# Patient Record
Sex: FEMALE | Race: WHITE | NOT HISPANIC OR LATINO | Employment: UNEMPLOYED | URBAN - METROPOLITAN AREA
[De-identification: names, ages, dates, MRNs, and addresses within clinical notes are randomized per-mention and may not be internally consistent; named-entity substitution may affect disease eponyms.]

---

## 2017-04-20 ENCOUNTER — HISTORICAL (OUTPATIENT)
Dept: LAB | Facility: HOSPITAL | Age: 57
End: 2017-04-20

## 2017-04-27 ENCOUNTER — HISTORICAL (OUTPATIENT)
Dept: RADIOLOGY | Facility: HOSPITAL | Age: 57
End: 2017-04-27

## 2017-06-16 ENCOUNTER — HISTORICAL (OUTPATIENT)
Dept: RADIOLOGY | Facility: HOSPITAL | Age: 57
End: 2017-06-16

## 2017-06-16 LAB
ABS NEUT (OLG): 4.4 X10(3)/MCL
ALBUMIN SERPL-MCNC: 3.3 GM/DL (ref 3.4–5)
ALBUMIN/GLOB SERPL: 1 {RATIO}
ALP SERPL-CCNC: 95 UNIT/L (ref 45–117)
ALT SERPL-CCNC: 86 UNIT/L (ref 13–56)
AMYLASE SERPL-CCNC: 25 UNIT/L (ref 25–115)
AST SERPL-CCNC: 62 UNIT/L (ref 15–37)
BASOPHILS # BLD AUTO: 0 X10(3)/MCL
BASOPHILS NFR BLD AUTO: 0.1 % (ref 0–1)
BILIRUB SERPL-MCNC: 0.5 MG/DL (ref 0.2–1)
BILIRUBIN DIRECT+TOT PNL SERPL-MCNC: 0.1 MG/DL (ref 0–0.2)
BILIRUBIN DIRECT+TOT PNL SERPL-MCNC: 0.4 MG/DL (ref 0–1)
BUN SERPL-MCNC: 5 MG/DL (ref 7–18)
CALCIUM SERPL-MCNC: 8.5 MG/DL (ref 8.5–10.1)
CHLORIDE SERPL-SCNC: 104 MMOL/L (ref 98–107)
CO2 SERPL-SCNC: 30 MMOL/L (ref 21–32)
CREAT SERPL-MCNC: 0.83 MG/DL (ref 0.55–1.02)
EOSINOPHIL # BLD AUTO: 0.1 X10(3)/MCL
EOSINOPHIL NFR BLD AUTO: 1.7 % (ref 0–6.4)
ERYTHROCYTE [DISTWIDTH] IN BLOOD BY AUTOMATED COUNT: 16.3 % (ref 11.5–14.8)
GLOBULIN SER-MCNC: 3 GM/DL (ref 2–4)
GLUCOSE SERPL-MCNC: 141 MG/DL (ref 74–106)
HCT VFR BLD AUTO: 49.4 % (ref 35.5–44.6)
HGB BLD-MCNC: 17.1 GM/DL (ref 12.1–15.4)
INR PPP: 1.1
LIPASE SERPL-CCNC: 156 UNIT/L (ref 73–393)
LYMPHOCYTES # BLD AUTO: 2.2 X10(3)/MCL
LYMPHOCYTES NFR BLD AUTO: 31.4 % (ref 16–44)
MCH RBC QN AUTO: 37.1 PG (ref 28.5–33.8)
MCHC RBC AUTO-ENTMCNC: 34.6 % (ref 33–37)
MCV RBC AUTO: 107.2 FL (ref 82–99)
MONOCYTES # BLD AUTO: 0.3 X10(3)/MCL
MONOCYTES NFR BLD AUTO: 4.4 % (ref 4–12.1)
NEUTROPHILS # BLD AUTO: 4.4 X10(3)/MCL
NEUTROPHILS NFR BLD AUTO: 62.3 % (ref 43–73)
PLATELET # BLD AUTO: 248 X10(3)/MCL (ref 136–369)
PMV BLD AUTO: 9.4 FL (ref 7.4–10.4)
POTASSIUM SERPL-SCNC: 3.1 MMOL/L (ref 3.5–5.1)
PROT SERPL-MCNC: 6.6 GM/DL (ref 6.4–8.2)
PROTHROMBIN TIME: 11.7 SECOND(S) (ref 9.8–12)
RBC # BLD AUTO: 4.61 X10(6)/MCL (ref 4–5.5)
SODIUM SERPL-SCNC: 142 MMOL/L (ref 136–145)
WBC # SPEC AUTO: 7.1 X10(3)/MCL (ref 4–10)

## 2017-07-03 ENCOUNTER — HISTORICAL (OUTPATIENT)
Dept: LAB | Facility: HOSPITAL | Age: 57
End: 2017-07-03

## 2017-07-03 LAB
IRON SATN MFR SERPL: 44.5 % (ref 20–50)
IRON SERPL-MCNC: 178 MCG/DL (ref 50–175)
TIBC SERPL-MCNC: 400 MCG/DL (ref 250–450)
TRANSFERRIN SERPL-MCNC: 319 MG/DL (ref 200–360)

## 2017-08-22 LAB
BILIRUB SERPL-MCNC: NEGATIVE MG/DL
BLOOD URINE, POC: NORMAL
CLARITY, POC UA: NORMAL
COLOR, POC UA: YELLOW
GLUCOSE UR QL STRIP: NEGATIVE
KETONES UR QL STRIP: NORMAL
LEUKOCYTE EST, POC UA: NORMAL
NITRITE, POC UA: POSITIVE
PH, POC UA: 6.5
PROTEIN, POC: NEGATIVE
SPECIFIC GRAVITY, POC UA: 1
UROBILINOGEN, POC UA: NORMAL

## 2019-06-13 ENCOUNTER — HISTORICAL (OUTPATIENT)
Dept: RADIOLOGY | Facility: HOSPITAL | Age: 59
End: 2019-06-13

## 2019-10-09 ENCOUNTER — HISTORICAL (OUTPATIENT)
Dept: RADIOLOGY | Facility: HOSPITAL | Age: 59
End: 2019-10-09

## 2019-10-09 LAB
BUN SERPL-MCNC: 8 MG/DL (ref 7–18)
CREAT SERPL-MCNC: 1.1 MG/DL (ref 0.6–1.3)

## 2019-10-10 ENCOUNTER — HISTORICAL (OUTPATIENT)
Dept: ADMINISTRATIVE | Facility: HOSPITAL | Age: 59
End: 2019-10-10

## 2019-10-10 LAB
ABS NEUT (OLG): 6.72 X10(3)/MCL (ref 2.1–9.2)
ALBUMIN SERPL-MCNC: 3.5 GM/DL (ref 3.4–5)
ALBUMIN/GLOB SERPL: 0.8 RATIO (ref 1.1–2)
ALP SERPL-CCNC: 103 UNIT/L (ref 45–117)
ALT SERPL-CCNC: 108 UNIT/L (ref 12–78)
APTT PPP: 28 SECOND(S) (ref 23.3–37)
AST SERPL-CCNC: 132 UNIT/L (ref 15–37)
BASOPHILS # BLD AUTO: 0 X10(3)/MCL (ref 0–0.2)
BASOPHILS NFR BLD AUTO: 0 %
BILIRUB SERPL-MCNC: 0.5 MG/DL (ref 0.2–1)
BILIRUBIN DIRECT+TOT PNL SERPL-MCNC: 0.2 MG/DL (ref 0–0.2)
BILIRUBIN DIRECT+TOT PNL SERPL-MCNC: 0.3 MG/DL
BUN SERPL-MCNC: 7 MG/DL (ref 7–18)
CALCIUM SERPL-MCNC: 9.2 MG/DL (ref 8.5–10.1)
CHLORIDE SERPL-SCNC: 98 MMOL/L (ref 98–107)
CO2 SERPL-SCNC: 27 MMOL/L (ref 21–32)
CREAT SERPL-MCNC: 0.8 MG/DL (ref 0.6–1.3)
EOSINOPHIL # BLD AUTO: 0.1 X10(3)/MCL (ref 0–0.9)
EOSINOPHIL NFR BLD AUTO: 1 %
ERYTHROCYTE [DISTWIDTH] IN BLOOD BY AUTOMATED COUNT: 13.4 % (ref 11.5–14.5)
EST. AVERAGE GLUCOSE BLD GHB EST-MCNC: 128 MG/DL
GLOBULIN SER-MCNC: 4.3 GM/ML (ref 2.3–3.5)
GLUCOSE SERPL-MCNC: 87 MG/DL (ref 74–106)
HBA1C MFR BLD: 6.1 % (ref 4.2–6.3)
HBV SURFACE AB SER-ACNC: <3.1 MIU/ML
HBV SURFACE AB SERPL IA-ACNC: NONREACTIVE M[IU]/ML
HCT VFR BLD AUTO: 49.3 % (ref 35–46)
HCV AB SERPL QL IA: NONREACTIVE
HGB BLD-MCNC: 16.4 GM/DL (ref 12–16)
HIV 1+2 AB+HIV1 P24 AG SERPL QL IA: NONREACTIVE
IMM GRANULOCYTES # BLD AUTO: 0.03 10*3/UL
IMM GRANULOCYTES NFR BLD AUTO: 0 %
INR PPP: 1.01 (ref 0.9–1.2)
LDH SERPL-CCNC: 194 UNIT/L (ref 84–246)
LYMPHOCYTES # BLD AUTO: 1.8 X10(3)/MCL (ref 0.6–4.6)
LYMPHOCYTES NFR BLD AUTO: 20 %
MCH RBC QN AUTO: 33 PG (ref 26–34)
MCHC RBC AUTO-ENTMCNC: 33.3 GM/DL (ref 31–37)
MCV RBC AUTO: 99.2 FL (ref 80–100)
MONOCYTES # BLD AUTO: 0.4 X10(3)/MCL (ref 0.1–1.3)
MONOCYTES NFR BLD AUTO: 5 %
NEUTROPHILS # BLD AUTO: 6.72 X10(3)/MCL (ref 2.1–9.2)
NEUTROPHILS NFR BLD AUTO: 73 %
PLATELET # BLD AUTO: 359 X10(3)/MCL (ref 130–400)
PMV BLD AUTO: 9.1 FL (ref 7.4–10.4)
POTASSIUM SERPL-SCNC: 4.4 MMOL/L (ref 3.5–5.1)
PROT SERPL-MCNC: 7.8 GM/DL (ref 6.4–8.2)
PROTHROMBIN TIME: 13.2 SECOND(S) (ref 11.9–14.4)
RBC # BLD AUTO: 4.97 X10(6)/MCL (ref 4–5.2)
SODIUM SERPL-SCNC: 134 MMOL/L (ref 136–145)
T PALLIDUM AB SER QL: NONREACTIVE
WBC # SPEC AUTO: 9.2 X10(3)/MCL (ref 4.5–11)

## 2019-10-17 ENCOUNTER — HISTORICAL (OUTPATIENT)
Dept: SURGERY | Facility: HOSPITAL | Age: 59
End: 2019-10-17

## 2021-04-20 ENCOUNTER — HISTORICAL (OUTPATIENT)
Dept: ADMINISTRATIVE | Facility: HOSPITAL | Age: 61
End: 2021-04-20

## 2021-04-20 LAB
ABS NEUT (OLG): 8.06 X10(3)/MCL (ref 2.1–9.2)
ALBUMIN SERPL-MCNC: 2.7 GM/DL (ref 3.4–4.8)
ALBUMIN/GLOB SERPL: 0.8 RATIO (ref 1.1–2)
ALP SERPL-CCNC: 224 UNIT/L (ref 40–150)
ALT SERPL-CCNC: 8 UNIT/L (ref 0–55)
AST SERPL-CCNC: 18 UNIT/L (ref 5–34)
BASOPHILS # BLD AUTO: 0.02 X10(3)/MCL (ref 0–0.2)
BASOPHILS NFR BLD AUTO: 0.2 % (ref 0–1)
BILIRUB SERPL-MCNC: 0.8 MG/DL (ref 0.2–1.2)
BILIRUBIN DIRECT+TOT PNL SERPL-MCNC: 0.4 MG/DL (ref 0–0.5)
BILIRUBIN DIRECT+TOT PNL SERPL-MCNC: 0.4 MG/DL (ref 0–0.8)
BUN SERPL-MCNC: 6.1 MG/DL (ref 9.8–20.1)
CALCIUM SERPL-MCNC: 8.8 MG/DL (ref 8.4–10.2)
CHLORIDE SERPL-SCNC: 99 MMOL/L (ref 98–107)
CHOLEST SERPL-MCNC: 83 MG/DL
CHOLEST/HDLC SERPL: 3 {RATIO} (ref 0–5)
CO2 SERPL-SCNC: 29 MMOL/L (ref 23–31)
CREAT SERPL-MCNC: 0.78 MG/DL (ref 0.57–1.11)
EOSINOPHIL # BLD AUTO: 0.02 X10(3)/MCL (ref 0–0.9)
EOSINOPHIL NFR BLD AUTO: 0.2 % (ref 0–6.4)
ERYTHROCYTE [DISTWIDTH] IN BLOOD BY AUTOMATED COUNT: 15.1 % (ref 11.5–17)
EST. AVERAGE GLUCOSE BLD GHB EST-MCNC: <96.8 MG/DL
FERRITIN SERPL-MCNC: 146.18 NG/ML (ref 4.63–204)
FOLATE SERPL-MCNC: 14.4 NG/ML (ref 7–31.4)
GLOBULIN SER-MCNC: 3.3 GM/DL (ref 2.4–3.5)
GLUCOSE SERPL-MCNC: 66 MG/DL (ref 82–115)
HBA1C MFR BLD: <5 %
HCT VFR BLD AUTO: 34.1 % (ref 37–47)
HDLC SERPL-MCNC: 32 MG/DL (ref 40–60)
HGB BLD-MCNC: 10.8 GM/DL (ref 12–16)
IMM GRANULOCYTES # BLD AUTO: 0.06 10*3/UL (ref 0–0.02)
IMM GRANULOCYTES NFR BLD AUTO: 0.5 % (ref 0–0.43)
IRON SATN MFR SERPL: 12 % (ref 20–50)
IRON SERPL-MCNC: 27 UG/DL (ref 50–170)
LDLC SERPL CALC-MCNC: 35 MG/DL (ref 50–140)
LYMPHOCYTES # BLD AUTO: 2.07 X10(3)/MCL (ref 0.6–4.6)
LYMPHOCYTES NFR BLD AUTO: 18.8 % (ref 16–44)
MACROCYTES BLD QL SMEAR: NORMAL
MAGNESIUM SERPL-MCNC: 2.06 MG/DL (ref 1.6–2.6)
MCH RBC QN AUTO: 35.3 PG (ref 27–31)
MCHC RBC AUTO-ENTMCNC: 31.7 GM/DL (ref 33–36)
MCV RBC AUTO: 111.4 FL (ref 80–94)
MONOCYTES # BLD AUTO: 0.77 X10(3)/MCL (ref 0.1–1.3)
MONOCYTES NFR BLD AUTO: 7 % (ref 4–12.1)
NEUTROPHILS # BLD AUTO: 8.06 X10(3)/MCL (ref 2.1–9.2)
NEUTROPHILS NFR BLD AUTO: 73.3 % (ref 43–73)
NRBC BLD AUTO-RTO: 0 % (ref 0–0.2)
PLATELET # BLD AUTO: 360 X10(3)/MCL (ref 130–400)
PLATELET # BLD EST: ADEQUATE 10*3/UL
PMV BLD AUTO: 9.4 FL (ref 7.4–10.4)
POLYCHROMASIA BLD QL SMEAR: SLIGHT
POTASSIUM SERPL-SCNC: 3 MMOL/L (ref 3.5–5.1)
PREALB SERPL-MCNC: 8 MG/DL (ref 14–37)
PROT SERPL-MCNC: 6 GM/DL (ref 5.8–7.6)
RBC # BLD AUTO: 3.06 X10(6)/MCL (ref 4.2–5.4)
SODIUM SERPL-SCNC: 138 MMOL/L (ref 136–145)
TIBC SERPL-MCNC: 194 UG/DL (ref 70–310)
TIBC SERPL-MCNC: 221 UG/DL (ref 250–450)
TRANSFERRIN SERPL-MCNC: 189 MG/DL (ref 173–360)
TRIGL SERPL-MCNC: 82 MG/DL (ref 0–150)
VLDLC SERPL CALC-MCNC: 16 MG/DL
WBC # SPEC AUTO: 11 X10(3)/MCL (ref 4.5–11.5)

## 2021-04-21 LAB
BUN SERPL-MCNC: 6.5 MG/DL (ref 9.8–20.1)
CALCIUM SERPL-MCNC: 8.5 MG/DL (ref 8.4–10.2)
CHLORIDE SERPL-SCNC: 105 MMOL/L (ref 98–107)
CO2 SERPL-SCNC: 28 MMOL/L (ref 23–31)
CREAT SERPL-MCNC: 0.75 MG/DL (ref 0.57–1.11)
CREAT/UREA NIT SERPL: 9
GLUCOSE SERPL-MCNC: 82 MG/DL (ref 82–115)
POTASSIUM SERPL-SCNC: 4.1 MMOL/L (ref 3.5–5.1)
SODIUM SERPL-SCNC: 138 MMOL/L (ref 136–145)

## 2021-04-26 LAB
ABS NEUT (OLG): 3.3 X10(3)/MCL (ref 2.1–9.2)
ALBUMIN SERPL-MCNC: 2.6 GM/DL (ref 3.4–4.8)
ALBUMIN/GLOB SERPL: 0.8 RATIO (ref 1.1–2)
ALP SERPL-CCNC: 232 UNIT/L (ref 40–150)
ALT SERPL-CCNC: 11 UNIT/L (ref 0–55)
AST SERPL-CCNC: 19 UNIT/L (ref 5–34)
BASOPHILS # BLD AUTO: 0.02 X10(3)/MCL (ref 0–0.2)
BASOPHILS NFR BLD AUTO: 0.4 % (ref 0–1)
BILIRUB SERPL-MCNC: 0.2 MG/DL (ref 0.2–1.2)
BILIRUBIN DIRECT+TOT PNL SERPL-MCNC: 0 MG/DL (ref 0–0.8)
BILIRUBIN DIRECT+TOT PNL SERPL-MCNC: 0.2 MG/DL (ref 0–0.5)
BUN SERPL-MCNC: 8.7 MG/DL (ref 9.8–20.1)
CALCIUM SERPL-MCNC: 9.1 MG/DL (ref 8.4–10.2)
CHLORIDE SERPL-SCNC: 104 MMOL/L (ref 98–107)
CO2 SERPL-SCNC: 28 MMOL/L (ref 23–31)
CREAT SERPL-MCNC: 0.8 MG/DL (ref 0.57–1.11)
EOSINOPHIL # BLD AUTO: 0.06 X10(3)/MCL (ref 0–0.9)
EOSINOPHIL NFR BLD AUTO: 1.1 % (ref 0–6.4)
ERYTHROCYTE [DISTWIDTH] IN BLOOD BY AUTOMATED COUNT: 14.2 % (ref 11.5–17)
GLOBULIN SER-MCNC: 3.4 GM/DL (ref 2.4–3.5)
GLUCOSE SERPL-MCNC: 113 MG/DL (ref 82–115)
HCT VFR BLD AUTO: 35.3 % (ref 37–47)
HGB BLD-MCNC: 11.1 GM/DL (ref 12–16)
HYPOCHROMIA BLD QL SMEAR: ABNORMAL
IMM GRANULOCYTES # BLD AUTO: 0.01 10*3/UL (ref 0–0.02)
IMM GRANULOCYTES NFR BLD AUTO: 0.2 % (ref 0–0.43)
LYMPHOCYTES # BLD AUTO: 1.58 X10(3)/MCL (ref 0.6–4.6)
LYMPHOCYTES NFR BLD AUTO: 29.6 % (ref 16–44)
MACROCYTES BLD QL SMEAR: ABNORMAL
MCH RBC QN AUTO: 34.6 PG (ref 27–31)
MCHC RBC AUTO-ENTMCNC: 31.4 GM/DL (ref 33–36)
MCV RBC AUTO: 110 FL (ref 80–94)
MONOCYTES # BLD AUTO: 0.37 X10(3)/MCL (ref 0.1–1.3)
MONOCYTES NFR BLD AUTO: 6.9 % (ref 4–12.1)
NEUTROPHILS # BLD AUTO: 3.3 X10(3)/MCL (ref 2.1–9.2)
NEUTROPHILS NFR BLD AUTO: 61.8 % (ref 43–73)
NRBC BLD AUTO-RTO: 0 % (ref 0–0.2)
PLATELET # BLD AUTO: 352 X10(3)/MCL (ref 130–400)
PLATELET # BLD EST: ADEQUATE 10*3/UL
PMV BLD AUTO: 9.2 FL (ref 7.4–10.4)
POTASSIUM SERPL-SCNC: 4 MMOL/L (ref 3.5–5.1)
PREALB SERPL-MCNC: 12.9 MG/DL (ref 14–37)
PROT SERPL-MCNC: 6 GM/DL (ref 5.8–7.6)
RBC # BLD AUTO: 3.21 X10(6)/MCL (ref 4.2–5.4)
RBC MORPH BLD: ABNORMAL
SODIUM SERPL-SCNC: 141 MMOL/L (ref 136–145)
WBC # SPEC AUTO: 5.3 X10(3)/MCL (ref 4.5–11.5)

## 2021-05-03 LAB
ABS NEUT (OLG): 4.03 X10(3)/MCL (ref 2.1–9.2)
ALBUMIN SERPL-MCNC: 2.6 GM/DL (ref 3.4–4.8)
ALBUMIN/GLOB SERPL: 0.7 RATIO (ref 1.1–2)
ALP SERPL-CCNC: 239 UNIT/L (ref 40–150)
ALT SERPL-CCNC: 13 UNIT/L (ref 0–55)
AST SERPL-CCNC: 19 UNIT/L (ref 5–34)
BASOPHILS # BLD AUTO: 0.02 X10(3)/MCL (ref 0–0.2)
BASOPHILS NFR BLD AUTO: 0.3 % (ref 0–1)
BILIRUB SERPL-MCNC: 0.2 MG/DL (ref 0.2–1.2)
BILIRUBIN DIRECT+TOT PNL SERPL-MCNC: 0.1 MG/DL (ref 0–0.5)
BILIRUBIN DIRECT+TOT PNL SERPL-MCNC: 0.1 MG/DL (ref 0–0.8)
BUN SERPL-MCNC: 6.1 MG/DL (ref 9.8–20.1)
CALCIUM SERPL-MCNC: 9.6 MG/DL (ref 8.4–10.2)
CHLORIDE SERPL-SCNC: 102 MMOL/L (ref 98–107)
CO2 SERPL-SCNC: 29 MMOL/L (ref 23–31)
CREAT SERPL-MCNC: 0.78 MG/DL (ref 0.57–1.11)
EOSINOPHIL # BLD AUTO: 0.04 X10(3)/MCL (ref 0–0.9)
EOSINOPHIL NFR BLD AUTO: 0.6 % (ref 0–6.4)
ERYTHROCYTE [DISTWIDTH] IN BLOOD BY AUTOMATED COUNT: 13.8 % (ref 11.5–17)
GLOBULIN SER-MCNC: 3.7 GM/DL (ref 2.4–3.5)
GLUCOSE SERPL-MCNC: 124 MG/DL (ref 82–115)
HCT VFR BLD AUTO: 38.4 % (ref 37–47)
HGB BLD-MCNC: 12 GM/DL (ref 12–16)
HYPOCHROMIA BLD QL SMEAR: ABNORMAL
IMM GRANULOCYTES # BLD AUTO: 0.02 10*3/UL (ref 0–0.02)
IMM GRANULOCYTES NFR BLD AUTO: 0.3 % (ref 0–0.43)
LYMPHOCYTES # BLD AUTO: 1.87 X10(3)/MCL (ref 0.6–4.6)
LYMPHOCYTES NFR BLD AUTO: 29.4 % (ref 16–44)
MACROCYTES BLD QL SMEAR: ABNORMAL
MCH RBC QN AUTO: 33.8 PG (ref 27–31)
MCHC RBC AUTO-ENTMCNC: 31.3 GM/DL (ref 33–36)
MCV RBC AUTO: 108.2 FL (ref 80–94)
MONOCYTES # BLD AUTO: 0.38 X10(3)/MCL (ref 0.1–1.3)
MONOCYTES NFR BLD AUTO: 6 % (ref 4–12.1)
NEUTROPHILS # BLD AUTO: 4.03 X10(3)/MCL (ref 2.1–9.2)
NEUTROPHILS NFR BLD AUTO: 63.4 % (ref 43–73)
NRBC BLD AUTO-RTO: 0 % (ref 0–0.2)
PLATELET # BLD AUTO: 380 X10(3)/MCL (ref 130–400)
PLATELET # BLD EST: ADEQUATE 10*3/UL
PMV BLD AUTO: 8.8 FL (ref 7.4–10.4)
POTASSIUM SERPL-SCNC: 3.7 MMOL/L (ref 3.5–5.1)
PREALB SERPL-MCNC: 13.4 MG/DL (ref 14–37)
PROT SERPL-MCNC: 6.3 GM/DL (ref 5.8–7.6)
RBC # BLD AUTO: 3.55 X10(6)/MCL (ref 4.2–5.4)
RBC MORPH BLD: ABNORMAL
SODIUM SERPL-SCNC: 143 MMOL/L (ref 136–145)
WBC # SPEC AUTO: 6.4 X10(3)/MCL (ref 4.5–11.5)

## 2022-02-15 ENCOUNTER — HISTORICAL (OUTPATIENT)
Dept: ADMINISTRATIVE | Facility: HOSPITAL | Age: 62
End: 2022-02-15

## 2022-02-15 LAB
APTT PPP: 34.4 S (ref 23.2–33.7)
BUN SERPL-MCNC: 7 MG/DL (ref 9.8–20.1)
CALCIUM SERPL-MCNC: 9.1 MG/DL (ref 8.7–10.5)
CHLORIDE SERPL-SCNC: 106 MMOL/L (ref 98–107)
CO2 SERPL-SCNC: 22 MMOL/L (ref 23–31)
CREAT SERPL-MCNC: 0.7 MG/DL (ref 0.55–1.02)
CREAT/UREA NIT SERPL: 10
GLUCOSE SERPL-MCNC: 98 MG/DL (ref 82–115)
HCT VFR BLD AUTO: 43 % (ref 37–47)
HEMOLYSIS INTERF INDEX SERPL-ACNC: 6
HGB BLD-MCNC: 14.7 G/DL (ref 12–16)
ICTERIC INTERF INDEX SERPL-ACNC: 0
INR PPP: 1.2 (ref 0–1.3)
LIPEMIC INTERF INDEX SERPL-ACNC: <0
POTASSIUM SERPL-SCNC: 4.1 MMOL/L (ref 3.5–5.1)
PROTHROMBIN TIME: 14.8 S (ref 12.5–14.5)
SARS-COV-2 AG RESP QL IA.RAPID: NEGATIVE
SODIUM SERPL-SCNC: 138 MMOL/L (ref 136–145)

## 2022-02-16 ENCOUNTER — HISTORICAL (OUTPATIENT)
Dept: ADMINISTRATIVE | Facility: HOSPITAL | Age: 62
End: 2022-02-16

## 2022-02-23 ENCOUNTER — HISTORICAL (OUTPATIENT)
Dept: ADMINISTRATIVE | Facility: HOSPITAL | Age: 62
End: 2022-02-23

## 2022-02-23 LAB
ABS NEUT (OLG): 11.48 (ref 2.1–9.2)
ALBUMIN SERPL-MCNC: 3.2 G/DL (ref 3.4–4.8)
ALBUMIN/GLOB SERPL: 1 {RATIO} (ref 1.1–2)
ALP SERPL-CCNC: 171 U/L (ref 40–150)
ALT SERPL-CCNC: 121 U/L (ref 0–55)
AST SERPL-CCNC: 119 U/L (ref 5–34)
BASOPHILS # BLD AUTO: 0.02 10*3/UL (ref 0–0.2)
BASOPHILS NFR BLD AUTO: 0.1 % (ref 0–1)
BILIRUB SERPL-MCNC: 0.7 MG/DL (ref 0.2–1.2)
BILIRUBIN DIRECT+TOT PNL SERPL-MCNC: 0.3 (ref 0–0.8)
BILIRUBIN DIRECT+TOT PNL SERPL-MCNC: 0.4 (ref 0–0.5)
BUN SERPL-MCNC: 11.2 MG/DL (ref 9.8–20.1)
CALCIUM SERPL-MCNC: 8.6 MG/DL (ref 8.4–10.2)
CHLORIDE SERPL-SCNC: 97 MMOL/L (ref 98–107)
CHOLEST SERPL-MCNC: 120 MG/DL
CHOLEST/HDLC SERPL: 2 {RATIO} (ref 0–5)
CO2 SERPL-SCNC: 29 MMOL/L (ref 23–31)
CREAT SERPL-MCNC: 0.66 MG/DL (ref 0.57–1.11)
EOSINOPHIL # BLD AUTO: 0.04 10*3/UL (ref 0–0.9)
EOSINOPHIL NFR BLD AUTO: 0.3 % (ref 0–6.4)
ERYTHROCYTE [DISTWIDTH] IN BLOOD BY AUTOMATED COUNT: 14.6 % (ref 11.5–17)
EST. AVERAGE GLUCOSE BLD GHB EST-MCNC: 114 MG/DL
FERRITIN SERPL-MCNC: 335.42 NG/ML (ref 4.63–204)
GLOBULIN SER-MCNC: 3.2 G/DL (ref 2.4–3.5)
GLUCOSE SERPL-MCNC: 89 MG/DL (ref 82–115)
HBA1C MFR BLD: 5.6 %
HCT VFR BLD AUTO: 33.7 % (ref 37–47)
HDLC SERPL-MCNC: 51 MG/DL (ref 40–60)
HEMOLYSIS INTERF INDEX SERPL-ACNC: 6
HEMOLYSIS INTERF INDEX SERPL-ACNC: 6
HGB BLD-MCNC: 11.5 G/DL (ref 12–16)
ICTERIC INTERF INDEX SERPL-ACNC: 0
IMM GRANULOCYTES # BLD AUTO: 0.07 10*3/UL (ref 0–0.02)
IMM GRANULOCYTES NFR BLD AUTO: 0.5 % (ref 0–0.43)
IRON SATN MFR SERPL: 23 % (ref 20–50)
IRON SERPL-MCNC: 63 UG/DL (ref 50–170)
LDLC SERPL CALC-MCNC: 29 MG/DL (ref 50–140)
LIPEMIC INTERF INDEX SERPL-ACNC: 3
LYMPHOCYTES # BLD AUTO: 1.38 10*3/UL (ref 0.6–4.6)
LYMPHOCYTES NFR BLD AUTO: 9.7 % (ref 16–44)
MAGNESIUM SERPL-MCNC: 2.1 MG/DL (ref 1.6–2.6)
MANUAL DIFF? (OHS): NO
MCH RBC QN AUTO: 33.5 PG (ref 27–31)
MCHC RBC AUTO-ENTMCNC: 34.1 G/DL (ref 33–36)
MCV RBC AUTO: 98.3 FL (ref 80–94)
MONOCYTES # BLD AUTO: 1.18 10*3/UL (ref 0.1–1.3)
MONOCYTES NFR BLD AUTO: 8.3 % (ref 4–12.1)
NEUTROPHILS # BLD AUTO: 11.48 10*3/UL (ref 2.1–9.2)
NEUTROPHILS NFR BLD AUTO: 81.1 % (ref 43–73)
NRBC BLD AUTO-RTO: 0 % (ref 0–0.2)
PLATELET # BLD AUTO: 225 10*3/UL (ref 130–400)
PMV BLD AUTO: 9.9 FL (ref 7.4–10.4)
POTASSIUM SERPL-SCNC: 3.2 MMOL/L (ref 3.5–5.1)
PREALB SERPL-MCNC: 19.5 (ref 14–37)
PROT SERPL-MCNC: 6.4 G/DL (ref 5.8–7.6)
RBC # BLD AUTO: 3.43 10*6/UL (ref 4.2–5.4)
SODIUM SERPL-SCNC: 136 MMOL/L (ref 136–145)
TIBC SERPL-MCNC: 208 UG/DL (ref 70–310)
TIBC SERPL-MCNC: 271 UG/DL (ref 250–450)
TRANSFERRIN SERPL-MCNC: 252 MG/DL (ref 173–360)
TRIGL SERPL-MCNC: 200 MG/DL (ref 0–150)
VLDLC SERPL CALC-MCNC: 40 MG/DL
WBC # SPEC AUTO: 14.2 10*3/UL (ref 4.5–11.5)

## 2022-02-24 LAB
ABS NEUT (OLG): 8.91 (ref 2.1–9.2)
ALBUMIN SERPL-MCNC: 2.6 G/DL (ref 3.4–4.8)
ALBUMIN/GLOB SERPL: 1 {RATIO} (ref 1.1–2)
ALP SERPL-CCNC: 153 U/L (ref 40–150)
ALT SERPL-CCNC: 82 U/L (ref 0–55)
AST SERPL-CCNC: 48 U/L (ref 5–34)
BASOPHILS # BLD AUTO: 0 10*3/UL (ref 0–0.2)
BASOPHILS NFR BLD AUTO: 0 % (ref 0–1)
BILIRUB SERPL-MCNC: 0.6 MG/DL (ref 0.2–1.2)
BILIRUBIN DIRECT+TOT PNL SERPL-MCNC: 0.3 (ref 0–0.5)
BILIRUBIN DIRECT+TOT PNL SERPL-MCNC: 0.3 (ref 0–0.8)
BUN SERPL-MCNC: 10.2 MG/DL (ref 9.8–20.1)
CALCIUM SERPL-MCNC: 8 MG/DL (ref 8.4–10.2)
CHLORIDE SERPL-SCNC: 99 MMOL/L (ref 98–107)
CO2 SERPL-SCNC: 26 MMOL/L (ref 23–31)
CREAT SERPL-MCNC: 0.64 MG/DL (ref 0.57–1.11)
EOSINOPHIL # BLD AUTO: 0.13 10*3/UL (ref 0–0.9)
EOSINOPHIL NFR BLD AUTO: 1.1 % (ref 0–6.4)
ERYTHROCYTE [DISTWIDTH] IN BLOOD BY AUTOMATED COUNT: 14.9 % (ref 11.5–17)
GLOBULIN SER-MCNC: 2.7 G/DL (ref 2.4–3.5)
GLUCOSE SERPL-MCNC: 104 MG/DL (ref 82–115)
HCT VFR BLD AUTO: 29.1 % (ref 37–47)
HEMOLYSIS INTERF INDEX SERPL-ACNC: 1
HGB BLD-MCNC: 9.8 G/DL (ref 12–16)
ICTERIC INTERF INDEX SERPL-ACNC: 0
IMM GRANULOCYTES # BLD AUTO: 0.08 10*3/UL (ref 0–0.02)
IMM GRANULOCYTES NFR BLD AUTO: 0.7 % (ref 0–0.43)
LIPEMIC INTERF INDEX SERPL-ACNC: -3
LYMPHOCYTES # BLD AUTO: 1.37 10*3/UL (ref 0.6–4.6)
LYMPHOCYTES NFR BLD AUTO: 11.8 % (ref 16–44)
MANUAL DIFF? (OHS): NO
MCH RBC QN AUTO: 33.6 PG (ref 27–31)
MCHC RBC AUTO-ENTMCNC: 33.7 G/DL (ref 33–36)
MCV RBC AUTO: 99.7 FL (ref 80–94)
MONOCYTES # BLD AUTO: 1.13 10*3/UL (ref 0.1–1.3)
MONOCYTES NFR BLD AUTO: 9.7 % (ref 4–12.1)
NEUTROPHILS # BLD AUTO: 8.91 10*3/UL (ref 2.1–9.2)
NEUTROPHILS NFR BLD AUTO: 76.7 % (ref 43–73)
NRBC BLD AUTO-RTO: 0 % (ref 0–0.2)
PLATELET # BLD AUTO: 202 10*3/UL (ref 130–400)
PMV BLD AUTO: 9.8 FL (ref 7.4–10.4)
POTASSIUM SERPL-SCNC: 3.2 MMOL/L (ref 3.5–5.1)
PROT SERPL-MCNC: 5.3 G/DL (ref 5.8–7.6)
RBC # BLD AUTO: 2.92 10*6/UL (ref 4.2–5.4)
SODIUM SERPL-SCNC: 134 MMOL/L (ref 136–145)
WBC # SPEC AUTO: 11.6 10*3/UL (ref 4.5–11.5)

## 2022-02-25 LAB
ALBUMIN SERPL-MCNC: 2.8 G/DL (ref 3.4–4.8)
ALBUMIN/GLOB SERPL: 0.9 {RATIO} (ref 1.1–2)
ALP SERPL-CCNC: 162 U/L (ref 40–150)
ALT SERPL-CCNC: 70 U/L (ref 0–55)
AST SERPL-CCNC: 35 U/L (ref 5–34)
BILIRUB SERPL-MCNC: 0.7 MG/DL (ref 0.2–1.2)
BILIRUBIN DIRECT+TOT PNL SERPL-MCNC: 0.3 (ref 0–0.8)
BILIRUBIN DIRECT+TOT PNL SERPL-MCNC: 0.4 (ref 0–0.5)
BUN SERPL-MCNC: 6.5 MG/DL (ref 9.8–20.1)
CALCIUM SERPL-MCNC: 8.7 MG/DL (ref 8.4–10.2)
CHLORIDE SERPL-SCNC: 102 MMOL/L (ref 98–107)
CO2 SERPL-SCNC: 26 MMOL/L (ref 23–31)
CREAT SERPL-MCNC: 0.58 MG/DL (ref 0.57–1.11)
GLOBULIN SER-MCNC: 3.2 G/DL (ref 2.4–3.5)
GLUCOSE SERPL-MCNC: 100 MG/DL (ref 82–115)
HCT VFR BLD AUTO: 27.4 % (ref 37–47)
HEMOLYSIS INTERF INDEX SERPL-ACNC: 4
HGB BLD-MCNC: 8.9 G/DL (ref 12–16)
ICTERIC INTERF INDEX SERPL-ACNC: 1
LIPEMIC INTERF INDEX SERPL-ACNC: -4
POTASSIUM SERPL-SCNC: 4.5 MMOL/L (ref 3.5–5.1)
PROT SERPL-MCNC: 6 G/DL (ref 5.8–7.6)
SODIUM SERPL-SCNC: 135 MMOL/L (ref 136–145)

## 2022-02-26 LAB
ABS NEUT (OLG): 4.87 (ref 2.1–9.2)
BASOPHILS # BLD AUTO: 0.01 10*3/UL (ref 0–0.2)
BASOPHILS NFR BLD AUTO: 0.1 % (ref 0–1)
EOSINOPHIL # BLD AUTO: 0.03 10*3/UL (ref 0–0.9)
EOSINOPHIL NFR BLD AUTO: 0.4 % (ref 0–6.4)
ERYTHROCYTE [DISTWIDTH] IN BLOOD BY AUTOMATED COUNT: 15.7 % (ref 11.5–17)
HCT VFR BLD AUTO: 26.7 % (ref 37–47)
HCT VFR BLD AUTO: 28.8 % (ref 37–47)
HGB BLD-MCNC: 8.8 G/DL (ref 12–16)
HGB BLD-MCNC: 9.3 G/DL (ref 12–16)
IMM GRANULOCYTES # BLD AUTO: 0.05 10*3/UL (ref 0–0.02)
IMM GRANULOCYTES NFR BLD AUTO: 0.7 % (ref 0–0.43)
LACTATE SERPL-SCNC: 1.5 MMOL/L (ref 0.5–2.2)
LYMPHOCYTES # BLD AUTO: 1.52 10*3/UL (ref 0.6–4.6)
LYMPHOCYTES NFR BLD AUTO: 20.6 % (ref 16–44)
MANUAL DIFF? (OHS): NO
MCH RBC QN AUTO: 33.6 PG (ref 27–31)
MCHC RBC AUTO-ENTMCNC: 33 G/DL (ref 33–36)
MCV RBC AUTO: 101.9 FL (ref 80–94)
MONOCYTES # BLD AUTO: 0.9 10*3/UL (ref 0.1–1.3)
MONOCYTES NFR BLD AUTO: 12.2 % (ref 4–12.1)
NEUTROPHILS # BLD AUTO: 4.87 10*3/UL (ref 2.1–9.2)
NEUTROPHILS NFR BLD AUTO: 66 % (ref 43–73)
NRBC BLD AUTO-RTO: 0 % (ref 0–0.2)
PLATELET # BLD AUTO: 254 10*3/UL (ref 130–400)
PMV BLD AUTO: 9.5 FL (ref 7.4–10.4)
RBC # BLD AUTO: 2.62 10*6/UL (ref 4.2–5.4)
WBC # SPEC AUTO: 7.4 10*3/UL (ref 4.5–11.5)

## 2022-02-27 LAB
ALBUMIN SERPL-MCNC: 2.5 G/DL (ref 3.4–4.8)
ALBUMIN/GLOB SERPL: 0.8 {RATIO} (ref 1.1–2)
ALP SERPL-CCNC: 162 U/L (ref 40–150)
ALT SERPL-CCNC: 47 U/L (ref 0–55)
APPEARANCE, UA: CLEAR
AST SERPL-CCNC: 21 U/L (ref 5–34)
BILIRUB SERPL-MCNC: 0.3 MG/DL (ref 0.2–1.2)
BILIRUB UR QL STRIP.AUTO: NEGATIVE
BILIRUB UR QL STRIP: NEGATIVE
BILIRUBIN DIRECT+TOT PNL SERPL-MCNC: 0.1 (ref 0–0.8)
BILIRUBIN DIRECT+TOT PNL SERPL-MCNC: 0.2 (ref 0–0.5)
BUN SERPL-MCNC: 8.4 MG/DL (ref 9.8–20.1)
CALCIUM SERPL-MCNC: 8.4 MG/DL (ref 8.4–10.2)
CHLORIDE SERPL-SCNC: 103 MMOL/L (ref 98–107)
CO2 SERPL-SCNC: 23 MMOL/L (ref 23–31)
COLOR UR: YELLOW
CREAT SERPL-MCNC: 0.71 MG/DL (ref 0.57–1.11)
DO MICRO?: NO
GLOBULIN SER-MCNC: 3 G/DL (ref 2.4–3.5)
GLUCOSE (UA): NEGATIVE
GLUCOSE SERPL-MCNC: 108 MG/DL (ref 82–115)
GLUCOSE UR QL STRIP.AUTO: NEGATIVE
HEMOLYSIS INTERF INDEX SERPL-ACNC: 2
HGB UR QL STRIP: NEGATIVE
ICTERIC INTERF INDEX SERPL-ACNC: 0
KETONES UR QL STRIP.AUTO: NEGATIVE
KETONES UR QL STRIP: NEGATIVE
LEUKOCYTE ESTERASE UR QL STRIP.AUTO: NEGATIVE
LEUKOCYTE ESTERASE UR QL STRIP: NEGATIVE
LIPEMIC INTERF INDEX SERPL-ACNC: -2
NITRITE UR QL STRIP: NEGATIVE
PH UR STRIP: 7 [PH] (ref 5–7)
POTASSIUM SERPL-SCNC: 4 MMOL/L (ref 3.5–5.1)
PROT SERPL-MCNC: 5.5 G/DL (ref 5.8–7.6)
PROT UR QL STRIP.AUTO: NEGATIVE
PROT UR QL STRIP: NEGATIVE
RBC UR QL AUTO: NEGATIVE
SODIUM SERPL-SCNC: 135 MMOL/L (ref 136–145)
SP GR UR STRIP: 1.01 (ref 1–1.03)
UROBILINOGEN UR STRIP-ACNC: 1
UROBILINOGEN UR STRIP-ACNC: NEGATIVE

## 2022-02-28 LAB
ABS NEUT (OLG): 4.74 (ref 2.1–9.2)
ALBUMIN SERPL-MCNC: 2.5 G/DL (ref 3.4–4.8)
ALBUMIN/GLOB SERPL: 0.8 {RATIO} (ref 1.1–2)
ALP SERPL-CCNC: 177 U/L (ref 40–150)
ALT SERPL-CCNC: 41 U/L (ref 0–55)
AST SERPL-CCNC: 25 U/L (ref 5–34)
BASOPHILS # BLD AUTO: 0.01 10*3/UL (ref 0–0.2)
BASOPHILS NFR BLD AUTO: 0.1 % (ref 0–1)
BILIRUB SERPL-MCNC: 0.4 MG/DL (ref 0.2–1.2)
BILIRUBIN DIRECT+TOT PNL SERPL-MCNC: 0.2 (ref 0–0.5)
BILIRUBIN DIRECT+TOT PNL SERPL-MCNC: 0.2 (ref 0–0.8)
BUN SERPL-MCNC: 4.6 MG/DL (ref 9.8–20.1)
CALCIUM SERPL-MCNC: 8.5 MG/DL (ref 8.4–10.2)
CHLORIDE SERPL-SCNC: 100 MMOL/L (ref 98–107)
CO2 SERPL-SCNC: 27 MMOL/L (ref 23–31)
CREAT SERPL-MCNC: 0.63 MG/DL (ref 0.57–1.11)
EOSINOPHIL # BLD AUTO: 0.02 10*3/UL (ref 0–0.9)
EOSINOPHIL NFR BLD AUTO: 0.3 % (ref 0–6.4)
ERYTHROCYTE [DISTWIDTH] IN BLOOD BY AUTOMATED COUNT: 15.9 % (ref 11.5–17)
GLOBULIN SER-MCNC: 3.1 G/DL (ref 2.4–3.5)
GLUCOSE SERPL-MCNC: 104 MG/DL (ref 82–115)
HCT VFR BLD AUTO: 24.7 % (ref 37–47)
HEMOLYSIS INTERF INDEX SERPL-ACNC: 8
HGB BLD-MCNC: 8.1 G/DL (ref 12–16)
ICTERIC INTERF INDEX SERPL-ACNC: 0
IMM GRANULOCYTES # BLD AUTO: 0.05 10*3/UL (ref 0–0.02)
IMM GRANULOCYTES NFR BLD AUTO: 0.7 % (ref 0–0.43)
LIPEMIC INTERF INDEX SERPL-ACNC: -3
LYMPHOCYTES # BLD AUTO: 1.36 10*3/UL (ref 0.6–4.6)
LYMPHOCYTES NFR BLD AUTO: 18.9 % (ref 16–44)
MANUAL DIFF? (OHS): NO
MCH RBC QN AUTO: 33.2 PG (ref 27–31)
MCHC RBC AUTO-ENTMCNC: 32.8 G/DL (ref 33–36)
MCV RBC AUTO: 101.2 FL (ref 80–94)
MONOCYTES # BLD AUTO: 1 10*3/UL (ref 0.1–1.3)
MONOCYTES NFR BLD AUTO: 13.9 % (ref 4–12.1)
NEUTROPHILS # BLD AUTO: 4.74 10*3/UL (ref 2.1–9.2)
NEUTROPHILS NFR BLD AUTO: 66.1 % (ref 43–73)
NRBC BLD AUTO-RTO: 0 % (ref 0–0.2)
PLATELET # BLD AUTO: 281 10*3/UL (ref 130–400)
PMV BLD AUTO: 9.6 FL (ref 7.4–10.4)
POTASSIUM SERPL-SCNC: 3.9 MMOL/L (ref 3.5–5.1)
PREALB SERPL-MCNC: 11.2 (ref 14–37)
PROT SERPL-MCNC: 5.6 G/DL (ref 5.8–7.6)
RBC # BLD AUTO: 2.44 10*6/UL (ref 4.2–5.4)
SODIUM SERPL-SCNC: 135 MMOL/L (ref 136–145)
WBC # SPEC AUTO: 7.2 10*3/UL (ref 4.5–11.5)

## 2022-03-03 LAB — DEPRECATED CALCIDIOL+CALCIFEROL SERPL-MC: 5.7 NG/ML (ref 30–80)

## 2022-04-11 ENCOUNTER — HISTORICAL (OUTPATIENT)
Dept: ADMINISTRATIVE | Facility: HOSPITAL | Age: 62
End: 2022-04-11
Payer: MEDICAID

## 2022-04-29 VITALS
HEIGHT: 63 IN | SYSTOLIC BLOOD PRESSURE: 127 MMHG | DIASTOLIC BLOOD PRESSURE: 77 MMHG | BODY MASS INDEX: 30.2 KG/M2 | WEIGHT: 170.44 LBS | OXYGEN SATURATION: 96 %

## 2022-05-05 NOTE — HISTORICAL [facility] CERNER
This is a historical note converted from Chau. Formatting and pictures may have been removed.  Please reference Chau for original formatting and attached multimedia. Patient has been examined this morning and she is feeling well. States that she started to have small amount of vaginal soptting 4 days ago but otherwise without complaint: no weakness, nausea, dizziness. No new ER visits or medications.  She is feeling well today without any complaints. Can describe scheduled procedure in her own words.  Presents today with her sister Roro, #[phone].  ?   Of note, was seen in Cardiology clinic 10/10: Patient has greater than or equal to 4 METS of activity.? Patient is at?low?cardiac risk for?low risk surgery.? Further cardiac work-up is not indicated at this time.  ?  PE:  Vitals:  /96  HR 61  RR 20  O2 95% on RA  T 36.1  ?   EKG 10/10: NSR, possible old anetrior infarct  ?   General: NAD, A/Ox3. Well appearing.  Respiratory: normal work of breathing  Cardiovascular: RRR  Abdomen: + stable distension as during preop exam, + enlarged liver, nontender  Extremities: no edema, no calf tenderness  ?   Plan: To OR for scheduled cold knife conization.  Will await TVUS for further PMB workup. Not yet scheduled.  ?   Lise Magdaleno MD  [facility], PGY-3  ?  ?  ?  ?  ?  ?  ?  ?Chief Complaint  ?  Preop  History of Present Illness  60yo  with persistent cervical dysplasia presenting for preoperative visit for cold knife conization. Cervical dysplasia history outlined below. Pt without any complaints today. Pt states that last menses was approx age 49 but she did have one episode in 2018 of post menopausal bleeding: no cramping, noted on wiping, never noticed on pantyliner. Was also noted on her pelvic exam by her PCP as per patient. No repeat episodes since.  ?  Of note, pt with history of chronic alcohol abuse, fatty liver?and recent Abdominal US in 2019 demonstrating hepatomegaly and lobulation at  mid pole of R kidney for which renal cell carcinoma cannot be excluded- pt states that she was seen by [facility] on 9/26 and today presented for CT Neck and MRI abdomen for further workup. Upon review of imaging, no evidence of renal masses and CT neck with evidence of?sialolithiasis.??  ?  She was also evaluated by Cardiology in 08/2017 for elevated Troponin (peak .236 ng/mL), echocardiogram at this time was significant for mild mitral regurgitation, trace tricuspid regurgitation, mild concentric left ventricular hypertrophy, diastolic dysfunction and EF 65%. She has not seen Cardiology since then.  ?  Seeing Dr. Vogt, her PCP on 10/15. Has not yet obtained appt with Cardiology clinic for preoperative assessment.  ?  Dysplasia History:  5/2018: Pap: LSIL, cannot rule out high grade lesion; HPV HR+.  6/2019: HSIL, with moderate to severe dysplasia (CIS vs CIN2/3)  ?  GynHx:  LMP @ age 49  Denying history of STIs  Previous forms of contraception: OCPs  ?  ObHx:  FTSVD (7#12)  ?  Social:  + tobacco use: 1.5 PPD x 22-25 years (1-1.5 PPD all those years)  + chronic alcohol use- started age 23- notes that she started drinking heavily after her  passed away. Drinks >6 pack daily: only drinking beer at this time. Used to drink whiskey.  + former crack cocaine use - has not used in 5 years. Denying IVDU. Denying other recreational drugs.  Granddaughter will drive her to and from surgery  Review of Systems  Constitutional:?no fever, fatigue, weakness  Eye:?no vision loss, eye redness  ENMT:?no sore throat, ear pain  Respiratory:?no cough, no wheezing, + chronic SOB  Cardiovascular:?no chest pain, no palpitations, no edema  Gastrointestinal:?no nausea, vomiting, or diarrhea. Endorsing abdominal fullness.  Genitourinary:?no dysuria, no hematuria  Hema/Lymph:?no abnormal bruising or bleeding  Endocrine:?no heat or cold intolerance  Musculoskeletal:? no joint swelling  Integumentary:?no skin rash or abnormal  lesion  Neurologic: no headache, no dizziness, no weakness or numbness  Physical Exam  ???Vitals & Measurements  ??T:?36.8? ?C (Oral)? HR:?70(Peripheral)? RR:?20? BP:?132/86? SpO2:?96%?  ??HT:?160?cm? WT:?77.5?kg? BMI:?30.27?  General: NAD, A/Ox3.  Head: Atraumatic, normocephalic  Eyes: EOMI, No scleral icterus, normal conjunctivae  Respiratory: CTAB  Cardiovascular: RRR  Abdomen: significantly distended, + enlarged liver, +tympanic, nontender. No evidence of caput medusae on physical exam.  Skin: No rashes, warm and?dry.  Extremities: no edema, no calf tenderness.?  ?   Exam:  External genitalia: Unremarkable external female genitalia, no masses/lesions. Normal appearing urethral meatus. Normal appearing external anus.  Speculum exam: Vaginal vault without discharge, non odorous. Vaginal mucosa unremarkable, moist. Pale with loss of rugae. No masses/lesions. Cervix well visualized, posterior, flush with vaginal vault.  Bimanual exam with approx 0.5cm of posterior lip of cervix, <0.5cm anterior lip of cervix. No gross lesions or masses, no bleeding.  Uterine size not possible to assess 2/2 abdominal distension  Assessment/Plan  ?  58yo with persistent severe cervical dysplasia scheduled for CKC on 10/17/2019. Also endorsing one isolated episode of PMB in 2018, only noted on wiping. Pt is a poor candidate for general anesthesia at this time given h/o chronic tobacco use and potential cardiac ischemia not fully worked up or followed by cardiology since 2017. Will refer to Cardiology clinic- appt scheduled for tom 10/10. Will plan at this time to proceed with CKC under MAC anesthesia on 10/17 given concern for severe cervical dysplasia vs CIS on pap smear. Will obtain TVUS to assess for endometrial/uterine abnormalities.  ?  PMH significant for chronic alcohol abuse with hepatomegaly, HTN, former crack cocaine abuse, tobacco use, esophageal stricture, Class I Obesity. Pt also with h/o elevated troponin level in 2017  requiring Cardiology workup which was negative for acute coronary syndrome, echo at that time with EF 65%.  ?  Reviewed risks and indication for?cold knife conization with patient.?We discussed the risk that not all of the abnormal cells are removed, and the possible?indication for repeat excision or hysterectomy if repeat excision is not feasible. Alternatives to this planned procedure were explained to the patient, including continued monitoring vs LEEP.? This procedure and its risks, benefits, complications (including injury to bowel, bladder, major blood vessel, ureter, bleeding, possibility of transfusion, infection, scarring, dyspareunia, erosion, further surgery, incontinence, failure of the procedure, or fistula formation). Additional risks specific to this patient and procedure include damage to rectum/bladder and leaving behind abnormal cells.?  ?  Patient counseled on risk of blood transfusion including but not limited to allergic reaction, transmission of HIV, Hep C 1:2 million, transmission of Hep B 1:250,000.  ?  Discussed?smoking cessation with patient.?Emphasized that?tobacco cessation would help prevent postoperative complications. Also discussed with the patient that cessation would help prevent future cardiovascular disease and decrease her risk of cancer. She expressed understanding.  ?  Discussed typical postoperative course and expectations for postoperative pain.?Discussed return precautions and importance of postoperative visit at which point pathology would be reviewed.  ?   [facility]ACE Appointment requested, surgical consents signed.  Pre-op labs?ordered:?T&S, PT/INR, PTT, CBC, CMP, A1c,?HIV, HepC Ab, HepBSAg, ?RPR; TVUS pending EKG, CXR  Labs AM of surgery: None  Meds?AM of surgery: Propranolol. Ok to continue aspirin perioperatively. ?  NPO after midnight.  SCDs and Heparin?for DVT prophylaxis. Caprini score?5 (high DVT risk) Problem List/Past Medical History  Ongoing  ??Alcohol  abuse  ?Alcoholic fatty liver  ?Ascites  ?Cervical dysplasia  ?Chronic GERD  ?Depression  ?Diabetes  ?Enlarged liver  ?Esophageal stricture  ?Hypertension  ?Obesity  ?Preop examination  ?Tobacco user  Historical  ??Pregnant  Procedure/Surgical History  ?EGD (06/15/2017)  Cervical spinal fusion (04/04/2015)  Colonoscopy (04/04/1995)  Appendectomy (1973)  Breast augmentation   ?  Medications  ?aspirin 81 mg oral Delayed Release (EC) tablet, 81 mg= 1 tab(s), Oral, Daily  ?escitalopram 10 mg oral tablet, See Instructions, 5 refills  ?glipiZIDE 5 mg oral tablet, 5 mg= 1 tab(s), Oral, Daily  ?losartan 100 mg oral tablet, 100 mg= 1 tab(s), Oral, Daily  ?propranolol 20 mg oral tablet, 20 mg= 1 tab(s), Oral, BID, 2 refills  Allergies  Demerol HCl?(rash)  Social History  Abuse/Neglect  ?No, No, Yes, 09/27/2019  Alcohol  ?Current, Beer, Daily, Alcohol use interferes with work or home: No. Others hurt by drinking: No. Household alcohol concerns: No., 09/27/2019  ?Current, Liquor, Several times per day, Started age 23 Years. Previous treatment: None. Alcohol use interferes with work or home: No. Drinks more than intended: No. Others hurt by drinking: No. Ready to change: No. 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 Exercise frequency: 3-4 times/week. Self assessment: Poor condition. Exercise type: Aerobics., 04/20/2017  Home/Environment  ?Lives with Children. Living situation: Home/Independent., 04/20/2017  Nutrition/Health  ?Regular, 04/20/2017  Sexual  ?Sexually active: Yes., 04/20/2017  Substance Use  ?Past, Cocaine, 3-5 times per week, Previous treatment: Treatment center. IV drug use: No. Drug use interferes with work/home: Yes. Ready to change: Yes. Household substance abuse concerns: No., 04/20/2017  Tobacco  ?10 or more cigarettes (1/2 pack or more)/day in last 30 days, Cigarettes, No, 20 per day.,  09/27/2019  Family History  ?CAD - Coronary artery disease: Mother.  ?COPD (chronic obstructive pulmonary disease) 27-APR-2016 23:37:36<$>: Mother.  ?Hypertension.: Mother.  Denying breast/ovarian/cervical/endometrial/colon cancer  Lab Results  ?  Scanned into media:  4/2019:  5.6>16.4/49.7<334,   CMP: , Cr 0.71, GFR 92, K+ 5.1  A1c 6.8  UA: wnl  ?  5/2018:  HIV/RPR/HepB/HepC neg  Diagnostic Results  (10/09/2019 09:31 CDT MRI Abdomen W W/O Contrast)  FINDINGS: Kidneys show symmetric enhancement. No enhancing renal  lesion or hydronephrosis identified.  ?  The liver is mildly enlarged with generalized steatosis. No defined gallstone or biliary dilatation. The SMV, splenic vein and main portal vein are patent. The spleen, pancreas and adrenal glands are within  normal limits.  ?  No ascites or abdominal lymphadenopathy. Partially visualized bilateral breast implants. Small area of signal change and enhancement involving the posterior lateral aspect of the inferior left rib,  likely a healing rib fracture.  ?  IMPRESSION:?  1. No suspicious renal lesion identified.  2. Hepatomegaly with hepatic steatosis. [1]   ?  [1]?MRI Abdomen W W/O Contrast; Carlos Eduardo AGARWAL, Reji CLEMENTS 10/09/2019 09:31 CDT  Addendum by Bo AGARWAL, May S on October 16, 2019 15:11:50 CDT (Verified)  I have seen this patient and agree with note above.  Informed consent obtained, specifically I reiterated the risks of: medical conditions such as acute MI, trouble with extubation and needing prolonged ventilation, excess bleeding, pain, infection, bleeding, need for transfusion, need for laparotomy, damage to nearby organs and need for repair or additional surgery, delayed complications,?or other unpredicted risks.  Patient able to describe planned procedure in her own words.  Expected recovery time reviewed, as well as normal postoperative course. [1]  [1]?[facility] Preop Clinic Note; Lise Magdaleno MD 10/09/2019 13:15 CDT   patient states  that she had one more episode of spotting this weekend with wiping.? US has not been done to date.? I discussed adding on D&C today and she is agreeable to this.? Just need pathology as she will likely be a hysterectomy eventually once medically optimized.  ?

## 2022-05-05 NOTE — HISTORICAL [facility] CERNER
This is a historical note converted from [employer]ner. Formatting and pictures may have been removed.  Please reference Cerner for original formatting and attached multimedia. Indication for Surgery  HSIL Pap smear, inadequate colposcopy  Postmenopausal bleeding  Preoperative Diagnosis  Cervical dysplasia  Post menopausal bleeding  Postoperative Diagnosis  Cervical dysplasia  Postmenopausal bleeding  Operation  Cold knife conization  Endocervical curettage  Dilation and curettage  Surgeon(s)  Leanna Martin MD (PGY-2)  ?  Attending  Cheri Soriano MD  Anesthesia  General, LMA  Estimated Blood Loss  10 mL  Urine Output  250 mL  Findings  EUA: 0.5 cm palpable cervix, difficult to assess uterine size given patients ascites, vaginal mucosa without lesion, cervix without visible lesion, almost flush with vaginal wall circumferentially  Intraop: uterus sounded to 5 cm  Specimen(s)  Cervix, cone biopsy  Endocervical curettings  Endometrial curettings  Complications  None  Technique  The patient was then brought to the operating room with IVF running and SCDs in place. General anesthesia was obtained and found to be adequate. The patient was then prepped and draped in the normal sterile fashion in the dorsal supine position with Ismael type stirrups.?  ?   The cervix was visualized. The anterior lip of the cervix was then grasped with a single-tooth tenaculum. 2.5 cc vasopressin injected at 2, 4, 8, and 10 oclock position. Two stay sutures were placed using 0 Vicryl at the 3 and 9 oclock positions. An 11 blade was used to obtain the cone specimen starting at the?12 oclock position and moving in a counter-clockwise manner.?  ?   The cervical cone specimen was then amputated and tagged with silk suture at the 12 oclock position. At this point an endocervical curettage was performed. Both specimens were then labeled and sent for pathology. A sharp curette was then inserted into the uterus. The uterus was curetted in a clockwise  fashion. The endometrial scrapings were sent to pathology.?Hemostasis was achieved using the Bovie with a roller ball tip.?  ?   Surgicel was then placed in the surgical bed, over which the two stay sutures were tied. The patient tolerated the procedure well. The patient was then extubated and awakened from anesthesia and brought to the recovery room in stable condition.  ?  ?Bo was present for the entire procedure   This certifies I was present during the entire period between opening and closing of the surgical field.

## 2022-05-21 NOTE — HISTORICAL [facility]ER
This is a historical note converted from Ch[employer]. Formatting and pictures may have been removed.  Please reference [employer] for original formatting and attached multimedia. Chief Complaint  s/p Thoracic Fusion  Reason for Consultation  Physiatry  History of Present Illness  Admit MD: George Do MD  Consult Physiatry: Nam Swift MD  HPI: 62yo WF with PMH of HTN, DMII, GERD, and history of alcoholism was admitted to [facility] on 2/16 for scheduled spinal fusion per Neurosurgery (Dr. Cole). Patient initially presents to Dr. Crespo office with severe pain, had surgery at T8-T9, T11 (April 2021), did very well, and went home. ?Subsequently was having increasing pain. Noted angulation because of the quality of bones, inflammation of the disk space at T11-T12,? and severe narrowing stenosis at T11-T12.??Underwent Open exposure, removal of the caps and rods, hemostasis, subsequent decompression going down from T11-12, L1 and then placement of a new screw at L1 with bars and caps from T11 to L1 on 2/16. ?New screws were put at T9 not at T11. ?Sides were packed with autograft and allograft with mainly [employer] DBM and some ossimenal strips, bars, and caps were put and final tightening was done. ?A drain was left in place and ?secured. Drain was discontinued on?2/21/22, with no complications.?Participating in therapy. Functional status includes bed mobility, toilet transfer, UE/LE dressing, and gait requiring minimal assitance. Supervision or setup needed for sit to stand. Modified independent for supine to sit. Patient was evaluated, accepted, and admitted to inpatient rehab to improve functional status. Transferred to [facility] on 2/22 without incident.??  2/24: Seen in patient room, lying in bed resting. States that she needs to sit up before she has to throw up. Admits to heartburn this morning as well as nausea. States that she has been drinking milk which more typically helps her nausea, but it may be making it worse.  Reports good appetite and assures me that she is eating something to take pain medicine. States that she was having diarrhea, but no BM in the past couple days. Tells me that therapy is not going well and she has not been able to due much due to pain. Described as sharp pain in back and wrapping around sides. Denies radiating, burning, or tingling pains. Admits that ice application was helpful. Discussed medication adjustment and encouraged participation. Therapy relays previous rehab stay was also difficult with getting patient to participate. VSSAF.  Review of Systems  Barriers to Discharge:  Social:Pt. completed high school. She has no  history. ?She is disabled. ??  Patient is . ?She also has an ex-. ?She states that her ex- has been bringing her to appointments. Pts son had been living with her. ?He went to rehab in [place] ([facility]). He no longer lives with patient. She states that she will need to be as independent as possible prior to return home. ?She states that she was able to manage very well after her last rehab. Children (1).  Medical:Compression fracture and spinal stenosis?,?Alcoholic cirrhosis,?Diabetes type 2,?Hyperlipidemia,?Depression,?Hypertension,?Esophagitis,?GERD,?Tobacco abuse,?History of alcoholism,?VHD,?Thoracic spinal stenosis?  Functional:  Prior Level of Fx: ?Independent ADLs, uses a RW for ambulation. ?She states she wasnt using this all of the time at home but then she started having pain again. ?She has a w/c that she uses when out in the community as needed. ?She does chores, does laundry, grocery shops. ?She states that usually doesnt drive but does drive to do her grocery pickup. She does not cook. ?She states she usually relies on TV dinners. She heats those up in the microwave. ?She manages her own meds and own finances. She hires someone for lawn maintenance. ?She does not have a medic alert. ? ??  Residence: ?Pt. lives alone in [place] in a  2-story home with a threshold to enter the residence. ?She uses a tub/shower combination with grab bar and HHS. ?She does not have an elevated toilet. No grab bars adjacent.?  DME: Blood glucose monitor, BSC, Reacher, shower chair, W/C, RW, HHS, and grab bar in tub/shower. ?  Psychiatric: Hx mental health/substance abuse: Pt. reports a history of anxiety and depression. She also has a history of ETOH/substance abuse. ?She states that she has been sober.?  Depression/Anxiety:?no complaints????  escitalopram (Lexapro)20 mg, form: Tab, Oral, Daily  Pain:?back-sharp?????  gabapentin 300 mg oral capsule)600 mg, form: Cap, Oral, TID  methocarbamol 750 mg oral tablet)750 mg, form: Tab, Oral, TID  acetaminophen 325 mg oral tablet)650 mg, form: Tab, Oral, q6hr PRN for pain  acetaminophen-oxyCODONE (Percocet 10/325)1 tab(s), form: Tab, Oral, q4hr PRN for pain  lidocaine topical, 2 patch(es),q24hr,6:00, bilateral low back parallel to dressing(on either side).  Bowels/Bladder: last BM?2/24??????  Appetite:?good??? ? ?  Sleep:?good?? ???  ?   ???  ?  Physical Exam  Vitals & Measurements  T:?36.7? ?C (Oral)? TMIN:?36.6? ?C (Oral)? TMAX:?36.7? ?C (Oral)? HR:?67(Peripheral)? RR:?20? BP:?127/64? SpO2:?98%?  General:?well-developed, well-nourished, in no acute distress  Eye: EOMI, clear conjunctiva, eyelids normal  HENT:?normocephalic,??oropharynx and nasal mucosal surfaces moist  Neck: full range of motion, supple  Respiratory:?equal chest rise, no SOB, no audible wheeze  Cardiovascular:?regular rate and rhythm, no edema  Gastrointestinal:?soft, non-tender, slightly distended?  Musculoskeletal:?full range of motion of all extremities with generalized weakness  Integumentary: no rashes or skin lesions present, back incision-steristrips, dressing-c/d/i  Neurologic: cranial nerves intact, no signs of peripheral neurological deficit, motor/sensory function intact  ?*MD performed and documented physical examination  ??  Assessment/Plan  1.?S/P fusion of thoracic spine?Z98.1  2.?Thoracic spinal stenosis?M48.04  3.?Thoracic compression fracture?S22.000A  4.?Alcoholic cirrhosis of liver?K70.30  5.?Alcoholic fatty liver?K70.0  6.?Alcohol abuse?F10.10  7.?Diabetes?E11.9  8.?Hypertension?I10  9.?HLD (hyperlipidemia)?E78.5  10.?Esophageal stricture?K22.2  11.?Chronic GERD?K21.9  12.?Depression?F32.9  13.?VHD (valvular heart disease)?I38  14.?Obesity?E66.9  15.?Tobacco user?Z72.0  Orders:  lidocaine topical, 2 patch(es), form: Patch-24, TD, q24hr, first dose 02/25/22 6:00:00 CST, bilateral low back parallel to dressing (on either side).Remove 12 hours after initial application  Wounds:?back incision-steristrips, dressing-c/d/i? ?  Precautions:?spinal?? ? ?  Bracing:?LSO?????  Swallowing:?Regular?  Function: Tolerating therapy. Continue PT/OT/RT  VTE Prophylaxis:?  enoxaparin (Lovenox)40 mg, form: Injection, Subcutaneous, Daily  Code Status:?FULL CODE?? ? ?  Discharge: ?Pt. lives alone in [place] in a 2-story home with a threshold to enter the residence. ?Pt. completed high school. She has no  history. ?She is disabled. ??  Patient is . ?She also has an ex-. ?She states that her ex- has been bringing her to appointments. Pts son had been living with her. ?He went to rehab in [place] ([facility]). He no longer lives with patient. She states that she will need to be as independent as possible prior to return home. ?She states that she was able to manage very well after her last rehab. Children (1). Date pending.  dos 2/24/22  I have?seen and examined patient?in initial Physiatry consult  case & medical records reviewed  I have done and signed? prescreen  Admit?history and PE?completed and reviewed and are consistent with findings on prescreen  I have reviewed case with Kamini Oshea,NP  Medical management by Dr Do and his NPs- I have discussed case with them  PT,OT,ST,RT evaluations ordered and in  progress  Med Reconciliation completed and reviewed in EHR  I?have discussed? expected course with patient  Patient remains appropriate for, in need of, should tolerate and benefit from IRF  Navdeep Swift MD  * I was involved in the creation of this note with Kamini Oshea NP  ?  ?  ?  ?  ?  ?  ?  Tania Oshea?NP, conducted additional independent physical examination and assisted with medical documentation.???   Problem List/Past Medical History  Ongoing  Alcohol abuse  Alcoholic cirrhosis of liver  Alcoholic fatty liver  Ascites  Cervical dysplasia  Chronic GERD  Depression  Diabetes  Enlarged liver  Esophageal stricture  HLD (hyperlipidemia)  Hypertension  Hypertension  Obesity  Preop examination  Thoracic compression fracture  Tobacco user  VHD (valvular heart disease)  Historical  Pregnant  Procedure/Surgical History  Lumbar Fusion (.) (02/16/2022)  Fusion of 2 to 7 Thoracic Vertebral Joints with Synthetic Substitute, Posterior Approach, Posterior Column, Open Approach (04/14/2021)  Release Thoracic Spinal Cord, Open Approach (04/14/2021)  Thoracic Fusion with O-ARM (.) (04/14/2021)  Biopsy Gastrointestinal (04/07/2021)  Esophagogastroduodenoscopy (04/07/2021)  Excision of Stomach, Via Natural or Artificial Opening Endoscopic, Diagnostic (04/07/2021)  Cervical Conization (None) (10/17/2019)  Conization of cervix, with or without fulguration, with or without dilation and curettage, with or without repair; cold knife or laser (10/17/2019)  Dilation & Curettage (None) (10/17/2019)  Excision of Cervix, Via Natural or Artificial Opening, Diagnostic (10/17/2019)  EGD (06/15/2017)  Cervical spinal fusion (04/04/2015)  Colonoscopy (04/04/1995)  Appendectomy (1973)  Breast augmentation   Medications  Inpatient  acetaminophen 325 mg oral tablet, 325 mg= 1 tab(s), Oral, q4hr, PRN  acetaminophen 325 mg oral tablet, 650 mg= 2 tab(s), Oral, q6hr, PRN  Dulcolax Laxative 10 mg RECTAL suppository, 10 mg= 1 supp, VT (rectal), q3day,  PRN  gabapentin 300 mg oral capsule, 600 mg= 2 cap(s), Oral, TID  hydrALAZINE, 10 mg= 0.5 mL, IV Push, q4hr, PRN  K-Dur 20 oral tablet, extended release, 20 mEq= 1 tab(s), Oral, BID  labetalol, 10 mg= 2 mL, IV Push, q10min, PRN  labetalol 200 mg oral tablet, 200 mg= 1 tab(s), Oral, BID  lactulose 10 g/15 mL oral syrup, 20 gm= 30 mL, Oral, Every other day, PRN  Lexapro, 20 mg= 1 tab(s), Oral, Daily  Lidocaine Viscous, 15 mL, Oral, Once  Lidoderm 5% topical film, 2 patch(es), TD, q24hr  Lovenox, 40 mg= 0.4 mL, Subcutaneous, Daily  methocarbamol 750 mg oral tablet, 750 mg= 1 tab(s), Oral, TID  Mylanta Max with Simethicone (400/400/40mg per 5mL) UD Susp, 30 mL, Oral, Once  Pepcid 20 mg oral tablet, 20 mg= 1 tab(s), Oral, BID, PRN  Percocet 10/325, 1 tab(s), Oral, q4hr, PRN  Protonix, 40 mg= 1 tab(s), Oral, Daily  Vasopressin in Saline 20 Units/30mL  Zofran, 8 mg= 2 tab(s), Oral, q8hr, PRN  Home  acetaminophen-HYDROcodone, Oral, q6hr, PRN  atorvastatin 10 mg oral tablet, 10 mg= 1 tab(s), Oral, Daily  Cymbalta 30 mg oral delayed release capsule, 60 mg= 2 cap(s), Oral, Daily,? ?Not taking  escitalopram 10 mg oral tablet, 20 mg= 2 tab(s), Oral, Daily  escitalopram 20 mg oral tablet, 20 mg= 1 tab(s), Oral, Daily  folic acid 1 mg oral tablet, 1 mg= 1 tab(s), Oral, Daily  gabapentin 300 mg oral capsule, 600 mg= 2 cap(s), Oral, TID  gabapentin 300 mg oral capsule, 300 mg= 1 cap(s), Oral, QID,? ?Not taking  labetalol 200 mg oral tablet, 200 mg= 1 tab(s), Oral, BID  methocarbamol 750 mg oral tablet, 1 tab, Oral, TID  Percocet 10/325, 1 tab(s), Oral, q4hr, PRN  Allergies  Demerol HCl?(rash)  Social History  Abuse/Neglect  No, No, Yes, 02/22/2022  No, 02/16/2022  No, No, Yes, 01/19/2022  No, 04/19/2021  No, 04/06/2021  Alcohol  Past, Beer, Several times per day, 02/22/2022  Current, Beer, Daily, 04/19/2021  Current, Several times per day, 04/06/2021  Current, Beer, Several times per day, 07/15/2020  Current, Beer, Daily, Alcohol  use interferes with work or home: No. Others hurt by drinking: No. Household alcohol concerns: No., 09/27/2019  Current, Liquor, Several times per day, Started age 23 Years. Previous treatment: None. Alcohol use interferes with work or home: No. Drinks more than intended: No. Others hurt by drinking: No. Ready to change: No. 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 Exercise frequency: 3-4 times/week. Self assessment: Poor condition. Exercise type: Aerobics., 04/20/2017  Home/Environment  Lives with Alone., 10/09/2019  Lives with Children. Living situation: Home/Independent., 04/20/2017  Nutrition/Health  Regular, 04/20/2017  Sexual  Sexually active: Yes., 04/20/2017  Spiritual/Cultural  Buddhist, 10/09/2019  Substance Use  Past, Cocaine, 04/19/2021  Past, Cocaine, 3-5 times per week, Previous treatment: Treatment center. IV drug use: No. Drug use interferes with work/home: Yes. Ready to change: Yes. Household substance abuse concerns: No., 04/20/2017  Tobacco  10 or more cigarettes (1/2 pack or more)/day in last 30 days, N/A, 02/22/2022  10 or more cigarettes (1/2 pack or more)/day in last 30 days, No, 02/16/2022  10 or more cigarettes (1/2 pack or more)/day in last 30 days, Cigarettes, N/A, 01/19/2022  10 or more cigarettes (1/2 pack or more)/day in last 30 days, Yes, 04/19/2021  10 or more cigarettes (1/2 pack or more)/day in last 30 days, No, 04/06/2021  Family History  CAD - Coronary artery disease: Mother.  COPD (chronic obstructive pulmonary disease)......: Mother.  Hypertension.: Mother.  Lab Results  Test Name Test Result Date/Time   Sodium Lvl 134 mmol/L (Low) 02/24/2022 06:10 CST   Sodium Lvl 136 mmol/L 02/23/2022 06:25 CST   Potassium Lvl 3.2 mmol/L (Low) 02/24/2022 06:10 CST   Potassium Lvl 3.2 mmol/L (Low) 02/23/2022 06:25 CST   Chloride  99 mmol/L 02/24/2022 06:10 CST   Chloride 97 mmol/L (Low) 02/23/2022 06:25 CST   CO2 26 mmol/L 02/24/2022 06:10 CST   CO2 29 mmol/L 02/23/2022 06:25 CST   Calcium Lvl 8.0 mg/dL (Low) 02/24/2022 06:10 CST   Calcium Lvl 8.6 mg/dL 02/23/2022 06:25 CST   Magnesium Lvl 2.10 mg/dL 02/23/2022 06:25 CST   Glucose Lvl 104 mg/dL 02/24/2022 06:10 CST   Glucose Lvl 89 mg/dL 02/23/2022 06:25 CST   .0 mg/dL 02/23/2022 06:25 CST   BUN 10.2 mg/dL 02/24/2022 06:10 CST   BUN 11.2 mg/dL 02/23/2022 06:25 CST   Creatinine 0.64 mg/dL 02/24/2022 06:10 CST   Creatinine 0.66 mg/dL 02/23/2022 06:25 CST   Est Creat Clearance Ser 73.43 mL/min 02/24/2022 06:54 CST   Est Creat Clearance Ser 72.15 mL/min 02/23/2022 07:13 CST   eGFR-AA >60 02/24/2022 06:10 CST   eGFR-AA >60 02/23/2022 06:25 CST   eGFR-JACE >60 mL/min/1.73 m2 02/24/2022 06:10 CST   eGFR-JACE >60 mL/min/1.73 m2 02/23/2022 06:25 CST   Bili Total 0.6 mg/dL 02/24/2022 06:10 CST   Bili Total 0.7 mg/dL 02/23/2022 06:25 CST   Bili Direct 0.3 mg/dL 02/24/2022 06:10 CST   Bili Direct 0.4 mg/dL 02/23/2022 06:25 CST   Bili Indirect 0.30 mg/dL 02/24/2022 06:10 CST   Bili Indirect 0.30 mg/dL 02/23/2022 06:25 CST   AST 48 unit/L (High) 02/24/2022 06:10 CST    unit/L (High) 02/23/2022 06:25 CST   ALT 82 unit/L (High) 02/24/2022 06:10 CST    unit/L (High) 02/23/2022 06:25 CST   Alk Phos 153 unit/L (High) 02/24/2022 06:10 CST   Alk Phos 171 unit/L (High) 02/23/2022 06:25 CST   Total Protein 5.3 gm/dL (Low) 02/24/2022 06:10 CST   Total Protein 6.4 gm/dL 02/23/2022 06:25 CST   Albumin Lvl 2.6 gm/dL (Low) 02/24/2022 06:10 CST   Albumin Lvl 3.2 gm/dL (Low) 02/23/2022 06:25 CST   Globulin 2.7 gm/dL 02/24/2022 06:10 CST   Globulin 3.2 gm/dL 02/23/2022 06:25 CST   A/G Ratio 1.0 ratio (Low) 02/24/2022 06:10 CST   A/G Ratio 1.0 ratio (Low) 02/23/2022 06:25 CST   Hgb A1c 5.6 % 02/23/2022 06:25 CST   Prealbumin 19.5 mg/dL 02/23/2022 06:25 CST   Chol 120 mg/dL 02/23/2022 06:25 CST   HDL 51  mg/dL 02/23/2022 06:25 CST   Trig 200 mg/dL (High) 02/23/2022 06:25 CST   LDL 29.00 mg/dL (Low) 02/23/2022 06:25 CST   Chol/HDL 2 02/23/2022 06:25 CST   VLDL 40 02/23/2022 06:25 CST   WBC 11.6 x10(3)/mcL (High) 02/24/2022 06:10 CST   WBC 14.2 x10(3)/mcL (High) 02/23/2022 06:25 CST   RBC 2.92 x10(6)/mcL (Low) 02/24/2022 06:10 CST   RBC 3.43 x10(6)/mcL (Low) 02/23/2022 06:25 CST   Hgb 9.8 gm/dL (Low) 02/24/2022 06:10 CST   Hgb 11.5 gm/dL (Low) 02/23/2022 06:25 CST   Hct 29.1 % (Low) 02/24/2022 06:10 CST   Hct 33.7 % (Low) 02/23/2022 06:25 CST   Platelet 202 x10(3)/mcL 02/24/2022 06:10 CST   Platelet 225 x10(3)/mcL 02/23/2022 06:25 CST   MCV 99.7 fL (High) 02/24/2022 06:10 CST   MCV 98.3 fL (High) 02/23/2022 06:25 CST   MCH 33.6 pg (High) 02/24/2022 06:10 CST   MCH 33.5 pg (High) 02/23/2022 06:25 CST   MCHC 33.7 gm/dL 02/24/2022 06:10 CST   MCHC 34.1 gm/dL 02/23/2022 06:25 CST   RDW 14.9 % 02/24/2022 06:10 CST   RDW 14.6 % 02/23/2022 06:25 CST   MPV 9.8 fL 02/24/2022 06:10 CST   MPV 9.9 fL 02/23/2022 06:25 CST   Abs Neut 8.91 x10(3)/mcL 02/24/2022 06:10 CST   Abs Neut 11.48 x10(3)/mcL (High) 02/23/2022 06:25 CST   Neutro Auto 76.7 % (High) 02/24/2022 06:10 CST   Neutro Auto 81.1 % (High) 02/23/2022 06:25 CST   Lymph Auto 11.8 % (Low) 02/24/2022 06:10 CST   Lymph Auto 9.7 % (Low) 02/23/2022 06:25 CST   Mono Auto 9.7 % 02/24/2022 06:10 CST   Mono Auto 8.3 % 02/23/2022 06:25 CST   Eos Auto 1.1 % 02/24/2022 06:10 CST   Eos Auto 0.3 % 02/23/2022 06:25 CST   Abs Eos 0.13 x10(3)/mcL 02/24/2022 06:10 CST   Abs Eos 0.04 x10(3)/mcL 02/23/2022 06:25 CST   Basophil Auto 0.0 % 02/24/2022 06:10 CST   Basophil Auto 0.1 % 02/23/2022 06:25 CST   Abs Neutro 8.91 x10(3)/mcL 02/24/2022 06:10 CST   Abs Neutro 11.48 x10(3)/mcL (High) 02/23/2022 06:25 CST   Abs Lymph 1.37 x10(3)/mcL 02/24/2022 06:10 CST   Abs Lymph 1.38 x10(3)/mcL 02/23/2022 06:25 CST   Abs Mono 1.13 x10(3)/mcL 02/24/2022 06:10 CST   Abs Mono 1.18 x10(3)/mcL 02/23/2022 06:25  CST   Abs Baso 0.00 x10(3)/mcL 02/24/2022 06:10 CST   Abs Baso 0.02 x10(3)/mcL 02/23/2022 06:25 CST   NRBC% 0.0 % 02/24/2022 06:10 CST   NRBC% 0.0 % 02/23/2022 06:25 CST   IG% 0.700 % (High) 02/24/2022 06:10 CST   IG% 0.500 % (High) 02/23/2022 06:25 CST   IG# 0.0800 (High) 02/24/2022 06:10 CST   IG# 0.0700 (High) 02/23/2022 06:25 CST   Diagnostic Results  (01/19/2022 16:42 CST CT Abdomen and Pelvis W Contrast)  IMPRESSION:  1. ?Partially visualized posterior fusion with transpedicular screw of  T11 traversing into the intervertebral disc space of T11-T12. The  T11-T12 disc space and the endplates are with irregular definition.  These findings may represent discitis and osteomyelitis with  paraspinal soft tissue inflammations.  2. ?Otherwise, no acute obstructive or inflammatory findings  identified. [1]  ?  (01/19/2022 15:09 CST US Abdomen Limited)  IMPRESSION: Hepatomegaly [2]  ?  (01/19/2022 18:04 CST CT Thoracic Spine W/O Contrast)  IMPRESSION:  1. Stable compression deformity of T10 vertebral body.  2. Interval posterior thoracic fusions and decompression laminectomy.  3. T11-T12 disc space irregularity and osteolytic changes of the  endplates with paraspinal soft tissue inflammations concerning for  discitis and osteomyelitis. Ventral epidural fluid collection lower  thoracic level may represent a hematoma or an abscess. [3]  ?  (01/19/2022 18:04 CST CT Lumbar Spine W/O Contrast)  IMPRESSION:  1. ?Old stable mild compression deformity of L1 vertebral body.  2. ?Lumbar degenerative disc disease and spondylosis level by level  discussed above. [4]  ?  (02/15/2022 13:35 CST XR Chest 2 Views)  IMPRESSION: No active pulmonary disease [5]  ?  (02/18/2022 10:44 CST XR Spine Thorocolumbar AP LAT)  FINDINGS:  There are postoperative changes of the thoracolumbar spine with  posterior spinal fusion hardware extending from T9 through L1.  Alignment is stable with focal kyphosis centered at T11. There are  stable  compression deformities at T10 and L1 with destructive endplate  changes at T11-T12. A soft tissue drain is in place. [6]     [1]?CT Abdomen and Pelvis W Contrast; John Allen MD 01/19/2022 16:42 CST  [2]?US Abdomen Limited; Clint Bosch MD 01/19/2022 15:09 CST  [3]?CT Thoracic Spine W/O Contrast; John Allen MD 01/19/2022 18:04 CST  [4]?CT Lumbar Spine W/O Contrast; John Allen MD 01/19/2022 18:04 CST  [5]?XR Chest 2 Views; Clint Bosch MD 02/15/2022 13:35 CST  [6]?XR Spine Thorocolumbar AP LAT; Echo Miller MDhael 02/18/2022 10:44 CST

## 2022-05-21 NOTE — HISTORICAL [facility] CERNER
This is a historical note converted from Chau. Formatting and pictures may have been removed.  Please reference Cerlisa for original formatting and attached multimedia. Reason for Consultation  psych eval  History of Present Illness  61-year-old WF presented to [facility] for scheduled spinal fusion. ?PMH significant for hypertension, diabetes type 2, GERD, and history of alcoholism. ?On 2/16/2022 tolerated T11-L1 hardware revision with fusion without perioperative complications. ?Follow-up spinal x-rays on 2/18 stable. ?Discontinued CLARK drain on 2/21. ?Tolerated transfer to [facility] inpatient rehab unit on 2/22 without incident.  Todays Information: No acute events overnight. ?Laying in bed comfortably. ?Reports good sleep, decrease in appetite due to loose stools. ?Last BM 2/24, reported loose stools yesterday and reports no loose stools since last night. ?Vital signs at goal with no recent recorded fevers. ?Labs reviewed, improvement in liver enzymes, potassium at 3.2, WBC at 11.6.? [1]  ?  Pt was seen today. Staff reports labile mood, anxiety and depression. She was put on Buspar today and was taking Lexapro before coming to the hospital. History of ETOH abuse - drinking 18 cans of beer per day. Last use was 11/21. Admits to smoking THC before coming to the hospital for back pain.  She reports she feels like she is on drugs but states she is not.  She states she smith sot see a Psychiatrist, prescribed Lexapro by [facility].  She states she went to  her son at the clinic and got confused, one thought went into another.  She is oriented times1, knows she is in a hospital. She reports it [birth year] her birthday. Sleep poor, appetite pretty good. Hears things like they are in the room. Denies suicidal or homicidal ideations. Denies suicide attempts. She states she has never been inpt for psych.  Social history:  , 1 son, completed 12 th grade, not working, retired.  Physical Exam  Vitals &  Measurements  T:?36.6? ?C (Oral)? TMIN:?36.6? ?C (Oral)? TMAX:?37.2? ?C (Oral)? HR:?76(Peripheral)? RR:?20? BP:?104/57? SpO2:?95%?  Assessment/Plan  1.?S/P fusion of thoracic spine?Z98.1  2.?Thoracic spinal stenosis?M48.04  3.?Thoracic compression fracture?S22.000A  4.?Alcoholic cirrhosis of liver?K70.30  5.?Alcoholic fatty liver?K70.0  6.?Alcohol abuse?F10.10  7.?Diabetes?E11.9  8.?Hypertension?I10  9.?HLD (hyperlipidemia)?E78.5  10.?Esophageal stricture?K22.2  11.?Chronic GERD?K21.9  12.?Depression?F32.9  13.?VHD (valvular heart disease)?I38  14.?Obesity?E66.9  15.?Tobacco user?Z72.0  ?  Recommendations:  She appears very confused, Buspar was added today for anxiety  Will add Wellbutrin  mg QAM for now and may need to reassess when medically cleared   Problem List/Past Medical History  Ongoing  Alcohol abuse  Alcoholic cirrhosis of liver  Alcoholic fatty liver  Ascites  Cervical dysplasia  Chronic GERD  Depression  Diabetes  Enlarged liver  Esophageal stricture  HLD (hyperlipidemia)  Hypertension  Hypertension  Obesity  Preop examination  Thoracic compression fracture  Tobacco user  VHD (valvular heart disease)  Historical  Pregnant  Procedure/Surgical History  Fusion of Lumbar Vertebral Joint with Autologous Tissue Substitute, Posterior Approach, Anterior Column, Open Approach (02/16/2022)  Fusion of Thoracic Vertebral Joint with Autologous Tissue Substitute, Posterior Approach, Anterior Column, Open Approach (02/16/2022)  Lumbar Fusion (.) (02/16/2022)  Release Thoracic Spinal Cord, Open Approach (02/16/2022)  Removal of Internal Fixation Device from Thoracic Vertebra, Open Approach (02/16/2022)  Fusion of 2 to 7 Thoracic Vertebral Joints with Synthetic Substitute, Posterior Approach, Posterior Column, Open Approach (04/14/2021)  Release Thoracic Spinal Cord, Open Approach (04/14/2021)  Thoracic Fusion with O-ARM (.) (04/14/2021)  Biopsy Gastrointestinal (04/07/2021)  Esophagogastroduodenoscopy  (04/07/2021)  Excision of Stomach, Via Natural or Artificial Opening Endoscopic, Diagnostic (04/07/2021)  Cervical Conization (None) (10/17/2019)  Conization of cervix, with or without fulguration, with or without dilation and curettage, with or without repair; cold knife or laser (10/17/2019)  Dilation & Curettage (None) (10/17/2019)  Excision of Cervix, Via Natural or Artificial Opening, Diagnostic (10/17/2019)  EGD (06/15/2017)  Cervical spinal fusion (04/04/2015)  Colonoscopy (04/04/1995)  Appendectomy (1973)  Breast augmentation   Medications  Inpatient  acetaminophen 325 mg oral tablet, 325 mg= 1 tab(s), Oral, q4hr, PRN  acetaminophen 325 mg oral tablet, 650 mg= 2 tab(s), Oral, q6hr, PRN  BuSpar 5 mg oral tablet, 5 mg= 1 tab(s), Oral, TID  Dulcolax Laxative 10 mg RECTAL suppository, 10 mg= 1 supp, AL (rectal), q3day, PRN  gabapentin 300 mg oral capsule, 600 mg= 2 cap(s), Oral, TID  hydrALAZINE, 10 mg= 0.5 mL, IV Push, q4hr, PRN  K-Dur 20 oral tablet, extended release, 20 mEq= 1 tab(s), Oral, BID  labetalol, 10 mg= 2 mL, IV Push, q10min, PRN  labetalol 200 mg oral tablet, 200 mg= 1 tab(s), Oral, BID  lactulose 10 g/15 mL oral syrup, 20 gm= 30 mL, Oral, Every other day, PRN  Lexapro, 20 mg= 1 tab(s), Oral, Daily  Lidocaine Viscous, 15 mL, Oral, Once  Lidoderm 5% topical film, 2 patch(es), TD, q24hr  Lovenox, 40 mg= 0.4 mL, Subcutaneous, Daily  Mylanta Max with Simethicone (400/400/40mg per 5mL) UD Susp, 30 mL, Oral, Once  Pepcid 20 mg oral tablet, 20 mg= 1 tab(s), Oral, BID, PRN  Percocet 10/325, 1 tab(s), Oral, q4hr, PRN  Protonix, 40 mg= 1 tab(s), Oral, Daily  tiZANidine, 4 mg= 1 tab(s), Oral, TID  tiZANidine, 2 mg= 0.5 tab(s), Oral, q4hr, PRN  Vasopressin in Saline 20 Units/30mL  Zofran, 8 mg= 2 tab(s), Oral, q8hr, PRN  Home  acetaminophen-HYDROcodone, Oral, q6hr, PRN  atorvastatin 10 mg oral tablet, 10 mg= 1 tab(s), Oral, Daily  Cymbalta 30 mg oral delayed release capsule, 60 mg= 2 cap(s), Oral, Daily,? ?Not  taking  escitalopram 10 mg oral tablet, 20 mg= 2 tab(s), Oral, Daily  escitalopram 20 mg oral tablet, 20 mg= 1 tab(s), Oral, Daily  folic acid 1 mg oral tablet, 1 mg= 1 tab(s), Oral, Daily  gabapentin 300 mg oral capsule, 600 mg= 2 cap(s), Oral, TID  gabapentin 300 mg oral capsule, 300 mg= 1 cap(s), Oral, QID,? ?Not taking  labetalol 200 mg oral tablet, 200 mg= 1 tab(s), Oral, BID  methocarbamol 750 mg oral tablet, 1 tab, Oral, TID  Percocet 10/325, 1 tab(s), Oral, q4hr, PRN  Allergies  Demerol HCl?(rash)  Social History  Abuse/Neglect  No, No, Yes, 02/22/2022  No, 02/16/2022  No, No, Yes, 01/19/2022  No, 04/19/2021  No, 04/06/2021  Alcohol  Past, Beer, Several times per day, 02/22/2022  Current, Beer, Daily, 04/19/2021  Current, Several times per day, 04/06/2021  Current, Beer, Several times per day, 07/15/2020  Current, Beer, Daily, Alcohol use interferes with work or home: No. Others hurt by drinking: No. Household alcohol concerns: No., 09/27/2019  Current, Liquor, Several times per day, Started age 23 Years. Previous treatment: None. Alcohol use interferes with work or home: No. Drinks more than intended: No. Others hurt by drinking: No. Ready to change: No. 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 Exercise frequency: 3-4 times/week. Self assessment: Poor condition. Exercise type: Aerobics., 04/20/2017  Home/Environment  Lives with Alone., 10/09/2019  Lives with Children. Living situation: Home/Independent., 04/20/2017  Nutrition/Health  Regular, 04/20/2017  Sexual  Sexually active: Yes., 04/20/2017  Spiritual/Cultural  Syn[employer], 10/09/2019  Substance Use  Past, Cocaine, 04/19/2021  Past, Cocaine, 3-5 times per week, Previous treatment: Treatment center. IV drug use: No. Drug use interferes with work/home: Yes. Ready to change: Yes. Household  substance abuse concerns: No., 04/20/2017  Tobacco  10 or more cigarettes (1/2 pack or more)/day in last 30 days, N/A, 02/22/2022  10 or more cigarettes (1/2 pack or more)/day in last 30 days, No, 02/16/2022  10 or more cigarettes (1/2 pack or more)/day in last 30 days, Cigarettes, N/A, 01/19/2022  10 or more cigarettes (1/2 pack or more)/day in last 30 days, Yes, 04/19/2021  10 or more cigarettes (1/2 pack or more)/day in last 30 days, No, 04/06/2021  Family History  CAD - Coronary artery disease: Mother.  COPD (chronic obstructive pulmonary disease)......: Mother.  Hypertension.: Mother.     [1]?Rehab Progress Note; Hi AGARWAL, George Bettencourt 02/24/2022 07:35 CST

## 2022-08-28 ENCOUNTER — HOSPITAL ENCOUNTER (EMERGENCY)
Facility: HOSPITAL | Age: 62
Discharge: HOME | End: 2022-08-29
Attending: EMERGENCY MEDICINE
Payer: MEDICAID

## 2022-08-28 VITALS
RESPIRATION RATE: 18 BRPM | SYSTOLIC BLOOD PRESSURE: 138 MMHG | OXYGEN SATURATION: 94 % | WEIGHT: 119 LBS | HEART RATE: 84 BPM | BODY MASS INDEX: 21.09 KG/M2 | DIASTOLIC BLOOD PRESSURE: 92 MMHG | HEIGHT: 63 IN | TEMPERATURE: 97 F

## 2022-08-28 DIAGNOSIS — R53.1 WEAKNESS: ICD-10-CM

## 2022-08-28 DIAGNOSIS — Z79.899 POLYPHARMACY: ICD-10-CM
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
## 2022-08-28 LAB
ALBUMIN SERPL-MCNC: 3 GM/DL (ref 3.4–4.8)
ALBUMIN/GLOB SERPL: 0.8 RATIO (ref 1.1–2)
ALP SERPL-CCNC: 153 UNIT/L (ref 40–150)
ALT SERPL-CCNC: 21 UNIT/L (ref 0–55)
APPEARANCE UR: CLEAR
AST SERPL-CCNC: 30 UNIT/L (ref 5–34)
BACTERIA #/AREA URNS AUTO: NORMAL /HPF
BASOPHILS # BLD AUTO: 0.02 X10(3)/MCL (ref 0–0.2)
BASOPHILS NFR BLD AUTO: 0.3 %
BILIRUB UR QL STRIP.AUTO: NEGATIVE MG/DL
BILIRUBIN DIRECT+TOT PNL SERPL-MCNC: 0.2 MG/DL
BUN SERPL-MCNC: 7.9 MG/DL (ref 9.8–20.1)
CALCIUM SERPL-MCNC: 9 MG/DL (ref 8.4–10.2)
CHLORIDE SERPL-SCNC: 100 MMOL/L (ref 98–107)
CO2 SERPL-SCNC: 20 MMOL/L (ref 23–31)
COLOR UR AUTO: YELLOW
CREAT SERPL-MCNC: 0.63 MG/DL (ref 0.55–1.02)
D DIMER PPP IA.FEU-MCNC: 0.47 UG/ML FEU (ref 0–0.5)
EOSINOPHIL # BLD AUTO: 0.05 X10(3)/MCL (ref 0–0.9)
EOSINOPHIL NFR BLD AUTO: 0.7 %
ERYTHROCYTE [DISTWIDTH] IN BLOOD BY AUTOMATED COUNT: 15 % (ref 11.5–17)
GFR SERPLBLD CREATININE-BSD FMLA CKD-EPI: >60 MLS/MIN/1.73/M2
GLOBULIN SER-MCNC: 3.6 GM/DL (ref 2.4–3.5)
GLUCOSE SERPL-MCNC: 98 MG/DL (ref 82–115)
GLUCOSE UR QL STRIP.AUTO: NEGATIVE MG/DL
HCT VFR BLD AUTO: 36.5 % (ref 37–47)
HGB BLD-MCNC: 11.7 GM/DL (ref 12–16)
IMM GRANULOCYTES # BLD AUTO: 0.05 X10(3)/MCL (ref 0–0.04)
IMM GRANULOCYTES NFR BLD AUTO: 0.7 %
KETONES UR QL STRIP.AUTO: NEGATIVE MG/DL
LEUKOCYTE ESTERASE UR QL STRIP.AUTO: NEGATIVE UNIT/L
LYMPHOCYTES # BLD AUTO: 1.73 X10(3)/MCL (ref 0.6–4.6)
LYMPHOCYTES NFR BLD AUTO: 25.6 %
MAGNESIUM SERPL-MCNC: 2 MG/DL (ref 1.6–2.6)
MCH RBC QN AUTO: 36.9 PG (ref 27–31)
MCHC RBC AUTO-ENTMCNC: 32.1 MG/DL (ref 33–36)
MCV RBC AUTO: 115.1 FL (ref 80–94)
MONOCYTES # BLD AUTO: 0.45 X10(3)/MCL (ref 0.1–1.3)
MONOCYTES NFR BLD AUTO: 6.7 %
NEUTROPHILS # BLD AUTO: 4.5 X10(3)/MCL (ref 2.1–9.2)
NEUTROPHILS NFR BLD AUTO: 66 %
NITRITE UR QL STRIP.AUTO: NEGATIVE
NRBC BLD AUTO-RTO: 0 %
PH UR STRIP.AUTO: 7 [PH]
PLATELET # BLD AUTO: 240 X10(3)/MCL (ref 130–400)
PMV BLD AUTO: 8.9 FL (ref 7.4–10.4)
POTASSIUM SERPL-SCNC: 4.4 MMOL/L (ref 3.5–5.1)
PROT SERPL-MCNC: 6.6 GM/DL (ref 5.8–7.6)
PROT UR QL STRIP.AUTO: NEGATIVE MG/DL
RBC # BLD AUTO: 3.17 X10(6)/MCL (ref 4.2–5.4)
RBC #/AREA URNS AUTO: <5 /HPF
RBC UR QL AUTO: NEGATIVE UNIT/L
SODIUM SERPL-SCNC: 132 MMOL/L (ref 136–145)
SP GR UR STRIP.AUTO: 1 (ref 1–1.03)
SQUAMOUS #/AREA URNS AUTO: <5 /HPF
TROPONIN I SERPL-MCNC: <0.01 NG/ML (ref 0–0.04)
UROBILINOGEN UR STRIP-ACNC: 0.2 MG/DL
WBC # SPEC AUTO: 6.8 X10(3)/MCL (ref 4.5–11.5)
WBC #/AREA URNS AUTO: <5 /HPF

## 2022-08-28 PROCEDURE — 81001 URINALYSIS AUTO W/SCOPE: CPT | Performed by: PHYSICIAN ASSISTANT

## 2022-08-28 PROCEDURE — 93005 ELECTROCARDIOGRAM TRACING: CPT

## 2022-08-28 PROCEDURE — 80053 COMPREHEN METABOLIC PANEL: CPT | Performed by: PHYSICIAN ASSISTANT

## 2022-08-28 PROCEDURE — 99285 EMERGENCY DEPT VISIT HI MDM: CPT | Mod: 25

## 2022-08-28 PROCEDURE — 96374 THER/PROPH/DIAG INJ IV PUSH: CPT

## 2022-08-28 PROCEDURE — 83735 ASSAY OF MAGNESIUM: CPT | Performed by: EMERGENCY MEDICINE

## 2022-08-28 PROCEDURE — 85379 FIBRIN DEGRADATION QUANT: CPT | Performed by: EMERGENCY MEDICINE

## 2022-08-28 PROCEDURE — 96375 TX/PRO/DX INJ NEW DRUG ADDON: CPT

## 2022-08-28 PROCEDURE — 93010 EKG 12-LEAD: ICD-10-PCS | Mod: ,,, | Performed by: INTERNAL MEDICINE

## 2022-08-28 PROCEDURE — 25000003 PHARM REV CODE 250: Performed by: EMERGENCY MEDICINE

## 2022-08-28 PROCEDURE — 84484 ASSAY OF TROPONIN QUANT: CPT | Performed by: PHYSICIAN ASSISTANT

## 2022-08-28 PROCEDURE — 63600175 PHARM REV CODE 636 W HCPCS: Performed by: EMERGENCY MEDICINE

## 2022-08-28 PROCEDURE — 93010 ELECTROCARDIOGRAM REPORT: CPT | Mod: ,,, | Performed by: INTERNAL MEDICINE

## 2022-08-28 PROCEDURE — 36415 COLL VENOUS BLD VENIPUNCTURE: CPT | Performed by: PHYSICIAN ASSISTANT

## 2022-08-28 PROCEDURE — 96361 HYDRATE IV INFUSION ADD-ON: CPT

## 2022-08-28 PROCEDURE — 85025 COMPLETE CBC W/AUTO DIFF WBC: CPT | Performed by: PHYSICIAN ASSISTANT

## 2022-08-28 RX ORDER — MORPHINE SULFATE 4 MG/ML
4 INJECTION, SOLUTION INTRAMUSCULAR; INTRAVENOUS
Status: COMPLETED | OUTPATIENT
Start: 2022-08-28 | End: 2022-08-28

## 2022-08-28 RX ORDER — ONDANSETRON 2 MG/ML
4 INJECTION INTRAMUSCULAR; INTRAVENOUS
Status: COMPLETED | OUTPATIENT
Start: 2022-08-28 | End: 2022-08-28

## 2022-08-28 RX ADMIN — ONDANSETRON 4 MG: 2 INJECTION INTRAMUSCULAR; INTRAVENOUS at 10:08

## 2022-08-28 RX ADMIN — SODIUM CHLORIDE 1000 ML: 9 INJECTION, SOLUTION INTRAVENOUS at 08:08

## 2022-08-28 RX ADMIN — MORPHINE SULFATE 4 MG: 4 INJECTION INTRAVENOUS at 10:08

## 2022-08-29 NOTE — ED PROVIDER NOTES
"Encounter Date: 8/28/2022    SCRIBE #1 NOTE: I, Jacdelano Cole, am scribing for, and in the presence of,  Dr. Ohara. I have scribed the entire note.     History     Chief Complaint   Patient presents with    Fall     States fell in bathroom.  Complaint of lower back pain, weakness.  Noted hypotension in triage 78/54.  Patient admits to 3 beers in the last few hours.      62 year old female presents to the ED following a fall. Pt states earlier tonight she drank 3 beers and took a muscle relaxer. Pt states she felt fine and got up to use the restroom. Pt states she grabbed onto her walker to try to get up and she states "next thing I know I'm flat on my butt." Pt believes she had a syncopal episode leading to her falling and hitting the floor. Pt states she has had 2 spinal surgeries done in the past year. Pt states she is experiencing lower back pain but this is chronic for her. Pt also states she is experiencing worsening LE weakness post fall. Pt denies any urinary incontinence.     The history is provided by the patient. No  was used.   Fall  The accident occurred just prior to arrival. The fall occurred from a bed. She fell from a height of 1 to 2 ft. She landed on A hard floor. There was no blood loss. The point of impact was the buttocks. The pain is present in the lower back. Associated symptoms include back pain (lower back pain (chronic)). Pertinent negatives include no fever, no abdominal pain, no nausea, no vomiting and no headaches. She has tried nothing for the symptoms.   Review of patient's allergies indicates:   Allergen Reactions    Meperidine Rash     No past medical history on file.  No past surgical history on file.  No family history on file.     Review of Systems   Constitutional:  Negative for chills and fever.   HENT:  Negative for congestion and ear pain.    Eyes:  Negative for discharge.   Respiratory:  Negative for cough, shortness of breath and wheezing.  "   Cardiovascular:  Negative for chest pain and leg swelling.   Gastrointestinal:  Negative for abdominal pain, constipation, diarrhea, nausea and vomiting.   Genitourinary:  Negative for dysuria, flank pain and frequency.   Musculoskeletal:  Positive for back pain (lower back pain (chronic)). Negative for joint swelling.   Skin:  Negative for rash.   Neurological:  Positive for weakness (bilateral LE). Negative for dizziness and headaches.   Psychiatric/Behavioral:  Negative for agitation, confusion and hallucinations.      Physical Exam     Initial Vitals   BP Pulse Resp Temp SpO2   08/28/22 1917 08/28/22 1917 08/28/22 1917 08/28/22 1917 08/28/22 2015   (!) 78/54 65 20 96.8 °F (36 °C) 99 %      MAP       --                Physical Exam    Nursing note and vitals reviewed.  Constitutional: She appears well-developed. No distress.   HENT:   Head: Normocephalic and atraumatic.   Mouth/Throat: Oropharynx is clear and moist.   Eyes: Conjunctivae and EOM are normal. Pupils are equal, round, and reactive to light.   Neck: Neck supple.   Cardiovascular:  Normal rate and regular rhythm.           No murmur heard.  Pulmonary/Chest: Breath sounds normal. No respiratory distress. She exhibits no tenderness.   Abdominal: Abdomen is soft. Bowel sounds are normal. She exhibits no distension. There is no abdominal tenderness.   Musculoskeletal:         General: Normal range of motion.      Cervical back: Neck supple.      Lumbar back: Normal. Normal range of motion.      Comments: Scarring to thoracic spine from previous surgeries. No dehiscence to the area. No thoracic step offs or tenderness. Bilateral lumbar Paraspinous tenderness.     Neurological: She is alert and oriented to person, place, and time. She has normal strength. No cranial nerve deficit or sensory deficit.   Sensory and motor function grossly intact bilateral LE   Psychiatric: She has a normal mood and affect. Judgment normal.       ED Course   Procedures  Labs  Reviewed   COMPREHENSIVE METABOLIC PANEL - Abnormal; Notable for the following components:       Result Value    Sodium Level 132 (*)     Carbon Dioxide 20 (*)     Blood Urea Nitrogen 7.9 (*)     Albumin Level 3.0 (*)     Globulin 3.6 (*)     Albumin/Globulin Ratio 0.8 (*)     Alkaline Phosphatase 153 (*)     All other components within normal limits   CBC WITH DIFFERENTIAL - Abnormal; Notable for the following components:    RBC 3.17 (*)     Hgb 11.7 (*)     Hct 36.5 (*)     .1 (*)     MCH 36.9 (*)     MCHC 32.1 (*)     IG# 0.05 (*)     All other components within normal limits   TROPONIN I - Normal   URINALYSIS, REFLEX TO URINE CULTURE - Normal   MAGNESIUM - Normal   D DIMER, QUANTITATIVE - Normal   URINALYSIS, MICROSCOPIC - Normal   CBC W/ AUTO DIFFERENTIAL    Narrative:     The following orders were created for panel order CBC Auto Differential.  Procedure                               Abnormality         Status                     ---------                               -----------         ------                     CBC with Differential[[id number]]        Abnormal            Final result                 Please view results for these tests on the individual orders.     EKG Readings: (Independently Interpreted)   Initial Reading: No STEMI. Rhythm: Normal Sinus Rhythm. Heart Rate: 63. Ectopy: No Ectopy. Conduction: Normal. ST Segments: Normal ST Segments. Axis: Normal.   Done on 8/28/22 at 1924.    Broad based T waves in inferior and lateral leads.    Normal QTc     Imaging Results              CT Lumbar Spine Without Contrast (Final result)  Result time 08/28/22 21:54:17      Final result by Anson Rollins MD (08/28/22 21:54:17)                   Impression:      Stable postsurgical changes from T9 through L1 as above without acute abnormality.      Electronically signed by: Anson Rollins MD  Date:    08/28/2022  Time:    21:54               Narrative:    EXAMINATION:  CT LUMBAR SPINE WITHOUT  CONTRAST    CLINICAL HISTORY:  Compression fracture, lumbar;    TECHNIQUE:  Axial images of the lumbar spine were obtained without IV contrast administration.  Coronal and sagittal reconstructions were provided.  Three dimensional and MIP images were obtained and evaluated.  Total DLP was 471 mGy-cm. Dose lowering technique and automated exposure control were utilized for this exam.    COMPARISON:  CT of the thoracic and lumbar spine 01/19/2022.    FINDINGS:  There are chronic fracture deformities and endplate irregularities from T10 through T12.  There are stable postsurgical changes following T10 through T12 laminectomy and posterior fusion from T9-L1.  Alignment is unchanged.  There is no compression deformity.  There is no new loss of vertebral body height.  The remaining posterior elements are intact and well aligned.    The paraspinal musculature is normal.  There is no retroperitoneal adenopathy.                                       CT Head Without Contrast (Final result)  Result time 08/28/22 21:50:01      Final result by Anson Rollins MD (08/28/22 21:50:01)                   Impression:      No acute intracranial abnormality.      Electronically signed by: Anson Rollins MD  Date:    08/28/2022  Time:    21:50               Narrative:    EXAMINATION:  CT HEAD WITHOUT CONTRAST    CLINICAL HISTORY:  Syncope, recurrent;    TECHNIQUE:  Axial images of the head were obtained without IV contrast administration.  Coronal and sagittal reconstructions were provided.  Three dimensional and MIP images were obtained and evaluated.  Total DLP was 879 mGy-cm. Dose lowering technique and automated exposure control were utilized for this exam.    COMPARISON:  CT of the head 08/28/2017.    FINDINGS:  There is normal brain formation.  There is normal gray-white matter differentiation.  There is no hemorrhage, hydrocephalus, or midline shift.  There is no cytotoxic or vasogenic edema.  There is no intra or extra-axial fluid  collection.  There is no herniation.    The calvarium is intact.  There is no fracture.  The bilateral orbits are normal.  The paranasal sinuses and mastoid air cells are normally developed and free of disease.                                       X-Ray Chest 1 View (In process)                      Medications   sodium chloride 0.9% bolus 1,000 mL (0 mLs Intravenous Stopped 8/28/22 2332)   morphine injection 4 mg (4 mg Intravenous Given 8/28/22 2215)   ondansetron injection 4 mg (4 mg Intravenous Given 8/28/22 2215)     Medical Decision Making:   ED Management:  Well appearing, ct ls neg, not orthostatic, mildly hemoconcentrated, mixing etoh with muscle relaxers, no concerning ekg features for syncope, ct head neg, neg head ct.  Had IVF and pain meds in Ed, has f/u with NSG        Scribe Attestation:   Scribe #1: I performed the above scribed service and the documentation accurately describes the services I performed. I attest to the accuracy of the note.    Attending Attestation:           Physician Attestation for Scribe:  Physician Attestation Statement for Scribe #1: I, Dr. Ohara, reviewed documentation, as scribed by Jose De Jesus Cole in my presence, and it is both accurate and complete.                    Clinical Impression:   Final diagnoses:  [R53.1] Weakness  [W19.XXXA] Fall, initial encounter (Primary)  [Z79.899] Polypharmacy        ED Disposition Condition    Discharge Stable          ED Prescriptions    None       Follow-up Information       Follow up With Specialties Details Why Contact Info    PCP  Call in 1 day  follow up with PCP ni 1-2 days             Reginaldo Ohara MD  08/28/22 [id number]

## 2022-08-29 NOTE — FIRST PROVIDER EVALUATION
"Medical screening exam completed.  I have conducted a focused provider triage encounter, findings are as follows:    Brief history of present illness:  62 year old female presents to ED for accidental fall; patient reports low back pain and weakness; denies hitting head and LOC; reports lumbar surgery 6 months ago     Vitals:    08/28/22 1917   BP: (!) 78/54   BP Location: Right arm   Patient Position: Sitting   Pulse: 65   Resp: 20   Temp: 96.8 °F (36 °C)   TempSrc: Temporal   Weight: 54 kg (119 lb)   Height: 5' 2.99" (1.6 m)       Pertinent physical exam:  awake, alert, in wheel chair     Brief workup plan:  CT lumbar spine; CBC, CMP, EKG, Troponin, UA     Preliminary workup initiated; this workup will be continued and followed by the physician or advanced practice provider that is assigned to the patient when roomed.  "

## 2022-09-21 ENCOUNTER — HISTORICAL (OUTPATIENT)
Dept: ADMINISTRATIVE | Facility: HOSPITAL | Age: 62
End: 2022-09-21
Payer: MEDICAID

## 2022-10-26 ENCOUNTER — HOSPITAL ENCOUNTER (INPATIENT)
Facility: HOSPITAL | Age: 62
LOS: 16 days | Discharge: HOME HEALTH | DRG: 481 | End: 2022-11-11
Attending: EMERGENCY MEDICINE | Admitting: INTERNAL MEDICINE
Payer: MEDICAID

## 2022-10-26 DIAGNOSIS — S72.001A CLOSED DISPLACED FRACTURE OF RIGHT FEMORAL NECK: ICD-10-CM

## 2022-10-26 DIAGNOSIS — I10 BENIGN ESSENTIAL HTN: ICD-10-CM

## 2022-10-26 DIAGNOSIS — I82.409 DVT (DEEP VENOUS THROMBOSIS): ICD-10-CM
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
## 2022-10-26 DIAGNOSIS — S72.001A CLOSED FRACTURE OF RIGHT HIP, INITIAL ENCOUNTER: ICD-10-CM

## 2022-10-26 DIAGNOSIS — S72.141A: ICD-10-CM

## 2022-10-26 DIAGNOSIS — R00.0 SINUS TACHYCARDIA: Primary | ICD-10-CM

## 2022-10-26 DIAGNOSIS — E11.9 TYPE 2 DIABETES MELLITUS WITHOUT COMPLICATION, WITHOUT LONG-TERM CURRENT USE OF INSULIN: ICD-10-CM

## 2022-10-26 DIAGNOSIS — M79.89 SWELLING OF RIGHT LOWER EXTREMITY: ICD-10-CM

## 2022-10-26 DIAGNOSIS — R07.9 CHEST PAIN: ICD-10-CM

## 2022-10-26 DIAGNOSIS — F10.920 ACUTE ALCOHOLIC INTOXICATION WITHOUT COMPLICATION: ICD-10-CM

## 2022-10-26 LAB
ALBUMIN SERPL-MCNC: 3.8 GM/DL (ref 3.4–4.8)
ALBUMIN/GLOB SERPL: 1.2 RATIO (ref 1.1–2)
ALP SERPL-CCNC: 158 UNIT/L (ref 40–150)
ALT SERPL-CCNC: 17 UNIT/L (ref 0–55)
APTT PPP: 28.9 SECONDS (ref 23.2–33.7)
AST SERPL-CCNC: 31 UNIT/L (ref 5–34)
BASOPHILS # BLD AUTO: 0.05 X10(3)/MCL (ref 0–0.2)
BASOPHILS NFR BLD AUTO: 0.5 %
BILIRUBIN DIRECT+TOT PNL SERPL-MCNC: 0.3 MG/DL
BUN SERPL-MCNC: 7.1 MG/DL (ref 9.8–20.1)
CALCIUM SERPL-MCNC: 9.4 MG/DL (ref 8.4–10.2)
CHLORIDE SERPL-SCNC: 96 MMOL/L (ref 98–107)
CO2 SERPL-SCNC: 19 MMOL/L (ref 23–31)
CREAT SERPL-MCNC: 0.64 MG/DL (ref 0.55–1.02)
D DIMER PPP IA.FEU-MCNC: 2.34 UG/ML FEU (ref 0–0.5)
EOSINOPHIL # BLD AUTO: 0.04 X10(3)/MCL (ref 0–0.9)
EOSINOPHIL NFR BLD AUTO: 0.4 %
ERYTHROCYTE [DISTWIDTH] IN BLOOD BY AUTOMATED COUNT: 12.3 % (ref 11.5–17)
ETHANOL SERPL-MCNC: 239 MG/DL
ETHANOL SERPL-MCNC: <10 MG/DL
GFR SERPLBLD CREATININE-BSD FMLA CKD-EPI: >60 MLS/MIN/1.73/M2
GLOBULIN SER-MCNC: 3.3 GM/DL (ref 2.4–3.5)
GLUCOSE SERPL-MCNC: 99 MG/DL (ref 82–115)
HCT VFR BLD AUTO: 37.7 % (ref 37–47)
HGB BLD-MCNC: 12.8 GM/DL (ref 12–16)
IMM GRANULOCYTES # BLD AUTO: 0.05 X10(3)/MCL (ref 0–0.04)
IMM GRANULOCYTES NFR BLD AUTO: 0.5 %
INR BLD: 1.01 (ref 0–1.3)
LYMPHOCYTES # BLD AUTO: 2.66 X10(3)/MCL (ref 0.6–4.6)
LYMPHOCYTES NFR BLD AUTO: 24.5 %
MCH RBC QN AUTO: 35.3 PG (ref 27–31)
MCHC RBC AUTO-ENTMCNC: 34 MG/DL (ref 33–36)
MCV RBC AUTO: 103.9 FL (ref 80–94)
MONOCYTES # BLD AUTO: 0.75 X10(3)/MCL (ref 0.1–1.3)
MONOCYTES NFR BLD AUTO: 6.9 %
NEUTROPHILS # BLD AUTO: 7.3 X10(3)/MCL (ref 2.1–9.2)
NEUTROPHILS NFR BLD AUTO: 67.2 %
NRBC BLD AUTO-RTO: 0 %
PLATELET # BLD AUTO: 358 X10(3)/MCL (ref 130–400)
PMV BLD AUTO: 9.2 FL (ref 7.4–10.4)
POCT GLUCOSE: 124 MG/DL (ref 70–110)
POTASSIUM SERPL-SCNC: 5.3 MMOL/L (ref 3.5–5.1)
PROT SERPL-MCNC: 7.1 GM/DL (ref 5.8–7.6)
PROTHROMBIN TIME: 13.2 SECONDS (ref 12.5–14.5)
RBC # BLD AUTO: 3.63 X10(6)/MCL (ref 4.2–5.4)
SARS-COV-2 RDRP RESP QL NAA+PROBE: NEGATIVE
SODIUM SERPL-SCNC: 128 MMOL/L (ref 136–145)
TROPONIN I SERPL-MCNC: <0.01 NG/ML (ref 0–0.04)
WBC # SPEC AUTO: 10.9 X10(3)/MCL (ref 4.5–11.5)

## 2022-10-26 PROCEDURE — 96376 TX/PRO/DX INJ SAME DRUG ADON: CPT

## 2022-10-26 PROCEDURE — 82077 ASSAY SPEC XCP UR&BREATH IA: CPT | Performed by: INTERNAL MEDICINE

## 2022-10-26 PROCEDURE — 63600175 PHARM REV CODE 636 W HCPCS: Performed by: INTERNAL MEDICINE

## 2022-10-26 PROCEDURE — 96375 TX/PRO/DX INJ NEW DRUG ADDON: CPT

## 2022-10-26 PROCEDURE — S4991 NICOTINE PATCH NONLEGEND: HCPCS | Performed by: PHYSICIAN ASSISTANT

## 2022-10-26 PROCEDURE — 25000003 PHARM REV CODE 250: Performed by: PHYSICIAN ASSISTANT

## 2022-10-26 PROCEDURE — 84484 ASSAY OF TROPONIN QUANT: CPT | Performed by: PHYSICIAN ASSISTANT

## 2022-10-26 PROCEDURE — 63600175 PHARM REV CODE 636 W HCPCS: Performed by: PHYSICIAN ASSISTANT

## 2022-10-26 PROCEDURE — 99285 EMERGENCY DEPT VISIT HI MDM: CPT | Mod: 25

## 2022-10-26 PROCEDURE — 80053 COMPREHEN METABOLIC PANEL: CPT | Performed by: STUDENT IN AN ORGANIZED HEALTH CARE EDUCATION/TRAINING PROGRAM

## 2022-10-26 PROCEDURE — 87635 SARS-COV-2 COVID-19 AMP PRB: CPT | Performed by: EMERGENCY MEDICINE

## 2022-10-26 PROCEDURE — 85379 FIBRIN DEGRADATION QUANT: CPT | Performed by: PHYSICIAN ASSISTANT

## 2022-10-26 PROCEDURE — 25000003 PHARM REV CODE 250: Performed by: INTERNAL MEDICINE

## 2022-10-26 PROCEDURE — 82077 ASSAY SPEC XCP UR&BREATH IA: CPT | Performed by: STUDENT IN AN ORGANIZED HEALTH CARE EDUCATION/TRAINING PROGRAM

## 2022-10-26 PROCEDURE — 25500020 PHARM REV CODE 255: Performed by: INTERNAL MEDICINE

## 2022-10-26 PROCEDURE — 85730 THROMBOPLASTIN TIME PARTIAL: CPT | Performed by: STUDENT IN AN ORGANIZED HEALTH CARE EDUCATION/TRAINING PROGRAM

## 2022-10-26 PROCEDURE — 63600175 PHARM REV CODE 636 W HCPCS: Performed by: EMERGENCY MEDICINE

## 2022-10-26 PROCEDURE — 11000001 HC ACUTE MED/SURG PRIVATE ROOM

## 2022-10-26 PROCEDURE — 36415 COLL VENOUS BLD VENIPUNCTURE: CPT | Performed by: STUDENT IN AN ORGANIZED HEALTH CARE EDUCATION/TRAINING PROGRAM

## 2022-10-26 PROCEDURE — 85610 PROTHROMBIN TIME: CPT | Performed by: STUDENT IN AN ORGANIZED HEALTH CARE EDUCATION/TRAINING PROGRAM

## 2022-10-26 PROCEDURE — 36415 COLL VENOUS BLD VENIPUNCTURE: CPT | Performed by: PHYSICIAN ASSISTANT

## 2022-10-26 PROCEDURE — 85025 COMPLETE CBC W/AUTO DIFF WBC: CPT | Performed by: STUDENT IN AN ORGANIZED HEALTH CARE EDUCATION/TRAINING PROGRAM

## 2022-10-26 PROCEDURE — 96374 THER/PROPH/DIAG INJ IV PUSH: CPT

## 2022-10-26 PROCEDURE — 93005 ELECTROCARDIOGRAM TRACING: CPT

## 2022-10-26 RX ORDER — HYDRALAZINE HYDROCHLORIDE 20 MG/ML
10 INJECTION INTRAMUSCULAR; INTRAVENOUS EVERY 4 HOURS PRN
Status: DISCONTINUED | OUTPATIENT
Start: 2022-10-26 | End: 2022-11-11 | Stop reason: HOSPADM

## 2022-10-26 RX ORDER — AMLODIPINE BESYLATE 5 MG/1
10 TABLET ORAL DAILY
Status: DISCONTINUED | OUTPATIENT
Start: 2022-10-26 | End: 2022-11-11 | Stop reason: HOSPADM

## 2022-10-26 RX ORDER — ESCITALOPRAM OXALATE 20 MG/1
20 TABLET ORAL DAILY
Status: ON HOLD | COMMUNITY

## 2022-10-26 RX ORDER — ENOXAPARIN SODIUM 100 MG/ML
40 INJECTION SUBCUTANEOUS EVERY 24 HOURS
Status: DISCONTINUED | OUTPATIENT
Start: 2022-10-26 | End: 2022-11-11 | Stop reason: HOSPADM

## 2022-10-26 RX ORDER — GABAPENTIN 300 MG/1
300 CAPSULE ORAL 3 TIMES DAILY
Status: DISCONTINUED | OUTPATIENT
Start: 2022-10-26 | End: 2022-11-11 | Stop reason: HOSPADM

## 2022-10-26 RX ORDER — GABAPENTIN 300 MG/1
300 CAPSULE ORAL 3 TIMES DAILY
Status: ON HOLD | COMMUNITY

## 2022-10-26 RX ORDER — DICYCLOMINE HYDROCHLORIDE 20 MG/1
20 TABLET ORAL EVERY 6 HOURS
Status: ON HOLD | COMMUNITY
End: 2022-11-11 | Stop reason: HOSPADM

## 2022-10-26 RX ORDER — ESCITALOPRAM OXALATE 10 MG/1
20 TABLET ORAL DAILY
Status: DISCONTINUED | OUTPATIENT
Start: 2022-10-27 | End: 2022-11-11 | Stop reason: HOSPADM

## 2022-10-26 RX ORDER — CLONIDINE HYDROCHLORIDE 0.1 MG/1
0.1 TABLET ORAL 3 TIMES DAILY PRN
Status: DISCONTINUED | OUTPATIENT
Start: 2022-10-26 | End: 2022-11-11 | Stop reason: HOSPADM

## 2022-10-26 RX ORDER — MORPHINE SULFATE 4 MG/ML
4 INJECTION, SOLUTION INTRAMUSCULAR; INTRAVENOUS
Status: COMPLETED | OUTPATIENT
Start: 2022-10-26 | End: 2022-10-26

## 2022-10-26 RX ORDER — SODIUM CHLORIDE 0.9 % (FLUSH) 0.9 %
10 SYRINGE (ML) INJECTION EVERY 12 HOURS PRN
Status: DISCONTINUED | OUTPATIENT
Start: 2022-10-26 | End: 2022-11-11 | Stop reason: HOSPADM

## 2022-10-26 RX ORDER — GLUCAGON 1 MG
1 KIT INJECTION
Status: DISCONTINUED | OUTPATIENT
Start: 2022-10-26 | End: 2022-11-11 | Stop reason: HOSPADM

## 2022-10-26 RX ORDER — MORPHINE SULFATE 4 MG/ML
2 INJECTION, SOLUTION INTRAMUSCULAR; INTRAVENOUS EVERY 4 HOURS PRN
Status: DISPENSED | OUTPATIENT
Start: 2022-10-26 | End: 2022-10-28

## 2022-10-26 RX ORDER — SODIUM CHLORIDE 9 MG/ML
INJECTION, SOLUTION INTRAVENOUS CONTINUOUS
Status: ACTIVE | OUTPATIENT
Start: 2022-10-26 | End: 2022-10-27

## 2022-10-26 RX ORDER — IBUPROFEN 200 MG
1 TABLET ORAL DAILY
Status: DISCONTINUED | OUTPATIENT
Start: 2022-10-26 | End: 2022-11-11 | Stop reason: HOSPADM

## 2022-10-26 RX ORDER — SODIUM CHLORIDE 9 MG/ML
1000 INJECTION, SOLUTION INTRAVENOUS
Status: DISCONTINUED | OUTPATIENT
Start: 2022-10-26 | End: 2022-10-26

## 2022-10-26 RX ORDER — METHOCARBAMOL 100 MG/ML
1000 INJECTION, SOLUTION INTRAMUSCULAR; INTRAVENOUS ONCE
Status: COMPLETED | OUTPATIENT
Start: 2022-10-26 | End: 2022-10-26

## 2022-10-26 RX ORDER — ATORVASTATIN CALCIUM 10 MG/1
10 TABLET, FILM COATED ORAL DAILY
Status: DISCONTINUED | OUTPATIENT
Start: 2022-10-27 | End: 2022-11-11 | Stop reason: HOSPADM

## 2022-10-26 RX ORDER — BUSPIRONE HYDROCHLORIDE 5 MG/1
5 TABLET ORAL 2 TIMES DAILY
Status: DISCONTINUED | OUTPATIENT
Start: 2022-10-26 | End: 2022-10-28

## 2022-10-26 RX ORDER — FOLIC ACID 1 MG/1
1 TABLET ORAL DAILY
Status: DISCONTINUED | OUTPATIENT
Start: 2022-10-27 | End: 2022-11-11 | Stop reason: HOSPADM

## 2022-10-26 RX ORDER — IBUPROFEN 200 MG
24 TABLET ORAL
Status: DISCONTINUED | OUTPATIENT
Start: 2022-10-26 | End: 2022-11-11 | Stop reason: HOSPADM

## 2022-10-26 RX ORDER — LABETALOL HYDROCHLORIDE 5 MG/ML
10 INJECTION, SOLUTION INTRAVENOUS
Status: DISCONTINUED | OUTPATIENT
Start: 2022-10-26 | End: 2022-11-11 | Stop reason: HOSPADM

## 2022-10-26 RX ORDER — FOLIC ACID 1 MG/1
1 TABLET ORAL DAILY
Status: DISCONTINUED | OUTPATIENT
Start: 2022-10-27 | End: 2022-10-26

## 2022-10-26 RX ORDER — LABETALOL 200 MG/1
200 TABLET, FILM COATED ORAL 2 TIMES DAILY
COMMUNITY
End: 2024-03-20

## 2022-10-26 RX ORDER — LABETALOL 200 MG/1
200 TABLET, FILM COATED ORAL 2 TIMES DAILY
Status: DISCONTINUED | OUTPATIENT
Start: 2022-10-26 | End: 2022-11-11 | Stop reason: HOSPADM

## 2022-10-26 RX ORDER — IBUPROFEN 200 MG
16 TABLET ORAL
Status: DISCONTINUED | OUTPATIENT
Start: 2022-10-26 | End: 2022-11-11 | Stop reason: HOSPADM

## 2022-10-26 RX ORDER — ONDANSETRON 2 MG/ML
4 INJECTION INTRAMUSCULAR; INTRAVENOUS EVERY 4 HOURS PRN
Status: DISCONTINUED | OUTPATIENT
Start: 2022-10-26 | End: 2022-11-11 | Stop reason: HOSPADM

## 2022-10-26 RX ORDER — ACETAMINOPHEN 325 MG/1
650 TABLET ORAL EVERY 4 HOURS PRN
Status: DISCONTINUED | OUTPATIENT
Start: 2022-10-26 | End: 2022-11-06

## 2022-10-26 RX ORDER — INSULIN ASPART 100 [IU]/ML
1-10 INJECTION, SOLUTION INTRAVENOUS; SUBCUTANEOUS EVERY 6 HOURS PRN
Status: DISCONTINUED | OUTPATIENT
Start: 2022-10-26 | End: 2022-11-11 | Stop reason: HOSPADM

## 2022-10-26 RX ORDER — THIAMINE HCL 100 MG
100 TABLET ORAL DAILY
Status: DISCONTINUED | OUTPATIENT
Start: 2022-10-27 | End: 2022-11-11 | Stop reason: HOSPADM

## 2022-10-26 RX ORDER — ONDANSETRON 2 MG/ML
4 INJECTION INTRAMUSCULAR; INTRAVENOUS
Status: COMPLETED | OUTPATIENT
Start: 2022-10-26 | End: 2022-10-26

## 2022-10-26 RX ORDER — ACETAMINOPHEN 325 MG/1
650 TABLET ORAL EVERY 8 HOURS PRN
Status: DISCONTINUED | OUTPATIENT
Start: 2022-10-26 | End: 2022-11-11 | Stop reason: HOSPADM

## 2022-10-26 RX ORDER — OXYCODONE HYDROCHLORIDE 5 MG/1
5 TABLET ORAL EVERY 4 HOURS PRN
Status: DISCONTINUED | OUTPATIENT
Start: 2022-10-26 | End: 2022-11-11 | Stop reason: HOSPADM

## 2022-10-26 RX ORDER — HYDRALAZINE HYDROCHLORIDE 20 MG/ML
10 INJECTION INTRAMUSCULAR; INTRAVENOUS
Status: DISCONTINUED | OUTPATIENT
Start: 2022-10-26 | End: 2022-11-11 | Stop reason: HOSPADM

## 2022-10-26 RX ORDER — FOLIC ACID 1 MG/1
1 TABLET ORAL DAILY
Status: ON HOLD | COMMUNITY

## 2022-10-26 RX ORDER — ATORVASTATIN CALCIUM 10 MG/1
10 TABLET, FILM COATED ORAL DAILY
Status: ON HOLD | COMMUNITY

## 2022-10-26 RX ORDER — BUSPIRONE HYDROCHLORIDE 5 MG/1
5 TABLET ORAL 2 TIMES DAILY
Status: ON HOLD | COMMUNITY
End: 2022-11-11 | Stop reason: HOSPADM

## 2022-10-26 RX ADMIN — GABAPENTIN 300 MG: 300 CAPSULE ORAL at 09:10

## 2022-10-26 RX ADMIN — SODIUM ZIRCONIUM CYCLOSILICATE 10 G: 10 POWDER, FOR SUSPENSION ORAL at 10:10

## 2022-10-26 RX ADMIN — ONDANSETRON 4 MG: 2 INJECTION INTRAMUSCULAR; INTRAVENOUS at 09:10

## 2022-10-26 RX ADMIN — ONDANSETRON 4 MG: 2 INJECTION INTRAMUSCULAR; INTRAVENOUS at 02:10

## 2022-10-26 RX ADMIN — BUSPIRONE HYDROCHLORIDE 5 MG: 5 TABLET ORAL at 09:10

## 2022-10-26 RX ADMIN — LABETALOL HYDROCHLORIDE 200 MG: 200 TABLET, FILM COATED ORAL at 09:10

## 2022-10-26 RX ADMIN — ACETAMINOPHEN 650 MG: 325 TABLET ORAL at 10:10

## 2022-10-26 RX ADMIN — OXYCODONE 5 MG: 5 TABLET ORAL at 02:10

## 2022-10-26 RX ADMIN — LABETALOL HYDROCHLORIDE 10 MG: 5 INJECTION, SOLUTION INTRAVENOUS at 08:10

## 2022-10-26 RX ADMIN — ENOXAPARIN SODIUM 40 MG: 40 INJECTION SUBCUTANEOUS at 06:10

## 2022-10-26 RX ADMIN — ONDANSETRON 4 MG: 2 INJECTION INTRAMUSCULAR; INTRAVENOUS at 08:10

## 2022-10-26 RX ADMIN — MORPHINE SULFATE 4 MG: 4 INJECTION INTRAVENOUS at 09:10

## 2022-10-26 RX ADMIN — MORPHINE SULFATE 2 MG: 4 INJECTION INTRAVENOUS at 10:10

## 2022-10-26 RX ADMIN — SODIUM CHLORIDE: 9 INJECTION, SOLUTION INTRAVENOUS at 12:10

## 2022-10-26 RX ADMIN — FOLIC ACID: 5 INJECTION, SOLUTION INTRAMUSCULAR; INTRAVENOUS; SUBCUTANEOUS at 10:10

## 2022-10-26 RX ADMIN — MORPHINE SULFATE 4 MG: 4 INJECTION INTRAVENOUS at 08:10

## 2022-10-26 RX ADMIN — IOPAMIDOL 100 ML: 755 INJECTION, SOLUTION INTRAVENOUS at 06:10

## 2022-10-26 RX ADMIN — AMLODIPINE BESYLATE 10 MG: 5 TABLET ORAL at 05:10

## 2022-10-26 RX ADMIN — MORPHINE SULFATE 2 MG: 4 INJECTION INTRAVENOUS at 05:10

## 2022-10-26 RX ADMIN — HYDRALAZINE HYDROCHLORIDE 10 MG: 20 INJECTION INTRAMUSCULAR; INTRAVENOUS at 06:10

## 2022-10-26 RX ADMIN — METHOCARBAMOL 1000 MG: 100 INJECTION INTRAMUSCULAR; INTRAVENOUS at 09:10

## 2022-10-26 RX ADMIN — OXYCODONE 5 MG: 5 TABLET ORAL at 07:10

## 2022-10-26 RX ADMIN — MORPHINE SULFATE 2 MG: 4 INJECTION INTRAVENOUS at 11:10

## 2022-10-26 RX ADMIN — NICOTINE 1 PATCH: 14 PATCH, EXTENDED RELEASE TRANSDERMAL at 12:10

## 2022-10-26 NOTE — CONSULTS
[facility] - Emergency Dept  Orthopedics  Consult Note    Patient Name: Luz Maria Alfaro  MRN: [id number]  Admission Date: 10/26/2022  Hospital Length of Stay: 0 days  Attending Provider: Chyna Juarez MD  Primary Care Provider: Primary Doctor No    Patient information was obtained from patient, spouse/SO, and ER records.     Consults  Subjective:     Principal Problem:Closed right hip fracture    Chief Complaint:   Chief Complaint   Patient presents with    Hip Pain     Patient states fell out of bed c/o right hip pain; +ETOH last night.  2+ pedal pulse.  No other complaints.         HPI:  The patient is a 62-year-old female who had a fall at home.  She had immediate right hip pain and inability to bear weight to the right leg.  She was brought to [facility] Emergency Room for evaluation.  She was found to have a right intertrochanteric femur fracture.  She is being admitted to medicines service.  Orthopedics was consulted for fixation of her fracture.  She is awake and alert at the time of my evaluation.  Pain is currently controlled with medications.  Her ex- is at the bedside providing history.  She denies any other injuries or complaints at this time.    Past Medical History:   Diagnosis Date    Benign essential HTN     Mixed hyperlipidemia     Type 2 diabetes mellitus without complications        No past surgical history on file.    Review of patient's allergies indicates:   Allergen Reactions    Meperidine Rash       Current Facility-Administered Medications   Medication    0.9%  NaCl infusion    acetaminophen tablet 650 mg    acetaminophen tablet 650 mg    dextrose 50% injection 12.5 g    dextrose 50% injection 25 g    [START ON 10/27/2022] folic acid tablet 1 mg    glucagon (human recombinant) injection 1 mg    glucose chewable tablet 16 g    glucose chewable tablet 24 g    insulin aspart U-100 injection 1-10 Units    morphine injection 2 mg    [START ON 10/27/2022] multivitamin  "tablet    nicotine 14 mg/24 hr 1 patch    ondansetron injection 4 mg    oxyCODONE immediate release tablet 5 mg    sodium chloride 0.9% flush 10 mL    sodium zirconium cyclosilicate packet 10 g    [START ON 10/27/2022] thiamine tablet 100 mg     No current outpatient medications on file.     Family History    None       Tobacco Use    Smoking status: Every Day     Types: Cigarettes    Smokeless tobacco: Never   Substance and Sexual Activity    Alcohol use: Yes    Drug use: Never    Sexual activity: Not Currently     Review of Systems   Constitutional: Negative for chills and fever.   HENT:  Negative for congestion and hearing loss.    Eyes:  Negative for visual disturbance.   Cardiovascular:  Negative for chest pain and syncope.   Respiratory:  Negative for cough and shortness of breath.    Hematologic/Lymphatic: Does not bruise/bleed easily.   Skin:  Negative for color change and rash.   Gastrointestinal:  Negative for abdominal pain, nausea and vomiting.   Genitourinary:  Negative for dysuria and hematuria.   Neurological:  Negative for numbness, sensory change and weakness.   Psychiatric/Behavioral:  Negative for altered mental status.    Objective:     Vital Signs (Most Recent):  Temp: 97.2 °F (36.2 °C) (10/26/22 0720)  Pulse: 99 (10/26/22 1445)  Resp: 16 (10/26/22 1445)  BP: (!) 163/96 (10/26/22 1445)  SpO2: 95 % (10/26/22 1445)   Vital Signs (24h Range):  Temp:  [97.2 °F (36.2 °C)] 97.2 °F (36.2 °C)  Pulse:  [76-99] 99  Resp:  [16-20] 16  SpO2:  [95 %-100 %] 95 %  BP: (156-168)/() 163/96     Weight: 54.4 kg (120 lb)  Height: 5' 3" (160 cm)  Body mass index is 21.26 kg/m².    No intake or output data in the 24 hours ending 10/26/22 1543    General    Constitutional: She is oriented to person, place, and time. She appears well-developed and well-nourished. No distress.   HENT:   Head: Normocephalic and atraumatic.   Eyes: EOM are normal.   Neck: Neck supple.   Cardiovascular:  Normal rate and intact distal " pulses.            Pulmonary/Chest: Effort normal. No respiratory distress.   Abdominal: Soft. She exhibits no distension. There is no abdominal tenderness.   Neurological: She is alert and oriented to person, place, and time.   Psychiatric: She has a normal mood and affect. Her behavior is normal. Judgment and thought content normal.             Right Hip Exam     Comments:  Skin is intact with no abrasions or open wounds.  Extremity is short compared to the opposite limb.  There is no obvious swelling.  She has tenderness to the groin.  No hip range of motion was attempted.  Thigh is soft and compressible.  No tenderness over the knee, lower leg, or ankle.  No calf tenderness.  She can actively dorsiflex and plantar flex the foot.  Palpable DP pulse.  Motor intact to digits.  Brisk capillary refill distally.  Sensation to light touch intact distally.    Other extremities are free from any obvious swelling, deformity, open wounds, tenderness over the long bones, or pain with range of motion    Significant Labs: BMP:   Recent Labs   Lab 10/26/22  0745   *   K 5.3*   CO2 19*   BUN 7.1*   CREATININE 0.64   CALCIUM 9.4     CBC:   Recent Labs   Lab 10/26/22  0745   WBC 10.9   HGB 12.8   HCT 37.7        All pertinent labs within the past 24 hours have been reviewed.    Significant Imaging: X-Ray: I have reviewed all pertinent results/findings and my personal findings are:  X-rays of the right hip demonstrate a displaced intertrochanteric femur fracture    Assessment/Plan:     Active Diagnoses:    Diagnosis Date Noted POA    PRINCIPAL PROBLEM:  Closed right hip fracture [S72.001A] 10/26/2022 Yes      Problems Resolved During this Admission:     Patient has a displaced right intertrochanteric femur fracture.  Her injury would benefit from operative stabilization.  She is being admitted to medicines service.  We will initiate multimodal pain control.  Remain on bedrest and she will need to be NPO after  midnight.  Plan to proceed to the operating room tomorrow with Dr. Brambila for intramedullary nailing of right intertrochanteric femur fracture.    The proposed procedure and associated risks and benefits were discussed with the patient and family. Risks associated with surgery include but are not limited to pain, bleeding, infection, neurovascular injury, loss of function, need for future surgery, scarring, malunion, nonunion, hardware failure, loss of limb, and loss of life.      The above findings, diagnosis, and treatment plan were discussed with Dr. Stewart Brambila who is in agreement.    Thank you for your consult.       ALEKS Montesinos  Orthopedics  [facility] - Emergency Dept

## 2022-10-26 NOTE — ED PROVIDER NOTES
Encounter Date: 10/26/2022       History     Chief Complaint   Patient presents with    Hip Pain     Patient states fell out of bed c/o right hip pain; +ETOH last night.  2+ pedal pulse.  No other complaints.      62-year-old female with a history of hypertension, hyperlipidemia, diabetes and alcohol use presents ED for evaluation of hip pain.  She fell out of bed onto her right hip with immediate pain.  She denies any other injuries or any other pain.  Admits to drinking last night    The history is provided by the patient. No  was used.   Hip Pain  This is a new problem. The current episode started 1 to 2 hours ago. The problem occurs constantly. The problem has not changed since onset.Nothing aggravates the symptoms. Nothing relieves the symptoms. She has tried nothing for the symptoms. The treatment provided no relief.   Review of patient's allergies indicates:   Allergen Reactions    Meperidine Rash     Past Medical History:   Diagnosis Date    Benign essential HTN     Mixed hyperlipidemia     Type 2 diabetes mellitus without complications      No past surgical history on file.  No family history on file.  Social History     Tobacco Use    Smoking status: Every Day     Types: Cigarettes    Smokeless tobacco: Never   Substance Use Topics    Alcohol use: Yes    Drug use: Never     Review of Systems    Physical Exam     Initial Vitals [10/26/22 0720]   BP Pulse Resp Temp SpO2   (!) 166/100 76 18 97.2 °F (36.2 °C) 98 %      MAP       --         Physical Exam    Nursing note and vitals reviewed.  Constitutional: She appears well-developed. She is not diaphoretic. No distress.   Disheveled, Intoxicated   HENT:   Head: Normocephalic and atraumatic.   Nose: Nose normal.   Mouth/Throat: Oropharynx is clear and moist.   Eyes: Conjunctivae and EOM are normal. Pupils are equal, round, and reactive to light.   Neck: Trachea normal. Neck supple.   Normal range of motion.  Cardiovascular:  Normal rate,  regular rhythm, normal heart sounds and intact distal pulses.           No murmur heard.  Pulmonary/Chest: Breath sounds normal. No respiratory distress. She has no wheezes. She has no rhonchi. She has no rales. She exhibits no tenderness.   Abdominal: Abdomen is soft. Bowel sounds are normal. She exhibits no distension and no mass. There is no abdominal tenderness. There is no rebound and no guarding.   Musculoskeletal:         General: Tenderness present. No edema.      Cervical back: Normal range of motion and neck supple.      Lumbar back: Normal. Normal range of motion.      Comments: Right proximal femur tenderness with pain with range of motion     Neurological: She is alert and oriented to person, place, and time. She has normal strength. No cranial nerve deficit or sensory deficit.   Skin: Skin is warm and dry. Capillary refill takes less than 2 seconds. No abscess noted. No erythema. No pallor.   Psychiatric: She has a normal mood and affect. Her behavior is normal. Judgment and thought content normal.       ED Course   Procedures  Labs Reviewed   COMPREHENSIVE METABOLIC PANEL - Abnormal; Notable for the following components:       Result Value    Sodium Level 128 (*)     Potassium Level 5.3 (*)     Chloride 96 (*)     Carbon Dioxide 19 (*)     Blood Urea Nitrogen 7.1 (*)     Alkaline Phosphatase 158 (*)     All other components within normal limits   ALCOHOL,MEDICAL (ETHANOL) - Abnormal; Notable for the following components:    Ethanol Level 239.0 (*)     All other components within normal limits   CBC WITH DIFFERENTIAL - Abnormal; Notable for the following components:    RBC 3.63 (*)     .9 (*)     MCH 35.3 (*)     IG# 0.05 (*)     All other components within normal limits   PROTIME-INR - Normal   APTT - Normal   CBC W/ AUTO DIFFERENTIAL    Narrative:     The following orders were created for panel order CBC auto differential.  Procedure                               Abnormality         Status                      ---------                               -----------         ------                     CBC with Differential[[id number]]        Abnormal            Final result                 Please view results for these tests on the individual orders.          Imaging Results              X-Ray Hip 2 or 3 views Right (with Pelvis when performed) (In process)                      X-Ray Chest AP Portable (In process)  Result time 10/26/22 09:30:42      ED Interpretation by Sarahi Bolden MD (10/26/22 09:28:33, [facility] - Emergency Dept, Emergency Medicine)    Rotated, no acute abnormality                                  X-Rays:   Independently Interpreted Readings:   Chest X-Ray: Normal heart size.  No infiltrates.  No acute abnormalities.   Other Readings:  Right femoral neck fracture  Medications   0.9%  NaCl infusion (has no administration in time range)   morphine injection 4 mg (4 mg Intravenous Given 10/26/22 0800)   ondansetron injection 4 mg (4 mg Intravenous Given 10/26/22 0800)   methocarbamoL injection 1,000 mg (1,000 mg Intravenous Given 10/26/22 0900)   morphine injection 4 mg (4 mg Intravenous Given 10/26/22 0915)   ondansetron injection 4 mg (4 mg Intravenous Given 10/26/22 0915)     Medical Decision Making:   History:   I obtained history from: EMS provider.       <> Summary of History: Fall off bed  Old Medical Records: I decided to obtain old medical records.  Old Records Summarized: records from previous admission(s).       <> Summary of Records: Spinal surgery  Initial Assessment:   Fall with hip pain  Differential Diagnosis:   Fall with hip fracture  Alcohol intoxication  Hip pain  Hip fracture  Independently Interpreted Test(s):   I have ordered and independently interpreted X-rays - see prior notes.  Clinical Tests:   Lab Tests: Ordered and Reviewed  Radiological Study: Ordered and Reviewed  ED Management:  Ortho consult, hospitalist admit  Other:   I have discussed this  case with another health care provider.           ED Course as of 10/26/22 0932   Wed Oct 26, 2022   0915 Consulting ortho [BS]   0930 Spoke with Dr. Brambila, requests keep NPO, may be able to go to OR later today. Admit to hospitalist [BS]      ED Course User Index  [BS] Sarahi Bolden MD                 Clinical Impression:   Final diagnoses:  [W19.XXXA] Fall  [F10.920] Acute alcoholic intoxication without complication (Primary)  [S72.141A] Fracture of femur, intertrochanteric, closed, right, initial encounter  [I10] Benign essential HTN        ED Disposition Condition    Admit Stable                Sarahi Bolden MD  10/26/22 0932

## 2022-10-26 NOTE — H&P
[facility]  Hospital Medicine History & Physical Examination       Patient Name: Luz Maria Alfaro  MRN: [id number]  Patient Class: IP- Inpatient   Admission Date: 10/26/2022   Admitting Physician: Chyna Juarez MD   Length of Stay: 0  Attending Physician: Chyna Juarez MD   Primary Care Provider: Primary Doctor No  Face-to-Face encounter date: 10/26/2022  Code Status: Full Code  Chief Complaint: Hip Pain (Patient states fell out of bed c/o right hip pain; +ETOH last night.  2+ pedal pulse.  No other complaints. )        Patient information was obtained from patient, patient's family, past medical records and ER records.     HISTORY OF PRESENT ILLNESS:   Luz Maria Alfaro is a 62 y.o. White female with a past medical history of hypertension, hyperlipidemia, diabetes mellitus type 2, anxiety/depression, tobacco use, alcohol abuse. The patient presented to [facility] on 10/26/2022 with a primary complaint of right hip pain. Patient was drinking last night and she fell out of bed.  Patient is unable to give further details but remembers when she woke up on the floor calling for help sometime after midnight.  She admits to drinking 4 beers last night and reports drinking at least 4 beers daily.  She denies a history of alcohol withdrawals in the past but has been drinking more than usual recently due to depression.  She smokes 2 packs of cigarettes per day.  At baseline patient ambulates independently and lives with her son.    Upon presentation to the ED, patient afebrile, hypertensive 166/100 and PO2 98% room air.  Labs notable for sodium 128, potassium 5.3, chloride 96, CO2 19, alkaline phosphatase 158, alcohol level 239.  Chest x-ray without acute abnormalities.  X-ray right hip with a minimally displaced intertrochanteric fracture.  In ED patient the medical morphine and Zofran.  She  is admitted to hospital medicine services for further medical management.    PAST MEDICAL HISTORY:    Hypertension  Hyperlipidemia  Diabetes mellitus II  Anxiety/depression  Alcohol abuse  Tobacco use    PAST SURGICAL HISTORY:   Back surgery x2   Neck surgery   Appendectomy  Colonoscopy   EGD    ALLERGIES:   Meperidine    FAMILY HISTORY:   Mother:  Cardiovascular disease    SOCIAL HISTORY:   Tobacco - Current smoker; 2 packs per day  Alcohol - 4 (+) beers daily  Illicit Drugs - Denies    HOME MEDICATIONS:   As documented    REVIEW OF SYSTEMS:   Except as documented, all other systems reviewed and negative     PHYSICAL EXAM:     VITAL SIGNS: 24 HRS MIN & MAX LAST   Temp  Min: 97.2 °F (36.2 °C)  Max: 97.2 °F (36.2 °C) 97.2 °F (36.2 °C)   BP  Min: 166/100  Max: 168/93 (!) 168/93     Pulse  Min: 76  Max: 82  82   Resp  Min: 16  Max: 20 20   SpO2  Min: 98 %  Max: 100 % 100 %       General appearance: Well-developed, well-nourished female in no apparent distress.  Ex- at bedside.  HEENT: Atraumatic head. Moist mucous membranes of oral cavity.  Lungs:  Symmetrical chest rise.  Nonlabored.  Heart: Regular rate and rhythm.   Abdomen: Soft, distended, non-tender.  Extremities: No cyanosis, clubbing.  Right hip externally rotated with leg shortened.  Skin: No Rash. Warm and dry.  Neuro: Awake, alert and oriented. Motor and sensory exams grossly intact.  Psych/mental status: Appropriate mood and affect. Cooperative. Responds appropriately to questions.       LABS AND IMAGING:     Recent Labs   Lab 10/26/22  0745   WBC 10.9   RBC 3.63*   HGB 12.8   HCT 37.7   .9*   MCH 35.3*   MCHC 34.0   RDW 12.3      MPV 9.2       Recent Labs   Lab 10/26/22  0745   *   K 5.3*   CO2 19*   BUN 7.1*   CREATININE 0.64   CALCIUM 9.4   ALBUMIN 3.8   ALKPHOS 158*   ALT 17   AST 31   BILITOT 0.3       Microbiology Results (last 7 days)       ** No results found for the last 168 hours. **             X-Ray Hip 2 or 3 views Right (with Pelvis when performed)  Narrative: EXAMINATION:  XR HIP WITH PELVIS WHEN PERFORMED, 2 OR 3   VIEWS RIGHT    CLINICAL HISTORY:  Unspecified fall, initial encounter    COMPARISON:  None.    FINDINGS:  Minimally displace intertrochanteric fracture of the right hip with no other definite fractures or dislocations identified    Joint spaces preserved.    No blastic or lytic lesions.    Soft tissues within normal limits.  Impression: Fracture as above.    Electronically signed by: Clint Bosch  Date:    10/26/2022  Time:    09:40  X-Ray Chest AP Portable  Narrative: EXAMINATION:  XR CHEST AP PORTABLE    CLINICAL HISTORY:  fall;, .    COMPARISON:  August 28, 2022    FINDINGS:  No alveolar consolidation, effusion, or pneumothorax is seen.   The thoracic aorta is normal  cardiac silhouette, central pulmonary vessels and mediastinum are normal in size and are grossly unremarkable. There has been surgical manipulation of the lower thoracic spine  Impression: No acute chest disease is identified.    Electronically signed by: Clint Bosch  Date:    10/26/2022  Time:    09:30        ASSESSMENT & PLAN:   Assessment:  Minimally displaced right intertrochanteric hip fracture  Hyponatremia/hypochloremia  Hyperkalemia  Essential hypertension  Hyperlipidemia   Diabetes mellitus type 2   Anxiety/depression  Alcohol intoxication  Tobacco abuse    Plan:  - Ortho consulted. Appreciate recommendations  - IV Morphine and oxycodone as needed for pain  - Lokalema ordered. Continue to monitor electrolytes   - Banana bag ordered  - Will start thiamine, multivitamin folate supplementation orally tomorrow  - CIWA every 6 hours  - Monitor for alcohol withdrawals   - Ultrasound of liver ordered to rule out cirrhosis  - Consult placed for psych eval  - IVF hydration at 125mL/hr  - Accu-checks and sliding scale  - Labs in AM      VTE Prophylaxis: will be placed on SCD for DVT prophylaxis and will be advised to be as mobile as possible and sit in a chair as  tolerated      __________________________________________________________________________  INPATIENT LIST OF MEDICATIONS     Current Facility-Administered Medications:     0.9%  NaCl infusion, 1,000 mL, Intravenous, ED 1 Time, Sarahi Bolden MD    acetaminophen tablet 650 mg, 650 mg, Oral, Q8H PRN, Demetrice Burk PA-C    acetaminophen tablet 650 mg, 650 mg, Oral, Q4H PRN, Demetrice Burk PA-C    dextrose 50% injection 12.5 g, 12.5 g, Intravenous, PRN, ROSI Edwards-TYREE    dextrose 50% injection 25 g, 25 g, Intravenous, PRN, Demetrice Burk PA-C    glucagon (human recombinant) injection 1 mg, 1 mg, Intramuscular, PRN, Demetrice Burk PA-C    glucose chewable tablet 16 g, 16 g, Oral, PRN, Demetrice Burk PA-C    glucose chewable tablet 24 g, 24 g, Oral, PRN, Demetrice Burk PA-C    ondansetron injection 4 mg, 4 mg, Intravenous, Q4H PRN, Demetrice Burk PA-C    sodium chloride 0.9% 1,000 mL with mvi, (ADULT) no.4 with vit K 3,300 unit- 150 mcg/10 mL 10 mL, thiamine 100 mg, folic acid 1 mg infusion, , Intravenous, Once, Demetrice Burk PA-C    sodium chloride 0.9% flush 10 mL, 10 mL, Intravenous, Q12H PRN, Demetrice Burk PA-C  No current outpatient medications on file.      Scheduled Meds:   sodium chloride 0.9%  1,000 mL Intravenous ED 1 Time    Banana bag   Intravenous Once     Continuous Infusions:  PRN Meds:.acetaminophen, acetaminophen, dextrose 50%, dextrose 50%, glucagon (human recombinant), glucose, glucose, ondansetron, sodium chloride 0.9%      Discharge Planning and Disposition: Anticipated discharge to be determined.    IDemetrice PA, have reviewed and discussed the case with Dr. Chyna Juarez MD    Please see the following addendum for further assessment and plan from there attending MD.    Demetrice Burk PA-C  10/26/2022    ________________________________________________________________________________    MD Addendum:  I, Dr. Chyna Juarez MD assumed care of this  patient today at around 11:00 a.m.  For the patient encounter, I performed the substantive portion of the visit, I reviewed the NP/PA documentation, treatment plan, and medical decision making.  I had face to face time with this patient     A. History:    62-year-old female with significant history of HTN, HLD, type 2 diabetes mellitus, anxiety/depression, chronic tobacco and alcohol abuse.  Patient also has chronic back pain and has required multiple back surgeries in the past and neck surgery.  Patient reportedly was drinking last night, she does not recall all the events.  She woke up on the floor little after midnight.  Does not recall how she fell.  Patient drinks alcohol daily, but lately has been drinking more than usual secondary to anxiety/depression.  Reports she had quit for a while and then restarted again.  Patient lives with her son and is independent at baseline.  Upon initial evaluation patient was significantly hypertensive in the ED. lab significant for macrocytosis, electrolyte abnormalities with hyperkalemia/hyponatremia.  Alcohol level significantly elevated-239.  Chest x-ray was unremarkable.  X-ray hip revealed a minimally displaced intertrochanteric fracture of the right hip.  Hospitalist team consulted for admission.  Orthopedic services also consulted      B. Physical exam:  As above    C. Medical decision making:    Acute alcohol intoxication   Fall secondary to above   Closed right hip fracture secondary to fall  Right hip pain secondary to above  Hypertensive urgency/poorly-controlled HTN-pain contributing  Acute on chronic back pain  Acute hyponatremia-possible beer potomania   Mild metabolic acidosis  Elevated F-clmre-wbdh out PE/DVT  HLD  Type 2 diabetes mellitus   History of anxiety/depression   Chronic tobacco/alcohol abuse   Prophylaxis    Patient received banana bag in the ED   Once this completed will initiate her on normal saline at 75 cc/hour   Noted hyponatremia   Continue to  monitor, Lokelma for hyperkalemia  Repeat alcohol level in the evening  Start serax protocol once alcohol level trends down  Initiated folic acid, multivitamin and thiamine  Ultrasound abdomen to rule out cirrhosis  Orthopedics consulted for hip fracture, NPO after midnight  Plan to take to OR tomorrow  Adequate pain control   Blood pressure significantly accelerated  Pain could be contributing   On p.r.n. oxycodone, p.r.n. morphine   P.r.n. IV antihypertensives-hydralazine, labetalol, clonidine added  Resumed scheduled labetalol, added amlodipine 10 mg daily  Patient was complaining of chest pain after admission   Troponins negative   EKG ordered  D-dimer elevated   Obtain CT angiogram, venous ultrasound  Given acute on chronic back pain x-ray L-spine also ordered  Patient reports quitting drinking prior and then restarted any   Consult psych to help with anxiety/depression and detox   Resumed home meds-Lexapro, BuSpar  Also resumed-statin, gabapentin  Nicotine patch   DVT prophylaxis-Lovenox    Critical care time-45 minutes  Critical care diagnosis-hypertensive urgency requiring p.r.n. IV antihypertensives   Critical care interventions: Hands-on evaluation, review of labs/radiographs/records and discussions with patient       All diagnosis and differential diagnosis have been reviewed; assessment and plan has been documented; I have personally reviewed the labs and test results that are presently available; I have reviewed the patients medication list; I have reviewed the consulting providers response and recommendations. I have reviewed or attempted to review medical records based upon their availability.    All of the patient and family questions have been addressed and answered. Patient's is agreeable to the above stated plan. I will continue to monitor closely and make adjustments to medical management as needed.      Chyna Juarez MD  10/26/2022

## 2022-10-26 NOTE — PROGRESS NOTES
*full consult to follow    Patient had a fall onto her right hip at home while intoxicated.  Found to have a right displaced intertrochanteric femur fracture.  Plan for operative intervention tomorrow.  Okay for diet today, NPO after midnight tonight.  Admitted to the hospital medicine service and will be monitoring for signs of alcohol withdrawal.  Please call with any questions or concerns.      Stewart Brambila MD  Orthopedic Trauma  [facility]

## 2022-10-26 NOTE — PROGRESS NOTES
Attempted to meet with patient to conduct initial cm dc planning assessment; however, nurse Hinton stated not a good time to meet due to continued intoxication.

## 2022-10-27 ENCOUNTER — ANESTHESIA EVENT (OUTPATIENT)
Dept: SURGERY | Facility: HOSPITAL | Age: 62
DRG: 481 | End: 2022-10-27
Payer: MEDICAID

## 2022-10-27 ENCOUNTER — ANESTHESIA (OUTPATIENT)
Dept: SURGERY | Facility: HOSPITAL | Age: 62
DRG: 481 | End: 2022-10-27
Payer: MEDICAID

## 2022-10-27 LAB
ALBUMIN SERPL-MCNC: 3.5 GM/DL (ref 3.4–4.8)
ALBUMIN/GLOB SERPL: 1.2 RATIO (ref 1.1–2)
ALP SERPL-CCNC: 142 UNIT/L (ref 40–150)
ALT SERPL-CCNC: 15 UNIT/L (ref 0–55)
AST SERPL-CCNC: 18 UNIT/L (ref 5–34)
BASOPHILS # BLD AUTO: 0.03 X10(3)/MCL (ref 0–0.2)
BASOPHILS NFR BLD AUTO: 0.3 %
BILIRUBIN DIRECT+TOT PNL SERPL-MCNC: 0.5 MG/DL
BUN SERPL-MCNC: 9 MG/DL (ref 9.8–20.1)
CALCIUM SERPL-MCNC: 9.4 MG/DL (ref 8.4–10.2)
CHLORIDE SERPL-SCNC: 94 MMOL/L (ref 98–107)
CO2 SERPL-SCNC: 25 MMOL/L (ref 23–31)
CREAT SERPL-MCNC: 0.95 MG/DL (ref 0.55–1.02)
EOSINOPHIL # BLD AUTO: 0.01 X10(3)/MCL (ref 0–0.9)
EOSINOPHIL NFR BLD AUTO: 0.1 %
ERYTHROCYTE [DISTWIDTH] IN BLOOD BY AUTOMATED COUNT: 12.5 % (ref 11.5–17)
GFR SERPLBLD CREATININE-BSD FMLA CKD-EPI: >60 MLS/MIN/1.73/M2
GLOBULIN SER-MCNC: 3 GM/DL (ref 2.4–3.5)
GLUCOSE SERPL-MCNC: 106 MG/DL (ref 82–115)
HCT VFR BLD AUTO: 35.9 % (ref 37–47)
HGB BLD-MCNC: 11.8 GM/DL (ref 12–16)
IMM GRANULOCYTES # BLD AUTO: 0.03 X10(3)/MCL (ref 0–0.04)
IMM GRANULOCYTES NFR BLD AUTO: 0.3 %
LYMPHOCYTES # BLD AUTO: 2.18 X10(3)/MCL (ref 0.6–4.6)
LYMPHOCYTES NFR BLD AUTO: 24.5 %
MACROCYTES BLD QL SMEAR: ABNORMAL
MCH RBC QN AUTO: 34.6 PG (ref 27–31)
MCHC RBC AUTO-ENTMCNC: 32.9 MG/DL (ref 33–36)
MCV RBC AUTO: 105.3 FL (ref 80–94)
MONOCYTES # BLD AUTO: 0.83 X10(3)/MCL (ref 0.1–1.3)
MONOCYTES NFR BLD AUTO: 9.3 %
NEUTROPHILS # BLD AUTO: 5.8 X10(3)/MCL (ref 2.1–9.2)
NEUTROPHILS NFR BLD AUTO: 65.5 %
NRBC BLD AUTO-RTO: 0 %
PLATELET # BLD AUTO: 304 X10(3)/MCL (ref 130–400)
PLATELET # BLD EST: NORMAL 10*3/UL
PMV BLD AUTO: 9.2 FL (ref 7.4–10.4)
POCT GLUCOSE: 103 MG/DL (ref 70–110)
POCT GLUCOSE: 126 MG/DL (ref 70–110)
POCT GLUCOSE: 152 MG/DL (ref 70–110)
POTASSIUM SERPL-SCNC: 4.1 MMOL/L (ref 3.5–5.1)
PROT SERPL-MCNC: 6.5 GM/DL (ref 5.8–7.6)
RBC # BLD AUTO: 3.41 X10(6)/MCL (ref 4.2–5.4)
RBC MORPH BLD: ABNORMAL
SODIUM SERPL-SCNC: 130 MMOL/L (ref 136–145)
WBC # SPEC AUTO: 8.9 X10(3)/MCL (ref 4.5–11.5)

## 2022-10-27 PROCEDURE — 27000221 HC OXYGEN, UP TO 24 HOURS

## 2022-10-27 PROCEDURE — 36415 COLL VENOUS BLD VENIPUNCTURE: CPT | Performed by: PHYSICIAN ASSISTANT

## 2022-10-27 PROCEDURE — 71000033 HC RECOVERY, INTIAL HOUR: Performed by: ORTHOPAEDIC SURGERY

## 2022-10-27 PROCEDURE — 27245 PR OPEN FIX INTER/SUBTROCH FX,IMPLNT: ICD-10-PCS | Mod: RT,,, | Performed by: ORTHOPAEDIC SURGERY

## 2022-10-27 PROCEDURE — 25000242 PHARM REV CODE 250 ALT 637 W/ HCPCS: Performed by: STUDENT IN AN ORGANIZED HEALTH CARE EDUCATION/TRAINING PROGRAM

## 2022-10-27 PROCEDURE — 36000710: Performed by: ORTHOPAEDIC SURGERY

## 2022-10-27 PROCEDURE — 25000003 PHARM REV CODE 250: Performed by: PHYSICIAN ASSISTANT

## 2022-10-27 PROCEDURE — 25000003 PHARM REV CODE 250: Performed by: STUDENT IN AN ORGANIZED HEALTH CARE EDUCATION/TRAINING PROGRAM

## 2022-10-27 PROCEDURE — C1769 GUIDE WIRE: HCPCS | Performed by: ORTHOPAEDIC SURGERY

## 2022-10-27 PROCEDURE — 36000711: Performed by: ORTHOPAEDIC SURGERY

## 2022-10-27 PROCEDURE — 37000008 HC ANESTHESIA 1ST 15 MINUTES: Performed by: ORTHOPAEDIC SURGERY

## 2022-10-27 PROCEDURE — C1713 ANCHOR/SCREW BN/BN,TIS/BN: HCPCS | Performed by: ORTHOPAEDIC SURGERY

## 2022-10-27 PROCEDURE — 85025 COMPLETE CBC W/AUTO DIFF WBC: CPT | Performed by: PHYSICIAN ASSISTANT

## 2022-10-27 PROCEDURE — 63600175 PHARM REV CODE 636 W HCPCS: Performed by: ORTHOPAEDIC SURGERY

## 2022-10-27 PROCEDURE — 11000001 HC ACUTE MED/SURG PRIVATE ROOM

## 2022-10-27 PROCEDURE — 63600175 PHARM REV CODE 636 W HCPCS: Performed by: INTERNAL MEDICINE

## 2022-10-27 PROCEDURE — 63600175 PHARM REV CODE 636 W HCPCS

## 2022-10-27 PROCEDURE — 63600175 PHARM REV CODE 636 W HCPCS: Performed by: ANESTHESIOLOGY

## 2022-10-27 PROCEDURE — 63600175 PHARM REV CODE 636 W HCPCS: Performed by: PHYSICIAN ASSISTANT

## 2022-10-27 PROCEDURE — 27201423 OPTIME MED/SURG SUP & DEVICES STERILE SUPPLY: Performed by: ORTHOPAEDIC SURGERY

## 2022-10-27 PROCEDURE — 80053 COMPREHEN METABOLIC PANEL: CPT | Performed by: PHYSICIAN ASSISTANT

## 2022-10-27 PROCEDURE — 63600175 PHARM REV CODE 636 W HCPCS: Performed by: STUDENT IN AN ORGANIZED HEALTH CARE EDUCATION/TRAINING PROGRAM

## 2022-10-27 PROCEDURE — 27245 PR OPEN FIX INTER/SUBTROCH FX,IMPLNT: ICD-10-PCS | Mod: AS,RT,, | Performed by: NURSE PRACTITIONER

## 2022-10-27 PROCEDURE — 27245 TREAT THIGH FRACTURE: CPT | Mod: RT,,, | Performed by: ORTHOPAEDIC SURGERY

## 2022-10-27 PROCEDURE — 97162 PT EVAL MOD COMPLEX 30 MIN: CPT

## 2022-10-27 PROCEDURE — 37000009 HC ANESTHESIA EA ADD 15 MINS: Performed by: ORTHOPAEDIC SURGERY

## 2022-10-27 PROCEDURE — 25000003 PHARM REV CODE 250: Performed by: INTERNAL MEDICINE

## 2022-10-27 PROCEDURE — 27245 TREAT THIGH FRACTURE: CPT | Mod: AS,RT,, | Performed by: NURSE PRACTITIONER

## 2022-10-27 PROCEDURE — 63600175 PHARM REV CODE 636 W HCPCS: Performed by: NURSE PRACTITIONER

## 2022-10-27 DEVICE — SCREW LOCKING 34MM: Type: IMPLANTABLE DEVICE | Site: FEMUR | Status: FUNCTIONAL

## 2022-10-27 DEVICE — SCREW TFN ADVANCE 95MM: Type: IMPLANTABLE DEVICE | Site: FEMUR | Status: FUNCTIONAL

## 2022-10-27 DEVICE — NAIL IM CANN 130 DEG 11X170: Type: IMPLANTABLE DEVICE | Site: FEMUR | Status: FUNCTIONAL

## 2022-10-27 RX ORDER — ALBUTEROL SULFATE 90 UG/1
AEROSOL, METERED RESPIRATORY (INHALATION)
Status: DISCONTINUED | OUTPATIENT
Start: 2022-10-27 | End: 2022-10-27

## 2022-10-27 RX ORDER — METHOCARBAMOL 100 MG/ML
INJECTION, SOLUTION INTRAMUSCULAR; INTRAVENOUS
Status: COMPLETED
Start: 2022-10-27 | End: 2022-10-27

## 2022-10-27 RX ORDER — MIDAZOLAM HYDROCHLORIDE 1 MG/ML
INJECTION INTRAMUSCULAR; INTRAVENOUS
Status: DISCONTINUED | OUTPATIENT
Start: 2022-10-27 | End: 2022-10-27

## 2022-10-27 RX ORDER — CEFAZOLIN SODIUM 2 G/50ML
SOLUTION INTRAVENOUS
Status: DISCONTINUED
Start: 2022-10-27 | End: 2022-10-27

## 2022-10-27 RX ORDER — CEFAZOLIN SODIUM 1 G/3ML
INJECTION, POWDER, FOR SOLUTION INTRAMUSCULAR; INTRAVENOUS
Status: DISCONTINUED | OUTPATIENT
Start: 2022-10-27 | End: 2022-10-27

## 2022-10-27 RX ORDER — HYDROMORPHONE HYDROCHLORIDE 2 MG/ML
0.5 INJECTION, SOLUTION INTRAMUSCULAR; INTRAVENOUS; SUBCUTANEOUS EVERY 5 MIN PRN
Status: DISCONTINUED | OUTPATIENT
Start: 2022-10-27 | End: 2022-10-29

## 2022-10-27 RX ORDER — ACETAMINOPHEN 10 MG/ML
INJECTION, SOLUTION INTRAVENOUS
Status: COMPLETED
Start: 2022-10-27 | End: 2022-10-27

## 2022-10-27 RX ORDER — ONDANSETRON 2 MG/ML
4 INJECTION INTRAMUSCULAR; INTRAVENOUS ONCE
Status: DISCONTINUED | OUTPATIENT
Start: 2022-10-27 | End: 2022-11-11 | Stop reason: HOSPADM

## 2022-10-27 RX ORDER — METHOCARBAMOL 100 MG/ML
1000 INJECTION, SOLUTION INTRAMUSCULAR; INTRAVENOUS ONCE
Status: COMPLETED | OUTPATIENT
Start: 2022-10-27 | End: 2022-10-27

## 2022-10-27 RX ORDER — FENTANYL CITRATE 50 UG/ML
INJECTION, SOLUTION INTRAMUSCULAR; INTRAVENOUS
Status: DISCONTINUED | OUTPATIENT
Start: 2022-10-27 | End: 2022-10-27

## 2022-10-27 RX ORDER — HYDROMORPHONE HYDROCHLORIDE 2 MG/ML
INJECTION, SOLUTION INTRAMUSCULAR; INTRAVENOUS; SUBCUTANEOUS
Status: COMPLETED
Start: 2022-10-27 | End: 2022-10-27

## 2022-10-27 RX ORDER — KETOROLAC TROMETHAMINE 30 MG/ML
15 INJECTION, SOLUTION INTRAMUSCULAR; INTRAVENOUS ONCE
Status: COMPLETED | OUTPATIENT
Start: 2022-10-27 | End: 2022-10-27

## 2022-10-27 RX ORDER — PROMETHAZINE HYDROCHLORIDE 25 MG/ML
INJECTION, SOLUTION INTRAMUSCULAR; INTRAVENOUS
Status: DISPENSED
Start: 2022-10-27 | End: 2022-10-27

## 2022-10-27 RX ORDER — METHOCARBAMOL 100 MG/ML
INJECTION, SOLUTION INTRAMUSCULAR; INTRAVENOUS
Status: DISPENSED
Start: 2022-10-27 | End: 2022-10-27

## 2022-10-27 RX ORDER — GLYCOPYRROLATE 0.2 MG/ML
INJECTION INTRAMUSCULAR; INTRAVENOUS
Status: DISCONTINUED | OUTPATIENT
Start: 2022-10-27 | End: 2022-10-27

## 2022-10-27 RX ORDER — CEFAZOLIN SODIUM 2 G/50ML
2 SOLUTION INTRAVENOUS
Status: DISCONTINUED | OUTPATIENT
Start: 2022-10-27 | End: 2022-10-28

## 2022-10-27 RX ORDER — EPHEDRINE SULFATE 50 MG/ML
INJECTION, SOLUTION INTRAVENOUS
Status: DISCONTINUED | OUTPATIENT
Start: 2022-10-27 | End: 2022-10-27

## 2022-10-27 RX ORDER — ACETAMINOPHEN 10 MG/ML
1000 INJECTION, SOLUTION INTRAVENOUS ONCE
Status: COMPLETED | OUTPATIENT
Start: 2022-10-27 | End: 2022-10-27

## 2022-10-27 RX ORDER — DIPHENHYDRAMINE HYDROCHLORIDE 50 MG/ML
25 INJECTION INTRAMUSCULAR; INTRAVENOUS EVERY 6 HOURS PRN
Status: DISCONTINUED | OUTPATIENT
Start: 2022-10-27 | End: 2022-11-11 | Stop reason: HOSPADM

## 2022-10-27 RX ORDER — ONDANSETRON 2 MG/ML
INJECTION INTRAMUSCULAR; INTRAVENOUS
Status: DISCONTINUED | OUTPATIENT
Start: 2022-10-27 | End: 2022-10-27

## 2022-10-27 RX ORDER — KETOROLAC TROMETHAMINE 30 MG/ML
INJECTION, SOLUTION INTRAMUSCULAR; INTRAVENOUS
Status: COMPLETED
Start: 2022-10-27 | End: 2022-10-27

## 2022-10-27 RX ORDER — VANCOMYCIN HYDROCHLORIDE 1 G/20ML
INJECTION, POWDER, LYOPHILIZED, FOR SOLUTION INTRAVENOUS
Status: DISCONTINUED | OUTPATIENT
Start: 2022-10-27 | End: 2022-10-27 | Stop reason: HOSPADM

## 2022-10-27 RX ORDER — PHENYLEPHRINE HCL IN 0.9% NACL 1 MG/10 ML
SYRINGE (ML) INTRAVENOUS
Status: DISCONTINUED | OUTPATIENT
Start: 2022-10-27 | End: 2022-10-27

## 2022-10-27 RX ORDER — DEXAMETHASONE SODIUM PHOSPHATE 4 MG/ML
INJECTION, SOLUTION INTRA-ARTICULAR; INTRALESIONAL; INTRAMUSCULAR; INTRAVENOUS; SOFT TISSUE
Status: DISCONTINUED | OUTPATIENT
Start: 2022-10-27 | End: 2022-10-27

## 2022-10-27 RX ORDER — HYDROMORPHONE HYDROCHLORIDE 2 MG/ML
0.2 INJECTION, SOLUTION INTRAMUSCULAR; INTRAVENOUS; SUBCUTANEOUS EVERY 5 MIN PRN
Status: DISCONTINUED | OUTPATIENT
Start: 2022-10-27 | End: 2022-10-29

## 2022-10-27 RX ORDER — PROPOFOL 10 MG/ML
VIAL (ML) INTRAVENOUS
Status: DISCONTINUED | OUTPATIENT
Start: 2022-10-27 | End: 2022-10-27

## 2022-10-27 RX ORDER — ROCURONIUM BROMIDE 10 MG/ML
INJECTION, SOLUTION INTRAVENOUS
Status: DISCONTINUED | OUTPATIENT
Start: 2022-10-27 | End: 2022-10-27

## 2022-10-27 RX ADMIN — HYDROMORPHONE HYDROCHLORIDE 0.5 MG: 2 INJECTION INTRAMUSCULAR; INTRAVENOUS; SUBCUTANEOUS at 11:10

## 2022-10-27 RX ADMIN — CEFAZOLIN SODIUM 2 G: 2 SOLUTION INTRAVENOUS at 07:10

## 2022-10-27 RX ADMIN — Medication 100 MCG: at 09:10

## 2022-10-27 RX ADMIN — ROCURONIUM BROMIDE 10 MG: 50 INJECTION INTRAVENOUS at 09:10

## 2022-10-27 RX ADMIN — Medication 50 MCG: at 09:10

## 2022-10-27 RX ADMIN — CEFAZOLIN 2 G: 330 INJECTION, POWDER, FOR SOLUTION INTRAMUSCULAR; INTRAVENOUS at 09:10

## 2022-10-27 RX ADMIN — ROCURONIUM BROMIDE 50 MG: 50 INJECTION INTRAVENOUS at 09:10

## 2022-10-27 RX ADMIN — ENOXAPARIN SODIUM 40 MG: 40 INJECTION SUBCUTANEOUS at 03:10

## 2022-10-27 RX ADMIN — METHOCARBAMOL 1000 MG: 100 INJECTION, SOLUTION INTRAMUSCULAR; INTRAVENOUS at 10:10

## 2022-10-27 RX ADMIN — EPHEDRINE SULFATE 5 MG: 50 INJECTION INTRAVENOUS at 09:10

## 2022-10-27 RX ADMIN — LABETALOL HYDROCHLORIDE 200 MG: 200 TABLET, FILM COATED ORAL at 08:10

## 2022-10-27 RX ADMIN — MORPHINE SULFATE 2 MG: 4 INJECTION INTRAVENOUS at 08:10

## 2022-10-27 RX ADMIN — FENTANYL CITRATE 100 MCG: 50 INJECTION, SOLUTION INTRAMUSCULAR; INTRAVENOUS at 09:10

## 2022-10-27 RX ADMIN — MIDAZOLAM 2 MG: 1 INJECTION INTRAMUSCULAR; INTRAVENOUS at 08:10

## 2022-10-27 RX ADMIN — GABAPENTIN 300 MG: 300 CAPSULE ORAL at 08:10

## 2022-10-27 RX ADMIN — BUSPIRONE HYDROCHLORIDE 5 MG: 5 TABLET ORAL at 08:10

## 2022-10-27 RX ADMIN — KETOROLAC TROMETHAMINE 15 MG: 30 INJECTION, SOLUTION INTRAMUSCULAR; INTRAVENOUS at 10:10

## 2022-10-27 RX ADMIN — ALBUTEROL SULFATE 4 PUFF: 90 AEROSOL, METERED RESPIRATORY (INHALATION) at 10:10

## 2022-10-27 RX ADMIN — SODIUM CHLORIDE: 9 INJECTION, SOLUTION INTRAVENOUS at 03:10

## 2022-10-27 RX ADMIN — OXYCODONE 5 MG: 5 TABLET ORAL at 08:10

## 2022-10-27 RX ADMIN — KETOROLAC TROMETHAMINE 15 MG: 30 INJECTION, SOLUTION INTRAMUSCULAR at 10:10

## 2022-10-27 RX ADMIN — SODIUM CHLORIDE, SODIUM GLUCONATE, SODIUM ACETATE, POTASSIUM CHLORIDE AND MAGNESIUM CHLORIDE: 526; 502; 368; 37; 30 INJECTION, SOLUTION INTRAVENOUS at 08:10

## 2022-10-27 RX ADMIN — ACETAMINOPHEN 1000 MG: 10 INJECTION, SOLUTION INTRAVENOUS at 10:10

## 2022-10-27 RX ADMIN — HYDROMORPHONE HYDROCHLORIDE 2 MG: 2 INJECTION INTRAMUSCULAR; INTRAVENOUS; SUBCUTANEOUS at 10:10

## 2022-10-27 RX ADMIN — DEXAMETHASONE SODIUM PHOSPHATE 4 MG: 4 INJECTION, SOLUTION INTRA-ARTICULAR; INTRALESIONAL; INTRAMUSCULAR; INTRAVENOUS; SOFT TISSUE at 09:10

## 2022-10-27 RX ADMIN — PROPOFOL 150 MG: 10 INJECTION, EMULSION INTRAVENOUS at 09:10

## 2022-10-27 RX ADMIN — SUGAMMADEX 200 MG: 100 INJECTION, SOLUTION INTRAVENOUS at 09:10

## 2022-10-27 RX ADMIN — ALBUTEROL SULFATE 2 PUFF: 90 AEROSOL, METERED RESPIRATORY (INHALATION) at 10:10

## 2022-10-27 RX ADMIN — OXYCODONE 5 MG: 5 TABLET ORAL at 05:10

## 2022-10-27 RX ADMIN — OXYCODONE 5 MG: 5 TABLET ORAL at 03:10

## 2022-10-27 RX ADMIN — GABAPENTIN 300 MG: 300 CAPSULE ORAL at 03:10

## 2022-10-27 RX ADMIN — GLYCOPYRROLATE 0.2 MG: 0.2 INJECTION INTRAMUSCULAR; INTRAVENOUS at 08:10

## 2022-10-27 RX ADMIN — ONDANSETRON 4 MG: 2 INJECTION INTRAMUSCULAR; INTRAVENOUS at 09:10

## 2022-10-27 NOTE — BRIEF OP NOTE
[facility] - 9th Floor Med Surg  Brief Operative Note    SUMMARY     Surgery Date: 10/27/2022     Surgeon(s) and Role:     * Stewart Brambila MD - Primary    Assisting Surgeon: None    Pre-op Diagnosis:  Fracture of femur, intertrochanteric, closed, right, initial encounter [S72.141A]    Post-op Diagnosis:  Post-Op Diagnosis Codes:     * Fracture of femur, intertrochanteric, closed, right, initial encounter [S72.141A]    Procedure(s) (LRB):  RIGHT FEMUR INTERTROCH IMN (Right)    Anesthesia: General    Operative Findings: see op report    Estimated Blood Loss: *100mL  Estimated Blood Loss has been documented.         Specimens:   Specimen (24h ago, onward)      None            [id number]  A/P: Tolerated procedure well. Admit to floor. WBAT to RLE, full ROM RLE. Lovenox for DVT ppx unles otherwise contraindicated. Will need CM eval for placement.       Stewart Brambila MD  Orthopedic Trauma  [facility]

## 2022-10-27 NOTE — PROGRESS NOTES
Patient Name: Luz Maria Alfaro  MRN: [id number]  Admission Date: 10/26/2022  Hospital Length of Stay: 1 days  Attending Provider: Chyna Juarez MD  Primary Care Provider: Primary Doctor No  Follow-up For: Procedure(s) (LRB):  RIGHT FEMUR INTERTROCH IMN (Right)    Post-Operative Day:    Subjective:       Principal Orthopedic Problem:  right intertrochanteric femur fracture      Interval History:  no acute issues overnight.  Pain controlled.  Resting comfortably in bed.  Ready for surgery today.    Review of patient's allergies indicates:   Allergen Reactions    Meperidine Rash       Current Facility-Administered Medications   Medication    0.9%  NaCl infusion    acetaminophen tablet 650 mg    acetaminophen tablet 650 mg    amLODIPine tablet 10 mg    atorvastatin tablet 10 mg    busPIRone tablet 5 mg    cloNIDine tablet 0.1 mg    dextrose 50% injection 12.5 g    dextrose 50% injection 25 g    enoxaparin injection 40 mg    EScitalopram oxalate tablet 20 mg    folic acid tablet 1 mg    gabapentin capsule 300 mg    glucagon (human recombinant) injection 1 mg    glucose chewable tablet 16 g    glucose chewable tablet 24 g    hydrALAZINE injection 10 mg    hydrALAZINE injection 10 mg    insulin aspart U-100 injection 1-10 Units    labetaloL injection 10 mg    labetaloL tablet 200 mg    morphine injection 2 mg    multivitamin tablet    nicotine 14 mg/24 hr 1 patch    ondansetron injection 4 mg    oxyCODONE immediate release tablet 5 mg    sodium chloride 0.9% flush 10 mL    sodium zirconium cyclosilicate packet 10 g    thiamine tablet 100 mg     Objective:     Vital Signs (Most Recent):  Temp: 98.2 °F (36.8 °C) (10/27/22 0327)  Pulse: 78 (10/27/22 0327)  Resp: 18 (10/27/22 0520)  BP: 104/65 (10/27/22 0327)  SpO2: 96 % (10/27/22 0327) Vital Signs (24h Range):  Temp:  [97.2 °F (36.2 °C)-99.1 °F (37.3 °C)] 98.2 °F (36.8 °C)  Pulse:  [] 78  Resp:  [16-25] 18  SpO2:  [94 %-100 %] 96 %  BP: (104-238)/() 104/65  "    Weight: 54.4 kg (120 lb)  Height: 5' 3" (160 cm)  Body mass index is 21.26 kg/m².      Intake/Output Summary (Last 24 hours) at 10/27/2022 0636  Last data filed at 10/27/2022 0409  Gross per 24 hour   Intake --   Output 250 ml   Net -250 ml       Physical Exam:   Ortho/SPM Exam    General the patient is alert and oriented, no acute distress nontoxic-appearing, appropriate affect.    Constitutional: Vital signs are examined and stable.  Resp: No signs of labored breathing    Musculoskeletal Exam:    Right lower extremity:  thigh soft and depressible.  No open wounds or abrasions noted.  Limb is shortened and externally rotated.  Site marked.  Distally- palpable DP pulse with good range of motion of ankle and digits.    Diagnostic Findings:   Significant Labs: BMP:   Recent Labs   Lab 10/26/22  0745   *   K 5.3*   CO2 19*   BUN 7.1*   CREATININE 0.64   CALCIUM 9.4     CBC:   Recent Labs   Lab 10/26/22  0745   WBC 10.9   HGB 12.8   HCT 37.7        CMP:   Recent Labs   Lab 10/26/22  0745   *   K 5.3*   CO2 19*   BUN 7.1*   CREATININE 0.64   CALCIUM 9.4   ALBUMIN 3.8   BILITOT 0.3   ALKPHOS 158*   AST 31   ALT 17     Lactic Acid: No results for input(s): LACTATE in the last 48 hours.         Significant Imaging: I have reviewed and interpreted all pertinent imaging results/findings.     Assessment/Plan:     Active Diagnoses:    Diagnosis Date Noted POA    PRINCIPAL PROBLEM:  Closed right hip fracture [S72.001A] 10/26/2022 Yes      Problems Resolved During this Admission:     The risks, benefits and alternatives treatment were discussed at length with the patient today including but not limited to pain, bleeding, scarring, infection, damage to neurovascular structures, malunion/nonunion, hardware failure/irritation, need for future procedures and complications leading to amputation and even death.   She will benefit from intramedullary stabilization of her right intertrochanteric femur fracture.  " Plan taken to the operating room later today.  She understands and agrees and all the rehab discussed all questions and concerns were addressed.          Stewart Brambila MD  Orthopedic Trauma Surgery  [facility] - 9th Floor Med Surg

## 2022-10-27 NOTE — ANESTHESIA POSTPROCEDURE EVALUATION
Anesthesia Post Evaluation    Patient: Luz Maria Alfaro    Procedure(s) Performed: Procedure(s) (LRB):  RIGHT FEMUR INTERTROCH IMN (Right)    Final Anesthesia Type: general      Patient location during evaluation: PACU  Patient participation: Yes- Able to Participate  Level of consciousness: awake and alert  Post-procedure vital signs: reviewed and stable  Pain management: adequate  Airway patency: patent      Anesthetic complications: no      Cardiovascular status: hemodynamically stable  Respiratory status: unassisted  Hydration status: euvolemic  Follow-up not needed.          Vitals Value Taken Time   /68 10/27/22 1026   Temp 36.8 °C (98.2 °F) 10/27/22 0327   Pulse 104 10/27/22 1029   Resp 18 10/27/22 0852   SpO2 95 % 10/27/22 1029   Vitals shown include unvalidated device data.      No case tracking events are documented in the log.      Pain/Saroj Score: Pain Rating Prior to Med Admin: 6 (10/27/2022  8:52 AM)  Pain Rating Post Med Admin: 0 (10/27/2022  6:20 AM)

## 2022-10-27 NOTE — OP NOTE
[facility]                       [address] [address]    PATIENT NAME:      YULIA DAMON   YOB: 1960  CSN:               [id number]  MRN:               [id number]  ADMIT DATE:        10/26/2022 07:25:00  PHYSICIAN:         Stewart Brambila MD                          OPERATIVE REPORT      DATE OF SURGERY:    10/27/2022 00:00:00    SURGEON:  Stewart Brambila MD    PREOPERATIVE DIAGNOSIS:  Right displaced intertrochanteric femur fracture.    POSTOPERATIVE DIAGNOSIS:  Right displaced intertrochanteric femur fracture.    PROCEDURE PERFORMED:  Intramedullary nailing of right intertrochanteric femur   fracture.    ANESTHESIA:  General.    ESTIMATED BLOOD LOSS:  100 cc.    ASSISTANT:  Estela Cabrera nurse practitioner, necessary for a skilled set of   hands to assist with reduction of the fracture as well as application of   hardware and deep closure.    IMPLANTS:  Synthes 11 mm short TFNA with a single distal interlocking screw.    COMPLICATIONS:  None.    COUNTS:  All counts correct x2 at the end of the case.    INDICATIONS FOR PROCEDURE:  Ms. Damon is a 62-year-old female with a history   of chronic alcoholism.  She had a fall at home while intoxicated and sustained   a right intertrochanteric femur fracture.  She was seen, evaluated, and admitted   to the hospital medicine service.  The risks, benefits, and alternatives of   treatment were discussed at length with the patient.  She was brought to the   operating room for operative stabilization of her intertrochanteric femur   fracture.    PROCEDURE IN DETAIL:  After informed consent was obtained, the patient was met   in the preoperative holding area and her site was marked.  She was taken to the   operating room.  She was placed supine on the operating table and general   anesthesia was induced.  All bony prominences were well padded, and  preoperative   antibiotics were given.  The right lower extremity was prepped and draped in a   standard sterile fashion.  A time-out was done indicating correct operative limb   and procedure.  She had been placed into the traction Spars on the [place] table,   and a closed manipulation of the hip showed good alignment.  Incision was made   over the lateral aspect of the hip.  It was carried down to the tip of the   greater trochanter.  A starting point for trochanteric entry nail was obtained.    Opening reamer was passed.  The nail was slid into position.  The lag screw was   placed centered within the femoral neck and head on AP and lateral projections.    Prior to application of the lag screw, a percutaneously applied bone hook was   then used on the inferior aspect of the neck, reducing the fracture and holding   it in position.  As the lag screw was placed, external compression was then   performed.  Good overall alignment was maintained.  The single distal   interlocking screw was placed.  The set screw was tightened.  The jigs were   removed, and final fluoroscopic images showed that all the hardware and   alignment were in appropriate position.  Wounds thoroughly irrigated and closed   using #1 Vicryl, 2-0 Monocryl, and staples.  Xeroform and island dressings were   applied, and the patient was awakened, taken out of the traction Spars,   extubated, and taken to Recovery in stable condition.    POSTOPERATIVE PLAN:  She will be admitted back to the floor.  She can weightbear   as tolerated to the right lower extremity.  Full range of motion of the right   lower extremity.  Lovenox for DVT prophylaxis.  Will need Case Management   evaluation for placement.        ______________________________  MD BRETT Sen/ROSIO  DD:  10/27/2022  Time:  09:56AM  DT:  10/27/2022  Time:  11:04AM  Job #:  [id number]      OPERATIVE REPORT

## 2022-10-27 NOTE — ANESTHESIA PROCEDURE NOTES
Intubation    Date/Time: 10/27/2022 9:04 AM  Performed by: David Patricia CRNA  Authorized by: Heidi Moreland MD     Intubation:     Induction:  Intravenous    Intubated:  Postinduction    Mask Ventilation:  Easy mask    Attempts:  1    Attempted By:  CRNA    Method of Intubation:  Direct    Blade:  Aur[place] 3    Laryngeal View Grade: Grade IIA - cords partially seen      Difficult Airway Encountered?: No      Complications:  None    Airway Device:  Oral endotracheal tube    Airway Device Size:  7.0    Style/Cuff Inflation:  Cuffed (inflated to minimal occlusive pressure)    Tube secured:  22    Secured at:  The lips    Placement Verified By:  Capnometry    Complicating Factors:  None    Findings Post-Intubation:  BS equal bilateral and atraumatic/condition of teeth unchanged

## 2022-10-27 NOTE — TRANSFER OF CARE
"Anesthesia Transfer of Care Note    Patient: Luz Maria Alfaro    Procedure(s) Performed: Procedure(s) (LRB):  RIGHT FEMUR INTERTROCH IMN (Right)    Patient location: PACU    Anesthesia Type: general    Transport from OR: Transported from OR on room air with adequate spontaneous ventilation    Post pain: adequate analgesia    Post assessment: no apparent anesthetic complications    Post vital signs: stable    Level of consciousness: awake    Nausea/Vomiting: no nausea/vomiting    Complications: none    Transfer of care protocol was followed      Last vitals:   Visit Vitals  /71 (BP Location: Right arm, Patient Position: Sitting)   Pulse 83   Temp 36.8 °C (98.2 °F) (Oral)   Resp 18   Ht 5' 3" (1.6 m)   Wt 54.4 kg (120 lb)   SpO2 (!) 93%   BMI 21.26 kg/m²     "

## 2022-10-27 NOTE — PROGRESS NOTES
[facility] Hospital Medicine Progress Note        Chief Complaint: Inpatient Follow-up for Right IT fracture     HPI:   Luz Maria Alfaro is a 62 y.o. White female with a past medical history of hypertension, hyperlipidemia, diabetes mellitus type 2, anxiety/depression, tobacco use, alcohol abuse. The patient presented to [facility] on 10/26/2022 with a primary complaint of right hip pain. Patient was drinking last night and she fell out of bed.  Patient is unable to give further details but remembers when she woke up on the floor calling for help sometime after midnight.  She admits to drinking 4 beers last night and reports drinking at least 4 beers daily.  She denies a history of alcohol withdrawals in the past but has been drinking more than usual recently due to depression.  She smokes 2 packs of cigarettes per day.  At baseline patient ambulates independently and lives with her son.     Upon presentation to the ED, patient afebrile, hypertensive 166/100 and PO2 98% room air.  Labs notable for sodium 128, potassium 5.3, chloride 96, CO2 19, alkaline phosphatase 158, alcohol level 239.  Chest x-ray without acute abnormalities.  X-ray right hip with a minimally displaced intertrochanteric fracture.  In ED patient the medical morphine and Zofran.  She  is admitted to hospital medicine services for further medical management  Interval Hx:   Patient awake and comfortable. S/P right femur fracture repair. Pain well controlled.     Objective/physical exam:  General: In no acute distress, afebrile  Chest: Clear to auscultation bilaterally  Heart: RRR, +S1, S2, no appreciable murmur  Abdomen: Soft, nontender, BS +  MSK: Warm, no lower extremity edema, no clubbing or cyanosis  Neurologic: Alert and oriented x4, Cranial nerve II-XII intact,    VITAL SIGNS: 24 HRS MIN & MAX LAST   Temp  Min: 97.8 °F (36.6 °C)  Max: 99.1 °F (37.3 °C) 97.8 °F (36.6 °C)   BP  Min: 90/56  Max: 238/124 105/82   Pulse   Min: 78  Max: 136  108   Resp  Min: 16  Max: 25 18   SpO2  Min: 89 %  Max: 98 % (!) 94 %         Recent Labs   Lab 10/26/22  0745 10/27/22  0524   WBC 10.9 8.9   RBC 3.63* 3.41*   HGB 12.8 11.8*   HCT 37.7 35.9*   .9* 105.3*   MCH 35.3* 34.6*   MCHC 34.0 32.9*   RDW 12.3 12.5    304   MPV 9.2 9.2       Recent Labs   Lab 10/26/22  0745 10/27/22  0524   * 130*   K 5.3* 4.1   CO2 19* 25   BUN 7.1* 9.0*   CREATININE 0.64 0.95   CALCIUM 9.4 9.4   ALBUMIN 3.8 3.5   ALKPHOS 158* 142   ALT 17 15   AST 31 18   BILITOT 0.3 0.5          Microbiology Results (last 7 days)       ** No results found for the last 168 hours. **             See below for Radiology    Scheduled Med:   amLODIPine  10 mg Oral Daily    atorvastatin  10 mg Oral Daily    busPIRone  5 mg Oral BID    ceFAZolin (ANCEF) IVPB  2 g Intravenous Q8H    ceFAZolin 2 g/50mL Dextrose IVPB        enoxaparin  40 mg Subcutaneous Daily    EScitalopram oxalate  20 mg Oral Daily    folic acid  1 mg Oral Daily    gabapentin  300 mg Oral TID    labetaloL  200 mg Oral BID    methocarbamoL        multivitamin  1 tablet Oral Daily    nicotine  1 patch Transdermal Daily    ondansetron  4 mg Intravenous Once    promethazine        sodium zirconium cyclosilicate  10 g Oral Once    thiamine  100 mg Oral Daily        Continuous Infusions:       PRN Meds:  acetaminophen, acetaminophen, cloNIDine, dextrose 50%, dextrose 50%, diphenhydrAMINE, glucagon (human recombinant), glucose, glucose, hydrALAZINE, hydrALAZINE, HYDROmorphone, HYDROmorphone, insulin aspart U-100, labetalol, morphine, ondansetron, oxyCODONE, sodium chloride 0.9%       Assessment/Plan:  Minimally displaced right intertrochanteric hip fracture  Hyponatremia/hypochloremia: improved   Hyperkalemia  Essential hypertension  Hyperlipidemia   Diabetes mellitus type 2   Anxiety/depression  Alcohol intoxication  Tobacco abuse    Plan:  Patient doing well with no acute issues  Pain well controlled   Home  meds will be continued   See a psych consult for anxiety/depression. Current mood is stable   Start PT when ok with ortho team   Cont sliding scale   Monitor labs closely     VTE prophylaxis: Lovenox    Patient condition:  Fair    Anticipated discharge and Disposition:         All diagnosis and differential diagnosis have been reviewed; assessment and plan has been documented; I have personally reviewed the labs and test results that are presently available; I have reviewed the patients medication list; I have reviewed the consulting providers response and recommendations. I have reviewed or attempted to review medical records based upon their availability    All of the patient's questions have been  addressed and answered. Patient's is agreeable to the above stated plan. I will continue to monitor closely and make adjustments to medical management as needed.  _____________________________________________________________________    Nutrition Status:    Radiology:  X-Ray Hip 2 or 3 views Right (with Pelvis when performed)  Narrative: EXAMINATION:  XR HIP WITH PELVIS WHEN PERFORMED, 2 OR 3  VIEWS RIGHT    CLINICAL HISTORY:  post op;    TECHNIQUE:  AP view of the pelvis and AP and frog leg lateral view of the right hip were performed.    COMPARISON:  10/26/2022    FINDINGS:  BONE: Postsurgical changes of ORIF.  Hardware appears engaged and intact.  Alignment is satisfactory.    SOFT TISSUES: Expected postoperative gas.  Excreted contrast is seen in the urinary bladder compatible with recent CT exam.  Impression: Postop proximal femoral ORIF in good alignment.    Electronically signed by: Devi Richey  Date:    10/27/2022  Time:    11:40  SURG FL Surgery Fluoro Usage  See OP Notes for results.     IMPRESSION: See OP Notes for results.     This procedure was auto-finalized by: Virtual Radiologist      Robin Metcalf MD   10/27/2022

## 2022-10-27 NOTE — PSYCH
Attempted psych eval this morning but patient's bed was not in her room.  I spoke to her nurse and she informed me that she was in surgery holding.  She says that she has hip surgery scheduled today for noon.

## 2022-10-27 NOTE — PT/OT/SLP EVAL
Physical Therapy Evaluation    Patient Name:  Luz Maria Alfaro   MRN:  [id number]    Recommendations:     Discharge Recommendations:  rehabilitation facility, home with home health   Discharge Equipment Recommendations: walker, rolling   Barriers to discharge:  acuity of illness    Assessment:     Luz Maria Alfaro is a 62 y.o. female admitted with a medical diagnosis of Closed right hip fracture.  She presents with the following impairments/functional limitations:  weakness, impaired endurance, impaired self care skills, impaired functional mobility, gait instability, decreased lower extremity function, pain, decreased ROM, orthopedic precautions.    Rehab Prognosis: Good; patient would benefit from acute skilled PT services to address these deficits and reach maximum level of function.    Recent Surgery: Procedure(s) (LRB):  RIGHT FEMUR INTERTROCH IMN (Right) Day of Surgery    Plan:     During this hospitalization, patient to be seen daily (/BID) to address the identified rehab impairments via gait training, therapeutic activities, therapeutic exercises, neuromuscular re-education and progress toward the following goals:    Plan of Care Expires:  11/26/22    Subjective     Chief Complaint: pain  Patient/Family Comments/goals: to get stronger  Pain/Comfort:  Pain Rating 1: 5/10  Location - Side 1: Right  Location 1: hip    Patients cultural, spiritual, Hoahaoism conflicts given the current situation: no    Living Environment:  Pt lives with son in a [facility] with no steps to enter. Pt reports she does not go upstairs at her house.  Prior to admission, patients level of function was modified independent.  Equipment used at home: walker, rolling.  DME owned (not currently used): none.  Upon discharge, patient will have assistance from unknown.    Objective:     Communicated with RN prior to session.  Patient found HOB elevated with peripheral IV  upon PT entry to room.    General Precautions: Standard,     Orthopedic  Precautions:RLE weight bearing as tolerated (RLE Full ROM)   Braces:    Respiratory Status: Nasal cannula, flow 2 L/min    BP seated EOB: 121/76 mmHg  SpO2: 95%-96%    Exams:  Cognitive Exam:  Patient is oriented to Person, Place, Time, and Situation  LLE ROM: WFL  LLE Strength: WFL    Functional Mobility:  Bed Mobility:     Supine to Sit: minimum assistance  Sit to Supine: minimum assistance  Transfers:     Sit to Stand:  minimum assistance with rolling walker  Gait: pt performed pre-gt side steps to the left towards the HOB with a RW and [place].  Limited by pt requesting to return to bed 2/2 pain and fatigue.       AM-PAC 6 CLICK MOBILITY  Total Score:14     Patient left HOB elevated with all lines intact, call button in reach, RN notified, and family present.    GOALS:   Multidisciplinary Problems       Physical Therapy Goals          Problem: Physical Therapy    Goal Priority Disciplines Outcome Goal Variances Interventions   Physical Therapy Goal     PT, PT/OT      Description: Goals to be met by: 2022     Patient will increase functional independence with mobility by performin. Supine to sit with Modified [place]  2. Sit to supine with Modified [place]  3. Sit to stand transfer with Modified [place]  4. Gait  x 200 feet with Modified [place] using Rolling Walker.                          History:     Past Medical History:   Diagnosis Date    Benign essential HTN     Mixed hyperlipidemia     Type 2 diabetes mellitus without complications        No past surgical history on file.    Time Tracking:     PT Received On: 10/27/22  PT Start Time: 1330     PT Stop Time: 1343  PT Total Time (min): 13 min     Billable Minutes: Evaluation , moderate complexity      10/27/2022

## 2022-10-27 NOTE — ANESTHESIA PREPROCEDURE EVALUATION
10/27/2022  Luz Maria Alfaro is a 62 y.o., female  with a past medical history of hypertension, hyperlipidemia, diabetes mellitus type 2, anxiety/depression, tobacco use, alcohol abuse. The patient presented to [facility] on 10/26/2022 with a primary complaint of right hip pain. Patient was drinking last night and she fell out of bed.  Patient is unable to give further details but remembers when she woke up on the floor calling for help sometime after midnight.  She admits to drinking 4 beers last night and reports drinking at least 4 beers daily.  She denies a history of alcohol withdrawals in the past but has been drinking more than usual recently due to depression.  She smokes 2 packs of cigarettes per day.  At baseline patient ambulates independently and lives with her son.     HGB 11.8  Macrocytic  Cr .95  NSR on EKG  Echo 2021:      Here for IM nail of Hip fx  Pre-op Assessment    I have reviewed the Patient Summary Reports.     I have reviewed the Nursing Notes. I have reviewed the NPO Status.   I have reviewed the Medications.     Review of Systems  Anesthesia Hx:  No problems with previous Anesthesia    Social:  Smoker    Cardiovascular:   Hypertension ECG has been reviewed.    Endocrine:   Diabetes    Psych:   depression          Physical Exam  General: Well nourished, Cooperative, Alert and Oriented    Airway:  Mallampati: II   Mouth Opening: Normal  TM Distance: Normal  Tongue: Normal  Neck ROM: Normal ROM    Dental:  Edentulous        Anesthesia Plan  Type of Anesthesia, risks & benefits discussed:    Anesthesia Type: Gen ETT  Intra-op Monitoring Plan: Standard ASA Monitors  Post Op Pain Control Plan: multimodal analgesia  Induction:  IV  Airway Plan: Direct, Post-Induction  Informed Consent: Informed consent signed with the Patient and all parties understand the risks and agree with anesthesia plan.   All questions answered. Patient consented to blood products? Yes  ASA Score: 3  Day of Surgery Review of History & Physical: H&P Update referred to the surgeon/provider.    Ready For Surgery From Anesthesia Perspective.     .

## 2022-10-27 NOTE — PLAN OF CARE
Goals to be met by: 2022     Patient will increase functional independence with mobility by performin. Supine to sit with Modified [place]  2. Sit to supine with Modified [place]  3. Sit to stand transfer with Modified [place]  4. Gait  x 200 feet with Modified [place] using Rolling Walker.

## 2022-10-28 LAB
ANION GAP SERPL CALC-SCNC: 9 MEQ/L
BASOPHILS # BLD AUTO: 0.02 X10(3)/MCL (ref 0–0.2)
BASOPHILS NFR BLD AUTO: 0.3 %
BUN SERPL-MCNC: 8 MG/DL (ref 9.8–20.1)
CALCIUM SERPL-MCNC: 8.9 MG/DL (ref 8.4–10.2)
CHLORIDE SERPL-SCNC: 98 MMOL/L (ref 98–107)
CO2 SERPL-SCNC: 24 MMOL/L (ref 23–31)
CREAT SERPL-MCNC: 0.73 MG/DL (ref 0.55–1.02)
CREAT/UREA NIT SERPL: 11
EOSINOPHIL # BLD AUTO: 0.01 X10(3)/MCL (ref 0–0.9)
EOSINOPHIL NFR BLD AUTO: 0.1 %
ERYTHROCYTE [DISTWIDTH] IN BLOOD BY AUTOMATED COUNT: 12.5 % (ref 11.5–17)
GFR SERPLBLD CREATININE-BSD FMLA CKD-EPI: >60 MLS/MIN/1.73/M2
GLUCOSE SERPL-MCNC: 102 MG/DL (ref 82–115)
HCT VFR BLD AUTO: 25.2 % (ref 37–47)
HGB BLD-MCNC: 8.2 GM/DL (ref 12–16)
IMM GRANULOCYTES # BLD AUTO: 0.03 X10(3)/MCL (ref 0–0.04)
IMM GRANULOCYTES NFR BLD AUTO: 0.4 %
LYMPHOCYTES # BLD AUTO: 1.82 X10(3)/MCL (ref 0.6–4.6)
LYMPHOCYTES NFR BLD AUTO: 23.6 %
MCH RBC QN AUTO: 34.3 PG (ref 27–31)
MCHC RBC AUTO-ENTMCNC: 32.5 MG/DL (ref 33–36)
MCV RBC AUTO: 105.4 FL (ref 80–94)
MONOCYTES # BLD AUTO: 0.69 X10(3)/MCL (ref 0.1–1.3)
MONOCYTES NFR BLD AUTO: 8.9 %
NEUTROPHILS # BLD AUTO: 5.2 X10(3)/MCL (ref 2.1–9.2)
NEUTROPHILS NFR BLD AUTO: 66.7 %
NRBC BLD AUTO-RTO: 0 %
PLATELET # BLD AUTO: 227 X10(3)/MCL (ref 130–400)
PMV BLD AUTO: 10.4 FL (ref 7.4–10.4)
POCT GLUCOSE: 106 MG/DL (ref 70–110)
POCT GLUCOSE: 114 MG/DL (ref 70–110)
POCT GLUCOSE: 134 MG/DL (ref 70–110)
POCT GLUCOSE: 159 MG/DL (ref 70–110)
POTASSIUM SERPL-SCNC: 3.8 MMOL/L (ref 3.5–5.1)
RBC # BLD AUTO: 2.39 X10(6)/MCL (ref 4.2–5.4)
SODIUM SERPL-SCNC: 131 MMOL/L (ref 136–145)
WBC # SPEC AUTO: 7.7 X10(3)/MCL (ref 4.5–11.5)

## 2022-10-28 PROCEDURE — 63600175 PHARM REV CODE 636 W HCPCS: Performed by: INTERNAL MEDICINE

## 2022-10-28 PROCEDURE — 85025 COMPLETE CBC W/AUTO DIFF WBC: CPT | Performed by: NURSE PRACTITIONER

## 2022-10-28 PROCEDURE — 97116 GAIT TRAINING THERAPY: CPT | Mod: CQ

## 2022-10-28 PROCEDURE — 11000001 HC ACUTE MED/SURG PRIVATE ROOM

## 2022-10-28 PROCEDURE — 97110 THERAPEUTIC EXERCISES: CPT | Mod: CQ

## 2022-10-28 PROCEDURE — 80048 BASIC METABOLIC PNL TOTAL CA: CPT | Performed by: NURSE PRACTITIONER

## 2022-10-28 PROCEDURE — 25000003 PHARM REV CODE 250: Performed by: PHYSICIAN ASSISTANT

## 2022-10-28 PROCEDURE — S4991 NICOTINE PATCH NONLEGEND: HCPCS | Performed by: PHYSICIAN ASSISTANT

## 2022-10-28 PROCEDURE — 97166 OT EVAL MOD COMPLEX 45 MIN: CPT

## 2022-10-28 PROCEDURE — 25000003 PHARM REV CODE 250: Performed by: INTERNAL MEDICINE

## 2022-10-28 PROCEDURE — 97530 THERAPEUTIC ACTIVITIES: CPT

## 2022-10-28 PROCEDURE — 27000221 HC OXYGEN, UP TO 24 HOURS

## 2022-10-28 PROCEDURE — 63600175 PHARM REV CODE 636 W HCPCS: Performed by: NURSE PRACTITIONER

## 2022-10-28 PROCEDURE — 36415 COLL VENOUS BLD VENIPUNCTURE: CPT | Performed by: NURSE PRACTITIONER

## 2022-10-28 RX ORDER — BUSPIRONE HYDROCHLORIDE 5 MG/1
10 TABLET ORAL 3 TIMES DAILY
Status: DISCONTINUED | OUTPATIENT
Start: 2022-10-29 | End: 2022-11-11 | Stop reason: HOSPADM

## 2022-10-28 RX ORDER — ARIPIPRAZOLE 2 MG/1
2 TABLET ORAL DAILY
Status: DISCONTINUED | OUTPATIENT
Start: 2022-10-29 | End: 2022-11-11 | Stop reason: HOSPADM

## 2022-10-28 RX ADMIN — OXYCODONE 5 MG: 5 TABLET ORAL at 12:10

## 2022-10-28 RX ADMIN — AMLODIPINE BESYLATE 10 MG: 5 TABLET ORAL at 09:10

## 2022-10-28 RX ADMIN — GABAPENTIN 300 MG: 300 CAPSULE ORAL at 08:10

## 2022-10-28 RX ADMIN — CEFAZOLIN SODIUM 2 G: 2 SOLUTION INTRAVENOUS at 03:10

## 2022-10-28 RX ADMIN — NICOTINE 1 PATCH: 14 PATCH, EXTENDED RELEASE TRANSDERMAL at 10:10

## 2022-10-28 RX ADMIN — BUSPIRONE HYDROCHLORIDE 5 MG: 5 TABLET ORAL at 09:10

## 2022-10-28 RX ADMIN — THERA TABS 1 TABLET: TAB at 09:10

## 2022-10-28 RX ADMIN — LABETALOL HYDROCHLORIDE 200 MG: 200 TABLET, FILM COATED ORAL at 09:10

## 2022-10-28 RX ADMIN — OXYCODONE 5 MG: 5 TABLET ORAL at 03:10

## 2022-10-28 RX ADMIN — ENOXAPARIN SODIUM 40 MG: 40 INJECTION SUBCUTANEOUS at 05:10

## 2022-10-28 RX ADMIN — GABAPENTIN 300 MG: 300 CAPSULE ORAL at 05:10

## 2022-10-28 RX ADMIN — OXYCODONE 5 MG: 5 TABLET ORAL at 05:10

## 2022-10-28 RX ADMIN — FOLIC ACID 1 MG: 1 TABLET ORAL at 09:10

## 2022-10-28 RX ADMIN — ATORVASTATIN CALCIUM 10 MG: 10 TABLET, FILM COATED ORAL at 09:10

## 2022-10-28 RX ADMIN — ESCITALOPRAM OXALATE 20 MG: 10 TABLET ORAL at 09:10

## 2022-10-28 RX ADMIN — GABAPENTIN 300 MG: 300 CAPSULE ORAL at 09:10

## 2022-10-28 RX ADMIN — BUSPIRONE HYDROCHLORIDE 5 MG: 5 TABLET ORAL at 08:10

## 2022-10-28 RX ADMIN — Medication 100 MG: at 09:10

## 2022-10-28 RX ADMIN — OXYCODONE 5 MG: 5 TABLET ORAL at 09:10

## 2022-10-28 RX ADMIN — OXYCODONE 5 MG: 5 TABLET ORAL at 08:10

## 2022-10-28 RX ADMIN — LABETALOL HYDROCHLORIDE 200 MG: 200 TABLET, FILM COATED ORAL at 08:10

## 2022-10-28 NOTE — PROGRESS NOTES
[facility] - 9th Floor Med Surg  Orthopedics  Progress Note    Patient Name: Luz Maria Alfaro  MRN: [id number]  Admission Date: 10/26/2022  Hospital Length of Stay: 2 days  Attending Provider: Chyna Juarez MD  Primary Care Provider: Primary Doctor No  Follow-up For: Procedure(s) (LRB):  RIGHT FEMUR INTERTROCH IMN (Right)    Post-Operative Day: 1 Day Post-Op  Subjective:     Principal Problem:Closed right hip fracture    Principal Orthopedic Problem: closed right intertrochanteric femur fracture    Interval History: POD1 IMN R IT. No acute events since OR. Pain is improved post op and currently controlled with meds. She states she stood up with PT yesterday. No new issues reported.     Review of patient's allergies indicates:   Allergen Reactions    Meperidine Rash       Current Facility-Administered Medications   Medication    acetaminophen tablet 650 mg    acetaminophen tablet 650 mg    amLODIPine tablet 10 mg    atorvastatin tablet 10 mg    busPIRone tablet 5 mg    cefazolin (ANCEF) 2 gram in dextrose 5% 50 mL IVPB (premix)    cloNIDine tablet 0.1 mg    dextrose 50% injection 12.5 g    dextrose 50% injection 25 g    diphenhydrAMINE injection 25 mg    enoxaparin injection 40 mg    EScitalopram oxalate tablet 20 mg    folic acid tablet 1 mg    gabapentin capsule 300 mg    glucagon (human recombinant) injection 1 mg    glucose chewable tablet 16 g    glucose chewable tablet 24 g    hydrALAZINE injection 10 mg    hydrALAZINE injection 10 mg    HYDROmorphone (PF) injection 0.2 mg    HYDROmorphone (PF) injection 0.5 mg    insulin aspart U-100 injection 1-10 Units    labetaloL injection 10 mg    labetaloL tablet 200 mg    morphine injection 2 mg    multivitamin tablet    nicotine 14 mg/24 hr 1 patch    ondansetron injection 4 mg    ondansetron injection 4 mg    oxyCODONE immediate release tablet 5 mg    sodium chloride 0.9% flush 10 mL    sodium zirconium cyclosilicate packet 10 g    thiamine tablet 100  "mg     Objective:     Vital Signs (Most Recent):  Temp: 98 °F (36.7 °C) (10/28/22 0546)  Pulse: 76 (10/28/22 0546)  Resp: 18 (10/28/22 0546)  BP: 104/63 (10/28/22 0546)  SpO2: (!) 94 % (10/28/22 0546)   Vital Signs (24h Range):  Temp:  [97.4 °F (36.3 °C)-98.4 °F (36.9 °C)] 98 °F (36.7 °C)  Pulse:  [] 76  Resp:  [17-22] 18  SpO2:  [89 %-95 %] 94 %  BP: ()/(56-82) 104/63     Weight: 54.4 kg (120 lb)  Height: 5' 3" (160 cm)  Body mass index is 21.26 kg/m².      Intake/Output Summary (Last 24 hours) at 10/28/2022 0704  Last data filed at 10/28/2022 0559  Gross per 24 hour   Intake 1480 ml   Output 900 ml   Net 580 ml       Ortho/SPM Exam  General: AAOx4. Well nourished, well perfused, no distress.   R LE:   Dressing with spots of sanguinous drainage  Tolerates gently passive hip circumduction  Thigh is soft and compressible  No calf swelling or tenderness  Actively dorsi/plantar flexes foot  BCR distally  SLT intact distally      Significant Labs: BMP:   Recent Labs   Lab 10/27/22  0524   *   K 4.1   CO2 25   BUN 9.0*   CREATININE 0.95   CALCIUM 9.4     CBC:   Recent Labs   Lab 10/26/22  0745 10/27/22  0524   WBC 10.9 8.9   HGB 12.8 11.8*   HCT 37.7 35.9*    304     All pertinent labs within the past 24 hours have been reviewed.    Significant Imaging: X-Ray: I have reviewed all pertinent results/findings and my personal findings are:  post op xray right hip demonstrates good fracture alignment with all hardware intact    Assessment/Plan:     Active Diagnoses:    Diagnosis Date Noted POA    PRINCIPAL PROBLEM:  Closed right hip fracture [S72.001A] 10/26/2022 Yes      Problems Resolved During this Admission:     Pt is doing well today s/p IMN R IT. H&H stable post op. Pain controlled. Plan for continued PT today. WBAT R LE. Full ROM R LE. 3 post op doses of ancef. Lovenox for dvt ppx. Judicious pain mgmt. Begin dressing changes tomorrow. Will need CM eval for dc planning.     The above findings, " diagnosis, and treatment plan were discussed with Dr. Stewart Brambila who is in agreement.      ALEKS Montesinos  Orthopedics  [facility] - 9th Floor Med Surg

## 2022-10-28 NOTE — PT/OT/SLP PROGRESS
Physical Therapy Treatment    Patient Name:  Luz Maria Alfaro   MRN:  [id number]    Recommendations:     Discharge Recommendations:  rehabilitation facility, home with home health   Discharge Equipment Recommendations: walker, rolling     Subjective     Patient awake, alert, and oriented 3 .     Objective:     General Precautions: Standard,     Orthopedic Precautions:RLE weight bearing as tolerated (RLE Full ROM)   Braces:    Respiratory Status: Room air 98%     Communicated with nurse prior to treatment.     Functional Mobility:    Rolling:Minimal Assistance  Supine to sit:Moderate Assistance  Sit to stand transfer: Moderate Assistance  Bed to chair transfer:Moderate Assistance  Gait 20 ft with RW min assist and cues for sequence with some posterior LOB. Pt performed LE PRE's to increase strenth, ROM, and endurance to improve overall independence. Pt has RW @ home and she needs rehab but maybe HHPT if mobility improves over the weekend. Improved mobility and gait distance as well as pain complaints although still painful. I will see her tomorrow.       AM-PAC 6 CLICK MOBILITY        Patient left up in chair with all lines intact and call button in reach..    GOALS:   Multidisciplinary Problems       Physical Therapy Goals          Problem: Physical Therapy    Goal Priority Disciplines Outcome Goal Variances Interventions   Physical Therapy Goal     PT, PT/OT      Description: Goals to be met by: 2022     Patient will increase functional independence with mobility by performin. Supine to sit with Modified [place]  2. Sit to supine with Modified [place]  3. Sit to stand transfer with Modified [place]  4. Gait  x 200 feet with Modified [place] using Rolling Walker.                          Assessment:     Luz Maria Alfaro is a 62 y.o. female admitted with a medical diagnosis of Closed right hip fracture.     Rehab Prognosis: Good; patient would benefit from acute skilled PT services to  address these deficits and reach maximum level of function.    Recent Surgery: Procedure(s) (LRB):  RIGHT FEMUR INTERTROCH IMN (Right) 1 Day Post-Op    Plan:     During this hospitalization, patient to be seen daily (/BID) to address the identified rehab impairments via gait training, therapeutic activities, therapeutic exercises, neuromuscular re-education and progress toward the following goals:    Plan of Care Expires:  11/26/22    Billable Minutes     Billable Minutes: Gait Training 12 and Therapeutic Exercise 13    Treatment Type: Treatment  PT/PTA: PTA     PTA Visit Number: 1     10/28/2022

## 2022-10-28 NOTE — PLAN OF CARE
Problem: Occupational Therapy  Goal: Occupational Therapy Goal  Description: Goals to be met by: 11/11/22    Patient will increase functional independence with ADLs by performing:    LE Dressing with Modified [place].  Grooming while standing or seated with Modified [place].  Toileting from toilet with Modified [place] for hygiene and clothing management.   Toilet transfer to toilet with Modified [place].    Outcome: Ongoing, Progressing

## 2022-10-28 NOTE — PT/OT/SLP EVAL
Occupational Therapy   Evaluation    Name: Luz Maria Alfaro  MRN: [id number]  Admitting Diagnosis:  Closed right hip fracture  Recent Surgery: Procedure(s) (LRB):  RIGHT FEMUR INTERTROCH IMN (Right) 1 Day Post-Op    Recommendations:     Discharge Recommendations: home with home health  Discharge Equipment Recommendations:     Barriers to discharge:       Assessment:     Luz Maria Alfaro is a 62 y.o. female with a medical diagnosis of Closed right hip fracture s/p IMN R IT, closed R intertrochanteric femur fx.  She presents with the following performance deficits affecting function: impaired endurance, impaired self care skills, impaired functional mobility, gait instability, impaired balance, weakness, pain.    Pt is WBAT to RLE & full ROM to RLE.       Rehab Prognosis: Good; patient would benefit from acute skilled OT services to address these deficits and reach maximum level of function.       Plan:     Patient to be seen 6 x/week, daily to address the above listed problems via self-care/home management, therapeutic activities, therapeutic exercises  Plan of Care Expires: 11/11/22  Plan of Care Reviewed with: patient    Subjective     Chief Complaint: increased pain   Patient/Family Comments/goals: --    Occupational Profile:  Living Environment: son   Previous level of function: independent   Roles and Routines: unemployed, no longer drives   Equipment Used at Home:  walker, rolling, bedside commode (tub bench & hand held shower head)  Assistance upon Discharge: son     Pain/Comfort:  Pain Rating 1: 10/10    Patients cultural, spiritual, Scientologist conflicts given the current situation:      Objective:     Communicated with: nrsg prior to session.  Patient found HOB elevated with PureWick upon OT entry to room.    General Precautions: Standard,     Orthopedic Precautions:    Braces:    Respiratory Status: Room air    Occupational Performance:    Bed Mobility:    Patient completed Supine to Sit with moderate  assistance  Patient completed Sit to Supine with moderate assistance    Functional Mobility/Transfers:  Patient completed Sit <> Stand Transfer with minimum assistance  with  rolling walker       Activities of Daily Living:  Lower Body Dressing: maximal assistance .  Toileting: moderate assistance .    Cognitive/Visual Perceptual:  Cognitive/Psychosocial Skills:     -       Oriented to: Person, Place, Time, and Situation     Physical Exam:  Upper Extremity Strength:    -       Right Upper Extremity: WNL  -       Left Upper Extremity: WNL    AMPAC 6 Click ADL:  AMPA Total Score:      Treatment & Education:      Patient left HOB elevated with all lines intact, call button in reach, and nrsg notified    GOALS:   Multidisciplinary Problems       Occupational Therapy Goals          Problem: Occupational Therapy    Goal Priority Disciplines Outcome Interventions   Occupational Therapy Goal     OT, PT/OT Ongoing, Progressing    Description: Goals to be met by: 11/11/22    Patient will increase functional independence with ADLs by performing:    LE Dressing with Modified [place].  Grooming while standing or seated with Modified [place].  Toileting from toilet with Modified [place] for hygiene and clothing management.   Toilet transfer to toilet with Modified [place].                         History:     Past Medical History:   Diagnosis Date    Benign essential HTN     Mixed hyperlipidemia     Type 2 diabetes mellitus without complications          Past Surgical History:   Procedure Laterality Date    INTRAMEDULLARY RODDING OF FEMUR Right 10/27/2022    Procedure: RIGHT FEMUR INTERTROCH IMN;  Surgeon: Stewart Brambila MD;  [facility];  Service: Orthopedics;  Laterality: Right;  [place] table, supine  3rd case  Synthes Short TFNA       Time Tracking:     OT Date of Treatment:    OT Start Time: 0928  OT Stop Time: 0940  OT Total Time (min): 12 min    Billable Minutes:Evaluation Moderate Complexity      10/28/2022

## 2022-10-28 NOTE — PT/OT/SLP PROGRESS
Occupational Therapy   Treatment    Name: Luz Maria Alfaro  MRN: [id number]  Admitting Diagnosis:  Closed right hip fracture  1 Day Post-Op    Recommendations:     Discharge Recommendations: home with home health, rehabilitation facility  Discharge Equipment Recommendations:     Barriers to discharge:       Assessment:     Luz Maria Alfaro is a 62 y.o. female with a medical diagnosis of Closed right hip fracture.  She presents with the following performance deficits affecting function are weakness, impaired endurance, impaired self care skills, impaired functional mobility, gait instability, impaired balance, pain.       Rehab Prognosis:  Good; patient would benefit from acute skilled OT services to address these deficits and reach maximum level of function.       Plan:     Patient to be seen daily, 6 x/week to address the above listed problems via self-care/home management, therapeutic activities, therapeutic exercises  Plan of Care Expires: 11/11/22  Plan of Care Reviewed with: patient    Subjective     Pain/Comfort:  Pain Rating 1: 10/10    Objective:     Communicated with:  Patient found up in chair with PureWick upon OT entry to room.    General Precautions: Standard,     Orthopedic Precautions:RLE weight bearing as tolerated   Braces:    Respiratory Status: Room air     Occupational Performance:     Bed Mobility:    Patient completed Sit to Supine with minimum assistance     Functional Mobility/Transfers:  Patient completed Bed <> Chair Transfer using Step Transfer technique with minimum assistance with rolling walker  Functional Mobility: min A t/f back to bed from chair with RW; mod A from chair 2/2 increased pain RLE ; min A for functional mobility       AMPAC 6 Click ADL:          Patient left HOB elevated with all lines intact, call button in reach, and family present    GOALS:   Multidisciplinary Problems       Occupational Therapy Goals          Problem: Occupational Therapy    Goal Priority Disciplines  Outcome Interventions   Occupational Therapy Goal     OT, PT/OT Ongoing, Progressing    Description: Goals to be met by: 11/11/22    Patient will increase functional independence with ADLs by performing:    LE Dressing with Modified [place].  Grooming while standing or seated with Modified [place].  Toileting from toilet with Modified [place] for hygiene and clothing management.   Toilet transfer to toilet with Modified [place].                         Time Tracking:     OT Date of Treatment:    OT Start Time: 1255  OT Stop Time: 1308  OT Total Time (min): 13 min    Billable Minutes:Therapeutic Activity 13min    OT/KAREN: OT     KAREN Visit Number: 0    10/28/2022

## 2022-10-28 NOTE — PROGRESS NOTES
[facility] Hospital Medicine Progress Note        Chief Complaint: Inpatient Follow-up for Right IT fracture     HPI:   Luz Maria Alfaro is a 62 y.o. White female with a past medical history of hypertension, hyperlipidemia, diabetes mellitus type 2, anxiety/depression, tobacco use, alcohol abuse. The patient presented to [facility] on 10/26/2022 with a primary complaint of right hip pain. Patient was drinking last night and she fell out of bed.  Patient is unable to give further details but remembers when she woke up on the floor calling for help sometime after midnight.  She admits to drinking 4 beers last night and reports drinking at least 4 beers daily.  She denies a history of alcohol withdrawals in the past but has been drinking more than usual recently due to depression.  She smokes 2 packs of cigarettes per day.  At baseline patient ambulates independently and lives with her son.     Upon presentation to the ED, patient afebrile, hypertensive 166/100 and PO2 98% room air.  Labs notable for sodium 128, potassium 5.3, chloride 96, CO2 19, alkaline phosphatase 158, alcohol level 239.  Chest x-ray without acute abnormalities.  X-ray right hip with a minimally displaced intertrochanteric fracture.  In ED patient the medical morphine and Zofran.  She  is admitted to hospital medicine services for further medical management    Interval Hx:   Seen and examined.  She is afebrile vitals stable hemodynamics stable.  Does not show signs of alcohol withdrawal.    She work with physical therapy this morning was able to walk.    Objective/physical exam:  General: In no acute distress, afebrile  Chest: Clear to auscultation bilaterally  Heart: RRR, +S1, S2, no appreciable murmur  Abdomen: Soft, nontender, BS +  MSK: Warm, no lower extremity edema, no clubbing or cyanosis, right hip incision with bandage in place  Neurologic: Alert and oriented x4, Cranial nerve II-XII intact,    VITAL SIGNS: 24  HRS MIN & MAX LAST   Temp  Min: 97.2 °F (36.2 °C)  Max: 98 °F (36.7 °C) 97.2 °F (36.2 °C)   BP  Min: 102/60  Max: 121/68 108/67   Pulse  Min: 76  Max: 83  83   Resp  Min: 10  Max: 18 18   SpO2  Min: 91 %  Max: 94 % (!) 94 %         Recent Labs   Lab 10/26/22  0745 10/27/22  0524 10/28/22  0616   WBC 10.9 8.9 7.7   RBC 3.63* 3.41* 2.39*   HGB 12.8 11.8* 8.2*   HCT 37.7 35.9* 25.2*   .9* 105.3* 105.4*   MCH 35.3* 34.6* 34.3*   MCHC 34.0 32.9* 32.5*   RDW 12.3 12.5 12.5    304 227   MPV 9.2 9.2 10.4       Recent Labs   Lab 10/26/22  0745 10/27/22  0524 10/28/22  0616   * 130* 131*   K 5.3* 4.1 3.8   CO2 19* 25 24   BUN 7.1* 9.0* 8.0*   CREATININE 0.64 0.95 0.73   CALCIUM 9.4 9.4 8.9   ALBUMIN 3.8 3.5  --    ALKPHOS 158* 142  --    ALT 17 15  --    AST 31 18  --    BILITOT 0.3 0.5  --           Microbiology Results (last 7 days)       ** No results found for the last 168 hours. **             See below for Radiology    Scheduled Med:   amLODIPine  10 mg Oral Daily    atorvastatin  10 mg Oral Daily    busPIRone  5 mg Oral BID    enoxaparin  40 mg Subcutaneous Daily    EScitalopram oxalate  20 mg Oral Daily    folic acid  1 mg Oral Daily    gabapentin  300 mg Oral TID    labetaloL  200 mg Oral BID    multivitamin  1 tablet Oral Daily    nicotine  1 patch Transdermal Daily    ondansetron  4 mg Intravenous Once    sodium zirconium cyclosilicate  10 g Oral Once    thiamine  100 mg Oral Daily        Continuous Infusions:       PRN Meds:  acetaminophen, acetaminophen, cloNIDine, dextrose 50%, dextrose 50%, diphenhydrAMINE, glucagon (human recombinant), glucose, glucose, hydrALAZINE, hydrALAZINE, HYDROmorphone, HYDROmorphone, insulin aspart U-100, labetalol, ondansetron, oxyCODONE, sodium chloride 0.9%       Assessment/Plan:  Minimally displaced right intertrochanteric hip fracture  Hyponatremia/hypochloremia: improved   Hyperkalemia-resolved  Essential hypertension-controlled  Hyperlipidemia   Diabetes  mellitus type 2   Anxiety/depression  Alcohol intoxication  Tobacco abuse  Post-op anemia-continue to monitor    Plan:  - she has home made tea at the bedside, I advised her to not drink tea and she agreed in the hospital so it would be confounding the treatment.   - Librium 10 mg Q 4 p.r.n. for CIWA>6,   - continue pain control with oxycodone seems that she is tolerating.  - DM diet.   See a psych consult for anxiety/depression.  - follow psych recommendations.     VTE prophylaxis: Lovenox 40 mg qd    Patient condition:  Fair    Anticipated discharge and Disposition:     Pending further PT and pain control, plan for dc to rehab    All diagnosis and differential diagnosis have been reviewed; assessment and plan has been documented; I have personally reviewed the labs and test results that are presently available; I have reviewed the patients medication list; I have reviewed the consulting providers response and recommendations. I have reviewed or attempted to review medical records based upon their availability    All of the patient's questions have been  addressed and answered. Patient's is agreeable to the above stated plan. I will continue to monitor closely and make adjustments to medical management as needed.  _____________________________________________________________________    Nutrition Status: start diabetic diet today    Radiology:  CV Ultrasound doppler venous legs bilat  · There is no evidence of a right lower extremity DVT.  · There is no evidence of a left lower extremity DVT.     Bilateral lower extremity (-) DVT.      ABBY WINN,    10/28/2022

## 2022-10-29 LAB
ANION GAP SERPL CALC-SCNC: 7 MEQ/L
BASOPHILS # BLD AUTO: 0.02 X10(3)/MCL (ref 0–0.2)
BASOPHILS NFR BLD AUTO: 0.3 %
BUN SERPL-MCNC: 7.4 MG/DL (ref 9.8–20.1)
CALCIUM SERPL-MCNC: 8.7 MG/DL (ref 8.4–10.2)
CHLORIDE SERPL-SCNC: 100 MMOL/L (ref 98–107)
CO2 SERPL-SCNC: 27 MMOL/L (ref 23–31)
CREAT SERPL-MCNC: 0.66 MG/DL (ref 0.55–1.02)
CREAT/UREA NIT SERPL: 11
EOSINOPHIL # BLD AUTO: 0.04 X10(3)/MCL (ref 0–0.9)
EOSINOPHIL NFR BLD AUTO: 0.6 %
ERYTHROCYTE [DISTWIDTH] IN BLOOD BY AUTOMATED COUNT: 12.7 % (ref 11.5–17)
GFR SERPLBLD CREATININE-BSD FMLA CKD-EPI: >60 MLS/MIN/1.73/M2
GLUCOSE SERPL-MCNC: 108 MG/DL (ref 82–115)
HCT VFR BLD AUTO: 24 % (ref 37–47)
HGB BLD-MCNC: 7.7 GM/DL (ref 12–16)
IMM GRANULOCYTES # BLD AUTO: 0.03 X10(3)/MCL (ref 0–0.04)
IMM GRANULOCYTES NFR BLD AUTO: 0.5 %
LYMPHOCYTES # BLD AUTO: 1.94 X10(3)/MCL (ref 0.6–4.6)
LYMPHOCYTES NFR BLD AUTO: 29.8 %
MCH RBC QN AUTO: 35 PG (ref 27–31)
MCHC RBC AUTO-ENTMCNC: 32.1 MG/DL (ref 33–36)
MCV RBC AUTO: 109.1 FL (ref 80–94)
MONOCYTES # BLD AUTO: 0.7 X10(3)/MCL (ref 0.1–1.3)
MONOCYTES NFR BLD AUTO: 10.7 %
NEUTROPHILS # BLD AUTO: 3.8 X10(3)/MCL (ref 2.1–9.2)
NEUTROPHILS NFR BLD AUTO: 58.1 %
NRBC BLD AUTO-RTO: 0 %
PLATELET # BLD AUTO: 238 X10(3)/MCL (ref 130–400)
PMV BLD AUTO: 9.5 FL (ref 7.4–10.4)
POCT GLUCOSE: 122 MG/DL (ref 70–110)
POCT GLUCOSE: 127 MG/DL (ref 70–110)
POTASSIUM SERPL-SCNC: 3.9 MMOL/L (ref 3.5–5.1)
RBC # BLD AUTO: 2.2 X10(6)/MCL (ref 4.2–5.4)
SODIUM SERPL-SCNC: 134 MMOL/L (ref 136–145)
WBC # SPEC AUTO: 6.5 X10(3)/MCL (ref 4.5–11.5)

## 2022-10-29 PROCEDURE — 25000003 PHARM REV CODE 250: Performed by: NURSE PRACTITIONER

## 2022-10-29 PROCEDURE — 97530 THERAPEUTIC ACTIVITIES: CPT | Mod: CO

## 2022-10-29 PROCEDURE — 27000221 HC OXYGEN, UP TO 24 HOURS

## 2022-10-29 PROCEDURE — S4991 NICOTINE PATCH NONLEGEND: HCPCS | Performed by: PHYSICIAN ASSISTANT

## 2022-10-29 PROCEDURE — 85025 COMPLETE CBC W/AUTO DIFF WBC: CPT | Performed by: NURSE PRACTITIONER

## 2022-10-29 PROCEDURE — 25000003 PHARM REV CODE 250: Performed by: INTERNAL MEDICINE

## 2022-10-29 PROCEDURE — 11000001 HC ACUTE MED/SURG PRIVATE ROOM

## 2022-10-29 PROCEDURE — 80048 BASIC METABOLIC PNL TOTAL CA: CPT | Performed by: NURSE PRACTITIONER

## 2022-10-29 PROCEDURE — 25000003 PHARM REV CODE 250: Performed by: PHYSICIAN ASSISTANT

## 2022-10-29 PROCEDURE — 63600175 PHARM REV CODE 636 W HCPCS: Performed by: INTERNAL MEDICINE

## 2022-10-29 PROCEDURE — 36415 COLL VENOUS BLD VENIPUNCTURE: CPT | Performed by: NURSE PRACTITIONER

## 2022-10-29 RX ORDER — CHLORDIAZEPOXIDE HYDROCHLORIDE 10 MG/1
10 CAPSULE, GELATIN COATED ORAL 4 TIMES DAILY PRN
Status: DISCONTINUED | OUTPATIENT
Start: 2022-10-29 | End: 2022-11-08

## 2022-10-29 RX ADMIN — Medication 100 MG: at 09:10

## 2022-10-29 RX ADMIN — BUSPIRONE HYDROCHLORIDE 10 MG: 5 TABLET ORAL at 02:10

## 2022-10-29 RX ADMIN — OXYCODONE 5 MG: 5 TABLET ORAL at 05:10

## 2022-10-29 RX ADMIN — GABAPENTIN 300 MG: 300 CAPSULE ORAL at 09:10

## 2022-10-29 RX ADMIN — ARIPIPRAZOLE 2 MG: 2 TABLET ORAL at 09:10

## 2022-10-29 RX ADMIN — AMLODIPINE BESYLATE 10 MG: 5 TABLET ORAL at 09:10

## 2022-10-29 RX ADMIN — ATORVASTATIN CALCIUM 10 MG: 10 TABLET, FILM COATED ORAL at 09:10

## 2022-10-29 RX ADMIN — GABAPENTIN 300 MG: 300 CAPSULE ORAL at 08:10

## 2022-10-29 RX ADMIN — FOLIC ACID 1 MG: 1 TABLET ORAL at 09:10

## 2022-10-29 RX ADMIN — OXYCODONE 5 MG: 5 TABLET ORAL at 11:10

## 2022-10-29 RX ADMIN — LABETALOL HYDROCHLORIDE 200 MG: 200 TABLET, FILM COATED ORAL at 08:10

## 2022-10-29 RX ADMIN — GABAPENTIN 300 MG: 300 CAPSULE ORAL at 02:10

## 2022-10-29 RX ADMIN — THERA TABS 1 TABLET: TAB at 09:10

## 2022-10-29 RX ADMIN — OXYCODONE 5 MG: 5 TABLET ORAL at 06:10

## 2022-10-29 RX ADMIN — ESCITALOPRAM OXALATE 20 MG: 10 TABLET ORAL at 09:10

## 2022-10-29 RX ADMIN — BUSPIRONE HYDROCHLORIDE 10 MG: 5 TABLET ORAL at 08:10

## 2022-10-29 RX ADMIN — ENOXAPARIN SODIUM 40 MG: 40 INJECTION SUBCUTANEOUS at 05:10

## 2022-10-29 RX ADMIN — OXYCODONE 5 MG: 5 TABLET ORAL at 02:10

## 2022-10-29 RX ADMIN — LABETALOL HYDROCHLORIDE 200 MG: 200 TABLET, FILM COATED ORAL at 09:10

## 2022-10-29 RX ADMIN — NICOTINE 1 PATCH: 14 PATCH, EXTENDED RELEASE TRANSDERMAL at 09:10

## 2022-10-29 RX ADMIN — BUSPIRONE HYDROCHLORIDE 10 MG: 5 TABLET ORAL at 09:10

## 2022-10-29 NOTE — PT/OT/SLP PROGRESS
"Occupational Therapy  Treatment    Luz Maria Alfaro   MRN: [id number]   Admitting Diagnosis: Closed right hip fracture        OT Start Time: 1059  OT Stop Time: 1116  OT Total Time (min): 17 min      OT/KAREN: KAREN     KAREN Visit Number: 1    General Precautions: Standard, fall  Orthopedic Precautions: RLE weight bearing as tolerated  Braces: N/A  Respiratory Status: Room air         Subjective:  Communicated with RN prior to session.    S: Pt reporting "I was thinking of taking today off." Pt ed for necessity to move in order to maintain strength and functional I.Pt reluctantly agreeable.     O:Pt attempted bed mob twice prior to successful 2/2 increased pain with movement. Pt sup>EOB c extended time and encouragement. Pt EOB>stance c min A and completed functional mob in room c CGA and RW. Pt deferred to attempt ADLs at sink this date. Pt returned to bed c min  A for supporting RLE as lifted into bed.     A: pt with anxiety surrounding potential pain but once on feet able to use UE to minimize RLE discomfort. Pt tolerated fair.    P: cont c current OT POC      AM-PAC 6 CLICK ADL   How much help from another person does this patient currently need?   1 = Unable, Total/Dependent Assistance  2 = A lot, Maximum/Moderate Assistance  3 = A little, Minimum/Contact Guard/Supervision  4 = None, Modified [place]/Independent    Putting on and taking off regular lower body clothing? : 1  Bathing (including washing, rinsing, drying)?: 1  Toileting, which includes using toilet, bedpan, or urinal? : 1  Putting on and taking off regular upper body clothing?: 3  Taking care of personal grooming such as brushing teeth?: 4  Eating meals?: 4  Daily Activity Total Score: 14     AM-PAC Raw Score CMS "G-Code Modifier Level of Impairment Assistance   6 % Total / Unable   7 - 8 CM 80 - 100% Maximal Assist   9-13 CL 60 - 80% Moderate Assist   14 - 19 CK 40 - 60% Moderate Assist   20 - 22 CJ 20 - 40% Minimal Assist   23 CI 1-20% SBA " / CGA   24 CH 0% Independent/ Mod I       Patient left supine with all lines intact and call button in reach    ASSESSMENT:  Luz Maria Alfaro is a 62 y.o. female with a medical diagnosis of Closed right hip fracture and presents with .    Rehab identified problem list/impairments:      Rehab potential is good.    Activity tolerance: Fair    Discharge recommendations: Discharge Facility/Level of Care Needs: rehabilitation facility, nursing facility, skilled     Barriers to discharge: Barriers to Discharge: Inaccessible home environment, Decreased caregiver support    Equipment recommendations:       GOALS:   Multidisciplinary Problems       Occupational Therapy Goals          Problem: Occupational Therapy    Goal Priority Disciplines Outcome Interventions   Occupational Therapy Goal     OT, PT/OT Ongoing, Progressing    Description: Goals to be met by: 11/11/22    Patient will increase functional independence with ADLs by performing:    LE Dressing with Modified [place].  Grooming while standing or seated with Modified [place].  Toileting from toilet with Modified [place] for hygiene and clothing management.   Toilet transfer to toilet with Modified [place].                         Plan:  Patient to be seen 6 x/week to address the above listed problems via self-care/home management, therapeutic activities, therapeutic exercises  Plan of Care expires: 11/11/22  Plan of Care reviewed with: patient         10/29/2022

## 2022-10-29 NOTE — PT/OT/SLP PROGRESS
"Physical Therapy      Patient Name:  Luz Maria Alfaro   MRN:  [id number]    Patient not seen today secondary to refused, "I already got up with OT and walked." Will follow-up Monday.    "

## 2022-10-29 NOTE — PROGRESS NOTES
[facility] 9th Floor Med Surg  Orthopedics  Progress Note    Patient Name: Luz Maria Alfaro  MRN: [id number]  Admission Date: 10/26/2022  Hospital Length of Stay: 3 days  Attending Provider: Chyna Juarez MD  Primary Care Provider: Primary Doctor No  Follow-up For: Procedure(s) (LRB):  RIGHT FEMUR INTERTROCH IMN (Right)    Post-Operative Day: 2 Day Post-Op  Subjective:     Principal Problem:Closed right hip fracture    Principal Orthopedic Problem: closed right intertrochanteric femur fracture    Interval History: POD2 IMN R IT. No acute events overnight. Pain is improved post op and currently controlled with meds. She continues to work with PT. No new issues reported.     Review of patient's allergies indicates:   Allergen Reactions    Meperidine Rash       Current Facility-Administered Medications   Medication    acetaminophen tablet 650 mg    acetaminophen tablet 650 mg    amLODIPine tablet 10 mg    ARIPiprazole tablet 2 mg    atorvastatin tablet 10 mg    busPIRone tablet 10 mg    cloNIDine tablet 0.1 mg    dextrose 50% injection 12.5 g    dextrose 50% injection 25 g    diphenhydrAMINE injection 25 mg    enoxaparin injection 40 mg    EScitalopram oxalate tablet 20 mg    folic acid tablet 1 mg    gabapentin capsule 300 mg    glucagon (human recombinant) injection 1 mg    glucose chewable tablet 16 g    glucose chewable tablet 24 g    hydrALAZINE injection 10 mg    hydrALAZINE injection 10 mg    HYDROmorphone (PF) injection 0.2 mg    HYDROmorphone (PF) injection 0.5 mg    insulin aspart U-100 injection 1-10 Units    labetaloL injection 10 mg    labetaloL tablet 200 mg    multivitamin tablet    nicotine 14 mg/24 hr 1 patch    ondansetron injection 4 mg    ondansetron injection 4 mg    oxyCODONE immediate release tablet 5 mg    sodium chloride 0.9% flush 10 mL    sodium zirconium cyclosilicate packet 10 g    thiamine tablet 100 mg     Objective:     Vital Signs (Most Recent):  Temp: 99.6 °F (37.6 °C)  "(10/29/22 0400)  Pulse: 87 (10/29/22 0400)  Resp: 18 (10/29/22 0622)  BP: 118/75 (10/29/22 0400)  SpO2: (!) 94 % (10/29/22 0400)   Vital Signs (24h Range):  Temp:  [97.2 °F (36.2 °C)-99.6 °F (37.6 °C)] 99.6 °F (37.6 °C)  Pulse:  [80-88] 87  Resp:  [] 18  SpO2:  [91 %-94 %] 94 %  BP: (108-131)/(66-75) 118/75     Weight: 54.4 kg (120 lb)  Height: 5' 3" (160 cm)  Body mass index is 21.26 kg/m².      Intake/Output Summary (Last 24 hours) at 10/29/2022 0816  Last data filed at 10/28/2022 0933  Gross per 24 hour   Intake --   Output 650 ml   Net -650 ml         Ortho/SPM Exam  General: AAOx4. Well nourished, well perfused, no distress.   R LE:   Dressing with spots of sanguinous drainage  Tolerates gently passive hip circumduction  Thigh is soft and compressible  No calf swelling or tenderness  Actively dorsi/plantar flexes foot  BCR distally  SLT intact distally      Significant Labs: BMP:   Recent Labs   Lab 10/29/22  0549   *   K 3.9   CO2 27   BUN 7.4*   CREATININE 0.66   CALCIUM 8.7       CBC:   Recent Labs   Lab 10/28/22  0616 10/29/22  0528   WBC 7.7 6.5   HGB 8.2* 7.7*   HCT 25.2* 24.0*    238       All pertinent labs within the past 24 hours have been reviewed.    Significant Imaging: X-Ray: I have reviewed all pertinent results/findings and my personal findings are:  post op xray right hip demonstrates good fracture alignment with all hardware intact    Assessment/Plan:     Active Diagnoses:    Diagnosis Date Noted POA    PRINCIPAL PROBLEM:  Closed right hip fracture [S72.001A] 10/26/2022 Yes      Problems Resolved During this Admission:     Pt is doing well today s/p IMN R IT. H&H down a bit as expected post op. Will defer to primary team on transfusion needs. Pain controlled. Continue PT daily. WBAT R LE. Full ROM R LE. Lovenox for dvt ppx. Judicious pain mgmt. Begin daily dry dressing changes today. Will need CM eval for placement. F/u with Dr. Brambila in 2 weeks for wound check and staple " removal. Please call with any questions or concerns.    I, Estela Cabrera, GEORGINA, have scribed this note in the presence of Dr. Stewart Brambila who has personally examined the patient and initiated the plan of care.         ALEKS Montesinos  Orthopedics  [facility] - 9th Floor Med Surg

## 2022-10-29 NOTE — NURSING
Tea removed from room per doctor request. Tea placed in nursing station with patient label. Gave tea to the day charge nurse. Dressing changed per order.

## 2022-10-30 LAB
ALBUMIN SERPL-MCNC: 2.8 GM/DL (ref 3.4–4.8)
ALBUMIN/GLOB SERPL: 1 RATIO (ref 1.1–2)
ALP SERPL-CCNC: 103 UNIT/L (ref 40–150)
ALT SERPL-CCNC: 11 UNIT/L (ref 0–55)
AST SERPL-CCNC: 20 UNIT/L (ref 5–34)
BASOPHILS # BLD AUTO: 0.03 X10(3)/MCL (ref 0–0.2)
BASOPHILS NFR BLD AUTO: 0.5 %
BILIRUBIN DIRECT+TOT PNL SERPL-MCNC: 0.4 MG/DL
BUN SERPL-MCNC: 11.1 MG/DL (ref 9.8–20.1)
CALCIUM SERPL-MCNC: 8.9 MG/DL (ref 8.4–10.2)
CHLORIDE SERPL-SCNC: 102 MMOL/L (ref 98–107)
CO2 SERPL-SCNC: 26 MMOL/L (ref 23–31)
CREAT SERPL-MCNC: 0.68 MG/DL (ref 0.55–1.02)
EOSINOPHIL # BLD AUTO: 0.05 X10(3)/MCL (ref 0–0.9)
EOSINOPHIL NFR BLD AUTO: 0.8 %
ERYTHROCYTE [DISTWIDTH] IN BLOOD BY AUTOMATED COUNT: 12.8 % (ref 11.5–17)
GFR SERPLBLD CREATININE-BSD FMLA CKD-EPI: >60 MLS/MIN/1.73/M2
GLOBULIN SER-MCNC: 2.7 GM/DL (ref 2.4–3.5)
GLUCOSE SERPL-MCNC: 145 MG/DL (ref 82–115)
HCT VFR BLD AUTO: 23.3 % (ref 37–47)
HGB BLD-MCNC: 7.5 GM/DL (ref 12–16)
IMM GRANULOCYTES # BLD AUTO: 0.02 X10(3)/MCL (ref 0–0.04)
IMM GRANULOCYTES NFR BLD AUTO: 0.3 %
LYMPHOCYTES # BLD AUTO: 1.86 X10(3)/MCL (ref 0.6–4.6)
LYMPHOCYTES NFR BLD AUTO: 28.8 %
MCH RBC QN AUTO: 35.5 PG (ref 27–31)
MCHC RBC AUTO-ENTMCNC: 32.2 MG/DL (ref 33–36)
MCV RBC AUTO: 110.4 FL (ref 80–94)
MONOCYTES # BLD AUTO: 0.61 X10(3)/MCL (ref 0.1–1.3)
MONOCYTES NFR BLD AUTO: 9.5 %
NEUTROPHILS # BLD AUTO: 3.9 X10(3)/MCL (ref 2.1–9.2)
NEUTROPHILS NFR BLD AUTO: 60.1 %
NRBC BLD AUTO-RTO: 0 %
PLATELET # BLD AUTO: 250 X10(3)/MCL (ref 130–400)
PMV BLD AUTO: 9.7 FL (ref 7.4–10.4)
POCT GLUCOSE: 116 MG/DL (ref 70–110)
POCT GLUCOSE: 129 MG/DL (ref 70–110)
POCT GLUCOSE: 130 MG/DL (ref 70–110)
POCT GLUCOSE: 150 MG/DL (ref 70–110)
POCT GLUCOSE: 155 MG/DL (ref 70–110)
POTASSIUM SERPL-SCNC: 4.2 MMOL/L (ref 3.5–5.1)
PROT SERPL-MCNC: 5.5 GM/DL (ref 5.8–7.6)
RBC # BLD AUTO: 2.11 X10(6)/MCL (ref 4.2–5.4)
SODIUM SERPL-SCNC: 135 MMOL/L (ref 136–145)
WBC # SPEC AUTO: 6.5 X10(3)/MCL (ref 4.5–11.5)

## 2022-10-30 PROCEDURE — 63600175 PHARM REV CODE 636 W HCPCS: Performed by: INTERNAL MEDICINE

## 2022-10-30 PROCEDURE — 25000003 PHARM REV CODE 250: Performed by: PHYSICIAN ASSISTANT

## 2022-10-30 PROCEDURE — 80053 COMPREHEN METABOLIC PANEL: CPT | Performed by: STUDENT IN AN ORGANIZED HEALTH CARE EDUCATION/TRAINING PROGRAM

## 2022-10-30 PROCEDURE — 36415 COLL VENOUS BLD VENIPUNCTURE: CPT | Performed by: STUDENT IN AN ORGANIZED HEALTH CARE EDUCATION/TRAINING PROGRAM

## 2022-10-30 PROCEDURE — 25000003 PHARM REV CODE 250: Performed by: NURSE PRACTITIONER

## 2022-10-30 PROCEDURE — 11000001 HC ACUTE MED/SURG PRIVATE ROOM

## 2022-10-30 PROCEDURE — 25000003 PHARM REV CODE 250: Performed by: INTERNAL MEDICINE

## 2022-10-30 PROCEDURE — S4991 NICOTINE PATCH NONLEGEND: HCPCS | Performed by: PHYSICIAN ASSISTANT

## 2022-10-30 PROCEDURE — 85025 COMPLETE CBC W/AUTO DIFF WBC: CPT | Performed by: NURSE PRACTITIONER

## 2022-10-30 RX ORDER — LIDOCAINE HYDROCHLORIDE 20 MG/ML
15 SOLUTION OROPHARYNGEAL ONCE
Status: COMPLETED | OUTPATIENT
Start: 2022-10-31 | End: 2022-10-31

## 2022-10-30 RX ORDER — MAG HYDROX/ALUMINUM HYD/SIMETH 200-200-20
15 SUSPENSION, ORAL (FINAL DOSE FORM) ORAL 2 TIMES DAILY PRN
Status: DISCONTINUED | OUTPATIENT
Start: 2022-10-31 | End: 2022-11-11 | Stop reason: HOSPADM

## 2022-10-30 RX ADMIN — LABETALOL HYDROCHLORIDE 200 MG: 200 TABLET, FILM COATED ORAL at 08:10

## 2022-10-30 RX ADMIN — BUSPIRONE HYDROCHLORIDE 10 MG: 5 TABLET ORAL at 02:10

## 2022-10-30 RX ADMIN — OXYCODONE 5 MG: 5 TABLET ORAL at 08:10

## 2022-10-30 RX ADMIN — OXYCODONE 5 MG: 5 TABLET ORAL at 02:10

## 2022-10-30 RX ADMIN — GABAPENTIN 300 MG: 300 CAPSULE ORAL at 02:10

## 2022-10-30 RX ADMIN — GABAPENTIN 300 MG: 300 CAPSULE ORAL at 08:10

## 2022-10-30 RX ADMIN — NICOTINE 1 PATCH: 14 PATCH, EXTENDED RELEASE TRANSDERMAL at 08:10

## 2022-10-30 RX ADMIN — BUSPIRONE HYDROCHLORIDE 10 MG: 5 TABLET ORAL at 08:10

## 2022-10-30 RX ADMIN — OXYCODONE 5 MG: 5 TABLET ORAL at 05:10

## 2022-10-30 RX ADMIN — FOLIC ACID 1 MG: 1 TABLET ORAL at 08:10

## 2022-10-30 RX ADMIN — ESCITALOPRAM OXALATE 20 MG: 10 TABLET ORAL at 08:10

## 2022-10-30 RX ADMIN — ENOXAPARIN SODIUM 40 MG: 40 INJECTION SUBCUTANEOUS at 04:10

## 2022-10-30 RX ADMIN — ATORVASTATIN CALCIUM 10 MG: 10 TABLET, FILM COATED ORAL at 08:10

## 2022-10-30 RX ADMIN — THERA TABS 1 TABLET: TAB at 08:10

## 2022-10-30 RX ADMIN — Medication 100 MG: at 08:10

## 2022-10-30 RX ADMIN — ARIPIPRAZOLE 2 MG: 2 TABLET ORAL at 08:10

## 2022-10-30 RX ADMIN — AMLODIPINE BESYLATE 10 MG: 5 TABLET ORAL at 08:10

## 2022-10-31 LAB
ABS NEUT (OLG): 3.65 X10(3)/MCL (ref 2.1–9.2)
ALBUMIN SERPL-MCNC: 2.9 GM/DL (ref 3.4–4.8)
ALBUMIN/GLOB SERPL: 1 RATIO (ref 1.1–2)
ALP SERPL-CCNC: 116 UNIT/L (ref 40–150)
ALT SERPL-CCNC: 20 UNIT/L (ref 0–55)
ANISOCYTOSIS BLD QL SMEAR: ABNORMAL
AST SERPL-CCNC: 43 UNIT/L (ref 5–34)
BASOPHILS NFR BLD MANUAL: 0.13 X10(3)/MCL (ref 0–0.2)
BASOPHILS NFR BLD MANUAL: 2 %
BILIRUBIN DIRECT+TOT PNL SERPL-MCNC: 0.7 MG/DL
BUN SERPL-MCNC: 12.9 MG/DL (ref 9.8–20.1)
CALCIUM SERPL-MCNC: 9 MG/DL (ref 8.4–10.2)
CHLORIDE SERPL-SCNC: 101 MMOL/L (ref 98–107)
CO2 SERPL-SCNC: 24 MMOL/L (ref 23–31)
CREAT SERPL-MCNC: 0.61 MG/DL (ref 0.55–1.02)
EOSINOPHIL NFR BLD MANUAL: 0.19 X10(3)/MCL (ref 0–0.9)
EOSINOPHIL NFR BLD MANUAL: 3 %
ERYTHROCYTE [DISTWIDTH] IN BLOOD BY AUTOMATED COUNT: 12.9 % (ref 11.5–17)
GFR SERPLBLD CREATININE-BSD FMLA CKD-EPI: >60 MLS/MIN/1.73/M2
GLOBULIN SER-MCNC: 2.9 GM/DL (ref 2.4–3.5)
GLUCOSE SERPL-MCNC: 109 MG/DL (ref 82–115)
HCT VFR BLD AUTO: 24.5 % (ref 37–47)
HGB BLD-MCNC: 7.8 GM/DL (ref 12–16)
IMM GRANULOCYTES # BLD AUTO: 0.04 X10(3)/MCL (ref 0–0.04)
IMM GRANULOCYTES NFR BLD AUTO: 0.6 %
INSTRUMENT WBC (OLG): 6.3 X10(3)/MCL
LYMPHOCYTES NFR BLD MANUAL: 1.76 X10(3)/MCL
LYMPHOCYTES NFR BLD MANUAL: 28 %
MACROCYTES BLD QL SMEAR: ABNORMAL
MAGNESIUM SERPL-MCNC: 2 MG/DL (ref 1.6–2.6)
MCH RBC QN AUTO: 35 PG (ref 27–31)
MCHC RBC AUTO-ENTMCNC: 31.8 MG/DL (ref 33–36)
MCV RBC AUTO: 109.9 FL (ref 80–94)
MONOCYTES NFR BLD MANUAL: 0.63 X10(3)/MCL (ref 0.1–1.3)
MONOCYTES NFR BLD MANUAL: 10 %
NEUTROPHILS NFR BLD MANUAL: 58 %
NRBC BLD AUTO-RTO: 0 %
PHOSPHATE SERPL-MCNC: 3.3 MG/DL (ref 2.3–4.7)
PLATELET # BLD AUTO: 266 X10(3)/MCL (ref 130–400)
PLATELET # BLD EST: NORMAL 10*3/UL
PMV BLD AUTO: 10.6 FL (ref 7.4–10.4)
POCT GLUCOSE: 125 MG/DL (ref 70–110)
POCT GLUCOSE: 139 MG/DL (ref 70–110)
POCT GLUCOSE: 97 MG/DL (ref 70–110)
POIKILOCYTOSIS BLD QL SMEAR: ABNORMAL
POTASSIUM SERPL-SCNC: 5 MMOL/L (ref 3.5–5.1)
PROT SERPL-MCNC: 5.8 GM/DL (ref 5.8–7.6)
RBC # BLD AUTO: 2.23 X10(6)/MCL (ref 4.2–5.4)
RBC MORPH BLD: ABNORMAL
SODIUM SERPL-SCNC: 136 MMOL/L (ref 136–145)
STOMATOCYTES (OLG): ABNORMAL
WBC # SPEC AUTO: 6.3 X10(3)/MCL (ref 4.5–11.5)

## 2022-10-31 PROCEDURE — 36415 COLL VENOUS BLD VENIPUNCTURE: CPT | Performed by: STUDENT IN AN ORGANIZED HEALTH CARE EDUCATION/TRAINING PROGRAM

## 2022-10-31 PROCEDURE — S4991 NICOTINE PATCH NONLEGEND: HCPCS | Performed by: PHYSICIAN ASSISTANT

## 2022-10-31 PROCEDURE — 63600175 PHARM REV CODE 636 W HCPCS: Performed by: PHYSICIAN ASSISTANT

## 2022-10-31 PROCEDURE — 11000001 HC ACUTE MED/SURG PRIVATE ROOM

## 2022-10-31 PROCEDURE — 85025 COMPLETE CBC W/AUTO DIFF WBC: CPT | Performed by: STUDENT IN AN ORGANIZED HEALTH CARE EDUCATION/TRAINING PROGRAM

## 2022-10-31 PROCEDURE — 25000003 PHARM REV CODE 250: Performed by: PHYSICIAN ASSISTANT

## 2022-10-31 PROCEDURE — 25000003 PHARM REV CODE 250: Performed by: NURSE PRACTITIONER

## 2022-10-31 PROCEDURE — 85027 COMPLETE CBC AUTOMATED: CPT | Performed by: STUDENT IN AN ORGANIZED HEALTH CARE EDUCATION/TRAINING PROGRAM

## 2022-10-31 PROCEDURE — 97110 THERAPEUTIC EXERCISES: CPT | Mod: CQ

## 2022-10-31 PROCEDURE — 97116 GAIT TRAINING THERAPY: CPT | Mod: CQ

## 2022-10-31 PROCEDURE — 25000003 PHARM REV CODE 250: Performed by: INTERNAL MEDICINE

## 2022-10-31 PROCEDURE — 25000003 PHARM REV CODE 250: Performed by: STUDENT IN AN ORGANIZED HEALTH CARE EDUCATION/TRAINING PROGRAM

## 2022-10-31 PROCEDURE — 80053 COMPREHEN METABOLIC PANEL: CPT | Performed by: STUDENT IN AN ORGANIZED HEALTH CARE EDUCATION/TRAINING PROGRAM

## 2022-10-31 PROCEDURE — 63600175 PHARM REV CODE 636 W HCPCS: Performed by: INTERNAL MEDICINE

## 2022-10-31 PROCEDURE — 84100 ASSAY OF PHOSPHORUS: CPT | Performed by: STUDENT IN AN ORGANIZED HEALTH CARE EDUCATION/TRAINING PROGRAM

## 2022-10-31 PROCEDURE — 83735 ASSAY OF MAGNESIUM: CPT | Performed by: STUDENT IN AN ORGANIZED HEALTH CARE EDUCATION/TRAINING PROGRAM

## 2022-10-31 RX ORDER — PANTOPRAZOLE SODIUM 40 MG/1
40 TABLET, DELAYED RELEASE ORAL DAILY
Status: DISCONTINUED | OUTPATIENT
Start: 2022-10-31 | End: 2022-11-01

## 2022-10-31 RX ADMIN — NICOTINE 1 PATCH: 14 PATCH, EXTENDED RELEASE TRANSDERMAL at 08:10

## 2022-10-31 RX ADMIN — FOLIC ACID 1 MG: 1 TABLET ORAL at 08:10

## 2022-10-31 RX ADMIN — AMLODIPINE BESYLATE 10 MG: 5 TABLET ORAL at 08:10

## 2022-10-31 RX ADMIN — BUSPIRONE HYDROCHLORIDE 10 MG: 5 TABLET ORAL at 02:10

## 2022-10-31 RX ADMIN — LABETALOL HYDROCHLORIDE 200 MG: 200 TABLET, FILM COATED ORAL at 08:10

## 2022-10-31 RX ADMIN — OXYCODONE 5 MG: 5 TABLET ORAL at 05:10

## 2022-10-31 RX ADMIN — OXYCODONE 5 MG: 5 TABLET ORAL at 08:10

## 2022-10-31 RX ADMIN — BUSPIRONE HYDROCHLORIDE 10 MG: 5 TABLET ORAL at 08:10

## 2022-10-31 RX ADMIN — THERA TABS 1 TABLET: TAB at 08:10

## 2022-10-31 RX ADMIN — ARIPIPRAZOLE 2 MG: 2 TABLET ORAL at 08:10

## 2022-10-31 RX ADMIN — OXYCODONE 5 MG: 5 TABLET ORAL at 10:10

## 2022-10-31 RX ADMIN — ATORVASTATIN CALCIUM 10 MG: 10 TABLET, FILM COATED ORAL at 08:10

## 2022-10-31 RX ADMIN — GABAPENTIN 300 MG: 300 CAPSULE ORAL at 02:10

## 2022-10-31 RX ADMIN — OXYCODONE 5 MG: 5 TABLET ORAL at 03:10

## 2022-10-31 RX ADMIN — Medication 100 MG: at 08:10

## 2022-10-31 RX ADMIN — ALUMINUM HYDROXIDE, MAGNESIUM HYDROXIDE, AND SIMETHICONE 15 ML: 200; 200; 20 SUSPENSION ORAL at 02:10

## 2022-10-31 RX ADMIN — PANTOPRAZOLE SODIUM 40 MG: 40 TABLET, DELAYED RELEASE ORAL at 04:10

## 2022-10-31 RX ADMIN — ESCITALOPRAM OXALATE 20 MG: 10 TABLET ORAL at 08:10

## 2022-10-31 RX ADMIN — ALUMINUM HYDROXIDE, MAGNESIUM HYDROXIDE, AND SIMETHICONE 15 ML: 200; 200; 20 SUSPENSION ORAL at 12:10

## 2022-10-31 RX ADMIN — GABAPENTIN 300 MG: 300 CAPSULE ORAL at 08:10

## 2022-10-31 RX ADMIN — ONDANSETRON 4 MG: 2 INJECTION INTRAMUSCULAR; INTRAVENOUS at 05:10

## 2022-10-31 RX ADMIN — ENOXAPARIN SODIUM 40 MG: 40 INJECTION SUBCUTANEOUS at 04:10

## 2022-10-31 RX ADMIN — OXYCODONE 5 MG: 5 TABLET ORAL at 12:10

## 2022-10-31 RX ADMIN — LIDOCAINE HYDROCHLORIDE 15 ML: 20 SOLUTION ORAL; TOPICAL at 12:10

## 2022-10-31 NOTE — PT/OT/SLP PROGRESS
Occupational Therapy      Patient Name:  Luz Maria Alfaro   MRN:  [id number]    Patient not seen today secondary to eating lunch when therapy was attempted . Will follow-up as schedule allows.    10/31/2022

## 2022-10-31 NOTE — PLAN OF CARE
10/31/22 0804   Discharge Reassessment   Assessment Type Discharge Planning Reassessment   Discharge Plan discussed with: Patient   Post-Acute Status   Post-Acute Authorization Placement   Post-Acute Placement Status Referrals Sent  (Ortho Rehab)

## 2022-10-31 NOTE — PROGRESS NOTES
[facility] Hospital Medicine Progress Note        Chief Complaint: Inpatient Follow-up for Right IT fracture     HPI:   Mrs Luz Maria Alfaro is a 62 y.o. lady with a past medical history of hypertension, hyperlipidemia, diabetes mellitus type 2, anxiety/depression, tobacco use, alcohol abuse. The patient presented to [facility] on 10/26/2022 with a primary complaint of right hip pain. Patient was drinking the night before night and fell out of bed.  She was unable to give further details but remembered she woke up on the floor calling for help sometime after midnight. She admits to drinking 4 beers daily. Labs notable for sodium 128, potassium 5.3, chloride 96, CO2 19, alkaline phosphatase 158, alcohol level 239. Chest x-ray without acute abnormalities. X-ray right hip with a minimally displaced intertrochanteric fracture for which she underwent intramedullary nailing. PT working with patient, plan for Rehab placement. CM consulted.    Interval Hx:   Patient stated that she was doing well today and had no new complaints. Eating well, sleeping well.    Objective/physical exam:  General: In no acute distress, afebrile  Chest: Clear to auscultation bilaterally  Heart: RRR, +S1, S2, no appreciable murmur  Abdomen: Soft, nontender, BS +  MSK: Warm, no lower extremity edema, no clubbing or cyanosis, right hip incision with bandage in place  Neurologic: Alert and oriented x4, Cranial nerve II-XII intact,    VITAL SIGNS: 24 HRS MIN & MAX LAST   Temp  Min: 98.1 °F (36.7 °C)  Max: 99.3 °F (37.4 °C) 98.1 °F (36.7 °C)   BP  Min: 104/56  Max: 132/69 (!) 104/56   Pulse  Min: 69  Max: 83  83   Resp  Min: 16  Max: 20 20   SpO2  Min: 90 %  Max: 97 % (!) 91 %         Recent Labs   Lab 10/28/22  0616 10/29/22  0528 10/30/22  0549   WBC 7.7 6.5 6.5   RBC 2.39* 2.20* 2.11*   HGB 8.2* 7.7* 7.5*   HCT 25.2* 24.0* 23.3*   .4* 109.1* 110.4*   MCH 34.3* 35.0* 35.5*   MCHC 32.5* 32.1* 32.2*   RDW 12.5 12.7  12.8    238 250   MPV 10.4 9.5 9.7         Recent Labs   Lab 10/26/22  0745 10/27/22  0524 10/28/22  0616 10/29/22  0549 10/30/22  0549   * 130* 131* 134* 135*   K 5.3* 4.1 3.8 3.9 4.2   CO2 19* 25 24 27 26   BUN 7.1* 9.0* 8.0* 7.4* 11.1   CREATININE 0.64 0.95 0.73 0.66 0.68   CALCIUM 9.4 9.4 8.9 8.7 8.9   ALBUMIN 3.8 3.5  --   --  2.8*   ALKPHOS 158* 142  --   --  103   ALT 17 15  --   --  11   AST 31 18  --   --  20   BILITOT 0.3 0.5  --   --  0.4       Scheduled Med:   amLODIPine  10 mg Oral Daily    ARIPiprazole  2 mg Oral Daily    atorvastatin  10 mg Oral Daily    busPIRone  10 mg Oral TID    enoxaparin  40 mg Subcutaneous Daily    EScitalopram oxalate  20 mg Oral Daily    folic acid  1 mg Oral Daily    gabapentin  300 mg Oral TID    labetaloL  200 mg Oral BID    multivitamin  1 tablet Oral Daily    nicotine  1 patch Transdermal Daily    ondansetron  4 mg Intravenous Once    sodium zirconium cyclosilicate  10 g Oral Once    thiamine  100 mg Oral Daily         PRN Meds:  acetaminophen, acetaminophen, chlordiazepoxide, cloNIDine, dextrose 50%, dextrose 50%, diphenhydrAMINE, glucagon (human recombinant), glucose, glucose, hydrALAZINE, hydrALAZINE, insulin aspart U-100, labetalol, ondansetron, oxyCODONE, sodium chloride 0.9%       Assessment/Plan:  Right Intertrochanteric Hip Fracture s/p Intramedullary Nailing  Essential Hypertension  Hyperlipidemia   Diabetes Mellitus type 2   Anxiety/Depression  Tobacco abuse  Macrocytic Anemia likely 2/2 EtOH Use    - Continues to be admitted for close monitoring   - Librium 10 mg Q 4 p.r.n. for CIWA>6  - OT/PT on-board; recommending Rehab Placement  - On amlodipine 10 mg, labetalol 200 mg b.i.d.   - On atorvastatin 10 mg   - On ISS  - Continue pain control with oxycodone seems that she is tolerating  - Psych consulted; follow recommendations    VTE prophylaxis: Lovenox 40 mg qd    Patient condition:  Fair    Anticipated discharge and Disposition:     Pending  further PT and pain control, plan for dc to rehab    All diagnosis and differential diagnosis have been reviewed; assessment and plan has been documented; I have personally reviewed the labs and test results that are presently available; I have reviewed the patients medication list; I have reviewed the consulting providers response and recommendations. I have reviewed or attempted to review medical records based upon their availability    All of the patient's questions have been  addressed and answered. Patient's is agreeable to the above stated plan. I will continue to monitor closely and make adjustments to medical management as needed.  _____________________________________________________________________    Nutrition Status: Diabetic    Radiology:  CV Ultrasound doppler venous legs bilat  · There is no evidence of a right lower extremity DVT.  · There is no evidence of a left lower extremity DVT.     Bilateral lower extremity (-) DVT.      Jamie Evans MD   10/30/2022

## 2022-10-31 NOTE — PSYCH EVALUATION
"Subjective: "I been thinking about death a lot."    Patient is a 62 y.o. female seen for evaluation. Onset of symptoms was abrupt starting 2 weeks ago with gradually worsening course since that time. Patient's depressive symptoms include anhedonia, depressed mood, hopelessness, and recent changes in medical status/fall . Previous treatment modalities employed include medication. Depression risk factors include negative life event physical health status change and previous episode of depression. Recent fx to right hip, reports waking up on the floor in her room between her foot board and the wall. Unsure of how she got there during the night but is certain she started off in her bed. Lives with 2 other people but 1 of them wasn't in the home. Her son's room was across the home so it took her a while of banging on the walls loud enough to get his attention to call for her some help. Decreased appetite at home, reports sleep adequate before coming to the hospital. Reports she has been having multiple falls at home over the past several months. Has been increasingly saddened with the realization she is unable to  her car or leave her home much because of her health. Relies on son and grand-daughter to help transport her to where she needs to go. Has had 3 major surgeries in the past 3 years. Has been bed-ridden for the past year. 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 Currently disabled. , with 1 adult son. Currently seeing [facility] for psych med management.       Past Psychiatric History:   Past Psych medications: Lexapro, Buspar, Zoloft  Currently in treatment with [facility]  Education: high school diploma/GED    Substance Abuse History:  Recreational drugs:  denies  Use of Alcohol: denied  Use of Caffeine: denies use  Use of OTC: n/a    Patient Active Problem List    Diagnosis Date Noted    Closed right hip fracture 10/26/2022     Past Medical History:   Diagnosis " Date    Benign essential HTN     Mixed hyperlipidemia     Type 2 diabetes mellitus without complications       Past Surgical History:   Procedure Laterality Date    INTRAMEDULLARY RODDING OF FEMUR Right 10/27/2022    Procedure: RIGHT FEMUR INTERTROCH IMN;  Surgeon: Stewart Brambila MD;  Location: [facility];  Service: Orthopedics;  Laterality: Right;  High View table, supine  3rd case  Synthes Short TFNA      Medications Prior to Admission   Medication Sig Dispense Refill Last Dose    atorvastatin (LIPITOR) 10 MG tablet Take 10 mg by mouth once daily.   Past Week    busPIRone (BUSPAR) 5 MG Tab Take 5 mg by mouth 2 (two) times daily.   Past Week    EScitalopram oxalate (LEXAPRO) 20 MG tablet Take 20 mg by mouth once daily.   Past Week    folic acid (FOLVITE) 1 MG tablet Take 1 mg by mouth once daily.   Past Week    gabapentin (NEURONTIN) 300 MG capsule Take 300 mg by mouth 3 (three) times daily.   Past Week    labetaloL (NORMODYNE) 200 MG tablet Take 200 mg by mouth 2 (two) times daily.   Past Week    dicyclomine (BENTYL) 20 mg tablet Take 20 mg by mouth every 6 (six) hours.   Unknown     Review of patient's allergies indicates:   Allergen Reactions    Meperidine Rash      Social History     Tobacco Use    Smoking status: Every Day     Types: Cigarettes    Smokeless tobacco: Never   Substance Use Topics    Alcohol use: Yes        Psychiatric Review Of Systems:  sleep: no  appetite changes: yes  weight changes: no  energy/anergy: yes  interest/pleasure/anhedonia: yes  somatic symptoms: yes  libido: no  anxiety/panic: yes  guilty/hopeless: yes  S.I.B.s/risky behavior: no  any drugs: no  alcohol: no       Objective:  Vital signs:  Temp:  [98.1 °F (36.7 °C)-99.3 °F (37.4 °C)] 99.2 °F (37.3 °C)  Pulse:  [69-83] 74  Resp:  [16-20] 18  SpO2:  [90 %-97 %] 96 %  BP: (104-133)/(56-69) 119/69      Mental Status Evaluation:  Appearance:  unremarkable, age appropriate, lying in bed   Behavior:  friendly and cooperative   Speech:  soft    Mood:  sad   Affect:  sad   Thought Process:  circumstantial   Thought Content:  normal, no suicidality, no homicidality, delusions, or paranoia   Sensorium:  grossly intact   Cognition:  grossly intact   Insight:  fair   Judgment:  fair     Assessment/Plan:  Axis I: Anxiety Disorder NOS, Depressive Disorder NOS, and See current hospital problem list  Axis II: Deferred  Axis III:   Past Medical History:   Diagnosis Date    Benign essential HTN     Mixed hyperlipidemia     Type 2 diabetes mellitus without complications      Axis IV: economic problems, occupational problems, other psychosocial or environmental problems, problems with access to health care services, and problems with primary support group  Axis V: 0 Inadequate information        Recommendations:   1.) cont. Lexapro 20mg po QDAY (home med), start Abilify  2.) f/u SWLA after DC  3.) psych sign-off, re-consult as needed

## 2022-11-01 LAB
HCT VFR BLD AUTO: 25.1 % (ref 37–47)
HGB BLD-MCNC: 7.9 GM/DL (ref 12–16)
POCT GLUCOSE: 109 MG/DL (ref 70–110)
POCT GLUCOSE: 110 MG/DL (ref 70–110)
POCT GLUCOSE: 118 MG/DL (ref 70–110)
POCT GLUCOSE: 139 MG/DL (ref 70–110)

## 2022-11-01 PROCEDURE — 25000003 PHARM REV CODE 250: Performed by: PHYSICIAN ASSISTANT

## 2022-11-01 PROCEDURE — 25000003 PHARM REV CODE 250: Performed by: NURSE PRACTITIONER

## 2022-11-01 PROCEDURE — 25000003 PHARM REV CODE 250: Performed by: INTERNAL MEDICINE

## 2022-11-01 PROCEDURE — S4991 NICOTINE PATCH NONLEGEND: HCPCS | Performed by: PHYSICIAN ASSISTANT

## 2022-11-01 PROCEDURE — 85014 HEMATOCRIT: CPT | Performed by: INTERNAL MEDICINE

## 2022-11-01 PROCEDURE — 97116 GAIT TRAINING THERAPY: CPT | Mod: CQ

## 2022-11-01 PROCEDURE — 36415 COLL VENOUS BLD VENIPUNCTURE: CPT | Performed by: INTERNAL MEDICINE

## 2022-11-01 PROCEDURE — 85018 HEMOGLOBIN: CPT | Performed by: INTERNAL MEDICINE

## 2022-11-01 PROCEDURE — 25000003 PHARM REV CODE 250: Performed by: STUDENT IN AN ORGANIZED HEALTH CARE EDUCATION/TRAINING PROGRAM

## 2022-11-01 PROCEDURE — 97110 THERAPEUTIC EXERCISES: CPT | Mod: CQ

## 2022-11-01 PROCEDURE — 63600175 PHARM REV CODE 636 W HCPCS: Performed by: INTERNAL MEDICINE

## 2022-11-01 PROCEDURE — 11000001 HC ACUTE MED/SURG PRIVATE ROOM

## 2022-11-01 PROCEDURE — 97530 THERAPEUTIC ACTIVITIES: CPT

## 2022-11-01 RX ORDER — POLYETHYLENE GLYCOL 3350 17 G/17G
17 POWDER, FOR SOLUTION ORAL ONCE
Status: COMPLETED | OUTPATIENT
Start: 2022-11-01 | End: 2022-11-01

## 2022-11-01 RX ORDER — FAMOTIDINE 20 MG/1
20 TABLET, FILM COATED ORAL 2 TIMES DAILY
Status: DISCONTINUED | OUTPATIENT
Start: 2022-11-01 | End: 2022-11-11 | Stop reason: HOSPADM

## 2022-11-01 RX ADMIN — OXYCODONE 5 MG: 5 TABLET ORAL at 01:11

## 2022-11-01 RX ADMIN — OXYCODONE 5 MG: 5 TABLET ORAL at 12:11

## 2022-11-01 RX ADMIN — NICOTINE 1 PATCH: 14 PATCH, EXTENDED RELEASE TRANSDERMAL at 09:11

## 2022-11-01 RX ADMIN — POLYETHYLENE GLYCOL 3350 17 G: 17 POWDER, FOR SOLUTION ORAL at 03:11

## 2022-11-01 RX ADMIN — BUSPIRONE HYDROCHLORIDE 10 MG: 5 TABLET ORAL at 08:11

## 2022-11-01 RX ADMIN — ATORVASTATIN CALCIUM 10 MG: 10 TABLET, FILM COATED ORAL at 09:11

## 2022-11-01 RX ADMIN — AMLODIPINE BESYLATE 10 MG: 5 TABLET ORAL at 09:11

## 2022-11-01 RX ADMIN — ARIPIPRAZOLE 2 MG: 2 TABLET ORAL at 09:11

## 2022-11-01 RX ADMIN — GABAPENTIN 300 MG: 300 CAPSULE ORAL at 03:11

## 2022-11-01 RX ADMIN — LABETALOL HYDROCHLORIDE 200 MG: 200 TABLET, FILM COATED ORAL at 08:11

## 2022-11-01 RX ADMIN — OXYCODONE 5 MG: 5 TABLET ORAL at 09:11

## 2022-11-01 RX ADMIN — ENOXAPARIN SODIUM 40 MG: 40 INJECTION SUBCUTANEOUS at 05:11

## 2022-11-01 RX ADMIN — LABETALOL HYDROCHLORIDE 200 MG: 200 TABLET, FILM COATED ORAL at 09:11

## 2022-11-01 RX ADMIN — ESCITALOPRAM OXALATE 20 MG: 10 TABLET ORAL at 09:11

## 2022-11-01 RX ADMIN — THERA TABS 1 TABLET: TAB at 09:11

## 2022-11-01 RX ADMIN — GABAPENTIN 300 MG: 300 CAPSULE ORAL at 08:11

## 2022-11-01 RX ADMIN — GABAPENTIN 300 MG: 300 CAPSULE ORAL at 09:11

## 2022-11-01 RX ADMIN — PANTOPRAZOLE SODIUM 40 MG: 40 TABLET, DELAYED RELEASE ORAL at 09:11

## 2022-11-01 RX ADMIN — FAMOTIDINE 20 MG: 20 TABLET, FILM COATED ORAL at 08:11

## 2022-11-01 RX ADMIN — BUSPIRONE HYDROCHLORIDE 10 MG: 5 TABLET ORAL at 05:11

## 2022-11-01 RX ADMIN — Medication 100 MG: at 09:11

## 2022-11-01 RX ADMIN — BUSPIRONE HYDROCHLORIDE 10 MG: 5 TABLET ORAL at 09:11

## 2022-11-01 RX ADMIN — OXYCODONE 5 MG: 5 TABLET ORAL at 04:11

## 2022-11-01 RX ADMIN — OXYCODONE 5 MG: 5 TABLET ORAL at 05:11

## 2022-11-01 RX ADMIN — FOLIC ACID 1 MG: 1 TABLET ORAL at 09:11

## 2022-11-01 NOTE — PT/OT/SLP PROGRESS
Occupational Therapy   Treatment    Name: Luz Maria Alfaro  MRN: [id number]  Admitting Diagnosis:  Closed right hip fracture  5 Days Post-Op    Recommendations:     Discharge Recommendations: rehabilitation facility  Discharge Equipment Recommendations:     Barriers to discharge:       Assessment:     Luz Maria Alfaro is a 62 y.o. female with a medical diagnosis of Closed right hip fracture.  She presents with the following performance deficits affecting function are weakness, impaired endurance, impaired self care skills, impaired functional mobility, gait instability, impaired balance.     Todays session limited 2/2 increased pain.     Rehab Prognosis:  Good; patient would benefit from acute skilled OT services to address these deficits and reach maximum level of function.       Plan:     Patient to be seen 5 x/week, 6 x/week to address the above listed problems via self-care/home management, therapeutic activities, therapeutic exercises  Plan of Care Expires: 11/11/22  Plan of Care Reviewed with: patient    Subjective     Pain/Comfort:  Pain Rating 1: 8/10  Location - Side 1: Right  Location 1: leg  Pain Addressed 2: Reposition, Cessation of Activity    Objective:     Communicated with: Patient found HOB elevated with PureWick upon OT entry to room.    General Precautions: Standard, fall   Orthopedic Precautions:RLE weight bearing as tolerated   Braces:    Respiratory Status: Nasal cannula, flow 2 L/min     Occupational Performance:     Bed Mobility:    Patient completed Supine to Sit with minimum assistance  Patient completed Sit to Supine with minimum assistance     Functional Mobility/Transfers:  Patient completed Bed <> Chair Transfer using Step Transfer technique with minimum assistance with rolling walker  Functional Mobility: increased time to t/f to chair with RW 2/2 pain         AMPAC 6 Click ADL:      Treatment & Education:  --    Patient left up in chair with all lines intact and call button in  reach    GOALS:   Multidisciplinary Problems       Occupational Therapy Goals          Problem: Occupational Therapy    Goal Priority Disciplines Outcome Interventions   Occupational Therapy Goal     OT, PT/OT Ongoing, Progressing    Description: Goals to be met by: 11/11/22    Patient will increase functional independence with ADLs by performing:    LE Dressing with Modified [place].  Grooming while standing or seated with Modified [place].  Toileting from toilet with Modified [place] for hygiene and clothing management.   Toilet transfer to toilet with Modified [place].                         Time Tracking:     OT Date of Treatment:    OT Start Time: 1000  OT Stop Time: 1020  OT Total Time (min): 20 min    Billable Minutes:Therapeutic Activity 20min    OT/KAREN: OT     KAREN Visit Number: 2    11/1/2022

## 2022-11-01 NOTE — PROGRESS NOTES
[facility] Hospital Medicine Progress Note        Chief Complaint: Inpatient Follow-up for Right IT fracture     HPI:   Mrs Luz Maria Alfaro is a 62 y.o. lady with a past medical history of hypertension, hyperlipidemia, diabetes mellitus type 2, anxiety/depression, tobacco use, alcohol abuse. The patient presented to [facility] on 10/26/2022 with a primary complaint of right hip pain. Patient was drinking the night before night and fell out of bed.  She was unable to give further details but remembered she woke up on the floor calling for help sometime after midnight. She admits to drinking 4 beers daily. Labs notable for sodium 128, potassium 5.3, chloride 96, CO2 19, alkaline phosphatase 158, alcohol level 239. Chest x-ray without acute abnormalities. X-ray right hip with a minimally displaced intertrochanteric fracture for which she underwent intramedullary nailing. PT working with patient, plan for Rehab placement. CM consulted.    Interval Hx:   10/30/22 Patient stated that she was doing well today and had no new complaints. Eating well, sleeping well.    10/31/22 voices no complains / pain controlled/wound looks good/ awaiting  rehab placement      Objective/physical exam:  General: In no acute distress, afebrile  Chest: Clear to auscultation bilaterally  Heart: RRR, +S1, S2, no appreciable murmur  Abdomen: Soft, nontender, BS +  MSK: Warm, no lower extremity edema, no clubbing or cyanosis, right hip incision with bandage in place  Neurologic: Alert and oriented x4, Cranial nerve II-XII intact,    VITAL SIGNS: 24 HRS MIN & MAX LAST   Temp  Min: 98.1 °F (36.7 °C)  Max: 99.4 °F (37.4 °C) 99.4 °F (37.4 °C)   BP  Min: 104/56  Max: 133/66 118/70   Pulse  Min: 74  Max: 83  76   Resp  Min: 18  Max: 20 18   SpO2  Min: 90 %  Max: 96 % (!) 93 %         Recent Labs   Lab 10/29/22  0528 10/30/22  0549 10/31/22  0349   WBC 6.5 6.5 6.3   RBC 2.20* 2.11* 2.23*   HGB 7.7* 7.5* 7.8*   HCT 24.0* 23.3*  24.5*   .1* 110.4* 109.9*   MCH 35.0* 35.5* 35.0*   MCHC 32.1* 32.2* 31.8*   RDW 12.7 12.8 12.9    250 266   MPV 9.5 9.7 10.6*         Recent Labs   Lab 10/27/22  0524 10/28/22  0616 10/29/22  0549 10/30/22  0549 10/31/22  0349   *   < > 134* 135* 136   K 4.1   < > 3.9 4.2 5.0   CO2 25   < > 27 26 24   BUN 9.0*   < > 7.4* 11.1 12.9   CREATININE 0.95   < > 0.66 0.68 0.61   CALCIUM 9.4   < > 8.7 8.9 9.0   MG  --   --   --   --  2.00   ALBUMIN 3.5  --   --  2.8* 2.9*   ALKPHOS 142  --   --  103 116   ALT 15  --   --  11 20   AST 18  --   --  20 43*   BILITOT 0.5  --   --  0.4 0.7    < > = values in this interval not displayed.       Scheduled Med:   amLODIPine  10 mg Oral Daily    ARIPiprazole  2 mg Oral Daily    atorvastatin  10 mg Oral Daily    busPIRone  10 mg Oral TID    enoxaparin  40 mg Subcutaneous Daily    EScitalopram oxalate  20 mg Oral Daily    folic acid  1 mg Oral Daily    gabapentin  300 mg Oral TID    labetaloL  200 mg Oral BID    multivitamin  1 tablet Oral Daily    nicotine  1 patch Transdermal Daily    ondansetron  4 mg Intravenous Once    pantoprazole  40 mg Oral Daily    sodium zirconium cyclosilicate  10 g Oral Once    thiamine  100 mg Oral Daily         PRN Meds:  acetaminophen, acetaminophen, aluminum-magnesium hydroxide-simethicone **AND** [COMPLETED] LIDOcaine HCl 2%, chlordiazepoxide, cloNIDine, dextrose 50%, dextrose 50%, diphenhydrAMINE, glucagon (human recombinant), glucose, glucose, hydrALAZINE, hydrALAZINE, insulin aspart U-100, labetalol, ondansetron, oxyCODONE, sodium chloride 0.9%       Assessment/Plan:  Right Intertrochanteric Hip Fracture s/p Intramedullary Nailing  Essential Hypertension  Hyperlipidemia   Diabetes Mellitus type 2   Anxiety/Depression  Tobacco abuse  Macrocytic Anemia likely 2/2 EtOH Use    - Continues to be admitted for close monitoring   - Librium 10 mg Q 4 p.r.n. for CIWA>6  - OT/PT on-board; recommending Rehab Placement  - On amlodipine 10 mg,  labetalol 200 mg b.i.d.   - On atorvastatin 10 mg   - On ISS  - Continue pain control with oxycodone seems that she is tolerating  - Psych consulted; follow recommendations    VTE prophylaxis: Lovenox 40 mg qd    Patient condition:  Fair    Anticipated discharge and Disposition:     Pending further PT and pain control, plan for dc to rehab    All diagnosis and differential diagnosis have been reviewed; assessment and plan has been documented; I have personally reviewed the labs and test results that are presently available; I have reviewed the patients medication list; I have reviewed the consulting providers response and recommendations. I have reviewed or attempted to review medical records based upon their availability    All of the patient's questions have been  addressed and answered. Patient's is agreeable to the above stated plan. I will continue to monitor closely and make adjustments to medical management as needed.  _____________________________________________________________________    Nutrition Status: Diabetic    Radiology:  CV Ultrasound doppler venous legs bilat  · There is no evidence of a right lower extremity DVT.  · There is no evidence of a left lower extremity DVT.     Bilateral lower extremity (-) DVT.      Gavino Alonzo MD   10/31/2022

## 2022-11-01 NOTE — PT/OT/SLP PROGRESS
Physical Therapy         Treatment        Luz Maria Alfaro   MRN: [id number]     PT Received On: 22  PT Start Time: 1020     PT Stop Time: 1043    PT Total Time (min): 23 min       Billable Minutes:  Gait [id number] and Therapeutic Exercise 13  Total Minutes: 23    Treatment Type: Treatment  PT/PTA: PTA     PTA Visit Number: 3       General Precautions: Standard,    Orthopedic Precautions: Orthopedic Precautions : RLE weight bearing as tolerated (RLE Full ROM)   Braces:      Spiritual, Cultural Beliefs, Methodist Practices, Values that Affect Care: no    Objective:  Patient found in chair upon entry.    Balance:     Transitional Sit-to-stand: Min A with RW    Gait Training: Min A with RW   Pt amb 8ft, 10ft and 8ft with step through pattern. VC's to increase weight through RW to alleviate wb through RLE to decreased pain.       Additional Treatment:    Therapeutic Exercises    RLE: heel slides, SAQ, hip abd/add 2 x 10 reps in semi-supine    Activity Tolerance:  Patient tolerated treatment well    Patient left up in chair with call button in reach.    Assessment:  Luz Maria Alfaro is a 62 y.o. female with a medical diagnosis of Closed right hip fracture.   .    Rehab potential is excellent.    Activity tolerance: Excellent    Discharge recommendations: Discharge Facility/Level of Care Needs: rehabilitation facility     Equipment recommendations:       GOALS:   Multidisciplinary Problems       Physical Therapy Goals          Problem: Physical Therapy    Goal Priority Disciplines Outcome Goal Variances Interventions   Physical Therapy Goal     PT, PT/OT      Description: Goals to be met by: 2022     Patient will increase functional independence with mobility by performin. Supine to sit with Modified [place]  2. Sit to supine with Modified [place]  3. Sit to stand transfer with Modified [place]  4. Gait  x 200 feet with Modified [place] using Rolling Walker.                           PLAN:    Patient to be seen daily  to address the above listed problems via gait training, therapeutic activities, therapeutic exercises  Plan of Care expires: 11/26/22  Plan of Care reviewed with: patient         11/1/2022

## 2022-11-01 NOTE — PROGRESS NOTES
[facility] Hospital Medicine Progress Note        Chief Complaint: Inpatient Follow-up for Right IT fracture     HPI:   Mrs Luz Maria Alfaro is a 62 y.o. lady with a past medical history of hypertension, hyperlipidemia, diabetes mellitus type 2, anxiety/depression, tobacco use, alcohol abuse. The patient presented to [facility] on 10/26/2022 with a primary complaint of right hip pain. Patient was drinking the night before night and fell out of bed.  She was unable to give further details but remembered she woke up on the floor calling for help sometime after midnight. She admits to drinking 4 beers daily. Labs notable for sodium 128, potassium 5.3, chloride 96, CO2 19, alkaline phosphatase 158, alcohol level 239. Chest x-ray without acute abnormalities. X-ray right hip with a minimally displaced intertrochanteric fracture for which she underwent intramedullary nailing. PT working with patient, plan for Rehab placement. CM consulted, awaiting insurance authorization.    Interval Hx:   Patient stated that she was doing well today and had no new complaints. Adding Famotidine for heartburn and Miralax for constipation.    Objective/physical exam:  General: In no acute distress, afebrile  Chest: Clear to auscultation bilaterally  Heart: RRR, +S1, S2, no appreciable murmur  Abdomen: Soft, nontender, BS +  MSK: Warm, no lower extremity edema, no clubbing or cyanosis, right hip incision with bandage in place  Neurologic: Alert and oriented x4, Cranial nerve II-XII intact,    VITAL SIGNS: 24 HRS MIN & MAX LAST   Temp  Min: 98.1 °F (36.7 °C)  Max: 99.4 °F (37.4 °C) 98.1 °F (36.7 °C)   BP  Min: 111/65  Max: 143/73 (!) 143/73   Pulse  Min: 72  Max: 76  74   Resp  Min: 16  Max: 18 16   SpO2  Min: 93 %  Max: 98 % 98 %         Recent Labs   Lab 10/29/22  0528 10/30/22  0549 10/31/22  0349 11/01/22  0343   WBC 6.5 6.5 6.3  --    RBC 2.20* 2.11* 2.23*  --    HGB 7.7* 7.5* 7.8* 7.9*   HCT 24.0* 23.3* 24.5*  25.1*   .1* 110.4* 109.9*  --    MCH 35.0* 35.5* 35.0*  --    MCHC 32.1* 32.2* 31.8*  --    RDW 12.7 12.8 12.9  --     250 266  --    MPV 9.5 9.7 10.6*  --          Recent Labs   Lab 10/27/22  0524 10/28/22  0616 10/29/22  0549 10/30/22  0549 10/31/22  0349   *   < > 134* 135* 136   K 4.1   < > 3.9 4.2 5.0   CO2 25   < > 27 26 24   BUN 9.0*   < > 7.4* 11.1 12.9   CREATININE 0.95   < > 0.66 0.68 0.61   CALCIUM 9.4   < > 8.7 8.9 9.0   MG  --   --   --   --  2.00   ALBUMIN 3.5  --   --  2.8* 2.9*   ALKPHOS 142  --   --  103 116   ALT 15  --   --  11 20   AST 18  --   --  20 43*   BILITOT 0.5  --   --  0.4 0.7    < > = values in this interval not displayed.       Scheduled Med:   amLODIPine  10 mg Oral Daily    ARIPiprazole  2 mg Oral Daily    atorvastatin  10 mg Oral Daily    busPIRone  10 mg Oral TID    enoxaparin  40 mg Subcutaneous Daily    EScitalopram oxalate  20 mg Oral Daily    folic acid  1 mg Oral Daily    gabapentin  300 mg Oral TID    labetaloL  200 mg Oral BID    multivitamin  1 tablet Oral Daily    nicotine  1 patch Transdermal Daily    ondansetron  4 mg Intravenous Once    pantoprazole  40 mg Oral Daily    sodium zirconium cyclosilicate  10 g Oral Once    thiamine  100 mg Oral Daily         PRN Meds:  acetaminophen, acetaminophen, aluminum-magnesium hydroxide-simethicone **AND** [COMPLETED] LIDOcaine HCl 2%, chlordiazepoxide, cloNIDine, dextrose 50%, dextrose 50%, diphenhydrAMINE, glucagon (human recombinant), glucose, glucose, hydrALAZINE, hydrALAZINE, insulin aspart U-100, labetalol, ondansetron, oxyCODONE, sodium chloride 0.9%       Assessment/Plan:  Right Intertrochanteric Hip Fracture s/p Intramedullary Nailing  Essential Hypertension  Hyperlipidemia   Diabetes Mellitus type 2   Anxiety/Depression  Tobacco abuse  Macrocytic Anemia likely 2/2 EtOH Use    - Continues to be admitted for close monitoring   - Librium 10 mg Q 4 p.r.n. for CIWA > 6  - OT/PT on-board; recommending Rehab  Placement  - CM on-board; awaiting insurance approval for Rehab Placement  - On amlodipine 10 mg, labetalol 200 mg b.i.d.   - On atorvastatin 10 mg   - On Aripiprazole 2 mg, Buspirone 5 mg, Escitalopram 20 mg  - On Gabapentin 300 mg TID  - On Thiamine 100 mg BID  - On ISS  - Continue pain control with oxycodone seems that she is tolerating  - Psych consulted; follow recommendations    VTE prophylaxis: Lovenox 40 mg qd    Patient condition:  Fair    Anticipated discharge and Disposition:     Pending further PT and pain control, plan for dc to rehab    All diagnosis and differential diagnosis have been reviewed; assessment and plan has been documented; I have personally reviewed the labs and test results that are presently available; I have reviewed the patients medication list; I have reviewed the consulting providers response and recommendations. I have reviewed or attempted to review medical records based upon their availability    All of the patient's questions have been  addressed and answered. Patient's is agreeable to the above stated plan. I will continue to monitor closely and make adjustments to medical management as needed.  _____________________________________________________________________    Nutrition Status: Diabetic    Radiology:  CV Ultrasound doppler venous legs bilat  · There is no evidence of a right lower extremity DVT.  · There is no evidence of a left lower extremity DVT.     Bilateral lower extremity (-) DVT.      Jamie Evans MD   11/01/2022

## 2022-11-01 NOTE — PROGRESS NOTES
"Inpatient Nutrition Evaluation    Admit Date: 10/26/2022   Total duration of encounter: 6 days    Nutrition Recommendation/Prescription     Continue diabetic diet as tolerated  RD to monitor po intake and weight changes    Nutrition Assessment     Chart Review    Reason Seen: length of stay    Diagnosis:  Right Intertrochanteric Hip Fracture s/p Intramedullary Nailing  Essential Hypertension  Macrocytic Anemia likely 2/2 EtOH Use      Relevant Medical History: HTN, HLD, DM 2, anxiety/depression, tobaccos use, alcohol use    Nutrition-Related Medications: folic acid daily, insulin PRN, MVI daily, Zofran PRN, Protonix daily, thiamine daily    Nutrition-Related Labs: 11/1: H/H-7.9/25.1      Diet Order: Diet diabetic  Oral Supplement Order: none  Appetite/Oral Intake: good/% of meals  Factors Affecting Nutritional Intake: none identified  Food/Anglican/Cultural Preferences: none reported  Food Allergies: none reported    Skin Integrity: incision  Wound(s):       Comments    11/1: pt reports good appetite, with no n/v/d/c, stating nausea and diarrhea improved. UBW reported 119 lb with weight loss over 1 year. Weight prior weight loss was 170 lb. Admit weight of 54.4 kg (120 lb) on 10/26 and previous weight of 54 kg (119 lb) on 8/28. Weight stable at this time.     Anthropometrics    Height: 5' 3" (160 cm) Height Method: Stated  Last Weight: 54.4 kg (120 lb) (10/26/22 0720) Weight Method: Bed Scale  BMI (Calculated): 21.3  BMI Classification: normal (BMI 18.5-24.9)     Ideal Body Weight (IBW), Female: 115 lb     % Ideal Body Weight, Female (lb): 104.35 %                             Usual Weight Provided By: patient and EMR weight history    Wt Readings from Last 5 Encounters:   10/26/22 54.4 kg (120 lb)   08/28/22 54 kg (119 lb)   10/10/19 77.3 kg (170 lb 6.7 oz)     Weight Change(s) Since Admission:  Admit Weight: 54.4 kg (120 lb) (10/26/22 0720)      Patient Education    Not applicable.    Monitoring & Evaluation "     Dietitian will monitor food and beverage intake and weight change.  Nutrition Risk/Follow-Up: low (follow-up in 5-7 days)  Patients assigned 'low nutrition risk' status do not qualify for a full nutritional assessment but will be monitored and re-evaluated in a 5-7 day time period. Please consult if re-evaluation needed sooner.    Lelo Perea, Registration Eligible, Provisional LDN

## 2022-11-01 NOTE — PHYSICIAN QUERY
PT Name: Luz Maria Alfaro  MR #: [id number]    DOCUMENTATION CLARIFICATION      CDS/: TAMIA Erickson, RN               Contact information:[email or web address]    This form is a permanent document in the medical record.      Query Date: November 1, 2022    By submitting this query, we are merely seeking further clarification of documentation. Please utilize your independent clinical judgment when addressing the question(s) below.    The Medical Record contains the following:   Indicators  Supporting Clinical Findings Location in Medical Record    x Anemia documented Post-op anemia-continue to monitor        Macrocytic Anemia likely 2/2 EtOH Use Hospital Medicine Progress Notes File Time: 10/29/2022 1:04 PM    Hospital Medicine Progress  Notes: File Time: 10/30/2022 11:13 PM      x H&H  12.8 & 37.7 on 10/26/2022     11.8 & 35.9 on 10/27/2022     8.2 & 25.2 on 10/28/2022     7.7 & 24.0 on 10/29/2022     7.3 & 23.3 on 10/30/2022     7.8 & 24.5 on 10/31/2022     7.9 & 25.1 on 11/1/2022   Lab Results 10/26 thru 11/1/2022    x BP                    HR BP: (104-238)/()                  Pulse:  []     BP: ()/(56-82)                      Pulse:  []     BP: (108-131)/(66-75)                    Pulse:  [80-88]  Orthopedic Surgery Progress Notes 10/27, 10/28, 10/29    GI bleeding documented      Acute bleeding (Non GI site)      Transfusion(s)      x Acute/Chronic illness Minimally displaced right intertrochanteric hip fracture,  Hyponatremia/hypochloremia, Hyperkalemia,   Essential hypertension, Hyperlipidemia, Diabetes mellitus, Anxiety/Depression, Alcohol intoxication, Tobacco abuse, Post-op anemia,   Hospital Medicine Progress Notes File Time: 10/29/2022 1:04 PM    x Treatments Multivitamin Tablet  Dose: 1 tablet: Oral: Daily    Post-op anemia-continue to monitor        - Continues to be admitted for close monitoring    MAR 10/26 thru 11/1/2022    Hospital Medicine Progress Notes File Time:  10/29/2022 1:04 PM    Hospital Medicine Progress  Notes: File Time: 10/30/2022 11:13 PM      x Other PREOPERATIVE DIAGNOSIS:  Right displaced intertrochanteric femur fracture.    PROCEDURE PERFORMED:  Intramedullary nailing of right intertrochanteric femur   fracture.    ESTIMATED BLOOD LOSS:  100 cc.    H&H down a bit as expected post op. Will defer to primary team on transfusion needs.    Orthopedic Surgery Op Note 10/27/2022                Orthopedic Surgery Progress Notes 10/29/2022     Provider, please specify diagnosis or diagnoses associated with above clinical findings.   [ X ] Acute blood loss anemia expected post-operatively    [   ] Anemia, unspecified    [   ] Other Hematological Diagnosis (please specify): _________________   [   ] Clinically Undetermined              Please document in your progress notes daily for the duration of treatment, until resolved, and include in your discharge summary.    Form No. [id number]

## 2022-11-02 LAB
ANION GAP SERPL CALC-SCNC: 9 MEQ/L
BUN SERPL-MCNC: 11.9 MG/DL (ref 9.8–20.1)
CALCIUM SERPL-MCNC: 9.5 MG/DL (ref 8.4–10.2)
CHLORIDE SERPL-SCNC: 101 MMOL/L (ref 98–107)
CO2 SERPL-SCNC: 27 MMOL/L (ref 23–31)
CREAT SERPL-MCNC: 0.77 MG/DL (ref 0.55–1.02)
CREAT/UREA NIT SERPL: 15
GFR SERPLBLD CREATININE-BSD FMLA CKD-EPI: >60 MLS/MIN/1.73/M2
GLUCOSE SERPL-MCNC: 104 MG/DL (ref 82–115)
MAGNESIUM SERPL-MCNC: 2 MG/DL (ref 1.6–2.6)
PHOSPHATE SERPL-MCNC: 4.2 MG/DL (ref 2.3–4.7)
POCT GLUCOSE: 112 MG/DL (ref 70–110)
POCT GLUCOSE: 128 MG/DL (ref 70–110)
POCT GLUCOSE: 132 MG/DL (ref 70–110)
POTASSIUM SERPL-SCNC: 4.7 MMOL/L (ref 3.5–5.1)
SODIUM SERPL-SCNC: 137 MMOL/L (ref 136–145)

## 2022-11-02 PROCEDURE — 11000001 HC ACUTE MED/SURG PRIVATE ROOM

## 2022-11-02 PROCEDURE — 36415 COLL VENOUS BLD VENIPUNCTURE: CPT | Performed by: STUDENT IN AN ORGANIZED HEALTH CARE EDUCATION/TRAINING PROGRAM

## 2022-11-02 PROCEDURE — 97116 GAIT TRAINING THERAPY: CPT | Mod: CQ

## 2022-11-02 PROCEDURE — 25000003 PHARM REV CODE 250: Performed by: INTERNAL MEDICINE

## 2022-11-02 PROCEDURE — 63600175 PHARM REV CODE 636 W HCPCS: Performed by: INTERNAL MEDICINE

## 2022-11-02 PROCEDURE — 97530 THERAPEUTIC ACTIVITIES: CPT

## 2022-11-02 PROCEDURE — 83735 ASSAY OF MAGNESIUM: CPT | Performed by: STUDENT IN AN ORGANIZED HEALTH CARE EDUCATION/TRAINING PROGRAM

## 2022-11-02 PROCEDURE — 84100 ASSAY OF PHOSPHORUS: CPT | Performed by: STUDENT IN AN ORGANIZED HEALTH CARE EDUCATION/TRAINING PROGRAM

## 2022-11-02 PROCEDURE — 25000003 PHARM REV CODE 250: Performed by: NURSE PRACTITIONER

## 2022-11-02 PROCEDURE — 97110 THERAPEUTIC EXERCISES: CPT | Mod: CQ

## 2022-11-02 PROCEDURE — S4991 NICOTINE PATCH NONLEGEND: HCPCS | Performed by: PHYSICIAN ASSISTANT

## 2022-11-02 PROCEDURE — 25000003 PHARM REV CODE 250: Performed by: STUDENT IN AN ORGANIZED HEALTH CARE EDUCATION/TRAINING PROGRAM

## 2022-11-02 PROCEDURE — 80048 BASIC METABOLIC PNL TOTAL CA: CPT | Performed by: STUDENT IN AN ORGANIZED HEALTH CARE EDUCATION/TRAINING PROGRAM

## 2022-11-02 PROCEDURE — 25000003 PHARM REV CODE 250: Performed by: PHYSICIAN ASSISTANT

## 2022-11-02 RX ORDER — CYCLOBENZAPRINE HCL 5 MG
5 TABLET ORAL 3 TIMES DAILY PRN
Status: DISCONTINUED | OUTPATIENT
Start: 2022-11-02 | End: 2022-11-11 | Stop reason: HOSPADM

## 2022-11-02 RX ADMIN — OXYCODONE 5 MG: 5 TABLET ORAL at 05:11

## 2022-11-02 RX ADMIN — OXYCODONE 5 MG: 5 TABLET ORAL at 03:11

## 2022-11-02 RX ADMIN — GABAPENTIN 300 MG: 300 CAPSULE ORAL at 03:11

## 2022-11-02 RX ADMIN — OXYCODONE 5 MG: 5 TABLET ORAL at 01:11

## 2022-11-02 RX ADMIN — NICOTINE 1 PATCH: 14 PATCH, EXTENDED RELEASE TRANSDERMAL at 08:11

## 2022-11-02 RX ADMIN — AMLODIPINE BESYLATE 10 MG: 5 TABLET ORAL at 08:11

## 2022-11-02 RX ADMIN — BUSPIRONE HYDROCHLORIDE 10 MG: 5 TABLET ORAL at 08:11

## 2022-11-02 RX ADMIN — CYCLOBENZAPRINE HYDROCHLORIDE 5 MG: 5 TABLET, FILM COATED ORAL at 11:11

## 2022-11-02 RX ADMIN — GABAPENTIN 300 MG: 300 CAPSULE ORAL at 08:11

## 2022-11-02 RX ADMIN — ENOXAPARIN SODIUM 40 MG: 40 INJECTION SUBCUTANEOUS at 04:11

## 2022-11-02 RX ADMIN — LABETALOL HYDROCHLORIDE 200 MG: 200 TABLET, FILM COATED ORAL at 08:11

## 2022-11-02 RX ADMIN — FAMOTIDINE 20 MG: 20 TABLET, FILM COATED ORAL at 08:11

## 2022-11-02 RX ADMIN — ESCITALOPRAM OXALATE 20 MG: 10 TABLET ORAL at 08:11

## 2022-11-02 RX ADMIN — BUSPIRONE HYDROCHLORIDE 10 MG: 5 TABLET ORAL at 03:11

## 2022-11-02 RX ADMIN — OXYCODONE 5 MG: 5 TABLET ORAL at 09:11

## 2022-11-02 RX ADMIN — OXYCODONE 5 MG: 5 TABLET ORAL at 07:11

## 2022-11-02 RX ADMIN — ATORVASTATIN CALCIUM 10 MG: 10 TABLET, FILM COATED ORAL at 08:11

## 2022-11-02 RX ADMIN — ARIPIPRAZOLE 2 MG: 2 TABLET ORAL at 08:11

## 2022-11-02 RX ADMIN — FOLIC ACID 1 MG: 1 TABLET ORAL at 08:11

## 2022-11-02 NOTE — PROGRESS NOTES
[facility] Hospital Medicine Progress Note        Chief Complaint: Inpatient Follow-up for Right IT fracture     HPI:   Mrs Luz Maria Alfaro is a 62 year old lady with PMH of hypertension, hyperlipidemia, diabetes mellitus type 2, anxiety/depression, tobacco use, alcohol abuse. The patient presented to [facility] on 10/26/2022 with a primary complaint of right hip pain. Patient was drinking the night before night and fell out of bed.  She was unable to give further details but remembered she woke up on the floor calling for help sometime after midnight. She admits to drinking 4 beers daily. Labs notable for sodium 128, potassium 5.3, chloride 96, CO2 19, alkaline phosphatase 158, alcohol level 239. Chest x-ray without acute abnormalities. X-ray right hip with a minimally displaced intertrochanteric fracture for which she underwent intramedullary nailing. PT working with patient, plan for placement into Rehab vs SNF. CM received rejection from insurance for Rehab placement. Will apply for SNF placement. Added Famotidine for heartburn.    Interval Hx:   Patient stated that she was doing well today and had no new complaints. Informed of rehab rejection from insurance and plan to apply for SNF.     Objective/physical exam:  General: in no acute distress, afebrile  Chest: Clear to auscultation bilaterally  Heart: RRR, +S1, S2, no appreciable murmur  Abdomen: Soft, nontender, slightly distended abdomen; BS +  MSK: warm, no lower extremity edema, no clubbing or cyanosis, right hip incision with bandage in place  Neurologic: alert and oriented x4, Cranial nerve II-XII intact,    VITAL SIGNS: 24 HRS MIN & MAX LAST   Temp  Min: 98 °F (36.7 °C)  Max: 98.6 °F (37 °C) 98.4 °F (36.9 °C)   BP  Min: 107/66  Max: 118/63 107/66   Pulse  Min: 65  Max: 72  65   Resp  Min: 16  Max: 20 18   SpO2  Min: 90 %  Max: 98 % 98 %         Recent Labs   Lab 10/29/22  0528 10/30/22  0549 10/31/22  0349 11/01/22  034    WBC 6.5 6.5 6.3  --    RBC 2.20* 2.11* 2.23*  --    HGB 7.7* 7.5* 7.8* 7.9*   HCT 24.0* 23.3* 24.5* 25.1*   .1* 110.4* 109.9*  --    MCH 35.0* 35.5* 35.0*  --    MCHC 32.1* 32.2* 31.8*  --    RDW 12.7 12.8 12.9  --     250 266  --    MPV 9.5 9.7 10.6*  --          Recent Labs   Lab 10/27/22  0524 10/28/22  0616 10/30/22  0549 10/31/22  0349 11/02/22  0446   *   < > 135* 136 137   K 4.1   < > 4.2 5.0 4.7   CO2 25   < > 26 24 27   BUN 9.0*   < > 11.1 12.9 11.9   CREATININE 0.95   < > 0.68 0.61 0.77   CALCIUM 9.4   < > 8.9 9.0 9.5   MG  --   --   --  2.00 2.00   ALBUMIN 3.5  --  2.8* 2.9*  --    ALKPHOS 142  --  103 116  --    ALT 15  --  11 20  --    AST 18  --  20 43*  --    BILITOT 0.5  --  0.4 0.7  --     < > = values in this interval not displayed.       Scheduled Med:   amLODIPine  10 mg Oral Daily    ARIPiprazole  2 mg Oral Daily    atorvastatin  10 mg Oral Daily    busPIRone  10 mg Oral TID    enoxaparin  40 mg Subcutaneous Daily    EScitalopram oxalate  20 mg Oral Daily    famotidine  20 mg Oral BID    folic acid  1 mg Oral Daily    gabapentin  300 mg Oral TID    labetaloL  200 mg Oral BID    multivitamin  1 tablet Oral Daily    nicotine  1 patch Transdermal Daily    ondansetron  4 mg Intravenous Once    sodium zirconium cyclosilicate  10 g Oral Once    thiamine  100 mg Oral Daily         PRN Meds:  acetaminophen, acetaminophen, aluminum-magnesium hydroxide-simethicone **AND** [COMPLETED] LIDOcaine HCl 2%, chlordiazepoxide, cloNIDine, cyclobenzaprine, dextrose 50%, dextrose 50%, diphenhydrAMINE, glucagon (human recombinant), glucose, glucose, hydrALAZINE, hydrALAZINE, insulin aspart U-100, labetalol, ondansetron, oxyCODONE, sodium chloride 0.9%       Assessment/Plan:  Right Intertrochanteric Hip Fracture s/p Intramedullary Nailing  Essential Hypertension  Hyperlipidemia   Diabetes Mellitus type 2   Anxiety/Depression  Tobacco abuse  Macrocytic Anemia likely 2/2 EtOH Use    - Continues to  be admitted for close monitoring   - Librium 10 mg q4hrs PRN for CIWA > 6  - OT/PT on-board; recommending Rehab Placement  - CM on-board; recevied insurance rejection for Rehab Placement; will apply for SNF placement  - On Amlodipine 10 mg, Labetalol 200 mg b.i.d.   - On Atorvastatin 10 mg   - On Aripiprazole 2 mg, Buspirone 5 mg, Escitalopram 20 mg  - On Gabapentin 300 mg TID  - On Thiamine 100 mg BID  - On ISS  - Continue pain control with oxycodone seems that she is tolerating  - Psych consulted; follow recommendations    VTE prophylaxis: Lovenox 40 mg qd    Patient condition:  Fair    Anticipated discharge and Disposition:     Pending further PT and pain control, plan for dc to rehab    All diagnosis and differential diagnosis have been reviewed; assessment and plan has been documented; I have personally reviewed the labs and test results that are presently available; I have reviewed the patients medication list; I have reviewed the consulting providers response and recommendations. I have reviewed or attempted to review medical records based upon their availability    All of the patient's questions have been  addressed and answered. Patient's is agreeable to the above stated plan. I will continue to monitor closely and make adjustments to medical management as needed.  _____________________________________________________________________    Nutrition Status: Diabetic    Radiology:  CV Ultrasound doppler venous legs bilat  · There is no evidence of a right lower extremity DVT.  · There is no evidence of a left lower extremity DVT.     Bilateral lower extremity (-) DVT.      Jamie Evans MD   11/02/2022

## 2022-11-02 NOTE — PT/OT/SLP PROGRESS
Physical Therapy         Treatment        Luz Maria Alfaro   MRN: [id number]     PT Received On: 22  PT Start Time: 902     PT Stop Time: 928    PT Total Time (min): 26 min       Billable Minutes:  Gait [id number] and Therapeutic Exercise 10  Total Minutes: 26    Treatment Type: Treatment  PT/PTA: PTA     PTA Visit Number: 4       General Precautions: Standard,    Orthopedic Precautions: Orthopedic Precautions : RLE weight bearing as tolerated (RLE Full ROM)   Braces:      Spiritual, Cultural Beliefs, Latter day Practices, Values that Affect Care: no    Objective:  Patient found in bed upon entry.    Functional Mobility:  Bed Mobility:   Supine to sit: Minimal Assistance   Sit to supine: Activity did not occur   Rolling: Activity did not occur   Scooting: Minimal Assistance    Balance:     Transitional sit-to-stand: Min a with RW    Gait Training: Min A with RW   Pt amb 18ft with step through gait pattern. Pt presents with decreased Nnamdi step length and decreased wb on RLE during stance phase.     Additional Treatment:    Therapeutic Exercises    RLE: heel slides, SAQ, hip abd/add 3 x 12 reps    Activity Tolerance:  Patient tolerated treatment well    Patient left up in chair with call button in reach.    Assessment:  Luz Maria Alfaro is a 62 y.o. female with a medical diagnosis of Closed right hip fracture.     Rehab potential is excellent.    Activity tolerance: Excellent    Discharge recommendations: Discharge Facility/Level of Care Needs: rehabilitation facility     Equipment recommendations:       GOALS:   Multidisciplinary Problems       Physical Therapy Goals          Problem: Physical Therapy    Goal Priority Disciplines Outcome Goal Variances Interventions   Physical Therapy Goal     PT, PT/OT      Description: Goals to be met by: 2022     Patient will increase functional independence with mobility by performin. Supine to sit with Modified [place]  2. Sit to supine with Modified  [place]  3. Sit to stand transfer with Modified [place]  4. Gait  x 200 feet with Modified [place] using Rolling Walker.                          PLAN:    Patient to be seen daily  to address the above listed problems via gait training, therapeutic activities, therapeutic exercises  Plan of Care expires: 11/26/22  Plan of Care reviewed with: patient         11/2/2022

## 2022-11-02 NOTE — PT/OT/SLP PROGRESS
Occupational Therapy   Treatment    Name: Luz Maria Alfaro  MRN: [id number]  Admitting Diagnosis:  Closed right hip fracture  6 Days Post-Op    Recommendations:     Discharge Recommendations: rehabilitation facility  Discharge Equipment Recommendations:     Barriers to discharge:       Assessment:     Luz Maria Alfaro is a 62 y.o. female with a medical diagnosis of Closed right hip fracture.  She presents with the following performance deficits affecting function are weakness, impaired endurance, impaired self care skills, impaired functional mobility, gait instability, impaired balance, pain.     Rehab Prognosis:  Fair; patient would benefit from acute skilled OT services to address these deficits and reach maximum level of function.       Plan:     Patient to be seen 5 x/week to address the above listed problems via self-care/home management, therapeutic activities, therapeutic exercises  Plan of Care Expires: 11/11/22  Plan of Care Reviewed with: patient    Subjective     Pain/Comfort:  Pain Rating 1: 10/10  Location - Side 1: Right  Pain Addressed 1: Nurse notified, Cessation of Activity    Objective:     Communicated with: nrsg prior to session.  Patient found up in chair with PureWick upon OT entry to room.    General Precautions: Standard, fall   Orthopedic Precautions:RLE weight bearing as tolerated   Braces:    Respiratory Status: Room air     Occupational Performance:     Bed Mobility:    Patient completed Sit to Supine with minimum assistance     Functional Mobility/Transfers:  Patient completed Chair <> Mat Step Transfer technique with minimum assistance with rolling walker  Functional Mobility: min A t/f with RW; increased time to t/f 2/2 increased pain     Activities of Daily Living:  Unable to practice ADLs 2/2 increased pain and spasms today 10/10       [facility] 6 Click ADL:      Treatment & Education:  --    Patient left HOB elevated with all lines intact and call button in reach    GOALS:    Multidisciplinary Problems       Occupational Therapy Goals          Problem: Occupational Therapy    Goal Priority Disciplines Outcome Interventions   Occupational Therapy Goal     OT, PT/OT Ongoing, Progressing    Description: Goals to be met by: 11/11/22    Patient will increase functional independence with ADLs by performing:    LE Dressing with Modified [place].  Grooming while standing or seated with Modified [place].  Toileting from toilet with Modified [place] for hygiene and clothing management.   Toilet transfer to toilet with Modified [place].                         Time Tracking:     OT Date of Treatment:    OT Start Time: 1017  OT Stop Time: 1028  OT Total Time (min): 11 min    Billable Minutes:Therapeutic Activity 11min    OT/KAREN: OT     KAREN Visit Number: 3    11/2/2022

## 2022-11-03 LAB
POCT GLUCOSE: 117 MG/DL (ref 70–110)
POCT GLUCOSE: 121 MG/DL (ref 70–110)
POCT GLUCOSE: 96 MG/DL (ref 70–110)

## 2022-11-03 PROCEDURE — 97116 GAIT TRAINING THERAPY: CPT | Mod: CQ

## 2022-11-03 PROCEDURE — 11000001 HC ACUTE MED/SURG PRIVATE ROOM

## 2022-11-03 PROCEDURE — S4991 NICOTINE PATCH NONLEGEND: HCPCS | Performed by: PHYSICIAN ASSISTANT

## 2022-11-03 PROCEDURE — 63600175 PHARM REV CODE 636 W HCPCS: Performed by: INTERNAL MEDICINE

## 2022-11-03 PROCEDURE — 25000003 PHARM REV CODE 250: Performed by: NURSE PRACTITIONER

## 2022-11-03 PROCEDURE — 97110 THERAPEUTIC EXERCISES: CPT | Mod: CQ

## 2022-11-03 PROCEDURE — 97535 SELF CARE MNGMENT TRAINING: CPT

## 2022-11-03 PROCEDURE — 25000003 PHARM REV CODE 250: Performed by: INTERNAL MEDICINE

## 2022-11-03 PROCEDURE — 25000003 PHARM REV CODE 250: Performed by: STUDENT IN AN ORGANIZED HEALTH CARE EDUCATION/TRAINING PROGRAM

## 2022-11-03 PROCEDURE — 25000003 PHARM REV CODE 250: Performed by: PHYSICIAN ASSISTANT

## 2022-11-03 RX ADMIN — LABETALOL HYDROCHLORIDE 200 MG: 200 TABLET, FILM COATED ORAL at 08:11

## 2022-11-03 RX ADMIN — AMLODIPINE BESYLATE 10 MG: 5 TABLET ORAL at 08:11

## 2022-11-03 RX ADMIN — BUSPIRONE HYDROCHLORIDE 10 MG: 5 TABLET ORAL at 08:11

## 2022-11-03 RX ADMIN — CYCLOBENZAPRINE HYDROCHLORIDE 5 MG: 5 TABLET, FILM COATED ORAL at 04:11

## 2022-11-03 RX ADMIN — THERA TABS 1 TABLET: TAB at 08:11

## 2022-11-03 RX ADMIN — GABAPENTIN 300 MG: 300 CAPSULE ORAL at 01:11

## 2022-11-03 RX ADMIN — Medication 100 MG: at 08:11

## 2022-11-03 RX ADMIN — ATORVASTATIN CALCIUM 10 MG: 10 TABLET, FILM COATED ORAL at 08:11

## 2022-11-03 RX ADMIN — GABAPENTIN 300 MG: 300 CAPSULE ORAL at 08:11

## 2022-11-03 RX ADMIN — ARIPIPRAZOLE 2 MG: 2 TABLET ORAL at 08:11

## 2022-11-03 RX ADMIN — NICOTINE 1 PATCH: 14 PATCH, EXTENDED RELEASE TRANSDERMAL at 08:11

## 2022-11-03 RX ADMIN — FAMOTIDINE 20 MG: 20 TABLET, FILM COATED ORAL at 08:11

## 2022-11-03 RX ADMIN — OXYCODONE 5 MG: 5 TABLET ORAL at 09:11

## 2022-11-03 RX ADMIN — OXYCODONE 5 MG: 5 TABLET ORAL at 05:11

## 2022-11-03 RX ADMIN — OXYCODONE 5 MG: 5 TABLET ORAL at 08:11

## 2022-11-03 RX ADMIN — OXYCODONE 5 MG: 5 TABLET ORAL at 01:11

## 2022-11-03 RX ADMIN — OXYCODONE 5 MG: 5 TABLET ORAL at 04:11

## 2022-11-03 RX ADMIN — ENOXAPARIN SODIUM 40 MG: 40 INJECTION SUBCUTANEOUS at 05:11

## 2022-11-03 RX ADMIN — FOLIC ACID 1 MG: 1 TABLET ORAL at 08:11

## 2022-11-03 RX ADMIN — ESCITALOPRAM OXALATE 20 MG: 10 TABLET ORAL at 08:11

## 2022-11-03 RX ADMIN — BUSPIRONE HYDROCHLORIDE 10 MG: 5 TABLET ORAL at 01:11

## 2022-11-03 NOTE — PT/OT/SLP PROGRESS
Physical Therapy         Treatment        Luz Maria Alfaro   MRN: [id number]     PT Received On: 22  PT Start Time: 939     PT Stop Time: 1003    PT Total Time (min): 24 min       Billable Minutes:  Gait [id number] and Therapeutic Exercise 14  Total Minutes: 24    Treatment Type: Treatment  PT/PTA: PTA     PTA Visit Number: 5       General Precautions: Standard,    Orthopedic Precautions: Orthopedic Precautions : RLE weight bearing as tolerated (RLE Full ROM)   Braces:      Spiritual, Cultural Beliefs, Hindu Practices, Values that Affect Care: no    Objective:  Patient found in chair upon entry.    Balance:     Transitional Sit-to-stand: Min A with RW    Gait Training: Min A with RW   Pt amb 26ft with step to gait pattern. Pt presents with decreased stance time on RLE due to increase in pain with wb.     Additional Treatment:    Therapeutic Exercises    RLE: heel slides, SAQ, hip abd/add 2 X 12 reps in semi supine    Activity Tolerance:  Patient tolerated treatment well    Patient left HOB elevated with call button in reach.    Assessment:  Luz Maria Alfaro is a 62 y.o. female with a medical diagnosis of Closed right hip fracture. .    Rehab potential is excellent.    Activity tolerance: Excellent    Discharge recommendations: Discharge Facility/Level of Care Needs: nursing facility, skilled     Equipment recommendations:       GOALS:   Multidisciplinary Problems       Physical Therapy Goals          Problem: Physical Therapy    Goal Priority Disciplines Outcome Goal Variances Interventions   Physical Therapy Goal     PT, PT/OT      Description: Goals to be met by: 2022     Patient will increase functional independence with mobility by performin. Supine to sit with Modified [place]  2. Sit to supine with Modified [place]  3. Sit to stand transfer with Modified [place]  4. Gait  x 200 feet with Modified [place] using Rolling Walker.                          PLAN:    Patient  to be seen daily  to address the above listed problems via gait training, therapeutic activities, therapeutic exercises  Plan of Care expires: 11/26/22  Plan of Care reviewed with: patient         11/3/2022

## 2022-11-03 NOTE — PLAN OF CARE
11/03/22 0805   Discharge Reassessment   Assessment Type Discharge Planning Reassessment   Post-Acute Status   Post-Acute Authorization Placement   Post-Acute Placement Status Referrals Sent  (TCU)

## 2022-11-03 NOTE — PLAN OF CARE
Problem: Adult Inpatient Plan of Care  Goal: Plan of Care Review  Outcome: Ongoing, Progressing  Goal: Patient-Specific Goal (Individualized)  Outcome: Ongoing, Progressing  Goal: Absence of Hospital-Acquired Illness or Injury  Outcome: Ongoing, Progressing     Problem: Skin Injury Risk Increased  Goal: Skin Health and Integrity  Outcome: Ongoing, Progressing     Problem: Fall Injury Risk  Goal: Absence of Fall and Fall-Related Injury  Outcome: Ongoing, Progressing     Problem: Pain Acute  Goal: Acceptable Pain Control and Functional Ability  Outcome: Ongoing, Progressing

## 2022-11-03 NOTE — PLAN OF CARE
[place] completed. Letter of Consideration received and requested information faxed to OB and Gabriela at [facility] regarding pt's level 2 142. Referral sent to pt's facilities of choice. Awaiting response at this time.

## 2022-11-03 NOTE — PT/OT/SLP PROGRESS
Occupational Therapy   Treatment    Name: Luz Maria Alfaro  MRN: [id number]  Admitting Diagnosis:  Closed right hip fracture  7 Days Post-Op    Recommendations:     Discharge Recommendations: nursing facility, skilled  Discharge Equipment Recommendations:     Barriers to discharge:       Assessment:     Luz Maria Alfaro is a 62 y.o. female with a medical diagnosis of Closed right hip fracture.  She presents with the following performance deficits affecting function are weakness, impaired endurance, impaired self care skills, impaired functional mobility, gait instability, impaired balance, pain.     Rehab Prognosis:  Good; patient would benefit from acute skilled OT services to address these deficits and reach maximum level of function.       Plan:     Patient to be seen 5 x/week, 6 x/week to address the above listed problems via self-care/home management, therapeutic activities, therapeutic exercises  Plan of Care Expires: 11/11/22  Plan of Care Reviewed with: patient    Subjective     Pain/Comfort:  Pain Rating 1: 8/10  Location - Side 1: Right  Pain Addressed 1: Pre-medicate for activity, Distraction    Objective:     Communicated with: nrsg prior to session.  Patient found HOB elevated with bed alarm upon OT entry to room.    General Precautions: Standard, fall   Orthopedic Precautions:RLE weight bearing as tolerated   Braces:    Respiratory Status: Room air     Occupational Performance:     Bed Mobility:    Patient completed Supine to Sit with minimum assistance     Functional Mobility/Transfers:  Patient completed Bed <> Chair Transfer using Step Transfer technique with minimum assistance with rolling walker  Functional Mobility: min A t/f to chair with RW; pt c/o increased pain and spasms requiring increased time to get to chair     Activities of Daily Living:  Lower Body Dressing: minimum assistance using hip kit, pt required min A for threading RLE into brief ; SBA for don/doff BLE socks with VC       [facility]  6 Click ADL:      Treatment & Education:  --    Patient left up in chair with all lines intact, call button in reach, and chair alarm on    GOALS:   Multidisciplinary Problems       Occupational Therapy Goals          Problem: Occupational Therapy    Goal Priority Disciplines Outcome Interventions   Occupational Therapy Goal     OT, PT/OT Ongoing, Progressing    Description: Goals to be met by: 11/11/22    Patient will increase functional independence with ADLs by performing:    LE Dressing with Modified [place].  Grooming while standing or seated with Modified [place].  Toileting from toilet with Modified [place] for hygiene and clothing management.   Toilet transfer to toilet with Modified [place].                         Time Tracking:     OT Date of Treatment:    OT Start Time: 0818  OT Stop Time: 0850  OT Total Time (min): 32 min    Billable Minutes:Self Care/Home Management 32min    OT/KAREN: OT     KAREN Visit Number: 4    11/3/2022

## 2022-11-03 NOTE — PROGRESS NOTES
[facility] Hospital Medicine Progress Note        Chief Complaint: Inpatient Follow-up for Right IT fracture     HPI:   Mrs Luz Maria Alfaro is a 62 year old lady with PMH of hypertension, hyperlipidemia, diabetes mellitus type 2, anxiety/depression, tobacco use, alcohol abuse. The patient presented to [facility] on 10/26/2022 with a primary complaint of right hip pain. Patient was drinking the night before night and fell out of bed.  She was unable to give further details but remembered she woke up on the floor calling for help sometime after midnight. She admits to drinking 4 beers daily. Labs notable for sodium 128, potassium 5.3, chloride 96, CO2 19, alkaline phosphatase 158, alcohol level 239. Chest x-ray without acute abnormalities. X-ray right hip with a minimally displaced intertrochanteric fracture for which she underwent intramedullary nailing. PT working with patient, plan for placement into Rehab vs SNF. CM received rejection from insurance for Rehab placement. Working on placement. She was making a good clinical recovery.     Interval Hx:   Patient awake and comfortable. Feels much better. Ambulating with PT. Pain well controlled. Spoke about alcohol cessation.     Objective/physical exam:  General: In no acute distress, afebrile  Chest: Clear to auscultation bilaterally  Heart: RRR, +S1, S2, no appreciable murmur  Abdomen: Soft, nontender, BS +  MSK: Warm, no lower extremity edema, no clubbing or cyanosis  Neurologic: Alert and oriented x4, Cranial nerve II-XII intact, Strength 5/5 in all 4 extremities    VITAL SIGNS: 24 HRS MIN & MAX LAST   Temp  Min: 98.3 °F (36.8 °C)  Max: 99.5 °F (37.5 °C) 98.3 °F (36.8 °C)   BP  Min: 102/57  Max: 125/74 (!) 106/59     Pulse  Min: 67  Max: 75  67   Resp  Min: 16  Max: 20 16   SpO2  Min: 89 %  Max: 93 % (!) 92 %         Recent Labs   Lab 10/29/22  0528 10/30/22  0549 10/31/22  0349 11/01/22  0343   WBC 6.5 6.5 6.3  --    RBC 2.20* 2.11*  2.23*  --    HGB 7.7* 7.5* 7.8* 7.9*   HCT 24.0* 23.3* 24.5* 25.1*   .1* 110.4* 109.9*  --    MCH 35.0* 35.5* 35.0*  --    MCHC 32.1* 32.2* 31.8*  --    RDW 12.7 12.8 12.9  --     250 266  --    MPV 9.5 9.7 10.6*  --        Recent Labs   Lab 10/30/22  0549 10/31/22  0349 11/02/22  0446   * 136 137   K 4.2 5.0 4.7   CO2 26 24 27   BUN 11.1 12.9 11.9   CREATININE 0.68 0.61 0.77   CALCIUM 8.9 9.0 9.5   MG  --  2.00 2.00   ALBUMIN 2.8* 2.9*  --    ALKPHOS 103 116  --    ALT 11 20  --    AST 20 43*  --    BILITOT 0.4 0.7  --           Microbiology Results (last 7 days)       ** No results found for the last 168 hours. **             See below for Radiology    Scheduled Med:   amLODIPine  10 mg Oral Daily    ARIPiprazole  2 mg Oral Daily    atorvastatin  10 mg Oral Daily    busPIRone  10 mg Oral TID    enoxaparin  40 mg Subcutaneous Daily    EScitalopram oxalate  20 mg Oral Daily    famotidine  20 mg Oral BID    folic acid  1 mg Oral Daily    gabapentin  300 mg Oral TID    labetaloL  200 mg Oral BID    multivitamin  1 tablet Oral Daily    nicotine  1 patch Transdermal Daily    ondansetron  4 mg Intravenous Once    sodium zirconium cyclosilicate  10 g Oral Once    thiamine  100 mg Oral Daily        Continuous Infusions:       PRN Meds:  acetaminophen, acetaminophen, aluminum-magnesium hydroxide-simethicone **AND** [COMPLETED] LIDOcaine HCl 2%, chlordiazepoxide, cloNIDine, cyclobenzaprine, dextrose 50%, dextrose 50%, diphenhydrAMINE, glucagon (human recombinant), glucose, glucose, hydrALAZINE, hydrALAZINE, insulin aspart U-100, labetalol, ondansetron, oxyCODONE, sodium chloride 0.9%       Assessment/Plan:  Essential Hypertension  Hyperlipidemia   Diabetes Mellitus type 2   Anxiety/Depression  Tobacco abuse  Macrocytic Anemia likely 2/2 EtOH Use    Plan:  Patient has no new issues  Advised to continue moving with PT  No signs of withdrawal   Cont sliding scale   Cont current tx plan    Continue strict  aspiration, fall and decubitus precautions      VTE prophylaxis: Lovenox    Patient condition:  Fair    Anticipated discharge and Disposition:   Rehab when accepted       All diagnosis and differential diagnosis have been reviewed; assessment and plan has been documented; I have personally reviewed the labs and test results that are presently available; I have reviewed the patients medication list; I have reviewed the consulting providers response and recommendations. I have reviewed or attempted to review medical records based upon their availability    All of the patient's questions have been  addressed and answered. Patient's is agreeable to the above stated plan. I will continue to monitor closely and make adjustments to medical management as needed.  _____________________________________________________________________    Nutrition Status:    Radiology:  X-Ray Chest 1 View  Narrative: EXAMINATION:  XR CHEST 1 VIEW    CLINICAL HISTORY:  hypoxemia;    TECHNIQUE:  One view    COMPARISON:  October 26, 2022.    FINDINGS:  Cardiopericardial silhouette is within normal limits. Lungs are without dense focal or segmental consolidation, congestive process, pleural effusions or pneumothorax.  Operative changes lower thoracic spine with bilateral rods.  Impression: NO ACUTE CARDIOPULMONARY PROCESS IDENTIFIED.    Electronically signed by: John Allen  Date:    11/02/2022  Time:    17:01      Robin Metcalf MD   11/03/2022

## 2022-11-04 LAB
POCT GLUCOSE: 118 MG/DL (ref 70–110)
POCT GLUCOSE: 119 MG/DL (ref 70–110)
POCT GLUCOSE: 138 MG/DL (ref 70–110)

## 2022-11-04 PROCEDURE — 94799 UNLISTED PULMONARY SVC/PX: CPT

## 2022-11-04 PROCEDURE — 63600175 PHARM REV CODE 636 W HCPCS: Performed by: INTERNAL MEDICINE

## 2022-11-04 PROCEDURE — S4991 NICOTINE PATCH NONLEGEND: HCPCS | Performed by: PHYSICIAN ASSISTANT

## 2022-11-04 PROCEDURE — 25000003 PHARM REV CODE 250: Performed by: PHYSICIAN ASSISTANT

## 2022-11-04 PROCEDURE — 25000003 PHARM REV CODE 250: Performed by: INTERNAL MEDICINE

## 2022-11-04 PROCEDURE — 94761 N-INVAS EAR/PLS OXIMETRY MLT: CPT

## 2022-11-04 PROCEDURE — 25000003 PHARM REV CODE 250: Performed by: NURSE PRACTITIONER

## 2022-11-04 PROCEDURE — 25000003 PHARM REV CODE 250: Performed by: STUDENT IN AN ORGANIZED HEALTH CARE EDUCATION/TRAINING PROGRAM

## 2022-11-04 PROCEDURE — 97535 SELF CARE MNGMENT TRAINING: CPT

## 2022-11-04 PROCEDURE — 97164 PT RE-EVAL EST PLAN CARE: CPT

## 2022-11-04 PROCEDURE — 11000001 HC ACUTE MED/SURG PRIVATE ROOM

## 2022-11-04 PROCEDURE — 97168 OT RE-EVAL EST PLAN CARE: CPT

## 2022-11-04 RX ORDER — POLYETHYLENE GLYCOL 3350 17 G/17G
17 POWDER, FOR SOLUTION ORAL DAILY
Status: DISCONTINUED | OUTPATIENT
Start: 2022-11-04 | End: 2022-11-11 | Stop reason: HOSPADM

## 2022-11-04 RX ADMIN — OXYCODONE 5 MG: 5 TABLET ORAL at 06:11

## 2022-11-04 RX ADMIN — ARIPIPRAZOLE 2 MG: 2 TABLET ORAL at 08:11

## 2022-11-04 RX ADMIN — OXYCODONE 5 MG: 5 TABLET ORAL at 02:11

## 2022-11-04 RX ADMIN — FAMOTIDINE 20 MG: 20 TABLET, FILM COATED ORAL at 09:11

## 2022-11-04 RX ADMIN — ENOXAPARIN SODIUM 40 MG: 40 INJECTION SUBCUTANEOUS at 04:11

## 2022-11-04 RX ADMIN — AMLODIPINE BESYLATE 10 MG: 5 TABLET ORAL at 08:11

## 2022-11-04 RX ADMIN — GABAPENTIN 300 MG: 300 CAPSULE ORAL at 02:11

## 2022-11-04 RX ADMIN — THERA TABS 1 TABLET: TAB at 08:11

## 2022-11-04 RX ADMIN — CYCLOBENZAPRINE HYDROCHLORIDE 5 MG: 5 TABLET, FILM COATED ORAL at 02:11

## 2022-11-04 RX ADMIN — FAMOTIDINE 20 MG: 20 TABLET, FILM COATED ORAL at 08:11

## 2022-11-04 RX ADMIN — GABAPENTIN 300 MG: 300 CAPSULE ORAL at 09:11

## 2022-11-04 RX ADMIN — BUSPIRONE HYDROCHLORIDE 10 MG: 5 TABLET ORAL at 08:11

## 2022-11-04 RX ADMIN — BUSPIRONE HYDROCHLORIDE 10 MG: 5 TABLET ORAL at 02:11

## 2022-11-04 RX ADMIN — Medication 100 MG: at 08:11

## 2022-11-04 RX ADMIN — OXYCODONE 5 MG: 5 TABLET ORAL at 11:11

## 2022-11-04 RX ADMIN — GABAPENTIN 300 MG: 300 CAPSULE ORAL at 08:11

## 2022-11-04 RX ADMIN — NICOTINE 1 PATCH: 14 PATCH, EXTENDED RELEASE TRANSDERMAL at 08:11

## 2022-11-04 RX ADMIN — ATORVASTATIN CALCIUM 10 MG: 10 TABLET, FILM COATED ORAL at 08:11

## 2022-11-04 RX ADMIN — CYCLOBENZAPRINE HYDROCHLORIDE 5 MG: 5 TABLET, FILM COATED ORAL at 11:11

## 2022-11-04 RX ADMIN — LABETALOL HYDROCHLORIDE 200 MG: 200 TABLET, FILM COATED ORAL at 09:11

## 2022-11-04 RX ADMIN — BUSPIRONE HYDROCHLORIDE 10 MG: 5 TABLET ORAL at 09:11

## 2022-11-04 RX ADMIN — FOLIC ACID 1 MG: 1 TABLET ORAL at 08:11

## 2022-11-04 RX ADMIN — OXYCODONE 5 MG: 5 TABLET ORAL at 10:11

## 2022-11-04 RX ADMIN — POLYETHYLENE GLYCOL 3350 17 G: 17 POWDER, FOR SOLUTION ORAL at 02:11

## 2022-11-04 RX ADMIN — ESCITALOPRAM OXALATE 20 MG: 10 TABLET ORAL at 08:11

## 2022-11-04 NOTE — PT/OT/SLP RE-EVAL
Occupational Therapy   Re-evaluation    Name: Luz Maria Alfaro  MRN: [id number]  Admitting Diagnosis:  Closed right hip fracture  Recent Surgery: Procedure(s) (LRB):  RIGHT FEMUR INTERTROCH IMN (Right) 8 Days Post-Op    Recommendations:     Discharge Recommendations: rehabilitation facility, nursing facility, skilled  Discharge Equipment Recommendations:     Barriers to discharge:       Assessment:     Luz Maria Alfaro is a 62 y.o. female with a medical diagnosis of Closed right hip fracture.  She presents with the following performance deficits affecting function are weakness, impaired endurance, impaired self care skills, impaired functional mobility, gait instability, impaired balance, pain.      Rehab Prognosis:  good; patient would benefit from acute skilled OT services to address these deficits and reach maximum level of function.       Plan:     Patient to be seen 5 x/week, 6 x/week to address the above listed problems via self-care/home management, therapeutic activities, therapeutic exercises  Plan of Care Expires: 11/11/22  Plan of Care Reviewed with: patient    Subjective     Chief Complaint: --  Patient/Family stated goals: --  Communicated with: nrsg prior to session.  Pain/Comfort:  Pain Rating 1: 10/10  Location 1: knee    Objective:     Communicated with: nrsg prior to session.  Patient found HOB elevated with:  upon OT entry to room.    General Precautions: Standard, fall   Orthopedic Precautions:RLE weight bearing as tolerated   Braces:    Respiratory Status: Room air    Occupational Performance:    Bed Mobility:    Patient completed Supine to Sit with minimum assistance  Patient completed Sit to Supine with minimum assistance    Functional Mobility/Transfers:  Patient completed Sit <> Stand Transfer with minimum assistance  with  rolling walker   Functional Mobility: pt ambulated around room with min A to door and back ; no LOB    Activities of Daily Living:  Lower Body Dressing: modified  independence using hip kit , pt able to doff/adilson BLE socks     Cognitive/Visual Perceptual:  Cognitive/Psychosocial Skills:     -       Mood/Affect/Coping skills/emotional control: Pleasant    Physical Exam:  Upper Extremity Strength:    -       Right Upper Extremity: WNL  -       Left Upper Extremity: WNL    AMPA 6 Click:  [facility] Total Score:      Treatment & Education:  --    Patient left HOB elevated with all lines intact and call button in reach    GOALS:   Multidisciplinary Problems       Occupational Therapy Goals          Problem: Occupational Therapy    Goal Priority Disciplines Outcome Interventions   Occupational Therapy Goal     OT, PT/OT Ongoing, Progressing    Description: Goals to be met by: 11/11/22    Patient will increase functional independence with ADLs by performing:    LE Dressing with Modified [place].  Grooming while standing or seated with Modified [place].  Toileting from toilet with Modified [place] for hygiene and clothing management.   Toilet transfer to toilet with Modified [place].                         History:     Past Medical History:   Diagnosis Date    Benign essential HTN     Mixed hyperlipidemia     Type 2 diabetes mellitus without complications          Past Surgical History:   Procedure Laterality Date    INTRAMEDULLARY RODDING OF FEMUR Right 10/27/2022    Procedure: RIGHT FEMUR INTERTROCH IMN;  Surgeon: Stewart Brambila MD;  Location: [facility];  Service: Orthopedics;  Laterality: Right;  [place] table, supine  3rd case  Synthes Short TFNA       Time Tracking:     OT Date of Treatment:    OT Start Time: 1423  OT Stop Time: 1457  OT Total Time (min): 34 min    Billable Minutes:Cecily 17  Self Care/Home Management 17    11/4/2022

## 2022-11-04 NOTE — PROGRESS NOTES
[facility] Hospital Medicine Progress Note        Chief Complaint: Inpatient Follow-up for Right IT fracture     HPI:   Mrs Luz Maria Alfaro is a 62 year old lady with PMH of hypertension, hyperlipidemia, diabetes mellitus type 2, anxiety/depression, tobacco use, alcohol abuse. The patient presented to [facility] on 10/26/2022 with a primary complaint of right hip pain. Patient was drinking the night before night and fell out of bed.  She was unable to give further details but remembered she woke up on the floor calling for help sometime after midnight. She admits to drinking 4 beers daily. Labs notable for sodium 128, potassium 5.3, chloride 96, CO2 19, alkaline phosphatase 158, alcohol level 239. Chest x-ray without acute abnormalities. X-ray right hip with a minimally displaced intertrochanteric fracture for which she underwent intramedullary nailing. PT working with patient, plan for placement into Rehab vs SNF. CM received rejection from insurance for Rehab placement. Working on placement. She was making a good clinical recovery.     Interval Hx:   Patient awake and comfortable. C/O right knee pain. This is after PT. No other issues.     Objective/physical exam:  General: In no acute distress, afebrile  Chest: Clear to auscultation bilaterally  Heart: RRR, +S1, S2, no appreciable murmur  Abdomen: Soft, nontender, BS +  MSK: Right knee with mild swelling and tenderness   Neurologic: Alert and oriented x4, Cranial nerve II-XII intact, Strength 5/5 in all 4 extremities    VITAL SIGNS: 24 HRS MIN & MAX LAST   Temp  Min: 97.5 °F (36.4 °C)  Max: 99.1 °F (37.3 °C) 98.1 °F (36.7 °C)   BP  Min: 103/59  Max: 173/106 (!) 173/106     Pulse  Min: 68  Max: 78  77   Resp  Min: 16  Max: 18 18   SpO2  Min: 90 %  Max: 97 % 97 %         Recent Labs   Lab 10/29/22  0528 10/30/22  0549 10/31/22  0349 11/01/22  0343   WBC 6.5 6.5 6.3  --    RBC 2.20* 2.11* 2.23*  --    HGB 7.7* 7.5* 7.8* 7.9*   HCT 24.0*  23.3* 24.5* 25.1*   .1* 110.4* 109.9*  --    MCH 35.0* 35.5* 35.0*  --    MCHC 32.1* 32.2* 31.8*  --    RDW 12.7 12.8 12.9  --     250 266  --    MPV 9.5 9.7 10.6*  --        Recent Labs   Lab 10/30/22  0549 10/31/22  0349 11/02/22  0446   * 136 137   K 4.2 5.0 4.7   CO2 26 24 27   BUN 11.1 12.9 11.9   CREATININE 0.68 0.61 0.77   CALCIUM 8.9 9.0 9.5   MG  --  2.00 2.00   ALBUMIN 2.8* 2.9*  --    ALKPHOS 103 116  --    ALT 11 20  --    AST 20 43*  --    BILITOT 0.4 0.7  --           Microbiology Results (last 7 days)       ** No results found for the last 168 hours. **             See below for Radiology    Scheduled Med:   amLODIPine  10 mg Oral Daily    ARIPiprazole  2 mg Oral Daily    atorvastatin  10 mg Oral Daily    busPIRone  10 mg Oral TID    enoxaparin  40 mg Subcutaneous Daily    EScitalopram oxalate  20 mg Oral Daily    famotidine  20 mg Oral BID    folic acid  1 mg Oral Daily    gabapentin  300 mg Oral TID    labetaloL  200 mg Oral BID    multivitamin  1 tablet Oral Daily    nicotine  1 patch Transdermal Daily    ondansetron  4 mg Intravenous Once    polyethylene glycol  17 g Oral Daily    sodium zirconium cyclosilicate  10 g Oral Once    thiamine  100 mg Oral Daily        Continuous Infusions:       PRN Meds:  acetaminophen, acetaminophen, aluminum-magnesium hydroxide-simethicone **AND** [COMPLETED] LIDOcaine HCl 2%, chlordiazepoxide, cloNIDine, cyclobenzaprine, dextrose 50%, dextrose 50%, diphenhydrAMINE, glucagon (human recombinant), glucose, glucose, hydrALAZINE, hydrALAZINE, insulin aspart U-100, labetalol, ondansetron, oxyCODONE, sodium chloride 0.9%       Assessment/Plan:  Essential Hypertension  Hyperlipidemia   Diabetes Mellitus type 2   Anxiety/Depression  Tobacco abuse  Macrocytic Anemia likely 2/2 EtOH Use    Plan:  Patient today has right knee pain and mild swelling   Will get XR Right knee for further evaluation   Advised to continue moving with PT  No signs of withdrawal    Cont sliding scale, blood sugars stable.   Cont current tx plan    Continue strict aspiration, fall and decubitus precautions      VTE prophylaxis: Lovenox    Patient condition:  Fair    Anticipated discharge and Disposition:   Rehab when accepted       All diagnosis and differential diagnosis have been reviewed; assessment and plan has been documented; I have personally reviewed the labs and test results that are presently available; I have reviewed the patients medication list; I have reviewed the consulting providers response and recommendations. I have reviewed or attempted to review medical records based upon their availability    All of the patient's questions have been  addressed and answered. Patient's is agreeable to the above stated plan. I will continue to monitor closely and make adjustments to medical management as needed.  _____________________________________________________________________    Nutrition Status:    Radiology:  X-Ray Chest 1 View  Narrative: EXAMINATION:  XR CHEST 1 VIEW    CLINICAL HISTORY:  hypoxemia;    TECHNIQUE:  One view    COMPARISON:  October 26, 2022.    FINDINGS:  Cardiopericardial silhouette is within normal limits. Lungs are without dense focal or segmental consolidation, congestive process, pleural effusions or pneumothorax.  Operative changes lower thoracic spine with bilateral rods.  Impression: NO ACUTE CARDIOPULMONARY PROCESS IDENTIFIED.    Electronically signed by: John Allen  Date:    11/02/2022  Time:    17:01      Robin Metcalf MD   11/04/2022

## 2022-11-04 NOTE — PT/OT/SLP RE-EVAL
Physical Therapy Re-evaluation    Patient Name:  Luz Maria Alfaro   MRN:  [id number]    Recommendations:     Discharge Recommendations:  nursing facility, skilled, rehabilitation facility   Discharge Equipment Recommendations: walker, rolling   Barriers to discharge:  increased need of assistance    Assessment:     Luz Maria Alfaro is a 62 y.o. female admitted with a medical diagnosis of Closed right hip fracture.  She presents with the following impairments/functional limitations:  weakness, impaired endurance, impaired self care skills, impaired functional mobility, gait instability, impaired balance, decreased lower extremity function, decreased safety awareness, pain. Progressing well with PT. Still limited by pain. Would benefit from rehab vs SNF to improve strength and mobility prior to returning home.     Rehab Prognosis:  good; patient would benefit from acute skilled PT services to address these deficits and reach maximum level of function.      Recent Surgery: Procedure(s) (LRB):  RIGHT FEMUR INTERTROCH IMN (Right) 8 Days Post-Op    Plan:     During this hospitalization, patient to be seen daily to address the above listed problems via therapeutic activities, therapeutic exercises, neuromuscular re-education  Plan of Care Expires:  11/26/22  Plan of Care Reviewed with: patient    Subjective     Communicated with nurse prior to session.  Patient found supine with telemetry upon PT entry to room, agreeable to evaluation.      Chief Complaint: pain  Patient comments/goals: none  Pain/Comfort:  Pain Rating 1: 8/10  Location - Side 1: Right  Location 1: hip  Pain Addressed 1: Distraction, Reposition, Nurse notified  Pain Rating Post-Intervention 1: 8/10    Patients cultural, spiritual, Gnosticist conflicts given the current situation: no      Objective:     Patient found with: telemetry     General Precautions: Standard, fall   Orthopedic Precautions:RLE weight bearing as tolerated   Braces: N/A  Respiratory  Status: Room air    Exams:  Cognitive Exam:  Patient is oriented to Person, Place, Time, and Situation  Sensation:    -       Intact  RLE ROM: WFL  RLE Strength: 3+/5 grossly. Limited by pain  LLE ROM: WFL  LLE Strength: WFL    Functional Mobility:  Bed Mobility:     Supine to Sit: minimum assistance  Transfers:     Sit to Stand:  contact guard assistance with rolling walker  Gait: 40 ft with RW & CGA. Antalgic gait pattern  Balance: fair    AM-PAC 6 CLICK MOBILITY  Total Score:17     Patient left up in chair with all lines intact, call button in reach, chair alarm on, and nurse notified. Educated on calling for assistance when ready to return to bed.     GOALS:   Multidisciplinary Problems       Physical Therapy Goals          Problem: Physical Therapy    Goal Priority Disciplines Outcome Goal Variances Interventions   Physical Therapy Goal     PT, PT/OT Ongoing, Progressing     Description: Goals to be met by: 2022     Patient will increase functional independence with mobility by performin. Supine to sit with Modified [place]  2. Sit to supine with Modified [place]  3. Sit to stand transfer with Modified [place]  4. Gait  x 200 feet with Modified [place] using Rolling Walker.                          History:     Past Medical History:   Diagnosis Date    Benign essential HTN     Mixed hyperlipidemia     Type 2 diabetes mellitus without complications        Past Surgical History:   Procedure Laterality Date    INTRAMEDULLARY RODDING OF FEMUR Right 10/27/2022    Procedure: RIGHT FEMUR INTERTROCH IMN;  Surgeon: Stewart Brambila MD;  Location: [facility];  Service: Orthopedics;  Laterality: Right;  [place] table, supine  3rd case  Synthes Short TFNA       Time Tracking:     PT Received On: 22  PT Start Time: 0752     PT Stop Time: 0810  PT Total Time (min): 18 min     Billable Minutes: Re-eval 18 minutes      2022

## 2022-11-04 NOTE — PLAN OF CARE
Problem: Physical Therapy  Goal: Physical Therapy Goal  Description: Goals to be met by: 2022     Patient will increase functional independence with mobility by performin. Supine to sit with Modified [place]  2. Sit to supine with Modified [place]  3. Sit to stand transfer with Modified [place]  4. Gait  x 200 feet with Modified [place] using Rolling Walker.     Outcome: Ongoing, Progressing

## 2022-11-04 NOTE — PROGRESS NOTES
[facility] 9th Floor Med Surg  Orthopedics  Progress Note    Patient Name: Luz Maria Alfaro  MRN: [id number]  Admission Date: 10/26/2022  Hospital Length of Stay: 9 days  Attending Provider: Gavino Alonzo MD  Primary Care Provider: Primary Doctor No  Follow-up For: Procedure(s) (LRB):  RIGHT FEMUR INTERTROCH IMN (Right)    Post-Operative Day: 8 Day Post-Op  Subjective:     Principal Problem:Closed right hip fracture    Principal Orthopedic Problem: closed right intertrochanteric femur fracture    Interval History: POD8 IMN R IT. Patient states she is doing fairly well today. Her pain is controlled. She has been working with PT. Placement pending. No redness/drainage to incision. No new issues/complaints related to right hip.     Review of patient's allergies indicates:   Allergen Reactions    Meperidine Rash       Current Facility-Administered Medications   Medication    acetaminophen tablet 650 mg    acetaminophen tablet 650 mg    aluminum-magnesium hydroxide-simethicone 200-200-20 mg/5 mL suspension 15 mL    amLODIPine tablet 10 mg    ARIPiprazole tablet 2 mg    atorvastatin tablet 10 mg    busPIRone tablet 10 mg    chlordiazepoxide capsule 10 mg    cloNIDine tablet 0.1 mg    cyclobenzaprine tablet 5 mg    dextrose 50% injection 12.5 g    dextrose 50% injection 25 g    diphenhydrAMINE injection 25 mg    enoxaparin injection 40 mg    EScitalopram oxalate tablet 20 mg    famotidine tablet 20 mg    folic acid tablet 1 mg    gabapentin capsule 300 mg    glucagon (human recombinant) injection 1 mg    glucose chewable tablet 16 g    glucose chewable tablet 24 g    hydrALAZINE injection 10 mg    hydrALAZINE injection 10 mg    insulin aspart U-100 injection 1-10 Units    labetaloL injection 10 mg    labetaloL tablet 200 mg    multivitamin tablet    nicotine 14 mg/24 hr 1 patch    ondansetron injection 4 mg    ondansetron injection 4 mg    oxyCODONE immediate release tablet 5 mg    sodium chloride 0.9%  "flush 10 mL    sodium zirconium cyclosilicate packet 10 g    thiamine tablet 100 mg     Objective:     Vital Signs (Most Recent):  Temp: 98.6 °F (37 °C) (11/04/22 0400)  Pulse: 69 (11/04/22 0400)  Resp: 17 (11/04/22 0623)  BP: (!) 117/54 (11/04/22 0400)  SpO2: 95 % (11/04/22 0400)   Vital Signs (24h Range):  Temp:  [97.9 °F (36.6 °C)-99.1 °F (37.3 °C)] 98.6 °F (37 °C)  Pulse:  [67-78] 69  Resp:  [16-18] 17  SpO2:  [90 %-96 %] 95 %  BP: (106-122)/(54-71) 117/54     Weight: 54.4 kg (120 lb)  Height: 5' 3" (160 cm)  Body mass index is 21.26 kg/m².      Intake/Output Summary (Last 24 hours) at 11/4/2022 0730  Last data filed at 11/3/2022 1842  Gross per 24 hour   Intake 480 ml   Output 600 ml   Net -120 ml         Ortho/SPM Exam  General: AAOx4. Well nourished, well perfused, no distress.   R LE:   Dressing clean, dry, intact  Dressing removed for exam; incisions are clean and dry with no erythema or drainage.   Tolerates gently passive hip circumduction  Thigh is soft and compressible  No calf swelling or tenderness  Actively dorsi/plantar flexes foot  BCR distally  SLT intact distally      Significant Labs: BMP:   No results for input(s): GLU, NA, K, CL, CO2, BUN, CREATININE, CALCIUM, MG in the last 48 hours.    CBC:   No results for input(s): WBC, HGB, HCT, PLT in the last 48 hours.    All pertinent labs within the past 24 hours have been reviewed.    Significant Imaging: X-Ray: I have reviewed all pertinent results/findings and my personal findings are:  post op xray right hip demonstrates good fracture alignment with all hardware intact    Assessment/Plan:     Active Diagnoses:    Diagnosis Date Noted POA    PRINCIPAL PROBLEM:  Closed right hip fracture [S72.001A] 10/26/2022 Yes      Problems Resolved During this Admission:     Pt is doing well today 8 days s/p IMN R IT. Pain controlled. Continue judicious pain management. Continue PT daily. WBAT R LE. Full ROM R LE. Lovenox for dvt ppx. Continue daily dry dressing " changes to incisions. Will need CM eval for placement. F/u with Dr. Brambila in 1 week for wound check and staple removal. Please call with any questions or concerns.    The above findings, diagnosis, and treatment plan were discussed with Dr. Stewart Brambila who is in agreement.          ALEKS Montesinos  Orthopedics  [facility] - 9th Floor Med Surg

## 2022-11-05 LAB
ANION GAP SERPL CALC-SCNC: 11 MEQ/L
BASOPHILS # BLD AUTO: 0.06 X10(3)/MCL (ref 0–0.2)
BASOPHILS NFR BLD AUTO: 0.7 %
BUN SERPL-MCNC: 11 MG/DL (ref 9.8–20.1)
CALCIUM SERPL-MCNC: 9.2 MG/DL (ref 8.4–10.2)
CHLORIDE SERPL-SCNC: 100 MMOL/L (ref 98–107)
CO2 SERPL-SCNC: 26 MMOL/L (ref 23–31)
CREAT SERPL-MCNC: 0.83 MG/DL (ref 0.55–1.02)
CREAT/UREA NIT SERPL: 13
EOSINOPHIL # BLD AUTO: 0.12 X10(3)/MCL (ref 0–0.9)
EOSINOPHIL NFR BLD AUTO: 1.4 %
ERYTHROCYTE [DISTWIDTH] IN BLOOD BY AUTOMATED COUNT: 13.6 % (ref 11.5–17)
GFR SERPLBLD CREATININE-BSD FMLA CKD-EPI: >60 MLS/MIN/1.73/M2
GLUCOSE SERPL-MCNC: 119 MG/DL (ref 82–115)
HCT VFR BLD AUTO: 27.3 % (ref 37–47)
HGB BLD-MCNC: 8.3 GM/DL (ref 12–16)
IMM GRANULOCYTES # BLD AUTO: 0.07 X10(3)/MCL (ref 0–0.04)
IMM GRANULOCYTES NFR BLD AUTO: 0.8 %
LYMPHOCYTES # BLD AUTO: 2.25 X10(3)/MCL (ref 0.6–4.6)
LYMPHOCYTES NFR BLD AUTO: 26.3 %
MCH RBC QN AUTO: 34.3 PG (ref 27–31)
MCHC RBC AUTO-ENTMCNC: 30.4 MG/DL (ref 33–36)
MCV RBC AUTO: 112.8 FL (ref 80–94)
MONOCYTES # BLD AUTO: 0.86 X10(3)/MCL (ref 0.1–1.3)
MONOCYTES NFR BLD AUTO: 10 %
NEUTROPHILS # BLD AUTO: 5.2 X10(3)/MCL (ref 2.1–9.2)
NEUTROPHILS NFR BLD AUTO: 60.8 %
NRBC BLD AUTO-RTO: 0 %
PLATELET # BLD AUTO: 453 X10(3)/MCL (ref 130–400)
PMV BLD AUTO: 9.4 FL (ref 7.4–10.4)
POCT GLUCOSE: 102 MG/DL (ref 70–110)
POCT GLUCOSE: 115 MG/DL (ref 70–110)
POCT GLUCOSE: 117 MG/DL (ref 70–110)
POCT GLUCOSE: 118 MG/DL (ref 70–110)
POTASSIUM SERPL-SCNC: 4 MMOL/L (ref 3.5–5.1)
RBC # BLD AUTO: 2.42 X10(6)/MCL (ref 4.2–5.4)
SODIUM SERPL-SCNC: 137 MMOL/L (ref 136–145)
WBC # SPEC AUTO: 8.6 X10(3)/MCL (ref 4.5–11.5)

## 2022-11-05 PROCEDURE — 80048 BASIC METABOLIC PNL TOTAL CA: CPT | Performed by: INTERNAL MEDICINE

## 2022-11-05 PROCEDURE — 25000003 PHARM REV CODE 250: Performed by: PHYSICIAN ASSISTANT

## 2022-11-05 PROCEDURE — 97110 THERAPEUTIC EXERCISES: CPT | Mod: CQ

## 2022-11-05 PROCEDURE — 97116 GAIT TRAINING THERAPY: CPT | Mod: CQ

## 2022-11-05 PROCEDURE — 63600175 PHARM REV CODE 636 W HCPCS: Performed by: INTERNAL MEDICINE

## 2022-11-05 PROCEDURE — 85025 COMPLETE CBC W/AUTO DIFF WBC: CPT | Performed by: INTERNAL MEDICINE

## 2022-11-05 PROCEDURE — S4991 NICOTINE PATCH NONLEGEND: HCPCS | Performed by: PHYSICIAN ASSISTANT

## 2022-11-05 PROCEDURE — 36415 COLL VENOUS BLD VENIPUNCTURE: CPT | Performed by: INTERNAL MEDICINE

## 2022-11-05 PROCEDURE — 11000001 HC ACUTE MED/SURG PRIVATE ROOM

## 2022-11-05 PROCEDURE — 25000003 PHARM REV CODE 250: Performed by: INTERNAL MEDICINE

## 2022-11-05 PROCEDURE — 25000003 PHARM REV CODE 250: Performed by: NURSE PRACTITIONER

## 2022-11-05 PROCEDURE — 25000003 PHARM REV CODE 250: Performed by: STUDENT IN AN ORGANIZED HEALTH CARE EDUCATION/TRAINING PROGRAM

## 2022-11-05 RX ADMIN — GABAPENTIN 300 MG: 300 CAPSULE ORAL at 08:11

## 2022-11-05 RX ADMIN — CYCLOBENZAPRINE HYDROCHLORIDE 5 MG: 5 TABLET, FILM COATED ORAL at 05:11

## 2022-11-05 RX ADMIN — BUSPIRONE HYDROCHLORIDE 10 MG: 5 TABLET ORAL at 08:11

## 2022-11-05 RX ADMIN — OXYCODONE 5 MG: 5 TABLET ORAL at 08:11

## 2022-11-05 RX ADMIN — FAMOTIDINE 20 MG: 20 TABLET, FILM COATED ORAL at 08:11

## 2022-11-05 RX ADMIN — ENOXAPARIN SODIUM 40 MG: 40 INJECTION SUBCUTANEOUS at 04:11

## 2022-11-05 RX ADMIN — BUSPIRONE HYDROCHLORIDE 10 MG: 5 TABLET ORAL at 02:11

## 2022-11-05 RX ADMIN — THERA TABS 1 TABLET: TAB at 08:11

## 2022-11-05 RX ADMIN — FOLIC ACID 1 MG: 1 TABLET ORAL at 08:11

## 2022-11-05 RX ADMIN — Medication 100 MG: at 08:11

## 2022-11-05 RX ADMIN — GABAPENTIN 300 MG: 300 CAPSULE ORAL at 02:11

## 2022-11-05 RX ADMIN — AMLODIPINE BESYLATE 10 MG: 5 TABLET ORAL at 08:11

## 2022-11-05 RX ADMIN — ATORVASTATIN CALCIUM 10 MG: 10 TABLET, FILM COATED ORAL at 08:11

## 2022-11-05 RX ADMIN — NICOTINE 1 PATCH: 14 PATCH, EXTENDED RELEASE TRANSDERMAL at 07:11

## 2022-11-05 RX ADMIN — LABETALOL HYDROCHLORIDE 200 MG: 200 TABLET, FILM COATED ORAL at 08:11

## 2022-11-05 RX ADMIN — CYCLOBENZAPRINE HYDROCHLORIDE 5 MG: 5 TABLET, FILM COATED ORAL at 01:11

## 2022-11-05 RX ADMIN — OXYCODONE 5 MG: 5 TABLET ORAL at 03:11

## 2022-11-05 RX ADMIN — OXYCODONE 5 MG: 5 TABLET ORAL at 12:11

## 2022-11-05 RX ADMIN — OXYCODONE 5 MG: 5 TABLET ORAL at 04:11

## 2022-11-05 RX ADMIN — ESCITALOPRAM OXALATE 20 MG: 10 TABLET ORAL at 08:11

## 2022-11-05 RX ADMIN — ARIPIPRAZOLE 2 MG: 2 TABLET ORAL at 08:11

## 2022-11-05 NOTE — PROGRESS NOTES
[facility] Medicine Progress Note        Chief Complaint: Inpatient Follow-up for Right IT fracture     HPI:   Mrs Luz Maria Alfaro is a 62 year old lady with PMH of hypertension, hyperlipidemia, diabetes mellitus type 2, anxiety/depression, tobacco use, alcohol abuse. The patient presented to [facility] on 10/26/2022 with a primary complaint of right hip pain. Patient was drinking the night before night and fell out of bed.  She was unable to give further details but remembered she woke up on the floor calling for help sometime after midnight. She admits to drinking 4 beers daily. Labs notable for sodium 128, potassium 5.3, chloride 96, CO2 19, alkaline phosphatase 158, alcohol level 239. Chest x-ray without acute abnormalities. X-ray right hip with a minimally displaced intertrochanteric fracture for which she underwent intramedullary nailing. PT working with patient, plan for placement into Rehab vs SNF. CM received rejection from insurance for Rehab placement. Working on placement. She was making a good clinical recovery.     Interval Hx:   Patient awake and comfortable. Has no new complaints. Informed patient about the XR right knee report. She is participating with PT.     Objective/physical exam:  General: In no acute distress, afebrile  Chest: Clear to auscultation bilaterally  Heart: RRR, +S1, S2, no appreciable murmur  Abdomen: Soft, nontender, BS +  MSK: Right knee with mild swelling and tenderness   Neurologic: Alert and oriented x4, Cranial nerve II-XII intact, Strength 5/5 in all 4 extremities    VITAL SIGNS: 24 HRS MIN & MAX LAST   Temp  Min: 96.2 °F (35.7 °C)  Max: 99.3 °F (37.4 °C) 98 °F (36.7 °C)   BP  Min: 110/63  Max: 173/106 134/78     Pulse  Min: 70  Max: 78  76   Resp  Min: 18  Max: 20 18   SpO2  Min: 90 %  Max: 98 % (!) 94 %         Recent Labs   Lab 10/30/22  0549 10/31/22  0349 11/01/22  0343 11/05/22  0616   WBC 6.5 6.3  --  8.6   RBC 2.11* 2.23*  --   2.42*   HGB 7.5* 7.8* 7.9* 8.3*   HCT 23.3* 24.5* 25.1* 27.3*   .4* 109.9*  --  112.8*   MCH 35.5* 35.0*  --  34.3*   MCHC 32.2* 31.8*  --  30.4*   RDW 12.8 12.9  --  13.6    266  --  453*   MPV 9.7 10.6*  --  9.4       Recent Labs   Lab 10/30/22  0549 10/31/22  0349 11/02/22  0446 11/05/22  0616   * 136 137 137   K 4.2 5.0 4.7 4.0   CO2 26 24 27 26   BUN 11.1 12.9 11.9 11.0   CREATININE 0.68 0.61 0.77 0.83   CALCIUM 8.9 9.0 9.5 9.2   MG  --  2.00 2.00  --    ALBUMIN 2.8* 2.9*  --   --    ALKPHOS 103 116  --   --    ALT 11 20  --   --    AST 20 43*  --   --    BILITOT 0.4 0.7  --   --           Microbiology Results (last 7 days)       ** No results found for the last 168 hours. **             See below for Radiology    Scheduled Med:   amLODIPine  10 mg Oral Daily    ARIPiprazole  2 mg Oral Daily    atorvastatin  10 mg Oral Daily    busPIRone  10 mg Oral TID    enoxaparin  40 mg Subcutaneous Daily    EScitalopram oxalate  20 mg Oral Daily    famotidine  20 mg Oral BID    folic acid  1 mg Oral Daily    gabapentin  300 mg Oral TID    labetaloL  200 mg Oral BID    multivitamin  1 tablet Oral Daily    nicotine  1 patch Transdermal Daily    ondansetron  4 mg Intravenous Once    polyethylene glycol  17 g Oral Daily    sodium zirconium cyclosilicate  10 g Oral Once    thiamine  100 mg Oral Daily        Continuous Infusions:       PRN Meds:  acetaminophen, acetaminophen, aluminum-magnesium hydroxide-simethicone **AND** [COMPLETED] LIDOcaine HCl 2%, chlordiazepoxide, cloNIDine, cyclobenzaprine, dextrose 50%, dextrose 50%, diphenhydrAMINE, glucagon (human recombinant), glucose, glucose, hydrALAZINE, hydrALAZINE, insulin aspart U-100, labetalol, ondansetron, oxyCODONE, sodium chloride 0.9%       Assessment/Plan:  Essential Hypertension  Hyperlipidemia   Diabetes Mellitus type 2   Anxiety/Depression  Tobacco abuse  Macrocytic Anemia likely 2/2 EtOH Use    Plan:  Patient doing well. Right knee XR was negative  for fractures   Advised to continue moving with PT  Current meds reviewed and will continue   Cont sliding scale, blood sugars stable.   Cont current tx plan    Continue strict aspiration, fall and decubitus precautions      VTE prophylaxis: Lovenox    Patient condition:  Fair    Anticipated discharge and Disposition:   Rehab when accepted       All diagnosis and differential diagnosis have been reviewed; assessment and plan has been documented; I have personally reviewed the labs and test results that are presently available; I have reviewed the patients medication list; I have reviewed the consulting providers response and recommendations. I have reviewed or attempted to review medical records based upon their availability    All of the patient's questions have been  addressed and answered. Patient's is agreeable to the above stated plan. I will continue to monitor closely and make adjustments to medical management as needed.  _____________________________________________________________________    Nutrition Status:    Radiology:  X-Ray Knee 1 or 2 View Right  Narrative: EXAMINATION:  XR KNEE 1 OR 2 VIEW RIGHT    CLINICAL HISTORY:  Pain and swelling;    TECHNIQUE:  AP and lateral views of the right knee were performed.    COMPARISON:  None    FINDINGS:  No displaced fracture.  Edematous soft tissues noted.  Impression: Appearance of edematous soft tissues with no underlying osseous abnormality.    Electronically signed by: Barrington Bocanegra  Date:    11/04/2022  Time:    14:05      Robin Metcalf MD   11/05/2022

## 2022-11-05 NOTE — PT/OT/SLP PROGRESS
Physical Therapy  Treatment    Luz Maria Alfaro   MRN: [id number]   Admitting Diagnosis: Closed right hip fracture       PT Start Time: 1200     PT Stop Time: 1228    PT Total Time (min): 28 min       Billable Minutes:  Gait Training 12 and Therapeutic Exercise 16             PTA Visit Number: 1       General Precautions: Standard, fall  Orthopedic Precautions: RLE weight bearing as tolerated   Braces:    Respiratory Status: Room air    Spiritual, Cultural Beliefs, Jain Practices, Values that Affect Care: no    Subjective:  Communicated with NSG prior to session.    Pain/Comfort  Pain Rating Post-Intervention 1: 8/10  Location - Side 2: Right  Location 2: leg    Objective:   Patient found with: telemetry    Functional Mobility:  Bed Mobility: Supine to sit. mIN A.       Transfers:   Sit to/from stand. Min A with RW.    Gait:    Pt ambulated ~75ft. Step to gait pattern. Antalgic gait t/o worsening with gait distance. Pt very slow but steady. Increased leaning on the RW with progression of gait. Pt limited by pain. NSG present at the end of session to bring meds.        Treatment and Education:  ANGELO LE therex x 10 reps AAROM on the RLE    AM-PAC 6 CLICK MOBILITY  How much help from another person does this patient currently need?   1 = Unable, Total/Dependent Assistance  2 = A lot, Maximum/Moderate Assistance  3 = A little, Minimum/Contact Guard/Supervision  4 = None, Modified [place]/Independent         AM-PAC Raw Score CMS G-Code Modifier Level of Impairment Assistance   6 % Total / Unable   7 - 9 CM 80 - 100% Maximal Assist   10 - 14 CL 60 - 80% Moderate Assist   15 - 19 CK 40 - 60% Moderate Assist   20 - 22 CJ 20 - 40% Minimal Assist   23 CI 1-20% SBA / CGA   24 CH 0% Independent/ Mod I     Patient left up in chair with all lines intact, call button in reach, and chair alarm on.    Assessment:  Luz Maria Alfaro is a 62 y.o. female with a medical diagnosis of Closed right hip fracture     Rehab  identified problem list/impairments: Rehab identified problem list/impairments: weakness, impaired balance, impaired sensation, gait instability, impaired endurance    Rehab potential is excellent.    Activity tolerance: Excellent    Discharge recommendations: Discharge Facility/Level of Care Needs: rehabilitation facility     Barriers to discharge:      Equipment recommendations:       GOALS:   Multidisciplinary Problems       Physical Therapy Goals          Problem: Physical Therapy    Goal Priority Disciplines Outcome Goal Variances Interventions   Physical Therapy Goal     PT, PT/OT Ongoing, Progressing     Description: Goals to be met by: 2022     Patient will increase functional independence with mobility by performin. Supine to sit with Modified [place]  2. Sit to supine with Modified [place]  3. Sit to stand transfer with Modified [place]  4. Gait  x 200 feet with Modified [place] using Rolling Walker.                          PLAN:    Patient to be seen daily  to address the above listed problems via gait training, therapeutic activities, therapeutic exercises  Plan of Care expires: 22  Plan of Care reviewed with: patient         2022

## 2022-11-06 LAB
POCT GLUCOSE: 108 MG/DL (ref 70–110)
POCT GLUCOSE: 116 MG/DL (ref 70–110)
POCT GLUCOSE: 190 MG/DL (ref 70–110)
POCT GLUCOSE: 94 MG/DL (ref 70–110)

## 2022-11-06 PROCEDURE — 11000001 HC ACUTE MED/SURG PRIVATE ROOM

## 2022-11-06 PROCEDURE — 25000003 PHARM REV CODE 250: Performed by: STUDENT IN AN ORGANIZED HEALTH CARE EDUCATION/TRAINING PROGRAM

## 2022-11-06 PROCEDURE — 63600175 PHARM REV CODE 636 W HCPCS: Performed by: INTERNAL MEDICINE

## 2022-11-06 PROCEDURE — 25000003 PHARM REV CODE 250: Performed by: PHYSICIAN ASSISTANT

## 2022-11-06 PROCEDURE — 63600175 PHARM REV CODE 636 W HCPCS: Performed by: PHYSICIAN ASSISTANT

## 2022-11-06 PROCEDURE — 25000003 PHARM REV CODE 250: Performed by: INTERNAL MEDICINE

## 2022-11-06 PROCEDURE — 25000003 PHARM REV CODE 250: Performed by: NURSE PRACTITIONER

## 2022-11-06 PROCEDURE — S4991 NICOTINE PATCH NONLEGEND: HCPCS | Performed by: PHYSICIAN ASSISTANT

## 2022-11-06 RX ORDER — ACETAMINOPHEN 500 MG
1000 TABLET ORAL EVERY 6 HOURS PRN
Status: DISCONTINUED | OUTPATIENT
Start: 2022-11-06 | End: 2022-11-11 | Stop reason: HOSPADM

## 2022-11-06 RX ADMIN — ARIPIPRAZOLE 2 MG: 2 TABLET ORAL at 08:11

## 2022-11-06 RX ADMIN — OXYCODONE 5 MG: 5 TABLET ORAL at 10:11

## 2022-11-06 RX ADMIN — POLYETHYLENE GLYCOL 3350 17 G: 17 POWDER, FOR SOLUTION ORAL at 08:11

## 2022-11-06 RX ADMIN — GABAPENTIN 300 MG: 300 CAPSULE ORAL at 09:11

## 2022-11-06 RX ADMIN — LABETALOL HYDROCHLORIDE 200 MG: 200 TABLET, FILM COATED ORAL at 09:11

## 2022-11-06 RX ADMIN — AMLODIPINE BESYLATE 10 MG: 5 TABLET ORAL at 08:11

## 2022-11-06 RX ADMIN — BUSPIRONE HYDROCHLORIDE 10 MG: 5 TABLET ORAL at 09:11

## 2022-11-06 RX ADMIN — BUSPIRONE HYDROCHLORIDE 10 MG: 5 TABLET ORAL at 02:11

## 2022-11-06 RX ADMIN — ACETAMINOPHEN 1000 MG: 500 TABLET, FILM COATED ORAL at 09:11

## 2022-11-06 RX ADMIN — FOLIC ACID 1 MG: 1 TABLET ORAL at 08:11

## 2022-11-06 RX ADMIN — CYCLOBENZAPRINE HYDROCHLORIDE 5 MG: 5 TABLET, FILM COATED ORAL at 06:11

## 2022-11-06 RX ADMIN — ATORVASTATIN CALCIUM 10 MG: 10 TABLET, FILM COATED ORAL at 08:11

## 2022-11-06 RX ADMIN — CYCLOBENZAPRINE HYDROCHLORIDE 5 MG: 5 TABLET, FILM COATED ORAL at 08:11

## 2022-11-06 RX ADMIN — ENOXAPARIN SODIUM 40 MG: 40 INJECTION SUBCUTANEOUS at 04:11

## 2022-11-06 RX ADMIN — ESCITALOPRAM OXALATE 20 MG: 10 TABLET ORAL at 08:11

## 2022-11-06 RX ADMIN — OXYCODONE 5 MG: 5 TABLET ORAL at 04:11

## 2022-11-06 RX ADMIN — NICOTINE 1 PATCH: 14 PATCH, EXTENDED RELEASE TRANSDERMAL at 08:11

## 2022-11-06 RX ADMIN — BUSPIRONE HYDROCHLORIDE 10 MG: 5 TABLET ORAL at 08:11

## 2022-11-06 RX ADMIN — INSULIN ASPART 2 UNITS: 100 INJECTION, SOLUTION INTRAVENOUS; SUBCUTANEOUS at 12:11

## 2022-11-06 RX ADMIN — GABAPENTIN 300 MG: 300 CAPSULE ORAL at 02:11

## 2022-11-06 RX ADMIN — CYCLOBENZAPRINE HYDROCHLORIDE 5 MG: 5 TABLET, FILM COATED ORAL at 09:11

## 2022-11-06 RX ADMIN — GABAPENTIN 300 MG: 300 CAPSULE ORAL at 08:11

## 2022-11-06 RX ADMIN — FAMOTIDINE 20 MG: 20 TABLET, FILM COATED ORAL at 09:11

## 2022-11-06 RX ADMIN — FAMOTIDINE 20 MG: 20 TABLET, FILM COATED ORAL at 08:11

## 2022-11-06 RX ADMIN — OXYCODONE 5 MG: 5 TABLET ORAL at 06:11

## 2022-11-06 RX ADMIN — Medication 100 MG: at 08:11

## 2022-11-06 RX ADMIN — LABETALOL HYDROCHLORIDE 200 MG: 200 TABLET, FILM COATED ORAL at 08:11

## 2022-11-06 RX ADMIN — ACETAMINOPHEN 1000 MG: 500 TABLET, FILM COATED ORAL at 02:11

## 2022-11-07 LAB
POCT GLUCOSE: 122 MG/DL (ref 70–110)
POCT GLUCOSE: 130 MG/DL (ref 70–110)

## 2022-11-07 PROCEDURE — 25000003 PHARM REV CODE 250: Performed by: NURSE PRACTITIONER

## 2022-11-07 PROCEDURE — S4991 NICOTINE PATCH NONLEGEND: HCPCS | Performed by: PHYSICIAN ASSISTANT

## 2022-11-07 PROCEDURE — 63600175 PHARM REV CODE 636 W HCPCS: Performed by: INTERNAL MEDICINE

## 2022-11-07 PROCEDURE — 25000003 PHARM REV CODE 250: Performed by: INTERNAL MEDICINE

## 2022-11-07 PROCEDURE — 25000003 PHARM REV CODE 250: Performed by: PHYSICIAN ASSISTANT

## 2022-11-07 PROCEDURE — 97530 THERAPEUTIC ACTIVITIES: CPT

## 2022-11-07 PROCEDURE — 11000001 HC ACUTE MED/SURG PRIVATE ROOM

## 2022-11-07 PROCEDURE — 25000003 PHARM REV CODE 250: Performed by: STUDENT IN AN ORGANIZED HEALTH CARE EDUCATION/TRAINING PROGRAM

## 2022-11-07 RX ADMIN — OXYCODONE 5 MG: 5 TABLET ORAL at 06:11

## 2022-11-07 RX ADMIN — ARIPIPRAZOLE 2 MG: 2 TABLET ORAL at 08:11

## 2022-11-07 RX ADMIN — FAMOTIDINE 20 MG: 20 TABLET, FILM COATED ORAL at 08:11

## 2022-11-07 RX ADMIN — GABAPENTIN 300 MG: 300 CAPSULE ORAL at 02:11

## 2022-11-07 RX ADMIN — ACETAMINOPHEN 1000 MG: 500 TABLET, FILM COATED ORAL at 08:11

## 2022-11-07 RX ADMIN — OXYCODONE 5 MG: 5 TABLET ORAL at 05:11

## 2022-11-07 RX ADMIN — LABETALOL HYDROCHLORIDE 200 MG: 200 TABLET, FILM COATED ORAL at 08:11

## 2022-11-07 RX ADMIN — OXYCODONE 5 MG: 5 TABLET ORAL at 11:11

## 2022-11-07 RX ADMIN — ATORVASTATIN CALCIUM 10 MG: 10 TABLET, FILM COATED ORAL at 08:11

## 2022-11-07 RX ADMIN — CYCLOBENZAPRINE HYDROCHLORIDE 5 MG: 5 TABLET, FILM COATED ORAL at 08:11

## 2022-11-07 RX ADMIN — ENOXAPARIN SODIUM 40 MG: 40 INJECTION SUBCUTANEOUS at 04:11

## 2022-11-07 RX ADMIN — BUSPIRONE HYDROCHLORIDE 10 MG: 5 TABLET ORAL at 08:11

## 2022-11-07 RX ADMIN — BUSPIRONE HYDROCHLORIDE 10 MG: 5 TABLET ORAL at 02:11

## 2022-11-07 RX ADMIN — AMLODIPINE BESYLATE 10 MG: 5 TABLET ORAL at 08:11

## 2022-11-07 RX ADMIN — FOLIC ACID 1 MG: 1 TABLET ORAL at 08:11

## 2022-11-07 RX ADMIN — Medication 100 MG: at 08:11

## 2022-11-07 RX ADMIN — NICOTINE 1 PATCH: 14 PATCH, EXTENDED RELEASE TRANSDERMAL at 08:11

## 2022-11-07 RX ADMIN — ESCITALOPRAM OXALATE 20 MG: 10 TABLET ORAL at 08:11

## 2022-11-07 RX ADMIN — GABAPENTIN 300 MG: 300 CAPSULE ORAL at 08:11

## 2022-11-07 RX ADMIN — CYCLOBENZAPRINE HYDROCHLORIDE 5 MG: 5 TABLET, FILM COATED ORAL at 10:11

## 2022-11-07 NOTE — PT/OT/SLP PROGRESS
Occupational Therapy   Treatment    Name: Luz Maria Alfaro  MRN: [id number]  Admitting Diagnosis:  Closed right hip fracture  11 Days Post-Op    Recommendations:     Discharge Recommendations: nursing facility, skilled, rehabilitation facility  Discharge Equipment Recommendations:     Barriers to discharge:       Assessment:     Luz Maria Alfaro is a 62 y.o. female with a medical diagnosis of Closed right hip fracture.  She presents with the following performance deficits affecting function are weakness, impaired endurance, impaired self care skills, impaired functional mobility, gait instability, impaired balance, pain.     Pt limited today 2/2 RLE ankle swelling and pain. Notified nrsg.    Rehab Prognosis:  Good; patient would benefit from acute skilled OT services to address these deficits and reach maximum level of function.       Plan:     Patient to be seen 5 x/week, 3 x/week to address the above listed problems via self-care/home management, therapeutic activities, therapeutic exercises  Plan of Care Expires: 11/11/22  Plan of Care Reviewed with: patient    Subjective     Pain/Comfort:       Objective:     Communicated with: nrsg prior to session.  Patient found HOB elevated with bed alarm upon OT entry to room.    General Precautions: Standard, fall   Orthopedic Precautions:RLE weight bearing as tolerated   Braces:    Respiratory Status: Room air     Occupational Performance:     Bed Mobility:    Patient completed Supine to Sit with stand by assistance     Functional Mobility/Transfers:  Patient completed Bed <> Chair Transfer using Step Transfer technique with minimum assistance with rolling walker  Functional Mobility: t/f to chair ; slow gait 2/2 RLE ankle pain; she states is 12/10 pain ; notified nursg; left pt UIC with RLE elevated on pillow and ice on ankle with instructions of 20min on/20min off           AMPAC 6 Click ADL:      Treatment & Education:  --    Patient left up in chair with all lines  intact, call button in reach, and chair alarm on    GOALS:   Multidisciplinary Problems       Occupational Therapy Goals          Problem: Occupational Therapy    Goal Priority Disciplines Outcome Interventions   Occupational Therapy Goal     OT, PT/OT Ongoing, Progressing    Description: Goals to be met by: 11/11/22    Patient will increase functional independence with ADLs by performing:    LE Dressing with Modified [place].  Grooming while standing or seated with Modified [place].  Toileting from toilet with Modified [place] for hygiene and clothing management.   Toilet transfer to toilet with Modified [place].                         Time Tracking:     OT Date of Treatment:    OT Start Time: 1052  OT Stop Time: 1109  OT Total Time (min): 17 min    Billable Minutes:Therapeutic Activity 17min    OT/KAREN: OT     KAREN Visit Number: 2    11/7/2022

## 2022-11-07 NOTE — PT/OT/SLP PROGRESS
Physical Therapy      Patient Name:  Luz Maria Alfaro   MRN:  [id number]    Patient not seen today secondary to Patient unwilling to participate, Pain. Will follow-up as schedule allows.

## 2022-11-07 NOTE — PLAN OF CARE
Updates sent to [facility] and [facility] regarding pt's referral. Awaiting a call back at this time from both facilities regarding the status of her referral. Still awaiting level 2 142 at this time.

## 2022-11-07 NOTE — PROGRESS NOTES
[facility]        Chief Complaint: Inpatient Follow-up for Right IT fracture     HPI:   Mrs Luz Maria Alfaro is a 62 year old lady with PMH of hypertension, hyperlipidemia, diabetes mellitus type 2, anxiety/depression, tobacco use, alcohol abuse. The patient presented to [facility] on 10/26/2022 with a primary complaint of right hip pain. Patient was drinking the night before night and fell out of bed.  She was unable to give further details but remembered she woke up on the floor calling for help sometime after midnight. She admits to drinking 4 beers daily. Labs notable for sodium 128, potassium 5.3, chloride 96, CO2 19, alkaline phosphatase 158, alcohol level 239. Chest x-ray without acute abnormalities. X-ray right hip with a minimally displaced intertrochanteric fracture for which she underwent intramedullary nailing. PT working with patient, plan for placement into Rehab vs SNF. CM received rejection from insurance for Rehab placement. Working on placement. She was making a good clinical recovery.     Interval Hx:   Patient awake and comfortable. Ambulating with PT. Pain well controlled.     Objective/physical exam:  General: In no acute distress, afebrile  Chest: Clear to auscultation bilaterally  Heart: RRR, +S1, S2, no appreciable murmur  Abdomen: Soft, nontender, BS +  MSK: Right knee with mild swelling and tenderness   Neurologic: Alert and oriented x4, Cranial nerve II-XII intact, Strength 5/5 in all 4 extremities    VITAL SIGNS: 24 HRS MIN & MAX LAST   Temp  Min: 97.5 °F (36.4 °C)  Max: 98.3 °F (36.8 °C) 98.2 °F (36.8 °C)   BP  Min: 93/54  Max: 116/62 95/61     Pulse  Min: 58  Max: 69  63   Resp  Min: 16  Max: 19 19   SpO2  Min: 91 %  Max: 96 % 95 %         Recent Labs   Lab 11/01/22  0343 11/05/22  0616   WBC  --  8.6   RBC  --  2.42*   HGB 7.9* 8.3*   HCT 25.1* 27.3*   MCV  --  112.8*   MCH  --  34.3*   MCHC  --  30.4*   RDW  --  13.6   PLT   --  453*   MPV  --  9.4       Recent Labs   Lab 11/02/22  0446 11/05/22  0616    137   K 4.7 4.0   CO2 27 26   BUN 11.9 11.0   CREATININE 0.77 0.83   CALCIUM 9.5 9.2   MG 2.00  --           Microbiology Results (last 7 days)       ** No results found for the last 168 hours. **             See below for Radiology    Scheduled Med:   amLODIPine  10 mg Oral Daily    ARIPiprazole  2 mg Oral Daily    atorvastatin  10 mg Oral Daily    busPIRone  10 mg Oral TID    enoxaparin  40 mg Subcutaneous Daily    EScitalopram oxalate  20 mg Oral Daily    famotidine  20 mg Oral BID    folic acid  1 mg Oral Daily    gabapentin  300 mg Oral TID    labetaloL  200 mg Oral BID    multivitamin  1 tablet Oral Daily    nicotine  1 patch Transdermal Daily    ondansetron  4 mg Intravenous Once    polyethylene glycol  17 g Oral Daily    sodium zirconium cyclosilicate  10 g Oral Once    thiamine  100 mg Oral Daily        Continuous Infusions:       PRN Meds:  acetaminophen, acetaminophen, aluminum-magnesium hydroxide-simethicone **AND** [COMPLETED] LIDOcaine HCl 2%, chlordiazepoxide, cloNIDine, cyclobenzaprine, dextrose 50%, dextrose 50%, diphenhydrAMINE, glucagon (human recombinant), glucose, glucose, hydrALAZINE, hydrALAZINE, insulin aspart U-100, labetalol, ondansetron, oxyCODONE, sodium chloride 0.9%       Assessment/Plan:  Right IT fracture   Essential Hypertension  Hyperlipidemia   Diabetes Mellitus type 2   Anxiety/Depression  Tobacco abuse  Macrocytic Anemia likely 2/2 EtOH Use    Plan:  Patient resting well and pain is controlled.   Advised to continue moving with PT  Current meds reviewed and will continue   Cont sliding scale, blood sugars stable.   Cont current tx plan    Continue strict aspiration, fall and decubitus precautions      VTE prophylaxis: Lovenox    Patient condition:  Fair    Anticipated discharge and Disposition:   Rehab when accepted       All diagnosis and differential diagnosis have been reviewed; assessment  and plan has been documented; I have personally reviewed the labs and test results that are presently available; I have reviewed the patients medication list; I have reviewed the consulting providers response and recommendations. I have reviewed or attempted to review medical records based upon their availability    All of the patient's questions have been  addressed and answered. Patient's is agreeable to the above stated plan. I will continue to monitor closely and make adjustments to medical management as needed.  _____________________________________________________________________    Nutrition Status:    Radiology:  X-Ray Knee 1 or 2 View Right  Narrative: EXAMINATION:  XR KNEE 1 OR 2 VIEW RIGHT    CLINICAL HISTORY:  Pain and swelling;    TECHNIQUE:  AP and lateral views of the right knee were performed.    COMPARISON:  None    FINDINGS:  No displaced fracture.  Edematous soft tissues noted.  Impression: Appearance of edematous soft tissues with no underlying osseous abnormality.    Electronically signed by: Barrington Bocanegra  Date:    11/04/2022  Time:    14:05      Robin Metcalf MD   11/07/2022

## 2022-11-07 NOTE — PROGRESS NOTES
[facility]        Chief Complaint: Inpatient Follow-up for Right IT fracture     HPI:   Mrs Luz Maria Alfaro is a 62 year old lady with PMH of hypertension, hyperlipidemia, diabetes mellitus type 2, anxiety/depression, tobacco use, alcohol abuse. The patient presented to [facility] on 10/26/2022 with a primary complaint of right hip pain. Patient was drinking the night before night and fell out of bed.  She was unable to give further details but remembered she woke up on the floor calling for help sometime after midnight. She admits to drinking 4 beers daily. Labs notable for sodium 128, potassium 5.3, chloride 96, CO2 19, alkaline phosphatase 158, alcohol level 239. Chest x-ray without acute abnormalities. X-ray right hip with a minimally displaced intertrochanteric fracture for which she underwent intramedullary nailing. PT working with patient, plan for placement into Rehab vs SNF. CM received rejection from insurance for Rehab placement. Working on placement. She was making a good clinical recovery.     Interval Hx:   Patient awake and comfortable. No new complaints.     Objective/physical exam:  General: In no acute distress, afebrile  Chest: Clear to auscultation bilaterally  Heart: RRR, +S1, S2, no appreciable murmur  Abdomen: Soft, nontender, BS +  MSK: Right knee with mild swelling and tenderness   Neurologic: Alert and oriented x4, Cranial nerve II-XII intact, Strength 5/5 in all 4 extremities    VITAL SIGNS: 24 HRS MIN & MAX LAST   Temp  Min: 98.2 °F (36.8 °C)  Max: 99.7 °F (37.6 °C) 99.2 °F (37.3 °C)   BP  Min: 107/61  Max: 132/73 115/66     Pulse  Min: 68  Max: 82  80   Resp  Min: 16  Max: 20 18   SpO2  Min: 90 %  Max: 96 % 96 %         Recent Labs   Lab 10/31/22  0349 11/01/22  0343 11/05/22  0616   WBC 6.3  --  8.6   RBC 2.23*  --  2.42*   HGB 7.8* 7.9* 8.3*   HCT 24.5* 25.1* 27.3*   .9*  --  112.8*   MCH 35.0*  --  34.3*   MCHC 31.8*   --  30.4*   RDW 12.9  --  13.6     --  453*   MPV 10.6*  --  9.4       Recent Labs   Lab 10/31/22  0349 11/02/22  0446 11/05/22  0616    137 137   K 5.0 4.7 4.0   CO2 24 27 26   BUN 12.9 11.9 11.0   CREATININE 0.61 0.77 0.83   CALCIUM 9.0 9.5 9.2   MG 2.00 2.00  --    ALBUMIN 2.9*  --   --    ALKPHOS 116  --   --    ALT 20  --   --    AST 43*  --   --    BILITOT 0.7  --   --           Microbiology Results (last 7 days)       ** No results found for the last 168 hours. **             See below for Radiology    Scheduled Med:   amLODIPine  10 mg Oral Daily    ARIPiprazole  2 mg Oral Daily    atorvastatin  10 mg Oral Daily    busPIRone  10 mg Oral TID    enoxaparin  40 mg Subcutaneous Daily    EScitalopram oxalate  20 mg Oral Daily    famotidine  20 mg Oral BID    folic acid  1 mg Oral Daily    gabapentin  300 mg Oral TID    labetaloL  200 mg Oral BID    multivitamin  1 tablet Oral Daily    nicotine  1 patch Transdermal Daily    ondansetron  4 mg Intravenous Once    polyethylene glycol  17 g Oral Daily    sodium zirconium cyclosilicate  10 g Oral Once    thiamine  100 mg Oral Daily        Continuous Infusions:       PRN Meds:  acetaminophen, acetaminophen, aluminum-magnesium hydroxide-simethicone **AND** [COMPLETED] LIDOcaine HCl 2%, chlordiazepoxide, cloNIDine, cyclobenzaprine, dextrose 50%, dextrose 50%, diphenhydrAMINE, glucagon (human recombinant), glucose, glucose, hydrALAZINE, hydrALAZINE, insulin aspart U-100, labetalol, ondansetron, oxyCODONE, sodium chloride 0.9%       Assessment/Plan:  Right IT fracture   Essential Hypertension  Hyperlipidemia   Diabetes Mellitus type 2   Anxiety/Depression  Tobacco abuse  Macrocytic Anemia likely 2/2 EtOH Use    Plan:  Patient clinically improving. No acute issues.   Advised to continue moving with PT  Current meds reviewed and will continue   Cont sliding scale, blood sugars stable.   Cont current tx plan    Continue strict aspiration, fall and decubitus  precautions      VTE prophylaxis: Lovenox    Patient condition:  Fair    Anticipated discharge and Disposition:   Rehab when accepted       All diagnosis and differential diagnosis have been reviewed; assessment and plan has been documented; I have personally reviewed the labs and test results that are presently available; I have reviewed the patients medication list; I have reviewed the consulting providers response and recommendations. I have reviewed or attempted to review medical records based upon their availability    All of the patient's questions have been  addressed and answered. Patient's is agreeable to the above stated plan. I will continue to monitor closely and make adjustments to medical management as needed.  _____________________________________________________________________    Nutrition Status:    Radiology:  X-Ray Knee 1 or 2 View Right  Narrative: EXAMINATION:  XR KNEE 1 OR 2 VIEW RIGHT    CLINICAL HISTORY:  Pain and swelling;    TECHNIQUE:  AP and lateral views of the right knee were performed.    COMPARISON:  None    FINDINGS:  No displaced fracture.  Edematous soft tissues noted.  Impression: Appearance of edematous soft tissues with no underlying osseous abnormality.    Electronically signed by: Barrington Bocanegra  Date:    11/04/2022  Time:    14:05      Robin Metcalf MD   11/06/2022

## 2022-11-08 LAB
POCT GLUCOSE: 106 MG/DL (ref 70–110)
POCT GLUCOSE: 114 MG/DL (ref 70–110)
POCT GLUCOSE: 130 MG/DL (ref 70–110)

## 2022-11-08 PROCEDURE — 11000001 HC ACUTE MED/SURG PRIVATE ROOM

## 2022-11-08 PROCEDURE — 25000003 PHARM REV CODE 250: Performed by: NURSE PRACTITIONER

## 2022-11-08 PROCEDURE — S4991 NICOTINE PATCH NONLEGEND: HCPCS | Performed by: PHYSICIAN ASSISTANT

## 2022-11-08 PROCEDURE — 25000003 PHARM REV CODE 250: Performed by: STUDENT IN AN ORGANIZED HEALTH CARE EDUCATION/TRAINING PROGRAM

## 2022-11-08 PROCEDURE — 25000003 PHARM REV CODE 250: Performed by: INTERNAL MEDICINE

## 2022-11-08 PROCEDURE — 63600175 PHARM REV CODE 636 W HCPCS: Performed by: INTERNAL MEDICINE

## 2022-11-08 PROCEDURE — 93010 EKG 12-LEAD: ICD-10-PCS | Mod: ,,, | Performed by: INTERNAL MEDICINE

## 2022-11-08 PROCEDURE — 97535 SELF CARE MNGMENT TRAINING: CPT

## 2022-11-08 PROCEDURE — 93010 ELECTROCARDIOGRAM REPORT: CPT | Mod: ,,, | Performed by: INTERNAL MEDICINE

## 2022-11-08 PROCEDURE — 97116 GAIT TRAINING THERAPY: CPT | Mod: CQ

## 2022-11-08 PROCEDURE — 97530 THERAPEUTIC ACTIVITIES: CPT | Mod: CQ

## 2022-11-08 PROCEDURE — 93005 ELECTROCARDIOGRAM TRACING: CPT

## 2022-11-08 PROCEDURE — 25000003 PHARM REV CODE 250: Performed by: PHYSICIAN ASSISTANT

## 2022-11-08 RX ORDER — ALPRAZOLAM 0.5 MG/1
0.5 TABLET ORAL 3 TIMES DAILY PRN
Status: DISCONTINUED | OUTPATIENT
Start: 2022-11-08 | End: 2022-11-11 | Stop reason: HOSPADM

## 2022-11-08 RX ADMIN — POLYETHYLENE GLYCOL 3350 17 G: 17 POWDER, FOR SOLUTION ORAL at 09:11

## 2022-11-08 RX ADMIN — BUSPIRONE HYDROCHLORIDE 10 MG: 5 TABLET ORAL at 09:11

## 2022-11-08 RX ADMIN — OXYCODONE 5 MG: 5 TABLET ORAL at 07:11

## 2022-11-08 RX ADMIN — FAMOTIDINE 20 MG: 20 TABLET, FILM COATED ORAL at 09:11

## 2022-11-08 RX ADMIN — AMLODIPINE BESYLATE 10 MG: 5 TABLET ORAL at 09:11

## 2022-11-08 RX ADMIN — ARIPIPRAZOLE 2 MG: 2 TABLET ORAL at 09:11

## 2022-11-08 RX ADMIN — ALPRAZOLAM 0.5 MG: 0.5 TABLET ORAL at 09:11

## 2022-11-08 RX ADMIN — ACETAMINOPHEN 1000 MG: 500 TABLET, FILM COATED ORAL at 04:11

## 2022-11-08 RX ADMIN — CYCLOBENZAPRINE HYDROCHLORIDE 5 MG: 5 TABLET, FILM COATED ORAL at 09:11

## 2022-11-08 RX ADMIN — OXYCODONE 5 MG: 5 TABLET ORAL at 01:11

## 2022-11-08 RX ADMIN — GABAPENTIN 300 MG: 300 CAPSULE ORAL at 09:11

## 2022-11-08 RX ADMIN — GABAPENTIN 300 MG: 300 CAPSULE ORAL at 04:11

## 2022-11-08 RX ADMIN — NICOTINE 1 PATCH: 14 PATCH, EXTENDED RELEASE TRANSDERMAL at 09:11

## 2022-11-08 RX ADMIN — ATORVASTATIN CALCIUM 10 MG: 10 TABLET, FILM COATED ORAL at 09:11

## 2022-11-08 RX ADMIN — FOLIC ACID 1 MG: 1 TABLET ORAL at 09:11

## 2022-11-08 RX ADMIN — Medication 100 MG: at 09:11

## 2022-11-08 RX ADMIN — LABETALOL HYDROCHLORIDE 200 MG: 200 TABLET, FILM COATED ORAL at 09:11

## 2022-11-08 RX ADMIN — OXYCODONE 5 MG: 5 TABLET ORAL at 02:11

## 2022-11-08 RX ADMIN — ESCITALOPRAM OXALATE 20 MG: 10 TABLET ORAL at 09:11

## 2022-11-08 RX ADMIN — ENOXAPARIN SODIUM 40 MG: 40 INJECTION SUBCUTANEOUS at 04:11

## 2022-11-08 RX ADMIN — BUSPIRONE HYDROCHLORIDE 10 MG: 5 TABLET ORAL at 04:11

## 2022-11-08 NOTE — PLAN OF CARE
Spoke with Gabriela at [facility] who stated that Cat with [facility] will come evaluate pt tomorrow at 10 am. She also requested that I refax a copy of pt's psych eval to her at [phone]. Faxed as requested.

## 2022-11-08 NOTE — PROGRESS NOTES
"Inpatient Nutrition Evaluation    Admit Date: 10/26/2022   Total duration of encounter: 13 days    Nutrition Recommendation/Prescription     Continue diabetic diet as tolerated  Continue medical management of blood sugars    Nutrition Assessment     Chart Review    Reason Seen: follow-up    Diagnosis:  Right Intertrochanteric Hip Fracture s/p Intramedullary Nailing  Essential Hypertension  Macrocytic Anemia likely 2/2 EtOH Use      Relevant Medical History: HTN, HLD, DM 2, anxiety/depression, tobaccos use, alcohol use    Nutrition-Related Medications: folic acid daily, insulin PRN, MVI daily, Zofran PRN, Protonix daily, thiamine daily    Nutrition-Related Labs: 11/1: H/H-7.9/25.1  11/5: H/H-8.3/27.3, Gluc-119, CBG         Diet Order: Diet diabetic  Oral Supplement Order: none  Appetite/Oral Intake: good/% of meals  Factors Affecting Nutritional Intake: none identified  Food/Restorationist/Cultural Preferences: none reported  Food Allergies: none reported    Skin Integrity: incision  Wound(s):       Comments    11/1: pt reports good appetite, with no n/v/d/c, stating nausea and diarrhea improved. UBW reported 119 lb with weight loss over 1 year. Weight prior weight loss was 170 lb. Admit weight of 54.4 kg (120 lb) on 10/26 and previous weight of 54 kg (119 lb) on 8/28. Weight stable at this time.     11/8: Pt doing well. Tolerating diet without problems. Monitor blood sugars.    Anthropometrics    Height: 5' 3" (160 cm) Height Method: Stated  Last Weight: 54.4 kg (120 lb) (10/26/22 0720) Weight Method: Bed Scale  BMI (Calculated): 21.3  BMI Classification: normal (BMI 18.5-24.9)     Ideal Body Weight (IBW), Female: 115 lb     % Ideal Body Weight, Female (lb): 104.35 %                             Usual Weight Provided By: patient and EMR weight history    Wt Readings from Last 5 Encounters:   10/26/22 54.4 kg (120 lb)   08/28/22 54 kg (119 lb)   10/10/19 77.3 kg (170 lb 6.7 oz)     Weight Change(s) Since " Admission:  Admit Weight: 54.4 kg (120 lb) (10/26/22 0720)  11/8: 54.4kg-stable      Patient Education    Not applicable.    Monitoring & Evaluation     Dietitian will monitor food and beverage intake and weight change.  Nutrition Risk/Follow-Up: low (follow-up in 5-7 days)  Patients assigned 'low nutrition risk' status do not qualify for a full nutritional assessment but will be monitored and re-evaluated in a 5-7 day time period. Please consult if re-evaluation needed sooner.

## 2022-11-08 NOTE — PT/OT/SLP PROGRESS
Physical Therapy  Treatment    Luz Maria Alfaro   MRN: [id number]   Admitting Diagnosis: Closed right hip fracture    PT Received On: 11/08/22  PT Start Time: 0912     PT Stop Time: 0936    PT Total Time (min): 24 min       Billable Minutes:  Gait Training 12 and Therapeutic Activity 12    Treatment Type: Treatment  PT/PTA: PTA     PTA Visit Number: 2       General Precautions: Standard, fall  Orthopedic Precautions: RLE weight bearing as tolerated   Braces:    Respiratory Status: Room air    Spiritual, Cultural Beliefs, Confucianism Practices, Values that Affect Care: no    Subjective:  Communicated with nurse prior to session.       Objective:   Patient found with: telemetry    Functional Mobility:  Bed Mobility: supine to sit tf with CGA       Transfers: sit<>stand tf x3 trials with CGA and cues for safety awareness       Gait: pt amb x2 trials for 45ft each trial with seated rest breaks required and CGA with noted antalgic gait pattern         Balance:   Static Sit: FAIR+: Able to take MINIMAL challenges from all directions  Dynamic Sit: FAIR+: Maintains balance through MINIMAL excursions of active trunk motion  Static Stand: FAIR: Maintains without assist but unable to take challenges  Dynamic stand: FAIR: Needs CONTACT GUARD during gait       AM-PAC 6 CLICK MOBILITY  How much help from another person does this patient currently need?   1 = Unable, Total/Dependent Assistance  2 = A lot, Maximum/Moderate Assistance  3 = A little, Minimum/Contact Guard/Supervision  4 = None, Modified [place]/Independent         AM-PAC Raw Score CMS G-Code Modifier Level of Impairment Assistance   6 % Total / Unable   7 - 9 CM 80 - 100% Maximal Assist   10 - 14 CL 60 - 80% Moderate Assist   15 - 19 CK 40 - 60% Moderate Assist   20 - 22 CJ 20 - 40% Minimal Assist   23 CI 1-20% SBA / CGA   24 CH 0% Independent/ Mod I     Patient left up in chair with all lines intact, call button in reach, and chair alarm  on.    Assessment:  Luz Maria Alfaro is a 62 y.o. female with a medical diagnosis of Closed right hip fracture and presents with increased endurance.    Rehab identified problem list/impairments: Rehab identified problem list/impairments: weakness, impaired endurance, decreased safety awareness, pain, impaired cognition    Rehab potential is good.    Activity tolerance: Good    Discharge recommendations: Discharge Facility/Level of Care Needs: nursing facility, skilled     Barriers to discharge:      Equipment recommendations: Equipment Needed After Discharge: walker, rolling     GOALS:   Multidisciplinary Problems       Physical Therapy Goals          Problem: Physical Therapy    Goal Priority Disciplines Outcome Goal Variances Interventions   Physical Therapy Goal     PT, PT/OT Ongoing, Progressing     Description: Goals to be met by: 2022     Patient will increase functional independence with mobility by performin. Supine to sit with Modified [place]  2. Sit to supine with Modified [place]  3. Sit to stand transfer with Modified [place]  4. Gait  x 200 feet with Modified [place] using Rolling Walker.                          PLAN:    Patient to be seen daily  to address the above listed problems via gait training, therapeutic activities  Plan of Care expires: 22  Plan of Care reviewed with: patient         2022

## 2022-11-08 NOTE — PROGRESS NOTES
[facility] Hospital Medicine Progress Note        Chief Complaint: Inpatient Follow-up for Right IT fracture     HPI:   Mrs Luz Maria Alfaro is a 62 year old lady with PMH of hypertension, hyperlipidemia, diabetes mellitus type 2, anxiety/depression, tobacco use, alcohol abuse. The patient presented to [facility] on 10/26/2022 with a primary complaint of right hip pain. Patient was drinking the night before night and fell out of bed.  She was unable to give further details but remembered she woke up on the floor calling for help sometime after midnight. She admits to drinking 4 beers daily. Labs notable for sodium 128, potassium 5.3, chloride 96, CO2 19, alkaline phosphatase 158, alcohol level 239. Chest x-ray without acute abnormalities. X-ray right hip with a minimally displaced intertrochanteric fracture for which she underwent intramedullary nailing. PT working with patient, plan for placement into Rehab vs SNF. CM received rejection from insurance for Rehab placement. Working on placement. She was making a good clinical recovery.     Interval Hx:   Patient awake and comfortable. States has panic attacks. Now feels much better, Participating with PT.     Objective/physical exam:  General: In no acute distress, afebrile  Chest: Clear to auscultation bilaterally  Heart: RRR, +S1, S2, no appreciable murmur  Abdomen: Soft, nontender, BS +  MSK: Right knee with mild swelling and tenderness   Neurologic: Alert and oriented x4, Cranial nerve II-XII intact, Strength 5/5 in all 4 extremities    VITAL SIGNS: 24 HRS MIN & MAX LAST   Temp  Min: 97.9 °F (36.6 °C)  Max: 98.8 °F (37.1 °C) 98.8 °F (37.1 °C)   BP  Min: 95/61  Max: 124/65 109/76     Pulse  Min: 63  Max: 77  71   Resp  Min: 18  Max: 20 18   SpO2  Min: 90 %  Max: 95 % (!) 93 %         Recent Labs   Lab 11/05/22  0616   WBC 8.6   RBC 2.42*   HGB 8.3*   HCT 27.3*   .8*   MCH 34.3*   MCHC 30.4*   RDW 13.6   *   MPV 9.4        Recent Labs   Lab 11/02/22  0446 11/05/22  0616    137   K 4.7 4.0   CO2 27 26   BUN 11.9 11.0   CREATININE 0.77 0.83   CALCIUM 9.5 9.2   MG 2.00  --           Microbiology Results (last 7 days)       ** No results found for the last 168 hours. **             See below for Radiology    Scheduled Med:   amLODIPine  10 mg Oral Daily    ARIPiprazole  2 mg Oral Daily    atorvastatin  10 mg Oral Daily    busPIRone  10 mg Oral TID    enoxaparin  40 mg Subcutaneous Daily    EScitalopram oxalate  20 mg Oral Daily    famotidine  20 mg Oral BID    folic acid  1 mg Oral Daily    gabapentin  300 mg Oral TID    labetaloL  200 mg Oral BID    multivitamin  1 tablet Oral Daily    nicotine  1 patch Transdermal Daily    ondansetron  4 mg Intravenous Once    polyethylene glycol  17 g Oral Daily    sodium zirconium cyclosilicate  10 g Oral Once    thiamine  100 mg Oral Daily        Continuous Infusions:       PRN Meds:  acetaminophen, acetaminophen, aluminum-magnesium hydroxide-simethicone **AND** [COMPLETED] LIDOcaine HCl 2%, chlordiazepoxide, cloNIDine, cyclobenzaprine, dextrose 50%, dextrose 50%, diphenhydrAMINE, glucagon (human recombinant), glucose, glucose, hydrALAZINE, hydrALAZINE, insulin aspart U-100, labetalol, ondansetron, oxyCODONE, sodium chloride 0.9%       Assessment/Plan:  Right IT fracture   Essential Hypertension  Hyperlipidemia   Diabetes Mellitus type 2   Anxiety/Depression  Tobacco abuse  Macrocytic Anemia likely 2/2 EtOH Use    Plan:  Patient having episodes of panic attacks.   Will start po prn xanax   Advised to continue moving with PT  Current meds reviewed and will continue   Cont sliding scale, blood sugars stable.   Cont current tx plan    Continue strict aspiration, fall and decubitus precautions      VTE prophylaxis: Lovenox    Patient condition:  Fair    Anticipated discharge and Disposition:   Rehab when accepted       All diagnosis and differential diagnosis have been reviewed; assessment  and plan has been documented; I have personally reviewed the labs and test results that are presently available; I have reviewed the patients medication list; I have reviewed the consulting providers response and recommendations. I have reviewed or attempted to review medical records based upon their availability    All of the patient's questions have been  addressed and answered. Patient's is agreeable to the above stated plan. I will continue to monitor closely and make adjustments to medical management as needed.  _____________________________________________________________________    Nutrition Status:    Radiology:  CV Ultrasound doppler venous DVT leg right  There was no evidence of deep or superficial vein thrombosis in the right   lower extremity.      Robin Metcalf MD   11/08/2022

## 2022-11-08 NOTE — PT/OT/SLP PROGRESS
Occupational Therapy   Treatment    Name: Luz Maria Alfaro  MRN: [id number]  Admitting Diagnosis:  Closed right hip fracture  12 Days Post-Op    Recommendations:     Discharge Recommendations: rehabilitation facility, nursing facility, skilled  Discharge Equipment Recommendations:     Barriers to discharge:       Assessment:     Luz Maria Alfaro is a 62 y.o. female with a medical diagnosis of Closed right hip fracture.  She presents with the following performance deficits affecting function are weakness, impaired endurance, impaired self care skills, impaired functional mobility, gait instability, impaired balance, pain.     Rehab Prognosis:  Good; patient would benefit from acute skilled OT services to address these deficits and reach maximum level of function.       Plan:     Patient to be seen 5 x/week, 6 x/week to address the above listed problems via self-care/home management, therapeutic activities, therapeutic exercises  Plan of Care Expires: 11/11/22  Plan of Care Reviewed with: patient    Subjective     Pain/Comfort:  Pain Rating 1: 10/10  Location - Side 1: Right  Pain Addressed 2: Reposition, Cessation of Activity    Objective:     Communicated with: nrsg prior to session.  Patient found HOB elevated with upon OT entry to room.    General Precautions: Standard, fall   Orthopedic Precautions:RLE weight bearing as tolerated   Braces:    Respiratory Status: Room air     Occupational Performance:     Bed Mobility:    Patient completed Supine to Sit with contact guard assistance  Patient completed Sit to Supine with contact guard assistance       Activities of Daily Living:  Lower Body Dressing: stand by assistance sitting EOB; pt completed LE dress with hip kit for don/doff BLE socks with SBA; did require VC for appropriate use       [facility] 6 Click ADL:      Treatment & Education:  --    Patient left HOB elevated with all lines intact and call button in reach    GOALS:   Multidisciplinary Problems        Occupational Therapy Goals          Problem: Occupational Therapy    Goal Priority Disciplines Outcome Interventions   Occupational Therapy Goal     OT, PT/OT Ongoing, Progressing    Description: Goals to be met by: 11/11/22    Patient will increase functional independence with ADLs by performing:    LE Dressing with Modified [place].  Grooming while standing or seated with Modified [place].  Toileting from toilet with Modified [place] for hygiene and clothing management.   Toilet transfer to toilet with Modified [place].                         Time Tracking:     OT Date of Treatment:    OT Start Time: 1424  OT Stop Time: 1445  OT Total Time (min): 21 min    Billable Minutes:Self Care/Home Management 21min    OT/KAREN: OT     KAREN Visit Number: 3    11/8/2022

## 2022-11-09 LAB
ANION GAP SERPL CALC-SCNC: 9 MEQ/L
BASOPHILS # BLD AUTO: 0.06 X10(3)/MCL (ref 0–0.2)
BASOPHILS NFR BLD AUTO: 0.7 %
BUN SERPL-MCNC: 10 MG/DL (ref 9.8–20.1)
CALCIUM SERPL-MCNC: 8.8 MG/DL (ref 8.4–10.2)
CHLORIDE SERPL-SCNC: 108 MMOL/L (ref 98–107)
CO2 SERPL-SCNC: 24 MMOL/L (ref 23–31)
CREAT SERPL-MCNC: 0.83 MG/DL (ref 0.55–1.02)
CREAT/UREA NIT SERPL: 12
EOSINOPHIL # BLD AUTO: 0.12 X10(3)/MCL (ref 0–0.9)
EOSINOPHIL NFR BLD AUTO: 1.3 %
ERYTHROCYTE [DISTWIDTH] IN BLOOD BY AUTOMATED COUNT: 13.9 % (ref 11.5–17)
GFR SERPLBLD CREATININE-BSD FMLA CKD-EPI: >60 MLS/MIN/1.73/M2
GLUCOSE SERPL-MCNC: 90 MG/DL (ref 82–115)
HCT VFR BLD AUTO: 26 % (ref 37–47)
HGB BLD-MCNC: 8 GM/DL (ref 12–16)
IMM GRANULOCYTES # BLD AUTO: 0.04 X10(3)/MCL (ref 0–0.04)
IMM GRANULOCYTES NFR BLD AUTO: 0.4 %
LYMPHOCYTES # BLD AUTO: 1.73 X10(3)/MCL (ref 0.6–4.6)
LYMPHOCYTES NFR BLD AUTO: 19 %
MCH RBC QN AUTO: 34.5 PG (ref 27–31)
MCHC RBC AUTO-ENTMCNC: 30.8 MG/DL (ref 33–36)
MCV RBC AUTO: 112.1 FL (ref 80–94)
MONOCYTES # BLD AUTO: 0.63 X10(3)/MCL (ref 0.1–1.3)
MONOCYTES NFR BLD AUTO: 6.9 %
NEUTROPHILS # BLD AUTO: 6.5 X10(3)/MCL (ref 2.1–9.2)
NEUTROPHILS NFR BLD AUTO: 71.7 %
NRBC BLD AUTO-RTO: 0 %
PLATELET # BLD AUTO: 439 X10(3)/MCL (ref 130–400)
PMV BLD AUTO: 9.8 FL (ref 7.4–10.4)
POCT GLUCOSE: 105 MG/DL (ref 70–110)
POCT GLUCOSE: 125 MG/DL (ref 70–110)
POCT GLUCOSE: 148 MG/DL (ref 70–110)
POCT GLUCOSE: 96 MG/DL (ref 70–110)
POTASSIUM SERPL-SCNC: 4.4 MMOL/L (ref 3.5–5.1)
RBC # BLD AUTO: 2.32 X10(6)/MCL (ref 4.2–5.4)
SODIUM SERPL-SCNC: 141 MMOL/L (ref 136–145)
WBC # SPEC AUTO: 9.1 X10(3)/MCL (ref 4.5–11.5)

## 2022-11-09 PROCEDURE — 25000003 PHARM REV CODE 250: Performed by: STUDENT IN AN ORGANIZED HEALTH CARE EDUCATION/TRAINING PROGRAM

## 2022-11-09 PROCEDURE — 25000003 PHARM REV CODE 250: Performed by: INTERNAL MEDICINE

## 2022-11-09 PROCEDURE — 11000001 HC ACUTE MED/SURG PRIVATE ROOM

## 2022-11-09 PROCEDURE — 63600175 PHARM REV CODE 636 W HCPCS: Performed by: INTERNAL MEDICINE

## 2022-11-09 PROCEDURE — 85025 COMPLETE CBC W/AUTO DIFF WBC: CPT | Performed by: INTERNAL MEDICINE

## 2022-11-09 PROCEDURE — 25000003 PHARM REV CODE 250: Performed by: NURSE PRACTITIONER

## 2022-11-09 PROCEDURE — S4991 NICOTINE PATCH NONLEGEND: HCPCS | Performed by: PHYSICIAN ASSISTANT

## 2022-11-09 PROCEDURE — 25000003 PHARM REV CODE 250: Performed by: PHYSICIAN ASSISTANT

## 2022-11-09 PROCEDURE — 36415 COLL VENOUS BLD VENIPUNCTURE: CPT | Performed by: INTERNAL MEDICINE

## 2022-11-09 PROCEDURE — 80048 BASIC METABOLIC PNL TOTAL CA: CPT | Performed by: INTERNAL MEDICINE

## 2022-11-09 PROCEDURE — 97116 GAIT TRAINING THERAPY: CPT | Mod: CQ

## 2022-11-09 PROCEDURE — 97535 SELF CARE MNGMENT TRAINING: CPT

## 2022-11-09 RX ADMIN — FAMOTIDINE 20 MG: 20 TABLET, FILM COATED ORAL at 09:11

## 2022-11-09 RX ADMIN — FOLIC ACID 1 MG: 1 TABLET ORAL at 09:11

## 2022-11-09 RX ADMIN — LABETALOL HYDROCHLORIDE 200 MG: 200 TABLET, FILM COATED ORAL at 08:11

## 2022-11-09 RX ADMIN — BUSPIRONE HYDROCHLORIDE 10 MG: 5 TABLET ORAL at 09:11

## 2022-11-09 RX ADMIN — GABAPENTIN 300 MG: 300 CAPSULE ORAL at 09:11

## 2022-11-09 RX ADMIN — OXYCODONE 5 MG: 5 TABLET ORAL at 09:11

## 2022-11-09 RX ADMIN — BUSPIRONE HYDROCHLORIDE 10 MG: 5 TABLET ORAL at 08:11

## 2022-11-09 RX ADMIN — FAMOTIDINE 20 MG: 20 TABLET, FILM COATED ORAL at 08:11

## 2022-11-09 RX ADMIN — OXYCODONE 5 MG: 5 TABLET ORAL at 01:11

## 2022-11-09 RX ADMIN — ATORVASTATIN CALCIUM 10 MG: 10 TABLET, FILM COATED ORAL at 09:11

## 2022-11-09 RX ADMIN — BUSPIRONE HYDROCHLORIDE 10 MG: 5 TABLET ORAL at 01:11

## 2022-11-09 RX ADMIN — GABAPENTIN 300 MG: 300 CAPSULE ORAL at 01:11

## 2022-11-09 RX ADMIN — AMLODIPINE BESYLATE 10 MG: 5 TABLET ORAL at 09:11

## 2022-11-09 RX ADMIN — LABETALOL HYDROCHLORIDE 200 MG: 200 TABLET, FILM COATED ORAL at 09:11

## 2022-11-09 RX ADMIN — ENOXAPARIN SODIUM 40 MG: 40 INJECTION SUBCUTANEOUS at 06:11

## 2022-11-09 RX ADMIN — THERA TABS 1 TABLET: TAB at 09:11

## 2022-11-09 RX ADMIN — NICOTINE 1 PATCH: 14 PATCH, EXTENDED RELEASE TRANSDERMAL at 09:11

## 2022-11-09 RX ADMIN — ESCITALOPRAM OXALATE 20 MG: 10 TABLET ORAL at 09:11

## 2022-11-09 RX ADMIN — POLYETHYLENE GLYCOL 3350 17 G: 17 POWDER, FOR SOLUTION ORAL at 09:11

## 2022-11-09 RX ADMIN — GABAPENTIN 300 MG: 300 CAPSULE ORAL at 08:11

## 2022-11-09 RX ADMIN — OXYCODONE 5 MG: 5 TABLET ORAL at 08:11

## 2022-11-09 RX ADMIN — ARIPIPRAZOLE 2 MG: 2 TABLET ORAL at 09:11

## 2022-11-09 NOTE — PT/OT/SLP PROGRESS
Physical Therapy  Treatment    Luz Maria Alfaro   MRN: [id number]   Admitting Diagnosis: Closed right hip fracture    PT Received On: 11/09/22  PT Start Time: 1419     PT Stop Time: 1437    PT Total Time (min): 18 min       Billable Minutes:  Gait Training 18    Treatment Type: Treatment  PT/PTA: PTA     PTA Visit Number: 3       General Precautions: Standard, fall  Orthopedic Precautions: RLE weight bearing as tolerated   Braces:    Respiratory Status: Room air    Spiritual, Cultural Beliefs, Orthodox Practices, Values that Affect Care: no    Subjective:  Communicated with nurse prior to session.         Objective:   Patient found with: telemetry    Functional Mobility:  Bed Mobility: supine<>sit tf SBA with cues for safety        Transfers: sit<>stand tf x2 trials with RW and CGA        Gait: pt amb x85ft with RW and CGA with 1 standing rest break with cues for upright posture         Balance:   Static Stand: FAIR: Maintains without assist but unable to take challenges  Dynamic stand: FAIR: Needs CONTACT GUARD during gait         AM-PAC 6 CLICK MOBILITY  How much help from another person does this patient currently need?   1 = Unable, Total/Dependent Assistance  2 = A lot, Maximum/Moderate Assistance  3 = A little, Minimum/Contact Guard/Supervision  4 = None, Modified [place]/Independent         AM-PAC Raw Score CMS G-Code Modifier Level of Impairment Assistance   6 % Total / Unable   7 - 9 CM 80 - 100% Maximal Assist   10 - 14 CL 60 - 80% Moderate Assist   15 - 19 CK 40 - 60% Moderate Assist   20 - 22 CJ 20 - 40% Minimal Assist   23 CI 1-20% SBA / CGA   24 CH 0% Independent/ Mod I     Patient left HOB elevated with all lines intact and call button in reach.    Assessment:  Luz Maria Alfaro is a 62 y.o. female with a medical diagnosis of Closed right hip fracture and presents with increased gait endurance.    Rehab identified problem list/impairments: Rehab identified problem list/impairments:  weakness, impaired endurance, decreased safety awareness, pain, impaired cognition    Rehab potential is good.    Activity tolerance: Good    Discharge recommendations: Discharge Facility/Level of Care Needs: rehabilitation facility, nursing facility, skilled     Barriers to discharge:      Equipment recommendations: Equipment Needed After Discharge: walker, rolling     GOALS:   Multidisciplinary Problems       Physical Therapy Goals          Problem: Physical Therapy    Goal Priority Disciplines Outcome Goal Variances Interventions   Physical Therapy Goal     PT, PT/OT Ongoing, Progressing     Description: Goals to be met by: 2022     Patient will increase functional independence with mobility by performin. Supine to sit with Modified [place]  2. Sit to supine with Modified [place]  3. Sit to stand transfer with Modified [place]  4. Gait  x 200 feet with Modified [place] using Rolling Walker.                          PLAN:    Patient to be seen daily  to address the above listed problems via gait training, therapeutic activities  Plan of Care expires: 22  Plan of Care reviewed with: patient         2022

## 2022-11-09 NOTE — PT/OT/SLP PROGRESS
Occupational Therapy   Treatment    Name: Luz Maria Alfaro  MRN: [id number]  Admitting Diagnosis:  Closed right hip fracture  13 Days Post-Op    Recommendations:     Discharge Recommendations: rehabilitation facility, nursing facility, skilled  Discharge Equipment Recommendations:     Barriers to discharge:       Assessment:     Luz Maria Alfaro is a 62 y.o. female with a medical diagnosis of Closed right hip fracture.  She presents with the following performance deficits affecting function are weakness, impaired endurance, impaired self care skills, impaired functional mobility, gait instability, impaired balance, decreased upper extremity function, impaired coordination.     Rehab Prognosis:  Good; patient would benefit from acute skilled OT services to address these deficits and reach maximum level of function.       Plan:     Patient to be seen 3 x/week, 5 x/week to address the above listed problems via self-care/home management, therapeutic activities, therapeutic exercises  Plan of Care Expires: 11/11/22  Plan of Care Reviewed with: patient    Subjective     Pain/Comfort:       Objective:     Communicated with: nrsg prior to session.  Patient found HOB elevated with  upon OT entry to room.    General Precautions: Standard, fall   Orthopedic Precautions:RLE weight bearing as tolerated   Braces:    Respiratory Status: Room air     Occupational Performance:     Bed Mobility:    Patient completed Supine to Sit with stand by assistance  Patient completed Sit to Supine with stand by assistance     Functional Mobility/Transfers:  Patient completed Toilet Transfer Step Transfer technique with contact guard assistance with  rolling walker  Functional Mobility: no LOB to toilet '; no assist for pericare     Activities of Daily Living:  Toileting: contact guard assistance .      [facility] 6 Click ADL:      Treatment & Education:  --    Patient left HOB elevated with all lines intact and call button in reach    GOALS:    Multidisciplinary Problems       Occupational Therapy Goals          Problem: Occupational Therapy    Goal Priority Disciplines Outcome Interventions   Occupational Therapy Goal     OT, PT/OT Ongoing, Progressing    Description: Goals to be met by: 11/11/22    Patient will increase functional independence with ADLs by performing:    LE Dressing with Modified [place].  Grooming while standing or seated with Modified [place].  Toileting from toilet with Modified [place] for hygiene and clothing management.   Toilet transfer to toilet with Modified [place].                         Time Tracking:     OT Date of Treatment:    OT Start Time: 1443  OT Stop Time: 1500  OT Total Time (min): 17 min    Billable Minutes:Self Care/Home Management 17min    OT/KAREN: OT     KAREN Visit Number: 4    11/9/2022

## 2022-11-09 NOTE — PROGRESS NOTES
[facility] Medicine Progress Note        Chief Complaint: Inpatient Follow-up for Right IT fracture     HPI:   Mrs Luz Maria Alfaro is a 62 year old lady with PMH of hypertension, hyperlipidemia, diabetes mellitus type 2, anxiety/depression, tobacco use, alcohol abuse. The patient presented to [facility] on 10/26/2022 with a primary complaint of right hip pain. Patient was drinking the night before night and fell out of bed.  She was unable to give further details but remembered she woke up on the floor calling for help sometime after midnight. She admits to drinking 4 beers daily. Labs notable for sodium 128, potassium 5.3, chloride 96, CO2 19, alkaline phosphatase 158, alcohol level 239. Chest x-ray without acute abnormalities. X-ray right hip with a minimally displaced intertrochanteric fracture for which she underwent intramedullary nailing. PT working with patient, plan for placement into Rehab vs SNF. CM received rejection from insurance for Rehab placement. Working on placement. She was making a good clinical recovery.     Interval Hx:   Patient awake and laying in bed, reports she is working with [employer] they come  Discussed placement    Objective/physical exam:  General: In no acute distress, afebrile  Chest: Clear to auscultation bilaterally  Heart: RRR, +S1, S2, 2/6 systolic murmur heard  Abdomen: Soft, nontender, BS +  MSK: Right knee with mild swelling and tenderness   Neurologic: Alert and oriented x4, Cranial nerve II-XII intact    VITAL SIGNS: 24 HRS MIN & MAX LAST   Temp  Min: 97.2 °F (36.2 °C)  Max: 98.8 °F (37.1 °C) 98.5 °F (36.9 °C)   BP  Min: 96/59  Max: 119/77 119/77     Pulse  Min: 63  Max: 76  69   Resp  Min: 16  Max: 18 16   SpO2  Min: 87 %  Max: 97 % (!) 87 %         Recent Labs   Lab 11/05/22  0616 11/09/22  0351   WBC 8.6 9.1   RBC 2.42* 2.32*   HGB 8.3* 8.0*   HCT 27.3* 26.0*   .8* 112.1*   MCH 34.3* 34.5*   MCHC 30.4* 30.8*   RDW 13.6 13.9    * 439*   MPV 9.4 9.8       Recent Labs   Lab 11/05/22  0616 11/09/22  0351    141   K 4.0 4.4   CO2 26 24   BUN 11.0 10.0   CREATININE 0.83 0.83   CALCIUM 9.2 8.8          Microbiology Results (last 7 days)       ** No results found for the last 168 hours. **             See below for Radiology    Scheduled Med:   amLODIPine  10 mg Oral Daily    ARIPiprazole  2 mg Oral Daily    atorvastatin  10 mg Oral Daily    busPIRone  10 mg Oral TID    enoxaparin  40 mg Subcutaneous Daily    EScitalopram oxalate  20 mg Oral Daily    famotidine  20 mg Oral BID    folic acid  1 mg Oral Daily    gabapentin  300 mg Oral TID    labetaloL  200 mg Oral BID    multivitamin  1 tablet Oral Daily    nicotine  1 patch Transdermal Daily    ondansetron  4 mg Intravenous Once    polyethylene glycol  17 g Oral Daily    sodium zirconium cyclosilicate  10 g Oral Once    thiamine  100 mg Oral Daily        Continuous Infusions:       PRN Meds:  acetaminophen, acetaminophen, ALPRAZolam, aluminum-magnesium hydroxide-simethicone **AND** [COMPLETED] LIDOcaine HCl 2%, cloNIDine, cyclobenzaprine, dextrose 50%, dextrose 50%, diphenhydrAMINE, glucagon (human recombinant), glucose, glucose, hydrALAZINE, hydrALAZINE, insulin aspart U-100, labetalol, ondansetron, oxyCODONE, sodium chloride 0.9%       Assessment/Plan:  Right IT fracture S/P Intramedullary nailing of right intertrochanteric femur fracture 10/27  Essential Hypertension  Hyperlipidemia   Diabetes Mellitus type 2   Anxiety/Depression/ panic attacks   Tobacco abuse  Macrocytic Anemia likely 2/2 EtOH Use    S/p Intramedullary nailing of right intertrochanteric femur racture  POD 13  Patient having episodes of panic attacks which seems to respond to prn xanax   Advised to continue moving with PT--> recommended rehab/SNF  Current meds reviewed and will continue   Cont sliding scale, blood sugars stable.   Cont current tx plan  Continue strict aspiration, fall and decubitus precautions     Morning that is stable    VTE prophylaxis: Lovenox    Patient condition:  Fair    Anticipated discharge and Disposition:   Rehab/ SNF when accepted.  Patient is medically cleared for discharge      All diagnosis and differential diagnosis have been reviewed; assessment and plan has been documented; I have personally reviewed the labs and test results that are presently available; I have reviewed the patients medication list; I have reviewed the consulting providers response and recommendations. I have reviewed or attempted to review medical records based upon their availability    All of the patient's questions have been  addressed and answered. Patient's is agreeable to the above stated plan. I will continue to monitor closely and make adjustments to medical management as needed.  _____________________________________________________________________    Nutrition Status:    Radiology:  CV Ultrasound doppler venous DVT leg right  There was no evidence of deep or superficial vein thrombosis in the right   lower extremity.      Adam Weston MD   11/09/2022

## 2022-11-10 LAB
POCT GLUCOSE: 112 MG/DL (ref 70–110)
POCT GLUCOSE: 98 MG/DL (ref 70–110)

## 2022-11-10 PROCEDURE — 25000003 PHARM REV CODE 250: Performed by: INTERNAL MEDICINE

## 2022-11-10 PROCEDURE — 25000003 PHARM REV CODE 250: Performed by: PHYSICIAN ASSISTANT

## 2022-11-10 PROCEDURE — 25000003 PHARM REV CODE 250: Performed by: STUDENT IN AN ORGANIZED HEALTH CARE EDUCATION/TRAINING PROGRAM

## 2022-11-10 PROCEDURE — 97110 THERAPEUTIC EXERCISES: CPT | Mod: CQ

## 2022-11-10 PROCEDURE — 63600175 PHARM REV CODE 636 W HCPCS: Performed by: INTERNAL MEDICINE

## 2022-11-10 PROCEDURE — 11000001 HC ACUTE MED/SURG PRIVATE ROOM

## 2022-11-10 PROCEDURE — 97535 SELF CARE MNGMENT TRAINING: CPT

## 2022-11-10 PROCEDURE — S4991 NICOTINE PATCH NONLEGEND: HCPCS | Performed by: PHYSICIAN ASSISTANT

## 2022-11-10 PROCEDURE — 25000003 PHARM REV CODE 250: Performed by: NURSE PRACTITIONER

## 2022-11-10 PROCEDURE — 97116 GAIT TRAINING THERAPY: CPT | Mod: CQ

## 2022-11-10 RX ADMIN — FAMOTIDINE 20 MG: 20 TABLET, FILM COATED ORAL at 08:11

## 2022-11-10 RX ADMIN — ATORVASTATIN CALCIUM 10 MG: 10 TABLET, FILM COATED ORAL at 08:11

## 2022-11-10 RX ADMIN — ENOXAPARIN SODIUM 40 MG: 40 INJECTION SUBCUTANEOUS at 05:11

## 2022-11-10 RX ADMIN — NICOTINE 1 PATCH: 14 PATCH, EXTENDED RELEASE TRANSDERMAL at 08:11

## 2022-11-10 RX ADMIN — FOLIC ACID 1 MG: 1 TABLET ORAL at 08:11

## 2022-11-10 RX ADMIN — GABAPENTIN 300 MG: 300 CAPSULE ORAL at 08:11

## 2022-11-10 RX ADMIN — OXYCODONE 5 MG: 5 TABLET ORAL at 04:11

## 2022-11-10 RX ADMIN — ARIPIPRAZOLE 2 MG: 2 TABLET ORAL at 08:11

## 2022-11-10 RX ADMIN — BUSPIRONE HYDROCHLORIDE 10 MG: 5 TABLET ORAL at 08:11

## 2022-11-10 RX ADMIN — BUSPIRONE HYDROCHLORIDE 10 MG: 5 TABLET ORAL at 05:11

## 2022-11-10 RX ADMIN — OXYCODONE 5 MG: 5 TABLET ORAL at 08:11

## 2022-11-10 RX ADMIN — Medication 100 MG: at 08:11

## 2022-11-10 RX ADMIN — THERA TABS 1 TABLET: TAB at 08:11

## 2022-11-10 RX ADMIN — ESCITALOPRAM OXALATE 20 MG: 10 TABLET ORAL at 08:11

## 2022-11-10 RX ADMIN — POLYETHYLENE GLYCOL 3350 17 G: 17 POWDER, FOR SOLUTION ORAL at 08:11

## 2022-11-10 RX ADMIN — LABETALOL HYDROCHLORIDE 200 MG: 200 TABLET, FILM COATED ORAL at 08:11

## 2022-11-10 RX ADMIN — GABAPENTIN 300 MG: 300 CAPSULE ORAL at 05:11

## 2022-11-10 NOTE — PT/OT/SLP PROGRESS
Occupational Therapy   Treatment    Name: Luz Maria Alfaro  MRN: [id number]  Admitting Diagnosis:  Closed right hip fracture  14 Days Post-Op    Recommendations:     Discharge Recommendations: nursing facility, skilled, home with home health  Discharge Equipment Recommendations:     Barriers to discharge:       Assessment:     Luz Maria Alfaro is a 62 y.o. female with a medical diagnosis of Closed right hip fracture.  She presents with the following performance deficits affecting function are weakness, impaired endurance, pain, impaired self care skills, impaired functional mobility.     Rehab Prognosis:  Good; patient would benefit from acute skilled OT services to address these deficits and reach maximum level of function.       Plan:     Patient to be seen 5 x/week, 6 x/week to address the above listed problems via self-care/home management, therapeutic activities, therapeutic exercises  Plan of Care Expires: 11/11/22  Plan of Care Reviewed with: patient    Subjective     Pain/Comfort:  Pain Rating 1: 5/10  Location - Side 1: Right  Location 1: leg    Objective:     Communicated with: nrsg prior to session.  Patient found HOB elevated with upon OT entry to room.    General Precautions: Standard, fall   Orthopedic Precautions:RLE weight bearing as tolerated   Braces:    Respiratory Status: Nasal cannula, flow 3 L/min     Occupational Performance:     Bed Mobility:    Patient completed Supine to Sit with stand by assistance  Patient completed Sit to Supine with stand by assistance     Functional Mobility/Transfers:  Patient completed Sit <> Stand Transfer with stand by assistance  with  rolling walker   Patient completed Toilet Transfer Step Transfer technique with stand by assistance with  rolling walker  Functional Mobility: no LOB     Activities of Daily Living:  Grooming: stand by assistance standing at sink for oral care   Lower Body Dressing: stand by assistance using hip kit at EOB for BLE socks   Toileting:  stand by assistance for pericare and don/doff new brief       [facility] 6 Click ADL:      Treatment & Education:  --    Patient left HOB elevated with all lines intact and call button in reach    GOALS:   Multidisciplinary Problems       Occupational Therapy Goals          Problem: Occupational Therapy    Goal Priority Disciplines Outcome Interventions   Occupational Therapy Goal     OT, PT/OT Ongoing, Progressing    Description: Goals to be met by: 11/11/22    Patient will increase functional independence with ADLs by performing:    LE Dressing with Modified [place].  Grooming while standing or seated with Modified [place].  Toileting from toilet with Modified [place] for hygiene and clothing management.   Toilet transfer to toilet with Modified [place].                         Time Tracking:     OT Date of Treatment:    OT Start Time: 0945  OT Stop Time: 1023  OT Total Time (min): 38 min    Billable Minutes:Self Care/Home Management 38min    OT/KAREN: OT     KAREN Visit Number: 5    11/10/2022

## 2022-11-10 NOTE — PROGRESS NOTES
[facility] Medicine Progress Note        Chief Complaint: Inpatient Follow-up for Right IT fracture     HPI:   Mrs Luz Maria Alfaro is a 62 year old lady with PMH of hypertension, hyperlipidemia, diabetes mellitus type 2, anxiety/depression, tobacco use, alcohol abuse. The patient presented to [facility] on 10/26/2022 with a primary complaint of right hip pain. Patient was drinking the night before night and fell out of bed.  She was unable to give further details but remembered she woke up on the floor calling for help sometime after midnight. She admits to drinking 4 beers daily. Labs notable for sodium 128, potassium 5.3, chloride 96, CO2 19, alkaline phosphatase 158, alcohol level 239. Chest x-ray without acute abnormalities. X-ray right hip with a minimally displaced intertrochanteric fracture for which she underwent intramedullary nailing. PT working with patient, plan for placement into Rehab vs SNF. CM received rejection from insurance for Rehab placement. Working on placement. She was making a good clinical recovery.     Interval Hx:   Patient awake and laying in bed, reports she is working with therapy and doing ok, still waiting on placement  No family is at bedside  Case discussed with patient's nurse as well as  on the floor    Objective/physical exam:  General: In no acute distress, afebrile  Chest: Clear to auscultation bilaterally  Heart: RRR, +S1, S2, 2/6 systolic murmur heard  Abdomen: Soft, nontender, BS +  MSK: Right knee with mild swelling and tenderness   Neurologic: Alert and oriented x4, Cranial nerve II-XII intact    VITAL SIGNS: 24 HRS MIN & MAX LAST   Temp  Min: 98.3 °F (36.8 °C)  Max: 98.5 °F (36.9 °C) 98.5 °F (36.9 °C)   BP  Min: 91/51  Max: 119/77 (!) 96/56     Pulse  Min: 64  Max: 69  65   Resp  Min: 16  Max: 18 16   SpO2  Min: 87 %  Max: 96 % 96 %         Recent Labs   Lab 11/05/22  0616 11/09/22  0351   WBC 8.6 9.1   RBC 2.42* 2.32*    HGB 8.3* 8.0*   HCT 27.3* 26.0*   .8* 112.1*   MCH 34.3* 34.5*   MCHC 30.4* 30.8*   RDW 13.6 13.9   * 439*   MPV 9.4 9.8       Recent Labs   Lab 11/05/22  0616 11/09/22  0351    141   K 4.0 4.4   CO2 26 24   BUN 11.0 10.0   CREATININE 0.83 0.83   CALCIUM 9.2 8.8          Microbiology Results (last 7 days)       ** No results found for the last 168 hours. **             See below for Radiology    Scheduled Med:   amLODIPine  10 mg Oral Daily    ARIPiprazole  2 mg Oral Daily    atorvastatin  10 mg Oral Daily    busPIRone  10 mg Oral TID    enoxaparin  40 mg Subcutaneous Daily    EScitalopram oxalate  20 mg Oral Daily    famotidine  20 mg Oral BID    folic acid  1 mg Oral Daily    gabapentin  300 mg Oral TID    labetaloL  200 mg Oral BID    multivitamin  1 tablet Oral Daily    nicotine  1 patch Transdermal Daily    ondansetron  4 mg Intravenous Once    polyethylene glycol  17 g Oral Daily    sodium zirconium cyclosilicate  10 g Oral Once    thiamine  100 mg Oral Daily        Continuous Infusions:       PRN Meds:  acetaminophen, acetaminophen, ALPRAZolam, aluminum-magnesium hydroxide-simethicone **AND** [COMPLETED] LIDOcaine HCl 2%, cloNIDine, cyclobenzaprine, dextrose 50%, dextrose 50%, diphenhydrAMINE, glucagon (human recombinant), glucose, glucose, hydrALAZINE, hydrALAZINE, insulin aspart U-100, labetalol, ondansetron, oxyCODONE, sodium chloride 0.9%       Assessment/Plan:  Right IT fracture S/P Intramedullary nailing of right intertrochanteric femur fracture 10/27  Essential Hypertension  Hyperlipidemia   Diabetes Mellitus type 2   Anxiety/Depression/ panic attacks   Tobacco abuse  Macrocytic Anemia likely 2/2 EtOH Use    S/p Intramedullary nailing of right intertrochanteric femur fracture 10/27  Patient having episodes of panic attacks which seems to respond to prn xanax   Advised to continue moving with PT--> recommended rehab/SNF  Current meds reviewed and will continue   Cont sliding scale,  blood sugars stable.   Cont current tx plan  Continue strict aspiration, fall and decubitus precautions    Morning labs stable  Pending Level 2 for placement     VTE prophylaxis: Lovenox    Patient condition:  Fair    Anticipated discharge and Disposition:   Rehab/ SNF when accepted,awaiting Level 2.  Patient is medically cleared for discharge      All diagnosis and differential diagnosis have been reviewed; assessment and plan has been documented; I have personally reviewed the labs and test results that are presently available; I have reviewed the patients medication list; I have reviewed the consulting providers response and recommendations. I have reviewed or attempted to review medical records based upon their availability    All of the patient's questions have been  addressed and answered. Patient's is agreeable to the above stated plan. I will continue to monitor closely and make adjustments to medical management as needed.  _____________________________________________________________________    Nutrition Status:    Radiology:  CV Ultrasound doppler venous DVT leg right  There was no evidence of deep or superficial vein thrombosis in the right   lower extremity.      Adam Weston MD   11/10/2022

## 2022-11-10 NOTE — PT/OT/SLP PROGRESS
Physical Therapy  Treatment    Luz Maria Alfaro   MRN: [id number]   Admitting Diagnosis: Closed right hip fracture    PT Received On: 11/10/22  PT Start Time: 1408     PT Stop Time: 1433    PT Total Time (min): 25 min       Billable Minutes:  Gait Training 13 and Therapeutic Exercise 12    Treatment Type: Treatment  PT/PTA: PTA     PTA Visit Number: 4       General Precautions: Standard, fall  Orthopedic Precautions: RLE weight bearing as tolerated   Braces:    Respiratory Status: Room air    Spiritual, Cultural Beliefs, [employer]ian Practices, Values that Affect Care: no    Subjective:  Communicated with nurse prior to session.         Objective:   Patient found with: telemetry    Functional Mobility:  Bed Mobility: supine<>sit tf with SBA        Transfers: sit<>stand tf x4 trials with RW and SBA    Pt performed standing therex including hip flexion and calf raises for 2 sets of 10 reps as well as seated LAQs    Gait: Pt amb x85ft with CGA and RW with cues for obstacle negotiation and safety awareness       Balance:   Static Stand: FAIR+: Takes MINIMAL challenges from all directions  Dynamic stand: FAIR: Needs CONTACT GUARD during gait       AM-PAC 6 CLICK MOBILITY  How much help from another person does this patient currently need?   1 = Unable, Total/Dependent Assistance  2 = A lot, Maximum/Moderate Assistance  3 = A little, Minimum/Contact Guard/Supervision  4 = None, Modified [place]/Independent         AM-PAC Raw Score CMS G-Code Modifier Level of Impairment Assistance   6 % Total / Unable   7 - 9 CM 80 - 100% Maximal Assist   10 - 14 CL 60 - 80% Moderate Assist   15 - 19 CK 40 - 60% Moderate Assist   20 - 22 CJ 20 - 40% Minimal Assist   23 CI 1-20% SBA / CGA   24 CH 0% Independent/ Mod I     Patient left HOB elevated with all lines intact and call button in reach.    Assessment:  Luz Maria Alfaro is a 62 y.o. female with a medical diagnosis of Closed right hip fracture and presents with increased  endurance.    Rehab identified problem list/impairments: Rehab identified problem list/impairments: weakness, impaired endurance, decreased safety awareness, pain, impaired cognition, impaired functional mobility    Rehab potential is good.    Activity tolerance: Good    Discharge recommendations: Discharge Facility/Level of Care Needs: nursing facility, skilled     Barriers to discharge:      Equipment recommendations: Equipment Needed After Discharge: walker, rolling     GOALS:   Multidisciplinary Problems       Physical Therapy Goals          Problem: Physical Therapy    Goal Priority Disciplines Outcome Goal Variances Interventions   Physical Therapy Goal     PT, PT/OT Ongoing, Progressing     Description: Goals to be met by: 2022     Patient will increase functional independence with mobility by performin. Supine to sit with Modified [place]  2. Sit to supine with Modified [place]  3. Sit to stand transfer with Modified [place]  4. Gait  x 200 feet with Modified [place] using Rolling Walker.                          PLAN:    Patient to be seen daily  to address the above listed problems via gait training, therapeutic activities, therapeutic exercises  Plan of Care expires: 22  Plan of Care reviewed with: patient         11/10/2022

## 2022-11-10 NOTE — PROGRESS NOTES
[facility] 9th Floor Med Surg  Orthopedics  Progress Note    Patient Name: Luz Maria Alfaro  MRN: [id number]  Admission Date: 10/26/2022  Hospital Length of Stay: 15 days  Attending Provider: Adam Weston MD  Primary Care Provider: Primary Doctor No  Follow-up For: Procedure(s) (LRB):  RIGHT FEMUR INTERTROCH IMN (Right)    Post-Operative Day: 14 Day Post-Op  Subjective:     Principal Problem:Closed right hip fracture    Principal Orthopedic Problem: closed right intertrochanteric femur fracture    Interval History: POD14 IMN R IT. Patient states she is doing well today. Her pain is controlled. She states physical therapy is going well. Placement is pending. No redness/drainage to incision. No new issues/complaints related to right hip.     Review of patient's allergies indicates:   Allergen Reactions    Meperidine Rash       Current Facility-Administered Medications   Medication    acetaminophen tablet 1,000 mg    acetaminophen tablet 650 mg    ALPRAZolam tablet 0.5 mg    aluminum-magnesium hydroxide-simethicone 200-200-20 mg/5 mL suspension 15 mL    amLODIPine tablet 10 mg    ARIPiprazole tablet 2 mg    atorvastatin tablet 10 mg    busPIRone tablet 10 mg    cloNIDine tablet 0.1 mg    cyclobenzaprine tablet 5 mg    dextrose 50% injection 12.5 g    dextrose 50% injection 25 g    diphenhydrAMINE injection 25 mg    enoxaparin injection 40 mg    EScitalopram oxalate tablet 20 mg    famotidine tablet 20 mg    folic acid tablet 1 mg    gabapentin capsule 300 mg    glucagon (human recombinant) injection 1 mg    glucose chewable tablet 16 g    glucose chewable tablet 24 g    hydrALAZINE injection 10 mg    hydrALAZINE injection 10 mg    insulin aspart U-100 injection 1-10 Units    labetaloL injection 10 mg    labetaloL tablet 200 mg    multivitamin tablet    nicotine 14 mg/24 hr 1 patch    ondansetron injection 4 mg    ondansetron injection 4 mg    oxyCODONE immediate release tablet 5 mg    polyethylene glycol  "packet 17 g    sodium chloride 0.9% flush 10 mL    sodium zirconium cyclosilicate packet 10 g    thiamine tablet 100 mg     Objective:     Vital Signs (Most Recent):  Temp: 97.9 °F (36.6 °C) (11/10/22 0752)  Pulse: 66 (11/10/22 0752)  Resp: 18 (11/10/22 0857)  BP: 109/63 (11/10/22 0752)  SpO2: 98 % (11/10/22 0752)   Vital Signs (24h Range):  Temp:  [97.9 °F (36.6 °C)-98.5 °F (36.9 °C)] 97.9 °F (36.6 °C)  Pulse:  [64-66] 66  Resp:  [16-18] 18  SpO2:  [93 %-98 %] 98 %  BP: ()/(51-86) 109/63     Weight: 54.4 kg (120 lb)  Height: 5' 3" (160 cm)  Body mass index is 21.26 kg/m².      Intake/Output Summary (Last 24 hours) at 11/10/2022 1000  Last data filed at 11/9/2022 2300  Gross per 24 hour   Intake 360 ml   Output 100 ml   Net 260 ml         Ortho/SPM Exam  General: AAOx4. Well nourished, well perfused, no distress.   R LE:   Dressing clean, dry, intact  Dressing removed for exam; incisions are well healed; clean and dry with no erythema or drainage.   Tolerates passive hip ROM with minimal pain  Thigh is soft and compressible  No calf swelling or tenderness  Actively dorsi/plantar flexes foot  BCR distally  SLT intact distally      Significant Labs: BMP:   Recent Labs   Lab 11/09/22 0351      K 4.4   CO2 24   BUN 10.0   CREATININE 0.83   CALCIUM 8.8       CBC:   Recent Labs   Lab 11/09/22 0351   WBC 9.1   HGB 8.0*   HCT 26.0*   *       All pertinent labs within the past 24 hours have been reviewed.    Significant Imaging: X-Ray: I have reviewed all pertinent results/findings and my personal findings are:  post op xray right hip demonstrates good fracture alignment with all hardware intact    Assessment/Plan:     Active Diagnoses:    Diagnosis Date Noted POA    PRINCIPAL PROBLEM:  Closed right hip fracture [S72.001A] 10/26/2022 Yes      Problems Resolved During this Admission:     Pt is doing well today 2 weeks s/p IMN R IT. Pain controlled. Continue judicious pain management. Continue PT daily. " WBAT R LE. Full ROM R LE. Lovenox for dvt ppx. Incisions are well healed and free of any signs of infection. Will have staples removed today. Ok to shower and leave open to air. Placement is pending. F/u with Dr. Brambila in 1 month for xrays. Please call with any questions or concerns.    The above findings, diagnosis, and treatment plan were discussed with Dr. Stewart Brambila who is in agreement.          ALEKS Montesinos  Orthopedics  [facility] - 9th Floor Med Surg

## 2022-11-11 VITALS
WEIGHT: 120 LBS | HEART RATE: 71 BPM | HEIGHT: 63 IN | BODY MASS INDEX: 21.26 KG/M2 | SYSTOLIC BLOOD PRESSURE: 119 MMHG | DIASTOLIC BLOOD PRESSURE: 66 MMHG | RESPIRATION RATE: 18 BRPM | TEMPERATURE: 98 F | OXYGEN SATURATION: 94 %

## 2022-11-11 PROBLEM — F10.920 ACUTE ALCOHOLIC INTOXICATION WITHOUT COMPLICATION: Status: ACTIVE | Noted: 2022-11-11

## 2022-11-11 PROBLEM — F10.920 ACUTE ALCOHOLIC INTOXICATION WITHOUT COMPLICATION: Status: RESOLVED | Noted: 2022-11-11 | Resolved: 2022-11-11

## 2022-11-11 PROBLEM — I82.409 DVT (DEEP VENOUS THROMBOSIS): Status: ACTIVE | Noted: 2022-11-11

## 2022-11-11 LAB
POCT GLUCOSE: 154 MG/DL (ref 70–110)
POCT GLUCOSE: 96 MG/DL (ref 70–110)

## 2022-11-11 PROCEDURE — 25000003 PHARM REV CODE 250: Performed by: INTERNAL MEDICINE

## 2022-11-11 PROCEDURE — 25000003 PHARM REV CODE 250: Performed by: PHYSICIAN ASSISTANT

## 2022-11-11 PROCEDURE — 97168 OT RE-EVAL EST PLAN CARE: CPT

## 2022-11-11 PROCEDURE — S4991 NICOTINE PATCH NONLEGEND: HCPCS | Performed by: PHYSICIAN ASSISTANT

## 2022-11-11 PROCEDURE — 97164 PT RE-EVAL EST PLAN CARE: CPT

## 2022-11-11 PROCEDURE — 25000003 PHARM REV CODE 250: Performed by: NURSE PRACTITIONER

## 2022-11-11 PROCEDURE — 25000003 PHARM REV CODE 250: Performed by: STUDENT IN AN ORGANIZED HEALTH CARE EDUCATION/TRAINING PROGRAM

## 2022-11-11 PROCEDURE — 97535 SELF CARE MNGMENT TRAINING: CPT

## 2022-11-11 RX ORDER — FAMOTIDINE 20 MG/1
20 TABLET, FILM COATED ORAL 2 TIMES DAILY
Qty: 30 TABLET | Refills: 0 | Status: ON HOLD | OUTPATIENT
Start: 2022-11-11 | End: 2022-11-26

## 2022-11-11 RX ORDER — ARIPIPRAZOLE 2 MG/1
2 TABLET ORAL DAILY
Qty: 30 TABLET | Refills: 1 | Status: ON HOLD | OUTPATIENT
Start: 2022-11-12

## 2022-11-11 RX ORDER — TRAMADOL HYDROCHLORIDE 50 MG/1
50 TABLET ORAL EVERY 6 HOURS
Qty: 20 TABLET | Refills: 0 | Status: SHIPPED | OUTPATIENT
Start: 2022-11-11 | End: 2022-11-21 | Stop reason: SDUPTHER

## 2022-11-11 RX ORDER — BUSPIRONE HYDROCHLORIDE 10 MG/1
10 TABLET ORAL 3 TIMES DAILY
Qty: 90 TABLET | Refills: 1 | Status: ON HOLD | OUTPATIENT
Start: 2022-11-11

## 2022-11-11 RX ORDER — LANOLIN ALCOHOL/MO/W.PET/CERES
100 CREAM (GRAM) TOPICAL DAILY
Status: ON HOLD | COMMUNITY
Start: 2022-11-12

## 2022-11-11 RX ORDER — IBUPROFEN 200 MG
1 TABLET ORAL DAILY
Qty: 28 PATCH | Refills: 0 | Status: SHIPPED | OUTPATIENT
Start: 2022-11-12 | End: 2024-03-20

## 2022-11-11 RX ORDER — AMLODIPINE BESYLATE 10 MG/1
10 TABLET ORAL DAILY
Qty: 30 TABLET | Refills: 1 | Status: ON HOLD | OUTPATIENT
Start: 2022-11-12

## 2022-11-11 RX ADMIN — ESCITALOPRAM OXALATE 20 MG: 10 TABLET ORAL at 09:11

## 2022-11-11 RX ADMIN — OXYCODONE 5 MG: 5 TABLET ORAL at 06:11

## 2022-11-11 RX ADMIN — OXYCODONE 5 MG: 5 TABLET ORAL at 01:11

## 2022-11-11 RX ADMIN — ARIPIPRAZOLE 2 MG: 2 TABLET ORAL at 09:11

## 2022-11-11 RX ADMIN — BUSPIRONE HYDROCHLORIDE 10 MG: 5 TABLET ORAL at 09:11

## 2022-11-11 RX ADMIN — AMLODIPINE BESYLATE 10 MG: 5 TABLET ORAL at 09:11

## 2022-11-11 RX ADMIN — ATORVASTATIN CALCIUM 10 MG: 10 TABLET, FILM COATED ORAL at 09:11

## 2022-11-11 RX ADMIN — THERA TABS 1 TABLET: TAB at 09:11

## 2022-11-11 RX ADMIN — BUSPIRONE HYDROCHLORIDE 10 MG: 5 TABLET ORAL at 03:11

## 2022-11-11 RX ADMIN — NICOTINE 1 PATCH: 14 PATCH, EXTENDED RELEASE TRANSDERMAL at 09:11

## 2022-11-11 RX ADMIN — FAMOTIDINE 20 MG: 20 TABLET, FILM COATED ORAL at 09:11

## 2022-11-11 RX ADMIN — POLYETHYLENE GLYCOL 3350 17 G: 17 POWDER, FOR SOLUTION ORAL at 09:11

## 2022-11-11 RX ADMIN — GABAPENTIN 300 MG: 300 CAPSULE ORAL at 09:11

## 2022-11-11 RX ADMIN — OXYCODONE 5 MG: 5 TABLET ORAL at 02:11

## 2022-11-11 RX ADMIN — FOLIC ACID 1 MG: 1 TABLET ORAL at 09:11

## 2022-11-11 RX ADMIN — GABAPENTIN 300 MG: 300 CAPSULE ORAL at 03:11

## 2022-11-11 RX ADMIN — Medication 100 MG: at 09:11

## 2022-11-11 RX ADMIN — LABETALOL HYDROCHLORIDE 200 MG: 200 TABLET, FILM COATED ORAL at 09:11

## 2022-11-11 NOTE — PLAN OF CARE
Problem: Physical Therapy  Goal: Physical Therapy Goal  Description: Goals to be met by: 2022     Patient will increase functional independence with mobility by performin. Supine to sit with Modified [place]  2. Sit to supine with Modified [place]  3. Sit to stand transfer with Modified [place]  4. Gait  x 200 feet with Modified [place] using Rolling Walker.     Outcome: Ongoing, Progressing

## 2022-11-11 NOTE — PT/OT/SLP RE-EVAL
Physical Therapy Re-evaluation    Patient Name:  Luz Maria Alfaro   MRN:  [id number]    Recommendations:     Discharge Recommendations:  home health PT   Discharge Equipment Recommendations: walker, rolling   Barriers to discharge: None    Assessment:     Luz Maria Alfaro is a 62 y.o. female admitted with a medical diagnosis of Closed right hip fracture.  She presents with the following impairments/functional limitations:  weakness, impaired self care skills, impaired functional mobility, impaired endurance. Patient has made great progress with PT. I feel it is safe for patient to return home with  PT. She will have a granddaughter home with her during the day to assist with any needs.     Rehab Prognosis:  good; patient would benefit from acute skilled PT services to address these deficits and reach maximum level of function.      Recent Surgery: Procedure(s) (LRB):  RIGHT FEMUR INTERTROCH IMN (Right) 15 Days Post-Op    Plan:     During this hospitalization, patient to be seen daily to address the above listed problems via gait training, therapeutic activities, therapeutic exercises, neuromuscular re-education  Plan of Care Expires:  11/26/22  Plan of Care Reviewed with: patient    Subjective     Communicated with nurse prior to session.  Patient found HOB elevated with telemetry upon PT entry to room, agreeable to evaluation.      Chief Complaint: none  Patient comments/goals: none  Pain/Comfort:  Pain Rating 1: 3/10  Location - Side 1: Right  Location 1: leg  Pain Addressed 1: Distraction, Reposition  Pain Rating Post-Intervention 1: 3/10    Patients cultural, spiritual, [employer] conflicts given the current situation: no      Objective:     Patient found with: telemetry     General Precautions: Standard, fall   Orthopedic Precautions:RLE weight bearing as tolerated   Braces: N/A  Respiratory Status: Room air    Exams:  Cognitive Exam:  Patient is oriented to Person, Place, Time, and Situation  Sensation:    -        Intact  RLE ROM: WFL  RLE Strength: +3/5 globally  LLE ROM: WFL  LLE Strength: WFL    Functional Mobility:  Bed Mobility:     Supine to Sit: stand by assistance  Sit to Supine: stand by assistance  Transfers:     Sit to Stand:  stand by assistance with rolling walker  Gait: 128 ft with RW & SBA  Balance: fair    AM-PAC 6 CLICK MOBILITY  Total Score:23     Patient left HOB elevated with all lines intact and call button in reach.    GOALS:   Multidisciplinary Problems       Physical Therapy Goals          Problem: Physical Therapy    Goal Priority Disciplines Outcome Goal Variances Interventions   Physical Therapy Goal     PT, PT/OT Ongoing, Progressing     Description: Goals to be met by: 2022     Patient will increase functional independence with mobility by performin. Supine to sit with Modified [place]  2. Sit to supine with Modified [place]  3. Sit to stand transfer with Modified [place]  4. Gait  x 200 feet with Modified [place] using Rolling Walker.                          History:     Past Medical History:   Diagnosis Date    Benign essential HTN     Mixed hyperlipidemia     Type 2 diabetes mellitus without complications        Past Surgical History:   Procedure Laterality Date    INTRAMEDULLARY RODDING OF FEMUR Right 10/27/2022    Procedure: RIGHT FEMUR INTERTROCH IMN;  Surgeon: Stewart Brambila MD;  Location: [facility];  Service: Orthopedics;  Laterality: Right;  [place]opold table, supine  3rd case  Synthes Short TFNA       Time Tracking:     PT Received On: 22  PT Start Time: 819     PT Stop Time: 834  PT Total Time (min): 15 min     Billable Minutes: Evaluation 15 minutes      2022

## 2022-11-11 NOTE — PT/OT/SLP RE-EVAL
Occupational Therapy   Re-evaluation    Name: Luz Maria Alfaro  MRN: [id number]  Admitting Diagnosis:  Closed right hip fracture  Recent Surgery: Procedure(s) (LRB):  RIGHT FEMUR INTERTROCH IMN (Right) 15 Days Post-Op    Recommendations:     Discharge Recommendations: home with home health  Discharge Equipment Recommendations:     Barriers to discharge:       Assessment:     Luz Maria Alfaro is a 62 y.o. female with a medical diagnosis of Closed right hip fracture.  She presents with the following performance deficits affecting function are impaired self care skills, impaired endurance.      Rehab Prognosis:  good ; patient would benefit from acute skilled OT services to address these deficits and reach maximum level of function.       Plan:     Patient to be seen 5 x/week to address the above listed problems via self-care/home management, therapeutic activities, therapeutic exercises  Plan of Care Expires: 11/11/22  Plan of Care Reviewed with: patient    Subjective     Chief Complaint: --  Patient/Family stated goals: --  Communicated with: -- prior to session.  Pain/Comfort:  Pain Rating 1: 0/10    Objective:     Communicated with: -- prior to session.  Patient found HOB elevated with: bed alarm upon OT entry to room.    General Precautions: Standard, fall   Orthopedic Precautions:RLE weight bearing as tolerated   Braces:    Respiratory Status: Nasal cannula, flow 3 L/min    Occupational Performance:    Bed Mobility:    Patient completed Supine to Sit with supervision  Patient completed Sit to Supine with supervision    Functional Mobility/Transfers:  Patient completed Sit <> Stand Transfer with supervision  with  rolling walker   Functional Mobility: no lob   Pt t/f to chair with RW ; sat in chair for 10min but refused to stay in chair longer; therefore, t/f back to bed with RW and SBA ; no LOB     Activities of Daily Living:  Grooming: modified independence standing at sink with RW in front for oral care   Lower  Body Dressing: modified independence with hip kit for don socks     Cognitive/Visual Perceptual:  Cognitive/Psychosocial Skills:     -       Oriented to: Person, Place, and Time     Physical Exam:  Upper Extremity Strength:    -       Right Upper Extremity: WFL  -       Left Upper Extremity: WFL    AMPA 6 Click:  [facility] Total Score:      Treatment & Education:  --    Patient left HOB elevated with all lines intact and call button in reach    GOALS:   Multidisciplinary Problems       Occupational Therapy Goals          Problem: Occupational Therapy    Goal Priority Disciplines Outcome Interventions   Occupational Therapy Goal     OT, PT/OT Ongoing, Progressing    Description: Goals to be met by: 11/11/22    Patient will increase functional independence with ADLs by performing:    LE Dressing with Modified [place].  Grooming while standing or seated with Modified [place].  Toileting from toilet with Modified [place] for hygiene and clothing management.   Toilet transfer to toilet with Modified [place].                         History:     Past Medical History:   Diagnosis Date    Benign essential HTN     Mixed hyperlipidemia     Type 2 diabetes mellitus without complications          Past Surgical History:   Procedure Laterality Date    INTRAMEDULLARY RODDING OF FEMUR Right 10/27/2022    Procedure: RIGHT FEMUR INTERTROCH IMN;  Surgeon: Stewart Brambila MD;  Location: [facility];  Service: Orthopedics;  Laterality: Right;  [place] table, supine  3rd case  Synthes Short TFNA       Time Tracking:     OT Date of Treatment:    OT Start Time: 1038  OT Stop Time: 1111  OT Total Time (min): 33 min    Billable Minutes:Re-eval 20  Self Care/Home Management 13    11/11/2022

## 2022-11-11 NOTE — DISCHARGE SUMMARY
,[facility]  Hospital Medicine Discharge Summary    Admit Date: 10/26/2022  Discharge Date and Time: 11/11/202211:46 AM  Admitting Physician:  Team  Discharging Physician: Adam Weston MD.  Primary Care Physician: Robyn Matthews NP  Consults: Orthopedic Surgery and Psychiatry    Discharge Diagnoses:  Right IT fracture S/P Intramedullary nailing of right intertrochanteric femur fracture 10/27  Essential Hypertension  Hyperlipidemia   Diabetes Mellitus type 2   Anxiety/Depression/ panic attacks   Tobacco abuse  Macrocytic Anemia likely 2/2 EtOH Use    Hospital Course:   Mrs Luz Maria Alfaro is a 62 year old lady with PMH of hypertension, hyperlipidemia, diabetes mellitus type 2, anxiety/depression, tobacco use, alcohol abuse. The patient presented to [facility] on 10/26/2022 with a primary complaint of right hip pain. Patient was drinking the night before night and fell out of bed.  She was unable to give further details but remembered she woke up on the floor calling for help sometime after midnight. She admits to drinking 4 beers daily. Labs notable for sodium 128, potassium 5.3, chloride 96, CO2 19, alkaline phosphatase 158, alcohol level 239. Chest x-ray without acute abnormalities. X-ray right hip with a minimally displaced intertrochanteric fracture for which she underwent intramedullary nailing. PT working with patient, plan for placement into Rehab vs SNF. CM received rejection from insurance for Rehab placement. Working on placement. She was making a good clinical recovery.  S/p Intramedullary nailing of right intertrochanteric femur fracture 10/27  Patient having episodes of panic attacks which seems to respond to prn xanax   Current meds reviewed and will continue   Cont sliding scale, blood sugars stable.   Cont current tx plan  Continue strict aspiration, fall and decubitus precautions    Morning labs stable  Patient has been successfully rehabbed in the hospital per therapist  and has now been cleared to be discharged home with home health.  Patient is medically cleared for discharge as well      Pt was seen and examined on the day of discharge  Vitals:  VITAL SIGNS: 24 HRS MIN & MAX LAST   Temp  Min: 98.2 °F (36.8 °C)  Max: 99 °F (37.2 °C) 98.2 °F (36.8 °C)   BP  Min: 105/62  Max: 125/73 118/73   Pulse  Min: 67  Max: 78  68   Resp  Min: 16  Max: 18 16   SpO2  Min: 92 %  Max: 99 % (!) 92 %         Physical Exam:  General: In no acute distress, afebrile  Chest: Clear to auscultation bilaterally  Heart: RRR, +S1, S2, 2/6 systolic murmur heard  Abdomen: Soft, nontender, BS +  MSK: ambulating independently now with a walker    Neurologic: Alert and oriented x4, Cranial nerve II-XII intact    Significant Diagnostic Studies: See Full reports for all details    Recent Labs   Lab 11/05/22 0616 11/09/22  0351   WBC 8.6 9.1   RBC 2.42* 2.32*   HGB 8.3* 8.0*   HCT 27.3* 26.0*   .8* 112.1*   MCH 34.3* 34.5*   MCHC 30.4* 30.8*   RDW 13.6 13.9   * 439*   MPV 9.4 9.8       Recent Labs   Lab 11/05/22  0616 11/09/22  0351    141   K 4.0 4.4   CO2 26 24   BUN 11.0 10.0   CREATININE 0.83 0.83   CALCIUM 9.2 8.8        Microbiology Results (last 7 days)       ** No results found for the last 168 hours. **             CV Ultrasound doppler venous DVT leg right  There was no evidence of deep or superficial vein thrombosis in the right   lower extremity.         Medication List        START taking these medications      amLODIPine 10 MG tablet  Commonly known as: NORVASC  Take 1 tablet (10 mg total) by mouth once daily.  Start taking on: November 12, 2022     ARIPiprazole 2 MG Tab  Commonly known as: ABILIFY  Take 1 tablet (2 mg total) by mouth once daily.  Start taking on: November 12, 2022     famotidine 20 MG tablet  Commonly known as: PEPCID  Take 1 tablet (20 mg total) by mouth 2 (two) times daily. for 15 days     multivitamin Tab  Take 1 tablet by mouth once daily.  Start taking on:  November 12, 2022     nicotine 14 mg/24 hr  Commonly known as: NICODERM CQ  Place 1 patch onto the skin once daily.  Start taking on: November 12, 2022     thiamine 100 MG tablet  Take 1 tablet (100 mg total) by mouth once daily.  Start taking on: November 12, 2022     traMADoL 50 mg tablet  Commonly known as: ULTRAM  Take 1 tablet (50 mg total) by mouth every 6 (six) hours. for 5 days            CHANGE how you take these medications      busPIRone 10 MG tablet  Commonly known as: BUSPAR  Take 1 tablet (10 mg total) by mouth 3 (three) times daily.  What changed:   medication strength  how much to take  when to take this            CONTINUE taking these medications      atorvastatin 10 MG tablet  Commonly known as: LIPITOR     EScitalopram oxalate 20 MG tablet  Commonly known as: LEXAPRO     folic acid 1 MG tablet  Commonly known as: FOLVITE     gabapentin 300 MG capsule  Commonly known as: NEURONTIN     labetaloL 200 MG tablet  Commonly known as: NORMODYNE            STOP taking these medications      dicyclomine 20 mg tablet  Commonly known as: BENTYL               Where to Get Your Medications        These medications were sent to [facility] [address] [place] [address] Floor 1  [address] [facility] Floor [address] [address]      Phone: [phone]   amLODIPine 10 MG tablet  ARIPiprazole 2 MG Tab  busPIRone 10 MG tablet  famotidine 20 MG tablet  nicotine 14 mg/24 hr  traMADoL 50 mg tablet       You can get these medications from any pharmacy    You don't need a prescription for these medications  multivitamin Tab  thiamine 100 MG tablet          Explained in detail to the patient about the discharge plan, medications, and follow-up visits. Pt understands and agrees with the treatment plan  Discharge Disposition: Home with    Discharged Condition: stable  Diet-   Dietary Orders (From admission, onward)       Start     Ordered    10/28/22 1519  Diet diabetic  Diet  effective now         10/28/22 1519                   Medications Per DC med rec  Activities as tolerated   Follow-up Information       Stewart Brambila MD Follow up in 1 month(s).    Specialty: Orthopedic Surgery  Contact information:  [address] [place] [address] [phone]               Robyn Matthews NP. Schedule an appointment as soon as possible for a visit in 1 week(s).    Specialties: Urgent Care, Emergency Medicine  Why: post discharge for diabetes mx  Contact information:  [address] [place] [id number] [phone]                           For further questions contact hospitalist office    Discharge time 33 minutes    For worsening symptoms, chest pain, shortness of breath, increased abdominal pain, high grade fever, stroke or stroke like symptoms, immediately go to the nearest Emergency Room or call 911 as soon as possible.      Adam Weston MD  Department of Hospital Medicine   [facility]   11/11/2022 11:46 AM

## 2022-11-11 NOTE — PLAN OF CARE
Spoke with Jayson paiz [place] to let her know that pt no longer requires SNF placement and is being discharged home today.

## 2022-11-11 NOTE — PLAN OF CARE
11/11/22 1202   Final Note   Assessment Type Final Discharge Note   Anticipated Discharge Disposition Home-Health   Hospital Resources/Appts/Education Provided Post-Acute resouces added to AVS   Post-Acute Status   Post-Acute Authorization Home Health   Home Health Status Set-up Complete/Auth obtained  ([facility] per medicaid rotation)

## 2022-11-19 PROCEDURE — G0180 MD CERTIFICATION HHA PATIENT: HCPCS | Mod: ,,, | Performed by: ORTHOPAEDIC SURGERY

## 2022-11-19 PROCEDURE — G0180 PR HOME HEALTH MD CERTIFICATION: ICD-10-PCS | Mod: ,,, | Performed by: ORTHOPAEDIC SURGERY

## 2022-11-21 DIAGNOSIS — S72.001D CLOSED FRACTURE OF RIGHT HIP WITH ROUTINE HEALING, SUBSEQUENT ENCOUNTER: Primary | ICD-10-CM

## 2022-11-21 RX ORDER — TRAMADOL HYDROCHLORIDE 50 MG/1
50 TABLET ORAL EVERY 6 HOURS PRN
Qty: 20 TABLET | Refills: 0 | Status: SHIPPED | OUTPATIENT
Start: 2022-11-21 | End: 2022-12-02 | Stop reason: SDUPTHER

## 2022-12-02 DIAGNOSIS — S72.001D CLOSED FRACTURE OF RIGHT HIP WITH ROUTINE HEALING, SUBSEQUENT ENCOUNTER: ICD-10-CM

## 2022-12-02 RX ORDER — TRAMADOL HYDROCHLORIDE 50 MG/1
50 TABLET ORAL EVERY 6 HOURS PRN
Qty: 20 TABLET | Refills: 0 | Status: SHIPPED | OUTPATIENT
Start: 2022-12-02 | End: 2022-12-08 | Stop reason: SDUPTHER

## 2022-12-06 ENCOUNTER — EXTERNAL HOME HEALTH (OUTPATIENT)
Dept: HOME HEALTH SERVICES | Facility: HOSPITAL | Age: 62
End: 2022-12-06
Payer: MEDICAID

## 2022-12-08 DIAGNOSIS — S72.001D CLOSED FRACTURE OF RIGHT HIP WITH ROUTINE HEALING, SUBSEQUENT ENCOUNTER: ICD-10-CM

## 2022-12-09 RX ORDER — TRAMADOL HYDROCHLORIDE 50 MG/1
50 TABLET ORAL EVERY 6 HOURS PRN
Qty: 28 TABLET | Refills: 0 | Status: SHIPPED | OUTPATIENT
Start: 2022-12-09 | End: 2022-12-29 | Stop reason: SDUPTHER

## 2022-12-15 ENCOUNTER — OFFICE VISIT (OUTPATIENT)
Dept: ORTHOPEDICS | Facility: CLINIC | Age: 62
End: 2022-12-15
Payer: MEDICAID

## 2022-12-15 ENCOUNTER — HOSPITAL ENCOUNTER (OUTPATIENT)
Dept: RADIOLOGY | Facility: CLINIC | Age: 62
Discharge: HOME | End: 2022-12-15
Attending: ORTHOPAEDIC SURGERY
Payer: MEDICAID

## 2022-12-15 VITALS
BODY MASS INDEX: 21.25 KG/M2 | HEART RATE: 104 BPM | DIASTOLIC BLOOD PRESSURE: 89 MMHG | HEIGHT: 63 IN | SYSTOLIC BLOOD PRESSURE: 152 MMHG | WEIGHT: 119.94 LBS | RESPIRATION RATE: 18 BRPM

## 2022-12-15 DIAGNOSIS — S72.001D CLOSED FRACTURE OF RIGHT HIP WITH ROUTINE HEALING, SUBSEQUENT ENCOUNTER: Primary | ICD-10-CM

## 2022-12-15 DIAGNOSIS — S72.001D CLOSED FRACTURE OF RIGHT HIP WITH ROUTINE HEALING, SUBSEQUENT ENCOUNTER: ICD-10-CM

## 2022-12-15 PROCEDURE — 73552 X-RAY EXAM OF FEMUR 2/>: CPT | Mod: RT,,, | Performed by: ORTHOPAEDIC SURGERY

## 2022-12-15 PROCEDURE — 99024 PR POST-OP FOLLOW-UP VISIT: ICD-10-PCS | Mod: ,,, | Performed by: ORTHOPAEDIC SURGERY

## 2022-12-15 PROCEDURE — 3077F SYST BP >= 140 MM HG: CPT | Mod: CPTII,,, | Performed by: ORTHOPAEDIC SURGERY

## 2022-12-15 PROCEDURE — 1159F MED LIST DOCD IN RCRD: CPT | Mod: CPTII,,, | Performed by: ORTHOPAEDIC SURGERY

## 2022-12-15 PROCEDURE — 1160F RVW MEDS BY RX/DR IN RCRD: CPT | Mod: CPTII,,, | Performed by: ORTHOPAEDIC SURGERY

## 2022-12-15 PROCEDURE — 1159F PR MEDICATION LIST DOCUMENTED IN MEDICAL RECORD: ICD-10-PCS | Mod: CPTII,,, | Performed by: ORTHOPAEDIC SURGERY

## 2022-12-15 PROCEDURE — 1160F PR REVIEW ALL MEDS BY PRESCRIBER/CLIN PHARMACIST DOCUMENTED: ICD-10-PCS | Mod: CPTII,,, | Performed by: ORTHOPAEDIC SURGERY

## 2022-12-15 PROCEDURE — 3079F DIAST BP 80-89 MM HG: CPT | Mod: CPTII,,, | Performed by: ORTHOPAEDIC SURGERY

## 2022-12-15 PROCEDURE — 73552 XR FEMUR 2 VIEW RIGHT: ICD-10-PCS | Mod: RT,,, | Performed by: ORTHOPAEDIC SURGERY

## 2022-12-15 PROCEDURE — 99024 POSTOP FOLLOW-UP VISIT: CPT | Mod: ,,, | Performed by: ORTHOPAEDIC SURGERY

## 2022-12-15 PROCEDURE — 3008F BODY MASS INDEX DOCD: CPT | Mod: CPTII,,, | Performed by: ORTHOPAEDIC SURGERY

## 2022-12-15 PROCEDURE — 3079F PR MOST RECENT DIASTOLIC BLOOD PRESSURE 80-89 MM HG: ICD-10-PCS | Mod: CPTII,,, | Performed by: ORTHOPAEDIC SURGERY

## 2022-12-15 PROCEDURE — 3077F PR MOST RECENT SYSTOLIC BLOOD PRESSURE >= 140 MM HG: ICD-10-PCS | Mod: CPTII,,, | Performed by: ORTHOPAEDIC SURGERY

## 2022-12-15 PROCEDURE — 3008F PR BODY MASS INDEX (BMI) DOCUMENTED: ICD-10-PCS | Mod: CPTII,,, | Performed by: ORTHOPAEDIC SURGERY

## 2022-12-15 NOTE — PROGRESS NOTES
Subjective:       Patient ID: Luz Maria Alfaro is a 62 y.o. female.    Chief Complaint   Patient presents with    Right Hip - Follow-up     7 WEEK F/U IMN RIGHT IT FEMUR FX, AMBULATING WITH WALKER        Patient is here today for follow-up evaluation 7 weeks status post intramedullary nailing of right intertrochanteric femur fracture.  She complains of soreness in the groin and hip.  She is able to ambulate with a walker.  She is had no issue with her incisions.  She continues to smoke despite our discussion regarding nicotine cessation.    Follow-up  Pertinent negatives include no abdominal pain, chest pain, chills, congestion, coughing, fever, nausea, neck pain, numbness or vomiting.     Review of Systems   Constitutional: Negative for chills, fever and malaise/fatigue.   HENT:  Negative for congestion and hearing loss.    Eyes:  Negative for visual disturbance.   Cardiovascular:  Negative for chest pain and syncope.   Respiratory:  Negative for cough and shortness of breath.    Hematologic/Lymphatic: Does not bruise/bleed easily.   Skin:  Negative for color change and suspicious lesions.   Musculoskeletal:  Negative for falls and neck pain.   Gastrointestinal:  Negative for abdominal pain, nausea and vomiting.   Genitourinary:  Negative for dysuria and hematuria.   Neurological:  Negative for numbness and sensory change.   Psychiatric/Behavioral:  Negative for altered mental status. The patient is not nervous/anxious.       Current Outpatient Medications on File Prior to Visit   Medication Sig Dispense Refill    amLODIPine (NORVASC) 10 MG tablet Take 1 tablet (10 mg total) by mouth once daily. 30 tablet 1    ARIPiprazole (ABILIFY) 2 MG Tab Take 1 tablet (2 mg total) by mouth once daily. 30 tablet 1    atorvastatin (LIPITOR) 10 MG tablet Take 10 mg by mouth once daily.      busPIRone (BUSPAR) 10 MG tablet Take 1 tablet (10 mg total) by mouth 3 (three) times daily. 90 tablet 1    EScitalopram oxalate (LEXAPRO) 20  "MG tablet Take 20 mg by mouth once daily.      folic acid (FOLVITE) 1 MG tablet Take 1 mg by mouth once daily.      gabapentin (NEURONTIN) 300 MG capsule Take 300 mg by mouth 3 (three) times daily.      thiamine 100 MG tablet Take 1 tablet (100 mg total) by mouth once daily.      traMADoL (ULTRAM) 50 mg tablet Take 1 tablet (50 mg total) by mouth every 6 (six) hours as needed for Pain. 28 tablet 0    famotidine (PEPCID) 20 MG tablet Take 1 tablet (20 mg total) by mouth 2 (two) times daily. for 15 days 30 tablet 0    labetaloL (NORMODYNE) 200 MG tablet Take 200 mg by mouth 2 (two) times daily.      multivitamin Tab Take 1 tablet by mouth once daily.      nicotine (NICODERM CQ) 14 mg/24 hr Place 1 patch onto the skin once daily. 28 patch 0     No current facility-administered medications on file prior to visit.          Objective:      BP (!) 152/89   Pulse 104   Resp 18   Ht 5' 3" (1.6 m)   Wt 54.4 kg (119 lb 14.9 oz)   BMI 21.24 kg/m²   Physical Exam  Constitutional:       General: She is not in acute distress.     Appearance: Normal appearance. She is not ill-appearing.   HENT:      Head: Normocephalic and atraumatic.      Nose: No congestion.   Eyes:      Extraocular Movements: Extraocular movements intact.   Cardiovascular:      Rate and Rhythm: Normal rate and regular rhythm.      Pulses: Normal pulses.   Pulmonary:      Effort: Pulmonary effort is normal.      Breath sounds: Normal breath sounds.   Abdominal:      General: There is no distension.      Palpations: Abdomen is soft.      Tenderness: There is no abdominal tenderness.   Musculoskeletal:      Comments: Right lower extremity:  Surgical incisions are all well healed.  She has tenderness to palpation over the lateral aspect of the hip.  Her hardware is not palpable or prominent.  She has soreness with internal external rotation of the hip.  Full range of motion of the ankle and digits.  No calf swelling or tenderness, no signs of DVT.   Skin:     " General: Skin is warm and dry.   Neurological:      Mental Status: She is alert and oriented to person, place, and time. Mental status is at baseline.   Psychiatric:         Mood and Affect: Mood normal.         Behavior: Behavior normal.         Thought Content: Thought content normal.         Judgment: Judgment normal.      Body mass index is 21.24 kg/m².    Radiology:   Right hip two views:  She has had collapse at the fracture site with prominence of her lag screw laterally.  No cut-out is noted.  She does have new callus formation.      Assessment:         1. Closed fracture of right hip with routine healing, subsequent encounter  X-Ray Femur 2 View Right              Plan:       We discussed plan of care today and the findings on x-ray.  She is had confirmed controlled collapse of her fracture though it appears to be stable and she does have new callus formation.  She is had no cut-out of the lag screw and it is not significantly prominent.  Allow her to continue to weight bear to the right lower extremity and continue to work on range of motion exercises and strengthening.  We had another discussion about smoking cessation and the need for the discontinuance of all nicotine products in order to promote healing but her fracture.  I will see her back in 5 weeks for repeat x-rays of the hip.  She understands and agrees with all that we have discussed today and all questions and concerns were addressed.    Stewart Brambila MD  Orthopedic Trauma  [facility]      Follow up in about 5 weeks (around 1/19/2023).    Closed fracture of right hip with routine healing, subsequent encounter  -     X-Ray Femur 2 View Right; Future; Expected date: 12/15/2022              Orders Placed This Encounter   Procedures    X-Ray Femur 2 View Right     Standing Status:   Future     Number of Occurrences:   1     Standing Expiration Date:   12/14/2023     Order Specific Question:   May the Radiologist modify the order  per protocol to meet the clinical needs of the patient?     Answer:   Yes     Order Specific Question:   Release to patient     Answer:   Immediate       Future Appointments   Date Time Provider Department Center   1/24/2023 10:00 AM Stewart Brambila MD [facility]

## 2022-12-29 DIAGNOSIS — S72.001D CLOSED FRACTURE OF RIGHT HIP WITH ROUTINE HEALING, SUBSEQUENT ENCOUNTER: ICD-10-CM

## 2022-12-30 RX ORDER — TRAMADOL HYDROCHLORIDE 50 MG/1
50 TABLET ORAL EVERY 8 HOURS PRN
Qty: 21 TABLET | Refills: 0 | Status: SHIPPED | OUTPATIENT
Start: 2022-12-30 | End: 2023-01-06

## 2023-01-16 RX ORDER — TRAMADOL HYDROCHLORIDE 50 MG/1
50 TABLET ORAL EVERY 6 HOURS
Qty: 28 TABLET | Refills: 0 | Status: SHIPPED | OUTPATIENT
Start: 2023-01-16 | End: 2023-01-30 | Stop reason: SDUPTHER

## 2023-01-16 NOTE — ADDENDUM NOTE
Addended by: HEIDY BERKOWITZ on: 1/16/2023 11:20 AM     Modules accepted: Orders     ED Provider Note    Scribed for Scooter Ha II, MD by Cesia Masters. 6/5/2018  3:48 AM    Means of Arrival: EMS  History obtained by: Patient  Limitations: None    CHIEF COMPLAINT  Chief Complaint   Patient presents with   • Hallucinations     auditory     HPI  Dallin Lowry is a 36 y.o. male with a history of schizophrenia who presents with auditory hallucinations telling him that someone is going to come kill him and a request to be transferred to [facility] ([facility]). He was seen at [facility] and he was unsatisfied with his care there so he left and went to [facility]. At [facility] he called 911 who took him here for evaluation. He denies any suicidal ideation or homicidal ideation. He does not like taking his psychiatric medications as he states that do not work. He states he will not be able to make it to [facility] by himself and requests to be placed on a legal hold. He says he can't make it because of the voices in his head. The voices are not telling him to hurt himself or others. He denies any plans to hurt himself or others. He says he does not take recommended medications because they do not work. He wants to go to [facility] to talk to someone. He is unable to tell me why he believes placing him on a hold is more effective than him going to the facility himself.     REVIEW OF SYSTEMS  Review of Systems   Constitutional: Negative for fever.   Respiratory: Negative for shortness of breath.    Cardiovascular: Negative for chest pain.   Neurological: Negative for headaches.   Psychiatric/Behavioral: Positive for hallucinations and substance abuse. Negative for suicidal ideas. The patient is not nervous/anxious and does not have insomnia.         Negative for homicidal ideations.     See HPI for further details.    PAST MEDICAL HISTORY   has a past medical history of Auditory hallucinations; Psychiatric disorder; and Schizophrenia ([facility]) (2001).    SOCIAL  "HISTORY  Social History     Social History Main Topics   • Smoking status: Current Every Day Smoker     Packs/day: 0.50     Types: Cigarettes   • Smokeless tobacco: Never Used   • Alcohol use No   • Drug use: No      Comment: crystal meth for about 5 yrs     SURGICAL HISTORY  patient denies any surgical history    CURRENT MEDICATIONS  Home Medications     Reviewed by Stephanie Lantigua R.N. (Registered Nurse) on 06/05/18 at 0324  Med List Status: Complete   Medication Last Dose Status        Patient Kevin Taking any Medications                     ALLERGIES  Allergies   Allergen Reactions   • Geodon [ ]      \"I get lock jaw\"   • Haldol [Haloperidol]      \"I get lock jaw\"   • Risperidone And Related      \"I get lock jaw\"     PHYSICAL EXAM  VITAL SIGNS: /78   Pulse 90   Temp 36.4 °C (97.6 °F)   Resp 16   Ht 1.829 m (6')   Wt 71.9 kg (158 lb 8.2 oz)   SpO2 96%   BMI 21.50 kg/m²     Pulse ox interpretation: I interpret this pulse ox as normal.  Constitutional: 36 year old man, well nourished sitting in chair  HENT: Normocephalic, Atraumatic, Bilateral external ears normal. Nose normal.   Eyes: Pupils are equal. Conjunctiva normal, non-icteric.   Heart: Regular rate and rythm, no murmurs.    Lungs: No respiratory distress, regular respirations. Clear to auscultation bilaterally.  Abdomen: Normal appearance, nondistended, nontender.  Skin: Warm, Dry, No erythema, No rash.   Neurologic-Psychiatric: Alert, Animated, mostly linear thought process, some paranoid delusions but not debilitated by them. Not showing signs of responding to internal stimuli. No suicidal or homicidal comments. Clear speech. No facial droop. Normal strength in all extremities. Normal gait.       COURSE & MEDICAL DECISION MAKING  Pertinent Labs & Imaging studies reviewed. (See chart for details)    3:48 AM This is a 36 y.o. male who presents with auditory hallucinations demanding to be placed on a legal hold and transferred to inpatient " "psychiatric facility. He states he is too schizophrenic to walk to inpatient psychiatric facility himself, but he states he will not take his psychiatric medications because they do not work. He also states that there are different levels of schizophrenia and that he is the lowest levels and is a \"functional schizophrenic.\" He is not acutely psychotic and denies suicidal or homicidal ideation. I explained to him that he will not be placed on a legal hold, and I recommend that he go to inpatient psychiatric facility himself since he has been fully capable of leaving previous hospital on his own will, going to restaurant and calling 911. I do not believe he is gravely disabled by schizophrenia at this time.       3:57 AM Patient freely decides to leave ER at this time as he is frustrated he will not be placed on legal hold.     The patient will return for worsening symptoms and is stable at the time of discharge. The patient verbalizes understanding and will comply.    The patient is referred to a primary physician for blood pressure management, diabetic screening, and for all other preventative health concerns.    DISPOSITION:  Patient will be discharged home in stable condition.    FOLLOW UP:  [facility]     FINAL IMPRESSION  1. Schizophrenia, unspecified type (HCC)          Cesia MORENO (Scribe), am scribing for, and in the presence of, ILIANA Arteaga II.    Electronically signed by: Cesia Masters (Scribe), 6/5/2018    Scooter MORENO II, MD personally performed the services described in this documentation, as scribed by Cesia Masters in my presence, and it is both accurate and complete.    The note accurately reflects work and decisions made by me.  Scooter Ha II  6/5/2018  7:40 AM    "

## 2023-01-24 ENCOUNTER — OFFICE VISIT (OUTPATIENT)
Dept: ORTHOPEDICS | Facility: CLINIC | Age: 63
End: 2023-01-24
Payer: MEDICAID

## 2023-01-24 ENCOUNTER — HOSPITAL ENCOUNTER (OUTPATIENT)
Dept: RADIOLOGY | Facility: CLINIC | Age: 63
Discharge: HOME | End: 2023-01-24
Attending: ORTHOPAEDIC SURGERY
Payer: MEDICAID

## 2023-01-24 VITALS
HEART RATE: 103 BPM | WEIGHT: 119.94 LBS | HEIGHT: 63 IN | BODY MASS INDEX: 21.25 KG/M2 | SYSTOLIC BLOOD PRESSURE: 156 MMHG | TEMPERATURE: 99 F | RESPIRATION RATE: 18 BRPM | DIASTOLIC BLOOD PRESSURE: 88 MMHG

## 2023-01-24 DIAGNOSIS — S72.001D CLOSED FRACTURE OF RIGHT HIP WITH ROUTINE HEALING, SUBSEQUENT ENCOUNTER: ICD-10-CM

## 2023-01-24 DIAGNOSIS — S72.001D CLOSED FRACTURE OF RIGHT HIP WITH ROUTINE HEALING, SUBSEQUENT ENCOUNTER: Primary | ICD-10-CM

## 2023-01-24 PROCEDURE — 3008F BODY MASS INDEX DOCD: CPT | Mod: CPTII,,, | Performed by: PHYSICIAN ASSISTANT

## 2023-01-24 PROCEDURE — 3079F DIAST BP 80-89 MM HG: CPT | Mod: CPTII,,, | Performed by: PHYSICIAN ASSISTANT

## 2023-01-24 PROCEDURE — 3077F SYST BP >= 140 MM HG: CPT | Mod: CPTII,,, | Performed by: PHYSICIAN ASSISTANT

## 2023-01-24 PROCEDURE — 73552 X-RAY EXAM OF FEMUR 2/>: CPT | Mod: RT,,, | Performed by: ORTHOPAEDIC SURGERY

## 2023-01-24 PROCEDURE — 3079F PR MOST RECENT DIASTOLIC BLOOD PRESSURE 80-89 MM HG: ICD-10-PCS | Mod: CPTII,,, | Performed by: PHYSICIAN ASSISTANT

## 2023-01-24 PROCEDURE — 1159F MED LIST DOCD IN RCRD: CPT | Mod: CPTII,,, | Performed by: PHYSICIAN ASSISTANT

## 2023-01-24 PROCEDURE — 99024 PR POST-OP FOLLOW-UP VISIT: ICD-10-PCS | Mod: ,,, | Performed by: PHYSICIAN ASSISTANT

## 2023-01-24 PROCEDURE — 3077F PR MOST RECENT SYSTOLIC BLOOD PRESSURE >= 140 MM HG: ICD-10-PCS | Mod: CPTII,,, | Performed by: PHYSICIAN ASSISTANT

## 2023-01-24 PROCEDURE — 99024 POSTOP FOLLOW-UP VISIT: CPT | Mod: ,,, | Performed by: PHYSICIAN ASSISTANT

## 2023-01-24 PROCEDURE — 3008F PR BODY MASS INDEX (BMI) DOCUMENTED: ICD-10-PCS | Mod: CPTII,,, | Performed by: PHYSICIAN ASSISTANT

## 2023-01-24 PROCEDURE — 1159F PR MEDICATION LIST DOCUMENTED IN MEDICAL RECORD: ICD-10-PCS | Mod: CPTII,,, | Performed by: PHYSICIAN ASSISTANT

## 2023-01-24 PROCEDURE — 73552 XR FEMUR 2 VIEW RIGHT: ICD-10-PCS | Mod: RT,,, | Performed by: ORTHOPAEDIC SURGERY

## 2023-01-24 NOTE — PROGRESS NOTES
Subjective:       Patient ID: Luz Maria Alfaro is a 62 y.o. female.  Chief Complaint   Patient presents with    Right Femur - Follow-up     3 month f/u IMN right IT femur fx, ambulating with walker.         HPI    Patient presents for 3 month follow up IMN right IT fracture. Doing well, ambulating with a walker at this time. States she is doing well but had increased pain in the groin since her last follow up. She has been unable to transition out of her walker. Having some pain at the posterior hip along her incision site. Denies numbness and tingling distally. Pain mostly when ambulating. No pain with passive ROM.     ROS:  Constitutional: Denies fever chills  Eyes: No change in vision  ENT: No ringing or current infections  CV: No chest pain  Resp: No labored breathing  MSK: Pain evident at site of injury located in HPI,   Integ: No signs of abrasions or lacerations  Neuro: No numbness or tingling  Lymphatic: No swelling outside the area of injury     Current Outpatient Medications on File Prior to Visit   Medication Sig Dispense Refill    amLODIPine (NORVASC) 10 MG tablet Take 1 tablet (10 mg total) by mouth once daily. 30 tablet 1    ARIPiprazole (ABILIFY) 2 MG Tab Take 1 tablet (2 mg total) by mouth once daily. 30 tablet 1    atorvastatin (LIPITOR) 10 MG tablet Take 10 mg by mouth once daily.      busPIRone (BUSPAR) 10 MG tablet Take 1 tablet (10 mg total) by mouth 3 (three) times daily. 90 tablet 1    EScitalopram oxalate (LEXAPRO) 20 MG tablet Take 20 mg by mouth once daily.      folic acid (FOLVITE) 1 MG tablet Take 1 mg by mouth once daily.      gabapentin (NEURONTIN) 300 MG capsule Take 300 mg by mouth 3 (three) times daily.      thiamine 100 MG tablet Take 1 tablet (100 mg total) by mouth once daily.      traMADoL (ULTRAM) 50 mg tablet Take 1 tablet (50 mg total) by mouth every 6 (six) hours. 28 tablet 0    famotidine (PEPCID) 20 MG tablet Take 1 tablet (20 mg total) by mouth 2 (two) times daily. for 15  "days 30 tablet 0    labetaloL (NORMODYNE) 200 MG tablet Take 200 mg by mouth 2 (two) times daily.      multivitamin Tab Take 1 tablet by mouth once daily.      nicotine (NICODERM CQ) 14 mg/24 hr Place 1 patch onto the skin once daily. 28 patch 0     No current facility-administered medications on file prior to visit.          Objective:      BP (!) 156/88   Pulse 103   Temp 99.2 °F (37.3 °C) (Oral)   Resp 18   Ht 5' 3" (1.6 m)   Wt 54.4 kg (119 lb 14.9 oz)   BMI 21.24 kg/m²   Physical Exam  General the patient is alert and oriented x3 no acute distress nontoxic-appearing appropriate affect.    Constitutional: Vital signs are examined and stable.  Resp: No signs of labored breathing    Musculoskeletal:   Right lower extremity: no swelling; no deformity; no open wounds; compartments are soft and compressible; No pain with ROM at the hip, knee, or ankle with passive range of motion on exam; no tenderness to palpation; dorsi/plantar flexes the foot; SILT distally; BCR distally; DP pulse 2+        Body mass index is 21.24 kg/m².  Ideal body weight: 52.4 kg (115 lb 8.3 oz)  Adjusted ideal body weight: 53.2 kg (117 lb 4.6 oz)  Hemoglobin A1c   Date Value Ref Range Status   02/23/2022 5.6 <=7.0    04/20/2021 <5.0 <<=7.0 % Final     Hgb   Date Value Ref Range Status   11/09/2022 8.0 (L) 12.0 - 16.0 gm/dL Final   11/05/2022 8.3 (L) 12.0 - 16.0 gm/dL Final     Hct   Date Value Ref Range Status   11/09/2022 26.0 (L) 37.0 - 47.0 % Final   11/05/2022 27.3 (L) 37.0 - 47.0 % Final     Iron Level   Date Value Ref Range Status   02/23/2022 63 50 - 170    04/20/2021 27 (L) 50 - 170 ug/dL Final     No components found for: FROLATE  Vit D 25 OH   Date Value Ref Range Status   03/03/2022 5.7 30.0 - 80.0      WBC   Date Value Ref Range Status   11/09/2022 9.1 4.5 - 11.5 x10(3)/mcL Final   11/05/2022 8.6 4.5 - 11.5 x10(3)/mcL Final       Radiology:  3 view x ray right femur: hardware intact without signs of loosening or failure. " Continued bony consolidation. Some callus noted at the joint line. No further collapse of her fracture as compared to previous images.         Assessment:         1. Closed fracture of right hip with routine healing, subsequent encounter  X-Ray Femur 2 View Right              Plan:         Follow up in about 2 months (around 3/24/2023), or if symptoms worsen or fail to improve.    Luz Maria was seen today for follow-up.    Diagnoses and all orders for this visit:    Closed fracture of right hip with routine healing, subsequent encounter  -     X-Ray Femur 2 View Right; Future        -  Patient progressing following surgery although I do suspect she is having some post-traumatic arthritis at this time due to her having anterior groin pain. Posterior pain is intermittent and consistent with routine healing.  - Is working with physical therapy on mobilizing but states the groin pain has become more severe over the past few weeks.   - Discussed possible need for arthoplasty in the future. She is understanding and all questions were answered to the best of my ability.   - We will follow up with her in 6-8 weeks for repeat x-rays and evaluation of her groin pain at that time.  -ED precautions given    The above findings, diagnostics, and treatment plan were discussed with Dr. Brambila who is in agreement with the plan of care except as stated in additional documentation.       Shonda Lewis PA-C          Future Appointments   Date Time Provider Department Center   3/27/2023 10:00 AM Stewart Brambila MD [facility]

## 2023-01-26 ENCOUNTER — DOCUMENT SCAN (OUTPATIENT)
Dept: HOME HEALTH SERVICES | Facility: HOSPITAL | Age: 63
End: 2023-01-26

## 2023-01-30 RX ORDER — TRAMADOL HYDROCHLORIDE 50 MG/1
50 TABLET ORAL EVERY 6 HOURS
Qty: 28 TABLET | Refills: 0 | Status: SHIPPED | OUTPATIENT
Start: 2023-01-30 | End: 2023-02-15 | Stop reason: SDUPTHER

## 2023-02-02 DIAGNOSIS — B97.7 HIGH RISK HPV INFECTION: ICD-10-CM

## 2023-02-02 DIAGNOSIS — R87.612 LGSIL ON PAP SMEAR OF CERVIX: ICD-10-CM

## 2023-02-02 DIAGNOSIS — N87.0 CIN I (CERVICAL INTRAEPITHELIAL NEOPLASIA I): Primary | ICD-10-CM

## 2023-02-15 DIAGNOSIS — S72.001D CLOSED FRACTURE OF RIGHT HIP WITH ROUTINE HEALING, SUBSEQUENT ENCOUNTER: Primary | ICD-10-CM

## 2023-02-15 RX ORDER — TRAMADOL HYDROCHLORIDE 50 MG/1
50 TABLET ORAL EVERY 6 HOURS
Qty: 28 TABLET | Refills: 0 | Status: SHIPPED | OUTPATIENT
Start: 2023-02-15 | End: 2023-03-08 | Stop reason: SDUPTHER

## 2023-03-08 DIAGNOSIS — S72.001D CLOSED FRACTURE OF RIGHT HIP WITH ROUTINE HEALING, SUBSEQUENT ENCOUNTER: ICD-10-CM

## 2023-03-09 RX ORDER — TRAMADOL HYDROCHLORIDE 50 MG/1
50 TABLET ORAL EVERY 8 HOURS PRN
Qty: 21 TABLET | Refills: 0 | Status: SHIPPED | OUTPATIENT
Start: 2023-03-09 | End: 2023-03-16

## 2023-03-27 ENCOUNTER — OFFICE VISIT (OUTPATIENT)
Dept: ORTHOPEDICS | Facility: CLINIC | Age: 63
End: 2023-03-27
Payer: MEDICARE

## 2023-03-27 ENCOUNTER — HOSPITAL ENCOUNTER (OUTPATIENT)
Dept: RADIOLOGY | Facility: CLINIC | Age: 63
Discharge: HOME | End: 2023-03-27
Attending: ORTHOPAEDIC SURGERY
Payer: MEDICARE

## 2023-03-27 VITALS
BODY MASS INDEX: 21.25 KG/M2 | DIASTOLIC BLOOD PRESSURE: 75 MMHG | SYSTOLIC BLOOD PRESSURE: 117 MMHG | HEIGHT: 63 IN | HEART RATE: 97 BPM | WEIGHT: 119.94 LBS | RESPIRATION RATE: 18 BRPM | TEMPERATURE: 98 F

## 2023-03-27 DIAGNOSIS — S72.001D CLOSED FRACTURE OF RIGHT HIP WITH ROUTINE HEALING, SUBSEQUENT ENCOUNTER: Primary | ICD-10-CM

## 2023-03-27 DIAGNOSIS — S72.001D CLOSED FRACTURE OF RIGHT HIP WITH ROUTINE HEALING, SUBSEQUENT ENCOUNTER: ICD-10-CM

## 2023-03-27 PROCEDURE — 99213 OFFICE O/P EST LOW 20 MIN: CPT | Mod: ,,, | Performed by: ORTHOPAEDIC SURGERY

## 2023-03-27 PROCEDURE — 73502 XR HIP WITH PELVIS WHEN PERFORMED, 2 OR 3  VIEWS RIGHT: ICD-10-PCS | Mod: RT,,, | Performed by: ORTHOPAEDIC SURGERY

## 2023-03-27 PROCEDURE — 73502 X-RAY EXAM HIP UNI 2-3 VIEWS: CPT | Mod: RT,,, | Performed by: ORTHOPAEDIC SURGERY

## 2023-03-27 PROCEDURE — 99213 PR OFFICE/OUTPT VISIT, EST, LEVL III, 20-29 MIN: ICD-10-PCS | Mod: ,,, | Performed by: ORTHOPAEDIC SURGERY

## 2023-03-27 NOTE — PROGRESS NOTES
Subjective:       Patient ID: Luz Maria Alfaro is a 63 y.o. female.    Chief Complaint   Patient presents with    Right Hip - Follow-up     5 MONTH F/U IMN RIGHT IT FEMUR FX, REPORTS CONTINUED GROIN PAIN, DOING STRETCHES ON HER OWN.          Patient is here today for follow-up evaluation 5 months status post intramedullary nailing of right intertrochanteric femur fracture.  She reports she has some pain in her groin as well as a lateral aspect of her hip.  It is not debilitating and she is able to walk with a walker.  She has had multiple back surgeries and states that the walker helps with her back pain as well.  She is had no new issues since her last evaluation.  She has completed a course of physical therapy.    Follow-up  Pertinent negatives include no abdominal pain, chest pain, chills, congestion, coughing, fever, nausea, neck pain, numbness or vomiting.     Review of Systems   Constitutional: Negative for chills, fever and malaise/fatigue.   HENT:  Negative for congestion and hearing loss.    Eyes:  Negative for visual disturbance.   Cardiovascular:  Negative for chest pain and syncope.   Respiratory:  Negative for cough and shortness of breath.    Hematologic/Lymphatic: Does not bruise/bleed easily.   Skin:  Negative for color change and suspicious lesions.   Musculoskeletal:  Negative for falls and neck pain.   Gastrointestinal:  Negative for abdominal pain, nausea and vomiting.   Genitourinary:  Negative for dysuria and hematuria.   Neurological:  Negative for numbness and sensory change.   Psychiatric/Behavioral:  Negative for altered mental status. The patient is not nervous/anxious.       Current Outpatient Medications on File Prior to Visit   Medication Sig Dispense Refill    amLODIPine (NORVASC) 10 MG tablet Take 1 tablet (10 mg total) by mouth once daily. 30 tablet 1    ARIPiprazole (ABILIFY) 2 MG Tab Take 1 tablet (2 mg total) by mouth once daily. 30 tablet 1    atorvastatin (LIPITOR) 10 MG tablet  "Take 10 mg by mouth once daily.      busPIRone (BUSPAR) 10 MG tablet Take 1 tablet (10 mg total) by mouth 3 (three) times daily. 90 tablet 1    EScitalopram oxalate (LEXAPRO) 20 MG tablet Take 20 mg by mouth once daily.      folic acid (FOLVITE) 1 MG tablet Take 1 mg by mouth once daily.      gabapentin (NEURONTIN) 300 MG capsule Take 300 mg by mouth 3 (three) times daily.      thiamine 100 MG tablet Take 1 tablet (100 mg total) by mouth once daily.      famotidine (PEPCID) 20 MG tablet Take 1 tablet (20 mg total) by mouth 2 (two) times daily. for 15 days 30 tablet 0    labetaloL (NORMODYNE) 200 MG tablet Take 200 mg by mouth 2 (two) times daily.      multivitamin Tab Take 1 tablet by mouth once daily.      nicotine (NICODERM CQ) 14 mg/24 hr Place 1 patch onto the skin once daily. 28 patch 0     No current facility-administered medications on file prior to visit.          Objective:      /75   Pulse 97   Temp 97.6 °F (36.4 °C) (Oral)   Resp 18   Ht 5' 3" (1.6 m)   Wt 54.4 kg (119 lb 14.9 oz)   BMI 21.24 kg/m²   Physical Exam  Constitutional:       General: She is not in acute distress.     Appearance: Normal appearance. She is not ill-appearing.   HENT:      Head: Normocephalic and atraumatic.      Nose: No congestion.   Eyes:      Extraocular Movements: Extraocular movements intact.   Cardiovascular:      Rate and Rhythm: Normal rate and regular rhythm.      Pulses: Normal pulses.   Pulmonary:      Effort: Pulmonary effort is normal.      Breath sounds: Normal breath sounds.   Abdominal:      General: There is no distension.      Palpations: Abdomen is soft.      Tenderness: There is no abdominal tenderness.   Musculoskeletal:      Comments: Right lower extremity:  Surgical incisions are all well healed.  No painful or prominent hardware is noted.  She has some soreness with internal external rotation of the hip.  She feels the pain in her groin with rotation.  No crepitus is noted no grinding with " range of motion.  She is no palpable hardware laterally.  No calf swelling or tenderness, no signs of DVT.  She is neurovascularly intact distally.   Skin:     General: Skin is warm and dry.   Neurological:      Mental Status: She is alert and oriented to person, place, and time. Mental status is at baseline.   Psychiatric:         Mood and Affect: Mood normal.         Behavior: Behavior normal.         Thought Content: Thought content normal.         Judgment: Judgment normal.      Body mass index is 21.24 kg/m².    Radiology:   Right hip two views:  Hardware is intact.  Alignment is maintained.  No change in alignment compared to her previous films.  Further consolidation is noted compared to previous films.      Assessment:         1. Closed fracture of right hip with routine healing, subsequent encounter  X-Ray Hip 2 or 3 views Right (with Pelvis when performed)              Plan:       Her fracture is consolidated, she has had some shortening with slight prominence of her lag screw laterally however it has not changed position she has had no cut-out.  She continues to smoke despite our discussion regarding the need for nicotine cessation.  She understands she is at risk for development of posttraumatic arthritis in the future and that she May 1 day require total hip arthroplasty.  For now her fracture is stable and her hardware is intact and we have no plans for any further operative intervention.  If she continues to have worsening pain and would like to see an arthroplasty specialist in the future will be happy to refer her to 1 of my partners.  She can continue full weight-bearing without restrictions.  She will follow up on an as-needed basis from this point forward.    Stewart Brambila MD  Orthopedic Trauma  [facility]      Follow up if symptoms worsen or fail to improve.    Closed fracture of right hip with routine healing, subsequent encounter  -     X-Ray Hip 2 or 3 views Right (with  Pelvis when performed); Future; Expected date: 03/27/2023              Orders Placed This Encounter   Procedures    X-Ray Hip 2 or 3 views Right (with Pelvis when performed)     Standing Status:   Future     Number of Occurrences:   1     Standing Expiration Date:   3/24/2024     Order Specific Question:   May the Radiologist modify the order per protocol to meet the clinical needs of the patient?     Answer:   Yes     Order Specific Question:   Release to patient     Answer:   Immediate       Future Appointments   Date Time Provider Department Center   5/12/2023 10:00 AM COLPO ROOM #1, [facility] GYN [facility] GYN Ryan Aguirre

## 2023-06-13 DIAGNOSIS — S22.078D: Primary | ICD-10-CM

## 2023-06-19 ENCOUNTER — HOSPITAL ENCOUNTER (OUTPATIENT)
Dept: RADIOLOGY | Facility: HOSPITAL | Age: 63
Discharge: HOME | End: 2023-06-19
Attending: NEUROLOGICAL SURGERY
Payer: MEDICARE

## 2023-06-19 DIAGNOSIS — S22.078D: ICD-10-CM

## 2023-06-19 PROCEDURE — 78802 RP LOCLZJ TUM WHBDY 1 D IMG: CPT | Mod: TC

## 2023-06-21 ENCOUNTER — HOSPITAL ENCOUNTER (OUTPATIENT)
Dept: RADIOLOGY | Facility: HOSPITAL | Age: 63
Discharge: HOME | End: 2023-06-21
Attending: NEUROLOGICAL SURGERY
Payer: MEDICARE

## 2023-07-21 ENCOUNTER — PROCEDURE VISIT (OUTPATIENT)
Dept: GYNECOLOGY | Facility: CLINIC | Age: 63
End: 2023-07-21
Payer: MEDICARE

## 2023-07-21 VITALS
RESPIRATION RATE: 18 BRPM | HEIGHT: 63 IN | OXYGEN SATURATION: 97 % | TEMPERATURE: 99 F | BODY MASS INDEX: 22 KG/M2 | WEIGHT: 124.19 LBS | DIASTOLIC BLOOD PRESSURE: 80 MMHG | SYSTOLIC BLOOD PRESSURE: 121 MMHG | HEART RATE: 97 BPM

## 2023-07-21 DIAGNOSIS — N87.0 CIN I (CERVICAL INTRAEPITHELIAL NEOPLASIA I): ICD-10-CM

## 2023-07-21 DIAGNOSIS — R87.612 LGSIL ON PAP SMEAR OF CERVIX: ICD-10-CM

## 2023-07-21 DIAGNOSIS — B97.7 HIGH RISK HPV INFECTION: ICD-10-CM

## 2023-07-21 PROCEDURE — 88305 TISSUE EXAM BY PATHOLOGIST: CPT | Mod: TC

## 2023-07-21 PROCEDURE — 57505 ENDOCERVICAL CURETTAGE: CPT | Mod: PBBFAC

## 2023-07-21 PROCEDURE — 57456 ENDOCERV CURETTAGE W/SCOPE: CPT | Mod: PBBFAC

## 2023-07-21 RX ORDER — ONDANSETRON 4 MG/1
TABLET, ORALLY DISINTEGRATING ORAL
Status: ON HOLD | COMMUNITY
Start: 2023-06-16

## 2023-07-21 RX ORDER — HYDROCODONE BITARTRATE AND ACETAMINOPHEN 5; 325 MG/1; MG/1
1 TABLET ORAL 2 TIMES DAILY PRN
Status: ON HOLD | COMMUNITY
Start: 2023-07-10

## 2023-07-21 NOTE — PROCEDURES
Colposcopy    Date/Time: 2023 8:00 AM  Performed by: Janene Tang MD  Authorized by: Janene Tang MD     Consent Done?:  Yes (Verbal) and Yes (Written)  Assistants?: Yes    List of assistants:  Dracy    Colposcopy Site:  Cervix  Position:  Supine  Acrowhite Lesion: No    Atypical Vessels: No    Transformation Zone Adequate?: Yes    Biopsy?: No    ECC Performed?: Yes    LEEP Performed?: No    Estimated blood loss (cc):  0   Patient tolerated the procedure well with no immediate complications.   Post-operative instructions were provided for the patient.   Patient was discharged and will follow up if any complications occur          [facility] COLPOSCOPY CLINIC NOTE     Luz Maria Alfaro is a 63 y.o.  referred to [facility] colposcopy clinic from .      Patient reports Yes, past history of abnormal pap smear.However, is unable to remember the type of abnormality        Pap History:   2022 :LSIL , HrHPV + (untyped)      Sexual History:    Number of sex partners: Not currently ( 8 years ); Lifetime 10  Contraception:  PLAN CONTRACEPTION: no method  Future fertility desires: YES/NO: postmenopausal   Smoking History: 30 years 1 ppd   HIV status: negative   HPV vaccination status: YES/NO: No    GYN History:  Last menstrual period: 53 menopause, PMB episode at 59      Gynecology  OB History          1    Para   1    Term   1            AB        Living   1         SAB        IAB        Ectopic        Multiple        Live Births   1                Past Medical History:   Diagnosis Date    Benign essential HTN     Mixed hyperlipidemia     Type 2 diabetes mellitus without complications       Past Surgical History:   Procedure Laterality Date    APPENDECTOMY      BACK SURGERY      HIP SURGERY      INTRAMEDULLARY RODDING OF FEMUR Right 10/27/2022    Procedure: RIGHT FEMUR INTERTROCH IMN;  Surgeon: Stewart Brambila MD;  [facility];  Service: Orthopedics;  Laterality: Right;  Jj elizabeth,  "supine  3rd case  Synthes Short TFNA        Social History     Tobacco Use    Smoking status: Every Day     Packs/day: 0.50     Types: Cigarettes    Smokeless tobacco: Current   Substance Use Topics    Alcohol use: Yes    Drug use: Never       Review of Systems  Pertinent items are noted in HPI.     Objective:     /80   Pulse 97   Temp 98.5 °F (36.9 °C) (Oral)   Resp 18   Ht 5' 3" (1.6 m)   Wt 56.3 kg (124 lb 3.2 oz)   SpO2 97%   BMI 22.00 kg/m²   Physical Exam:  Gen: Well-nourished, well-developed female appearing stated age. Alert, cooperative, in no acute distress.      SEE COLPOSCOPY PROCEDURE NOTE      Assessment:       63 y.o.  here for:  1. ADEOLA I (cervical intraepithelial neoplasia I)  Ambulatory referral/consult to Obstetrics / Gynecology      2. LGSIL on Pap smear of cervix  Ambulatory referral/consult to Obstetrics / Gynecology    Specimen to Pathology Gynecology and Obstetrics      3. High risk HPV infection  Ambulatory referral/consult to Obstetrics / Gynecology    Specimen to Pathology Gynecology and Obstetrics             COLPOSCOPIC IMPRESSION:    No acetowhite lesions present     Plan:         Problem List Items Addressed This Visit    None  Visit Diagnoses       ADEOLA I (cervical intraepithelial neoplasia I)        LGSIL on Pap smear of cervix        Relevant Orders    Specimen to Pathology Gynecology and Obstetrics    High risk HPV infection        Relevant Orders    Specimen to Pathology Gynecology and Obstetrics            No acetowhite or lugol's lesions. ECC obtained. Will call pt with results.         Janene Tang MD   [facility], PGY2         I was present throughout the key elements of the procedure.  I agree with the resident's findings and description.    Antionette Rowland MD  [facility]   "

## 2023-07-24 LAB
ESTROGEN SERPL-MCNC: NORMAL PG/ML
INSULIN SERPL-ACNC: NORMAL U[IU]/ML
LAB AP CLINICAL INFORMATION: NORMAL
LAB AP GROSS DESCRIPTION: NORMAL
LAB AP REPORT FOOTNOTES: NORMAL
T3RU NFR SERPL: NORMAL %

## 2023-07-25 ENCOUNTER — TELEPHONE (OUTPATIENT)
Dept: GYNECOLOGY | Facility: CLINIC | Age: 63
End: 2023-07-25
Payer: MEDICARE

## 2023-07-25 NOTE — TELEPHONE ENCOUNTER
Called patient to discuss results of recent colposcopy office visit.discuss lab results  Diagnostic tests were reviewed and questions answered. Diagnosis, care plan and treatment options were discussed. The patient understand instructions and will follow up as directed.      Based on patient pap smear results and recent colposcopy findings/results:    Pap:   12/2022 :LSIL , HrHPV + (untyped)    Colposcopy results:  Endocervix, curettage :      - Inadequate tissue for diagnostic interpretation.           Assessment/Diagnosis:  Cervicval dysplasia : LSIL      Plan:   Repeat pap smear with co-testing in 1 year    Pt endorses understanding the plan     Janene Tang MD   [facility], PGY2

## 2023-08-15 ENCOUNTER — TELEPHONE (OUTPATIENT)
Dept: GYNECOLOGY | Facility: CLINIC | Age: 63
End: 2023-08-15
Payer: MEDICARE

## 2024-01-08 DIAGNOSIS — R14.0 ABDOMINAL DISTENSION: Primary | ICD-10-CM

## 2024-01-08 DIAGNOSIS — R63.4 LOSS OF WEIGHT: ICD-10-CM

## 2024-01-08 DIAGNOSIS — K74.60 CIRRHOSIS: ICD-10-CM

## 2024-01-22 ENCOUNTER — HOSPITAL ENCOUNTER (OUTPATIENT)
Dept: RADIOLOGY | Facility: HOSPITAL | Age: 64
Discharge: HOME | End: 2024-01-22
Attending: INTERNAL MEDICINE
Payer: MEDICARE

## 2024-01-22 DIAGNOSIS — R63.4 LOSS OF WEIGHT: ICD-10-CM

## 2024-01-22 DIAGNOSIS — R14.0 ABDOMINAL DISTENSION: ICD-10-CM

## 2024-01-22 DIAGNOSIS — K74.60 CIRRHOSIS: ICD-10-CM

## 2024-01-22 PROCEDURE — 76700 US EXAM ABDOM COMPLETE: CPT | Mod: TC

## 2024-02-08 ENCOUNTER — HOSPITAL ENCOUNTER (OUTPATIENT)
Dept: RADIOLOGY | Facility: HOSPITAL | Age: 64
Discharge: HOME | End: 2024-02-08
Attending: INTERNAL MEDICINE
Payer: MEDICARE

## 2024-02-08 PROCEDURE — 63600175 PHARM REV CODE 636 W HCPCS: Performed by: INTERNAL MEDICINE

## 2024-02-08 PROCEDURE — A9537 TC99M MEBROFENIN: HCPCS

## 2024-02-08 RX ORDER — SINCALIDE 5 UG/5ML
0.02 INJECTION, POWDER, LYOPHILIZED, FOR SOLUTION INTRAVENOUS ONCE
Status: COMPLETED | OUTPATIENT
Start: 2024-02-08 | End: 2024-02-08

## 2024-02-08 RX ADMIN — SINCALIDE 1 MCG: 5 INJECTION, POWDER, LYOPHILIZED, FOR SOLUTION INTRAVENOUS at 10:02

## 2024-03-20 ENCOUNTER — HOSPITAL ENCOUNTER (INPATIENT)
Facility: HOSPITAL | Age: 64
LOS: 28 days | Discharge: HOME HEALTH | DRG: 871 | End: 2024-04-17
Attending: EMERGENCY MEDICINE | Admitting: INTERNAL MEDICINE
Payer: MEDICARE

## 2024-03-20 DIAGNOSIS — D69.6 THROMBOCYTOPENIA: ICD-10-CM

## 2024-03-20 DIAGNOSIS — R41.82 ALTERED MENTAL STATUS: ICD-10-CM

## 2024-03-20 DIAGNOSIS — D53.9 MACROCYTIC ANEMIA: ICD-10-CM

## 2024-03-20 DIAGNOSIS — K74.60 HEPATIC CIRRHOSIS, UNSPECIFIED HEPATIC CIRRHOSIS TYPE, UNSPECIFIED WHETHER ASCITES PRESENT: ICD-10-CM

## 2024-03-20 DIAGNOSIS — I63.9 STROKE: ICD-10-CM

## 2024-03-20 DIAGNOSIS — G93.40 ACUTE ENCEPHALOPATHY: Primary | ICD-10-CM

## 2024-03-20 LAB
ALBUMIN SERPL-MCNC: 1.9 G/DL (ref 3.4–4.8)
ALBUMIN/GLOB SERPL: 0.6 RATIO (ref 1.1–2)
ALP SERPL-CCNC: 313 UNIT/L (ref 40–150)
ALT SERPL-CCNC: 17 UNIT/L (ref 0–55)
AMMONIA PLAS-MSCNC: 33.7 UMOL/L (ref 18–72)
AMPHET UR QL SCN: NEGATIVE
ANISOCYTOSIS BLD QL SMEAR: ABNORMAL
APPEARANCE UR: CLEAR
APTT PPP: 27.1 SECONDS (ref 23.2–33.7)
AST SERPL-CCNC: 43 UNIT/L (ref 5–34)
BACTERIA #/AREA URNS AUTO: ABNORMAL /HPF
BARBITURATE SCN PRESENT UR: NEGATIVE
BASOPHILS # BLD AUTO: 0.01 X10(3)/MCL
BASOPHILS NFR BLD AUTO: 0.2 %
BENZODIAZ UR QL SCN: NEGATIVE
BILIRUB SERPL-MCNC: 1.5 MG/DL
BILIRUB UR QL STRIP.AUTO: NEGATIVE
BUN SERPL-MCNC: 3.5 MG/DL (ref 9.8–20.1)
CALCIUM SERPL-MCNC: 7.9 MG/DL (ref 8.4–10.2)
CANNABINOIDS UR QL SCN: NEGATIVE
CHLORIDE SERPL-SCNC: 105 MMOL/L (ref 98–107)
CO2 SERPL-SCNC: 18 MMOL/L (ref 23–31)
COCAINE UR QL SCN: NEGATIVE
COLOR UR AUTO: YELLOW
CREAT SERPL-MCNC: 0.5 MG/DL (ref 0.55–1.02)
EOSINOPHIL # BLD AUTO: 0.01 X10(3)/MCL (ref 0–0.9)
EOSINOPHIL NFR BLD AUTO: 0.2 %
ERYTHROCYTE [DISTWIDTH] IN BLOOD BY AUTOMATED COUNT: 15.3 % (ref 11.5–17)
ETHANOL SERPL-MCNC: <10 MG/DL
FENTANYL UR QL SCN: NEGATIVE
FOLATE SERPL-MCNC: 11.9 NG/ML (ref 7–31.4)
GFR SERPLBLD CREATININE-BSD FMLA CKD-EPI: >60 MLS/MIN/1.73/M2
GLOBULIN SER-MCNC: 3.2 GM/DL (ref 2.4–3.5)
GLUCOSE SERPL-MCNC: 82 MG/DL (ref 82–115)
GLUCOSE UR QL STRIP.AUTO: NORMAL
HCT VFR BLD AUTO: 31.7 % (ref 37–47)
HEMATOLOGIST REVIEW: NORMAL
HGB BLD-MCNC: 10.7 G/DL (ref 12–16)
IMM GRANULOCYTES # BLD AUTO: 0.05 X10(3)/MCL (ref 0–0.04)
IMM GRANULOCYTES NFR BLD AUTO: 0.8 %
INR PPP: 1.4
KETONES UR QL STRIP.AUTO: NEGATIVE
LEUKOCYTE ESTERASE UR QL STRIP.AUTO: 75
LYMPHOCYTES # BLD AUTO: 1.22 X10(3)/MCL (ref 0.6–4.6)
LYMPHOCYTES NFR BLD AUTO: 19.1 %
MACROCYTES BLD QL SMEAR: ABNORMAL
MCH RBC QN AUTO: 47.3 PG (ref 27–31)
MCHC RBC AUTO-ENTMCNC: 33.8 G/DL (ref 33–36)
MCV RBC AUTO: 140.3 FL (ref 80–94)
MDMA UR QL SCN: NEGATIVE
MONOCYTES # BLD AUTO: 0.61 X10(3)/MCL (ref 0.1–1.3)
MONOCYTES NFR BLD AUTO: 9.6 %
MUCOUS THREADS URNS QL MICRO: ABNORMAL /LPF
NEUTROPHILS # BLD AUTO: 4.48 X10(3)/MCL (ref 2.1–9.2)
NEUTROPHILS NFR BLD AUTO: 70.1 %
NITRITE UR QL STRIP.AUTO: NEGATIVE
NRBC BLD AUTO-RTO: 0.8 %
OHS QRS DURATION: 66 MS
OHS QTC CALCULATION: 478 MS
OPIATES UR QL SCN: POSITIVE
PCP UR QL: NEGATIVE
PH UR STRIP.AUTO: 6 [PH]
PH UR: 6 [PH] (ref 3–11)
PLATELET # BLD AUTO: 34 X10(3)/MCL (ref 130–400)
PLATELET # BLD EST: ABNORMAL 10*3/UL
PLATELETS.RETICULATED NFR BLD AUTO: 11.9 % (ref 0.9–11.2)
PMV BLD AUTO: 12.1 FL (ref 7.4–10.4)
POIKILOCYTOSIS BLD QL SMEAR: ABNORMAL
POLYCHROMASIA BLD QL SMEAR: ABNORMAL
POTASSIUM SERPL-SCNC: 4 MMOL/L (ref 3.5–5.1)
PROT SERPL-MCNC: 5.1 GM/DL (ref 5.8–7.6)
PROT UR QL STRIP.AUTO: NEGATIVE
PROTHROMBIN TIME: 16.8 SECONDS (ref 12.5–14.5)
RBC # BLD AUTO: 2.26 X10(6)/MCL (ref 4.2–5.4)
RBC #/AREA URNS AUTO: ABNORMAL /HPF
RBC MORPH BLD: ABNORMAL
RBC UR QL AUTO: NEGATIVE
SODIUM SERPL-SCNC: 136 MMOL/L (ref 136–145)
SP GR UR STRIP.AUTO: >1.05 (ref 1–1.03)
SPECIFIC GRAVITY, URINE AUTO (.000) (OHS): >1.05 (ref 1–1.03)
SQUAMOUS #/AREA URNS LPF: ABNORMAL /HPF
STIPPLED RBC (OHS): ABNORMAL
TARGETS BLD QL SMEAR: ABNORMAL
UROBILINOGEN UR STRIP-ACNC: 2
VIT B12 SERPL-MCNC: 1958 PG/ML (ref 213–816)
WBC # SPEC AUTO: 6.38 X10(3)/MCL (ref 4.5–11.5)
WBC #/AREA URNS AUTO: ABNORMAL /HPF

## 2024-03-20 PROCEDURE — 82746 ASSAY OF FOLIC ACID SERUM: CPT | Performed by: EMERGENCY MEDICINE

## 2024-03-20 PROCEDURE — 85610 PROTHROMBIN TIME: CPT | Performed by: EMERGENCY MEDICINE

## 2024-03-20 PROCEDURE — 25000003 PHARM REV CODE 250: Performed by: EMERGENCY MEDICINE

## 2024-03-20 PROCEDURE — 63600175 PHARM REV CODE 636 W HCPCS: Performed by: EMERGENCY MEDICINE

## 2024-03-20 PROCEDURE — 51701 INSERT BLADDER CATHETER: CPT

## 2024-03-20 PROCEDURE — 81001 URINALYSIS AUTO W/SCOPE: CPT | Performed by: STUDENT IN AN ORGANIZED HEALTH CARE EDUCATION/TRAINING PROGRAM

## 2024-03-20 PROCEDURE — 25000003 PHARM REV CODE 250: Performed by: INTERNAL MEDICINE

## 2024-03-20 PROCEDURE — 93010 ELECTROCARDIOGRAM REPORT: CPT | Mod: ,,, | Performed by: INTERNAL MEDICINE

## 2024-03-20 PROCEDURE — 82077 ASSAY SPEC XCP UR&BREATH IA: CPT | Performed by: EMERGENCY MEDICINE

## 2024-03-20 PROCEDURE — 84425 ASSAY OF VITAMIN B-1: CPT | Performed by: EMERGENCY MEDICINE

## 2024-03-20 PROCEDURE — 25000003 PHARM REV CODE 250: Performed by: NURSE PRACTITIONER

## 2024-03-20 PROCEDURE — 92610 EVALUATE SWALLOWING FUNCTION: CPT

## 2024-03-20 PROCEDURE — 80053 COMPREHEN METABOLIC PANEL: CPT | Performed by: STUDENT IN AN ORGANIZED HEALTH CARE EDUCATION/TRAINING PROGRAM

## 2024-03-20 PROCEDURE — 80307 DRUG TEST PRSMV CHEM ANLYZR: CPT | Performed by: EMERGENCY MEDICINE

## 2024-03-20 PROCEDURE — 87086 URINE CULTURE/COLONY COUNT: CPT | Performed by: STUDENT IN AN ORGANIZED HEALTH CARE EDUCATION/TRAINING PROGRAM

## 2024-03-20 PROCEDURE — 85025 COMPLETE CBC W/AUTO DIFF WBC: CPT | Performed by: STUDENT IN AN ORGANIZED HEALTH CARE EDUCATION/TRAINING PROGRAM

## 2024-03-20 PROCEDURE — 99285 EMERGENCY DEPT VISIT HI MDM: CPT | Mod: 25

## 2024-03-20 PROCEDURE — 63600175 PHARM REV CODE 636 W HCPCS: Performed by: INTERNAL MEDICINE

## 2024-03-20 PROCEDURE — 82607 VITAMIN B-12: CPT | Performed by: EMERGENCY MEDICINE

## 2024-03-20 PROCEDURE — 11000001 HC ACUTE MED/SURG PRIVATE ROOM

## 2024-03-20 PROCEDURE — 85730 THROMBOPLASTIN TIME PARTIAL: CPT | Performed by: EMERGENCY MEDICINE

## 2024-03-20 PROCEDURE — 25500020 PHARM REV CODE 255: Performed by: EMERGENCY MEDICINE

## 2024-03-20 PROCEDURE — 82140 ASSAY OF AMMONIA: CPT | Performed by: EMERGENCY MEDICINE

## 2024-03-20 PROCEDURE — 93005 ELECTROCARDIOGRAM TRACING: CPT

## 2024-03-20 PROCEDURE — 51798 US URINE CAPACITY MEASURE: CPT

## 2024-03-20 RX ORDER — ALBUTEROL SULFATE 90 UG/1
1 AEROSOL, METERED RESPIRATORY (INHALATION) EVERY 4 HOURS PRN
COMMUNITY

## 2024-03-20 RX ORDER — HYOSCYAMINE SULFATE 0.12 MG/1
0.12 TABLET SUBLINGUAL 3 TIMES DAILY PRN
COMMUNITY

## 2024-03-20 RX ORDER — THIAMINE HCL 100 MG
100 TABLET ORAL EVERY 8 HOURS
Status: DISCONTINUED | OUTPATIENT
Start: 2024-03-22 | End: 2024-04-17 | Stop reason: HOSPADM

## 2024-03-20 RX ORDER — LORAZEPAM 0.5 MG/1
2 TABLET ORAL EVERY 4 HOURS PRN
Status: DISCONTINUED | OUTPATIENT
Start: 2024-03-20 | End: 2024-03-30

## 2024-03-20 RX ORDER — PANTOPRAZOLE SODIUM 40 MG/1
40 TABLET, DELAYED RELEASE ORAL 2 TIMES DAILY
COMMUNITY

## 2024-03-20 RX ORDER — CHLORDIAZEPOXIDE HYDROCHLORIDE 25 MG/1
25 CAPSULE, GELATIN COATED ORAL 4 TIMES DAILY PRN
Status: DISCONTINUED | OUTPATIENT
Start: 2024-03-20 | End: 2024-03-30

## 2024-03-20 RX ORDER — METHOCARBAMOL 750 MG/1
750 TABLET, FILM COATED ORAL 3 TIMES DAILY PRN
Status: ON HOLD | COMMUNITY
End: 2024-04-17 | Stop reason: HOSPADM

## 2024-03-20 RX ORDER — SODIUM CHLORIDE 9 MG/ML
INJECTION, SOLUTION INTRAVENOUS CONTINUOUS
Status: DISCONTINUED | OUTPATIENT
Start: 2024-03-20 | End: 2024-03-20

## 2024-03-20 RX ADMIN — SODIUM CHLORIDE: 9 INJECTION, SOLUTION INTRAVENOUS at 11:03

## 2024-03-20 RX ADMIN — THIAMINE HYDROCHLORIDE 500 MG: 100 INJECTION, SOLUTION INTRAMUSCULAR; INTRAVENOUS at 02:03

## 2024-03-20 RX ADMIN — LEUCINE, PHENYLALANINE, LYSINE, METHIONINE, ISOLEUCINE, VALINE, HISTIDINE, THREONINE, TRYPTOPHAN, ALANINE, GLYCINE, ARGININE, PROLINE, SERINE, TYROSINE, SODIUM ACETATE, DIBASIC POTASSIUM PHOSPHATE, MAGNESIUM CHLORIDE, SODIUM CHLORIDE, CALCIUM CHLORIDE, DEXTROSE
311; 238; 247; 170; 255; 247; 204; 179; 77; 880; 438; 489; 289; 213; 17; 297; 261; 51; 77; 33; 5 INJECTION INTRAVENOUS at 02:03

## 2024-03-20 RX ADMIN — THIAMINE HYDROCHLORIDE 500 MG: 100 INJECTION, SOLUTION INTRAMUSCULAR; INTRAVENOUS at 08:03

## 2024-03-20 RX ADMIN — PIPERACILLIN SODIUM AND TAZOBACTAM SODIUM 4.5 G: 4; .5 INJECTION, POWDER, LYOPHILIZED, FOR SOLUTION INTRAVENOUS at 01:03

## 2024-03-20 RX ADMIN — PIPERACILLIN SODIUM AND TAZOBACTAM SODIUM 4.5 G: 4; .5 INJECTION, POWDER, LYOPHILIZED, FOR SOLUTION INTRAVENOUS at 09:03

## 2024-03-20 RX ADMIN — FOLIC ACID: 5 INJECTION, SOLUTION INTRAMUSCULAR; INTRAVENOUS; SUBCUTANEOUS at 05:03

## 2024-03-20 RX ADMIN — IOHEXOL 100 ML: 350 INJECTION, SOLUTION INTRAVENOUS at 03:03

## 2024-03-20 NOTE — H&P
[facility] Hospital Medicine History & Physical Examination       Patient Name: Luz Maria Alfaro  MRN: [id number]  Patient Class: IP- Inpatient   Admission Date: 03/20/2024   Admitting Service: Hospital Medicine   Length of Stay: 0  Attending Physician: Helena Spain MD  Primary Care Provider: Robyn Matthews NP  Face-to-Face encounter date: 03/20/2024  Code Status: Full  Chief Complaint: Altered Mental Status (Arrives aasi unit 21 reports ams since yesterday w/ increased weakness, also reports from family pt w/ worsening hearing for past week, gcs 15, currently only c/o chronic back pain)      Patient information was obtained from patient, patient's family, past medical records and ER records.      HISTORY OF PRESENT ILLNESS:   Luz Maria Alfaro is a 64 y.o. female with a PMHx of HTN, HLD, type 2 DM, alcoholic cirrhosis, alcohol abuse and nicotine dependence who presented to [facility] on 3/20/2024 via EMS with c/o worsening confusion x1 day with associated generalized weakness.  The majority of the history was obtained from review of the medical record as the patient was unable to provide any additional history.    Upon presentation to ED, vital signed included /73, , RR 18, SpO2 98% on room air, temperature 98.4° F.  Labs notable for hemoglobin 10.7, hematocrit 31.7, platelets 34, vitamin B12 1958, INR 1.4, CO2 18, BUN 3.5, creatinine 0.5, calcium 7.9, , albumin 1.9, AST 43.  Serum alcohol level undetectable.  EKG demonstrated sinus tachycardia.  CXR demonstrated increased left retrocardiac density with partial silhouetting of the left hemidiaphragm which might be related to an infiltrate/atelectasis.  CT head without contrast negative for acute intracranial findings or significant interval changes compared to August 2022.  CT abdomen and pelvis with IV contrast consistent with cirrhosis, moderate generalized ascites, bilateral pleural fluid right greater than left  noted.  She was admitted to hospital medicine service for further medical management.      REVIEW OF SYSTEMS:   Unobtainable    PAST MEDICAL HISTORY:   Essential hypertension  Mixed hyperlipidemia  Type 2 DM  Alcoholic cirrhosis   Nicotine dependence   Alcohol abuse    PAST SURGICAL HISTORY:     Past Surgical History:   Procedure Laterality Date    APPENDECTOMY      BACK SURGERY      HIP SURGERY      INTRAMEDULLARY RODDING OF FEMUR Right 10/27/2022    Procedure: RIGHT FEMUR INTERTROCH IMN;  Surgeon: Stewatr Brambila MD;  [facility];  Service: Orthopedics;  Laterality: Right;  [place] table, supine  3rd case  Synthes Short TFNA       FAMILY HISTORY:   Reviewed and negative    SOCIAL HISTORY:   Denied or illicit drug use.  Smokes 1 pack of cigarettes per day. Drinks 2 beers per day.    ALLERGIES:   Meperidine    HOME MEDICATIONS:     Prior to Admission medications    Medication Sig Start Date End Date Taking? Authorizing Provider   albuterol (PROVENTIL/VENTOLIN HFA) 90 mcg/actuation inhaler Inhale 1 puff into the lungs every 4 (four) hours as needed for Shortness of Breath.   Yes Provider, Historical   amLODIPine (NORVASC) 10 MG tablet Take 1 tablet (10 mg total) by mouth once daily.  Patient taking differently: Take 5 mg by mouth once daily. 11/12/22  Yes Adam Weston MD   gabapentin (NEURONTIN) 300 MG capsule Take 300 mg by mouth 3 (three) times daily.   Yes Provider, Historical   HYDROcodone-acetaminophen (NORCO) 5-325 mg per tablet Take 1 tablet by mouth 2 (two) times daily as needed. 7/10/23  Yes Provider, Historical   hyoscyamine (LEVSIN) 0.125 mg Subl Place 0.125 mg under the tongue 3 (three) times daily as needed (for abdominal cramping).   Yes Provider, Historical   methocarbamoL (ROBAXIN) 750 MG Tab Take 750 mg by mouth 3 (three) times daily as needed (for back pain).   Yes Provider, Historical   pantoprazole (PROTONIX) 40 MG tablet Take 40 mg by mouth 2 (two) times daily.   Yes Provider, Historical    ARIPiprazole (ABILIFY) 2 MG Tab Take 1 tablet (2 mg total) by mouth once daily. 11/12/22   Adam Weston MD   atorvastatin (LIPITOR) 10 MG tablet Take 10 mg by mouth once daily.    Provider, Historical   busPIRone (BUSPAR) 10 MG tablet Take 1 tablet (10 mg total) by mouth 3 (three) times daily. 11/11/22   Adam Weston MD   EScitalopram oxalate (LEXAPRO) 20 MG tablet Take 20 mg by mouth once daily.    Provider, Historical   famotidine (PEPCID) 20 MG tablet Take 1 tablet (20 mg total) by mouth 2 (two) times daily. for 15 days 11/11/22 11/26/22  Adam Weston MD   folic acid (FOLVITE) 1 MG tablet Take 1 mg by mouth once daily.    Provider, Historical   labetaloL (NORMODYNE) 200 MG tablet Take 200 mg by mouth 2 (two) times daily.    Provider, Historical   multivitamin Tab Take 1 tablet by mouth once daily. 11/12/22   Adam Weston MD   nicotine (NICODERM CQ) 14 mg/24 hr Place 1 patch onto the skin once daily. 11/12/22   Adam Weston MD   ondansetron (ZOFRAN-ODT) 4 MG TbDL DISSOLVE ONE TABLET under the TONGUE THREE TIMES DAILY AS NEEDED FOR NAUSEA 6/16/23   Provider, Historical   thiamine 100 MG tablet Take 1 tablet (100 mg total) by mouth once daily. 11/12/22   Adam Weston MD     ________________________________________________________________________  INPATIENT LIST OF MEDICATIONS     Current Facility-Administered Medications:     thiamine (B-1) 500 mg in dextrose 5 % (D5W) 100 mL IVPB, 500 mg, Intravenous, Q8H, Last Rate: 100 mL/hr at 03/20/24 0801, 500 mg at 03/20/24 0801 **FOLLOWED BY** [START ON 3/22/2024] thiamine tablet 100 mg, 100 mg, Oral, Q8H, Burk, Christopher C, MD    Current Outpatient Medications:     albuterol (PROVENTIL/VENTOLIN HFA) 90 mcg/actuation inhaler, Inhale 1 puff into the lungs every 4 (four) hours as needed for Shortness of Breath., Disp: , Rfl:     amLODIPine (NORVASC) 10 MG tablet, Take 1 tablet (10 mg total) by mouth once daily. (Patient taking differently: Take 5 mg by mouth once  daily.), Disp: 30 tablet, Rfl: 1    gabapentin (NEURONTIN) 300 MG capsule, Take 300 mg by mouth 3 (three) times daily., Disp: , Rfl:     HYDROcodone-acetaminophen (NORCO) 5-325 mg per tablet, Take 1 tablet by mouth 2 (two) times daily as needed., Disp: , Rfl:     hyoscyamine (LEVSIN) 0.125 mg Subl, Place 0.125 mg under the tongue 3 (three) times daily as needed (for abdominal cramping)., Disp: , Rfl:     methocarbamoL (ROBAXIN) 750 MG Tab, Take 750 mg by mouth 3 (three) times daily as needed (for back pain)., Disp: , Rfl:     pantoprazole (PROTONIX) 40 MG tablet, Take 40 mg by mouth 2 (two) times daily., Disp: , Rfl:     ARIPiprazole (ABILIFY) 2 MG Tab, Take 1 tablet (2 mg total) by mouth once daily., Disp: 30 tablet, Rfl: 1    atorvastatin (LIPITOR) 10 MG tablet, Take 10 mg by mouth once daily., Disp: , Rfl:     busPIRone (BUSPAR) 10 MG tablet, Take 1 tablet (10 mg total) by mouth 3 (three) times daily., Disp: 90 tablet, Rfl: 1    EScitalopram oxalate (LEXAPRO) 20 MG tablet, Take 20 mg by mouth once daily., Disp: , Rfl:     famotidine (PEPCID) 20 MG tablet, Take 1 tablet (20 mg total) by mouth 2 (two) times daily. for 15 days, Disp: 30 tablet, Rfl: 0    folic acid (FOLVITE) 1 MG tablet, Take 1 mg by mouth once daily., Disp: , Rfl:     labetaloL (NORMODYNE) 200 MG tablet, Take 200 mg by mouth 2 (two) times daily., Disp: , Rfl:     multivitamin Tab, Take 1 tablet by mouth once daily., Disp: , Rfl:     nicotine (NICODERM CQ) 14 mg/24 hr, Place 1 patch onto the skin once daily., Disp: 28 patch, Rfl: 0    ondansetron (ZOFRAN-ODT) 4 MG TbDL, DISSOLVE ONE TABLET under the TONGUE THREE TIMES DAILY AS NEEDED FOR NAUSEA, Disp: , Rfl:     thiamine 100 MG tablet, Take 1 tablet (100 mg total) by mouth once daily., Disp: , Rfl:     Scheduled Meds:   thiamine (B-1) 500 mg in dextrose 5 % (D5W) 100 mL IVPB  500 mg Intravenous Q8H    Followed by    [START ON 3/22/2024] thiamine  100 mg Oral Q8H     Continuous Infusions:  PRN  "Meds:.    PHYSICAL EXAM:     VITAL SIGNS: 24 HRS MIN & MAX LAST   Temp  Min: 98.4 °F (36.9 °C)  Max: 98.4 °F (36.9 °C) 98.4 °F (36.9 °C)   BP  Min: 123/86  Max: 147/76 (!) 141/87   Pulse  Min: 100  Max: 112  106   Resp  Min: 13  Max: 19 18   SpO2  Min: 97 %  Max: 98 % 98 %       General appearance:  Chronically ill-appearing female in no apparent distress.  HENT: Atraumatic head. Moist mucous membranes of oral cavity. Very hard of hearing.  Eyes: Normal extraocular movements.   Neck: Supple.   Lungs: Clear to auscultation bilaterally.   Heart: Regular rate and rhythm. S1 and S2 present. No pedal edema.  Abdomen: Soft, non-distended, non-tender.  Protuberant.  Extremities: No cyanosis, clubbing  Skin:  Ecchymosis to bilateral upper extremities. Dry, flaking skin.   Neuro: Motor and sensory exams grossly intact.   Psych/mental status:  Does not answer questions appropriately. When asked questions patient responds"I asked you to speak in English."    LABS AND IMAGING:     Recent Labs   Lab 03/20/24  0055   WBC 6.38   RBC 2.26*   HGB 10.7*   HCT 31.7*   .3*   MCH 47.3*   MCHC 33.8   RDW 15.3   PLT 34*   MPV 12.1*       Recent Labs   Lab 03/20/24  0055      K 4.0   CO2 18*   BUN 3.5*   CREATININE 0.50*   CALCIUM 7.9*   ALBUMIN 1.9*   ALKPHOS 313*   ALT 17   AST 43*   BILITOT 1.5       Microbiology Results (last 7 days)       ** No results found for the last 168 hours. **             CT Abdomen Pelvis With IV Contrast NO Oral Contrast  Narrative: EXAMINATION:  CT ABDOMEN PELVIS WITH IV CONTRAST    CLINICAL HISTORY:  Abdominal pain, acute, nonlocalized;    TECHNIQUE:  Low dose axial images, sagittal and coronal reformations were obtained from the lung bases to the pubic symphysis following the IV administration of 100 mL of Omnipaque 350 .  Oral contrast given.  .  Automated exposure control used.    COMPARISON:  None.    FINDINGS:  Liver demonstrates heterogeneous enhancement pattern and irregular " contour with enlarged left hepatic lobe consistent with cirrhosis.  No discrete focal lesion.    Gallbladder and biliary ductal system normal.    Pancreas, stomach, spleen normal.  Left-sided adrenal nodule measuring 9 mm, nonspecific.    Kidneys normal enhancement with no hydronephrosis.    Aorta tapers normally.  Moderate atherosclerotic calcification.    Bowel loops are generally decompressed.  No definite edematous bowel wall thickening or dilation.    Moderate generalized ascites.  No localized inflammatory change.  No lymphadenopathy in the abdomen or pelvis.    Uterus, adnexa, bladder, rectum normal.    Internal fixation hardware proximal right femur.  Surgical changes thoracolumbar region.    Mild moderate pleural fluid right greater than left.  Impression: Findings consistent with cirrhosis.    Moderate generalized ascites.    Bilateral pleural fluid right greater than left.    Agree with nighthawk.    Electronically signed by: Vadim Wei MD  Date:    03/20/2024  Time:    08:06  X-Ray Chest 1 View  Narrative: EXAMINATION:  XR CHEST 1 VIEW    CPT 71896    CLINICAL HISTORY:  AMS;    COMPARISON:  November 12, 2022    FINDINGS:  Examination reveals mediastinal silhouette to be within normal limits cardiac silhouette is not enlarged right lung field is clear and free of gross infiltrates atelectasis or effusions.    Some increased left retrocardiac density and partial silhouetting of the left hemidiaphragm is identified which might be related to an infiltrate/atelectasis.    No other focal consolidative changes  Impression: Increased left retrocardiac density with partial silhouetting of the left hemidiaphragm which might be related to an infiltrate/atelectasis.    Otherwise no active pulmonary disease    Electronically signed by: Clint Bosch  Date:    03/20/2024  Time:    07:31  CT Head Without Contrast  Narrative: EXAMINATION:  CT HEAD WITHOUT CONTRAST    CLINICAL HISTORY:  Mental status change,  unknown cause;    TECHNIQUE:  CT imaging of the head performed from the skull base to the vertex without intravenous contrast. DLP 1582 mGycm. Automatic exposure control, adjustment of mA/kV or iterative reconstruction technique was used to reduce radiation.    COMPARISON:  28 August 2022    FINDINGS:  There is no acute cortical infarct, hemorrhage or mass lesion.  There is right parietal encephalomalacia.  There is moderate patchy hypoattenuation in the cerebral white matter which is nonspecific but most commonly associated with chronic small vessel ischemic changes.  Ventricles are not significantly enlarged.  There are vascular calcifications.    Visualized paranasal sinuses and mastoid air cells are clear.  Impression: No acute intracranial findings or significant interval change compared to August 2022.    No significant discrepancy with the preliminary report.    Electronically signed by: Bruce Smith  Date:    03/20/2024  Time:    06:32        ASSESSMENT & PLAN:     Acute encephalopathy, metabolic versus hepatic   Metabolic Acidosis   Alcoholic cirrhosis   Ascites   Thrombocytopenia   Acute microcytic anemia   Hypoalbuminemia   History of essential hypertension, hyperlipidemia, type 2 DM, nicotine dependence, alcohol abuse     Plan:  Banana bag given in ED, NS at 50 ml/hr after completion of banana bag  CIWA AR Scale q 8 hrs  Fall precautions   Seizure precautions   NPO until more appropriate   UDS pending  IV Thiamine q8 hrs x2 days  Peripheral Smear pending  Ammonia level is normal  Consider MRI Brain if no symptomatic improvement   Resume appropriate home medications once able to tolerate PO intake  Labs in AM      VTE Prophylaxis: SCDs    Discharge Planning and Disposition: SHRAVAN    I, Gabriela Starkey NP have reviewed and discussed the case with Helena Spain MD  Please see the attending MD's addendum for further assessment and plan.    Gabriela Starkey, [facility]-BC  Department of Hospital Medicine    [facility]   03/20/2024    This note was created with the assistance of opvizor voice recognition software. There may be transcription errors as a result of using this technology, however minimal. Effort has been made to assure accuracy of transcription, but any obvious errors or omissions should be clarified with the author of the document.    _______________________________________________________________________________  MD Addendum:  I, Dr. Grabiel cheney, assumed care of this patient today at  10am  For the patient encounter, I performed the substantive portion of the visit, I reviewed the NP/PA documentation, treatment plan, and medical decision making.  I had face to face time with this patient     The majority of the history was obtained from review of the medical record as the patient was unable to provide any additional history.    A. History:  64 y.o. female with a PMHx of HTN, HLD, type 2 DM, alcoholic cirrhosis, alcohol abuse and nicotine dependence who presented to [facility] on 3/20/2024 via EMS with c/o worsening confusion x1 day with associated generalized weakness.      She was admitted to hospital medicine service for further medical management.      Vitals   /73, , RR 18, SpO2 98% on room air, temperature 98.4° F.      Labs   hemoglobin 10.7, hematocrit 31.7, platelets 34, vitamin B12 1958, INR 1.4, CO2 18, BUN 3.5, creatinine 0.5, calcium 7.9, , albumin 1.9, AST 43.  Serum alcohol level undetectable.      Imaging  EKG demonstrated sinus tachycardia.    CXR demonstrated increased left retrocardiac density with partial silhouetting of the left hemidiaphragm which might be related to an infiltrate/atelectasis.    CT head without contrast negative for acute intracranial findings or significant interval changes compared to August 2022.    CT abdomen and pelvis with IV contrast consistent with cirrhosis, moderate generalized ascites, bilateral pleural fluid  "right greater than left noted.        B. Physical exam:  General appearance:  Chronically ill-appearing female in no apparent distress.  HENT: Atraumatic head. Moist mucous membranes of oral cavity. Very hard of hearing.  Eyes: Normal extraocular movements.   Neck: Supple.   Lungs: Clear to auscultation bilaterally anteriorly .   Heart: Regular rate and rhythm. S1 and S2 present. No pedal edema.  Abdomen: Soft, non-distended, non-tender.  Protuberant- ascites   Extremities: No cyanosis, clubbing  Skin:  Ecchymosis to bilateral upper extremities. Dry, flaking skin.   Neuro: alert, oriented to name , but not to place and time, asking to go home, Motor and sensory exams intact  Psych/mental status:  Does not answer questions appropriately. When asked questions patient responds"I asked you to speak in English."    ASSESSMENT & PLAN:     Acute encephalopathy, metabolic versus Acute urinary retention   Possible Aspiration PNA vs Acute UTI -POA, Vs  Less likely SBP   Acute UTI  Acute urinary retention s/p straight cath x 1   Metabolic Acidosis   Alcoholic cirrhosis liver disease with coagulopathy, thrombocytopenia   Ascites , moderate  Thrombocytopenia , severe , not bleeding significantly   Acute microcytic anemia   Hypoalbuminemia   Bilateral LE EDEMA d/t hypoalbuminosis     History of essential hypertension, hyperlipidemia, type 2 DM, nicotine dependence, alcohol abuse     Plan:  Banana bag given in ED,   IV Clinimix after completion of banana bag  CIWA AR Scale q 8 hrs  NPO until more appropriate  ST/OT/PT Eval  Begin IV Zosyn , day 1   F/up urine cultures   Does not need to be tapped at this time   Felt relief after straight cath in ED   Retention likely d/t UTI and conrad use   Will begin po flomax if retains again   No indication for transfusion of platelets at this time  Transfuse platelets if less than 10 K or if less than 20 K and bleeding  Elevate lower extremity on 2 pillows   Consult nutritionist to help optimize " nutrition   Fall precautions   Seizure precautions   UDS pending  IV Thiamine q8 hrs x2 days  Peripheral Smear pending  Ammonia level is normal  Consider MRI Brain if no symptomatic improvement   Resume appropriate home medications once able to tolerate PO intake  Labs in AM      VTE Prophylaxis: SCDs    Discharge Planning and Disposition: TBD      All diagnosis and differential diagnosis have been reviewed; assessment and plan has been documented; I have personally reviewed the labs and test results that are presently available; I have reviewed the patients medication list; I have reviewed the consulting providers response and recommendations. I have reviewed or attempted to review medical records based upon their availability.    All of the patient and family questions have been addressed and answered. Patient's is agreeable to the above stated plan. I will continue to monitor closely and make adjustments to medical management as needed.      03/20/2024

## 2024-03-20 NOTE — ED PROVIDER NOTES
Encounter Date: 3/20/2024       History     Chief Complaint   Patient presents with    Altered Mental Status     Arrives [facility] 21 reports ams since yesterday w/ increased weakness, also reports from family pt w/ worsening hearing for past week, gcs 15, currently only c/o chronic back pain     64-year-old female with a history of hypertension, hyperlipidemia, diabetes mellitus, alcohol abuse with cirrhosis presents to the emergency department for evaluation of increasing altered mental status for the last 2 days with associated generalized weakness.  No reported fever.  Family reported possibly with decreased hearing for the last week as well.      At time of my evaluation, patient very difficult to obtain a coherent conversation.  She was alert, will answer basic orientation questions however question has to be rephrasing multiple times for the patient to come up with an answer.  Additionally, will start speak nonsensically mid conversation.    Upon abdominal palpation, winces; however, unable to express when abdominal pain started, unable to communicate when last BM was or if she's had any nausea.     Bruising noted to the forearms bilaterally, denies any trauma.  Unable to tell me if windows versus developed    The history is provided by the patient, the EMS personnel and medical records.     Review of patient's allergies indicates:   Allergen Reactions    Meperidine Rash and Hives     Past Medical History:   Diagnosis Date    Benign essential HTN     Mixed hyperlipidemia     Type 2 diabetes mellitus without complications      Past Surgical History:   Procedure Laterality Date    APPENDECTOMY      BACK SURGERY      HIP SURGERY      INTRAMEDULLARY RODDING OF FEMUR Right 10/27/2022    Procedure: RIGHT FEMUR INTERTROCH IMN;  Surgeon: Stewart Brambila MD;  Location: [facility];  Service: Orthopedics;  Laterality: Right;  [place] table, supine  3rd case  Synthes Short TFNA     No family history on file.  Social History      Tobacco Use    Smoking status: Every Day     Current packs/day: 0.50     Types: Cigarettes    Smokeless tobacco: Current   Substance Use Topics    Alcohol use: Yes    Drug use: Never     Review of Systems   Constitutional:  Negative for fever.   Respiratory:  Negative for chest tightness and shortness of breath.    Gastrointestinal:  Positive for abdominal pain. Negative for nausea and vomiting.   Musculoskeletal:  Positive for back pain.       Physical Exam     Initial Vitals [03/20/24 0013]   BP Pulse Resp Temp SpO2   (!) 143/73 100 18 98.4 °F (36.9 °C) 98 %      MAP       --         Physical Exam    Nursing note and vitals reviewed.  Constitutional: She appears well-developed and well-nourished. No distress.   HENT:   Head: Normocephalic and atraumatic.   Mouth/Throat: Oropharynx is clear and moist.   TM clear bilaterally with no erythema or exudates, no discharge   Eyes: Conjunctivae are normal. No scleral icterus.   Neck: Neck supple.   Normal range of motion.  Cardiovascular:  Normal rate, regular rhythm and intact distal pulses.           Pulmonary/Chest: No respiratory distress.   Abdominal: Abdomen is soft. She exhibits distension. There is abdominal tenderness. There is no rebound and no guarding.   Musculoskeletal:      Cervical back: Normal range of motion and neck supple.     Neurological: She is alert.   Oriented to self, knew she was at the hospital but not she was at [facility], not oriented to time  Moving all extremities with symmetric strength, no gross facial asymmetry   Skin: Skin is warm and dry.   Bruising to the forearms bilaterally         ED Course   Procedures  Labs Reviewed   COMPREHENSIVE METABOLIC PANEL - Abnormal; Notable for the following components:       Result Value    Carbon Dioxide 18 (*)     Blood Urea Nitrogen 3.5 (*)     Creatinine 0.50 (*)     Calcium Level Total 7.9 (*)     Protein Total 5.1 (*)     Albumin Level 1.9 (*)     Albumin/Globulin Ratio 0.6 (*)     Alkaline  Phosphatase 313 (*)     Aspartate Aminotransferase 43 (*)     All other components within normal limits   CBC WITH DIFFERENTIAL - Abnormal; Notable for the following components:    RBC 2.26 (*)     Hgb 10.7 (*)     Hct 31.7 (*)     .3 (*)     MCH 47.3 (*)     Platelet 34 (*)     MPV 12.1 (*)     IG# 0.05 (*)     IPF 11.9 (*)     All other components within normal limits   BLOOD SMEAR MICROSCOPIC EXAM (OLG) - Abnormal; Notable for the following components:    RBC Morph Abnormal (*)     Anisocytosis 1+ (*)     Macrocytosis 3+ (*)     Poikilocytosis 1+ (*)     Polychromasia 1+ (*)     Stippled RBCs 1+ (*)     Target Cells 2+ (*)     Platelets Decreased (*)     All other components within normal limits   PROTIME-INR - Abnormal; Notable for the following components:    PT 16.8 (*)     INR 1.4 (*)     All other components within normal limits   AMMONIA - Normal   APTT - Normal   ALCOHOL,MEDICAL (ETHANOL) - Normal   CBC W/ AUTO DIFFERENTIAL    Narrative:     The following orders were created for panel order CBC auto differential.  Procedure                               Abnormality         Status                     ---------                               -----------         ------                     CBC with Differential[[id number]]        Abnormal            Final result                 Please view results for these tests on the individual orders.   URINALYSIS, REFLEX TO URINE CULTURE   PATH REVIEW OF BLOOD SMEAR   DRUG SCREEN, URINE (BEAKER)   VITAMIN B12   FOLATE   VITAMIN B1, WHOLE BLOOD          Imaging Results              CT Abdomen Pelvis With IV Contrast NO Oral Contrast (Preliminary result)  Result time 03/20/24 03:29:56      Preliminary result by Alexys Rodriguez Jr., MD (03/20/24 03:29:56)                   Narrative:    START OF REPORT:  Technique: CT of the abdomen and pelvis was performed with axial images as well as sagittal and coronal reconstruction images with intravenous contrast but  without oral contrast.    Comparison: None available.    Clinical History: Abd pain , weakness.    Dosage Information: Automated Exposure Control was utilized.    Findings:  Lines and Tubes: None.  Thorax:  Lungs: The visualized lung bases appear unremarkable.  Pleura: There are small bilateral pleural effusions.  Heart: The heart size is within normal limits.  Abdomen:  Abdominal Wall: There is extensive stranding of the subcutaneous fat as well as the intraabdominal and intrapelvic fat suggesting an element of anasarca edema of uncertain etiology.  Liver: Mild fatty infiltration of the liver is present. There is a nodular contour of the liver suggesting cirrhosis.  Biliary System: No intrahepatic or extrahepatic biliary duct dilatation is seen.  Gallbladder: The gallbladder appears unremarkable.  Pancreas: The pancreas appears unremarkable.  Spleen: The spleen appears unremarkable.  Adrenals: The adrenal glands appear unremarkable.  Kidneys: The kidneys appear unremarkable with no stones cysts masses or hydronephrosis.  Aorta: There is moderate calcification of the abdominal aorta and its branches.  IVC: Unremarkable.  Bowel:  Esophagus: The visualized esophagus appears unremarkable.  Stomach: The stomach appears unremarkable.  Duodenum: Unremarkable appearing duodenum.  Small Bowel: The small bowel appears unremarkable.  Colon: Nondistended. There is submucosal fatty infiltration of the cecum and ascending colon. This may reflect sequelae of prior inflammation.  Appendix: The appendix is not identified but no inflammatory changes are seen in the right lower quadrant to suggest appendicitis.  Peritoneum: There is mild to moderate ascites. No intraperitoneal free gas is seen.    Pelvis:  Bladder: The bladder appears unremarkable.  Female:  Uterus: A speck of calcification is seen in the fundus of the uterus.  Ovaries: No adnexal masses are seen.    Bony structures: The bones are osteopenic.  Dorsal Spine: There are  chronic compression deformities of T10 through T12 with posterior decompression and fusion of T9 through L1.  Bony Pelvis: A dynamic hip screw is seen in the right femur.      Impression:  1. There is extensive stranding of the subcutaneous fat as well as the intraabdominal and intrapelvic fat suggesting an element of anasarca edema of uncertain etiology.  2. There is a nodular contour of the liver suggesting cirrhosis.  3. There are small bilateral pleural effusions.  4. There is mild to moderate ascites.  5. No acute intraabdominal or pelvic solid organ or bowel pathology identified. Details and other findings as discussed above.                                         CT Head Without Contrast (Preliminary result)  Result time 03/20/24 02:00:14      Preliminary result by Alexys Rodriguez Jr., MD (03/20/24 02:00:14)                   Narrative:    START OF REPORT:  Technique: CT of the head was performed without intravenous contrast with axial as well as coronal and sagittal images.    Comparison: Comparison is with study dated 2022-08-28 21:46:17.    Dosage Information: Automated exposure control was utilized.    Clinical history: Ams.    Findings:  Hemorrhage: No acute intracranial hemorrhage is seen.  CSF spaces: The ventricles, sulci and basal cisterns all appear mildly prominent consistent with global cerebral atrophy.  Brain parenchyma: Unremarkable with preservation of the grey white junction throughout. Mild stable appearing microvascular change is seen in portions of the periventricular and deep white matter tracts.  Vascular territory infarcts:  Lacunar infarcts: There are few lacunar infarcts in the bilateral basal ganglia.  Cerebellum: Unremarkable.  Vascular: Unremarkable venous sinuses. Moderate stable appearing atheromatous calcification of the intracranial arteries is seen.  Sella and skull base: The sella appears to be within normal limits for age.  Cerebellopontine angles: Within normal  limits.  Herniation: None.  Intracranial calcifications: Incidental note is made of bilateral choroid plexus calcification. Incidental note is made of some pineal region calcification.  Calvarium: No acute linear or depressed skull fracture is seen.    Maxillofacial Structures:  Paranasal sinuses: The visualized paranasal sinuses appear clear with no mucoperiosteal thickening or air fluid levels identified.  Orbits: The orbits appear unremarkable.  Zygomatic arches: The zygomatic arches are intact and unremarkable.  Temporal bones and mastoids: The temporal bones and mastoids appear unremarkable.  TMJ: The mandibular condyles appear normally placed with respect to the mandibular fossa.  Nasal Bones: The nasal septum is deviated to left.      Impression:  1. No acute intracranial process identified. Details and findings as noted above.                                         X-Ray Chest 1 View (In process)                      Medications   sodium chloride 0.9% 1,000 mL with mvi, (ADULT) no.4 with vit K 3,300 unit- 150 mcg/10 mL 10 mL, thiamine 100 mg, folic acid 1 mg infusion (has no administration in time range)   iohexoL (OMNIPAQUE 350) injection 100 mL (100 mLs Intravenous Given 3/20/24 0305)     Medical Decision Making  Problems Addressed:  Acute encephalopathy: acute illness or injury  Altered mental status: acute illness or injury  Hepatic cirrhosis, unspecified hepatic cirrhosis type, unspecified whether ascites present: chronic illness or injury  Macrocytic anemia: chronic illness or injury with exacerbation, progression, or side effects of treatment  Thrombocytopenia: acute illness or injury      ED assessment:    Ms. Alfaro presented to the emergency department for evaluation of altered mental status in recent days, confused here, unable to answer questions clearly though is keenly alert.      Differential diagnosis (including but not limited to):   Potential for occult head trauma given bruising to the  forearms, intracranial hemorrhage, cerebral contusion, concussion, ischemic CVA, hemorrhagic CVA, urinary tract infection, acute kidney injury, electrolyte derangements  She does have medications prescribed to her including gabapentin, Norco for chronic pain as well as Abilify, BuSpar, Lexapro - consideration given to polypharmacy etiology of her mental status changes.  Additionally, history of alcohol abuse and cirrhosis.  Consider intoxication, alcoholic or hepatic encephalopathy, Wernicke encephalopathy    ED management:  Laboratory studies notable for anemia, thrombocytopenia.  Elevated alkaline phosphatase, hypoalbuminemia without hyperbilirubinemia or significant transaminitis.  Glucose within normal limits.  CT of the head demonstrates no clinically significant acute abnormalities.  CT of the abdomen and pelvis with ascites, anasarca, small bilateral pleural effusions, otherwise unremarkable.  Toxicology studies negative.  Given persistent mental status changes, case discussed with hospital medicine who accepts for admission for further inpatient evaluation and management    My independent radiology interpretation:   CT head without IV contrast:  No acute intracranial hemorrhage or mass lesion   Chest x-ray:  Hypoventilatory, possible small effusions bilaterally, no dense infiltrate    Amount and/or Complexity of Data Reviewed  Independent historian: EMS   Summary of history:  Reports family summoned EMS for evaluation of altered mental status since yesterday, also with possible decreased hearing for the last few days  External data reviewed: notes from previous admissions and prior labs  Summary of data reviewed:  Previous admission in 2022 with hip fracture, documented alcoholic with cirrhosis at that time.  Most recent CBC on record from 11/20/2022 with thrombocytosis, macrocytic anemia at that time. Worsening macrocytosis today.  Risk and benefits of testing: discussed   Labs: ordered and  reviewed  Radiology: ordered and independent interpretation performed (see above or ED course)  ECG/medicine tests: ordered and independent interpretation performed (see above or ED course)  Discussion of management or test interpretation with external provider(s): discussed with hospitalist physician   Summary of discussion: as below    Risk  Decision regarding hospitalization  Shared decision making     Critical Care  none    I, Roseann Davidson MD personally performed the history, PE, MDM, and procedures as documented above and agree with the scribe's documentation.              ED Course as of 03/20/24 0535   Wed Mar 20, 2024   0426 Discussed with Toyin Golden NP with hospital medicine who accepts for admission [KS]      ED Course User Index  [KS] Roseann Davidson MD                           Clinical Impression:  Final diagnoses:  [R41.82] Altered mental status  [G93.40] Acute encephalopathy (Primary)  [D69.6] Thrombocytopenia  [K74.60] Hepatic cirrhosis, unspecified hepatic cirrhosis type, unspecified whether ascites present  [D53.9] Macrocytic anemia          ED Disposition Condition    Admit                 Roseann Davidson MD  03/20/24 0517

## 2024-03-20 NOTE — PLAN OF CARE
Pt is , 1 child, no living will or POA.    03/20/24 1258   Discharge Assessment   Assessment Type Discharge Planning Assessment   Confirmed/corrected address, phone number and insurance Yes   Confirmed Demographics Correct on Facesheet   Source of Information patient   When was your last doctors appointment?   ([facility] for PCP)   Communicated MILAN with patient/caregiver Date not available/Unable to determine   People in Home child(lala), adult;grandchild(lala)   Do you expect to return to your current living situation? Yes   Do you have help at home or someone to help you manage your care at home? Yes   Who are your caregiver(s) and their phone number(s)? lives with son and grandchild, but son is reportedly gone 6-6. Ex  Bruce Baum [phone]   Prior to hospitilization cognitive status: Unable to Assess   Current cognitive status: Unable to Assess   Walking or Climbing Stairs Difficulty yes   Mobility Management rolling walker   Dressing/Bathing Difficulty yes   Dressing/Bathing bathing difficulty, requires equipment   Dressing/Bathing Management bed baths with assistance from family if needed. Bedside commode   Home Accessibility stairs within home   Stairs, Within Home, Primary 15+   Home Layout Able to live on 1st floor   Equipment Currently Used at Home bedside commode;rollator;other (see comments)  (blood pressure machine PRN)   Readmission within 30 days? No   Patient currently being followed by outpatient case management? No   Do you currently have service(s) that help you manage your care at home? No   Do you take prescription medications? Yes  (Fills with [facility] on [address])   Do you have prescription coverage? Yes   Coverage Medicare   Do you have any problems affording any of your prescribed medications? No   Is the patient taking medications as prescribed? yes   Who is going to help you get home at discharge? Bruce can assist depending on dc disposition   How do you get to doctors  appointments? family or friend will provide   Are you on dialysis? No   Do you take coumadin? No   Discharge Plan A Other  (TBD)   DME Needed Upon Discharge  other (see comments)  (TBD)   Discharge Plan discussed with:   (ex  Bruce)   Transition of Care Barriers None   Physical Activity   On average, how many days per week do you engage in moderate to strenuous exercise (like a brisk walk)? 0 days   On average, how many minutes do you engage in exercise at this level? 0 min   Financial Resource Strain   How hard is it for you to pay for the very basics like food, housing, medical care, and heating? Not very   Housing Stability   In the last 12 months, was there a time when you were not able to pay the mortgage or rent on time? N   In the last 12 months, how many places have you lived? 1   In the last 12 months, was there a time when you did not have a steady place to sleep or slept in a shelter (including now)? N   Transportation Needs   In the past 12 months, has lack of transportation kept you from medical appointments or from getting medications? no   In the past 12 months, has lack of transportation kept you from meetings, work, or from getting things needed for daily living? No   Food Insecurity   Within the past 12 months, the food you bought just didn't last and you didn't have money to get more. Never true   Stress   Do you feel stress - tense, restless, nervous, or anxious, or unable to sleep at night because your mind is troubled all the time - these days? To some exte  (per ex  Bruce due to declining health/ hearing loss over the past month)   Social Connections   In a typical week, how many times do you talk on the phone with family, friends, or neighbors? More than 3  (lives with son and grandchild)   How often do you get together with friends or relatives? More than 3  (lives with son and grandchild)   How often do you attend Sikh or Latter day services? Never   Do you belong to any  clubs or organizations such as Latter-day groups, unions, fraternal or athletic groups, or school groups? No   How often do you attend meetings of the clubs or organizations you belong to? Never   Are you , , , , never , or living with a partner?    Alcohol Use   Q1: How often do you have a drink containing alcohol? 4 or more ti  (per ex  Bruce pt drinks approximately 3 beers daily)   Q2: How many drinks containing alcohol do you have on a typical day when you are drinking? 3 or 4  (per ex  Bruce pt drinks approximately 3 beers daily)   Q3: How often do you have six or more drinks on one occasion? Never  (per ex  Bruce pt drinks approximately 3 beers daily)   OTHER   Name(s) of People in Home son and grandchild

## 2024-03-20 NOTE — Clinical Note
Diagnosis: Acute encephalopathy [693096]   Future Attending Provider: AGUS HORTON [[id number]]   Admit to which facility:: [facility] [[id number]]   Reason for IP Medical Treatment  (Clinical interventions that can only be accomplished in the IP setting? ) :: further AMS/thrombocytopenia work up   I certify that Inpatient services for greater than or equal to 2 midnights are medically necessary:: Yes   Plans for Post-Acute care--if anticipated (pick the single best option):: A. No post acute care anticipated at this time

## 2024-03-20 NOTE — PT/OT/SLP EVAL
[facility]  Speech Language Pathology Department  Clinical Swallow Evaluation    Patient Name:  Luz Maria Alfaro   MRN:  [id number]    Recommendations     General recommendations:  SLP follow up with nursing x1 regarding diet tolerance  Diet texture/consistency recommendations:  Puree solids (IDDSI 4) and thin liquids (IDDSI 0)  Medications: per patient preference  Swallow strategies/precautions: small bites/sips, slow rate, supervision with meals, and assist with feeding as needed  Precautions: Standard, aspiration    History     Luz Maria Alfaro is a/n 64 y.o. female admitted with encephalopathy, metabolic acidosis, alcoholic cirrhosis, and ascites after presenting with AMS.    Past Medical History:   Diagnosis Date    Benign essential HTN     Mixed hyperlipidemia     Type 2 diabetes mellitus without complications      Past Surgical History:   Procedure Laterality Date    APPENDECTOMY      BACK SURGERY      HIP SURGERY      INTRAMEDULLARY RODDING OF FEMUR Right 10/27/2022    Procedure: RIGHT FEMUR INTERTROCH IMN;  Surgeon: Stewart Brambila MD;  Location: [facility];  Service: Orthopedics;  Laterality: Right;  [place] table, supine  3rd case  Synthes Short TFNA     Home diet texture/consistency: unknown  Current method of nutrition: NPO    Imaging   Results for orders placed during the hospital encounter of 03/20/24    X-Ray Chest 1 View    Narrative  EXAMINATION:  XR CHEST 1 VIEW    CPT 18442    CLINICAL HISTORY:  AMS;    COMPARISON:  November 12, 2022    FINDINGS:  Examination reveals mediastinal silhouette to be within normal limits cardiac silhouette is not enlarged right lung field is clear and free of gross infiltrates atelectasis or effusions.    Some increased left retrocardiac density and partial silhouetting of the left hemidiaphragm is identified which might be related to an infiltrate/atelectasis.    No other focal consolidative changes    Impression  Increased left retrocardiac  density with partial silhouetting of the left hemidiaphragm which might be related to an infiltrate/atelectasis.    Otherwise no active pulmonary disease      Electronically signed by: Clint Bosch  Date:    03/20/2024  Time:    07:31      Subjective     Patient awake and alert, but confused and unable to follow directions.    Patient goals: unable to state   Spiritual/Cultural/Jainism Beliefs/Practices that affect care: no    Pain/Comfort: Pain Rating 1: 0/10  Respiratory status: room air  Restraints/positioning devices: roll belt    Objective     ORAL MUSCULATURE  Dentition: missing teeth  Secretion Management: adequate  Mucosal Quality: good    Consistency Fed By Oral Symptoms Pharyngeal Symptoms   Thin liquid by spoon SLP None None   Puree SLP None None   Thin liquid by cup Self None None   Thin liquid by straw SLP None None   Chewable solid Self Expectorated material N/A     Assessment     Oral dysphagia secondary to lack of adequate dentition and negatively impacted by AMS requiring diet modification to improve swallow safety and efficiency.  No overt/clinical signs/sx aspiration noted.    Education     No learner present/available.    Plan     SLP Follow-Up:  Yes   Plan of Care reviewed with:   (no learner present)     Time Tracking     SLP Treatment Date:   03/20/24  Speech Start Time:  1440  Speech Stop Time:  1455     Speech Total Time (min):  15 min    Billable minutes:  Swallow and Oral Function Evaluation, 15 minutes     03/20/2024

## 2024-03-21 PROBLEM — E46 MALNUTRITION: Status: ACTIVE | Noted: 2024-03-21

## 2024-03-21 PROBLEM — G93.40 ACUTE ENCEPHALOPATHY: Status: ACTIVE | Noted: 2024-03-21

## 2024-03-21 LAB
AFP-TM SERPL-MCNC: 2.7 NG/ML
ALBUMIN SERPL-MCNC: 1.8 G/DL (ref 3.4–4.8)
ALBUMIN/GLOB SERPL: 0.7 RATIO (ref 1.1–2)
ALP SERPL-CCNC: 269 UNIT/L (ref 40–150)
ALT SERPL-CCNC: 15 UNIT/L (ref 0–55)
AST SERPL-CCNC: 49 UNIT/L (ref 5–34)
BASOPHILS # BLD AUTO: 0.01 X10(3)/MCL
BASOPHILS NFR BLD AUTO: 0.1 %
BILIRUB SERPL-MCNC: 1.4 MG/DL
BUN SERPL-MCNC: 5 MG/DL (ref 9.8–20.1)
CALCIUM SERPL-MCNC: 7.3 MG/DL (ref 8.4–10.2)
CHLORIDE SERPL-SCNC: 103 MMOL/L (ref 98–107)
CO2 SERPL-SCNC: 25 MMOL/L (ref 23–31)
CREAT SERPL-MCNC: 0.43 MG/DL (ref 0.55–1.02)
EOSINOPHIL # BLD AUTO: 0.01 X10(3)/MCL (ref 0–0.9)
EOSINOPHIL NFR BLD AUTO: 0.1 %
ERYTHROCYTE [DISTWIDTH] IN BLOOD BY AUTOMATED COUNT: 15.9 % (ref 11.5–17)
EST. AVERAGE GLUCOSE BLD GHB EST-MCNC: 111.2 MG/DL
FERRITIN SERPL-MCNC: 277.84 NG/ML (ref 4.63–204)
GFR SERPLBLD CREATININE-BSD FMLA CKD-EPI: >60 MLS/MIN/1.73/M2
GLOBULIN SER-MCNC: 2.6 GM/DL (ref 2.4–3.5)
GLUCOSE SERPL-MCNC: 97 MG/DL (ref 82–115)
HBA1C MFR BLD: 5.5 %
HCT VFR BLD AUTO: 24.8 % (ref 37–47)
HCT VFR BLD AUTO: 25.9 % (ref 37–47)
HGB BLD-MCNC: 8 G/DL (ref 12–16)
HGB BLD-MCNC: 8.6 G/DL (ref 12–16)
IMM GRANULOCYTES # BLD AUTO: 0.08 X10(3)/MCL (ref 0–0.04)
IMM GRANULOCYTES NFR BLD AUTO: 1.1 %
INR PPP: 1.5
IRON SATN MFR SERPL: ABNORMAL %
IRON SERPL-MCNC: 95 UG/DL (ref 50–170)
LYMPHOCYTES # BLD AUTO: 1.81 X10(3)/MCL (ref 0.6–4.6)
LYMPHOCYTES NFR BLD AUTO: 25.1 %
MAGNESIUM SERPL-MCNC: 2.3 MG/DL (ref 1.6–2.6)
MCH RBC QN AUTO: 47.8 PG (ref 27–31)
MCHC RBC AUTO-ENTMCNC: 33.2 G/DL (ref 33–36)
MCV RBC AUTO: 143.9 FL (ref 80–94)
MONOCYTES # BLD AUTO: 0.66 X10(3)/MCL (ref 0.1–1.3)
MONOCYTES NFR BLD AUTO: 9.2 %
MRSA PCR SCRN (OHS): NOT DETECTED
NEUTROPHILS # BLD AUTO: 4.64 X10(3)/MCL (ref 2.1–9.2)
NEUTROPHILS NFR BLD AUTO: 64.4 %
NRBC BLD AUTO-RTO: 0.7 %
PHOSPHATE SERPL-MCNC: 7.2 MG/DL (ref 2.3–4.7)
PLATELET # BLD AUTO: 39 X10(3)/MCL (ref 130–400)
PLATELETS.RETICULATED NFR BLD AUTO: 12.8 % (ref 0.9–11.2)
PMV BLD AUTO: 12.3 FL (ref 7.4–10.4)
POTASSIUM SERPL-SCNC: 3.8 MMOL/L (ref 3.5–5.1)
PROLACTIN LEVEL (OLG): 11.59 NG/ML (ref 5.18–26.53)
PROT SERPL-MCNC: 4.4 GM/DL (ref 5.8–7.6)
PROTHROMBIN TIME: 18.3 SECONDS (ref 12.5–14.5)
RBC # BLD AUTO: 1.8 X10(6)/MCL (ref 4.2–5.4)
SODIUM SERPL-SCNC: 138 MMOL/L (ref 136–145)
TIBC SERPL-MCNC: <120 UG/DL (ref 250–450)
TIBC SERPL-MCNC: <25 UG/DL (ref 70–310)
TRANSFERRIN SERPL-MCNC: 68 MG/DL (ref 173–360)
WBC # SPEC AUTO: 7.21 X10(3)/MCL (ref 4.5–11.5)

## 2024-03-21 PROCEDURE — 95816 EEG AWAKE AND DROWSY: CPT | Mod: 26,,, | Performed by: SPECIALIST

## 2024-03-21 PROCEDURE — 11000001 HC ACUTE MED/SURG PRIVATE ROOM

## 2024-03-21 PROCEDURE — 25000003 PHARM REV CODE 250: Performed by: INTERNAL MEDICINE

## 2024-03-21 PROCEDURE — 85610 PROTHROMBIN TIME: CPT

## 2024-03-21 PROCEDURE — 63600175 PHARM REV CODE 636 W HCPCS: Performed by: INTERNAL MEDICINE

## 2024-03-21 PROCEDURE — 80053 COMPREHEN METABOLIC PANEL: CPT | Performed by: NURSE PRACTITIONER

## 2024-03-21 PROCEDURE — 82728 ASSAY OF FERRITIN: CPT | Performed by: INTERNAL MEDICINE

## 2024-03-21 PROCEDURE — 87641 MR-STAPH DNA AMP PROBE: CPT | Performed by: INTERNAL MEDICINE

## 2024-03-21 PROCEDURE — 83036 HEMOGLOBIN GLYCOSYLATED A1C: CPT | Performed by: INTERNAL MEDICINE

## 2024-03-21 PROCEDURE — 84100 ASSAY OF PHOSPHORUS: CPT | Performed by: INTERNAL MEDICINE

## 2024-03-21 PROCEDURE — 84146 ASSAY OF PROLACTIN: CPT | Performed by: INTERNAL MEDICINE

## 2024-03-21 PROCEDURE — 85014 HEMATOCRIT: CPT | Performed by: INTERNAL MEDICINE

## 2024-03-21 PROCEDURE — 97162 PT EVAL MOD COMPLEX 30 MIN: CPT

## 2024-03-21 PROCEDURE — 99223 1ST HOSP IP/OBS HIGH 75: CPT | Mod: 25,,, | Performed by: SPECIALIST

## 2024-03-21 PROCEDURE — 83735 ASSAY OF MAGNESIUM: CPT | Performed by: INTERNAL MEDICINE

## 2024-03-21 PROCEDURE — 82105 ALPHA-FETOPROTEIN SERUM: CPT

## 2024-03-21 PROCEDURE — 83540 ASSAY OF IRON: CPT | Performed by: INTERNAL MEDICINE

## 2024-03-21 PROCEDURE — 95816 EEG AWAKE AND DROWSY: CPT

## 2024-03-21 PROCEDURE — 85025 COMPLETE CBC W/AUTO DIFF WBC: CPT | Performed by: NURSE PRACTITIONER

## 2024-03-21 PROCEDURE — 25000003 PHARM REV CODE 250: Performed by: NURSE PRACTITIONER

## 2024-03-21 PROCEDURE — 21400001 HC TELEMETRY ROOM

## 2024-03-21 RX ORDER — METHOCARBAMOL 750 MG/1
750 TABLET, FILM COATED ORAL 3 TIMES DAILY PRN
Status: DISCONTINUED | OUTPATIENT
Start: 2024-03-21 | End: 2024-04-17 | Stop reason: HOSPADM

## 2024-03-21 RX ORDER — LORAZEPAM 2 MG/ML
1 INJECTION INTRAMUSCULAR
Status: DISCONTINUED | OUTPATIENT
Start: 2024-03-21 | End: 2024-03-30

## 2024-03-21 RX ORDER — LACTULOSE 10 G/15ML
10 SOLUTION ORAL 3 TIMES DAILY
Status: DISCONTINUED | OUTPATIENT
Start: 2024-03-21 | End: 2024-03-22

## 2024-03-21 RX ORDER — GABAPENTIN 300 MG/1
300 CAPSULE ORAL 3 TIMES DAILY
Status: DISCONTINUED | OUTPATIENT
Start: 2024-03-21 | End: 2024-03-31

## 2024-03-21 RX ORDER — HYOSCYAMINE SULFATE 0.12 MG/1
0.12 TABLET SUBLINGUAL 3 TIMES DAILY PRN
Status: DISCONTINUED | OUTPATIENT
Start: 2024-03-21 | End: 2024-04-17 | Stop reason: HOSPADM

## 2024-03-21 RX ORDER — FOLIC ACID 1 MG/1
1 TABLET ORAL DAILY
Status: DISCONTINUED | OUTPATIENT
Start: 2024-03-21 | End: 2024-04-17 | Stop reason: HOSPADM

## 2024-03-21 RX ORDER — CLONIDINE HYDROCHLORIDE 0.1 MG/1
0.1 TABLET ORAL 3 TIMES DAILY PRN
Status: DISCONTINUED | OUTPATIENT
Start: 2024-03-21 | End: 2024-03-30

## 2024-03-21 RX ORDER — ALBUTEROL SULFATE 90 UG/1
1 AEROSOL, METERED RESPIRATORY (INHALATION) EVERY 4 HOURS PRN
Status: DISCONTINUED | OUTPATIENT
Start: 2024-03-21 | End: 2024-04-17 | Stop reason: HOSPADM

## 2024-03-21 RX ORDER — GLUCAGON 1 MG
1 KIT INJECTION
Status: DISCONTINUED | OUTPATIENT
Start: 2024-03-21 | End: 2024-04-17 | Stop reason: HOSPADM

## 2024-03-21 RX ORDER — HYDROCODONE BITARTRATE AND ACETAMINOPHEN 5; 325 MG/1; MG/1
1 TABLET ORAL 2 TIMES DAILY PRN
Status: DISCONTINUED | OUTPATIENT
Start: 2024-03-21 | End: 2024-03-26

## 2024-03-21 RX ORDER — MORPHINE SULFATE 4 MG/ML
1 INJECTION, SOLUTION INTRAMUSCULAR; INTRAVENOUS EVERY 4 HOURS PRN
Status: DISCONTINUED | OUTPATIENT
Start: 2024-03-21 | End: 2024-03-26

## 2024-03-21 RX ORDER — IBUPROFEN 200 MG
24 TABLET ORAL
Status: DISCONTINUED | OUTPATIENT
Start: 2024-03-21 | End: 2024-04-17 | Stop reason: HOSPADM

## 2024-03-21 RX ORDER — INSULIN ASPART 100 [IU]/ML
0-5 INJECTION, SOLUTION INTRAVENOUS; SUBCUTANEOUS
Status: DISCONTINUED | OUTPATIENT
Start: 2024-03-21 | End: 2024-04-17 | Stop reason: HOSPADM

## 2024-03-21 RX ORDER — ALBUMIN HUMAN 250 G/1000ML
25 SOLUTION INTRAVENOUS ONCE
Status: COMPLETED | OUTPATIENT
Start: 2024-03-22 | End: 2024-03-22

## 2024-03-21 RX ORDER — IBUPROFEN 200 MG
16 TABLET ORAL
Status: DISCONTINUED | OUTPATIENT
Start: 2024-03-21 | End: 2024-04-17 | Stop reason: HOSPADM

## 2024-03-21 RX ADMIN — THIAMINE HYDROCHLORIDE 500 MG: 100 INJECTION, SOLUTION INTRAMUSCULAR; INTRAVENOUS at 06:03

## 2024-03-21 RX ADMIN — THIAMINE HYDROCHLORIDE 500 MG: 100 INJECTION, SOLUTION INTRAMUSCULAR; INTRAVENOUS at 10:03

## 2024-03-21 RX ADMIN — LORAZEPAM 2 MG: 0.5 TABLET ORAL at 05:03

## 2024-03-21 RX ADMIN — PIPERACILLIN SODIUM AND TAZOBACTAM SODIUM 4.5 G: 4; .5 INJECTION, POWDER, LYOPHILIZED, FOR SOLUTION INTRAVENOUS at 01:03

## 2024-03-21 RX ADMIN — PIPERACILLIN SODIUM AND TAZOBACTAM SODIUM 4.5 G: 4; .5 INJECTION, POWDER, LYOPHILIZED, FOR SOLUTION INTRAVENOUS at 09:03

## 2024-03-21 RX ADMIN — PIPERACILLIN SODIUM AND TAZOBACTAM SODIUM 4.5 G: 4; .5 INJECTION, POWDER, LYOPHILIZED, FOR SOLUTION INTRAVENOUS at 05:03

## 2024-03-21 RX ADMIN — THIAMINE HYDROCHLORIDE 500 MG: 100 INJECTION, SOLUTION INTRAMUSCULAR; INTRAVENOUS at 01:03

## 2024-03-21 RX ADMIN — DOXYCYCLINE 100 MG: 100 INJECTION, POWDER, LYOPHILIZED, FOR SOLUTION INTRAVENOUS at 03:03

## 2024-03-21 RX ADMIN — LEUCINE, PHENYLALANINE, LYSINE, METHIONINE, ISOLEUCINE, VALINE, HISTIDINE, THREONINE, TRYPTOPHAN, ALANINE, GLYCINE, ARGININE, PROLINE, SERINE, TYROSINE, SODIUM ACETATE, DIBASIC POTASSIUM PHOSPHATE, MAGNESIUM CHLORIDE, SODIUM CHLORIDE, CALCIUM CHLORIDE, DEXTROSE
311; 238; 247; 170; 255; 247; 204; 179; 77; 880; 438; 489; 289; 213; 17; 297; 261; 51; 77; 33; 5 INJECTION INTRAVENOUS at 07:03

## 2024-03-21 NOTE — CONSULTS
Inpatient Nutrition Assessment    Admit Date: 3/20/2024   Total duration of encounter: 1 day   Patient Age: 64 y.o.    Nutrition Recommendation/Prescription     Oral diet texture modifications per SLP, currently puree solids + thin liquids. Monitor need to diabetic diet restriction.  Add Boost Glucose Control (provides 190 kcal, 16 g protein per serving) with meals, alternate flavors.   Continue thiamin supplementation. As mental status improves, consider 100mg daily.     Recommend increasing rate of PPN to 70ml/hr to better meet protein needs:   Clinimix-E 4.25/5 @ 70 ml/hr to provide  571 kcal/d, 31% est needs  71 g amino acids/d, 91% est needs  1680 ml fluid/d, 100% est needs    Communication of Recommendations: reviewed with nurse and reviewed with family    Nutrition Assessment     Malnutrition Assessment/Nutrition-Focused Physical Exam    Malnutrition Context: chronic illness (03/21/24 1105)  Malnutrition Level: moderate (03/21/24 1105)  Energy Intake (Malnutrition):  (unable to determine) (03/21/24 1105)  Weight Loss (Malnutrition):  (unable to determine) (03/21/24 1105)  Subcutaneous Fat (Malnutrition): severe depletion (03/21/24 1105)     Upper Arm Region (Subcutaneous Fat Loss): severe depletion     Muscle Mass (Malnutrition): mild depletion (03/21/24 1105)     Clavicle Bone Region (Muscle Loss): mild depletion  Clavicle and Acromion Bone Region (Muscle Loss): mild depletion     Dorsal Hand (Muscle Loss): mild depletion     Anterior Thigh Region (Muscle Loss): mild depletion  Posterior Calf Region (Muscle Loss): mild depletion  Fluid Accumulation (Malnutrition):  (does not meet criteria) (03/21/24 1105)        A minimum of two characteristics is recommended for diagnosis of either severe or non-severe malnutrition.    Chart Review    Reason Seen: continuous nutrition monitoring, malnutrition screening tool (MST), and physician consult for PCM    Malnutrition Screening Tool Results   Have you recently lost  weight without trying?: Unsure  Have you been eating poorly because of a decreased appetite?: No   MST Score: 2   Diagnosis:  Acute encephalopathy, metabolic versus hepatic   Metabolic Acidosis   Alcoholic cirrhosis   Ascites   Thrombocytopenia   Acute microcytic anemia   Hypoalbuminemia     Relevant Medical History: HTN, HLD, type 2 DM, alcoholic cirrhosis, alcohol abuse and nicotine dependence      Scheduled Medications:  piperacillin-tazobactam (Zosyn) IV (PEDS and ADULTS) (extended infusion is not appropriate), 4.5 g, Q8H  thiamine (B-1) 500 mg in dextrose 5 % (D5W) 100 mL IVPB, 500 mg, Q8H   Followed by  [START ON 3/22/2024] thiamine, 100 mg, Q8H    Continuous Infusions:  Amino acid 4.25% - dextrose 5% (CLINIMIX-E) solution (1L provides 42.5 gm AA, 50 gm CHO (170 kcal/L dextrose), Na 35, K 30, Mg 5, Ca 4.5, Acetate 70, Cl 39, Phos 15), Last Rate: 50 mL/hr at 03/20/24 1415    PRN Medications: chlordiazepoxide, cloNIDine, LORazepam    Calorie Containing IV Medications: no significant kcals from medications at this time and Clinimix    Recent Labs   Lab 03/20/24  0055 03/20/24  0321 03/21/24  0517 03/21/24  0656     --  138  --    K 4.0  --  3.8  --    CALCIUM 7.9*  --  7.3*  --    CHLORIDE 105  --  103  --    CO2 18*  --  25  --    BUN 3.5*  --  5.0*  --    CREATININE 0.50*  --  0.43*  --    EGFRNORACEVR >60  --  >60  --    GLUCOSE 82  --  97  --    BILITOT 1.5  --  1.4  --    ALKPHOS 313*  --  269*  --    ALT 17  --  15  --    AST 43*  --  49*  --    ALBUMIN 1.9*  --  1.8*  --    AMMONIA  --  33.7  --   --    WBC 6.38  --   --  7.21   HGB 10.7*  --   --  8.6*   HCT 31.7*  --   --  25.9*     Nutrition Orders:  Amino acid 4.25% - dextrose 5% (CLINIMIX-E) solution (1L provides 42.5 gm AA, 50 gm CHO (170 kcal/L dextrose), Na 35, K 30, Mg 5, Ca 4.5, Acetate 70, Cl 39, Phos 15)  Diet Pureed (IDDSI Level 4) Supervision with Meals  Dietary nutrition supplements Boost Glucose Control Chocolate; BID,Dietary  "nutrition supplements Boost Glucose Control Vanilla; Daily    Appetite/Oral Intake: poor/0-25% of meals  Factors Affecting Nutritional Intake: impaired cognitive status/motor control and decreased appetite  Food/Buddhism/Cultural Preferences: none reported  Food Allergies: none reported  Last Bowel Movement:  (pt can not answer)  Wound(s):      Comments    3/21/24 Pt unable to answer questions, ex- at bedside states pt is on a "liquid diet", chronic alcohol abuse. States notable wt loss over the last 9 months, estimating weight around 160lbs. EMR wt history shows 119-124lbs x 1.5 years so unsure accuracy of reported UBW. Did note muscle and fat loss during NFPA. He think she would drink oral supplement, will send with meals. PPN infusing at 50ml/hr, recommend increasing rate to better meet nutritional needs while oral intake is inadequate.     Anthropometrics    Height: 5' 3" (160 cm), Height Method: Stated  Last Weight: 61 kg (134 lb 7.7 oz) (03/21/24 0510), Weight Method: Standard Scale  BMI (Calculated): 23.8  BMI Classification: normal (BMI 18.5-24.9)     Ideal Body Weight (IBW), Female: 115 lb     % Ideal Body Weight, Female (lb): 116.94 %                             Usual Weight Provided By: unable to obtain usual weight    Wt Readings from Last 5 Encounters:   03/21/24 61 kg (134 lb 7.7 oz)   07/21/23 56.3 kg (124 lb 3.2 oz)   03/27/23 54.4 kg (119 lb 14.9 oz)   01/24/23 54.4 kg (119 lb 14.9 oz)   12/15/22 54.4 kg (119 lb 14.9 oz)     Weight Change(s) Since Admission:   Wt Readings from Last 1 Encounters:   03/21/24 0510 61 kg (134 lb 7.7 oz)   03/20/24 0013 56.7 kg (125 lb)   Admit Weight: 56.7 kg (125 lb) (03/20/24 0013), Weight Method: Stated    Estimated Needs    Weight Used For Calorie Calculations: 61 kg (134 lb 7.7 oz)  Energy Calorie Requirements (kcal): 1830kcals/d (30kcals/kg)  Energy Need Method: Kcal/kg  Weight Used For Protein Calculations: 61 kg (134 lb 7.7 oz)  Protein Requirements: " 79g/d (1.3g/kg)  Fluid Requirements (mL): 1525-1830ml fl/d (25-30ml/kg)    Enteral Nutrition     Patient not receiving enteral nutrition at this time.    Parenteral Nutrition     Standard Formula: Clinimix E 4.25/5  Custom Formula: not applicable  Additives: none  Rate/Volume: 50ml/hr  Lipids: none  Total Nutrition Provided by Parenteral Nutrition:  Calories Provided  408 kcal/d, 22% needs   Protein Provided  51 g/d, 64% needs   Dextrose Provided  60 g/d, GIR 0.73 mg CHO/kg/min   Fluid Provided  1200 ml/d, 78% needs       Evaluation of Received Nutrient Intake    Calories: not meeting estimated needs  Protein: not meeting estimated needs    Patient Education     Not applicable.    Nutrition Diagnosis     PES: Inadequate oral intake related to acute illness as evidenced by <50% EER since admit. (new)     PES: Moderate chronic disease or condition related malnutrition related to inability to consume sufficient nutrients as evidenced by severe fat depletion and mild muscle depletion. (new)    Nutrition Interventions     Intervention(s): modified composition of meals/snacks, modified rate of parenteral nutrition, commercial beverage, multivitamin/mineral supplement therapy, and collaboration with other providers    Goal: Meet greater than 80% of nutritional needs by follow-up. (new)  Goal: Maintain weight throughout hospitalization. (new)    Nutrition Goals & Monitoring     Dietitian will monitor: energy intake, weight change, electrolyte/renal panel, and glucose/endocrine profile    Nutrition Risk/Follow-Up: high (follow-up in 1-4 days)   Please consult if re-assessment needed sooner.

## 2024-03-21 NOTE — NURSING
Nurses Note -- 4 Eyes      3/21/2024   0515      Skin assessed during: Admit      [x] No Altered Skin Integrity Present    []Prevention Measures Documented      [] Yes- Altered Skin Integrity Present or Discovered   [] LDA Added if Not in Epic (Describe Wound)   [] New Altered Skin Integrity was Present on Admit and Documented in LDA   [] Wound Image Taken    Wound Care Consulted? No    Attending Nurse:  Liz Crawford RN/Staff Member:  DEVAN Elliott

## 2024-03-21 NOTE — ASSESSMENT & PLAN NOTE
MRI brain w w/o ordered  Await EEG results  Avoid sedating medications  Etoh withdrawal protocol per primary team  Fall precautions

## 2024-03-21 NOTE — CONSULTS
[facility] - Neurology  Neurology  Consult Note    Patient Name: Luz Maria Alfaro  MRN: [id number]  Admission Date: 3/20/2024  Hospital Length of Stay: 1 days  Code Status: Full Code   Attending Provider: Yvette Saba MD   Consulting Provider: Portia Beatty [facility]  Primary Care Physician: Robyn Matthews NP  Principal Problem:<principal problem not specified>    Inpatient consult to Neurology  Consult performed by: Portia Beatty FNP  Consult ordered by: Yvette Saba MD         Subjective:     Chief Complaint:       HPI:   63 y/o PMH HTN, HLD, DM2, alcoholic cirrhosis, alcohol abuse, and smoker who presents to ED on 3/20 with worsening confusion with associated generalized weakness.  Patient reportedly also having worsening hearing over the last week as well per patient's family report.    CT head without negative for acute intracranial abnormalities or significant interval change as compared to 08/2022.  CT abdomen/pelvis consistent with liver cirrhosis.  Significant for microcytic anemia, AST 49, and bacteriuria.              Past Medical History:   Diagnosis Date    Benign essential HTN     Mixed hyperlipidemia     Type 2 diabetes mellitus without complications        Past Surgical History:   Procedure Laterality Date    APPENDECTOMY      BACK SURGERY      HIP SURGERY      INTRAMEDULLARY RODDING OF FEMUR Right 10/27/2022    Procedure: RIGHT FEMUR INTERTROCH IMN;  Surgeon: Stewart Brambila MD;  Location: [facility];  Service: Orthopedics;  Laterality: Right;  [place] table, supine  3rd case  Synthes Short TFNA       Review of patient's allergies indicates:   Allergen Reactions    Meperidine Rash and Hives       Current Neurological Medications:     No current facility-administered medications on file prior to encounter.     Current Outpatient Medications on File Prior to Encounter   Medication Sig    albuterol (PROVENTIL/VENTOLIN HFA) 90 mcg/actuation inhaler Inhale 1 puff into the lungs every 4  (four) hours as needed for Shortness of Breath.    amLODIPine (NORVASC) 10 MG tablet Take 1 tablet (10 mg total) by mouth once daily. (Patient taking differently: Take 5 mg by mouth once daily.)    gabapentin (NEURONTIN) 300 MG capsule Take 300 mg by mouth 3 (three) times daily.    HYDROcodone-acetaminophen (NORCO) 5-325 mg per tablet Take 1 tablet by mouth 2 (two) times daily as needed.    hyoscyamine (LEVSIN) 0.125 mg Subl Place 0.125 mg under the tongue 3 (three) times daily as needed (for abdominal cramping).    methocarbamoL (ROBAXIN) 750 MG Tab Take 750 mg by mouth 3 (three) times daily as needed (for back pain).    pantoprazole (PROTONIX) 40 MG tablet Take 40 mg by mouth 2 (two) times daily.    ARIPiprazole (ABILIFY) 2 MG Tab Take 1 tablet (2 mg total) by mouth once daily.    atorvastatin (LIPITOR) 10 MG tablet Take 10 mg by mouth once daily.    busPIRone (BUSPAR) 10 MG tablet Take 1 tablet (10 mg total) by mouth 3 (three) times daily.    EScitalopram oxalate (LEXAPRO) 20 MG tablet Take 20 mg by mouth once daily.    famotidine (PEPCID) 20 MG tablet Take 1 tablet (20 mg total) by mouth 2 (two) times daily. for 15 days    folic acid (FOLVITE) 1 MG tablet Take 1 mg by mouth once daily.    ondansetron (ZOFRAN-ODT) 4 MG TbDL DISSOLVE ONE TABLET under the TONGUE THREE TIMES DAILY AS NEEDED FOR NAUSEA    thiamine 100 MG tablet Take 1 tablet (100 mg total) by mouth once daily.     Family History    None       Tobacco Use    Smoking status: Every Day     Current packs/day: 0.50     Types: Cigarettes    Smokeless tobacco: Current   Substance and Sexual Activity    Alcohol use: Yes    Drug use: Never    Sexual activity: Not Currently     Review of Systems   Unable to perform ROS: Acuity of condition     Objective:     Vital Signs (Most Recent):  Temp: 98 °F (36.7 °C) (03/21/24 1149)  Pulse: 102 (03/21/24 1149)  Resp: 16 (03/21/24 1149)  BP: (!) 148/87 (03/21/24 1149)  SpO2: 97 % (03/21/24 1149) Vital Signs (24h  Range):  Temp:  [98 °F (36.7 °C)-98.8 °F (37.1 °C)] 98 °F (36.7 °C)  Pulse:  [101-115] 102  Resp:  [11-20] 16  SpO2:  [96 %-100 %] 97 %  BP: (109-158)/() 148/87     Weight: 61 kg (134 lb 7.7 oz)  Body mass index is 23.82 kg/m².     Physical Exam   Defer to MD for full neurologic exam       Significant Labs:   Recent Lab Results         03/21/24  1214   03/21/24  0656   03/21/24  0517        Immature Platelet Fraction   12.8         Albumin/Globulin Ratio     0.7       Albumin     1.8       ALP     269       ALT     15       AST     49       Baso #   0.01         Basophil %   0.1         BILIRUBIN TOTAL     1.4       BUN     5.0       Calcium     7.3       Chloride     103       CO2     25       Creatinine     0.43       eGFR     >60       Eos #   0.01         Eos %   0.1         Ferritin     277.84       Globulin, Total     2.6       Glucose     97       Hematocrit 24.8   25.9         Hemoglobin 8.0   8.6         Immature Grans (Abs)   0.08         Immature Granulocytes   1.1         INR 1.5           Iron     95       Iron Binding Capacity Unsaturated     <25       Iron Saturation     --  Comment: Unable to calculate       Lymph #   1.81         LYMPH %   25.1         Magnesium  2.30           MCH   47.8         MCHC   33.2         MCV   143.9         Mono #   0.66         Mono %   9.2         MPV   12.3         Neut #   4.64         Neut %   64.4         nRBC   0.7         Phosphorus Level 7.2           Platelet Count   39         Potassium     3.8       Prolactin     11.59       PROTEIN TOTAL     4.4       PT 18.3           RBC   1.80         RDW   15.9         Sodium     138       TIBC     <120       Transferrin     68       WBC   7.21                 Significant Imaging:  CT head w/o 3/20/2024:  FINDINGS:  There is no acute cortical infarct, hemorrhage or mass lesion.  There is right parietal encephalomalacia.  There is moderate patchy hypoattenuation in the cerebral white matter which is nonspecific but  most commonly associated with chronic small vessel ischemic changes.  Ventricles are not significantly enlarged.  There are vascular calcifications.     Visualized paranasal sinuses and mastoid air cells are clear.     Impression:     No acute intracranial findings or significant interval change compared to August 2022.     I have reviewed all pertinent imaging results/findings within the past 24 hours.  Assessment and Plan:     Acute encephalopathy  MRI brain w w/o ordered  Await EEG results  Avoid sedating medications  Etoh withdrawal protocol per primary team  Fall precautions        VTE Risk Mitigation (From admission, onward)           Ordered     IP VTE HIGH RISK PATIENT  Once         03/20/24 0823     Place sequential compression device  Until discontinued         03/20/24 0823                    Thank you for your consult.  Further recommendations to follow per MD Portia Beatty, [facility]  Inpatient Neurology  [facility] - Neurology

## 2024-03-21 NOTE — PLAN OF CARE
03/21/24 1554   Discharge Assessment   Assessment Type Discharge Planning Assessment   Discharge Plan A Skilled Nursing Facility   Discharge Plan B Skilled Nursing Facility

## 2024-03-21 NOTE — SUBJECTIVE & OBJECTIVE
Past Medical History:   Diagnosis Date    Benign essential HTN     Mixed hyperlipidemia     Type 2 diabetes mellitus without complications        Past Surgical History:   Procedure Laterality Date    APPENDECTOMY      BACK SURGERY      HIP SURGERY      INTRAMEDULLARY RODDING OF FEMUR Right 10/27/2022    Procedure: RIGHT FEMUR INTERTROCH IMN;  Surgeon: Stewart Brambila MD;  Location: [facility];  Service: Orthopedics;  Laterality: Right;  [place] table, supine  3rd case  Synthes Short TFNA       Review of patient's allergies indicates:   Allergen Reactions    Meperidine Rash and Hives       Current Neurological Medications:     No current facility-administered medications on file prior to encounter.     Current Outpatient Medications on File Prior to Encounter   Medication Sig    albuterol (PROVENTIL/VENTOLIN HFA) 90 mcg/actuation inhaler Inhale 1 puff into the lungs every 4 (four) hours as needed for Shortness of Breath.    amLODIPine (NORVASC) 10 MG tablet Take 1 tablet (10 mg total) by mouth once daily. (Patient taking differently: Take 5 mg by mouth once daily.)    gabapentin (NEURONTIN) 300 MG capsule Take 300 mg by mouth 3 (three) times daily.    HYDROcodone-acetaminophen (NORCO) 5-325 mg per tablet Take 1 tablet by mouth 2 (two) times daily as needed.    hyoscyamine (LEVSIN) 0.125 mg Subl Place 0.125 mg under the tongue 3 (three) times daily as needed (for abdominal cramping).    methocarbamoL (ROBAXIN) 750 MG Tab Take 750 mg by mouth 3 (three) times daily as needed (for back pain).    pantoprazole (PROTONIX) 40 MG tablet Take 40 mg by mouth 2 (two) times daily.    ARIPiprazole (ABILIFY) 2 MG Tab Take 1 tablet (2 mg total) by mouth once daily.    atorvastatin (LIPITOR) 10 MG tablet Take 10 mg by mouth once daily.    busPIRone (BUSPAR) 10 MG tablet Take 1 tablet (10 mg total) by mouth 3 (three) times daily.    EScitalopram oxalate (LEXAPRO) 20 MG tablet Take 20 mg by mouth once daily.    famotidine (PEPCID) 20 MG  tablet Take 1 tablet (20 mg total) by mouth 2 (two) times daily. for 15 days    folic acid (FOLVITE) 1 MG tablet Take 1 mg by mouth once daily.    ondansetron (ZOFRAN-ODT) 4 MG TbDL DISSOLVE ONE TABLET under the TONGUE THREE TIMES DAILY AS NEEDED FOR NAUSEA    thiamine 100 MG tablet Take 1 tablet (100 mg total) by mouth once daily.     Family History    None       Tobacco Use    Smoking status: Every Day     Current packs/day: 0.50     Types: Cigarettes    Smokeless tobacco: Current   Substance and Sexual Activity    Alcohol use: Yes    Drug use: Never    Sexual activity: Not Currently     Review of Systems   Unable to perform ROS: Acuity of condition     Objective:     Vital Signs (Most Recent):  Temp: 98 °F (36.7 °C) (03/21/24 1149)  Pulse: 102 (03/21/24 1149)  Resp: 16 (03/21/24 1149)  BP: (!) 148/87 (03/21/24 1149)  SpO2: 97 % (03/21/24 1149) Vital Signs (24h Range):  Temp:  [98 °F (36.7 °C)-98.8 °F (37.1 °C)] 98 °F (36.7 °C)  Pulse:  [101-115] 102  Resp:  [11-20] 16  SpO2:  [96 %-100 %] 97 %  BP: (109-158)/() 148/87     Weight: 61 kg (134 lb 7.7 oz)  Body mass index is 23.82 kg/m².     Physical Exam   Defer to MD for full neurologic exam       Significant Labs:   Recent Lab Results         03/21/24  1214   03/21/24  0656   03/21/24  0517        Immature Platelet Fraction   12.8         Albumin/Globulin Ratio     0.7       Albumin     1.8       ALP     269       ALT     15       AST     49       Baso #   0.01         Basophil %   0.1         BILIRUBIN TOTAL     1.4       BUN     5.0       Calcium     7.3       Chloride     103       CO2     25       Creatinine     0.43       eGFR     >60       Eos #   0.01         Eos %   0.1         Ferritin     277.84       Globulin, Total     2.6       Glucose     97       Hematocrit 24.8   25.9         Hemoglobin 8.0   8.6         Immature Grans (Abs)   0.08         Immature Granulocytes   1.1         INR 1.5           Iron     95       Iron Binding Capacity  Unsaturated     <25       Iron Saturation     --  Comment: Unable to calculate       Lymph #   1.81         LYMPH %   25.1         Magnesium  2.30           MCH   47.8         MCHC   33.2         MCV   143.9         Mono #   0.66         Mono %   9.2         MPV   12.3         Neut #   4.64         Neut %   64.4         nRBC   0.7         Phosphorus Level 7.2           Platelet Count   39         Potassium     3.8       Prolactin     11.59       PROTEIN TOTAL     4.4       PT 18.3           RBC   1.80         RDW   15.9         Sodium     138       TIBC     <120       Transferrin     68       WBC   7.21                 Significant Imaging:  CT head w/o 3/20/2024:  FINDINGS:  There is no acute cortical infarct, hemorrhage or mass lesion.  There is right parietal encephalomalacia.  There is moderate patchy hypoattenuation in the cerebral white matter which is nonspecific but most commonly associated with chronic small vessel ischemic changes.  Ventricles are not significantly enlarged.  There are vascular calcifications.     Visualized paranasal sinuses and mastoid air cells are clear.     Impression:     No acute intracranial findings or significant interval change compared to August 2022.     I have reviewed all pertinent imaging results/findings within the past 24 hours.

## 2024-03-21 NOTE — CONSULTS
Consult Note    Reason for Consult:      We were consulted by Dr. Saba to evaluate this patient for cirrhosis, ascites, drop in Hgb, FOBT pending.     HPI:     64 year old female known to Dr. Garcia with a pmhx of HTN, HLD, DM, alcohol abuse, chronic anemia, and cirrhosis presents to the ED c/o increased weakness and AMS for past 2 days.     On presentation, hypertensive 143/73 otherwise VSS. Labs notable for macrocytic anemia hgb 10.7, thrombocytopenia 34,000, B12 1958 H, folate wnL, iron 95, TIBC low, ferritin 277 H, INR 1.5, BUN 3.5/Cr 0.50, GFR >60, alk phos 313, AST 43, ALT 17, tbili 1.5, ammonia wnL, alcohol level <10, UDS positive for opiates, UA with acute UTI. Imaging notable for CXR: increased L retrocardiac density with partial silhouetting of L hemidiaphragm may represent infilatrate/atelectasis. CT head: no acute intracranial findings. CT abd/pel with: heterogenous enhancement and irregular contour with enlarged L hepatic lobe consistent with cirrhosis. No discrete focal lesion. Moderate generalized ascites. Pt was admitted for further care. GI consulted for cirrhosis with ascites and drop in hgb.    Pt unable to provide history. She only grunts and moves around in bed when I ask her questions. She did attempt to flex her hand when I asked. No family present to provide further history. History obtained solely from chart review.    Previous records reviewed...  Baseline hgb 7-8 per my chart review.  EGD for variceal screening and nausea 01/24/2024: benign intrinsic stricture s/p dilation with 44Fr bougie. small mucosal tear noted so didn't dilate further. small HH. granularity and erythema in antrum compatible with gastritis. normal mucosa in duodenum. No mention of esophageal varices.   Colonoscopy for alternating bowel habits 9/1/2023: internal hemorrhoids, 3 polyps in descending colon, and 1 polyp in ascending. All TA polyps.    PCP:  Robyn Matthews NP    Review of patient's allergies  indicates:   Allergen Reactions    Meperidine Rash and Hives        Current Facility-Administered Medications   Medication Dose Route Frequency Provider Last Rate Last Admin    albuterol inhaler 1 puff  1 puff Inhalation Q4H PRN Yvette Saba MD        Amino acid 4.25% - dextrose 5% (CLINIMIX-E) solution (1L provides 42.5 gm AA, 50 gm CHO (170 kcal/L dextrose), Na 35, K 30, Mg 5, Ca 4.5, Acetate 70, Cl 39, Phos 15)   Intravenous Continuous Helena Spain MD 50 mL/hr at 03/20/24 1415 New Bag at 03/20/24 1415    chlordiazepoxide capsule 25 mg  25 mg Oral QID PRN Gabriela Starkey AGACNP-BC        cloNIDine tablet 0.1 mg  0.1 mg Oral TID PRN Toyin Golden FNP        doxycycline 100 mg in dextrose 5 % in water (D5W) 100 mL IVPB (MB+)  100 mg Intravenous Q12H Yvette Saba MD        folic acid tablet 1 mg  1 mg Oral Daily Yvette Saba MD        gabapentin capsule 300 mg  300 mg Oral TID Yvette Saba MD        HYDROcodone-acetaminophen 5-325 mg per tablet 1 tablet  1 tablet Oral BID PRN Yvette Saba MD        hyoscyamine ODT tablet 0.125 mg  0.125 mg Sublingual TID PRN Yvette Saba MD        LORazepam tablet 2 mg  2 mg Oral Q4H PRN Gabriela Starkey AGACNP-BC        methocarbamoL tablet 750 mg  750 mg Oral TID PRN Yvette Saba MD        piperacillin-tazobactam (ZOSYN) 4.5 g in dextrose 5 % in water (D5W) 100 mL IVPB (MB+)  4.5 g Intravenous Q8H Helena Spain MD   Stopped at 03/21/24 0929    thiamine (B-1) 500 mg in dextrose 5 % (D5W) 100 mL IVPB  500 mg Intravenous Q8H Evgeny Burk  mL/hr at 03/21/24 1015 500 mg at 03/21/24 1015    Followed by    [START ON 3/22/2024] thiamine tablet 100 mg  100 mg Oral Q8H Evgeny Burk MD         Medications Prior to Admission   Medication Sig Dispense Refill Last Dose    albuterol (PROVENTIL/VENTOLIN HFA) 90 mcg/actuation inhaler Inhale 1 puff into the lungs every 4 (four) hours as needed for Shortness of Breath.   Past Month     amLODIPine (NORVASC) 10 MG tablet Take 1 tablet (10 mg total) by mouth once daily. (Patient taking differently: Take 5 mg by mouth once daily.) 30 tablet 1 3/19/2024    gabapentin (NEURONTIN) 300 MG capsule Take 300 mg by mouth 3 (three) times daily.   3/19/2024    HYDROcodone-acetaminophen (NORCO) 5-325 mg per tablet Take 1 tablet by mouth 2 (two) times daily as needed.   3/19/2024    hyoscyamine (LEVSIN) 0.125 mg Subl Place 0.125 mg under the tongue 3 (three) times daily as needed (for abdominal cramping).   3/19/2024    methocarbamoL (ROBAXIN) 750 MG Tab Take 750 mg by mouth 3 (three) times daily as needed (for back pain).   3/19/2024    pantoprazole (PROTONIX) 40 MG tablet Take 40 mg by mouth 2 (two) times daily.   3/19/2024    ARIPiprazole (ABILIFY) 2 MG Tab Take 1 tablet (2 mg total) by mouth once daily. 30 tablet 1 Unknown    atorvastatin (LIPITOR) 10 MG tablet Take 10 mg by mouth once daily.   Unknown    busPIRone (BUSPAR) 10 MG tablet Take 1 tablet (10 mg total) by mouth 3 (three) times daily. 90 tablet 1 Unknown    EScitalopram oxalate (LEXAPRO) 20 MG tablet Take 20 mg by mouth once daily.   Unknown    famotidine (PEPCID) 20 MG tablet Take 1 tablet (20 mg total) by mouth 2 (two) times daily. for 15 days 30 tablet 0     folic acid (FOLVITE) 1 MG tablet Take 1 mg by mouth once daily.   Unknown    ondansetron (ZOFRAN-ODT) 4 MG TbDL DISSOLVE ONE TABLET under the TONGUE THREE TIMES DAILY AS NEEDED FOR NAUSEA   Unknown    thiamine 100 MG tablet Take 1 tablet (100 mg total) by mouth once daily.   Unknown       Past Medical History:  Past Medical History:   Diagnosis Date    Benign essential HTN     Mixed hyperlipidemia     Type 2 diabetes mellitus without complications       Past Surgical History:  Past Surgical History:   Procedure Laterality Date    APPENDECTOMY      BACK SURGERY      HIP SURGERY      INTRAMEDULLARY RODDING OF FEMUR Right 10/27/2022    Procedure: RIGHT FEMUR INTERTROCH IMN;  Surgeon: Stewart ROONEY  MD Kosta;  Location: [facility];  Service: Orthopedics;  Laterality: Right;  [place] table, supine  3rd case  Synthes Short TFNA      Family History:  No family history on file.  Social History:  Social History     Tobacco Use    Smoking status: Every Day     Current packs/day: 0.50     Types: Cigarettes    Smokeless tobacco: Current   Substance Use Topics    Alcohol use: Yes       Review of Systems:     Review of Systems   Unable to perform ROS: Mental status change       Objective:     VITAL SIGNS: 24 HR MIN & MAX LAST    Temp  Min: 98 °F (36.7 °C)  Max: 98.8 °F (37.1 °C)  98 °F (36.7 °C)        BP  Min: 109/70  Max: 158/88  (!) 148/87     Pulse  Min: 101  Max: 115  102     Resp  Min: 11  Max: 20  16    SpO2  Min: 96 %  Max: 100 %  97 %        Intake/Output Summary (Last 24 hours) at 3/21/2024 1241  Last data filed at 3/21/2024 0223  Gross per 24 hour   Intake 399.97 ml   Output --   Net 399.97 ml       Physical Exam  Constitutional:       General: She is not in acute distress.     Appearance: She is ill-appearing.   HENT:      Head: Normocephalic and atraumatic.   Eyes:      General: No scleral icterus.     Extraocular Movements: Extraocular movements intact.   Cardiovascular:      Rate and Rhythm: Normal rate and regular rhythm.   Pulmonary:      Effort: Pulmonary effort is normal. No respiratory distress.   Abdominal:      General: Bowel sounds are normal. There is distension.      Palpations: Abdomen is soft. There is no mass.      Tenderness: There is no abdominal tenderness. There is no guarding or rebound.      Comments: Distended. Slightly firm to palpation.   Musculoskeletal:      Right lower leg: No edema.      Left lower leg: No edema.   Skin:     General: Skin is warm and dry.      Coloration: Skin is not jaundiced.   Neurological:      Mental Status: She is alert.      Comments: Unable to assess   Psychiatric:      Comments: Unable to assess           Recent Results (from the past 48 hour(s))   EKG  12-lead    Collection Time: 03/20/24 12:20 AM   Result Value Ref Range    QRS Duration 66 ms    [facility] QTC Calculation 478 ms   Comprehensive metabolic panel    Collection Time: 03/20/24 12:55 AM   Result Value Ref Range    Sodium Level 136 136 - 145 mmol/L    Potassium Level 4.0 3.5 - 5.1 mmol/L    Chloride 105 98 - 107 mmol/L    Carbon Dioxide 18 (L) 23 - 31 mmol/L    Glucose Level 82 82 - 115 mg/dL    Blood Urea Nitrogen 3.5 (L) 9.8 - 20.1 mg/dL    Creatinine 0.50 (L) 0.55 - 1.02 mg/dL    Calcium Level Total 7.9 (L) 8.4 - 10.2 mg/dL    Protein Total 5.1 (L) 5.8 - 7.6 gm/dL    Albumin Level 1.9 (L) 3.4 - 4.8 g/dL    Globulin 3.2 2.4 - 3.5 gm/dL    Albumin/Globulin Ratio 0.6 (L) 1.1 - 2.0 ratio    Bilirubin Total 1.5 <=1.5 mg/dL    Alkaline Phosphatase 313 (H) 40 - 150 unit/L    Alanine Aminotransferase 17 0 - 55 unit/L    Aspartate Aminotransferase 43 (H) 5 - 34 unit/L    eGFR >60 mls/min/1.73/m2   CBC with Differential    Collection Time: 03/20/24 12:55 AM   Result Value Ref Range    WBC 6.38 4.50 - 11.50 x10(3)/mcL    RBC 2.26 (L) 4.20 - 5.40 x10(6)/mcL    Hgb 10.7 (L) 12.0 - 16.0 g/dL    Hct 31.7 (L) 37.0 - 47.0 %    .3 (H) 80.0 - 94.0 fL    MCH 47.3 (H) 27.0 - 31.0 pg    MCHC 33.8 33.0 - 36.0 g/dL    RDW 15.3 11.5 - 17.0 %    Platelet 34 (LL) 130 - 400 x10(3)/mcL    MPV 12.1 (H) 7.4 - 10.4 fL    Neut % 70.1 %    Lymph % 19.1 %    Mono % 9.6 %    Eos % 0.2 %    Basophil % 0.2 %    Lymph # 1.22 0.6 - 4.6 x10(3)/mcL    Neut # 4.48 2.1 - 9.2 x10(3)/mcL    Mono # 0.61 0.1 - 1.3 x10(3)/mcL    Eos # 0.01 0 - 0.9 x10(3)/mcL    Baso # 0.01 <=0.2 x10(3)/mcL    IG# 0.05 (H) 0 - 0.04 x10(3)/mcL    IG% 0.8 %    NRBC% 0.8 %    IPF 11.9 (H) 0.9 - 11.2 %   Blood Smear Microscopic Exam    Collection Time: 03/20/24 12:55 AM   Result Value Ref Range    RBC Morph Abnormal (A) Normal    Anisocytosis 1+ (A) (none)    Macrocytosis 3+ (A) (none)    Poikilocytosis 1+ (A) (none)    Polychromasia 1+ (A) (none)    Stippled RBCs 1+  (A) (none)    Target Cells 2+ (A) (none)    Platelets Decreased (A) Normal, Adequate   Path Review, Peripheral Smear    Collection Time: 03/20/24 12:55 AM   Result Value Ref Range    Peripheral Smear Evaluation       - Macrocytic anemia.  - Thrombocytopenia.    Impression: Peripheral blood findings likely secondary to patient's known history of cirrhosis.    Brody Prescott M.D.      Ethanol    Collection Time: 03/20/24 12:55 AM   Result Value Ref Range    Ethanol Level <10.0 <=10.0 mg/dL   Ammonia    Collection Time: 03/20/24  3:21 AM   Result Value Ref Range    Ammonia Level 33.7 18.0 - 72.0 umol/L   Protime-INR    Collection Time: 03/20/24  3:41 AM   Result Value Ref Range    PT 16.8 (H) 12.5 - 14.5 seconds    INR 1.4 (H) <=1.3   APTT    Collection Time: 03/20/24  3:41 AM   Result Value Ref Range    PTT 27.1 23.2 - 33.7 seconds   Vitamin B12    Collection Time: 03/20/24  6:52 AM   Result Value Ref Range    Vitamin B12 Level 1,958 (H) 213 - 816 pg/mL   Folate    Collection Time: 03/20/24  6:52 AM   Result Value Ref Range    Folate Level 11.9 7.0 - 31.4 ng/mL   Urinalysis, Reflex to Urine Culture    Collection Time: 03/20/24  9:58 AM    Specimen: Urine   Result Value Ref Range    Color, UA Yellow Yellow, Light-Yellow, Colorless, Straw, Dark-Yellow    Appearance, UA Clear Clear    Specific Gravity, UA >1.050 (H) 1.005 - 1.030    pH, UA 6.0 5.0 - 8.5    Protein, UA Negative Negative    Glucose, UA Normal Negative, Normal    Ketones, UA Negative Negative    Blood, UA Negative Negative    Bilirubin, UA Negative Negative    Urobilinogen, UA 2.0 (A) 0.2, 1.0, Normal    Nitrites, UA Negative Negative    Leukocyte Esterase, UA 75 (A) Negative    WBC, UA 21-50 (A) None Seen, 0-2, 3-5, 0-5 /HPF    Bacteria, UA Many (A) None Seen, Trace /HPF    Squamous Epithelial Cells, UA Trace None Seen /HPF    Mucous, UA Occasional (A) None Seen /LPF    RBC, UA 0-5 None Seen, 0-2, 3-5, 0-5 /HPF   Drug Screen, Urine    Collection Time:  03/20/24  9:58 AM   Result Value Ref Range    Amphetamines, Urine Negative Negative    Barbituates, Urine Negative Negative    Benzodiazepine, Urine Negative Negative    Cannabinoids, Urine Negative Negative    Cocaine, Urine Negative Negative    Fentanyl, Urine Negative Negative    MDMA, Urine Negative Negative    Opiates, Urine Positive (A) Negative    Phencyclidine, Urine Negative Negative    pH, Urine 6.0 3.0 - 11.0    Specific Gravity, Urine Auto >1.050 (H) 1.001 - 1.035   Urine culture    Collection Time: 03/20/24  9:58 AM    Specimen: Urine   Result Value Ref Range    Urine Culture >/= 100,000 colonies/ml Gram-negative Rods (A)    Comprehensive Metabolic Panel    Collection Time: 03/21/24  5:17 AM   Result Value Ref Range    Sodium Level 138 136 - 145 mmol/L    Potassium Level 3.8 3.5 - 5.1 mmol/L    Chloride 103 98 - 107 mmol/L    Carbon Dioxide 25 23 - 31 mmol/L    Glucose Level 97 82 - 115 mg/dL    Blood Urea Nitrogen 5.0 (L) 9.8 - 20.1 mg/dL    Creatinine 0.43 (L) 0.55 - 1.02 mg/dL    Calcium Level Total 7.3 (L) 8.4 - 10.2 mg/dL    Protein Total 4.4 (L) 5.8 - 7.6 gm/dL    Albumin Level 1.8 (L) 3.4 - 4.8 g/dL    Globulin 2.6 2.4 - 3.5 gm/dL    Albumin/Globulin Ratio 0.7 (L) 1.1 - 2.0 ratio    Bilirubin Total 1.4 <=1.5 mg/dL    Alkaline Phosphatase 269 (H) 40 - 150 unit/L    Alanine Aminotransferase 15 0 - 55 unit/L    Aspartate Aminotransferase 49 (H) 5 - 34 unit/L    eGFR >60 mls/min/1.73/m2   Iron Panel    Collection Time: 03/21/24  5:17 AM   Result Value Ref Range    Iron Binding Capacity Unsaturated <25 (L) 70 - 310 ug/dL    Iron Level 95 50 - 170 ug/dL    Transferrin 68 (L) 173 - 360 mg/dL    Iron Binding Capacity Total <120 (L) 250 - 450 ug/dL    Iron Saturation     Ferritin    Collection Time: 03/21/24  5:17 AM   Result Value Ref Range    Ferritin Level 277.84 (H) 4.63 - 204.00 ng/mL   Prolactin    Collection Time: 03/21/24  5:17 AM   Result Value Ref Range    Prolactin Level 11.59 5.18 - 26.53  ng/mL   CBC with Differential    Collection Time: 03/21/24  6:56 AM   Result Value Ref Range    WBC 7.21 4.50 - 11.50 x10(3)/mcL    RBC 1.80 (L) 4.20 - 5.40 x10(6)/mcL    Hgb 8.6 (L) 12.0 - 16.0 g/dL    Hct 25.9 (L) 37.0 - 47.0 %    .9 (H) 80.0 - 94.0 fL    MCH 47.8 (H) 27.0 - 31.0 pg    MCHC 33.2 33.0 - 36.0 g/dL    RDW 15.9 11.5 - 17.0 %    Platelet 39 (LL) 130 - 400 x10(3)/mcL    MPV 12.3 (H) 7.4 - 10.4 fL    Neut % 64.4 %    Lymph % 25.1 %    Mono % 9.2 %    Eos % 0.1 %    Basophil % 0.1 %    Lymph # 1.81 0.6 - 4.6 x10(3)/mcL    Neut # 4.64 2.1 - 9.2 x10(3)/mcL    Mono # 0.66 0.1 - 1.3 x10(3)/mcL    Eos # 0.01 0 - 0.9 x10(3)/mcL    Baso # 0.01 <=0.2 x10(3)/mcL    IG# 0.08 (H) 0 - 0.04 x10(3)/mcL    IG% 1.1 %    NRBC% 0.7 %    IPF 12.8 (H) 0.9 - 11.2 %   Hemoglobin and Hematocrit    Collection Time: 03/21/24 12:14 PM   Result Value Ref Range    Hgb 8.0 (L) 12.0 - 16.0 g/dL    Hct 24.8 (L) 37.0 - 47.0 %   Protime-INR    Collection Time: 03/21/24 12:14 PM   Result Value Ref Range    PT 18.3 (H) 12.5 - 14.5 seconds    INR 1.5 (H) <=1.3       CT Abdomen Pelvis With IV Contrast NO Oral Contrast    Result Date: 3/20/2024  EXAMINATION: CT ABDOMEN PELVIS WITH IV CONTRAST CLINICAL HISTORY: Abdominal pain, acute, nonlocalized; TECHNIQUE: Low dose axial images, sagittal and coronal reformations were obtained from the lung bases to the pubic symphysis following the IV administration of 100 mL of Omnipaque 350 .  Oral contrast given.  .  Automated exposure control used. COMPARISON: None. FINDINGS: Liver demonstrates heterogeneous enhancement pattern and irregular contour with enlarged left hepatic lobe consistent with cirrhosis.  No discrete focal lesion. Gallbladder and biliary ductal system normal. Pancreas, stomach, spleen normal.  Left-sided adrenal nodule measuring 9 mm, nonspecific. Kidneys normal enhancement with no hydronephrosis. Aorta tapers normally.  Moderate atherosclerotic calcification. Bowel  loops are generally decompressed.  No definite edematous bowel wall thickening or dilation. Moderate generalized ascites.  No localized inflammatory change.  No lymphadenopathy in the abdomen or pelvis. Uterus, adnexa, bladder, rectum normal. Internal fixation hardware proximal right femur.  Surgical changes thoracolumbar region. Mild moderate pleural fluid right greater than left.     Findings consistent with cirrhosis. Moderate generalized ascites. Bilateral pleural fluid right greater than left. Agree with [facility]hawk. Electronically signed by: Vadim Wei MD Date:    03/20/2024 Time:    08:06    X-Ray Chest 1 View    Result Date: 3/20/2024  EXAMINATION: XR CHEST 1 VIEW CPT 82497 CLINICAL HISTORY: AMS; COMPARISON: November 12, 2022 FINDINGS: Examination reveals mediastinal silhouette to be within normal limits cardiac silhouette is not enlarged right lung field is clear and free of gross infiltrates atelectasis or effusions. Some increased left retrocardiac density and partial silhouetting of the left hemidiaphragm is identified which might be related to an infiltrate/atelectasis. No other focal consolidative changes     Increased left retrocardiac density with partial silhouetting of the left hemidiaphragm which might be related to an infiltrate/atelectasis. Otherwise no active pulmonary disease Electronically signed by: Clint Bosch Date:    03/20/2024 Time:    07:31    CT Head Without Contrast    Result Date: 3/20/2024  EXAMINATION: CT HEAD WITHOUT CONTRAST CLINICAL HISTORY: Mental status change, unknown cause; TECHNIQUE: CT imaging of the head performed from the skull base to the vertex without intravenous contrast. DLP 1582 mGycm. Automatic exposure control, adjustment of mA/kV or iterative reconstruction technique was used to reduce radiation. COMPARISON: 28 August 2022 FINDINGS: There is no acute cortical infarct, hemorrhage or mass lesion.  There is right parietal encephalomalacia.  There is  moderate patchy hypoattenuation in the cerebral white matter which is nonspecific but most commonly associated with chronic small vessel ischemic changes.  Ventricles are not significantly enlarged.  There are vascular calcifications. Visualized paranasal sinuses and mastoid air cells are clear.     No acute intracranial findings or significant interval change compared to August 2022. No significant discrepancy with the preliminary report. Electronically signed by: Bruce Smith Date:    03/20/2024 Time:    06:32        Assessment / Plan:     64 year old female known to Dr. Garcia with a pmhx of HTN, HLD, DM, alcohol abuse, chronic anemia, and cirrhosis presents to the ED c/o increased weakness and AMS for past 2 days. Admitted with acute UTI, encephalopathy, acute urinary retention. GI consulted for cirrhosis and drop in hgb.     Cirrhosis  MELD-Na: 12  - Origin: ETOH vs PANDA  - Transplant candidacy: unable to obtain hx  - Ascites: present on US today, not present on previous imaging  - low sodium diet  - presence of varices: last EGD 01/24/24 without varices  - presence of hepatic encephalopathy: questionable as ammonia is wnL and pt has acute UTI  - screening for neoplasm: AFP: wnL; Most recent imaging: no lesion on CT  Plts: 39,000  INR:1.5    2. Chronic macrocytic anemia  Hgb 10.7--8.0  Baseline hgb 7-8  FOBT pending, no report of GI bleed    - Continue ppi  - Monitor H/H and transfuse as needed to Hgb 7  - Monitor stools for bleeding  - will get paracentesis with fluid studies to further evaluate new ascites  - start lactulose for potential HE    Thank you for allowing us to participate in this patient's care.

## 2024-03-21 NOTE — PROCEDURES
"Luz Maria Alfaro is a 64 y.o. female patient.    Temp: 98.1 °F (36.7 °C) (24 1606)  Pulse: 106 (24 1606)  Resp: 16 (24 1149)  BP: (!) 155/97 (24 1606)  SpO2: 95 % (24 1606)  Weight: 61 kg (134 lb 7.7 oz) (24 0510)  Height: 5' 3" (160 cm) (24 0510)       EEG    Date/Time: 3/21/2024 4:08 PM    Performed by: Lalo Maldonado MD  Authorized by: Yvette Saba MD      EEG REPORT    PATIENT: Luz Maria Alfaro  : 1960    INTERPRETING PHYSICIAN: Lalo Maldonado     Reason for EEG: Encephalopathy      Digital EEG reviewed.  Electrodes placed in customary 10-20 fashion.    Video recorded:  yes    Duration of recordin  minutes   Patient Awake     Poorly organized delta slowing in background   Activations:   photic performed and not associated with abnormalities.   Technical character: Suboptimal ___poor pt cooperation / relaxation   Lateralizing, focal, or epileptiform features were not present.  EKG: defer characterization; RR interval may be irregular     IMPRESSION: This is a nonspecific EEG with diffuse slow activity.     Comments:   The lack of focal or epileptiform features does not negate or exclude a diagnosis of seizure or epilepsy, as the sensitivity of interictal EEG may be < or equal to 50%.    Lalo Maldonado MD LISA FAAN FAA           3/21/2024    "

## 2024-03-21 NOTE — PLAN OF CARE
Problem: Physical Therapy  Goal: Physical Therapy Goal  Description: Goals to be met by: 24     Patient will increase functional independence with mobility by performin. Supine to sit with MInimal Assistance  2. Sit to stand transfer with Minimal Assistance  3. Bed to chair transfer with Minimal Assistance using Rolling Walker  4. Gait  x 150 feet with Minimal Assistance using Rolling Walker.     Outcome: Ongoing, Progressing

## 2024-03-21 NOTE — HPI
63 y/o PMH HTN, HLD, DM2, alcoholic cirrhosis, alcohol abuse, and smoker who presents to ED on 3/20 with worsening confusion with associated generalized weakness.  Patient reportedly also having worsening hearing over the last week as well per patient's family report.    CT head without negative for acute intracranial abnormalities or significant interval change as compared to 08/2022.  CT abdomen/pelvis consistent with liver cirrhosis.  Significant for microcytic anemia, AST 49, and bacteriuria.

## 2024-03-21 NOTE — PT/OT/SLP EVAL
"Physical Therapy Evaluation    Patient Name:  Luz Maria Alfaro   MRN:  [id number]    Recommendations:     Discharge therapy intensity: Moderate Intensity Therapy   Discharge Equipment Recommendations: to be determined by next level of care   Barriers to discharge: Impaired mobility and Ongoing medical needs    Assessment:     Luz Maria Alfaro is a 64 y.o. female admitted with a medical diagnosis of acute encephalopathy, suspected metabolic form UTI vs. Alcohol withdrawal; metabolic acidosis, alcoholic cirrhosis, ascites, anemia.  She presents with the following impairments/functional limitations: weakness, impaired endurance, impaired functional mobility, gait instability, impaired balance, impaired self care skills, impaired cognition, decreased safety awareness, decreased upper extremity function, decreased lower extremity function, impaired cardiopulmonary response to activity . Pt required maxA for bed mobility and sitting balance, attempted to stand but pt with clear discomfort and no significant effort placed through legs to stand. Confused and not much active participation today. Pt's ex  reports she was previously ambulatory with a RW and independent with ADLS. At this time recommending moderate intensity therapy but will continue to follow.    Rehab Prognosis: Fair; patient would benefit from acute skilled PT services to address these deficits and reach maximum level of function.    Recent Surgery: * No surgery found *      Plan:     During this hospitalization, patient to be seen 5 x/week to address the identified rehab impairments via gait training, therapeutic activities, therapeutic exercises, neuromuscular re-education and progress toward the following goals:    Plan of Care Expires:  04/21/24    Subjective     Chief Complaint: c/o pain when sitting EOB   Patient/Family Comments/goals: unable to state  Pain/Comfort:  Pain Rating 1:  (stated "I'm hurting" when sitting EOB, did not " localize)    Patients cultural, spiritual, Amish conflicts given the current situation: no    Living Environment:  Pt lives with her son and grandchild in a 2-story condo with her needs on first floor.   Prior to admission, patients level of function was Otto with RW, indep with ADLs.  Equipment used at home: bedside commode, walker, rolling.  DME owned (not currently used): none.  Upon discharge, patient will have assistance from ex ; son works 12 hour shifts.    Objective:     Communicated with RN prior to session.  Patient found HOB elevated with peripheral IV, telemetry, pulse ox (continuous) (roll belt)  upon PT entry to room.    General Precautions: Standard, fall  Orthopedic Precautions:    Braces:    Respiratory Status: Room air  Blood Pressure: NT      Exams:  Cognitive Exam:  Patient is oriented to n/a; opening eyes briefly to attend to PT, smiling occasionally, followed command to squeeze my hand with R hand, no other command following  RLE Strength: VINITA 2/2 cognition   LLE Strength: VINITA 2/2 cognition  Skin integrity: Visible skin intact      Functional Mobility:  Bed Mobility:     Supine to Sit: maximal assistance and of 2 persons  Sit to Supine: total assistance and of 2 persons  Transfers:     Sit to Stand:  maximal assistance and of 2 persons with hand-held assist  Balance: Sitting balance =mod-maxA 2/2 posterior sway      AM-PAC 6 CLICK MOBILITY  Total Score:9       Treatment & Education:  Educated family on frequent orientation, lights on during the day and off at night, positioning    Patient and family were provided with verbal education education regarding PT role/goals/POC, fall prevention, safety awareness, and discharge/DME recommendations.  Understanding was verbalized, however additional teaching warranted.     Patient left HOB elevated with all lines intact, call button in reach, RN notified, and family present.    GOALS:   Multidisciplinary Problems       Physical Therapy  Goals          Problem: Physical Therapy    Goal Priority Disciplines Outcome Goal Variances Interventions   Physical Therapy Goal     PT, PT/OT Ongoing, Progressing     Description: Goals to be met by: 24     Patient will increase functional independence with mobility by performin. Supine to sit with MInimal Assistance  2. Sit to stand transfer with Minimal Assistance  3. Bed to chair transfer with Minimal Assistance using Rolling Walker  4. Gait  x 150 feet with Minimal Assistance using Rolling Walker.                          History:     Past Medical History:   Diagnosis Date    Benign essential HTN     Mixed hyperlipidemia     Type 2 diabetes mellitus without complications        Past Surgical History:   Procedure Laterality Date    APPENDECTOMY      BACK SURGERY      HIP SURGERY      INTRAMEDULLARY RODDING OF FEMUR Right 10/27/2022    Procedure: RIGHT FEMUR INTERTROCH IMN;  Surgeon: Stewart Brambila MD;  Location: [facility];  Service: Orthopedics;  Laterality: Right;  [place] table, supine  3rd case  Synthes Short TFNA       Time Tracking:     PT Received On: 24  PT Start Time: 1057     PT Stop Time: 1110  PT Total Time (min): 13 min     Billable Minutes: Evaluation MOD      2024

## 2024-03-21 NOTE — PLAN OF CARE
Therapy recommending Moderate intensity post care. CM printed patient choice list via care port for skilled Nursing Facilities. CM reviewed choices with patient sister , Vannesa Brambila. Family first choice is [facility], second choice is [facility]. GERARDO Ramires will send referral to facility. CM completed PASSAR for patient . And given to GERARDO Ramires.

## 2024-03-21 NOTE — PROGRESS NOTES
[facility] Hospital Medicine Progress Note        Chief Complaint: Inpatient Follow-up for Altered Mental Status     HPI: Luz Maria Alfaro is a 64 y.o. female with a PMHx of HTN, HLD, type 2 DM, alcoholic cirrhosis, alcohol abuse and nicotine dependence who presented to [facility] on 3/20/2024 via EMS with c/o worsening confusion x1 day with associated generalized weakness.  The majority of the history was obtained from review of the medical record as the patient was unable to provide any additional history.     Upon presentation to ED, vital signed included /73, , RR 18, SpO2 98% on room air, temperature 98.4° F.  Labs notable for hemoglobin 10.7, hematocrit 31.7, platelets 34, vitamin B12 1958, INR 1.4, CO2 18, BUN 3.5, creatinine 0.5, calcium 7.9, , albumin 1.9, AST 43.  Serum alcohol level undetectable.  EKG demonstrated sinus tachycardia.  CXR demonstrated increased left retrocardiac density with partial silhouetting of the left hemidiaphragm which might be related to an infiltrate/atelectasis.  CT head without contrast negative for acute intracranial findings or significant interval changes compared to August 2022.  CT abdomen and pelvis with IV contrast consistent with cirrhosis, moderate generalized ascites, bilateral pleural fluid right greater than left noted.  She was admitted to hospital medicine service for further medical management.      Interval Hx:   Patient was seen and examined at bedside.Alert,continues to be altered,does not communicate  or follow commands.Noted to move all extremities and opening eyes.    Chart was reviewed. Afebrile.Intermittent Tachycardia noted. On room air.Labs reviewed.Urine culture-gm ne rods    Objective/physical exam:  General: In no acute distress, afebrile  Chest: Clear to auscultation bilaterally  Heart:  +S1, S2, no appreciable murmur  Abdomen: Soft, nontender, BS +. Distended  MSK: Warm, no lower extremity edema, no  clubbing or cyanosis  Neurologic: Alert , oriented x0,does not communicate  or follow commands.Noted to move all extremities and opening eyes.    VITAL SIGNS: 24 HRS MIN & MAX LAST   Temp  Min: 98.2 °F (36.8 °C)  Max: 98.8 °F (37.1 °C) 98.2 °F (36.8 °C)   BP  Min: 109/70  Max: 158/88 (!) 151/84   Pulse  Min: 101  Max: 115  (!) 115   Resp  Min: 11  Max: 20 16   SpO2  Min: 96 %  Max: 100 % 97 %     I have reviewed the following labs:  Recent Labs   Lab 03/20/24  0055 03/21/24  0656   WBC 6.38 7.21   RBC 2.26* 1.80*   HGB 10.7* 8.6*   HCT 31.7* 25.9*   .3* 143.9*   MCH 47.3* 47.8*   MCHC 33.8 33.2   RDW 15.3 15.9   PLT 34* 39*   MPV 12.1* 12.3*     Recent Labs   Lab 03/20/24 0055 03/21/24  0517    138   K 4.0 3.8   CO2 18* 25   BUN 3.5* 5.0*   CREATININE 0.50* 0.43*   CALCIUM 7.9* 7.3*   ALBUMIN 1.9* 1.8*   ALKPHOS 313* 269*   ALT 17 15   AST 43* 49*   BILITOT 1.5 1.4     Microbiology Results (last 7 days)       Procedure Component Value Units Date/Time    Urine culture [[id number]]  (Abnormal) Collected: 03/20/24 0958    Order Status: Completed Specimen: Urine Updated: 03/21/24 0731     Urine Culture >/= 100,000 colonies/ml Gram-negative Rods             See below for Radiology    Scheduled Med:   gabapentin  300 mg Oral TID    piperacillin-tazobactam (Zosyn) IV (PEDS and ADULTS) (extended infusion is not appropriate)  4.5 g Intravenous Q8H    thiamine (B-1) 500 mg in dextrose 5 % (D5W) 100 mL IVPB  500 mg Intravenous Q8H    Followed by    [START ON 3/22/2024] thiamine  100 mg Oral Q8H      Continuous Infusions:   Amino acid 4.25% - dextrose 5% (CLINIMIX-E) solution (1L provides 42.5 gm AA, 50 gm CHO (170 kcal/L dextrose), Na 35, K 30, Mg 5, Ca 4.5, Acetate 70, Cl 39, Phos 15) 50 mL/hr at 03/20/24 1415      PRN Meds:  albuterol, chlordiazepoxide, cloNIDine, HYDROcodone-acetaminophen, hyoscyamine, LORazepam, methocarbamoL     Assessment/Plan:  Acute encephalopathy, suspect Metabolic from UTI and possible  Alcohol withdrawal  Concern for Pneumonia  Metabolic Acidosis -improved  Alcoholic cirrhosis   Ascites   Thrombocytopenia from underlying cirrhosis  Macrocytic anemia   Hypoalbuminemia     History of essential hypertension, hyperlipidemia, type 2 DM, nicotine dependence, alcohol abuse     Plan:  CIWA scale  Thiamine, folate, multivitamin. Received banana bag.  Now on clinimix  F/u EEG. MRI Brain. Family says she is perfectly normal at baseline mentally and physically  Cont Zosyn-Day 2. Monitor fever curve, wbc. Urine and blood cultures. There is a concern for pneumonia , follow up sputum cultures, add doxycycline.  Ammonia normal  Hb drop noted today,could be dilutional, abdomen looks distended with ascites, family unaware of her cirrhosis diagnosis, interested to discuss with GI about further steps down the radha. F/u CMP, PT INR  May need pantoprazole if FOBT +ve. Holding due to low platelets.Monitor H and H  PT ,OT, Speech Therapy  Monitor electrolytes  Cont supportive care and appropriate home meds  Monitor blood sugars, add sliding scale  Hearing aids            VTE prophylaxis: scds , no chemical prophylaxis given low platelets      Anticipated discharge and Disposition:   tbd . Work up in progress      All diagnosis and differential diagnosis have been reviewed; assessment and plan has been documented; I have personally reviewed the labs and test results that are presently available; I have reviewed the patients medication list; I have reviewed the consulting providers response and recommendations. I have reviewed or attempted to review medical records based upon their availability    All of the patient's questions have been  addressed and answered. Patient's is agreeable to the above stated plan. I will continue to monitor closely and make adjustments to medical management as needed.  _____________________________________________________________________    Nutrition Status:    Radiology:  I have personally  reviewed the following imaging and agree with the radiologist.     CT Abdomen Pelvis With IV Contrast NO Oral Contrast  Narrative: EXAMINATION:  CT ABDOMEN PELVIS WITH IV CONTRAST    CLINICAL HISTORY:  Abdominal pain, acute, nonlocalized;    TECHNIQUE:  Low dose axial images, sagittal and coronal reformations were obtained from the lung bases to the pubic symphysis following the IV administration of 100 mL of Omnipaque 350 .  Oral contrast given.  .  Automated exposure control used.    COMPARISON:  None.    FINDINGS:  Liver demonstrates heterogeneous enhancement pattern and irregular contour with enlarged left hepatic lobe consistent with cirrhosis.  No discrete focal lesion.    Gallbladder and biliary ductal system normal.    Pancreas, stomach, spleen normal.  Left-sided adrenal nodule measuring 9 mm, nonspecific.    Kidneys normal enhancement with no hydronephrosis.    Aorta tapers normally.  Moderate atherosclerotic calcification.    Bowel loops are generally decompressed.  No definite edematous bowel wall thickening or dilation.    Moderate generalized ascites.  No localized inflammatory change.  No lymphadenopathy in the abdomen or pelvis.    Uterus, adnexa, bladder, rectum normal.    Internal fixation hardware proximal right femur.  Surgical changes thoracolumbar region.    Mild moderate pleural fluid right greater than left.  Impression: Findings consistent with cirrhosis.    Moderate generalized ascites.    Bilateral pleural fluid right greater than left.    Agree with errol.    Electronically signed by: Vadim Wei MD  Date:    03/20/2024  Time:    08:06  X-Ray Chest 1 View  Narrative: EXAMINATION:  XR CHEST 1 VIEW    CPT 04485    CLINICAL HISTORY:  AMS;    COMPARISON:  November 12, 2022    FINDINGS:  Examination reveals mediastinal silhouette to be within normal limits cardiac silhouette is not enlarged right lung field is clear and free of gross infiltrates atelectasis or  effusions.    Some increased left retrocardiac density and partial silhouetting of the left hemidiaphragm is identified which might be related to an infiltrate/atelectasis.    No other focal consolidative changes  Impression: Increased left retrocardiac density with partial silhouetting of the left hemidiaphragm which might be related to an infiltrate/atelectasis.    Otherwise no active pulmonary disease    Electronically signed by: Clint Bosch  Date:    03/20/2024  Time:    07:31  CT Head Without Contrast  Narrative: EXAMINATION:  CT HEAD WITHOUT CONTRAST    CLINICAL HISTORY:  Mental status change, unknown cause;    TECHNIQUE:  CT imaging of the head performed from the skull base to the vertex without intravenous contrast. DLP 1582 mGycm. Automatic exposure control, adjustment of mA/kV or iterative reconstruction technique was used to reduce radiation.    COMPARISON:  28 August 2022    FINDINGS:  There is no acute cortical infarct, hemorrhage or mass lesion.  There is right parietal encephalomalacia.  There is moderate patchy hypoattenuation in the cerebral white matter which is nonspecific but most commonly associated with chronic small vessel ischemic changes.  Ventricles are not significantly enlarged.  There are vascular calcifications.    Visualized paranasal sinuses and mastoid air cells are clear.  Impression: No acute intracranial findings or significant interval change compared to August 2022.    No significant discrepancy with the preliminary report.    Electronically signed by: Bruce Smith  Date:    03/20/2024  Time:    06:32      Yvette Saba MD  Department of Hospital Medicine   [facility]   03/21/2024

## 2024-03-22 LAB
ALBUMIN FLD-MCNC: 0.5 GM/DL
ALBUMIN SERPL-MCNC: 1.7 G/DL (ref 3.4–4.8)
ANION GAP SERPL CALC-SCNC: 9 MEQ/L
BACTERIA UR CULT: ABNORMAL
BASOPHILS # BLD AUTO: 0 X10(3)/MCL
BASOPHILS NFR BLD AUTO: 0 %
BUN SERPL-MCNC: 7.2 MG/DL (ref 9.8–20.1)
CALCIUM SERPL-MCNC: 7.4 MG/DL (ref 8.4–10.2)
CHLORIDE SERPL-SCNC: 101 MMOL/L (ref 98–107)
CO2 SERPL-SCNC: 27 MMOL/L (ref 23–31)
CREAT SERPL-MCNC: 0.41 MG/DL (ref 0.55–1.02)
CREAT/UREA NIT SERPL: 18
EOSINOPHIL # BLD AUTO: 0.01 X10(3)/MCL (ref 0–0.9)
EOSINOPHIL NFR BLD AUTO: 0.2 %
ERYTHROCYTE [DISTWIDTH] IN BLOOD BY AUTOMATED COUNT: 16 % (ref 11.5–17)
GFR SERPLBLD CREATININE-BSD FMLA CKD-EPI: >60 MLS/MIN/1.73/M2
GLUCOSE FLD-MCNC: 96 MG/DL
GLUCOSE SERPL-MCNC: 93 MG/DL (ref 82–115)
GROUP & RH: NORMAL
HCT VFR BLD AUTO: 21.5 % (ref 37–47)
HGB BLD-MCNC: 7.2 G/DL (ref 12–16)
IMM GRANULOCYTES # BLD AUTO: 0.08 X10(3)/MCL (ref 0–0.04)
IMM GRANULOCYTES NFR BLD AUTO: 1.5 %
INDIRECT COOMBS: NORMAL
INR PPP: 1.2
LYMPHOCYTE MANUAL BF (OHS): 92 %
LYMPHOCYTES # BLD AUTO: 1.84 X10(3)/MCL (ref 0.6–4.6)
LYMPHOCYTES NFR BLD AUTO: 34.5 %
MCH RBC QN AUTO: 47.4 PG (ref 27–31)
MCHC RBC AUTO-ENTMCNC: 33.5 G/DL (ref 33–36)
MCV RBC AUTO: 141.4 FL (ref 80–94)
MONOCYTE MANUAL BF (OHS): 7 %
MONOCYTES # BLD AUTO: 0.54 X10(3)/MCL (ref 0.1–1.3)
MONOCYTES NFR BLD AUTO: 10.1 %
NEUTROPHILS # BLD AUTO: 2.86 X10(3)/MCL (ref 2.1–9.2)
NEUTROPHILS MAN BF (OHS): 1 %
NEUTROPHILS NFR BLD AUTO: 53.7 %
NRBC BLD AUTO-RTO: 0.6 %
PLATELET # BLD AUTO: 40 X10(3)/MCL (ref 130–400)
PMV BLD AUTO: 12.6 FL (ref 7.4–10.4)
POCT GLUCOSE: 116 MG/DL (ref 70–110)
POCT GLUCOSE: 88 MG/DL (ref 70–110)
POTASSIUM SERPL-SCNC: 3.8 MMOL/L (ref 3.5–5.1)
PROT FLD-MCNC: 0.8 GM/DL
PROT SERPL-MCNC: 3.9 GM/DL (ref 5.8–7.6)
PROTHROMBIN TIME: 15.1 SECONDS (ref 12.5–14.5)
RBC # BLD AUTO: 1.52 X10(6)/MCL (ref 4.2–5.4)
SODIUM SERPL-SCNC: 137 MMOL/L (ref 136–145)
SPECIMEN OUTDATE: NORMAL
WBC # FLD AUTO: 49 /UL
WBC # SPEC AUTO: 5.33 X10(3)/MCL (ref 4.5–11.5)

## 2024-03-22 PROCEDURE — 85610 PROTHROMBIN TIME: CPT

## 2024-03-22 PROCEDURE — C1751 CATH, INF, PER/CENT/MIDLINE: HCPCS

## 2024-03-22 PROCEDURE — 25000003 PHARM REV CODE 250: Performed by: HOSPITALIST

## 2024-03-22 PROCEDURE — 25000003 PHARM REV CODE 250: Performed by: INTERNAL MEDICINE

## 2024-03-22 PROCEDURE — 82945 GLUCOSE OTHER FLUID: CPT

## 2024-03-22 PROCEDURE — P9047 ALBUMIN (HUMAN), 25%, 50ML: HCPCS | Mod: JZ,JG

## 2024-03-22 PROCEDURE — 99231 SBSQ HOSP IP/OBS SF/LOW 25: CPT | Mod: ,,, | Performed by: SPECIALIST

## 2024-03-22 PROCEDURE — P9016 RBC LEUKOCYTES REDUCED: HCPCS | Performed by: HOSPITALIST

## 2024-03-22 PROCEDURE — 84155 ASSAY OF PROTEIN SERUM: CPT

## 2024-03-22 PROCEDURE — 21400001 HC TELEMETRY ROOM

## 2024-03-22 PROCEDURE — 86901 BLOOD TYPING SEROLOGIC RH(D): CPT | Performed by: HOSPITALIST

## 2024-03-22 PROCEDURE — 84157 ASSAY OF PROTEIN OTHER: CPT

## 2024-03-22 PROCEDURE — 63600175 PHARM REV CODE 636 W HCPCS: Performed by: INTERNAL MEDICINE

## 2024-03-22 PROCEDURE — 89051 BODY FLUID CELL COUNT: CPT

## 2024-03-22 PROCEDURE — 88305 TISSUE EXAM BY PATHOLOGIST: CPT

## 2024-03-22 PROCEDURE — C9113 INJ PANTOPRAZOLE SODIUM, VIA: HCPCS | Performed by: HOSPITALIST

## 2024-03-22 PROCEDURE — 86923 COMPATIBILITY TEST ELECTRIC: CPT | Mod: 91 | Performed by: HOSPITALIST

## 2024-03-22 PROCEDURE — 27000221 HC OXYGEN, UP TO 24 HOURS

## 2024-03-22 PROCEDURE — 85025 COMPLETE CBC W/AUTO DIFF WBC: CPT | Performed by: INTERNAL MEDICINE

## 2024-03-22 PROCEDURE — 82042 OTHER SOURCE ALBUMIN QUAN EA: CPT

## 2024-03-22 PROCEDURE — 0W9G3ZZ DRAINAGE OF PERITONEAL CAVITY, PERCUTANEOUS APPROACH: ICD-10-PCS | Performed by: INTERNAL MEDICINE

## 2024-03-22 PROCEDURE — 36569 INSJ PICC 5 YR+ W/O IMAGING: CPT

## 2024-03-22 PROCEDURE — 25000003 PHARM REV CODE 250

## 2024-03-22 PROCEDURE — 82040 ASSAY OF SERUM ALBUMIN: CPT

## 2024-03-22 PROCEDURE — 87070 CULTURE OTHR SPECIMN AEROBIC: CPT

## 2024-03-22 PROCEDURE — 63600175 PHARM REV CODE 636 W HCPCS: Performed by: HOSPITALIST

## 2024-03-22 PROCEDURE — 63600175 PHARM REV CODE 636 W HCPCS: Mod: JZ,JG

## 2024-03-22 PROCEDURE — 88112 CYTOPATH CELL ENHANCE TECH: CPT

## 2024-03-22 PROCEDURE — 02HV33Z INSERTION OF INFUSION DEVICE INTO SUPERIOR VENA CAVA, PERCUTANEOUS APPROACH: ICD-10-PCS | Performed by: HOSPITALIST

## 2024-03-22 PROCEDURE — 30233N1 TRANSFUSION OF NONAUTOLOGOUS RED BLOOD CELLS INTO PERIPHERAL VEIN, PERCUTANEOUS APPROACH: ICD-10-PCS | Performed by: HOSPITALIST

## 2024-03-22 PROCEDURE — 80048 BASIC METABOLIC PNL TOTAL CA: CPT | Performed by: INTERNAL MEDICINE

## 2024-03-22 PROCEDURE — A4216 STERILE WATER/SALINE, 10 ML: HCPCS | Performed by: HOSPITALIST

## 2024-03-22 PROCEDURE — 11000001 HC ACUTE MED/SURG PRIVATE ROOM

## 2024-03-22 PROCEDURE — 36430 TRANSFUSION BLD/BLD COMPNT: CPT

## 2024-03-22 RX ORDER — SODIUM CHLORIDE 0.9 % (FLUSH) 0.9 %
10 SYRINGE (ML) INJECTION
Status: DISCONTINUED | OUTPATIENT
Start: 2024-03-22 | End: 2024-04-17 | Stop reason: HOSPADM

## 2024-03-22 RX ORDER — PANTOPRAZOLE SODIUM 40 MG/10ML
40 INJECTION, POWDER, LYOPHILIZED, FOR SOLUTION INTRAVENOUS 2 TIMES DAILY
Status: DISCONTINUED | OUTPATIENT
Start: 2024-03-22 | End: 2024-03-25

## 2024-03-22 RX ORDER — PANTOPRAZOLE SODIUM 40 MG/1
40 TABLET, DELAYED RELEASE ORAL DAILY
Status: DISCONTINUED | OUTPATIENT
Start: 2024-03-22 | End: 2024-03-22

## 2024-03-22 RX ORDER — SODIUM CHLORIDE 0.9 % (FLUSH) 0.9 %
10 SYRINGE (ML) INJECTION EVERY 6 HOURS
Status: DISCONTINUED | OUTPATIENT
Start: 2024-03-22 | End: 2024-03-31

## 2024-03-22 RX ORDER — LACTULOSE 10 G/15ML
200 SOLUTION ORAL; RECTAL 3 TIMES DAILY
Status: DISCONTINUED | OUTPATIENT
Start: 2024-03-22 | End: 2024-03-24

## 2024-03-22 RX ORDER — HYDROCODONE BITARTRATE AND ACETAMINOPHEN 500; 5 MG/1; MG/1
TABLET ORAL
Status: DISCONTINUED | OUTPATIENT
Start: 2024-03-22 | End: 2024-04-17 | Stop reason: HOSPADM

## 2024-03-22 RX ADMIN — PIPERACILLIN SODIUM AND TAZOBACTAM SODIUM 4.5 G: 4; .5 INJECTION, POWDER, LYOPHILIZED, FOR SOLUTION INTRAVENOUS at 08:03

## 2024-03-22 RX ADMIN — PIPERACILLIN SODIUM AND TAZOBACTAM SODIUM 4.5 G: 4; .5 INJECTION, POWDER, LYOPHILIZED, FOR SOLUTION INTRAVENOUS at 01:03

## 2024-03-22 RX ADMIN — LEUCINE, PHENYLALANINE, LYSINE, METHIONINE, ISOLEUCINE, VALINE, HISTIDINE, THREONINE, TRYPTOPHAN, ALANINE, GLYCINE, ARGININE, PROLINE, SERINE, TYROSINE, SODIUM ACETATE, DIBASIC POTASSIUM PHOSPHATE, MAGNESIUM CHLORIDE, SODIUM CHLORIDE, CALCIUM CHLORIDE, DEXTROSE
311; 238; 247; 170; 255; 247; 204; 179; 77; 880; 438; 489; 289; 213; 17; 297; 261; 51; 77; 33; 5 INJECTION INTRAVENOUS at 10:03

## 2024-03-22 RX ADMIN — DOXYCYCLINE 100 MG: 100 INJECTION, POWDER, LYOPHILIZED, FOR SOLUTION INTRAVENOUS at 12:03

## 2024-03-22 RX ADMIN — THIAMINE HYDROCHLORIDE 500 MG: 100 INJECTION, SOLUTION INTRAMUSCULAR; INTRAVENOUS at 01:03

## 2024-03-22 RX ADMIN — ALBUMIN (HUMAN) 25 G: 12.5 SOLUTION INTRAVENOUS at 01:03

## 2024-03-22 RX ADMIN — LACTULOSE 200 G: 10 SOLUTION ORAL; RECTAL at 08:03

## 2024-03-22 RX ADMIN — PIPERACILLIN SODIUM AND TAZOBACTAM SODIUM 4.5 G: 4; .5 INJECTION, POWDER, LYOPHILIZED, FOR SOLUTION INTRAVENOUS at 04:03

## 2024-03-22 RX ADMIN — SODIUM CHLORIDE, PRESERVATIVE FREE 10 ML: 5 INJECTION INTRAVENOUS at 11:03

## 2024-03-22 RX ADMIN — SODIUM CHLORIDE, PRESERVATIVE FREE 10 ML: 5 INJECTION INTRAVENOUS at 06:03

## 2024-03-22 RX ADMIN — PANTOPRAZOLE SODIUM 40 MG: 40 INJECTION, POWDER, FOR SOLUTION INTRAVENOUS at 08:03

## 2024-03-22 NOTE — CONSULTS
"3/22/2024  Luz Maria Alfaro   1960   [id number]            Psychiatry Evaluation    Date of Admission: 3/20/2024 12:30 AM    Chief Complaint: Psychiatric evaluation for SNF placement    SUBJECTIVE:   History of Present Illness:   Luz Maria Alfaro is a 64 y.o. female with a history of HTN, HLD, type 2 DM, alcoholic cirrhosis, and alcohol abuse who presented to [facility] on 03/20/24 for confusion x1 day and associated weakness.  CXR demonstrated increased left retrocardiac density with partial silhouetting of the left hemidiaphragm which might be related to an infiltrate/atelectasis.  CT head without contrast negative for acute intracranial findings or significant interval changes compared to August 2022.  CT abdomen and pelvis with IV contrast consistent with cirrhosis, moderate generalized ascites, bilateral pleural fluid right greater than left noted. Psychiatric consult requested due to possible SNF placement. Seen at the bedside where she makes eye contact, but does not communicate. History is limited due to this fact. Nursing reports that she has previously said "yes" and "no" appropriately today, but makes almost no communication with staff. Seen by psychiatry on 10/31/2022 when inpatient where she was diagnosed with depressive disorder NOS and anxiety disorder NOS.        Past Psychiatric History:   Previous Psychiatric Hospitalizations: Unable to obtain  Previous Medication Trials: Abilify; Buspirone, Lexapro, zoloft  Previous Suicide Attempts: Unable to obtain  Outpatient psychiatrist: Psychiatric medications historically managed by [facility]    Past Medical/Surgical History:   Past Medical History:   Diagnosis Date    Benign essential HTN     Mixed hyperlipidemia     Type 2 diabetes mellitus without complications      Past Surgical History:   Procedure Laterality Date    APPENDECTOMY      BACK SURGERY      HIP SURGERY      INTRAMEDULLARY RODDING OF FEMUR Right 10/27/2022    Procedure: RIGHT FEMUR INTERTROCH " IMN;  Surgeon: Stewart Brambila MD;  Location: [facility];  Service: Orthopedics;  Laterality: Right;  Dinosaur table, supine  3rd case  Synthes Short TFNA         Family Psychiatric History:   Unable to obtain    Allergies:   Review of patient's allergies indicates:   Allergen Reactions    Meperidine Rash and Hives       Substance Abuse History:   Tobacco: History of 2 PPD  Alcohol: History of 4 beers daily  Illicit Substances: Unable to obtain  Treatment: Unable to obtain      Current Medications:   Home Psychiatric Meds: Lexapro 20mg PO QD    Scheduled Meds:    folic acid  1 mg Oral Daily    gabapentin  300 mg Oral TID    lactulose  200 g Rectal TID    pantoprazole  40 mg Intravenous BID    piperacillin-tazobactam (Zosyn) IV (PEDS and ADULTS) (extended infusion is not appropriate)  4.5 g Intravenous Q8H    sodium chloride 0.9%  10 mL Intravenous Q6H    thiamine  100 mg Oral Q8H      PRN Meds: 0.9%  NaCl infusion (for blood administration), albuterol, chlordiazepoxide, cloNIDine, dextrose 10%, dextrose 10%, glucagon (human recombinant), glucose, glucose, HYDROcodone-acetaminophen, hyoscyamine, insulin aspart U-100, lorazepam, LORazepam, methocarbamoL, morphine, Fl[place] PICC/Midline Protocol **AND** sodium chloride 0.9% **AND** sodium chloride 0.9%   Psychotherapeutics (From admission, onward)      Start     Stop Route Frequency Ordered    03/21/24 1921  LORazepam injection 1 mg         -- IV Every 3 hours PRN 03/21/24 1822    03/20/24 1000  LORazepam tablet 2 mg         -- Oral Every 4 hours PRN 03/20/24 0900    03/20/24 1000  chlordiazepoxide capsule 25 mg         -- Oral 4 times daily PRN 03/20/24 0900              Social History:  Housing Status: Unable to obtain  Relationship Status/Sexual Orientation: Unable to obtain   Children: 1 adult son  Education: High school diploma/GED  Employment Status/Info: Unable to obtain    history: Unable to obtain  History of physical/sexual abuse: Unable to obtain   Access  to gun: Unable to obtain       Legal History:   Past Charges/Incarcerations: Unable to obtain   Pending charges: Unable to obtain      OBJECTIVE:       Vitals   Vitals:    03/22/24 1545   BP: (!) 152/80   Pulse: 95   Resp: 18   Temp: 98.1 °F (36.7 °C)        Labs/Imaging/Studies:   Recent Results (from the past 48 hour(s))   Comprehensive Metabolic Panel    Collection Time: 03/21/24  5:17 AM   Result Value Ref Range    Sodium Level 138 136 - 145 mmol/L    Potassium Level 3.8 3.5 - 5.1 mmol/L    Chloride 103 98 - 107 mmol/L    Carbon Dioxide 25 23 - 31 mmol/L    Glucose Level 97 82 - 115 mg/dL    Blood Urea Nitrogen 5.0 (L) 9.8 - 20.1 mg/dL    Creatinine 0.43 (L) 0.55 - 1.02 mg/dL    Calcium Level Total 7.3 (L) 8.4 - 10.2 mg/dL    Protein Total 4.4 (L) 5.8 - 7.6 gm/dL    Albumin Level 1.8 (L) 3.4 - 4.8 g/dL    Globulin 2.6 2.4 - 3.5 gm/dL    Albumin/Globulin Ratio 0.7 (L) 1.1 - 2.0 ratio    Bilirubin Total 1.4 <=1.5 mg/dL    Alkaline Phosphatase 269 (H) 40 - 150 unit/L    Alanine Aminotransferase 15 0 - 55 unit/L    Aspartate Aminotransferase 49 (H) 5 - 34 unit/L    eGFR >60 mls/min/1.73/m2   Iron Panel    Collection Time: 03/21/24  5:17 AM   Result Value Ref Range    Iron Binding Capacity Unsaturated <25 (L) 70 - 310 ug/dL    Iron Level 95 50 - 170 ug/dL    Transferrin 68 (L) 173 - 360 mg/dL    Iron Binding Capacity Total <120 (L) 250 - 450 ug/dL    Iron Saturation     Ferritin    Collection Time: 03/21/24  5:17 AM   Result Value Ref Range    Ferritin Level 277.84 (H) 4.63 - 204.00 ng/mL   Prolactin    Collection Time: 03/21/24  5:17 AM   Result Value Ref Range    Prolactin Level 11.59 5.18 - 26.53 ng/mL   CBC with Differential    Collection Time: 03/21/24  6:56 AM   Result Value Ref Range    WBC 7.21 4.50 - 11.50 x10(3)/mcL    RBC 1.80 (L) 4.20 - 5.40 x10(6)/mcL    Hgb 8.6 (L) 12.0 - 16.0 g/dL    Hct 25.9 (L) 37.0 - 47.0 %    .9 (H) 80.0 - 94.0 fL    MCH 47.8 (H) 27.0 - 31.0 pg    MCHC 33.2 33.0 - 36.0 g/dL     RDW 15.9 11.5 - 17.0 %    Platelet 39 (LL) 130 - 400 x10(3)/mcL    MPV 12.3 (H) 7.4 - 10.4 fL    Neut % 64.4 %    Lymph % 25.1 %    Mono % 9.2 %    Eos % 0.1 %    Basophil % 0.1 %    Lymph # 1.81 0.6 - 4.6 x10(3)/mcL    Neut # 4.64 2.1 - 9.2 x10(3)/mcL    Mono # 0.66 0.1 - 1.3 x10(3)/mcL    Eos # 0.01 0 - 0.9 x10(3)/mcL    Baso # 0.01 <=0.2 x10(3)/mcL    IG# 0.08 (H) 0 - 0.04 x10(3)/mcL    IG% 1.1 %    NRBC% 0.7 %    IPF 12.8 (H) 0.9 - 11.2 %   Hemoglobin and Hematocrit    Collection Time: 03/21/24 12:14 PM   Result Value Ref Range    Hgb 8.0 (L) 12.0 - 16.0 g/dL    Hct 24.8 (L) 37.0 - 47.0 %   Protime-INR    Collection Time: 03/21/24 12:14 PM   Result Value Ref Range    PT 18.3 (H) 12.5 - 14.5 seconds    INR 1.5 (H) <=1.3   Magnesium    Collection Time: 03/21/24 12:14 PM   Result Value Ref Range    Magnesium Level 2.30 1.60 - 2.60 mg/dL   Phosphorus    Collection Time: 03/21/24 12:14 PM   Result Value Ref Range    Phosphorus Level 7.2 (H) 2.3 - 4.7 mg/dL   AFP Tumor Marker    Collection Time: 03/21/24 12:14 PM   Result Value Ref Range    Alpha Fetoprotein Level 2.70 <=8.90 ng/mL   MRSA PCR    Collection Time: 03/21/24  5:14 PM   Result Value Ref Range    MRSA PCR SCRN ([facility]) Not Detected Not Detected   Hemoglobin A1c if not done in past 3 months    Collection Time: 03/21/24  8:03 PM   Result Value Ref Range    Hemoglobin A1c 5.5 <=7.0 %    Estimated Average Glucose 111.2 mg/dL   Basic Metabolic Panel    Collection Time: 03/22/24  5:26 AM   Result Value Ref Range    Sodium Level 137 136 - 145 mmol/L    Potassium Level 3.8 3.5 - 5.1 mmol/L    Chloride 101 98 - 107 mmol/L    Carbon Dioxide 27 23 - 31 mmol/L    Glucose Level 93 82 - 115 mg/dL    Blood Urea Nitrogen 7.2 (L) 9.8 - 20.1 mg/dL    Creatinine 0.41 (L) 0.55 - 1.02 mg/dL    BUN/Creatinine Ratio 18     Calcium Level Total 7.4 (L) 8.4 - 10.2 mg/dL    Anion Gap 9.0 mEq/L    eGFR >60 mls/min/1.73/m2   Protime-INR    Collection Time: 03/22/24  5:26 AM   Result  Value Ref Range    PT 15.1 (H) 12.5 - 14.5 seconds    INR 1.2 <=1.3   CBC with Differential    Collection Time: 03/22/24  5:26 AM   Result Value Ref Range    WBC 5.33 4.50 - 11.50 x10(3)/mcL    RBC 1.52 (L) 4.20 - 5.40 x10(6)/mcL    Hgb 7.2 (L) 12.0 - 16.0 g/dL    Hct 21.5 (L) 37.0 - 47.0 %    .4 (H) 80.0 - 94.0 fL    MCH 47.4 (H) 27.0 - 31.0 pg    MCHC 33.5 33.0 - 36.0 g/dL    RDW 16.0 11.5 - 17.0 %    Platelet 40 (L) 130 - 400 x10(3)/mcL    MPV 12.6 (H) 7.4 - 10.4 fL    Neut % 53.7 %    Lymph % 34.5 %    Mono % 10.1 %    Eos % 0.2 %    Basophil % 0.0 %    Lymph # 1.84 0.6 - 4.6 x10(3)/mcL    Neut # 2.86 2.1 - 9.2 x10(3)/mcL    Mono # 0.54 0.1 - 1.3 x10(3)/mcL    Eos # 0.01 0 - 0.9 x10(3)/mcL    Baso # 0.00 <=0.2 x10(3)/mcL    IG# 0.08 (H) 0 - 0.04 x10(3)/mcL    IG% 1.5 %    NRBC% 0.6 %   Albumin    Collection Time: 03/22/24  5:26 AM   Result Value Ref Range    Albumin Level 1.7 (L) 3.4 - 4.8 g/dL   Protein, Total    Collection Time: 03/22/24  5:26 AM   Result Value Ref Range    Protein Total 3.9 (L) 5.8 - 7.6 gm/dL   Prepare RBC 1 Unit    Collection Time: 03/22/24  7:53 AM   Result Value Ref Range    UNIT NUMBER [id number]     UNIT ABO/RH B POS     DISPENSE STATUS Issued     Unit Expiration [id number]     Product Code Z5499J33     Unit Blood Type Code 7300     CROSSMATCH INTERPRETATION Compatible     UNIT NUMBER [id number]     UNIT ABO/RH B POS     DISPENSE STATUS Selected     Unit Expiration [id number]     Product Code T3086J10     Unit Blood Type Code 7300     CROSSMATCH INTERPRETATION Compatible    Type & Screen    Collection Time: 03/22/24  7:53 AM   Result Value Ref Range    Group & Rh B POS     Indirect Demi GEL NEG     Specimen Outdate 03/25/2024 23:59    POCT glucose    Collection Time: 03/22/24  8:26 AM   Result Value Ref Range    POCT Glucose 116 (H) 70 - 110 mg/dL   Albumin, Body Fluid    Collection Time: 03/22/24 12:56 PM   Result Value Ref Range    Albumin Level Body Fluid 0.5 gm/dL  "  Cell Count, Body Fluid    Collection Time: 03/22/24 12:56 PM   Result Value Ref Range    WBC BF 49 /uL   Protein, Body Fluid    Collection Time: 03/22/24 12:56 PM   Result Value Ref Range    Protein Body Fluid 0.8 gm/dL   Glucose, Body Fluid    Collection Time: 03/22/24 12:56 PM   Result Value Ref Range    Glucose Body Fluid 96 mg/dL   Differential, Body Fluid    Collection Time: 03/22/24 12:56 PM   Result Value Ref Range    Neutrophils % 1 %    Lymphocyte % 92 %    Monocyte % 7 %   POCT glucose    Collection Time: 03/22/24  4:19 PM   Result Value Ref Range    POCT Glucose 88 70 - 110 mg/dL      No results found for: "PHENYTOIN", "PHENOBARB", "VALPROATE", "CBMZ"        Psychiatric Mental Status Exam:  General Appearance: appears stated age, dressed in hospital garb, lying in bed, in no acute distress  Arousal: alert  Behavior: poor eye contact  Movements and Motor Activity: no tics, no tremors, no akathisia, no dystonia, no evidence of tardive dyskinesia  Orientation: Unwilling to Participate  Speech: refuses to speak  Mood: Guarded  Affect: constricted  Thought Process: Unwilling to Participate  Associations: Unwilling to Participate  Thought Content and Perceptions: Unwilling to Participate  Recent and Remote Memory: Unwilling to Participate; per interview/observation with patient  Attention and Concentration: Unwilling to Participate; per interview/observation with patient  Fund of Knowledge: Unwilling to Participate; based on history, vocabulary, fund of knowledge, syntax, grammar, and content  Insight: poor; based on understanding of severity of illness and HPI  Judgment: poor; based on patient's behavior and HPI        ASSESSMENT/PLAN:   Diagnoses:  DELIRIUM, DEMENTIA, AND AMNESTIC AND OTHER COGNITIVE DISORDERS; Delirium NOS (R41.0), SUBSTANCE-RELATED DISORDERS; Alcohol-Related Disorders; Alcohol Abuse Without Physiological Dependence , and MOOD DISORDERS; Mood Disorder NOS (F39)         Past Medical " History:   Diagnosis Date    Benign essential HTN     Mixed hyperlipidemia     Type 2 diabetes mellitus without complications           Problem lists and Management Plans:  Continue CIWA-Ar Q 8 hr  Continue librium, thiamine, and folic acid  Will sign-off; please reconsult as needed    Buddy Silveira

## 2024-03-22 NOTE — PT/OT/SLP PROGRESS
Unable to complete MBS at this time as pt minimally responsive and NPO pending paracentesis.  POC discussed with nursing and MD.  SLP to follow up in AM.

## 2024-03-22 NOTE — PLAN OF CARE
Patient 's PASSAR triggered level 2 for SNF placement. CM placed order for Psych consult. CM phoned [facility] Intake spoke to Gina and notified of psych consult for SNF placement.

## 2024-03-22 NOTE — PROGRESS NOTES
[facility] Hospital Medicine Progress Note        Chief Complaint: Inpatient Follow-up     HPI:   64 y.o. female with a PMHx of HTN, HLD, type 2 DM, alcoholic cirrhosis, alcohol abuse and nicotine dependence who presented to [facility] on 3/20/2024 via EMS with c/o worsening confusion x1 day with associated generalized weakness.  The majority of the history was obtained from review of the medical record as the patient was unable to provide any additional history.     Upon presentation to ED, vital signed included /73, , RR 18, SpO2 98% on room air, temperature 98.4° F.  Labs notable for hemoglobin 10.7, hematocrit 31.7, platelets 34, vitamin B12 1958, INR 1.4, CO2 18, BUN 3.5, creatinine 0.5, calcium 7.9, , albumin 1.9, AST 43.  Serum alcohol level undetectable.  EKG demonstrated sinus tachycardia.  CXR demonstrated increased left retrocardiac density with partial silhouetting of the left hemidiaphragm which might be related to an infiltrate/atelectasis.  CT head without contrast negative for acute intracranial findings or significant interval changes compared to August 2022.  CT abdomen and pelvis with IV contrast consistent with cirrhosis, moderate generalized ascites, bilateral pleural fluid right greater than left noted.  She was admitted to hospital medicine service for further medical management.       Interval Hx:   Patient was lethargic this morning.  She opens her eyes but not able to communicate.  No family with her at the bedside this morning.  GI rounded yesterday.     Objective/physical exam:  General: In no acute distress, afebrile  Chest: Clear to auscultation bilaterally  Heart:  +S1, S2, no appreciable murmur  Abdomen: Soft, nontender, BS +. Distended  MSK: Warm, no lower extremity edema, no clubbing or cyanosis  Neurologic:  Lethargic, inattentive.    VITAL SIGNS: 24 HRS MIN & MAX LAST   Temp  Min: 97.8 °F (36.6 °C)  Max: 98.2 °F (36.8 °C) 97.8 °F (36.6  °C)   BP  Min: 131/82  Max: 155/97 131/82   Pulse  Min: 87  Max: 115  87   Resp  Min: 16  Max: 18 18   SpO2  Min: 95 %  Max: 100 % 97 %       Recent Labs   Lab 03/20/24 0055 03/21/24  0656 03/21/24 1214 03/22/24  0526   WBC 6.38 7.21  --  5.33   RBC 2.26* 1.80*  --  1.52*   HGB 10.7* 8.6* 8.0* 7.2*   HCT 31.7* 25.9* 24.8* 21.5*   .3* 143.9*  --  141.4*   MCH 47.3* 47.8*  --  47.4*   MCHC 33.8 33.2  --  33.5   RDW 15.3 15.9  --  16.0   PLT 34* 39*  --  40*   MPV 12.1* 12.3*  --  12.6*       Recent Labs   Lab 03/20/24 0055 03/21/24 0517 03/21/24 1214 03/22/24  0526    138  --  137   K 4.0 3.8  --  3.8   CO2 18* 25  --  27   BUN 3.5* 5.0*  --  7.2*   CREATININE 0.50* 0.43*  --  0.41*   CALCIUM 7.9* 7.3*  --  7.4*   MG  --   --  2.30  --    ALBUMIN 1.9* 1.8*  --   --    ALKPHOS 313* 269*  --   --    ALT 17 15  --   --    AST 43* 49*  --   --    BILITOT 1.5 1.4  --   --           Microbiology Results (last 7 days)       Procedure Component Value Units Date/Time    Urine culture [[id number]]  (Abnormal)  (Susceptibility) Collected: 03/20/24 0958    Order Status: Completed Specimen: Urine Updated: 03/22/24 0651     Urine Culture >/= 100,000 colonies/ml Escherichia coli    Body Fluid Culture [[id number]]     Order Status: Sent Specimen: Abdominal Fluid from Abdomen     Respiratory Culture [[id number]]     Order Status: Sent Specimen: Sputum, Expectorated              Radiology:  CT Abdomen Pelvis With IV Contrast NO Oral Contrast  Narrative: EXAMINATION:  CT ABDOMEN PELVIS WITH IV CONTRAST    CLINICAL HISTORY:  Abdominal pain, acute, nonlocalized;    TECHNIQUE:  Low dose axial images, sagittal and coronal reformations were obtained from the lung bases to the pubic symphysis following the IV administration of 100 mL of Omnipaque 350 .  Oral contrast given.  .  Automated exposure control used.    COMPARISON:  None.    FINDINGS:  Liver demonstrates heterogeneous enhancement pattern and irregular  contour with enlarged left hepatic lobe consistent with cirrhosis.  No discrete focal lesion.    Gallbladder and biliary ductal system normal.    Pancreas, stomach, spleen normal.  Left-sided adrenal nodule measuring 9 mm, nonspecific.    Kidneys normal enhancement with no hydronephrosis.    Aorta tapers normally.  Moderate atherosclerotic calcification.    Bowel loops are generally decompressed.  No definite edematous bowel wall thickening or dilation.    Moderate generalized ascites.  No localized inflammatory change.  No lymphadenopathy in the abdomen or pelvis.    Uterus, adnexa, bladder, rectum normal.    Internal fixation hardware proximal right femur.  Surgical changes thoracolumbar region.    Mild moderate pleural fluid right greater than left.  Impression: Findings consistent with cirrhosis.    Moderate generalized ascites.    Bilateral pleural fluid right greater than left.    Agree with nighthawk.    Electronically signed by: Vadim Wei MD  Date:    03/20/2024  Time:    08:06  X-Ray Chest 1 View  Narrative: EXAMINATION:  XR CHEST 1 VIEW    CPT 33739    CLINICAL HISTORY:  AMS;    COMPARISON:  November 12, 2022    FINDINGS:  Examination reveals mediastinal silhouette to be within normal limits cardiac silhouette is not enlarged right lung field is clear and free of gross infiltrates atelectasis or effusions.    Some increased left retrocardiac density and partial silhouetting of the left hemidiaphragm is identified which might be related to an infiltrate/atelectasis.    No other focal consolidative changes  Impression: Increased left retrocardiac density with partial silhouetting of the left hemidiaphragm which might be related to an infiltrate/atelectasis.    Otherwise no active pulmonary disease    Electronically signed by: Clint Bosch  Date:    03/20/2024  Time:    07:31  CT Head Without Contrast  Narrative: EXAMINATION:  CT HEAD WITHOUT CONTRAST    CLINICAL HISTORY:  Mental status change,  unknown cause;    TECHNIQUE:  CT imaging of the head performed from the skull base to the vertex without intravenous contrast. DLP 1582 mGycm. Automatic exposure control, adjustment of mA/kV or iterative reconstruction technique was used to reduce radiation.    COMPARISON:  28 August 2022    FINDINGS:  There is no acute cortical infarct, hemorrhage or mass lesion.  There is right parietal encephalomalacia.  There is moderate patchy hypoattenuation in the cerebral white matter which is nonspecific but most commonly associated with chronic small vessel ischemic changes.  Ventricles are not significantly enlarged.  There are vascular calcifications.    Visualized paranasal sinuses and mastoid air cells are clear.  Impression: No acute intracranial findings or significant interval change compared to August 2022.    No significant discrepancy with the preliminary report.    Electronically signed by: Bruce Smith  Date:    03/20/2024  Time:    06:32      Scheduled Med:   albumin human 25%  25 g Intravenous Once    doxycycline IV (PEDS and ADULTS)  100 mg Intravenous Q12H    folic acid  1 mg Oral Daily    gabapentin  300 mg Oral TID    lactulose 10 gram/15 ml  10 g Oral TID    pantoprazole  40 mg Oral Daily    piperacillin-tazobactam (Zosyn) IV (PEDS and ADULTS) (extended infusion is not appropriate)  4.5 g Intravenous Q8H    thiamine  100 mg Oral Q8H        Continuous Infusions:   Amino acid 4.25% - dextrose 5% (CLINIMIX-E) solution (1L provides 42.5 gm AA, 50 gm CHO (170 kcal/L dextrose), Na 35, K 30, Mg 5, Ca 4.5, Acetate 70, Cl 39, Phos 15) 50 mL/hr at 03/21/24 1935        PRN Meds:  albuterol, chlordiazepoxide, cloNIDine, dextrose 10%, dextrose 10%, glucagon (human recombinant), glucose, glucose, HYDROcodone-acetaminophen, hyoscyamine, insulin aspart U-100, lorazepam, LORazepam, methocarbamoL, morphine       Assessment/Plan:   Acute encephalopathy, suspect Metabolic from UTI and possible Alcohol withdrawal  Concern  for Pneumonia  Metabolic Acidosis -improved  Alcoholic cirrhosis   Ascites   Thrombocytopenia from underlying cirrhosis  Macrocytic anemia   Hypoalbuminemia     Continue Clinimix.  NPO with altered mental status.    She has been started on lactulose and will continue to follow.    GI has ordered paracentesis.  Will follow up results and recommendations.  No further fevers since admission.  Mildly tachycardic but possibly secondary to dehydration.  Currently on doxycycline and Zosyn questionable pneumonia.  She did have a positive urine culture.  Will discontinue doxycycline.  I reviewed the chest imaging.  Appears that she has some pleural effusion likely due to ascites.  No discrete consolidation noted.  Will plan to treat UTI for 5 days.  Transitioned to oral when able.    Patient did have a significant drop in her hemoglobin this morning.  Was 10.7 on admission and now down to 7.2.  Maybe some dilutional effect.  Will transfuse 1 unit of blood this morning. GI is following.  Will see if they recommend scoping with her history of cirrhosis.  She was on Protonix currently 40 mg oral daily.  Will bump up to b.i.d. in the meantime.  Iron stores were stable.  Elevated ferritin.  Likely some degree of chronic inflammatory anemia  Speech therapy evaluated yesterday.  Poor tolerance of p.o. meds.  Planning on MBS this morning    Critical care diagnosis: critical anemia requiring blood transfusion  Critical care interventions: hands on evaluation, review of labs/radiographs/records and discussions with family  Critical care time spent: >32 minutes        Chaz Harrison MD   03/22/2024     All diagnosis and differential diagnosis have been reviewed; assessment and plan has been documented; I have personally reviewed the labs and test results that are presently available; I have reviewed the patients medication list; I have reviewed the consulting providers response and recommendations. I have reviewed or attempted to review  medical records based upon their availability    All of the patient's questions have been  addressed and answered. Patient's is agreeable to the above stated plan. I will continue to monitor closely and make adjustments to medical management as needed.  _____________________________________________________________________

## 2024-03-22 NOTE — PROGRESS NOTES
"Gastroenterology Progress Note    Subjective/Interval History:    VSS stable today on 2L O2 via NC. Pt sleeping but awakens to name and touch. Still nonverbal at this time. No Bms since admission. Hgb lower at 7.2 with plans for 1u prbcs per primary. Plts stable at 40,000. INR improved at 1.2.    EEG unrevealing. Brain MRI ordered. Plans for paracentesis with fluid studies today.    Vital Signs:  /74   Pulse 91   Temp 97.6 °F (36.4 °C) (Oral)   Resp 18   Ht 5' 3" (1.6 m)   Wt 61 kg (134 lb 7.7 oz)   SpO2 97%   BMI 23.82 kg/m²   Body mass index is 23.82 kg/m².    Physical Exam:  Physical Exam  Constitutional:       General: She is not in acute distress.     Appearance: She is ill-appearing.   HENT:      Head: Normocephalic and atraumatic.   Eyes:      General: No scleral icterus.     Extraocular Movements: Extraocular movements intact.   Cardiovascular:      Rate and Rhythm: Normal rate and regular rhythm.   Pulmonary:      Effort: Pulmonary effort is normal. No respiratory distress.      Comments: On 2L O2 via NC.  Abdominal:      General: Bowel sounds are normal. There is distension.      Palpations: Abdomen is soft. There is no mass.      Tenderness: There is no abdominal tenderness. There is no guarding or rebound.   Musculoskeletal:      Right lower leg: No edema.      Left lower leg: No edema.   Skin:     General: Skin is warm and dry.      Coloration: Skin is not jaundiced.   Neurological:      Comments: Squeezes finger on command   Psychiatric:      Comments: Unable to assess         Labs:  Recent Results (from the past 48 hour(s))   Comprehensive Metabolic Panel    Collection Time: 03/21/24  5:17 AM   Result Value Ref Range    Sodium Level 138 136 - 145 mmol/L    Potassium Level 3.8 3.5 - 5.1 mmol/L    Chloride 103 98 - 107 mmol/L    Carbon Dioxide 25 23 - 31 mmol/L    Glucose Level 97 82 - 115 mg/dL    Blood Urea Nitrogen 5.0 (L) 9.8 - 20.1 mg/dL    Creatinine 0.43 (L) 0.55 - 1.02 mg/dL    " Calcium Level Total 7.3 (L) 8.4 - 10.2 mg/dL    Protein Total 4.4 (L) 5.8 - 7.6 gm/dL    Albumin Level 1.8 (L) 3.4 - 4.8 g/dL    Globulin 2.6 2.4 - 3.5 gm/dL    Albumin/Globulin Ratio 0.7 (L) 1.1 - 2.0 ratio    Bilirubin Total 1.4 <=1.5 mg/dL    Alkaline Phosphatase 269 (H) 40 - 150 unit/L    Alanine Aminotransferase 15 0 - 55 unit/L    Aspartate Aminotransferase 49 (H) 5 - 34 unit/L    eGFR >60 mls/min/1.73/m2   Iron Panel    Collection Time: 03/21/24  5:17 AM   Result Value Ref Range    Iron Binding Capacity Unsaturated <25 (L) 70 - 310 ug/dL    Iron Level 95 50 - 170 ug/dL    Transferrin 68 (L) 173 - 360 mg/dL    Iron Binding Capacity Total <120 (L) 250 - 450 ug/dL    Iron Saturation     Ferritin    Collection Time: 03/21/24  5:17 AM   Result Value Ref Range    Ferritin Level 277.84 (H) 4.63 - 204.00 ng/mL   Prolactin    Collection Time: 03/21/24  5:17 AM   Result Value Ref Range    Prolactin Level 11.59 5.18 - 26.53 ng/mL   CBC with Differential    Collection Time: 03/21/24  6:56 AM   Result Value Ref Range    WBC 7.21 4.50 - 11.50 x10(3)/mcL    RBC 1.80 (L) 4.20 - 5.40 x10(6)/mcL    Hgb 8.6 (L) 12.0 - 16.0 g/dL    Hct 25.9 (L) 37.0 - 47.0 %    .9 (H) 80.0 - 94.0 fL    MCH 47.8 (H) 27.0 - 31.0 pg    MCHC 33.2 33.0 - 36.0 g/dL    RDW 15.9 11.5 - 17.0 %    Platelet 39 (LL) 130 - 400 x10(3)/mcL    MPV 12.3 (H) 7.4 - 10.4 fL    Neut % 64.4 %    Lymph % 25.1 %    Mono % 9.2 %    Eos % 0.1 %    Basophil % 0.1 %    Lymph # 1.81 0.6 - 4.6 x10(3)/mcL    Neut # 4.64 2.1 - 9.2 x10(3)/mcL    Mono # 0.66 0.1 - 1.3 x10(3)/mcL    Eos # 0.01 0 - 0.9 x10(3)/mcL    Baso # 0.01 <=0.2 x10(3)/mcL    IG# 0.08 (H) 0 - 0.04 x10(3)/mcL    IG% 1.1 %    NRBC% 0.7 %    IPF 12.8 (H) 0.9 - 11.2 %   Hemoglobin and Hematocrit    Collection Time: 03/21/24 12:14 PM   Result Value Ref Range    Hgb 8.0 (L) 12.0 - 16.0 g/dL    Hct 24.8 (L) 37.0 - 47.0 %   Protime-INR    Collection Time: 03/21/24 12:14 PM   Result Value Ref Range    PT 18.3  (H) 12.5 - 14.5 seconds    INR 1.5 (H) <=1.3   Magnesium    Collection Time: 03/21/24 12:14 PM   Result Value Ref Range    Magnesium Level 2.30 1.60 - 2.60 mg/dL   Phosphorus    Collection Time: 03/21/24 12:14 PM   Result Value Ref Range    Phosphorus Level 7.2 (H) 2.3 - 4.7 mg/dL   AFP Tumor Marker    Collection Time: 03/21/24 12:14 PM   Result Value Ref Range    Alpha Fetoprotein Level 2.70 <=8.90 ng/mL   MRSA PCR    Collection Time: 03/21/24  5:14 PM   Result Value Ref Range    MRSA PCR SCRN ([facility]) Not Detected Not Detected   Hemoglobin A1c if not done in past 3 months    Collection Time: 03/21/24  8:03 PM   Result Value Ref Range    Hemoglobin A1c 5.5 <=7.0 %    Estimated Average Glucose 111.2 mg/dL   Basic Metabolic Panel    Collection Time: 03/22/24  5:26 AM   Result Value Ref Range    Sodium Level 137 136 - 145 mmol/L    Potassium Level 3.8 3.5 - 5.1 mmol/L    Chloride 101 98 - 107 mmol/L    Carbon Dioxide 27 23 - 31 mmol/L    Glucose Level 93 82 - 115 mg/dL    Blood Urea Nitrogen 7.2 (L) 9.8 - 20.1 mg/dL    Creatinine 0.41 (L) 0.55 - 1.02 mg/dL    BUN/Creatinine Ratio 18     Calcium Level Total 7.4 (L) 8.4 - 10.2 mg/dL    Anion Gap 9.0 mEq/L    eGFR >60 mls/min/1.73/m2   Protime-INR    Collection Time: 03/22/24  5:26 AM   Result Value Ref Range    PT 15.1 (H) 12.5 - 14.5 seconds    INR 1.2 <=1.3   CBC with Differential    Collection Time: 03/22/24  5:26 AM   Result Value Ref Range    WBC 5.33 4.50 - 11.50 x10(3)/mcL    RBC 1.52 (L) 4.20 - 5.40 x10(6)/mcL    Hgb 7.2 (L) 12.0 - 16.0 g/dL    Hct 21.5 (L) 37.0 - 47.0 %    .4 (H) 80.0 - 94.0 fL    MCH 47.4 (H) 27.0 - 31.0 pg    MCHC 33.5 33.0 - 36.0 g/dL    RDW 16.0 11.5 - 17.0 %    Platelet 40 (L) 130 - 400 x10(3)/mcL    MPV 12.6 (H) 7.4 - 10.4 fL    Neut % 53.7 %    Lymph % 34.5 %    Mono % 10.1 %    Eos % 0.2 %    Basophil % 0.0 %    Lymph # 1.84 0.6 - 4.6 x10(3)/mcL    Neut # 2.86 2.1 - 9.2 x10(3)/mcL    Mono # 0.54 0.1 - 1.3 x10(3)/mcL    Eos # 0.01 0  - 0.9 x10(3)/mcL    Baso # 0.00 <=0.2 x10(3)/mcL    IG# 0.08 (H) 0 - 0.04 x10(3)/mcL    IG% 1.5 %    NRBC% 0.6 %   Prepare RBC 1 Unit    Collection Time: 03/22/24  7:53 AM   Result Value Ref Range    UNIT NUMBER [id number]     UNIT ABO/RH B POS     DISPENSE STATUS Selected     Unit Expiration [id number]     Product Code I3729V11     Unit Blood Type Code 7300     CROSSMATCH INTERPRETATION Compatible     UNIT NUMBER [id number]     UNIT ABO/RH B POS     DISPENSE STATUS Selected     Unit Expiration [id number]     Product Code G4999B78     Unit Blood Type Code 7300     CROSSMATCH INTERPRETATION Compatible    Type & Screen    Collection Time: 03/22/24  7:53 AM   Result Value Ref Range    Group & Rh B POS     Indirect Demi GEL NEG     Specimen Outdate 03/25/2024 23:59        Imaging:  CT Abdomen Pelvis With IV Contrast NO Oral Contrast    Result Date: 3/20/2024  EXAMINATION: CT ABDOMEN PELVIS WITH IV CONTRAST CLINICAL HISTORY: Abdominal pain, acute, nonlocalized; TECHNIQUE: Low dose axial images, sagittal and coronal reformations were obtained from the lung bases to the pubic symphysis following the IV administration of 100 mL of Omnipaque 350 .  Oral contrast given.  .  Automated exposure control used. COMPARISON: None. FINDINGS: Liver demonstrates heterogeneous enhancement pattern and irregular contour with enlarged left hepatic lobe consistent with cirrhosis.  No discrete focal lesion. Gallbladder and biliary ductal system normal. Pancreas, stomach, spleen normal.  Left-sided adrenal nodule measuring 9 mm, nonspecific. Kidneys normal enhancement with no hydronephrosis. Aorta tapers normally.  Moderate atherosclerotic calcification. Bowel loops are generally decompressed.  No definite edematous bowel wall thickening or dilation. Moderate generalized ascites.  No localized inflammatory change.  No lymphadenopathy in the abdomen or pelvis. Uterus, adnexa, bladder, rectum normal. Internal fixation  hardware proximal right femur.  Surgical changes thoracolumbar region. Mild moderate pleural fluid right greater than left.     Findings consistent with cirrhosis. Moderate generalized ascites. Bilateral pleural fluid right greater than left. Agree with errol. Electronically signed by: Vadim Wei MD Date:    03/20/2024 Time:    08:06    X-Ray Chest 1 View    Result Date: 3/20/2024  EXAMINATION: XR CHEST 1 VIEW CPT 36040 CLINICAL HISTORY: AMS; COMPARISON: November 12, 2022 FINDINGS: Examination reveals mediastinal silhouette to be within normal limits cardiac silhouette is not enlarged right lung field is clear and free of gross infiltrates atelectasis or effusions. Some increased left retrocardiac density and partial silhouetting of the left hemidiaphragm is identified which might be related to an infiltrate/atelectasis. No other focal consolidative changes     Increased left retrocardiac density with partial silhouetting of the left hemidiaphragm which might be related to an infiltrate/atelectasis. Otherwise no active pulmonary disease Electronically signed by: Clint Bosch Date:    03/20/2024 Time:    07:31    CT Head Without Contrast    Result Date: 3/20/2024  EXAMINATION: CT HEAD WITHOUT CONTRAST CLINICAL HISTORY: Mental status change, unknown cause; TECHNIQUE: CT imaging of the head performed from the skull base to the vertex without intravenous contrast. DLP 1582 mGycm. Automatic exposure control, adjustment of mA/kV or iterative reconstruction technique was used to reduce radiation. COMPARISON: 28 August 2022 FINDINGS: There is no acute cortical infarct, hemorrhage or mass lesion.  There is right parietal encephalomalacia.  There is moderate patchy hypoattenuation in the cerebral white matter which is nonspecific but most commonly associated with chronic small vessel ischemic changes.  Ventricles are not significantly enlarged.  There are vascular calcifications. Visualized paranasal sinuses  and mastoid air cells are clear.     No acute intracranial findings or significant interval change compared to August 2022. No significant discrepancy with the preliminary report. Electronically signed by: Bruce Smith Date:    03/20/2024 Time:    06:32     Assessment/Plan:  64 year old female known to Dr. Garcia with a pmhx of HTN, HLD, DM, alcohol abuse, chronic anemia, and cirrhosis presents to the ED c/o increased weakness and AMS for past 2 days. Admitted with acute UTI, encephalopathy, acute urinary retention. GI consulted for cirrhosis and drop in hgb.      Cirrhosis  MELD-Na: 12 on 3/21/24  - Origin: ETOH vs PANDA  - Transplant candidacy: unable to obtain hx  - Ascites: present on US 3/21, not present on previous imaging  - low sodium diet  - presence of varices: last EGD 01/24/24 without varices  - presence of hepatic encephalopathy: questionable as ammonia is wnL and pt has acute UTI. Neuro following  - screening for neoplasm: AFP: wnL; Most recent imaging: no lesion on CT  Plts: 40,000  INR: 1.2     2. Chronic macrocytic anemia  Hgb 10.7--8.0--7.2  Plans to transfuse 1u prbcs per primary  Baseline hgb 7-8  FOBT pending, no report of GI bleed     - Continue ppi  - Monitor H/H and transfuse as needed to Hgb 7  - Monitor stools for bleeding  - F/u paracentesis with fluid studies to further evaluate new ascites  - transition to lactulose enemas as not clear for po intake for potential HE  - no plans for endoscopy today d/t lack of overt GIB and recent EGD without varices or source of anemia. Will monitor response to blood transfusion.       Megan Sethi PA-C

## 2024-03-22 NOTE — ASSESSMENT & PLAN NOTE
Multifactorial    -plans for blood transfusion and paracentesis today  -avoid sedating medications  -etoh withdrawal protocol and antimicrobials for UTI per primary team  -fall precautions  -further recommendations per MD

## 2024-03-22 NOTE — PROCEDURES
"Luz Maria Alfaro is a 64 y.o. female patient.    Temp: 97.6 °F (36.4 °C) (03/22/24 0825)  Pulse: 91 (03/22/24 0825)  Resp: 18 (03/22/24 0825)  BP: 119/74 (03/22/24 0825)  SpO2: 97 % (03/22/24 0825)  Weight: 61 kg (134 lb 7.7 oz) (03/21/24 0510)  Height: 5' 3" (160 cm) (03/21/24 0510)    PICC  Date/Time: 3/22/2024 10:30 AM  Performed by: Buck Kwok RN  Consent Done: Yes  Time out: Immediately prior to procedure a time out was called to verify the correct patient, procedure, equipment, support staff and site/side marked as required  Indications: med administration  Anesthesia: local infiltration  Local anesthetic: lidocaine 1% without epinephrine  Anesthetic Total (mL): 5  Preparation: skin prepped with ChloraPrep  Skin prep agent dried: skin prep agent completely dried prior to procedure  Sterile barriers: all five maximum sterile barriers used - cap, mask, sterile gown, sterile gloves, and large sterile sheet  Hand hygiene: hand hygiene performed prior to central venous catheter insertion  Location details: right basilic  Catheter type: double lumen  Catheter size: 5 Fr  Catheter Length: 35cm    Ultrasound guidance: yes  Number of attempts: 1  Post-procedure: blood return through all ports and sterile dressing applied    Assessment: placement verified by x-ray          Buck Kwok RN  3/22/2024    "

## 2024-03-22 NOTE — PROGRESS NOTES
[facility] - Neurology  Neurology  Progress Note    Patient Name: Luz Maria Alfaro  MRN: [id number]  Admission Date: 3/20/2024  Hospital Length of Stay: 2 days  Code Status: Full Code   Attending Provider: Chaz Harrison MD  Primary Care Physician: Robyn Matthews NP   Principal Problem:<principal problem not specified>    HPI:   63 y/o PMH HTN, HLD, DM2, alcoholic cirrhosis, alcohol abuse, and smoker who presents to ED on 3/20 with worsening confusion with associated generalized weakness.  Patient reportedly also having worsening hearing over the last week as well per patient's family report.    CT head without negative for acute intracranial abnormalities or significant interval change as compared to 08/2022.  CT abdomen/pelvis consistent with liver cirrhosis.  Significant for microcytic anemia, AST 49, and bacteriuria.             Overview/Hospital Course:  No notes on file        Subjective:     Interval History:   Neurologic exam remains poor and unchanged.      EEG unrevealing for epileptiform discharges, revealed diffuse slowing.  Labs significant for worsening anemia.     Current Neurological Medications:     Current Facility-Administered Medications   Medication Dose Route Frequency Provider Last Rate Last Admin    0.9%  NaCl infusion (for blood administration)   Intravenous Q24H PRN Chaz Harrison MD        albumin human 25% bottle 25 g  25 g Intravenous Once Megan Sethi, PA-C        albuterol inhaler 1 puff  1 puff Inhalation Q4H PRN Yvette Saba MD        Amino acid 4.25% - dextrose 5% (CLINIMIX-E) solution (1L provides 42.5 gm AA, 50 gm CHO (170 kcal/L dextrose), Na 35, K 30, Mg 5, Ca 4.5, Acetate 70, Cl 39, Phos 15)   Intravenous Continuous Yvette Saba MD 50 mL/hr at 03/21/24 1935 New Bag at 03/21/24 1935    chlordiazepoxide capsule 25 mg  25 mg Oral QID PRN Gabriela Starkey, AGACNP-BC        cloNIDine tablet 0.1 mg  0.1 mg Oral TID PRN Toyin Golden, FNP         dextrose 10% bolus 125 mL 125 mL  12.5 g Intravenous PRN Yvette Saba MD        dextrose 10% bolus 250 mL 250 mL  25 g Intravenous PRN Yvette Saba MD        folic acid tablet 1 mg  1 mg Oral Daily Yvette Saba MD        gabapentin capsule 300 mg  300 mg Oral TID Yvette Saba MD        glucagon (human recombinant) injection 1 mg  1 mg Intramuscular PRN Yvette Saba MD        glucose chewable tablet 16 g  16 g Oral PRN Yvette Saba MD        glucose chewable tablet 24 g  24 g Oral PRN Yvette Saba MD        HYDROcodone-acetaminophen 5-325 mg per tablet 1 tablet  1 tablet Oral BID PRN Yvette Saba MD        hyoscyamine ODT tablet 0.125 mg  0.125 mg Sublingual TID PRN Yvette Saba MD        insulin aspart U-100 injection 0-5 Units  0-5 Units Subcutaneous QID (AC + HS) PRN Yvette Saba MD        lactulose 10 gram/15 ml solution 10 g  10 g Oral TID Megan Sethi, PA-C        LORazepam injection 1 mg  1 mg Intravenous Q3H PRN Yvette Saba MD        LORazepam tablet 2 mg  2 mg Oral Q4H PRN Gabriela Starkey AGACNP-BC   2 mg at 03/21/24 1757    methocarbamoL tablet 750 mg  750 mg Oral TID PRN Yvette Saba MD        morphine injection 1 mg  1 mg Intravenous Q4H PRN Yvette Saba MD        pantoprazole injection 40 mg  40 mg Intravenous BID Chaz Harrison MD   40 mg at 03/22/24 0821    piperacillin-tazobactam (ZOSYN) 4.5 g in dextrose 5 % in water (D5W) 100 mL IVPB (MB+)  4.5 g Intravenous Q8H Helena Spain MD   Stopped at 03/22/24 0821    thiamine tablet 100 mg  100 mg Oral Q8H Evgeny Burk MD           Review of Systems   Unable to perform ROS: Acuity of condition     Objective:     Vital Signs (Most Recent):  Temp: 97.6 °F (36.4 °C) (03/22/24 0825)  Pulse: 91 (03/22/24 0825)  Resp: 18 (03/22/24 0825)  BP: 119/74 (03/22/24 0825)  SpO2: 97 % (03/22/24 0825) Vital Signs (24h Range):  Temp:  [97.6 °F (36.4 °C)-98.1 °F (36.7 °C)] 97.6 °F (36.4 °C)  Pulse:  []  91  Resp:  [16-18] 18  SpO2:  [95 %-100 %] 97 %  BP: (119-155)/(74-98) 119/74     Weight: 61 kg (134 lb 7.7 oz)  Body mass index is 23.82 kg/m².     Physical Exam   GENERAL: NAD, calm, lethargic, able to arouse to verbal stimuli, scattered bruising throughout BUE  MENTAL STATUS:  Does not follow commands  SPEECH/LANGUAGE:  Nonverbal  CN:   gaze conjugate, visual fields:  Appears to make eye contact  PERRLA  Motor: no focal motor weakness  Gait: not observed         Significant Labs:   Recent Lab Results         03/22/24  0526   03/21/24 2003 03/21/24  1714   03/21/24  1214        AFP       2.70       Anion Gap 9.0             Baso # 0.00             Basophil % 0.0             BUN 7.2             BUN/CREAT RATIO 18             Calcium 7.4             Chloride 101             CO2 27             Creatinine 0.41             eGFR >60             Eos # 0.01             Eos % 0.2             Estimated Avg Glucose   111.2           Glucose 93             Hematocrit 21.5       24.8       Hemoglobin 7.2       8.0       Hemoglobin A1C External   5.5           Immature Grans (Abs) 0.08             Immature Granulocytes 1.5             INR 1.2       1.5       Lymph # 1.84             LYMPH % 34.5             Magnesium        2.30       MCH 47.4             MCHC 33.5             .4             Mono # 0.54             Mono % 10.1             MPV 12.6             MRSA SCREEN BY PCR     Not Detected         Neut # 2.86             Neut % 53.7             nRBC 0.6             Phosphorus Level       7.2       Platelet Count 40             Potassium 3.8             PT 15.1       18.3       RBC 1.52             RDW 16.0             Sodium 137             WBC 5.33                     Significant Imaging: I have reviewed all pertinent imaging results/findings within the past 24 hours.  Assessment and Plan:     Acute encephalopathy  Multifactorial    -plans for blood transfusion and paracentesis today  -avoid sedating  medications  -etoh withdrawal protocol and antimicrobials for UTI per primary team  -fall precautions  -further recommendations per MD          VTE Risk Mitigation (From admission, onward)           Ordered     IP VTE HIGH RISK PATIENT  Once         03/20/24 0823     Place sequential compression device  Until discontinued         03/20/24 0823                    JACEK Nielsen-BC  Inpatient Neurology  [facility] - Neurology

## 2024-03-22 NOTE — SUBJECTIVE & OBJECTIVE
Subjective:     Interval History:   Neurologic exam remains poor and unchanged.      EEG unrevealing for epileptiform discharges, revealed diffuse slowing.  Labs significant for worsening anemia.     Current Neurological Medications:     Current Facility-Administered Medications   Medication Dose Route Frequency Provider Last Rate Last Admin    0.9%  NaCl infusion (for blood administration)   Intravenous Q24H PRN Chaz Harrison MD        albumin human 25% bottle 25 g  25 g Intravenous Once Megan Sethi PA-C        albuterol inhaler 1 puff  1 puff Inhalation Q4H PRN Yvette Saba MD        Amino acid 4.25% - dextrose 5% (CLINIMIX-E) solution (1L provides 42.5 gm AA, 50 gm CHO (170 kcal/L dextrose), Na 35, K 30, Mg 5, Ca 4.5, Acetate 70, Cl 39, Phos 15)   Intravenous Continuous Yvette Saba MD 50 mL/hr at 03/21/24 1935 New Bag at 03/21/24 1935    chlordiazepoxide capsule 25 mg  25 mg Oral QID PRN Gabriela Starkey, AGACNP-BC        cloNIDine tablet 0.1 mg  0.1 mg Oral TID PRN Toyin Golden, ALEKS        dextrose 10% bolus 125 mL 125 mL  12.5 g Intravenous PRN Yvette Saba MD        dextrose 10% bolus 250 mL 250 mL  25 g Intravenous PRN Yvette Saba MD        folic acid tablet 1 mg  1 mg Oral Daily Yvette Saba MD        gabapentin capsule 300 mg  300 mg Oral TID Yvette Saba MD        glucagon (human recombinant) injection 1 mg  1 mg Intramuscular PRN Yvette Saba MD        glucose chewable tablet 16 g  16 g Oral PRN Yvette Saba MD        glucose chewable tablet 24 g  24 g Oral PRN Yvette Saba MD        HYDROcodone-acetaminophen 5-325 mg per tablet 1 tablet  1 tablet Oral BID PRN Yvette Saba MD        hyoscyamine ODT tablet 0.125 mg  0.125 mg Sublingual TID PRN Yvette Saba MD        insulin aspart U-100 injection 0-5 Units  0-5 Units Subcutaneous QID (AC + HS) PRN Yvette Saba MD        lactulose 10 gram/15 ml solution 10 g  10 g Oral TID Megan Sethi PA-C         LORazepam injection 1 mg  1 mg Intravenous Q3H PRN Yvette Saba MD        LORazepam tablet 2 mg  2 mg Oral Q4H PRN Gabriela Starkey AGACNP-BC   2 mg at 03/21/24 1757    methocarbamoL tablet 750 mg  750 mg Oral TID PRN Yvette Saba MD        morphine injection 1 mg  1 mg Intravenous Q4H PRN Yvette Saba MD        pantoprazole injection 40 mg  40 mg Intravenous BID Chaz Harrison MD   40 mg at 03/22/24 0821    piperacillin-tazobactam (ZOSYN) 4.5 g in dextrose 5 % in water (D5W) 100 mL IVPB (MB+)  4.5 g Intravenous Q8H Helena Spain MD   Stopped at 03/22/24 0821    thiamine tablet 100 mg  100 mg Oral Q8H Evgeny Burk MD           Review of Systems   Unable to perform ROS: Acuity of condition     Objective:     Vital Signs (Most Recent):  Temp: 97.6 °F (36.4 °C) (03/22/24 0825)  Pulse: 91 (03/22/24 0825)  Resp: 18 (03/22/24 0825)  BP: 119/74 (03/22/24 0825)  SpO2: 97 % (03/22/24 0825) Vital Signs (24h Range):  Temp:  [97.6 °F (36.4 °C)-98.1 °F (36.7 °C)] 97.6 °F (36.4 °C)  Pulse:  [] 91  Resp:  [16-18] 18  SpO2:  [95 %-100 %] 97 %  BP: (119-155)/(74-98) 119/74     Weight: 61 kg (134 lb 7.7 oz)  Body mass index is 23.82 kg/m².     Physical Exam   GENERAL: NAD, calm, lethargic, able to arouse to verbal stimuli, scattered bruising throughout BUE  MENTAL STATUS:  Does not follow commands  SPEECH/LANGUAGE:  Nonverbal  CN:   gaze conjugate, visual fields:  Appears to make eye contact  PERRLA  Motor: no focal motor weakness  Gait: not observed         Significant Labs:   Recent Lab Results         03/22/24  0526   03/21/24 2003 03/21/24  1714   03/21/24  1214        AFP       2.70       Anion Gap 9.0             Baso # 0.00             Basophil % 0.0             BUN 7.2             BUN/CREAT RATIO 18             Calcium 7.4             Chloride 101             CO2 27             Creatinine 0.41             eGFR >60             Eos # 0.01             Eos % 0.2             Estimated Avg  Glucose   111.2           Glucose 93             Hematocrit 21.5       24.8       Hemoglobin 7.2       8.0       Hemoglobin A1C External   5.5           Immature Grans (Abs) 0.08             Immature Granulocytes 1.5             INR 1.2       1.5       Lymph # 1.84             LYMPH % 34.5             Magnesium        2.30       MCH 47.4             MCHC 33.5             .4             Mono # 0.54             Mono % 10.1             MPV 12.6             MRSA SCREEN BY PCR     Not Detected         Neut # 2.86             Neut % 53.7             nRBC 0.6             Phosphorus Level       7.2       Platelet Count 40             Potassium 3.8             PT 15.1       18.3       RBC 1.52             RDW 16.0             Sodium 137             WBC 5.33                     Significant Imaging: I have reviewed all pertinent imaging results/findings within the past 24 hours.   Methotrexate Pregnancy And Lactation Text: This medication is Pregnancy Category X and is known to cause fetal harm. This medication is excreted in breast milk.

## 2024-03-23 LAB
ALBUMIN SERPL-MCNC: 2.7 G/DL (ref 3.4–4.8)
ALBUMIN/GLOB SERPL: 0.9 RATIO (ref 1.1–2)
ALP SERPL-CCNC: 244 UNIT/L (ref 40–150)
ALT SERPL-CCNC: 21 UNIT/L (ref 0–55)
AST SERPL-CCNC: 58 UNIT/L (ref 5–34)
BASOPHILS # BLD AUTO: 0.01 X10(3)/MCL
BASOPHILS NFR BLD AUTO: 0.2 %
BILIRUB SERPL-MCNC: 1.4 MG/DL
BUN SERPL-MCNC: 6.8 MG/DL (ref 9.8–20.1)
CALCIUM SERPL-MCNC: 8.6 MG/DL (ref 8.4–10.2)
CHLORIDE SERPL-SCNC: 101 MMOL/L (ref 98–107)
CO2 SERPL-SCNC: 29 MMOL/L (ref 23–31)
CREAT SERPL-MCNC: 0.42 MG/DL (ref 0.55–1.02)
EOSINOPHIL # BLD AUTO: 0.01 X10(3)/MCL (ref 0–0.9)
EOSINOPHIL NFR BLD AUTO: 0.2 %
ERYTHROCYTE [DISTWIDTH] IN BLOOD BY AUTOMATED COUNT: ABNORMAL %
GFR SERPLBLD CREATININE-BSD FMLA CKD-EPI: >60 MLS/MIN/1.73/M2
GLOBULIN SER-MCNC: 2.9 GM/DL (ref 2.4–3.5)
GLUCOSE SERPL-MCNC: 84 MG/DL (ref 82–115)
HCT VFR BLD AUTO: 32.4 % (ref 37–47)
HGB BLD-MCNC: 11.2 G/DL (ref 12–16)
IMM GRANULOCYTES # BLD AUTO: 0.06 X10(3)/MCL (ref 0–0.04)
IMM GRANULOCYTES NFR BLD AUTO: 0.9 %
LYMPHOCYTES # BLD AUTO: 1.74 X10(3)/MCL (ref 0.6–4.6)
LYMPHOCYTES NFR BLD AUTO: 26.2 %
MAGNESIUM SERPL-MCNC: 2 MG/DL (ref 1.6–2.6)
MCH RBC QN AUTO: 40.7 PG (ref 27–31)
MCHC RBC AUTO-ENTMCNC: 34.6 G/DL (ref 33–36)
MCV RBC AUTO: 117.8 FL (ref 80–94)
MONOCYTES # BLD AUTO: 0.89 X10(3)/MCL (ref 0.1–1.3)
MONOCYTES NFR BLD AUTO: 13.4 %
NEUTROPHILS # BLD AUTO: 3.92 X10(3)/MCL (ref 2.1–9.2)
NEUTROPHILS NFR BLD AUTO: 59.1 %
NRBC BLD AUTO-RTO: 0 %
PLATELET # BLD AUTO: 48 X10(3)/MCL (ref 130–400)
PMV BLD AUTO: 12.3 FL (ref 7.4–10.4)
POCT GLUCOSE: 101 MG/DL (ref 70–110)
POCT GLUCOSE: 106 MG/DL (ref 70–110)
POCT GLUCOSE: 82 MG/DL (ref 70–110)
POTASSIUM SERPL-SCNC: 3.4 MMOL/L (ref 3.5–5.1)
PROT SERPL-MCNC: 5.6 GM/DL (ref 5.8–7.6)
RBC # BLD AUTO: 2.75 X10(6)/MCL (ref 4.2–5.4)
SODIUM SERPL-SCNC: 141 MMOL/L (ref 136–145)
WBC # SPEC AUTO: 6.63 X10(3)/MCL (ref 4.5–11.5)

## 2024-03-23 PROCEDURE — 25000003 PHARM REV CODE 250: Performed by: INTERNAL MEDICINE

## 2024-03-23 PROCEDURE — 85025 COMPLETE CBC W/AUTO DIFF WBC: CPT | Performed by: HOSPITALIST

## 2024-03-23 PROCEDURE — 63600175 PHARM REV CODE 636 W HCPCS: Performed by: NURSE PRACTITIONER

## 2024-03-23 PROCEDURE — 63600175 PHARM REV CODE 636 W HCPCS: Performed by: HOSPITALIST

## 2024-03-23 PROCEDURE — 80053 COMPREHEN METABOLIC PANEL: CPT | Performed by: HOSPITALIST

## 2024-03-23 PROCEDURE — 21400001 HC TELEMETRY ROOM

## 2024-03-23 PROCEDURE — 63600175 PHARM REV CODE 636 W HCPCS: Performed by: INTERNAL MEDICINE

## 2024-03-23 PROCEDURE — A4216 STERILE WATER/SALINE, 10 ML: HCPCS | Performed by: HOSPITALIST

## 2024-03-23 PROCEDURE — C9113 INJ PANTOPRAZOLE SODIUM, VIA: HCPCS | Performed by: HOSPITALIST

## 2024-03-23 PROCEDURE — 25000003 PHARM REV CODE 250: Performed by: HOSPITALIST

## 2024-03-23 PROCEDURE — 83735 ASSAY OF MAGNESIUM: CPT | Performed by: HOSPITALIST

## 2024-03-23 PROCEDURE — 11000001 HC ACUTE MED/SURG PRIVATE ROOM

## 2024-03-23 RX ORDER — MUPIROCIN 20 MG/G
OINTMENT TOPICAL 2 TIMES DAILY
Status: COMPLETED | OUTPATIENT
Start: 2024-03-23 | End: 2024-03-27

## 2024-03-23 RX ORDER — ACETAMINOPHEN 10 MG/ML
1000 INJECTION, SOLUTION INTRAVENOUS ONCE
Status: COMPLETED | OUTPATIENT
Start: 2024-03-23 | End: 2024-03-23

## 2024-03-23 RX ADMIN — SODIUM CHLORIDE, PRESERVATIVE FREE 10 ML: 5 INJECTION INTRAVENOUS at 06:03

## 2024-03-23 RX ADMIN — SODIUM CHLORIDE, PRESERVATIVE FREE 10 ML: 5 INJECTION INTRAVENOUS at 12:03

## 2024-03-23 RX ADMIN — PANTOPRAZOLE SODIUM 40 MG: 40 INJECTION, POWDER, FOR SOLUTION INTRAVENOUS at 10:03

## 2024-03-23 RX ADMIN — PIPERACILLIN SODIUM AND TAZOBACTAM SODIUM 4.5 G: 4; .5 INJECTION, POWDER, LYOPHILIZED, FOR SOLUTION INTRAVENOUS at 01:03

## 2024-03-23 RX ADMIN — ACETAMINOPHEN 1000 MG: 10 INJECTION, SOLUTION INTRAVENOUS at 02:03

## 2024-03-23 RX ADMIN — MUPIROCIN: 20 OINTMENT TOPICAL at 10:03

## 2024-03-23 RX ADMIN — PIPERACILLIN SODIUM AND TAZOBACTAM SODIUM 4.5 G: 4; .5 INJECTION, POWDER, LYOPHILIZED, FOR SOLUTION INTRAVENOUS at 04:03

## 2024-03-23 RX ADMIN — SODIUM CHLORIDE, PRESERVATIVE FREE 10 ML: 5 INJECTION INTRAVENOUS at 01:03

## 2024-03-23 RX ADMIN — PIPERACILLIN SODIUM AND TAZOBACTAM SODIUM 4.5 G: 4; .5 INJECTION, POWDER, LYOPHILIZED, FOR SOLUTION INTRAVENOUS at 08:03

## 2024-03-23 RX ADMIN — MUPIROCIN: 20 OINTMENT TOPICAL at 08:03

## 2024-03-23 RX ADMIN — PANTOPRAZOLE SODIUM 40 MG: 40 INJECTION, POWDER, FOR SOLUTION INTRAVENOUS at 09:03

## 2024-03-23 NOTE — PT/OT/SLP PROGRESS
Unable to complete MBS due to patient refusal to accept PO/barium at this time. Will continue to follow.

## 2024-03-23 NOTE — PROGRESS NOTES
"Gastroenterology Progress Note    Subjective/Interval History:  Awake.  Not much communicative.    Vital Signs:  BP (!) 153/84   Pulse 79   Temp 96.6 °F (35.9 °C) (Axillary)   Resp 13   Ht 5' 3" (1.6 m)   Wt 61 kg (134 lb 7.7 oz)   SpO2 99%   BMI 23.82 kg/m²   Body mass index is 23.82 kg/m².    Physical Exam:  Physical Exam  Constitutional:       General: She is not in acute distress.     Appearance: She is ill-appearing.   HENT:      Head: Normocephalic and atraumatic.   Eyes:      General: No scleral icterus.     Extraocular Movements: Extraocular movements intact.   Cardiovascular:      Rate and Rhythm: Normal rate and regular rhythm.   Pulmonary:      Effort: Pulmonary effort is normal. No respiratory distress.      Comments: On 2L O2 via NC.  Abdominal:      General: Bowel sounds are normal. There is distension.      Palpations: Abdomen is soft. There is no mass.      Tenderness: There is no abdominal tenderness. There is no guarding or rebound.   Musculoskeletal:      Right lower leg: No edema.      Left lower leg: No edema.   Skin:     General: Skin is warm and dry.      Coloration: Skin is not jaundiced.   Neurological:      Comments: Squeezes finger on command   Psychiatric:      Comments: Unable to assess         Labs:  Recent Results (from the past 48 hour(s))   Hemoglobin A1c if not done in past 3 months    Collection Time: 03/21/24  8:03 PM   Result Value Ref Range    Hemoglobin A1c 5.5 <=7.0 %    Estimated Average Glucose 111.2 mg/dL   Basic Metabolic Panel    Collection Time: 03/22/24  5:26 AM   Result Value Ref Range    Sodium Level 137 136 - 145 mmol/L    Potassium Level 3.8 3.5 - 5.1 mmol/L    Chloride 101 98 - 107 mmol/L    Carbon Dioxide 27 23 - 31 mmol/L    Glucose Level 93 82 - 115 mg/dL    Blood Urea Nitrogen 7.2 (L) 9.8 - 20.1 mg/dL    Creatinine 0.41 (L) 0.55 - 1.02 mg/dL    BUN/Creatinine Ratio 18     Calcium Level Total 7.4 (L) 8.4 - 10.2 mg/dL    Anion Gap 9.0 mEq/L    eGFR >60 " mls/min/1.73/m2   Protime-INR    Collection Time: 03/22/24  5:26 AM   Result Value Ref Range    PT 15.1 (H) 12.5 - 14.5 seconds    INR 1.2 <=1.3   CBC with Differential    Collection Time: 03/22/24  5:26 AM   Result Value Ref Range    WBC 5.33 4.50 - 11.50 x10(3)/mcL    RBC 1.52 (L) 4.20 - 5.40 x10(6)/mcL    Hgb 7.2 (L) 12.0 - 16.0 g/dL    Hct 21.5 (L) 37.0 - 47.0 %    .4 (H) 80.0 - 94.0 fL    MCH 47.4 (H) 27.0 - 31.0 pg    MCHC 33.5 33.0 - 36.0 g/dL    RDW 16.0 11.5 - 17.0 %    Platelet 40 (L) 130 - 400 x10(3)/mcL    MPV 12.6 (H) 7.4 - 10.4 fL    Neut % 53.7 %    Lymph % 34.5 %    Mono % 10.1 %    Eos % 0.2 %    Basophil % 0.0 %    Lymph # 1.84 0.6 - 4.6 x10(3)/mcL    Neut # 2.86 2.1 - 9.2 x10(3)/mcL    Mono # 0.54 0.1 - 1.3 x10(3)/mcL    Eos # 0.01 0 - 0.9 x10(3)/mcL    Baso # 0.00 <=0.2 x10(3)/mcL    IG# 0.08 (H) 0 - 0.04 x10(3)/mcL    IG% 1.5 %    NRBC% 0.6 %   Albumin    Collection Time: 03/22/24  5:26 AM   Result Value Ref Range    Albumin Level 1.7 (L) 3.4 - 4.8 g/dL   Protein, Total    Collection Time: 03/22/24  5:26 AM   Result Value Ref Range    Protein Total 3.9 (L) 5.8 - 7.6 gm/dL   Prepare RBC 1 Unit    Collection Time: 03/22/24  7:53 AM   Result Value Ref Range    UNIT NUMBER [id number]     UNIT ABO/RH B POS     DISPENSE STATUS Transfused     Unit Expiration [id number]     Product Code U4027Y94     Unit Blood Type Code 7300     CROSSMATCH INTERPRETATION Compatible     UNIT NUMBER [id number]     UNIT ABO/RH B POS     DISPENSE STATUS Selected     Unit Expiration [id number]     Product Code W4852L88     Unit Blood Type Code 7300     CROSSMATCH INTERPRETATION Compatible    Type & Screen    Collection Time: 03/22/24  7:53 AM   Result Value Ref Range    Group & Rh B POS     Indirect Demi GEL NEG     Specimen Outdate 03/25/2024 23:59    POCT glucose    Collection Time: 03/22/24  8:26 AM   Result Value Ref Range    POCT Glucose 116 (H) 70 - 110 mg/dL   Albumin, Body Fluid    Collection Time:  03/22/24 12:56 PM   Result Value Ref Range    Albumin Level Body Fluid 0.5 gm/dL   Cell Count, Body Fluid    Collection Time: 03/22/24 12:56 PM   Result Value Ref Range    WBC BF 49 /uL   Body Fluid Culture    Collection Time: 03/22/24 12:56 PM    Specimen: Abdomen; Abdominal Fluid   Result Value Ref Range    Body Fluid Culture No Growth At 24 Hours    Protein, Body Fluid    Collection Time: 03/22/24 12:56 PM   Result Value Ref Range    Protein Body Fluid 0.8 gm/dL   Glucose, Body Fluid    Collection Time: 03/22/24 12:56 PM   Result Value Ref Range    Glucose Body Fluid 96 mg/dL   Differential, Body Fluid    Collection Time: 03/22/24 12:56 PM   Result Value Ref Range    Neutrophils % 1 %    Lymphocyte % 92 %    Monocyte % 7 %   POCT glucose    Collection Time: 03/22/24  4:19 PM   Result Value Ref Range    POCT Glucose 88 70 - 110 mg/dL   Magnesium    Collection Time: 03/23/24  4:45 AM   Result Value Ref Range    Magnesium Level 2.00 1.60 - 2.60 mg/dL   Comprehensive Metabolic Panel    Collection Time: 03/23/24  4:45 AM   Result Value Ref Range    Sodium Level 141 136 - 145 mmol/L    Potassium Level 3.4 (L) 3.5 - 5.1 mmol/L    Chloride 101 98 - 107 mmol/L    Carbon Dioxide 29 23 - 31 mmol/L    Glucose Level 84 82 - 115 mg/dL    Blood Urea Nitrogen 6.8 (L) 9.8 - 20.1 mg/dL    Creatinine 0.42 (L) 0.55 - 1.02 mg/dL    Calcium Level Total 8.6 8.4 - 10.2 mg/dL    Protein Total 5.6 (L) 5.8 - 7.6 gm/dL    Albumin Level 2.7 (L) 3.4 - 4.8 g/dL    Globulin 2.9 2.4 - 3.5 gm/dL    Albumin/Globulin Ratio 0.9 (L) 1.1 - 2.0 ratio    Bilirubin Total 1.4 <=1.5 mg/dL    Alkaline Phosphatase 244 (H) 40 - 150 unit/L    Alanine Aminotransferase 21 0 - 55 unit/L    Aspartate Aminotransferase 58 (H) 5 - 34 unit/L    eGFR >60 mls/min/1.73/m2   CBC with Differential    Collection Time: 03/23/24  4:45 AM   Result Value Ref Range    WBC 6.63 4.50 - 11.50 x10(3)/mcL    RBC 2.75 (L) 4.20 - 5.40 x10(6)/mcL    Hgb 11.2 (L) 12.0 - 16.0 g/dL    Hct  32.4 (L) 37.0 - 47.0 %    .8 (H) 80.0 - 94.0 fL    MCH 40.7 (H) 27.0 - 31.0 pg    MCHC 34.6 33.0 - 36.0 g/dL    RDW      Platelet 48 (L) 130 - 400 x10(3)/mcL    MPV 12.3 (H) 7.4 - 10.4 fL    Neut % 59.1 %    Lymph % 26.2 %    Mono % 13.4 %    Eos % 0.2 %    Basophil % 0.2 %    Lymph # 1.74 0.6 - 4.6 x10(3)/mcL    Neut # 3.92 2.1 - 9.2 x10(3)/mcL    Mono # 0.89 0.1 - 1.3 x10(3)/mcL    Eos # 0.01 0 - 0.9 x10(3)/mcL    Baso # 0.01 <=0.2 x10(3)/mcL    IG# 0.06 (H) 0 - 0.04 x10(3)/mcL    IG% 0.9 %    NRBC% 0.0 %   POCT glucose    Collection Time: 03/23/24  1:42 PM   Result Value Ref Range    POCT Glucose 101 70 - 110 mg/dL   POCT glucose    Collection Time: 03/23/24  4:50 PM   Result Value Ref Range    POCT Glucose 106 70 - 110 mg/dL       Imaging:  CT Abdomen Pelvis With IV Contrast NO Oral Contrast    Result Date: 3/20/2024  EXAMINATION: CT ABDOMEN PELVIS WITH IV CONTRAST CLINICAL HISTORY: Abdominal pain, acute, nonlocalized; TECHNIQUE: Low dose axial images, sagittal and coronal reformations were obtained from the lung bases to the pubic symphysis following the IV administration of 100 mL of Omnipaque 350 .  Oral contrast given.  .  Automated exposure control used. COMPARISON: None. FINDINGS: Liver demonstrates heterogeneous enhancement pattern and irregular contour with enlarged left hepatic lobe consistent with cirrhosis.  No discrete focal lesion. Gallbladder and biliary ductal system normal. Pancreas, stomach, spleen normal.  Left-sided adrenal nodule measuring 9 mm, nonspecific. Kidneys normal enhancement with no hydronephrosis. Aorta tapers normally.  Moderate atherosclerotic calcification. Bowel loops are generally decompressed.  No definite edematous bowel wall thickening or dilation. Moderate generalized ascites.  No localized inflammatory change.  No lymphadenopathy in the abdomen or pelvis. Uterus, adnexa, bladder, rectum normal. Internal fixation hardware proximal right femur.  Surgical  changes thoracolumbar region. Mild moderate pleural fluid right greater than left.     Findings consistent with cirrhosis. Moderate generalized ascites. Bilateral pleural fluid right greater than left. Agree with nighthawk. Electronically signed by: Vadim Wei MD Date:    03/20/2024 Time:    08:06    X-Ray Chest 1 View    Result Date: 3/20/2024  EXAMINATION: XR CHEST 1 VIEW CPT 09087 CLINICAL HISTORY: AMS; COMPARISON: November 12, 2022 FINDINGS: Examination reveals mediastinal silhouette to be within normal limits cardiac silhouette is not enlarged right lung field is clear and free of gross infiltrates atelectasis or effusions. Some increased left retrocardiac density and partial silhouetting of the left hemidiaphragm is identified which might be related to an infiltrate/atelectasis. No other focal consolidative changes     Increased left retrocardiac density with partial silhouetting of the left hemidiaphragm which might be related to an infiltrate/atelectasis. Otherwise no active pulmonary disease Electronically signed by: Clint Bosch Date:    03/20/2024 Time:    07:31    CT Head Without Contrast    Result Date: 3/20/2024  EXAMINATION: CT HEAD WITHOUT CONTRAST CLINICAL HISTORY: Mental status change, unknown cause; TECHNIQUE: CT imaging of the head performed from the skull base to the vertex without intravenous contrast. DLP 1582 mGycm. Automatic exposure control, adjustment of mA/kV or iterative reconstruction technique was used to reduce radiation. COMPARISON: 28 August 2022 FINDINGS: There is no acute cortical infarct, hemorrhage or mass lesion.  There is right parietal encephalomalacia.  There is moderate patchy hypoattenuation in the cerebral white matter which is nonspecific but most commonly associated with chronic small vessel ischemic changes.  Ventricles are not significantly enlarged.  There are vascular calcifications. Visualized paranasal sinuses and mastoid air cells are clear.     No  acute intracranial findings or significant interval change compared to August 2022. No significant discrepancy with the preliminary report. Electronically signed by: Bruce Smith Date:    03/20/2024 Time:    06:32     Assessment/Plan:  64 year old female known to Dr. Garcia with a pmhx of HTN, HLD, DM, alcohol abuse, chronic anemia, and cirrhosis presents to the ED c/o increased weakness and AMS for past 2 days. Admitted with acute UTI, encephalopathy, acute urinary retention. GI consulted for cirrhosis and drop in hgb.      Cirrhosis  MELD-Na: 12 on 3/21/24  - Origin: ETOH vs PANDA  - Transplant candidacy: unable to obtain hx  - Ascites: present on US 3/21, not present on previous imaging  - low sodium diet  - presence of varices: last EGD 01/24/24 without varices  - presence of hepatic encephalopathy: questionable as ammonia is wnL and pt has acute UTI. Neuro following  - screening for neoplasm: AFP: wnL; Most recent imaging: no lesion on CT  Plts: 40,000  INR: 1.2     2. Chronic macrocytic anemia  Hgb 10.7--8.0--7.2  Plans to transfuse 1u prbcs per primary  Baseline hgb 7-8  FOBT pending, no report of GI bleed     - Continue ppi  - Monitor H/H and transfuse as needed to Hgb 7  - Monitor stools for bleeding  - F/u paracentesis with fluid studies to further evaluate new ascites  - transition to lactulose enemas as not clear for po intake for potential HE  - no plans for endoscopy today d/t lack of overt GIB and recent EGD without varices or source of anemia. Will monitor response to blood transfusion.     3/23/24  Events noted.  Patient reported to have 2 soft BMs.  No overt GI bleed.  Awake.  Not much communicative.  Ascitic fluid analysis not consistent with SBP.  Also SAAG gradient greater than 1.1 along with low ascitic fluid total protein suggestive of ascites from liver disease.  Lactulose enemas as needed.  Monitor electrolytes periodically.  Hemoglobin above 10.

## 2024-03-23 NOTE — PROGRESS NOTES
[facility] Hospital Medicine Progress Note        Chief Complaint: Inpatient Follow-up     HPI:   64 y.o. female with a PMHx of HTN, HLD, type 2 DM, alcoholic cirrhosis, alcohol abuse and nicotine dependence who presented to [facility] on 3/20/2024 via EMS with c/o worsening confusion x1 day with associated generalized weakness.  The majority of the history was obtained from review of the medical record as the patient was unable to provide any additional history.     Upon presentation to ED, vital signed included /73, , RR 18, SpO2 98% on room air, temperature 98.4° F.  Labs notable for hemoglobin 10.7, hematocrit 31.7, platelets 34, vitamin B12 1958, INR 1.4, CO2 18, BUN 3.5, creatinine 0.5, calcium 7.9, , albumin 1.9, AST 43.  Serum alcohol level undetectable.  EKG demonstrated sinus tachycardia.  CXR demonstrated increased left retrocardiac density with partial silhouetting of the left hemidiaphragm which might be related to an infiltrate/atelectasis.  CT head without contrast negative for acute intracranial findings or significant interval changes compared to August 2022.  CT abdomen and pelvis with IV contrast consistent with cirrhosis, moderate generalized ascites, bilateral pleural fluid right greater than left noted.  She was admitted to hospital medicine service for further medical management.       Interval Hx:   Patient was much more awake this morning but noncommunicative.  She does seem to respond to simple questions with shaking and nodding her head but not sure how much she was really understanding.  GI he was switched over to l lactulose retention enemas which seemed to be having a good affect     Objective/physical exam:  General: In no acute distress, afebrile  Chest: Clear to auscultation bilaterally  Heart:  +S1, S2, no appreciable murmur  Abdomen: Soft, nontender, BS +. Distended  MSK: Warm, no lower extremity edema, no clubbing or  cyanosis  Neurologic:  Awake but nonverbal  VITAL SIGNS: 24 HRS MIN & MAX LAST   Temp  Min: 97.6 °F (36.4 °C)  Max: 98.2 °F (36.8 °C) 98 °F (36.7 °C)   BP  Min: 119/74  Max: 159/86 (!) 159/86   Pulse  Min: 84  Max: 96  90   Resp  Min: 18  Max: 18 18   SpO2  Min: 90 %  Max: 98 % 97 %       Recent Labs   Lab 03/20/24 0055 03/21/24  0656 03/21/24  1214 03/22/24  0526 03/23/24  0445   WBC 6.38 7.21  --  5.33 6.63   RBC 2.26* 1.80*  --  1.52* 2.75*   HGB 10.7* 8.6* 8.0* 7.2* 11.2*   HCT 31.7* 25.9* 24.8* 21.5* 32.4*   .3* 143.9*  --  141.4* 117.8*   MCH 47.3* 47.8*  --  47.4* 40.7*   MCHC 33.8 33.2  --  33.5 34.6   RDW 15.3 15.9  --  16.0  --    PLT 34* 39*  --  40* 48*   MPV 12.1* 12.3*  --  12.6* 12.3*       Recent Labs   Lab 03/20/24 0055 03/21/24 0517 03/21/24 1214 03/22/24  0526 03/23/24  0445    138  --  137 141   K 4.0 3.8  --  3.8 3.4*   CO2 18* 25  --  27 29   BUN 3.5* 5.0*  --  7.2* 6.8*   CREATININE 0.50* 0.43*  --  0.41* 0.42*   CALCIUM 7.9* 7.3*  --  7.4* 8.6   MG  --   --  2.30  --  2.00   ALBUMIN 1.9* 1.8*  --  1.7* 2.7*   ALKPHOS 313* 269*  --   --  244*   ALT 17 15  --   --  21   AST 43* 49*  --   --  58*   BILITOT 1.5 1.4  --   --  1.4          Microbiology Results (last 7 days)       Procedure Component Value Units Date/Time    Body Fluid Culture [[id number]] Collected: 03/22/24 1256    Order Status: Sent Specimen: Abdominal Fluid from Abdomen Updated: 03/22/24 1321    Urine culture [[id number]]  (Abnormal)  (Susceptibility) Collected: 03/20/24 0958    Order Status: Completed Specimen: Urine Updated: 03/22/24 0651     Urine Culture >/= 100,000 colonies/ml Escherichia coli    Respiratory Culture [[id number]]     Order Status: Sent Specimen: Sputum, Expectorated              Radiology:  X-Ray Chest 1 View for Line/Tube Placement  Narrative: EXAMINATION:  XR CHEST 1 VIEW FOR LINE/TUBE PLACEMENT    CLINICAL HISTORY:  picc;    COMPARISON:  20 March 2024    FINDINGS:  Frontal view of the  chest was obtained. Right PICC tip overlies the distal SVC.  The heart is not enlarged.  Lungs are clear.  There is no pneumothorax or significant effusion.  Impression: Right PICC tip overlies the distal SVC.    Electronically signed by: Bruce Smith  Date:    03/22/2024  Time:    11:03      Scheduled Med:   folic acid  1 mg Oral Daily    gabapentin  300 mg Oral TID    lactulose  200 g Rectal TID    mupirocin   Nasal BID    pantoprazole  40 mg Intravenous BID    piperacillin-tazobactam (Zosyn) IV (PEDS and ADULTS) (extended infusion is not appropriate)  4.5 g Intravenous Q8H    sodium chloride 0.9%  10 mL Intravenous Q6H    thiamine  100 mg Oral Q8H        Continuous Infusions:   Amino acid 4.25% - dextrose 5% (CLINIMIX-E) solution (1L provides 42.5 gm AA, 50 gm CHO (170 kcal/L dextrose), Na 35, K 30, Mg 5, Ca 4.5, Acetate 70, Cl 39, Phos 15) 50 mL/hr at 03/22/24 2243        PRN Meds:  0.9%  NaCl infusion (for blood administration), albuterol, chlordiazepoxide, cloNIDine, dextrose 10%, dextrose 10%, glucagon (human recombinant), glucose, glucose, HYDROcodone-acetaminophen, hyoscyamine, insulin aspart U-100, lorazepam, LORazepam, methocarbamoL, morphine, Flushing PICC/Midline Protocol **AND** sodium chloride 0.9% **AND** sodium chloride 0.9%       Assessment/Plan:   Metabolic encephalopathy  Severe sepsis  Anemia of chronic inflammation  Urinary tract infection  Metabolic Acidosis -improved  Alcoholic cirrhosis   Ascites   Thrombocytopenia from underlying cirrhosis  Macrocytic anemia   Hypoalbuminemia      Patient was remain NPO.  Remains altered unsafe for a diet.  On Clinimix.  Will continue this until she was tolerating p.o..  Speech therapy will continue to follow.  I discussed with them yesterday.  Appreciate GI recommendations.  Now with lactulose enemas.  Urine culture with pansensitive E coli.  On Zosyn day 3.  Plan to transition to oral antibiotic once cleared for intake.  We would benefit from some  Aldactone but will need to wait till has oral clearance as well.    Greatly increased hemoglobin this morning.  He was 7.2 yesterday.  Transfused 1 unit and now 11.  Will continue to monitor.  He was not appear to be dry.    Continue Protonix 40 mg IV b.i.d. for now.  MRI of the head was ordered on admission due to encephalopathy.  Low suspicion for stroke in the setting of cirrhosis and sepsis.  Will go ahead and cancel MRI for now.  Can reassess after she was had some more time with lactulose     Critical care diagnosis: sepsis, iv antibiotics  Critical care interventions: hands on evaluation, review of labs/radiographs/records and discussions with family  Critical care time spent: >32 minutes      Chaz Harrison MD   03/23/2024     All diagnosis and differential diagnosis have been reviewed; assessment and plan has been documented; I have personally reviewed the labs and test results that are presently available; I have reviewed the patients medication list; I have reviewed the consulting providers response and recommendations. I have reviewed or attempted to review medical records based upon their availability    All of the patient's questions have been  addressed and answered. Patient's is agreeable to the above stated plan. I will continue to monitor closely and make adjustments to medical management as needed.  _____________________________________________________________________

## 2024-03-24 LAB
ANION GAP SERPL CALC-SCNC: 15 MEQ/L
BASOPHILS # BLD AUTO: 0.02 X10(3)/MCL
BASOPHILS NFR BLD AUTO: 0.2 %
BUN SERPL-MCNC: 7.8 MG/DL (ref 9.8–20.1)
CALCIUM SERPL-MCNC: 8.4 MG/DL (ref 8.4–10.2)
CHLORIDE SERPL-SCNC: 102 MMOL/L (ref 98–107)
CO2 SERPL-SCNC: 22 MMOL/L (ref 23–31)
CREAT SERPL-MCNC: 0.38 MG/DL (ref 0.55–1.02)
CREAT/UREA NIT SERPL: 21
EOSINOPHIL # BLD AUTO: 0.01 X10(3)/MCL (ref 0–0.9)
EOSINOPHIL NFR BLD AUTO: 0.1 %
ERYTHROCYTE [DISTWIDTH] IN BLOOD BY AUTOMATED COUNT: ABNORMAL %
GFR SERPLBLD CREATININE-BSD FMLA CKD-EPI: >60 MLS/MIN/1.73/M2
GLUCOSE SERPL-MCNC: 73 MG/DL (ref 82–115)
HCT VFR BLD AUTO: 35.7 % (ref 37–47)
HGB BLD-MCNC: 12.4 G/DL (ref 12–16)
IMM GRANULOCYTES # BLD AUTO: 0.1 X10(3)/MCL (ref 0–0.04)
IMM GRANULOCYTES NFR BLD AUTO: 1 %
LYMPHOCYTES # BLD AUTO: 1.89 X10(3)/MCL (ref 0.6–4.6)
LYMPHOCYTES NFR BLD AUTO: 18 %
MAGNESIUM SERPL-MCNC: 1.9 MG/DL (ref 1.6–2.6)
MCH RBC QN AUTO: 40.1 PG (ref 27–31)
MCHC RBC AUTO-ENTMCNC: 34.7 G/DL (ref 33–36)
MCV RBC AUTO: 115.5 FL (ref 80–94)
MONOCYTES # BLD AUTO: 1.36 X10(3)/MCL (ref 0.1–1.3)
MONOCYTES NFR BLD AUTO: 13 %
NEUTROPHILS # BLD AUTO: 7.12 X10(3)/MCL (ref 2.1–9.2)
NEUTROPHILS NFR BLD AUTO: 67.7 %
NRBC BLD AUTO-RTO: 0 %
PLATELET # BLD AUTO: 63 X10(3)/MCL (ref 130–400)
PMV BLD AUTO: 12.1 FL (ref 7.4–10.4)
POCT GLUCOSE: 81 MG/DL (ref 70–110)
POCT GLUCOSE: 83 MG/DL (ref 70–110)
POTASSIUM SERPL-SCNC: 3.4 MMOL/L (ref 3.5–5.1)
RBC # BLD AUTO: 3.09 X10(6)/MCL (ref 4.2–5.4)
SODIUM SERPL-SCNC: 139 MMOL/L (ref 136–145)
VIT B1 BLD-SCNC: 80 NMOL/L (ref 70–180)
WBC # SPEC AUTO: 10.5 X10(3)/MCL (ref 4.5–11.5)

## 2024-03-24 PROCEDURE — 63600175 PHARM REV CODE 636 W HCPCS: Performed by: INTERNAL MEDICINE

## 2024-03-24 PROCEDURE — 83735 ASSAY OF MAGNESIUM: CPT | Performed by: HOSPITALIST

## 2024-03-24 PROCEDURE — 11000001 HC ACUTE MED/SURG PRIVATE ROOM

## 2024-03-24 PROCEDURE — 25000003 PHARM REV CODE 250: Performed by: INTERNAL MEDICINE

## 2024-03-24 PROCEDURE — A4216 STERILE WATER/SALINE, 10 ML: HCPCS | Performed by: HOSPITALIST

## 2024-03-24 PROCEDURE — 85025 COMPLETE CBC W/AUTO DIFF WBC: CPT | Performed by: HOSPITALIST

## 2024-03-24 PROCEDURE — 63600175 PHARM REV CODE 636 W HCPCS: Performed by: HOSPITALIST

## 2024-03-24 PROCEDURE — 27000221 HC OXYGEN, UP TO 24 HOURS

## 2024-03-24 PROCEDURE — 94760 N-INVAS EAR/PLS OXIMETRY 1: CPT

## 2024-03-24 PROCEDURE — 80048 BASIC METABOLIC PNL TOTAL CA: CPT | Performed by: HOSPITALIST

## 2024-03-24 PROCEDURE — 25000003 PHARM REV CODE 250: Performed by: HOSPITALIST

## 2024-03-24 PROCEDURE — 92526 ORAL FUNCTION THERAPY: CPT

## 2024-03-24 PROCEDURE — C9113 INJ PANTOPRAZOLE SODIUM, VIA: HCPCS | Performed by: HOSPITALIST

## 2024-03-24 PROCEDURE — 21400001 HC TELEMETRY ROOM

## 2024-03-24 RX ORDER — POTASSIUM CHLORIDE 14.9 MG/ML
20 INJECTION INTRAVENOUS
Status: COMPLETED | OUTPATIENT
Start: 2024-03-24 | End: 2024-03-24

## 2024-03-24 RX ORDER — SODIUM CHLORIDE 9 MG/ML
INJECTION, SOLUTION INTRAVENOUS CONTINUOUS
Status: DISCONTINUED | OUTPATIENT
Start: 2024-03-24 | End: 2024-03-26

## 2024-03-24 RX ADMIN — SODIUM CHLORIDE, PRESERVATIVE FREE 10 ML: 5 INJECTION INTRAVENOUS at 06:03

## 2024-03-24 RX ADMIN — PANTOPRAZOLE SODIUM 40 MG: 40 INJECTION, POWDER, FOR SOLUTION INTRAVENOUS at 09:03

## 2024-03-24 RX ADMIN — SODIUM CHLORIDE: 9 INJECTION, SOLUTION INTRAVENOUS at 11:03

## 2024-03-24 RX ADMIN — PIPERACILLIN SODIUM AND TAZOBACTAM SODIUM 4.5 G: 4; .5 INJECTION, POWDER, LYOPHILIZED, FOR SOLUTION INTRAVENOUS at 04:03

## 2024-03-24 RX ADMIN — MUPIROCIN: 20 OINTMENT TOPICAL at 08:03

## 2024-03-24 RX ADMIN — POTASSIUM CHLORIDE 20 MEQ: 14.9 INJECTION, SOLUTION INTRAVENOUS at 04:03

## 2024-03-24 RX ADMIN — SODIUM CHLORIDE, PRESERVATIVE FREE 10 ML: 5 INJECTION INTRAVENOUS at 05:03

## 2024-03-24 RX ADMIN — PIPERACILLIN SODIUM AND TAZOBACTAM SODIUM 4.5 G: 4; .5 INJECTION, POWDER, LYOPHILIZED, FOR SOLUTION INTRAVENOUS at 01:03

## 2024-03-24 RX ADMIN — SODIUM CHLORIDE, PRESERVATIVE FREE 10 ML: 5 INJECTION INTRAVENOUS at 11:03

## 2024-03-24 RX ADMIN — SODIUM CHLORIDE, PRESERVATIVE FREE 10 ML: 5 INJECTION INTRAVENOUS at 12:03

## 2024-03-24 RX ADMIN — POTASSIUM CHLORIDE 20 MEQ: 14.9 INJECTION, SOLUTION INTRAVENOUS at 12:03

## 2024-03-24 RX ADMIN — PANTOPRAZOLE SODIUM 40 MG: 40 INJECTION, POWDER, FOR SOLUTION INTRAVENOUS at 08:03

## 2024-03-24 RX ADMIN — MUPIROCIN: 20 OINTMENT TOPICAL at 09:03

## 2024-03-24 RX ADMIN — PIPERACILLIN SODIUM AND TAZOBACTAM SODIUM 4.5 G: 4; .5 INJECTION, POWDER, LYOPHILIZED, FOR SOLUTION INTRAVENOUS at 08:03

## 2024-03-24 NOTE — PT/OT/SLP PROGRESS
[facility]  Speech Language Pathology Department  Dysphagia Therapy Progress Note    Patient Name:  Luz Maria Alfaro   MRN:  [id number]    Recommendations     General recommendations:  Modified Barium Swallow Study  Diet texture/consistency recommendations:  NPO  Medications: NPO  Barriers to safe discharge:  decreased communication skills and level of skilled assistance needed    Subjective     Patient awake and alert.  Spiritual/Cultural/Jehovah's witness Beliefs/Practices that affect care: no    Pain/Comfort: Pain Rating 1: 0/10    Objective     Therapeutic PO Trials:  Consistency Amount Fed By Oral Symptoms Pharyngeal Symptoms   Thin liquid by cup x4 SLP None Cough after the swallow   Thin liquid by straw x2 SLP None None   Puree x2 SLP Bolus holding None     Assessment     Pt continues to present with oropharyngeal dysphagia and remains unsafe for PO diet.    Goals     Multidisciplinary Problems       SLP Goals       Not on file                  Patient Education     Patient provided with verbal education regarding ST POC.  Additional teaching is warranted.    Plan     Will continue to follow and tx as appropriate.    SLP Follow-Up:  Yes   Patient to be seen:  5 x/week   Plan of Care expires:     Plan of Care reviewed with:  patient       Time Tracking     SLP Treatment Date:   03/24/24  Speech Start Time:  1150  Speech Stop Time:  1200     Speech Total Time (min):  10 min    Billable minutes:  Treatment of Swallow Dysfunction, 10 minutes       03/24/2024

## 2024-03-24 NOTE — PROGRESS NOTES
SLP spoke with RN and MD regarding plans for MBS. Per MD, ST to hold MBS until tomorrow AM. Will continue to follow.

## 2024-03-24 NOTE — PROGRESS NOTES
[facility] Hospital Medicine Progress Note        Chief Complaint: Inpatient Follow-up for encephalopathy     HPI:   64 y.o. female with a PMHx of HTN, HLD, type 2 DM, alcoholic cirrhosis, alcohol abuse and nicotine dependence who presented to [facility] on 3/20/2024 via EMS with c/o worsening confusion x1 day with associated generalized weakness.  The majority of the history was obtained from review of the medical record as the patient was unable to provide any additional history.     Upon presentation to ED, vital signed included /73, , RR 18, SpO2 98% on room air, temperature 98.4° F.  Labs notable for hemoglobin 10.7, hematocrit 31.7, platelets 34, vitamin B12 1958, INR 1.4, CO2 18, BUN 3.5, creatinine 0.5, calcium 7.9, , albumin 1.9, AST 43.  Serum alcohol level undetectable.  EKG demonstrated sinus tachycardia.  CXR demonstrated increased left retrocardiac density with partial silhouetting of the left hemidiaphragm which might be related to an infiltrate/atelectasis.  CT head without contrast negative for acute intracranial findings or significant interval changes compared to August 2022.  CT abdomen and pelvis with IV contrast consistent with cirrhosis, moderate generalized ascites, bilateral pleural fluid right greater than left noted.  She was admitted to hospital medicine service for further medical management.      Spoke to her Ex  who frequently visits with her, patients baseline is - she ambulates with a walker past 2 years, good memory and speech and need very minimal assist for ADLs. She is a chronic alcoholic and currently drinks beer. Early this month she started to loose her hearing and then became confused and weak.       Interval Hx:   Patient today awake and looks comfortable. She is non verbal and not following any verbal commands. Has been having several episodes of loose stools. Enema has been on hold. She is NPO.     No family at bedside.    '  Case was discussed with patient's nurse and  on the floor.    Objective/physical exam:  General: In no acute distress, Non verbal   Chest: Clear to auscultation bilaterally  Heart: RRR, +S1, S2, no appreciable murmur  Abdomen: Soft, nontender, BS +  Neurologic: Moving all 4 extremities     VITAL SIGNS: 24 HRS MIN & MAX LAST   Temp  Min: 96.6 °F (35.9 °C)  Max: 98 °F (36.7 °C) 97.4 °F (36.3 °C)   BP  Min: 149/91  Max: 179/94 (!) 173/110   Pulse  Min: 75  Max: 103  103   Resp  Min: 13  Max: 20 20   SpO2  Min: 96 %  Max: 99 % 99 %     I have reviewed the following labs:  Recent Labs   Lab 03/20/24 0055 03/21/24  0656 03/21/24 1214 03/22/24  0526 03/23/24 0445 03/24/24 0448   WBC 6.38 7.21  --  5.33 6.63 10.50   RBC 2.26* 1.80*  --  1.52* 2.75* 3.09*   HGB 10.7* 8.6*   < > 7.2* 11.2* 12.4   HCT 31.7* 25.9*   < > 21.5* 32.4* 35.7*   .3* 143.9*  --  141.4* 117.8* 115.5*   MCH 47.3* 47.8*  --  47.4* 40.7* 40.1*   MCHC 33.8 33.2  --  33.5 34.6 34.7   RDW 15.3 15.9  --  16.0  --   --    PLT 34* 39*  --  40* 48* 63*   MPV 12.1* 12.3*  --  12.6* 12.3* 12.1*    < > = values in this interval not displayed.     Recent Labs   Lab 03/20/24 0055 03/21/24  0517 03/21/24  1214 03/22/24  0526 03/23/24 0445 03/24/24 0448    138  --  137 141 139   K 4.0 3.8  --  3.8 3.4* 3.4*   CO2 18* 25  --  27 29 22*   BUN 3.5* 5.0*  --  7.2* 6.8* 7.8*   CREATININE 0.50* 0.43*  --  0.41* 0.42* 0.38*   CALCIUM 7.9* 7.3*  --  7.4* 8.6 8.4   MG  --   --  2.30  --  2.00 1.90   ALBUMIN 1.9* 1.8*  --  1.7* 2.7*  --    ALKPHOS 313* 269*  --   --  244*  --    ALT 17 15  --   --  21  --    AST 43* 49*  --   --  58*  --    BILITOT 1.5 1.4  --   --  1.4  --      Microbiology Results (last 7 days)       Procedure Component Value Units Date/Time    Body Fluid Culture [[id number]] Collected: 03/22/24 1256    Order Status: Completed Specimen: Abdominal Fluid from Abdomen Updated: 03/24/24 0651     Body Fluid Culture No Growth At  48 Hours    Urine culture [[id number]]  (Abnormal)  (Susceptibility) Collected: 03/20/24 0958    Order Status: Completed Specimen: Urine Updated: 03/22/24 0651     Urine Culture >/= 100,000 colonies/ml Escherichia coli    Respiratory Culture [[id number]]     Order Status: Sent Specimen: Sputum, Expectorated              See below for Radiology    Scheduled Med:   folic acid  1 mg Oral Daily    gabapentin  300 mg Oral TID    mupirocin   Nasal BID    pantoprazole  40 mg Intravenous BID    piperacillin-tazobactam (Zosyn) IV (PEDS and ADULTS) (extended infusion is not appropriate)  4.5 g Intravenous Q8H    sodium chloride 0.9%  10 mL Intravenous Q6H    thiamine  100 mg Oral Q8H      Continuous Infusions:   sodium chloride 0.9% 100 mL/hr at 03/24/24 1127      PRN Meds:  0.9%  NaCl infusion (for blood administration), albuterol, chlordiazepoxide, cloNIDine, dextrose 10%, dextrose 10%, glucagon (human recombinant), glucose, glucose, HYDROcodone-acetaminophen, hyoscyamine, insulin aspart U-100, lorazepam, LORazepam, methocarbamoL, morphine, Flushing PICC/Midline Protocol **AND** sodium chloride 0.9% **AND** sodium chloride 0.9%     Assessment/Plan:  Metabolic encephalopathy  Sepsis secondary to UTI, POA   Anemia of chronic inflammation  Alcoholic cirrhosis   Current alcohol use   Ascites   Thrombocytopenia from underlying cirrhosis  Macrocytic anemia   Hypoalbuminemia       Plan:  Patient looks comfortable but still confused and non verbal   She has been having several watery stools. Will stop lactulose enema for now   Hydrate with NS at 100 cc/hr   Continue iv zosyn for UTI  CT brain did not show any acute changes and patient is also moving extremities     Reviewed today's labs and  WBC 10, Hb 12, Plt 63, Na 139, K 3.4, CO2 22, Crt 0.38    Current meds reviewed    Continue strict aspiration, fall and decubitus precautions      Will closely monitor patients daily weight, urine out put, renal parameters and volume status   "    Labs in am     Start OT/PT    VTE prophylaxis: SCD    Patient condition:  Guarded    Anticipated discharge and Disposition:   TBD      All diagnosis and differential diagnosis have been reviewed; assessment and plan has been documented; I have personally reviewed the labs and test results that are presently available; I have reviewed the patients medication list; I have reviewed the consulting providers response and recommendations. I have reviewed or attempted to review medical records based upon their availability    All of the patient's questions have been  addressed and answered. Patient's is agreeable to the above stated plan. I will continue to monitor closely and make adjustments to medical management as needed.  _____________________________________________________________________    Nutrition Status:    Radiology:  I have personally reviewed the following imaging and agree with the radiologist.     US Paracentesis inc Imaging  EXAMINATION  US GUIDED PARACENTESIS INC IMAGING    CLINICAL HISTORY  new onset ascites;    COMPARISON  Abdominopelvic CT dated 22 January 2024 was reviewed prior to start of the procedure.    TECHNIQUE/FINDINGS  The procedure and indication(s) were explained to the patient, to include discussion of risks, benefits, and alternatives. Written informed consent was obtained once all questions/concerns had been addressed to satisfaction. A "time out" was then performed with all participating members of the procedure team present and in agreement.  The patient was placed in supine position.  A preliminary ultrasound scan demonstrated adequate volume of intraperitoneal free fluid to allow safe access; optimal access site was then marked and the regional abdominal wall prepped and draped in usual sterile fashion. Local anesthesia was achieved with 5 mL 1% lidocaine, injected under direct ultrasound visualization.    With continuous ultrasound guidance, a tandem needle-catheter unit was " advanced into the right lateral peritoneal cavity.  The needle was then removed and the catheter left in place, with subsequent connection to drainage tubing and vacuum container.  A total of approximately 800 mL mildly turbid, straw-colored fluid were aspirated without incident.  Final sonographic assessment prior to catheter removal demonstrated decreased volume of the free abdominal fluid. The catheter was then removed uneventfully, hemostasis achieved with direct pressure at the access site, and no evidence of new intra-abdominal abnormality or other adverse finding was appreciated.    Estimated blood loss: negligible    IMPRESSION  Technically successful ultrasound-guided therapeutic/diagnostic paracentesis.    Patient Status: The patient tolerated the procedure well, with no immediate complications appreciated. The patient was monitored for a short period following procedure completion, in order to ensure no change from baseline pre-procedure condition, with subsequent release from the Radiology Dept for treatment/follow up by the primary team.    The staff radiologist (Dr. Montenegro) was present throughout the procedure.    Electronically signed by: Gaurang Montenegro  Date:    03/23/2024  Time:    14:41      Robin Metcalf MD  Department of Hospital Medicine   [facility]   03/24/2024

## 2024-03-25 LAB
ANION GAP SERPL CALC-SCNC: 14 MEQ/L
BASOPHILS # BLD AUTO: 0.03 X10(3)/MCL
BASOPHILS NFR BLD AUTO: 0.2 %
BUN SERPL-MCNC: 7.5 MG/DL (ref 9.8–20.1)
CALCIUM SERPL-MCNC: 8.5 MG/DL (ref 8.4–10.2)
CHLORIDE SERPL-SCNC: 106 MMOL/L (ref 98–107)
CO2 SERPL-SCNC: 19 MMOL/L (ref 23–31)
CREAT SERPL-MCNC: 0.48 MG/DL (ref 0.55–1.02)
CREAT/UREA NIT SERPL: 16
EOSINOPHIL # BLD AUTO: 0.01 X10(3)/MCL (ref 0–0.9)
EOSINOPHIL NFR BLD AUTO: 0.1 %
ERYTHROCYTE [DISTWIDTH] IN BLOOD BY AUTOMATED COUNT: ABNORMAL %
GFR SERPLBLD CREATININE-BSD FMLA CKD-EPI: >60 MLS/MIN/1.73/M2
GLUCOSE SERPL-MCNC: 80 MG/DL (ref 82–115)
HCT VFR BLD AUTO: 33.2 % (ref 37–47)
HGB BLD-MCNC: 11.6 G/DL (ref 12–16)
IMM GRANULOCYTES # BLD AUTO: 0.14 X10(3)/MCL (ref 0–0.04)
IMM GRANULOCYTES NFR BLD AUTO: 1 %
LYMPHOCYTES # BLD AUTO: 1.78 X10(3)/MCL (ref 0.6–4.6)
LYMPHOCYTES NFR BLD AUTO: 12.7 %
MCH RBC QN AUTO: 40.6 PG (ref 27–31)
MCHC RBC AUTO-ENTMCNC: 34.9 G/DL (ref 33–36)
MCV RBC AUTO: 116.1 FL (ref 80–94)
MONOCYTES # BLD AUTO: 1.63 X10(3)/MCL (ref 0.1–1.3)
MONOCYTES NFR BLD AUTO: 11.6 %
NEUTROPHILS # BLD AUTO: 10.45 X10(3)/MCL (ref 2.1–9.2)
NEUTROPHILS NFR BLD AUTO: 74.4 %
NRBC BLD AUTO-RTO: 0 %
PLATELET # BLD AUTO: 114 X10(3)/MCL (ref 130–400)
PLATELETS.RETICULATED NFR BLD AUTO: 8.5 % (ref 0.9–11.2)
PMV BLD AUTO: 11.2 FL (ref 7.4–10.4)
POCT GLUCOSE: 69 MG/DL (ref 70–110)
POTASSIUM SERPL-SCNC: 3.4 MMOL/L (ref 3.5–5.1)
PSYCHE PATHOLOGY RESULT: NORMAL
RBC # BLD AUTO: 2.86 X10(6)/MCL (ref 4.2–5.4)
SODIUM SERPL-SCNC: 139 MMOL/L (ref 136–145)
WBC # SPEC AUTO: 14.04 X10(3)/MCL (ref 4.5–11.5)

## 2024-03-25 PROCEDURE — 92611 MOTION FLUOROSCOPY/SWALLOW: CPT

## 2024-03-25 PROCEDURE — 25000003 PHARM REV CODE 250: Performed by: INTERNAL MEDICINE

## 2024-03-25 PROCEDURE — 80048 BASIC METABOLIC PNL TOTAL CA: CPT | Performed by: INTERNAL MEDICINE

## 2024-03-25 PROCEDURE — 97530 THERAPEUTIC ACTIVITIES: CPT

## 2024-03-25 PROCEDURE — 63600175 PHARM REV CODE 636 W HCPCS: Performed by: INTERNAL MEDICINE

## 2024-03-25 PROCEDURE — 97166 OT EVAL MOD COMPLEX 45 MIN: CPT

## 2024-03-25 PROCEDURE — 85025 COMPLETE CBC W/AUTO DIFF WBC: CPT | Performed by: INTERNAL MEDICINE

## 2024-03-25 PROCEDURE — 63600175 PHARM REV CODE 636 W HCPCS: Performed by: HOSPITALIST

## 2024-03-25 PROCEDURE — 11000001 HC ACUTE MED/SURG PRIVATE ROOM

## 2024-03-25 PROCEDURE — A9698 NON-RAD CONTRAST MATERIALNOC: HCPCS | Performed by: INTERNAL MEDICINE

## 2024-03-25 PROCEDURE — 25500020 PHARM REV CODE 255: Performed by: INTERNAL MEDICINE

## 2024-03-25 PROCEDURE — 63600175 PHARM REV CODE 636 W HCPCS: Performed by: NURSE PRACTITIONER

## 2024-03-25 PROCEDURE — 63600175 PHARM REV CODE 636 W HCPCS: Mod: JZ,JG | Performed by: INTERNAL MEDICINE

## 2024-03-25 PROCEDURE — 21400001 HC TELEMETRY ROOM

## 2024-03-25 PROCEDURE — A4216 STERILE WATER/SALINE, 10 ML: HCPCS | Performed by: HOSPITALIST

## 2024-03-25 PROCEDURE — C9113 INJ PANTOPRAZOLE SODIUM, VIA: HCPCS | Performed by: HOSPITALIST

## 2024-03-25 PROCEDURE — 25000003 PHARM REV CODE 250: Performed by: HOSPITALIST

## 2024-03-25 RX ORDER — CIPROFLOXACIN 500 MG/1
500 TABLET ORAL EVERY 12 HOURS
Status: COMPLETED | OUTPATIENT
Start: 2024-03-25 | End: 2024-03-27

## 2024-03-25 RX ORDER — HYDRALAZINE HYDROCHLORIDE 20 MG/ML
10 INJECTION INTRAMUSCULAR; INTRAVENOUS EVERY 4 HOURS PRN
Status: DISCONTINUED | OUTPATIENT
Start: 2024-03-25 | End: 2024-04-17 | Stop reason: HOSPADM

## 2024-03-25 RX ORDER — LABETALOL HYDROCHLORIDE 5 MG/ML
10 INJECTION, SOLUTION INTRAVENOUS EVERY 4 HOURS PRN
Status: DISCONTINUED | OUTPATIENT
Start: 2024-03-25 | End: 2024-04-17 | Stop reason: HOSPADM

## 2024-03-25 RX ORDER — AMLODIPINE BESYLATE 5 MG/1
5 TABLET ORAL DAILY
Status: DISCONTINUED | OUTPATIENT
Start: 2024-03-25 | End: 2024-04-17 | Stop reason: HOSPADM

## 2024-03-25 RX ORDER — ARIPIPRAZOLE 2 MG/1
2 TABLET ORAL DAILY
Status: DISCONTINUED | OUTPATIENT
Start: 2024-03-25 | End: 2024-04-17 | Stop reason: HOSPADM

## 2024-03-25 RX ORDER — PANTOPRAZOLE SODIUM 40 MG/1
40 TABLET, DELAYED RELEASE ORAL 2 TIMES DAILY
Status: DISCONTINUED | OUTPATIENT
Start: 2024-03-25 | End: 2024-04-17 | Stop reason: HOSPADM

## 2024-03-25 RX ORDER — BUSPIRONE HYDROCHLORIDE 5 MG/1
10 TABLET ORAL 3 TIMES DAILY
Status: DISCONTINUED | OUTPATIENT
Start: 2024-03-25 | End: 2024-03-31

## 2024-03-25 RX ORDER — CARVEDILOL 3.12 MG/1
6.25 TABLET ORAL 2 TIMES DAILY
Status: DISCONTINUED | OUTPATIENT
Start: 2024-03-25 | End: 2024-04-17 | Stop reason: HOSPADM

## 2024-03-25 RX ORDER — ESCITALOPRAM OXALATE 10 MG/1
20 TABLET ORAL DAILY
Status: DISCONTINUED | OUTPATIENT
Start: 2024-03-25 | End: 2024-03-31

## 2024-03-25 RX ADMIN — SODIUM CHLORIDE, PRESERVATIVE FREE 10 ML: 5 INJECTION INTRAVENOUS at 11:03

## 2024-03-25 RX ADMIN — SODIUM CHLORIDE, PRESERVATIVE FREE 10 ML: 5 INJECTION INTRAVENOUS at 06:03

## 2024-03-25 RX ADMIN — BUSPIRONE HYDROCHLORIDE 10 MG: 5 TABLET ORAL at 10:03

## 2024-03-25 RX ADMIN — CIPROFLOXACIN HYDROCHLORIDE 500 MG: 500 TABLET, FILM COATED ORAL at 10:03

## 2024-03-25 RX ADMIN — MORPHINE SULFATE 1 MG: 4 INJECTION, SOLUTION INTRAMUSCULAR; INTRAVENOUS at 11:03

## 2024-03-25 RX ADMIN — CARVEDILOL 6.25 MG: 3.12 TABLET, FILM COATED ORAL at 10:03

## 2024-03-25 RX ADMIN — PIPERACILLIN SODIUM AND TAZOBACTAM SODIUM 4.5 G: 4; .5 INJECTION, POWDER, LYOPHILIZED, FOR SOLUTION INTRAVENOUS at 04:03

## 2024-03-25 RX ADMIN — GABAPENTIN 300 MG: 300 CAPSULE ORAL at 10:03

## 2024-03-25 RX ADMIN — PANTOPRAZOLE SODIUM 40 MG: 40 TABLET, DELAYED RELEASE ORAL at 10:03

## 2024-03-25 RX ADMIN — PANTOPRAZOLE SODIUM 40 MG: 40 INJECTION, POWDER, FOR SOLUTION INTRAVENOUS at 11:03

## 2024-03-25 RX ADMIN — MUPIROCIN: 20 OINTMENT TOPICAL at 10:03

## 2024-03-25 RX ADMIN — MORPHINE SULFATE 1 MG: 4 INJECTION, SOLUTION INTRAMUSCULAR; INTRAVENOUS at 12:03

## 2024-03-25 RX ADMIN — BARIUM SULFATE 10 ML: 0.81 POWDER, FOR SUSPENSION ORAL at 10:03

## 2024-03-25 RX ADMIN — THIAMINE HCL TAB 100 MG 100 MG: 100 TAB at 10:03

## 2024-03-25 RX ADMIN — MUPIROCIN: 20 OINTMENT TOPICAL at 11:03

## 2024-03-25 RX ADMIN — HYDRALAZINE HYDROCHLORIDE 10 MG: 20 INJECTION INTRAMUSCULAR; INTRAVENOUS at 12:03

## 2024-03-25 RX ADMIN — SODIUM CHLORIDE: 9 INJECTION, SOLUTION INTRAVENOUS at 12:03

## 2024-03-25 NOTE — PROGRESS NOTES
"Gastroenterology Progress Note    Subjective/Interval History:  Awake.  Not much communicative.    Vital Signs:  BP (!) 156/80   Pulse 87   Temp 97.4 °F (36.3 °C) (Oral)   Resp 20   Ht 5' 3" (1.6 m)   Wt 61 kg (134 lb 7.7 oz)   SpO2 97%   BMI 23.82 kg/m²   Body mass index is 23.82 kg/m².    Physical Exam:  Physical Exam  Constitutional:       General: She is not in acute distress.     Appearance: She is ill-appearing.   HENT:      Head: Normocephalic and atraumatic.   Eyes:      General: No scleral icterus.     Extraocular Movements: Extraocular movements intact.   Cardiovascular:      Rate and Rhythm: Normal rate and regular rhythm.   Pulmonary:      Effort: Pulmonary effort is normal. No respiratory distress.      Comments: On 2L O2 via NC.  Abdominal:      General: Bowel sounds are normal. There is distension.      Palpations: Abdomen is soft. There is no mass.      Tenderness: There is no abdominal tenderness. There is no guarding or rebound.   Musculoskeletal:      Right lower leg: No edema.      Left lower leg: No edema.   Skin:     General: Skin is warm and dry.      Coloration: Skin is not jaundiced.   Neurological:      Comments: Squeezes finger on command   Psychiatric:      Comments: Unable to assess         Labs:  Recent Results (from the past 48 hour(s))   Magnesium    Collection Time: 03/23/24  4:45 AM   Result Value Ref Range    Magnesium Level 2.00 1.60 - 2.60 mg/dL   Comprehensive Metabolic Panel    Collection Time: 03/23/24  4:45 AM   Result Value Ref Range    Sodium Level 141 136 - 145 mmol/L    Potassium Level 3.4 (L) 3.5 - 5.1 mmol/L    Chloride 101 98 - 107 mmol/L    Carbon Dioxide 29 23 - 31 mmol/L    Glucose Level 84 82 - 115 mg/dL    Blood Urea Nitrogen 6.8 (L) 9.8 - 20.1 mg/dL    Creatinine 0.42 (L) 0.55 - 1.02 mg/dL    Calcium Level Total 8.6 8.4 - 10.2 mg/dL    Protein Total 5.6 (L) 5.8 - 7.6 gm/dL    Albumin Level 2.7 (L) 3.4 - 4.8 g/dL    Globulin 2.9 2.4 - 3.5 gm/dL    " Albumin/Globulin Ratio 0.9 (L) 1.1 - 2.0 ratio    Bilirubin Total 1.4 <=1.5 mg/dL    Alkaline Phosphatase 244 (H) 40 - 150 unit/L    Alanine Aminotransferase 21 0 - 55 unit/L    Aspartate Aminotransferase 58 (H) 5 - 34 unit/L    eGFR >60 mls/min/1.73/m2   CBC with Differential    Collection Time: 03/23/24  4:45 AM   Result Value Ref Range    WBC 6.63 4.50 - 11.50 x10(3)/mcL    RBC 2.75 (L) 4.20 - 5.40 x10(6)/mcL    Hgb 11.2 (L) 12.0 - 16.0 g/dL    Hct 32.4 (L) 37.0 - 47.0 %    .8 (H) 80.0 - 94.0 fL    MCH 40.7 (H) 27.0 - 31.0 pg    MCHC 34.6 33.0 - 36.0 g/dL    RDW      Platelet 48 (L) 130 - 400 x10(3)/mcL    MPV 12.3 (H) 7.4 - 10.4 fL    Neut % 59.1 %    Lymph % 26.2 %    Mono % 13.4 %    Eos % 0.2 %    Basophil % 0.2 %    Lymph # 1.74 0.6 - 4.6 x10(3)/mcL    Neut # 3.92 2.1 - 9.2 x10(3)/mcL    Mono # 0.89 0.1 - 1.3 x10(3)/mcL    Eos # 0.01 0 - 0.9 x10(3)/mcL    Baso # 0.01 <=0.2 x10(3)/mcL    IG# 0.06 (H) 0 - 0.04 x10(3)/mcL    IG% 0.9 %    NRBC% 0.0 %   POCT glucose    Collection Time: 03/23/24  1:42 PM   Result Value Ref Range    POCT Glucose 101 70 - 110 mg/dL   POCT glucose    Collection Time: 03/23/24  4:50 PM   Result Value Ref Range    POCT Glucose 106 70 - 110 mg/dL   POCT glucose    Collection Time: 03/23/24  8:13 PM   Result Value Ref Range    POCT Glucose 82 70 - 110 mg/dL   Basic Metabolic Panel    Collection Time: 03/24/24  4:48 AM   Result Value Ref Range    Sodium Level 139 136 - 145 mmol/L    Potassium Level 3.4 (L) 3.5 - 5.1 mmol/L    Chloride 102 98 - 107 mmol/L    Carbon Dioxide 22 (L) 23 - 31 mmol/L    Glucose Level 73 (L) 82 - 115 mg/dL    Blood Urea Nitrogen 7.8 (L) 9.8 - 20.1 mg/dL    Creatinine 0.38 (L) 0.55 - 1.02 mg/dL    BUN/Creatinine Ratio 21     Calcium Level Total 8.4 8.4 - 10.2 mg/dL    Anion Gap 15.0 mEq/L    eGFR >60 mls/min/1.73/m2   Magnesium    Collection Time: 03/24/24  4:48 AM   Result Value Ref Range    Magnesium Level 1.90 1.60 - 2.60 mg/dL   CBC with Differential     Collection Time: 03/24/24  4:48 AM   Result Value Ref Range    WBC 10.50 4.50 - 11.50 x10(3)/mcL    RBC 3.09 (L) 4.20 - 5.40 x10(6)/mcL    Hgb 12.4 12.0 - 16.0 g/dL    Hct 35.7 (L) 37.0 - 47.0 %    .5 (H) 80.0 - 94.0 fL    MCH 40.1 (H) 27.0 - 31.0 pg    MCHC 34.7 33.0 - 36.0 g/dL    RDW      Platelet 63 (L) 130 - 400 x10(3)/mcL    MPV 12.1 (H) 7.4 - 10.4 fL    Neut % 67.7 %    Lymph % 18.0 %    Mono % 13.0 %    Eos % 0.1 %    Basophil % 0.2 %    Lymph # 1.89 0.6 - 4.6 x10(3)/mcL    Neut # 7.12 2.1 - 9.2 x10(3)/mcL    Mono # 1.36 (H) 0.1 - 1.3 x10(3)/mcL    Eos # 0.01 0 - 0.9 x10(3)/mcL    Baso # 0.02 <=0.2 x10(3)/mcL    IG# 0.10 (H) 0 - 0.04 x10(3)/mcL    IG% 1.0 %    NRBC% 0.0 %   POCT glucose    Collection Time: 03/24/24 12:17 PM   Result Value Ref Range    POCT Glucose 81 70 - 110 mg/dL   POCT glucose    Collection Time: 03/24/24  5:18 PM   Result Value Ref Range    POCT Glucose 83 70 - 110 mg/dL       Imaging:  CT Abdomen Pelvis With IV Contrast NO Oral Contrast    Result Date: 3/20/2024  EXAMINATION: CT ABDOMEN PELVIS WITH IV CONTRAST CLINICAL HISTORY: Abdominal pain, acute, nonlocalized; TECHNIQUE: Low dose axial images, sagittal and coronal reformations were obtained from the lung bases to the pubic symphysis following the IV administration of 100 mL of Omnipaque 350 .  Oral contrast given.  .  Automated exposure control used. COMPARISON: None. FINDINGS: Liver demonstrates heterogeneous enhancement pattern and irregular contour with enlarged left hepatic lobe consistent with cirrhosis.  No discrete focal lesion. Gallbladder and biliary ductal system normal. Pancreas, stomach, spleen normal.  Left-sided adrenal nodule measuring 9 mm, nonspecific. Kidneys normal enhancement with no hydronephrosis. Aorta tapers normally.  Moderate atherosclerotic calcification. Bowel loops are generally decompressed.  No definite edematous bowel wall thickening or dilation. Moderate generalized ascites.  No  localized inflammatory change.  No lymphadenopathy in the abdomen or pelvis. Uterus, adnexa, bladder, rectum normal. Internal fixation hardware proximal right femur.  Surgical changes thoracolumbar region. Mild moderate pleural fluid right greater than left.     Findings consistent with cirrhosis. Moderate generalized ascites. Bilateral pleural fluid right greater than left. Agree with nighthawk. Electronically signed by: Vadim Wei MD Date:    03/20/2024 Time:    08:06    X-Ray Chest 1 View    Result Date: 3/20/2024  EXAMINATION: XR CHEST 1 VIEW CPT 77657 CLINICAL HISTORY: AMS; COMPARISON: November 12, 2022 FINDINGS: Examination reveals mediastinal silhouette to be within normal limits cardiac silhouette is not enlarged right lung field is clear and free of gross infiltrates atelectasis or effusions. Some increased left retrocardiac density and partial silhouetting of the left hemidiaphragm is identified which might be related to an infiltrate/atelectasis. No other focal consolidative changes     Increased left retrocardiac density with partial silhouetting of the left hemidiaphragm which might be related to an infiltrate/atelectasis. Otherwise no active pulmonary disease Electronically signed by: Clint Bosch Date:    03/20/2024 Time:    07:31    CT Head Without Contrast    Result Date: 3/20/2024  EXAMINATION: CT HEAD WITHOUT CONTRAST CLINICAL HISTORY: Mental status change, unknown cause; TECHNIQUE: CT imaging of the head performed from the skull base to the vertex without intravenous contrast. DLP 1582 mGycm. Automatic exposure control, adjustment of mA/kV or iterative reconstruction technique was used to reduce radiation. COMPARISON: 28 August 2022 FINDINGS: There is no acute cortical infarct, hemorrhage or mass lesion.  There is right parietal encephalomalacia.  There is moderate patchy hypoattenuation in the cerebral white matter which is nonspecific but most commonly associated with chronic small  vessel ischemic changes.  Ventricles are not significantly enlarged.  There are vascular calcifications. Visualized paranasal sinuses and mastoid air cells are clear.     No acute intracranial findings or significant interval change compared to August 2022. No significant discrepancy with the preliminary report. Electronically signed by: Bruce Smith Date:    03/20/2024 Time:    06:32     Assessment/Plan:  64 year old female known to Dr. Garcia with a pmhx of HTN, HLD, DM, alcohol abuse, chronic anemia, and cirrhosis presents to the ED c/o increased weakness and AMS for past 2 days. Admitted with acute UTI, encephalopathy, acute urinary retention. GI consulted for cirrhosis and drop in hgb.      Cirrhosis  MELD-Na: 12 on 3/21/24  - Origin: ETOH vs PANDA  - Transplant candidacy: unable to obtain hx  - Ascites: present on US 3/21, not present on previous imaging  - low sodium diet  - presence of varices: last EGD 01/24/24 without varices  - presence of hepatic encephalopathy: questionable as ammonia is wnL and pt has acute UTI. Neuro following  - screening for neoplasm: AFP: wnL; Most recent imaging: no lesion on CT  Plts: 40,000  INR: 1.2     2. Chronic macrocytic anemia  Hgb 10.7--8.0--7.2  Plans to transfuse 1u prbcs per primary  Baseline hgb 7-8  FOBT pending, no report of GI bleed     - Continue ppi  - Monitor H/H and transfuse as needed to Hgb 7  - Monitor stools for bleeding  - F/u paracentesis with fluid studies to further evaluate new ascites  - transition to lactulose enemas as not clear for po intake for potential HE  - no plans for endoscopy today d/t lack of overt GIB and recent EGD without varices or source of anemia. Will monitor response to blood transfusion.     3/23/24  Events noted.  Patient reported to have 2 soft BMs.  No overt GI bleed.  Awake.  Not much communicative.  Ascitic fluid analysis not consistent with SBP.  Also SAAG gradient greater than 1.1 along with low ascitic fluid total  protein suggestive of ascites from liver disease.  Lactulose enemas as needed.  Monitor electrolytes periodically.  Hemoglobin above 10.    3/24/24  Patient reported to have loose bowel movements.  Lactulose on hold.  Monitor electrolytes periodically.  Avoid hypokalemia and hypomagnesemia in particular.  Ascitic fluid analysis not consistent with SBP as above.  Ascitic fluid analysis suggestive of liver cirrhosis.

## 2024-03-25 NOTE — PROGRESS NOTES
"Gastroenterology Progress Note      HPI:    Pt cleared for modified diet. She is awake and alert but nonverbal. No abd pain. Nurses report all Bms have been nonbloody. The lactulose discontinued yesterday due to diarrhea.     ROS:    Review of Systems   Unable to perform ROS: Patient nonverbal         Vital Signs:  BP (!) 173/94   Pulse (!) 119   Temp 97.2 °F (36.2 °C) (Axillary)   Resp 18   Ht 5' 3" (1.6 m)   Wt 61 kg (134 lb 7.7 oz)   SpO2 97%   BMI 23.82 kg/m²   Body mass index is 23.82 kg/m².    Physical Exam:    Physical Exam  Vitals and nursing note reviewed.   Constitutional:       Appearance: She is ill-appearing.   HENT:      Head: Normocephalic and atraumatic.   Eyes:      General: No scleral icterus.     Extraocular Movements: Extraocular movements intact.   Cardiovascular:      Rate and Rhythm: Normal rate and regular rhythm.      Heart sounds: Normal heart sounds.   Pulmonary:      Effort: Pulmonary effort is normal. No respiratory distress.      Breath sounds: Normal breath sounds.   Abdominal:      General: Abdomen is flat. Bowel sounds are normal. There is no distension.      Palpations: Abdomen is soft.      Tenderness: There is no abdominal tenderness.   Musculoskeletal:         General: No swelling or deformity. Normal range of motion.      Right lower leg: No edema.      Left lower leg: No edema.   Skin:     General: Skin is warm and dry.      Comments: Bruising on javier upper extremities.   Neurological:      Mental Status: She is alert.      Comments: Awake, alert, not verbally responsive         Labs:  Recent Results (from the past 48 hour(s))   POCT glucose    Collection Time: 03/23/24  1:42 PM   Result Value Ref Range    POCT Glucose 101 70 - 110 mg/dL   POCT glucose    Collection Time: 03/23/24  4:50 PM   Result Value Ref Range    POCT Glucose 106 70 - 110 mg/dL   POCT glucose    Collection Time: 03/23/24  8:13 PM   Result Value Ref Range    POCT Glucose 82 70 - 110 mg/dL   Basic " Metabolic Panel    Collection Time: 03/24/24  4:48 AM   Result Value Ref Range    Sodium Level 139 136 - 145 mmol/L    Potassium Level 3.4 (L) 3.5 - 5.1 mmol/L    Chloride 102 98 - 107 mmol/L    Carbon Dioxide 22 (L) 23 - 31 mmol/L    Glucose Level 73 (L) 82 - 115 mg/dL    Blood Urea Nitrogen 7.8 (L) 9.8 - 20.1 mg/dL    Creatinine 0.38 (L) 0.55 - 1.02 mg/dL    BUN/Creatinine Ratio 21     Calcium Level Total 8.4 8.4 - 10.2 mg/dL    Anion Gap 15.0 mEq/L    eGFR >60 mls/min/1.73/m2   Magnesium    Collection Time: 03/24/24  4:48 AM   Result Value Ref Range    Magnesium Level 1.90 1.60 - 2.60 mg/dL   CBC with Differential    Collection Time: 03/24/24  4:48 AM   Result Value Ref Range    WBC 10.50 4.50 - 11.50 x10(3)/mcL    RBC 3.09 (L) 4.20 - 5.40 x10(6)/mcL    Hgb 12.4 12.0 - 16.0 g/dL    Hct 35.7 (L) 37.0 - 47.0 %    .5 (H) 80.0 - 94.0 fL    MCH 40.1 (H) 27.0 - 31.0 pg    MCHC 34.7 33.0 - 36.0 g/dL    RDW      Platelet 63 (L) 130 - 400 x10(3)/mcL    MPV 12.1 (H) 7.4 - 10.4 fL    Neut % 67.7 %    Lymph % 18.0 %    Mono % 13.0 %    Eos % 0.1 %    Basophil % 0.2 %    Lymph # 1.89 0.6 - 4.6 x10(3)/mcL    Neut # 7.12 2.1 - 9.2 x10(3)/mcL    Mono # 1.36 (H) 0.1 - 1.3 x10(3)/mcL    Eos # 0.01 0 - 0.9 x10(3)/mcL    Baso # 0.02 <=0.2 x10(3)/mcL    IG# 0.10 (H) 0 - 0.04 x10(3)/mcL    IG% 1.0 %    NRBC% 0.0 %   POCT glucose    Collection Time: 03/24/24 12:17 PM   Result Value Ref Range    POCT Glucose 81 70 - 110 mg/dL   POCT glucose    Collection Time: 03/24/24  5:18 PM   Result Value Ref Range    POCT Glucose 83 70 - 110 mg/dL   Basic Metabolic Panel    Collection Time: 03/25/24  5:45 AM   Result Value Ref Range    Sodium Level 139 136 - 145 mmol/L    Potassium Level 3.4 (L) 3.5 - 5.1 mmol/L    Chloride 106 98 - 107 mmol/L    Carbon Dioxide 19 (L) 23 - 31 mmol/L    Glucose Level 80 (L) 82 - 115 mg/dL    Blood Urea Nitrogen 7.5 (L) 9.8 - 20.1 mg/dL    Creatinine 0.48 (L) 0.55 - 1.02 mg/dL    BUN/Creatinine Ratio 16      Calcium Level Total 8.5 8.4 - 10.2 mg/dL    Anion Gap 14.0 mEq/L    eGFR >60 mls/min/1.73/m2   CBC with Differential    Collection Time: 03/25/24  5:45 AM   Result Value Ref Range    WBC 14.04 (H) 4.50 - 11.50 x10(3)/mcL    RBC 2.86 (L) 4.20 - 5.40 x10(6)/mcL    Hgb 11.6 (L) 12.0 - 16.0 g/dL    Hct 33.2 (L) 37.0 - 47.0 %    .1 (H) 80.0 - 94.0 fL    MCH 40.6 (H) 27.0 - 31.0 pg    MCHC 34.9 33.0 - 36.0 g/dL    RDW      Platelet 114 (L) 130 - 400 x10(3)/mcL    MPV 11.2 (H) 7.4 - 10.4 fL    Neut % 74.4 %    Lymph % 12.7 %    Mono % 11.6 %    Eos % 0.1 %    Basophil % 0.2 %    Lymph # 1.78 0.6 - 4.6 x10(3)/mcL    Neut # 10.45 (H) 2.1 - 9.2 x10(3)/mcL    Mono # 1.63 (H) 0.1 - 1.3 x10(3)/mcL    Eos # 0.01 0 - 0.9 x10(3)/mcL    Baso # 0.03 <=0.2 x10(3)/mcL    IG# 0.14 (H) 0 - 0.04 x10(3)/mcL    IG% 1.0 %    NRBC% 0.0 %    IPF 8.5 0.9 - 11.2 %         Assessment/Plan:    64 year old female known to Dr. Garcia with a pmhx of HTN, HLD, DM, alcohol abuse, chronic anemia, and cirrhosis presents to the ED c/o increased weakness and AMS for past 2 days. Admitted with acute UTI, encephalopathy, acute urinary retention. GI consulted for cirrhosis and drop in hgb.      Cirrhosis  thrombocytopenia  MELD-Na: 12 on 3/21/24  - Origin: ETOH vs PANDA  - Transplant candidacy: unable to obtain hx  - Ascites: moderate generalized ascites present on CT 3/20/24 -new onset this admission. S/p paracentesis 3/23/24 with 800 ml removed. SAAG >1.1 c/w cirrhotic ascites.   - low sodium diet   - presence of varices: last EGD 01/24/24 without varices  - presence of hepatic encephalopathy: questionable as ammonia is wnL and pt has acute UTI. Neuro following. Continue lactulose  - screening for neoplasm: AFP: wnL; Most recent imaging: no lesion on CT  - Plts: 40,000 --- 114,000   - INR: 1.2     3. Chronic macrocytic anemia- stable  - Hgb 10.7--8.0--7.2 --- +1 unit prbcs--- 11.2 --- 12.4 --- 11.6   - Plans to transfuse 1u prbcs per primary  - No  overt GI bleeding.   - Continue ppi    4. Metabolic Encephalopathy  - multifactorial, possible HE element involved  - restart lactulose, titrate to 2-3 Bms daily     5. Urosepsis  - on zosyn           Sumaya Calzada PA-C  [facility], [facility]

## 2024-03-25 NOTE — PROGRESS NOTES
[facility] Hospital Medicine Progress Note        Chief Complaint: Inpatient Follow-up for encephalopathy     HPI:   64 y.o. female with a PMHx of HTN, HLD, type 2 DM, alcoholic cirrhosis, alcohol abuse and nicotine dependence who presented to [facility] on 3/20/2024 via EMS with c/o worsening confusion x1 day with associated generalized weakness.  The majority of the history was obtained from review of the medical record as the patient was unable to provide any additional history.     Upon presentation to ED, vital signed included /73, , RR 18, SpO2 98% on room air, temperature 98.4° F.  Labs notable for hemoglobin 10.7, hematocrit 31.7, platelets 34, vitamin B12 1958, INR 1.4, CO2 18, BUN 3.5, creatinine 0.5, calcium 7.9, , albumin 1.9, AST 43.  Serum alcohol level undetectable.  EKG demonstrated sinus tachycardia.  CXR demonstrated increased left retrocardiac density with partial silhouetting of the left hemidiaphragm which might be related to an infiltrate/atelectasis.  CT head without contrast negative for acute intracranial findings or significant interval changes compared to August 2022.  CT abdomen and pelvis with IV contrast consistent with cirrhosis, moderate generalized ascites, bilateral pleural fluid right greater than left noted.  She was admitted to hospital medicine service for further medical management.      Spoke to her Ex  who frequently visits with her, patients baseline is - she ambulates with a walker past 2 years, good memory and speech and need very minimal assist for ADLs. She is a chronic alcoholic and currently drinks beer. Early this month she started to loose her hearing and then became confused and weak.       Interval Hx:   Patient today more alert and awake, looks comfortable. She is still non verbal and not following commands. Has been afebrile. She was seen by ST and cleared for po diet.     Family at bedside, explained in detail  about the patients condition, diagnosis, vitals, labs and treatment plan. They understand and agree with the plan. All their questions were answered.    '  Case was discussed with patient's nurse and  on the floor.    Objective/physical exam:  General: In no acute distress, Non verbal   Chest: Clear to auscultation bilaterally  Heart: RRR, +S1, S2, no appreciable murmur  Abdomen: Soft, nontender, BS +  Neurologic: Moving all 4 extremities   + generalized muscle loss     VITAL SIGNS: 24 HRS MIN & MAX LAST   Temp  Min: 97.2 °F (36.2 °C)  Max: 98.6 °F (37 °C) 97.5 °F (36.4 °C)   BP  Min: 142/74  Max: 189/108 (!) 161/88   Pulse  Min: 87  Max: 120  (!) 120   Resp  Min: 18  Max: 22 18   SpO2  Min: 96 %  Max: 98 % 97 %     I have reviewed the following labs:  Recent Labs   Lab 03/20/24  0055 03/21/24  0656 03/21/24  1214 03/22/24  0526 03/23/24  0445 03/24/24  0448 03/25/24  0545   WBC 6.38 7.21  --  5.33 6.63 10.50 14.04*   RBC 2.26* 1.80*  --  1.52* 2.75* 3.09* 2.86*   HGB 10.7* 8.6*   < > 7.2* 11.2* 12.4 11.6*   HCT 31.7* 25.9*   < > 21.5* 32.4* 35.7* 33.2*   .3* 143.9*  --  141.4* 117.8* 115.5* 116.1*   MCH 47.3* 47.8*  --  47.4* 40.7* 40.1* 40.6*   MCHC 33.8 33.2  --  33.5 34.6 34.7 34.9   RDW 15.3 15.9  --  16.0  --   --   --    PLT 34* 39*  --  40* 48* 63* 114*   MPV 12.1* 12.3*  --  12.6* 12.3* 12.1* 11.2*    < > = values in this interval not displayed.     Recent Labs   Lab 03/20/24  0055 03/21/24  0517 03/21/24  1214 03/22/24  0526 03/23/24  0445 03/24/24  0448 03/25/24  0545    138  --  137 141 139 139   K 4.0 3.8  --  3.8 3.4* 3.4* 3.4*   CO2 18* 25  --  27 29 22* 19*   BUN 3.5* 5.0*  --  7.2* 6.8* 7.8* 7.5*   CREATININE 0.50* 0.43*  --  0.41* 0.42* 0.38* 0.48*   CALCIUM 7.9* 7.3*  --  7.4* 8.6 8.4 8.5   MG  --   --  2.30  --  2.00 1.90  --    ALBUMIN 1.9* 1.8*  --  1.7* 2.7*  --   --    ALKPHOS 313* 269*  --   --  244*  --   --    ALT 17 15  --   --  21  --   --    AST 43* 49*  --   --   58*  --   --    BILITOT 1.5 1.4  --   --  1.4  --   --      Microbiology Results (last 7 days)       Procedure Component Value Units Date/Time    Body Fluid Culture [[id number]] Collected: 03/22/24 1256    Order Status: Completed Specimen: Abdominal Fluid from Abdomen Updated: 03/25/24 1013     Body Fluid Culture No Growth At 72 Hours    Urine culture [[id number]]  (Abnormal)  (Susceptibility) Collected: 03/20/24 0958    Order Status: Completed Specimen: Urine Updated: 03/22/24 0651     Urine Culture >/= 100,000 colonies/ml Escherichia coli    Respiratory Culture [[id number]]     Order Status: Sent Specimen: Sputum, Expectorated              See below for Radiology    Scheduled Med:   folic acid  1 mg Oral Daily    gabapentin  300 mg Oral TID    mupirocin   Nasal BID    pantoprazole  40 mg Intravenous BID    piperacillin-tazobactam (Zosyn) IV (PEDS and ADULTS) (extended infusion is not appropriate)  4.5 g Intravenous Q8H    sodium chloride 0.9%  10 mL Intravenous Q6H    thiamine  100 mg Oral Q8H      Continuous Infusions:   sodium chloride 0.9% 100 mL/hr at 03/24/24 1127      PRN Meds:  0.9%  NaCl infusion (for blood administration), albuterol, chlordiazepoxide, cloNIDine, dextrose 10%, dextrose 10%, glucagon (human recombinant), glucose, glucose, hydrALAZINE, HYDROcodone-acetaminophen, hyoscyamine, insulin aspart U-100, lorazepam, LORazepam, methocarbamoL, morphine, Flushing PICC/Midline Protocol **AND** sodium chloride 0.9% **AND** sodium chloride 0.9%     Assessment/Plan:  Metabolic encephalopathy  Sepsis secondary to UTI, POA   Anemia of chronic inflammation  Alcoholic cirrhosis   Current alcohol use   Ascites   Thrombocytopenia from underlying cirrhosis  Macrocytic anemia   Hypoalbuminemia   Hypertensive urgency       Plan:  Patient more alert and awake today. Still non verbal  Will cont iv genle hydration  Cleared for po diet per [facility] meds reviewed and started   BP uncontrolled. Added coreg 6.25 mg po BID  and continue home Norvasc   Continue iv zosyn for UTI  CT brain did not show any acute changes and patient is also moving extremities     Reviewed today's labs and  WBC 14, Hb 11, Plt 114, Na 139, K 3.4, CO2 19, Crt 0.48    Current meds reviewed    Continue strict aspiration, fall and decubitus precautions      Will closely monitor patients daily weight, urine out put, renal parameters and volume status      Labs in am     Start OT/PT    VTE prophylaxis: SCD    Patient condition:  Guarded    Anticipated discharge and Disposition:   TBD      All diagnosis and differential diagnosis have been reviewed; assessment and plan has been documented; I have personally reviewed the labs and test results that are presently available; I have reviewed the patients medication list; I have reviewed the consulting providers response and recommendations. I have reviewed or attempted to review medical records based upon their availability    All of the patient's questions have been  addressed and answered. Patient's is agreeable to the above stated plan. I will continue to monitor closely and make adjustments to medical management as needed.  _____________________________________________________________________    Nutrition Status:    Radiology:  I have personally reviewed the following imaging and agree with the radiologist.     Fl Modified Barium Swallow Speech  See procedure notes from Speech Pathologist.    This procedure was auto-finalized.      Robin Metcalf MD  Department of Hospital Medicine   [facility]   03/25/2024

## 2024-03-25 NOTE — PLAN OF CARE
Faxed level II paperwork to [facility] for review. Awaiting decision.

## 2024-03-25 NOTE — PROCEDURES
[facility]  Speech Language Pathology Department  Modified Barium Swallow (MBS) Study    Patient Name:  Luz Maria Alfaro   MRN:  [id number]    Recommendations     General recommendations:  SLP follow up x1 regarding diet tolerance  Repeat MBS study: as clinically indicated   Diet texture/consistency recommendations:  Puree solids (IDDSI 4) and mildly thick liquids (IDDSI 2)  Medications: crushed in puree  Swallow strategies/precautions: small bites/sips, slow rate, additional swallow per bite/sip, supervision with meals, and assist with feeding as needed  General precautions: Standard, aspiration    History     Luz Maria Alfaro is a/n 64 y.o. female admitted with encephalopathy, metabolic acidosis, alcoholic cirrhosis, and ascites after presenting with AMS. Clinical swallow evaluation was done on 3/20 and modified diet of puree solids and thin liquids was initiated however nursing reported poor tolerance of PO intake on 3/21.      Past Medical History:   Diagnosis Date    Benign essential HTN     Mixed hyperlipidemia     Type 2 diabetes mellitus without complications      Past Surgical History:   Procedure Laterality Date    APPENDECTOMY      BACK SURGERY      HIP SURGERY      INTRAMEDULLARY RODDING OF FEMUR Right 10/27/2022    Procedure: RIGHT FEMUR INTERTROCH IMN;  Surgeon: Stewart Brambila MD;  Location: [facility];  Service: Orthopedics;  Laterality: Right;  Sylvia table, supine  3rd case  Synthes Short TFNA     A MBS Study was completed to assess the efficiency of her swallow function, rule out aspiration and make recommendations regarding safe dietary consistencies, effective compensatory strategies, and safe eating environment.     Home diet texture/consistency: unknown  Current Method of Nutrition: NPO    Patient complaint: unable to state    Imaging   Results for orders placed during the hospital encounter of 03/20/24    X-Ray Chest 1 View    Narrative  EXAMINATION:  XR CHEST 1  VIEW    CPT 34281    CLINICAL HISTORY:  AMS;    COMPARISON:  November 12, 2022    FINDINGS:  Examination reveals mediastinal silhouette to be within normal limits cardiac silhouette is not enlarged right lung field is clear and free of gross infiltrates atelectasis or effusions.    Some increased left retrocardiac density and partial silhouetting of the left hemidiaphragm is identified which might be related to an infiltrate/atelectasis.    No other focal consolidative changes    Impression  Increased left retrocardiac density with partial silhouetting of the left hemidiaphragm which might be related to an infiltrate/atelectasis.    Otherwise no active pulmonary disease      Electronically signed by: Clint Bosch  Date:    03/20/2024  Time:    07:31    Subjective     Patient awake and alert, however pt required max encouragement for participation.    Spiritual/Cultural/Caodaism Beliefs/Practices that affect care: no  Pain/Comfort: Pain Rating 1: 0/10    Respiratory Status: room air  Restraints/positioning devices: none    Fluoroscopic Findings     Oral Musculature  Dentition: missing teeth  Secretion Management: adequate  Mucosal Quality: good  Facial Movement: WFL  Buccal Strength & Mobility: WFL  Mandibular Strength & Mobility: WFL  Oral Labial Strength & Mobility: WFL  Lingual Strength & Mobility: WFL  Velar Elevation: WFL  Vocal Quality: no phonation    Positioning: upright in bed  Visualization  Patient was seen in the lateral view    Oral Phase:   Adequate lip closure  Bolus holding  Prolonged/disorganized bolus formation  Disorganized lingual movement  Reduced bolus cohesion  Adequate anterior-posterior transport  Loss of bolus control with liquids  Mild oral residue    Pharyngeal Phase:   Swallow delay with spill to the pyriform sinuses  Reduced base of tongue retraction  Adequate epiglottic deflection  Reduced hyolaryngeal excursion  Reduced airway protection    Consistency Laryngeal Penetration  Aspiration Residue   Mildly thick liquid by spoon None None None   Thin liquid by cup During the swallow  To the vocal folds  Did NOT clear None Mild  Base of tongue, Valleculae, and Pyriform sinus  Cleared with additional swallow   Thin liquid by straw During the swallow  To the vocal folds  Did NOT clear None Trace  Base of tongue and Pyriform sinus  Did NOT fully clear   Puree None None Trace  Base of tongue  Did NOT fully clear   Mildly thick liquid by cup During the swallow  Cleared spontaneously None Trace  Base of tongue and Valleculae  Did NOT fully clear     Cervical Esophageal Phase:   UES appeared to accommodate all bolus types without stasis or retrograde movement visualized    Assessment     Pt exhibited moderate oropharyngeal dysphagia characterized by the findings noted above.  Laryngeal penetration of thin liquids to the vocal folds visualized and did NOT clear putting the pt at HIGH risk of aspiration.  Both swallow safety and efficiency are impaired.  Swallow function is negatively impacted by cognitive impairments.    Patient appears to be at high risk for aspiration related pneumonia when considering complicated medical status, reduced mobility, dependence for feeding, and cognitive impairments.  Prognosis for behavioral swallow rehabilitation is poor due to cognitive impairments and complicated medical status .    Education     Patient provided with verbal education regarding MBS results and recommendations.  Additional teaching is warranted.    Plan     SLP Follow-Up:  Yes    Plan of Care reviewed with:  patient     Time Tracking     SLP Treatment Date:   03/25/24  Speech Start Time:  1035  Speech Stop Time:  1055     Speech Total Time (min):  20 min    Billable minutes:   Motion Fluoroscopic Evaluation, Video Recording, 20 minutes     03/25/2024

## 2024-03-25 NOTE — PT/OT/SLP EVAL
Occupational Therapy  Evaluation    Name: Luz Maria Alfaro  MRN: [id number]  Admitting Diagnosis: AMS  Recent Surgery: * No surgery found *      Recommendations:     Discharge therapy intensity: Moderate Intensity Therapy   Discharge Equipment Recommendations:  to be determined by next level of care  Barriers to discharge:   (ongoing medical needs; impaired cognition)    Assessment:     Luz Maria Alfaro is a 64 y.o. female presenting with AMS and malnutrition with a history of hypertension, hyperlipidemia, diabetes mellitus, alcohol abuse with cirrhosis. 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 She presents with the following performance deficits affecting function: weakness, impaired endurance, impaired self care skills, impaired functional mobility, impaired balance, impaired cognition, decreased coordination, decreased upper extremity function, decreased lower extremity function, decreased safety awareness.     During today's eval, patient was nonverbal and unable to follow any simple commands with max verbal and physical cues. She was able to sit EOB with total A and fair balance. Patient was able to take one sip of drink but refused all other attempts. Patient was able to hold onto railing for balance while sitting but kept trying to go back down onto pillow. Lotion bottle was placed into patient's hand in attempt to see if patient would use it but did not. Lotion was placed into patient's hand and other hand was placed to rub together but patient pulled back. Patient was returned supine in bed with total A and all needs in reach. She would benefit from moderate intensive therapy upon d/c.    Rehab Prognosis: Fair; patient would benefit from acute skilled OT services to address these deficits and reach maximum level of function.       Plan:     Patient to be seen 5 x/week to address the above listed problems via    Plan of  Care Expires: 04/25/24  Plan of Care Reviewed with:  (ex-)    Subjective     Chief Complaint: none; patient is nonverbal  Patient/Family Comments/goals: ex- states to increase indep    Occupational Profile:  Living Environment: patient lives with son and grandchild but ex- visits frequently in 2 story home  Previous level of function: needed assist with some ADLs  Roles and Routines: mother; grandmother; ex-wife  Equipment Used at Home: walker, rolling; BSC  Assistance upon Discharge: ex- and son    Pain/Comfort:  Pain Rating 1: 0/10    Patients cultural, spiritual, Islam conflicts given the current situation: no    Objective:     OT communicated with nursing prior to session.      Patient was found supine with telemetry, peripheral IV upon OT entry to room.    General Precautions: Standard, fall, honey thick  Orthopedic Precautions: N/A  Braces: N/A    Vital Signs: Blood Pressure: 162/73;     Bed Mobility:    Patient completed Supine to Sit with total assistance  Patient completed Sit to Supine with total assistance    Functional Mobility/Transfers:  Not attempted    Activities of Daily Living:  Feeding:  total A to bring drink to mouth      Functional Cognition:  Affect: Flat   Command Following: Unable to follow commands despite cues    Visual Perceptual Skills:  Patient with eye contact with therapist    Upper Extremity Function:  Right Upper Extremity:   Range of Motion: Impaired.    Strength: Impaired.      Left Upper Extremity:  Range of Motion: Impaired.    Strength: Impaired.      Balance:   Static sitting balance: WFL     Therapeutic Positioning  Risk for acquired pressure injuries is increased due to relative decrease in mobility d/t hospitalization  and inability to communicate toileting needs.    OT interventions performed during the course of today's session:   Education was provided on benefits of and recommendations for therapeutic positioning  Therapeutic  positioning was provided at the conclusion of session to offload all bony prominences for the prevention and/or reduction of pressure injuries  Positioning recommendations were communicated to care team     OT recommendations for therapeutic positioning throughout hospitalization:   Follow [facility] Pressure Injury Prevention Protocol  [employer]    Patient Education:  Patient and ex-  provided with verbal education education regarding OT role/goals/POC, fall prevention, safety awareness, Discharge/DME recommendations, and pressure ulcer prevention.  Additional teaching is warranted.     Patient left left sidelying with all lines intact, call button in reach, wedge under R side, bed alarm on, nursing notified, and ex- present.    GOALS:   Multidisciplinary Problems       Occupational Therapy Goals          Problem: Occupational Therapy    Goal Priority Disciplines Outcome Interventions   Occupational Therapy Goal     OT, PT/OT Ongoing, Progressing    Description: Goals to be met by 4/25/2024    1. Pt will follow >80% of simple one step commands across three sessions  2. Pt will perform grooming EOB with min A.   3. Pt will perform functional grasp/reach/release of items >80% of trials in prep for increased participation in ADL tasks.   4. Pt will perform sit<>stand with min A in prep for ADL t/fs.                         History:     Past Medical History:   Diagnosis Date    Benign essential HTN     Mixed hyperlipidemia     Type 2 diabetes mellitus without complications          Past Surgical History:   Procedure Laterality Date    APPENDECTOMY      BACK SURGERY      HIP SURGERY      INTRAMEDULLARY RODDING OF FEMUR Right 10/27/2022    Procedure: RIGHT FEMUR INTERTROCH IMN;  Surgeon: Stewart Brambila MD;  Location: [facility];  Service: Orthopedics;  Laterality: Right;  Fleming table, supine  3rd case  Synthes Short TFNA       Time Tracking:     OT Date of Treatment:    OT Start Time: 1214  OT Stop Time:  1233  OT Total Time (min): 19 min    Billable Minutes:Evaluation mod    3/25/2024

## 2024-03-25 NOTE — PLAN OF CARE
Problem: Occupational Therapy  Goal: Occupational Therapy Goal  Description: Goals to be met by 4/25/2024    1. Pt will follow >80% of simple one step commands across three sessions  2. Pt will perform grooming EOB with min A.   3. Pt will perform functional grasp/reach/release of items >80% of trials in prep for increased participation in ADL tasks.   4. Pt will perform sit<>stand with min A in prep for ADL t/fs.    Outcome: Ongoing, Progressing

## 2024-03-25 NOTE — PT/OT/SLP PROGRESS
Physical Therapy Treatment    Patient Name:  Luz Maria Alfaro   MRN:  [id number]    Recommendations:     Discharge therapy intensity: Moderate Intensity Therapy   Discharge Equipment Recommendations: to be determined by next level of care  Barriers to discharge: Impaired mobility and Ongoing medical needs    Assessment:     Luz Maria Alfaro is a 64 y.o. female admitted with a medical diagnosis of encephalopathy, multifactorial.  She presents with the following impairments/functional limitations: weakness, impaired endurance, impaired functional mobility, gait instability, impaired balance, impaired self care skills, impaired cognition, decreased safety awareness, decreased upper extremity function, decreased lower extremity function, impaired cardiopulmonary response to activity . Pt with minimal active participation, attending visually to PT but no verbalizations .    Rehab Prognosis: Good; patient would benefit from acute skilled PT services to address these deficits and reach maximum level of function.    Recent Surgery: * No surgery found *      Plan:     During this hospitalization, patient to be seen 5 x/week to address the identified rehab impairments via gait training, therapeutic activities, therapeutic exercises, neuromuscular re-education and progress toward the following goals:    Plan of Care Expires:  04/21/24    Subjective     Chief Complaint: unable to state  Patient/Family Comments/goals: unable to state  Pain/Comfort:  Pain Rating 1: 0/10      Objective:     Communicated with RN prior to session.  Patient found HOB elevated with peripheral IV, telemetry, pulse ox (continuous), bed alarm upon PT entry to room.     General Precautions: Standard, fall, aspiration  Orthopedic Precautions:    Braces:    Respiratory Status: Room air  Blood Pressure: NT  Skin Integrity: Visible skin intact      Functional Mobility:  Bed Mobility:     Supine to Sit: maximal assistance and of 2 persons  Sit to Supine:  maximal assistance and of 2 persons  Transfers:     Sit to Stand:  maximal assistance and of 2 persons with hand-held assist  Balance: Sitting balance = brief SBA, mostly Jessika    Therapeutic Activities/Exercises:  No command following     Education:  Patient provided with verbal education education regarding PT role/goals/POC and safety awareness.  Additional teaching is warranted.     Patient left HOB elevated with all lines intact, call button in reach, wedge under R side, and bed alarm on    GOALS:   Multidisciplinary Problems       Physical Therapy Goals          Problem: Physical Therapy    Goal Priority Disciplines Outcome Goal Variances Interventions   Physical Therapy Goal     PT, PT/OT Ongoing, Progressing     Description: Goals to be met by: 24     Patient will increase functional independence with mobility by performin. Supine to sit with MInimal Assistance  2. Sit to stand transfer with Minimal Assistance  3. Bed to chair transfer with Minimal Assistance using Rolling Walker  4. Gait  x 150 feet with Minimal Assistance using Rolling Walker.                          Time Tracking:     PT Received On: 24  PT Start Time: 1350     PT Stop Time: 1403  PT Total Time (min): 13 min     Billable Minutes: Therapeutic Activity 13 min    Treatment Type: Treatment  PT/PTA: PT     Number of PTA visits since last PT visit: 2024

## 2024-03-26 LAB
ABO + RH BLD: NORMAL
ABO + RH BLD: NORMAL
ALBUMIN SERPL-MCNC: 2.5 G/DL (ref 3.4–4.8)
ALBUMIN/GLOB SERPL: 1 RATIO (ref 1.1–2)
ALP SERPL-CCNC: 175 UNIT/L (ref 40–150)
ALT SERPL-CCNC: 21 UNIT/L (ref 0–55)
AST SERPL-CCNC: 23 UNIT/L (ref 5–34)
BASOPHILS # BLD AUTO: 0.02 X10(3)/MCL
BASOPHILS NFR BLD AUTO: 0.1 %
BILIRUB SERPL-MCNC: 1 MG/DL
BLD PROD TYP BPU: NORMAL
BLD PROD TYP BPU: NORMAL
BLOOD UNIT EXPIRATION DATE: NORMAL
BLOOD UNIT EXPIRATION DATE: NORMAL
BLOOD UNIT TYPE CODE: 7300
BLOOD UNIT TYPE CODE: 7300
BUN SERPL-MCNC: 3.4 MG/DL (ref 9.8–20.1)
CALCIUM SERPL-MCNC: 8.2 MG/DL (ref 8.4–10.2)
CHLORIDE SERPL-SCNC: 108 MMOL/L (ref 98–107)
CO2 SERPL-SCNC: 18 MMOL/L (ref 23–31)
CREAT SERPL-MCNC: 0.48 MG/DL (ref 0.55–1.02)
CROSSMATCH INTERPRETATION: NORMAL
CROSSMATCH INTERPRETATION: NORMAL
DISPENSE STATUS: NORMAL
DISPENSE STATUS: NORMAL
EOSINOPHIL # BLD AUTO: 0 X10(3)/MCL (ref 0–0.9)
EOSINOPHIL NFR BLD AUTO: 0 %
ERYTHROCYTE [DISTWIDTH] IN BLOOD BY AUTOMATED COUNT: ABNORMAL %
GFR SERPLBLD CREATININE-BSD FMLA CKD-EPI: >60 MLS/MIN/1.73/M2
GLOBULIN SER-MCNC: 2.6 GM/DL (ref 2.4–3.5)
GLUCOSE SERPL-MCNC: 83 MG/DL (ref 82–115)
HCT VFR BLD AUTO: 32.3 % (ref 37–47)
HGB BLD-MCNC: 11.2 G/DL (ref 12–16)
IMM GRANULOCYTES # BLD AUTO: 0.1 X10(3)/MCL (ref 0–0.04)
IMM GRANULOCYTES NFR BLD AUTO: 0.7 %
LYMPHOCYTES # BLD AUTO: 1.69 X10(3)/MCL (ref 0.6–4.6)
LYMPHOCYTES NFR BLD AUTO: 11.6 %
MAGNESIUM SERPL-MCNC: 1.6 MG/DL (ref 1.6–2.6)
MCH RBC QN AUTO: 40.3 PG (ref 27–31)
MCHC RBC AUTO-ENTMCNC: 34.7 G/DL (ref 33–36)
MCV RBC AUTO: 116.2 FL (ref 80–94)
MONOCYTES # BLD AUTO: 1.75 X10(3)/MCL (ref 0.1–1.3)
MONOCYTES NFR BLD AUTO: 12 %
NEUTROPHILS # BLD AUTO: 11.06 X10(3)/MCL (ref 2.1–9.2)
NEUTROPHILS NFR BLD AUTO: 75.6 %
NRBC BLD AUTO-RTO: 0 %
PLATELET # BLD AUTO: 139 X10(3)/MCL (ref 130–400)
PLATELETS.RETICULATED NFR BLD AUTO: 6 % (ref 0.9–11.2)
PMV BLD AUTO: 11.2 FL (ref 7.4–10.4)
POCT GLUCOSE: 131 MG/DL (ref 70–110)
POCT GLUCOSE: 67 MG/DL (ref 70–110)
POCT GLUCOSE: 79 MG/DL (ref 70–110)
POCT GLUCOSE: 85 MG/DL (ref 70–110)
POCT GLUCOSE: 96 MG/DL (ref 70–110)
POTASSIUM SERPL-SCNC: 3 MMOL/L (ref 3.5–5.1)
PROT SERPL-MCNC: 5.1 GM/DL (ref 5.8–7.6)
RBC # BLD AUTO: 2.78 X10(6)/MCL (ref 4.2–5.4)
SODIUM SERPL-SCNC: 139 MMOL/L (ref 136–145)
UNIT NUMBER: NORMAL
UNIT NUMBER: NORMAL
WBC # SPEC AUTO: 14.62 X10(3)/MCL (ref 4.5–11.5)

## 2024-03-26 PROCEDURE — 25000003 PHARM REV CODE 250: Performed by: INTERNAL MEDICINE

## 2024-03-26 PROCEDURE — 11000001 HC ACUTE MED/SURG PRIVATE ROOM

## 2024-03-26 PROCEDURE — 80053 COMPREHEN METABOLIC PANEL: CPT | Performed by: INTERNAL MEDICINE

## 2024-03-26 PROCEDURE — 97530 THERAPEUTIC ACTIVITIES: CPT | Mod: CQ

## 2024-03-26 PROCEDURE — 83735 ASSAY OF MAGNESIUM: CPT | Performed by: INTERNAL MEDICINE

## 2024-03-26 PROCEDURE — A4216 STERILE WATER/SALINE, 10 ML: HCPCS | Performed by: HOSPITALIST

## 2024-03-26 PROCEDURE — 63600175 PHARM REV CODE 636 W HCPCS: Performed by: INTERNAL MEDICINE

## 2024-03-26 PROCEDURE — 25000003 PHARM REV CODE 250: Performed by: HOSPITALIST

## 2024-03-26 PROCEDURE — 25000003 PHARM REV CODE 250: Performed by: PHYSICIAN ASSISTANT

## 2024-03-26 PROCEDURE — 85025 COMPLETE CBC W/AUTO DIFF WBC: CPT | Performed by: INTERNAL MEDICINE

## 2024-03-26 PROCEDURE — 97530 THERAPEUTIC ACTIVITIES: CPT | Mod: CO

## 2024-03-26 PROCEDURE — 21400001 HC TELEMETRY ROOM

## 2024-03-26 RX ORDER — POTASSIUM CHLORIDE 14.9 MG/ML
20 INJECTION INTRAVENOUS
Status: COMPLETED | OUTPATIENT
Start: 2024-03-26 | End: 2024-03-26

## 2024-03-26 RX ORDER — LACTULOSE 10 G/15ML
200 SOLUTION ORAL; RECTAL 3 TIMES DAILY
Status: DISCONTINUED | OUTPATIENT
Start: 2024-03-26 | End: 2024-03-27

## 2024-03-26 RX ORDER — DEXTROSE MONOHYDRATE, SODIUM CHLORIDE, AND POTASSIUM CHLORIDE 50; 2.98; 4.5 G/1000ML; G/1000ML; G/1000ML
INJECTION, SOLUTION INTRAVENOUS CONTINUOUS
Status: DISCONTINUED | OUTPATIENT
Start: 2024-03-26 | End: 2024-03-27

## 2024-03-26 RX ADMIN — LACTULOSE 200 G: 10 SOLUTION ORAL; RECTAL at 02:03

## 2024-03-26 RX ADMIN — SODIUM CHLORIDE, PRESERVATIVE FREE 10 ML: 5 INJECTION INTRAVENOUS at 03:03

## 2024-03-26 RX ADMIN — MUPIROCIN: 20 OINTMENT TOPICAL at 09:03

## 2024-03-26 RX ADMIN — POTASSIUM CHLORIDE, DEXTROSE MONOHYDRATE AND SODIUM CHLORIDE: 300; 5; 450 INJECTION, SOLUTION INTRAVENOUS at 10:03

## 2024-03-26 RX ADMIN — CIPROFLOXACIN HYDROCHLORIDE 500 MG: 500 TABLET, FILM COATED ORAL at 09:03

## 2024-03-26 RX ADMIN — SODIUM CHLORIDE, PRESERVATIVE FREE 10 ML: 5 INJECTION INTRAVENOUS at 05:03

## 2024-03-26 RX ADMIN — POTASSIUM CHLORIDE 20 MEQ: 14.9 INJECTION, SOLUTION INTRAVENOUS at 09:03

## 2024-03-26 RX ADMIN — AMLODIPINE BESYLATE 5 MG: 5 TABLET ORAL at 01:03

## 2024-03-26 RX ADMIN — POTASSIUM CHLORIDE 20 MEQ: 14.9 INJECTION, SOLUTION INTRAVENOUS at 12:03

## 2024-03-26 RX ADMIN — THIAMINE HCL TAB 100 MG 100 MG: 100 TAB at 09:03

## 2024-03-26 RX ADMIN — DEXTROSE MONOHYDRATE 125 ML: 100 INJECTION, SOLUTION INTRAVENOUS at 01:03

## 2024-03-26 RX ADMIN — MUPIROCIN: 20 OINTMENT TOPICAL at 10:03

## 2024-03-26 RX ADMIN — CARVEDILOL 6.25 MG: 3.12 TABLET, FILM COATED ORAL at 01:03

## 2024-03-26 RX ADMIN — CIPROFLOXACIN HYDROCHLORIDE 500 MG: 500 TABLET, FILM COATED ORAL at 01:03

## 2024-03-26 RX ADMIN — PANTOPRAZOLE SODIUM 40 MG: 40 TABLET, DELAYED RELEASE ORAL at 09:03

## 2024-03-26 RX ADMIN — BUSPIRONE HYDROCHLORIDE 10 MG: 5 TABLET ORAL at 09:03

## 2024-03-26 RX ADMIN — LABETALOL HYDROCHLORIDE 10 MG: 5 INJECTION, SOLUTION INTRAVENOUS at 01:03

## 2024-03-26 RX ADMIN — GABAPENTIN 300 MG: 300 CAPSULE ORAL at 09:03

## 2024-03-26 RX ADMIN — CARVEDILOL 6.25 MG: 3.12 TABLET, FILM COATED ORAL at 09:03

## 2024-03-26 RX ADMIN — THIAMINE HCL TAB 100 MG 100 MG: 100 TAB at 05:03

## 2024-03-26 NOTE — PROGRESS NOTES
[facility] Hospital Medicine Progress Note        Chief Complaint: Inpatient Follow-up for encephalopathy     HPI:   64 y.o. female with a PMHx of HTN, HLD, type 2 DM, alcoholic cirrhosis, alcohol abuse and nicotine dependence who presented to [facility] on 3/20/2024 via EMS with c/o worsening confusion x1 day with associated generalized weakness.  The majority of the history was obtained from review of the medical record as the patient was unable to provide any additional history.     Upon presentation to ED, vital signed included /73, , RR 18, SpO2 98% on room air, temperature 98.4° F.  Labs notable for hemoglobin 10.7, hematocrit 31.7, platelets 34, vitamin B12 1958, INR 1.4, CO2 18, BUN 3.5, creatinine 0.5, calcium 7.9, , albumin 1.9, AST 43.  Serum alcohol level undetectable.  EKG demonstrated sinus tachycardia.  CXR demonstrated increased left retrocardiac density with partial silhouetting of the left hemidiaphragm which might be related to an infiltrate/atelectasis.  CT head without contrast negative for acute intracranial findings or significant interval changes compared to August 2022.  CT abdomen and pelvis with IV contrast consistent with cirrhosis, moderate generalized ascites, bilateral pleural fluid right greater than left noted.  She was admitted to hospital medicine service for further medical management.      Spoke to her Ex  who frequently visits with her, patients baseline is - she ambulates with a walker past 2 years, good memory and speech and need very minimal assist for ADLs. She is a chronic alcoholic and currently drinks beer. Early this month she started to loose her hearing and then became confused and weak.       Interval Hx:   Patient today resting well. Able to open eyes with verbal and tactile stimulus. Not following commands. Unable to hold conversation. Moving all 4 extremities. She has been afebrile.     No family at bedside.    '  Case was discussed with patient's nurse and  on the floor.    Objective/physical exam:  General: In no acute distress, Non verbal, more awake today  Chest: Clear to auscultation bilaterally  Heart: RRR, +S1, S2, no appreciable murmur  Abdomen: Soft, nontender, BS +  Neurologic: Moving all 4 extremities   + generalized muscle loss     VITAL SIGNS: 24 HRS MIN & MAX LAST   Temp  Min: 97.2 °F (36.2 °C)  Max: 98.6 °F (37 °C) 97.5 °F (36.4 °C)   BP  Min: 142/74  Max: 189/108 (!) 161/88   Pulse  Min: 87  Max: 120  (!) 120   Resp  Min: 18  Max: 22 18   SpO2  Min: 96 %  Max: 98 % 97 %     I have reviewed the following labs:  Recent Labs   Lab 03/20/24 0055 03/21/24  0656 03/21/24  1214 03/22/24  0526 03/23/24  0445 03/24/24  0448 03/25/24  0545   WBC 6.38 7.21  --  5.33 6.63 10.50 14.04*   RBC 2.26* 1.80*  --  1.52* 2.75* 3.09* 2.86*   HGB 10.7* 8.6*   < > 7.2* 11.2* 12.4 11.6*   HCT 31.7* 25.9*   < > 21.5* 32.4* 35.7* 33.2*   .3* 143.9*  --  141.4* 117.8* 115.5* 116.1*   MCH 47.3* 47.8*  --  47.4* 40.7* 40.1* 40.6*   MCHC 33.8 33.2  --  33.5 34.6 34.7 34.9   RDW 15.3 15.9  --  16.0  --   --   --    PLT 34* 39*  --  40* 48* 63* 114*   MPV 12.1* 12.3*  --  12.6* 12.3* 12.1* 11.2*    < > = values in this interval not displayed.     Recent Labs   Lab 03/20/24 0055 03/21/24  0517 03/21/24  1214 03/22/24  0526 03/23/24  0445 03/24/24  0448 03/25/24  0545    138  --  137 141 139 139   K 4.0 3.8  --  3.8 3.4* 3.4* 3.4*   CO2 18* 25  --  27 29 22* 19*   BUN 3.5* 5.0*  --  7.2* 6.8* 7.8* 7.5*   CREATININE 0.50* 0.43*  --  0.41* 0.42* 0.38* 0.48*   CALCIUM 7.9* 7.3*  --  7.4* 8.6 8.4 8.5   MG  --   --  2.30  --  2.00 1.90  --    ALBUMIN 1.9* 1.8*  --  1.7* 2.7*  --   --    ALKPHOS 313* 269*  --   --  244*  --   --    ALT 17 15  --   --  21  --   --    AST 43* 49*  --   --  58*  --   --    BILITOT 1.5 1.4  --   --  1.4  --   --      Microbiology Results (last 7 days)       Procedure Component Value Units  Date/Time    Body Fluid Culture [[id number]] Collected: 03/22/24 1256    Order Status: Completed Specimen: Abdominal Fluid from Abdomen Updated: 03/25/24 1013     Body Fluid Culture No Growth At 72 Hours    Urine culture [[id number]]  (Abnormal)  (Susceptibility) Collected: 03/20/24 0958    Order Status: Completed Specimen: Urine Updated: 03/22/24 0651     Urine Culture >/= 100,000 colonies/ml Escherichia coli    Respiratory Culture [[id number]]     Order Status: Sent Specimen: Sputum, Expectorated              See below for Radiology    Scheduled Med:   folic acid  1 mg Oral Daily    gabapentin  300 mg Oral TID    mupirocin   Nasal BID    pantoprazole  40 mg Intravenous BID    piperacillin-tazobactam (Zosyn) IV (PEDS and ADULTS) (extended infusion is not appropriate)  4.5 g Intravenous Q8H    sodium chloride 0.9%  10 mL Intravenous Q6H    thiamine  100 mg Oral Q8H      Continuous Infusions:   sodium chloride 0.9% 100 mL/hr at 03/24/24 1127      PRN Meds:  0.9%  NaCl infusion (for blood administration), albuterol, chlordiazepoxide, cloNIDine, dextrose 10%, dextrose 10%, glucagon (human recombinant), glucose, glucose, hydrALAZINE, HYDROcodone-acetaminophen, hyoscyamine, insulin aspart U-100, lorazepam, LORazepam, methocarbamoL, morphine, Flushing PICC/Midline Protocol **AND** sodium chloride 0.9% **AND** sodium chloride 0.9%     Assessment/Plan:  Acute Metabolic encephalopathy  Sepsis secondary to UTI, POA   Anemia of chronic inflammation  Alcoholic cirrhosis   Current alcohol use   Ascites   Thrombocytopenia from underlying cirrhosis  Macrocytic anemia   Hypoalbuminemia   Hypertensive urgency       Plan:  Patients mental status is a bit better today. More alert and awake  Still non verbal. Not following verbal commands   Will cont iv genle hydration, will switch to D5 1/2 with 40 MEQ KCL   Cleared for po diet per ST, maintain strict aspiration precautions   Home meds reviewed and started   Continue coreg 6.25 mg po  BID and  Norvasc   Continue po Cipro   CT brain did not show any acute changes and patient is also moving extremities     Reviewed today's labs and  WBC 14, Hb 11, Plt 139, Na 139, K 3, CO2 18, Crt 0.48    Added po Bicarb tabs     Current meds reviewed    Continue strict aspiration, fall and decubitus precautions      Will closely monitor patients daily weight, urine out put, renal parameters and volume status      Labs in am     Start OT/PT    VTE prophylaxis: SCD    Patient condition:  Guarded    Anticipated discharge and Disposition:   TBD      All diagnosis and differential diagnosis have been reviewed; assessment and plan has been documented; I have personally reviewed the labs and test results that are presently available; I have reviewed the patients medication list; I have reviewed the consulting providers response and recommendations. I have reviewed or attempted to review medical records based upon their availability    All of the patient's questions have been  addressed and answered. Patient's is agreeable to the above stated plan. I will continue to monitor closely and make adjustments to medical management as needed.  _____________________________________________________________________    Nutrition Status:    Radiology:  I have personally reviewed the following imaging and agree with the radiologist.     Fl Modified Barium Swallow Speech  See procedure notes from Speech Pathologist.    This procedure was auto-finalized.      Robin Metcalf MD  Department of Hospital Medicine   [facility]   03/25/2024

## 2024-03-26 NOTE — PT/OT/SLP PROGRESS
Occupational Therapy   Treatment    Name: Luz Maria Alfaro  MRN: [id number]  Admitting Diagnosis:  <principal problem not specified>       Recommendations:     Recommended therapy intensity at discharge: Moderate Intensity Therapy   Discharge Equipment Recommendations:  to be determined by next level of care  Barriers to discharge:       Assessment:     Luz Maria Alfaro is a 64 y.o. female with a medical diagnosis of <principal problem not specified>.  She presents with increased lethargy. Performance deficits affecting function are weakness, impaired balance, decreased safety awareness, impaired endurance, impaired cognition, impaired self care skills, impaired functional mobility, gait instability, decreased lower extremity function, decreased upper extremity function.     Rehab Prognosis:  Fair; patient would benefit from acute skilled OT services to address these deficits and reach maximum level of function.       Plan:     Patient to be seen 4 x/week to address the above listed problems via self-care/home management, therapeutic activities, therapeutic exercises  Plan of Care Expires: 04/25/24  Plan of Care Reviewed with: patient    Subjective     Pain/Comfort:  N/A    Objective:     Communicated with: nurse prior to session.  Patient found HOB elevated with telemetry, peripheral IV upon OT entry to room.    General Precautions: Standard, fall, honey thick    Orthopedic Precautions:N/A  Braces: N/A  Respiratory Status: Room air     Occupational Performance:     Bed Mobility:    Patient completed Supine to Sit with total assistance in long sitting    Therapeutic Positioning    OT interventions performed during the course of today's session in an effort to prevent and/or reduce acquired pressure injuries:   Therapeutic positioning was provided at the conclusion of session to offload all bony prominences for the prevention and/or reduction of pressure injuries    [facility] 6 Click ADL: 6      Patient left HOB elevated  with all lines intact and call button in reach.    GOALS:   Multidisciplinary Problems       Occupational Therapy Goals          Problem: Occupational Therapy    Goal Priority Disciplines Outcome Interventions   Occupational Therapy Goal     OT, PT/OT Ongoing, Progressing    Description: Goals to be met by 4/25/2024    1. Pt will follow >80% of simple one step commands across three sessions  2. Pt will perform grooming EOB with min A.   3. Pt will perform functional grasp/reach/release of items >80% of trials in prep for increased participation in ADL tasks.   4. Pt will perform sit<>stand with min A in prep for ADL t/fs.                         Time Tracking:     OT Date of Treatment: 03/26/24  OT Start Time: 1035  OT Stop Time: 1046  OT Total Time (min): 11 min    Billable Minutes:Therapeutic Activity 11    OT/KAREN: KAREN     Number of KAREN visits since last OT visit: 1    3/26/2024

## 2024-03-26 NOTE — PLAN OF CARE
Received level  from [facility]. Made Vanita at [facility] aware. Sent a copy of pasrr/142 as well as clinical updates to [facility] via [employer]. Awaiting response to find out whether they are ready to admit this patient if she is medically ready.

## 2024-03-26 NOTE — PT/OT/SLP PROGRESS
Occupational Therapy   Treatment    Name: Luz Maria Alfaro  MRN: [id number]  Admitting Diagnosis:  <principal problem not specified>       Recommendations:     Recommended therapy intensity at discharge: Moderate Intensity Therapy   Discharge Equipment Recommendations:  to be determined by next level of care  Barriers to discharge:       Assessment:     Luz Maria Alfaro is a 64 y.o. female with a medical diagnosis of <principal problem not specified>. Performance deficits affecting function are weakness, gait instability, decreased upper extremity function, decreased lower extremity function, impaired balance, impaired endurance, decreased safety awareness, impaired self care skills, impaired functional mobility, impaired cognition.     Rehab Prognosis:  Good; patient would benefit from acute skilled OT services to address these deficits and reach maximum level of function.       Plan:     Patient to be seen 4 x/week to address the above listed problems via self-care/home management, therapeutic activities, therapeutic exercises  Plan of Care Expires: 04/25/24  Plan of Care Reviewed with: patient    Subjective     Pain/Comfort:  N/A    Objective:     Communicated with: nurse prior to session.  Patient found HOB elevated with telemetry, peripheral IV upon OT entry to room.    General Precautions: Standard, fall, honey thick    Orthopedic Precautions:N/A  Braces: N/A  Respiratory Status: Room air     Occupational Performance:     Bed Mobility:    Patient completed Scooting/Bridging with minimum assistance  Patient completed Supine to Sit with minimum assistance  Patient completed Sit to Supine with moderate assistance     Functional Mobility/Transfers:  Patient completed Sit <> Stand Transfer with moderate assistance and of 2 persons  with  hand-held assist     Therapeutic Activities:  Follow commands 50% of time     Therapeutic Positioning    OT interventions performed during the course of today's session in an  effort to prevent and/or reduce acquired pressure injuries:   Therapeutic positioning was provided at the conclusion of session to offload all bony prominences for the prevention and/or reduction of pressure injuries    [facility] 6 Click ADL: 6    Patient Education:  Patient provided with verbal education and demonstrations education regarding fall prevention and safety awareness.  Understanding was verbalized, however additional teaching warranted.      Patient left HOB elevated with all lines intact and call button in reach.    GOALS:   Multidisciplinary Problems       Occupational Therapy Goals          Problem: Occupational Therapy    Goal Priority Disciplines Outcome Interventions   Occupational Therapy Goal     OT, PT/OT Ongoing, Progressing    Description: Goals to be met by 4/25/2024    1. Pt will follow >80% of simple one step commands across three sessions  2. Pt will perform grooming EOB with min A.   3. Pt will perform functional grasp/reach/release of items >80% of trials in prep for increased participation in ADL tasks.   4. Pt will perform sit<>stand with min A in prep for ADL t/fs.                         Time Tracking:     OT Date of Treatment: 03/26/24  OT Start Time: 1213  OT Stop Time: 1228  OT Total Time (min): 15 min    Billable Minutes:Therapeutic Activity 15    OT/KAREN: KAREN     Number of KAREN visits since last OT visit: 2    3/26/2024

## 2024-03-26 NOTE — PROGRESS NOTES
"Gastroenterology Progress Note      HPI:    Pt is somnolent today. Totally unable to work with PT. Abd soft. VSS     ROS:    Review of Systems   Unable to perform ROS: Mental status change         Vital Signs:  /84   Pulse 105   Temp 97.7 °F (36.5 °C) (Oral)   Resp 17   Ht 5' 3" (1.6 m)   Wt 61 kg (134 lb 7.7 oz)   SpO2 96%   BMI 23.82 kg/m²   Body mass index is 23.82 kg/m².    Physical Exam:    Physical Exam  Vitals and nursing note reviewed.   Constitutional:       Appearance: She is ill-appearing.   HENT:      Head: Normocephalic and atraumatic.   Eyes:      General: No scleral icterus.     Extraocular Movements: Extraocular movements intact.   Cardiovascular:      Rate and Rhythm: Normal rate and regular rhythm.      Heart sounds: Normal heart sounds.   Pulmonary:      Effort: Pulmonary effort is normal. No respiratory distress.      Breath sounds: Normal breath sounds.   Abdominal:      General: Abdomen is flat. Bowel sounds are normal. There is no distension.      Palpations: Abdomen is soft.      Tenderness: There is no abdominal tenderness.   Musculoskeletal:         General: No swelling or deformity. Normal range of motion.      Right lower leg: No edema.      Left lower leg: No edema.   Skin:     General: Skin is warm and dry.   Neurological:      Mental Status: She is alert.      Comments: somnolent         Labs:  Recent Results (from the past 48 hour(s))   POCT glucose    Collection Time: 03/24/24 12:17 PM   Result Value Ref Range    POCT Glucose 81 70 - 110 mg/dL   POCT glucose    Collection Time: 03/24/24  5:18 PM   Result Value Ref Range    POCT Glucose 83 70 - 110 mg/dL   Basic Metabolic Panel    Collection Time: 03/25/24  5:45 AM   Result Value Ref Range    Sodium Level 139 136 - 145 mmol/L    Potassium Level 3.4 (L) 3.5 - 5.1 mmol/L    Chloride 106 98 - 107 mmol/L    Carbon Dioxide 19 (L) 23 - 31 mmol/L    Glucose Level 80 (L) 82 - 115 mg/dL    Blood Urea Nitrogen 7.5 (L) 9.8 - 20.1 " mg/dL    Creatinine 0.48 (L) 0.55 - 1.02 mg/dL    BUN/Creatinine Ratio 16     Calcium Level Total 8.5 8.4 - 10.2 mg/dL    Anion Gap 14.0 mEq/L    eGFR >60 mls/min/1.73/m2   CBC with Differential    Collection Time: 03/25/24  5:45 AM   Result Value Ref Range    WBC 14.04 (H) 4.50 - 11.50 x10(3)/mcL    RBC 2.86 (L) 4.20 - 5.40 x10(6)/mcL    Hgb 11.6 (L) 12.0 - 16.0 g/dL    Hct 33.2 (L) 37.0 - 47.0 %    .1 (H) 80.0 - 94.0 fL    MCH 40.6 (H) 27.0 - 31.0 pg    MCHC 34.9 33.0 - 36.0 g/dL    RDW      Platelet 114 (L) 130 - 400 x10(3)/mcL    MPV 11.2 (H) 7.4 - 10.4 fL    Neut % 74.4 %    Lymph % 12.7 %    Mono % 11.6 %    Eos % 0.1 %    Basophil % 0.2 %    Lymph # 1.78 0.6 - 4.6 x10(3)/mcL    Neut # 10.45 (H) 2.1 - 9.2 x10(3)/mcL    Mono # 1.63 (H) 0.1 - 1.3 x10(3)/mcL    Eos # 0.01 0 - 0.9 x10(3)/mcL    Baso # 0.03 <=0.2 x10(3)/mcL    IG# 0.14 (H) 0 - 0.04 x10(3)/mcL    IG% 1.0 %    NRBC% 0.0 %    IPF 8.5 0.9 - 11.2 %   POCT glucose    Collection Time: 03/25/24  9:24 AM   Result Value Ref Range    POCT Glucose 69 (L) 70 - 110 mg/dL   POCT glucose    Collection Time: 03/26/24 12:14 AM   Result Value Ref Range    POCT Glucose 67 (L) 70 - 110 mg/dL   POCT glucose    Collection Time: 03/26/24  5:31 AM   Result Value Ref Range    POCT Glucose 85 70 - 110 mg/dL   Comprehensive Metabolic Panel    Collection Time: 03/26/24  5:46 AM   Result Value Ref Range    Sodium Level 139 136 - 145 mmol/L    Potassium Level 3.0 (L) 3.5 - 5.1 mmol/L    Chloride 108 (H) 98 - 107 mmol/L    Carbon Dioxide 18 (L) 23 - 31 mmol/L    Glucose Level 83 82 - 115 mg/dL    Blood Urea Nitrogen 3.4 (L) 9.8 - 20.1 mg/dL    Creatinine 0.48 (L) 0.55 - 1.02 mg/dL    Calcium Level Total 8.2 (L) 8.4 - 10.2 mg/dL    Protein Total 5.1 (L) 5.8 - 7.6 gm/dL    Albumin Level 2.5 (L) 3.4 - 4.8 g/dL    Globulin 2.6 2.4 - 3.5 gm/dL    Albumin/Globulin Ratio 1.0 (L) 1.1 - 2.0 ratio    Bilirubin Total 1.0 <=1.5 mg/dL    Alkaline Phosphatase 175 (H) 40 - 150 unit/L     Alanine Aminotransferase 21 0 - 55 unit/L    Aspartate Aminotransferase 23 5 - 34 unit/L    eGFR >60 mls/min/1.73/m2   Magnesium    Collection Time: 03/26/24  5:46 AM   Result Value Ref Range    Magnesium Level 1.60 1.60 - 2.60 mg/dL   CBC with Differential    Collection Time: 03/26/24  5:46 AM   Result Value Ref Range    WBC 14.62 (H) 4.50 - 11.50 x10(3)/mcL    RBC 2.78 (L) 4.20 - 5.40 x10(6)/mcL    Hgb 11.2 (L) 12.0 - 16.0 g/dL    Hct 32.3 (L) 37.0 - 47.0 %    .2 (H) 80.0 - 94.0 fL    MCH 40.3 (H) 27.0 - 31.0 pg    MCHC 34.7 33.0 - 36.0 g/dL    RDW      Platelet 139 130 - 400 x10(3)/mcL    MPV 11.2 (H) 7.4 - 10.4 fL    Neut % 75.6 %    Lymph % 11.6 %    Mono % 12.0 %    Eos % 0.0 %    Basophil % 0.1 %    Lymph # 1.69 0.6 - 4.6 x10(3)/mcL    Neut # 11.06 (H) 2.1 - 9.2 x10(3)/mcL    Mono # 1.75 (H) 0.1 - 1.3 x10(3)/mcL    Eos # 0.00 0 - 0.9 x10(3)/mcL    Baso # 0.02 <=0.2 x10(3)/mcL    IG# 0.10 (H) 0 - 0.04 x10(3)/mcL    IG% 0.7 %    NRBC% 0.0 %    IPF 6.0 0.9 - 11.2 %   POCT glucose    Collection Time: 03/26/24  9:22 AM   Result Value Ref Range    POCT Glucose 79 70 - 110 mg/dL         Assessment/Plan:    64 year old female known to Dr. Garcia with a pmhx of HTN, HLD, DM, alcohol abuse, chronic anemia, and cirrhosis presents to the ED c/o increased weakness and AMS for past 2 days. Admitted with acute UTI, encephalopathy, acute urinary retention. GI consulted for cirrhosis and drop in hgb.      Cirrhosis  thrombocytopenia  MELD-Na: 12 on 3/21/24  - Origin: ETOH vs PANDA  - Transplant candidacy: unable to obtain hx  - Ascites: moderate generalized ascites present on CT 3/20/24 -new onset this admission. S/p paracentesis 3/23/24 with 800 ml removed. SAAG >1.1 c/w cirrhotic ascites.   - low sodium diet   - presence of varices: last EGD 01/24/24 without varices  - presence of hepatic encephalopathy: questionable as ammonia is wnL and pt has acute UTI. Neuro following. Continue lactulose  - screening for  neoplasm: AFP: wnL; Most recent imaging: no lesion on CT  - Plts: 40,000 --- 114,000   - INR: 1.2     3. Chronic macrocytic anemia- stable  - Hgb 10.7--8.0--7.2 --- +1 unit prbcs--- 11.2 --- 12.4 --- 11.6   - Plans to transfuse 1u prbcs per primary  - No overt GI bleeding.   - Continue ppi     4. Metabolic Encephalopathy  - multifactorial, possible HE element involved    - pt is too somnolent for PO lactulose. restart lactulose enemas today      5. Urosepsis  - on [facility]  [facility]

## 2024-03-26 NOTE — PROGRESS NOTES
Inpatient Nutrition Assessment    Admit Date: 3/20/2024   Total duration of encounter: 6 days   Patient Age: 64 y.o.    Nutrition Recommendation/Prescription     Oral diet per SLP recommendations: Diet Pureed (IDDSI Level 4) No Straws (assist with feeding as needed), Supervision with Meals; Mildly Thick Liquids (IDDSI Level 2)   Boost Very High Calorie (provides 530 kcal, 22 g protein per serving) BID, mildly thick appropriate.   Continue thiamin, MV and folic acid supplementation.   Replace electrolytes as feasible.    Recommend NG/Dobhoff placement for enteral nutrition if AMS and inadequate oral intake persists. Tube feeding recommendations:      Diabetisource AC goal rate of 70ml/hr would provide:1680kcals (92% est needs), 84g protein (106% est needs), 1148ml free water    Communication of Recommendations: reviewed with nurse    Nutrition Assessment     Malnutrition Assessment/Nutrition-Focused Physical Exam    Malnutrition Context: chronic illness (03/21/24 1105)  Malnutrition Level: moderate (03/21/24 1105)  Energy Intake (Malnutrition):  (unable to determine) (03/21/24 1105)  Weight Loss (Malnutrition):  (unable to determine) (03/21/24 1105)  Subcutaneous Fat (Malnutrition): severe depletion (03/21/24 1105)     Upper Arm Region (Subcutaneous Fat Loss): severe depletion     Muscle Mass (Malnutrition): mild depletion (03/21/24 1105)     Clavicle Bone Region (Muscle Loss): mild depletion  Clavicle and Acromion Bone Region (Muscle Loss): mild depletion     Dorsal Hand (Muscle Loss): mild depletion     Anterior Thigh Region (Muscle Loss): mild depletion  Posterior Calf Region (Muscle Loss): mild depletion  Fluid Accumulation (Malnutrition):  (does not meet criteria) (03/21/24 1105)        A minimum of two characteristics is recommended for diagnosis of either severe or non-severe malnutrition.    Chart Review    Reason Seen: continuous nutrition monitoring, malnutrition screening tool (MST), and physician consult  for PCM    Malnutrition Screening Tool Results   Have you recently lost weight without trying?: Unsure  Have you been eating poorly because of a decreased appetite?: No   MST Score: 2   Diagnosis:  Acute encephalopathy, metabolic versus hepatic   Metabolic Acidosis   Alcoholic cirrhosis   Ascites   Thrombocytopenia   Acute microcytic anemia   Hypoalbuminemia     Relevant Medical History: HTN, HLD, type 2 DM, alcoholic cirrhosis, alcohol abuse and nicotine dependence      Scheduled Medications:  amLODIPine, 5 mg, Daily  ARIPiprazole, 2 mg, Daily  busPIRone, 10 mg, TID  carvediloL, 6.25 mg, BID  ciprofloxacin HCl, 500 mg, Q12H  EScitalopram oxalate, 20 mg, Daily  folic acid, 1 mg, Daily  gabapentin, 300 mg, TID  multivitamin, 1 tablet, Daily  mupirocin, , BID  pantoprazole, 40 mg, BID  potassium chloride in water, 20 mEq, Q2H  sodium chloride 0.9%, 10 mL, Q6H  thiamine, 100 mg, Q8H    Continuous Infusions:  dextrose 5 % and 0.45 % NaCl with KCl 40 mEq, Last Rate: 100 mL/hr at 03/26/24 1006    PRN Medications: 0.9%  NaCl infusion (for blood administration), albuterol, chlordiazepoxide, cloNIDine, dextrose 10%, dextrose 10%, glucagon (human recombinant), glucose, glucose, hydrALAZINE, hyoscyamine, insulin aspart U-100, labetalol, lorazepam, LORazepam, methocarbamoL, Flushing PICC/Midline Protocol **AND** sodium chloride 0.9% **AND** sodium chloride 0.9%    Calorie Containing IV Medications:  D5 + 1/2NS + Kcl @ 100ml/hr (408kcals/d)    Recent Labs   Lab 03/20/24  0055 03/20/24  0321 03/21/24  0517 03/21/24  0656 03/21/24  1214 03/21/24  2003 03/22/24  0526 03/23/24  0445 03/24/24  0448 03/25/24  0545 03/26/24  0546     --  138  --   --   --  137 141 139 139 139   K 4.0  --  3.8  --   --   --  3.8 3.4* 3.4* 3.4* 3.0*   CALCIUM 7.9*  --  7.3*  --   --   --  7.4* 8.6 8.4 8.5 8.2*   PHOS  --   --   --   --  7.2*  --   --   --   --   --   --    MG  --   --   --   --  2.30  --   --  2.00 1.90  --  1.60   CHLORIDE 105  --  " 103  --   --   --  101 101 102 106 108*   CO2 18*  --  25  --   --   --  27 29 22* 19* 18*   BUN 3.5*  --  5.0*  --   --   --  7.2* 6.8* 7.8* 7.5* 3.4*   CREATININE 0.50*  --  0.43*  --   --   --  0.41* 0.42* 0.38* 0.48* 0.48*   EGFRNORACEVR >60  --  >60  --   --   --  >60 >60 >60 >60 >60   GLUCOSE 82  --  97  --   --   --  93 84 73* 80* 83   BILITOT 1.5  --  1.4  --   --   --   --  1.4  --   --  1.0   ALKPHOS 313*  --  269*  --   --   --   --  244*  --   --  175*   ALT 17  --  15  --   --   --   --  21  --   --  21   AST 43*  --  49*  --   --   --   --  58*  --   --  23   ALBUMIN 1.9*  --  1.8*  --   --   --  1.7* 2.7*  --   --  2.5*   HGBA1C  --   --   --   --   --  5.5  --   --   --   --   --    AMMONIA  --  33.7  --   --   --   --   --   --   --   --   --    WBC 6.38  --   --  7.21  --   --  5.33 6.63 10.50 14.04* 14.62*   HGB 10.7*  --   --  8.6* 8.0*  --  7.2* 11.2* 12.4 11.6* 11.2*   HCT 31.7*  --   --  25.9* 24.8*  --  21.5* 32.4* 35.7* 33.2* 32.3*       Nutrition Orders:  Diet Pureed (IDDSI Level 4) No Straws (assist with feeding as needed), Supervision with Meals; Mildly Thick Liquids (IDDSI Level 2)  Dietary nutrition supplements Boost VHC Vanilla; BID    Appetite/Oral Intake: poor/0-25% of meals  Factors Affecting Nutritional Intake: impaired cognitive status/motor control and decreased appetite  Food/Anabaptism/Cultural Preferences: none reported  Food Allergies: none reported  Last Bowel Movement: 03/25/24  Wound(s):      Comments    3/21/24 Pt unable to answer questions, ex- at bedside states pt is on a "liquid diet", chronic alcohol abuse. States notable wt loss over the last 9 months, estimating weight around 160lbs. EMR wt history shows 119-124lbs x 1.5 years so unsure accuracy of reported UBW. Did note muscle and fat loss during NFPA. He think she would drink oral supplement, will send with meals. PPN infusing at 50ml/hr, recommend increasing rate to better meet nutritional needs while oral " "intake is inadequate.     3/26/24 NPO from 3/21-3/24 s/t AMS. MBS performed yesterday, SLP recommends puree solids and mildly thick liquids. Pt has not woke up to take medications or po intake. Will change ONS to Boost VHC for mildly thick supplement and leave tube feeding recommendations incase po intake does not improve.     Anthropometrics    Height: 5' 3" (160 cm), Height Method: Stated  Last Weight: 61 kg (134 lb 7.7 oz) (03/21/24 0510), Weight Method: Standard Scale  BMI (Calculated): 23.8  BMI Classification: normal (BMI 18.5-24.9)     Ideal Body Weight (IBW), Female: 115 lb     % Ideal Body Weight, Female (lb): 116.94 %                             Usual Weight Provided By: unable to obtain usual weight    Wt Readings from Last 5 Encounters:   03/21/24 61 kg (134 lb 7.7 oz)   07/21/23 56.3 kg (124 lb 3.2 oz)   03/27/23 54.4 kg (119 lb 14.9 oz)   01/24/23 54.4 kg (119 lb 14.9 oz)   12/15/22 54.4 kg (119 lb 14.9 oz)     Weight Change(s) Since Admission: 3/26/24 no updated wts   Wt Readings from Last 1 Encounters:   03/21/24 0510 61 kg (134 lb 7.7 oz)   03/20/24 0013 56.7 kg (125 lb)   Admit Weight: 56.7 kg (125 lb) (03/20/24 0013), Weight Method: Stated    Estimated Needs    Weight Used For Calorie Calculations: 61 kg (134 lb 7.7 oz)  Energy Calorie Requirements (kcal): 1830kcals/d (30kcals/kg)  Energy Need Method: Kcal/kg  Weight Used For Protein Calculations: 61 kg (134 lb 7.7 oz)  Protein Requirements: 79g/d (1.3g/kg)  Fluid Requirements (mL): 1525-1830ml fl/d (25-30ml/kg)    Enteral Nutrition     Patient not receiving enteral nutrition at this time.    Parenteral Nutrition     Patient not receiving parenteral nutrition at this time.    Evaluation of Received Nutrient Intake    Calories: not meeting estimated needs  Protein: not meeting estimated needs    Patient Education     Not applicable.    Nutrition Diagnosis     PES: Inadequate oral intake related to acute illness as evidenced by <50% EER since admit. " (active)     PES: Moderate chronic disease or condition related malnutrition related to inability to consume sufficient nutrients as evidenced by severe fat depletion and mild muscle depletion. (active)    Nutrition Interventions     Intervention(s): modified composition of meals/snacks, modified composition of enteral nutrition, modified rate of enteral nutrition, commercial beverage, multivitamin/mineral supplement therapy, and collaboration with other providers    Goal: Meet greater than 80% of nutritional needs by follow-up. (goal not met)  Goal: Maintain weight throughout hospitalization. (goal progressing)    Nutrition Goals & Monitoring     Dietitian will monitor: energy intake, weight change, electrolyte/renal panel, and glucose/endocrine profile    Nutrition Risk/Follow-Up: high (follow-up in 1-4 days)   Please consult if re-assessment needed sooner.

## 2024-03-26 NOTE — PLAN OF CARE
Problem: Adult Inpatient Plan of Care  Goal: Plan of Care Review  3/26/2024 1817 by Chen Miles RN  Outcome: Ongoing, Progressing  3/26/2024 1744 by Chen Miles RN  Outcome: Ongoing, Progressing  Goal: Patient-Specific Goal (Individualized)  3/26/2024 1817 by Chen Miles RN  Outcome: Ongoing, Progressing  3/26/2024 1744 by Chen Miles RN  Outcome: Ongoing, Progressing  Goal: Absence of Hospital-Acquired Illness or Injury  3/26/2024 1817 by Chen Miles RN  Outcome: Ongoing, Progressing  3/26/2024 1744 by Chen Miles RN  Outcome: Ongoing, Progressing  Goal: Optimal Comfort and Wellbeing  3/26/2024 1817 by Chen Miles RN  Outcome: Ongoing, Progressing  3/26/2024 1744 by Chen Miles RN  Outcome: Ongoing, Progressing  Goal: Readiness for Transition of Care  3/26/2024 1817 by Chen Miles RN  Outcome: Ongoing, Progressing  3/26/2024 1744 by Chen Miles RN  Outcome: Ongoing, Progressing     Problem: Diabetes Comorbidity  Goal: Blood Glucose Level Within Targeted Range  3/26/2024 1817 by Chen Miles RN  Outcome: Ongoing, Progressing  3/26/2024 1744 by Chen Miles RN  Outcome: Ongoing, Progressing     Problem: Fall Injury Risk  Goal: Absence of Fall and Fall-Related Injury  3/26/2024 1817 by Chen Miles RN  Outcome: Ongoing, Progressing  3/26/2024 1744 by Chen Miles RN  Outcome: Ongoing, Progressing     Problem: Skin Injury Risk Increased  Goal: Skin Health and Integrity  3/26/2024 1817 by Chen Miles RN  Outcome: Ongoing, Progressing  3/26/2024 1744 by Chen Miles RN  Outcome: Ongoing, Progressing     Problem: Infection  Goal: Absence of Infection Signs and Symptoms  3/26/2024 1817 by Chen Miles RN  Outcome: Ongoing, Progressing  3/26/2024 1744 by Chen Miles RN  Outcome: Ongoing, Progressing

## 2024-03-26 NOTE — PT/OT/SLP PROGRESS
Physical Therapy Treatment    Patient Name:  Luz Maria Alfaro   MRN:  [id number]    Recommendations:     Discharge therapy intensity: Moderate Intensity Therapy   Discharge Equipment Recommendations: to be determined by next level of care  Barriers to discharge: Decreased caregiver support and Ongoing medical needs    Assessment:     Luz Maria Alfaro is a 64 y.o. female admitted with a medical diagnosis of encephalopathy, multifactorial.  She presents with the following impairments/functional limitations: weakness, impaired endurance, impaired functional mobility, gait instability, impaired balance, impaired self care skills, impaired cognition, decreased safety awareness, decreased upper extremity function, decreased lower extremity function, impaired cardiopulmonary response to activity .    Rehab Prognosis: Fair; patient would benefit from acute skilled PT services to address these deficits and reach maximum level of function.    Recent Surgery: * No surgery found *      Plan:     During this hospitalization, patient to be seen 5 x/week to address the identified rehab impairments via gait training, therapeutic activities, therapeutic exercises, neuromuscular re-education and progress toward the following goals:    Plan of Care Expires:  04/21/24    Subjective     Chief Complaint: none stated  Patient/Family Comments/goals: none stated  Pain/Comfort:  Pain Rating 1: 0/10      Objective:     Communicated with patient, nurse prior to session.  Patient found HOB elevated with telemetry, peripheral IV upon PT entry to room.     General Precautions: Standard, fall, honey thick  Orthopedic Precautions: N/A  Braces: N/A  Respiratory Status: Room air  Blood Pressure: 152/96mmHg, HR: 101bpm  Skin Integrity: Visible skin intact      Functional Mobility:  Bed Mobility:     Rolling Left:  minimum assistance  Rolling Right: minimum assistance  Scooting: minimum assistance  Supine to Sit: minimum assistance and moderate  assistance  Sit to Supine: minimum assistance and moderate assistance    Therapeutic Activities/Exercises:  Patient performed dynamic sitting edge of bed with SBA-CGA for sitting balance.    Education:  Patient provided with verbal education education regarding PT role/goals/POC, fall prevention, and safety awareness.  Understanding was verbalized, however additional teaching warranted.     Patient left HOB elevated with all lines intact, call button in reach, bed alarm on, and nursing notified    GOALS:   Multidisciplinary Problems       Physical Therapy Goals          Problem: Physical Therapy    Goal Priority Disciplines Outcome Goal Variances Interventions   Physical Therapy Goal     PT, PT/OT Ongoing, Progressing     Description: Goals to be met by: 24     Patient will increase functional independence with mobility by performin. Supine to sit with MInimal Assistance  2. Sit to stand transfer with Minimal Assistance  3. Bed to chair transfer with Minimal Assistance using Rolling Walker  4. Gait  x 150 feet with Minimal Assistance using Rolling Walker.                          Time Tracking:     PT Received On: 24  PT Start Time: 1213     PT Stop Time: 1228  PT Total Time (min): 15 min     Billable Minutes: Therapeutic Activity 15    Treatment Type: Treatment  PT/PTA: PTA     Number of PTA visits since last PT visit: 2     2024

## 2024-03-26 NOTE — PLAN OF CARE
Problem: Adult Inpatient Plan of Care  Goal: Plan of Care Review  3/26/2024 1818 by Cehn Miles RN  Outcome: Ongoing, Progressing  3/26/2024 1817 by Chen Miles RN  Outcome: Ongoing, Progressing  3/26/2024 1744 by Chen Miles RN  Outcome: Ongoing, Progressing  Goal: Patient-Specific Goal (Individualized)  3/26/2024 1818 by Chen Miles RN  Outcome: Ongoing, Progressing  3/26/2024 1817 by Chen Miles RN  Outcome: Ongoing, Progressing  3/26/2024 1744 by Chen Miles RN  Outcome: Ongoing, Progressing  Goal: Absence of Hospital-Acquired Illness or Injury  3/26/2024 1818 by Chen Miles RN  Outcome: Ongoing, Progressing  3/26/2024 1817 by Chen Miles RN  Outcome: Ongoing, Progressing  3/26/2024 1744 by Chen Miles RN  Outcome: Ongoing, Progressing  Goal: Optimal Comfort and Wellbeing  3/26/2024 1818 by Chen Miles RN  Outcome: Ongoing, Progressing  3/26/2024 1817 by Chen Miles RN  Outcome: Ongoing, Progressing  3/26/2024 1744 by Chen Miles RN  Outcome: Ongoing, Progressing  Goal: Readiness for Transition of Care  3/26/2024 1818 by Chen Miles RN  Outcome: Ongoing, Progressing  3/26/2024 1817 by Chen Miles RN  Outcome: Ongoing, Progressing  3/26/2024 1744 by Chen Miles RN  Outcome: Ongoing, Progressing     Problem: Diabetes Comorbidity  Goal: Blood Glucose Level Within Targeted Range  3/26/2024 1818 by Chen Miles RN  Outcome: Ongoing, Progressing  3/26/2024 1817 by Chen Miles RN  Outcome: Ongoing, Progressing  3/26/2024 1744 by Chen Miles RN  Outcome: Ongoing, Progressing     Problem: Fall Injury Risk  Goal: Absence of Fall and Fall-Related Injury  3/26/2024 1818 by Chen Miles RN  Outcome: Ongoing, Progressing  3/26/2024 1817 by Gaddison, Chen, RN  Outcome: Ongoing, Progressing  3/26/2024 1744 by Chen Miles, DEVAN  Outcome: Ongoing, Progressing     Problem: Skin Injury Risk  Increased  Goal: Skin Health and Integrity  3/26/2024 1818 by Chen Miles RN  Outcome: Ongoing, Progressing  3/26/2024 1817 by Chen Miles RN  Outcome: Ongoing, Progressing  3/26/2024 1744 by Chen Miles RN  Outcome: Ongoing, Progressing     Problem: Infection  Goal: Absence of Infection Signs and Symptoms  3/26/2024 1818 by Chen Miles RN  Outcome: Ongoing, Progressing  3/26/2024 1817 by Chen Miles RN  Outcome: Ongoing, Progressing  3/26/2024 1744 by Chen Miles RN  Outcome: Ongoing, Progressing

## 2024-03-27 LAB
AMMONIA PLAS-MSCNC: 50.2 UMOL/L (ref 18–72)
ANION GAP SERPL CALC-SCNC: 6 MEQ/L
BACTERIA FLD CULT: NORMAL
BASOPHILS # BLD AUTO: 0.03 X10(3)/MCL
BASOPHILS NFR BLD AUTO: 0.3 %
BUN SERPL-MCNC: <3 MG/DL (ref 9.8–20.1)
CALCIUM SERPL-MCNC: 8 MG/DL (ref 8.4–10.2)
CHLORIDE SERPL-SCNC: 114 MMOL/L (ref 98–107)
CO2 SERPL-SCNC: 18 MMOL/L (ref 23–31)
CREAT SERPL-MCNC: 0.45 MG/DL (ref 0.55–1.02)
CREAT/UREA NIT SERPL: <7
EOSINOPHIL # BLD AUTO: 0.03 X10(3)/MCL (ref 0–0.9)
EOSINOPHIL NFR BLD AUTO: 0.3 %
ERYTHROCYTE [DISTWIDTH] IN BLOOD BY AUTOMATED COUNT: ABNORMAL %
GFR SERPLBLD CREATININE-BSD FMLA CKD-EPI: >60 MLS/MIN/1.73/M2
GLUCOSE SERPL-MCNC: 104 MG/DL (ref 82–115)
HCT VFR BLD AUTO: 30.2 % (ref 37–47)
HGB BLD-MCNC: 10.2 G/DL (ref 12–16)
IMM GRANULOCYTES # BLD AUTO: 0.05 X10(3)/MCL (ref 0–0.04)
IMM GRANULOCYTES NFR BLD AUTO: 0.6 %
LYMPHOCYTES # BLD AUTO: 1.93 X10(3)/MCL (ref 0.6–4.6)
LYMPHOCYTES NFR BLD AUTO: 21.9 %
MCH RBC QN AUTO: 40.6 PG (ref 27–31)
MCHC RBC AUTO-ENTMCNC: 33.8 G/DL (ref 33–36)
MCV RBC AUTO: 120.3 FL (ref 80–94)
MONOCYTES # BLD AUTO: 1.22 X10(3)/MCL (ref 0.1–1.3)
MONOCYTES NFR BLD AUTO: 13.8 %
NEUTROPHILS # BLD AUTO: 5.56 X10(3)/MCL (ref 2.1–9.2)
NEUTROPHILS NFR BLD AUTO: 63.1 %
NRBC BLD AUTO-RTO: 0 %
PLATELET # BLD AUTO: 142 X10(3)/MCL (ref 130–400)
PMV BLD AUTO: 11 FL (ref 7.4–10.4)
POCT GLUCOSE: 102 MG/DL (ref 70–110)
POCT GLUCOSE: 138 MG/DL (ref 70–110)
POCT GLUCOSE: 91 MG/DL (ref 70–110)
POCT GLUCOSE: 97 MG/DL (ref 70–110)
POTASSIUM SERPL-SCNC: 4.2 MMOL/L (ref 3.5–5.1)
RBC # BLD AUTO: 2.51 X10(6)/MCL (ref 4.2–5.4)
SODIUM SERPL-SCNC: 138 MMOL/L (ref 136–145)
WBC # SPEC AUTO: 8.82 X10(3)/MCL (ref 4.5–11.5)

## 2024-03-27 PROCEDURE — 85025 COMPLETE CBC W/AUTO DIFF WBC: CPT | Performed by: INTERNAL MEDICINE

## 2024-03-27 PROCEDURE — 25000003 PHARM REV CODE 250: Performed by: PHYSICIAN ASSISTANT

## 2024-03-27 PROCEDURE — 99222 1ST HOSP IP/OBS MODERATE 55: CPT | Mod: ,,, | Performed by: NURSE PRACTITIONER

## 2024-03-27 PROCEDURE — 11000001 HC ACUTE MED/SURG PRIVATE ROOM

## 2024-03-27 PROCEDURE — 25000003 PHARM REV CODE 250: Performed by: INTERNAL MEDICINE

## 2024-03-27 PROCEDURE — 21400001 HC TELEMETRY ROOM

## 2024-03-27 PROCEDURE — 97530 THERAPEUTIC ACTIVITIES: CPT | Mod: CQ

## 2024-03-27 PROCEDURE — 25000003 PHARM REV CODE 250: Performed by: HOSPITALIST

## 2024-03-27 PROCEDURE — S5010 5% DEXTROSE AND 0.45% SALINE: HCPCS | Performed by: INTERNAL MEDICINE

## 2024-03-27 PROCEDURE — 27000207 HC ISOLATION

## 2024-03-27 PROCEDURE — A4216 STERILE WATER/SALINE, 10 ML: HCPCS | Performed by: HOSPITALIST

## 2024-03-27 PROCEDURE — 82140 ASSAY OF AMMONIA: CPT | Performed by: INTERNAL MEDICINE

## 2024-03-27 PROCEDURE — 80048 BASIC METABOLIC PNL TOTAL CA: CPT | Performed by: INTERNAL MEDICINE

## 2024-03-27 PROCEDURE — 63600175 PHARM REV CODE 636 W HCPCS: Performed by: INTERNAL MEDICINE

## 2024-03-27 RX ORDER — DEXTROSE MONOHYDRATE AND SODIUM CHLORIDE 5; .45 G/100ML; G/100ML
INJECTION, SOLUTION INTRAVENOUS CONTINUOUS
Status: DISCONTINUED | OUTPATIENT
Start: 2024-03-27 | End: 2024-03-27

## 2024-03-27 RX ORDER — SODIUM BICARBONATE 650 MG/1
650 TABLET ORAL DAILY
Status: DISCONTINUED | OUTPATIENT
Start: 2024-03-27 | End: 2024-04-17 | Stop reason: HOSPADM

## 2024-03-27 RX ADMIN — SODIUM BICARBONATE 650 MG TABLET 650 MG: at 04:03

## 2024-03-27 RX ADMIN — CIPROFLOXACIN HYDROCHLORIDE 500 MG: 500 TABLET, FILM COATED ORAL at 10:03

## 2024-03-27 RX ADMIN — BUSPIRONE HYDROCHLORIDE 10 MG: 5 TABLET ORAL at 09:03

## 2024-03-27 RX ADMIN — SODIUM CHLORIDE, PRESERVATIVE FREE 10 ML: 5 INJECTION INTRAVENOUS at 12:03

## 2024-03-27 RX ADMIN — ESCITALOPRAM OXALATE 20 MG: 10 TABLET ORAL at 10:03

## 2024-03-27 RX ADMIN — GABAPENTIN 300 MG: 300 CAPSULE ORAL at 09:03

## 2024-03-27 RX ADMIN — SODIUM CHLORIDE, PRESERVATIVE FREE 10 ML: 5 INJECTION INTRAVENOUS at 06:03

## 2024-03-27 RX ADMIN — LABETALOL HYDROCHLORIDE 10 MG: 5 INJECTION, SOLUTION INTRAVENOUS at 04:03

## 2024-03-27 RX ADMIN — AMLODIPINE BESYLATE 5 MG: 5 TABLET ORAL at 10:03

## 2024-03-27 RX ADMIN — GABAPENTIN 300 MG: 300 CAPSULE ORAL at 04:03

## 2024-03-27 RX ADMIN — MUPIROCIN: 20 OINTMENT TOPICAL at 09:03

## 2024-03-27 RX ADMIN — SODIUM CHLORIDE, PRESERVATIVE FREE 10 ML: 5 INJECTION INTRAVENOUS at 05:03

## 2024-03-27 RX ADMIN — DEXTROSE AND SODIUM CHLORIDE: 5; 450 INJECTION, SOLUTION INTRAVENOUS at 12:03

## 2024-03-27 RX ADMIN — GABAPENTIN 300 MG: 300 CAPSULE ORAL at 10:03

## 2024-03-27 RX ADMIN — THIAMINE HCL TAB 100 MG 100 MG: 100 TAB at 09:03

## 2024-03-27 RX ADMIN — LEUCINE, PHENYLALANINE, LYSINE, METHIONINE, ISOLEUCINE, VALINE, HISTIDINE, THREONINE, TRYPTOPHAN, ALANINE, GLYCINE, ARGININE, PROLINE, SERINE, TYROSINE, SODIUM ACETATE, DIBASIC POTASSIUM PHOSPHATE, MAGNESIUM CHLORIDE, SODIUM CHLORIDE, CALCIUM CHLORIDE, DEXTROSE
311; 238; 247; 170; 255; 247; 204; 179; 77; 880; 438; 489; 289; 213; 17; 297; 261; 51; 77; 33; 5 INJECTION INTRAVENOUS at 05:03

## 2024-03-27 RX ADMIN — Medication: at 10:03

## 2024-03-27 RX ADMIN — RIFAXIMIN 550 MG: 550 TABLET ORAL at 09:03

## 2024-03-27 RX ADMIN — MUPIROCIN: 20 OINTMENT TOPICAL at 10:03

## 2024-03-27 RX ADMIN — SODIUM CHLORIDE, PRESERVATIVE FREE 10 ML: 5 INJECTION INTRAVENOUS at 11:03

## 2024-03-27 RX ADMIN — CARVEDILOL 6.25 MG: 3.12 TABLET, FILM COATED ORAL at 10:03

## 2024-03-27 RX ADMIN — RIFAXIMIN 550 MG: 550 TABLET ORAL at 01:03

## 2024-03-27 RX ADMIN — BUSPIRONE HYDROCHLORIDE 10 MG: 5 TABLET ORAL at 04:03

## 2024-03-27 RX ADMIN — PANTOPRAZOLE SODIUM 40 MG: 40 TABLET, DELAYED RELEASE ORAL at 09:03

## 2024-03-27 RX ADMIN — CARVEDILOL 6.25 MG: 3.12 TABLET, FILM COATED ORAL at 09:03

## 2024-03-27 NOTE — PROGRESS NOTES
[facility] Hospital Medicine Progress Note        Chief Complaint: Inpatient Follow-up for encephalopathy     HPI:   64 y.o. female with a PMHx of HTN, HLD, type 2 DM, alcoholic cirrhosis, alcohol abuse and nicotine dependence who presented to [facility] on 3/20/2024 via EMS with c/o worsening confusion x1 day with associated generalized weakness.  The majority of the history was obtained from review of the medical record as the patient was unable to provide any additional history.     Upon presentation to ED, vital signed included /73, , RR 18, SpO2 98% on room air, temperature 98.4° F.  Labs notable for hemoglobin 10.7, hematocrit 31.7, platelets 34, vitamin B12 1958, INR 1.4, CO2 18, BUN 3.5, creatinine 0.5, calcium 7.9, , albumin 1.9, AST 43.  Serum alcohol level undetectable.  EKG demonstrated sinus tachycardia.  CXR demonstrated increased left retrocardiac density with partial silhouetting of the left hemidiaphragm which might be related to an infiltrate/atelectasis.  CT head without contrast negative for acute intracranial findings or significant interval changes compared to August 2022.  CT abdomen and pelvis with IV contrast consistent with cirrhosis, moderate generalized ascites, bilateral pleural fluid right greater than left noted.  She was admitted to hospital medicine service for further medical management.      Spoke to her Ex  who frequently visits with her, patients baseline is - she ambulates with a walker past 2 years, good memory and speech and need very minimal assist for ADLs. She is a chronic alcoholic and currently drinks beer. Early this month she started to loose her hearing and then became confused and weak.     Patients mental status was slowly improving. She could not tolerate lactulose, was having profuse diarrhea and weakness. Lactulose was stopped and added rifaximin. Her ammonia level was rechedk and was 50. She was afebrile.  Completed antibiotics for her UTI.       Interval Hx:   Patient today more alert and awake. Opens eyes to verbal stimulus. Able to say few words. Per nurse, patient also tolerated po diet. She is still lethargic and not following all verbal commands. Has been afebrile.     Will consult palliative care team to start discussing goals of care with family.   '  Case was discussed with patient's nurse and  on the floor.    Objective/physical exam:  General: In no acute distress, more awake today  Chest: Clear to auscultation bilaterally  Heart: RRR, +S1, S2, no appreciable murmur  Abdomen: Soft, nontender, BS +  Neurologic: Moving all 4 extremities   + generalized muscle loss     VITAL SIGNS: 24 HRS MIN & MAX LAST   Temp  Min: 97.2 °F (36.2 °C)  Max: 98.6 °F (37 °C) 97.5 °F (36.4 °C)   BP  Min: 142/74  Max: 189/108 (!) 161/88   Pulse  Min: 87  Max: 120  (!) 120   Resp  Min: 18  Max: 22 18   SpO2  Min: 96 %  Max: 98 % 97 %     I have reviewed the following labs:  Recent Labs   Lab 03/20/24  0055 03/21/24  0656 03/21/24  1214 03/22/24  0526 03/23/24  0445 03/24/24  0448 03/25/24  0545   WBC 6.38 7.21  --  5.33 6.63 10.50 14.04*   RBC 2.26* 1.80*  --  1.52* 2.75* 3.09* 2.86*   HGB 10.7* 8.6*   < > 7.2* 11.2* 12.4 11.6*   HCT 31.7* 25.9*   < > 21.5* 32.4* 35.7* 33.2*   .3* 143.9*  --  141.4* 117.8* 115.5* 116.1*   MCH 47.3* 47.8*  --  47.4* 40.7* 40.1* 40.6*   MCHC 33.8 33.2  --  33.5 34.6 34.7 34.9   RDW 15.3 15.9  --  16.0  --   --   --    PLT 34* 39*  --  40* 48* 63* 114*   MPV 12.1* 12.3*  --  12.6* 12.3* 12.1* 11.2*    < > = values in this interval not displayed.     Recent Labs   Lab 03/20/24  0055 03/21/24  0517 03/21/24  1214 03/22/24  0526 03/23/24  0445 03/24/24  0448 03/25/24  0545    138  --  137 141 139 139   K 4.0 3.8  --  3.8 3.4* 3.4* 3.4*   CO2 18* 25  --  27 29 22* 19*   BUN 3.5* 5.0*  --  7.2* 6.8* 7.8* 7.5*   CREATININE 0.50* 0.43*  --  0.41* 0.42* 0.38* 0.48*   CALCIUM 7.9* 7.3*   --  7.4* 8.6 8.4 8.5   MG  --   --  2.30  --  2.00 1.90  --    ALBUMIN 1.9* 1.8*  --  1.7* 2.7*  --   --    ALKPHOS 313* 269*  --   --  244*  --   --    ALT 17 15  --   --  21  --   --    AST 43* 49*  --   --  58*  --   --    BILITOT 1.5 1.4  --   --  1.4  --   --      Microbiology Results (last 7 days)       Procedure Component Value Units Date/Time    Body Fluid Culture [[id number]] Collected: 03/22/24 1256    Order Status: Completed Specimen: Abdominal Fluid from Abdomen Updated: 03/25/24 1013     Body Fluid Culture No Growth At 72 Hours    Urine culture [[id number]]  (Abnormal)  (Susceptibility) Collected: 03/20/24 0958    Order Status: Completed Specimen: Urine Updated: 03/22/24 0651     Urine Culture >/= 100,000 colonies/ml Escherichia coli    Respiratory Culture [[id number]]     Order Status: Sent Specimen: Sputum, Expectorated              See below for Radiology    Scheduled Med:   folic acid  1 mg Oral Daily    gabapentin  300 mg Oral TID    mupirocin   Nasal BID    pantoprazole  40 mg Intravenous BID    piperacillin-tazobactam (Zosyn) IV (PEDS and ADULTS) (extended infusion is not appropriate)  4.5 g Intravenous Q8H    sodium chloride 0.9%  10 mL Intravenous Q6H    thiamine  100 mg Oral Q8H      Continuous Infusions:   sodium chloride 0.9% 100 mL/hr at 03/24/24 1127      PRN Meds:  0.9%  NaCl infusion (for blood administration), albuterol, chlordiazepoxide, cloNIDine, dextrose 10%, dextrose 10%, glucagon (human recombinant), glucose, glucose, hydrALAZINE, HYDROcodone-acetaminophen, hyoscyamine, insulin aspart U-100, lorazepam, LORazepam, methocarbamoL, morphine, Flushing PICC/Midline Protocol **AND** sodium chloride 0.9% **AND** sodium chloride 0.9%     Assessment/Plan:  Acute Metabolic encephalopathy, multifactorial   Sepsis secondary to UTI, POA   Anemia of chronic inflammation  Alcoholic cirrhosis   Current alcohol use   Ascites   Thrombocytopenia from underlying cirrhosis  Macrocytic anemia    Hypoalbuminemia   Hypertensive urgency       Plan:  Patients mental status is gradually improving   She is tolerating po diet   Ammonia is 50, will add Rifaximin   Not tolerating lactulose   Will cont iv genle hydration, with D5 1/2  Home meds reviewed and started   Continue coreg 6.25 mg po BID and  Norvasc   CT brain did not show any acute changes and patient is also moving extremities     Completed antibiotics for UTI    Reviewed today's labs and  WBC 8.82, Hb 10, Plt 142, Na 138, K 4.2, CO2 18, Crt 0.45    Current meds reviewed    Continue strict aspiration, fall and decubitus precautions      Will closely monitor patients daily weight, urine out put, renal parameters and volume status      Labs in am     Continue OT/PT    VTE prophylaxis: SCD    Patient condition:  Guarded    Anticipated discharge and Disposition:   TBD      All diagnosis and differential diagnosis have been reviewed; assessment and plan has been documented; I have personally reviewed the labs and test results that are presently available; I have reviewed the patients medication list; I have reviewed the consulting providers response and recommendations. I have reviewed or attempted to review medical records based upon their availability    All of the patient's questions have been  addressed and answered. Patient's is agreeable to the above stated plan. I will continue to monitor closely and make adjustments to medical management as needed.  _____________________________________________________________________    Nutrition Status:    Radiology:  I have personally reviewed the following imaging and agree with the radiologist.     Fl Modified Barium Swallow Speech  See procedure notes from Speech Pathologist.    This procedure was auto-finalized.      Robin Metcalf MD  Department of Hospital Medicine   [facility]   03/25/2024

## 2024-03-27 NOTE — PLAN OF CARE
Problem: Adult Inpatient Plan of Care  Goal: Plan of Care Review  Outcome: Ongoing, Not Progressing  Goal: Patient-Specific Goal (Individualized)  Outcome: Ongoing, Not Progressing  Goal: Absence of Hospital-Acquired Illness or Injury  Outcome: Ongoing, Not Progressing  Goal: Optimal Comfort and Wellbeing  Outcome: Ongoing, Not Progressing  Goal: Readiness for Transition of Care  Outcome: Ongoing, Not Progressing     Problem: Diabetes Comorbidity  Goal: Blood Glucose Level Within Targeted Range  Outcome: Ongoing, Not Progressing     Problem: Fall Injury Risk  Goal: Absence of Fall and Fall-Related Injury  Outcome: Ongoing, Not Progressing     Problem: Skin Injury Risk Increased  Goal: Skin Health and Integrity  Outcome: Ongoing, Not Progressing     Problem: Infection  Goal: Absence of Infection Signs and Symptoms  Outcome: Ongoing, Not Progressing     Problem: Impaired Wound Healing  Goal: Optimal Wound Healing  Outcome: Ongoing, Not Progressing     Problem: Coping Ineffective  Goal: Effective Coping  Outcome: Ongoing, Not Progressing

## 2024-03-27 NOTE — CONSULTS
Inpatient consult to Palliative Care  Consult performed by: France Persaud FNP  Consult ordered by: Robin Metcalf MD      Patient Name: Luz Maria Alfaro   MRN: [id number]   Admission Date: 3/20/2024   Hospital Length of Stay: 7   Attending Provider: Robin Metcalf MD   Consulting Provider: France FINK  Reason for Consult: Goals of Care  Primary Care Physician: Robyn Matthews NP     Principal Problem: <principal problem not specified>     Patient information was obtained from spouse/SO and ER records.      Final diagnoses:  [R41.82] Altered mental status  [G93.40] Acute encephalopathy (Primary)  [D69.6] Thrombocytopenia  [K74.60] Hepatic cirrhosis, unspecified hepatic cirrhosis type, unspecified whether ascites present  [D53.9] Macrocytic anemia     Assessment/Plan:     I reviewed the patient and family's understanding of the seriousness of the illness and its expected prognosis. We discussed the patient's goals of care and treatment preferences. We discussed the difference between palliative and curative medicine. I explained the differences between home health, palliative and hospice care. I clarified current code status. I identified the surrogate decision maker or health care POA. I explained the difference between a living will (advanced directive) and [place]. We discussed the patient's chosen code status as listed above and the contents of the LaPOST. I answered all questions and we formulated a plan including recommendations for symptom management and how to best achieve goals of care.       Advance Care Planning     Date: 03/28/2024    [facility]  I engaged the family in a voluntary conversation about advance care planning and we specifically addressed what the goals of care would be moving forward, in light of the patient's change in clinical status, specifically current condition.  We did specifically address the patient's likely prognosis, which is poor.  We explored the  patient's values and preferences for future care.  The family endorses that what is most important right now is to focus on curative/life-prolongation (regardless of treatment burdens)    Accordingly, we have decided that the best plan to meet the patient's goals includes continuing with treatment               Met with ex- and patient--introduced service and discussed patient history and current condition.  He reports that patient has long-history of alcohol abuse and that he has history as well.  Discussed with him that medical team is continuing to evaluate whether patient can make significant improvement or not.  Discussed the severity of patient's condition and high risk for decline.      Discussed AD and code status to which SO reports that patient has an adult son who he expressed that he thinks he would have difficulty making medical decisions.  Ex- expressed that he would agree to all resuscitative measures--I explained the likely poor outcomes that would result from resuscitation in patient's current condition.  Discussed that son is legal next of kin--carl asking if son could defer authority to him.   Discussed that POA must be given by patient and currently patient is encephalopathic.  Discussed that son could defer decision making to next of kin after him which would be siblings.  We arranged phone meeting with son at noon tomorrow to discuss goals further.  Offered support and informed palliative care would continue to follow.      History of Present Illness:     Patient is a 64-year-old female with PMH of HTN, HLD, dm type 2, alcoholic cirrhosis, alcohol abuse and nicotine dependence who presented to [facility] on 03/20/2024 via EMS with complaint of worsening confusion for 1 day.  Upon presentation vital signs include /73, heart rate 100 with notable labs of hemoglobin 10.7 platelets 34, vitamin B12 1958, CO2 18, BUN 3.5, creatinine 0.5, alkaline phosphatase 313, albumin 1.9 with  serum alcohol level undetectable.  Chest x-ray demonstrated increased left retrocardiac density with partial silhouetting of the left hemidiaphragm which may be related to infiltrate or atelectasis.  CT of the head negative for acute findings.  CT of the abdomen and pelvis consistent with cirrhosis, moderate generalized ascites, bilateral pleural fluid right greater than left noted.  Patient is currently lying in bed with ex- present. Palliative care consulted for discussion of goals of care.       Active Ambulatory Problems     Diagnosis Date Noted    Closed right hip fracture s/p Intramedullary nailing  10/26/2022    DVT (deep venous thrombosis) 11/11/2022    Type 2 diabetes mellitus without complications      Resolved Ambulatory Problems     Diagnosis Date Noted    Acute alcoholic intoxication without complication 11/11/2022     Past Medical History:   Diagnosis Date    Benign essential HTN     Mixed hyperlipidemia         Past Surgical History:   Procedure Laterality Date    APPENDECTOMY      BACK SURGERY      HIP SURGERY      INTRAMEDULLARY RODDING OF FEMUR Right 10/27/2022    Procedure: RIGHT FEMUR INTERTROCH IMN;  Surgeon: Stewart Brambila MD;  Location: [facility];  Service: Orthopedics;  Laterality: Right;  [place] table, supine  3rd case  [employer] Short TFNA        Review of patient's allergies indicates:   Allergen Reactions    Meperidine Rash and Hives          Current Facility-Administered Medications:     0.9%  NaCl infusion (for blood administration), , Intravenous, Q24H PRN, Chaz Harrison MD    albuterol inhaler 1 puff, 1 puff, Inhalation, Q4H PRN, Yvette Saba MD    Amino acid 4.25% - dextrose 5% (CLINIMIX-E) solution (1L provides 42.5 gm AA, 50 gm CHO (170 kcal/L dextrose), Na 35, K 30, Mg 5, Ca 4.5, Acetate 70, Cl 39, Phos 15), , Intravenous, Continuous, Sumaya Calzada PA    amLODIPine tablet 5 mg, 5 mg, Oral, Daily, Robin Metcalf MD, 5 mg at 03/27/24 1024    ARIPiprazole tablet 2  mg, 2 mg, Oral, Daily, Robin Metcalf MD    busPIRone tablet 10 mg, 10 mg, Oral, TID, Robin Metcalf MD, 10 mg at 03/27/24 1612    carvediloL tablet 6.25 mg, 6.25 mg, Oral, BID, Robin Metcalf MD, 6.25 mg at 03/27/24 1024    chlordiazepoxide capsule 25 mg, 25 mg, Oral, QID PRN, Gabriela Starkey [facility]    cloNIDine tablet 0.1 mg, 0.1 mg, Oral, TID PRN, Toyin Golden, FNP    dextrose 10% bolus 125 mL 125 mL, 12.5 g, Intravenous, PRN, Yvette Saba MD, Stopped at 03/26/24 0125    dextrose 10% bolus 250 mL 250 mL, 25 g, Intravenous, PRN, Yvette Saba MD    EScitalopram oxalate tablet 20 mg, 20 mg, Oral, Daily, Robin Metcalf MD, 20 mg at 03/27/24 1027    folic acid tablet 1 mg, 1 mg, Oral, Daily, Yvette Saba MD    gabapentin capsule 300 mg, 300 mg, Oral, TID, Yvette Saba MD, 300 mg at 03/27/24 1612    glucagon (human recombinant) injection 1 mg, 1 mg, Intramuscular, PRN, Yvette Saba MD    glucose chewable tablet 16 g, 16 g, Oral, PRN, Yvette Saba MD    glucose chewable tablet 24 g, 24 g, Oral, PRN, Yvette Saba MD    hydrALAZINE injection 10 mg, 10 mg, Intravenous, Q4H PRN, Toyin Golden, FNP, 10 mg at 03/25/24 0013    hyoscyamine ODT tablet 0.125 mg, 0.125 mg, Sublingual, TID PRN, Yvette Saba MD    insulin aspart U-100 injection 0-5 Units, 0-5 Units, Subcutaneous, QID (AC + HS) PRN, Yvette Saba MD    labetaloL injection 10 mg, 10 mg, Intravenous, Q4H PRN, Robin Metcalf MD, 10 mg at 03/27/24 0429    LORazepam injection 1 mg, 1 mg, Intravenous, Q3H PRN, Yvette Saba MD    LORazepam tablet 2 mg, 2 mg, Oral, Q4H PRN, Gabriela Starkey, AGACNP-BC, 2 mg at 03/21/24 1757    methocarbamoL tablet 750 mg, 750 mg, Oral, TID PRN, Yvette Saba MD    multivitamin tablet, 1 tablet, Oral, Daily, Robin Metcalf MD    mupirocin 2 % ointment, , Nasal, BID, Chaz Harrison MD, Given at 03/27/24 1027    pantoprazole EC tablet 40 mg, 40 mg,  "Oral, BID, Robin Metcalf MD, 40 mg at 03/26/24 2123    rifAXIMin tablet 550 mg, 550 mg, Oral, BID, Robin Metcalf MD, 550 mg at 03/27/24 1306    sodium bicarbonate tablet 650 mg, 650 mg, Oral, Daily, Robin Metcalf MD, 650 mg at 03/27/24 1612    Flushing PICC/Midline Protocol, , , Until Discontinued **AND** sodium chloride 0.9% flush 10 mL, 10 mL, Intravenous, Q6H, 10 mL at 03/27/24 1121 **AND** sodium chloride 0.9% flush 10 mL, 10 mL, Intravenous, PRN, Chaz Harrison MD    [COMPLETED] thiamine (B-1) 500 mg in dextrose 5 % (D5W) 100 mL IVPB, 500 mg, Intravenous, Q8H, Stopped at 03/22/24 0223 **FOLLOWED BY** thiamine tablet 100 mg, 100 mg, Oral, Q8H, Evgeny Burk MD, 100 mg at 03/26/24 2122    zinc oxide-cod liver oil 40 % paste, , Topical (Top), Daily, Robin Metcalf MD, Given at 03/27/24 1028     0.9%  NaCl infusion (for blood administration), albuterol, chlordiazepoxide, cloNIDine, dextrose 10%, dextrose 10%, glucagon (human recombinant), glucose, glucose, hydrALAZINE, hyoscyamine, insulin aspart U-100, labetalol, lorazepam, LORazepam, methocarbamoL, Flushing PICC/Midline Protocol **AND** sodium chloride 0.9% **AND** sodium chloride 0.9%     No family history on file.     Review of Systems   Unable to perform ROS: Acuity of condition            Objective:   BP (!) 149/82   Pulse 88   Temp 97.6 °F (36.4 °C)   Resp 18   Ht 5' 3" (1.6 m)   Wt 61 kg (134 lb 7.7 oz)   SpO2 97%   BMI 23.82 kg/m²      Physical Exam  Constitutional:       Appearance: She is ill-appearing.   HENT:      Head: Normocephalic.   Eyes:      Pupils: Pupils are equal, round, and reactive to light.   Cardiovascular:      Rate and Rhythm: Normal rate.   Pulmonary:      Effort: Pulmonary effort is normal.   Abdominal:      Palpations: Abdomen is soft.   Skin:     General: Skin is warm.   Neurological:      Comments: lethargic           FAMILY CONTACTS:     Review of Symptoms  Review of " Symptoms      Symptom Assessment (ESAS 0-10 Scale)  Pain:  0  Dyspnea:  0  Anxiety:  0  Nausea:  0  Depression:  0  Anorexia:  0  Fatigue:  0  Insomnia:  0  Restlessness:  0  Agitation:  0         Bowel Management Plan (BMP):  Yes      Psychosocial/Cultural:   See Palliative Psychosocial Note: Yes  Patient is  with one adult son.   **Primary  to Follow**  Palliative Care  Consult: No    Spiritual:  F - [place] and Belief:  Cheondoism      Advance Care Planning   Advance Directives:     Decision Making:  Family answered questions  Goals of Care: The family endorses that what is most important right now is to focus on curative/life-prolongation (regardless of treatment burdens)    Accordingly, we have decided that the best plan to meet the patient's goals includes continuing with treatment          PAINAD: NA    Caregiver burden formerly assessed: Yes        > 50% of 60 min of encounter was spent in chart review, face to face discussion of goals of care, symptom assessment, coordination of care and emotional support.         France Persaud FNP, ACHPN  Palliative Medicine  [facility]

## 2024-03-27 NOTE — PT/OT/SLP PROGRESS
Occupational Therapy      Patient Name:  Luz Maria Alfaro   MRN:  [id number]    Attempted to see patient in PM but was very lethargic and not appropriate for therapy at this time. Opened eyes to sternal rub but closed them immediately. Pt not following any commands. Nursing aware. Will attempt pt again as scheduling allows.      3/27/2024

## 2024-03-27 NOTE — PT/OT/SLP PROGRESS
Physical Therapy Treatment    Patient Name:  Luz Maria Alfaro   MRN:  [id number]    Recommendations:     Discharge therapy intensity: Moderate Intensity Therapy   Discharge Equipment Recommendations: to be determined by next level of care  Barriers to discharge: Decreased caregiver support and Ongoing medical needs    Assessment:     Luz Maria Alfaro is a 64 y.o. female admitted with a medical diagnosis of encephalopathy, multifactorial.  She presents with the following impairments/functional limitations: weakness, impaired endurance, impaired functional mobility, gait instability, impaired balance, impaired self care skills, impaired cognition, decreased safety awareness, decreased upper extremity function, decreased lower extremity function, impaired cardiopulmonary response to activity .    Rehab Prognosis: Fair; patient would benefit from acute skilled PT services to address these deficits and reach maximum level of function.    Recent Surgery: * No surgery found *      Plan:     During this hospitalization, patient to be seen 5 x/week to address the identified rehab impairments via gait training, therapeutic activities, therapeutic exercises, neuromuscular re-education and progress toward the following goals:    Plan of Care Expires:  04/21/24    Subjective     Chief Complaint: none stated  Patient/Family Comments/goals: none stated  Pain/Comfort:  Pain Rating 1: 0/10      Objective:     Communicated with patient, nurse prior to session.  Patient found HOB elevated with peripheral IV, telemetry upon PT entry to room.     General Precautions: Standard, fall, honey thick  Orthopedic Precautions: N/A  Braces: N/A  Respiratory Status: Room air  Blood Pressure: n/a  Skin Integrity: Visible skin intact      Functional Mobility:  Bed Mobility:     Rolling Left:  minimum assistance  Rolling Right: minimum assistance  Scooting: minimum assistance  Supine <> Sit: attempted with maxA however patient refusing and laying  back down      Education:  Patient provided with verbal education education regarding PT role/goals/POC, fall prevention, and safety awareness.  Understanding was verbalized, however additional teaching warranted.     Patient left HOB elevated with all lines intact, call button in reach, bed alarm on, and nursing notified    GOALS:   Multidisciplinary Problems       Physical Therapy Goals          Problem: Physical Therapy    Goal Priority Disciplines Outcome Goal Variances Interventions   Physical Therapy Goal     PT, PT/OT Ongoing, Progressing     Description: Goals to be met by: 24     Patient will increase functional independence with mobility by performin. Supine to sit with MInimal Assistance  2. Sit to stand transfer with Minimal Assistance  3. Bed to chair transfer with Minimal Assistance using Rolling Walker  4. Gait  x 150 feet with Minimal Assistance using Rolling Walker.                          Time Tracking:     PT Received On: 24  PT Start Time: 1120     PT Stop Time: 1132  PT Total Time (min): 12 min     Billable Minutes: Therapeutic Activity 12    Treatment Type: Treatment  PT/PTA: PTA     Number of PTA visits since last PT visit: 3     2024

## 2024-03-27 NOTE — PROGRESS NOTES
"Gastroenterology Progress Note      HPI:    Mentation is better, but she has uncontrollable liquid diarrhea, and it is causing irritation to her skin. No blood or foul odor noted. Nursing has attempted to obtain stool samples to no avail. Lactulose d/c'd by nursing.     The patient did eat breakfast with assistance, but is unable to eat on her own. Her lunch plate is sitting untouched at her bedside, she is too weak to eat by herself. The patient is still very somnolent, but she does rouse to tactile stimulation and follows commands.     ROS:    Review of Systems   Constitutional:  Negative for fever, malaise/fatigue and weight loss.   Respiratory:  Negative for cough and shortness of breath.    Cardiovascular:  Negative for chest pain, palpitations and leg swelling.   Gastrointestinal:  Positive for diarrhea. Negative for abdominal pain, blood in stool, constipation, heartburn, melena, nausea and vomiting.   Musculoskeletal:  Negative for back pain and myalgias.   Skin:  Negative for rash.   Neurological:  Negative for speech change and focal weakness.   All other systems reviewed and are negative.        Vital Signs:  BP (!) 169/81   Pulse 83   Temp 96.9 °F (36.1 °C) (Axillary)   Resp 18   Ht 5' 3" (1.6 m)   Wt 61 kg (134 lb 7.7 oz)   SpO2 97%   BMI 23.82 kg/m²   Body mass index is 23.82 kg/m².    Physical Exam:    Physical Exam  Vitals and nursing note reviewed.   Constitutional:       Appearance: She is ill-appearing.   HENT:      Head: Normocephalic and atraumatic.   Eyes:      General: No scleral icterus.     Extraocular Movements: Extraocular movements intact.   Cardiovascular:      Rate and Rhythm: Normal rate and regular rhythm.      Heart sounds: Normal heart sounds.   Pulmonary:      Effort: Pulmonary effort is normal. No respiratory distress.      Breath sounds: Normal breath sounds.   Abdominal:      General: Abdomen is flat. Bowel sounds are normal. There is no distension.      Palpations: " Abdomen is soft.      Tenderness: There is no abdominal tenderness.   Musculoskeletal:         General: No swelling or deformity. Normal range of motion.      Right lower leg: No edema.      Left lower leg: No edema.   Skin:     General: Skin is warm and dry.   Neurological:      General: No focal deficit present.      Mental Status: She is alert and oriented to person, place, and time.   Psychiatric:         Mood and Affect: Mood normal.         Behavior: Behavior normal.         Labs:  Recent Results (from the past 48 hour(s))   POCT glucose    Collection Time: 03/25/24  9:24 AM   Result Value Ref Range    POCT Glucose 69 (L) 70 - 110 mg/dL   POCT glucose    Collection Time: 03/26/24 12:14 AM   Result Value Ref Range    POCT Glucose 67 (L) 70 - 110 mg/dL   POCT glucose    Collection Time: 03/26/24  5:31 AM   Result Value Ref Range    POCT Glucose 85 70 - 110 mg/dL   Comprehensive Metabolic Panel    Collection Time: 03/26/24  5:46 AM   Result Value Ref Range    Sodium Level 139 136 - 145 mmol/L    Potassium Level 3.0 (L) 3.5 - 5.1 mmol/L    Chloride 108 (H) 98 - 107 mmol/L    Carbon Dioxide 18 (L) 23 - 31 mmol/L    Glucose Level 83 82 - 115 mg/dL    Blood Urea Nitrogen 3.4 (L) 9.8 - 20.1 mg/dL    Creatinine 0.48 (L) 0.55 - 1.02 mg/dL    Calcium Level Total 8.2 (L) 8.4 - 10.2 mg/dL    Protein Total 5.1 (L) 5.8 - 7.6 gm/dL    Albumin Level 2.5 (L) 3.4 - 4.8 g/dL    Globulin 2.6 2.4 - 3.5 gm/dL    Albumin/Globulin Ratio 1.0 (L) 1.1 - 2.0 ratio    Bilirubin Total 1.0 <=1.5 mg/dL    Alkaline Phosphatase 175 (H) 40 - 150 unit/L    Alanine Aminotransferase 21 0 - 55 unit/L    Aspartate Aminotransferase 23 5 - 34 unit/L    eGFR >60 mls/min/1.73/m2   Magnesium    Collection Time: 03/26/24  5:46 AM   Result Value Ref Range    Magnesium Level 1.60 1.60 - 2.60 mg/dL   CBC with Differential    Collection Time: 03/26/24  5:46 AM   Result Value Ref Range    WBC 14.62 (H) 4.50 - 11.50 x10(3)/mcL    RBC 2.78 (L) 4.20 - 5.40  x10(6)/mcL    Hgb 11.2 (L) 12.0 - 16.0 g/dL    Hct 32.3 (L) 37.0 - 47.0 %    .2 (H) 80.0 - 94.0 fL    MCH 40.3 (H) 27.0 - 31.0 pg    MCHC 34.7 33.0 - 36.0 g/dL    RDW      Platelet 139 130 - 400 x10(3)/mcL    MPV 11.2 (H) 7.4 - 10.4 fL    Neut % 75.6 %    Lymph % 11.6 %    Mono % 12.0 %    Eos % 0.0 %    Basophil % 0.1 %    Lymph # 1.69 0.6 - 4.6 x10(3)/mcL    Neut # 11.06 (H) 2.1 - 9.2 x10(3)/mcL    Mono # 1.75 (H) 0.1 - 1.3 x10(3)/mcL    Eos # 0.00 0 - 0.9 x10(3)/mcL    Baso # 0.02 <=0.2 x10(3)/mcL    IG# 0.10 (H) 0 - 0.04 x10(3)/mcL    IG% 0.7 %    NRBC% 0.0 %    IPF 6.0 0.9 - 11.2 %   POCT glucose    Collection Time: 03/26/24  9:22 AM   Result Value Ref Range    POCT Glucose 79 70 - 110 mg/dL   POCT glucose    Collection Time: 03/26/24  3:34 PM   Result Value Ref Range    POCT Glucose 131 (H) 70 - 110 mg/dL   POCT glucose    Collection Time: 03/26/24  9:21 PM   Result Value Ref Range    POCT Glucose 96 70 - 110 mg/dL   Basic Metabolic Panel    Collection Time: 03/27/24  5:12 AM   Result Value Ref Range    Sodium Level 138 136 - 145 mmol/L    Potassium Level 4.2 3.5 - 5.1 mmol/L    Chloride 114 (H) 98 - 107 mmol/L    Carbon Dioxide 18 (L) 23 - 31 mmol/L    Glucose Level 104 82 - 115 mg/dL    Blood Urea Nitrogen <3.0 (L) 9.8 - 20.1 mg/dL    Creatinine 0.45 (L) 0.55 - 1.02 mg/dL    BUN/Creatinine Ratio <7     Calcium Level Total 8.0 (L) 8.4 - 10.2 mg/dL    Anion Gap 6.0 mEq/L    eGFR >60 mls/min/1.73/m2   CBC with Differential    Collection Time: 03/27/24  5:12 AM   Result Value Ref Range    WBC 8.82 4.50 - 11.50 x10(3)/mcL    RBC 2.51 (L) 4.20 - 5.40 x10(6)/mcL    Hgb 10.2 (L) 12.0 - 16.0 g/dL    Hct 30.2 (L) 37.0 - 47.0 %    .3 (H) 80.0 - 94.0 fL    MCH 40.6 (H) 27.0 - 31.0 pg    MCHC 33.8 33.0 - 36.0 g/dL    RDW      Platelet 142 130 - 400 x10(3)/mcL    MPV 11.0 (H) 7.4 - 10.4 fL    Neut % 63.1 %    Lymph % 21.9 %    Mono % 13.8 %    Eos % 0.3 %    Basophil % 0.3 %    Lymph # 1.93 0.6 - 4.6  x10(3)/mcL    Neut # 5.56 2.1 - 9.2 x10(3)/mcL    Mono # 1.22 0.1 - 1.3 x10(3)/mcL    Eos # 0.03 0 - 0.9 x10(3)/mcL    Baso # 0.03 <=0.2 x10(3)/mcL    IG# 0.05 (H) 0 - 0.04 x10(3)/mcL    IG% 0.6 %    NRBC% 0.0 %   POCT glucose    Collection Time: 03/27/24  5:51 AM   Result Value Ref Range    POCT Glucose 91 70 - 110 mg/dL         Assessment/Plan:       64 year old female known to Dr. Garcia with a pmhx of HTN, HLD, DM, alcohol abuse, chronic anemia, and cirrhosis presents to the ED c/o increased weakness and AMS for past 2 days. Admitted with acute UTI, encephalopathy, acute urinary retention. GI consulted for cirrhosis and drop in hgb.      Cirrhosis  thrombocytopenia  MELD-Na: 12 on 3/21/24  - Origin: ETOH vs PANDA  - Transplant candidacy: unable to obtain hx  - Ascites: moderate generalized ascites present on CT 3/20/24 -new onset this admission. S/p paracentesis 3/23/24 with 800 ml removed. SAAG >1.1 c/w cirrhotic ascites.   - low sodium diet   - presence of varices: last EGD 01/24/24 without varices  - presence of hepatic encephalopathy: questionable as ammonia is wnL and pt has acute UTI. Neuro following. Continue lactulose  - screening for neoplasm: AFP: wnL; Most recent imaging: no lesion on CT  - Plts: 40,000 --- 114,000   - INR: 1.2     3. Chronic macrocytic anemia- stable  - Hgb 10.7--8.0--7.2 --- +1 unit prbcs--- 11.2 --- 12.4 --- 11.6   - Plans to transfuse 1u prbcs per primary  - No overt GI bleeding.   - Continue ppi     4. Metabolic Encephalopathy - improving  - multifactorial, possible HE element involved  -s/p lactulose treatment which induced diarrhea     5. Urosepsis  - was on zosyn, now PO cipro    6. Diarrhea  - attempt to obtain sample. It could just be due to lactulose. D/c lactulose    3/27/24   - the patient is eating small amounts only with assistance. Her lunch is sitting untouched  - recommend starting clinimix vs. NGT with tube feeds per nutrition recs to provide optimal nutrition.  Clinimix ordered to replace IVf  - palliative care being consulted, agree with this    Sumaya Calzada PA-C  [facility]

## 2024-03-28 LAB
ANION GAP SERPL CALC-SCNC: 7 MEQ/L
BUN SERPL-MCNC: 5.8 MG/DL (ref 9.8–20.1)
CALCIUM SERPL-MCNC: 8.6 MG/DL (ref 8.4–10.2)
CHLORIDE SERPL-SCNC: 115 MMOL/L (ref 98–107)
CO2 SERPL-SCNC: 18 MMOL/L (ref 23–31)
CREAT SERPL-MCNC: 0.43 MG/DL (ref 0.55–1.02)
CREAT/UREA NIT SERPL: 13
GFR SERPLBLD CREATININE-BSD FMLA CKD-EPI: >60 MLS/MIN/1.73/M2
GLUCOSE SERPL-MCNC: 92 MG/DL (ref 82–115)
POCT GLUCOSE: 109 MG/DL (ref 70–110)
POCT GLUCOSE: 92 MG/DL (ref 70–110)
POTASSIUM SERPL-SCNC: 4.3 MMOL/L (ref 3.5–5.1)
SODIUM SERPL-SCNC: 140 MMOL/L (ref 136–145)

## 2024-03-28 PROCEDURE — 25000003 PHARM REV CODE 250: Performed by: INTERNAL MEDICINE

## 2024-03-28 PROCEDURE — 63600175 PHARM REV CODE 636 W HCPCS: Performed by: NURSE PRACTITIONER

## 2024-03-28 PROCEDURE — 25000003 PHARM REV CODE 250: Performed by: HOSPITALIST

## 2024-03-28 PROCEDURE — 25000003 PHARM REV CODE 250: Performed by: PHYSICIAN ASSISTANT

## 2024-03-28 PROCEDURE — 27000207 HC ISOLATION

## 2024-03-28 PROCEDURE — 51798 US URINE CAPACITY MEASURE: CPT

## 2024-03-28 PROCEDURE — 21400001 HC TELEMETRY ROOM

## 2024-03-28 PROCEDURE — 51701 INSERT BLADDER CATHETER: CPT

## 2024-03-28 PROCEDURE — 11000001 HC ACUTE MED/SURG PRIVATE ROOM

## 2024-03-28 PROCEDURE — 80048 BASIC METABOLIC PNL TOTAL CA: CPT | Performed by: INTERNAL MEDICINE

## 2024-03-28 PROCEDURE — 97535 SELF CARE MNGMENT TRAINING: CPT

## 2024-03-28 PROCEDURE — A4216 STERILE WATER/SALINE, 10 ML: HCPCS | Performed by: HOSPITALIST

## 2024-03-28 PROCEDURE — 97110 THERAPEUTIC EXERCISES: CPT | Mod: CQ

## 2024-03-28 PROCEDURE — 99233 SBSQ HOSP IP/OBS HIGH 50: CPT | Mod: ,,, | Performed by: NURSE PRACTITIONER

## 2024-03-28 RX ORDER — LORAZEPAM 2 MG/ML
1 INJECTION INTRAMUSCULAR ONCE
Status: COMPLETED | OUTPATIENT
Start: 2024-03-28 | End: 2024-03-28

## 2024-03-28 RX ADMIN — SODIUM CHLORIDE, PRESERVATIVE FREE 10 ML: 5 INJECTION INTRAVENOUS at 06:03

## 2024-03-28 RX ADMIN — SODIUM BICARBONATE 650 MG TABLET 650 MG: at 09:03

## 2024-03-28 RX ADMIN — BUSPIRONE HYDROCHLORIDE 10 MG: 5 TABLET ORAL at 09:03

## 2024-03-28 RX ADMIN — SODIUM CHLORIDE, PRESERVATIVE FREE 10 ML: 5 INJECTION INTRAVENOUS at 12:03

## 2024-03-28 RX ADMIN — GABAPENTIN 300 MG: 300 CAPSULE ORAL at 03:03

## 2024-03-28 RX ADMIN — AMLODIPINE BESYLATE 5 MG: 5 TABLET ORAL at 09:03

## 2024-03-28 RX ADMIN — LEUCINE, PHENYLALANINE, LYSINE, METHIONINE, ISOLEUCINE, VALINE, HISTIDINE, THREONINE, TRYPTOPHAN, ALANINE, GLYCINE, ARGININE, PROLINE, SERINE, TYROSINE, SODIUM ACETATE, DIBASIC POTASSIUM PHOSPHATE, MAGNESIUM CHLORIDE, SODIUM CHLORIDE, CALCIUM CHLORIDE, DEXTROSE
311; 238; 247; 170; 255; 247; 204; 179; 77; 880; 438; 489; 289; 213; 17; 297; 261; 51; 77; 33; 5 INJECTION INTRAVENOUS at 11:03

## 2024-03-28 RX ADMIN — CARVEDILOL 6.25 MG: 3.12 TABLET, FILM COATED ORAL at 09:03

## 2024-03-28 RX ADMIN — LORAZEPAM 1 MG: 2 INJECTION INTRAMUSCULAR; INTRAVENOUS at 05:03

## 2024-03-28 RX ADMIN — BUSPIRONE HYDROCHLORIDE 10 MG: 5 TABLET ORAL at 03:03

## 2024-03-28 RX ADMIN — THIAMINE HCL TAB 100 MG 100 MG: 100 TAB at 06:03

## 2024-03-28 RX ADMIN — SODIUM CHLORIDE, PRESERVATIVE FREE 10 ML: 5 INJECTION INTRAVENOUS at 11:03

## 2024-03-28 RX ADMIN — GABAPENTIN 300 MG: 300 CAPSULE ORAL at 09:03

## 2024-03-28 RX ADMIN — ARIPIPRAZOLE 2 MG: 2 TABLET ORAL at 10:03

## 2024-03-28 RX ADMIN — ESCITALOPRAM OXALATE 20 MG: 10 TABLET ORAL at 09:03

## 2024-03-28 RX ADMIN — PANTOPRAZOLE SODIUM 40 MG: 40 TABLET, DELAYED RELEASE ORAL at 09:03

## 2024-03-28 RX ADMIN — FOLIC ACID 1 MG: 1 TABLET ORAL at 09:03

## 2024-03-28 RX ADMIN — LEUCINE, PHENYLALANINE, LYSINE, METHIONINE, ISOLEUCINE, VALINE, HISTIDINE, THREONINE, TRYPTOPHAN, ALANINE, GLYCINE, ARGININE, PROLINE, SERINE, TYROSINE, SODIUM ACETATE, DIBASIC POTASSIUM PHOSPHATE, MAGNESIUM CHLORIDE, SODIUM CHLORIDE, CALCIUM CHLORIDE, DEXTROSE
311; 238; 247; 170; 255; 247; 204; 179; 77; 880; 438; 489; 289; 213; 17; 297; 261; 51; 77; 33; 5 INJECTION INTRAVENOUS at 07:03

## 2024-03-28 RX ADMIN — THERA TABS 1 TABLET: TAB at 09:03

## 2024-03-28 RX ADMIN — Medication: at 01:03

## 2024-03-28 RX ADMIN — RIFAXIMIN 550 MG: 550 TABLET ORAL at 10:03

## 2024-03-28 NOTE — PT/OT/SLP PROGRESS
Occupational Therapy   Treatment    Name: Luz Maria Alfaro  MRN: [id number]  Admitting Diagnosis:  AMS    Recommendations:     Recommended therapy intensity at discharge: Moderate Intensity Therapy   Discharge Equipment Recommendations:  to be determined by next level of care  Barriers to discharge:   (ongoing medical needs; impaired cognition)    Assessment:     Luz Maria Alfaro is a 64 y.o. female with a medical diagnosis of AMS and malnutrition with a history of hypertension, hyperlipidemia, diabetes mellitus, alcohol abuse with cirrhosis.  She presents with increased alertness leading to increased participation with ADLs. Performance deficits affecting function are weakness, gait instability, decreased upper extremity function, decreased lower extremity function, impaired balance, impaired endurance, decreased safety awareness, impaired self care skills, impaired functional mobility, impaired cognition.     Patient was supine in bed with ex  present at OT arrival. Ex  states that he was able to feed her breakfast and that she was eating more today. Patient was soiled with brief on while in bed and required additional time during session to clean patient. Patient's buttocks was very red in perineal area and the beginning of breakdown is noted. New layers were placed on patient's bed and a brief was not donned. Patient required total A for donning new gown and socks. Patient was able to transfer to sitting EOB with max x2 A and able to stand for ~1-2 min with max A x2 but with max x1 once maintaining balance. She was returned to supine and was able to wash face with warm towel with setup. Patient with all needs in reach, roll belt and pressure relief boots on, ex  present, and nurse was informed of patient's current buttocks condition and an air mattress was recommended.     Rehab Prognosis:  Fair; patient would benefit from acute skilled OT services to address these deficits and reach maximum  level of function.       Plan:     Patient to be seen 4 x/week to address the above listed problems via self-care/home management, therapeutic activities, therapeutic exercises  Plan of Care Expires: 04/25/24  Plan of Care Reviewed with: patient    Subjective     Pain/Comfort:  Location - Orientation 1: generalized  Location 1: perineum  Pain Addressed 1: Reposition, Nurse notified    Objective:     Communicated with: nursing prior to session.  Patient found HOB elevated with peripheral IV, telemetry, restraints upon OT entry to room.    General Precautions: Standard, fall    Orthopedic Precautions:N/A  Braces: N/A  Respiratory Status: Room air     Occupational Performance:     Bed Mobility:    Patient completed Supine to Sit with maximal assistance and 2 persons  Patient completed Sit to Supine with maximal assistance and 2 persons     Functional Mobility/Transfers:  Patient completed Sit <> Stand Transfer with maximal assistance and of 2 persons  with  hand-held assist     Activities of Daily Living:  Grooming: setup to wash face with towel; total A to clean under fingernails  Upper Body Dressing: total assistance doff/adilson new gown  Lower Body Dressing: total assistance adilson socks  Toileting: total assistance after BM ; michael cream applied     Therapeutic Positioning    OT interventions performed during the course of today's session in an effort to prevent and/or reduce acquired pressure injuries:   Education was provided on benefits of and recommendations for therapeutic positioning  Therapeutic positioning was provided at the conclusion of session to offload all bony prominences for the prevention and/or reduction of pressure injuries  Positioning recommendations were communicated to care team     Skin assessment: all bony prominences were assessed    Findings: new area of altered skin integrity discovered at buttocks; nursing notified after session    Patient Education:  Patient and spouse were provided with  verbal education education regarding OT role/goals/POC, fall prevention, safety awareness, and Discharge/DME recommendations.  Additional teaching is warranted.      Patient left right sidelying with all lines intact, call button in reach, wedge under L side, pressure relief boots, bed alarm on, restraints reapplied at end of session, nursing notified, and ex- present.    GOALS:   Multidisciplinary Problems       Occupational Therapy Goals          Problem: Occupational Therapy    Goal Priority Disciplines Outcome Interventions   Occupational Therapy Goal     OT, PT/OT Ongoing, Progressing    Description: Goals to be met by 4/25/2024    1. Pt will follow >80% of simple one step commands across three sessions  2. Pt will perform grooming EOB with min A.   3. Pt will perform functional grasp/reach/release of items >80% of trials in prep for increased participation in ADL tasks.   4. Pt will perform sit<>stand with min A in prep for ADL t/fs.                         Time Tracking:     OT Date of Treatment: 03/28/24  OT Start Time: 0936  OT Stop Time: 1016  OT Total Time (min): 40 min    Billable Minutes:Self Care/Home Management 40    OT/KAREN: OT     Number of KAREN visits since last OT visit: 0    3/28/2024

## 2024-03-28 NOTE — PHYSICIAN QUERY
PT Name: Luz Maria Alfaro  MR #: [id number]    DOCUMENTATION CLARIFICATION     CDS/: Charmaine Solo RN, CDS               Contact information:  [email or web address]    This form is a permanent document in the medical record.     Query Date: March 28, 2024    By submitting this query, we are merely seeking further clarification of documentation.  Please utilize your independent clinical judgment when addressing the question(s) below.    The medical record contains the following:   Indicators  Supporting Clinical Findings Location in Medical Record   x Registered Dietician Diagnosis --Malnutrition Level: moderate   --Malnutrition Context: chronic illness   --PES: Inadequate oral intake related to acute illness as evidenced by <50% EER since admit. (new)   --PES: Moderate chronic disease or condition related malnutrition related to inability to consume sufficient nutrients as evidenced by severe fat depletion and mild muscle depletion. (new)   RD c/s 3/21   x Energy Intake --Appetite/Oral Intake: poor/0-25% of meals    RD c/s 3/21   x Weight Loss --States notable wt loss over the last 9 months, estimating weight around 160lbs. EMR wt history shows 119-124lbs x 1.5 years so unsure accuracy of reported UBW.    RD c/s 3/21   x Fat Loss Subcutaneous Fat (Malnutrition): severe depletion (03/21/24 1105)  Upper Arm Region (Subcutaneous Fat Loss): severe depletion   RD c/s 3/21   x Muscle Loss Muscle Mass (Malnutrition): mild depletion (03/21/24 1105)  Clavicle Bone Region (Muscle Loss): mild depletion  Clavicle and Acromion Bone Region (Muscle Loss): mild depletion  Dorsal Hand (Muscle Loss): mild depletion  Anterior Thigh Region (Muscle Loss): mild depletion  Posterior Calf Region (Muscle Loss): mild depletion   RD c/s 3/21    Edema/Fluid Accumulation      Reduced  Strength (by dynamometer)     x Weight, BMI, Usual Body Weight Last Weight: 61 kg (134 lb 7.7 oz) (03/21/24 0510),   BMI (Calculated):  "23.8  Ideal Body Weight (IBW), Female: 115 lb   RD c/s 3/21    Delayed Wound Healing     x Acute or Chronic Illness --Relevant Medical History: HTN, HLD, type 2 DM, alcoholic cirrhosis, alcohol abuse and nicotine dependence     --Acute Metabolic encephalopathy, multifactorial   Sepsis secondary to UTI, POA   Anemia of chronic inflammation  Alcoholic cirrhosis   Current alcohol use   Ascites   Thrombocytopenia from underlying cirrhosis  Macrocytic anemia   Hypoalbuminemia   Hypertensive urgency   RD c/s 3/21      Hosp med Note 3/27 (Dr Metcalf)    Social or Environmental Circumstances     x Treatment --Recommend increasing rate of PPN to 70ml/hr to better meet protein needs:   Clinimix-E 4.25/5 @ 70 ml/hr to provide  571 kcal/d, 31% est needs  71 g amino acids/d, 91% est needs  1680 ml fluid/d, 100% est needs  --Dietary nutrition supplements Boost Glucose Control Chocolate; BID,Dietary nutrition supplements Boost Glucose Control Vanilla; Daily    RD c/s 3/21   x Other --ex- at bedside states pt is on a "liquid diet", chronic alcohol abuse.     --She could not tolerate lactulose, was having profuse diarrhea and weakness. Lactulose was stopped and added rifaximin.  RD c/s 3/21      Hosp med Note 3/27 (Dr Metcalf)       Academy of Nutrition and Dietetics (Academy) and the American Society for Parenteral and Enteral Nutrition (A.S.P.E.N.) Clinical Characteristics to support Malnutrition      Criteria for mild malnutrition is defined as 1 characteristic outlined above within the established moderate or severe parameters.  A minimum of 2 out of the 6 characteristics noted above are recommended for a diagnosis of moderate or severe malnutrition.  Chronic illness/injury is a disease/condition lasting 3 months or longer.    The noted clinical guidelines are only system guidelines and do not replace the providers clinical judgment.    Provider, please specify the diagnosis or diagnoses associated with above " clinical findings.  Check all that apply.    [ x ] Moderate Malnutrition - a minimum of 2 of the 6 moderate malnutrition characteristics noted above      [  ] Malnutrition, Unspecified degree     [  ] Other Nutritional Diagnosis (please specify): _______     [  ] Malnutrition ruled out   Please document in your progress notes daily for the duration of treatment until resolved and  include in your discharge summary.      Reference:    HARLEEN Snider, PhD, RD, Benoit SANCHEZ P., PhD, RN, MALCOM Claros MD, PhD, Everardo MURPHY A., MS, RD, [facility], SARAHY Sidhu, MS, RD, The Academy Malnutrition Work Group, The A.S.P.E.N. Board of Directors. (2012). Consensus Statement: Academy of Nutrition and Dietetics and American Society for Parenteral and Enteral Nutrition: Characteristics Recommended for the Identification and Documentation of Adult Malnutrition (Undernutrition). Journal of Parenteral and Enteral Nutrition, 36(3), 275-283. doi:10.1177/9519952084425817     Form No. 64560

## 2024-03-28 NOTE — PHYSICIAN QUERY
PT Name: Luz Maria Alfaro  MR #: [id number]    DOCUMENTATION CLARIFICATION     CDS/: Charmaine Solo RN, CDS               Contact information: [email or web address]  This form is a permanent document in the medical record.     Query Date: March 28, 2024    By submitting this query, we are merely seeking further clarification of documentation. Please utilize your independent clinical judgment when addressing the question(s) below.    The Medical Record contains the following:   Indicators   Supporting Clinical Findings Location in Medical Record   x AMS, Confusion,  LOC, etc.  --evaluation of increasing altered mental status for the last 2 days with associated generalized weakness.   --Oriented to self, knew she was at the hospital but not she was at [facility], not oriented to time   -- altered mental status in recent days, confused here, unable to answer questions clearly though is keenly alert.      --reports ams since yesterday w/ increased weakness, also reports from family pt w/ worsening hearing for past week, gcs 15     --Patients mental status was slowly improving. She could not tolerate lactulose   --She is still lethargic and not following all verbal commands.   --Opens eyes to verbal stimulus.    ED Provider Note 3/20              Hosp med H&P 3/20 (Dr Spain)      Hosp med Note 3/27 (Dr Metcalf)   x Acute/Chronic Illness --PAST MEDICAL HISTORY:  Alcoholic cirrhosis   Alcohol abuse  --Assessment and Plan;     Acute encephalopathy, metabolic versus hepatic   Metabolic Acidosis   Alcoholic cirrhosis   Ascites   Acute UTI     --Acute Metabolic encephalopathy, multifactorial   Sepsis secondary to UTI, POA    Hosp med H&P 3/20 (Dr Spain)                  Hosp med Note 3/27 (Dr Metcalf)    Radiology Findings      Electrolyte Imbalance     x Medication --Begin IV Zosyn , day 1   --IV Thiamine q8 hrs x2 days     --Lactulose was stopped and added rifaximin       --Zosyn 4.5g IV q8h  (3/20-3/25)  --Cipro 500mg PO q12h (3/25-3/27)     Hosp med H&P 3/20 (Dr Spain)    Hosp med Note 3/27 (Dr Metcalf)    MAR   x Treatment         --Banana bag given in ED, NS at 50 ml/hr after completion of banana bag   --IV Clinimix after completion of banana bag    Hosp med H&P 3/20 (Dr Spain)   x Other --, albumin 1.9, AST 43. Serum alcohol level undetectable.    Hosp med H&P 3/20 (Dr Spain)     The noted clinical guidelines are only system guidelines and do not replace the providers clinical judgment.    The National [place] of Neurologic Disorders and Stroke (NINDS) of the NIH describes encephalopathy as any diffuse disease of the brain that alters brain function or structure.    Provider, please clarify the encephalopathy diagnosis or diagnoses associated with above clinical findings.     [   ] Metabolic Encephalopathy      [   ] Hepatic Encephalopathy      [   ] Metabolic and Hepatic Encephalopathy     [   ] Other Encephalopathy (please specify): ____________________     [ x  ]  Clinically Undetermined     Please document in your progress notes daily for the duration of treatment until resolved, and include in your discharge summary.    References:  CORNELIA Trejo RN, CCDS. (2018, June 9). Notes from the Instructor: Encephalopathy tips. Retrieved October 22, 2020, from [email or web address].org/articles/[email or web address]-instructor-encephalopathy-tips    ICD-9-CM Coding Clinic First Quarter 2013, Effective with discharges: October 21, 2013 [facility] § Seizure with encephalopathy due to postictal state (2013).    ICD-10-CM/PCS [employer] Integrated Codebook (Version V.20.8.10.0) [Computer software]. (2020). Retrieved October 21, 2020.    National [place] of Neurological Disorders and Stroke. (2019, March 27). Retrieved October 22, 2020, from https://www.ninds.nih.gov/Disorders/All-Disorders/Zadghmqjfhjnvb-Kjmryvmgqjn-Yezy    Form No. 01688

## 2024-03-28 NOTE — PT/OT/SLP PROGRESS
Physical Therapy Treatment    Patient Name:  Luz Maria Alfaro   MRN:  [id number]    Recommendations:     Discharge therapy intensity: Moderate Intensity Therapy   Discharge Equipment Recommendations: to be determined by next level of care  Barriers to discharge: Impaired mobility    Assessment:     Luz Maria Alfaro is a 64 y.o. female admitted with a medical diagnosis of  encephalopathy, multifactorial .  She presents with the following impairments/functional limitations: weakness, impaired endurance, impaired functional mobility, gait instability, impaired balance, impaired self care skills, impaired cognition, decreased safety awareness, decreased upper extremity function, decreased lower extremity function, impaired cardiopulmonary response to activity .    Rehab Prognosis: Fair; patient would benefit from acute skilled PT services to address these deficits and reach maximum level of function.    Recent Surgery: * No surgery found *      Plan:     During this hospitalization, patient to be seen 5 x/week to address the identified rehab impairments via gait training, therapeutic activities, neuromuscular re-education, therapeutic exercises and progress toward the following goals:    Plan of Care Expires:  04/21/24    Subjective     Chief Complaint: none expressed  Patient/Family Comments/goals:   Pain/Comfort:  Pain Rating 1: 0/10 (pt did not speak, no indication of pain)      Objective:     Communicated with pts nurse prior to session.  Patient found supine with peripheral IV, restraints, telemetry upon PT entry to room.     General Precautions: Standard, fall  Orthopedic Precautions: N/A  Braces: N/A  Respiratory Status: Room air  Blood Pressure:   Skin Integrity: Bruising of B UE's    Therapeutic Activities/Exercises:  Pt was difficult to arouse but w/ PROM of LE's pt  began to respond.  Pt assisted w/ LE and UE exercises intermittently.  Pt did except bites of food and drink from lunch.  Would not participate  after ~ 15 mins into session.     Education:  Patient provided with verbal education education regarding PT role/goals/POC.  Additional teaching is warranted.     Patient left HOB elevated with all lines intact, call button in reach, and nurse notified    GOALS:   Multidisciplinary Problems       Physical Therapy Goals          Problem: Physical Therapy    Goal Priority Disciplines Outcome Goal Variances Interventions   Physical Therapy Goal     PT, PT/OT Ongoing, Progressing     Description: Goals to be met by: 24     Patient will increase functional independence with mobility by performin. Supine to sit with MInimal Assistance  2. Sit to stand transfer with Minimal Assistance  3. Bed to chair transfer with Minimal Assistance using Rolling Walker  4. Gait  x 150 feet with Minimal Assistance using Rolling Walker.                          Time Tracking:     PT Received On: 24  PT Start Time: 1541     PT Stop Time: 1559  PT Total Time (min): 18 min     Billable Minutes: Therapeutic Exercise 18    Treatment Type: Treatment  PT/PTA: PTA     Number of PTA visits since last PT visit: 2024

## 2024-03-28 NOTE — PLAN OF CARE
Problem: Adult Inpatient Plan of Care  Goal: Plan of Care Review  Outcome: Ongoing, Progressing  Goal: Patient-Specific Goal (Individualized)  Outcome: Ongoing, Progressing  Goal: Absence of Hospital-Acquired Illness or Injury  Outcome: Ongoing, Progressing  Goal: Optimal Comfort and Wellbeing  Outcome: Ongoing, Progressing  Goal: Readiness for Transition of Care  Outcome: Ongoing, Progressing     Problem: Diabetes Comorbidity  Goal: Blood Glucose Level Within Targeted Range  Outcome: Ongoing, Progressing     Problem: Fall Injury Risk  Goal: Absence of Fall and Fall-Related Injury  Outcome: Ongoing, Progressing     Problem: Skin Injury Risk Increased  Goal: Skin Health and Integrity  Outcome: Ongoing, Progressing     Problem: Infection  Goal: Absence of Infection Signs and Symptoms  Outcome: Ongoing, Progressing     Problem: Impaired Wound Healing  Goal: Optimal Wound Healing  Outcome: Ongoing, Progressing     Problem: Coping Ineffective  Goal: Effective Coping  Outcome: Ongoing, Progressing

## 2024-03-28 NOTE — PROGRESS NOTES
Inpatient Nutrition Assessment    Admit Date: 3/20/2024   Total duration of encounter: 8 days   Patient Age: 64 y.o.    Nutrition Recommendation/Prescription     Oral diet per SLP recommendations: puree solids + mildly thick liquids. Assistance with meals.     Boost Very High Calorie (provides 530 kcal, 22 g protein per serving) BID, mildly thick appropriate.     Continue thiamin, MV and folic acid supplementation.     Continue PPN for supplemental nutrition until oral intake >60% estimate nutrition needs.     Would consider enteral nutrition if mentation or po intake does not improve to best meet nutritional needs. Tube feeding recommendations:     Diabetisource AC goal rate of 70ml/hr would provide:1680kcals (92% est needs), 84g protein (106% est needs), 1148ml free water    Communication of Recommendations: reviewed with nurse    Nutrition Assessment     Malnutrition Assessment/Nutrition-Focused Physical Exam    Malnutrition Context: chronic illness (03/21/24 1105)  Malnutrition Level: moderate (03/21/24 1105)  Energy Intake (Malnutrition):  (unable to determine) (03/21/24 1105)  Weight Loss (Malnutrition):  (unable to determine) (03/21/24 1105)  Subcutaneous Fat (Malnutrition): severe depletion (03/21/24 1105)     Upper Arm Region (Subcutaneous Fat Loss): severe depletion     Muscle Mass (Malnutrition): mild depletion (03/21/24 1105)     Clavicle Bone Region (Muscle Loss): mild depletion  Clavicle and Acromion Bone Region (Muscle Loss): mild depletion     Dorsal Hand (Muscle Loss): mild depletion     Anterior Thigh Region (Muscle Loss): mild depletion  Posterior Calf Region (Muscle Loss): mild depletion  Fluid Accumulation (Malnutrition):  (does not meet criteria) (03/21/24 1105)        A minimum of two characteristics is recommended for diagnosis of either severe or non-severe malnutrition.    Chart Review    Reason Seen: continuous nutrition monitoring, malnutrition screening tool (MST), and physician consult  for PCM    Malnutrition Screening Tool Results   Have you recently lost weight without trying?: Unsure  Have you been eating poorly because of a decreased appetite?: No   MST Score: 2   Diagnosis:  Acute encephalopathy, metabolic versus hepatic   Metabolic Acidosis   Alcoholic cirrhosis   Ascites   Thrombocytopenia   Acute microcytic anemia   Hypoalbuminemia     Relevant Medical History: HTN, HLD, type 2 DM, alcoholic cirrhosis, alcohol abuse and nicotine dependence      Scheduled Medications:  amLODIPine, 5 mg, Daily  ARIPiprazole, 2 mg, Daily  busPIRone, 10 mg, TID  carvediloL, 6.25 mg, BID  EScitalopram oxalate, 20 mg, Daily  folic acid, 1 mg, Daily  gabapentin, 300 mg, TID  multivitamin, 1 tablet, Daily  pantoprazole, 40 mg, BID  rifAXIMin, 550 mg, BID  sodium bicarbonate, 650 mg, Daily  sodium chloride 0.9%, 10 mL, Q6H  thiamine, 100 mg, Q8H  zinc oxide-cod liver oil, , Daily    Continuous Infusions:  Amino acid 4.25% - dextrose 5% (CLINIMIX-E) solution (1L provides 42.5 gm AA, 50 gm CHO (170 kcal/L dextrose), Na 35, K 30, Mg 5, Ca 4.5, Acetate 70, Cl 39, Phos 15), Last Rate: 75 mL/hr at 03/28/24 0752    PRN Medications: 0.9%  NaCl infusion (for blood administration), albuterol, chlordiazepoxide, cloNIDine, dextrose 10%, dextrose 10%, glucagon (human recombinant), glucose, glucose, hydrALAZINE, hyoscyamine, insulin aspart U-100, labetalol, lorazepam, LORazepam, methocarbamoL, Flushing PICC/Midline Protocol **AND** sodium chloride 0.9% **AND** sodium chloride 0.9%    Calorie Containing IV Medications:  D5 + 1/2NS + Kcl @ 100ml/hr (408kcals/d)    Recent Labs   Lab 03/21/24 2003 03/22/24  0526 03/23/24  0445 03/24/24  0448 03/25/24  0545 03/26/24  0546 03/27/24  0512 03/27/24  1113 03/28/24  0603   NA  --  137 141 139 139 139 138  --  140   K  --  3.8 3.4* 3.4* 3.4* 3.0* 4.2  --  4.3   CALCIUM  --  7.4* 8.6 8.4 8.5 8.2* 8.0*  --  8.6   MG  --   --  2.00 1.90  --  1.60  --   --   --    CHLORIDE  --  101 101 102  "106 108* 114*  --  115*   CO2  --  27 29 22* 19* 18* 18*  --  18*   BUN  --  7.2* 6.8* 7.8* 7.5* 3.4* <3.0*  --  5.8*   CREATININE  --  0.41* 0.42* 0.38* 0.48* 0.48* 0.45*  --  0.43*   EGFRNORACEVR  --  >60 >60 >60 >60 >60 >60  --  >60   GLUCOSE  --  93 84 73* 80* 83 104  --  92   BILITOT  --   --  1.4  --   --  1.0  --   --   --    ALKPHOS  --   --  244*  --   --  175*  --   --   --    ALT  --   --  21  --   --  21  --   --   --    AST  --   --  58*  --   --  23  --   --   --    ALBUMIN  --  1.7* 2.7*  --   --  2.5*  --   --   --    HGBA1C 5.5  --   --   --   --   --   --   --   --    AMMONIA  --   --   --   --   --   --   --  50.2  --    WBC  --  5.33 6.63 10.50 14.04* 14.62* 8.82  --   --    HGB  --  7.2* 11.2* 12.4 11.6* 11.2* 10.2*  --   --    HCT  --  21.5* 32.4* 35.7* 33.2* 32.3* 30.2*  --   --        Nutrition Orders:  Diet Pureed (IDDSI Level 4) No Straws (assist with feeding as needed), Supervision with Meals; Mildly Thick Liquids (IDDSI Level 2)  Amino acid 4.25% - dextrose 5% (CLINIMIX-E) solution (1L provides 42.5 gm AA, 50 gm CHO (170 kcal/L dextrose), Na 35, K 30, Mg 5, Ca 4.5, Acetate 70, Cl 39, Phos 15)  Dietary nutrition supplements Boost VHC Vanilla; BID    Appetite/Oral Intake: poor/0-25% of meals  Factors Affecting Nutritional Intake: impaired cognitive status/motor control and decreased appetite  Food/Sabianist/Cultural Preferences: none reported  Food Allergies: none reported  Last Bowel Movement: 03/27/24  Wound(s):     Altered Skin Integrity 03/26/24 1000 Right medial Buttocks Other (comment)-Tissue loss description: Partial thickness     Comments    3/21/24 Pt unable to answer questions, ex- at bedside states pt is on a "liquid diet", chronic alcohol abuse. States notable wt loss over the last 9 months, estimating weight around 160lbs. EMR wt history shows 119-124lbs x 1.5 years so unsure accuracy of reported UBW. Did note muscle and fat loss during NFPA. He think she would drink " "oral supplement, will send with meals. PPN infusing at 50ml/hr, recommend increasing rate to better meet nutritional needs while oral intake is inadequate.     3/26/24 NPO from 3/21-3/24 s/t AMS. MBS performed yesterday, SLP recommends puree solids and mildly thick liquids. Pt has not woke up to take medications or po intake. Will change ONS to Boost VHC for mildly thick supplement and leave tube feeding recommendations incase po intake does not improve.     3/28/24 Spoke to ex-, pt ate 75% waffle this morning and a few sips of thickened ice tea, tolerated. PO intake remains inadequate but tolerating meals. PPN started yesterday by MD for supplemental support.     Anthropometrics    Height: 5' 3" (160 cm), Height Method: Stated  Last Weight: 61 kg (134 lb 7.7 oz) (03/21/24 0510), Weight Method: Standard Scale  BMI (Calculated): 23.8  BMI Classification: normal (BMI 18.5-24.9)     Ideal Body Weight (IBW), Female: 115 lb     % Ideal Body Weight, Female (lb): 116.94 %                             Usual Weight Provided By: unable to obtain usual weight    Wt Readings from Last 5 Encounters:   03/21/24 61 kg (134 lb 7.7 oz)   07/21/23 56.3 kg (124 lb 3.2 oz)   03/27/23 54.4 kg (119 lb 14.9 oz)   01/24/23 54.4 kg (119 lb 14.9 oz)   12/15/22 54.4 kg (119 lb 14.9 oz)     Weight Change(s) Since Admission: 3/26-3/28/24 no updated wts   Wt Readings from Last 1 Encounters:   03/21/24 0510 61 kg (134 lb 7.7 oz)   03/20/24 0013 56.7 kg (125 lb)   Admit Weight: 56.7 kg (125 lb) (03/20/24 0013), Weight Method: Stated    Estimated Needs    Weight Used For Calorie Calculations: 61 kg (134 lb 7.7 oz)  Energy Calorie Requirements (kcal): 1830kcals/d (30kcals/kg)  Energy Need Method: Kcal/kg  Weight Used For Protein Calculations: 61 kg (134 lb 7.7 oz)  Protein Requirements: 79g/d (1.3g/kg)  Fluid Requirements (mL): 1525-1830ml fl/d (25-30ml/kg)    Enteral Nutrition     Patient not receiving enteral nutrition at this " time.    Parenteral Nutrition   Standard Formula: Clinimix E 4.25/5  Custom Formula: not applicable  Additives: none  Rate/Volume: 75mL/hr  Lipids: none  Total Nutrition Provided by Parenteral Nutrition:  Calories Provided  612 kcal/d, 33% needs   Protein Provided  77 g/d, 97% needs   Dextrose Provided  90 g/d, GIR 1.02 mg CHO/kg/min   Fluid Provided  1800 ml/d, 100% needs      Patient not receiving parenteral nutrition at this time.    Evaluation of Received Nutrient Intake    Calories: not meeting estimated needs  Protein: meeting estimated needs    Patient Education     Not applicable.    Nutrition Diagnosis     PES: Inadequate oral intake related to acute illness as evidenced by <50% EER since admit. (active)     PES: Moderate chronic disease or condition related malnutrition related to inability to consume sufficient nutrients as evidenced by severe fat depletion and mild muscle depletion. (active)    Nutrition Interventions     Intervention(s): modified composition of meals/snacks, modified composition of enteral nutrition, modified rate of enteral nutrition, modified rate of parenteral nutrition, commercial beverage, multivitamin/mineral supplement therapy, and collaboration with other providers    Goal: Meet greater than 80% of nutritional needs by follow-up. (goal not met)  Goal: Maintain weight throughout hospitalization. (goal progressing)    Nutrition Goals & Monitoring     Dietitian will monitor: energy intake, weight change, electrolyte/renal panel, and glucose/endocrine profile    Nutrition Risk/Follow-Up: high (follow-up in 1-4 days)   Please consult if re-assessment needed sooner.

## 2024-03-28 NOTE — PROGRESS NOTES
[facility]  Hospital Medicine Progress Note      Chief Complaint:  Altered mental status    HPI: (personally reviewed by me and is documented from initial H&P)     64 y.o. female with a PMHx of HTN, HLD, type 2 DM, alcoholic cirrhosis, alcohol abuse and nicotine dependence who presented to [facility] on 3/20/2024 via EMS with c/o worsening confusion x1 day with associated generalized weakness.  The majority of the history was obtained from review of the medical record as the patient was unable to provide any additional history.     Upon presentation to ED, vital signed included /73, , RR 18, SpO2 98% on room air, temperature 98.4° F.  Labs notable for hemoglobin 10.7, hematocrit 31.7, platelets 34, vitamin B12 1958, INR 1.4, CO2 18, BUN 3.5, creatinine 0.5, calcium 7.9, , albumin 1.9, AST 43.  Serum alcohol level undetectable.  EKG demonstrated sinus tachycardia.  CXR demonstrated increased left retrocardiac density with partial silhouetting of the left hemidiaphragm which might be related to an infiltrate/atelectasis.  CT head without contrast negative for acute intracranial findings or significant interval changes compared to August 2022.  CT abdomen and pelvis with IV contrast consistent with cirrhosis, moderate generalized ascites, bilateral pleural fluid right greater than left noted.  She was admitted to hospital medicine service for further medical management.      Interval Hx:   No overnight event no family present at bedside    __________________________________________________________________________________________________________________________________  Objective/physical exam:  Vital signs have been personally reviewed by me   General: Appears comfortable, no acute distress.  Neuro:  No significant verbal response appreciated during my evaluation.  Patient does not follow any commands for me however CNA present at bedside states that she did speak  words    Integumentary: Warm, dry, intact.  Musculoskeletal: Purposeful movement noted.     Respiratory: No accessory muscle use. Breath sounds are equal.  Cardiovascular: Regular rate.       VITAL SIGNS: 24 HRS MIN & MAX LAST   Temp  Min: 96.8 °F (36 °C)  Max: 98 °F (36.7 °C) 97.4 °F (36.3 °C)   BP  Min: 138/73  Max: 181/96 (!) 181/96   Pulse  Min: 83  Max: 94  94   Resp  Min: 16  Max: 20 18   SpO2  Min: 95 %  Max: 97 % 97 %     MRI Brain Without Contrast  Narrative: EXAMINATION:  MRI BRAIN WITHOUT CONTRAST    CLINICAL HISTORY:  Stroke, follow up;  Cerebral infarction, unspecified    TECHNIQUE:  Multiplanar multisequence MR imaging of the brain was performed without contrast.    COMPARISON:  CT head without contrast 03/20/2024.    FINDINGS:  INTRACRANIAL: Right parietal lobe encephalomalacia.  Mild generalized brain atrophy.  Moderate chronic small-vessel ischemic changes of the supratentorial white matter.  No parenchymal restricted diffusion.  No evidence of intracranial hemorrhage.  No extra-axial fluid collection or mass.  No intracranial mass effect.  No hydrocephalus.  Midline structures have a normal configuration.  Visualized pituitary gland and infundibulum are normal.  Visualized major intracranial vascular structures demonstrate normal flow voids and are normal in course and caliber.    SINUSES: Paranasal sinuses and mastoid air cells are clear.    ORBITS: Visualized orbits are normal.  Impression: No acute intracranial abnormality.    Moderate chronic small-vessel ischemic changes of the supratentorial white matter.    Right parietal lobe encephalomalacia.    Mild generalized brain atrophy.    Electronically signed by: Jack Kirkland  Date:    03/28/2024  Time:    06:09    Recent Labs   Lab 03/22/24  0526 03/23/24  0445 03/25/24  0545 03/26/24  0546 03/27/24  0512   WBC 5.33   < > 14.04* 14.62* 8.82   RBC 1.52*   < > 2.86* 2.78* 2.51*   HGB 7.2*   < > 11.6* 11.2* 10.2*   HCT 21.5*   < > 33.2* 32.3* 30.2*    .4*   < > 116.1* 116.2* 120.3*   MCH 47.4*   < > 40.6* 40.3* 40.6*   MCHC 33.5   < > 34.9 34.7 33.8   RDW 16.0  --   --   --   --    PLT 40*   < > 114* 139 142   MPV 12.6*   < > 11.2* 11.2* 11.0*    < > = values in this interval not displayed.       Recent Labs   Lab 03/22/24  0526 03/23/24  0445 03/24/24  0448 03/25/24  0545 03/26/24  0546 03/27/24  0512 03/28/24  0603    141 139   < > 139 138 140   K 3.8 3.4* 3.4*   < > 3.0* 4.2 4.3   CO2 27 29 22*   < > 18* 18* 18*   BUN 7.2* 6.8* 7.8*   < > 3.4* <3.0* 5.8*   CREATININE 0.41* 0.42* 0.38*   < > 0.48* 0.45* 0.43*   CALCIUM 7.4* 8.6 8.4   < > 8.2* 8.0* 8.6   MG  --  2.00 1.90  --  1.60  --   --    ALBUMIN 1.7* 2.7*  --   --  2.5*  --   --    ALKPHOS  --  244*  --   --  175*  --   --    ALT  --  21  --   --  21  --   --    AST  --  58*  --   --  23  --   --    BILITOT  --  1.4  --   --  1.0  --   --     < > = values in this interval not displayed.     Microbiology Results (last 7 days)       Procedure Component Value Units Date/Time    Body Fluid Culture [[id number]] Collected: 03/22/24 1256    Order Status: Completed Specimen: Abdominal Fluid from Abdomen Updated: 03/27/24 1038     Body Fluid Culture Final Report: At 5 days. No growth    Clostridium Diff Toxin, A & B, EIA [[id number]]     Order Status: Sent Specimen: Stool     Urine culture [[id number]]  (Abnormal)  (Susceptibility) Collected: 03/20/24 0958    Order Status: Completed Specimen: Urine Updated: 03/22/24 0651     Urine Culture >/= 100,000 colonies/ml Escherichia coli    Respiratory Culture [[id number]]     Order Status: Sent Specimen: Sputum, Expectorated              Scheduled Med:   amLODIPine  5 mg Oral Daily    ARIPiprazole  2 mg Oral Daily    busPIRone  10 mg Oral TID    carvediloL  6.25 mg Oral BID    EScitalopram oxalate  20 mg Oral Daily    folic acid  1 mg Oral Daily    gabapentin  300 mg Oral TID    multivitamin  1 tablet Oral Daily    pantoprazole  40 mg Oral BID    rifAXIMin   550 mg Oral BID    sodium bicarbonate  650 mg Oral Daily    sodium chloride 0.9%  10 mL Intravenous Q6H    thiamine  100 mg Oral Q8H    zinc oxide-cod liver oil   Topical (Top) Daily      Continuous Infusions:   Amino acid 4.25% - dextrose 5% (CLINIMIX-E) solution (1L provides 42.5 gm AA, 50 gm CHO (170 kcal/L dextrose), Na 35, K 30, Mg 5, Ca 4.5, Acetate 70, Cl 39, Phos 15) 75 mL/hr at 03/28/24 0752        PRN Meds:  0.9%  NaCl infusion (for blood administration), albuterol, chlordiazepoxide, cloNIDine, dextrose 10%, dextrose 10%, glucagon (human recombinant), glucose, glucose, hydrALAZINE, hyoscyamine, insulin aspart U-100, labetalol, lorazepam, LORazepam, methocarbamoL, Flushing PICC/Midline Protocol **AND** sodium chloride 0.9% **AND** sodium chloride 0.9%   __________________________________________________________________________________________________________________________________  Assessment/Plan:  Acute Metabolic encephalopathy  UTI   Anemia of chronic inflammation  Alcoholic cirrhosis   Current alcohol use   Ascites   Thrombocytopenia from underlying cirrhosis  Macrocytic anemia   Hypoalbuminemia   Hypertensive urgency      Above present on admission     Anticipated discharge and Disposition when medically stable:  Pending.   _______________________________________________________________________________________________________________________________    ------altered mental status, etiology not well defined   S/p EEG without evidence of seizure like activity.felt to be secondary to chronic alcohol abuse.     -----cirrhosis with MELD score<14.  No evidence of GI bleed  Abdominal ascites,s/p diagnostic paracentesis, suggestive of cirrhotic etiology  Lactulose discontinued due to persistent liquid stools, considering restarting at lower dose/frequency  Continue rifaximin prophylactically    Continue supportive care  Continue checking vital signs q4hrs.    ---hypertension, continue to monitor and make  adjustments as needed.      Nurse notified to page me if any changes occur   DVT prophylaxis initiated   Nutrition Status:     Consults:  Palliative Care, gastroenterologist  I have personally reviewed the specialist documentation and/or have spoken to the specialist with regard to the care of this patient; recommendations are noted above.     _______________________________________________________________________________________________________________________________    I have spent >30 minutes on the day of the visit; time spent includes face to face time and non-face to face time preparing to see the patient (eg, review of tests), independently reviewing and interpreting medical records, both past and current; documenting clinical information in the electronic or other health record, and communicating results to the patient/family/caregiver and care coordinator and nursing team.      All diagnosis and differential diagnosis have been reviewed,  interpreted and communicated appropriately to care team. assessment and plan has been documented; I have personally reviewed the labs and test results that are presently available and pertinent to this hospital course; I have reviewed medical records based upon their availability.    I will continue to monitor closely and make adjustments to medical management as needed.    Tabatha Stokes,    03/28/2024     This note was created with the assistance of Dragon voice recognition software. There may be transcription errors as a result of using this technology however minimal. Effort has been made to assure accuracy of transcription but any obvious errors or omissions should be clarified with the author of the document.

## 2024-03-28 NOTE — PROGRESS NOTES
[facility] - Neurology  Wound Care    Patient Name:  Luz Maria Alfaro   MRN:  [id number]  Date: 3/28/2024  Diagnosis: <principal problem not specified>    History:     Past Medical History:   Diagnosis Date    Benign essential HTN     Mixed hyperlipidemia     Type 2 diabetes mellitus without complications        Social History     Socioeconomic History    Marital status:    Tobacco Use    Smoking status: Every Day     Current packs/day: 0.50     Types: Cigarettes    Smokeless tobacco: Current   Substance and Sexual Activity    Alcohol use: Yes    Drug use: Never    Sexual activity: Not Currently     Social Determinants of Health     Financial Resource Strain: Low Risk  (3/20/2024)    Overall Financial Resource Strain (CARDIA)     Difficulty of Paying Living Expenses: Not very hard   Food Insecurity: Unknown (3/20/2024)    Hunger Vital Sign     Ran Out of Food in the Last Year: Never true   Transportation Needs: No Transportation Needs (3/20/2024)    PRAPARE - Transportation     Lack of Transportation (Medical): No     Lack of Transportation (Non-Medical): No   Physical Activity: Inactive (3/20/2024)    Exercise Vital Sign     Days of Exercise per Week: 0 days     Minutes of Exercise per Session: 0 min   Stress: Stress Concern Present (3/20/2024)    [employer] [place] [employer] - Occupational Stress Questionnaire     Feeling of Stress : To some extent   Social Connections: Socially Isolated (3/20/2024)    Social Connection and Isolation Panel [NHANES]     Frequency of Communication with Friends and Family: More than three times a week     Frequency of Social Gatherings with Friends and Family: More than three times a week     Attends Synagogue Services: Never     Active Member of Clubs or Organizations: No     Attends Club or Organization Meetings: Never     Marital Status:    Housing Stability: Low Risk  (3/20/2024)    Housing Stability Vital Sign     Unable to Pay for  Housing in the Last Year: No     Number of Places Lived in the Last Year: 1     Unstable Housing in the Last Year: No       Precautions:     Allergies as of 03/20/2024 - Reviewed 03/20/2024   Allergen Reaction Noted    Meperidine Rash and Hives 08/12/2021       [facility] Assessment Details/Treatment        03/28/24 1407   WOCN Assessment   Visit Date 03/28/24   Visit Time 1407   Consult Type New   WO Speciality Wound   Intervention assessed;chart review;orders   Teaching on-going   Skin Interventions   Device Skin Pressure Protection absorbent pad utilized/changed   Pressure Reduction Devices specialty bed utilized  (ordered)        Altered Skin Integrity 03/26/24 1000 Right medial Buttocks Other (comment)   Date First Assessed/Time First Assessed: 03/26/24 1000   Altered Skin Integrity Present on Admission - Did Patient arrive to the hospital with altered skin?: yes  Side: Right  Orientation: medial  Location: Buttocks  Is this injury device related?: (c...   Wound Image    Dressing Appearance Open to air   Drainage Amount None   Drainage Characteristics/Odor No odor   Appearance Red;Moist   Periwound Area Redness;Moist   Wound Edges Irregular   Care Cleansed with:;Sterile normal saline   Dressing Applied        Altered Skin Integrity 03/27/24 2252 Left dorsal Elbow   Date First Assessed/Time First Assessed: 03/27/24 2252   Side: Left  Orientation: dorsal  Location: Elbow   Wound Image    Dressing Appearance Dry;Intact;Clean   Drainage Amount None   Appearance Intact;Red   Tissue loss description Not applicable   Wound Edges   (closed)   Wound Length (cm) 6 cm   Wound Width (cm) 0.4 cm   Wound Surface Area (cm^2) 2.4 cm^2   Care Cleansed with:;Sterile normal saline   Dressing Applied        Altered Skin Integrity 03/27/24 2254 Right dorsal Elbow   Date First Assessed/Time First Assessed: 03/27/24 2254   Side: Right  Orientation: dorsal  Location: Elbow   Wound Image    Dressing Appearance Dry;Intact;Clean   Drainage  Amount None   Appearance Red;Dry   Tissue loss description Not applicable   Periwound Area Intact   Care Cleansed with:;Sterile normal saline   Dressing Applied;Foam        Altered Skin Integrity 03/27/24 2303 Left anterior Ankle   Date First Assessed/Time First Assessed: 03/27/24 2303   Side: Left  Orientation: anterior  Location: Ankle   Wound Image    Dressing Appearance Dry;Intact;Clean   Drainage Amount None   Appearance Intact   Tissue loss description Not applicable   Periwound Area Intact;Redness   Wound Length (cm) 2 cm   Wound Width (cm) 2 cm   Wound Surface Area (cm^2) 4 cm^2   Care Cleansed with:;Sterile normal saline   Dressing Applied;Foam        Altered Skin Integrity 03/27/24 2304 Right anterior Ankle   Date First Assessed/Time First Assessed: 03/27/24 2304   Side: Right  Orientation: anterior  Location: Ankle   Wound Image    Dressing Appearance Dry;Intact;Clean   Drainage Amount None   Drainage Characteristics/Odor No odor   Appearance Red   Tissue loss description Partial thickness   Periwound Area Intact;Redness   Care Cleansed with:;Sterile normal saline   Dressing Applied;Foam     Woundcare consulted for Buttocks, left right elbow, left and right lateral ankles.  Initial evaluation, no family at bedside. Recommendations: Sacrum: Apply Desitin BID and PRN, Bilateral ankle and elbows: Cleanse with NS. Apply mepilex bordered foam. Change every 3 days and PRN. Heel lift boots on while in bed.Nursing to continue with turning every two hours, wedge, and floating heels. Repositioned, head of bed elevated, bed in lowest position. DAV mattress ordered. Will follow up.      03/28/2024

## 2024-03-28 NOTE — CONSULTS
Consults    Patient Name: Luz Maria Alfaro   MRN: [id number]   Admission Date: 3/20/2024   Hospital Length of Stay: 8   Attending Provider: Tabatha Stokes DO   Consulting Provider: France FINK  Reason for Consult: Goals of Care  Primary Care Physician:  Robyn Matthews NP     Principal Problem: <principal problem not specified>       Final diagnoses:  [R41.82] Altered mental status  [G93.40] Acute encephalopathy (Primary)  [D69.6] Thrombocytopenia  [K74.60] Hepatic cirrhosis, unspecified hepatic cirrhosis type, unspecified whether ascites present  [D53.9] Macrocytic anemia      Assessment/Plan:     I reviewed the patient and family's understanding of the seriousness of the illness and its expected prognosis. We discussed the patient's goals of care and treatment preferences.        Advance Care Planning     Date: 03/28/2024    Today a voluntary meeting took place: other (conference room) over the phone    Patient Participation: Patient is unable to participate     Attendees (Name and  Relationship to patient):  son    Staff attendees (Name and  Role): KEN Persaud NP    ACP Conversation (General): Other (specify below) see below     Code Status: Full Code    ACP Documents: None    Goals of care: The family endorses that what is most important right now is to focus on improvement in condition but with limits to invasive therapies    Accordingly, we have decided that the best plan to meet the patient's goals includes continuing with treatment      Recommendations/  Follow-up tasks: Other (specify below) spoke to son on the phone and had extensive conversation about patient's history and current condition.  Son states that he realized the severity of patient's condition..  Discussed code status and the likely poor outcomes that would result from intubation and resuscitation.  He verbalized understanding and informed that he would speak to the rest of his family concerning this.  He expressed that he does  not think that placing patient on life support would provide any quality of life.         Discussed options of continued treatment, but patient's nutrition remains poor and may not be candidate for feeding tube d/t ascites.  Informed that GI is following and will make recommendations.  Discussed option of hospice as well.        I updated son on my conversation with ex- who wants to be involved in decision-making.  Informed that I discussed that son is legal next of kin.    Son states that he talks to his father daily and wants to remain primary decision-maker.  Informed that ex- also wanted me to speak with patient's sister to which son informed that he will speak to her family.  Offered support and informed that palliative would continue to follow.               Interval History:     Patient currently lying in bed and continues to be somnolent.  Palliative care continuing to follow for assistance with plan of care.       Active Ambulatory Problems     Diagnosis Date Noted    Closed right hip fracture s/p Intramedullary nailing  10/26/2022    DVT (deep venous thrombosis) 11/11/2022    Type 2 diabetes mellitus without complications      Resolved Ambulatory Problems     Diagnosis Date Noted    Acute alcoholic intoxication without complication 11/11/2022     Past Medical History:   Diagnosis Date    Benign essential HTN     Mixed hyperlipidemia         Past Surgical History:   Procedure Laterality Date    APPENDECTOMY      BACK SURGERY      HIP SURGERY      INTRAMEDULLARY RODDING OF FEMUR Right 10/27/2022    Procedure: RIGHT FEMUR INTERTROCH IMN;  Surgeon: Stewart Brambila MD;  Location: [facility];  Service: Orthopedics;  Laterality: Right;  [place] table, supine  3rd case  Synthes Short TFNA        Review of patient's allergies indicates:   Allergen Reactions    Meperidine Rash and Hives          Current Facility-Administered Medications:     0.9%  NaCl infusion (for blood administration), , Intravenous,  Q24H PRN, Chaz Harrison MD    albuterol inhaler 1 puff, 1 puff, Inhalation, Q4H PRN, Yvette Saba MD    Amino acid 4.25% - dextrose 5% (CLINIMIX-E) solution (1L provides 42.5 gm AA, 50 gm CHO (170 kcal/L dextrose), Na 35, K 30, Mg 5, Ca 4.5, Acetate 70, Cl 39, Phos 15), , Intravenous, Continuous, Sumaya Calzada PA, Last Rate: 75 mL/hr at 03/28/24 0752, New Bag at 03/28/24 0752    amLODIPine tablet 5 mg, 5 mg, Oral, Daily, Robin Metcalf MD, 5 mg at 03/28/24 0959    ARIPiprazole tablet 2 mg, 2 mg, Oral, Daily, Robin Metcalf MD, 2 mg at 03/28/24 1000    busPIRone tablet 10 mg, 10 mg, Oral, TID, Robin Metcalf MD, 10 mg at 03/28/24 0959    carvediloL tablet 6.25 mg, 6.25 mg, Oral, BID, Robin Metcalf MD, 6.25 mg at 03/28/24 0959    chlordiazepoxide capsule 25 mg, 25 mg, Oral, QID PRN, Gabriela Starkey, AGACNP-BC    cloNIDine tablet 0.1 mg, 0.1 mg, Oral, TID PRN, Toyin Golden, YARIELP    dextrose 10% bolus 125 mL 125 mL, 12.5 g, Intravenous, PRN, Yvette Saba MD, Stopped at 03/26/24 0125    dextrose 10% bolus 250 mL 250 mL, 25 g, Intravenous, PRN, Yvette Saba MD    EScitalopram oxalate tablet 20 mg, 20 mg, Oral, Daily, Robin Metcalf MD, 20 mg at 03/28/24 0959    folic acid tablet 1 mg, 1 mg, Oral, Daily, Yvette Saba MD, 1 mg at 03/28/24 0959    gabapentin capsule 300 mg, 300 mg, Oral, TID, Yvette Saba MD, 300 mg at 03/28/24 0959    glucagon (human recombinant) injection 1 mg, 1 mg, Intramuscular, PRN, Yvette Saba MD    glucose chewable tablet 16 g, 16 g, Oral, PRN, Yvette Saba MD    glucose chewable tablet 24 g, 24 g, Oral, PRN, Yvette Saba MD    hydrALAZINE injection 10 mg, 10 mg, Intravenous, Q4H PRN, Toyin Golden, FNP, 10 mg at 03/25/24 0013    hyoscyamine ODT tablet 0.125 mg, 0.125 mg, Sublingual, TID PRWandy QUEEN Mohana, MD    insulin aspart U-100 injection 0-5 Units, 0-5 Units, Subcutaneous, QID (AC + HS) YANNAN, Yvette Saba MD     "labetaloL injection 10 mg, 10 mg, Intravenous, Q4H PRN, Robin Metcalf MD, 10 mg at 03/27/24 0429    LORazepam injection 1 mg, 1 mg, Intravenous, Q3H PRN, Yvette Saba MD    LORazepam tablet 2 mg, 2 mg, Oral, Q4H PRN, Gabriela Starkey, AGACNP-BC, 2 mg at 03/21/24 1757    methocarbamoL tablet 750 mg, 750 mg, Oral, TID PRN, Yvette Saba MD    multivitamin tablet, 1 tablet, Oral, Daily, Robin Metcalf MD, 1 tablet at 03/28/24 0959    pantoprazole EC tablet 40 mg, 40 mg, Oral, BID, Robin Metcalf MD, 40 mg at 03/28/24 0959    rifAXIMin tablet 550 mg, 550 mg, Oral, BID, Robin Metcalf MD, 550 mg at 03/28/24 1000    sodium bicarbonate tablet 650 mg, 650 mg, Oral, Daily, Robin Metcalf MD, 650 mg at 03/28/24 0959    Flushing PICC/Midline Protocol, , , Until Discontinued **AND** sodium chloride 0.9% flush 10 mL, 10 mL, Intravenous, Q6H, 10 mL at 03/28/24 0607 **AND** sodium chloride 0.9% flush 10 mL, 10 mL, Intravenous, PRN, Chaz Harrison MD    [COMPLETED] thiamine (B-1) 500 mg in dextrose 5 % (D5W) 100 mL IVPB, 500 mg, Intravenous, Q8H, Stopped at 03/22/24 0223 **FOLLOWED BY** thiamine tablet 100 mg, 100 mg, Oral, Q8H, Evgeny Burk MD, 100 mg at 03/28/24 0607    zinc oxide-cod liver oil 40 % paste, , Topical (Top), Daily, Robin Metcalf MD, Given at 03/27/24 1028     0.9%  NaCl infusion (for blood administration), albuterol, chlordiazepoxide, cloNIDine, dextrose 10%, dextrose 10%, glucagon (human recombinant), glucose, glucose, hydrALAZINE, hyoscyamine, insulin aspart U-100, labetalol, lorazepam, LORazepam, methocarbamoL, Flushing PICC/Midline Protocol **AND** sodium chloride 0.9% **AND** sodium chloride 0.9%     No family history on file.       Review of Systems   Unable to perform ROS: Acuity of condition            Objective:   BP (!) 157/88   Pulse 90   Temp 97.7 °F (36.5 °C) (Oral)   Resp 20   Ht 5' 3" (1.6 m)   Wt 61 kg (134 lb 7.7 oz)   SpO2 100% "   BMI 23.82 kg/m²      Physical Exam   Constitutional: She appears ill.   HENT:   Head: Normocephalic.   Eyes: Pupils are equal, round, and reactive to light.   Cardiovascular: Normal rate. Pulmonary:      Effort: Pulmonary effort is normal.     Abdominal: Soft.   Neurological:   somnolent   Skin: Skin is warm. There is pallor.          Review of Symptoms  Review of Symptoms      Symptom Assessment (ESAS 0-10 Scale)  Pain:  0  Dyspnea:  0  Anxiety:  0  Nausea:  0  Depression:  0  Anorexia:  0  Fatigue:  0  Insomnia:  0  Restlessness:  0  Agitation:  0         Bowel Management Plan (BMP):  Yes      Psychosocial/Cultural:   See Palliative Psychosocial Note: Yes  **Primary  to Follow**  Palliative Care  Consult: No      Advance Care Planning   Advance Directives:     Decision Making:  Family answered questions  Goals of Care: The family endorses that what is most important right now is to focus on improvement in condition but with limits to invasive therapies    Accordingly, we have decided that the best plan to meet the patient's goals includes continuing with treatment            PAINAD: NA    Caregiver burden formerly assessed: Yes      No results displayed because visit has over 200 results.               > 50% of 45 min of encounter was spent in chart review, face to face discussion of goals of care, symptom assessment, coordination of care and emotional support.    France SALDIVARP, [facility]  Palliative Medicine  [facility] - Observation Unit

## 2024-03-28 NOTE — PROGRESS NOTES
"Gastroenterology Progress Note      HPI:       3/27/24   - the patient is eating small amounts only with assistance. Her lunch is sitting untouched  - recommend starting clinimix vs. NGT with tube feeds per nutrition recs to provide optimal nutrition. Clinimix ordered to replace IVf  - palliative care being consulted, agree with this     3/28/24  - family is opting for full/curative care, per palliative notes  - clinimix started yesterday.   - still no stool studies obtained, still having diarrhea  - her mentation has improved. She is tolerating her meals  - her abdomen is soft/nontender. No GI complaints now. No family at bedside.     ROS:    Review of Systems   Constitutional:  Negative for fever, malaise/fatigue and weight loss.   Respiratory:  Negative for cough and shortness of breath.    Cardiovascular:  Negative for chest pain, palpitations and leg swelling.   Gastrointestinal:  Positive for diarrhea. Negative for abdominal pain, blood in stool, constipation, heartburn, melena, nausea and vomiting.   Musculoskeletal:  Negative for back pain and myalgias.   Skin:  Negative for rash.   Neurological:  Negative for speech change and focal weakness.   All other systems reviewed and are negative.        Vital Signs:  BP (!) 181/96   Pulse 94   Temp 97.4 °F (36.3 °C) (Oral)   Resp 18   Ht 5' 3" (1.6 m)   Wt 61 kg (134 lb 7.7 oz)   SpO2 97%   BMI 23.82 kg/m²   Body mass index is 23.82 kg/m².    Physical Exam:    Physical Exam  Vitals and nursing note reviewed.   Constitutional:       Appearance: She is ill-appearing.   HENT:      Head: Normocephalic and atraumatic.   Eyes:      General: No scleral icterus.     Extraocular Movements: Extraocular movements intact.   Cardiovascular:      Rate and Rhythm: Normal rate and regular rhythm.      Heart sounds: Normal heart sounds.   Pulmonary:      Effort: Pulmonary effort is normal. No respiratory distress.      Breath sounds: Normal breath sounds.   Abdominal:      " General: Abdomen is flat. Bowel sounds are normal. There is no distension.      Palpations: Abdomen is soft.      Tenderness: There is no abdominal tenderness.   Musculoskeletal:         General: No swelling or deformity. Normal range of motion.      Right lower leg: No edema.      Left lower leg: No edema.   Skin:     General: Skin is warm and dry.   Neurological:      General: No focal deficit present.      Mental Status: She is alert.      Comments: Awake, alert, minimally verbal, sleepy         Labs:  Recent Results (from the past 48 hour(s))   POCT glucose    Collection Time: 03/26/24  3:34 PM   Result Value Ref Range    POCT Glucose 131 (H) 70 - 110 mg/dL   POCT glucose    Collection Time: 03/26/24  9:21 PM   Result Value Ref Range    POCT Glucose 96 70 - 110 mg/dL   Basic Metabolic Panel    Collection Time: 03/27/24  5:12 AM   Result Value Ref Range    Sodium Level 138 136 - 145 mmol/L    Potassium Level 4.2 3.5 - 5.1 mmol/L    Chloride 114 (H) 98 - 107 mmol/L    Carbon Dioxide 18 (L) 23 - 31 mmol/L    Glucose Level 104 82 - 115 mg/dL    Blood Urea Nitrogen <3.0 (L) 9.8 - 20.1 mg/dL    Creatinine 0.45 (L) 0.55 - 1.02 mg/dL    BUN/Creatinine Ratio <7     Calcium Level Total 8.0 (L) 8.4 - 10.2 mg/dL    Anion Gap 6.0 mEq/L    eGFR >60 mls/min/1.73/m2   CBC with Differential    Collection Time: 03/27/24  5:12 AM   Result Value Ref Range    WBC 8.82 4.50 - 11.50 x10(3)/mcL    RBC 2.51 (L) 4.20 - 5.40 x10(6)/mcL    Hgb 10.2 (L) 12.0 - 16.0 g/dL    Hct 30.2 (L) 37.0 - 47.0 %    .3 (H) 80.0 - 94.0 fL    MCH 40.6 (H) 27.0 - 31.0 pg    MCHC 33.8 33.0 - 36.0 g/dL    RDW      Platelet 142 130 - 400 x10(3)/mcL    MPV 11.0 (H) 7.4 - 10.4 fL    Neut % 63.1 %    Lymph % 21.9 %    Mono % 13.8 %    Eos % 0.3 %    Basophil % 0.3 %    Lymph # 1.93 0.6 - 4.6 x10(3)/mcL    Neut # 5.56 2.1 - 9.2 x10(3)/mcL    Mono # 1.22 0.1 - 1.3 x10(3)/mcL    Eos # 0.03 0 - 0.9 x10(3)/mcL    Baso # 0.03 <=0.2 x10(3)/mcL    IG# 0.05 (H) 0 -  0.04 x10(3)/mcL    IG% 0.6 %    NRBC% 0.0 %   POCT glucose    Collection Time: 03/27/24  5:51 AM   Result Value Ref Range    POCT Glucose 91 70 - 110 mg/dL   Ammonia    Collection Time: 03/27/24 11:13 AM   Result Value Ref Range    Ammonia Level 50.2 18.0 - 72.0 umol/L   POCT glucose    Collection Time: 03/27/24  1:13 PM   Result Value Ref Range    POCT Glucose 102 70 - 110 mg/dL   POCT glucose    Collection Time: 03/27/24  6:25 PM   Result Value Ref Range    POCT Glucose 97 70 - 110 mg/dL   POCT glucose    Collection Time: 03/27/24  9:48 PM   Result Value Ref Range    POCT Glucose 138 (H) 70 - 110 mg/dL   Basic Metabolic Panel    Collection Time: 03/28/24  6:03 AM   Result Value Ref Range    Sodium Level 140 136 - 145 mmol/L    Potassium Level 4.3 3.5 - 5.1 mmol/L    Chloride 115 (H) 98 - 107 mmol/L    Carbon Dioxide 18 (L) 23 - 31 mmol/L    Glucose Level 92 82 - 115 mg/dL    Blood Urea Nitrogen 5.8 (L) 9.8 - 20.1 mg/dL    Creatinine 0.43 (L) 0.55 - 1.02 mg/dL    BUN/Creatinine Ratio 13     Calcium Level Total 8.6 8.4 - 10.2 mg/dL    Anion Gap 7.0 mEq/L    eGFR >60 mls/min/1.73/m2   POCT glucose    Collection Time: 03/28/24  6:15 AM   Result Value Ref Range    POCT Glucose 92 70 - 110 mg/dL         Assessment/Plan:     64 year old female known to Dr. Garcia with a pmhx of HTN, HLD, DM, alcohol abuse, chronic anemia, and cirrhosis presents to the ED c/o increased weakness and AMS for past 2 days. Admitted with acute UTI, encephalopathy, acute urinary retention. GI consulted for cirrhosis and drop in hgb.      Cirrhosis  thrombocytopenia  MELD-Na: 12 on 3/21/24  - Origin: ETOH vs PANDA  - Transplant candidacy: unable to obtain hx  - Ascites: moderate generalized ascites present on CT 3/20/24 -new onset this admission. S/p paracentesis 3/23/24 with 800 ml removed. SAAG >1.1 c/w cirrhotic ascites.   - low sodium diet   - presence of varices: last EGD 01/24/24 without varices  - presence of hepatic encephalopathy:  questionable as ammonia is wnL and pt has acute UTI. Neuro following. Continue lactulose  - screening for neoplasm: AFP: wnL; Most recent imaging: no lesion on CT  - Plts: 40,000 --- 114,000   - INR: 1.2     3. Chronic macrocytic anemia- stable  - Hgb 10.7--8.0--7.2 --- +1 unit prbcs--- 11.2 --- 12.4 --- 11.6 -- 10.2   - Plans to transfuse 1u prbcs per primary  - No overt GI bleeding.   - Continue ppi BID     4. Metabolic Encephalopathy - improving  - multifactorial, possible HE element involved  -s/p lactulose treatment which induced diarrhea     5. Urosepsis  - was on zosyn, now PO cipro     6. Diarrhea  - attempt to obtain sample, consider rectal tube or bed pan. D/c lactulose  - possibly due to antibiotics  - xifaxan added yesterday 3/27/24       PATRIA ButtsC  [facility], [facility]

## 2024-03-29 LAB
POCT GLUCOSE: 109 MG/DL (ref 70–110)
POCT GLUCOSE: 93 MG/DL (ref 70–110)
POCT GLUCOSE: 98 MG/DL (ref 70–110)
POCT GLUCOSE: 98 MG/DL (ref 70–110)

## 2024-03-29 PROCEDURE — 63600175 PHARM REV CODE 636 W HCPCS: Performed by: NURSE PRACTITIONER

## 2024-03-29 PROCEDURE — 27000207 HC ISOLATION

## 2024-03-29 PROCEDURE — 97164 PT RE-EVAL EST PLAN CARE: CPT

## 2024-03-29 PROCEDURE — 11000001 HC ACUTE MED/SURG PRIVATE ROOM

## 2024-03-29 PROCEDURE — 25000003 PHARM REV CODE 250: Performed by: PHYSICIAN ASSISTANT

## 2024-03-29 PROCEDURE — 25000003 PHARM REV CODE 250: Performed by: INTERNAL MEDICINE

## 2024-03-29 PROCEDURE — 99900035 HC TECH TIME PER 15 MIN (STAT)

## 2024-03-29 PROCEDURE — 94760 N-INVAS EAR/PLS OXIMETRY 1: CPT

## 2024-03-29 PROCEDURE — 25000003 PHARM REV CODE 250: Performed by: HOSPITALIST

## 2024-03-29 PROCEDURE — 99900031 HC PATIENT EDUCATION (STAT)

## 2024-03-29 PROCEDURE — 21400001 HC TELEMETRY ROOM

## 2024-03-29 PROCEDURE — A4216 STERILE WATER/SALINE, 10 ML: HCPCS | Performed by: HOSPITALIST

## 2024-03-29 RX ORDER — HALOPERIDOL 5 MG/ML
2 INJECTION INTRAMUSCULAR ONCE
Status: COMPLETED | OUTPATIENT
Start: 2024-03-29 | End: 2024-03-29

## 2024-03-29 RX ADMIN — BUSPIRONE HYDROCHLORIDE 10 MG: 5 TABLET ORAL at 09:03

## 2024-03-29 RX ADMIN — CARVEDILOL 6.25 MG: 3.12 TABLET, FILM COATED ORAL at 09:03

## 2024-03-29 RX ADMIN — ARIPIPRAZOLE 2 MG: 2 TABLET ORAL at 09:03

## 2024-03-29 RX ADMIN — LEUCINE, PHENYLALANINE, LYSINE, METHIONINE, ISOLEUCINE, VALINE, HISTIDINE, THREONINE, TRYPTOPHAN, ALANINE, GLYCINE, ARGININE, PROLINE, SERINE, TYROSINE, SODIUM ACETATE, DIBASIC POTASSIUM PHOSPHATE, MAGNESIUM CHLORIDE, SODIUM CHLORIDE, CALCIUM CHLORIDE, DEXTROSE
311; 238; 247; 170; 255; 247; 204; 179; 77; 880; 438; 489; 289; 213; 17; 297; 261; 51; 77; 33; 5 INJECTION INTRAVENOUS at 07:03

## 2024-03-29 RX ADMIN — FOLIC ACID 1 MG: 1 TABLET ORAL at 09:03

## 2024-03-29 RX ADMIN — ESCITALOPRAM OXALATE 20 MG: 10 TABLET ORAL at 09:03

## 2024-03-29 RX ADMIN — RIFAXIMIN 550 MG: 550 TABLET ORAL at 09:03

## 2024-03-29 RX ADMIN — Medication: at 09:03

## 2024-03-29 RX ADMIN — SODIUM BICARBONATE 650 MG TABLET 650 MG: at 09:03

## 2024-03-29 RX ADMIN — THERA TABS 1 TABLET: TAB at 09:03

## 2024-03-29 RX ADMIN — THIAMINE HCL TAB 100 MG 100 MG: 100 TAB at 02:03

## 2024-03-29 RX ADMIN — SODIUM CHLORIDE, PRESERVATIVE FREE 10 ML: 5 INJECTION INTRAVENOUS at 12:03

## 2024-03-29 RX ADMIN — PANTOPRAZOLE SODIUM 40 MG: 40 TABLET, DELAYED RELEASE ORAL at 09:03

## 2024-03-29 RX ADMIN — SODIUM CHLORIDE, PRESERVATIVE FREE 10 ML: 5 INJECTION INTRAVENOUS at 05:03

## 2024-03-29 RX ADMIN — HALOPERIDOL LACTATE 2 MG: 5 INJECTION INTRAMUSCULAR at 01:03

## 2024-03-29 RX ADMIN — GABAPENTIN 300 MG: 300 CAPSULE ORAL at 09:03

## 2024-03-29 RX ADMIN — GABAPENTIN 300 MG: 300 CAPSULE ORAL at 02:03

## 2024-03-29 RX ADMIN — BUSPIRONE HYDROCHLORIDE 10 MG: 5 TABLET ORAL at 02:03

## 2024-03-29 RX ADMIN — THIAMINE HCL TAB 100 MG 100 MG: 100 TAB at 09:03

## 2024-03-29 RX ADMIN — AMLODIPINE BESYLATE 5 MG: 5 TABLET ORAL at 12:03

## 2024-03-29 RX ADMIN — THIAMINE HCL TAB 100 MG 100 MG: 100 TAB at 05:03

## 2024-03-29 NOTE — PROGRESS NOTES
"Gastroenterology Progress Note      HPI:       3/27/24   - the patient is eating small amounts only with assistance. Her lunch is sitting untouched  - recommend starting clinimix vs. NGT with tube feeds per nutrition recs to provide optimal nutrition. Clinimix ordered to replace IVf  - palliative care being consulted, agree with this     3/28/24  - family is opting for full/curative care, per palliative notes  - clinimix started yesterday.   - still no stool studies obtained, still having diarrhea  - her mentation has improved. She is tolerating her meals  - her abdomen is soft/nontender. No GI complaints now. No family at bedside.     3-29-24  Pt pulled her picc out- clinimix was going into the bed  Pt ate all her dinner  Refused her meds  Denies abd pain  BM x 4    ROS:    Review of Systems   Constitutional:  Negative for fever, malaise/fatigue and weight loss.   Respiratory:  Negative for cough and shortness of breath.    Cardiovascular:  Negative for chest pain, palpitations and leg swelling.   Gastrointestinal:  Positive for diarrhea. Negative for abdominal pain, blood in stool, constipation, heartburn, melena, nausea and vomiting.   Musculoskeletal:  Negative for back pain and myalgias.   Skin:  Negative for rash.   Neurological:  Negative for speech change and focal weakness.   All other systems reviewed and are negative.        Vital Signs:  BP (!) 155/79   Pulse 94   Temp 98.4 °F (36.9 °C) (Oral)   Resp 18   Ht 5' 3" (1.6 m)   Wt 61 kg (134 lb 7.7 oz)   SpO2 96%   BMI 23.82 kg/m²   Body mass index is 23.82 kg/m².    Physical Exam:    Physical Exam  Vitals and nursing note reviewed.   Constitutional:       Appearance: She is ill-appearing.   HENT:      Head: Normocephalic and atraumatic.   Eyes:      General: No scleral icterus.     Extraocular Movements: Extraocular movements intact.   Cardiovascular:      Rate and Rhythm: Normal rate and regular rhythm.      Heart sounds: Normal heart sounds. "   Pulmonary:      Effort: Pulmonary effort is normal. No respiratory distress.      Breath sounds: Normal breath sounds.   Abdominal:      General: Abdomen is flat. Bowel sounds are normal. There is no distension.      Palpations: Abdomen is soft.      Tenderness: There is no abdominal tenderness.   Musculoskeletal:         General: No swelling or deformity. Normal range of motion.      Right lower leg: No edema.      Left lower leg: No edema.   Skin:     General: Skin is warm and dry.   Neurological:      General: No focal deficit present.      Mental Status: She is alert.      Comments: Awake, alert, minimally verbal, sleepy         Labs:  Recent Results (from the past 48 hour(s))   Ammonia    Collection Time: 03/27/24 11:13 AM   Result Value Ref Range    Ammonia Level 50.2 18.0 - 72.0 umol/L   POCT glucose    Collection Time: 03/27/24  1:13 PM   Result Value Ref Range    POCT Glucose 102 70 - 110 mg/dL   POCT glucose    Collection Time: 03/27/24  6:25 PM   Result Value Ref Range    POCT Glucose 97 70 - 110 mg/dL   POCT glucose    Collection Time: 03/27/24  9:48 PM   Result Value Ref Range    POCT Glucose 138 (H) 70 - 110 mg/dL   Basic Metabolic Panel    Collection Time: 03/28/24  6:03 AM   Result Value Ref Range    Sodium Level 140 136 - 145 mmol/L    Potassium Level 4.3 3.5 - 5.1 mmol/L    Chloride 115 (H) 98 - 107 mmol/L    Carbon Dioxide 18 (L) 23 - 31 mmol/L    Glucose Level 92 82 - 115 mg/dL    Blood Urea Nitrogen 5.8 (L) 9.8 - 20.1 mg/dL    Creatinine 0.43 (L) 0.55 - 1.02 mg/dL    BUN/Creatinine Ratio 13     Calcium Level Total 8.6 8.4 - 10.2 mg/dL    Anion Gap 7.0 mEq/L    eGFR >60 mls/min/1.73/m2   POCT glucose    Collection Time: 03/28/24  6:15 AM   Result Value Ref Range    POCT Glucose 92 70 - 110 mg/dL   POCT glucose    Collection Time: 03/28/24  8:40 PM   Result Value Ref Range    POCT Glucose 109 70 - 110 mg/dL   POCT glucose    Collection Time: 03/29/24  5:26 AM   Result Value Ref Range    POCT  Glucose 98 70 - 110 mg/dL         Assessment/Plan:     64 year old female known to Dr. Garcia with a pmhx of HTN, HLD, DM, alcohol abuse, chronic anemia, and cirrhosis presents to the ED c/o increased weakness and AMS for past 2 days. Admitted with acute UTI, encephalopathy, acute urinary retention. GI consulted for cirrhosis and drop in hgb.      Cirrhosis  thrombocytopenia  MELD-Na: 12 on 3/21/24  - Origin: ETOH vs PANDA  - Transplant candidacy: unable to obtain hx  - Ascites: moderate generalized ascites present on CT 3/20/24 -new onset this admission. S/p paracentesis 3/23/24 with 800 ml removed. SAAG >1.1 c/w cirrhotic ascites.   - low sodium diet   - presence of varices: last EGD 01/24/24 without varices  - presence of hepatic encephalopathy: questionable as ammonia is wnL and pt has acute UTI. Neuro following. Continue lactulose  - screening for neoplasm: AFP: wnL; Most recent imaging: no lesion on CT  - Plts: 40,000 --- 114,000   - INR: 1.2     3. Chronic macrocytic anemia- stable  - Hgb 10.7--8.0--7.2 --- +1 unit prbcs--- 11.2 --- 12.4 --- 11.6 -- 10.2   - Plans to transfuse 1u prbcs per primary  - No overt GI bleeding.   - Continue ppi BID     4. Metabolic Encephalopathy - improving  - multifactorial, possible HE element involved  -s/p lactulose treatment which induced diarrhea     5. Urosepsis  - was on zosyn, now PO cipro     6. Diarrhea  - attempt to obtain sample, consider rectal tube or bed pan. D/c lactulose  - possibly due to antibiotics  - xifaxan added yesterday 3/27/24     Continue supportive care  Consider adding appetite stimulant - will defer this to the attending  Continue Xifaxan and lactulose  No further GI recs - please call if needed        Sabi Burk NP as scribe for Dr. Chester Degroot

## 2024-03-29 NOTE — NURSING
Patient pulled picc out to right upper arm. Receiving clinimix. Attempted to contact all 3 family members multiple times, no answer. Right forearm 20g placed using US on first attempt. Good blood return. Flushed easily with 10 cc normal saline. Tolerated well.

## 2024-03-29 NOTE — PROGRESS NOTES
[facility]  Hospital Medicine Progress Note      Chief Complaint:  Altered mental status    HPI: (personally reviewed by me and is documented from initial H&P)     64 y.o. female with a PMHx of HTN, HLD, type 2 DM, alcoholic cirrhosis, alcohol abuse and nicotine dependence who presented to [facility] on 3/20/2024 via EMS with c/o worsening confusion x1 day with associated generalized weakness.  The majority of the history was obtained from review of the medical record as the patient was unable to provide any additional history.     Upon presentation to ED, vital signed included /73, , RR 18, SpO2 98% on room air, temperature 98.4° F.  Labs notable for hemoglobin 10.7, hematocrit 31.7, platelets 34, vitamin B12 1958, INR 1.4, CO2 18, BUN 3.5, creatinine 0.5, calcium 7.9, , albumin 1.9, AST 43.  Serum alcohol level undetectable.  EKG demonstrated sinus tachycardia.  CXR demonstrated increased left retrocardiac density with partial silhouetting of the left hemidiaphragm which might be related to an infiltrate/atelectasis.  CT head without contrast negative for acute intracranial findings or significant interval changes compared to August 2022.  CT abdomen and pelvis with IV contrast consistent with cirrhosis, moderate generalized ascites, bilateral pleural fluid right greater than left noted.  She was admitted to hospital medicine service for further medical management.      Interval Hx:   Nurse tells me that patient was significantly disoriented overnight, pulled out her IV not following simple commands.    __________________________________________________________________________________________________________________________________  Objective/physical exam:  Vital signs have been personally reviewed by me   General: Appears comfortable, no acute distress.  Neuro:  No significant verbal response appreciated during my evaluation.  Patient does not follow any commands for  me however CNA present at bedside states that she did speak words    Integumentary: Warm, dry, intact.  Musculoskeletal: Purposeful movement noted.     Respiratory: No accessory muscle use. Breath sounds are equal.  Cardiovascular: Regular rate.       VITAL SIGNS: 24 HRS MIN & MAX LAST   Temp  Min: 97.7 °F (36.5 °C)  Max: 98.4 °F (36.9 °C) 97.7 °F (36.5 °C)   BP  Min: 99/77  Max: 157/88 (!) 146/76   Pulse  Min: 78  Max: 94  85   Resp  Min: 18  Max: 20 18   SpO2  Min: 96 %  Max: 100 % 98 %     MRI Brain Without Contrast  Narrative: EXAMINATION:  MRI BRAIN WITHOUT CONTRAST    CLINICAL HISTORY:  Stroke, follow up;  Cerebral infarction, unspecified    TECHNIQUE:  Multiplanar multisequence MR imaging of the brain was performed without contrast.    COMPARISON:  CT head without contrast 03/20/2024.    FINDINGS:  INTRACRANIAL: Right parietal lobe encephalomalacia.  Mild generalized brain atrophy.  Moderate chronic small-vessel ischemic changes of the supratentorial white matter.  No parenchymal restricted diffusion.  No evidence of intracranial hemorrhage.  No extra-axial fluid collection or mass.  No intracranial mass effect.  No hydrocephalus.  Midline structures have a normal configuration.  Visualized pituitary gland and infundibulum are normal.  Visualized major intracranial vascular structures demonstrate normal flow voids and are normal in course and caliber.    SINUSES: Paranasal sinuses and mastoid air cells are clear.    ORBITS: Visualized orbits are normal.  Impression: No acute intracranial abnormality.    Moderate chronic small-vessel ischemic changes of the supratentorial white matter.    Right parietal lobe encephalomalacia.    Mild generalized brain atrophy.    Electronically signed by: Jack Krikland  Date:    03/28/2024  Time:    06:09    Recent Labs   Lab 03/25/24  0545 03/26/24  0546 03/27/24  0512   WBC 14.04* 14.62* 8.82   RBC 2.86* 2.78* 2.51*   HGB 11.6* 11.2* 10.2*   HCT 33.2* 32.3* 30.2*   .1*  116.2* 120.3*   MCH 40.6* 40.3* 40.6*   MCHC 34.9 34.7 33.8   * 139 142   MPV 11.2* 11.2* 11.0*       Recent Labs   Lab 03/23/24  0445 03/24/24  0448 03/25/24  0545 03/26/24  0546 03/27/24  0512 03/28/24  0603    139   < > 139 138 140   K 3.4* 3.4*   < > 3.0* 4.2 4.3   CO2 29 22*   < > 18* 18* 18*   BUN 6.8* 7.8*   < > 3.4* <3.0* 5.8*   CREATININE 0.42* 0.38*   < > 0.48* 0.45* 0.43*   CALCIUM 8.6 8.4   < > 8.2* 8.0* 8.6   MG 2.00 1.90  --  1.60  --   --    ALBUMIN 2.7*  --   --  2.5*  --   --    ALKPHOS 244*  --   --  175*  --   --    ALT 21  --   --  21  --   --    AST 58*  --   --  23  --   --    BILITOT 1.4  --   --  1.0  --   --     < > = values in this interval not displayed.     Microbiology Results (last 7 days)       Procedure Component Value Units Date/Time    Body Fluid Culture [[id number]] Collected: 03/22/24 1256    Order Status: Completed Specimen: Abdominal Fluid from Abdomen Updated: 03/27/24 1038     Body Fluid Culture Final Report: At 5 days. No growth    Clostridium Diff Toxin, A & B, EIA [[id number]]     Order Status: Canceled Specimen: Stool              Scheduled Med:   amLODIPine  5 mg Oral Daily    ARIPiprazole  2 mg Oral Daily    busPIRone  10 mg Oral TID    carvediloL  6.25 mg Oral BID    EScitalopram oxalate  20 mg Oral Daily    folic acid  1 mg Oral Daily    gabapentin  300 mg Oral TID    multivitamin  1 tablet Oral Daily    pantoprazole  40 mg Oral BID    rifAXIMin  550 mg Oral BID    sodium bicarbonate  650 mg Oral Daily    sodium chloride 0.9%  10 mL Intravenous Q6H    thiamine  100 mg Oral Q8H    zinc oxide-cod liver oil   Topical (Top) Daily      Continuous Infusions:   Amino acid 4.25% - dextrose 5% (CLINIMIX-E) solution (1L provides 42.5 gm AA, 50 gm CHO (170 kcal/L dextrose), Na 35, K 30, Mg 5, Ca 4.5, Acetate 70, Cl 39, Phos 15) 75 mL/hr at 03/28/24 6378        PRN Meds:  0.9%  NaCl infusion (for blood administration), albuterol, chlordiazepoxide, cloNIDine, dextrose  10%, dextrose 10%, glucagon (human recombinant), glucose, glucose, hydrALAZINE, hyoscyamine, insulin aspart U-100, labetalol, lorazepam, LORazepam, methocarbamoL, Flushing PICC/Midline Protocol **AND** sodium chloride 0.9% **AND** sodium chloride 0.9%   __________________________________________________________________________________________________________________________________  Assessment/Plan:  Acute Metabolic encephalopathy  UTI   Anemia of chronic inflammation  Alcoholic cirrhosis   Current alcohol use   Ascites   Thrombocytopenia from underlying cirrhosis  Macrocytic anemia   Hypoalbuminemia   Hypertensive urgency      Above present on admission     Anticipated discharge and Disposition when medically stable:  Pending.   _______________________________________________________________________________________________________________________________    ------altered mental status, etiology not well defined,  No improvement in mental status, will repeat ammonia level (lactulose is currently on hold due to severe diarrhea however I intend to restart based on ammonia level)  S/p EEG without evidence of seizure like activity.felt to be secondary to chronic alcohol abuse.     -----cirrhosis with MELD score<14.  No evidence of GI bleed  Abdominal ascites,s/p diagnostic paracentesis, suggestive of cirrhotic etiology  Lactulose discontinued due to persistent liquid stools, considering restarting at lower dose/frequency  Continue rifaximin prophylactically    Continue supportive care  Continue checking vital signs q4hrs.    ---hypertension, continue to monitor and make adjustments as needed.      Nurse notified to page me if any changes occur   DVT prophylaxis initiated   Nutrition Status:     Consults:  Palliative Care, gastroenterologist  I have personally reviewed the specialist documentation and/or have spoken to the specialist with regard to the care of this patient; recommendations are noted above.      _______________________________________________________________________________________________________________________________    I have spent >30 minutes on the day of the visit; time spent includes face to face time and non-face to face time preparing to see the patient (eg, review of tests), independently reviewing and interpreting medical records, both past and current; documenting clinical information in the electronic or other health record, and communicating results to the patient/family/caregiver and care coordinator and nursing team.      All diagnosis and differential diagnosis have been reviewed,  interpreted and communicated appropriately to care team. assessment and plan has been documented; I have personally reviewed the labs and test results that are presently available and pertinent to this hospital course; I have reviewed medical records based upon their availability.    I will continue to monitor closely and make adjustments to medical management as needed.    Tabatha Stokes,    03/29/2024     This note was created with the assistance of Dragon voice recognition software. There may be transcription errors as a result of using this technology however minimal. Effort has been made to assure accuracy of transcription but any obvious errors or omissions should be clarified with the author of the document.

## 2024-03-29 NOTE — PT/OT/SLP RE-EVAL
Physical Therapy Re-Evaluation    Patient Name:  Luz Maria Alfaro   MRN:  [id number]    Recommendations:     Discharge therapy intensity: Moderate Intensity Therapy   Discharge Equipment Recommendations: to be determined by next level of care   Barriers to discharge: Decreased caregiver support, Impaired mobility, and Ongoing medical needs    Assessment:     Luz Maria Alfaro is a 64 y.o. female admitted with a medical diagnosis of acute encephalopathy, suspected metabolic form UTI vs. Alcohol withdrawal; metabolic acidosis, alcoholic cirrhosis, ascites, anemia .  She presents with the following impairments/functional limitations: weakness, impaired endurance, impaired self care skills, impaired functional mobility, gait instability. Pt with fair response to treatment. Able to further engage in mobility for out of bed transfer, however difficulty with motor planning and following command. PT to decrease frequency to 3x/week due to poor pt engagement in treatment sessions since SOC. Will reassess and adjust as needed.    Rehab Prognosis: Fair; patient would benefit from acute skilled PT services to address these deficits and reach maximum level of function.    Recent Surgery: * No surgery found *      Plan:     During this hospitalization, patient to be seen 3 x/week to address the identified rehab impairments via gait training, therapeutic activities, therapeutic exercises and progress toward the following goals:    Plan of Care Expires:  04/21/24    PT/PTA conference to discuss PT POC and patient's progression towards goals held with Kaley Lo PTA.     Subjective     Chief Complaint: needing to use the bathroom  Patient/Family Comments/goals: none stated  Pain/Comfort:  Pain Rating 1: 0/10  Pain Rating Post-Intervention 1: 0/10    Patients cultural, spiritual, [employer] conflicts given the current situation: no    Objective:     Communicated with nurse prior to session.  Patient found HOB elevated with  peripheral IV, telemetry  upon PT entry to room.    General Precautions: Standard, fall  Orthopedic Precautions:N/A   Braces: N/A  Respiratory Status: Room air      Exams:  Cognitive Exam:  Patient is oriented to Person and did not answer further orientation questions  Gross Motor Coordination:  impaired  RLE Strength: WFL  LLE Strength: WFL  Skin integrity: Visible skin intact      Functional Mobility:  Bed Mobility:     Supine to Sit: minimum assistance  Sit to Supine: moderate assistance  Transfers:     Sit to Stand:  minimum assistance with rolling walker  Gait: 5 steps to HOB with RW moderate assist requiring hand over hand assist to advance RW. Pt with no initiation of movement      AM-PAC 6 CLICK MOBILITY  Total Score:15       Treatment & Education:  Pt transferred to [facility]. (+) void. Static stand with UE support on RW. PT assisted with annamarie and donning brief.    Patient provided with verbal education and demonstrations education regarding PT role/goals/POC, fall prevention, and safety awareness.  Additional teaching is warranted.     Patient left HOB elevated with all lines intact, call button in reach, bed alarm on, and nurse notified.    GOALS:   Multidisciplinary Problems       Physical Therapy Goals          Problem: Physical Therapy    Goal Priority Disciplines Outcome Goal Variances Interventions   Physical Therapy Goal     PT, PT/OT Ongoing, Not Progressing     Description: Goals to be met by: 24     Patient will increase functional independence with mobility by performin. Supine to sit with MInimal Assistance  2. Sit to stand transfer with Minimal Assistance  3. Bed to chair transfer with Minimal Assistance using Rolling Walker  4. Gait  x 150 feet with Minimal Assistance using Rolling Walker.                          History:     Past Medical History:   Diagnosis Date    Benign essential HTN     Mixed hyperlipidemia     Type 2 diabetes mellitus without complications        Past  Surgical History:   Procedure Laterality Date    APPENDECTOMY      BACK SURGERY      HIP SURGERY      INTRAMEDULLARY RODDING OF FEMUR Right 10/27/2022    Procedure: RIGHT FEMUR INTERTROCH IMN;  Surgeon: Stewart Brambila MD;  Location: [facility];  Service: Orthopedics;  Laterality: Right;  [place] table, supine  3rd case  [employer] Short TFNA       Time Tracking:     PT Received On: 03/29/24  PT Start Time: 1312     PT Stop Time: 1333  PT Total Time (min): 21 min     Billable Minutes: Re-eval 21 min      03/29/2024

## 2024-03-30 LAB
ALBUMIN SERPL-MCNC: 2.6 G/DL (ref 3.4–4.8)
ALBUMIN/GLOB SERPL: 0.8 RATIO (ref 1.1–2)
ALP SERPL-CCNC: 123 UNIT/L (ref 40–150)
ALT SERPL-CCNC: 17 UNIT/L (ref 0–55)
AMMONIA PLAS-MSCNC: 24.6 UMOL/L (ref 18–72)
AST SERPL-CCNC: 22 UNIT/L (ref 5–34)
BILIRUB SERPL-MCNC: 0.5 MG/DL
BUN SERPL-MCNC: 12.4 MG/DL (ref 9.8–20.1)
CALCIUM SERPL-MCNC: 8.9 MG/DL (ref 8.4–10.2)
CHLORIDE SERPL-SCNC: 105 MMOL/L (ref 98–107)
CO2 SERPL-SCNC: 25 MMOL/L (ref 23–31)
CREAT SERPL-MCNC: 0.48 MG/DL (ref 0.55–1.02)
GFR SERPLBLD CREATININE-BSD FMLA CKD-EPI: >60 MLS/MIN/1.73/M2
GLOBULIN SER-MCNC: 3.1 GM/DL (ref 2.4–3.5)
GLUCOSE SERPL-MCNC: 89 MG/DL (ref 82–115)
POCT GLUCOSE: 120 MG/DL (ref 70–110)
POCT GLUCOSE: 88 MG/DL (ref 70–110)
POCT GLUCOSE: 95 MG/DL (ref 70–110)
POTASSIUM SERPL-SCNC: 4 MMOL/L (ref 3.5–5.1)
PROT SERPL-MCNC: 5.7 GM/DL (ref 5.8–7.6)
SODIUM SERPL-SCNC: 139 MMOL/L (ref 136–145)

## 2024-03-30 PROCEDURE — 80053 COMPREHEN METABOLIC PANEL: CPT | Performed by: STUDENT IN AN ORGANIZED HEALTH CARE EDUCATION/TRAINING PROGRAM

## 2024-03-30 PROCEDURE — 25000003 PHARM REV CODE 250: Performed by: HOSPITALIST

## 2024-03-30 PROCEDURE — 63600175 PHARM REV CODE 636 W HCPCS: Performed by: NURSE PRACTITIONER

## 2024-03-30 PROCEDURE — 25000003 PHARM REV CODE 250: Performed by: INTERNAL MEDICINE

## 2024-03-30 PROCEDURE — A4216 STERILE WATER/SALINE, 10 ML: HCPCS | Performed by: HOSPITALIST

## 2024-03-30 PROCEDURE — 25000003 PHARM REV CODE 250: Performed by: STUDENT IN AN ORGANIZED HEALTH CARE EDUCATION/TRAINING PROGRAM

## 2024-03-30 PROCEDURE — 21400001 HC TELEMETRY ROOM

## 2024-03-30 PROCEDURE — 82140 ASSAY OF AMMONIA: CPT | Performed by: STUDENT IN AN ORGANIZED HEALTH CARE EDUCATION/TRAINING PROGRAM

## 2024-03-30 PROCEDURE — 11000001 HC ACUTE MED/SURG PRIVATE ROOM

## 2024-03-30 RX ORDER — HALOPERIDOL 5 MG/ML
2 INJECTION INTRAMUSCULAR ONCE
Status: COMPLETED | OUTPATIENT
Start: 2024-03-30 | End: 2024-03-30

## 2024-03-30 RX ADMIN — Medication: at 09:03

## 2024-03-30 RX ADMIN — GABAPENTIN 300 MG: 300 CAPSULE ORAL at 10:03

## 2024-03-30 RX ADMIN — THERA TABS 1 TABLET: TAB at 09:03

## 2024-03-30 RX ADMIN — CARVEDILOL 6.25 MG: 3.12 TABLET, FILM COATED ORAL at 09:03

## 2024-03-30 RX ADMIN — SODIUM CHLORIDE, PRESERVATIVE FREE 10 ML: 5 INJECTION INTRAVENOUS at 05:03

## 2024-03-30 RX ADMIN — ARIPIPRAZOLE 2 MG: 2 TABLET ORAL at 09:03

## 2024-03-30 RX ADMIN — RIFAXIMIN 550 MG: 550 TABLET ORAL at 09:03

## 2024-03-30 RX ADMIN — LEUCINE, PHENYLALANINE, LYSINE, METHIONINE, ISOLEUCINE, VALINE, HISTIDINE, THREONINE, TRYPTOPHAN, ALANINE, GLYCINE, ARGININE, PROLINE, SERINE, TYROSINE, SODIUM ACETATE, DIBASIC POTASSIUM PHOSPHATE, MAGNESIUM CHLORIDE, SODIUM CHLORIDE, CALCIUM CHLORIDE, DEXTROSE
311; 238; 247; 170; 255; 247; 204; 179; 77; 880; 438; 489; 289; 213; 17; 297; 261; 51; 77; 33; 5 INJECTION INTRAVENOUS at 10:03

## 2024-03-30 RX ADMIN — LEUCINE, PHENYLALANINE, LYSINE, METHIONINE, ISOLEUCINE, VALINE, HISTIDINE, THREONINE, TRYPTOPHAN, ALANINE, GLYCINE, ARGININE, PROLINE, SERINE, TYROSINE, SODIUM ACETATE, DIBASIC POTASSIUM PHOSPHATE, MAGNESIUM CHLORIDE, SODIUM CHLORIDE, CALCIUM CHLORIDE, DEXTROSE
311; 238; 247; 170; 255; 247; 204; 179; 77; 880; 438; 489; 289; 213; 17; 297; 261; 51; 77; 33; 5 INJECTION INTRAVENOUS at 09:03

## 2024-03-30 RX ADMIN — BUSPIRONE HYDROCHLORIDE 10 MG: 5 TABLET ORAL at 09:03

## 2024-03-30 RX ADMIN — THIAMINE HCL TAB 100 MG 100 MG: 100 TAB at 10:03

## 2024-03-30 RX ADMIN — RIFAXIMIN 550 MG: 550 TABLET ORAL at 10:03

## 2024-03-30 RX ADMIN — HALOPERIDOL LACTATE 2 MG: 5 INJECTION, SOLUTION INTRAMUSCULAR at 12:03

## 2024-03-30 RX ADMIN — HALOPERIDOL LACTATE 2 MG: 5 INJECTION, SOLUTION INTRAMUSCULAR at 10:03

## 2024-03-30 RX ADMIN — BUSPIRONE HYDROCHLORIDE 10 MG: 5 TABLET ORAL at 10:03

## 2024-03-30 RX ADMIN — GABAPENTIN 300 MG: 300 CAPSULE ORAL at 09:03

## 2024-03-30 RX ADMIN — CARVEDILOL 6.25 MG: 3.12 TABLET, FILM COATED ORAL at 10:03

## 2024-03-30 RX ADMIN — PANTOPRAZOLE SODIUM 40 MG: 40 TABLET, DELAYED RELEASE ORAL at 09:03

## 2024-03-30 RX ADMIN — HYDRALAZINE HYDROCHLORIDE 10 MG: 20 INJECTION INTRAMUSCULAR; INTRAVENOUS at 03:03

## 2024-03-30 RX ADMIN — FOLIC ACID 1 MG: 1 TABLET ORAL at 09:03

## 2024-03-30 RX ADMIN — SODIUM CHLORIDE, PRESERVATIVE FREE 10 ML: 5 INJECTION INTRAVENOUS at 06:03

## 2024-03-30 RX ADMIN — SODIUM BICARBONATE 650 MG TABLET 650 MG: at 09:03

## 2024-03-30 RX ADMIN — SODIUM CHLORIDE, PRESERVATIVE FREE 10 ML: 5 INJECTION INTRAVENOUS at 12:03

## 2024-03-30 RX ADMIN — AMLODIPINE BESYLATE 5 MG: 5 TABLET ORAL at 09:03

## 2024-03-30 RX ADMIN — PANTOPRAZOLE SODIUM 40 MG: 40 TABLET, DELAYED RELEASE ORAL at 10:03

## 2024-03-30 RX ADMIN — ESCITALOPRAM OXALATE 20 MG: 10 TABLET ORAL at 09:03

## 2024-03-31 LAB
POCT GLUCOSE: 108 MG/DL (ref 70–110)
POCT GLUCOSE: 112 MG/DL (ref 70–110)
POCT GLUCOSE: 121 MG/DL (ref 70–110)
POCT GLUCOSE: 131 MG/DL (ref 70–110)

## 2024-03-31 PROCEDURE — 21400001 HC TELEMETRY ROOM

## 2024-03-31 PROCEDURE — 25000003 PHARM REV CODE 250: Performed by: STUDENT IN AN ORGANIZED HEALTH CARE EDUCATION/TRAINING PROGRAM

## 2024-03-31 PROCEDURE — 25000003 PHARM REV CODE 250: Performed by: HOSPITALIST

## 2024-03-31 PROCEDURE — 25000003 PHARM REV CODE 250: Performed by: INTERNAL MEDICINE

## 2024-03-31 PROCEDURE — 11000001 HC ACUTE MED/SURG PRIVATE ROOM

## 2024-03-31 PROCEDURE — A4216 STERILE WATER/SALINE, 10 ML: HCPCS | Performed by: HOSPITALIST

## 2024-03-31 RX ORDER — ESCITALOPRAM OXALATE 5 MG/1
5 TABLET ORAL DAILY
Status: DISCONTINUED | OUTPATIENT
Start: 2024-04-01 | End: 2024-04-17 | Stop reason: HOSPADM

## 2024-03-31 RX ORDER — BUSPIRONE HYDROCHLORIDE 5 MG/1
5 TABLET ORAL 2 TIMES DAILY
Status: DISCONTINUED | OUTPATIENT
Start: 2024-03-31 | End: 2024-04-17 | Stop reason: HOSPADM

## 2024-03-31 RX ORDER — GABAPENTIN 100 MG/1
100 CAPSULE ORAL 3 TIMES DAILY
Status: DISCONTINUED | OUTPATIENT
Start: 2024-03-31 | End: 2024-04-17 | Stop reason: HOSPADM

## 2024-03-31 RX ADMIN — FOLIC ACID 1 MG: 1 TABLET ORAL at 08:03

## 2024-03-31 RX ADMIN — PANTOPRAZOLE SODIUM 40 MG: 40 TABLET, DELAYED RELEASE ORAL at 08:03

## 2024-03-31 RX ADMIN — CARVEDILOL 6.25 MG: 3.12 TABLET, FILM COATED ORAL at 10:03

## 2024-03-31 RX ADMIN — GABAPENTIN 300 MG: 300 CAPSULE ORAL at 08:03

## 2024-03-31 RX ADMIN — RIFAXIMIN 550 MG: 550 TABLET ORAL at 08:03

## 2024-03-31 RX ADMIN — ESCITALOPRAM OXALATE 20 MG: 10 TABLET ORAL at 08:03

## 2024-03-31 RX ADMIN — PANTOPRAZOLE SODIUM 40 MG: 40 TABLET, DELAYED RELEASE ORAL at 10:03

## 2024-03-31 RX ADMIN — SODIUM BICARBONATE 650 MG TABLET 650 MG: at 08:03

## 2024-03-31 RX ADMIN — THERA TABS 1 TABLET: TAB at 08:03

## 2024-03-31 RX ADMIN — BUSPIRONE HYDROCHLORIDE 10 MG: 5 TABLET ORAL at 08:03

## 2024-03-31 RX ADMIN — BUSPIRONE HYDROCHLORIDE 5 MG: 5 TABLET ORAL at 10:03

## 2024-03-31 RX ADMIN — SODIUM CHLORIDE, PRESERVATIVE FREE 10 ML: 5 INJECTION INTRAVENOUS at 05:03

## 2024-03-31 RX ADMIN — LEUCINE, PHENYLALANINE, LYSINE, METHIONINE, ISOLEUCINE, VALINE, HISTIDINE, THREONINE, TRYPTOPHAN, ALANINE, GLYCINE, ARGININE, PROLINE, SERINE, TYROSINE, SODIUM ACETATE, DIBASIC POTASSIUM PHOSPHATE, MAGNESIUM CHLORIDE, SODIUM CHLORIDE, CALCIUM CHLORIDE, DEXTROSE
311; 238; 247; 170; 255; 247; 204; 179; 77; 880; 438; 489; 289; 213; 17; 297; 261; 51; 77; 33; 5 INJECTION INTRAVENOUS at 02:03

## 2024-03-31 RX ADMIN — CARVEDILOL 6.25 MG: 3.12 TABLET, FILM COATED ORAL at 08:03

## 2024-03-31 RX ADMIN — Medication: at 08:03

## 2024-03-31 RX ADMIN — SODIUM CHLORIDE, PRESERVATIVE FREE 10 ML: 5 INJECTION INTRAVENOUS at 12:03

## 2024-03-31 RX ADMIN — AMLODIPINE BESYLATE 5 MG: 5 TABLET ORAL at 08:03

## 2024-03-31 RX ADMIN — ARIPIPRAZOLE 2 MG: 2 TABLET ORAL at 08:03

## 2024-03-31 NOTE — PROGRESS NOTES
[facility]  Hospital Medicine Progress Note      Chief Complaint:  Altered mental status    HPI: (personally reviewed by me and is documented from initial H&P)     64 y.o. female with a PMHx of HTN, HLD, type 2 DM, alcoholic cirrhosis, alcohol abuse and nicotine dependence who presented to [facility] on 3/20/2024 via EMS with c/o worsening confusion x1 day with associated generalized weakness.  The majority of the history was obtained from review of the medical record as the patient was unable to provide any additional history.     Upon presentation to ED, vital signed included /73, , RR 18, SpO2 98% on room air, temperature 98.4° F.  Labs notable for hemoglobin 10.7, hematocrit 31.7, platelets 34, vitamin B12 1958, INR 1.4, CO2 18, BUN 3.5, creatinine 0.5, calcium 7.9, , albumin 1.9, AST 43.  Serum alcohol level undetectable.  EKG demonstrated sinus tachycardia.  CXR demonstrated increased left retrocardiac density with partial silhouetting of the left hemidiaphragm which might be related to an infiltrate/atelectasis.  CT head without contrast negative for acute intracranial findings or significant interval changes compared to August 2022.  CT abdomen and pelvis with IV contrast consistent with cirrhosis, moderate generalized ascites, bilateral pleural fluid right greater than left noted.  She was admitted to hospital medicine service for further medical management.      Interval Hx:   No overnight events; no new complaints.   __________________________________________________________________________________________________________________________________    Objective/physical exam:  Vital signs have been personally reviewed by me   General: Appears comfortable, no acute distress.  Neuro:  Awake and alert, verbal response appreciated.    Integumentary: Warm, dry, intact.  Musculoskeletal: Purposeful movement noted.     Respiratory: No accessory muscle use. Breath sounds  are equal.  Cardiovascular: Regular rate.       VITAL SIGNS: 24 HRS MIN & MAX LAST   Temp  Min: 97.8 °F (36.6 °C)  Max: 98.6 °F (37 °C) 97.8 °F (36.6 °C)   BP  Min: 100/54  Max: 184/76 110/73   Pulse  Min: 71  Max: 112  79   Resp  Min: 18  Max: 22 18   SpO2  Min: 94 %  Max: 97 % (!) 94 %     MRI Brain Without Contrast  Narrative: EXAMINATION:  MRI BRAIN WITHOUT CONTRAST    CLINICAL HISTORY:  Stroke, follow up;  Cerebral infarction, unspecified    TECHNIQUE:  Multiplanar multisequence MR imaging of the brain was performed without contrast.    COMPARISON:  CT head without contrast 03/20/2024.    FINDINGS:  INTRACRANIAL: Right parietal lobe encephalomalacia.  Mild generalized brain atrophy.  Moderate chronic small-vessel ischemic changes of the supratentorial white matter.  No parenchymal restricted diffusion.  No evidence of intracranial hemorrhage.  No extra-axial fluid collection or mass.  No intracranial mass effect.  No hydrocephalus.  Midline structures have a normal configuration.  Visualized pituitary gland and infundibulum are normal.  Visualized major intracranial vascular structures demonstrate normal flow voids and are normal in course and caliber.    SINUSES: Paranasal sinuses and mastoid air cells are clear.    ORBITS: Visualized orbits are normal.  Impression: No acute intracranial abnormality.    Moderate chronic small-vessel ischemic changes of the supratentorial white matter.    Right parietal lobe encephalomalacia.    Mild generalized brain atrophy.    Electronically signed by: Jack Kirkland  Date:    03/28/2024  Time:    06:09    Recent Labs   Lab 03/25/24  0545 03/26/24  0546 03/27/24  0512   WBC 14.04* 14.62* 8.82   RBC 2.86* 2.78* 2.51*   HGB 11.6* 11.2* 10.2*   HCT 33.2* 32.3* 30.2*   .1* 116.2* 120.3*   MCH 40.6* 40.3* 40.6*   MCHC 34.9 34.7 33.8   * 139 142   MPV 11.2* 11.2* 11.0*       Recent Labs   Lab 03/26/24  0546 03/27/24  0512 03/28/24  0603 03/30/24  0926    138 140  139   K 3.0* 4.2 4.3 4.0   CO2 18* 18* 18* 25   BUN 3.4* <3.0* 5.8* 12.4   CREATININE 0.48* 0.45* 0.43* 0.48*   CALCIUM 8.2* 8.0* 8.6 8.9   MG 1.60  --   --   --    ALBUMIN 2.5*  --   --  2.6*   ALKPHOS 175*  --   --  123   ALT 21  --   --  17   AST 23  --   --  22   BILITOT 1.0  --   --  0.5     Microbiology Results (last 7 days)       Procedure Component Value Units Date/Time    Body Fluid Culture [[id number]] Collected: 03/22/24 1256    Order Status: Completed Specimen: Abdominal Fluid from Abdomen Updated: 03/27/24 1038     Body Fluid Culture Final Report: At 5 days. No growth    Clostridium Diff Toxin, A & B, EIA [[id number]]     Order Status: Canceled Specimen: Stool              Scheduled Med:   amLODIPine  5 mg Oral Daily    ARIPiprazole  2 mg Oral Daily    busPIRone  10 mg Oral TID    carvediloL  6.25 mg Oral BID    EScitalopram oxalate  20 mg Oral Daily    folic acid  1 mg Oral Daily    gabapentin  300 mg Oral TID    multivitamin  1 tablet Oral Daily    pantoprazole  40 mg Oral BID    rifAXIMin  550 mg Oral BID    sodium bicarbonate  650 mg Oral Daily    sodium chloride 0.9%  10 mL Intravenous Q6H    thiamine  100 mg Oral Q8H    zinc oxide-cod liver oil   Topical (Top) Daily      Continuous Infusions:         PRN Meds:  0.9%  NaCl infusion (for blood administration), albuterol, dextrose 10%, dextrose 10%, glucagon (human recombinant), glucose, glucose, hydrALAZINE, hyoscyamine, insulin aspart U-100, labetalol, methocarbamoL, Flushing PICC/Midline Protocol **AND** sodium chloride 0.9% **AND** sodium chloride 0.9%   __________________________________________________________________________________________________________________________________  Assessment/Plan:  Acute Metabolic encephalopathy  UTI   Anemia of chronic inflammation  Alcoholic cirrhosis   Current alcohol use   Ascites   Thrombocytopenia from underlying cirrhosis  Macrocytic anemia   Hypoalbuminemia   Hypertensive urgency      Above present on  admission     Anticipated discharge and Disposition when medically stable:  Pending.   _______________________________________________________________________________________________________________________________    ------altered mental status, etiology not well defined.  Mental status is actually improving.  Ammonia level within normal limits.  Lactulose currently on hold.  We will continue low dose and monitor closely.    S/p EEG without evidence of seizure like activity.felt to be secondary to chronic alcohol abuse.     -----cirrhosis with MELD score<14.  No evidence of GI bleed  Abdominal ascites,s/p diagnostic paracentesis, suggestive of cirrhotic etiology; Continue rifaximin prophylactically    Continue supportive care  Continue checking vital signs q4hrs.    ---hypertension, continue to monitor and make adjustments as needed.      Nurse notified to page me if any changes occur   DVT prophylaxis initiated   Nutrition Status:     Consults:  Palliative Care, gastroenterologist  I have personally reviewed the specialist documentation and/or have spoken to the specialist with regard to the care of this patient; recommendations are noted above.     _______________________________________________________________________________________________________________________________    I have spent >30 minutes on the day of the visit; time spent includes face to face time and non-face to face time preparing to see the patient (eg, review of tests), independently reviewing and interpreting medical records, both past and current; documenting clinical information in the electronic or other health record, and communicating results to the patient/family/caregiver and care coordinator and nursing team.      All diagnosis and differential diagnosis have been reviewed,  interpreted and communicated appropriately to care team. assessment and plan has been documented; I have personally reviewed the labs and test results that are  presently available and pertinent to this hospital course; I have reviewed medical records based upon their availability.    I will continue to monitor closely and make adjustments to medical management as needed.    Tabatha Stokes, DO   03/31/2024     This note was created with the assistance of Dragon voice recognition software. There may be transcription errors as a result of using this technology however minimal. Effort has been made to assure accuracy of transcription but any obvious errors or omissions should be clarified with the author of the document.

## 2024-04-01 LAB
POCT GLUCOSE: 120 MG/DL (ref 70–110)
POCT GLUCOSE: 179 MG/DL (ref 70–110)
POCT GLUCOSE: 74 MG/DL (ref 70–110)
POCT GLUCOSE: 89 MG/DL (ref 70–110)

## 2024-04-01 PROCEDURE — 97530 THERAPEUTIC ACTIVITIES: CPT

## 2024-04-01 PROCEDURE — 21400001 HC TELEMETRY ROOM

## 2024-04-01 PROCEDURE — 25000003 PHARM REV CODE 250: Performed by: INTERNAL MEDICINE

## 2024-04-01 PROCEDURE — 63600175 PHARM REV CODE 636 W HCPCS: Performed by: INTERNAL MEDICINE

## 2024-04-01 PROCEDURE — 11000001 HC ACUTE MED/SURG PRIVATE ROOM

## 2024-04-01 PROCEDURE — 25000003 PHARM REV CODE 250: Performed by: STUDENT IN AN ORGANIZED HEALTH CARE EDUCATION/TRAINING PROGRAM

## 2024-04-01 RX ADMIN — PANTOPRAZOLE SODIUM 40 MG: 40 TABLET, DELAYED RELEASE ORAL at 10:04

## 2024-04-01 RX ADMIN — RIFAXIMIN 550 MG: 550 TABLET ORAL at 09:04

## 2024-04-01 RX ADMIN — BUSPIRONE HYDROCHLORIDE 5 MG: 5 TABLET ORAL at 09:04

## 2024-04-01 RX ADMIN — LEUCINE, PHENYLALANINE, LYSINE, METHIONINE, ISOLEUCINE, VALINE, HISTIDINE, THREONINE, TRYPTOPHAN, ALANINE, GLYCINE, ARGININE, PROLINE, SERINE, TYROSINE, SODIUM ACETATE, DIBASIC POTASSIUM PHOSPHATE, MAGNESIUM CHLORIDE, SODIUM CHLORIDE, CALCIUM CHLORIDE, DEXTROSE
311; 238; 247; 170; 255; 247; 204; 179; 77; 880; 438; 489; 289; 213; 17; 297; 261; 51; 77; 33; 5 INJECTION INTRAVENOUS at 04:04

## 2024-04-01 RX ADMIN — AMLODIPINE BESYLATE 5 MG: 5 TABLET ORAL at 10:04

## 2024-04-01 RX ADMIN — GABAPENTIN 100 MG: 100 CAPSULE ORAL at 09:04

## 2024-04-01 RX ADMIN — THIAMINE HCL TAB 100 MG 100 MG: 100 TAB at 09:04

## 2024-04-01 RX ADMIN — PANTOPRAZOLE SODIUM 40 MG: 40 TABLET, DELAYED RELEASE ORAL at 09:04

## 2024-04-01 RX ADMIN — ARIPIPRAZOLE 2 MG: 2 TABLET ORAL at 10:04

## 2024-04-01 RX ADMIN — BUSPIRONE HYDROCHLORIDE 5 MG: 5 TABLET ORAL at 10:04

## 2024-04-01 RX ADMIN — LABETALOL HYDROCHLORIDE 10 MG: 5 INJECTION, SOLUTION INTRAVENOUS at 12:04

## 2024-04-01 RX ADMIN — THERA TABS 1 TABLET: TAB at 10:04

## 2024-04-01 RX ADMIN — THIAMINE HCL TAB 100 MG 100 MG: 100 TAB at 02:04

## 2024-04-01 RX ADMIN — GABAPENTIN 100 MG: 100 CAPSULE ORAL at 02:04

## 2024-04-01 RX ADMIN — RIFAXIMIN 550 MG: 550 TABLET ORAL at 10:04

## 2024-04-01 RX ADMIN — GABAPENTIN 100 MG: 100 CAPSULE ORAL at 10:04

## 2024-04-01 RX ADMIN — CARVEDILOL 6.25 MG: 3.12 TABLET, FILM COATED ORAL at 10:04

## 2024-04-01 RX ADMIN — Medication: at 10:04

## 2024-04-01 RX ADMIN — CARVEDILOL 6.25 MG: 3.12 TABLET, FILM COATED ORAL at 09:04

## 2024-04-01 RX ADMIN — ESCITALOPRAM OXALATE 5 MG: 5 TABLET, FILM COATED ORAL at 10:04

## 2024-04-01 RX ADMIN — FOLIC ACID 1 MG: 1 TABLET ORAL at 10:04

## 2024-04-01 RX ADMIN — SODIUM BICARBONATE 650 MG TABLET 650 MG: at 10:04

## 2024-04-01 NOTE — PT/OT/SLP PROGRESS
Physical Therapy Treatment    Patient Name:  Luz Maria Alfaro   MRN:  [id number]    Recommendations:     Discharge therapy intensity: Moderate Intensity Therapy   Discharge Equipment Recommendations: to be determined by next level of care  Barriers to discharge: Impaired mobility and Ongoing medical needs    Assessment:     Luz Maria Alfaro is a 64 y.o. female admitted with a medical diagnosis of Luz Maria Alfaro is a 64 y.o. female admitted with a medical diagnosis of acute encephalopathy, suspected metabolic form UTI vs. Alcohol withdrawal; metabolic acidosis, alcoholic cirrhosis, ascites, anemia .  She presents with the following impairments/functional limitations: weakness, impaired endurance, impaired functional mobility, gait instability, impaired cognition, impaired balance, decreased safety awareness, impaired self care skills, decreased coordination, decreased upper extremity function, decreased lower extremity function . Pt was reluctant to work and did not tolerate being up for long    Rehab Prognosis: Fair; patient would benefit from acute skilled PT services to address these deficits and reach maximum level of function.    Recent Surgery: * No surgery found *      Plan:     During this hospitalization, patient to be seen 3 x/week to address the identified rehab impairments via gait training, therapeutic activities, therapeutic exercises and progress toward the following goals:    Plan of Care Expires:  04/21/24    Subjective     Chief Complaint:   Patient/Family Comments/goals:   Pain/Comfort:         Objective:     Communicated with nurse prior to session.  Patient found supine with peripheral IV, PureWick, telemetry upon PT entry to room.     General Precautions: Standard, fall  Orthopedic Precautions: N/A  Braces: N/A  Respiratory Status: Room air  Blood Pressure:   Skin Integrity: Visible skin intact      Functional Mobility:  Bed Mobility:     Scooting: moderate assistance  Supine to Sit: moderate  assistance  Sit to Supine: moderate assistance  Transfers:     Sit to Stand:  moderate assistance with no AD and rolling walker  Gait: pt was not safe to take forward steps ,but did side steps with moda    Therapeutic Activities/Exercises:      Education:  Patient provided with verbal education education regarding PT role/goals/POC, fall prevention, and safety awareness.  Additional teaching is warranted.     Patient left supine with all lines intact    GOALS:   Multidisciplinary Problems       Physical Therapy Goals          Problem: Physical Therapy    Goal Priority Disciplines Outcome Goal Variances Interventions   Physical Therapy Goal     PT, PT/OT Ongoing, Not Progressing     Description: Goals to be met by: 24     Patient will increase functional independence with mobility by performin. Supine to sit with MInimal Assistance  2. Sit to stand transfer with Minimal Assistance  3. Bed to chair transfer with Minimal Assistance using Rolling Walker  4. Gait  x 150 feet with Minimal Assistance using Rolling Walker.                          Time Tracking:     PT Received On:    PT Start Time: 0950     PT Stop Time: 1015  PT Total Time (min): 25 min     Billable Minutes: Therapeutic Activity 25    Treatment Type: Treatment  PT/PTA: PT     Number of PTA visits since last PT visit: 2024

## 2024-04-01 NOTE — PROGRESS NOTES
[facility]  Hospital Medicine Progress Note      Chief Complaint:  Altered mental status    HPI: (personally reviewed by me and is documented from initial H&P)     64 y.o. female with a PMHx of HTN, HLD, type 2 DM, alcoholic cirrhosis, alcohol abuse and nicotine dependence who presented to [facility] on 3/20/2024 via EMS with c/o worsening confusion x1 day with associated generalized weakness.  The majority of the history was obtained from review of the medical record as the patient was unable to provide any additional history.     Upon presentation to ED, vital signed included /73, , RR 18, SpO2 98% on room air, temperature 98.4° F.  Labs notable for hemoglobin 10.7, hematocrit 31.7, platelets 34, vitamin B12 1958, INR 1.4, CO2 18, BUN 3.5, creatinine 0.5, calcium 7.9, , albumin 1.9, AST 43.  Serum alcohol level undetectable.  EKG demonstrated sinus tachycardia.  CXR demonstrated increased left retrocardiac density with partial silhouetting of the left hemidiaphragm which might be related to an infiltrate/atelectasis.  CT head without contrast negative for acute intracranial findings or significant interval changes compared to August 2022.  CT abdomen and pelvis with IV contrast consistent with cirrhosis, moderate generalized ascites, bilateral pleural fluid right greater than left noted.  She was admitted to hospital medicine service for further medical management.      Interval Hx:   No overnight events; no new complaints.   No family present at bedside during my visit.  Nurse present during my evaluation; according to staff patient has been interacting with them.  __________________________________________________________________________________________________________________________________    Objective/physical exam:  Vital signs have been personally reviewed by me   General: Appears comfortable, no acute distress.  Neuro:  Awake and alert,no verbal response  appreciated.    Integumentary: Warm, dry, intact.  Musculoskeletal: Purposeful movement noted.     Respiratory: No accessory muscle use. Breath sounds are equal.  Cardiovascular: Regular rate.       VITAL SIGNS: 24 HRS MIN & MAX LAST   Temp  Min: 97.4 °F (36.3 °C)  Max: 98 °F (36.7 °C) 98 °F (36.7 °C)   BP  Min: 103/68  Max: 179/99 (!) 159/90   Pulse  Min: 70  Max: 100  89   Resp  Min: 16  Max: 20 20   SpO2  Min: 92 %  Max: 97 % 95 %     MRI Brain Without Contrast  Narrative: EXAMINATION:  MRI BRAIN WITHOUT CONTRAST    CLINICAL HISTORY:  Stroke, follow up;  Cerebral infarction, unspecified    TECHNIQUE:  Multiplanar multisequence MR imaging of the brain was performed without contrast.    COMPARISON:  CT head without contrast 03/20/2024.    FINDINGS:  INTRACRANIAL: Right parietal lobe encephalomalacia.  Mild generalized brain atrophy.  Moderate chronic small-vessel ischemic changes of the supratentorial white matter.  No parenchymal restricted diffusion.  No evidence of intracranial hemorrhage.  No extra-axial fluid collection or mass.  No intracranial mass effect.  No hydrocephalus.  Midline structures have a normal configuration.  Visualized pituitary gland and infundibulum are normal.  Visualized major intracranial vascular structures demonstrate normal flow voids and are normal in course and caliber.    SINUSES: Paranasal sinuses and mastoid air cells are clear.    ORBITS: Visualized orbits are normal.  Impression: No acute intracranial abnormality.    Moderate chronic small-vessel ischemic changes of the supratentorial white matter.    Right parietal lobe encephalomalacia.    Mild generalized brain atrophy.    Electronically signed by: Jack Kirkland  Date:    03/28/2024  Time:    06:09    Recent Labs   Lab 03/26/24  0546 03/27/24  0512   WBC 14.62* 8.82   RBC 2.78* 2.51*   HGB 11.2* 10.2*   HCT 32.3* 30.2*   .2* 120.3*   MCH 40.3* 40.6*   MCHC 34.7 33.8    142   MPV 11.2* 11.0*       Recent Labs    Lab 03/26/24  0546 03/27/24  0512 03/28/24  0603 03/30/24  0926    138 140 139   K 3.0* 4.2 4.3 4.0   CO2 18* 18* 18* 25   BUN 3.4* <3.0* 5.8* 12.4   CREATININE 0.48* 0.45* 0.43* 0.48*   CALCIUM 8.2* 8.0* 8.6 8.9   MG 1.60  --   --   --    ALBUMIN 2.5*  --   --  2.6*   ALKPHOS 175*  --   --  123   ALT 21  --   --  17   AST 23  --   --  22   BILITOT 1.0  --   --  0.5     Microbiology Results (last 7 days)       Procedure Component Value Units Date/Time    Body Fluid Culture [[id number]] Collected: 03/22/24 1256    Order Status: Completed Specimen: Abdominal Fluid from Abdomen Updated: 03/27/24 1038     Body Fluid Culture Final Report: At 5 days. No growth    Clostridium Diff Toxin, A & B, EIA [[id number]]     Order Status: Canceled Specimen: Stool              Scheduled Med:   amLODIPine  5 mg Oral Daily    ARIPiprazole  2 mg Oral Daily    busPIRone  5 mg Oral BID    carvediloL  6.25 mg Oral BID    EScitalopram oxalate  5 mg Oral Daily    folic acid  1 mg Oral Daily    gabapentin  100 mg Oral TID    multivitamin  1 tablet Oral Daily    pantoprazole  40 mg Oral BID    rifAXIMin  550 mg Oral BID    sodium bicarbonate  650 mg Oral Daily    thiamine  100 mg Oral Q8H    zinc oxide-cod liver oil   Topical (Top) Daily      Continuous Infusions:   Amino acid 4.25% - dextrose 5% (CLINIMIX-E) solution (1L provides 42.5 gm AA, 50 gm CHO (170 kcal/L dextrose), Na 35, K 30, Mg 5, Ca 4.5, Acetate 70, Cl 39, Phos 15) 75 mL/hr at 04/01/24 0424          PRN Meds:  0.9%  NaCl infusion (for blood administration), albuterol, dextrose 10%, dextrose 10%, glucagon (human recombinant), glucose, glucose, hydrALAZINE, hyoscyamine, insulin aspart U-100, labetalol, methocarbamoL, [CANCELED] Flushing PICC/Midline Protocol **AND** [DISCONTINUED] sodium chloride 0.9% **AND** sodium chloride 0.9%    __________________________________________________________________________________________________________________________________  Assessment/Plan:  Acute Metabolic encephalopathy  UTI   Anemia of chronic inflammation  Alcoholic cirrhosis   Current alcohol use   Ascites   Thrombocytopenia from underlying cirrhosis  Macrocytic anemia   Hypoalbuminemia   Hypertensive urgency      Above present on admission     Anticipated discharge and Disposition when medically stable:  SNF new   _______________________________________________________________________________________________________________________________    ------altered mental status, etiology not well defined.  Mental status is actually improving.  Ammonia level within normal limits.  Lactulose currently on hold.     S/p EEG without evidence of seizure like activity.felt to be secondary to chronic alcohol abuse.     -----cirrhosis with MELD score<14.  No evidence of GI bleed  Abdominal ascites,s/p diagnostic paracentesis, suggestive of cirrhotic etiology; Continue rifaximin prophylactically    Continue supportive care  Continue checking vital signs q4hrs.    ---hypertension, continue to monitor and make adjustments as needed.      Nurse notified to page me if any changes occur   DVT prophylaxis initiated   Nutrition Status:     Consults:  Palliative Care, gastroenterologist  I have personally reviewed the specialist documentation and/or have spoken to the specialist with regard to the care of this patient; recommendations are noted above.     _______________________________________________________________________________________________________________________________    I have spent >30 minutes on the day of the visit; time spent includes face to face time and non-face to face time preparing to see the patient (eg, review of tests), independently reviewing and interpreting medical records, both past and current; documenting clinical information in the  electronic or other health record, and communicating results to the patient/family/caregiver and care coordinator and nursing team.      All diagnosis and differential diagnosis have been reviewed,  interpreted and communicated appropriately to care team. assessment and plan has been documented; I have personally reviewed the labs and test results that are presently available and pertinent to this hospital course; I have reviewed medical records based upon their availability.    I will continue to monitor closely and make adjustments to medical management as needed.    Tabatha Stokes,    04/01/2024     This note was created with the assistance of Dragon voice recognition software. There may be transcription errors as a result of using this technology however minimal. Effort has been made to assure accuracy of transcription but any obvious errors or omissions should be clarified with the author of the document.

## 2024-04-01 NOTE — PROGRESS NOTES
Inpatient Nutrition Assessment    Admit Date: 3/20/2024   Total duration of encounter: 12 days   Patient Age: 64 y.o.    Nutrition Recommendation/Prescription     Oral diet per SLP recommendations: puree solids + mildly thick liquids. Assistance with meals.     Boost Very High Calorie (provides 530 kcal, 22 g protein per serving) BID, mildly thick appropriate.     Continue thiamin, MV and folic acid supplementation.     Can discontinue PPN since oral intake improved.     Communication of Recommendations: reviewed with nurse    Nutrition Assessment     Malnutrition Assessment/Nutrition-Focused Physical Exam    Malnutrition Context: chronic illness (03/21/24 1105)  Malnutrition Level: moderate (03/21/24 1105)  Energy Intake (Malnutrition):  (unable to determine) (03/21/24 1105)  Weight Loss (Malnutrition):  (unable to determine) (03/21/24 1105)  Subcutaneous Fat (Malnutrition): severe depletion (03/21/24 1105)     Upper Arm Region (Subcutaneous Fat Loss): severe depletion     Muscle Mass (Malnutrition): mild depletion (03/21/24 1105)     Clavicle Bone Region (Muscle Loss): mild depletion  Clavicle and Acromion Bone Region (Muscle Loss): mild depletion     Dorsal Hand (Muscle Loss): mild depletion     Anterior Thigh Region (Muscle Loss): mild depletion  Posterior Calf Region (Muscle Loss): mild depletion  Fluid Accumulation (Malnutrition):  (does not meet criteria) (03/21/24 1105)        A minimum of two characteristics is recommended for diagnosis of either severe or non-severe malnutrition.    Chart Review    Reason Seen: continuous nutrition monitoring, malnutrition screening tool (MST), and physician consult for PCM    Malnutrition Screening Tool Results   Have you recently lost weight without trying?: Unsure  Have you been eating poorly because of a decreased appetite?: No   MST Score: 2   Diagnosis:  Acute encephalopathy, metabolic versus hepatic   Metabolic Acidosis   Alcoholic cirrhosis   Ascites    Thrombocytopenia   Acute microcytic anemia   Hypoalbuminemia     Relevant Medical History: HTN, HLD, type 2 DM, alcoholic cirrhosis, alcohol abuse and nicotine dependence      Scheduled Medications:  amLODIPine, 5 mg, Daily  ARIPiprazole, 2 mg, Daily  busPIRone, 5 mg, BID  carvediloL, 6.25 mg, BID  EScitalopram oxalate, 5 mg, Daily  folic acid, 1 mg, Daily  gabapentin, 100 mg, TID  multivitamin, 1 tablet, Daily  pantoprazole, 40 mg, BID  rifAXIMin, 550 mg, BID  sodium bicarbonate, 650 mg, Daily  thiamine, 100 mg, Q8H  zinc oxide-cod liver oil, , Daily    Continuous Infusions:     PRN Medications: 0.9%  NaCl infusion (for blood administration), albuterol, dextrose 10%, dextrose 10%, glucagon (human recombinant), glucose, glucose, hydrALAZINE, hyoscyamine, insulin aspart U-100, labetalol, methocarbamoL, [CANCELED] Flushing PICC/Midline Protocol **AND** [DISCONTINUED] sodium chloride 0.9% **AND** sodium chloride 0.9%    Calorie Containing IV Medications: Clinimix    Recent Labs   Lab 03/26/24  0546 03/27/24  0512 03/27/24  1113 03/28/24  0603 03/30/24  0926    138  --  140 139   K 3.0* 4.2  --  4.3 4.0   CALCIUM 8.2* 8.0*  --  8.6 8.9   MG 1.60  --   --   --   --    CHLORIDE 108* 114*  --  115* 105   CO2 18* 18*  --  18* 25   BUN 3.4* <3.0*  --  5.8* 12.4   CREATININE 0.48* 0.45*  --  0.43* 0.48*   EGFRNORACEVR >60 >60  --  >60 >60   GLUCOSE 83 104  --  92 89   BILITOT 1.0  --   --   --  0.5   ALKPHOS 175*  --   --   --  123   ALT 21  --   --   --  17   AST 23  --   --   --  22   ALBUMIN 2.5*  --   --   --  2.6*   AMMONIA  --   --  50.2  --  24.6   WBC 14.62* 8.82  --   --   --    HGB 11.2* 10.2*  --   --   --    HCT 32.3* 30.2*  --   --   --        Nutrition Orders:  Diet Pureed (IDDSI Level 4) No Straws (assist with feeding as needed), Supervision with Meals; Mildly Thick Liquids (IDDSI Level 2)  Dietary nutrition supplements Boost Very High Calorie Nutritional Drink - Vanilla; BID    Appetite/Oral Intake:  "fair/50-75% of meals  Factors Affecting Nutritional Intake: impaired cognitive status/motor control, decreased appetite, and inability to feed self  Food/Sikhism/Cultural Preferences: none reported  Food Allergies: none reported  Last Bowel Movement: 03/29/24  Wound(s):     Altered Skin Integrity 03/26/24 1000 Right medial Buttocks Other (comment)-Tissue loss description: Partial thickness       Altered Skin Integrity 03/27/24 2252 Left dorsal Elbow-Tissue loss description: Not applicable       Altered Skin Integrity 03/27/24 2254 Right dorsal Elbow-Tissue loss description: Not applicable       Altered Skin Integrity 03/27/24 2303 Left anterior Ankle-Tissue loss description: Not applicable       Altered Skin Integrity 03/27/24 2304 Right anterior Ankle-Tissue loss description: Partial thickness     Comments    3/21/24 Pt unable to answer questions, ex- at bedside states pt is on a "liquid diet", chronic alcohol abuse. States notable wt loss over the last 9 months, estimating weight around 160lbs. EMR wt history shows 119-124lbs x 1.5 years so unsure accuracy of reported UBW. Did note muscle and fat loss during NFPA. He think she would drink oral supplement, will send with meals. PPN infusing at 50ml/hr, recommend increasing rate to better meet nutritional needs while oral intake is inadequate.     3/26/24 NPO from 3/21-3/24 s/t AMS. MBS performed yesterday, SLP recommends puree solids and mildly thick liquids. Pt has not woke up to take medications or po intake. Will change ONS to Boost VHC for mildly thick supplement and leave tube feeding recommendations incase po intake does not improve.     3/28/24 Spoke to ex-, pt ate 75% waffle this morning and a few sips of thickened ice tea, tolerated. PO intake remains inadequate but tolerating meals. PPN started yesterday by MD for supplemental support.     4/1/24 Pt awake and alert watching TV, did not respond to me. RN states assist feeding and eating " "well. PPN still infusing, can discontinue.     Anthropometrics    Height: 5' 3" (160 cm), Height Method: Stated  Last Weight: 61 kg (134 lb 7.7 oz) (03/21/24 0510), Weight Method: Standard Scale  BMI (Calculated): 23.8  BMI Classification: normal (BMI 18.5-24.9)     Ideal Body Weight (IBW), Female: 115 lb     % Ideal Body Weight, Female (lb): 116.94 %                             Usual Weight Provided By: unable to obtain usual weight    Wt Readings from Last 5 Encounters:   03/21/24 61 kg (134 lb 7.7 oz)   07/21/23 56.3 kg (124 lb 3.2 oz)   03/27/23 54.4 kg (119 lb 14.9 oz)   01/24/23 54.4 kg (119 lb 14.9 oz)   12/15/22 54.4 kg (119 lb 14.9 oz)     Weight Change(s) Since Admission: 3/26-4/1/24 no updated wts   Wt Readings from Last 1 Encounters:   03/21/24 0510 61 kg (134 lb 7.7 oz)   03/20/24 0013 56.7 kg (125 lb)   Admit Weight: 56.7 kg (125 lb) (03/20/24 0013), Weight Method: Stated    Estimated Needs    Weight Used For Calorie Calculations: 61 kg (134 lb 7.7 oz)  Energy Calorie Requirements (kcal): 1830kcals/d (30kcals/kg)  Energy Need Method: Kcal/kg  Weight Used For Protein Calculations: 61 kg (134 lb 7.7 oz)  Protein Requirements: 79g/d (1.3g/kg)  Fluid Requirements (mL): 1525-1830ml fl/d (25-30ml/kg)    Enteral Nutrition     Patient not receiving enteral nutrition at this time.    Parenteral Nutrition   Standard Formula: Clinimix E 4.25/5  Custom Formula: not applicable  Additives: none  Rate/Volume: 75mL/hr  Lipids: none  Total Nutrition Provided by Parenteral Nutrition:  Calories Provided  612 kcal/d, 33% needs   Protein Provided  77 g/d, 97% needs   Dextrose Provided  90 g/d, GIR 1.02 mg CHO/kg/min   Fluid Provided  1800 ml/d, 100% needs      Patient not receiving parenteral nutrition at this time.    Evaluation of Received Nutrient Intake    Calories: meeting estimated needs  Protein: meeting estimated needs    Patient Education     Not applicable.    Nutrition Diagnosis     PES: Inadequate oral intake " related to acute illness as evidenced by <50% EER since admit. (resolved)     PES: Moderate chronic disease or condition related malnutrition related to inability to consume sufficient nutrients as evidenced by severe fat depletion and mild muscle depletion. (active)    Nutrition Interventions     Intervention(s): modified composition of meals/snacks, modified rate of parenteral nutrition, commercial beverage, multivitamin/mineral supplement therapy, and collaboration with other providers    Goal: Meet greater than 80% of nutritional needs by follow-up. (goal met)  Goal: Maintain weight throughout hospitalization. (goal progressing)    Nutrition Goals & Monitoring     Dietitian will monitor: energy intake, weight change, electrolyte/renal panel, and glucose/endocrine profile    Nutrition Risk/Follow-Up: high (follow-up in 1-4 days)   Please consult if re-assessment needed sooner.

## 2024-04-02 LAB
COLOR STL: YELLOW
CONSISTENCY STL: ABNORMAL
HEMOCCULT SP1 STL QL: POSITIVE
POCT GLUCOSE: 120 MG/DL (ref 70–110)
POCT GLUCOSE: 85 MG/DL (ref 70–110)

## 2024-04-02 PROCEDURE — 25000003 PHARM REV CODE 250: Performed by: INTERNAL MEDICINE

## 2024-04-02 PROCEDURE — 82272 OCCULT BLD FECES 1-3 TESTS: CPT | Performed by: INTERNAL MEDICINE

## 2024-04-02 PROCEDURE — 97535 SELF CARE MNGMENT TRAINING: CPT | Mod: CO

## 2024-04-02 PROCEDURE — 25000003 PHARM REV CODE 250: Performed by: STUDENT IN AN ORGANIZED HEALTH CARE EDUCATION/TRAINING PROGRAM

## 2024-04-02 PROCEDURE — 21400001 HC TELEMETRY ROOM

## 2024-04-02 PROCEDURE — 11000001 HC ACUTE MED/SURG PRIVATE ROOM

## 2024-04-02 PROCEDURE — 97530 THERAPEUTIC ACTIVITIES: CPT | Mod: CO

## 2024-04-02 RX ADMIN — PANTOPRAZOLE SODIUM 40 MG: 40 TABLET, DELAYED RELEASE ORAL at 08:04

## 2024-04-02 RX ADMIN — Medication: at 09:04

## 2024-04-02 RX ADMIN — RIFAXIMIN 550 MG: 550 TABLET ORAL at 08:04

## 2024-04-02 RX ADMIN — THIAMINE HCL TAB 100 MG 100 MG: 100 TAB at 03:04

## 2024-04-02 RX ADMIN — ESCITALOPRAM OXALATE 5 MG: 5 TABLET, FILM COATED ORAL at 09:04

## 2024-04-02 RX ADMIN — CARVEDILOL 6.25 MG: 3.12 TABLET, FILM COATED ORAL at 09:04

## 2024-04-02 RX ADMIN — THIAMINE HCL TAB 100 MG 100 MG: 100 TAB at 08:04

## 2024-04-02 RX ADMIN — AMLODIPINE BESYLATE 5 MG: 5 TABLET ORAL at 09:04

## 2024-04-02 RX ADMIN — THIAMINE HCL TAB 100 MG 100 MG: 100 TAB at 05:04

## 2024-04-02 RX ADMIN — FOLIC ACID 1 MG: 1 TABLET ORAL at 09:04

## 2024-04-02 RX ADMIN — GABAPENTIN 100 MG: 100 CAPSULE ORAL at 09:04

## 2024-04-02 RX ADMIN — THERA TABS 1 TABLET: TAB at 09:04

## 2024-04-02 RX ADMIN — RIFAXIMIN 550 MG: 550 TABLET ORAL at 09:04

## 2024-04-02 RX ADMIN — CARVEDILOL 6.25 MG: 3.12 TABLET, FILM COATED ORAL at 08:04

## 2024-04-02 RX ADMIN — GABAPENTIN 100 MG: 100 CAPSULE ORAL at 03:04

## 2024-04-02 RX ADMIN — ARIPIPRAZOLE 2 MG: 2 TABLET ORAL at 09:04

## 2024-04-02 RX ADMIN — BUSPIRONE HYDROCHLORIDE 5 MG: 5 TABLET ORAL at 08:04

## 2024-04-02 RX ADMIN — PANTOPRAZOLE SODIUM 40 MG: 40 TABLET, DELAYED RELEASE ORAL at 09:04

## 2024-04-02 RX ADMIN — SODIUM BICARBONATE 650 MG TABLET 650 MG: at 09:04

## 2024-04-02 RX ADMIN — GABAPENTIN 100 MG: 100 CAPSULE ORAL at 08:04

## 2024-04-02 RX ADMIN — BUSPIRONE HYDROCHLORIDE 5 MG: 5 TABLET ORAL at 09:04

## 2024-04-02 NOTE — PROGRESS NOTES
[facility]  Hospital Medicine Progress Note      Chief Complaint:  Altered mental status    HPI: (personally reviewed by me and is documented from initial H&P)     64 y.o. female with a PMHx of HTN, HLD, type 2 DM, alcoholic cirrhosis, alcohol abuse and nicotine dependence who presented to [facility] on 3/20/2024 via EMS with c/o worsening confusion x1 day with associated generalized weakness.  The majority of the history was obtained from review of the medical record as the patient was unable to provide any additional history.     Upon presentation to ED, vital signed included /73, , RR 18, SpO2 98% on room air, temperature 98.4° F.  Labs notable for hemoglobin 10.7, hematocrit 31.7, platelets 34, vitamin B12 1958, INR 1.4, CO2 18, BUN 3.5, creatinine 0.5, calcium 7.9, , albumin 1.9, AST 43.  Serum alcohol level undetectable.  EKG demonstrated sinus tachycardia.  CXR demonstrated increased left retrocardiac density with partial silhouetting of the left hemidiaphragm which might be related to an infiltrate/atelectasis.  CT head without contrast negative for acute intracranial findings or significant interval changes compared to August 2022.  CT abdomen and pelvis with IV contrast consistent with cirrhosis, moderate generalized ascites, bilateral pleural fluid right greater than left noted.  She was admitted to hospital medicine service for further medical management.      Interval Hx:   No overnight events; no new complaints.   Mental status improving daily.  Patient is tolerating her meals and working well with therapy.  __________________________________________________________________________________________________________________________________    Objective/physical exam:  Vital signs have been personally reviewed by me   General: Appears comfortable, no acute distress.  Neuro:  Awake and alert,no verbal response appreciated.    Integumentary: Warm, dry,  intact.  Musculoskeletal: Purposeful movement noted.     Respiratory: No accessory muscle use. Breath sounds are equal.  Cardiovascular: Regular rate.       VITAL SIGNS: 24 HRS MIN & MAX LAST   Temp  Min: 97.3 °F (36.3 °C)  Max: 98.3 °F (36.8 °C) 98.3 °F (36.8 °C)   BP  Min: 100/65  Max: 133/79 109/71   Pulse  Min: 77  Max: 104  84   No data recorded 18   SpO2  Min: 93 %  Max: 95 % 95 %     MRI Brain Without Contrast  Narrative: EXAMINATION:  MRI BRAIN WITHOUT CONTRAST    CLINICAL HISTORY:  Stroke, follow up;  Cerebral infarction, unspecified    TECHNIQUE:  Multiplanar multisequence MR imaging of the brain was performed without contrast.    COMPARISON:  CT head without contrast 03/20/2024.    FINDINGS:  INTRACRANIAL: Right parietal lobe encephalomalacia.  Mild generalized brain atrophy.  Moderate chronic small-vessel ischemic changes of the supratentorial white matter.  No parenchymal restricted diffusion.  No evidence of intracranial hemorrhage.  No extra-axial fluid collection or mass.  No intracranial mass effect.  No hydrocephalus.  Midline structures have a normal configuration.  Visualized pituitary gland and infundibulum are normal.  Visualized major intracranial vascular structures demonstrate normal flow voids and are normal in course and caliber.    SINUSES: Paranasal sinuses and mastoid air cells are clear.    ORBITS: Visualized orbits are normal.  Impression: No acute intracranial abnormality.    Moderate chronic small-vessel ischemic changes of the supratentorial white matter.    Right parietal lobe encephalomalacia.    Mild generalized brain atrophy.    Electronically signed by: Jack Kirkland  Date:    03/28/2024  Time:    06:09    Recent Labs   Lab 03/27/24  0512   WBC 8.82   RBC 2.51*   HGB 10.2*   HCT 30.2*   .3*   MCH 40.6*   MCHC 33.8      MPV 11.0*       Recent Labs   Lab 03/27/24  0512 03/28/24  0603 03/30/24  0926    140 139   K 4.2 4.3 4.0   CO2 18* 18* 25   BUN <3.0* 5.8*  12.4   CREATININE 0.45* 0.43* 0.48*   CALCIUM 8.0* 8.6 8.9   ALBUMIN  --   --  2.6*   ALKPHOS  --   --  123   ALT  --   --  17   AST  --   --  22   BILITOT  --   --  0.5     Microbiology Results (last 7 days)       Procedure Component Value Units Date/Time    Body Fluid Culture [[id number]] Collected: 03/22/24 1256    Order Status: Completed Specimen: Abdominal Fluid from Abdomen Updated: 03/27/24 1038     Body Fluid Culture Final Report: At 5 days. No growth    Clostridium Diff Toxin, A & B, EIA [[id number]]     Order Status: Canceled Specimen: Stool              Scheduled Med:   amLODIPine  5 mg Oral Daily    ARIPiprazole  2 mg Oral Daily    busPIRone  5 mg Oral BID    carvediloL  6.25 mg Oral BID    EScitalopram oxalate  5 mg Oral Daily    folic acid  1 mg Oral Daily    gabapentin  100 mg Oral TID    multivitamin  1 tablet Oral Daily    pantoprazole  40 mg Oral BID    rifAXIMin  550 mg Oral BID    sodium bicarbonate  650 mg Oral Daily    thiamine  100 mg Oral Q8H    zinc oxide-cod liver oil   Topical (Top) Daily      Continuous Infusions:           PRN Meds:  0.9%  NaCl infusion (for blood administration), albuterol, dextrose 10%, dextrose 10%, glucagon (human recombinant), glucose, glucose, hydrALAZINE, hyoscyamine, insulin aspart U-100, labetalol, methocarbamoL, [CANCELED] Flushing PICC/Midline Protocol **AND** [DISCONTINUED] sodium chloride 0.9% **AND** sodium chloride 0.9%   __________________________________________________________________________________________________________________________________  Assessment/Plan:  Acute Metabolic encephalopathy  UTI   Anemia of chronic inflammation  Alcoholic cirrhosis   Current alcohol use   Ascites   Thrombocytopenia from underlying cirrhosis  Macrocytic anemia   Hypoalbuminemia   Hypertensive urgency      Above present on admission     Anticipated discharge and Disposition when medically stable:  SNF new--waiting placement.    _______________________________________________________________________________________________________________________________    ------altered mental status, etiology not well defined.  Mental status is actually improving.  Ammonia level within normal limits.  Lactulose currently on hold.     S/p EEG without evidence of seizure like activity.felt to be secondary to chronic alcohol abuse.     -----cirrhosis with MELD score<14.  No evidence of GI bleed  Abdominal ascites,s/p diagnostic paracentesis, suggestive of cirrhotic etiology; Continue rifaximin prophylactically    Continue supportive care  Continue checking vital signs q4hrs.    ---hypertension, continue to monitor and make adjustments as needed.      Nurse notified to page me if any changes occur   DVT prophylaxis initiated   Nutrition Status:     Consults:  Palliative Care, gastroenterologist  I have personally reviewed the specialist documentation and/or have spoken to the specialist with regard to the care of this patient; recommendations are noted above.     _______________________________________________________________________________________________________________________________    I have spent >30 minutes on the day of the visit; time spent includes face to face time and non-face to face time preparing to see the patient (eg, review of tests), independently reviewing and interpreting medical records, both past and current; documenting clinical information in the electronic or other health record, and communicating results to the patient/family/caregiver and care coordinator and nursing team.      All diagnosis and differential diagnosis have been reviewed,  interpreted and communicated appropriately to care team. assessment and plan has been documented; I have personally reviewed the labs and test results that are presently available and pertinent to this hospital course; I have reviewed medical records based upon their availability.    I will  continue to monitor closely and make adjustments to medical management as needed.    Tabatha Stokes, DO   04/02/2024     This note was created with the assistance of Dragon voice recognition software. There may be transcription errors as a result of using this technology however minimal. Effort has been made to assure accuracy of transcription but any obvious errors or omissions should be clarified with the author of the document.

## 2024-04-02 NOTE — PT/OT/SLP PROGRESS
Occupational Therapy   Treatment    Name: Luz Maria Alfaro  MRN: [id number]  Admitting Diagnosis:  <principal problem not specified>       Recommendations:     Recommended therapy intensity at discharge: Moderate Intensity Therapy   Discharge Equipment Recommendations:  to be determined by next level of care  Barriers to discharge:       Assessment:     Luz Maria Alfaro is a 64 y.o. female with a medical diagnosis of <principal problem not specified>.  Performance deficits affecting function are weakness, impaired endurance, impaired self care skills, gait instability, impaired balance, impaired functional mobility, impaired cognition, decreased safety awareness.     Rehab Prognosis:  Fair; patient would benefit from acute skilled OT services to address these deficits and reach maximum level of function.       Plan:     Patient to be seen 4 x/week to address the above listed problems via self-care/home management, therapeutic activities, therapeutic exercises  Plan of Care Expires: 04/25/24  Plan of Care Reviewed with: patient    Subjective     Pain/Comfort:  No complaints    Objective:     Communicated with: nurse prior to session.  Patient found HOB elevated with peripheral IV, PureWick, telemetry upon OT entry to room.    General Precautions: Standard, fall    Orthopedic Precautions:N/A  Braces: N/A  Respiratory Status: Room air     Occupational Performance:     Bed Mobility:    Patient completed Scooting/Bridging with moderate assistance  Patient completed Supine to Sit with minimum assistance  Patient completed Sit to Supine with minimum assistance     Functional Mobility/Transfers:  Patient completed Sit <> Stand Transfer with minimum assistance  with  rolling walker with posterior lean during static standing  Static standing balance and tolerance x 5 minutes    Activities of Daily Living:  Toileting: total assistance standing and supine and side lying    Therapeutic Positioning    OT interventions performed  during the course of today's session in an effort to prevent and/or reduce acquired pressure injuries:   Therapeutic positioning was provided at the conclusion of session to offload all bony prominences for the prevention and/or reduction of pressure injuries    Skin assessment: complete skin assessment was performed with exception of areas not visualized   Findings: known area of altered skin integrity at buttocks noted, redness, nurse notified    [facility] 6 Click ADL: 6    Patient Education:  Patient provided with verbal education and demonstrations education regarding fall prevention and safety awareness.  Understanding was verbalized, however additional teaching warranted.      Patient left HOB elevated with all lines intact and call button in reach.    GOALS:   Multidisciplinary Problems       Occupational Therapy Goals          Problem: Occupational Therapy    Goal Priority Disciplines Outcome Interventions   Occupational Therapy Goal     OT, PT/OT Ongoing, Progressing    Description: Goals to be met by 4/25/2024    1. Pt will follow >80% of simple one step commands across three sessions  2. Pt will perform grooming EOB with min A.   3. Pt will perform functional grasp/reach/release of items >80% of trials in prep for increased participation in ADL tasks.   4. Pt will perform sit<>stand with min A in prep for ADL t/fs.                         Time Tracking:     OT Date of Treatment: 04/02/24  OT Start Time: 1050  OT Stop Time: 1120  OT Total Time (min): 30 min    Billable Minutes:Self Care/Home Management 15  Therapeutic Activity 15    OT/KAREN: KAREN     Number of KAREN visits since last OT visit: 1    4/2/2024

## 2024-04-02 NOTE — PLAN OF CARE
Problem: Adult Inpatient Plan of Care  Goal: Plan of Care Review  Outcome: Ongoing, Progressing  Goal: Patient-Specific Goal (Individualized)  Outcome: Ongoing, Progressing  Goal: Absence of Hospital-Acquired Illness or Injury  Outcome: Ongoing, Progressing  Goal: Optimal Comfort and Wellbeing  Outcome: Ongoing, Progressing  Goal: Readiness for Transition of Care  Outcome: Ongoing, Progressing     Problem: Fall Injury Risk  Goal: Absence of Fall and Fall-Related Injury  Outcome: Ongoing, Progressing     Problem: Infection  Goal: Absence of Infection Signs and Symptoms  Outcome: Ongoing, Progressing     Problem: Impaired Wound Healing  Goal: Optimal Wound Healing  Outcome: Ongoing, Progressing     Problem: Coping Ineffective  Goal: Effective Coping  Outcome: Ongoing, Progressing

## 2024-04-03 PROCEDURE — 21400001 HC TELEMETRY ROOM

## 2024-04-03 PROCEDURE — 99232 SBSQ HOSP IP/OBS MODERATE 35: CPT | Mod: 95,,, | Performed by: NURSE PRACTITIONER

## 2024-04-03 PROCEDURE — 25000003 PHARM REV CODE 250: Performed by: STUDENT IN AN ORGANIZED HEALTH CARE EDUCATION/TRAINING PROGRAM

## 2024-04-03 PROCEDURE — 11000001 HC ACUTE MED/SURG PRIVATE ROOM

## 2024-04-03 PROCEDURE — 97530 THERAPEUTIC ACTIVITIES: CPT | Mod: CQ

## 2024-04-03 PROCEDURE — 25000003 PHARM REV CODE 250: Performed by: INTERNAL MEDICINE

## 2024-04-03 PROCEDURE — 97530 THERAPEUTIC ACTIVITIES: CPT | Mod: CO

## 2024-04-03 RX ADMIN — THERA TABS 1 TABLET: TAB at 08:04

## 2024-04-03 RX ADMIN — SODIUM BICARBONATE 650 MG TABLET 650 MG: at 08:04

## 2024-04-03 RX ADMIN — CARVEDILOL 6.25 MG: 3.12 TABLET, FILM COATED ORAL at 08:04

## 2024-04-03 RX ADMIN — THIAMINE HCL TAB 100 MG 100 MG: 100 TAB at 09:04

## 2024-04-03 RX ADMIN — GABAPENTIN 100 MG: 100 CAPSULE ORAL at 08:04

## 2024-04-03 RX ADMIN — THIAMINE HCL TAB 100 MG 100 MG: 100 TAB at 05:04

## 2024-04-03 RX ADMIN — Medication: at 08:04

## 2024-04-03 RX ADMIN — PANTOPRAZOLE SODIUM 40 MG: 40 TABLET, DELAYED RELEASE ORAL at 08:04

## 2024-04-03 RX ADMIN — BUSPIRONE HYDROCHLORIDE 5 MG: 5 TABLET ORAL at 08:04

## 2024-04-03 RX ADMIN — RIFAXIMIN 550 MG: 550 TABLET ORAL at 08:04

## 2024-04-03 RX ADMIN — THIAMINE HCL TAB 100 MG 100 MG: 100 TAB at 02:04

## 2024-04-03 RX ADMIN — ESCITALOPRAM OXALATE 5 MG: 5 TABLET, FILM COATED ORAL at 08:04

## 2024-04-03 RX ADMIN — ARIPIPRAZOLE 2 MG: 2 TABLET ORAL at 08:04

## 2024-04-03 RX ADMIN — GABAPENTIN 100 MG: 100 CAPSULE ORAL at 02:04

## 2024-04-03 RX ADMIN — AMLODIPINE BESYLATE 5 MG: 5 TABLET ORAL at 08:04

## 2024-04-03 RX ADMIN — FOLIC ACID 1 MG: 1 TABLET ORAL at 08:04

## 2024-04-03 NOTE — PT/OT/SLP PROGRESS
Physical Therapy Treatment    Patient Name:  Luz Maria Alfaro   MRN:  [id number]    Recommendations:     Discharge therapy intensity: Moderate Intensity Therapy   Discharge Equipment Recommendations: to be determined by next level of care  Barriers to discharge: Decreased caregiver support, Impaired mobility, and Ongoing medical needs    Assessment:     Luz Maria Alfaro is a 64 y.o. female admitted with a medical diagnosis of acute encephalopathy, suspected metabolic form UTI vs. Alcohol withdrawal; metabolic acidosis, alcoholic cirrhosis, ascites, anemia.  She presents with the following impairments/functional limitations: weakness, impaired endurance, impaired functional mobility, gait instability, impaired cognition, impaired balance, decreased safety awareness, impaired self care skills, decreased coordination, decreased upper extremity function, decreased lower extremity function.    Rehab Prognosis: Good; patient would benefit from acute skilled PT services to address these deficits and reach maximum level of function.    Recent Surgery: * No surgery found *      Plan:     During this hospitalization, patient to be seen 3 x/week to address the identified rehab impairments via gait training, therapeutic activities, therapeutic exercises and progress toward the following goals:    Plan of Care Expires:  04/21/24    Subjective     Chief Complaint:   Patient/Family Comments/goals:   Pain/Comfort:         Objective:     Communicated with nursing prior to session.  Patient found HOB elevated with PureWick, telemetry upon PT entry to room.     General Precautions: Standard, fall  Orthopedic Precautions: N/A  Braces: N/A  Respiratory Status: Room air  Blood Pressure: nt  Skin Integrity:  skin break down over perineal area      Functional Mobility:  Bed Mobility:     Rolling R/L: minimum assistance  Scooting: moderate assistance  Supine to Sit: minimum assistance  Sit to Supine: moderate assistance  Transfers:     Sit  to Stand:  minimum assistance with rolling walker  Bed to Chair: minimum assistance with  rolling walker  using  Step Transfer    Therapeutic Activities/Exercises:  Pt. Soiled upon entry. Rolling performed for pericare total A. Pt. Then sat EOB. Attempt to t/f to chair however pt with uncontrolled loose BM.   Layed pt. Back down for total A with pericare & adilson brief. Pt sat EOB & then t/f to bed side chair.     Patient left up in chair with all lines intact, call button in reach, chair alarm on, geomat cushion, RN notified, and nursing staff present    GOALS:   Multidisciplinary Problems       Physical Therapy Goals          Problem: Physical Therapy    Goal Priority Disciplines Outcome Goal Variances Interventions   Physical Therapy Goal     PT, PT/OT Ongoing, Not Progressing     Description: Goals to be met by: 24     Patient will increase functional independence with mobility by performin. Supine to sit with MInimal Assistance  2. Sit to stand transfer with Minimal Assistance  3. Bed to chair transfer with Minimal Assistance using Rolling Walker  4. Gait  x 150 feet with Minimal Assistance using Rolling Walker.                          Time Tracking:     PT Received On: 24  PT Start Time: 1131     PT Stop Time: 1202  PT Total Time (min): 31 min     Billable Minutes: Therapeutic Activity 31    Treatment Type: Treatment  PT/PTA: PTA     Number of PTA visits since last PT visit: 2     2024

## 2024-04-03 NOTE — PLAN OF CARE
SUSANA phoned Bruce Baum to inform him financial are needed by [facility] for SNF placement. No answer, voice message is full. Attending phoned son harlan Baum to notify them to get financials or information needed to [place] by 2 pm today or patient will be discharged to home.  Mary have Bruce Baum call CM . Bruce Baum phoned CM , informed him to bring information needed to [facility] by  pm today or or patient will be discharged home. Bruce Baum informed CM he will take care of it .

## 2024-04-03 NOTE — CONSULTS
Consults    Patient Name: Luz Maria Alfaro   MRN: [id number]   Admission Date: 3/20/2024   Hospital Length of Stay: 14   Attending Provider: Tabatha Stokes DO   Consulting Provider: France FINK  Reason for Consult: Goals of Care  Primary Care Physician:  Robyn Matthews NP     Principal Problem: <principal problem not specified>       Final diagnoses:  [R41.82] Altered mental status  [G93.40] Acute encephalopathy (Primary)  [D69.6] Thrombocytopenia  [K74.60] Hepatic cirrhosis, unspecified hepatic cirrhosis type, unspecified whether ascites present  [D53.9] Macrocytic anemia      Assessment/Plan:     I reviewed the patient and family's understanding of the seriousness of the illness and its expected prognosis. We discussed the patient's goals of care and treatment preferences.        Advance Care Planning     Date: 04/03/2024    [facility]  I engaged the patient and family in a voluntary conversation about advance care planning and we specifically addressed what the goals of care would be moving forward, in light of the patient's change in clinical status, specifically current condition.  We did specifically address the patient's likely prognosis, which is fair .  We explored the patient's values and preferences for future care.  The patient and family endorses that what is most important right now is to focus on improvement in condition but with limits to invasive therapies    Accordingly, we have decided that the best plan to meet the patient's goals includes continuing with treatment          Met with patient who states that she is feeling better and that she is working with therapy.  Denies pain or any other discomfort.  States she is looking forward to being discharged out of the hospital.    Discuss case with hospitalist and case management who states that placement is pending financials provided to facility and that family has been notified.        Interval History:     Patient awake and alert and  tolerating diet.  Plan is for skilled placement for further therapy.  Palliative Care continuing to follow for assistance with plan of care.      Active Ambulatory Problems     Diagnosis Date Noted    Closed right hip fracture s/p Intramedullary nailing  10/26/2022    DVT (deep venous thrombosis) 11/11/2022    Type 2 diabetes mellitus without complications      Resolved Ambulatory Problems     Diagnosis Date Noted    Acute alcoholic intoxication without complication 11/11/2022     Past Medical History:   Diagnosis Date    Benign essential HTN     Mixed hyperlipidemia         Past Surgical History:   Procedure Laterality Date    APPENDECTOMY      BACK SURGERY      HIP SURGERY      INTRAMEDULLARY RODDING OF FEMUR Right 10/27/2022    Procedure: RIGHT FEMUR INTERTROCH IMN;  Surgeon: Stewart Brambila MD;  Location: [facility];  Service: Orthopedics;  Laterality: Right;  Juliette table, supine  3rd case  Synthes Short TFNA        Review of patient's allergies indicates:   Allergen Reactions    Meperidine Rash and Hives          Current Facility-Administered Medications:     0.9%  NaCl infusion (for blood administration), , Intravenous, Q24H PRN, Chaz Harrison MD    albuterol inhaler 1 puff, 1 puff, Inhalation, Q4H PRN, Yvette Saba MD    amLODIPine tablet 5 mg, 5 mg, Oral, Daily, Robin Metcalf MD, 5 mg at 04/03/24 0845    ARIPiprazole tablet 2 mg, 2 mg, Oral, Daily, Robin Metcalf MD, 2 mg at 04/03/24 0853    busPIRone tablet 5 mg, 5 mg, Oral, BID, Tabatha Stokes, DO, 5 mg at 04/03/24 0845    carvediloL tablet 6.25 mg, 6.25 mg, Oral, BID, Robin Metcalf MD, 6.25 mg at 04/03/24 0846    dextrose 10% bolus 125 mL 125 mL, 12.5 g, Intravenous, PRN, Yvette Saba MD, Stopped at 03/26/24 0125    dextrose 10% bolus 250 mL 250 mL, 25 g, Intravenous, PRN, Yvette Saba MD    EScitalopram oxalate tablet 5 mg, 5 mg, Oral, Daily, Tabatha Stokes, DO, 5 mg at 04/03/24 0852    folic acid tablet 1 mg, 1 mg,  Oral, Daily, Yvette Saba MD, 1 mg at 04/03/24 0846    gabapentin capsule 100 mg, 100 mg, Oral, TID, Tabatha Stokes DO, 100 mg at 04/03/24 0845    glucagon (human recombinant) injection 1 mg, 1 mg, Intramuscular, PRN, Yvette Saba MD    glucose chewable tablet 16 g, 16 g, Oral, PRN, Yvette Saba MD    glucose chewable tablet 24 g, 24 g, Oral, PRN, Yvette Saba MD    hydrALAZINE injection 10 mg, 10 mg, Intravenous, Q4H PRN, Toyin Golden, FNP, 10 mg at 03/30/24 1518    hyoscyamine ODT tablet 0.125 mg, 0.125 mg, Sublingual, TID PRN, Yvette Saba MD    insulin aspart U-100 injection 0-5 Units, 0-5 Units, Subcutaneous, QID (AC + HS) PRN, Yvette Saba MD    labetaloL injection 10 mg, 10 mg, Intravenous, Q4H PRN, Robin Metcalf MD, 10 mg at 04/01/24 0032    methocarbamoL tablet 750 mg, 750 mg, Oral, TID PRN, Yvette Saba MD    multivitamin tablet, 1 tablet, Oral, Daily, Robin Metcalf MD, 1 tablet at 04/03/24 0845    pantoprazole EC tablet 40 mg, 40 mg, Oral, BID, Robin Metcalf MD, 40 mg at 04/03/24 0845    rifAXIMin tablet 550 mg, 550 mg, Oral, BID, Robin Metcalf MD, 550 mg at 04/03/24 0853    sodium bicarbonate tablet 650 mg, 650 mg, Oral, Daily, Robin Metcalf MD, 650 mg at 04/03/24 0846    [CANCELED] Flushing PICC/Midline Protocol, , , Until Discontinued **AND** [DISCONTINUED] sodium chloride 0.9% flush 10 mL, 10 mL, Intravenous, Q6H, 10 mL at 03/31/24 1706 **AND** sodium chloride 0.9% flush 10 mL, 10 mL, Intravenous, PRN, Chaz Harrison MD    [COMPLETED] thiamine (B-1) 500 mg in dextrose 5 % (D5W) 100 mL IVPB, 500 mg, Intravenous, Q8H, Stopped at 03/22/24 0223 **FOLLOWED BY** thiamine tablet 100 mg, 100 mg, Oral, Q8H, Evgeny Burk MD, 100 mg at 04/03/24 0501    zinc oxide-cod liver oil 40 % paste, , Topical (Top), Daily, Robin Metcalf MD, Given at 04/03/24 0853     0.9%  NaCl infusion (for blood administration), albuterol,  "dextrose 10%, dextrose 10%, glucagon (human recombinant), glucose, glucose, hydrALAZINE, hyoscyamine, insulin aspart U-100, labetalol, methocarbamoL, [CANCELED] Flushing PICC/Midline Protocol **AND** [DISCONTINUED] sodium chloride 0.9% **AND** sodium chloride 0.9%     No family history on file.       Review of Systems   Unable to perform ROS: Mental status change            Objective:   BP (!) 94/58   Pulse 71   Temp 97.4 °F (36.3 °C) (Oral)   Resp 18   Ht 5' 3" (1.6 m)   Wt 61 kg (134 lb 7.7 oz)   SpO2 96%   BMI 23.82 kg/m²      Physical Exam   Constitutional: She appears ill.   Eyes: Pupils are equal, round, and reactive to light.   Cardiovascular: Normal rate. Pulmonary:      Effort: Pulmonary effort is normal.     Abdominal: Soft.   Musculoskeletal:      Comments: sarcopenia   Neurological:   To person and place          Review of Symptoms  Review of Symptoms      Symptom Assessment (ESAS 0-10 Scale)  Pain:  0  Dyspnea:  0  Anxiety:  0  Nausea:  0  Depression:  0  Anorexia:  0  Fatigue:  0  Insomnia:  0  Restlessness:  0  Agitation:  0         Bowel Management Plan (BMP):  Yes      Psychosocial/Cultural:   See Palliative Psychosocial Note: Yes  **Primary  to Follow**  Palliative Care  Consult: No      Advance Care Planning   Advance Directives:   Goals of Care: What is most important right now is to focus on improvement in condition but with limits to invasive therapies. Accordingly, we have decided that the best plan to meet the patient's goals includes continuing with treatment.          PAINAD: NA    Caregiver burden formerly assessed: Yes      No results displayed because visit has over 200 results.               > 50% of 30 min of encounter was spent in chart review, face to face discussion of goals of care, symptom assessment, coordination of care and emotional support.    France Persaud FNP, [facility]  Palliative Medicine  [facility]   "

## 2024-04-03 NOTE — PT/OT/SLP PROGRESS
Occupational Therapy   Treatment    Name: Luz Maria Alfaro  MRN: [id number]  Admitting Diagnosis:  AMS       Recommendations:     Recommended therapy intensity at discharge: Moderate Intensity Therapy   Discharge Equipment Recommendations:  to be determined by next level of care  Barriers to discharge:       Assessment:     Luz Maria Alfaro is a 64 y.o. female with a medical diagnosis of AMS.  She presents with improved balance and increased endurance, pt. Requiring cueing for safety due to poor safety awareness, Overall Mod A for mobility during session, pt. Is a high fall risk, recommending Mod intensity therapy. Performance deficits affecting function are weakness, impaired endurance, impaired self care skills, impaired functional mobility, impaired balance.     Rehab Prognosis:  Good; patient would benefit from acute skilled OT services to address these deficits and reach maximum level of function.       Plan:     Patient to be seen 4 x/week to address the above listed problems via self-care/home management, therapeutic activities, therapeutic exercises  Plan of Care Expires: 04/25/24  Plan of Care Reviewed with: patient    Subjective     Pain/Comfort:       Objective:     Communicated with: RN prior to session.  Patient found up in chair with   upon OT entry to room.    General Precautions: Standard, fall    Orthopedic Precautions:N/A  Braces: N/A  Respiratory Status: Room air    Occupational Performance:   (Sit to stand- Mod A) From BS chair on mary evelina.  Pt. Performing a stand step t/f from BS chair to EOB using RW for UE support with balance, Mod A for balance and increased cueing for step progression and safety.   BM noted in standing, Min A for standing balance with total A for toilet hygiene.  Pt. Repositioned in bed appropriately with all needs within reach.    Therapeutic Positioning    OT interventions performed during the course of today's session in an effort to prevent and/or reduce acquired  pressure injuries:   Therapeutic positioning was provided at the conclusion of session to offload all bony prominences for the prevention and/or reduction of pressure injuries        [facility] 6 Click ADL:      Patient Education:  Patient provided with verbal education education regarding fall prevention, safety awareness, and pressure ulcer prevention.  Additional teaching is warranted.      Patient left HOB elevated with all lines intact and call button in reach.    GOALS:   Multidisciplinary Problems       Occupational Therapy Goals          Problem: Occupational Therapy    Goal Priority Disciplines Outcome Interventions   Occupational Therapy Goal     OT, PT/OT Ongoing, Progressing    Description: Goals to be met by 4/25/2024    1. Pt will follow >80% of simple one step commands across three sessions  2. Pt will perform grooming EOB with min A.   3. Pt will perform functional grasp/reach/release of items >80% of trials in prep for increased participation in ADL tasks.   4. Pt will perform sit<>stand with min A in prep for ADL t/fs.                         Time Tracking:     OT Date of Treatment: 04/03/24  OT Start Time: 1417  OT Stop Time: 1440  OT Total Time (min): 23 min    Billable Minutes:Therapeutic Activity 2          Number of KAREN visits since last OT visit: 5    4/3/2024

## 2024-04-03 NOTE — PROGRESS NOTES
[facility]  Hospital Medicine Progress Note      Chief Complaint:  Altered mental status    HPI: (personally reviewed by me and is documented from initial H&P)     64 y.o. female with a PMHx of HTN, HLD, type 2 DM, alcoholic cirrhosis, alcohol abuse and nicotine dependence who presented to [facility] on 3/20/2024 via EMS with c/o worsening confusion x1 day with associated generalized weakness.  The majority of the history was obtained from review of the medical record as the patient was unable to provide any additional history.     Upon presentation to ED, vital signed included /73, , RR 18, SpO2 98% on room air, temperature 98.4° F.  Labs notable for hemoglobin 10.7, hematocrit 31.7, platelets 34, vitamin B12 1958, INR 1.4, CO2 18, BUN 3.5, creatinine 0.5, calcium 7.9, , albumin 1.9, AST 43.  Serum alcohol level undetectable.  EKG demonstrated sinus tachycardia.  CXR demonstrated increased left retrocardiac density with partial silhouetting of the left hemidiaphragm which might be related to an infiltrate/atelectasis.  CT head without contrast negative for acute intracranial findings or significant interval changes compared to August 2022.  CT abdomen and pelvis with IV contrast consistent with cirrhosis, moderate generalized ascites, bilateral pleural fluid right greater than left noted.  She was admitted to hospital medicine service for further medical management.      Interval Hx:     SUSANA stated that SNF was having difficulty reaching family with regard to obtaining financial information. I initially called the patient's  however attempts were unsuccessful but I was able to talk to her son, Omer.   I informed him that if family did not proved information needed to get patient to the SNF, she would have to be discharged home.   SUSANA notified me shortly thereafter that family was at the SNF to provide documentation.   Update---patient has to pay >$200 upfront for  SNF placement. CM notified and following, referral to TCU to be followup today.     Patient is medically stable, she is communicating, tolerating her diet and working well with therapy.      __________________________________________________________________________________________________________________________________    Objective/physical exam:  Vital signs have been personally reviewed by me   General: Appears comfortable, no acute distress.  Neuro:  Awake and alert,no verbal response appreciated.    Integumentary: Warm, dry, intact.  Musculoskeletal: Purposeful movement noted.     Respiratory: No accessory muscle use. Breath sounds are equal.  Cardiovascular: Regular rate.       VITAL SIGNS: 24 HRS MIN & MAX LAST   Temp  Min: 97.4 °F (36.3 °C)  Max: 98.3 °F (36.8 °C) 97.4 °F (36.3 °C)   BP  Min: 94/58  Max: 131/63 (!) 94/58   Pulse  Min: 71  Max: 88  71   Resp  Min: 18  Max: 18 18   SpO2  Min: 95 %  Max: 96 % 96 %     MRI Brain Without Contrast  Narrative: EXAMINATION:  MRI BRAIN WITHOUT CONTRAST    CLINICAL HISTORY:  Stroke, follow up;  Cerebral infarction, unspecified    TECHNIQUE:  Multiplanar multisequence MR imaging of the brain was performed without contrast.    COMPARISON:  CT head without contrast 03/20/2024.    FINDINGS:  INTRACRANIAL: Right parietal lobe encephalomalacia.  Mild generalized brain atrophy.  Moderate chronic small-vessel ischemic changes of the supratentorial white matter.  No parenchymal restricted diffusion.  No evidence of intracranial hemorrhage.  No extra-axial fluid collection or mass.  No intracranial mass effect.  No hydrocephalus.  Midline structures have a normal configuration.  Visualized pituitary gland and infundibulum are normal.  Visualized major intracranial vascular structures demonstrate normal flow voids and are normal in course and caliber.    SINUSES: Paranasal sinuses and mastoid air cells are clear.    ORBITS: Visualized orbits are normal.  Impression: No acute  "intracranial abnormality.    Moderate chronic small-vessel ischemic changes of the supratentorial white matter.    Right parietal lobe encephalomalacia.    Mild generalized brain atrophy.    Electronically signed by: Jack Kirkland  Date:    03/28/2024  Time:    06:09    No results for input(s): "WBC", "RBC", "HGB", "HCT", "MCV", "MCH", "MCHC", "RDW", "PLT", "MPV", "GRAN", "LYMPH", "MONO", "BASO", "NRBC" in the last 168 hours.      Recent Labs   Lab 03/28/24  0603 03/30/24  0926    139   K 4.3 4.0   CO2 18* 25   BUN 5.8* 12.4   CREATININE 0.43* 0.48*   CALCIUM 8.6 8.9   ALBUMIN  --  2.6*   ALKPHOS  --  123   ALT  --  17   AST  --  22   BILITOT  --  0.5     Microbiology Results (last 7 days)       ** No results found for the last 168 hours. **             Scheduled Med:   amLODIPine  5 mg Oral Daily    ARIPiprazole  2 mg Oral Daily    busPIRone  5 mg Oral BID    carvediloL  6.25 mg Oral BID    EScitalopram oxalate  5 mg Oral Daily    folic acid  1 mg Oral Daily    gabapentin  100 mg Oral TID    multivitamin  1 tablet Oral Daily    pantoprazole  40 mg Oral BID    rifAXIMin  550 mg Oral BID    sodium bicarbonate  650 mg Oral Daily    thiamine  100 mg Oral Q8H    zinc oxide-cod liver oil   Topical (Top) Daily      PRN Meds:  0.9%  NaCl infusion (for blood administration), albuterol, dextrose 10%, dextrose 10%, glucagon (human recombinant), glucose, glucose, hydrALAZINE, hyoscyamine, insulin aspart U-100, labetalol, methocarbamoL, [CANCELED] Flushing PICC/Midline Protocol **AND** [DISCONTINUED] sodium chloride 0.9% **AND** sodium chloride 0.9%   __________________________________________________________________________________________________________________________________  Assessment/Plan:  Acute Metabolic encephalopathy  UTI   Anemia of chronic inflammation  Alcoholic cirrhosis   Current alcohol use   Ascites   Thrombocytopenia from underlying cirrhosis  Macrocytic anemia   Hypoalbuminemia   Hypertensive urgency "      Above present on admission     Anticipated discharge and Disposition when medically stable:  New placement--waiting placement.     _______________________________________________________________________________________________________________________________    ------altered mental status, etiology not well defined.  Mental status is actually improving.  Ammonia level within normal limits.  Lactulose currently on hold.     S/p EEG without evidence of seizure like activity felt to be secondary to chronic alcohol abuse.     -----cirrhosis with MELD score<14.  No evidence of GI bleed  Abdominal ascites,s/p diagnostic paracentesis, suggestive of cirrhotic etiology; Continue rifaximin prophylactically    Continue supportive care  Continue checking vital signs q4hrs.    ---hypertension, continue to monitor and make adjustments as needed.      Nurse notified to page me if any changes occur   DVT prophylaxis initiated   Nutrition Status:     Consults:  Palliative Care, gastroenterologist  I have personally reviewed the specialist documentation and/or have spoken to the specialist with regard to the care of this patient; recommendations are noted above.     _______________________________________________________________________________________________________________________________    I have spent >30 minutes on the day of the visit; time spent includes face to face time and non-face to face time preparing to see the patient (eg, review of tests), independently reviewing and interpreting medical records, both past and current; documenting clinical information in the electronic or other health record, and communicating results to the patient/family/caregiver and care coordinator and nursing team.      All diagnosis and differential diagnosis have been reviewed,  interpreted and communicated appropriately to care team. assessment and plan has been documented; I have personally reviewed the labs and test results that are  presently available and pertinent to this hospital course; I have reviewed medical records based upon their availability.    I will continue to monitor closely and make adjustments to medical management as needed.    Tabatha Stokes, DO   04/03/2024     This note was created with the assistance of Dragon voice recognition software. There may be transcription errors as a result of using this technology however minimal. Effort has been made to assure accuracy of transcription but any obvious errors or omissions should be clarified with the author of the document.

## 2024-04-04 LAB
POCT GLUCOSE: 125 MG/DL (ref 70–110)
POCT GLUCOSE: 93 MG/DL (ref 70–110)

## 2024-04-04 PROCEDURE — 97530 THERAPEUTIC ACTIVITIES: CPT | Mod: CQ

## 2024-04-04 PROCEDURE — 21400001 HC TELEMETRY ROOM

## 2024-04-04 PROCEDURE — 97535 SELF CARE MNGMENT TRAINING: CPT

## 2024-04-04 PROCEDURE — 25000003 PHARM REV CODE 250: Performed by: STUDENT IN AN ORGANIZED HEALTH CARE EDUCATION/TRAINING PROGRAM

## 2024-04-04 PROCEDURE — 97116 GAIT TRAINING THERAPY: CPT | Mod: CQ

## 2024-04-04 PROCEDURE — 25000003 PHARM REV CODE 250: Performed by: INTERNAL MEDICINE

## 2024-04-04 PROCEDURE — 11000001 HC ACUTE MED/SURG PRIVATE ROOM

## 2024-04-04 PROCEDURE — 97168 OT RE-EVAL EST PLAN CARE: CPT

## 2024-04-04 RX ADMIN — GABAPENTIN 100 MG: 100 CAPSULE ORAL at 08:04

## 2024-04-04 RX ADMIN — Medication: at 08:04

## 2024-04-04 RX ADMIN — RIFAXIMIN 550 MG: 550 TABLET ORAL at 08:04

## 2024-04-04 RX ADMIN — SODIUM BICARBONATE 650 MG TABLET 650 MG: at 08:04

## 2024-04-04 RX ADMIN — FOLIC ACID 1 MG: 1 TABLET ORAL at 08:04

## 2024-04-04 RX ADMIN — CARVEDILOL 6.25 MG: 3.12 TABLET, FILM COATED ORAL at 08:04

## 2024-04-04 RX ADMIN — BUSPIRONE HYDROCHLORIDE 5 MG: 5 TABLET ORAL at 08:04

## 2024-04-04 RX ADMIN — AMLODIPINE BESYLATE 5 MG: 5 TABLET ORAL at 08:04

## 2024-04-04 RX ADMIN — ESCITALOPRAM OXALATE 5 MG: 5 TABLET, FILM COATED ORAL at 08:04

## 2024-04-04 RX ADMIN — THIAMINE HCL TAB 100 MG 100 MG: 100 TAB at 08:04

## 2024-04-04 RX ADMIN — THIAMINE HCL TAB 100 MG 100 MG: 100 TAB at 05:04

## 2024-04-04 RX ADMIN — THERA TABS 1 TABLET: TAB at 08:04

## 2024-04-04 RX ADMIN — ARIPIPRAZOLE 2 MG: 2 TABLET ORAL at 08:04

## 2024-04-04 RX ADMIN — PANTOPRAZOLE SODIUM 40 MG: 40 TABLET, DELAYED RELEASE ORAL at 08:04

## 2024-04-04 NOTE — PT/OT/SLP PROGRESS
Physical Therapy Treatment    Patient Name:  Luz Maria Alfaro   MRN:  [id number]    Recommendations:     Discharge therapy intensity: Moderate Intensity Therapy   Discharge Equipment Recommendations: to be determined by next level of care  Barriers to discharge: Ongoing medical needs, impaired cognition     Assessment:     Luz Maria Alfaro is a 64 y.o. female admitted with a medical diagnosis of acute encephalopathy, suspected metabolic form UTI vs. Alcohol withdrawal; metabolic acidosis, alcoholic cirrhosis, ascites, anemia.  She presents with the following impairments/functional limitations: weakness, impaired endurance, impaired functional mobility, gait instability, impaired cognition, impaired balance, decreased safety awareness, impaired self care skills, decreased coordination, decreased upper extremity function, decreased lower extremity function.    Rehab Prognosis: Good; patient would benefit from acute skilled PT services to address these deficits and reach maximum level of function.    Recent Surgery: * No surgery found *      Plan:     During this hospitalization, patient to be seen 3 x/week to address the identified rehab impairments via gait training, therapeutic activities, therapeutic exercises and progress toward the following goals:    Plan of Care Expires:  04/21/24    Subjective     Chief Complaint:   Patient/Family Comments/goals:   Pain/Comfort:         Objective:     Communicated with nursing prior to session.  Patient found  standing up from chair alarm going off  with telemetry, chair check upon PT entry to room.     General Precautions: Standard, fall  Orthopedic Precautions: N/A  Braces: N/A  Respiratory Status: Room air  Blood Pressure: NT    Functional Mobility:  Transfers:     Sit to Stand:  minimum assistance with rolling walker  Gait: 4' x2, 56' w/RW , CGA    Therapeutic Activities/Exercises:  Upon entry pt. Was attempting to standing stating she needed the restroom. Pt ~4' to toilet  for (+) void/BM. Pt stood for total A with annamarie de anda brief. Pt. Amb ~ 4' back to chair. Pt. Then amb in [place].     Education:  Patient provided with verbal education education regarding safety awareness.   Additional teaching is warranted.    Patient left up in chair with all lines intact, call button in reach, chair alarm on, and nurse notified    GOALS:   Multidisciplinary Problems       Physical Therapy Goals          Problem: Physical Therapy    Goal Priority Disciplines Outcome Goal Variances Interventions   Physical Therapy Goal     PT, PT/OT Ongoing, Not Progressing     Description: Goals to be met by: 24     Patient will increase functional independence with mobility by performin. Supine to sit with MInimal Assistance  2. Sit to stand transfer with Minimal Assistance  3. Bed to chair transfer with Minimal Assistance using Rolling Walker  4. Gait  x 150 feet with Minimal Assistance using Rolling Walker.                          Time Tracking:     PT Received On: 24  PT Start Time: 1107     PT Stop Time: 1136  PT Total Time (min): 29 min     Billable Minutes: Gait Training 14 and Therapeutic Activity 15    Treatment Type: Treatment  PT/PTA: PTA     Number of PTA visits since last PT visit: 3     2024

## 2024-04-04 NOTE — PLAN OF CARE
Problem: Occupational Therapy  Goal: Occupational Therapy Goal  Description: Goals to be met by 4/25/2024    1. Pt will follow >80% of simple one step commands across three sessions. (Met 4/4/24)  2. Pt will perform grooming EOB with min A. (Met 4/4/24)  3. Pt will perform functional grasp/reach/release of items >80% of trials in prep for increased participation in ADL tasks. (Met 4/4/24)  4. Pt will perform sit<>stand with min A in prep for ADL t/fs. (Met 4/4/24)    Updated Goals 4/4/24:  Pt will be CGA for toilet transfer with   UE Dressing with [place].  LE Dressing with Supervision.  Grooming while standing at sink with Modified [place].  Toileting from toilet with Supervision for hygiene and clothing management.   Toilet transfer to toilet with Stand-by Assistance.  Outcome: Ongoing, Progressing

## 2024-04-04 NOTE — PROGRESS NOTES
Inpatient Nutrition Assessment    Admit Date: 3/20/2024   Total duration of encounter: 15 days   Patient Age: 64 y.o.    Nutrition Recommendation/Prescription     Oral diet per SLP recommendations: Diet Pureed (IDDSI Level 4) No Straws (assist with feeding as needed), Supervision with Meals; Mildly Thick Liquids (IDDSI Level 2)     Boost Very High Calorie (provides 530 kcal, 22 g protein per serving) BID, mildly thick appropriate.     Continue thiamin, MV and folic acid supplementation.     Obtain new weight as feasible.     Communication of Recommendations: reviewed with nurse    Nutrition Assessment     Malnutrition Assessment/Nutrition-Focused Physical Exam    Malnutrition Context: chronic illness (03/21/24 1105)  Malnutrition Level: moderate (03/21/24 1105)  Energy Intake (Malnutrition):  (unable to determine) (03/21/24 1105)  Weight Loss (Malnutrition):  (unable to determine) (03/21/24 1105)  Subcutaneous Fat (Malnutrition): severe depletion (03/21/24 1105)     Upper Arm Region (Subcutaneous Fat Loss): severe depletion     Muscle Mass (Malnutrition): mild depletion (03/21/24 1105)     Clavicle Bone Region (Muscle Loss): mild depletion  Clavicle and Acromion Bone Region (Muscle Loss): mild depletion     Dorsal Hand (Muscle Loss): mild depletion     Anterior Thigh Region (Muscle Loss): mild depletion  Posterior Calf Region (Muscle Loss): mild depletion  Fluid Accumulation (Malnutrition):  (does not meet criteria) (03/21/24 1105)        A minimum of two characteristics is recommended for diagnosis of either severe or non-severe malnutrition.    Chart Review    Reason Seen: continuous nutrition monitoring, malnutrition screening tool (MST), physician consult for PCM, and follow-up    Malnutrition Screening Tool Results   Have you recently lost weight without trying?: Unsure  Have you been eating poorly because of a decreased appetite?: No   MST Score: 2   Diagnosis:  Acute encephalopathy, metabolic versus hepatic    Metabolic Acidosis   Alcoholic cirrhosis   Ascites   Thrombocytopenia   Acute microcytic anemia   Hypoalbuminemia     Relevant Medical History: HTN, HLD, type 2 DM, alcoholic cirrhosis, alcohol abuse and nicotine dependence      Scheduled Medications:  amLODIPine, 5 mg, Daily  ARIPiprazole, 2 mg, Daily  busPIRone, 5 mg, BID  carvediloL, 6.25 mg, BID  EScitalopram oxalate, 5 mg, Daily  folic acid, 1 mg, Daily  gabapentin, 100 mg, TID  multivitamin, 1 tablet, Daily  pantoprazole, 40 mg, BID  rifAXIMin, 550 mg, BID  sodium bicarbonate, 650 mg, Daily  thiamine, 100 mg, Q8H  zinc oxide-cod liver oil, , Daily    Continuous Infusions:     PRN Medications: 0.9%  NaCl infusion (for blood administration), albuterol, dextrose 10%, dextrose 10%, glucagon (human recombinant), glucose, glucose, hydrALAZINE, hyoscyamine, insulin aspart U-100, labetalol, methocarbamoL, [CANCELED] Flushing PICC/Midline Protocol **AND** [DISCONTINUED] sodium chloride 0.9% **AND** sodium chloride 0.9%    Calorie Containing IV Medications: Clinimix    Recent Labs   Lab 03/30/24  0926      K 4.0   CALCIUM 8.9   CHLORIDE 105   CO2 25   BUN 12.4   CREATININE 0.48*   EGFRNORACEVR >60   GLUCOSE 89   BILITOT 0.5   ALKPHOS 123   ALT 17   AST 22   ALBUMIN 2.6*   AMMONIA 24.6       Nutrition Orders:  Diet Pureed (IDDSI Level 4) No Straws (assist with feeding as needed), Supervision with Meals; Mildly Thick Liquids (IDDSI Level 2)  Dietary nutrition supplements Boost Very High Calorie Nutritional Drink - Vanilla; BID    Appetite/Oral Intake: fair/50-75% of meals  Factors Affecting Nutritional Intake: impaired cognitive status/motor control, decreased appetite, and inability to feed self  Food/Quaker/Cultural Preferences: none reported  Food Allergies: none reported  Last Bowel Movement: 04/03/24  Wound(s):     Altered Skin Integrity 03/26/24 1000 Right medial Buttocks Other (comment)-Tissue loss description: Partial thickness       Altered Skin  "Integrity 03/27/24 2252 Left dorsal Elbow-Tissue loss description: Not applicable       Altered Skin Integrity 03/27/24 2254 Right dorsal Elbow-Tissue loss description: Not applicable       Altered Skin Integrity 03/27/24 2303 Left anterior Ankle-Tissue loss description: Not applicable       Altered Skin Integrity 03/27/24 2304 Right anterior Ankle-Tissue loss description: Partial thickness     Comments    3/21/24 Pt unable to answer questions, ex- at bedside states pt is on a "liquid diet", chronic alcohol abuse. States notable wt loss over the last 9 months, estimating weight around 160lbs. EMR wt history shows 119-124lbs x 1.5 years so unsure accuracy of reported UBW. Did note muscle and fat loss during NFPA. He think she would drink oral supplement, will send with meals. PPN infusing at 50ml/hr, recommend increasing rate to better meet nutritional needs while oral intake is inadequate.     3/26/24 NPO from 3/21-3/24 s/t AMS. MBS performed yesterday, SLP recommends puree solids and mildly thick liquids. Pt has not woke up to take medications or po intake. Will change ONS to Boost VHC for mildly thick supplement and leave tube feeding recommendations incase po intake does not improve.     3/28/24 Spoke to ex-, pt ate 75% waffle this morning and a few sips of thickened ice tea, tolerated. PO intake remains inadequate but tolerating meals. PPN started yesterday by MD for supplemental support.     4/1/24 Pt awake and alert watching TV, did not respond to me. RN states assist feeding and eating well. PPN still infusing, can discontinue.     4/4/24 With therapy. RN states tolerating meals with good intake. Has assistance with meals when needed. No GI complaints.     Anthropometrics    Height: 5' 3" (160 cm), Height Method: Stated  Last Weight: 61 kg (134 lb 7.7 oz) (03/21/24 0510), Weight Method: Standard Scale  BMI (Calculated): 23.8  BMI Classification: normal (BMI 18.5-24.9)     Ideal Body Weight " (IBW), Female: 115 lb     % Ideal Body Weight, Female (lb): 116.94 %                             Usual Weight Provided By: unable to obtain usual weight    Wt Readings from Last 5 Encounters:   03/21/24 61 kg (134 lb 7.7 oz)   07/21/23 56.3 kg (124 lb 3.2 oz)   03/27/23 54.4 kg (119 lb 14.9 oz)   01/24/23 54.4 kg (119 lb 14.9 oz)   12/15/22 54.4 kg (119 lb 14.9 oz)     Weight Change(s) Since Admission: 3/26-4/1/24 no updated wts   Wt Readings from Last 1 Encounters:   03/21/24 0510 61 kg (134 lb 7.7 oz)   03/20/24 0013 56.7 kg (125 lb)   Admit Weight: 56.7 kg (125 lb) (03/20/24 0013), Weight Method: Stated    Estimated Needs    Weight Used For Calorie Calculations: 61 kg (134 lb 7.7 oz)  Energy Calorie Requirements (kcal): 1830kcals/d (30kcals/kg)  Energy Need Method: Kcal/kg  Weight Used For Protein Calculations: 61 kg (134 lb 7.7 oz)  Protein Requirements: 79g/d (1.3g/kg)  Fluid Requirements (mL): 1525-1830ml fl/d (25-30ml/kg)    Enteral Nutrition     Patient not receiving enteral nutrition at this time.    Parenteral Nutrition     Patient not receiving parenteral nutrition at this time.    Evaluation of Received Nutrient Intake    Calories: meeting estimated needs  Protein: meeting estimated needs    Patient Education     Not applicable.    Nutrition Diagnosis     PES: Inadequate oral intake related to acute illness as evidenced by <50% EER since admit. (resolved)     PES: Moderate chronic disease or condition related malnutrition related to inability to consume sufficient nutrients as evidenced by severe fat depletion and mild muscle depletion. (active)    Nutrition Interventions     Intervention(s): modified composition of meals/snacks, modified rate of parenteral nutrition, commercial beverage, multivitamin/mineral supplement therapy, and collaboration with other providers    Goal: Meet greater than 80% of nutritional needs by follow-up. (goal met)  Goal: Maintain weight throughout hospitalization. (goal  progressing)    Nutrition Goals & Monitoring     Dietitian will monitor: energy intake, weight change, electrolyte/renal panel, and glucose/endocrine profile    Nutrition Risk/Follow-Up: moderate (follow-up in 3-5 days)   Please consult if re-assessment needed sooner.

## 2024-04-04 NOTE — PT/OT/SLP EVAL
Occupational Therapy   Re-evaluation    Name: Luz Maria Alfaro  MRN: [id number]  Admitting Diagnosis: AMS  Recent Surgery: * No surgery found *      Recommendations:     Discharge therapy intensity: Moderate Intensity Therapy   Discharge Equipment Recommendations:  to be determined by next level of care  Barriers to discharge:   (Ongoing medical needs, impaired cognition)    Assessment:     Luz Maria Alfaro is a 64 y.o. female with a medical diagnosis of AMS and malnutrition with a history of hypertension, hyperlipidemia, diabetes mellitus, alcohol abuse with cirrhosis. Pt is very hard of hearing, requiring repetition of verbal and visual cues for completing tasks/ transfers during session. Pt was overall Min A for bed mobility and CGA for functional transfers with RW. Pt was CGA for grooming in stand and total A for michael cares.  She presents with the following performance deficits affecting function: weakness, impaired endurance, impaired self care skills, impaired cognition, impaired functional mobility, gait instability, impaired balance.      Rehab Prognosis: Good; patient would benefit from acute skilled OT services to address these deficits and reach maximum level of function.       Plan:     Patient to be seen 4 x/week to address the above listed problems via self-care/home management, therapeutic activities, therapeutic exercises  Plan of Care Expires: 04/25/24  Plan of Care Reviewed with: patient    Subjective     Chief Complaint: Hates thickened liquids  Patient/Family Comments/goals: None stated    Pain/Comfort:  Pain Rating 1: 0/10    Patients cultural, spiritual, Taoism conflicts given the current situation: no    Objective:     OT communicated with nurse prior to session.      Patient was found HOB elevated with telemetry upon OT entry to room.    General Precautions: Standard, fall  Orthopedic Precautions: N/A  Braces: N/A    Bed Mobility:    Patient completed Supine to Sit with minimum  assistance    Functional Mobility/Transfers:  Patient completed Sit <> Stand Transfer with contact guard assistance  with  rolling walker   Patient completed Bed <> Chair Transfer using Step Transfer technique with contact guard assistance with rolling walker  Functional Mobility: Pt demo'd safe use of RW and no LOB noted during mobility.    Activities of Daily Living:  Grooming: contact guard assistance hand washing in stand at the sink with RW  Toileting: total assistance michael cares in bed and while in stand as pt incontinent of bowel. Brief placed for mobility.    Functional Cognition:  Affect: Cooperative  Orientation: oriented to Person and Place  Command Following: Follows simple one-step commands with 90% accuracy  Insight into Deficits: Impaired. Pt with BM on hands, reporting the discoloration being from her cats rather than BM.    Visual Perceptual Skills:  Intact    Upper Extremity Function:  Right Upper Extremity:   WFL    Left Upper Extremity:  WFL    Balance:   Static sitting balance: WFL  Static standing balance:WFL    Therapeutic Positioning  Risk for acquired pressure injuries is increased due to relative decrease in mobility d/t hospitalization , impaired mobility, incontinence, and altered skin already present.    OT interventions performed during the course of today's session in an effort to prevent and/or reduce acquired pressure injuries:   Education was provided on benefits of and recommendations for therapeutic positioning     Findings: known area of altered skin integrity at B feet/ ankles, and buttocks    OT recommendations for therapeutic positioning throughout hospitalization:   Follow [facility] Pressure Injury Prevention Protocol    Additional Treatment:  ADL Training: Pt instructed on safety with hand placement and thorough hand washing while in stand at the sink.    Patient Education:  Patient provided with verbal education and demonstrations education regarding OT role/goals/POC, fall  prevention, safety awareness, and pressure ulcer prevention.  Additional teaching is warranted.     Patient left up in chair with all lines intact, call button in reach, and nurse notified    GOALS:   Multidisciplinary Problems       Occupational Therapy Goals          Problem: Occupational Therapy    Goal Priority Disciplines Outcome Interventions   Occupational Therapy Goal     OT, PT/OT Ongoing, Progressing    Description: Goals to be met by 4/25/2024    1. Pt will follow >80% of simple one step commands across three sessions. (Met 4/4/24)  2. Pt will perform grooming EOB with min A. (Met 4/4/24)  3. Pt will perform functional grasp/reach/release of items >80% of trials in prep for increased participation in ADL tasks. (Met 4/4/24)  4. Pt will perform sit<>stand with min A in prep for ADL t/fs. (Met 4/4/24)    Updated Goals:  Pt will be CGA for toilet transfer with   UE Dressing with [place].  LE Dressing with Supervision.  Grooming while standing at sink with Modified [place].  Toileting from toilet with Supervision for hygiene and clothing management.   Toilet transfer to toilet with Stand-by Assistance.                       History:     Past Medical History:   Diagnosis Date    Benign essential HTN     Mixed hyperlipidemia     Type 2 diabetes mellitus without complications          Past Surgical History:   Procedure Laterality Date    APPENDECTOMY      BACK SURGERY      HIP SURGERY      INTRAMEDULLARY RODDING OF FEMUR Right 10/27/2022    Procedure: RIGHT FEMUR INTERTROCH IMN;  Surgeon: Stewart Brambila MD;  Location: [facility];  Service: Orthopedics;  Laterality: Right;  [place] table, supine  3rd case  Synthes Short TFNA       Time Tracking:     OT Date of Treatment: 04/04/24  OT Start Time: 0945  OT Stop Time: 1017  OT Total Time (min): 32 min    Billable Minutes:Re-eval 12 minutes  Self Care/Home Management 20 minutes    4/4/2024

## 2024-04-04 NOTE — PROGRESS NOTES
[facility] Hospital Medicine Progress Note        Chief Complaint: Inpatient Follow-up for Altered mental status    HPI: 64 y.o. female with a PMHx of HTN, HLD, type 2 DM, alcoholic cirrhosis, alcohol abuse and nicotine dependence who presented to [facility] on 3/20/2024 via EMS with c/o worsening confusion x1 day with associated generalized weakness.  The majority of the history was obtained from review of the medical record as the patient was unable to provide any additional history.     Upon presentation to ED, vital signed included /73, , RR 18, SpO2 98% on room air, temperature 98.4° F.  Labs notable for hemoglobin 10.7, hematocrit 31.7, platelets 34, vitamin B12 1958, INR 1.4, CO2 18, BUN 3.5, creatinine 0.5, calcium 7.9, , albumin 1.9, AST 43.  Serum alcohol level undetectable.  EKG demonstrated sinus tachycardia.  CXR demonstrated increased left retrocardiac density with partial silhouetting of the left hemidiaphragm which might be related to an infiltrate/atelectasis.  CT head without contrast negative for acute intracranial findings or significant interval changes compared to August 2022.  CT abdomen and pelvis with IV contrast consistent with cirrhosis, moderate generalized ascites, bilateral pleural fluid right greater than left noted.  She was admitted to hospital medicine service for further medical management.      Interval Hx:   No acute overnight events reported by the staff.  Waiting for placement.  Patient was seen and examined.  Chart was reviewed, afebrile, hemodynamically stable, most recent labs reviewed.      Objective/physical exam:  General: In no acute distress, afebrile  Chest: Clear to auscultation bilaterally  Heart: +S1, S2, no appreciable murmur  Abdomen: Soft, nontender, BS +  MSK: Warm, no lower extremity edema, no clubbing or cyanosis  Neurologic: Alert, awake, purposeful movements noted    VITAL SIGNS: 24 HRS MIN & MAX LAST   Temp  Min:  "96.2 °F (35.7 °C)  Max: 98.3 °F (36.8 °C) 96.2 °F (35.7 °C)   BP  Min: 94/58  Max: 124/74 105/71   Pulse  Min: 68  Max: 83  74   No data recorded 18   SpO2  Min: 94 %  Max: 98 % 95 %     I have reviewed the following labs:  No results for input(s): "WBC", "RBC", "HGB", "HCT", "MCV", "MCH", "MCHC", "RDW", "PLT", "MPV", "GRAN", "LYMPH", "MONO", "BASO", "NRBC" in the last 168 hours.  Recent Labs   Lab 03/30/24  0926      K 4.0   CO2 25   BUN 12.4   CREATININE 0.48*   CALCIUM 8.9   ALBUMIN 2.6*   ALKPHOS 123   ALT 17   AST 22   BILITOT 0.5     Microbiology Results (last 7 days)       ** No results found for the last 168 hours. **             See below for Radiology    Scheduled Med:   amLODIPine  5 mg Oral Daily    ARIPiprazole  2 mg Oral Daily    busPIRone  5 mg Oral BID    carvediloL  6.25 mg Oral BID    EScitalopram oxalate  5 mg Oral Daily    folic acid  1 mg Oral Daily    gabapentin  100 mg Oral TID    multivitamin  1 tablet Oral Daily    pantoprazole  40 mg Oral BID    rifAXIMin  550 mg Oral BID    sodium bicarbonate  650 mg Oral Daily    thiamine  100 mg Oral Q8H    zinc oxide-cod liver oil   Topical (Top) Daily      Continuous Infusions:     PRN Meds:  0.9%  NaCl infusion (for blood administration), albuterol, dextrose 10%, dextrose 10%, glucagon (human recombinant), glucose, glucose, hydrALAZINE, hyoscyamine, insulin aspart U-100, labetalol, methocarbamoL, [CANCELED] Flushing PICC/Midline Protocol **AND** [DISCONTINUED] sodium chloride 0.9% **AND** sodium chloride 0.9%     Assessment/Plan:  Acute Metabolic encephalopathy  UTI   Anemia of chronic inflammation  Alcoholic cirrhosis   Current alcohol use   Ascites   Thrombocytopenia from underlying cirrhosis  Macrocytic anemia   Hypoalbuminemia   Hypertensive urgency -improved     Plan   Mental status improving, status post EEG without evidence of seizure activity.  Ammonia level within normal limits, MRI of the brain without any acute " abnormalities  Cirrhosis with MELD less than 14, no evidence of GI bleed.  Status post paracentesis ;suggestive of cirrhotic etiology, continue rifaximin prophylactically  Platelets improving  Palliative medicine following  PTOT following, recommended moderate intensity therapy, Waiting on placement, case management on board   Continue supportive care    VTE prophylaxis:  SCDs, thrombocytopenic      Anticipated discharge and Disposition:   To be decided      All diagnosis and differential diagnosis have been reviewed; assessment and plan has been documented; I have personally reviewed the labs and test results that are presently available; I have reviewed the patients medication list; I have reviewed the consulting providers response and recommendations. I have reviewed or attempted to review medical records based upon their availability    All of the patient's questions have been  addressed and answered. Patient's is agreeable to the above stated plan. I will continue to monitor closely and make adjustments to medical management as needed.  _____________________________________________________________________    Nutrition Status:    Radiology:  I have personally reviewed the following imaging and agree with the radiologist.     MRI Brain Without Contrast  Narrative: EXAMINATION:  MRI BRAIN WITHOUT CONTRAST    CLINICAL HISTORY:  Stroke, follow up;  Cerebral infarction, unspecified    TECHNIQUE:  Multiplanar multisequence MR imaging of the brain was performed without contrast.    COMPARISON:  CT head without contrast 03/20/2024.    FINDINGS:  INTRACRANIAL: Right parietal lobe encephalomalacia.  Mild generalized brain atrophy.  Moderate chronic small-vessel ischemic changes of the supratentorial white matter.  No parenchymal restricted diffusion.  No evidence of intracranial hemorrhage.  No extra-axial fluid collection or mass.  No intracranial mass effect.  No hydrocephalus.  Midline structures have a normal  configuration.  Visualized pituitary gland and infundibulum are normal.  Visualized major intracranial vascular structures demonstrate normal flow voids and are normal in course and caliber.    SINUSES: Paranasal sinuses and mastoid air cells are clear.    ORBITS: Visualized orbits are normal.  Impression: No acute intracranial abnormality.    Moderate chronic small-vessel ischemic changes of the supratentorial white matter.    Right parietal lobe encephalomalacia.    Mild generalized brain atrophy.    Electronically signed by: Jack Kirkland  Date:    03/28/2024  Time:    06:09      Yvette Saba MD  Department of Hospital Medicine   [facility]   04/04/2024

## 2024-04-05 LAB
ANION GAP SERPL CALC-SCNC: 7 MEQ/L
BASOPHILS # BLD AUTO: 0.04 X10(3)/MCL
BASOPHILS NFR BLD AUTO: 0.5 %
BUN SERPL-MCNC: 21.2 MG/DL (ref 9.8–20.1)
CALCIUM SERPL-MCNC: 9.2 MG/DL (ref 8.4–10.2)
CHLORIDE SERPL-SCNC: 106 MMOL/L (ref 98–107)
CO2 SERPL-SCNC: 28 MMOL/L (ref 23–31)
CREAT SERPL-MCNC: 0.58 MG/DL (ref 0.55–1.02)
CREAT/UREA NIT SERPL: 37
EOSINOPHIL # BLD AUTO: 0.04 X10(3)/MCL (ref 0–0.9)
EOSINOPHIL NFR BLD AUTO: 0.5 %
ERYTHROCYTE [DISTWIDTH] IN BLOOD BY AUTOMATED COUNT: 22.5 % (ref 11.5–17)
GFR SERPLBLD CREATININE-BSD FMLA CKD-EPI: >60 MLS/MIN/1.73/M2
GLUCOSE SERPL-MCNC: 106 MG/DL (ref 82–115)
HCT VFR BLD AUTO: 31.7 % (ref 37–47)
HGB BLD-MCNC: 10.8 G/DL (ref 12–16)
IMM GRANULOCYTES # BLD AUTO: 0.04 X10(3)/MCL (ref 0–0.04)
IMM GRANULOCYTES NFR BLD AUTO: 0.5 %
LYMPHOCYTES # BLD AUTO: 1.44 X10(3)/MCL (ref 0.6–4.6)
LYMPHOCYTES NFR BLD AUTO: 16.8 %
MCH RBC QN AUTO: 40.4 PG (ref 27–31)
MCHC RBC AUTO-ENTMCNC: 34.1 G/DL (ref 33–36)
MCV RBC AUTO: 118.7 FL (ref 80–94)
MONOCYTES # BLD AUTO: 0.94 X10(3)/MCL (ref 0.1–1.3)
MONOCYTES NFR BLD AUTO: 11 %
NEUTROPHILS # BLD AUTO: 6.07 X10(3)/MCL (ref 2.1–9.2)
NEUTROPHILS NFR BLD AUTO: 70.7 %
NRBC BLD AUTO-RTO: 0 %
PLATELET # BLD AUTO: 413 X10(3)/MCL (ref 130–400)
PMV BLD AUTO: 9.8 FL (ref 7.4–10.4)
POTASSIUM SERPL-SCNC: 3.9 MMOL/L (ref 3.5–5.1)
RBC # BLD AUTO: 2.67 X10(6)/MCL (ref 4.2–5.4)
SODIUM SERPL-SCNC: 141 MMOL/L (ref 136–145)
WBC # SPEC AUTO: 8.57 X10(3)/MCL (ref 4.5–11.5)

## 2024-04-05 PROCEDURE — 21400001 HC TELEMETRY ROOM

## 2024-04-05 PROCEDURE — 63600175 PHARM REV CODE 636 W HCPCS: Performed by: INTERNAL MEDICINE

## 2024-04-05 PROCEDURE — 25000003 PHARM REV CODE 250: Performed by: INTERNAL MEDICINE

## 2024-04-05 PROCEDURE — 97535 SELF CARE MNGMENT TRAINING: CPT

## 2024-04-05 PROCEDURE — 80048 BASIC METABOLIC PNL TOTAL CA: CPT | Performed by: INTERNAL MEDICINE

## 2024-04-05 PROCEDURE — 85025 COMPLETE CBC W/AUTO DIFF WBC: CPT | Performed by: INTERNAL MEDICINE

## 2024-04-05 PROCEDURE — 11000001 HC ACUTE MED/SURG PRIVATE ROOM

## 2024-04-05 PROCEDURE — 25000003 PHARM REV CODE 250: Performed by: STUDENT IN AN ORGANIZED HEALTH CARE EDUCATION/TRAINING PROGRAM

## 2024-04-05 PROCEDURE — 99900031 HC PATIENT EDUCATION (STAT)

## 2024-04-05 PROCEDURE — 94761 N-INVAS EAR/PLS OXIMETRY MLT: CPT

## 2024-04-05 RX ORDER — ENOXAPARIN SODIUM 100 MG/ML
40 INJECTION SUBCUTANEOUS EVERY 24 HOURS
Status: DISCONTINUED | OUTPATIENT
Start: 2024-04-05 | End: 2024-04-17 | Stop reason: HOSPADM

## 2024-04-05 RX ADMIN — GABAPENTIN 100 MG: 100 CAPSULE ORAL at 03:04

## 2024-04-05 RX ADMIN — ARIPIPRAZOLE 2 MG: 2 TABLET ORAL at 09:04

## 2024-04-05 RX ADMIN — BUSPIRONE HYDROCHLORIDE 5 MG: 5 TABLET ORAL at 09:04

## 2024-04-05 RX ADMIN — Medication: at 09:04

## 2024-04-05 RX ADMIN — GABAPENTIN 100 MG: 100 CAPSULE ORAL at 09:04

## 2024-04-05 RX ADMIN — THIAMINE HCL TAB 100 MG 100 MG: 100 TAB at 05:04

## 2024-04-05 RX ADMIN — SODIUM BICARBONATE 650 MG TABLET 650 MG: at 09:04

## 2024-04-05 RX ADMIN — ESCITALOPRAM OXALATE 5 MG: 5 TABLET, FILM COATED ORAL at 09:04

## 2024-04-05 RX ADMIN — PANTOPRAZOLE SODIUM 40 MG: 40 TABLET, DELAYED RELEASE ORAL at 09:04

## 2024-04-05 RX ADMIN — ENOXAPARIN SODIUM 40 MG: 40 INJECTION SUBCUTANEOUS at 06:04

## 2024-04-05 RX ADMIN — RIFAXIMIN 550 MG: 550 TABLET ORAL at 09:04

## 2024-04-05 RX ADMIN — THIAMINE HCL TAB 100 MG 100 MG: 100 TAB at 09:04

## 2024-04-05 RX ADMIN — CARVEDILOL 6.25 MG: 3.12 TABLET, FILM COATED ORAL at 09:04

## 2024-04-05 RX ADMIN — THIAMINE HCL TAB 100 MG 100 MG: 100 TAB at 03:04

## 2024-04-05 RX ADMIN — THERA TABS 1 TABLET: TAB at 09:04

## 2024-04-05 RX ADMIN — FOLIC ACID 1 MG: 1 TABLET ORAL at 09:04

## 2024-04-05 RX ADMIN — AMLODIPINE BESYLATE 5 MG: 5 TABLET ORAL at 09:04

## 2024-04-05 NOTE — PROGRESS NOTES
[facility] - Neurology  Wound Care    Patient Name:  Luz Maria Alfaro   MRN:  [id number]  Date: 04/04/2024  Diagnosis: <principal problem not specified>    History:     Past Medical History:   Diagnosis Date    Benign essential HTN     Mixed hyperlipidemia     Type 2 diabetes mellitus without complications        Social History     Socioeconomic History    Marital status:    Tobacco Use    Smoking status: Every Day     Current packs/day: 0.50     Types: Cigarettes    Smokeless tobacco: Current   Substance and Sexual Activity    Alcohol use: Yes    Drug use: Never    Sexual activity: Not Currently     Social Determinants of Health     Financial Resource Strain: Low Risk  (3/20/2024)    Overall Financial Resource Strain (CARDIA)     Difficulty of Paying Living Expenses: Not very hard   Food Insecurity: Unknown (3/20/2024)    Hunger Vital Sign     Ran Out of Food in the Last Year: Never true   Transportation Needs: No Transportation Needs (3/20/2024)    PRAPARE - Transportation     Lack of Transportation (Medical): No     Lack of Transportation (Non-Medical): No   Physical Activity: Inactive (3/20/2024)    Exercise Vital Sign     Days of Exercise per Week: 0 days     Minutes of Exercise per Session: 0 min   Stress: Stress Concern Present (3/20/2024)    [employer] - Occupational Stress Questionnaire     Feeling of Stress : To some extent   Social Connections: Socially Isolated (3/20/2024)    Social Connection and Isolation Panel [NHANES]     Frequency of Communication with Friends and Family: More than three times a week     Frequency of Social Gatherings with Friends and Family: More than three times a week     Attends Denominational Services: Never     Active Member of Clubs or Organizations: No     Attends Club or Organization Meetings: Never     Marital Status:    Housing Stability: Low Risk  (3/20/2024)    Housing Stability Vital Sign     Unable to Pay for  Housing in the Last Year: No     Number of Places Lived in the Last Year: 1     Unstable Housing in the Last Year: No       Precautions:     Allergies as of 03/20/2024 - Reviewed 03/20/2024   Allergen Reaction Noted    Meperidine Rash and Hives 08/12/2021       WO Assessment Details/Treatment        04/04/24 1020   WOCN Assessment   Visit Date 04/04/24   Visit Time 1020   Consult Type Follow Up   WOCN Speciality Wound   Intervention changed   Teaching on-going   Skin Interventions   Device Skin Pressure Protection absorbent pad utilized/changed   Pressure Reduction Devices specialty bed utilized        Altered Skin Integrity 03/26/24 1000 Right medial Buttocks Other (comment)   Date First Assessed/Time First Assessed: 03/26/24 1000   Altered Skin Integrity Present on Admission - Did Patient arrive to the hospital with altered skin?: yes  Side: Right  Orientation: medial  Location: Buttocks  Is this injury device related?: (c...   Wound Image    Dressing Appearance Open to air   Drainage Amount None   Drainage Characteristics/Odor No odor   Appearance Pink;Dry   Tissue loss description Partial thickness   Periwound Area Redness;Moist   Wound Edges Irregular   Care Cleansed with:;Sterile normal saline   Dressing Applied        Altered Skin Integrity 03/27/24 2252 Left dorsal Elbow   Date First Assessed/Time First Assessed: 03/27/24 2252   Side: Left  Orientation: dorsal  Location: Elbow   Wound Image    Dressing Appearance Dry;Intact;Clean   Drainage Amount None   Appearance Intact   Tissue loss description Not applicable   Care Cleansed with:;Sterile normal saline   Dressing Applied;Foam        Altered Skin Integrity 03/27/24 2254 Right dorsal Elbow   Date First Assessed/Time First Assessed: 03/27/24 2254   Side: Right  Orientation: dorsal  Location: Elbow   Wound Image    Dressing Appearance Dry;Intact;Clean   Drainage Amount None   Appearance Intact;Pink;Dry   Tissue loss description Not applicable   Periwound  Area Intact   Care Cleansed with:;Sterile normal saline   Dressing Applied;Foam        Altered Skin Integrity 03/27/24 2303 Left anterior Ankle   Date First Assessed/Time First Assessed: 03/27/24 2303   Side: Left  Orientation: anterior  Location: Ankle   Wound Image    Dressing Appearance Dry;Intact;Clean   Drainage Amount None   Appearance Intact   Tissue loss description Not applicable   Periwound Area Intact;Redness   Care Cleansed with:;Sterile normal saline   Dressing Applied;Foam        Altered Skin Integrity 03/27/24 2304 Right anterior Ankle   Date First Assessed/Time First Assessed: 03/27/24 2304   Side: Right  Orientation: anterior  Location: Ankle   Wound Image    Dressing Appearance Dry;Intact;Clean     Woundcare follow up for Buttocks, left right elbow, left and right lateral ankles. no family at bedside, sitting in chair. Continue with current Recommendations: Sacrum: Apply Desitin BID and PRN, Bilateral ankle and elbows: Cleanse with NS. Apply mepilex bordered foam. Change every 3 days and PRN. Heel lift boots on while in bed.Nursing to continue with turning every two hours, wedge, and floating heels. Repositioned, head of bed elevated, bed in lowest position. On DAV mattress. Will follow up.     04/04/2024

## 2024-04-05 NOTE — PT/OT/SLP PROGRESS
Occupational Therapy   Treatment    Name: Luz Maria Alfaro  MRN: [id number]  Admitting Diagnosis:  AMS    Recommendations:     Recommended therapy intensity at discharge: Moderate Intensity Therapy   Discharge Equipment Recommendations:  to be determined by next level of care  Barriers to discharge:  Decreased caregiver support (ongoing medical needs)    Assessment:     Luz Maria Alfaro is a 64 y.o. female with a medical diagnosis of AMS and malnutrition with a history of hypertension, hyperlipidemia, diabetes mellitus, alcohol abuse with cirrhosis. Patient is very hard of hearing. She presents with performance deficits affecting function are weakness, impaired endurance, impaired self care skills, impaired cognition, impaired functional mobility, gait instability, impaired balance. Patient would benefit from moderate intensity therapy upon discharge.     Patient was supine in bed at OT arrival. She was able to transfer to EOB with SBA and able to stand with RW and ambulate to bathroom with CGA. She was able to brush teeth with thickened water and wash face at sink. She then transferred to toilet for BM and was able to complete toilet hygiene with CGA while sitting on toilet. She was able to ambulate to recliner and was given comb to detangle hair. She was left in chair with all needs in reach and nursing notified. She would benefit from continued skilled therapy.    Rehab Prognosis:  Good; patient would benefit from acute skilled OT services to address these deficits and reach maximum level of function.       Plan:     Patient to be seen 4 x/week to address the above listed problems via self-care/home management, therapeutic activities, therapeutic exercises  Plan of Care Expires: 04/25/24  Plan of Care Reviewed with: patient    Subjective     Pain/Comfort:  Pain Rating 1: 0/10    Objective:     Communicated with: nursing prior to session.  Patient found supine with pulse ox (continuous), telemetry, peripheral IV  upon OT entry to room.    General Precautions: Standard, fall    Orthopedic Precautions:N/A  Braces: N/A  Respiratory Status: Room air  Vital Signs: Respiratory Status: on room air     Occupational Performance:     Bed Mobility:    Patient completed Supine to Sit with stand by assistance     Functional Mobility/Transfers:  Patient completed Sit <> Stand Transfer with contact guard assistance  with  rolling walker   Patient completed Bed <> Chair Transfer using Step Transfer technique with contact guard assistance with rolling walker  Patient completed Toilet Transfer Step Transfer technique with contact guard assistance with  rolling walker  No LOB noted    Activities of Daily Living:  Grooming: contact guard assistance standing at sink to brush teeth, comb hair, and wash face with CGA and good balance  Lower Body Dressing: total assistance adilson/doff brief in standing  Toileting: maximal assistance patient able to perform toileting hygiene but needed assist for clothing management while on toilet    Patient Education:  Patient provided with verbal education education regarding OT role/goals/POC, fall prevention, safety awareness, and Discharge/DME recommendations.  Understanding was verbalized.      Patient left up in chair with all lines intact, call button in reach, chair alarm on, and nursing notified.    GOALS:   Multidisciplinary Problems       Occupational Therapy Goals          Problem: Occupational Therapy    Goal Priority Disciplines Outcome Interventions   Occupational Therapy Goal     OT, PT/OT Ongoing, Progressing    Description: Goals to be met by 4/25/2024    1. Pt will follow >80% of simple one step commands across three sessions. (Met 4/4/24)  2. Pt will perform grooming EOB with min A. (Met 4/4/24)  3. Pt will perform functional grasp/reach/release of items >80% of trials in prep for increased participation in ADL tasks. (Met 4/4/24)  4. Pt will perform sit<>stand with min A in prep for ADL t/fs.  (Met 4/4/24)    Updated Goals:  Pt will be CGA for toilet transfer with   UE Dressing with [place].  LE Dressing with Supervision.  Grooming while standing at sink with Modified [place].  Toileting from toilet with Supervision for hygiene and clothing management.   Toilet transfer to toilet with Stand-by Assistance.                       Time Tracking:     OT Date of Treatment: 04/05/24  OT Start Time: 1255  OT Stop Time: 1328  OT Total Time (min): 33 min    Billable Minutes:Self Care/Home Management 33    OT/KAREN: OT     Number of KAREN visits since last OT visit: 1 4/5/2024

## 2024-04-05 NOTE — PROGRESS NOTES
[facility] Hospital Medicine Progress Note        Chief Complaint: Inpatient Follow-up for Altered mental status    HPI: 64 y.o. female with a PMHx of HTN, HLD, type 2 DM, alcoholic cirrhosis, alcohol abuse and nicotine dependence who presented to [facility] on 3/20/2024 via EMS with c/o worsening confusion x1 day with associated generalized weakness.  The majority of the history was obtained from review of the medical record as the patient was unable to provide any additional history.     Upon presentation to ED, vital signed included /73, , RR 18, SpO2 98% on room air, temperature 98.4° F.  Labs notable for hemoglobin 10.7, hematocrit 31.7, platelets 34, vitamin B12 1958, INR 1.4, CO2 18, BUN 3.5, creatinine 0.5, calcium 7.9, , albumin 1.9, AST 43.  Serum alcohol level undetectable.  EKG demonstrated sinus tachycardia.  CXR demonstrated increased left retrocardiac density with partial silhouetting of the left hemidiaphragm which might be related to an infiltrate/atelectasis.  CT head without contrast negative for acute intracranial findings or significant interval changes compared to August 2022.  CT abdomen and pelvis with IV contrast consistent with cirrhosis, moderate generalized ascites, bilateral pleural fluid right greater than left noted.  She was admitted to hospital medicine service for further medical management.      Interval Hx:   No acute overnight events reported by the staff.  Waiting for placement.  Patient was seen and examined.  Chart was reviewed, afebrile, hemodynamically stable, most recent labs reviewed.      Objective/physical exam:  General: In no acute distress, afebrile  Chest: Clear to auscultation bilaterally  Heart: +S1, S2, no appreciable murmur  Abdomen: Soft, nontender, BS +  MSK: Warm, no lower extremity edema, no clubbing or cyanosis  Neurologic: Alert, awake, purposeful movements noted    VITAL SIGNS: 24 HRS MIN & MAX LAST   Temp  Min:  97.2 °F (36.2 °C)  Max: 98.6 °F (37 °C) 98.6 °F (37 °C)   BP  Min: 121/73  Max: 137/75 137/75   Pulse  Min: 72  Max: 85  85   Resp  Min: 18  Max: 18 18   SpO2  Min: 93 %  Max: 98 % 96 %     I have reviewed the following labs:  Recent Labs   Lab 04/05/24  0735   WBC 8.57   RBC 2.67*   HGB 10.8*   HCT 31.7*   .7*   MCH 40.4*   MCHC 34.1   RDW 22.5*   *   MPV 9.8     Recent Labs   Lab 03/30/24  0926 04/05/24  0735    141   K 4.0 3.9   CO2 25 28   BUN 12.4 21.2*   CREATININE 0.48* 0.58   CALCIUM 8.9 9.2   ALBUMIN 2.6*  --    ALKPHOS 123  --    ALT 17  --    AST 22  --    BILITOT 0.5  --      Microbiology Results (last 7 days)       ** No results found for the last 168 hours. **             See below for Radiology    Scheduled Med:   amLODIPine  5 mg Oral Daily    ARIPiprazole  2 mg Oral Daily    busPIRone  5 mg Oral BID    carvediloL  6.25 mg Oral BID    enoxparin  40 mg Subcutaneous Q24H (prophylaxis, 1700)    EScitalopram oxalate  5 mg Oral Daily    folic acid  1 mg Oral Daily    gabapentin  100 mg Oral TID    multivitamin  1 tablet Oral Daily    pantoprazole  40 mg Oral BID    rifAXIMin  550 mg Oral BID    sodium bicarbonate  650 mg Oral Daily    thiamine  100 mg Oral Q8H    zinc oxide-cod liver oil   Topical (Top) Daily      Continuous Infusions:     PRN Meds:  0.9%  NaCl infusion (for blood administration), albuterol, dextrose 10%, dextrose 10%, glucagon (human recombinant), glucose, glucose, hydrALAZINE, hyoscyamine, insulin aspart U-100, labetalol, methocarbamoL, [CANCELED] Flushing PICC/Midline Protocol **AND** [DISCONTINUED] sodium chloride 0.9% **AND** sodium chloride 0.9%     Assessment/Plan:  Acute Metabolic encephalopathy  UTI   Alcoholic cirrhosis   Current alcohol use   Ascites   Thrombocytopenia from underlying cirrhosis  Anemia, likely from chronic disease  Hypoalbuminemia   Hypertensive urgency -improved        Plan   Mental status improving, status post EEG without evidence of  seizure activity.  Ammonia level within normal limits, MRI of the brain without any acute abnormalities  Cirrhosis with MELD less than 14, no evidence of GI bleed.  Status post paracentesis ;suggestive of cirrhotic etiology, continue rifaximin prophylactically  Platelets improving  Palliative medicine following  Continue supportive care  PTOT following, recommended moderate intensity therapy, Waiting on placement, case management on board     VTE prophylaxis:  SCDs, thrombocytopenic>lovenox      Anticipated discharge and Disposition:   placement pending placement pending    All diagnosis and differential diagnosis have been reviewed; assessment and plan has been documented; I have personally reviewed the labs and test results that are presently available; I have reviewed the patients medication list; I have reviewed the consulting providers response and recommendations. I have reviewed or attempted to review medical records based upon their availability    All of the patient's questions have been  addressed and answered. Patient's is agreeable to the above stated plan. I will continue to monitor closely and make adjustments to medical management as needed.  _____________________________________________________________________    Nutrition Status:    Radiology:  I have personally reviewed the following imaging and agree with the radiologist.     MRI Brain Without Contrast  Narrative: EXAMINATION:  MRI BRAIN WITHOUT CONTRAST    CLINICAL HISTORY:  Stroke, follow up;  Cerebral infarction, unspecified    TECHNIQUE:  Multiplanar multisequence MR imaging of the brain was performed without contrast.    COMPARISON:  CT head without contrast 03/20/2024.    FINDINGS:  INTRACRANIAL: Right parietal lobe encephalomalacia.  Mild generalized brain atrophy.  Moderate chronic small-vessel ischemic changes of the supratentorial white matter.  No parenchymal restricted diffusion.  No evidence of intracranial hemorrhage.  No extra-axial  fluid collection or mass.  No intracranial mass effect.  No hydrocephalus.  Midline structures have a normal configuration.  Visualized pituitary gland and infundibulum are normal.  Visualized major intracranial vascular structures demonstrate normal flow voids and are normal in course and caliber.    SINUSES: Paranasal sinuses and mastoid air cells are clear.    ORBITS: Visualized orbits are normal.  Impression: No acute intracranial abnormality.    Moderate chronic small-vessel ischemic changes of the supratentorial white matter.    Right parietal lobe encephalomalacia.    Mild generalized brain atrophy.    Electronically signed by: Jack Kirkland  Date:    03/28/2024  Time:    06:09      Yvette Saba MD  Department of Hospital Medicine   [facility]   04/05/2024

## 2024-04-05 NOTE — PLAN OF CARE
SUSANA spoke to Bruce Baum,He brought paperwork from [employer] to [facility]. [employer] will e-mail past 3 months of Bank statements to [facility] Lists of hospitals in the [place]

## 2024-04-06 LAB — POCT GLUCOSE: 102 MG/DL (ref 70–110)

## 2024-04-06 PROCEDURE — 25000003 PHARM REV CODE 250: Performed by: INTERNAL MEDICINE

## 2024-04-06 PROCEDURE — 25000003 PHARM REV CODE 250: Performed by: STUDENT IN AN ORGANIZED HEALTH CARE EDUCATION/TRAINING PROGRAM

## 2024-04-06 PROCEDURE — 21400001 HC TELEMETRY ROOM

## 2024-04-06 PROCEDURE — 63600175 PHARM REV CODE 636 W HCPCS: Performed by: INTERNAL MEDICINE

## 2024-04-06 PROCEDURE — 11000001 HC ACUTE MED/SURG PRIVATE ROOM

## 2024-04-06 RX ADMIN — CARVEDILOL 6.25 MG: 3.12 TABLET, FILM COATED ORAL at 10:04

## 2024-04-06 RX ADMIN — THIAMINE HCL TAB 100 MG 100 MG: 100 TAB at 08:04

## 2024-04-06 RX ADMIN — PANTOPRAZOLE SODIUM 40 MG: 40 TABLET, DELAYED RELEASE ORAL at 10:04

## 2024-04-06 RX ADMIN — GABAPENTIN 100 MG: 100 CAPSULE ORAL at 10:04

## 2024-04-06 RX ADMIN — PANTOPRAZOLE SODIUM 40 MG: 40 TABLET, DELAYED RELEASE ORAL at 08:04

## 2024-04-06 RX ADMIN — ARIPIPRAZOLE 2 MG: 2 TABLET ORAL at 10:04

## 2024-04-06 RX ADMIN — Medication: at 10:04

## 2024-04-06 RX ADMIN — ESCITALOPRAM OXALATE 5 MG: 5 TABLET, FILM COATED ORAL at 10:04

## 2024-04-06 RX ADMIN — ENOXAPARIN SODIUM 40 MG: 40 INJECTION SUBCUTANEOUS at 04:04

## 2024-04-06 RX ADMIN — RIFAXIMIN 550 MG: 550 TABLET ORAL at 10:04

## 2024-04-06 RX ADMIN — AMLODIPINE BESYLATE 5 MG: 5 TABLET ORAL at 10:04

## 2024-04-06 RX ADMIN — BUSPIRONE HYDROCHLORIDE 5 MG: 5 TABLET ORAL at 08:04

## 2024-04-06 RX ADMIN — RIFAXIMIN 550 MG: 550 TABLET ORAL at 08:04

## 2024-04-06 RX ADMIN — THIAMINE HCL TAB 100 MG 100 MG: 100 TAB at 05:04

## 2024-04-06 RX ADMIN — GABAPENTIN 100 MG: 100 CAPSULE ORAL at 08:04

## 2024-04-06 RX ADMIN — CARVEDILOL 6.25 MG: 3.12 TABLET, FILM COATED ORAL at 08:04

## 2024-04-06 RX ADMIN — GABAPENTIN 100 MG: 100 CAPSULE ORAL at 02:04

## 2024-04-06 RX ADMIN — THIAMINE HCL TAB 100 MG 100 MG: 100 TAB at 02:04

## 2024-04-06 RX ADMIN — THERA TABS 1 TABLET: TAB at 10:04

## 2024-04-06 RX ADMIN — FOLIC ACID 1 MG: 1 TABLET ORAL at 10:04

## 2024-04-06 RX ADMIN — SODIUM BICARBONATE 650 MG TABLET 650 MG: at 10:04

## 2024-04-06 RX ADMIN — BUSPIRONE HYDROCHLORIDE 5 MG: 5 TABLET ORAL at 10:04

## 2024-04-06 NOTE — PROGRESS NOTES
[facility] Hospital Medicine Progress Note        Chief Complaint: Inpatient Follow-up for Altered mental status    HPI: 64 y.o. female with a PMHx of HTN, HLD, type 2 DM, alcoholic cirrhosis, alcohol abuse and nicotine dependence who presented to [facility] on 3/20/2024 via EMS with c/o worsening confusion x1 day with associated generalized weakness.  The majority of the history was obtained from review of the medical record as the patient was unable to provide any additional history.     Upon presentation to ED, vital signed included /73, , RR 18, SpO2 98% on room air, temperature 98.4° F.  Labs notable for hemoglobin 10.7, hematocrit 31.7, platelets 34, vitamin B12 1958, INR 1.4, CO2 18, BUN 3.5, creatinine 0.5, calcium 7.9, , albumin 1.9, AST 43.  Serum alcohol level undetectable.  EKG demonstrated sinus tachycardia.  CXR demonstrated increased left retrocardiac density with partial silhouetting of the left hemidiaphragm which might be related to an infiltrate/atelectasis.  CT head without contrast negative for acute intracranial findings or significant interval changes compared to August 2022.  CT abdomen and pelvis with IV contrast consistent with cirrhosis, moderate generalized ascites, bilateral pleural fluid right greater than left noted.  She was admitted to hospital medicine service for further medical management.      Interval Hx:   No acute overnight events reported by the staff.  Waiting for placement.  Patient was seen and examined, feels good.  Chart was reviewed, afebrile, hemodynamically stable, most recent labs reviewed.      Objective/physical exam:  General: In no acute distress, afebrile  Chest: Clear to auscultation bilaterally  Heart: +S1, S2, no appreciable murmur  Abdomen: Soft, nontender, BS +  MSK: Warm, no lower extremity edema, no clubbing or cyanosis  Neurologic: Alert, awake, purposeful movements noted    VITAL SIGNS: 24 HRS MIN & MAX LAST    Temp  Min: 97.4 °F (36.3 °C)  Max: 98.3 °F (36.8 °C) 97.4 °F (36.3 °C)   BP  Min: 96/61  Max: 129/72 103/64   Pulse  Min: 72  Max: 82  72   Resp  Min: 16  Max: 18 18   SpO2  Min: 93 %  Max: 97 % 96 %     I have reviewed the following labs:  Recent Labs   Lab 04/05/24  0735   WBC 8.57   RBC 2.67*   HGB 10.8*   HCT 31.7*   .7*   MCH 40.4*   MCHC 34.1   RDW 22.5*   *   MPV 9.8     Recent Labs   Lab 04/05/24  0735      K 3.9   CO2 28   BUN 21.2*   CREATININE 0.58   CALCIUM 9.2     Microbiology Results (last 7 days)       ** No results found for the last 168 hours. **             See below for Radiology    Scheduled Med:   amLODIPine  5 mg Oral Daily    ARIPiprazole  2 mg Oral Daily    busPIRone  5 mg Oral BID    carvediloL  6.25 mg Oral BID    enoxparin  40 mg Subcutaneous Q24H (prophylaxis, 1700)    EScitalopram oxalate  5 mg Oral Daily    folic acid  1 mg Oral Daily    gabapentin  100 mg Oral TID    multivitamin  1 tablet Oral Daily    pantoprazole  40 mg Oral BID    rifAXIMin  550 mg Oral BID    sodium bicarbonate  650 mg Oral Daily    thiamine  100 mg Oral Q8H    zinc oxide-cod liver oil   Topical (Top) Daily      Continuous Infusions:     PRN Meds:  0.9%  NaCl infusion (for blood administration), albuterol, dextrose 10%, dextrose 10%, glucagon (human recombinant), glucose, glucose, hydrALAZINE, hyoscyamine, insulin aspart U-100, labetalol, methocarbamoL, [CANCELED] Flushing PICC/Midline Protocol **AND** [DISCONTINUED] sodium chloride 0.9% **AND** sodium chloride 0.9%     Assessment/Plan:  Acute Metabolic encephalopathy  UTI   Alcoholic cirrhosis   Current alcohol use   Ascites   Thrombocytopenia from underlying cirrhosis  Anemia, likely from chronic disease  Hypoalbuminemia   Hypertensive urgency -improved        Plan   Mental status improving, status post EEG without evidence of seizure activity.  Ammonia level within normal limits, MRI of the brain without any acute abnormalities  Cirrhosis  with MELD less than 14, no evidence of GI bleed.  Status post paracentesis ;suggestive of cirrhotic etiology, continue rifaximin prophylactically  Platelets improving  Palliative medicine following  Continue supportive care  PTOT following, recommended moderate intensity therapy, Waiting on placement, case management on board , thinking about [facility] at [facility]. Working on financials      VTE prophylaxis:  SCDs, thrombocytopenic>lovenox      Anticipated discharge and Disposition:   placement pending placement pending    All diagnosis and differential diagnosis have been reviewed; assessment and plan has been documented; I have personally reviewed the labs and test results that are presently available; I have reviewed the patients medication list; I have reviewed the consulting providers response and recommendations. I have reviewed or attempted to review medical records based upon their availability    All of the patient's questions have been  addressed and answered. Patient's is agreeable to the above stated plan. I will continue to monitor closely and make adjustments to medical management as needed.  _____________________________________________________________________    Nutrition Status:    Radiology:  I have personally reviewed the following imaging and agree with the radiologist.     MRI Brain Without Contrast  Narrative: EXAMINATION:  MRI BRAIN WITHOUT CONTRAST    CLINICAL HISTORY:  Stroke, follow up;  Cerebral infarction, unspecified    TECHNIQUE:  Multiplanar multisequence MR imaging of the brain was performed without contrast.    COMPARISON:  CT head without contrast 03/20/2024.    FINDINGS:  INTRACRANIAL: Right parietal lobe encephalomalacia.  Mild generalized brain atrophy.  Moderate chronic small-vessel ischemic changes of the supratentorial white matter.  No parenchymal restricted diffusion.  No evidence of intracranial hemorrhage.  No extra-axial fluid collection or mass.  No intracranial mass  effect.  No hydrocephalus.  Midline structures have a normal configuration.  Visualized pituitary gland and infundibulum are normal.  Visualized major intracranial vascular structures demonstrate normal flow voids and are normal in course and caliber.    SINUSES: Paranasal sinuses and mastoid air cells are clear.    ORBITS: Visualized orbits are normal.  Impression: No acute intracranial abnormality.    Moderate chronic small-vessel ischemic changes of the supratentorial white matter.    Right parietal lobe encephalomalacia.    Mild generalized brain atrophy.    Electronically signed by: Jack Kirkland  Date:    03/28/2024  Time:    06:09      Yvette Saba MD  Department of Hospital Medicine   [facility]   04/06/2024

## 2024-04-07 LAB
MAGNESIUM SERPL-MCNC: 1.9 MG/DL (ref 1.6–2.6)
POCT GLUCOSE: 103 MG/DL (ref 70–110)
POCT GLUCOSE: 98 MG/DL (ref 70–110)

## 2024-04-07 PROCEDURE — 83735 ASSAY OF MAGNESIUM: CPT | Performed by: INTERNAL MEDICINE

## 2024-04-07 PROCEDURE — 25000003 PHARM REV CODE 250: Performed by: INTERNAL MEDICINE

## 2024-04-07 PROCEDURE — 21400001 HC TELEMETRY ROOM

## 2024-04-07 PROCEDURE — 25000003 PHARM REV CODE 250: Performed by: STUDENT IN AN ORGANIZED HEALTH CARE EDUCATION/TRAINING PROGRAM

## 2024-04-07 PROCEDURE — 11000001 HC ACUTE MED/SURG PRIVATE ROOM

## 2024-04-07 PROCEDURE — 63600175 PHARM REV CODE 636 W HCPCS: Performed by: INTERNAL MEDICINE

## 2024-04-07 RX ADMIN — FOLIC ACID 1 MG: 1 TABLET ORAL at 09:04

## 2024-04-07 RX ADMIN — THIAMINE HCL TAB 100 MG 100 MG: 100 TAB at 05:04

## 2024-04-07 RX ADMIN — RIFAXIMIN 550 MG: 550 TABLET ORAL at 09:04

## 2024-04-07 RX ADMIN — PANTOPRAZOLE SODIUM 40 MG: 40 TABLET, DELAYED RELEASE ORAL at 09:04

## 2024-04-07 RX ADMIN — GABAPENTIN 100 MG: 100 CAPSULE ORAL at 09:04

## 2024-04-07 RX ADMIN — AMLODIPINE BESYLATE 5 MG: 5 TABLET ORAL at 09:04

## 2024-04-07 RX ADMIN — CARVEDILOL 6.25 MG: 3.12 TABLET, FILM COATED ORAL at 09:04

## 2024-04-07 RX ADMIN — BUSPIRONE HYDROCHLORIDE 5 MG: 5 TABLET ORAL at 09:04

## 2024-04-07 RX ADMIN — THIAMINE HCL TAB 100 MG 100 MG: 100 TAB at 03:04

## 2024-04-07 RX ADMIN — ESCITALOPRAM OXALATE 5 MG: 5 TABLET, FILM COATED ORAL at 09:04

## 2024-04-07 RX ADMIN — THIAMINE HCL TAB 100 MG 100 MG: 100 TAB at 09:04

## 2024-04-07 RX ADMIN — GABAPENTIN 100 MG: 100 CAPSULE ORAL at 03:04

## 2024-04-07 RX ADMIN — SODIUM BICARBONATE 650 MG TABLET 650 MG: at 09:04

## 2024-04-07 RX ADMIN — ARIPIPRAZOLE 2 MG: 2 TABLET ORAL at 09:04

## 2024-04-07 RX ADMIN — Medication: at 09:04

## 2024-04-07 RX ADMIN — ENOXAPARIN SODIUM 40 MG: 40 INJECTION SUBCUTANEOUS at 05:04

## 2024-04-07 RX ADMIN — THERA TABS 1 TABLET: TAB at 09:04

## 2024-04-07 NOTE — PROGRESS NOTES
[facility] Hospital Medicine Progress Note        Chief Complaint: Inpatient Follow-up for Altered mental status    HPI: 64 y.o. female with a PMHx of HTN, HLD, type 2 DM, alcoholic cirrhosis, alcohol abuse and nicotine dependence who presented to [facility] on 3/20/2024 via EMS with c/o worsening confusion x1 day with associated generalized weakness.  The majority of the history was obtained from review of the medical record as the patient was unable to provide any additional history.     Upon presentation to ED, vital signed included /73, , RR 18, SpO2 98% on room air, temperature 98.4° F.  Labs notable for hemoglobin 10.7, hematocrit 31.7, platelets 34, vitamin B12 1958, INR 1.4, CO2 18, BUN 3.5, creatinine 0.5, calcium 7.9, , albumin 1.9, AST 43.  Serum alcohol level undetectable.  EKG demonstrated sinus tachycardia.  CXR demonstrated increased left retrocardiac density with partial silhouetting of the left hemidiaphragm which might be related to an infiltrate/atelectasis.  CT head without contrast negative for acute intracranial findings or significant interval changes compared to August 2022.  CT abdomen and pelvis with IV contrast consistent with cirrhosis, moderate generalized ascites, bilateral pleural fluid right greater than left noted.  She was admitted to hospital medicine service for further medical management.      Interval Hx:   No acute overnight events reported by the staff.  Waiting for placement.  Patient was seen and examined, feels good.  Chart was reviewed, afebrile, hemodynamically stable, most recent labs reviewed.      Objective/physical exam:  General: In no acute distress, afebrile  Chest: Clear to auscultation bilaterally  Heart: +S1, S2, no appreciable murmur  Abdomen: Soft, nontender, BS +  MSK: Warm, no lower extremity edema, no clubbing or cyanosis  Neurologic: Alert, awake, moves all extremities    VITAL SIGNS: 24 HRS MIN & MAX LAST    Temp  Min: 97.6 °F (36.4 °C)  Max: 98.3 °F (36.8 °C) 97.6 °F (36.4 °C)   BP  Min: 103/63  Max: 143/76 103/63   Pulse  Min: 68  Max: 75  72   Resp  Min: 18  Max: 18 18   SpO2  Min: 93 %  Max: 98 % (!) 94 %     I have reviewed the following labs:  Recent Labs   Lab 04/05/24  0735   WBC 8.57   RBC 2.67*   HGB 10.8*   HCT 31.7*   .7*   MCH 40.4*   MCHC 34.1   RDW 22.5*   *   MPV 9.8     Recent Labs   Lab 04/05/24  0735      K 3.9   CO2 28   BUN 21.2*   CREATININE 0.58   CALCIUM 9.2     Microbiology Results (last 7 days)       ** No results found for the last 168 hours. **             See below for Radiology    Scheduled Med:   amLODIPine  5 mg Oral Daily    ARIPiprazole  2 mg Oral Daily    busPIRone  5 mg Oral BID    carvediloL  6.25 mg Oral BID    enoxparin  40 mg Subcutaneous Q24H (prophylaxis, 1700)    EScitalopram oxalate  5 mg Oral Daily    folic acid  1 mg Oral Daily    gabapentin  100 mg Oral TID    multivitamin  1 tablet Oral Daily    pantoprazole  40 mg Oral BID    rifAXIMin  550 mg Oral BID    sodium bicarbonate  650 mg Oral Daily    thiamine  100 mg Oral Q8H    zinc oxide-cod liver oil   Topical (Top) Daily      Continuous Infusions:     PRN Meds:  0.9%  NaCl infusion (for blood administration), albuterol, dextrose 10%, dextrose 10%, glucagon (human recombinant), glucose, glucose, hydrALAZINE, hyoscyamine, insulin aspart U-100, labetalol, methocarbamoL, [CANCELED] Flushing PICC/Midline Protocol **AND** [DISCONTINUED] sodium chloride 0.9% **AND** sodium chloride 0.9%     Assessment/Plan:  Acute Metabolic encephalopathy  UTI   Alcoholic cirrhosis   Current alcohol use   Ascites   Thrombocytopenia from underlying cirrhosis  Anemia, likely from chronic disease  Hypoalbuminemia   Hypertensive urgency -improved        Plan   Mental status improving, status post EEG without evidence of seizure activity.  Ammonia level within normal limits, MRI of the brain without any acute  abnormalities  Cirrhosis with MELD less than 14, no evidence of GI bleed.  Status post paracentesis ;suggestive of cirrhotic etiology, continue rifaximin prophylactically  Platelets improving  Palliative medicine following  Continue supportive care  PTOT following, recommended moderate intensity therapy, Waiting on placement, case management on board , thinking about [facility] at [place]. Working on financials      VTE prophylaxis:  SCDs, thrombocytopenic>lovenox      Anticipated discharge and Disposition:   placement pending placement pending    All diagnosis and differential diagnosis have been reviewed; assessment and plan has been documented; I have personally reviewed the labs and test results that are presently available; I have reviewed the patients medication list; I have reviewed the consulting providers response and recommendations. I have reviewed or attempted to review medical records based upon their availability    All of the patient's questions have been  addressed and answered. Patient's is agreeable to the above stated plan. I will continue to monitor closely and make adjustments to medical management as needed.  _____________________________________________________________________    Nutrition Status:    Radiology:  I have personally reviewed the following imaging and agree with the radiologist.     MRI Brain Without Contrast  Narrative: EXAMINATION:  MRI BRAIN WITHOUT CONTRAST    CLINICAL HISTORY:  Stroke, follow up;  Cerebral infarction, unspecified    TECHNIQUE:  Multiplanar multisequence MR imaging of the brain was performed without contrast.    COMPARISON:  CT head without contrast 03/20/2024.    FINDINGS:  INTRACRANIAL: Right parietal lobe encephalomalacia.  Mild generalized brain atrophy.  Moderate chronic small-vessel ischemic changes of the supratentorial white matter.  No parenchymal restricted diffusion.  No evidence of intracranial hemorrhage.  No extra-axial fluid collection or mass.   No intracranial mass effect.  No hydrocephalus.  Midline structures have a normal configuration.  Visualized pituitary gland and infundibulum are normal.  Visualized major intracranial vascular structures demonstrate normal flow voids and are normal in course and caliber.    SINUSES: Paranasal sinuses and mastoid air cells are clear.    ORBITS: Visualized orbits are normal.  Impression: No acute intracranial abnormality.    Moderate chronic small-vessel ischemic changes of the supratentorial white matter.    Right parietal lobe encephalomalacia.    Mild generalized brain atrophy.    Electronically signed by: Jack Kirkland  Date:    03/28/2024  Time:    06:09      Yvette Saba MD  Department of Hospital Medicine   [facility]   04/07/2024

## 2024-04-08 LAB
POCT GLUCOSE: 101 MG/DL (ref 70–110)
POCT GLUCOSE: 104 MG/DL (ref 70–110)
POCT GLUCOSE: 111 MG/DL (ref 70–110)
POCT GLUCOSE: 92 MG/DL (ref 70–110)

## 2024-04-08 PROCEDURE — 25000003 PHARM REV CODE 250: Performed by: STUDENT IN AN ORGANIZED HEALTH CARE EDUCATION/TRAINING PROGRAM

## 2024-04-08 PROCEDURE — 84132 ASSAY OF SERUM POTASSIUM: CPT | Performed by: INTERNAL MEDICINE

## 2024-04-08 PROCEDURE — 11000001 HC ACUTE MED/SURG PRIVATE ROOM

## 2024-04-08 PROCEDURE — 84100 ASSAY OF PHOSPHORUS: CPT | Performed by: INTERNAL MEDICINE

## 2024-04-08 PROCEDURE — 97116 GAIT TRAINING THERAPY: CPT

## 2024-04-08 PROCEDURE — 25000003 PHARM REV CODE 250: Performed by: INTERNAL MEDICINE

## 2024-04-08 PROCEDURE — 97530 THERAPEUTIC ACTIVITIES: CPT | Mod: CO

## 2024-04-08 PROCEDURE — 21400001 HC TELEMETRY ROOM

## 2024-04-08 PROCEDURE — 63600175 PHARM REV CODE 636 W HCPCS: Performed by: INTERNAL MEDICINE

## 2024-04-08 RX ADMIN — BUSPIRONE HYDROCHLORIDE 5 MG: 5 TABLET ORAL at 09:04

## 2024-04-08 RX ADMIN — ESCITALOPRAM OXALATE 5 MG: 5 TABLET, FILM COATED ORAL at 08:04

## 2024-04-08 RX ADMIN — AMLODIPINE BESYLATE 5 MG: 5 TABLET ORAL at 08:04

## 2024-04-08 RX ADMIN — THERA TABS 1 TABLET: TAB at 08:04

## 2024-04-08 RX ADMIN — GABAPENTIN 100 MG: 100 CAPSULE ORAL at 09:04

## 2024-04-08 RX ADMIN — SODIUM BICARBONATE 650 MG TABLET 650 MG: at 08:04

## 2024-04-08 RX ADMIN — ARIPIPRAZOLE 2 MG: 2 TABLET ORAL at 08:04

## 2024-04-08 RX ADMIN — FOLIC ACID 1 MG: 1 TABLET ORAL at 08:04

## 2024-04-08 RX ADMIN — RIFAXIMIN 550 MG: 550 TABLET ORAL at 08:04

## 2024-04-08 RX ADMIN — Medication: at 09:04

## 2024-04-08 RX ADMIN — THIAMINE HCL TAB 100 MG 100 MG: 100 TAB at 09:04

## 2024-04-08 RX ADMIN — BUSPIRONE HYDROCHLORIDE 5 MG: 5 TABLET ORAL at 08:04

## 2024-04-08 RX ADMIN — GABAPENTIN 100 MG: 100 CAPSULE ORAL at 08:04

## 2024-04-08 RX ADMIN — GABAPENTIN 100 MG: 100 CAPSULE ORAL at 02:04

## 2024-04-08 RX ADMIN — PANTOPRAZOLE SODIUM 40 MG: 40 TABLET, DELAYED RELEASE ORAL at 08:04

## 2024-04-08 RX ADMIN — THIAMINE HCL TAB 100 MG 100 MG: 100 TAB at 02:04

## 2024-04-08 RX ADMIN — CARVEDILOL 6.25 MG: 3.12 TABLET, FILM COATED ORAL at 08:04

## 2024-04-08 RX ADMIN — RIFAXIMIN 550 MG: 550 TABLET ORAL at 09:04

## 2024-04-08 RX ADMIN — ENOXAPARIN SODIUM 40 MG: 40 INJECTION SUBCUTANEOUS at 05:04

## 2024-04-08 RX ADMIN — PANTOPRAZOLE SODIUM 40 MG: 40 TABLET, DELAYED RELEASE ORAL at 09:04

## 2024-04-08 RX ADMIN — THIAMINE HCL TAB 100 MG 100 MG: 100 TAB at 05:04

## 2024-04-08 RX ADMIN — CARVEDILOL 6.25 MG: 3.12 TABLET, FILM COATED ORAL at 09:04

## 2024-04-08 NOTE — PROGRESS NOTES
[facility] Hospital Medicine Progress Note        Chief Complaint: Inpatient Follow-up for Altered mental status    HPI: 64 y.o. female with a PMHx of HTN, HLD, type 2 DM, alcoholic cirrhosis, alcohol abuse and nicotine dependence who presented to [facility] on 3/20/2024 via EMS with c/o worsening confusion x1 day with associated generalized weakness.  The majority of the history was obtained from review of the medical record as the patient was unable to provide any additional history.     Upon presentation to ED, vital signed included /73, , RR 18, SpO2 98% on room air, temperature 98.4° F.  Labs notable for hemoglobin 10.7, hematocrit 31.7, platelets 34, vitamin B12 1958, INR 1.4, CO2 18, BUN 3.5, creatinine 0.5, calcium 7.9, , albumin 1.9, AST 43.  Serum alcohol level undetectable.  EKG demonstrated sinus tachycardia.  CXR demonstrated increased left retrocardiac density with partial silhouetting of the left hemidiaphragm which might be related to an infiltrate/atelectasis.  CT head without contrast negative for acute intracranial findings or significant interval changes compared to August 2022.  CT abdomen and pelvis with IV contrast consistent with cirrhosis, moderate generalized ascites, bilateral pleural fluid right greater than left noted.  She was admitted to hospital medicine service for further medical management.      Interval Hx:   No acute overnight events reported by the staff.  Waiting for placement.  Patient was seen and examined, feels good.  Chart was reviewed, afebrile, hemodynamically stable, most recent labs reviewed.      Objective/physical exam:  General: In no acute distress, afebrile  Chest: Clear to auscultation bilaterally  Heart: +S1, S2, no appreciable murmur  Abdomen: Soft, nontender, BS +  MSK: Warm, no lower extremity edema, no clubbing or cyanosis  Neurologic: Alert, awake, moves all extremities    VITAL SIGNS: 24 HRS MIN & MAX LAST    Temp  Min: 97.5 °F (36.4 °C)  Max: 98 °F (36.7 °C) 97.5 °F (36.4 °C)   BP  Min: 111/64  Max: 129/75 118/76   Pulse  Min: 73  Max: 77  74   Resp  Min: 18  Max: 18 18   SpO2  Min: 95 %  Max: 100 % 98 %     I have reviewed the following labs:  Recent Labs   Lab 04/05/24  0735   WBC 8.57   RBC 2.67*   HGB 10.8*   HCT 31.7*   .7*   MCH 40.4*   MCHC 34.1   RDW 22.5*   *   MPV 9.8     Recent Labs   Lab 04/05/24  0735 04/07/24  1429     --    K 3.9  --    CO2 28  --    BUN 21.2*  --    CREATININE 0.58  --    CALCIUM 9.2  --    MG  --  1.90     Microbiology Results (last 7 days)       ** No results found for the last 168 hours. **             See below for Radiology    Scheduled Med:   amLODIPine  5 mg Oral Daily    ARIPiprazole  2 mg Oral Daily    busPIRone  5 mg Oral BID    carvediloL  6.25 mg Oral BID    enoxparin  40 mg Subcutaneous Q24H (prophylaxis, 1700)    EScitalopram oxalate  5 mg Oral Daily    folic acid  1 mg Oral Daily    gabapentin  100 mg Oral TID    multivitamin  1 tablet Oral Daily    pantoprazole  40 mg Oral BID    rifAXIMin  550 mg Oral BID    sodium bicarbonate  650 mg Oral Daily    thiamine  100 mg Oral Q8H    zinc oxide-cod liver oil   Topical (Top) Daily      Continuous Infusions:     PRN Meds:  0.9%  NaCl infusion (for blood administration), albuterol, dextrose 10%, dextrose 10%, glucagon (human recombinant), glucose, glucose, hydrALAZINE, hyoscyamine, insulin aspart U-100, labetalol, methocarbamoL, [CANCELED] Flushing PICC/Midline Protocol **AND** [DISCONTINUED] sodium chloride 0.9% **AND** sodium chloride 0.9%     Assessment/Plan:  Acute Metabolic encephalopathy  UTI   Alcoholic cirrhosis   Current alcohol use   Ascites   Thrombocytopenia from underlying cirrhosis  Anemia, likely from chronic disease  Hypoalbuminemia   Hypertensive urgency -improved        Plan   Mental status improving, status post EEG without evidence of seizure activity.  Ammonia level within normal limits,  MRI of the brain without any acute abnormalities  Cirrhosis with MELD less than 14, no evidence of GI bleed.  Status post paracentesis ;suggestive of cirrhotic etiology, continue rifaximin prophylactically  Platelets improving  Palliative medicine following  Continue supportive care  PTOT following, recommended moderate intensity therapy, Waiting on placement, case management on board , thinking about [employer] [facility] [place]. Working on financials      VTE prophylaxis:  SCDs, thrombocytopenic>lovenox      Anticipated discharge and Disposition:   placement pending placement pending    All diagnosis and differential diagnosis have been reviewed; assessment and plan has been documented; I have personally reviewed the labs and test results that are presently available; I have reviewed the patients medication list; I have reviewed the consulting providers response and recommendations. I have reviewed or attempted to review medical records based upon their availability    All of the patient's questions have been  addressed and answered. Patient's is agreeable to the above stated plan. I will continue to monitor closely and make adjustments to medical management as needed.  _____________________________________________________________________    Nutrition Status:    Radiology:  I have personally reviewed the following imaging and agree with the radiologist.     MRI Brain Without Contrast  Narrative: EXAMINATION:  MRI BRAIN WITHOUT CONTRAST    CLINICAL HISTORY:  Stroke, follow up;  Cerebral infarction, unspecified    TECHNIQUE:  Multiplanar multisequence MR imaging of the brain was performed without contrast.    COMPARISON:  CT head without contrast 03/20/2024.    FINDINGS:  INTRACRANIAL: Right parietal lobe encephalomalacia.  Mild generalized brain atrophy.  Moderate chronic small-vessel ischemic changes of the supratentorial white matter.  No parenchymal restricted diffusion.  No evidence of intracranial hemorrhage.  No  extra-axial fluid collection or mass.  No intracranial mass effect.  No hydrocephalus.  Midline structures have a normal configuration.  Visualized pituitary gland and infundibulum are normal.  Visualized major intracranial vascular structures demonstrate normal flow voids and are normal in course and caliber.    SINUSES: Paranasal sinuses and mastoid air cells are clear.    ORBITS: Visualized orbits are normal.  Impression: No acute intracranial abnormality.    Moderate chronic small-vessel ischemic changes of the supratentorial white matter.    Right parietal lobe encephalomalacia.    Mild generalized brain atrophy.    Electronically signed by: Jack Kirkland  Date:    03/28/2024  Time:    06:09      Yvette Saba MD  Department of Hospital Medicine   [facility]   04/08/2024

## 2024-04-08 NOTE — PT/OT/SLP PROGRESS
Physical Therapy Treatment    Patient Name:  Luz Maria Alfaro   MRN:  [id number]    Recommendations:     Discharge therapy intensity: Moderate Intensity Therapy   Discharge Equipment Recommendations: walker, rolling  Barriers to discharge: Ongoing medical needs, impaired cognition     Assessment:     Luz Maria Alfaro is a 64 y.o. female admitted with a medical diagnosis of acute encephalopathy, suspected metabolic form UTI vs. Alcohol withdrawal; metabolic acidosis, alcoholic cirrhosis, ascites, anemia.  She presents with the following impairments/functional limitations: weakness, impaired endurance, impaired self care skills, impaired functional mobility, impaired balance, impaired cognition, decreased safety awareness. Pt with improving activity tolerance but remains very impulsive with poor safety awareness and is a high risk for falls. Constant cues required throughout session to remain on task.      Rehab Prognosis: Good; patient would benefit from acute skilled PT services to address these deficits and reach maximum level of function.    Recent Surgery: * No surgery found *      Plan:     During this hospitalization, patient to be seen 3 x/week to address the identified rehab impairments via gait training, therapeutic activities, therapeutic exercises and progress toward the following goals:    Plan of Care Expires:  04/21/24    Subjective     Chief Complaint:   Patient/Family Comments/goals:   Pain/Comfort:  Pain Rating 1: 0/10      Objective:     Communicated with nursing prior to session.  Patient found  with bed alarm going off, family member seated at bedside pt resting in bed  with pulse ox (continuous), telemetry, peripheral IV, bed alarm upon PT entry to room.     General Precautions: Standard, fall  Orthopedic Precautions: N/A  Braces: N/A  Respiratory Status: Room air    Functional Mobility:  Bed Mobility:     Supine to Sit: contact guard assistance  Transfers:     Sit to Stand:  minimum assistance  with rolling walker  Gait: 120ft with RW CGA. Verbal cues for proper use of AD. Step through gait pattern with narrow SERGO. Quick changes in direction resulting in LOB, PT assist to stabilize  Balance: decreased dynamic standing balance    Education:  Patient provided with verbal education education regarding safety awareness.   Additional teaching is warranted.    Patient left up in chair with all lines intact, call button in reach, chair alarm on, and nurse and family present in room    GOALS:   Multidisciplinary Problems       Physical Therapy Goals          Problem: Physical Therapy    Goal Priority Disciplines Outcome Goal Variances Interventions   Physical Therapy Goal     PT, PT/OT Ongoing, Progressing     Description: Goals to be met by: 24     Patient will increase functional independence with mobility by performin. Supine to sit with MInimal Assistance- MET  Pt Ind with bed mobility  2. Sit to stand transfer with Minimal Assistance- MET  Pt to demonstrate independence with transfers.  3. Bed to chair transfer with Minimal Assistance using Rolling Walker  4. Gait  x 150 feet with Minimal Assistance using Rolling Walker.                          Time Tracking:     PT Received On: 24  PT Start Time: 1442     PT Stop Time: 1455  PT Total Time (min): 13 min     Billable Minutes: Gait Training 14    Treatment Type: Treatment  PT/PTA: PT     Number of PTA visits since last PT visit: 2024

## 2024-04-08 NOTE — PROGRESS NOTES
Inpatient Nutrition Assessment    Admit Date: 3/20/2024   Total duration of encounter: 19 days   Patient Age: 64 y.o.    Nutrition Recommendation/Prescription     Continue diabetic diet as tolerated.     Change oral supplement to Boost Glucose Control (provides 190 kcal, 16 g protein per serving) for additional nutrition.     Continue thiamin, MV and folic acid supplementation.     Obtain new weight as feasible.     Communication of Recommendations: reviewed with nurse    Nutrition Assessment     Malnutrition Assessment/Nutrition-Focused Physical Exam    Malnutrition Context: chronic illness (03/21/24 1105)  Malnutrition Level: moderate (03/21/24 1105)  Energy Intake (Malnutrition):  (unable to determine) (03/21/24 1105)  Weight Loss (Malnutrition):  (unable to determine) (03/21/24 1105)  Subcutaneous Fat (Malnutrition): severe depletion (03/21/24 1105)     Upper Arm Region (Subcutaneous Fat Loss): severe depletion     Muscle Mass (Malnutrition): mild depletion (03/21/24 1105)     Clavicle Bone Region (Muscle Loss): mild depletion  Clavicle and Acromion Bone Region (Muscle Loss): mild depletion     Dorsal Hand (Muscle Loss): mild depletion     Anterior Thigh Region (Muscle Loss): mild depletion  Posterior Calf Region (Muscle Loss): mild depletion  Fluid Accumulation (Malnutrition):  (does not meet criteria) (03/21/24 1105)        A minimum of two characteristics is recommended for diagnosis of either severe or non-severe malnutrition.    Chart Review    Reason Seen: continuous nutrition monitoring, malnutrition screening tool (MST), physician consult for PCM, and follow-up    Malnutrition Screening Tool Results   Have you recently lost weight without trying?: Unsure  Have you been eating poorly because of a decreased appetite?: No   MST Score: 2   Diagnosis:  Acute encephalopathy, metabolic versus hepatic   Metabolic Acidosis   Alcoholic cirrhosis   Ascites   Thrombocytopenia   Acute microcytic anemia    Hypoalbuminemia     Relevant Medical History: HTN, HLD, type 2 DM, alcoholic cirrhosis, alcohol abuse and nicotine dependence      Scheduled Medications:  amLODIPine, 5 mg, Daily  ARIPiprazole, 2 mg, Daily  busPIRone, 5 mg, BID  carvediloL, 6.25 mg, BID  enoxparin, 40 mg, Q24H (prophylaxis, 1700)  EScitalopram oxalate, 5 mg, Daily  folic acid, 1 mg, Daily  gabapentin, 100 mg, TID  multivitamin, 1 tablet, Daily  pantoprazole, 40 mg, BID  rifAXIMin, 550 mg, BID  sodium bicarbonate, 650 mg, Daily  thiamine, 100 mg, Q8H  zinc oxide-cod liver oil, , Daily    Continuous Infusions:     PRN Medications: 0.9%  NaCl infusion (for blood administration), albuterol, dextrose 10%, dextrose 10%, glucagon (human recombinant), glucose, glucose, hydrALAZINE, hyoscyamine, insulin aspart U-100, labetalol, methocarbamoL, [CANCELED] Flushing PICC/Midline Protocol **AND** [DISCONTINUED] sodium chloride 0.9% **AND** sodium chloride 0.9%    Calorie Containing IV Medications: Clinimix    Recent Labs   Lab 04/05/24  0735 04/07/24  1429     --    K 3.9  --    CALCIUM 9.2  --    MG  --  1.90   CHLORIDE 106  --    CO2 28  --    BUN 21.2*  --    CREATININE 0.58  --    EGFRNORACEVR >60  --    GLUCOSE 106  --    WBC 8.57  --    HGB 10.8*  --    HCT 31.7*  --        Nutrition Orders:  Diet Adult Regular Diabetic  Dietary nutrition supplements Boost Glucose Control - Vanilla; BID    Appetite/Oral Intake: good/% of meals  Factors Affecting Nutritional Intake: none identified  Food/Druze/Cultural Preferences: none reported  Food Allergies: none reported  Last Bowel Movement: 04/06/24  Wound(s):     Altered Skin Integrity 03/26/24 1000 Right medial Buttocks Other (comment)-Tissue loss description: Partial thickness       Altered Skin Integrity 03/27/24 2252 Left dorsal Elbow-Tissue loss description: Not applicable       Altered Skin Integrity 03/27/24 2254 Right dorsal Elbow-Tissue loss description: Not applicable       Altered Skin  "Integrity 03/27/24 2303 Left anterior Ankle-Tissue loss description: Not applicable       Altered Skin Integrity 03/27/24 2304 Right anterior Ankle-Tissue loss description: Partial thickness     Comments    3/21/24 Pt unable to answer questions, ex- at bedside states pt is on a "liquid diet", chronic alcohol abuse. States notable wt loss over the last 9 months, estimating weight around 160lbs. EMR wt history shows 119-124lbs x 1.5 years so unsure accuracy of reported UBW. Did note muscle and fat loss during NFPA. He think she would drink oral supplement, will send with meals. PPN infusing at 50ml/hr, recommend increasing rate to better meet nutritional needs while oral intake is inadequate.     3/26/24 NPO from 3/21-3/24 s/t AMS. MBS performed yesterday, SLP recommends puree solids and mildly thick liquids. Pt has not woke up to take medications or po intake. Will change ONS to Boost VHC for mildly thick supplement and leave tube feeding recommendations incase po intake does not improve.     3/28/24 Spoke to ex-, pt ate 75% waffle this morning and a few sips of thickened ice tea, tolerated. PO intake remains inadequate but tolerating meals. PPN started yesterday by MD for supplemental support.     4/1/24 Pt awake and alert watching TV, did not respond to me. RN states assist feeding and eating well. PPN still infusing, can discontinue.     4/4/24 With therapy. RN states tolerating meals with good intake. Has assistance with meals when needed. No GI complaints.     4/8/24 Tolerating meals, eating well. Will change oral supplement to Boost Glucose Control since on thin liquids and po intake improved. No GI complaints.     Anthropometrics    Height: 5' 3" (160 cm), Height Method: Stated  Last Weight: 61 kg (134 lb 7.7 oz) (03/21/24 0510), Weight Method: Standard Scale  BMI (Calculated): 23.8  BMI Classification: normal (BMI 18.5-24.9)     Ideal Body Weight (IBW), Female: 115 lb     % Ideal Body Weight, " Female (lb): 116.94 %                             Usual Weight Provided By: unable to obtain usual weight    Wt Readings from Last 5 Encounters:   03/21/24 61 kg (134 lb 7.7 oz)   07/21/23 56.3 kg (124 lb 3.2 oz)   03/27/23 54.4 kg (119 lb 14.9 oz)   01/24/23 54.4 kg (119 lb 14.9 oz)   12/15/22 54.4 kg (119 lb 14.9 oz)     Weight Change(s) Since Admission: 3/26-4/8/24 no updated wts   Wt Readings from Last 1 Encounters:   03/21/24 0510 61 kg (134 lb 7.7 oz)   03/20/24 0013 56.7 kg (125 lb)   Admit Weight: 56.7 kg (125 lb) (03/20/24 0013), Weight Method: Stated    Estimated Needs    Weight Used For Calorie Calculations: 61 kg (134 lb 7.7 oz)  Energy Calorie Requirements (kcal): 1830kcals/d (30kcals/kg)  Energy Need Method: Kcal/kg  Weight Used For Protein Calculations: 61 kg (134 lb 7.7 oz)  Protein Requirements: 79g/d (1.3g/kg)  Fluid Requirements (mL): 1525-1830ml fl/d (25-30ml/kg)    Enteral Nutrition     Patient not receiving enteral nutrition at this time.    Parenteral Nutrition     Patient not receiving parenteral nutrition at this time.    Evaluation of Received Nutrient Intake    Calories: meeting estimated needs  Protein: meeting estimated needs    Patient Education     Not applicable.    Nutrition Diagnosis     PES: Inadequate oral intake related to acute illness as evidenced by <50% EER since admit. (resolved)     PES: Moderate chronic disease or condition related malnutrition related to inability to consume sufficient nutrients as evidenced by severe fat depletion and mild muscle depletion. (active)    Nutrition Interventions     Intervention(s): modified composition of meals/snacks, commercial beverage, multivitamin/mineral supplement therapy, and collaboration with other providers    Goal: Meet greater than 80% of nutritional needs by follow-up. (goal met)  Goal: Maintain weight throughout hospitalization. (goal progressing)    Nutrition Goals & Monitoring     Dietitian will monitor: energy intake,  weight change, electrolyte/renal panel, and glucose/endocrine profile    Nutrition Risk/Follow-Up: moderate (follow-up in 3-5 days)   Please consult if re-assessment needed sooner.

## 2024-04-08 NOTE — PLAN OF CARE
Problem: Physical Therapy  Goal: Physical Therapy Goal  Description: Goals to be met by: 24   Goals reviewed and advanced as appropriate 24  Patient will increase functional independence with mobility by performin. Supine to sit with MInimal Assistance- MET  Pt Ind with bed mobility  2. Sit to stand transfer with Minimal Assistance- MET  Pt to demonstrate independence with transfers.  3. Bed to chair transfer with Minimal Assistance using Rolling Walker  4. Gait  x 150 feet with Minimal Assistance using Rolling Walker.     Outcome: Ongoing, Progressing

## 2024-04-09 LAB
PHOSPHATE SERPL-MCNC: 3.4 MG/DL (ref 2.3–4.7)
POCT GLUCOSE: 98 MG/DL (ref 70–110)
POTASSIUM SERPL-SCNC: 4.1 MMOL/L (ref 3.5–5.1)

## 2024-04-09 PROCEDURE — 25000003 PHARM REV CODE 250: Performed by: INTERNAL MEDICINE

## 2024-04-09 PROCEDURE — 25000003 PHARM REV CODE 250: Performed by: STUDENT IN AN ORGANIZED HEALTH CARE EDUCATION/TRAINING PROGRAM

## 2024-04-09 PROCEDURE — 11000001 HC ACUTE MED/SURG PRIVATE ROOM

## 2024-04-09 PROCEDURE — 21400001 HC TELEMETRY ROOM

## 2024-04-09 PROCEDURE — 97535 SELF CARE MNGMENT TRAINING: CPT | Mod: CO

## 2024-04-09 PROCEDURE — 97116 GAIT TRAINING THERAPY: CPT | Mod: CQ

## 2024-04-09 PROCEDURE — 63600175 PHARM REV CODE 636 W HCPCS: Performed by: INTERNAL MEDICINE

## 2024-04-09 RX ADMIN — AMLODIPINE BESYLATE 5 MG: 5 TABLET ORAL at 09:04

## 2024-04-09 RX ADMIN — BUSPIRONE HYDROCHLORIDE 5 MG: 5 TABLET ORAL at 09:04

## 2024-04-09 RX ADMIN — CARVEDILOL 6.25 MG: 3.12 TABLET, FILM COATED ORAL at 09:04

## 2024-04-09 RX ADMIN — ENOXAPARIN SODIUM 40 MG: 40 INJECTION SUBCUTANEOUS at 04:04

## 2024-04-09 RX ADMIN — THIAMINE HCL TAB 100 MG 100 MG: 100 TAB at 09:04

## 2024-04-09 RX ADMIN — FOLIC ACID 1 MG: 1 TABLET ORAL at 09:04

## 2024-04-09 RX ADMIN — GABAPENTIN 100 MG: 100 CAPSULE ORAL at 09:04

## 2024-04-09 RX ADMIN — ESCITALOPRAM OXALATE 5 MG: 5 TABLET, FILM COATED ORAL at 09:04

## 2024-04-09 RX ADMIN — RIFAXIMIN 550 MG: 550 TABLET ORAL at 09:04

## 2024-04-09 RX ADMIN — THIAMINE HCL TAB 100 MG 100 MG: 100 TAB at 03:04

## 2024-04-09 RX ADMIN — GABAPENTIN 100 MG: 100 CAPSULE ORAL at 03:04

## 2024-04-09 RX ADMIN — METHOCARBAMOL 750 MG: 750 TABLET ORAL at 10:04

## 2024-04-09 RX ADMIN — ARIPIPRAZOLE 2 MG: 2 TABLET ORAL at 09:04

## 2024-04-09 RX ADMIN — Medication: at 09:04

## 2024-04-09 RX ADMIN — THERA TABS 1 TABLET: TAB at 09:04

## 2024-04-09 RX ADMIN — THIAMINE HCL TAB 100 MG 100 MG: 100 TAB at 06:04

## 2024-04-09 RX ADMIN — PANTOPRAZOLE SODIUM 40 MG: 40 TABLET, DELAYED RELEASE ORAL at 09:04

## 2024-04-09 RX ADMIN — SODIUM BICARBONATE 650 MG TABLET 650 MG: at 09:04

## 2024-04-09 NOTE — PROGRESS NOTES
[facility] Hospital Medicine Progress Note        Chief Complaint: Inpatient Follow-up for Altered mental status    HPI: 64 y.o. female with a PMHx of HTN, HLD, type 2 DM, alcoholic cirrhosis, alcohol abuse and nicotine dependence who presented to [facility] on 3/20/2024 via EMS with c/o worsening confusion x1 day with associated generalized weakness.  The majority of the history was obtained from review of the medical record as the patient was unable to provide any additional history.     Upon presentation to ED, vital signed included /73, , RR 18, SpO2 98% on room air, temperature 98.4° F.  Labs notable for hemoglobin 10.7, hematocrit 31.7, platelets 34, vitamin B12 1958, INR 1.4, CO2 18, BUN 3.5, creatinine 0.5, calcium 7.9, , albumin 1.9, AST 43.  Serum alcohol level undetectable.  EKG demonstrated sinus tachycardia.  CXR demonstrated increased left retrocardiac density with partial silhouetting of the left hemidiaphragm which might be related to an infiltrate/atelectasis.  CT head without contrast negative for acute intracranial findings or significant interval changes compared to August 2022.  CT abdomen and pelvis with IV contrast consistent with cirrhosis, moderate generalized ascites, bilateral pleural fluid right greater than left noted.  She was admitted to hospital medicine service for further medical management.      Interval Hx:   No acute overnight events reported by the staff.  Waiting for placement.  Patient was seen and examined, feels good.  Chart was reviewed, afebrile, hemodynamically stable, most recent labs reviewed.      Objective/physical exam:  General: In no acute distress, afebrile  Chest: Clear to auscultation bilaterally  Heart: +S1, S2, no appreciable murmur  Abdomen: Soft, nontender, BS +  MSK: Warm, no lower extremity edema, no clubbing or cyanosis  Neurologic: Alert,orientedx2-3, awake, moves all extremities    VITAL SIGNS: 24 HRS MIN &  MAX LAST   Temp  Min: 96.6 °F (35.9 °C)  Max: 98 °F (36.7 °C) 98 °F (36.7 °C)   BP  Min: 105/65  Max: 129/76 129/76   Pulse  Min: 73  Max: 80  78   Resp  Min: 18  Max: 20 20   SpO2  Min: 93 %  Max: 100 % (!) 94 %     I have reviewed the following labs:  Recent Labs   Lab 04/05/24  0735   WBC 8.57   RBC 2.67*   HGB 10.8*   HCT 31.7*   .7*   MCH 40.4*   MCHC 34.1   RDW 22.5*   *   MPV 9.8     Recent Labs   Lab 04/05/24  0735 04/07/24  1429 04/08/24  0500     --   --    K 3.9  --  4.1   CO2 28  --   --    BUN 21.2*  --   --    CREATININE 0.58  --   --    CALCIUM 9.2  --   --    MG  --  1.90  --      Microbiology Results (last 7 days)       ** No results found for the last 168 hours. **             See below for Radiology    Scheduled Med:   amLODIPine  5 mg Oral Daily    ARIPiprazole  2 mg Oral Daily    busPIRone  5 mg Oral BID    carvediloL  6.25 mg Oral BID    enoxparin  40 mg Subcutaneous Q24H (prophylaxis, 1700)    EScitalopram oxalate  5 mg Oral Daily    folic acid  1 mg Oral Daily    gabapentin  100 mg Oral TID    multivitamin  1 tablet Oral Daily    pantoprazole  40 mg Oral BID    rifAXIMin  550 mg Oral BID    sodium bicarbonate  650 mg Oral Daily    thiamine  100 mg Oral Q8H    zinc oxide-cod liver oil   Topical (Top) Daily      Continuous Infusions:     PRN Meds:  0.9%  NaCl infusion (for blood administration), albuterol, dextrose 10%, dextrose 10%, glucagon (human recombinant), glucose, glucose, hydrALAZINE, hyoscyamine, insulin aspart U-100, labetalol, methocarbamoL, [CANCELED] Flushing PICC/Midline Protocol **AND** [DISCONTINUED] sodium chloride 0.9% **AND** sodium chloride 0.9%     Assessment/Plan:  Acute Metabolic encephalopathy  UTI   Alcoholic cirrhosis   Current alcohol use   Ascites   Thrombocytopenia from underlying cirrhosis  Anemia, likely from chronic disease  Hypoalbuminemia   Hypertensive urgency -improved        Plan   Mental status improving, status post EEG without  evidence of seizure activity.  Ammonia level within normal limits, MRI of the brain without any acute abnormalities. Seen by Neurology, thinks metabolic encephalopathy, signed off. Pt has hearing issues  Cirrhosis with MELD less than 14, no evidence of GI bleed.  Status post paracentesis ;suggestive of cirrhotic etiology, continue rifaximin prophylactically  Platelets improving  Palliative medicine followed  Continue supportive care  PTOT following, recommended moderate intensity therapy, Waiting on placement, case management on board , thinking about e-volo at [facility]. Working on [employer]      VTE prophylaxis:  SCDs, thrombocytopenic>lovenox      Anticipated discharge and Disposition:   placement pending. GI follow up Otolaryngology follow up    All diagnosis and differential diagnosis have been reviewed; assessment and plan has been documented; I have personally reviewed the labs and test results that are presently available; I have reviewed the patients medication list; I have reviewed the consulting providers response and recommendations. I have reviewed or attempted to review medical records based upon their availability    All of the patient's questions have been  addressed and answered. Patient's is agreeable to the above stated plan. I will continue to monitor closely and make adjustments to medical management as needed.  _____________________________________________________________________    Nutrition Status:    Radiology:  I have personally reviewed the following imaging and agree with the radiologist.     MRI Brain Without Contrast  Narrative: EXAMINATION:  MRI BRAIN WITHOUT CONTRAST    CLINICAL HISTORY:  Stroke, follow up;  Cerebral infarction, unspecified    TECHNIQUE:  Multiplanar multisequence MR imaging of the brain was performed without contrast.    COMPARISON:  CT head without contrast 03/20/2024.    FINDINGS:  INTRACRANIAL: Right parietal lobe encephalomalacia.  Mild generalized brain  atrophy.  Moderate chronic small-vessel ischemic changes of the supratentorial white matter.  No parenchymal restricted diffusion.  No evidence of intracranial hemorrhage.  No extra-axial fluid collection or mass.  No intracranial mass effect.  No hydrocephalus.  Midline structures have a normal configuration.  Visualized pituitary gland and infundibulum are normal.  Visualized major intracranial vascular structures demonstrate normal flow voids and are normal in course and caliber.    SINUSES: Paranasal sinuses and mastoid air cells are clear.    ORBITS: Visualized orbits are normal.  Impression: No acute intracranial abnormality.    Moderate chronic small-vessel ischemic changes of the supratentorial white matter.    Right parietal lobe encephalomalacia.    Mild generalized brain atrophy.    Electronically signed by: Jack Kirkland  Date:    03/28/2024  Time:    06:09      Yvette Saba MD  Department of Hospital Medicine   [facility]   04/09/2024

## 2024-04-09 NOTE — PLAN OF CARE
CM phone patient 's son, Omer Baum at [phone] to discuss discharge plan for patient, no answer and no voice mail available. CM made 2 attempts to reach patient 's son.

## 2024-04-09 NOTE — PT/OT/SLP PROGRESS
Occupational Therapy   Treatment    Name: Luz Maria Alfaro  MRN: [id number]  Admitting Diagnosis:  <principal problem not specified>       Recommendations:     Recommended therapy intensity at discharge: Moderate Intensity Therapy   Discharge Equipment Recommendations:  walker, rolling  Barriers to discharge:  Decreased caregiver support    Assessment:     Luz Maria Alfaro is a 64 y.o. female with a medical diagnosis of <principal problem not specified>.  She presents with good participation. Performance deficits affecting function are weakness, impaired balance, decreased safety awareness, impaired endurance, impaired cognition, impaired self care skills, impaired functional mobility, gait instability.     Rehab Prognosis:  Good; patient would benefit from acute skilled OT services to address these deficits and reach maximum level of function.       Plan:     Patient to be seen 4 x/week to address the above listed problems via self-care/home management, therapeutic activities, therapeutic exercises  Plan of Care Expires: 04/25/24  Plan of Care Reviewed with: patient    Subjective     Pain/Comfort:  Pain Rating 1: 0/10    Objective:     Communicated with: nurse prior to session.  Patient found HOB elevated with pulse ox (continuous), telemetry, bed alarm upon OT entry to room.    General Precautions: Standard, fall    Orthopedic Precautions:N/A  Braces: N/A  Respiratory Status: Room air     Occupational Performance:     Bed Mobility:    Patient completed Scooting/Bridging with minimum assistance  Patient completed Supine to Sit with minimum assistance  Patient completed Sit to Supine with minimum assistance   Maintain sitting edge of bed with SBA    Functional Mobility/Transfers:  Patient completed Sit <> Stand Transfer with minimum assistance  with  rolling walker   Patient completed Toilet Transfer Step Transfer technique with minimum assistance with  rolling walker  Functional Mobility: mobility to and from  bathroom with RW and Min A    Activities of Daily Living:  Grooming: contact guard assistance washing hands at sink standing  Lower Body Dressing: maximal assistance seated on toilet to don/doff socks  Toileting: contact guard assistance in sitting and standing    Therapeutic Positioning    OT interventions performed during the course of today's session in an effort to prevent and/or reduce acquired pressure injuries:   Therapeutic positioning was provided at the conclusion of session to offload all bony prominences for the prevention and/or reduction of pressure injuries    [facility] 6 Click ADL: 16    Patient Education:  Patient and family were provided with verbal education and demonstrations education regarding fall prevention and safety awareness.  Understanding was verbalized, however additional teaching warranted.      Patient left HOB elevated with all lines intact, call button in reach, and family present.    GOALS:   Multidisciplinary Problems       Occupational Therapy Goals          Problem: Occupational Therapy    Goal Priority Disciplines Outcome Interventions   Occupational Therapy Goal     OT, PT/OT Ongoing, Progressing    Description: Goals to be met by 4/25/2024    1. Pt will follow >80% of simple one step commands across three sessions. (Met 4/4/24)  2. Pt will perform grooming EOB with min A. (Met 4/4/24)  3. Pt will perform functional grasp/reach/release of items >80% of trials in prep for increased participation in ADL tasks. (Met 4/4/24)  4. Pt will perform sit<>stand with min A in prep for ADL t/fs. (Met 4/4/24)    Updated Goals:  Pt will be CGA for toilet transfer with   UE Dressing with [place].  LE Dressing with Supervision.  Grooming while standing at sink with Modified [place].  Toileting from toilet with Supervision for hygiene and clothing management.   Toilet transfer to toilet with Stand-by Assistance.                       Time Tracking:     OT Date of Treatment: 04/09/24  OT  Start Time: 1604  OT Stop Time: 1627  OT Total Time (min): 23 min    Billable Minutes:Self Care/Home Management 23    OT/KAREN: KAREN     Number of KAREN visits since last OT visit: 3    4/9/2024

## 2024-04-09 NOTE — PT/OT/SLP PROGRESS
Physical Therapy Treatment    Patient Name:  Luz Maria Alfaro   MRN:  [id number]    Recommendations:     Discharge therapy intensity: Moderate Intensity Therapy   Discharge Equipment Recommendations: walker, rolling  Barriers to discharge: Ongoing medical needs and impaired cognition    Assessment:     Luz Maria Alfaro is a 64 y.o. female admitted with a medical diagnosis of acute encephalopathy, suspected metabolic form UTI vs. Alcohol withdrawal; metabolic acidosis, alcoholic cirrhosis, ascites, anemia.  She presents with the following impairments/functional limitations: weakness, impaired endurance, impaired self care skills, impaired functional mobility, impaired balance, impaired cognition, decreased safety awareness. Pt. With decreased safety awareness & needing constant VC for attention to task.    Rehab Prognosis: Good; patient would benefit from acute skilled PT services to address these deficits and reach maximum level of function.    Recent Surgery: * No surgery found *      Plan:     During this hospitalization, patient to be seen 3 x/week to address the identified rehab impairments via gait training, therapeutic activities, therapeutic exercises and progress toward the following goals:    Plan of Care Expires:  04/21/24    Subjective     Chief Complaint: I need to use the restroom  Patient/Family Comments/goals:   Pain/Comfort:         Objective:     Communicated with nursing prior to session.  Patient found supine with pulse ox (continuous), telemetry, bed alarm upon PT entry to room.     General Precautions: Standard, fall  Orthopedic Precautions: N/A  Braces: N/A  Respiratory Status: Room air  Blood Pressure: NT    Functional Mobility:  Bed Mobility:     Supine to Sit: minimum assistance  Transfers:     Sit to Stand:  contact guard assistance with no AD  X2 trials  Bed to Chair: contact guard assistance with  hand-held assist  using  Step Transfer  Gait: 200' w/RW  VC for safety awareness & attention  to task    Therapeutic Activities/Exercises:  Pt. T/f from bed to BSC for (+) BM. SBA for pericare. Pt. Then amb out in [place].     Education:  Patient provided with verbal education education regarding PT role/goals/POC, fall prevention, and safety awareness.  Understanding was verbalized, however additional teaching warranted.     Patient left up in chair with all lines intact, call button in reach, chair alarm on, geomat cushion, and nurse notified    GOALS:   Multidisciplinary Problems       Physical Therapy Goals          Problem: Physical Therapy    Goal Priority Disciplines Outcome Goal Variances Interventions   Physical Therapy Goal     PT, PT/OT Ongoing, Progressing     Description: Goals to be met by: 24     Patient will increase functional independence with mobility by performin. Supine to sit with MInimal Assistance- MET  Pt Ind with bed mobility  2. Sit to stand transfer with Minimal Assistance- MET  Pt to demonstrate independence with transfers.  3. Bed to chair transfer with Minimal Assistance using Rolling Walker  4. Gait  x 150 feet with Minimal Assistance using Rolling Walker.                          Time Tracking:     PT Received On: 24  PT Start Time: 1409     PT Stop Time: 1420  PT Total Time (min): 11 min     Billable Minutes: Gait Training 11    Treatment Type: Treatment  PT/PTA: PTA     Number of PTA visits since last PT visit: 5     2024

## 2024-04-10 LAB
POCT GLUCOSE: 119 MG/DL (ref 70–110)
POCT GLUCOSE: 124 MG/DL (ref 70–110)
POCT GLUCOSE: 85 MG/DL (ref 70–110)

## 2024-04-10 PROCEDURE — 25000003 PHARM REV CODE 250: Performed by: INTERNAL MEDICINE

## 2024-04-10 PROCEDURE — 21400001 HC TELEMETRY ROOM

## 2024-04-10 PROCEDURE — 11000001 HC ACUTE MED/SURG PRIVATE ROOM

## 2024-04-10 PROCEDURE — 25000003 PHARM REV CODE 250: Performed by: STUDENT IN AN ORGANIZED HEALTH CARE EDUCATION/TRAINING PROGRAM

## 2024-04-10 PROCEDURE — 97164 PT RE-EVAL EST PLAN CARE: CPT

## 2024-04-10 PROCEDURE — 97530 THERAPEUTIC ACTIVITIES: CPT

## 2024-04-10 PROCEDURE — 97535 SELF CARE MNGMENT TRAINING: CPT

## 2024-04-10 PROCEDURE — 63600175 PHARM REV CODE 636 W HCPCS: Performed by: INTERNAL MEDICINE

## 2024-04-10 RX ADMIN — AMLODIPINE BESYLATE 5 MG: 5 TABLET ORAL at 08:04

## 2024-04-10 RX ADMIN — BUSPIRONE HYDROCHLORIDE 5 MG: 5 TABLET ORAL at 08:04

## 2024-04-10 RX ADMIN — PANTOPRAZOLE SODIUM 40 MG: 40 TABLET, DELAYED RELEASE ORAL at 08:04

## 2024-04-10 RX ADMIN — THIAMINE HCL TAB 100 MG 100 MG: 100 TAB at 08:04

## 2024-04-10 RX ADMIN — GABAPENTIN 100 MG: 100 CAPSULE ORAL at 08:04

## 2024-04-10 RX ADMIN — ARIPIPRAZOLE 2 MG: 2 TABLET ORAL at 08:04

## 2024-04-10 RX ADMIN — THIAMINE HCL TAB 100 MG 100 MG: 100 TAB at 04:04

## 2024-04-10 RX ADMIN — CARVEDILOL 6.25 MG: 3.12 TABLET, FILM COATED ORAL at 08:04

## 2024-04-10 RX ADMIN — THERA TABS 1 TABLET: TAB at 08:04

## 2024-04-10 RX ADMIN — Medication: at 09:04

## 2024-04-10 RX ADMIN — METHOCARBAMOL 750 MG: 750 TABLET ORAL at 04:04

## 2024-04-10 RX ADMIN — ESCITALOPRAM OXALATE 5 MG: 5 TABLET, FILM COATED ORAL at 08:04

## 2024-04-10 RX ADMIN — ENOXAPARIN SODIUM 40 MG: 40 INJECTION SUBCUTANEOUS at 04:04

## 2024-04-10 RX ADMIN — SODIUM BICARBONATE 650 MG TABLET 650 MG: at 08:04

## 2024-04-10 RX ADMIN — RIFAXIMIN 550 MG: 550 TABLET ORAL at 08:04

## 2024-04-10 RX ADMIN — FOLIC ACID 1 MG: 1 TABLET ORAL at 08:04

## 2024-04-10 RX ADMIN — GABAPENTIN 100 MG: 100 CAPSULE ORAL at 04:04

## 2024-04-10 NOTE — PROGRESS NOTES
[facility] Hospital Medicine Progress Note        Chief Complaint: Inpatient Follow-up for Altered mental status    HPI: 64 y.o. female with a PMHx of HTN, HLD, type 2 DM, alcoholic cirrhosis, alcohol abuse and nicotine dependence who presented to [facility] on 3/20/2024 via EMS with c/o worsening confusion x1 day with associated generalized weakness.  The majority of the history was obtained from review of the medical record as the patient was unable to provide any additional history.     Upon presentation to ED, vital signed included /73, , RR 18, SpO2 98% on room air, temperature 98.4° F.  Labs notable for hemoglobin 10.7, hematocrit 31.7, platelets 34, vitamin B12 1958, INR 1.4, CO2 18, BUN 3.5, creatinine 0.5, calcium 7.9, , albumin 1.9, AST 43.  Serum alcohol level undetectable.  EKG demonstrated sinus tachycardia.  CXR demonstrated increased left retrocardiac density with partial silhouetting of the left hemidiaphragm which might be related to an infiltrate/atelectasis.  CT head without contrast negative for acute intracranial findings or significant interval changes compared to August 2022.  CT abdomen and pelvis with IV contrast consistent with cirrhosis, moderate generalized ascites, bilateral pleural fluid right greater than left noted.  She was admitted to hospital medicine service for further medical management.      Interval Hx:   No acute overnight events reported by the staff.  Waiting for placement.  Patient was seen and examined, feels good.  Chart was reviewed, afebrile, hemodynamically stable, most recent labs reviewed.      Objective/physical exam:  General: In no acute distress, afebrile  Chest: Clear to auscultation bilaterally  Heart: +S1, S2, no appreciable murmur  Abdomen: Soft, nontender, BS +  MSK: Warm, no lower extremity edema, no clubbing or cyanosis  Neurologic: Alert,orientedx2-3, awake, moves all extremities    VITAL SIGNS: 24 HRS MIN &  MAX LAST   Temp  Min: 97.3 °F (36.3 °C)  Max: 97.7 °F (36.5 °C) 97.3 °F (36.3 °C)   BP  Min: 91/52  Max: 119/64 119/64   Pulse  Min: 72  Max: 75  72   Resp  Min: 18  Max: 18 18   SpO2  Min: 91 %  Max: 99 % (!) 93 %     I have reviewed the following labs:  Recent Labs   Lab 04/05/24  0735   WBC 8.57   RBC 2.67*   HGB 10.8*   HCT 31.7*   .7*   MCH 40.4*   MCHC 34.1   RDW 22.5*   *   MPV 9.8     Recent Labs   Lab 04/05/24  0735 04/07/24  1429 04/08/24  0500     --   --    K 3.9  --  4.1   CO2 28  --   --    BUN 21.2*  --   --    CREATININE 0.58  --   --    CALCIUM 9.2  --   --    MG  --  1.90  --      Microbiology Results (last 7 days)       ** No results found for the last 168 hours. **             See below for Radiology    Scheduled Med:   amLODIPine  5 mg Oral Daily    ARIPiprazole  2 mg Oral Daily    busPIRone  5 mg Oral BID    carvediloL  6.25 mg Oral BID    enoxparin  40 mg Subcutaneous Q24H (prophylaxis, 1700)    EScitalopram oxalate  5 mg Oral Daily    folic acid  1 mg Oral Daily    gabapentin  100 mg Oral TID    multivitamin  1 tablet Oral Daily    pantoprazole  40 mg Oral BID    rifAXIMin  550 mg Oral BID    sodium bicarbonate  650 mg Oral Daily    thiamine  100 mg Oral Q8H    zinc oxide-cod liver oil   Topical (Top) Daily      Continuous Infusions:     PRN Meds:  0.9%  NaCl infusion (for blood administration), albuterol, dextrose 10%, dextrose 10%, glucagon (human recombinant), glucose, glucose, hydrALAZINE, hyoscyamine, insulin aspart U-100, labetalol, methocarbamoL, [CANCELED] Flushing PICC/Midline Protocol **AND** [DISCONTINUED] sodium chloride 0.9% **AND** sodium chloride 0.9%     Assessment/Plan:  Acute Metabolic encephalopathy  UTI   Alcoholic cirrhosis   Current alcohol use   Ascites   Thrombocytopenia from underlying cirrhosis  Anemia, likely from chronic disease  Hypoalbuminemia   Hypertensive urgency -improved        Plan   Mental status improved, status post EEG without  evidence of seizure activity.  Ammonia level within normal limits, MRI of the brain without any acute abnormalities. Seen by Neurology, thinks metabolic encephalopathy, signed off. Pt has hearing issues as well to add.  Cirrhosis with MELD less than 14, no evidence of GI bleed.  Status post paracentesis ;suggestive of cirrhotic etiology, continued rifaximin prophylactically, need outpatient follow up  Platelets improved  Palliative medicine followed  Continue supportive care, appropriate home meds  PTOT following, recommended moderate intensity therapy, Waiting on placement, case management on board , thinking about XDC at [facility]. Working on [employer]      VTE prophylaxis:  SCDs, thrombocytopenic>lovenox      Anticipated discharge and Disposition:   placement pending. GI follow up Otolaryngology follow up    All diagnosis and differential diagnosis have been reviewed; assessment and plan has been documented; I have personally reviewed the labs and test results that are presently available; I have reviewed the patients medication list; I have reviewed the consulting providers response and recommendations. I have reviewed or attempted to review medical records based upon their availability    All of the patient's questions have been  addressed and answered. Patient's is agreeable to the above stated plan. I will continue to monitor closely and make adjustments to medical management as needed.  _____________________________________________________________________    Nutrition Status:    Radiology:  I have personally reviewed the following imaging and agree with the radiologist.     MRI Brain Without Contrast  Narrative: EXAMINATION:  MRI BRAIN WITHOUT CONTRAST    CLINICAL HISTORY:  Stroke, follow up;  Cerebral infarction, unspecified    TECHNIQUE:  Multiplanar multisequence MR imaging of the brain was performed without contrast.    COMPARISON:  CT head without contrast 03/20/2024.    FINDINGS:  INTRACRANIAL:  Right parietal lobe encephalomalacia.  Mild generalized brain atrophy.  Moderate chronic small-vessel ischemic changes of the supratentorial white matter.  No parenchymal restricted diffusion.  No evidence of intracranial hemorrhage.  No extra-axial fluid collection or mass.  No intracranial mass effect.  No hydrocephalus.  Midline structures have a normal configuration.  Visualized pituitary gland and infundibulum are normal.  Visualized major intracranial vascular structures demonstrate normal flow voids and are normal in course and caliber.    SINUSES: Paranasal sinuses and mastoid air cells are clear.    ORBITS: Visualized orbits are normal.  Impression: No acute intracranial abnormality.    Moderate chronic small-vessel ischemic changes of the supratentorial white matter.    Right parietal lobe encephalomalacia.    Mild generalized brain atrophy.    Electronically signed by: Jack Kirkland  Date:    03/28/2024  Time:    06:09      Yvette Saba MD  Department of Hospital Medicine   [facility]   04/10/2024

## 2024-04-10 NOTE — PROGRESS NOTES
Advance Care Planning     Date: 04/10/2024  11:57 Voice mailbox not set up, Son Omer.  Today a voluntary meeting took place: bedside    Patient Participation: Patient is able to participate, conversation short phrases, patient napping aroused to verbal stimulation fell back to sleep during conversation. Advance directive information left at bedside. Attempted to notify patient's sonOmer, at 11:57 via phone, voice mailbox not set up unable to leave message. Palliative will continue to reach out to family and patient for education regarding advance care planning, goals of care, and continue discussion of code status.      Attendees (Name and  Relationship to patient):  Attempted to notify patient's sonOmer, at 11:57 via phone, voice mailbox not set up unable to leave message. Palliative will continue to reach out to family and patient for education regarding advance care planning, goals of care, and continue discussion of code status.     Staff attendees (Name and  Role): Yecenia WINSTON RN-CHPN    ACP Conversation (General): Understanding of advance care planning and role of health care agent defined ACP documents provided.      ACP Documents: Provided ACP documents    Goals of care: The patient endorses that what is most important right now is to focus on remaining as independent as possible, symptom/pain control, improvement in condition but with limits to invasive therapies, and comfort and QOL     Accordingly, we have decided that the best plan to meet the patient's goals includes continuing with treatment      Recommendations/  Follow-up tasks: The patient and health care agent were provided the following recommendations review and discuss advance care planning documents.          [facility]  I engaged the patient in a voluntary conversation about advance care planning and we specifically addressed what the goals of care would be moving forward, in light of the patient's change in clinical status, specifically  current condition, patient sleepy throughout conversation, minimal participation.  We explored the patient's values and preferences for future care.  The patient endorses that what is most important right now is to focus on remaining as independent as possible, symptom/pain control, improvement in condition but with limits to invasive therapies, and comfort and QOL     Accordingly, we have decided that the best plan to meet the patient's goals includes continuing with treatment

## 2024-04-10 NOTE — PT/OT/SLP PROGRESS
Occupational Therapy   Treatment    Name: Luz Maria Alfaro  MRN: [id number]  Admitting Diagnosis:  Malnutrition      Recommendations:     Recommended therapy intensity at discharge: Moderate Intensity Therapy   Discharge Equipment Recommendations:  walker, rolling  Barriers to discharge:   (Ongoing medical needs, impaired cognition)    Assessment:     Luz Maria Alfaro is a 64 y.o. female with a medical diagnosis of metabolic encephalopathy, UTI, alcoholic cirrhosis, ascites. Pt was overall Min A-CGA for functional ambulation and transfers. Pt completed michael cares with sup, required Min A for thorough wiping. Pt required repetition of visual and tactile cues throughout session 2/2 pt being [place]. Pt becoming nauseous while in the bathroom, therapist assisted pt back to bed for safety. Performance deficits affecting function are weakness, impaired balance, decreased safety awareness, impaired endurance, impaired cognition, impaired self care skills, impaired functional mobility, gait instability.     Rehab Prognosis:  Good; patient would benefit from acute skilled OT services to address these deficits and reach maximum level of function.       Plan:     Patient to be seen 4 x/week to address the above listed problems via self-care/home management, therapeutic activities, therapeutic exercises  Plan of Care Expires: 04/25/24  Plan of Care Reviewed with: patient    Subjective     Pain/Comfort:  Pain Rating 1: 0/10    Objective:     Communicated with: Nurse prior to session.  Patient found HOB elevated with telemetry, peripheral IV upon OT entry to room.    General Precautions: Standard, fall    Orthopedic Precautions:N/A  Braces: N/A  Respiratory Status: Room air     Occupational Performance:     Bed Mobility:    Patient completed Supine to Sit with stand by assistance  Patient completed Sit to Supine with minimum assistance     Functional Mobility/Transfers:  Patient completed Sit <> Stand Transfer with contact guard  assistance  with  rolling walker   Patient completed Toilet Transfer with minimum assistance with  rolling walker and grab bar  Functional Mobility: Pt was overall CGA for functional ambulation, no LOB noted.    Activities of Daily Living:  Toileting: minimum assistance for michael cares while in stand with RW    Therapeutic Activities:  Pt instructed on safety with RW management and hand placement during functional transfers with RW.     Therapeutic Positioning    OT interventions performed during the course of today's session in an effort to prevent and/or reduce acquired pressure injuries:   Education was provided on benefits of and recommendations for therapeutic positioning     Findings: Visible skin intact    Patient Education:  Patient provided with verbal education and demonstrations education regarding OT role/goals/POC, fall prevention, safety awareness, Discharge/DME recommendations, and pressure ulcer prevention.  Understanding was verbalized, however additional teaching warranted.      Patient left HOB elevated with all lines intact and call button in reach.    GOALS:   Multidisciplinary Problems       Occupational Therapy Goals          Problem: Occupational Therapy    Goal Priority Disciplines Outcome Interventions   Occupational Therapy Goal     OT, PT/OT Ongoing, Progressing    Description: Goals to be met by 4/25/2024    1. Pt will follow >80% of simple one step commands across three sessions. (Met 4/4/24)  2. Pt will perform grooming EOB with min A. (Met 4/4/24)  3. Pt will perform functional grasp/reach/release of items >80% of trials in prep for increased participation in ADL tasks. (Met 4/4/24)  4. Pt will perform sit<>stand with min A in prep for ADL t/fs. (Met 4/4/24)    Updated Goals:  Pt will be CGA for toilet transfer with   UE Dressing with [place].  LE Dressing with Supervision.  Grooming while standing at sink with Modified [place].  Toileting from toilet with Supervision for  hygiene and clothing management.   Toilet transfer to toilet with Stand-by Assistance.                       Time Tracking:     OT Date of Treatment: 04/10/24  OT Start Time: 1427  OT Stop Time: 1451  OT Total Time (min): 24 min    Billable Minutes:Self Care/Home Management 12 minutes  Therapeutic Activity 12 minutes    OT/KAREN: OT     Number of KAREN visits since last OT visit: 4    4/10/2024

## 2024-04-10 NOTE — PLAN OF CARE
Problem: Physical Therapy  Goal: Physical Therapy Goal  Description: Goals to be met by: 24     Patient will increase functional independence with mobility by performin. Supine to sit with MInimal Assistance- MET  2. Sit to stand transfer with Minimal Assistance- MET  3. Bed to chair transfer with Minimal Assistance using Rolling Walker MET  4. Gait  x 150 feet with Minimal Assistance using Rolling Walker. MET    Updated goals 4/10/24:  5. Pt will be independent with bed mobility.  6. Pt will be independent with transfers with RW.  7. Pt will ambulate 250ft with RW with modified independence.  8. Pt will score low falls risk on TUG test.    Outcome: Ongoing, Progressing

## 2024-04-10 NOTE — PT/OT/SLP RE-EVAL
Physical Therapy Re-Evaluation    Patient Name:  Luz Maria Alfaro   MRN:  [id number]    Recommendations:     Discharge therapy intensity: Moderate Intensity Therapy   Discharge Equipment Recommendations: walker, rolling   Barriers to discharge: Decreased caregiver support    Assessment:     Luz Maria Alfaro is a 64 y.o. female admitted with a medical diagnosis of metabolic encephalopathy, UTI, alcoholic cirrhosis, ascites.  She presents with the following impairments/functional limitations: weakness, impaired endurance, impaired functional mobility, gait instability, pain, decreased safety awareness . Pt making excellent progress with functional mobility, currently SBA-CGA level for gait with RW. Has met several goals and POC updated accordingly. Safety concerns with d/c home as pt does not have 24/7/reliable caregiver support per case management. Will continue to follow.    Rehab Prognosis: Good; patient would benefit from acute skilled PT services to address these deficits and reach maximum level of function.    Recent Surgery: * No surgery found *      Plan:     During this hospitalization, patient to be seen 3 x/week to address the identified rehab impairments via gait training, therapeutic activities, therapeutic exercises, neuromuscular re-education and progress toward the following goals:    Plan of Care Expires:  04/21/24    PT/PTA conference to discuss PT POC and patient's progression towards goals held with Tiffanie Curry PTA.     Subjective     Chief Complaint: pain in back  Patient/Family Comments/goals: home  Pain/Comfort:  Pain Rating 1: 5/10  Location - Orientation 1: lower  Location 1: back    Patients cultural, spiritual, Tenriism conflicts given the current situation: no    Objective:     Communicated with RN prior to session.  Patient found supine with pulse ox (continuous), telemetry  upon PT entry to room.    General Precautions: Standard, fall  Orthopedic Precautions:N/A   Braces:  N/A  Respiratory Status: Room air  Blood Pressure: NT      Exams:  RLE Strength: 4/5 gross  LLE Strength: 4/5 gross  Skin integrity: Visible skin intact      Functional Mobility:  Bed Mobility:     Supine to Sit: minimum assistance  Sit to Supine: stand by assistance  Transfers:     Sit to Stand:  stand by assistance with rolling walker  Toilet Transfer: contact guard assistance with  rolling walker  using  Stand Pivot  Gait: 170ft with RW with CGA, slow pace      AM-PAC 6 CLICK MOBILITY  Total Score:20       Treatment & Education:      Patient provided with verbal education education regarding PT role/goals/POC.  Understanding was verbalized.     Patient left supine with all lines intact, call button in reach, and RN notified.    GOALS:   Multidisciplinary Problems       Physical Therapy Goals          Problem: Physical Therapy    Goal Priority Disciplines Outcome Goal Variances Interventions   Physical Therapy Goal     PT, PT/OT Ongoing, Progressing     Description: Goals to be met by: 24     Patient will increase functional independence with mobility by performin. Supine to sit with MInimal Assistance- MET  2. Sit to stand transfer with Minimal Assistance- MET  3. Bed to chair transfer with Minimal Assistance using Rolling Walker MET  4. Gait  x 150 feet with Minimal Assistance using Rolling Walker. MET    Updated goals 4/10/24:  5. Pt will be independent with bed mobility.  6. Pt will be independent with transfers with RW.  7. Pt will ambulate 250ft with RW with modified independence.  8. Pt will score low falls risk on TUG test.                         History:     Past Medical History:   Diagnosis Date    Benign essential HTN     Mixed hyperlipidemia     Type 2 diabetes mellitus without complications        Past Surgical History:   Procedure Laterality Date    APPENDECTOMY      BACK SURGERY      HIP SURGERY      INTRAMEDULLARY RODDING OF FEMUR Right 10/27/2022    Procedure: RIGHT FEMUR INTERTROCH  NIYA;  Surgeon: Stewart Brambila MD;  Location: [facility];  Service: Orthopedics;  Laterality: Right;  Atlas table, supine  3rd case  Synthes Short TFNA       Time Tracking:     PT Received On: 04/10/24  PT Start Time: 1053     PT Stop Time: 1114  PT Total Time (min): 21 min     Billable Minutes: Re-eval 21 mins      04/10/2024

## 2024-04-10 NOTE — PLAN OF CARE
Problem: Adult Inpatient Plan of Care  Goal: Plan of Care Review  Outcome: Ongoing, Progressing  Goal: Patient-Specific Goal (Individualized)  Outcome: Ongoing, Progressing  Goal: Absence of Hospital-Acquired Illness or Injury  Outcome: Ongoing, Progressing  Goal: Optimal Comfort and Wellbeing  Outcome: Ongoing, Progressing  Goal: Readiness for Transition of Care  Outcome: Ongoing, Progressing     Problem: Diabetes Comorbidity  Goal: Blood Glucose Level Within Targeted Range  Outcome: Ongoing, Progressing     Problem: Fall Injury Risk  Goal: Absence of Fall and Fall-Related Injury  Outcome: Ongoing, Progressing     Problem: Infection  Goal: Absence of Infection Signs and Symptoms  Outcome: Ongoing, Progressing     Problem: Skin Injury Risk Increased  Goal: Skin Health and Integrity  Outcome: Ongoing, Progressing     Problem: Infection  Goal: Absence of Infection Signs and Symptoms  Outcome: Ongoing, Progressing     Problem: Impaired Wound Healing  Goal: Optimal Wound Healing  Outcome: Ongoing, Progressing     Problem: Coping Ineffective  Goal: Effective Coping  Outcome: Ongoing, Progressing

## 2024-04-11 LAB
POCT GLUCOSE: 118 MG/DL (ref 70–110)
POCT GLUCOSE: 87 MG/DL (ref 70–110)

## 2024-04-11 PROCEDURE — 21400001 HC TELEMETRY ROOM

## 2024-04-11 PROCEDURE — 25000003 PHARM REV CODE 250: Performed by: INTERNAL MEDICINE

## 2024-04-11 PROCEDURE — 63600175 PHARM REV CODE 636 W HCPCS: Performed by: INTERNAL MEDICINE

## 2024-04-11 PROCEDURE — 94760 N-INVAS EAR/PLS OXIMETRY 1: CPT

## 2024-04-11 PROCEDURE — 97168 OT RE-EVAL EST PLAN CARE: CPT

## 2024-04-11 PROCEDURE — 11000001 HC ACUTE MED/SURG PRIVATE ROOM

## 2024-04-11 PROCEDURE — 25000003 PHARM REV CODE 250: Performed by: STUDENT IN AN ORGANIZED HEALTH CARE EDUCATION/TRAINING PROGRAM

## 2024-04-11 RX ADMIN — GABAPENTIN 100 MG: 100 CAPSULE ORAL at 09:04

## 2024-04-11 RX ADMIN — ESCITALOPRAM OXALATE 5 MG: 5 TABLET, FILM COATED ORAL at 09:04

## 2024-04-11 RX ADMIN — GABAPENTIN 100 MG: 100 CAPSULE ORAL at 03:04

## 2024-04-11 RX ADMIN — THIAMINE HCL TAB 100 MG 100 MG: 100 TAB at 03:04

## 2024-04-11 RX ADMIN — PANTOPRAZOLE SODIUM 40 MG: 40 TABLET, DELAYED RELEASE ORAL at 09:04

## 2024-04-11 RX ADMIN — AMLODIPINE BESYLATE 5 MG: 5 TABLET ORAL at 09:04

## 2024-04-11 RX ADMIN — METHOCARBAMOL 750 MG: 750 TABLET ORAL at 12:04

## 2024-04-11 RX ADMIN — SODIUM BICARBONATE 650 MG TABLET 650 MG: at 09:04

## 2024-04-11 RX ADMIN — RIFAXIMIN 550 MG: 550 TABLET ORAL at 09:04

## 2024-04-11 RX ADMIN — BUSPIRONE HYDROCHLORIDE 5 MG: 5 TABLET ORAL at 09:04

## 2024-04-11 RX ADMIN — CARVEDILOL 6.25 MG: 3.12 TABLET, FILM COATED ORAL at 09:04

## 2024-04-11 RX ADMIN — THIAMINE HCL TAB 100 MG 100 MG: 100 TAB at 09:04

## 2024-04-11 RX ADMIN — Medication: at 09:04

## 2024-04-11 RX ADMIN — FOLIC ACID 1 MG: 1 TABLET ORAL at 09:04

## 2024-04-11 RX ADMIN — ARIPIPRAZOLE 2 MG: 2 TABLET ORAL at 09:04

## 2024-04-11 RX ADMIN — ENOXAPARIN SODIUM 40 MG: 40 INJECTION SUBCUTANEOUS at 04:04

## 2024-04-11 RX ADMIN — THIAMINE HCL TAB 100 MG 100 MG: 100 TAB at 05:04

## 2024-04-11 RX ADMIN — THERA TABS 1 TABLET: TAB at 09:04

## 2024-04-11 NOTE — PLAN OF CARE
Problem: Occupational Therapy  Goal: Occupational Therapy Goal  Description: Goals to be met by 4/25/2024    1. Pt will follow >80% of simple one step commands across three sessions. (Met 4/4/24)  2. Pt will perform grooming EOB with min A. (Met 4/4/24)  3. Pt will perform functional grasp/reach/release of items >80% of trials in prep for increased participation in ADL tasks. (Met 4/4/24)  4. Pt will perform sit<>stand with min A in prep for ADL t/fs. (Met 4/4/24)    Updated Goals 4/11/24:  Pt will be Mod I for toilet transfer with   UE Dressing with [place].  LE Dressing with [place].  Grooming while standing at sink with Modified [place].  Toileting from toilet with Mod I for hygiene and clothing management.   Toilet transfer to toilet with Mod I.  Outcome: Ongoing, Progressing

## 2024-04-11 NOTE — PLAN OF CARE
Patient was able to obtain banking information from [employer] and emailed to Vanita at [facility] for SNF placement. Patient was able to get SAIMA information from  and emailed to Vanita at [facility] for SNF placement. Ralph with admissions at [facility] notified of this.u

## 2024-04-11 NOTE — PT/OT/SLP RE-EVAL
Occupational Therapy   Re-evaluation    Name: Luz Maria Alfaro  MRN: [id number]  Admitting Diagnosis: Malnutrition  Recent Surgery: * No surgery found *      Recommendations:     Discharge therapy intensity: Moderate Intensity Therapy   Discharge Equipment Recommendations:  walker, rolling  Barriers to discharge:  Decreased caregiver support    Assessment:     Luz Maria Alfaro is a 64 y.o. female with a medical diagnosis of metabolic encephalopathy, UTI, alcoholic cirrhosis, and ascites. Therapist communicated with pt via writing which seemed to work best as pt very [place]. Pt was overall SBA-Sup for bed mobility, grooming in stand at the sink, and Min A for LB dressing while seated EOB. She presents with the following performance deficits affecting function: weakness, impaired endurance, impaired self care skills, impaired functional mobility, gait instability, impaired balance, visual deficits, decreased safety awareness, impaired cognition.    Rehab Prognosis: Good; patient would benefit from acute skilled OT services to address these deficits and reach maximum level of function.       Plan:     Patient to be seen 4 x/week to address the above listed problems via self-care/home management, therapeutic activities, therapeutic exercises  Plan of Care Expires: 04/25/24  Plan of Care Reviewed with: patient    Subjective     Chief Complaint: None stated  Patient/Family Comments/goals: To leave the hospital    Pain/Comfort:  Pain Rating 1: 0/10    Patients cultural, spiritual, Pentecostalism conflicts given the current situation: no    Objective:     OT communicated with nurse prior to session.      Patient was found HOB elevated with telemetry, peripheral IV upon OT entry to room.    General Precautions: Standard, fall  Orthopedic Precautions: N/A  Braces: N/A    Bed Mobility:    Patient completed Scooting/Bridging with modified independence  Patient completed Supine to Sit with supervision  Patient completed Sit to Supine  with modified independence    Functional Mobility/Transfers:  Patient completed Sit <> Stand Transfer with stand by assistance  with  rolling walker   Functional Mobility: Pt was SBA-CGA for functional mobility, no LOB noted. Pt requesting to sit after completing grooming in stand at the sink with RW. Pt returned to bed.    Activities of Daily Living:  Grooming: stand by assistance oral care, face, and hand washing in stand at the sink with RW  Lower Body Dressing: minimum assistance for donning R sock after doffing B socks with sup while seated EOB    Functional Cognition:  Affect: Cooperative  Attention: Easily distracted  Initiation: WFL  Command Following: Intact  Communication: clear/fluent    Visual Perceptual Skills:  Intact, wears glasses    Upper Extremity Function:  Right Upper Extremity:   WFL    Left Upper Extremity:  WFL    Balance:   Intact    Therapeutic Positioning  Risk for acquired pressure injuries is decreased due to ability to get to BSC/toilet with assist and continence.    OT interventions performed during the course of today's session in an effort to prevent and/or reduce acquired pressure injuries:   Education was provided on benefits of and recommendations for therapeutic positioning     Findings:  Visible skin intact    OT recommendations for therapeutic positioning throughout hospitalization:   Follow [facility] Pressure Injury Prevention Protocol    Patient Education:  Patient provided with verbal education and demonstrations education regarding OT role/goals/POC, fall prevention, safety awareness, and Discharge/DME recommendations.  Understanding was verbalized, however additional teaching warranted.     Patient left HOB elevated with all lines intact and call button in reach    GOALS:   Multidisciplinary Problems       Occupational Therapy Goals          Problem: Occupational Therapy    Goal Priority Disciplines Outcome Interventions   Occupational Therapy Goal     OT, PT/OT Ongoing,  Progressing    Description: Goals to be met by 4/25/2024    1. Pt will follow >80% of simple one step commands across three sessions. (Met 4/4/24)  2. Pt will perform grooming EOB with min A. (Met 4/4/24)  3. Pt will perform functional grasp/reach/release of items >80% of trials in prep for increased participation in ADL tasks. (Met 4/4/24)  4. Pt will perform sit<>stand with min A in prep for ADL t/fs. (Met 4/4/24)    Updated Goals 4/11/24:  Pt will be Mod I for toilet transfer with   UE Dressing with [place].  LE Dressing with Ind.  Grooming while standing at sink with Modified [place].  Toileting from toilet with Mod I for hygiene and clothing management.   Toilet transfer to toilet with Mod I.                       History:     Past Medical History:   Diagnosis Date    Benign essential HTN     Mixed hyperlipidemia     Type 2 diabetes mellitus without complications          Past Surgical History:   Procedure Laterality Date    APPENDECTOMY      BACK SURGERY      HIP SURGERY      INTRAMEDULLARY RODDING OF FEMUR Right 10/27/2022    Procedure: RIGHT FEMUR INTERTROCH IMN;  Surgeon: Stewart Brambila MD;  Location: [facility];  Service: Orthopedics;  Laterality: Right;  [place] table, supine  3rd case  Synthes Short TFNA       Time Tracking:     OT Date of Treatment: 04/11/24  OT Start Time: 1340  OT Stop Time: 1356  OT Total Time (min): 16 min    Billable Minutes:Re-eval 16 minutes    4/11/2024

## 2024-04-11 NOTE — PROGRESS NOTES
[facility]  Hospital Medicine Progress Note        Chief Complaint: Inpatient Follow-up for     HPI:   64 y.o. female with a PMHx of HTN, HLD, type 2 DM, alcoholic cirrhosis, alcohol abuse and nicotine dependence who presented to [facility] on 3/20/2024 via EMS with c/o worsening confusion x1 day with associated generalized weakness.  The majority of the history was obtained from review of the medical record as the patient was unable to provide any additional history.     Upon presentation to ED, vital signed included /73, , RR 18, SpO2 98% on room air, temperature 98.4° F.  Labs notable for hemoglobin 10.7, hematocrit 31.7, platelets 34, vitamin B12 1958, INR 1.4, CO2 18, BUN 3.5, creatinine 0.5, calcium 7.9, , albumin 1.9, AST 43.  Serum alcohol level undetectable.  EKG demonstrated sinus tachycardia.  CXR demonstrated increased left retrocardiac density with partial silhouetting of the left hemidiaphragm which might be related to an infiltrate/atelectasis.  CT head without contrast negative for acute intracranial findings or significant interval changes compared to August 2022.  CT abdomen and pelvis with IV contrast consistent with cirrhosis, moderate generalized ascites, bilateral pleural fluid right greater than left noted.  She was admitted to hospital medicine service for further medical management.  EEG showed no seizure activity.  Ammonia was within normal limits.  MRI brain showed no acute abnormalities.  Neurology signed off and had no further recommendations.  Underwent paracentesis and fluids workup revealed cirrhotic etiology.  Rifaximin was continued.  Palliative team was consulted for goals of care discussions.  PT suggested placement.    Interval Hx:   Afebrile.  Blood pressure looking good.  Alert and oriented, comfortably resting.  Answers all questions appropriately.  Has hearing deficits.  No family members at bedside.  She is currently awaiting  placement.  Mobilizing well with in the room.  No labs were obtained for this morning.  She is tolerating p.o., moving bowels.    Objective/physical exam:  Vitals:    04/11/24 0000 04/11/24 0334 04/11/24 0807 04/11/24 1115   BP: 106/67 122/71 127/73 (!) 100/59   Patient Position:  Lying     Pulse: 78 76 78 80   Resp: 20 18 20 20   Temp: 98.1 °F (36.7 °C) 98.1 °F (36.7 °C) 97.6 °F (36.4 °C) 98.2 °F (36.8 °C)   TempSrc: Oral Oral Oral Oral   SpO2: 96% (!) 94% 97% 96%   Weight:       Height:         General: In no acute distress, afebrile  Respiratory: Clear to auscultation bilaterally  Cardiovascular: S1, S2, no appreciable murmur  Abdomen: Soft, nontender, BS +  MSK: Warm, no lower extremity edema, no clubbing or cyanosis  Neurologic: Alert and oriented x4, moving all extremities with good strength     Lab Results   Component Value Date     04/05/2024    K 4.1 04/08/2024    CO2 28 04/05/2024    BUN 21.2 (H) 04/05/2024    CREATININE 0.58 04/05/2024    CALCIUM 9.2 04/05/2024    EGFRNONAA >60 02/28/2022      Lab Results   Component Value Date    ALT 17 03/30/2024    AST 22 03/30/2024    ALKPHOS 123 03/30/2024    BILITOT 0.5 03/30/2024      Lab Results   Component Value Date    WBC 8.57 04/05/2024    HGB 10.8 (L) 04/05/2024    HCT 31.7 (L) 04/05/2024    .7 (H) 04/05/2024     (H) 04/05/2024           Medications:   amLODIPine  5 mg Oral Daily    ARIPiprazole  2 mg Oral Daily    busPIRone  5 mg Oral BID    carvediloL  6.25 mg Oral BID    enoxparin  40 mg Subcutaneous Q24H (prophylaxis, 1700)    EScitalopram oxalate  5 mg Oral Daily    folic acid  1 mg Oral Daily    gabapentin  100 mg Oral TID    multivitamin  1 tablet Oral Daily    pantoprazole  40 mg Oral BID    rifAXIMin  550 mg Oral BID    sodium bicarbonate  650 mg Oral Daily    thiamine  100 mg Oral Q8H    zinc oxide-cod liver oil   Topical (Top) Daily      0.9%  NaCl infusion (for blood administration), albuterol, dextrose 10%, dextrose 10%,  glucagon (human recombinant), glucose, glucose, hydrALAZINE, hyoscyamine, insulin aspart U-100, labetalol, methocarbamoL, [CANCELED] Flushing PICC/Midline Protocol **AND** [DISCONTINUED] sodium chloride 0.9% **AND** sodium chloride 0.9%     Assessment/Plan:    Acute metabolic encephalopathy at admission-improved   E coli UTI at admission s/p Rx  Alcoholic cirrhosis  ascites s/p paracentesis  Alcohol use  Thrombocytopenia due to above   Anemia of chronic disease  Hypoalbuminemia due to liver dysfunction   Hypertensive urgency at admission-resolved   Chronic depression    Plan:  -mental status back to baseline.  Ambulating well.  Continue to monitor   -counseled on alcohol cessation.  Continue supportive care, rifaximin.  Monitor platelet count while inpatient.  SCDs for DVT prophylaxis  -therapy as tolerated. currently awaiting SNF placement.  -palliative team on board, appreciate assistance.  -home meds were reviewed and renewed.  Ensure regular bowel movements.    SCDs      Pedrito Arechiga MD

## 2024-04-12 LAB
POCT GLUCOSE: 108 MG/DL (ref 70–110)
POCT GLUCOSE: 123 MG/DL (ref 70–110)
POCT GLUCOSE: 95 MG/DL (ref 70–110)

## 2024-04-12 PROCEDURE — 21400001 HC TELEMETRY ROOM

## 2024-04-12 PROCEDURE — 25000003 PHARM REV CODE 250: Performed by: STUDENT IN AN ORGANIZED HEALTH CARE EDUCATION/TRAINING PROGRAM

## 2024-04-12 PROCEDURE — 25000003 PHARM REV CODE 250: Performed by: INTERNAL MEDICINE

## 2024-04-12 PROCEDURE — 11000001 HC ACUTE MED/SURG PRIVATE ROOM

## 2024-04-12 PROCEDURE — 97535 SELF CARE MNGMENT TRAINING: CPT

## 2024-04-12 PROCEDURE — 63600175 PHARM REV CODE 636 W HCPCS: Performed by: INTERNAL MEDICINE

## 2024-04-12 PROCEDURE — S4991 NICOTINE PATCH NONLEGEND: HCPCS | Performed by: INTERNAL MEDICINE

## 2024-04-12 RX ORDER — IBUPROFEN 200 MG
1 TABLET ORAL DAILY
Status: DISCONTINUED | OUTPATIENT
Start: 2024-04-12 | End: 2024-04-17 | Stop reason: HOSPADM

## 2024-04-12 RX ADMIN — BUSPIRONE HYDROCHLORIDE 5 MG: 5 TABLET ORAL at 09:04

## 2024-04-12 RX ADMIN — ARIPIPRAZOLE 2 MG: 2 TABLET ORAL at 09:04

## 2024-04-12 RX ADMIN — THIAMINE HCL TAB 100 MG 100 MG: 100 TAB at 10:04

## 2024-04-12 RX ADMIN — NICOTINE 1 PATCH: 21 PATCH, EXTENDED RELEASE TRANSDERMAL at 10:04

## 2024-04-12 RX ADMIN — CARVEDILOL 6.25 MG: 3.12 TABLET, FILM COATED ORAL at 09:04

## 2024-04-12 RX ADMIN — METHOCARBAMOL 750 MG: 750 TABLET ORAL at 08:04

## 2024-04-12 RX ADMIN — THIAMINE HCL TAB 100 MG 100 MG: 100 TAB at 03:04

## 2024-04-12 RX ADMIN — FOLIC ACID 1 MG: 1 TABLET ORAL at 09:04

## 2024-04-12 RX ADMIN — GABAPENTIN 100 MG: 100 CAPSULE ORAL at 09:04

## 2024-04-12 RX ADMIN — AMLODIPINE BESYLATE 5 MG: 5 TABLET ORAL at 09:04

## 2024-04-12 RX ADMIN — CARVEDILOL 6.25 MG: 3.12 TABLET, FILM COATED ORAL at 08:04

## 2024-04-12 RX ADMIN — ENOXAPARIN SODIUM 40 MG: 40 INJECTION SUBCUTANEOUS at 04:04

## 2024-04-12 RX ADMIN — BUSPIRONE HYDROCHLORIDE 5 MG: 5 TABLET ORAL at 08:04

## 2024-04-12 RX ADMIN — SODIUM BICARBONATE 650 MG TABLET 650 MG: at 09:04

## 2024-04-12 RX ADMIN — METHOCARBAMOL 750 MG: 750 TABLET ORAL at 01:04

## 2024-04-12 RX ADMIN — THIAMINE HCL TAB 100 MG 100 MG: 100 TAB at 05:04

## 2024-04-12 RX ADMIN — Medication: at 09:04

## 2024-04-12 RX ADMIN — RIFAXIMIN 550 MG: 550 TABLET ORAL at 09:04

## 2024-04-12 RX ADMIN — GABAPENTIN 100 MG: 100 CAPSULE ORAL at 03:04

## 2024-04-12 RX ADMIN — THERA TABS 1 TABLET: TAB at 09:04

## 2024-04-12 RX ADMIN — PANTOPRAZOLE SODIUM 40 MG: 40 TABLET, DELAYED RELEASE ORAL at 09:04

## 2024-04-12 RX ADMIN — RIFAXIMIN 550 MG: 550 TABLET ORAL at 08:04

## 2024-04-12 RX ADMIN — PANTOPRAZOLE SODIUM 40 MG: 40 TABLET, DELAYED RELEASE ORAL at 08:04

## 2024-04-12 RX ADMIN — ESCITALOPRAM OXALATE 5 MG: 5 TABLET, FILM COATED ORAL at 09:04

## 2024-04-12 NOTE — PT/OT/SLP PROGRESS
Occupational Therapy   Treatment    Name: Luz Maria Alfaro  MRN: [id number]  Admitting Diagnosis:  UTI    Recommendations:     Recommended therapy intensity at discharge: Moderate Intensity Therapy   Discharge Equipment Recommendations:  walker, rolling  Barriers to discharge:   (ongoing medical needs)    Assessment:     Luz Maria Alfaro is a 64 y.o. female with a medical diagnosis of metabolic encephalopathy, UTI, alcoholic cirrhosis, and ascites. She presents with increased functional mobility, independence with ADLs, and is [place]. Performance deficits affecting function are weakness, impaired endurance, gait instability, impaired balance, hearing deficits, and decreased safety awareness. Patient would benefit from moderate intensity therapy upon discharge for an increase in indep with ADLs and safety before returning home.    Rehab Prognosis:  Good; patient would benefit from acute skilled OT services to address these deficits and reach maximum level of function.       Plan:     Patient to be seen 4 x/week to address the above listed problems via self-care/home management, therapeutic activities, therapeutic exercises  Plan of Care Expires: 04/25/24  Plan of Care Reviewed with: patient, spouse    Subjective     Pain/Comfort:  Pain Rating 1: 0/10    Objective:     Communicated with: nursing and  prior to session.  Patient found HOB elevated with telemetry, pulse ox (continuous), peripheral IV, pressure relief boots upon OT entry to room.    General Precautions: Standard, aspiration, fall    Orthopedic Precautions:N/A  Braces: N/A  Respiratory Status: Room air     Occupational Performance:     Bed Mobility:    Patient completed Supine to Sit with supervision  Patient completed Sit to Supine with minimum assistance     Functional Mobility/Transfers:  Patient completed Sit <> Stand Transfer with supervision  with  rolling walker   Functional Mobility: patient able to ambulate in room and into hallway with  increased dynamic standing balance and safety using RW    Activities of Daily Living:  Grooming: stand by assistance standing at sink to brush teeth and dentures  Lower Body Dressing: stand by assistance donning brief sitting EOB and pulling up while using RW for balance    Therapeutic Positioning    OT interventions performed during the course of today's session in an effort to prevent and/or reduce acquired pressure injuries:   Education was provided on benefits of and recommendations for therapeutic positioning  Therapeutic positioning was provided at the conclusion of session to offload all bony prominences for the prevention and/or reduction of pressure injuries  Positioning recommendations were communicated to care team     Patient Education:  Patient and spouse were provided with verbal education education regarding OT role/goals/POC, fall prevention, safety awareness, Discharge/DME recommendations, and pressure ulcer prevention.  Understanding was verbalized.      Patient left HOB elevated with all lines intact, call button in reach, pressure relief boots, nurse notified, and ex- present.    GOALS:   Multidisciplinary Problems       Occupational Therapy Goals          Problem: Occupational Therapy    Goal Priority Disciplines Outcome Interventions   Occupational Therapy Goal     OT, PT/OT Ongoing, Progressing    Description: Goals to be met by 4/25/2024    1. Pt will follow >80% of simple one step commands across three sessions. (Met 4/4/24)  2. Pt will perform grooming EOB with min A. (Met 4/4/24)  3. Pt will perform functional grasp/reach/release of items >80% of trials in prep for increased participation in ADL tasks. (Met 4/4/24)  4. Pt will perform sit<>stand with min A in prep for ADL t/fs. (Met 4/4/24)    Updated Goals:  Pt will be Mod I for toilet transfer with   UE Dressing with [place].  LE Dressing with Ind.  Grooming while standing at sink with Modified [place].  Toileting from  toilet with Mod I for hygiene and clothing management.   Toilet transfer to toilet with Mod I.                       Time Tracking:     OT Date of Treatment: 04/12/24  OT Start Time: 0924  OT Stop Time: 0948  OT Total Time (min): 24 min    Billable Minutes:Self Care/Home Management 24    OT/KAREN: OT     Number of KAREN visits since last OT visit: 1    4/12/2024

## 2024-04-12 NOTE — PROGRESS NOTES
Inpatient Nutrition Assessment    Admit Date: 3/20/2024   Total duration of encounter: 23 days   Patient Age: 64 y.o.    Nutrition Recommendation/Prescription     Continue diabetic diet as tolerated.     Change oral supplement to Boost Glucose Control (provides 190 kcal, 16 g protein per serving) chocolate x1 daily for additional nutrition.     Continue thiamin, MV and folic acid supplementation.     Obtain new weight as feasible.     Communication of Recommendations: reviewed with nurse and reviewed with patient    Nutrition Assessment     Malnutrition Assessment/Nutrition-Focused Physical Exam    Malnutrition Context: chronic illness (03/21/24 1105)  Malnutrition Level: moderate (03/21/24 1105)  Energy Intake (Malnutrition):  (unable to determine) (03/21/24 1105)  Weight Loss (Malnutrition):  (unable to determine) (03/21/24 1105)  Subcutaneous Fat (Malnutrition): severe depletion (03/21/24 1105)     Upper Arm Region (Subcutaneous Fat Loss): severe depletion     Muscle Mass (Malnutrition): mild depletion (03/21/24 1105)     Clavicle Bone Region (Muscle Loss): mild depletion  Clavicle and Acromion Bone Region (Muscle Loss): mild depletion     Dorsal Hand (Muscle Loss): mild depletion     Anterior Thigh Region (Muscle Loss): mild depletion  Posterior Calf Region (Muscle Loss): mild depletion  Fluid Accumulation (Malnutrition):  (does not meet criteria) (03/21/24 1105)        A minimum of two characteristics is recommended for diagnosis of either severe or non-severe malnutrition.    Chart Review    Reason Seen: continuous nutrition monitoring, malnutrition screening tool (MST), physician consult for PCM, and follow-up    Malnutrition Screening Tool Results   Have you recently lost weight without trying?: Unsure  Have you been eating poorly because of a decreased appetite?: No   MST Score: 2   Diagnosis:  Acute encephalopathy, metabolic versus hepatic   Metabolic Acidosis   Alcoholic cirrhosis   Ascites    Thrombocytopenia   Acute microcytic anemia   Hypoalbuminemia     Relevant Medical History: HTN, HLD, type 2 DM, alcoholic cirrhosis, alcohol abuse and nicotine dependence      Scheduled Medications:  amLODIPine, 5 mg, Daily  ARIPiprazole, 2 mg, Daily  busPIRone, 5 mg, BID  carvediloL, 6.25 mg, BID  enoxparin, 40 mg, Q24H (prophylaxis, 1700)  EScitalopram oxalate, 5 mg, Daily  folic acid, 1 mg, Daily  gabapentin, 100 mg, TID  multivitamin, 1 tablet, Daily  nicotine, 1 patch, Daily  pantoprazole, 40 mg, BID  rifAXIMin, 550 mg, BID  sodium bicarbonate, 650 mg, Daily  thiamine, 100 mg, Q8H  zinc oxide-cod liver oil, , Daily    Continuous Infusions:     PRN Medications: 0.9%  NaCl infusion (for blood administration), albuterol, dextrose 10%, dextrose 10%, glucagon (human recombinant), glucose, glucose, hydrALAZINE, hyoscyamine, insulin aspart U-100, labetalol, methocarbamoL, [CANCELED] Flushing PICC/Midline Protocol **AND** [DISCONTINUED] sodium chloride 0.9% **AND** sodium chloride 0.9%    Calorie Containing IV Medications: no significant kcals from medications at this time    Recent Labs   Lab 04/07/24  1429 04/08/24  0500   K  --  4.1   PHOS  --  3.4   MG 1.90  --      Nutrition Orders:  Diet Adult Regular Diabetic  Dietary nutrition supplements Boost Glucose Control - Vanilla; BID    Appetite/Oral Intake: good/% of meals  Factors Affecting Nutritional Intake: none identified  Food/Buddhism/Cultural Preferences: none reported  Food Allergies: none reported  Last Bowel Movement: 04/11/24  Wound(s):     Altered Skin Integrity 03/26/24 1000 Right medial Buttocks Other (comment)-Tissue loss description: Partial thickness       Altered Skin Integrity 03/27/24 2252 Left dorsal Elbow-Tissue loss description: Not applicable       Altered Skin Integrity 03/27/24 2254 Right dorsal Elbow-Tissue loss description: Not applicable       Altered Skin Integrity 03/27/24 2303 Left anterior Ankle-Tissue loss description: Not  "applicable       Altered Skin Integrity 03/27/24 6315 Right anterior Ankle-Tissue loss description: Partial thickness     Comments    3/21/24 Pt unable to answer questions, ex- at bedside states pt is on a "liquid diet", chronic alcohol abuse. States notable wt loss over the last 9 months, estimating weight around 160lbs. EMR wt history shows 119-124lbs x 1.5 years so unsure accuracy of reported UBW. Did note muscle and fat loss during NFPA. He think she would drink oral supplement, will send with meals. PPN infusing at 50ml/hr, recommend increasing rate to better meet nutritional needs while oral intake is inadequate.     3/26/24 NPO from 3/21-3/24 s/t AMS. MBS performed yesterday, SLP recommends puree solids and mildly thick liquids. Pt has not woke up to take medications or po intake. Will change ONS to Boost VHC for mildly thick supplement and leave tube feeding recommendations incase po intake does not improve.     3/28/24 Spoke to ex-, pt ate 75% waffle this morning and a few sips of thickened ice tea, tolerated. PO intake remains inadequate but tolerating meals. PPN started yesterday by MD for supplemental support.     4/1/24 Pt awake and alert watching TV, did not respond to me. RN states assist feeding and eating well. PPN still infusing, can discontinue.     4/4/24 With therapy. RN states tolerating meals with good intake. Has assistance with meals when needed. No GI complaints.     4/8/24 Tolerating meals, eating well. Will change oral supplement to Boost Glucose Control since on thin liquids and po intake improved. No GI complaints.     4/12/24: Patient reports good oral intake today. Patient does not like vanilla oral nutrition supplement, will change to Boost Glucose Control chocolate x1 daily.     Anthropometrics    Height: 5' 3" (160 cm), Height Method: Stated  Last Weight: 61 kg (134 lb 7.7 oz) (03/21/24 0510), Weight Method: Standard Scale  BMI (Calculated): 23.8  BMI Classification: " normal (BMI 18.5-24.9)     Ideal Body Weight (IBW), Female: 115 lb     % Ideal Body Weight, Female (lb): 116.94 %                             Usual Weight Provided By: unable to obtain usual weight    Wt Readings from Last 5 Encounters:   03/21/24 61 kg (134 lb 7.7 oz)   07/21/23 56.3 kg (124 lb 3.2 oz)   03/27/23 54.4 kg (119 lb 14.9 oz)   01/24/23 54.4 kg (119 lb 14.9 oz)   12/15/22 54.4 kg (119 lb 14.9 oz)     Weight Change(s) Since Admission: 3/26-4/8/24 no updated wts   Wt Readings from Last 1 Encounters:   03/21/24 0510 61 kg (134 lb 7.7 oz)   03/20/24 0013 56.7 kg (125 lb)   Admit Weight: 56.7 kg (125 lb) (03/20/24 0013), Weight Method: Stated    Estimated Needs    Weight Used For Calorie Calculations: 61 kg (134 lb 7.7 oz)  Energy Calorie Requirements (kcal): 1830kcals/d (30kcals/kg)  Energy Need Method: Kcal/kg  Weight Used For Protein Calculations: 61 kg (134 lb 7.7 oz)  Protein Requirements: 79g/d (1.3g/kg)  Fluid Requirements (mL): 1525-1830ml fl/d (25-30ml/kg)    Enteral Nutrition     Patient not receiving enteral nutrition at this time.    Parenteral Nutrition     Patient not receiving parenteral nutrition at this time.    Evaluation of Received Nutrient Intake    Calories: meeting estimated needs  Protein: meeting estimated needs    Patient Education     Not applicable.    Nutrition Diagnosis     PES: Inadequate oral intake related to acute illness as evidenced by <50% EER since admit. (resolved)     PES: Moderate chronic disease or condition related malnutrition related to inability to consume sufficient nutrients as evidenced by severe fat depletion and mild muscle depletion. (active)    Nutrition Interventions     Intervention(s): modified composition of meals/snacks, commercial beverage, multivitamin/mineral supplement therapy, and collaboration with other providers    Goal: Meet greater than 80% of nutritional needs by follow-up. (goal met)  Goal: Maintain weight throughout hospitalization. (goal  progressing)    Nutrition Goals & Monitoring     Dietitian will monitor: energy intake, weight change, electrolyte/renal panel, and glucose/endocrine profile    Nutrition Risk/Follow-Up: moderate (follow-up in 3-5 days)   Please consult if re-assessment needed sooner.

## 2024-04-12 NOTE — PROGRESS NOTES
[facility] Hospital Medicine Progress Note        Chief Complaint: Inpatient Follow-up for     HPI:   64 y.o. female with a PMHx of HTN, HLD, type 2 DM, alcoholic cirrhosis, alcohol abuse and nicotine dependence who presented to [facility] on 3/20/2024 via EMS with c/o worsening confusion x1 day with associated generalized weakness.  The majority of the history was obtained from review of the medical record as the patient was unable to provide any additional history.     Upon presentation to ED, vital signed included /73, , RR 18, SpO2 98% on room air, temperature 98.4° F.  Labs notable for hemoglobin 10.7, hematocrit 31.7, platelets 34, vitamin B12 1958, INR 1.4, CO2 18, BUN 3.5, creatinine 0.5, calcium 7.9, , albumin 1.9, AST 43.  Serum alcohol level undetectable.  EKG demonstrated sinus tachycardia.  CXR demonstrated increased left retrocardiac density with partial silhouetting of the left hemidiaphragm which might be related to an infiltrate/atelectasis.  CT head without contrast negative for acute intracranial findings or significant interval changes compared to August 2022.  CT abdomen and pelvis with IV contrast consistent with cirrhosis, moderate generalized ascites, bilateral pleural fluid right greater than left noted.  She was admitted to hospital medicine service for further medical management.  EEG showed no seizure activity.  Ammonia was within normal limits.  MRI brain showed no acute abnormalities.  Neurology signed off and had no further recommendations.  Underwent paracentesis and fluids workup revealed cirrhotic etiology.  Rifaximin was continued.  Palliative team was consulted for goals of care discussions.  PT suggested placement.    Interval Hx:   No acute events overnight.  Alert, oriented, comfortably resting in the.  Doing well on room air.  Denies any nausea or vomiting.  Tolerating p.o..  Moving bowels.  Requesting for nicotine  patch.  Objective/physical exam:  Vitals:    04/11/24 2334 04/12/24 0351 04/12/24 0712 04/12/24 0919   BP: 127/73 128/76 (!) 156/74 (!) 156/74   Patient Position:       Pulse: 80 78 77 77   Resp:   16    Temp: 97.4 °F (36.3 °C) 97.3 °F (36.3 °C) 97.8 °F (36.6 °C)    TempSrc: Oral Oral Oral    SpO2: (!) 94% 95% 98%    Weight:       Height:         General: In no acute distress, afebrile  Respiratory: Clear to auscultation bilaterally  Cardiovascular: S1, S2, no appreciable murmur  Abdomen: Soft, nontender, BS +  MSK: Warm, no lower extremity edema, no clubbing or cyanosis  Neurologic: Alert and oriented x4, moving all extremities with good strength     Lab Results   Component Value Date     04/05/2024    K 4.1 04/08/2024    CO2 28 04/05/2024    BUN 21.2 (H) 04/05/2024    CREATININE 0.58 04/05/2024    CALCIUM 9.2 04/05/2024    EGFRNONAA >60 02/28/2022      Lab Results   Component Value Date    ALT 17 03/30/2024    AST 22 03/30/2024    ALKPHOS 123 03/30/2024    BILITOT 0.5 03/30/2024      Lab Results   Component Value Date    WBC 8.57 04/05/2024    HGB 10.8 (L) 04/05/2024    HCT 31.7 (L) 04/05/2024    .7 (H) 04/05/2024     (H) 04/05/2024           Medications:   amLODIPine  5 mg Oral Daily    ARIPiprazole  2 mg Oral Daily    busPIRone  5 mg Oral BID    carvediloL  6.25 mg Oral BID    enoxparin  40 mg Subcutaneous Q24H (prophylaxis, 1700)    EScitalopram oxalate  5 mg Oral Daily    folic acid  1 mg Oral Daily    gabapentin  100 mg Oral TID    multivitamin  1 tablet Oral Daily    nicotine  1 patch Transdermal Daily    pantoprazole  40 mg Oral BID    rifAXIMin  550 mg Oral BID    sodium bicarbonate  650 mg Oral Daily    thiamine  100 mg Oral Q8H    zinc oxide-cod liver oil   Topical (Top) Daily      0.9%  NaCl infusion (for blood administration), albuterol, dextrose 10%, dextrose 10%, glucagon (human recombinant), glucose, glucose, hydrALAZINE, hyoscyamine, insulin aspart U-100, labetalol,  methocarbamoL, [CANCELED] Flushing PICC/Midline Protocol **AND** [DISCONTINUED] sodium chloride 0.9% **AND** sodium chloride 0.9%     Assessment/Plan:    Acute metabolic encephalopathy at admission-improved   E coli UTI at admission s/p Rx  Alcoholic cirrhosis  ascites s/p paracentesis  Alcohol use  Thrombocytopenia due to above   Anemia of chronic disease  Hypoalbuminemia due to liver dysfunction   Hypertensive urgency at admission-resolved   Chronic depression  Tobacco use    Plan:  -mental status back to baseline.  Ambulating well.  Continue to monitor   - Continue supportive care, rifaximin.  Monitor platelet count while inpatient.  SCDs for DVT prophylaxis  -palliative team on board, appreciate assistance.  -home meds were reviewed and renewed.  Ensure regular bowel movements.  -Counseled on tobacco cessation, alcohol cessation.  Add nicotine patch.  -therapy as tolerated. currently awaiting SNF placement.    SCDs      Pedrito Arechiga MD

## 2024-04-13 LAB
POCT GLUCOSE: 116 MG/DL (ref 70–110)
POCT GLUCOSE: 122 MG/DL (ref 70–110)
POCT GLUCOSE: 94 MG/DL (ref 70–110)

## 2024-04-13 PROCEDURE — 11000001 HC ACUTE MED/SURG PRIVATE ROOM

## 2024-04-13 PROCEDURE — 25000003 PHARM REV CODE 250: Performed by: STUDENT IN AN ORGANIZED HEALTH CARE EDUCATION/TRAINING PROGRAM

## 2024-04-13 PROCEDURE — 25000003 PHARM REV CODE 250: Performed by: INTERNAL MEDICINE

## 2024-04-13 PROCEDURE — 63600175 PHARM REV CODE 636 W HCPCS: Performed by: INTERNAL MEDICINE

## 2024-04-13 PROCEDURE — S4991 NICOTINE PATCH NONLEGEND: HCPCS | Performed by: INTERNAL MEDICINE

## 2024-04-13 PROCEDURE — 21400001 HC TELEMETRY ROOM

## 2024-04-13 RX ADMIN — CARVEDILOL 6.25 MG: 3.12 TABLET, FILM COATED ORAL at 08:04

## 2024-04-13 RX ADMIN — THERA TABS 1 TABLET: TAB at 08:04

## 2024-04-13 RX ADMIN — GABAPENTIN 100 MG: 100 CAPSULE ORAL at 08:04

## 2024-04-13 RX ADMIN — FOLIC ACID 1 MG: 1 TABLET ORAL at 08:04

## 2024-04-13 RX ADMIN — AMLODIPINE BESYLATE 5 MG: 5 TABLET ORAL at 08:04

## 2024-04-13 RX ADMIN — PANTOPRAZOLE SODIUM 40 MG: 40 TABLET, DELAYED RELEASE ORAL at 08:04

## 2024-04-13 RX ADMIN — NICOTINE 1 PATCH: 21 PATCH, EXTENDED RELEASE TRANSDERMAL at 08:04

## 2024-04-13 RX ADMIN — BUSPIRONE HYDROCHLORIDE 5 MG: 5 TABLET ORAL at 08:04

## 2024-04-13 RX ADMIN — RIFAXIMIN 550 MG: 550 TABLET ORAL at 08:04

## 2024-04-13 RX ADMIN — METHOCARBAMOL 750 MG: 750 TABLET ORAL at 08:04

## 2024-04-13 RX ADMIN — THIAMINE HCL TAB 100 MG 100 MG: 100 TAB at 09:04

## 2024-04-13 RX ADMIN — GABAPENTIN 100 MG: 100 CAPSULE ORAL at 02:04

## 2024-04-13 RX ADMIN — THIAMINE HCL TAB 100 MG 100 MG: 100 TAB at 02:04

## 2024-04-13 RX ADMIN — ARIPIPRAZOLE 2 MG: 2 TABLET ORAL at 08:04

## 2024-04-13 RX ADMIN — SODIUM BICARBONATE 650 MG TABLET 650 MG: at 08:04

## 2024-04-13 RX ADMIN — ESCITALOPRAM OXALATE 5 MG: 5 TABLET, FILM COATED ORAL at 08:04

## 2024-04-13 RX ADMIN — THIAMINE HCL TAB 100 MG 100 MG: 100 TAB at 06:04

## 2024-04-13 RX ADMIN — ENOXAPARIN SODIUM 40 MG: 40 INJECTION SUBCUTANEOUS at 05:04

## 2024-04-13 RX ADMIN — Medication: at 08:04

## 2024-04-13 NOTE — PROGRESS NOTES
[facility]  Hospital Medicine Progress Note        Chief Complaint: Inpatient Follow-up for     HPI:   64 y.o. female with a PMHx of HTN, HLD, type 2 DM, alcoholic cirrhosis, alcohol abuse and nicotine dependence who presented to [facility] on 3/20/2024 via EMS with c/o worsening confusion x1 day with associated generalized weakness.  The majority of the history was obtained from review of the medical record as the patient was unable to provide any additional history.     Upon presentation to ED, vital signed included /73, , RR 18, SpO2 98% on room air, temperature 98.4° F.  Labs notable for hemoglobin 10.7, hematocrit 31.7, platelets 34, vitamin B12 1958, INR 1.4, CO2 18, BUN 3.5, creatinine 0.5, calcium 7.9, , albumin 1.9, AST 43.  Serum alcohol level undetectable.  EKG demonstrated sinus tachycardia.  CXR demonstrated increased left retrocardiac density with partial silhouetting of the left hemidiaphragm which might be related to an infiltrate/atelectasis.  CT head without contrast negative for acute intracranial findings or significant interval changes compared to August 2022.  CT abdomen and pelvis with IV contrast consistent with cirrhosis, moderate generalized ascites, bilateral pleural fluid right greater than left noted.  She was admitted to hospital medicine service for further medical management.  EEG showed no seizure activity.  Ammonia was within normal limits.  MRI brain showed no acute abnormalities.  Neurology signed off and had no further recommendations.  Underwent paracentesis and fluids workup revealed cirrhotic etiology.  Rifaximin was continued.  Palliative team was consulted for goals of care discussions.  PT suggested placement.    Interval Hx:   Afebrile.  Hemodynamically stable.  Comfortably resting.  Ambulating well.  Patient will get her hearing aid today.  She has no new complaints or concerns.        Objective/physical exam:  Vitals:     04/12/24 2300 04/13/24 0329 04/13/24 0745 04/13/24 0857   BP: 120/68 (!) 140/61 131/80 131/80   Pulse: 77 86 82 82   Resp: 18 18     Temp: 98 °F (36.7 °C) 98.4 °F (36.9 °C) 97.8 °F (36.6 °C)    TempSrc: Oral Oral Oral    SpO2: 96% 98% 97%    Weight:       Height:         General: In no acute distress, afebrile  Respiratory: Clear to auscultation bilaterally  Cardiovascular: S1, S2, no appreciable murmur  Abdomen: Soft, nontender, BS +  MSK: Warm, no lower extremity edema, no clubbing or cyanosis  Neurologic: Alert and oriented x4, moving all extremities with good strength     Lab Results   Component Value Date     04/05/2024    K 4.1 04/08/2024    CO2 28 04/05/2024    BUN 21.2 (H) 04/05/2024    CREATININE 0.58 04/05/2024    CALCIUM 9.2 04/05/2024    EGFRNONAA >60 02/28/2022      Lab Results   Component Value Date    ALT 17 03/30/2024    AST 22 03/30/2024    ALKPHOS 123 03/30/2024    BILITOT 0.5 03/30/2024      Lab Results   Component Value Date    WBC 8.57 04/05/2024    HGB 10.8 (L) 04/05/2024    HCT 31.7 (L) 04/05/2024    .7 (H) 04/05/2024     (H) 04/05/2024           Medications:  Current Facility-Administered Medications   Medication Dose Route Frequency Provider Last Rate Last Admin    0.9%  NaCl infusion (for blood administration)   Intravenous Q24H PRN Chaz Harrison MD        albuterol inhaler 1 puff  1 puff Inhalation Q4H PRN Yvette Saba MD        amLODIPine tablet 5 mg  5 mg Oral Daily Robin Metcalf MD   5 mg at 04/13/24 0857    ARIPiprazole tablet 2 mg  2 mg Oral Daily Robin Metcalf MD   2 mg at 04/13/24 0857    busPIRone tablet 5 mg  5 mg Oral BID Tabatha Stokes DO   5 mg at 04/13/24 0857    carvediloL tablet 6.25 mg  6.25 mg Oral BID Robin Metcalf MD   6.25 mg at 04/13/24 0857    dextrose 10% bolus 125 mL 125 mL  12.5 g Intravenous PRN Yvette Saba MD   Stopped at 03/26/24 0125    dextrose 10% bolus 250 mL 250 mL  25 g Intravenous PRN Wandy,  MD Yvette        enoxaparin injection 40 mg  40 mg Subcutaneous Q24H (prophylaxis, 1700) Yvette Saba MD   40 mg at 04/12/24 1629    EScitalopram oxalate tablet 5 mg  5 mg Oral Daily Tabatha Stokes DO   5 mg at 04/13/24 0857    folic acid tablet 1 mg  1 mg Oral Daily Yvette Saba MD   1 mg at 04/13/24 0857    gabapentin capsule 100 mg  100 mg Oral TID Tabatha Stokes DO   100 mg at 04/13/24 0857    glucagon (human recombinant) injection 1 mg  1 mg Intramuscular PRN Yvette Saba MD        glucose chewable tablet 16 g  16 g Oral PRN Yvette Saba MD        glucose chewable tablet 24 g  24 g Oral PRN Yvette Saba MD        hydrALAZINE injection 10 mg  10 mg Intravenous Q4H PRN Toyin Golden, FNP   10 mg at 03/30/24 1518    hyoscyamine ODT tablet 0.125 mg  0.125 mg Sublingual TID PRN Yvette Saba MD        insulin aspart U-100 injection 0-5 Units  0-5 Units Subcutaneous QID (AC + HS) PRN Yvette Saba MD        labetaloL injection 10 mg  10 mg Intravenous Q4H PRN Robin Metcalf MD   10 mg at 04/01/24 0032    methocarbamoL tablet 750 mg  750 mg Oral TID PRN Yevtte Saba MD   750 mg at 04/12/24 2021    multivitamin tablet  1 tablet Oral Daily Robin Metcalf MD   1 tablet at 04/13/24 0857    nicotine 21 mg/24 hr 1 patch  1 patch Transdermal Daily Pedrito Arechiga MD   1 patch at 04/13/24 0857    pantoprazole EC tablet 40 mg  40 mg Oral BID Robin Metcalf MD   40 mg at 04/13/24 0857    rifAXIMin tablet 550 mg  550 mg Oral BID Robin Metcalf MD   550 mg at 04/13/24 0857    sodium bicarbonate tablet 650 mg  650 mg Oral Daily Robin Metcalf MD   650 mg at 04/13/24 0857    sodium chloride 0.9% flush 10 mL  10 mL Intravenous PRN Chaz Harrison MD        thiamine tablet 100 mg  100 mg Oral Q8H Evgeny Burk MD   100 mg at 04/13/24 0617    zinc oxide-cod liver oil 40 % paste   Topical (Top) Daily Robin Metcalf MD   Given at 04/13/24 0884       Current Facility-Administered Medications   Medication Dose Route Frequency Provider Last Rate Last Admin    0.9%  NaCl infusion (for blood administration)   Intravenous Q24H PRN Chaz Harrison MD        albuterol inhaler 1 puff  1 puff Inhalation Q4H PRN Yvette Saba MD        amLODIPine tablet 5 mg  5 mg Oral Daily Robin Metcalf MD   5 mg at 04/13/24 0857    ARIPiprazole tablet 2 mg  2 mg Oral Daily Robin Metcalf MD   2 mg at 04/13/24 0857    busPIRone tablet 5 mg  5 mg Oral BID Tabatha Stokes DO   5 mg at 04/13/24 0857    carvediloL tablet 6.25 mg  6.25 mg Oral BID Robin Metcalf MD   6.25 mg at 04/13/24 0857    dextrose 10% bolus 125 mL 125 mL  12.5 g Intravenous PRN Yvette Saba MD   Stopped at 03/26/24 0125    dextrose 10% bolus 250 mL 250 mL  25 g Intravenous PRN Yvette Saba MD        enoxaparin injection 40 mg  40 mg Subcutaneous Q24H (prophylaxis, 1700) Yvette Saba MD   40 mg at 04/12/24 1629    EScitalopram oxalate tablet 5 mg  5 mg Oral Daily Tabatha Stokes DO   5 mg at 04/13/24 0857    folic acid tablet 1 mg  1 mg Oral Daily Yvette Saba MD   1 mg at 04/13/24 0857    gabapentin capsule 100 mg  100 mg Oral TID Tabatha Stokes DO   100 mg at 04/13/24 0857    glucagon (human recombinant) injection 1 mg  1 mg Intramuscular PRN Yvette Saba MD        glucose chewable tablet 16 g  16 g Oral PRN Yvette Saba MD        glucose chewable tablet 24 g  24 g Oral PRN Yvette Saba MD        hydrALAZINE injection 10 mg  10 mg Intravenous Q4H PRN Toyin Golden FNP   10 mg at 03/30/24 1518    hyoscyamine ODT tablet 0.125 mg  0.125 mg Sublingual TID PRN Yvette Saba MD        insulin aspart U-100 injection 0-5 Units  0-5 Units Subcutaneous QID (AC + HS) PRN Yvette Saba MD        labetaloL injection 10 mg  10 mg Intravenous Q4H PRN Robin Metcalf MD   10 mg at 04/01/24 0032    methocarbamoL tablet 750 mg  750 mg Oral TID PRN Wandy,  MD Yvette   750 mg at 04/12/24 2021    multivitamin tablet  1 tablet Oral Daily Robin Metcalf MD   1 tablet at 04/13/24 0857    nicotine 21 mg/24 hr 1 patch  1 patch Transdermal Daily Pedrito Arechiga MD   1 patch at 04/13/24 0857    pantoprazole EC tablet 40 mg  40 mg Oral BID Robin Metcalf MD   40 mg at 04/13/24 0857    rifAXIMin tablet 550 mg  550 mg Oral BID Robin Metcalf MD   550 mg at 04/13/24 0857    sodium bicarbonate tablet 650 mg  650 mg Oral Daily Robin Metcalf MD   650 mg at 04/13/24 0857    sodium chloride 0.9% flush 10 mL  10 mL Intravenous PRN Chaz Harrison MD        thiamine tablet 100 mg  100 mg Oral Q8H Evgeny Burk MD   100 mg at 04/13/24 0617    zinc oxide-cod liver oil 40 % paste   Topical (Top) Daily Robin Metcalf MD   Given at 04/13/24 0858        Assessment/Plan:    Acute metabolic encephalopathy at admission-improved   E coli UTI at admission s/p Rx  Alcoholic cirrhosis  ascites s/p paracentesis  Alcohol use  Thrombocytopenia due to above   Anemia of chronic disease  Hypoalbuminemia due to liver dysfunction   Hypertensive urgency at admission-resolved   Chronic depression  Tobacco use    Plan:  -mental status back to baseline.  Ambulating well.  Continue to monitor   - Continue supportive care, rifaximin.  Monitor platelet count while inpatient.  SCDs for DVT prophylaxis  -palliative team on board, appreciate assistance.  -home meds were reviewed and renewed.  Ensure regular bowel movements.  -Counseled on tobacco cessation, alcohol cessation.  Add nicotine patch.  -therapy as tolerated. currently awaiting SNF placement.    SCDs      Pedrito Arechiga MD

## 2024-04-14 LAB
POCT GLUCOSE: 106 MG/DL (ref 70–110)
POCT GLUCOSE: 123 MG/DL (ref 70–110)

## 2024-04-14 PROCEDURE — 25000003 PHARM REV CODE 250: Performed by: INTERNAL MEDICINE

## 2024-04-14 PROCEDURE — 25000003 PHARM REV CODE 250: Performed by: STUDENT IN AN ORGANIZED HEALTH CARE EDUCATION/TRAINING PROGRAM

## 2024-04-14 PROCEDURE — 11000001 HC ACUTE MED/SURG PRIVATE ROOM

## 2024-04-14 PROCEDURE — 21400001 HC TELEMETRY ROOM

## 2024-04-14 PROCEDURE — 63600175 PHARM REV CODE 636 W HCPCS: Performed by: INTERNAL MEDICINE

## 2024-04-14 PROCEDURE — S4991 NICOTINE PATCH NONLEGEND: HCPCS | Performed by: INTERNAL MEDICINE

## 2024-04-14 RX ADMIN — GABAPENTIN 100 MG: 100 CAPSULE ORAL at 08:04

## 2024-04-14 RX ADMIN — AMLODIPINE BESYLATE 5 MG: 5 TABLET ORAL at 09:04

## 2024-04-14 RX ADMIN — BUSPIRONE HYDROCHLORIDE 5 MG: 5 TABLET ORAL at 08:04

## 2024-04-14 RX ADMIN — THIAMINE HCL TAB 100 MG 100 MG: 100 TAB at 02:04

## 2024-04-14 RX ADMIN — GABAPENTIN 100 MG: 100 CAPSULE ORAL at 09:04

## 2024-04-14 RX ADMIN — THERA TABS 1 TABLET: TAB at 09:04

## 2024-04-14 RX ADMIN — ESCITALOPRAM OXALATE 5 MG: 5 TABLET, FILM COATED ORAL at 09:04

## 2024-04-14 RX ADMIN — PANTOPRAZOLE SODIUM 40 MG: 40 TABLET, DELAYED RELEASE ORAL at 09:04

## 2024-04-14 RX ADMIN — THIAMINE HCL TAB 100 MG 100 MG: 100 TAB at 08:04

## 2024-04-14 RX ADMIN — FOLIC ACID 1 MG: 1 TABLET ORAL at 09:04

## 2024-04-14 RX ADMIN — NICOTINE 1 PATCH: 21 PATCH, EXTENDED RELEASE TRANSDERMAL at 09:04

## 2024-04-14 RX ADMIN — RIFAXIMIN 550 MG: 550 TABLET ORAL at 09:04

## 2024-04-14 RX ADMIN — CARVEDILOL 6.25 MG: 3.12 TABLET, FILM COATED ORAL at 09:04

## 2024-04-14 RX ADMIN — BUSPIRONE HYDROCHLORIDE 5 MG: 5 TABLET ORAL at 09:04

## 2024-04-14 RX ADMIN — METHOCARBAMOL 750 MG: 750 TABLET ORAL at 07:04

## 2024-04-14 RX ADMIN — THIAMINE HCL TAB 100 MG 100 MG: 100 TAB at 05:04

## 2024-04-14 RX ADMIN — ENOXAPARIN SODIUM 40 MG: 40 INJECTION SUBCUTANEOUS at 05:04

## 2024-04-14 RX ADMIN — ARIPIPRAZOLE 2 MG: 2 TABLET ORAL at 09:04

## 2024-04-14 RX ADMIN — CARVEDILOL 6.25 MG: 3.12 TABLET, FILM COATED ORAL at 08:04

## 2024-04-14 RX ADMIN — SODIUM BICARBONATE 650 MG TABLET 650 MG: at 09:04

## 2024-04-14 RX ADMIN — GABAPENTIN 100 MG: 100 CAPSULE ORAL at 02:04

## 2024-04-14 RX ADMIN — PANTOPRAZOLE SODIUM 40 MG: 40 TABLET, DELAYED RELEASE ORAL at 08:04

## 2024-04-14 NOTE — PROGRESS NOTES
[facility] Hospital Medicine Progress Note        Chief Complaint: Inpatient Follow-up for     HPI:   64 y.o. female with a PMHx of HTN, HLD, type 2 DM, alcoholic cirrhosis, alcohol abuse and nicotine dependence who presented to [facility] on 3/20/2024 via EMS with c/o worsening confusion x1 day with associated generalized weakness.  The majority of the history was obtained from review of the medical record as the patient was unable to provide any additional history.     Upon presentation to ED, vital signed included /73, , RR 18, SpO2 98% on room air, temperature 98.4° F.  Labs notable for hemoglobin 10.7, hematocrit 31.7, platelets 34, vitamin B12 1958, INR 1.4, CO2 18, BUN 3.5, creatinine 0.5, calcium 7.9, , albumin 1.9, AST 43.  Serum alcohol level undetectable.  EKG demonstrated sinus tachycardia.  CXR demonstrated increased left retrocardiac density with partial silhouetting of the left hemidiaphragm which might be related to an infiltrate/atelectasis.  CT head without contrast negative for acute intracranial findings or significant interval changes compared to August 2022.  CT abdomen and pelvis with IV contrast consistent with cirrhosis, moderate generalized ascites, bilateral pleural fluid right greater than left noted.  She was admitted to hospital medicine service for further medical management.  EEG showed no seizure activity.  Ammonia was within normal limits.  MRI brain showed no acute abnormalities.  Neurology signed off and had no further recommendations.  Underwent paracentesis and fluids workup revealed cirrhotic etiology.  Rifaximin was continued.  Palliative team was consulted for goals of care discussions.  PT suggested placement.    Interval Hx:   No new issues.  Comfortably ambulating in the room.  She has no new complaints or concerns.  Hemodynamically stable.      Objective/physical exam:  Vitals:    04/13/24 1915 04/13/24 2310 04/14/24 0443  04/14/24 0824   BP: 109/67 125/74 117/70 131/73   Pulse: 90 90 79 104   Resp: 18  20    Temp: 98.1 °F (36.7 °C) 98 °F (36.7 °C)  97.9 °F (36.6 °C)   TempSrc: Oral Oral  Oral   SpO2: 97% 96% 96% 96%   Weight:       Height:         General: In no acute distress, afebrile  Respiratory: Clear to auscultation bilaterally  Cardiovascular: S1, S2, no appreciable murmur  Abdomen: Soft, nontender, BS +  MSK: Warm, no lower extremity edema, no clubbing or cyanosis  Neurologic: Alert and oriented x4, moving all extremities with good strength     Lab Results   Component Value Date     04/05/2024    K 4.1 04/08/2024    CO2 28 04/05/2024    BUN 21.2 (H) 04/05/2024    CREATININE 0.58 04/05/2024    CALCIUM 9.2 04/05/2024    EGFRNONAA >60 02/28/2022      Lab Results   Component Value Date    ALT 17 03/30/2024    AST 22 03/30/2024    ALKPHOS 123 03/30/2024    BILITOT 0.5 03/30/2024      Lab Results   Component Value Date    WBC 8.57 04/05/2024    HGB 10.8 (L) 04/05/2024    HCT 31.7 (L) 04/05/2024    .7 (H) 04/05/2024     (H) 04/05/2024           Medications:  Current Facility-Administered Medications   Medication Dose Route Frequency Provider Last Rate Last Admin    0.9%  NaCl infusion (for blood administration)   Intravenous Q24H PRN Chaz Harrison MD        albuterol inhaler 1 puff  1 puff Inhalation Q4H PRN Yvette Saba MD        amLODIPine tablet 5 mg  5 mg Oral Daily Robin Metcalf MD   5 mg at 04/13/24 0857    ARIPiprazole tablet 2 mg  2 mg Oral Daily Robin Metcalf MD   2 mg at 04/13/24 0857    busPIRone tablet 5 mg  5 mg Oral BID Tabatha Stokes DO   5 mg at 04/13/24 2025    carvediloL tablet 6.25 mg  6.25 mg Oral BID Robin Metcalf MD   6.25 mg at 04/13/24 2025    dextrose 10% bolus 125 mL 125 mL  12.5 g Intravenous PRN Yvette Saba MD   Stopped at 03/26/24 0125    dextrose 10% bolus 250 mL 250 mL  25 g Intravenous PRN Yvette Saba MD        enoxaparin injection 40 mg   40 mg Subcutaneous Q24H (prophylaxis, 1700) Yvette Saba MD   40 mg at 04/13/24 1723    EScitalopram oxalate tablet 5 mg  5 mg Oral Daily Tabatha Stokes, DO   5 mg at 04/13/24 0857    folic acid tablet 1 mg  1 mg Oral Daily Yvette Saba MD   1 mg at 04/13/24 0857    gabapentin capsule 100 mg  100 mg Oral TID Tabatha Stokes, DO   100 mg at 04/13/24 2025    glucagon (human recombinant) injection 1 mg  1 mg Intramuscular PRN Yvette Saba MD        glucose chewable tablet 16 g  16 g Oral PRN Yvette Saba MD        glucose chewable tablet 24 g  24 g Oral PRN Yvette Saba MD        hydrALAZINE injection 10 mg  10 mg Intravenous Q4H PRN Toyin Golden FNP   10 mg at 03/30/24 1518    hyoscyamine ODT tablet 0.125 mg  0.125 mg Sublingual TID PRN Yvette Saba MD        insulin aspart U-100 injection 0-5 Units  0-5 Units Subcutaneous QID (AC + HS) PRN Yvette Saba MD        labetaloL injection 10 mg  10 mg Intravenous Q4H PRN Robin Metcalf MD   10 mg at 04/01/24 0032    methocarbamoL tablet 750 mg  750 mg Oral TID PRN Yvette Saba MD   750 mg at 04/13/24 2027    multivitamin tablet  1 tablet Oral Daily Robin Metcalf MD   1 tablet at 04/13/24 0857    nicotine 21 mg/24 hr 1 patch  1 patch Transdermal Daily Pedrito Arechiga MD   1 patch at 04/13/24 0857    pantoprazole EC tablet 40 mg  40 mg Oral BID Robin Metcalf MD   40 mg at 04/13/24 2025    rifAXIMin tablet 550 mg  550 mg Oral BID Robin Metcalf MD   550 mg at 04/13/24 2025    sodium bicarbonate tablet 650 mg  650 mg Oral Daily Robin Metcalf MD   650 mg at 04/13/24 0857    sodium chloride 0.9% flush 10 mL  10 mL Intravenous PRN Chaz Harrison MD        thiamine tablet 100 mg  100 mg Oral Q8H Evgeny Burk MD   100 mg at 04/14/24 0520    zinc oxide-cod liver oil 40 % paste   Topical (Top) Daily Robin Metcalf MD   Given at 04/13/24 0823      Current Facility-Administered  Medications   Medication Dose Route Frequency Provider Last Rate Last Admin    0.9%  NaCl infusion (for blood administration)   Intravenous Q24H PRN Chaz Harrison MD        albuterol inhaler 1 puff  1 puff Inhalation Q4H PRN Yvette Saba MD        amLODIPine tablet 5 mg  5 mg Oral Daily Robin Metcalf MD   5 mg at 04/13/24 0857    ARIPiprazole tablet 2 mg  2 mg Oral Daily Robin Metclaf MD   2 mg at 04/13/24 0857    busPIRone tablet 5 mg  5 mg Oral BID Tabatha Stokes DO   5 mg at 04/13/24 2025    carvediloL tablet 6.25 mg  6.25 mg Oral BID Robin Metcalf MD   6.25 mg at 04/13/24 2025    dextrose 10% bolus 125 mL 125 mL  12.5 g Intravenous PRN Yvette Saba MD   Stopped at 03/26/24 0125    dextrose 10% bolus 250 mL 250 mL  25 g Intravenous PRN Yvette Saba MD        enoxaparin injection 40 mg  40 mg Subcutaneous Q24H (prophylaxis, 1700) Yvette Saba MD   40 mg at 04/13/24 1723    EScitalopram oxalate tablet 5 mg  5 mg Oral Daily Tabatha Stokes DO   5 mg at 04/13/24 0857    folic acid tablet 1 mg  1 mg Oral Daily Yvette Saba MD   1 mg at 04/13/24 0857    gabapentin capsule 100 mg  100 mg Oral TID Tabatha Stokes DO   100 mg at 04/13/24 2025    glucagon (human recombinant) injection 1 mg  1 mg Intramuscular PRN Yvette Saba MD        glucose chewable tablet 16 g  16 g Oral PRN Yvette Saba MD        glucose chewable tablet 24 g  24 g Oral PRN Yvette Saba MD        hydrALAZINE injection 10 mg  10 mg Intravenous Q4H PRN Toyin Golden FNP   10 mg at 03/30/24 1518    hyoscyamine ODT tablet 0.125 mg  0.125 mg Sublingual TID PRN Yvette Saba MD        insulin aspart U-100 injection 0-5 Units  0-5 Units Subcutaneous QID (AC + HS) PRN Yvette Saba MD        labetaloL injection 10 mg  10 mg Intravenous Q4H PRN Robin Metcalf MD   10 mg at 04/01/24 0032    methocarbamoL tablet 750 mg  750 mg Oral TID PRN Yvette Saba MD   750 mg at 04/13/24 2027     multivitamin tablet  1 tablet Oral Daily Robin Metcalf MD   1 tablet at 04/13/24 0857    nicotine 21 mg/24 hr 1 patch  1 patch Transdermal Daily Pedrito Arechiga MD   1 patch at 04/13/24 0857    pantoprazole EC tablet 40 mg  40 mg Oral BID Robin Metcalf MD   40 mg at 04/13/24 2025    rifAXIMin tablet 550 mg  550 mg Oral BID Robin Metcalf MD   550 mg at 04/13/24 2025    sodium bicarbonate tablet 650 mg  650 mg Oral Daily Robin Metcalf MD   650 mg at 04/13/24 0857    sodium chloride 0.9% flush 10 mL  10 mL Intravenous PRN Chaz Harrison MD        thiamine tablet 100 mg  100 mg Oral Q8H Evgeny Burk MD   100 mg at 04/14/24 0520    zinc oxide-cod liver oil 40 % paste   Topical (Top) Daily Robin Metcalf MD   Given at 04/13/24 0858        Assessment/Plan:    Acute metabolic encephalopathy at admission-improved   E coli UTI at admission s/p Rx  Alcoholic cirrhosis  ascites s/p paracentesis  Alcohol use  Thrombocytopenia due to above   Anemia of chronic disease  Hypoalbuminemia due to liver dysfunction   Hypertensive urgency at admission-resolved   Chronic depression  Tobacco use    Plan:  -mental status back to baseline.  Ambulating well.  Continue to monitor   - Continue supportive care, rifaximin.  Monitor platelet count while inpatient.  SCDs for DVT prophylaxis  -palliative team on board, appreciate assistance.  -home meds were reviewed and renewed.  Ensure regular bowel movements.  -Counseled on tobacco cessation, alcohol cessation.  Add nicotine patch.  -therapy as tolerated. currently awaiting SNF placement.    SCDs      Pedrito Arechiga MD

## 2024-04-15 LAB — POCT GLUCOSE: 110 MG/DL (ref 70–110)

## 2024-04-15 PROCEDURE — 25000003 PHARM REV CODE 250: Performed by: INTERNAL MEDICINE

## 2024-04-15 PROCEDURE — 25000003 PHARM REV CODE 250: Performed by: STUDENT IN AN ORGANIZED HEALTH CARE EDUCATION/TRAINING PROGRAM

## 2024-04-15 PROCEDURE — 97116 GAIT TRAINING THERAPY: CPT

## 2024-04-15 PROCEDURE — 63600175 PHARM REV CODE 636 W HCPCS: Performed by: INTERNAL MEDICINE

## 2024-04-15 PROCEDURE — S4991 NICOTINE PATCH NONLEGEND: HCPCS | Performed by: INTERNAL MEDICINE

## 2024-04-15 PROCEDURE — 21400001 HC TELEMETRY ROOM

## 2024-04-15 PROCEDURE — 11000001 HC ACUTE MED/SURG PRIVATE ROOM

## 2024-04-15 RX ADMIN — BUSPIRONE HYDROCHLORIDE 5 MG: 5 TABLET ORAL at 08:04

## 2024-04-15 RX ADMIN — ENOXAPARIN SODIUM 40 MG: 40 INJECTION SUBCUTANEOUS at 04:04

## 2024-04-15 RX ADMIN — METHOCARBAMOL 750 MG: 750 TABLET ORAL at 04:04

## 2024-04-15 RX ADMIN — AMLODIPINE BESYLATE 5 MG: 5 TABLET ORAL at 08:04

## 2024-04-15 RX ADMIN — THIAMINE HCL TAB 100 MG 100 MG: 100 TAB at 02:04

## 2024-04-15 RX ADMIN — CARVEDILOL 6.25 MG: 3.12 TABLET, FILM COATED ORAL at 08:04

## 2024-04-15 RX ADMIN — GABAPENTIN 100 MG: 100 CAPSULE ORAL at 08:04

## 2024-04-15 RX ADMIN — PANTOPRAZOLE SODIUM 40 MG: 40 TABLET, DELAYED RELEASE ORAL at 08:04

## 2024-04-15 RX ADMIN — THERA TABS 1 TABLET: TAB at 08:04

## 2024-04-15 RX ADMIN — THIAMINE HCL TAB 100 MG 100 MG: 100 TAB at 08:04

## 2024-04-15 RX ADMIN — RIFAXIMIN 550 MG: 550 TABLET ORAL at 08:04

## 2024-04-15 RX ADMIN — THIAMINE HCL TAB 100 MG 100 MG: 100 TAB at 04:04

## 2024-04-15 RX ADMIN — GABAPENTIN 100 MG: 100 CAPSULE ORAL at 02:04

## 2024-04-15 RX ADMIN — METHOCARBAMOL 750 MG: 750 TABLET ORAL at 02:04

## 2024-04-15 RX ADMIN — METHOCARBAMOL 750 MG: 750 TABLET ORAL at 08:04

## 2024-04-15 RX ADMIN — ARIPIPRAZOLE 2 MG: 2 TABLET ORAL at 08:04

## 2024-04-15 RX ADMIN — NICOTINE 1 PATCH: 21 PATCH, EXTENDED RELEASE TRANSDERMAL at 08:04

## 2024-04-15 RX ADMIN — FOLIC ACID 1 MG: 1 TABLET ORAL at 08:04

## 2024-04-15 RX ADMIN — SODIUM BICARBONATE 650 MG TABLET 650 MG: at 08:04

## 2024-04-15 RX ADMIN — ESCITALOPRAM OXALATE 5 MG: 5 TABLET, FILM COATED ORAL at 08:04

## 2024-04-15 NOTE — PROGRESS NOTES
[facility] - Neurology  Wound Care    Patient Name:  Luz Maria Alfaro   MRN:  [id number]  Date: 04/12/2024  Diagnosis: <principal problem not specified>    History:     Past Medical History:   Diagnosis Date    Benign essential HTN     Mixed hyperlipidemia     Type 2 diabetes mellitus without complications        Social History     Socioeconomic History    Marital status:    Tobacco Use    Smoking status: Every Day     Current packs/day: 0.50     Types: Cigarettes    Smokeless tobacco: Current   Substance and Sexual Activity    Alcohol use: Yes    Drug use: Never    Sexual activity: Not Currently     Social Determinants of Health     Financial Resource Strain: Low Risk  (3/20/2024)    Overall Financial Resource Strain (CARDIA)     Difficulty of Paying Living Expenses: Not very hard   Food Insecurity: Unknown (3/20/2024)    Hunger Vital Sign     Ran Out of Food in the Last Year: Never true   Transportation Needs: No Transportation Needs (3/20/2024)    PRAPARE - Transportation     Lack of Transportation (Medical): No     Lack of Transportation (Non-Medical): No   Physical Activity: Inactive (3/20/2024)    Exercise Vital Sign     Days of Exercise per Week: 0 days     Minutes of Exercise per Session: 0 min   Stress: Stress Concern Present (3/20/2024)    [employer] [place] [employer] - Occupational Stress Questionnaire     Feeling of Stress : To some extent   Social Connections: Socially Isolated (3/20/2024)    Social Connection and Isolation Panel [NHANES]     Frequency of Communication with Friends and Family: More than three times a week     Frequency of Social Gatherings with Friends and Family: More than three times a week     Attends Anab[employer]ist Services: Never     Active Member of Clubs or Organizations: No     Attends Club or Organization Meetings: Never     Marital Status:    Housing Stability: Low Risk  (3/20/2024)    Housing Stability Vital Sign     Unable to Pay for  Housing in the Last Year: No     Number of Places Lived in the Last Year: 1     Unstable Housing in the Last Year: No       Precautions:     Allergies as of 03/20/2024 - Reviewed 03/20/2024   Allergen Reaction Noted    Meperidine Rash and Hives 08/12/2021       WO Assessment Details/Treatment        04/12/24 1052   WOCN Assessment   Visit Date 04/12/24   Visit Time 1052   Consult Type Follow Up   WOCN Speciality Wound   Intervention changed   Teaching on-going   Skin Interventions   Device Skin Pressure Protection absorbent pad utilized/changed   Pressure Reduction Devices specialty bed utilized        Altered Skin Integrity 03/26/24 1000 Right medial Buttocks Other (comment)   Date First Assessed/Time First Assessed: 03/26/24 1000   Altered Skin Integrity Present on Admission - Did Patient arrive to the hospital with altered skin?: yes  Side: Right  Orientation: medial  Location: Buttocks  Is this injury device related?: (c...   Wound Image    Dressing Appearance Open to air   Drainage Amount None   Drainage Characteristics/Odor No odor   Appearance Moist   Tissue loss description Not applicable   Periwound Area Redness;Moist   Wound Edges Irregular   Care Cleansed with:;Wound cleanser   Dressing Applied        Altered Skin Integrity 03/27/24 2252 Left dorsal Elbow   Date First Assessed/Time First Assessed: 03/27/24 2252   Side: Left  Orientation: dorsal  Location: Elbow   Wound Image    Dressing Appearance Dry;Intact;Clean   Drainage Amount None   Appearance Intact   Tissue loss description Not applicable   Wound Length (cm) 6 cm   Wound Width (cm) 0.4 cm   Wound Depth (cm) 0.1 cm   Wound Volume (cm^3) 0.24 cm^3   Wound Surface Area (cm^2) 2.4 cm^2   Care Cleansed with:;Sterile normal saline   Dressing Changed        Altered Skin Integrity 03/27/24 2254 Right dorsal Elbow   Date First Assessed/Time First Assessed: 03/27/24 2254   Side: Right  Orientation: dorsal  Location: Elbow   Wound Image    Dressing  Appearance Open to air   Drainage Amount None   Appearance Intact;Pink   Tissue loss description Not applicable   Periwound Area Intact   Care Cleansed with:;Sterile normal saline   Dressing Applied        Altered Skin Integrity 03/27/24 2303 Left anterior Ankle   Date First Assessed/Time First Assessed: 03/27/24 2303   Side: Left  Orientation: anterior  Location: Ankle   Wound Image    Dressing Appearance Open to air   Drainage Amount None   Appearance Intact   Tissue loss description Not applicable   Periwound Area Intact   Wound Length (cm) 0 cm   Wound Width (cm) 0 cm   Wound Depth (cm) 0 cm   Wound Volume (cm^3) 0 cm^3   Wound Surface Area (cm^2) 0 cm^2   Care Cleansed with:;Sterile normal saline   Dressing Applied        Altered Skin Integrity 03/27/24 2304 Right anterior Ankle   Date First Assessed/Time First Assessed: 03/27/24 2304   Side: Right  Orientation: anterior  Location: Ankle   Wound Image    Dressing Appearance Dry;Intact;Clean   Drainage Amount None   Drainage Characteristics/Odor No odor   Appearance Red   Tissue loss description Not applicable   Periwound Area Intact;Dry   Wound Edges Irregular   Care Cleansed with:;Sterile normal saline   Dressing Applied     Woundcare follow up for Buttocks, left right elbow, left and right lateral ankles. no family at bedside, sitting in chair. Continue with current Recommendations: Sacrum: Apply Desitin BID and PRN, Bilateral ankle and elbows: Cleanse with NS. Apply mepilex bordered foam. Change every 3 days and PRN. Heel lift boots on while in bed.Nursing to continue with turning every two hours, wedge, and floating heels. Repositioned, head of bed elevated, bed in lowest position. On DAV mattress. Will follow up.     04/12/2024

## 2024-04-15 NOTE — PROGRESS NOTES
[facility] Hospital Medicine Progress Note        Chief Complaint: Inpatient Follow-up for     HPI:   64 y.o. female with a PMHx of HTN, HLD, type 2 DM, alcoholic cirrhosis, alcohol abuse and nicotine dependence who presented to [facility] on 3/20/2024 via EMS with c/o worsening confusion x1 day with associated generalized weakness.  The majority of the history was obtained from review of the medical record as the patient was unable to provide any additional history.     Upon presentation to ED, vital signed included /73, , RR 18, SpO2 98% on room air, temperature 98.4° F.  Labs notable for hemoglobin 10.7, hematocrit 31.7, platelets 34, vitamin B12 1958, INR 1.4, CO2 18, BUN 3.5, creatinine 0.5, calcium 7.9, , albumin 1.9, AST 43.  Serum alcohol level undetectable.  EKG demonstrated sinus tachycardia.  CXR demonstrated increased left retrocardiac density with partial silhouetting of the left hemidiaphragm which might be related to an infiltrate/atelectasis.  CT head without contrast negative for acute intracranial findings or significant interval changes compared to August 2022.  CT abdomen and pelvis with IV contrast consistent with cirrhosis, moderate generalized ascites, bilateral pleural fluid right greater than left noted.  She was admitted to hospital medicine service for further medical management.  EEG showed no seizure activity.  Ammonia was within normal limits.  MRI brain showed no acute abnormalities.  Neurology signed off and had no further recommendations.  Underwent paracentesis and fluids workup revealed cirrhotic etiology.  Rifaximin was continued.  Palliative team was consulted for goals of care discussions.  PT suggested placement.    Interval Hx:   Comfortably resting in the bed.  No new complaints or concerns.  Tolerating p.o..  Awaiting placement.    Objective/physical exam:  Vitals:    04/14/24 2314 04/15/24 0400 04/15/24 0825 04/15/24 0926   BP:  123/79 119/66 119/66 108/66   Pulse: 89 83  87   Resp: 16   18   Temp: 97.9 °F (36.6 °C) 98.3 °F (36.8 °C)  98 °F (36.7 °C)   TempSrc: Oral Oral  Oral   SpO2: 98% (!) 94%  97%   Weight:       Height:         General: In no acute distress, afebrile  Respiratory: Clear to auscultation bilaterally  Cardiovascular: S1, S2, no appreciable murmur  Abdomen: Soft, nontender, BS +  MSK: Warm, no lower extremity edema, no clubbing or cyanosis  Neurologic: Alert and oriented x4, moving all extremities with good strength     Lab Results   Component Value Date     04/05/2024    K 4.1 04/08/2024    CO2 28 04/05/2024    BUN 21.2 (H) 04/05/2024    CREATININE 0.58 04/05/2024    CALCIUM 9.2 04/05/2024    EGFRNONAA >60 02/28/2022      Lab Results   Component Value Date    ALT 17 03/30/2024    AST 22 03/30/2024    ALKPHOS 123 03/30/2024    BILITOT 0.5 03/30/2024      Lab Results   Component Value Date    WBC 8.57 04/05/2024    HGB 10.8 (L) 04/05/2024    HCT 31.7 (L) 04/05/2024    .7 (H) 04/05/2024     (H) 04/05/2024           Medications:  Current Facility-Administered Medications   Medication Dose Route Frequency Provider Last Rate Last Admin    0.9%  NaCl infusion (for blood administration)   Intravenous Q24H PRN Chaz Harrison MD        albuterol inhaler 1 puff  1 puff Inhalation Q4H PRN Yvette Saba MD        amLODIPine tablet 5 mg  5 mg Oral Daily Robin Metcalf MD   5 mg at 04/15/24 0825    ARIPiprazole tablet 2 mg  2 mg Oral Daily Robin Metcalf MD   2 mg at 04/15/24 0826    busPIRone tablet 5 mg  5 mg Oral BID Tabatha Stokes DO   5 mg at 04/15/24 0825    carvediloL tablet 6.25 mg  6.25 mg Oral BID Robin Metcalf MD   6.25 mg at 04/15/24 0825    dextrose 10% bolus 125 mL 125 mL  12.5 g Intravenous PRN Yvette Saba MD   Stopped at 03/26/24 0125    dextrose 10% bolus 250 mL 250 mL  25 g Intravenous PRN Yvette Saba MD        enoxaparin injection 40 mg  40 mg Subcutaneous  Q24H (prophylaxis, 1700) Yvette Saba MD   40 mg at 04/14/24 1715    EScitalopram oxalate tablet 5 mg  5 mg Oral Daily Tabatha Stokes, DO   5 mg at 04/15/24 0827    folic acid tablet 1 mg  1 mg Oral Daily Yvette Saba MD   1 mg at 04/15/24 0826    gabapentin capsule 100 mg  100 mg Oral TID Tabatha Stokes, DO   100 mg at 04/15/24 0825    glucagon (human recombinant) injection 1 mg  1 mg Intramuscular PRN Yvette Saba MD        glucose chewable tablet 16 g  16 g Oral PRN Yvette Saba MD        glucose chewable tablet 24 g  24 g Oral PRN Yvette Saba MD        hydrALAZINE injection 10 mg  10 mg Intravenous Q4H PRN Toyin Golden FNP   10 mg at 03/30/24 1518    hyoscyamine ODT tablet 0.125 mg  0.125 mg Sublingual TID PRN Yvette Saba MD        insulin aspart U-100 injection 0-5 Units  0-5 Units Subcutaneous QID (AC + HS) PRN Yvette Saba MD        labetaloL injection 10 mg  10 mg Intravenous Q4H PRN Robin Metcalf MD   10 mg at 04/01/24 0032    methocarbamoL tablet 750 mg  750 mg Oral TID PRN Yvette Saba MD   750 mg at 04/15/24 0420    multivitamin tablet  1 tablet Oral Daily Robin Metcalf MD   1 tablet at 04/15/24 0826    nicotine 21 mg/24 hr 1 patch  1 patch Transdermal Daily Pedrito Arechiga MD   1 patch at 04/15/24 0825    pantoprazole EC tablet 40 mg  40 mg Oral BID Robin Metcalf MD   40 mg at 04/15/24 0826    rifAXIMin tablet 550 mg  550 mg Oral BID Robin Metcalf MD   550 mg at 04/15/24 0827    sodium bicarbonate tablet 650 mg  650 mg Oral Daily Robin Metcalf MD   650 mg at 04/15/24 0825    sodium chloride 0.9% flush 10 mL  10 mL Intravenous PRN Chaz Harrison MD        thiamine tablet 100 mg  100 mg Oral Q8H Evgeny Burk MD   100 mg at 04/15/24 0420    zinc oxide-cod liver oil 40 % paste   Topical (Top) Daily Robin Metcalf MD   Given at 04/13/24 0879      Current Facility-Administered Medications   Medication Dose  Route Frequency Provider Last Rate Last Admin    0.9%  NaCl infusion (for blood administration)   Intravenous Q24H PRN Chaz Harrison MD        albuterol inhaler 1 puff  1 puff Inhalation Q4H PRN Yvette Saba MD        amLODIPine tablet 5 mg  5 mg Oral Daily Robin Metcalf MD   5 mg at 04/15/24 0825    ARIPiprazole tablet 2 mg  2 mg Oral Daily Robin Metcalf MD   2 mg at 04/15/24 0826    busPIRone tablet 5 mg  5 mg Oral BID Tabatha Stokes DO   5 mg at 04/15/24 0825    carvediloL tablet 6.25 mg  6.25 mg Oral BID Robin Metcalf MD   6.25 mg at 04/15/24 0825    dextrose 10% bolus 125 mL 125 mL  12.5 g Intravenous PRN Yvette Saba MD   Stopped at 03/26/24 0125    dextrose 10% bolus 250 mL 250 mL  25 g Intravenous PRN Yvette Saba MD        enoxaparin injection 40 mg  40 mg Subcutaneous Q24H (prophylaxis, 1700) Yvette Saba MD   40 mg at 04/14/24 1715    EScitalopram oxalate tablet 5 mg  5 mg Oral Daily Tabatha Stokes DO   5 mg at 04/15/24 0827    folic acid tablet 1 mg  1 mg Oral Daily Yvette Saba MD   1 mg at 04/15/24 0826    gabapentin capsule 100 mg  100 mg Oral TID Tabatha Stokes DO   100 mg at 04/15/24 0825    glucagon (human recombinant) injection 1 mg  1 mg Intramuscular PRN Yvette Saba MD        glucose chewable tablet 16 g  16 g Oral PRN Yvette Saba MD        glucose chewable tablet 24 g  24 g Oral PRN Yvette Saba MD        hydrALAZINE injection 10 mg  10 mg Intravenous Q4H PRN Toyin Golden FNP   10 mg at 03/30/24 1518    hyoscyamine ODT tablet 0.125 mg  0.125 mg Sublingual TID PRN Yvette Saba MD        insulin aspart U-100 injection 0-5 Units  0-5 Units Subcutaneous QID (AC + HS) PRN Yvette Saba MD        labetaloL injection 10 mg  10 mg Intravenous Q4H PRN Robin Metcalf MD   10 mg at 04/01/24 0032    methocarbamoL tablet 750 mg  750 mg Oral TID PRN Yvette Saba MD   750 mg at 04/15/24 0420    multivitamin tablet  1  tablet Oral Daily Robin Metcalf MD   1 tablet at 04/15/24 0826    nicotine 21 mg/24 hr 1 patch  1 patch Transdermal Daily Pedrito Arechiga MD   1 patch at 04/15/24 0825    pantoprazole EC tablet 40 mg  40 mg Oral BID Robin Metcalf MD   40 mg at 04/15/24 0826    rifAXIMin tablet 550 mg  550 mg Oral BID Robin Metcalf MD   550 mg at 04/15/24 0827    sodium bicarbonate tablet 650 mg  650 mg Oral Daily Robin Metcalf MD   650 mg at 04/15/24 0825    sodium chloride 0.9% flush 10 mL  10 mL Intravenous PRN Chaz Harrison MD        thiamine tablet 100 mg  100 mg Oral Q8H Evgeny Burk MD   100 mg at 04/15/24 0420    zinc oxide-cod liver oil 40 % paste   Topical (Top) Daily Robin Metcalf MD   Given at 04/13/24 0858        Assessment/Plan:    Acute metabolic encephalopathy at admission-improved   E coli UTI at admission s/p Rx  Alcoholic cirrhosis  ascites s/p paracentesis  Alcohol use  Thrombocytopenia due to above   Anemia of chronic disease  Hypoalbuminemia due to liver dysfunction   Hypertensive urgency at admission-resolved   Chronic depression  Tobacco use    Plan:  -mental status back to baseline.  Ambulating well.  Continue to monitor   - Continue supportive care, rifaximin.  Monitor platelet count while inpatient.  SCDs for DVT prophylaxis  -palliative team on board, appreciate assistance.  -home meds were reviewed and renewed.  Ensure regular bowel movements.  -Counseled on tobacco cessation, alcohol cessation.  Add nicotine patch.  -therapy as tolerated. currently awaiting SNF placement.    SCDs      Pedrito Arechiga MD

## 2024-04-15 NOTE — PT/OT/SLP PROGRESS
Physical Therapy Treatment    Patient Name:  Luz Maria Alfaro   MRN:  [id number]    Recommendations:     Discharge therapy intensity: Moderate Intensity Therapy   Discharge Equipment Recommendations: walker, rolling  Barriers to discharge: Decreased caregiver support and impaired safety awareness, placement    Assessment:     Luz Maria Alfaro is a 64 y.o. female admitted with a medical diagnosis of acute encephalopathy, suspected metabolic form UTI vs. Alcohol withdrawal; metabolic acidosis, alcoholic cirrhosis, ascites, anemia.  She presents with the following impairments/functional limitations: impaired endurance, gait instability, decreased safety awareness. Pt with improved overall strength and endurance however poor insight into deficits results in high fall risk. She is impulsive when moving and requires constant cues for safety throughout session. Due to limited home support pt will require placement until further improvements in functional independence.    Rehab Prognosis: Good; patient would benefit from acute skilled PT services to address these deficits and reach maximum level of function.    Recent Surgery: * No surgery found *      Plan:     During this hospitalization, patient to be seen 3 x/week to address the identified rehab impairments via gait training, therapeutic activities, therapeutic exercises and progress toward the following goals:    Plan of Care Expires:  04/21/24    Subjective     Chief Complaint: I need to use the restroom  Patient/Family Comments/goals:   Pain/Comfort:  Pain Rating 1: 0/10  Pain Rating Post-Intervention 1: 0/10      Objective:     Communicated with nursing prior to session.  Patient found supine with telemetry upon PT entry to room.     General Precautions: Standard, aspiration, fall  Orthopedic Precautions: N/A  Braces: N/A  Respiratory Status: Room air  Blood Pressure: NT    Functional Mobility:  Bed Mobility:     Supine to Sit: supervision  Sit to Supine:  supervision  Transfers:     Sit to Stand:  supervision with verbal cues for proper use of RW. PT demonstrated difference in pt stability with use of AD vs without  CGA without RW  Gait: 220ft with RW SBA. Step through gait pattern. Mild deviation from path with dynamic challenges.  Verbal cues to remain on task required.    Therapeutic Activities/Exercises:  Pt declined to remain out of bed due to increased back pain when sitting in chair.    Education:  Patient provided with verbal education education regarding PT role/goals/POC, fall prevention, and safety awareness.  Understanding was verbalized, however additional teaching warranted.     Patient left HOB elevated with all lines intact, call button in reach, and nurse notified    GOALS:   Multidisciplinary Problems       Physical Therapy Goals          Problem: Physical Therapy    Goal Priority Disciplines Outcome Goal Variances Interventions   Physical Therapy Goal     PT, PT/OT Ongoing, Progressing     Description: Goals to be met by: 24     Patient will increase functional independence with mobility by performin. Supine to sit with MInimal Assistance- MET  2. Sit to stand transfer with Minimal Assistance- MET  3. Bed to chair transfer with Minimal Assistance using Rolling Walker MET  4. Gait  x 150 feet with Minimal Assistance using Rolling Walker. MET    Updated goals 4/10/24:  5. Pt will be independent with bed mobility.  6. Pt will be independent with transfers with RW.  7. Pt will ambulate 250ft with RW with modified independence.  8. Pt will score low falls risk on TUG test.                         Time Tracking:     PT Received On: 04/15/24  PT Start Time: 1400     PT Stop Time: 1415  PT Total Time (min): 15 min     Billable Minutes: Gait Training 15    Treatment Type: Treatment  PT/PTA: PT     Number of PTA visits since last PT visit: 1     04/15/2024

## 2024-04-15 NOTE — PLAN OF CARE
Updates sent to [place] for review. Awaiting review of financials and final determination of acceptance.

## 2024-04-16 LAB
CREAT SERPL-MCNC: 0.62 MG/DL (ref 0.55–1.02)
GFR SERPLBLD CREATININE-BSD FMLA CKD-EPI: >60 MLS/MIN/1.73/M2
POCT GLUCOSE: 126 MG/DL (ref 70–110)
POCT GLUCOSE: 138 MG/DL (ref 70–110)
POCT GLUCOSE: 95 MG/DL (ref 70–110)
POCT GLUCOSE: 96 MG/DL (ref 70–110)

## 2024-04-16 PROCEDURE — 25000003 PHARM REV CODE 250: Performed by: INTERNAL MEDICINE

## 2024-04-16 PROCEDURE — 97530 THERAPEUTIC ACTIVITIES: CPT | Mod: CO

## 2024-04-16 PROCEDURE — 97535 SELF CARE MNGMENT TRAINING: CPT | Mod: CO

## 2024-04-16 PROCEDURE — 25000003 PHARM REV CODE 250: Performed by: STUDENT IN AN ORGANIZED HEALTH CARE EDUCATION/TRAINING PROGRAM

## 2024-04-16 PROCEDURE — 82565 ASSAY OF CREATININE: CPT | Performed by: INTERNAL MEDICINE

## 2024-04-16 PROCEDURE — S4991 NICOTINE PATCH NONLEGEND: HCPCS | Performed by: INTERNAL MEDICINE

## 2024-04-16 PROCEDURE — 25000003 PHARM REV CODE 250: Performed by: NURSE PRACTITIONER

## 2024-04-16 PROCEDURE — 11000001 HC ACUTE MED/SURG PRIVATE ROOM

## 2024-04-16 PROCEDURE — 97530 THERAPEUTIC ACTIVITIES: CPT

## 2024-04-16 PROCEDURE — 63600175 PHARM REV CODE 636 W HCPCS: Performed by: INTERNAL MEDICINE

## 2024-04-16 RX ORDER — OXYCODONE HYDROCHLORIDE 5 MG/1
5 TABLET ORAL EVERY 8 HOURS PRN
Status: DISCONTINUED | OUTPATIENT
Start: 2024-04-16 | End: 2024-04-17 | Stop reason: HOSPADM

## 2024-04-16 RX ADMIN — THERA TABS 1 TABLET: TAB at 09:04

## 2024-04-16 RX ADMIN — METHOCARBAMOL 750 MG: 750 TABLET ORAL at 02:04

## 2024-04-16 RX ADMIN — OXYCODONE HYDROCHLORIDE 5 MG: 5 TABLET ORAL at 01:04

## 2024-04-16 RX ADMIN — FOLIC ACID 1 MG: 1 TABLET ORAL at 09:04

## 2024-04-16 RX ADMIN — AMLODIPINE BESYLATE 5 MG: 5 TABLET ORAL at 09:04

## 2024-04-16 RX ADMIN — THIAMINE HCL TAB 100 MG 100 MG: 100 TAB at 02:04

## 2024-04-16 RX ADMIN — BUSPIRONE HYDROCHLORIDE 5 MG: 5 TABLET ORAL at 09:04

## 2024-04-16 RX ADMIN — PANTOPRAZOLE SODIUM 40 MG: 40 TABLET, DELAYED RELEASE ORAL at 09:04

## 2024-04-16 RX ADMIN — SODIUM BICARBONATE 650 MG TABLET 650 MG: at 09:04

## 2024-04-16 RX ADMIN — GABAPENTIN 100 MG: 100 CAPSULE ORAL at 08:04

## 2024-04-16 RX ADMIN — GABAPENTIN 100 MG: 100 CAPSULE ORAL at 02:04

## 2024-04-16 RX ADMIN — RIFAXIMIN 550 MG: 550 TABLET ORAL at 08:04

## 2024-04-16 RX ADMIN — GABAPENTIN 100 MG: 100 CAPSULE ORAL at 09:04

## 2024-04-16 RX ADMIN — BUSPIRONE HYDROCHLORIDE 5 MG: 5 TABLET ORAL at 08:04

## 2024-04-16 RX ADMIN — PANTOPRAZOLE SODIUM 40 MG: 40 TABLET, DELAYED RELEASE ORAL at 08:04

## 2024-04-16 RX ADMIN — OXYCODONE HYDROCHLORIDE 5 MG: 5 TABLET ORAL at 08:04

## 2024-04-16 RX ADMIN — ARIPIPRAZOLE 2 MG: 2 TABLET ORAL at 09:04

## 2024-04-16 RX ADMIN — CARVEDILOL 6.25 MG: 3.12 TABLET, FILM COATED ORAL at 08:04

## 2024-04-16 RX ADMIN — ESCITALOPRAM OXALATE 5 MG: 5 TABLET, FILM COATED ORAL at 09:04

## 2024-04-16 RX ADMIN — RIFAXIMIN 550 MG: 550 TABLET ORAL at 09:04

## 2024-04-16 RX ADMIN — Medication: at 12:04

## 2024-04-16 RX ADMIN — THIAMINE HCL TAB 100 MG 100 MG: 100 TAB at 05:04

## 2024-04-16 RX ADMIN — CARVEDILOL 6.25 MG: 3.12 TABLET, FILM COATED ORAL at 09:04

## 2024-04-16 RX ADMIN — ENOXAPARIN SODIUM 40 MG: 40 INJECTION SUBCUTANEOUS at 04:04

## 2024-04-16 RX ADMIN — NICOTINE 1 PATCH: 21 PATCH, EXTENDED RELEASE TRANSDERMAL at 10:04

## 2024-04-16 RX ADMIN — THIAMINE HCL TAB 100 MG 100 MG: 100 TAB at 10:04

## 2024-04-16 NOTE — PROGRESS NOTES
[facility] Hospital Medicine Progress Note        Chief Complaint: Inpatient Follow-up for     HPI:   64 y.o. female with a PMHx of HTN, HLD, type 2 DM, alcoholic cirrhosis, alcohol abuse and nicotine dependence who presented to [facility] on 3/20/2024 via EMS with c/o worsening confusion x1 day with associated generalized weakness.  The majority of the history was obtained from review of the medical record as the patient was unable to provide any additional history.     Upon presentation to ED, vital signed included /73, , RR 18, SpO2 98% on room air, temperature 98.4° F.  Labs notable for hemoglobin 10.7, hematocrit 31.7, platelets 34, vitamin B12 1958, INR 1.4, CO2 18, BUN 3.5, creatinine 0.5, calcium 7.9, , albumin 1.9, AST 43.  Serum alcohol level undetectable.  EKG demonstrated sinus tachycardia.  CXR demonstrated increased left retrocardiac density with partial silhouetting of the left hemidiaphragm which might be related to an infiltrate/atelectasis.  CT head without contrast negative for acute intracranial findings or significant interval changes compared to August 2022.  CT abdomen and pelvis with IV contrast consistent with cirrhosis, moderate generalized ascites, bilateral pleural fluid right greater than left noted.  She was admitted to hospital medicine service for further medical management.  EEG showed no seizure activity.  Ammonia was within normal limits.  MRI brain showed no acute abnormalities.  Neurology signed off and had no further recommendations.  Underwent paracentesis and fluids workup revealed cirrhotic etiology.  Rifaximin was continued.  Palliative team was consulted for goals of care discussions.  PT suggested placement.    Interval Hx:   Hemodynamically stable comfortably resting.  Tolerating p.o..  Tolerating therapy.  Currently awaiting placement    Objective/physical exam:  Vitals:    04/16/24 0000 04/16/24 0154 04/16/24 0400 04/16/24  0729   BP: 138/74  (!) 143/81 (!) 176/76   Pulse: 91  85 76   Resp: 18 17 18    Temp: 98 °F (36.7 °C)  97.5 °F (36.4 °C) 98 °F (36.7 °C)   TempSrc:    Oral   SpO2: 98%  95% 99%   Weight:       Height:         General: In no acute distress, afebrile  Respiratory: Clear to auscultation bilaterally  Cardiovascular: S1, S2, no appreciable murmur  Abdomen: Soft, nontender, BS +  MSK: Warm, no lower extremity edema, no clubbing or cyanosis  Neurologic: Alert and oriented x4, moving all extremities with good strength     Lab Results   Component Value Date     04/05/2024    K 4.1 04/08/2024    CO2 28 04/05/2024    BUN 21.2 (H) 04/05/2024    CREATININE 0.62 04/16/2024    CALCIUM 9.2 04/05/2024    EGFRNONAA >60 02/28/2022      Lab Results   Component Value Date    ALT 17 03/30/2024    AST 22 03/30/2024    ALKPHOS 123 03/30/2024    BILITOT 0.5 03/30/2024      Lab Results   Component Value Date    WBC 8.57 04/05/2024    HGB 10.8 (L) 04/05/2024    HCT 31.7 (L) 04/05/2024    .7 (H) 04/05/2024     (H) 04/05/2024           Medications:  Current Facility-Administered Medications   Medication Dose Route Frequency Provider Last Rate Last Admin    0.9%  NaCl infusion (for blood administration)   Intravenous Q24H PRN Chaz Harrison MD        albuterol inhaler 1 puff  1 puff Inhalation Q4H PRN Yvette Saba MD        amLODIPine tablet 5 mg  5 mg Oral Daily Robin Metcalf MD   5 mg at 04/15/24 0825    ARIPiprazole tablet 2 mg  2 mg Oral Daily Robin Metcalf MD   2 mg at 04/15/24 0826    busPIRone tablet 5 mg  5 mg Oral BID Tabatha Stokes DO   5 mg at 04/15/24 2053    carvediloL tablet 6.25 mg  6.25 mg Oral BID Robin Metcalf MD   6.25 mg at 04/15/24 2053    dextrose 10% bolus 125 mL 125 mL  12.5 g Intravenous PRN Yvette Saba MD   Stopped at 03/26/24 0125    dextrose 10% bolus 250 mL 250 mL  25 g Intravenous PRN Yvette Saba MD        enoxaparin injection 40 mg  40 mg Subcutaneous  Q24H (prophylaxis, 1700) Yvette Saba MD   40 mg at 04/15/24 1618    EScitalopram oxalate tablet 5 mg  5 mg Oral Daily Tabatha Stokes, DO   5 mg at 04/15/24 0827    folic acid tablet 1 mg  1 mg Oral Daily Yvette Saba MD   1 mg at 04/15/24 0826    gabapentin capsule 100 mg  100 mg Oral TID Tabatha Stokes, DO   100 mg at 04/15/24 2052    glucagon (human recombinant) injection 1 mg  1 mg Intramuscular PRN Yvette Saba MD        glucose chewable tablet 16 g  16 g Oral PRN Yvette Saba MD        glucose chewable tablet 24 g  24 g Oral PRN Yvette Saba MD        hydrALAZINE injection 10 mg  10 mg Intravenous Q4H PRN Toyin Golden FNP   10 mg at 03/30/24 1518    hyoscyamine ODT tablet 0.125 mg  0.125 mg Sublingual TID PRN Yvette Saba MD        insulin aspart U-100 injection 0-5 Units  0-5 Units Subcutaneous QID (AC + HS) PRN Yvette Saba MD        labetaloL injection 10 mg  10 mg Intravenous Q4H PRN Robin Metcalf MD   10 mg at 04/01/24 0032    methocarbamoL tablet 750 mg  750 mg Oral TID PRN Yvette Saba MD   750 mg at 04/15/24 2053    multivitamin tablet  1 tablet Oral Daily Robin Metcalf MD   1 tablet at 04/15/24 0826    nicotine 21 mg/24 hr 1 patch  1 patch Transdermal Daily Pedrito Arechiga MD   1 patch at 04/15/24 0825    oxyCODONE immediate release tablet 5 mg  5 mg Oral Q8H PRN Toyin Golden FNP   5 mg at 04/16/24 0154    pantoprazole EC tablet 40 mg  40 mg Oral BID Robin Metcalf MD   40 mg at 04/15/24 2053    rifAXIMin tablet 550 mg  550 mg Oral BID Robin Metcalf MD   550 mg at 04/15/24 2053    sodium bicarbonate tablet 650 mg  650 mg Oral Daily Robin Metcalf MD   650 mg at 04/15/24 0825    sodium chloride 0.9% flush 10 mL  10 mL Intravenous PRN Chaz Harrison MD        thiamine tablet 100 mg  100 mg Oral Q8H Evgeny Burk MD   100 mg at 04/16/24 0536    zinc oxide-cod liver oil 40 % paste   Topical (Top) Daily  Robin Metcalf MD   Given at 04/13/24 0858      Current Facility-Administered Medications   Medication Dose Route Frequency Provider Last Rate Last Admin    0.9%  NaCl infusion (for blood administration)   Intravenous Q24H PRN Chaz Harrison MD        albuterol inhaler 1 puff  1 puff Inhalation Q4H PRN Yvette Saba MD        amLODIPine tablet 5 mg  5 mg Oral Daily Robin Metcalf MD   5 mg at 04/15/24 0825    ARIPiprazole tablet 2 mg  2 mg Oral Daily Robin Metcalf MD   2 mg at 04/15/24 0826    busPIRone tablet 5 mg  5 mg Oral BID Tabatha Stokes DO   5 mg at 04/15/24 2053    carvediloL tablet 6.25 mg  6.25 mg Oral BID Robin Metcalf MD   6.25 mg at 04/15/24 2053    dextrose 10% bolus 125 mL 125 mL  12.5 g Intravenous PRN Yvette Saba MD   Stopped at 03/26/24 0125    dextrose 10% bolus 250 mL 250 mL  25 g Intravenous PRN Yvette Saba MD        enoxaparin injection 40 mg  40 mg Subcutaneous Q24H (prophylaxis, 1700) Yvette Saba MD   40 mg at 04/15/24 1618    EScitalopram oxalate tablet 5 mg  5 mg Oral Daily Tabatha Stokes DO   5 mg at 04/15/24 0827    folic acid tablet 1 mg  1 mg Oral Daily Yvette Saba MD   1 mg at 04/15/24 0826    gabapentin capsule 100 mg  100 mg Oral TID Tabatha Stokes DO   100 mg at 04/15/24 2052    glucagon (human recombinant) injection 1 mg  1 mg Intramuscular PRN Yvette Saba MD        glucose chewable tablet 16 g  16 g Oral PRN Yvette Saba MD        glucose chewable tablet 24 g  24 g Oral PRN Yvette Saba MD        hydrALAZINE injection 10 mg  10 mg Intravenous Q4H PRN Toyin Golden FNP   10 mg at 03/30/24 1518    hyoscyamine ODT tablet 0.125 mg  0.125 mg Sublingual TID PRN Yvette Saba MD        insulin aspart U-100 injection 0-5 Units  0-5 Units Subcutaneous QID (AC + HS) PRN Yvette Saba MD        labetaloL injection 10 mg  10 mg Intravenous Q4H PRN Robin Metcalf MD   10 mg at 04/01/24 0032     methocarbamoL tablet 750 mg  750 mg Oral TID PRN Yvette Saba MD   750 mg at 04/15/24 2053    multivitamin tablet  1 tablet Oral Daily Robin Metcalf MD   1 tablet at 04/15/24 0826    nicotine 21 mg/24 hr 1 patch  1 patch Transdermal Daily Pedrito Arechiga MD   1 patch at 04/15/24 0825    oxyCODONE immediate release tablet 5 mg  5 mg Oral Q8H PRN Toyin Golden FNP   5 mg at 04/16/24 0154    pantoprazole EC tablet 40 mg  40 mg Oral BID Robin Metcalf MD   40 mg at 04/15/24 2053    rifAXIMin tablet 550 mg  550 mg Oral BID Robin Metcalf MD   550 mg at 04/15/24 2053    sodium bicarbonate tablet 650 mg  650 mg Oral Daily Robin Metcalf MD   650 mg at 04/15/24 0825    sodium chloride 0.9% flush 10 mL  10 mL Intravenous PRN Chaz Harrison MD        thiamine tablet 100 mg  100 mg Oral Q8H Evgeny Burk MD   100 mg at 04/16/24 0536    zinc oxide-cod liver oil 40 % paste   Topical (Top) Daily Robin Metcalf MD   Given at 04/13/24 0858        Assessment/Plan:    Acute metabolic encephalopathy at admission-improved   E coli UTI at admission s/p Rx  Alcoholic cirrhosis  ascites s/p paracentesis  Alcohol use  Thrombocytopenia due to above   Anemia of chronic disease  Hypoalbuminemia due to liver dysfunction   Hypertensive urgency at admission-resolved   Chronic depression  Tobacco use    Plan:  -mental status at baseline.  Ambulating well.  Continue to monitor   - Continue supportive care, rifaximin.  Monitor platelet count while inpatient.    -Counseled on tobacco cessation, alcohol cessation.  Add nicotine patch.  -therapy as tolerated. currently awaiting SNF placement.  -home meds reviewed    SCDs      Pedrito Arechiga MD

## 2024-04-16 NOTE — PLAN OF CARE
Problem: Adult Inpatient Plan of Care  Goal: Plan of Care Review  Outcome: Ongoing, Progressing  Goal: Patient-Specific Goal (Individualized)  Outcome: Ongoing, Progressing  Goal: Absence of Hospital-Acquired Illness or Injury  Outcome: Ongoing, Progressing  Goal: Optimal Comfort and Wellbeing  Outcome: Ongoing, Progressing  Goal: Readiness for Transition of Care  Outcome: Ongoing, Progressing     Problem: Diabetes Comorbidity  Goal: Blood Glucose Level Within Targeted Range  Outcome: Ongoing, Progressing     Problem: Skin Injury Risk Increased  Goal: Skin Health and Integrity  Outcome: Ongoing, Progressing     Problem: Fall Injury Risk  Goal: Absence of Fall and Fall-Related Injury  Outcome: Ongoing, Progressing     Problem: Impaired Wound Healing  Goal: Optimal Wound Healing  Outcome: Ongoing, Progressing     Problem: Coping Ineffective  Goal: Effective Coping  Outcome: Ongoing, Progressing

## 2024-04-16 NOTE — CARE UPDATE
[id number] therapy recommended HH services for pt. FOC obtained. Referral sent to [facility] thru care port.   Notified Bruce, pt's ex  that pt will go home with HH services. Bruce will bring pt home in the morning.

## 2024-04-16 NOTE — PLAN OF CARE
Spoke to [facility]. They are unable to accept this patient due to her not qualifying for long term medicaid. Made SUSANA Weston aware of this.

## 2024-04-16 NOTE — PT/OT/SLP PROGRESS
"Physical Therapy Treatment    Patient Name:  Luz Maria Alfaro   MRN:  [id number]    Recommendations:     Discharge therapy intensity: Low Intensity Therapy   Discharge Equipment Recommendations: walker, rolling  Barriers to discharge: None    Assessment:     Luz Maria Alfaro is a 64 y.o. female admitted with a medical diagnosis of metabolic encephalopathy, UTI, alcoholic cirrhosis.  She presents with the following impairments/functional limitations: impaired endurance, pain, gait instability, impaired balance . Pt has made significant improvements in functional mobility since admission, now functioning at a supervision level with RW and scored in "Low Falls Risk" category on Tinetti Balance Assessment. Would now recommend low intensity therapy at d/c, son and grandson will be home with pt most of the time per her report. Pt appears to be aware of her current functional limitations (I.e., doesn't negotiate the stairs at home, doesn't shower alone, etc.).    Rehab Prognosis: Good; patient would benefit from acute skilled PT services to address these deficits and reach maximum level of function.    Recent Surgery: * No surgery found *      Plan:     During this hospitalization, patient to be seen 3 x/week to address the identified rehab impairments via gait training, therapeutic activities, therapeutic exercises and progress toward the following goals:    Plan of Care Expires:  04/21/24    Subjective     Chief Complaint: low back pain  Patient/Family Comments/goals: home  Pain/Comfort:  Pain Rating 1: 0/10      Objective:     Communicated with RN prior to session.  Patient found HOB elevated with telemetry upon PT entry to room.     General Precautions: Standard, aspiration, fall  Orthopedic Precautions: N/A  Braces: N/A  Respiratory Status: Room air  Blood Pressure: NT  Skin Integrity: Visible skin intact      Functional Mobility:  Bed Mobility:     Supine to Sit: independence  Sit to Supine: independence  Transfers:  "    Sit to Stand:  supervision with no AD  Gait: 60ft with RW with SPV  Tinetti Total Score: 24  < 18 = High Risk of Falls, 19-23 = Moderate Risk of Falls, > 24 = Low Risk of Falls    Therapeutic Activities/Exercises:      Education:  Patient provided with verbal education education regarding PT role/goals/POC, fall prevention, safety awareness, and discharge/DME recommendations.  Understanding was verbalized.     Patient left HOB elevated with all lines intact, call button in reach, and RN/CM notified    GOALS:   Multidisciplinary Problems       Physical Therapy Goals          Problem: Physical Therapy    Goal Priority Disciplines Outcome Goal Variances Interventions   Physical Therapy Goal     PT, PT/OT Ongoing, Progressing     Description: Goals to be met by: 24     Patient will increase functional independence with mobility by performin. Supine to sit with MInimal Assistance- MET  2. Sit to stand transfer with Minimal Assistance- MET  3. Bed to chair transfer with Minimal Assistance using Rolling Walker MET  4. Gait  x 150 feet with Minimal Assistance using Rolling Walker. MET    Updated goals 4/10/24:  5. Pt will be independent with bed mobility.  6. Pt will be independent with transfers with RW.  7. Pt will ambulate 250ft with RW with modified independence.  8. Pt will score low falls risk on TUG test.                         Time Tracking:     PT Received On: 24  PT Start Time: 1336     PT Stop Time: 1352  PT Total Time (min): 16 min     Billable Minutes: Therapeutic Activity 16 mins    Treatment Type: Treatment  PT/PTA: PT     Number of PTA visits since last PT visit: 2     2024

## 2024-04-17 VITALS
HEART RATE: 68 BPM | OXYGEN SATURATION: 98 % | RESPIRATION RATE: 18 BRPM | SYSTOLIC BLOOD PRESSURE: 136 MMHG | BODY MASS INDEX: 23.83 KG/M2 | WEIGHT: 134.5 LBS | HEIGHT: 63 IN | DIASTOLIC BLOOD PRESSURE: 72 MMHG | TEMPERATURE: 98 F

## 2024-04-17 PROBLEM — G93.40 ACUTE ENCEPHALOPATHY: Status: RESOLVED | Noted: 2024-03-21 | Resolved: 2024-04-17

## 2024-04-17 PROCEDURE — 25000003 PHARM REV CODE 250: Performed by: STUDENT IN AN ORGANIZED HEALTH CARE EDUCATION/TRAINING PROGRAM

## 2024-04-17 PROCEDURE — 25000003 PHARM REV CODE 250: Performed by: INTERNAL MEDICINE

## 2024-04-17 PROCEDURE — S4991 NICOTINE PATCH NONLEGEND: HCPCS | Performed by: INTERNAL MEDICINE

## 2024-04-17 RX ORDER — BUSPIRONE HYDROCHLORIDE 5 MG/1
5 TABLET ORAL 2 TIMES DAILY
Qty: 60 TABLET | Refills: 2 | Status: SHIPPED | OUTPATIENT
Start: 2024-04-17 | End: 2024-04-17

## 2024-04-17 RX ORDER — CARVEDILOL 6.25 MG/1
6.25 TABLET ORAL 2 TIMES DAILY
Qty: 180 TABLET | Refills: 0 | Status: SHIPPED | OUTPATIENT
Start: 2024-04-17 | End: 2024-04-17

## 2024-04-17 RX ORDER — CARVEDILOL 6.25 MG/1
6.25 TABLET ORAL 2 TIMES DAILY
Qty: 180 TABLET | Refills: 0 | Status: ON HOLD | OUTPATIENT
Start: 2024-04-17 | End: 2024-05-22 | Stop reason: HOSPADM

## 2024-04-17 RX ORDER — BUSPIRONE HYDROCHLORIDE 5 MG/1
5 TABLET ORAL 2 TIMES DAILY
Qty: 60 TABLET | Refills: 2 | Status: SHIPPED | OUTPATIENT
Start: 2024-04-17 | End: 2024-07-16

## 2024-04-17 RX ORDER — AMLODIPINE BESYLATE 5 MG/1
5 TABLET ORAL DAILY
Status: ON HOLD
Start: 2024-04-18 | End: 2024-05-14 | Stop reason: HOSPADM

## 2024-04-17 RX ORDER — ESCITALOPRAM OXALATE 5 MG/1
5 TABLET ORAL DAILY
Qty: 90 TABLET | Refills: 3 | Status: SHIPPED | OUTPATIENT
Start: 2024-04-18 | End: 2025-04-18

## 2024-04-17 RX ORDER — AMLODIPINE BESYLATE 5 MG/1
5 TABLET ORAL DAILY
Start: 2024-04-18 | End: 2024-04-17

## 2024-04-17 RX ORDER — SODIUM BICARBONATE 650 MG/1
650 TABLET ORAL DAILY
Qty: 30 TABLET | Refills: 2 | Status: SHIPPED | OUTPATIENT
Start: 2024-04-18 | End: 2024-04-17

## 2024-04-17 RX ORDER — ESCITALOPRAM OXALATE 5 MG/1
5 TABLET ORAL DAILY
Qty: 90 TABLET | Refills: 3 | Status: SHIPPED | OUTPATIENT
Start: 2024-04-18 | End: 2024-04-17

## 2024-04-17 RX ORDER — SODIUM BICARBONATE 650 MG/1
650 TABLET ORAL DAILY
Qty: 30 TABLET | Refills: 1 | Status: ON HOLD | OUTPATIENT
Start: 2024-04-18 | End: 2024-05-22 | Stop reason: HOSPADM

## 2024-04-17 RX ADMIN — FOLIC ACID 1 MG: 1 TABLET ORAL at 09:04

## 2024-04-17 RX ADMIN — THIAMINE HCL TAB 100 MG 100 MG: 100 TAB at 05:04

## 2024-04-17 RX ADMIN — THERA TABS 1 TABLET: TAB at 09:04

## 2024-04-17 RX ADMIN — ESCITALOPRAM OXALATE 5 MG: 5 TABLET, FILM COATED ORAL at 09:04

## 2024-04-17 RX ADMIN — AMLODIPINE BESYLATE 5 MG: 5 TABLET ORAL at 09:04

## 2024-04-17 RX ADMIN — NICOTINE 1 PATCH: 21 PATCH, EXTENDED RELEASE TRANSDERMAL at 09:04

## 2024-04-17 RX ADMIN — CARVEDILOL 6.25 MG: 3.12 TABLET, FILM COATED ORAL at 09:04

## 2024-04-17 RX ADMIN — GABAPENTIN 100 MG: 100 CAPSULE ORAL at 09:04

## 2024-04-17 RX ADMIN — SODIUM BICARBONATE 650 MG TABLET 650 MG: at 09:04

## 2024-04-17 RX ADMIN — RIFAXIMIN 550 MG: 550 TABLET ORAL at 09:04

## 2024-04-17 RX ADMIN — ARIPIPRAZOLE 2 MG: 2 TABLET ORAL at 09:04

## 2024-04-17 RX ADMIN — PANTOPRAZOLE SODIUM 40 MG: 40 TABLET, DELAYED RELEASE ORAL at 09:04

## 2024-04-17 RX ADMIN — BUSPIRONE HYDROCHLORIDE 5 MG: 5 TABLET ORAL at 09:04

## 2024-04-17 NOTE — DISCHARGE SUMMARY
[facility] Hospital Medicine  Discharge Summary      Patient Name: Luz Maria Alfaro  MRN: [id number]  [facility]: [id number]  Patient Class: IP- Inpatient  Admission Date: 3/20/2024  Hospital Length of Stay: 28 days  Discharge Date and Time:  04/17/2024 9:54 AM  Attending Physician: Pedrito Arechiga MD   Discharging Provider: Pedrito Arechiga MD  Primary Care Provider: Robyn Matthews NP    Primary Care Team: Networked reference to record PCT         64 y.o. female with a PMHx of HTN, HLD, type 2 DM, alcoholic cirrhosis, alcohol abuse and nicotine dependence who presented to [facility] on 3/20/2024 via EMS with c/o worsening confusion x1 day with associated generalized weakness.  The majority of the history was obtained from review of the medical record as the patient was unable to provide any additional history.     Upon presentation to ED, vital signed included /73, , RR 18, SpO2 98% on room air, temperature 98.4° F.  Labs notable for hemoglobin 10.7, hematocrit 31.7, platelets 34, vitamin B12 1958, INR 1.4, CO2 18, BUN 3.5, creatinine 0.5, calcium 7.9, , albumin 1.9, AST 43.  Serum alcohol level undetectable.  EKG demonstrated sinus tachycardia.  CXR demonstrated increased left retrocardiac density with partial silhouetting of the left hemidiaphragm which might be related to an infiltrate/atelectasis.  CT head without contrast negative for acute intracranial findings or significant interval changes compared to August 2022.  CT abdomen and pelvis with IV contrast consistent with cirrhosis, moderate generalized ascites, bilateral pleural fluid right greater than left noted.  She was admitted to hospital medicine service for further medical management.  EEG showed no seizure activity.  Ammonia was within normal limits.  MRI brain showed no acute abnormalities.  Neurology signed off and had no further recommendations.  Underwent paracentesis and fluids workup revealed  cirrhotic etiology.  Rifaximin was continued.  Palliative team was consulted for goals of care discussions.  PT suggested placement initially and she progressed well enough to go home with home health.  All medications were reconciled.  And necessary prescriptions were provided.  Necessary prescriptions were provided.      Acute metabolic encephalopathy at admission-improved   E coli UTI at admission s/p Rx  Alcoholic cirrhosis  ascites s/p paracentesis  Alcohol use  Thrombocytopenia due to above   Anemia of chronic disease  Hypoalbuminemia due to liver dysfunction   Hypertensive urgency at admission-resolved   Chronic depression  Tobacco use        Goals of Care Treatment Preferences:  Code Status: Full Code    Vitals:    04/17/24 1146   BP: 136/72   Pulse: 68   Resp: 18   Temp: 98 °F (36.7 °C)       General: Comfortable, not in distress  Respiratory: Clear to auscultation bilaterally, nonlabored breathing  Cardiovascular: RRR, S1, S2  Abdominal: Soft, nontender, nondistended  Neurological: AOx4, no focal deficits  Psychiatric: Cooperative              What is most important right now is to focus on remaining as independent as possible, symptom/pain control, improvement in condition but with limits to invasive therapies, comfort and QOL .  Accordingly, we have decided that the best plan to meet the patient's goals includes continuing with treatment.      Consults:   Consults (From admission, onward)          Status Ordering Provider     Inpatient consult to Social Work/Case Management  Once        Provider:  (Not yet assigned)    Acknowledged KARMA NAVARRO     Inpatient consult to Palliative Care  Once        Provider:  Richard Miller MD    Completed CHACHO MAYA     Inpatient consult to Psychiatry  Once        Provider:  [facility]    Completed JAMES DIAZ     Inpatient consult to Midline team  Once        Provider:  (Not yet assigned)    Acknowledged JAMES DIAZ     Inpatient  consult to Gastroenterology  Once        Provider:  Ferny Aviles MD    Completed OSMANI BRADFORD     Inpatient consult to Neurology  Once        Provider:  Lalo Maldonado MD    Completed OSMANI BRADFORD     Inpatient consult to Registered Dietitian/Nutritionist  Once        Provider:  (Not yet assigned)    Completed ANGELA ROBLERO     Inpatient consult to Social Work/Case Management  Once        Provider:  (Not yet assigned)    Acknowledged ANGELA ROBLERO            No new Assessment & Plan notes have been filed under this hospital service since the last note was generated.  Service: Hospital Medicine    Final Active Diagnoses:    Diagnosis Date Noted POA    Malnutrition [E46] 03/21/2024 Yes      Problems Resolved During this Admission:    Diagnosis Date Noted Date Resolved POA    PRINCIPAL PROBLEM:  Acute encephalopathy [G93.40] 03/21/2024 04/17/2024 Yes       Discharged Condition: good    Disposition: Home-Health Care [facility]    Follow Up:   Follow-up Information       [facility] Follow up.    Specialty: Home Health Services  Why: Your home health agency will contact you and schedule an appt for you  Contact information:  [address] [place] [address]. dg. [facility] [address]  [phone]               Robyn Matthews NP Follow up in 2 week(s).    Specialties: Urgent Care, Emergency Medicine  Why: Please call today to make a follow up appt.  Contact information:  [place] [facility] [address]  [phone]                           Patient Instructions:   No discharge procedures on file.    Significant Diagnostic Studies: Labs: CMP   Recent Labs   Lab 04/16/24  0405   CREATININE 0.62       Pending Diagnostic Studies:       Procedure Component Value Units Date/Time    Gastrointestinal Pathogens Panel, PCR [[id number]]     Order Status: Sent Lab Status: No result     Specimen: Stool     Occult Blood, Stool 2nd Specimen [[id number]]     Order Status: Sent Lab Status: No result      Specimen: Stool     Occult Blood, Stool, Diagnostic (1-3) [[id number]]  (Abnormal) Collected: 04/02/24 0459    Order Status: Sent Lab Status: In process Updated: 04/02/24 0823    Specimen: Stool     Narrative:      The following orders were created for panel order Occult Blood, Stool, Diagnostic (1-3).  Procedure                               Abnormality         Status                     ---------                               -----------         ------                     Occult Blood, Stool 1st...[[id number]]  Abnormal            Final result               Occult Blood, Stool 2nd...[[id number]]                                                   Please view results for these tests on the individual orders.           Medications:  Reconciled Home Medications:      Medication List        START taking these medications      carvediloL 6.25 MG tablet  Commonly known as: COREG  Take 1 tablet (6.25 mg total) by mouth 2 (two) times daily.     rifAXIMin 550 mg Tab  Commonly known as: XIFAXAN  Take 1 tablet (550 mg total) by mouth 2 (two) times daily.     sodium bicarbonate 650 MG tablet  Take 1 tablet (650 mg total) by mouth once daily.  Start taking on: April 18, 2024            CHANGE how you take these medications      amLODIPine 5 MG tablet  Commonly known as: NORVASC  Take 1 tablet (5 mg total) by mouth once daily.  Start taking on: April 18, 2024  What changed:   medication strength  how much to take     busPIRone 5 MG Tab  Commonly known as: BUSPAR  Take 1 tablet (5 mg total) by mouth 2 (two) times daily.  What changed:   medication strength  how much to take  when to take this     EScitalopram oxalate 5 MG Tab  Commonly known as: LEXAPRO  Take 1 tablet (5 mg total) by mouth once daily.  Start taking on: April 18, 2024  What changed:   medication strength  how much to take            CONTINUE taking these medications      albuterol 90 mcg/actuation inhaler  Commonly known as: PROVENTIL/VENTOLIN HFA  Inhale 1 puff  into the lungs every 4 (four) hours as needed for Shortness of Breath.     ARIPiprazole 2 MG Tab  Commonly known as: ABILIFY  Take 1 tablet (2 mg total) by mouth once daily.     atorvastatin 10 MG tablet  Commonly known as: LIPITOR  Take 10 mg by mouth once daily.     folic acid 1 MG tablet  Commonly known as: FOLVITE  Take 1 mg by mouth once daily.     gabapentin 300 MG capsule  Commonly known as: NEURONTIN  Take 300 mg by mouth 3 (three) times daily.     HYDROcodone-acetaminophen 5-325 mg per tablet  Commonly known as: NORCO  Take 1 tablet by mouth 2 (two) times daily as needed.     hyoscyamine 0.125 mg Subl  Commonly known as: Levsin  Place 0.125 mg under the tongue 3 (three) times daily as needed (for abdominal cramping).     ondansetron 4 MG Tbdl  Commonly known as: ZOFRAN-ODT  DISSOLVE ONE TABLET under the TONGUE THREE TIMES DAILY AS NEEDED FOR NAUSEA     pantoprazole 40 MG tablet  Commonly known as: PROTONIX  Take 40 mg by mouth 2 (two) times daily.     thiamine 100 MG tablet  Take 1 tablet (100 mg total) by mouth once daily.            STOP taking these medications      famotidine 20 MG tablet  Commonly known as: PEPCID     methocarbamoL 750 MG Tab  Commonly known as: ROBAXIN              Indwelling Lines/Drains at time of discharge:   Lines/Drains/Airways       None                   Time spent on the discharge of patient: 35 minutes         Pedrito Arechiga MD  Department of Hospital Medicine  [facility]

## 2024-04-17 NOTE — PLAN OF CARE
04/17/24 1050   Final Note   Assessment Type Final Discharge Note   Anticipated Discharge Disposition Home-Health   Post-Acute Status   Post-Acute Authorization Home Health   Home Health Status Set-up Complete/Auth obtained   Coverage [facility]   Patient choice form signed by patient/caregiver List from CMS Compare   Discharge Delays None known at this time

## 2024-04-18 ENCOUNTER — PATIENT OUTREACH (OUTPATIENT)
Dept: ADMINISTRATIVE | Facility: CLINIC | Age: 64
End: 2024-04-18
Payer: MEDICARE

## 2024-04-18 ENCOUNTER — PATIENT MESSAGE (OUTPATIENT)
Dept: ADMINISTRATIVE | Facility: CLINIC | Age: 64
End: 2024-04-18
Payer: MEDICARE

## 2024-04-18 NOTE — PROGRESS NOTES
C3 nurse attempted to contact Luz Maria Alfaro for a TCC post hospital discharge follow up call. No answer. The patient does not have a scheduled HOSFU appointment, and the pt does not have an [facility] PCP.

## 2024-04-19 NOTE — PROGRESS NOTES
3rd attempt-C3 nurse attempted to contact Luz Maria Alfaro for a TCC post hospital discharge follow up call. No answer. The patient does not have a scheduled HOSFU appointment, and the pt does not have an [facility] PCP.

## 2024-04-19 NOTE — PROGRESS NOTES
2nd attempt-C3 nurse attempted to contact Luz Maria Alfaro for a TCC post hospital discharge follow up call. No answer. The patient does not have a scheduled HOSFU appointment, and the pt does not have an [facility] PCP.

## 2024-05-08 ENCOUNTER — HOSPITAL ENCOUNTER (INPATIENT)
Facility: HOSPITAL | Age: 64
LOS: 5 days | Discharge: HOME HEALTH | DRG: 291 | End: 2024-05-14
Attending: STUDENT IN AN ORGANIZED HEALTH CARE EDUCATION/TRAINING PROGRAM | Admitting: INTERNAL MEDICINE
Payer: MEDICARE

## 2024-05-08 DIAGNOSIS — I50.9 CONGESTIVE HEART FAILURE, UNSPECIFIED HF CHRONICITY, UNSPECIFIED HEART FAILURE TYPE: ICD-10-CM

## 2024-05-08 DIAGNOSIS — R09.02 HYPOXIA: Primary | ICD-10-CM

## 2024-05-08 DIAGNOSIS — M79.89 LEG SWELLING: ICD-10-CM

## 2024-05-08 DIAGNOSIS — R79.89 ELEVATED BRAIN NATRIURETIC PEPTIDE (BNP) LEVEL: ICD-10-CM

## 2024-05-08 DIAGNOSIS — R06.02 SOB (SHORTNESS OF BREATH): ICD-10-CM

## 2024-05-08 DIAGNOSIS — R07.9 CHEST PAIN: ICD-10-CM

## 2024-05-08 LAB
BASOPHILS # BLD AUTO: 0.02 X10(3)/MCL
BASOPHILS NFR BLD AUTO: 0.2 %
EOSINOPHIL # BLD AUTO: 0.06 X10(3)/MCL (ref 0–0.9)
EOSINOPHIL NFR BLD AUTO: 0.7 %
ERYTHROCYTE [DISTWIDTH] IN BLOOD BY AUTOMATED COUNT: 18.3 % (ref 11.5–17)
HCT VFR BLD AUTO: 35 % (ref 37–47)
HGB BLD-MCNC: 11 G/DL (ref 12–16)
IMM GRANULOCYTES # BLD AUTO: 0.04 X10(3)/MCL (ref 0–0.04)
IMM GRANULOCYTES NFR BLD AUTO: 0.5 %
LYMPHOCYTES # BLD AUTO: 2.21 X10(3)/MCL (ref 0.6–4.6)
LYMPHOCYTES NFR BLD AUTO: 26.8 %
MCH RBC QN AUTO: 35.9 PG (ref 27–31)
MCHC RBC AUTO-ENTMCNC: 31.4 G/DL (ref 33–36)
MCV RBC AUTO: 114.4 FL (ref 80–94)
MONOCYTES # BLD AUTO: 0.74 X10(3)/MCL (ref 0.1–1.3)
MONOCYTES NFR BLD AUTO: 9 %
NEUTROPHILS # BLD AUTO: 5.17 X10(3)/MCL (ref 2.1–9.2)
NEUTROPHILS NFR BLD AUTO: 62.8 %
NRBC BLD AUTO-RTO: 0 %
PLATELET # BLD AUTO: 383 X10(3)/MCL (ref 130–400)
PMV BLD AUTO: 8.9 FL (ref 7.4–10.4)
RBC # BLD AUTO: 3.06 X10(6)/MCL (ref 4.2–5.4)
WBC # SPEC AUTO: 8.24 X10(3)/MCL (ref 4.5–11.5)

## 2024-05-08 PROCEDURE — 99291 CRITICAL CARE FIRST HOUR: CPT

## 2024-05-08 PROCEDURE — 84484 ASSAY OF TROPONIN QUANT: CPT | Performed by: STUDENT IN AN ORGANIZED HEALTH CARE EDUCATION/TRAINING PROGRAM

## 2024-05-08 PROCEDURE — 96374 THER/PROPH/DIAG INJ IV PUSH: CPT

## 2024-05-08 PROCEDURE — 93010 ELECTROCARDIOGRAM REPORT: CPT | Mod: ,,, | Performed by: INTERNAL MEDICINE

## 2024-05-08 PROCEDURE — 85025 COMPLETE CBC W/AUTO DIFF WBC: CPT | Performed by: STUDENT IN AN ORGANIZED HEALTH CARE EDUCATION/TRAINING PROGRAM

## 2024-05-08 PROCEDURE — 80053 COMPREHEN METABOLIC PANEL: CPT | Performed by: STUDENT IN AN ORGANIZED HEALTH CARE EDUCATION/TRAINING PROGRAM

## 2024-05-08 PROCEDURE — 83735 ASSAY OF MAGNESIUM: CPT | Performed by: STUDENT IN AN ORGANIZED HEALTH CARE EDUCATION/TRAINING PROGRAM

## 2024-05-08 PROCEDURE — 83880 ASSAY OF NATRIURETIC PEPTIDE: CPT | Performed by: STUDENT IN AN ORGANIZED HEALTH CARE EDUCATION/TRAINING PROGRAM

## 2024-05-08 PROCEDURE — 93005 ELECTROCARDIOGRAM TRACING: CPT

## 2024-05-08 NOTE — Clinical Note
Diagnosis: Hypoxia [429323]   Future Attending Provider: YOVANI SANDOVAL [[id number]]   Admit to which facility:: [facility] [[id number]]   Reason for IP Medical Treatment  (Clinical interventions that can only be accomplished in the IP setting? ) :: diuresis, O2   I certify that Inpatient services for greater than or equal to 2 midnights are medically necessary:: Yes   Plans for Post-Acute care--if anticipated (pick the single best option):: A. No post acute care anticipated at this time   Special Needs:: No Special Needs [1]

## 2024-05-09 LAB
ALBUMIN SERPL-MCNC: 3.1 G/DL (ref 3.4–4.8)
ALBUMIN SERPL-MCNC: 3.2 G/DL (ref 3.4–4.8)
ALBUMIN/GLOB SERPL: 0.9 RATIO (ref 1.1–2)
ALBUMIN/GLOB SERPL: 1 RATIO (ref 1.1–2)
ALP SERPL-CCNC: 151 UNIT/L (ref 40–150)
ALP SERPL-CCNC: 156 UNIT/L (ref 40–150)
ALT SERPL-CCNC: 5 UNIT/L (ref 0–55)
ALT SERPL-CCNC: 7 UNIT/L (ref 0–55)
AST SERPL-CCNC: 13 UNIT/L (ref 5–34)
AST SERPL-CCNC: 22 UNIT/L (ref 5–34)
BASOPHILS # BLD AUTO: 0.01 X10(3)/MCL
BASOPHILS NFR BLD AUTO: 0.2 %
BILIRUB SERPL-MCNC: 0.3 MG/DL
BILIRUB SERPL-MCNC: 0.3 MG/DL
BNP BLD-MCNC: 1045.1 PG/ML
BUN SERPL-MCNC: 6.3 MG/DL (ref 9.8–20.1)
BUN SERPL-MCNC: 7.4 MG/DL (ref 9.8–20.1)
CALCIUM SERPL-MCNC: 9 MG/DL (ref 8.4–10.2)
CALCIUM SERPL-MCNC: 9 MG/DL (ref 8.4–10.2)
CHLORIDE SERPL-SCNC: 97 MMOL/L (ref 98–107)
CHLORIDE SERPL-SCNC: 98 MMOL/L (ref 98–107)
CO2 SERPL-SCNC: 26 MMOL/L (ref 23–31)
CO2 SERPL-SCNC: 30 MMOL/L (ref 23–31)
CREAT SERPL-MCNC: 0.64 MG/DL (ref 0.55–1.02)
CREAT SERPL-MCNC: 0.64 MG/DL (ref 0.55–1.02)
EOSINOPHIL # BLD AUTO: 0 X10(3)/MCL (ref 0–0.9)
EOSINOPHIL NFR BLD AUTO: 0 %
ERYTHROCYTE [DISTWIDTH] IN BLOOD BY AUTOMATED COUNT: 18.4 % (ref 11.5–17)
FLUAV AG UPPER RESP QL IA.RAPID: NOT DETECTED
FLUBV AG UPPER RESP QL IA.RAPID: NOT DETECTED
GFR SERPLBLD CREATININE-BSD FMLA CKD-EPI: >60 ML/MIN/1.73/M2
GFR SERPLBLD CREATININE-BSD FMLA CKD-EPI: >60 ML/MIN/1.73/M2
GLOBULIN SER-MCNC: 3.2 GM/DL (ref 2.4–3.5)
GLOBULIN SER-MCNC: 3.3 GM/DL (ref 2.4–3.5)
GLUCOSE SERPL-MCNC: 133 MG/DL (ref 82–115)
GLUCOSE SERPL-MCNC: 96 MG/DL (ref 82–115)
HCT VFR BLD AUTO: 37 % (ref 37–47)
HGB BLD-MCNC: 11.8 G/DL (ref 12–16)
IMM GRANULOCYTES # BLD AUTO: 0.04 X10(3)/MCL (ref 0–0.04)
IMM GRANULOCYTES NFR BLD AUTO: 0.6 %
LYMPHOCYTES # BLD AUTO: 0.49 X10(3)/MCL (ref 0.6–4.6)
LYMPHOCYTES NFR BLD AUTO: 7.8 %
MAGNESIUM SERPL-MCNC: 1.8 MG/DL (ref 1.6–2.6)
MAGNESIUM SERPL-MCNC: 1.8 MG/DL (ref 1.6–2.6)
MCH RBC QN AUTO: 35.9 PG (ref 27–31)
MCHC RBC AUTO-ENTMCNC: 31.9 G/DL (ref 33–36)
MCV RBC AUTO: 112.5 FL (ref 80–94)
MONOCYTES # BLD AUTO: 0.04 X10(3)/MCL (ref 0.1–1.3)
MONOCYTES NFR BLD AUTO: 0.6 %
NEUTROPHILS # BLD AUTO: 5.71 X10(3)/MCL (ref 2.1–9.2)
NEUTROPHILS NFR BLD AUTO: 90.8 %
NRBC BLD AUTO-RTO: 0 %
OHS QRS DURATION: 78 MS
OHS QTC CALCULATION: 452 MS
PHOSPHATE SERPL-MCNC: 3.5 MG/DL (ref 2.3–4.7)
PLATELET # BLD AUTO: 397 X10(3)/MCL (ref 130–400)
PMV BLD AUTO: 9 FL (ref 7.4–10.4)
POCT GLUCOSE: 111 MG/DL (ref 70–110)
POCT GLUCOSE: 134 MG/DL (ref 70–110)
POTASSIUM SERPL-SCNC: 3.4 MMOL/L (ref 3.5–5.1)
POTASSIUM SERPL-SCNC: 4.3 MMOL/L (ref 3.5–5.1)
PROT SERPL-MCNC: 6.4 GM/DL (ref 5.8–7.6)
PROT SERPL-MCNC: 6.4 GM/DL (ref 5.8–7.6)
RBC # BLD AUTO: 3.29 X10(6)/MCL (ref 4.2–5.4)
RSV A 5' UTR RNA NPH QL NAA+PROBE: NOT DETECTED
SARS-COV-2 RNA RESP QL NAA+PROBE: NOT DETECTED
SODIUM SERPL-SCNC: 133 MMOL/L (ref 136–145)
SODIUM SERPL-SCNC: 137 MMOL/L (ref 136–145)
TROPONIN I SERPL-MCNC: <0.01 NG/ML (ref 0–0.04)
WBC # SPEC AUTO: 6.29 X10(3)/MCL (ref 4.5–11.5)

## 2024-05-09 PROCEDURE — 25000242 PHARM REV CODE 250 ALT 637 W/ HCPCS: Performed by: STUDENT IN AN ORGANIZED HEALTH CARE EDUCATION/TRAINING PROGRAM

## 2024-05-09 PROCEDURE — 36415 COLL VENOUS BLD VENIPUNCTURE: CPT | Performed by: NURSE PRACTITIONER

## 2024-05-09 PROCEDURE — 80053 COMPREHEN METABOLIC PANEL: CPT | Performed by: INTERNAL MEDICINE

## 2024-05-09 PROCEDURE — 27000221 HC OXYGEN, UP TO 24 HOURS

## 2024-05-09 PROCEDURE — 84100 ASSAY OF PHOSPHORUS: CPT | Performed by: NURSE PRACTITIONER

## 2024-05-09 PROCEDURE — 25000003 PHARM REV CODE 250: Performed by: STUDENT IN AN ORGANIZED HEALTH CARE EDUCATION/TRAINING PROGRAM

## 2024-05-09 PROCEDURE — 25000242 PHARM REV CODE 250 ALT 637 W/ HCPCS: Performed by: INTERNAL MEDICINE

## 2024-05-09 PROCEDURE — 94640 AIRWAY INHALATION TREATMENT: CPT

## 2024-05-09 PROCEDURE — 93005 ELECTROCARDIOGRAM TRACING: CPT

## 2024-05-09 PROCEDURE — 93010 ELECTROCARDIOGRAM REPORT: CPT | Mod: ,,, | Performed by: INTERNAL MEDICINE

## 2024-05-09 PROCEDURE — 94760 N-INVAS EAR/PLS OXIMETRY 1: CPT

## 2024-05-09 PROCEDURE — 99900031 HC PATIENT EDUCATION (STAT)

## 2024-05-09 PROCEDURE — 85025 COMPLETE CBC W/AUTO DIFF WBC: CPT | Performed by: NURSE PRACTITIONER

## 2024-05-09 PROCEDURE — 21400001 HC TELEMETRY ROOM

## 2024-05-09 PROCEDURE — 25000003 PHARM REV CODE 250: Performed by: PHYSICIAN ASSISTANT

## 2024-05-09 PROCEDURE — 63600175 PHARM REV CODE 636 W HCPCS

## 2024-05-09 PROCEDURE — 84484 ASSAY OF TROPONIN QUANT: CPT | Performed by: NURSE PRACTITIONER

## 2024-05-09 PROCEDURE — 25000003 PHARM REV CODE 250

## 2024-05-09 PROCEDURE — 0241U COVID/RSV/FLU A&B PCR: CPT | Performed by: STUDENT IN AN ORGANIZED HEALTH CARE EDUCATION/TRAINING PROGRAM

## 2024-05-09 PROCEDURE — 99900035 HC TECH TIME PER 15 MIN (STAT)

## 2024-05-09 PROCEDURE — 84484 ASSAY OF TROPONIN QUANT: CPT | Performed by: PHYSICIAN ASSISTANT

## 2024-05-09 PROCEDURE — 63600175 PHARM REV CODE 636 W HCPCS: Performed by: PHYSICIAN ASSISTANT

## 2024-05-09 PROCEDURE — 36415 COLL VENOUS BLD VENIPUNCTURE: CPT | Performed by: INTERNAL MEDICINE

## 2024-05-09 PROCEDURE — 83735 ASSAY OF MAGNESIUM: CPT | Performed by: NURSE PRACTITIONER

## 2024-05-09 PROCEDURE — 25000003 PHARM REV CODE 250: Performed by: INTERNAL MEDICINE

## 2024-05-09 PROCEDURE — 63600175 PHARM REV CODE 636 W HCPCS: Performed by: STUDENT IN AN ORGANIZED HEALTH CARE EDUCATION/TRAINING PROGRAM

## 2024-05-09 RX ORDER — ATORVASTATIN CALCIUM 10 MG/1
10 TABLET, FILM COATED ORAL DAILY
Status: DISCONTINUED | OUTPATIENT
Start: 2024-05-09 | End: 2024-05-09

## 2024-05-09 RX ORDER — IBUPROFEN 200 MG
24 TABLET ORAL
Status: DISCONTINUED | OUTPATIENT
Start: 2024-05-09 | End: 2024-05-14 | Stop reason: HOSPADM

## 2024-05-09 RX ORDER — SIMETHICONE 80 MG
1 TABLET,CHEWABLE ORAL 4 TIMES DAILY PRN
Status: DISCONTINUED | OUTPATIENT
Start: 2024-05-09 | End: 2024-05-14 | Stop reason: HOSPADM

## 2024-05-09 RX ORDER — GABAPENTIN 300 MG/1
300 CAPSULE ORAL 3 TIMES DAILY
Status: DISCONTINUED | OUTPATIENT
Start: 2024-05-09 | End: 2024-05-14 | Stop reason: HOSPADM

## 2024-05-09 RX ORDER — ESCITALOPRAM OXALATE 5 MG/1
5 TABLET ORAL DAILY
Status: DISCONTINUED | OUTPATIENT
Start: 2024-05-09 | End: 2024-05-14 | Stop reason: HOSPADM

## 2024-05-09 RX ORDER — ACETAMINOPHEN 325 MG/1
650 TABLET ORAL EVERY 6 HOURS PRN
Status: DISCONTINUED | OUTPATIENT
Start: 2024-05-09 | End: 2024-05-14 | Stop reason: HOSPADM

## 2024-05-09 RX ORDER — TALC
6 POWDER (GRAM) TOPICAL NIGHTLY PRN
Status: DISCONTINUED | OUTPATIENT
Start: 2024-05-09 | End: 2024-05-14 | Stop reason: HOSPADM

## 2024-05-09 RX ORDER — PANTOPRAZOLE SODIUM 40 MG/1
40 TABLET, DELAYED RELEASE ORAL 2 TIMES DAILY
Status: DISCONTINUED | OUTPATIENT
Start: 2024-05-09 | End: 2024-05-14 | Stop reason: HOSPADM

## 2024-05-09 RX ORDER — ALUMINUM HYDROXIDE, MAGNESIUM HYDROXIDE, AND SIMETHICONE 1200; 120; 1200 MG/30ML; MG/30ML; MG/30ML
30 SUSPENSION ORAL 4 TIMES DAILY PRN
Status: DISCONTINUED | OUTPATIENT
Start: 2024-05-09 | End: 2024-05-14 | Stop reason: HOSPADM

## 2024-05-09 RX ORDER — CARVEDILOL 3.12 MG/1
6.25 TABLET ORAL 2 TIMES DAILY
Status: DISCONTINUED | OUTPATIENT
Start: 2024-05-09 | End: 2024-05-14 | Stop reason: HOSPADM

## 2024-05-09 RX ORDER — CARVEDILOL 3.12 MG/1
6.25 TABLET ORAL 2 TIMES DAILY
Status: CANCELLED | OUTPATIENT
Start: 2024-05-09

## 2024-05-09 RX ORDER — IBUPROFEN 200 MG
16 TABLET ORAL
Status: DISCONTINUED | OUTPATIENT
Start: 2024-05-09 | End: 2024-05-14 | Stop reason: HOSPADM

## 2024-05-09 RX ORDER — FOLIC ACID 1 MG/1
1 TABLET ORAL DAILY
Status: DISCONTINUED | OUTPATIENT
Start: 2024-05-09 | End: 2024-05-14 | Stop reason: HOSPADM

## 2024-05-09 RX ORDER — FUROSEMIDE 10 MG/ML
40 INJECTION INTRAMUSCULAR; INTRAVENOUS
Status: COMPLETED | OUTPATIENT
Start: 2024-05-09 | End: 2024-05-09

## 2024-05-09 RX ORDER — IPRATROPIUM BROMIDE AND ALBUTEROL SULFATE 2.5; .5 MG/3ML; MG/3ML
9 SOLUTION RESPIRATORY (INHALATION)
Status: COMPLETED | OUTPATIENT
Start: 2024-05-09 | End: 2024-05-09

## 2024-05-09 RX ORDER — IBUPROFEN 200 MG
1 TABLET ORAL DAILY
Status: DISCONTINUED | OUTPATIENT
Start: 2024-05-09 | End: 2024-05-14 | Stop reason: HOSPADM

## 2024-05-09 RX ORDER — NALOXONE HCL 0.4 MG/ML
0.02 VIAL (ML) INJECTION
Status: DISCONTINUED | OUTPATIENT
Start: 2024-05-09 | End: 2024-05-14 | Stop reason: HOSPADM

## 2024-05-09 RX ORDER — POLYETHYLENE GLYCOL 3350 17 G/17G
17 POWDER, FOR SOLUTION ORAL 2 TIMES DAILY PRN
Status: DISCONTINUED | OUTPATIENT
Start: 2024-05-09 | End: 2024-05-14 | Stop reason: HOSPADM

## 2024-05-09 RX ORDER — INSULIN ASPART 100 [IU]/ML
0-5 INJECTION, SOLUTION INTRAVENOUS; SUBCUTANEOUS
Status: DISCONTINUED | OUTPATIENT
Start: 2024-05-09 | End: 2024-05-14 | Stop reason: HOSPADM

## 2024-05-09 RX ORDER — THIAMINE HCL 100 MG
100 TABLET ORAL DAILY
Status: DISCONTINUED | OUTPATIENT
Start: 2024-05-09 | End: 2024-05-14 | Stop reason: HOSPADM

## 2024-05-09 RX ORDER — ONDANSETRON HYDROCHLORIDE 2 MG/ML
4 INJECTION, SOLUTION INTRAVENOUS EVERY 4 HOURS PRN
Status: DISCONTINUED | OUTPATIENT
Start: 2024-05-09 | End: 2024-05-14 | Stop reason: HOSPADM

## 2024-05-09 RX ORDER — BUSPIRONE HYDROCHLORIDE 5 MG/1
5 TABLET ORAL 2 TIMES DAILY
Status: DISCONTINUED | OUTPATIENT
Start: 2024-05-09 | End: 2024-05-14 | Stop reason: HOSPADM

## 2024-05-09 RX ORDER — ENOXAPARIN SODIUM 100 MG/ML
40 INJECTION SUBCUTANEOUS EVERY 24 HOURS
Status: DISCONTINUED | OUTPATIENT
Start: 2024-05-09 | End: 2024-05-14 | Stop reason: HOSPADM

## 2024-05-09 RX ORDER — FUROSEMIDE 10 MG/ML
40 INJECTION INTRAMUSCULAR; INTRAVENOUS
Status: DISCONTINUED | OUTPATIENT
Start: 2024-05-09 | End: 2024-05-10

## 2024-05-09 RX ORDER — MAGNESIUM SULFATE HEPTAHYDRATE 40 MG/ML
2 INJECTION, SOLUTION INTRAVENOUS ONCE
Status: COMPLETED | OUTPATIENT
Start: 2024-05-09 | End: 2024-05-09

## 2024-05-09 RX ORDER — GLUCAGON 1 MG
1 KIT INJECTION
Status: DISCONTINUED | OUTPATIENT
Start: 2024-05-09 | End: 2024-05-14 | Stop reason: HOSPADM

## 2024-05-09 RX ORDER — PROCHLORPERAZINE EDISYLATE 5 MG/ML
5 INJECTION INTRAMUSCULAR; INTRAVENOUS EVERY 6 HOURS PRN
Status: DISCONTINUED | OUTPATIENT
Start: 2024-05-09 | End: 2024-05-14 | Stop reason: HOSPADM

## 2024-05-09 RX ORDER — LEVALBUTEROL INHALATION SOLUTION 0.63 MG/3ML
0.63 SOLUTION RESPIRATORY (INHALATION) EVERY 6 HOURS
Status: DISCONTINUED | OUTPATIENT
Start: 2024-05-09 | End: 2024-05-14 | Stop reason: HOSPADM

## 2024-05-09 RX ORDER — AMLODIPINE BESYLATE 5 MG/1
5 TABLET ORAL DAILY
Status: DISCONTINUED | OUTPATIENT
Start: 2024-05-09 | End: 2024-05-10

## 2024-05-09 RX ORDER — HYDRALAZINE HYDROCHLORIDE 20 MG/ML
10 INJECTION INTRAMUSCULAR; INTRAVENOUS EVERY 4 HOURS PRN
Status: DISCONTINUED | OUTPATIENT
Start: 2024-05-09 | End: 2024-05-14 | Stop reason: HOSPADM

## 2024-05-09 RX ADMIN — BUSPIRONE HYDROCHLORIDE 5 MG: 5 TABLET ORAL at 11:05

## 2024-05-09 RX ADMIN — CARVEDILOL 6.25 MG: 3.12 TABLET, FILM COATED ORAL at 11:05

## 2024-05-09 RX ADMIN — PANTOPRAZOLE SODIUM 40 MG: 40 TABLET, DELAYED RELEASE ORAL at 10:05

## 2024-05-09 RX ADMIN — GABAPENTIN 300 MG: 300 CAPSULE ORAL at 04:05

## 2024-05-09 RX ADMIN — ESCITALOPRAM OXALATE 5 MG: 5 TABLET, FILM COATED ORAL at 11:05

## 2024-05-09 RX ADMIN — POTASSIUM BICARBONATE 60 MEQ: 391 TABLET, EFFERVESCENT ORAL at 02:05

## 2024-05-09 RX ADMIN — ATORVASTATIN CALCIUM 10 MG: 10 TABLET, FILM COATED ORAL at 09:05

## 2024-05-09 RX ADMIN — LEVALBUTEROL HYDROCHLORIDE 0.63 MG: 0.63 SOLUTION RESPIRATORY (INHALATION) at 08:05

## 2024-05-09 RX ADMIN — PANTOPRAZOLE SODIUM 40 MG: 40 TABLET, DELAYED RELEASE ORAL at 11:05

## 2024-05-09 RX ADMIN — RIFAXIMIN 550 MG: 550 TABLET ORAL at 11:05

## 2024-05-09 RX ADMIN — THIAMINE HCL TAB 100 MG 100 MG: 100 TAB at 04:05

## 2024-05-09 RX ADMIN — FUROSEMIDE 40 MG: 10 INJECTION, SOLUTION INTRAMUSCULAR; INTRAVENOUS at 02:05

## 2024-05-09 RX ADMIN — MAGNESIUM SULFATE HEPTAHYDRATE 2 G: 40 INJECTION, SOLUTION INTRAVENOUS at 09:05

## 2024-05-09 RX ADMIN — BUSPIRONE HYDROCHLORIDE 5 MG: 5 TABLET ORAL at 10:05

## 2024-05-09 RX ADMIN — GABAPENTIN 300 MG: 300 CAPSULE ORAL at 10:05

## 2024-05-09 RX ADMIN — IPRATROPIUM BROMIDE AND ALBUTEROL SULFATE 9 ML: .5; 3 SOLUTION RESPIRATORY (INHALATION) at 12:05

## 2024-05-09 RX ADMIN — HYDRALAZINE HYDROCHLORIDE 10 MG: 20 INJECTION INTRAMUSCULAR; INTRAVENOUS at 09:05

## 2024-05-09 RX ADMIN — RIFAXIMIN 550 MG: 550 TABLET ORAL at 10:05

## 2024-05-09 RX ADMIN — FOLIC ACID 1 MG: 1 TABLET ORAL at 04:05

## 2024-05-09 RX ADMIN — CARVEDILOL 6.25 MG: 3.12 TABLET, FILM COATED ORAL at 10:05

## 2024-05-09 RX ADMIN — SACUBITRIL AND VALSARTAN 1 TABLET: 24; 26 TABLET, FILM COATED ORAL at 10:05

## 2024-05-09 RX ADMIN — AMLODIPINE BESYLATE 5 MG: 5 TABLET ORAL at 11:05

## 2024-05-09 RX ADMIN — FUROSEMIDE 40 MG: 10 INJECTION, SOLUTION INTRAMUSCULAR; INTRAVENOUS at 04:05

## 2024-05-09 NOTE — PLAN OF CARE
05/09/24 1359   Discharge Assessment   Assessment Type Discharge Planning Assessment   Confirmed/corrected address, phone number and insurance Yes   Confirmed Demographics Correct on Facesheet   Source of Information family  (Sister: Vannesa Blount: [phone])   If unable to respond/provide information was family/caregiver contacted? Yes   Contact Name/Number Sister: Vannesa Blount: [phone]   Communicated MILAN with patient/caregiver Date not available/Unable to determine   Reason For Admission Chief Complaint:  Patient presents with    Shortness of Breath. Pt arrives via [facility], EMS /  Pt reports SOB, hx of COPD, initial O2 sat in the 70's , pt currently on 4 L NC at 94% after duo neb, ems also provided 125 mg solumedrol. GCS 15     64-year-old with a history of shortness of breath presenting with shortness of breath hard of hearing poor historian she was hypoxic on EMS arrival placed on supplemental oxygen he is given steroids and a neb EN route.  Patient denies this happening previously.  Denies cough fevers chills.   People in Home child(lala), adult  (FOC: the patient lives withher adult son (Omer Bautista: [phone]) and a grandson (Adult).)   Facility Arrived From: Private residence.   Do you expect to return to your current living situation? Yes   Do you have help at home or someone to help you manage your care at home? Yes   Who are your caregiver(s) and their phone number(s)? FOC: the patient lives withher adult son (Omer Bautista: [phone]) and a grandson (Adult).   Prior to hospitilization cognitive status: Alert/Oriented   Current cognitive status: Alert/Oriented   Walking or Climbing Stairs Difficulty yes   Walking or Climbing Stairs ambulation difficulty, requires equipment   Mobility Management The patient uses a rolling walker for mobility concerns PRN.   Dressing/Bathing Difficulty no   Home Layout Able to live on 1st floor   Equipment Currently Used at Home walker, rolling   Readmission  within 30 days? Yes   Patient currently being followed by outpatient case management? No   Do you currently have service(s) that help you manage your care at home? Yes   Name and Contact number of agency The patient is active with home health services through: [facility].   Is the pt/caregiver preference to resume services with current agency Yes   Do you take prescription medications? Yes   Do you have prescription coverage? Yes   Coverage Payor: MEDICARE - MEDICARE PART A & B -   Do you have any problems affording any of your prescribed medications? No   Is the patient taking medications as prescribed? yes   Who is going to help you get home at discharge? FOC: the patient lives withher adult son (Omer Baum: [phone]) and a grandson (Adult). The family provides 24/7 availability.   How do you get to doctors appointments? family or friend will provide   Are you on dialysis? No   Do you take coumadin? No   Discharge Plan A Home with family   Discharge Plan B Home with family   DME Needed Upon Discharge  none   Discharge Plan discussed with: Sibling   Name(s) and Number(s) I spoke with the patient's Sister: Vannesa Brambila: [phone] after several attempts of reaching (Spouse): Bruce [phone]; Son:Omer Baum: [phone]   Transition of Care Barriers None   Financial Resource Strain   How hard is it for you to pay for the very basics like food, housing, medical care, and heating? Not very   Housing Stability   In the last 12 months, was there a time when you were not able to pay the mortgage or rent on time? N   At any time in the past 12 months, were you homeless or living in a shelter (including now)? N   Transportation Needs   Has the lack of transportation kept you from medical appointments, meetings, work or from getting things needed for daily living? No   Food Insecurity   Within the past 12 months, you worried that your food would run out before you got the money to buy more. Never true   Within the past  12 months, the food you bought just didn't last and you didn't have money to get more. Never true   Stress   Do you feel stress - tense, restless, nervous, or anxious, or unable to sleep at night because your mind is troubled all the time - these days? To some exte   Social Isolation   How often do you feel lonely or isolated from those around you?  Never   Alcohol Use   Q1: How often do you have a drink containing alcohol? Never   Q2: How many drinks containing alcohol do you have on a typical day when you are drinking? None   Q3: How often do you have six or more drinks on one occasion? Never   Utilities   In the past 12 months has the electric, gas, oil, or water company threatened to shut off services in your home? No   Health Literacy   How often do you need to have someone help you when you read instructions, pamphlets, or other written material from your doctor or pharmacy? Never   OTHER   Name(s) of People in Home Son: Omer Baum: [phone] and Grandson (Adult).

## 2024-05-09 NOTE — ED PROVIDER NOTES
Encounter Date: 5/8/2024       History     Chief Complaint   Patient presents with    Shortness of Breath     Pt arrives via [facility], EMS /  Pt reports SOB, hx of COPD, initial O2 sat in the 70's , pt currently on 4 L NC at 94% after duo neb, ems also provided 125 mg solumedrol. GCS 15     64-year-old with a history of shortness of breath presenting with shortness of breath hard of hearing poor historian she was hypoxic on EMS arrival placed on supplemental oxygen he is given steroids and a neb EN route.  Patient denies this happening previously.  Denies cough fevers chills.           Review of patient's allergies indicates:   Allergen Reactions    Iodinated contrast media Shortness Of Breath    Opioids - morphine analogues     Meperidine Rash and Hives     Past Medical History:   Diagnosis Date    Benign essential HTN     Mixed hyperlipidemia     Type 2 diabetes mellitus without complications      Past Surgical History:   Procedure Laterality Date    APPENDECTOMY      BACK SURGERY      HIP SURGERY      INTRAMEDULLARY RODDING OF FEMUR Right 10/27/2022    Procedure: RIGHT FEMUR INTERTROCH IMN;  Surgeon: Stewart Brambila MD;  Location: [facility];  Service: Orthopedics;  Laterality: Right;  [place] table, supine  3rd case  Synthes Short TFNA     No family history on file.  Social History     Tobacco Use    Smoking status: Every Day     Current packs/day: 0.50     Types: Cigarettes    Smokeless tobacco: Current   Substance Use Topics    Alcohol use: Yes    Drug use: Never     Review of Systems   Constitutional:  Negative for fever.   HENT:  Negative for sore throat.    Respiratory:  Positive for cough and shortness of breath.    Cardiovascular:  Negative for chest pain.   Gastrointestinal:  Negative for nausea.   Genitourinary:  Negative for dysuria.   Musculoskeletal:  Negative for back pain.   Skin:  Negative for rash.   Neurological:  Negative for weakness.   Hematological:  Does not bruise/bleed easily.       Physical Exam      Initial Vitals [05/08/24 2317]   BP Pulse Resp Temp SpO2   (!) 173/81 88 (!) 24 97.5 °F (36.4 °C) (!) 94 %      MAP       --         Physical Exam    Nursing note and vitals reviewed.  Constitutional: She is not diaphoretic. No distress.   HENT:   Head: Normocephalic and atraumatic.   Neck: Neck supple.   Normal range of motion.  Cardiovascular:  Normal rate and regular rhythm.           No murmur heard.  Pulmonary/Chest: No respiratory distress.   Diminished breath sounds wheezes throughout hypoxic requiring OxyMask   Abdominal: Abdomen is soft. She exhibits no distension. There is no abdominal tenderness.   Musculoskeletal:      Cervical back: Normal range of motion and neck supple.      Comments: Left leg is swollen and tender to palpation     Neurological: She is alert and oriented to person, place, and time. She has normal strength. No cranial nerve deficit or sensory deficit.   Skin: Skin is warm. Capillary refill takes less than 2 seconds.   Psychiatric: She has a normal mood and affect.         ED Course   Critical Care    Date/Time: 5/9/2024 4:34 AM    Performed by: Lenard Thrasher IV, MD  Authorized by: Lneard Thrasher IV, MD  Total critical care time (exclusive of procedural time) : 35 minutes  Critical care time was exclusive of separately billable procedures and treating other patients.  Critical care was necessary to treat or prevent imminent or life-threatening deterioration of the following conditions: respiratory failure.  Critical care was time spent personally by me on the following activities: discussions with consultants, development of treatment plan with patient or surrogate, blood draw for specimens, evaluation of patient's response to treatment, interpretation of cardiac output measurements, examination of patient, ordering and performing treatments and interventions, obtaining history from patient or surrogate, ordering and review of laboratory studies, ordering and review of  radiographic studies, pulse oximetry, re-evaluation of patient's condition and review of old charts.        Labs Reviewed   COMPREHENSIVE METABOLIC PANEL - Abnormal; Notable for the following components:       Result Value    Sodium 133 (*)     Potassium 3.4 (*)     Blood Urea Nitrogen 7.4 (*)     Albumin 3.2 (*)     Albumin/Globulin Ratio 1.0 (*)      (*)     All other components within normal limits   B-TYPE NATRIURETIC PEPTIDE - Abnormal; Notable for the following components:    Natriuretic Peptide 1,045.1 (*)     All other components within normal limits   CBC WITH DIFFERENTIAL - Abnormal; Notable for the following components:    RBC 3.06 (*)     Hgb 11.0 (*)     Hct 35.0 (*)     .4 (*)     MCH 35.9 (*)     MCHC 31.4 (*)     RDW 18.3 (*)     All other components within normal limits   TROPONIN I - Normal   MAGNESIUM - Normal   COVID/RSV/FLU A&B PCR - Normal    Narrative:     The Xpert Xpress SARS-CoV-2/FLU/RSV plus is a rapid, multiplexed real-time PCR test intended for the simultaneous qualitative detection and differentiation of SARS-CoV-2, Influenza A, Influenza B, and respiratory syncytial virus (RSV) viral RNA in either nasopharyngeal swab or nasal swab specimens.         CBC W/ AUTO DIFFERENTIAL    Narrative:     The following orders were created for panel order CBC auto differential.  Procedure                               Abnormality         Status                     ---------                               -----------         ------                     CBC with Differential[[id number]]       Abnormal            Final result                 Please view results for these tests on the individual orders.   COMPREHENSIVE METABOLIC PANEL   MAGNESIUM   PHOSPHORUS   CBC W/ AUTO DIFFERENTIAL    Narrative:     The following orders were created for panel order CBC with Automated Differential.  Procedure                               Abnormality         Status                     ---------                                -----------         ------                     CBC with Differential[[id number]]                                                        Please view results for these tests on the individual orders.   TROPONIN I   CBC WITH DIFFERENTIAL          Imaging Results              X-Ray Chest AP (In process)                      Medications   melatonin tablet 6 mg (has no administration in time range)   ondansetron injection 4 mg (has no administration in time range)   prochlorperazine injection Soln 5 mg (has no administration in time range)   polyethylene glycol packet 17 g (has no administration in time range)   acetaminophen tablet 650 mg (has no administration in time range)   simethicone chewable tablet 80 mg (has no administration in time range)   aluminum-magnesium hydroxide-simethicone 200-200-20 mg/5 mL suspension 30 mL (has no administration in time range)   naloxone 0.4 mg/mL injection 0.02 mg (has no administration in time range)   enoxaparin injection 40 mg (has no administration in time range)   albuterol-ipratropium 2.5 mg-0.5 mg/3 mL nebulizer solution 9 mL (9 mLs Nebulization Given 5/9/24 0019)   potassium bicarbonate disintegrating tablet 60 mEq (60 mEq Oral Given 5/9/24 0229)   furosemide injection 40 mg (40 mg Intravenous Given 5/9/24 0229)     Medical Decision Making  64-year-old presenting with shortness of breath hypoxic on arrival exam as above  Appears volume overloaded on chest x-ray overall no distress  BNP elevated supporting a diagnosis of new onset heart failure  Will diurese admit to hospitalist for further cardiac workup    Differential diagnosis (including but not limited to):   Judging by the patient's chief complaint and pertinent history, the patient has the following possible differential diagnoses, including but not limited to the following.  Some of these are deemed to be lower likelihood and some more likely based on my physical exam and history combined with  possible lab work and/or imaging studies.   Please see the pertinent studies, and refer to the HPI.  Some of these diagnoses will take further evaluation to fully rule out, perhaps as an outpatient and the patient was encouraged to follow up when discharged for more comprehensive evaluation.    ACS, pneumonia, COVID/Flu, congestive heart failure, asthma, COPD, pleural effusion, pulmonary edema, acute bronchitis, PE, pneumothorax, hemothorax, aortic dissection, electrolyte abnormalities, anemia, anxiety         Problems Addressed:  Congestive heart failure, unspecified HF chronicity, unspecified heart failure type: acute illness or injury that poses a threat to life or bodily functions  Hypoxia: acute illness or injury that poses a threat to life or bodily functions  SOB (shortness of breath): acute illness or injury that poses a threat to life or bodily functions    Amount and/or Complexity of Data Reviewed  Labs: ordered.  Radiology: ordered and independent interpretation performed.     Details: Chest x-ray with cardiomegaly and bilateral pulmonary edema  ECG/medicine tests: ordered and independent interpretation performed.     Details: EKG normal sinus rhythm 80 no ischemic changes normal axis intervals time 11:22 p.m.  Discussion of management or test interpretation with external provider(s): Discussed with hospitalist will admit    Risk  Prescription drug management.  Drug therapy requiring intensive monitoring for toxicity.  Decision regarding hospitalization.    Critical Care  Total time providing critical care: 36 minutes               ED Course as of 05/09/24 0438   Thu May 09, 2024   0140 Admitted to hospitalist  [AC]      ED Course User Index  [AC] Lenard Thrasher IV, MD                           Clinical Impression:  Final diagnoses:  [R06.02] SOB (shortness of breath)  [M79.89] Leg swelling  [R09.02] Hypoxia (Primary)  [I50.9] Congestive heart failure, unspecified HF chronicity, unspecified heart failure  type          ED Disposition Condition    Admit Stable                Lenard Thrasher IV, MD  05/09/24 1175

## 2024-05-09 NOTE — NURSING
Nurses Note -- 4 Eyes      5/9/2024   5:41 AM      Skin assessed during: Admit      [x] No Altered Skin Integrity Present    []Prevention Measures Documented      [] Yes- Altered Skin Integrity Present or Discovered   [] LDA Added if Not in Epic (Describe Wound)   [] New Altered Skin Integrity was Present on Admit and Documented in LDA   [] Wound Image Taken    Wound Care Consulted? No    Attending Nurse:  Pepper Crawford RN/Staff Member:   Shanell Miller RN

## 2024-05-09 NOTE — H&P
[facility] Hospital Medicine History & Physical Examination       Patient Name: Luz Maria Alfaro  MRN: [id number]  Patient Class: IP- Inpatient   Admission Date: 5/8/2024   Admitting Physician: Mckayla Cary MD   Length of Stay: 0  Attending Physician: Adam Weston MD   Primary Care Provider: BECCA  Face-to-Face encounter date: 05/09/2024  Code Status: Full Code    Chief Complaint: Shortness of Breath (Pt arrives via [facility], EMS /  Pt reports SOB, hx of COPD, initial O2 sat in the 70's , pt currently on 4 L NC at 94% after duo neb, ems also provided 125 mg solumedrol. GCS 15)        Patient information was obtained from patient, patient's family, past medical records and ER records.     HISTORY OF PRESENT ILLNESS:   Luz Maria Alfaro is a 64 y.o. White female with a past medical history of hypertension, hyperlipidemia, diabetes mellitus type 2, anxiety/depression, COPD  not on home oxygen  and alcoholic liver cirrhosis. The patient presented to [facility] on 5/8/2024 with a primary complaint of shortness of breath. Patient's ex  (Bruce [phone]) is at bedside giving today's history as patient has severe hearing loss.  He reports oxygen saturation was 62% yesterday.  Patient has been having chronic shortness of breath and swelling to the bilateral lower extremities with associated nausea.  Patient does not have a chronic cough. Patient had a month long admission to the hospital  approximately a month ago for in which she required paracentesis twice.  At baseline patient lives with son and grandson, ambulates with a cane / walker and needs assistance with activities of daily living.  She was a 2 pack-per-day smoker and a former alcohol user who quit in March of 2024.    Upon presentation to the ED, temperature 97.5° F, heart rate 88, blood pressure 173/81, respiratory rate 24 and SpO2 94% on 4 L nasal cannula.  Oxygen saturation decreased to 79 and patient was placed on OxyMask at 5 L.  labs with H&H 11/35, , sodium 133, potassium 3.4, alkaline phosphatase 156, BNP 1045, troponin less than 0.01.  Influenza A/B, RSV and SARS-COV-2 PCR negative.  EKG normal sinus rhythm and previously cited septal infarct.  Chest x-ray enlarged cardiac silhouette with pulmonary edema.  In ED patient received DuoNebs, 1 g of magnesium sulfate, 60 mEq of potassium bicarbonate.  Patient was admitted to hospital medicine services for further medical management.    PAST MEDICAL HISTORY:     Past Medical History:   Diagnosis Date    Benign essential HTN     Mixed hyperlipidemia     Type 2 diabetes mellitus without complications        PAST SURGICAL HISTORY:     Past Surgical History:   Procedure Laterality Date    APPENDECTOMY      BACK SURGERY      HIP SURGERY      INTRAMEDULLARY RODDING OF FEMUR Right 10/27/2022    Procedure: RIGHT FEMUR INTERTROCH IMN;  Surgeon: Stewart Brambila MD;  Location: [facility];  Service: Orthopedics;  Laterality: Right;  Hannah table, supine  3rd case  Synthes Short TFNA       ALLERGIES:   Iodinated contrast media, Opioids - morphine analogues, and Meperidine    FAMILY HISTORY:     Mother: COPD    SOCIAL HISTORY:   Smokes 2 pack of cigarettes per day   Former alcohol user.  Quit March 20, 2024   Denies illicit drug use    HOME MEDICATIONS:     Prior to Admission medications    Medication Sig Start Date End Date Taking? Authorizing Provider   albuterol (PROVENTIL/VENTOLIN HFA) 90 mcg/actuation inhaler Inhale 1 puff into the lungs every 4 (four) hours as needed for Shortness of Breath.   Yes Provider, Historical   amLODIPine (NORVASC) 5 MG tablet Take 1 tablet (5 mg total) by mouth once daily. 4/18/24 4/18/25 Yes Pedrito Arechiga MD   busPIRone (BUSPAR) 5 MG Tab Take 1 tablet (5 mg total) by mouth 2 (two) times daily. 4/17/24 7/16/24 Yes Pedrito Arechiga MD   carvediloL (COREG) 6.25 MG tablet Take 1 tablet (6.25 mg total) by mouth 2 (two) times daily. 4/17/24 7/16/24 Yes Geni  Pedrito AGUILAR MD   EScitalopram oxalate (LEXAPRO) 5 MG Tab Take 1 tablet (5 mg total) by mouth once daily. 4/18/24 4/18/25 Yes Pedrito Arechiga MD   gabapentin (NEURONTIN) 300 MG capsule Take 300 mg by mouth 3 (three) times daily.   Yes Provider, Historical   hyoscyamine (LEVSIN) 0.125 mg Subl Place 0.125 mg under the tongue 3 (three) times daily as needed (for abdominal cramping).   Yes Provider, Historical   pantoprazole (PROTONIX) 40 MG tablet Take 40 mg by mouth 2 (two) times daily.   Yes Provider, Historical   sodium bicarbonate 650 MG tablet Take 1 tablet (650 mg total) by mouth once daily. 4/18/24 6/17/24 Yes Pedrito Arechiga MD   ARIPiprazole (ABILIFY) 2 MG Tab Take 1 tablet (2 mg total) by mouth once daily. 11/12/22   Adam Weston MD   atorvastatin (LIPITOR) 10 MG tablet Take 10 mg by mouth once daily.    Provider, Historical   folic acid (FOLVITE) 1 MG tablet Take 1 mg by mouth once daily.    Provider, Historical   HYDROcodone-acetaminophen (NORCO) 5-325 mg per tablet Take 1 tablet by mouth 2 (two) times daily as needed. 7/10/23   Provider, Historical   ondansetron (ZOFRAN-ODT) 4 MG TbDL DISSOLVE ONE TABLET under the TONGUE THREE TIMES DAILY AS NEEDED FOR NAUSEA 6/16/23   Provider, Historical   rifAXIMin (XIFAXAN) 550 mg Tab Take 1 tablet (550 mg total) by mouth 2 (two) times daily. 4/17/24 7/16/24  Pedrito Arechiga MD   thiamine 100 MG tablet Take 1 tablet (100 mg total) by mouth once daily. 11/12/22   Adam Weston MD       REVIEW OF SYSTEMS:   Except as documented, all other systems reviewed and negative     PHYSICAL EXAM:     VITAL SIGNS: 24 HRS MIN & MAX LAST   Temp  Min: 97.5 °F (36.4 °C)  Max: 98.3 °F (36.8 °C) 97.6 °F (36.4 °C)   BP  Min: 135/68  Max: 173/81 (!) 171/90   Pulse  Min: 72  Max: 89  89   Resp  Min: 14  Max: 24 20   SpO2  Min: 79 %  Max: 99 % 98 %       General appearance: Well-developed, well-nourished female in no apparent distress.  Ex- at bedside.  HEENT: Atraumatic head.  Moist mucous membranes of oral cavity.  Hard of hearing.  Lungs: Inspiratory and expiration wheezing to auscultation bilaterally. On 3 L O2 via OxyMask.  Heart: Regular rate and rhythm with 2/6 systolic murmur. 1+ pitting edema to BLE.    Abdomen: Soft, non-distended, non-tender. Bowel sounds are normal.   Extremities: No cyanosis, clubbing. No deformities.  Skin: No Rash. Warm and dry.  Neuro: Awake, alert and oriented. Motor and sensory exams grossly intact.  Psych/mental status: Appropriate mood and affect. Cooperative. Responds appropriately to questions.     Upon leaving room patient reported chest pain.       LABS AND IMAGING:     Recent Labs   Lab 05/08/24 2348 05/09/24  0516   WBC 8.24 6.29   RBC 3.06* 3.29*   HGB 11.0* 11.8*   HCT 35.0* 37.0   .4* 112.5*   MCH 35.9* 35.9*   MCHC 31.4* 31.9*   RDW 18.3* 18.4*    397   MPV 8.9 9.0       Recent Labs   Lab 05/08/24 2348 05/09/24  0530 05/09/24  0707   *  --  137   K 3.4*  --  4.3   CO2 26  --  30   BUN 7.4*  --  6.3*   CREATININE 0.64  --  0.64   CALCIUM 9.0  --  9.0   MG 1.80 1.80  --    ALBUMIN 3.2*  --  3.1*   ALKPHOS 156*  --  151*   ALT 5  --  7   AST 13  --  22   BILITOT 0.3  --  0.3       Microbiology Results (last 7 days)       ** No results found for the last 168 hours. **             X-Ray Chest AP  Narrative: EXAMINATION:  XR CHEST AP PORTABLE    CLINICAL HISTORY:  sob;    TECHNIQUE:  Single frontal view of the chest was performed.    COMPARISON:  03/22/2024    FINDINGS:  LINES AND TUBES: EKG/telemetry leads overlie the chest.    MEDIASTINUM AND PITER: Cardiac silhouette is enlarged. There are calcifications in the region the mitral valve annulus.    LUNGS: Pulmonary vascular congestion and interstitial edema.    PLEURA:No pleural effusion. No pneumothorax.    BONES: No acute osseous abnormality.  Impression: Enlarged cardiac silhouette with pulmonary edema    Electronically signed by: Devi  Kaiden  Date:    05/09/2024  Time:    09:07        ASSESSMENT & PLAN:   Assessment:  Acute hypoxic respiratory failure secondary to new onset congestive heart failure exacerbation, type unknown  Macrocytic anemia, stable   Hypokalemia, resolved  Hyponatremia   Elevated alkaline phosphatase   History of hypertension, hyperlipidemia, diabetes mellitus type 2, anxiety/depression and COPD     Plan:  - Continue with supplemental oxygen, weaning as tolerated  - Cardiology consulted for new onset heart failure.  Appreciate recommendations  - Echo ordered to determine LVEF.  Follow results   - IV Lasix 40 mg twice a day  - Strict I&Os and daily weights   - Trend troponin x1 negative.  Upon leaving room patient complaining of chest tightness we will order stat EKG and stat troponin at this time  - IV hydralazine as needed for hypertension  - Accu-Cheks and sliding scale  - Resume appropriate home medications when deemed necessary   - Labs in AM      VTE Prophylaxis: will be placed on Lovenox for DVT prophylaxis and will be advised to be as mobile as possible and sit in a chair as tolerated      __________________________________________________________________________  INPATIENT LIST OF MEDICATIONS     Current Facility-Administered Medications:     acetaminophen tablet 650 mg, 650 mg, Oral, Q6H PRN, Azalea Earl AGACNP-BC    aluminum-magnesium hydroxide-simethicone 200-200-20 mg/5 mL suspension 30 mL, 30 mL, Oral, QID PRN, Azalea Earl AGACNP-BC    atorvastatin tablet 10 mg, 10 mg, Oral, Daily, Lanny Marcial, FNP, 10 mg at 05/09/24 0938    enoxaparin injection 40 mg, 40 mg, Subcutaneous, Daily, Azalea Earl AGACNP-BC    furosemide injection 40 mg, 40 mg, Intravenous, Q12H, Demetrice Burk PA-C    hydrALAZINE injection 10 mg, 10 mg, Intravenous, Q4H PRN, Demetrice Burk PA-C, 10 mg at 05/09/24 0938    magnesium sulfate 2g in water 50mL IVPB (premix), 2 g, Intravenous, Once, Lanny Marcial, YARIELP    melatonin  tablet 6 mg, 6 mg, Oral, Nightly PRN, RajatAzalea benvaides, AGACNP-BC    naloxone 0.4 mg/mL injection 0.02 mg, 0.02 mg, Intravenous, PRN, Marcello Earllimauricio ABREU, AGACNP-BC    ondansetron injection 4 mg, 4 mg, Intravenous, Q4H PRN, Azalea Earl, AGACNP-BC    polyethylene glycol packet 17 g, 17 g, Oral, BID PRN, RajatMarcello benavideslimauricio ABREU, AGACNP-BC    prochlorperazine injection Soln 5 mg, 5 mg, Intravenous, Q6H PRN, Azalea Earl, AGACNP-BC    simethicone chewable tablet 80 mg, 1 tablet, Oral, QID PRN, Azalea Earl, AGACNP-BC      Scheduled Meds:   atorvastatin  10 mg Oral Daily    enoxparin  40 mg Subcutaneous Daily    furosemide (LASIX) injection  40 mg Intravenous Q12H    magnesium sulfate IVPB  2 g Intravenous Once     Continuous Infusions:  PRN Meds:.  Current Facility-Administered Medications:     acetaminophen, 650 mg, Oral, Q6H PRN    aluminum-magnesium hydroxide-simethicone, 30 mL, Oral, QID PRN    hydrALAZINE, 10 mg, Intravenous, Q4H PRN    melatonin, 6 mg, Oral, Nightly PRN    naloxone, 0.02 mg, Intravenous, PRN    ondansetron, 4 mg, Intravenous, Q4H PRN    polyethylene glycol, 17 g, Oral, BID PRN    prochlorperazine, 5 mg, Intravenous, Q6H PRN    simethicone, 1 tablet, Oral, QID PRN      Discharge Planning and Disposition: Anticipated discharge to be determined.    IDemetrice PA, have reviewed and discussed the case with Dr. Adam Weston MD       Please see the following addendum for further assessment and plan from there attending MD.    Demetrice Burk PA-C  05/09/2024

## 2024-05-10 LAB
ALBUMIN SERPL-MCNC: 3.1 G/DL (ref 3.4–4.8)
ALBUMIN/GLOB SERPL: 1.1 RATIO (ref 1.1–2)
ALP SERPL-CCNC: 154 UNIT/L (ref 40–150)
ALT SERPL-CCNC: 6 UNIT/L (ref 0–55)
AST SERPL-CCNC: 13 UNIT/L (ref 5–34)
AV INDEX (PROSTH): 0.61
AV MEAN GRADIENT: 13 MMHG
AV PEAK GRADIENT: 23 MMHG
AV REGURGITATION PRESSURE HALF TIME: 481 MS
AV VALVE AREA BY VELOCITY RATIO: 1.14 CM²
AV VALVE AREA: 1.22 CM²
AV VELOCITY RATIO: 0.57
BASOPHILS # BLD AUTO: 0.01 X10(3)/MCL
BASOPHILS NFR BLD AUTO: 0.1 %
BILIRUB SERPL-MCNC: 0.4 MG/DL
BSA FOR ECHO PROCEDURE: 1.58 M2
BUN SERPL-MCNC: 5.8 MG/DL (ref 9.8–20.1)
CALCIUM SERPL-MCNC: 8.6 MG/DL (ref 8.4–10.2)
CHLORIDE SERPL-SCNC: 87 MMOL/L (ref 98–107)
CO2 SERPL-SCNC: 42 MMOL/L (ref 23–31)
CREAT SERPL-MCNC: 0.54 MG/DL (ref 0.55–1.02)
CV ECHO LV RWT: 0.41 CM
DOP CALC AO PEAK VEL: 2.38 M/S
DOP CALC AO VTI: 39.5 CM
DOP CALC LVOT AREA: 2 CM2
DOP CALC LVOT DIAMETER: 1.6 CM
DOP CALC LVOT PEAK VEL: 1.35 M/S
DOP CALC LVOT STROKE VOLUME: 48.23 CM3
DOP CALC MV VTI: 43.8 CM
DOP CALCLVOT PEAK VEL VTI: 24 CM
E WAVE DECELERATION TIME: 148 MSEC
E/A RATIO: 1.09
E/E' RATIO: 23.5 M/S
ECHO LV POSTERIOR WALL: 0.86 CM (ref 0.6–1.1)
EOSINOPHIL # BLD AUTO: 0.03 X10(3)/MCL (ref 0–0.9)
EOSINOPHIL NFR BLD AUTO: 0.3 %
ERYTHROCYTE [DISTWIDTH] IN BLOOD BY AUTOMATED COUNT: 18.7 % (ref 11.5–17)
FRACTIONAL SHORTENING: 27 % (ref 28–44)
GFR SERPLBLD CREATININE-BSD FMLA CKD-EPI: >60 ML/MIN/1.73/M2
GLOBULIN SER-MCNC: 2.7 GM/DL (ref 2.4–3.5)
GLUCOSE SERPL-MCNC: 94 MG/DL (ref 82–115)
HCT VFR BLD AUTO: 36.5 % (ref 37–47)
HGB BLD-MCNC: 12.1 G/DL (ref 12–16)
IMM GRANULOCYTES # BLD AUTO: 0.03 X10(3)/MCL (ref 0–0.04)
IMM GRANULOCYTES NFR BLD AUTO: 0.3 %
INTERVENTRICULAR SEPTUM: 0.94 CM (ref 0.6–1.1)
LEFT ATRIUM SIZE: 3.9 CM
LEFT ATRIUM VOLUME INDEX MOD: 49.6 ML/M2
LEFT ATRIUM VOLUME MOD: 78.4 CM3
LEFT INTERNAL DIMENSION IN SYSTOLE: 3.1 CM (ref 2.1–4)
LEFT VENTRICLE DIASTOLIC VOLUME INDEX: 50.32 ML/M2
LEFT VENTRICLE DIASTOLIC VOLUME: 79.5 ML
LEFT VENTRICLE MASS INDEX: 76 G/M2
LEFT VENTRICLE SYSTOLIC VOLUME INDEX: 24 ML/M2
LEFT VENTRICLE SYSTOLIC VOLUME: 37.9 ML
LEFT VENTRICULAR INTERNAL DIMENSION IN DIASTOLE: 4.22 CM (ref 3.5–6)
LEFT VENTRICULAR MASS: 119.59 G
LV LATERAL E/E' RATIO: 18.8 M/S
LV SEPTAL E/E' RATIO: 31.33 M/S
LVOT MG: 4 MMHG
LVOT MV: 0.88 CM/S
LYMPHOCYTES # BLD AUTO: 2.42 X10(3)/MCL (ref 0.6–4.6)
LYMPHOCYTES NFR BLD AUTO: 27.3 %
MAGNESIUM SERPL-MCNC: 1.8 MG/DL (ref 1.6–2.6)
MCH RBC QN AUTO: 36.1 PG (ref 27–31)
MCHC RBC AUTO-ENTMCNC: 33.2 G/DL (ref 33–36)
MCV RBC AUTO: 109 FL (ref 80–94)
MONOCYTES # BLD AUTO: 0.85 X10(3)/MCL (ref 0.1–1.3)
MONOCYTES NFR BLD AUTO: 9.6 %
MV MEAN GRADIENT: 9 MMHG
MV PEAK A VEL: 1.73 M/S
MV PEAK E VEL: 1.88 M/S
MV PEAK GRADIENT: 15 MMHG
MV STENOSIS PRESSURE HALF TIME: 106 MS
MV VALVE AREA BY CONTINUITY EQUATION: 1.1 CM2
MV VALVE AREA P 1/2 METHOD: 2.08 CM2
NEUTROPHILS # BLD AUTO: 5.51 X10(3)/MCL (ref 2.1–9.2)
NEUTROPHILS NFR BLD AUTO: 62.4 %
NRBC BLD AUTO-RTO: 0 %
OHS CV RV/LV RATIO: 0.69 CM
OHS LV EJECTION FRACTION SIMPSONS BIPLANE MOD: 56 %
OHS QRS DURATION: 80 MS
OHS QTC CALCULATION: 474 MS
PISA AR MAX VEL: 3.71 M/S
PISA TR MAX VEL: 2.56 M/S
PLATELET # BLD AUTO: 386 X10(3)/MCL (ref 130–400)
PMV BLD AUTO: 9.1 FL (ref 7.4–10.4)
POCT GLUCOSE: 141 MG/DL (ref 70–110)
POCT GLUCOSE: 93 MG/DL (ref 70–110)
POCT GLUCOSE: 96 MG/DL (ref 70–110)
POTASSIUM SERPL-SCNC: 3.3 MMOL/L (ref 3.5–5.1)
PROT SERPL-MCNC: 5.8 GM/DL (ref 5.8–7.6)
RBC # BLD AUTO: 3.35 X10(6)/MCL (ref 4.2–5.4)
RIGHT VENTRICULAR END-DIASTOLIC DIMENSION: 2.92 CM
SODIUM SERPL-SCNC: 138 MMOL/L (ref 136–145)
TDI LATERAL: 0.1 M/S
TDI SEPTAL: 0.06 M/S
TDI: 0.08 M/S
TR MAX PG: 26 MMHG
TRICUSPID ANNULAR PLANE SYSTOLIC EXCURSION: 2.06 CM
WBC # SPEC AUTO: 8.85 X10(3)/MCL (ref 4.5–11.5)
Z-SCORE OF LEFT VENTRICULAR DIMENSION IN END DIASTOLE: -0.7
Z-SCORE OF LEFT VENTRICULAR DIMENSION IN END SYSTOLE: 0.78

## 2024-05-10 PROCEDURE — 99900035 HC TECH TIME PER 15 MIN (STAT)

## 2024-05-10 PROCEDURE — S4991 NICOTINE PATCH NONLEGEND: HCPCS | Performed by: INTERNAL MEDICINE

## 2024-05-10 PROCEDURE — 94640 AIRWAY INHALATION TREATMENT: CPT

## 2024-05-10 PROCEDURE — 94760 N-INVAS EAR/PLS OXIMETRY 1: CPT

## 2024-05-10 PROCEDURE — 27000221 HC OXYGEN, UP TO 24 HOURS

## 2024-05-10 PROCEDURE — 63600175 PHARM REV CODE 636 W HCPCS: Performed by: NURSE PRACTITIONER

## 2024-05-10 PROCEDURE — 21400001 HC TELEMETRY ROOM

## 2024-05-10 PROCEDURE — 80053 COMPREHEN METABOLIC PANEL: CPT | Performed by: INTERNAL MEDICINE

## 2024-05-10 PROCEDURE — 99900031 HC PATIENT EDUCATION (STAT)

## 2024-05-10 PROCEDURE — 63600175 PHARM REV CODE 636 W HCPCS: Performed by: PHYSICIAN ASSISTANT

## 2024-05-10 PROCEDURE — 85025 COMPLETE CBC W/AUTO DIFF WBC: CPT | Performed by: INTERNAL MEDICINE

## 2024-05-10 PROCEDURE — 25000003 PHARM REV CODE 250: Performed by: PHYSICIAN ASSISTANT

## 2024-05-10 PROCEDURE — 63600175 PHARM REV CODE 636 W HCPCS

## 2024-05-10 PROCEDURE — 25000003 PHARM REV CODE 250: Performed by: INTERNAL MEDICINE

## 2024-05-10 PROCEDURE — 83735 ASSAY OF MAGNESIUM: CPT | Performed by: INTERNAL MEDICINE

## 2024-05-10 PROCEDURE — 94761 N-INVAS EAR/PLS OXIMETRY MLT: CPT

## 2024-05-10 PROCEDURE — 25000242 PHARM REV CODE 250 ALT 637 W/ HCPCS: Performed by: INTERNAL MEDICINE

## 2024-05-10 PROCEDURE — 25000003 PHARM REV CODE 250

## 2024-05-10 PROCEDURE — 36415 COLL VENOUS BLD VENIPUNCTURE: CPT | Performed by: INTERNAL MEDICINE

## 2024-05-10 RX ORDER — DAPAGLIFLOZIN 10 MG/1
10 TABLET, FILM COATED ORAL DAILY
Status: DISCONTINUED | OUTPATIENT
Start: 2024-05-10 | End: 2024-05-14 | Stop reason: HOSPADM

## 2024-05-10 RX ORDER — MAGNESIUM SULFATE HEPTAHYDRATE 40 MG/ML
2 INJECTION, SOLUTION INTRAVENOUS ONCE
Status: COMPLETED | OUTPATIENT
Start: 2024-05-10 | End: 2024-05-10

## 2024-05-10 RX ORDER — POTASSIUM CHLORIDE 20 MEQ/1
40 TABLET, EXTENDED RELEASE ORAL 2 TIMES DAILY
Status: COMPLETED | OUTPATIENT
Start: 2024-05-10 | End: 2024-05-10

## 2024-05-10 RX ORDER — FUROSEMIDE 40 MG/1
40 TABLET ORAL DAILY
Status: DISCONTINUED | OUTPATIENT
Start: 2024-05-10 | End: 2024-05-14 | Stop reason: HOSPADM

## 2024-05-10 RX ADMIN — BUSPIRONE HYDROCHLORIDE 5 MG: 5 TABLET ORAL at 08:05

## 2024-05-10 RX ADMIN — CARVEDILOL 6.25 MG: 3.12 TABLET, FILM COATED ORAL at 08:05

## 2024-05-10 RX ADMIN — LEVALBUTEROL HYDROCHLORIDE 0.63 MG: 0.63 SOLUTION RESPIRATORY (INHALATION) at 08:05

## 2024-05-10 RX ADMIN — RIFAXIMIN 550 MG: 550 TABLET ORAL at 09:05

## 2024-05-10 RX ADMIN — FUROSEMIDE 40 MG: 40 TABLET ORAL at 03:05

## 2024-05-10 RX ADMIN — ENOXAPARIN SODIUM 40 MG: 40 INJECTION SUBCUTANEOUS at 04:05

## 2024-05-10 RX ADMIN — PANTOPRAZOLE SODIUM 40 MG: 40 TABLET, DELAYED RELEASE ORAL at 08:05

## 2024-05-10 RX ADMIN — SACUBITRIL AND VALSARTAN 1 TABLET: 24; 26 TABLET, FILM COATED ORAL at 09:05

## 2024-05-10 RX ADMIN — GABAPENTIN 300 MG: 300 CAPSULE ORAL at 03:05

## 2024-05-10 RX ADMIN — FOLIC ACID 1 MG: 1 TABLET ORAL at 09:05

## 2024-05-10 RX ADMIN — AMLODIPINE BESYLATE 5 MG: 5 TABLET ORAL at 09:05

## 2024-05-10 RX ADMIN — DAPAGLIFLOZIN 10 MG: 10 TABLET, FILM COATED ORAL at 11:05

## 2024-05-10 RX ADMIN — CARVEDILOL 6.25 MG: 3.12 TABLET, FILM COATED ORAL at 09:05

## 2024-05-10 RX ADMIN — MAGNESIUM SULFATE HEPTAHYDRATE 2 G: 40 INJECTION, SOLUTION INTRAVENOUS at 11:05

## 2024-05-10 RX ADMIN — LEVALBUTEROL HYDROCHLORIDE 0.63 MG: 0.63 SOLUTION RESPIRATORY (INHALATION) at 02:05

## 2024-05-10 RX ADMIN — FUROSEMIDE 40 MG: 10 INJECTION, SOLUTION INTRAMUSCULAR; INTRAVENOUS at 03:05

## 2024-05-10 RX ADMIN — NICOTINE 1 PATCH: 21 PATCH, EXTENDED RELEASE TRANSDERMAL at 09:05

## 2024-05-10 RX ADMIN — THIAMINE HCL TAB 100 MG 100 MG: 100 TAB at 09:05

## 2024-05-10 RX ADMIN — GABAPENTIN 300 MG: 300 CAPSULE ORAL at 08:05

## 2024-05-10 RX ADMIN — LEVALBUTEROL HYDROCHLORIDE 0.63 MG: 0.63 SOLUTION RESPIRATORY (INHALATION) at 01:05

## 2024-05-10 RX ADMIN — PANTOPRAZOLE SODIUM 40 MG: 40 TABLET, DELAYED RELEASE ORAL at 09:05

## 2024-05-10 RX ADMIN — SACUBITRIL AND VALSARTAN 1 TABLET: 49; 51 TABLET, FILM COATED ORAL at 08:05

## 2024-05-10 RX ADMIN — POTASSIUM CHLORIDE 40 MEQ: 1500 TABLET, EXTENDED RELEASE ORAL at 08:05

## 2024-05-10 RX ADMIN — ESCITALOPRAM OXALATE 5 MG: 5 TABLET, FILM COATED ORAL at 09:05

## 2024-05-10 RX ADMIN — BUSPIRONE HYDROCHLORIDE 5 MG: 5 TABLET ORAL at 09:05

## 2024-05-10 RX ADMIN — LEVALBUTEROL HYDROCHLORIDE 0.63 MG: 0.63 SOLUTION RESPIRATORY (INHALATION) at 06:05

## 2024-05-10 RX ADMIN — RIFAXIMIN 550 MG: 550 TABLET ORAL at 08:05

## 2024-05-10 RX ADMIN — POTASSIUM CHLORIDE 40 MEQ: 1500 TABLET, EXTENDED RELEASE ORAL at 11:05

## 2024-05-10 RX ADMIN — GABAPENTIN 300 MG: 300 CAPSULE ORAL at 09:05

## 2024-05-10 NOTE — PLAN OF CARE
Problem: Adult Inpatient Plan of Care  Goal: Absence of Hospital-Acquired Illness or Injury  Outcome: Progressing  Goal: Optimal Comfort and Wellbeing  Outcome: Progressing     Problem: Heart Failure  Goal: Optimal Cardiac Output  Outcome: Progressing  Goal: Stable Heart Rate and Rhythm  Outcome: Progressing  Goal: Optimal Functional Ability  Outcome: Progressing  Goal: Improved Oral Intake  Outcome: Progressing  Goal: Effective Oxygenation and Ventilation  Outcome: Progressing  Goal: Effective Breathing Pattern During Sleep  Outcome: Progressing     Problem: Fall Injury Risk  Goal: Absence of Fall and Fall-Related Injury  Outcome: Progressing

## 2024-05-10 NOTE — PROGRESS NOTES
Inpatient Nutrition Evaluation    Admit Date: 5/8/2024   Total duration of encounter: 2 days    Nutrition Recommendation/Prescription     - continue oral diet as tolerated; Diet diabetic Low Sodium,2gm; 1800 Calorie     Nutrition Assessment     Chart Review    Reason Seen: continuous nutrition monitoring    Malnutrition Screening Tool Results   Have you recently lost weight without trying?: No  Have you been eating poorly because of a decreased appetite?: No   MST Score: 0     Diagnosis:  Acute hypoxic respiratory failure  Diastolic heart failure  Macrocytic anemia, stable   Hypokalemia, resolved  Hyponatremia   Elevated alkaline phosphatase    Relevant Medical History: hypertension, hyperlipidemia, diabetes mellitus type 2, anxiety/depression, COPD not on home oxygen and alcoholic liver cirrhosis     Nutrition-Related Medications: Scheduled Medications:  busPIRone, 5 mg, BID  carvediloL, 6.25 mg, BID  dapagliflozin propanediol, 10 mg, Daily  enoxparin, 40 mg, Daily  EScitalopram oxalate, 5 mg, Daily  folic acid, 1 mg, Daily  furosemide, 40 mg, Daily  gabapentin, 300 mg, TID  levalbuterol, 0.63 mg, Q6H  nicotine, 1 patch, Daily  pantoprazole, 40 mg, BID  potassium chloride, 40 mEq, BID  rifAXIMin, 550 mg, BID  sacubitriL-valsartan, 1 tablet, BID  thiamine, 100 mg, Daily    Continuous Infusions:   PRN Medications:    busPIRone tablet 5 mg    carvediloL tablet 6.25 mg    dapagliflozin propanediol (Farxiga) tablet 10 mg    enoxaparin injection 40 mg    EScitalopram oxalate tablet 5 mg    folic acid tablet 1 mg    furosemide tablet 40 mg    gabapentin capsule 300 mg    levalbuterol nebulizer solution 0.63 mg    nicotine 21 mg/24 hr 1 patch    pantoprazole EC tablet 40 mg    potassium chloride SA CR tablet 40 mEq    rifAXIMin tablet 550 mg    sacubitriL-valsartan 49-51 mg per tablet 1 tablet    thiamine tablet 100 mg        Nutrition-Related Labs:  Recent Labs   Lab 05/08/24  2348 05/09/24  0516 05/09/24  0530  "05/09/24  0707 05/10/24  0555   *  --   --  137 138   K 3.4*  --   --  4.3 3.3*   CALCIUM 9.0  --   --  9.0 8.6   PHOS  --   --  3.5  --   --    MG 1.80  --  1.80  --  1.80   CHLORIDE 98  --   --  97* 87*   CO2 26  --   --  30 42*   BUN 7.4*  --   --  6.3* 5.8*   CREATININE 0.64  --   --  0.64 0.54*   EGFRNORACEVR >60  --   --  >60 >60   GLUCOSE 96  --   --  133* 94   BILITOT 0.3  --   --  0.3 0.4   ALKPHOS 156*  --   --  151* 154*   ALT 5  --   --  7 6   AST 13  --   --  22 13   ALBUMIN 3.2*  --   --  3.1* 3.1*   WBC 8.24 6.29  --   --  8.85   HGB 11.0* 11.8*  --   --  12.1   HCT 35.0* 37.0  --   --  36.5*        Diet Order: Diet diabetic Low Sodium,2gm; 1800 Calorie  Oral Supplement Order: none  Appetite/Oral Intake: good/% of meals  Factors Affecting Nutritional Intake: none identified  Food/Hoahaoism/Cultural Preferences: unable to obtain  Food Allergies: no known food allergies    Skin Integrity: bruised (ecchymotic), skin tear (R forearm)  Wound(s):       Comments    5/10/24 pt sleeping, no family in room, no appetite loss reported at admit, weight fluctuations noted in EMR, possibly due to fluid    Anthropometrics    Height: 5' 3" (160 cm) Height Method: Measured  Last Weight: 56.3 kg (124 lb 1.6 oz) (05/10/24 0521) Weight Method: Bed Scale  BMI (Calculated): 22  BMI Classification: normal (BMI 18.5-24.9)     Ideal Body Weight (IBW), Female: 115 lb     % Ideal Body Weight, Female (lb): 107.91 %                             Usual Weight Provided By: EMR weight history    Wt Readings from Last 5 Encounters:   05/10/24 56.3 kg (124 lb 1.6 oz)   03/21/24 61 kg (134 lb 7.7 oz)   07/21/23 56.3 kg (124 lb 3.2 oz)   03/27/23 54.4 kg (119 lb 14.9 oz)   01/24/23 54.4 kg (119 lb 14.9 oz)     Weight Change(s) Since Admission:  Admit Weight: 52.2 kg (115 lb) (05/08/24 6287)      Patient Education    Not applicable.    Monitoring & Evaluation     Dietitian will monitor food and beverage intake.  Nutrition " Risk/Follow-Up: low (follow-up in 5-7 days)  Patients assigned 'low nutrition risk' status do not qualify for a full nutritional assessment but will be monitored and re-evaluated in a 5-7 day time period. Please consult if re-evaluation needed sooner.

## 2024-05-10 NOTE — PLAN OF CARE
Problem: Adult Inpatient Plan of Care  Goal: Absence of Hospital-Acquired Illness or Injury  Outcome: Progressing  Goal: Optimal Comfort and Wellbeing  Outcome: Progressing     Problem: Wound  Goal: Absence of Infection Signs and Symptoms  Outcome: Progressing     Problem: Diabetes Comorbidity  Goal: Blood Glucose Level Within Targeted Range  Outcome: Progressing     Problem: Heart Failure  Goal: Optimal Cardiac Output  5/10/2024 0733 by Marlin Nava RN  Outcome: Progressing  5/10/2024 0200 by Marlin Nava RN  Outcome: Progressing  Goal: Stable Heart Rate and Rhythm  Outcome: Progressing  Goal: Optimal Functional Ability  Outcome: Progressing  Goal: Improved Oral Intake  Outcome: Progressing  Goal: Effective Oxygenation and Ventilation  Outcome: Progressing  Goal: Effective Breathing Pattern During Sleep  Outcome: Progressing     Problem: Fall Injury Risk  Goal: Absence of Fall and Fall-Related Injury  Outcome: Progressing

## 2024-05-10 NOTE — PROGRESS NOTES
[facility] - [facility] Telemetry    Cardiology  Progress Note    Patient Name: Luz Maria Alfaro  MRN: [id number]  Admission Date: 5/8/2024  Hospital Length of Stay: 1 days  Code Status: Full Code   Attending Physician: Tabatha Stokes DO   Primary Care Physician: Robyn Matthews NP  Expected Discharge Date:   Principal Problem:<principal problem not specified>    Subjective:     Brief HPI/Hospital Course: Ms. Alfaro is a 63 y/o female with a history of HTN, HLD, VHD, COPD, DM II, who was known to Dr. Huynh (Last seen in 2022). She presented to the ER on 5.8.24 with complaints of shortness of breath. EMS reports initial oxygen saturation was 70% on RA. She was given supplemental oxygen and Solumedrol with improvement of oxygenation. Significant labs include H&H 11.0/35.0, , K 3.4, Glucose 133. Mag 1.8, , Albumin 3.1, BNP 1,045.1. Troponin negative x2. Flu, RSV, and COVID negative. CXR pending. EKG shows sinus rhythm with septal infarct (old). [facility] has been consulted for suspected new onset heart failure.    5.10.24: NAD. VSS. Reports SOB has improved. No complaints of CP/Palps. 4450 urine output over 24 hours/Net Negative 4210. NC 2 L. Patient is Deaf           PMH: HTN, HLD, VHD, COPD, DM II  PSH: Breast Augmentation, Appendectomy, Back Surgery, Hip Surgery, IM Femur Rudy  Family History: Sister - MI, CAD   Social History: Current Smoker. Denies illicit drug use and alcohol use.      Previous Cardiac Diagnostics:   ECHO (5.9.24):  Left Ventricle: The left ventricle is normal in size. Normal wall thickness. There is normal systolic function with a visually estimated ejection fraction of 55 - 60%. Grade 2 DD. Right Ventricle: Normal right ventricular cavity size. Systolic function is normal. Aortic Valve: Moderately calcified cusps but not well visualized. There is mild to moderate stenosis. Aortic valve area by VTI is 1.22 cm². Aortic valve peak velocity is 2.38 m/s. Mean  gradient is 13 mmHg. The dimensionless index is 0.61. There is moderate aortic regurgitation. Mitral Valve: There is moderate mitral annular calcification present. There is moderate stenosis. The mean pressure gradient across the mitral valve is 9 mmHg at a heart rate of 91  bpm. There is moderate regurgitation. Tricuspid Valve: There is mild regurgitation. Pericardium: There is no pericardial effusion.    Venous US of Left Lower Leg (5.8.24):  There was no evidence of deep or superficial vein thrombosis in the left lower extremity      ECHO (2.14.22):  The study quality is average. The left ventricle is normal in size. Global left ventricular systolic function is hyperdynamic. The left ventricular ejection fraction is 70%. The left ventricle diastolic function is impaired (Grade II) with an elevated left atrial pressure. Volume index (42ml/m2) is consistent with moderate LA enlargement. Mild mitral stenosis is noted with a mean trans mitral gradient of 4.7 mmHg. The area by pressure half time is 2.37 cm². Moderate (2+) aortic regurgitation. Mild aortic valve stenosis is present. An aortic valve area by continuity equation of 1.6 cm² is obtained with a trans-aortic mean gradient of 9.1 mmHg. Moderate calcification and diffuse thickening of the aortic valve cusps is noted with reduced cuspal excursion. Breast implants are noted.       Review of Systems   Cardiovascular:  Positive for dyspnea on exertion. Negative for chest pain, leg swelling and palpitations.   Respiratory:  Negative for shortness of breath.    All other systems reviewed and are negative.    Objective:     Vital Signs (Most Recent):  Temp: 97.9 °F (36.6 °C) (05/10/24 0753)  Pulse: 68 (05/10/24 0823)  Resp: 18 (05/10/24 0823)  BP: 116/63 (05/10/24 0753)  SpO2: (!) 94 % (05/10/24 0823) Vital Signs (24h Range):  Temp:  [97.6 °F (36.4 °C)-98.4 °F (36.9 °C)] 97.9 °F (36.6 °C)  Pulse:  [65-93] 68  Resp:  [12-22] 18  SpO2:  [90 %-98 %] 94 %  BP:  (114-159)/(62-79) 116/63   Weight: 56.3 kg (124 lb 1.6 oz)  Body mass index is 21.98 kg/m².  SpO2: (!) 94 %       Intake/Output Summary (Last 24 hours) at 5/10/2024 0928  Last data filed at 5/10/2024 0519  Gross per 24 hour   Intake 240 ml   Output 3550 ml   Net -3310 ml     Lines/Drains/Airways       Peripheral Intravenous Line  Duration                  Peripheral IV - Single Lumen 05/08/24 2300 22 G Left;Posterior Hand 1 day         Peripheral IV - Single Lumen 05/08/24 2357 20 G Anterior;Right Forearm 1 day                  Significant Labs:   Recent Results (from the past 72 hour(s))   EKG 12-lead    Collection Time: 05/08/24 11:22 PM   Result Value Ref Range    QRS Duration 78 ms    [facility] QTC Calculation 452 ms   Comprehensive metabolic panel    Collection Time: 05/08/24 11:48 PM   Result Value Ref Range    Sodium 133 (L) 136 - 145 mmol/L    Potassium 3.4 (L) 3.5 - 5.1 mmol/L    Chloride 98 98 - 107 mmol/L    CO2 26 23 - 31 mmol/L    Glucose 96 82 - 115 mg/dL    Blood Urea Nitrogen 7.4 (L) 9.8 - 20.1 mg/dL    Creatinine 0.64 0.55 - 1.02 mg/dL    Calcium 9.0 8.4 - 10.2 mg/dL    Protein Total 6.4 5.8 - 7.6 gm/dL    Albumin 3.2 (L) 3.4 - 4.8 g/dL    Globulin 3.2 2.4 - 3.5 gm/dL    Albumin/Globulin Ratio 1.0 (L) 1.1 - 2.0 ratio    Bilirubin Total 0.3 <=1.5 mg/dL     (H) 40 - 150 unit/L    ALT 5 0 - 55 unit/L    AST 13 5 - 34 unit/L    eGFR >60 mL/min/1.73/m2   Troponin I    Collection Time: 05/08/24 11:48 PM   Result Value Ref Range    Troponin-I <0.010 0.000 - 0.045 ng/mL   Brain natriuretic peptide    Collection Time: 05/08/24 11:48 PM   Result Value Ref Range    Natriuretic Peptide 1,045.1 (H) <=100.0 pg/mL   CBC with Differential    Collection Time: 05/08/24 11:48 PM   Result Value Ref Range    WBC 8.24 4.50 - 11.50 x10(3)/mcL    RBC 3.06 (L) 4.20 - 5.40 x10(6)/mcL    Hgb 11.0 (L) 12.0 - 16.0 g/dL    Hct 35.0 (L) 37.0 - 47.0 %    .4 (H) 80.0 - 94.0 fL    MCH 35.9 (H) 27.0 - 31.0 pg    MCHC 31.4 (L)  33.0 - 36.0 g/dL    RDW 18.3 (H) 11.5 - 17.0 %    Platelet 383 130 - 400 x10(3)/mcL    MPV 8.9 7.4 - 10.4 fL    Neut % 62.8 %    Lymph % 26.8 %    Mono % 9.0 %    Eos % 0.7 %    Basophil % 0.2 %    Lymph # 2.21 0.6 - 4.6 x10(3)/mcL    Neut # 5.17 2.1 - 9.2 x10(3)/mcL    Mono # 0.74 0.1 - 1.3 x10(3)/mcL    Eos # 0.06 0 - 0.9 x10(3)/mcL    Baso # 0.02 <=0.2 x10(3)/mcL    IG# 0.04 0 - 0.04 x10(3)/mcL    IG% 0.5 %    NRBC% 0.0 %   Magnesium    Collection Time: 05/08/24 11:48 PM   Result Value Ref Range    Magnesium Level 1.80 1.60 - 2.60 mg/dL   COVID/RSV/FLU A&B PCR    Collection Time: 05/09/24  1:58 AM   Result Value Ref Range    Influenza A PCR Not Detected Not Detected    Influenza B PCR Not Detected Not Detected    Respiratory Syncytial Virus PCR Not Detected Not Detected    SARS-CoV-2 PCR Not Detected Not Detected, Negative   CBC with Differential    Collection Time: 05/09/24  5:16 AM   Result Value Ref Range    WBC 6.29 4.50 - 11.50 x10(3)/mcL    RBC 3.29 (L) 4.20 - 5.40 x10(6)/mcL    Hgb 11.8 (L) 12.0 - 16.0 g/dL    Hct 37.0 37.0 - 47.0 %    .5 (H) 80.0 - 94.0 fL    MCH 35.9 (H) 27.0 - 31.0 pg    MCHC 31.9 (L) 33.0 - 36.0 g/dL    RDW 18.4 (H) 11.5 - 17.0 %    Platelet 397 130 - 400 x10(3)/mcL    MPV 9.0 7.4 - 10.4 fL    Neut % 90.8 %    Lymph % 7.8 %    Mono % 0.6 %    Eos % 0.0 %    Basophil % 0.2 %    Lymph # 0.49 (L) 0.6 - 4.6 x10(3)/mcL    Neut # 5.71 2.1 - 9.2 x10(3)/mcL    Mono # 0.04 (L) 0.1 - 1.3 x10(3)/mcL    Eos # 0.00 0 - 0.9 x10(3)/mcL    Baso # 0.01 <=0.2 x10(3)/mcL    IG# 0.04 0 - 0.04 x10(3)/mcL    IG% 0.6 %    NRBC% 0.0 %   Magnesium    Collection Time: 05/09/24  5:30 AM   Result Value Ref Range    Magnesium Level 1.80 1.60 - 2.60 mg/dL   Phosphorus    Collection Time: 05/09/24  5:30 AM   Result Value Ref Range    Phosphorus Level 3.5 2.3 - 4.7 mg/dL   Troponin I    Collection Time: 05/09/24  5:30 AM   Result Value Ref Range    Troponin-I <0.010 0.000 - 0.045 ng/mL   Comprehensive  Metabolic Panel (CMP)    Collection Time: 05/09/24  7:07 AM   Result Value Ref Range    Sodium 137 136 - 145 mmol/L    Potassium 4.3 3.5 - 5.1 mmol/L    Chloride 97 (L) 98 - 107 mmol/L    CO2 30 23 - 31 mmol/L    Glucose 133 (H) 82 - 115 mg/dL    Blood Urea Nitrogen 6.3 (L) 9.8 - 20.1 mg/dL    Creatinine 0.64 0.55 - 1.02 mg/dL    Calcium 9.0 8.4 - 10.2 mg/dL    Protein Total 6.4 5.8 - 7.6 gm/dL    Albumin 3.1 (L) 3.4 - 4.8 g/dL    Globulin 3.3 2.4 - 3.5 gm/dL    Albumin/Globulin Ratio 0.9 (L) 1.1 - 2.0 ratio    Bilirubin Total 0.3 <=1.5 mg/dL     (H) 40 - 150 unit/L    ALT 7 0 - 55 unit/L    AST 22 5 - 34 unit/L    eGFR >60 mL/min/1.73/m2   Echo    Collection Time: 05/09/24  9:04 AM   Result Value Ref Range    BSA 1.58 m2    Granado's Biplane MOD Ejection Fraction 56 %    LVOT stroke volume 48.23 cm3    LVIDd 4.22 3.5 - 6.0 cm    LV Systolic Volume 37.90 mL    LV Systolic Volume Index 24.0 mL/m2    LVIDs 3.10 2.1 - 4.0 cm    LV Diastolic Volume 79.50 mL    LV Diastolic Volume Index 50.32 mL/m2    IVS 0.94 0.6 - 1.1 cm    LVOT diameter 1.60 cm    LVOT area 2.0 cm2    FS 27 (A) 28 - 44 %    Left Ventricle Relative Wall Thickness 0.41 cm    Posterior Wall 0.86 0.6 - 1.1 cm    LV mass 119.59 g    LV Mass Index 76 g/m2    MV Peak E Moses 1.88 m/s    TDI LATERAL 0.10 m/s    TDI SEPTAL 0.06 m/s    E/E' ratio 23.50 m/s    MV Peak A Moses 1.73 m/s    TR Max Moses 2.56 m/s    E/A ratio 1.09     E wave deceleration time 148.00 msec    LV SEPTAL E/E' RATIO 31.33 m/s    LV LATERAL E/E' RATIO 18.80 m/s    LVOT peak moses 1.35 m/s    Left Ventricular Outflow Tract Mean Velocity 0.88 cm/s    Left Ventricular Outflow Tract Mean Gradient 4.00 mmHg    RVDD 2.92 cm    TAPSE 2.06 cm    RV/LV Ratio 0.69 cm    LA size 3.90 cm    LA volume (mod) 78.40 cm3    LA Volume Index (Mod) 49.6 mL/m2    AV regurgitation pressure 1/2 time 481 ms    AR Max Moses 3.71 m/s    AV mean gradient 13 mmHg    AV peak gradient 23 mmHg    Ao peak moses 2.38 m/s     Ao VTI 39.50 cm    LVOT peak VTI 24.00 cm    AV valve area 1.22 cm²    AV Velocity Ratio 0.57     AV index (prosthetic) 0.61     RAFAELA by Velocity Ratio 1.14 cm²    MV mean gradient 9 mmHg    MV peak gradient 15 mmHg    MV stenosis pressure 1/2 time 106.00 ms    MV valve area p 1/2 method 2.08 cm2    MV valve area by continuity eq 1.10 cm2    MV VTI 43.8 cm    Triscuspid Valve Regurgitation Peak Gradient 26 mmHg    Mean e' 0.08 m/s    ZLVIDS 0.78     ZLVIDD -0.70    EKG 12-lead    Collection Time: 05/09/24 11:09 AM   Result Value Ref Range    QRS Duration 80 ms    [facility] QTC Calculation 474 ms   Troponin I    Collection Time: 05/09/24 11:12 AM   Result Value Ref Range    Troponin-I <0.010 0.000 - 0.045 ng/mL   Troponin I    Collection Time: 05/09/24  4:50 PM   Result Value Ref Range    Troponin-I <0.010 0.000 - 0.045 ng/mL   POCT glucose    Collection Time: 05/09/24  6:16 PM   Result Value Ref Range    POCT Glucose 134 (H) 70 - 110 mg/dL   POCT glucose    Collection Time: 05/09/24 10:23 PM   Result Value Ref Range    POCT Glucose 111 (H) 70 - 110 mg/dL   Comprehensive Metabolic Panel    Collection Time: 05/10/24  5:55 AM   Result Value Ref Range    Sodium 138 136 - 145 mmol/L    Potassium 3.3 (L) 3.5 - 5.1 mmol/L    Chloride 87 (L) 98 - 107 mmol/L    CO2 42 (HH) 23 - 31 mmol/L    Glucose 94 82 - 115 mg/dL    Blood Urea Nitrogen 5.8 (L) 9.8 - 20.1 mg/dL    Creatinine 0.54 (L) 0.55 - 1.02 mg/dL    Calcium 8.6 8.4 - 10.2 mg/dL    Protein Total 5.8 5.8 - 7.6 gm/dL    Albumin 3.1 (L) 3.4 - 4.8 g/dL    Globulin 2.7 2.4 - 3.5 gm/dL    Albumin/Globulin Ratio 1.1 1.1 - 2.0 ratio    Bilirubin Total 0.4 <=1.5 mg/dL     (H) 40 - 150 unit/L    ALT 6 0 - 55 unit/L    AST 13 5 - 34 unit/L    eGFR >60 mL/min/1.73/m2   Magnesium    Collection Time: 05/10/24  5:55 AM   Result Value Ref Range    Magnesium Level 1.80 1.60 - 2.60 mg/dL   CBC with Differential    Collection Time: 05/10/24  5:55 AM   Result Value Ref Range    WBC 8.85  4.50 - 11.50 x10(3)/mcL    RBC 3.35 (L) 4.20 - 5.40 x10(6)/mcL    Hgb 12.1 12.0 - 16.0 g/dL    Hct 36.5 (L) 37.0 - 47.0 %    .0 (H) 80.0 - 94.0 fL    MCH 36.1 (H) 27.0 - 31.0 pg    MCHC 33.2 33.0 - 36.0 g/dL    RDW 18.7 (H) 11.5 - 17.0 %    Platelet 386 130 - 400 x10(3)/mcL    MPV 9.1 7.4 - 10.4 fL    Neut % 62.4 %    Lymph % 27.3 %    Mono % 9.6 %    Eos % 0.3 %    Basophil % 0.1 %    Lymph # 2.42 0.6 - 4.6 x10(3)/mcL    Neut # 5.51 2.1 - 9.2 x10(3)/mcL    Mono # 0.85 0.1 - 1.3 x10(3)/mcL    Eos # 0.03 0 - 0.9 x10(3)/mcL    Baso # 0.01 <=0.2 x10(3)/mcL    IG# 0.03 0 - 0.04 x10(3)/mcL    IG% 0.3 %    NRBC% 0.0 %     Telemetry:      Physical Exam  Vitals and nursing note reviewed.   HENT:      Head: Normocephalic.      Nose: Nose normal.      Mouth/Throat:      Mouth: Mucous membranes are moist.   Eyes:      Extraocular Movements: Extraocular movements intact.      Comments: Deaf   Cardiovascular:      Rate and Rhythm: Normal rate and regular rhythm.      Pulses: Normal pulses.      Heart sounds: Murmur heard.   Abdominal:      General: Bowel sounds are normal.      Palpations: Abdomen is soft.   Musculoskeletal:         General: Normal range of motion.      Cervical back: Normal range of motion.   Skin:     General: Skin is warm.   Neurological:      Mental Status: She is alert and oriented to person, place, and time.   Psychiatric:         Mood and Affect: Mood normal.         Behavior: Behavior normal.       Current Inpatient Medications:  Current Facility-Administered Medications:     acetaminophen tablet 650 mg, 650 mg, Oral, Q6H PRN, Azalea Earl, AGACNP-BC    aluminum-magnesium hydroxide-simethicone 200-200-20 mg/5 mL suspension 30 mL, 30 mL, Oral, QID PRN, Azalea Earl, AGACNP-BC    amLODIPine tablet 5 mg, 5 mg, Oral, Daily, Demetrice Burk PA-TYREE, 5 mg at 05/10/24 0910    busPIRone tablet 5 mg, 5 mg, Oral, BID, Demetrice Burk PA-C, 5 mg at 05/10/24 0910    carvediloL tablet 6.25 mg,  6.25 mg, Oral, BID, Demetrice Burk PA-C, 6.25 mg at 05/10/24 0910    dextrose 10% bolus 125 mL 125 mL, 12.5 g, Intravenous, PRN, Adam Weston MD    dextrose 10% bolus 250 mL 250 mL, 25 g, Intravenous, PRN, Adam Weston MD    enoxaparin injection 40 mg, 40 mg, Subcutaneous, Daily, Azalea Earl AGACNP-BC    EScitalopram oxalate tablet 5 mg, 5 mg, Oral, Daily, Demetrice Burk PA-C, 5 mg at 05/10/24 0910    folic acid tablet 1 mg, 1 mg, Oral, Daily, Adam Weston MD, 1 mg at 05/10/24 0910    furosemide injection 40 mg, 40 mg, Intravenous, Q12H, Demetrice Burk PA-C, 40 mg at 05/10/24 0302    gabapentin capsule 300 mg, 300 mg, Oral, TID, Demetrice Burk PA-C, 300 mg at 05/10/24 0910    glucagon (human recombinant) injection 1 mg, 1 mg, Intramuscular, PRN, Adam Weston MD    glucose chewable tablet 16 g, 16 g, Oral, PRN, Adam Weston MD    glucose chewable tablet 24 g, 24 g, Oral, PRN, Adam Weston MD    hydrALAZINE injection 10 mg, 10 mg, Intravenous, Q4H PRN, Demetrice Burk PA-C, 10 mg at 05/09/24 0938    insulin aspart U-100 injection 0-5 Units, 0-5 Units, Subcutaneous, QID (AC + HS) PRN, Adam Weston MD    levalbuterol nebulizer solution 0.63 mg, 0.63 mg, Nebulization, Q6H, Adam Weston MD, 0.63 mg at 05/10/24 0823    melatonin tablet 6 mg, 6 mg, Oral, Nightly PRN, Azalea Earl AGACNP-BC    naloxone 0.4 mg/mL injection 0.02 mg, 0.02 mg, Intravenous, PRN, Azalea Earl AGACNP-BC    nicotine 21 mg/24 hr 1 patch, 1 patch, Transdermal, Daily, Adam Weston MD, 1 patch at 05/10/24 0909    ondansetron injection 4 mg, 4 mg, Intravenous, Q4H PRN, Azalea Earl AGACNP-BC    pantoprazole EC tablet 40 mg, 40 mg, Oral, BID, Demetrice Burk, BOB, 40 mg at 05/10/24 0910    polyethylene glycol packet 17 g, 17 g, Oral, BID PRN, Azalea Earl AGACNP-BC    prochlorperazine injection Soln 5 mg, 5 mg, Intravenous, Q6H PRN, Azalea Earl AGACNP-BC    rifAXIMin tablet 550 mg, 550 mg, Oral, BID,  Demetrice Burk PA-C, 550 mg at 05/10/24 0910    sacubitriL-valsartan 24-26 mg per tablet 1 tablet, 1 tablet, Oral, BID, Lanny Marcial FNP, 1 tablet at 05/10/24 0910    simethicone chewable tablet 80 mg, 1 tablet, Oral, QID PRN, Azalea Earl AGACNP-BC    thiamine tablet 100 mg, 100 mg, Oral, Daily, Adam Weston MD, 100 mg at 05/10/24 0910  VTE Risk Mitigation (From admission, onward)           Ordered     enoxaparin injection 40 mg  Daily         05/09/24 0401     IP VTE HIGH RISK PATIENT  Once         05/09/24 0401     Place sequential compression device  Until discontinued         05/09/24 0401                  Assessment:   Acute Diastolic Heart Failure    - Grade II Diastolic Dysfunction on ECHO (5.9.24)     - BNP 1,045.1  Alcoholic Liver Cirrhosis   Hypokalemia   Hyponatremia - Resolved   HTN  HLD  COPD  VHD    - Moderate MR/MS    - Mild to Moderate AS    - Moderate AR    - Mild TR   DM II with Hyperglycemia   No known history of GI Bleed      *Iodine Allergy*    Plan:   ECHO Reviewed  Replete Lytes   Continue Lasix IV for Diurese   Continue Entresto, Coreg, Farxiga, and Statin  Accurate I&O/Daily Weight  Keep Mag > 2 and Potassium > 4   Labs in AM: CBC, BMP, and Mag       ALEKS Claudio  Cardiology  [facility] Tele[facility]  05/10/2024    Physician addendum:  I have seen and examined this patient as a split-shared visit with the DIDI d/t complicated medical management of above problems written in assessment and high acuity requiring physician expertise in medical decision-making. I performed the substantive portion of the history and exam. Above medical decision-making is also formulated by me.  SOB has improved.  Patient admits to smoking 1-1 1/2 PPD.  No official diagnosis of COPD    Cardiovascular exam:  S1, S2, 1/6 NICHOLE  Lungs:  Bilateral exp wheezes, poor effort  Extremities:  trace left pedal edema    Plan:  Transition to po lasix 40 mg daily  DC amlodipine---patient  with c/o pedal edema  Increase entresto to 49/51 mg po daily  Hospitalist to evaluate for COPD if indicated  Follow up in 2 weeks with BMP, BP check, Lexiscan PET for ischemic evaluation  Consider SGLT2 inhibitor  Will be available as needed.    Shay Walker MD  Cardiologist

## 2024-05-10 NOTE — PT/OT/SLP PROGRESS
Attempted PT eval this afternoon. Pt sleeping soundly upon entering. Due to pt being [place], PT gently shook pt's shoulder to awaken her. Pt opened one eye briefly and then was soundly sleeping again. Did not appear to want to participate at this time despite asking multiple times. Will follow up.

## 2024-05-10 NOTE — PROGRESS NOTES
[facility]  Hospital Medicine Progress Note      Chief Complaint:  Difficulty breathing    HPI: (personally reviewed by me and is documented from initial H&P)    64 y.o. White female with a past medical history of hypertension, hyperlipidemia, diabetes mellitus type 2, anxiety/depression, COPD  not on home oxygen  and alcoholic liver cirrhosis    The patient presented to [facility] on 5/8/2024 with a primary complaint of shortness of breath. Patient's ex  (Bruce [phone]) is at bedside giving today's history as patient has severe hearing loss.  He reports oxygen saturation was 62% yesterday.  Patient has been having chronic shortness of breath and swelling to the bilateral lower extremities with associated nausea.  Patient does not have a chronic cough. Patient had a month long admission to the hospital  approximately a month ago for in which she required paracentesis twice.  At baseline patient lives with son and grandson, ambulates with a cane / walker and needs assistance with activities of daily living.  She was a 2 pack-per-day smoker and a former alcohol user who quit in March of 2024.     Upon presentation to the ED, temperature 97.5° F, heart rate 88, blood pressure 173/81, respiratory rate 24 and SpO2 94% on 4 L nasal cannula.  Oxygen saturation decreased to 79 and patient was placed on OxyMask at 5 L. labs with H&H 11/35, , sodium 133, potassium 3.4, alkaline phosphatase 156, BNP 1045, troponin less than 0.01.  Influenza A/B, RSV and SARS-COV-2 PCR negative.  EKG normal sinus rhythm and previously cited septal infarct.  Chest x-ray enlarged cardiac silhouette with pulmonary edema.  In ED patient received DuoNebs, 1 g of magnesium sulfate, 60 mEq of potassium bicarbonate.  Patient was admitted to hospital medicine services for further medical management.    Interval Hx:     Chest x-ray reviewed and shows enlarged cardiac silhouette with pulmonary edema  echocardiogram revealed a EF of 55-60% with grade 2 diastolic dysfunction.    No family present at bedside during my evaluation  __________________________________________________________________________________________________________________________________    Objective/physical exam:  Vital signs have been personally reviewed by me   General: Appears comfortable, no acute distress.  Resting comfortably in bed    Integumentary: Warm, dry, intact.  Musculoskeletal: Purposeful movement noted.     Respiratory: No accessory muscle use. Breath sounds are equal.  Cardiovascular: Regular rate.       VITAL SIGNS: 24 HRS MIN & MAX LAST   Temp  Min: 97.6 °F (36.4 °C)  Max: 98.4 °F (36.9 °C) 97.7 °F (36.5 °C)   BP  Min: 114/65  Max: 159/79 114/65   Pulse  Min: 65  Max: 93  65   Resp  Min: 12  Max: 22 18   SpO2  Min: 90 %  Max: 98 % (!) 90 %     Echo    Left Ventricle: The left ventricle is normal in size. Normal wall   thickness. There is normal systolic function with a visually estimated   ejection fraction of 55 - 60%. Grade 2 DD.    Right Ventricle: Normal right ventricular cavity size. Systolic   function is normal.    Aortic Valve: Moderately calcified cusps but not well visualized. There   is mild to moderate stenosis. Aortic valve area by VTI is 1.22 cm². Aortic   valve peak velocity is 2.38 m/s. Mean gradient is 13 mmHg. The   dimensionless index is 0.61. There is moderate aortic regurgitation.    Mitral Valve: There is moderate mitral annular calcification present.   There is moderate stenosis. The mean pressure gradient across the mitral   valve is 9 mmHg at a heart rate of 91  bpm. There is moderate   regurgitation.    Tricuspid Valve: There is mild regurgitation.    Pericardium: There is no pericardial effusion.    Recent Labs   Lab 05/08/24  2348 05/09/24  0516 05/10/24  0555   WBC 8.24 6.29 8.85   RBC 3.06* 3.29* 3.35*   HGB 11.0* 11.8* 12.1   HCT 35.0* 37.0 36.5*   .4* 112.5* 109.0*   MCH 35.9* 35.9*  36.1*   MCHC 31.4* 31.9* 33.2   RDW 18.3* 18.4* 18.7*    397 386   MPV 8.9 9.0 9.1       Recent Labs   Lab 05/08/24  2348 05/09/24  0530 05/09/24  0707 05/10/24  0555   *  --  137 138   K 3.4*  --  4.3 3.3*   CO2 26  --  30 42*   BUN 7.4*  --  6.3* 5.8*   CREATININE 0.64  --  0.64 0.54*   CALCIUM 9.0  --  9.0 8.6   MG 1.80 1.80  --  1.80   ALBUMIN 3.2*  --  3.1* 3.1*   ALKPHOS 156*  --  151* 154*   ALT 5  --  7 6   AST 13  --  22 13   BILITOT 0.3  --  0.3 0.4     Microbiology Results (last 7 days)       ** No results found for the last 168 hours. **             Scheduled Med:   busPIRone  5 mg Oral BID    carvediloL  6.25 mg Oral BID    dapagliflozin propanediol  10 mg Oral Daily    enoxparin  40 mg Subcutaneous Daily    EScitalopram oxalate  5 mg Oral Daily    folic acid  1 mg Oral Daily    furosemide  40 mg Oral Daily    gabapentin  300 mg Oral TID    levalbuterol  0.63 mg Nebulization Q6H    magnesium sulfate IVPB  2 g Intravenous Once    nicotine  1 patch Transdermal Daily    pantoprazole  40 mg Oral BID    potassium chloride  40 mEq Oral BID    rifAXIMin  550 mg Oral BID    sacubitriL-valsartan  1 tablet Oral BID    thiamine  100 mg Oral Daily      Continuous Infusions:       PRN Meds:    Current Facility-Administered Medications:     acetaminophen, 650 mg, Oral, Q6H PRN    aluminum-magnesium hydroxide-simethicone, 30 mL, Oral, QID PRN    dextrose 10%, 12.5 g, Intravenous, PRN    dextrose 10%, 25 g, Intravenous, PRN    glucagon (human recombinant), 1 mg, Intramuscular, PRN    glucose, 16 g, Oral, PRN    glucose, 24 g, Oral, PRN    hydrALAZINE, 10 mg, Intravenous, Q4H PRN    insulin aspart U-100, 0-5 Units, Subcutaneous, QID (AC + HS) PRN    melatonin, 6 mg, Oral, Nightly PRN    naloxone, 0.02 mg, Intravenous, PRN    ondansetron, 4 mg, Intravenous, Q4H PRN    polyethylene glycol, 17 g, Oral, BID PRN    prochlorperazine, 5 mg, Intravenous, Q6H PRN    simethicone, 1 tablet, Oral, QID PRN    __________________________________________________________________________________________________________________________________  Assessment/Plan:  Acute hypoxic respiratory failure  Diastolic heart failure  Macrocytic anemia, stable   Hypokalemia, resolved  Hyponatremia   Elevated alkaline phosphatase     History of hypertension, hyperlipidemia, diabetes mellitus type 2, anxiety/depression and COPD     Above present on admission     Anticipated discharge and Disposition when medically stable:  Pending.   _______________________________________________________________________________________________________________________________  Presented for shortness of breath, noted to have diastolic dysfunction and is currently on Lasix therapy.  Continue to monitor volume status and urinary output.  Continue Entresto beta-blocker therapy.  Consult placed to PT/ OT  Continue supportive care  Continue checking vital signs q4hrs.       Nurse notified to page me if any changes occur     DVT prophylaxis initiated   Nutrition Status: diabetic/low sodium diet.     Consults: cardiology   I have personally reviewed the specialist documentation and/or have spoken to the specialist with regard to the care of this patient; recommendations are noted above.     _______________________________________________________________________________________________________________________________    I have spent >30 minutes on the day of the visit; time spent includes face to face time and non-face to face time preparing to see the patient (eg, review of tests), independently reviewing and interpreting medical records, both past and current; documenting clinical information in the electronic or other health record, and communicating results to the patient/family/caregiver and care coordinator and nursing team.      All diagnosis and differential diagnosis have been reviewed,  interpreted and communicated appropriately to care team. assessment  and plan has been documented; I have personally reviewed the labs and test results that are presently available and pertinent to this hospital course; I have reviewed medical records based upon their availability.    I will continue to monitor closely and make adjustments to medical management as needed.    Tabatha Stokes,    05/10/2024     This note was created with the assistance of Dragon voice recognition software. There may be transcription errors as a result of using this technology however minimal. Effort has been made to assure accuracy of transcription but any obvious errors or omissions should be clarified with the author of the document.

## 2024-05-10 NOTE — PLAN OF CARE
Problem: Adult Inpatient Plan of Care  Goal: Plan of Care Review  Outcome: Progressing  Goal: Patient-Specific Goal (Individualized)  Outcome: Progressing  Goal: Absence of Hospital-Acquired Illness or Injury  Outcome: Progressing  Goal: Optimal Comfort and Wellbeing  Outcome: Progressing  Goal: Readiness for Transition of Care  Outcome: Progressing     Problem: Wound  Goal: Optimal Coping  Outcome: Progressing  Goal: Optimal Functional Ability  Outcome: Progressing  Goal: Absence of Infection Signs and Symptoms  Outcome: Progressing  Goal: Improved Oral Intake  Outcome: Progressing  Goal: Optimal Pain Control and Function  Outcome: Progressing  Goal: Skin Health and Integrity  Outcome: Progressing  Goal: Optimal Wound Healing  Outcome: Progressing     Problem: Diabetes Comorbidity  Goal: Blood Glucose Level Within Targeted Range  Outcome: Progressing     Problem: Heart Failure  Goal: Optimal Cardiac Output  Outcome: Progressing  Goal: Stable Heart Rate and Rhythm  Outcome: Progressing  Goal: Optimal Functional Ability  Outcome: Progressing  Goal: Improved Oral Intake  Outcome: Progressing  Goal: Effective Oxygenation and Ventilation  Outcome: Progressing  Goal: Effective Breathing Pattern During Sleep  Outcome: Progressing     Problem: Fall Injury Risk  Goal: Absence of Fall and Fall-Related Injury  Outcome: Progressing

## 2024-05-11 LAB
PHOSPHATE SERPL-MCNC: 3.4 MG/DL (ref 2.3–4.7)
POCT GLUCOSE: 142 MG/DL (ref 70–110)
POCT GLUCOSE: 97 MG/DL (ref 70–110)
POCT GLUCOSE: 98 MG/DL (ref 70–110)

## 2024-05-11 PROCEDURE — 27000221 HC OXYGEN, UP TO 24 HOURS

## 2024-05-11 PROCEDURE — 21400001 HC TELEMETRY ROOM

## 2024-05-11 PROCEDURE — 94640 AIRWAY INHALATION TREATMENT: CPT

## 2024-05-11 PROCEDURE — 25000242 PHARM REV CODE 250 ALT 637 W/ HCPCS: Performed by: INTERNAL MEDICINE

## 2024-05-11 PROCEDURE — S4991 NICOTINE PATCH NONLEGEND: HCPCS | Performed by: INTERNAL MEDICINE

## 2024-05-11 PROCEDURE — 63600175 PHARM REV CODE 636 W HCPCS: Performed by: NURSE PRACTITIONER

## 2024-05-11 PROCEDURE — 36415 COLL VENOUS BLD VENIPUNCTURE: CPT | Performed by: STUDENT IN AN ORGANIZED HEALTH CARE EDUCATION/TRAINING PROGRAM

## 2024-05-11 PROCEDURE — 94760 N-INVAS EAR/PLS OXIMETRY 1: CPT

## 2024-05-11 PROCEDURE — 84100 ASSAY OF PHOSPHORUS: CPT | Performed by: STUDENT IN AN ORGANIZED HEALTH CARE EDUCATION/TRAINING PROGRAM

## 2024-05-11 PROCEDURE — 99900035 HC TECH TIME PER 15 MIN (STAT)

## 2024-05-11 PROCEDURE — 25000003 PHARM REV CODE 250

## 2024-05-11 PROCEDURE — 99900031 HC PATIENT EDUCATION (STAT)

## 2024-05-11 PROCEDURE — 25000003 PHARM REV CODE 250: Performed by: INTERNAL MEDICINE

## 2024-05-11 PROCEDURE — 25000003 PHARM REV CODE 250: Performed by: PHYSICIAN ASSISTANT

## 2024-05-11 RX ADMIN — SACUBITRIL AND VALSARTAN 1 TABLET: 49; 51 TABLET, FILM COATED ORAL at 09:05

## 2024-05-11 RX ADMIN — PANTOPRAZOLE SODIUM 40 MG: 40 TABLET, DELAYED RELEASE ORAL at 09:05

## 2024-05-11 RX ADMIN — DAPAGLIFLOZIN 10 MG: 10 TABLET, FILM COATED ORAL at 09:05

## 2024-05-11 RX ADMIN — FUROSEMIDE 40 MG: 40 TABLET ORAL at 09:05

## 2024-05-11 RX ADMIN — CARVEDILOL 6.25 MG: 3.12 TABLET, FILM COATED ORAL at 09:05

## 2024-05-11 RX ADMIN — GABAPENTIN 300 MG: 300 CAPSULE ORAL at 09:05

## 2024-05-11 RX ADMIN — FOLIC ACID 1 MG: 1 TABLET ORAL at 09:05

## 2024-05-11 RX ADMIN — GABAPENTIN 300 MG: 300 CAPSULE ORAL at 04:05

## 2024-05-11 RX ADMIN — LEVALBUTEROL HYDROCHLORIDE 0.63 MG: 0.63 SOLUTION RESPIRATORY (INHALATION) at 02:05

## 2024-05-11 RX ADMIN — RIFAXIMIN 550 MG: 550 TABLET ORAL at 09:05

## 2024-05-11 RX ADMIN — LEVALBUTEROL HYDROCHLORIDE 0.63 MG: 0.63 SOLUTION RESPIRATORY (INHALATION) at 09:05

## 2024-05-11 RX ADMIN — BUSPIRONE HYDROCHLORIDE 5 MG: 5 TABLET ORAL at 09:05

## 2024-05-11 RX ADMIN — ENOXAPARIN SODIUM 40 MG: 40 INJECTION SUBCUTANEOUS at 04:05

## 2024-05-11 RX ADMIN — NICOTINE 1 PATCH: 21 PATCH, EXTENDED RELEASE TRANSDERMAL at 09:05

## 2024-05-11 RX ADMIN — THIAMINE HCL TAB 100 MG 100 MG: 100 TAB at 09:05

## 2024-05-11 RX ADMIN — ESCITALOPRAM OXALATE 5 MG: 5 TABLET, FILM COATED ORAL at 09:05

## 2024-05-11 RX ADMIN — LEVALBUTEROL HYDROCHLORIDE 0.63 MG: 0.63 SOLUTION RESPIRATORY (INHALATION) at 01:05

## 2024-05-11 RX ADMIN — LEVALBUTEROL HYDROCHLORIDE 0.63 MG: 0.63 SOLUTION RESPIRATORY (INHALATION) at 08:05

## 2024-05-11 NOTE — PROGRESS NOTES
[facility]  Hospital Medicine Progress Note      Chief Complaint:  Difficulty breathing    HPI: (personally reviewed by me and is documented from initial H&P)    64 y.o. White female with a past medical history of hypertension, hyperlipidemia, diabetes mellitus type 2, anxiety/depression, COPD  not on home oxygen  and alcoholic liver cirrhosis    The patient presented to [facility] on 5/8/2024 with a primary complaint of shortness of breath. Patient's ex  (Bruce [phone]) is at bedside giving today's history as patient has severe hearing loss.  He reports oxygen saturation was 62% yesterday.  Patient has been having chronic shortness of breath and swelling to the bilateral lower extremities with associated nausea.  Patient does not have a chronic cough. Patient had a month long admission to the hospital  approximately a month ago for in which she required paracentesis twice.  At baseline patient lives with son and grandson, ambulates with a cane / walker and needs assistance with activities of daily living.  She was a 2 pack-per-day smoker and a former alcohol user who quit in March of 2024.     Upon presentation to the ED, temperature 97.5° F, heart rate 88, blood pressure 173/81, respiratory rate 24 and SpO2 94% on 4 L nasal cannula.  Oxygen saturation decreased to 79 and patient was placed on OxyMask at 5 L. labs with H&H 11/35, , sodium 133, potassium 3.4, alkaline phosphatase 156, BNP 1045, troponin less than 0.01.  Influenza A/B, RSV and SARS-COV-2 PCR negative.  EKG normal sinus rhythm and previously cited septal infarct.  Chest x-ray enlarged cardiac silhouette with pulmonary edema.  In ED patient received DuoNebs, 1 g of magnesium sulfate, 60 mEq of potassium bicarbonate.  Patient was admitted to hospital medicine services for further medical management.  Chest x-ray reviewed and shows enlarged cardiac silhouette with pulmonary edema echocardiogram revealed a EF of  55-60% with grade 2 diastolic dysfunction.      Interval Hx:     Patient tells me she lives at home with her son and does not use supplemental oxygen therapy at home.  She had no overnight events.  No new complaints.    Nurse present at bedside during my evaluation.      __________________________________________________________________________________________________________________________________    Objective/physical exam:  Vital signs have been personally reviewed by me   General: Appears comfortable, no acute distress.  Resting comfortably in bed    Patient requiring supplemental oxygen with desaturations when supplemental oxygen is turned off, currently on 1 L and maintained greater than 94%    Integumentary: Warm, dry, intact.  Musculoskeletal: Purposeful movement noted.     Respiratory: Breath sounds are equal.  Cardiovascular: Regular rate.       VITAL SIGNS: 24 HRS MIN & MAX LAST   Temp  Min: 97.7 °F (36.5 °C)  Max: 98.9 °F (37.2 °C) 98.9 °F (37.2 °C)   BP  Min: 114/65  Max: 137/77 137/77   Pulse  Min: 65  Max: 80  72   Resp  Min: 18  Max: 20 18   SpO2  Min: 90 %  Max: 97 % 97 %     Echo    Left Ventricle: The left ventricle is normal in size. Normal wall   thickness. There is normal systolic function with a visually estimated   ejection fraction of 55 - 60%. Grade 2 DD.    Right Ventricle: Normal right ventricular cavity size. Systolic   function is normal.    Aortic Valve: Moderately calcified cusps but not well visualized. There   is mild to moderate stenosis. Aortic valve area by VTI is 1.22 cm². Aortic   valve peak velocity is 2.38 m/s. Mean gradient is 13 mmHg. The   dimensionless index is 0.61. There is moderate aortic regurgitation.    Mitral Valve: There is moderate mitral annular calcification present.   There is moderate stenosis. The mean pressure gradient across the mitral   valve is 9 mmHg at a heart rate of 91  bpm. There is moderate   regurgitation.    Tricuspid Valve: There is mild  regurgitation.    Pericardium: There is no pericardial effusion.    Recent Labs   Lab 05/08/24 2348 05/09/24  0516 05/10/24  0555   WBC 8.24 6.29 8.85   RBC 3.06* 3.29* 3.35*   HGB 11.0* 11.8* 12.1   HCT 35.0* 37.0 36.5*   .4* 112.5* 109.0*   MCH 35.9* 35.9* 36.1*   MCHC 31.4* 31.9* 33.2   RDW 18.3* 18.4* 18.7*    397 386   MPV 8.9 9.0 9.1       Recent Labs   Lab 05/08/24  2348 05/09/24  0530 05/09/24  0707 05/10/24  0555   *  --  137 138   K 3.4*  --  4.3 3.3*   CO2 26  --  30 42*   BUN 7.4*  --  6.3* 5.8*   CREATININE 0.64  --  0.64 0.54*   CALCIUM 9.0  --  9.0 8.6   MG 1.80 1.80  --  1.80   ALBUMIN 3.2*  --  3.1* 3.1*   ALKPHOS 156*  --  151* 154*   ALT 5  --  7 6   AST 13  --  22 13   BILITOT 0.3  --  0.3 0.4     Microbiology Results (last 7 days)       Procedure Component Value Units Date/Time    Clostridium Diff Toxin, A & B, EIA [[id number]]     Order Status: Sent Specimen: Stool              Scheduled Med:   busPIRone  5 mg Oral BID    carvediloL  6.25 mg Oral BID    dapagliflozin propanediol  10 mg Oral Daily    enoxparin  40 mg Subcutaneous Daily    EScitalopram oxalate  5 mg Oral Daily    folic acid  1 mg Oral Daily    furosemide  40 mg Oral Daily    gabapentin  300 mg Oral TID    levalbuterol  0.63 mg Nebulization Q6H    nicotine  1 patch Transdermal Daily    pantoprazole  40 mg Oral BID    rifAXIMin  550 mg Oral BID    sacubitriL-valsartan  1 tablet Oral BID    thiamine  100 mg Oral Daily      Continuous Infusions:       PRN Meds:    Current Facility-Administered Medications:     acetaminophen, 650 mg, Oral, Q6H PRN    aluminum-magnesium hydroxide-simethicone, 30 mL, Oral, QID PRN    dextrose 10%, 12.5 g, Intravenous, PRN    dextrose 10%, 25 g, Intravenous, PRN    glucagon (human recombinant), 1 mg, Intramuscular, PRN    glucose, 16 g, Oral, PRN    glucose, 24 g, Oral, PRN    hydrALAZINE, 10 mg, Intravenous, Q4H PRN    insulin aspart U-100, 0-5 Units, Subcutaneous, QID (AC + HS)  PRN    melatonin, 6 mg, Oral, Nightly PRN    naloxone, 0.02 mg, Intravenous, PRN    ondansetron, 4 mg, Intravenous, Q4H PRN    polyethylene glycol, 17 g, Oral, BID PRN    prochlorperazine, 5 mg, Intravenous, Q6H PRN    simethicone, 1 tablet, Oral, QID PRN   __________________________________________________________________________________________________________________________________  Assessment/Plan:  Acute hypoxic respiratory failure  Diastolic heart failure  Macrocytic anemia, stable   Hypokalemia, resolved  Hyponatremia   Elevated alkaline phosphatase     History of hypertension, hyperlipidemia, diabetes mellitus type 2, anxiety/depression and COPD     Above present on admission     Anticipated discharge and Disposition when medically stable:  Pending.   _______________________________________________________________________________________________________________________________  Presented for shortness of breath, noted to have diastolic dysfunction and is currently on Lasix therapy.  Continue to monitor volume status and urinary output.  Continue Entresto beta-blocker therapy.    Consult placed to PT/ OT  Continue supportive care  Continue checking vital signs q4hrs.     --microcytic anemia, folic acid level  Nurse notified to page me if any changes occur     DVT prophylaxis initiated   Nutrition Status: diabetic/low sodium diet.     Consults: cardiology   I have personally reviewed the specialist documentation and/or have spoken to the specialist with regard to the care of this patient; recommendations are noted above.     _______________________________________________________________________________________________________________________________    This is a critical care document  Critical Care Diagnosis:  Hypoxic respiratory failure, wean supplemental oxygen as tolerated  Critical care interventions: hands on evaluation, review of labs/radiographs/records and discussions; assessing and managing the  high probability of imminent or life-threatening deterioration of cardiorespiratory status.  Critical care time spent 75 minutes.   time spent includes face to face time and non-face to face time preparing to see the patient (eg, review of tests), independently reviewing and interpreting medical records, both past and current; documenting clinical information in the electronic or other health record, and communicating results to the patient/family/caregiver and care coordinator and nursing team.      All diagnosis and differential diagnosis have been reviewed,  interpreted and communicated appropriately to care team. assessment and plan has been documented; I have personally reviewed the labs and test results that are presently available and pertinent to this hospital course; I have reviewed medical records based upon their availability.    I will continue to monitor closely and make adjustments to medical management as needed.    Tabatha Stokes,    05/11/2024     This note was created with the assistance of Dragon voice recognition software. There may be transcription errors as a result of using this technology however minimal. Effort has been made to assure accuracy of transcription but any obvious errors or omissions should be clarified with the author of the document.

## 2024-05-11 NOTE — PT/OT/SLP PROGRESS
Physical Therapy      Patient Name:  Luz Maria Alfaro   MRN:  [id number]    Patient not seen today secondary to pt refusal stating she was too tired . Will follow-up tomorrow.

## 2024-05-11 NOTE — PLAN OF CARE
Problem: Adult Inpatient Plan of Care  Goal: Plan of Care Review  Outcome: Progressing  Goal: Patient-Specific Goal (Individualized)  Outcome: Progressing  Goal: Absence of Hospital-Acquired Illness or Injury  Outcome: Progressing  Goal: Optimal Comfort and Wellbeing  Outcome: Progressing  Goal: Readiness for Transition of Care  Outcome: Progressing     Problem: Diabetes Comorbidity  Goal: Blood Glucose Level Within Targeted Range  Outcome: Progressing     Problem: Heart Failure  Goal: Optimal Cardiac Output  Outcome: Progressing  Goal: Stable Heart Rate and Rhythm  Outcome: Progressing  Goal: Optimal Functional Ability  Outcome: Progressing  Goal: Improved Oral Intake  Outcome: Progressing  Goal: Effective Oxygenation and Ventilation  Outcome: Progressing  Goal: Effective Breathing Pattern During Sleep  Outcome: Progressing     Problem: Fall Injury Risk  Goal: Absence of Fall and Fall-Related Injury  Outcome: Progressing     Problem: Infection  Goal: Absence of Infection Signs and Symptoms  Outcome: Progressing

## 2024-05-12 LAB
ANION GAP SERPL CALC-SCNC: 8 MEQ/L
BASOPHILS # BLD AUTO: 0.04 X10(3)/MCL
BASOPHILS NFR BLD AUTO: 0.6 %
BUN SERPL-MCNC: 7.8 MG/DL (ref 9.8–20.1)
CALCIUM SERPL-MCNC: 8.5 MG/DL (ref 8.4–10.2)
CHLORIDE SERPL-SCNC: 97 MMOL/L (ref 98–107)
CO2 SERPL-SCNC: 35 MMOL/L (ref 23–31)
CREAT SERPL-MCNC: 0.6 MG/DL (ref 0.55–1.02)
CREAT/UREA NIT SERPL: 13
EOSINOPHIL # BLD AUTO: 0.11 X10(3)/MCL (ref 0–0.9)
EOSINOPHIL NFR BLD AUTO: 1.5 %
ERYTHROCYTE [DISTWIDTH] IN BLOOD BY AUTOMATED COUNT: 17.6 % (ref 11.5–17)
GFR SERPLBLD CREATININE-BSD FMLA CKD-EPI: >60 ML/MIN/1.73/M2
GLUCOSE SERPL-MCNC: 116 MG/DL (ref 82–115)
HCT VFR BLD AUTO: 38.6 % (ref 37–47)
HGB BLD-MCNC: 12.3 G/DL (ref 12–16)
IMM GRANULOCYTES # BLD AUTO: 0.02 X10(3)/MCL (ref 0–0.04)
IMM GRANULOCYTES NFR BLD AUTO: 0.3 %
LYMPHOCYTES # BLD AUTO: 1.77 X10(3)/MCL (ref 0.6–4.6)
LYMPHOCYTES NFR BLD AUTO: 24.8 %
MAGNESIUM SERPL-MCNC: 1.9 MG/DL (ref 1.6–2.6)
MCH RBC QN AUTO: 35.7 PG (ref 27–31)
MCHC RBC AUTO-ENTMCNC: 31.9 G/DL (ref 33–36)
MCV RBC AUTO: 111.9 FL (ref 80–94)
MONOCYTES # BLD AUTO: 0.72 X10(3)/MCL (ref 0.1–1.3)
MONOCYTES NFR BLD AUTO: 10.1 %
NEUTROPHILS # BLD AUTO: 4.47 X10(3)/MCL (ref 2.1–9.2)
NEUTROPHILS NFR BLD AUTO: 62.7 %
NRBC BLD AUTO-RTO: 0 %
PLATELET # BLD AUTO: 284 X10(3)/MCL (ref 130–400)
PMV BLD AUTO: 9.2 FL (ref 7.4–10.4)
POCT GLUCOSE: 105 MG/DL (ref 70–110)
POCT GLUCOSE: 115 MG/DL (ref 70–110)
POTASSIUM SERPL-SCNC: 3.5 MMOL/L (ref 3.5–5.1)
RBC # BLD AUTO: 3.45 X10(6)/MCL (ref 4.2–5.4)
SODIUM SERPL-SCNC: 140 MMOL/L (ref 136–145)
WBC # SPEC AUTO: 7.13 X10(3)/MCL (ref 4.5–11.5)

## 2024-05-12 PROCEDURE — 27000221 HC OXYGEN, UP TO 24 HOURS

## 2024-05-12 PROCEDURE — 85025 COMPLETE CBC W/AUTO DIFF WBC: CPT | Performed by: STUDENT IN AN ORGANIZED HEALTH CARE EDUCATION/TRAINING PROGRAM

## 2024-05-12 PROCEDURE — 25000003 PHARM REV CODE 250: Performed by: INTERNAL MEDICINE

## 2024-05-12 PROCEDURE — 25000003 PHARM REV CODE 250: Performed by: PHYSICIAN ASSISTANT

## 2024-05-12 PROCEDURE — 83735 ASSAY OF MAGNESIUM: CPT | Performed by: STUDENT IN AN ORGANIZED HEALTH CARE EDUCATION/TRAINING PROGRAM

## 2024-05-12 PROCEDURE — 36415 COLL VENOUS BLD VENIPUNCTURE: CPT | Performed by: STUDENT IN AN ORGANIZED HEALTH CARE EDUCATION/TRAINING PROGRAM

## 2024-05-12 PROCEDURE — 99900035 HC TECH TIME PER 15 MIN (STAT)

## 2024-05-12 PROCEDURE — 25000242 PHARM REV CODE 250 ALT 637 W/ HCPCS: Performed by: INTERNAL MEDICINE

## 2024-05-12 PROCEDURE — 99900031 HC PATIENT EDUCATION (STAT)

## 2024-05-12 PROCEDURE — 21400001 HC TELEMETRY ROOM

## 2024-05-12 PROCEDURE — 94760 N-INVAS EAR/PLS OXIMETRY 1: CPT

## 2024-05-12 PROCEDURE — 25000003 PHARM REV CODE 250

## 2024-05-12 PROCEDURE — 80048 BASIC METABOLIC PNL TOTAL CA: CPT | Performed by: STUDENT IN AN ORGANIZED HEALTH CARE EDUCATION/TRAINING PROGRAM

## 2024-05-12 PROCEDURE — 63600175 PHARM REV CODE 636 W HCPCS: Performed by: NURSE PRACTITIONER

## 2024-05-12 PROCEDURE — S4991 NICOTINE PATCH NONLEGEND: HCPCS | Performed by: INTERNAL MEDICINE

## 2024-05-12 PROCEDURE — 94640 AIRWAY INHALATION TREATMENT: CPT

## 2024-05-12 RX ADMIN — SACUBITRIL AND VALSARTAN 1 TABLET: 49; 51 TABLET, FILM COATED ORAL at 08:05

## 2024-05-12 RX ADMIN — FUROSEMIDE 40 MG: 40 TABLET ORAL at 09:05

## 2024-05-12 RX ADMIN — GABAPENTIN 300 MG: 300 CAPSULE ORAL at 04:05

## 2024-05-12 RX ADMIN — LEVALBUTEROL HYDROCHLORIDE 0.63 MG: 0.63 SOLUTION RESPIRATORY (INHALATION) at 08:05

## 2024-05-12 RX ADMIN — BUSPIRONE HYDROCHLORIDE 5 MG: 5 TABLET ORAL at 09:05

## 2024-05-12 RX ADMIN — FOLIC ACID 1 MG: 1 TABLET ORAL at 09:05

## 2024-05-12 RX ADMIN — NICOTINE 1 PATCH: 21 PATCH, EXTENDED RELEASE TRANSDERMAL at 09:05

## 2024-05-12 RX ADMIN — DAPAGLIFLOZIN 10 MG: 10 TABLET, FILM COATED ORAL at 09:05

## 2024-05-12 RX ADMIN — ESCITALOPRAM OXALATE 5 MG: 5 TABLET, FILM COATED ORAL at 09:05

## 2024-05-12 RX ADMIN — GABAPENTIN 300 MG: 300 CAPSULE ORAL at 09:05

## 2024-05-12 RX ADMIN — CARVEDILOL 6.25 MG: 3.12 TABLET, FILM COATED ORAL at 08:05

## 2024-05-12 RX ADMIN — CARVEDILOL 6.25 MG: 3.12 TABLET, FILM COATED ORAL at 09:05

## 2024-05-12 RX ADMIN — SACUBITRIL AND VALSARTAN 1 TABLET: 49; 51 TABLET, FILM COATED ORAL at 09:05

## 2024-05-12 RX ADMIN — THIAMINE HCL TAB 100 MG 100 MG: 100 TAB at 09:05

## 2024-05-12 RX ADMIN — PANTOPRAZOLE SODIUM 40 MG: 40 TABLET, DELAYED RELEASE ORAL at 09:05

## 2024-05-12 RX ADMIN — LEVALBUTEROL HYDROCHLORIDE 0.63 MG: 0.63 SOLUTION RESPIRATORY (INHALATION) at 01:05

## 2024-05-12 RX ADMIN — RIFAXIMIN 550 MG: 550 TABLET ORAL at 08:05

## 2024-05-12 RX ADMIN — BUSPIRONE HYDROCHLORIDE 5 MG: 5 TABLET ORAL at 08:05

## 2024-05-12 RX ADMIN — LEVALBUTEROL HYDROCHLORIDE 0.63 MG: 0.63 SOLUTION RESPIRATORY (INHALATION) at 07:05

## 2024-05-12 RX ADMIN — PANTOPRAZOLE SODIUM 40 MG: 40 TABLET, DELAYED RELEASE ORAL at 08:05

## 2024-05-12 RX ADMIN — ENOXAPARIN SODIUM 40 MG: 40 INJECTION SUBCUTANEOUS at 04:05

## 2024-05-12 RX ADMIN — GABAPENTIN 300 MG: 300 CAPSULE ORAL at 08:05

## 2024-05-12 RX ADMIN — RIFAXIMIN 550 MG: 550 TABLET ORAL at 09:05

## 2024-05-12 NOTE — PLAN OF CARE
Problem: Adult Inpatient Plan of Care  Goal: Plan of Care Review  Outcome: Progressing  Goal: Patient-Specific Goal (Individualized)  Outcome: Progressing  Goal: Absence of Hospital-Acquired Illness or Injury  Outcome: Progressing  Goal: Optimal Comfort and Wellbeing  Outcome: Progressing  Goal: Readiness for Transition of Care  Outcome: Progressing     Problem: Wound  Goal: Optimal Coping  Outcome: Progressing  Goal: Optimal Functional Ability  Outcome: Progressing  Goal: Absence of Infection Signs and Symptoms  Outcome: Progressing  Goal: Improved Oral Intake  Outcome: Progressing  Goal: Optimal Pain Control and Function  Outcome: Progressing  Goal: Skin Health and Integrity  Outcome: Progressing  Goal: Optimal Wound Healing  Outcome: Progressing     Problem: Diabetes Comorbidity  Goal: Blood Glucose Level Within Targeted Range  Outcome: Progressing     Problem: Heart Failure  Goal: Optimal Cardiac Output  Outcome: Progressing  Goal: Stable Heart Rate and Rhythm  Outcome: Progressing  Goal: Optimal Functional Ability  Outcome: Progressing  Goal: Improved Oral Intake  Outcome: Progressing  Goal: Effective Oxygenation and Ventilation  Outcome: Progressing  Goal: Effective Breathing Pattern During Sleep  Outcome: Progressing     Problem: Fall Injury Risk  Goal: Absence of Fall and Fall-Related Injury  Outcome: Progressing     Problem: Infection  Goal: Absence of Infection Signs and Symptoms  Outcome: Progressing

## 2024-05-12 NOTE — PROGRESS NOTES
[facility]  Hospital Medicine Progress Note      Chief Complaint:  Difficulty breathing    HPI: (personally reviewed by me and is documented from initial H&P)    64 y.o. White female with a past medical history of hypertension, hyperlipidemia, diabetes mellitus type 2, anxiety/depression, COPD  not on home oxygen  and alcoholic liver cirrhosis    The patient presented to [facility] on 5/8/2024 with a primary complaint of shortness of breath. Patient's ex  (Bruce [phone]) is at bedside giving today's history as patient has severe hearing loss.  He reports oxygen saturation was 62% yesterday.  Patient has been having chronic shortness of breath and swelling to the bilateral lower extremities with associated nausea.  Patient does not have a chronic cough. Patient had a month long admission to the hospital  approximately a month ago for in which she required paracentesis twice.  At baseline patient lives with son and grandson, ambulates with a cane / walker and needs assistance with activities of daily living.  She was a 2 pack-per-day smoker and a former alcohol user who quit in March of 2024.     Upon presentation to the ED, temperature 97.5° F, heart rate 88, blood pressure 173/81, respiratory rate 24 and SpO2 94% on 4 L nasal cannula.  Oxygen saturation decreased to 79 and patient was placed on OxyMask at 5 L. labs with H&H 11/35, , sodium 133, potassium 3.4, alkaline phosphatase 156, BNP 1045, troponin less than 0.01.  Influenza A/B, RSV and SARS-COV-2 PCR negative.  EKG normal sinus rhythm and previously cited septal infarct.  Chest x-ray enlarged cardiac silhouette with pulmonary edema.  In ED patient received DuoNebs, 1 g of magnesium sulfate, 60 mEq of potassium bicarbonate.  Patient was admitted to hospital medicine services for further medical management.  Chest x-ray reviewed and shows enlarged cardiac silhouette with pulmonary edema echocardiogram revealed a EF of  55-60% with grade 2 diastolic dysfunction.      Interval Hx:     5/11/24-Patient tells me she lives at home with her son and does not use supplemental oxygen therapy at home.  She had no overnight events.  No new complaints.    Nurse present at bedside during my evaluation.     5/12/24 dr tadeo - chart reviewed and pt examined. Voices no complains and refers feeling better . O2 down to 1.5 liters w sat at 91 % at rest.      __________________________________________________________________________________________________________________________________    Objective/physical exam:  Vital signs have been personally reviewed by me   General: Appears comfortable, no acute distress.  Resting comfortably in bed    Patient requiring supplemental oxygen with desaturations when supplemental oxygen is turned off, currently on 1.5  L at 91%    Integumentary: Warm, dry, intact.  Musculoskeletal: Purposeful movement noted.     Respiratory: Breath sounds are equal.  Cardiovascular: Regular rate.       VITAL SIGNS: 24 HRS MIN & MAX LAST   Temp  Min: 98.2 °F (36.8 °C)  Max: 99.4 °F (37.4 °C) 98.3 °F (36.8 °C)   BP  Min: 112/74  Max: 149/84 112/74   Pulse  Min: 63  Max: 89  89   Resp  Min: 16  Max: 20 16   SpO2  Min: 91 %  Max: 96 % 95 %     Echo    Left Ventricle: The left ventricle is normal in size. Normal wall   thickness. There is normal systolic function with a visually estimated   ejection fraction of 55 - 60%. Grade 2 DD.    Right Ventricle: Normal right ventricular cavity size. Systolic   function is normal.    Aortic Valve: Moderately calcified cusps but not well visualized. There   is mild to moderate stenosis. Aortic valve area by VTI is 1.22 cm². Aortic   valve peak velocity is 2.38 m/s. Mean gradient is 13 mmHg. The   dimensionless index is 0.61. There is moderate aortic regurgitation.    Mitral Valve: There is moderate mitral annular calcification present.   There is moderate stenosis. The mean pressure gradient  across the mitral   valve is 9 mmHg at a heart rate of 91  bpm. There is moderate   regurgitation.    Tricuspid Valve: There is mild regurgitation.    Pericardium: There is no pericardial effusion.    Recent Labs   Lab 05/09/24  0516 05/10/24  0555 05/12/24  1057   WBC 6.29 8.85 7.13   RBC 3.29* 3.35* 3.45*   HGB 11.8* 12.1 12.3   HCT 37.0 36.5* 38.6   .5* 109.0* 111.9*   MCH 35.9* 36.1* 35.7*   MCHC 31.9* 33.2 31.9*   RDW 18.4* 18.7* 17.6*    386 284   MPV 9.0 9.1 9.2       Recent Labs   Lab 05/08/24  2348 05/09/24  0530 05/09/24  0707 05/10/24  0555 05/12/24  1057   *  --  137 138 140   K 3.4*  --  4.3 3.3* 3.5   CO2 26  --  30 42* 35*   BUN 7.4*  --  6.3* 5.8* 7.8*   CREATININE 0.64  --  0.64 0.54* 0.60   CALCIUM 9.0  --  9.0 8.6 8.5   MG 1.80 1.80  --  1.80 1.90   ALBUMIN 3.2*  --  3.1* 3.1*  --    ALKPHOS 156*  --  151* 154*  --    ALT 5  --  7 6  --    AST 13  --  22 13  --    BILITOT 0.3  --  0.3 0.4  --      Microbiology Results (last 7 days)       Procedure Component Value Units Date/Time    Clostridium Diff Toxin, A & B, EIA [[id number]]     Order Status: Canceled Specimen: Stool              Scheduled Med:   busPIRone  5 mg Oral BID    carvediloL  6.25 mg Oral BID    dapagliflozin propanediol  10 mg Oral Daily    enoxparin  40 mg Subcutaneous Daily    EScitalopram oxalate  5 mg Oral Daily    folic acid  1 mg Oral Daily    furosemide  40 mg Oral Daily    gabapentin  300 mg Oral TID    levalbuterol  0.63 mg Nebulization Q6H    nicotine  1 patch Transdermal Daily    pantoprazole  40 mg Oral BID    rifAXIMin  550 mg Oral BID    sacubitriL-valsartan  1 tablet Oral BID    thiamine  100 mg Oral Daily      Continuous Infusions:       PRN Meds:    Current Facility-Administered Medications:     acetaminophen, 650 mg, Oral, Q6H PRN    aluminum-magnesium hydroxide-simethicone, 30 mL, Oral, QID PRN    dextrose 10%, 12.5 g, Intravenous, PRN    dextrose 10%, 25 g, Intravenous, PRN    glucagon (human  recombinant), 1 mg, Intramuscular, PRN    glucose, 16 g, Oral, PRN    glucose, 24 g, Oral, PRN    hydrALAZINE, 10 mg, Intravenous, Q4H PRN    insulin aspart U-100, 0-5 Units, Subcutaneous, QID (AC + HS) PRN    melatonin, 6 mg, Oral, Nightly PRN    naloxone, 0.02 mg, Intravenous, PRN    ondansetron, 4 mg, Intravenous, Q4H PRN    polyethylene glycol, 17 g, Oral, BID PRN    prochlorperazine, 5 mg, Intravenous, Q6H PRN    simethicone, 1 tablet, Oral, QID PRN   __________________________________________________________________________________________________________________________________  Assessment/Plan:  Acute hypoxic respiratory failure  Acute grade 2  Diastolic heart failure  - continue entresto/ BB/ diuretics  Macrocytic anemia, stable   Hypokalemia, resolved  Hyponatremia- resolved    Elevated alkaline phosphatase     History of hypertension, hyperlipidemia, diabetes mellitus type 2, anxiety/depression and COPD     Above present on admission     Anticipated discharge and Disposition when medically stable:      Consult placed to PT/ OT  Continue supportive care  Continue checking vital signs q4hrs.        DVT prophylaxis initiated   Nutrition Status: diabetic/low sodium diet.     Consults: cardiology   I have personally reviewed the specialist documentation and/or have spoken to the specialist with regard to the care of this patient; recommendations are noted above.         I have spent >30 minutes on the day of the visit; time spent includes face to face time and non-face to face time preparing to see the patient (eg, review of tests), independently reviewing and interpreting medical records, both past and current; documenting clinical information in the electronic or other health record, and communicating results to the patient/family/caregiver and care coordinator and nursing team.      All diagnosis and differential diagnosis have been reviewed,  interpreted and communicated appropriately to care team.  assessment and plan has been documented; I have personally reviewed the labs and test results that are presently available and pertinent to this hospital course; I have reviewed medical records based upon their availability.    I will continue to monitor closely and make adjustments to medical management as needed.    Gavino Alonzo MD   05/12/2024     This note was created with the assistance of Dragon voice recognition software. There may be transcription errors as a result of using this technology however minimal. Effort has been made to assure accuracy of transcription but any obvious errors or omissions should be clarified with the author of the document.

## 2024-05-12 NOTE — PLAN OF CARE
Problem: Adult Inpatient Plan of Care  Goal: Plan of Care Review  Outcome: Progressing  Goal: Patient-Specific Goal (Individualized)  Outcome: Progressing  Goal: Absence of Hospital-Acquired Illness or Injury  Outcome: Progressing  Goal: Optimal Comfort and Wellbeing  Outcome: Progressing  Goal: Readiness for Transition of Care  Outcome: Progressing     Problem: Wound  Goal: Optimal Coping  Outcome: Progressing  Goal: Optimal Functional Ability  Outcome: Progressing  Goal: Absence of Infection Signs and Symptoms  Outcome: Progressing  Goal: Improved Oral Intake  Outcome: Progressing  Goal: Optimal Pain Control and Function  Outcome: Progressing  Goal: Skin Health and Integrity  Outcome: Progressing  Goal: Optimal Wound Healing  Outcome: Progressing     Problem: Diabetes Comorbidity  Goal: Blood Glucose Level Within Targeted Range  Outcome: Progressing     Problem: Heart Failure  Goal: Optimal Cardiac Output  Outcome: Progressing  Goal: Stable Heart Rate and Rhythm  Outcome: Progressing  Goal: Optimal Functional Ability  Outcome: Progressing  Goal: Improved Oral Intake  Outcome: Progressing  Goal: Effective Oxygenation and Ventilation  Outcome: Progressing  Goal: Effective Breathing Pattern During Sleep  Outcome: Progressing     Problem: Fall Injury Risk  Goal: Absence of Fall and Fall-Related Injury  Outcome: Progressing     Problem: Infection  Goal: Absence of Infection Signs and Symptoms  Outcome: Progressing     Problem: Skin Injury Risk Increased  Goal: Skin Health and Integrity  Outcome: Progressing

## 2024-05-13 LAB
ANION GAP SERPL CALC-SCNC: 9 MEQ/L
BUN SERPL-MCNC: 8.3 MG/DL (ref 9.8–20.1)
CALCIUM SERPL-MCNC: 8.5 MG/DL (ref 8.4–10.2)
CHLORIDE SERPL-SCNC: 99 MMOL/L (ref 98–107)
CO2 SERPL-SCNC: 32 MMOL/L (ref 23–31)
CREAT SERPL-MCNC: 0.57 MG/DL (ref 0.55–1.02)
CREAT/UREA NIT SERPL: 15
GFR SERPLBLD CREATININE-BSD FMLA CKD-EPI: >60 ML/MIN/1.73/M2
GLUCOSE SERPL-MCNC: 101 MG/DL (ref 82–115)
MAGNESIUM SERPL-MCNC: 1.9 MG/DL (ref 1.6–2.6)
POCT GLUCOSE: 110 MG/DL (ref 70–110)
POCT GLUCOSE: 112 MG/DL (ref 70–110)
POCT GLUCOSE: 117 MG/DL (ref 70–110)
POTASSIUM SERPL-SCNC: 3.2 MMOL/L (ref 3.5–5.1)
SODIUM SERPL-SCNC: 140 MMOL/L (ref 136–145)

## 2024-05-13 PROCEDURE — S4991 NICOTINE PATCH NONLEGEND: HCPCS | Performed by: INTERNAL MEDICINE

## 2024-05-13 PROCEDURE — 27000221 HC OXYGEN, UP TO 24 HOURS

## 2024-05-13 PROCEDURE — 99900035 HC TECH TIME PER 15 MIN (STAT)

## 2024-05-13 PROCEDURE — 80048 BASIC METABOLIC PNL TOTAL CA: CPT | Performed by: INTERNAL MEDICINE

## 2024-05-13 PROCEDURE — 97162 PT EVAL MOD COMPLEX 30 MIN: CPT

## 2024-05-13 PROCEDURE — 25000003 PHARM REV CODE 250

## 2024-05-13 PROCEDURE — 94640 AIRWAY INHALATION TREATMENT: CPT

## 2024-05-13 PROCEDURE — 99900031 HC PATIENT EDUCATION (STAT)

## 2024-05-13 PROCEDURE — 97166 OT EVAL MOD COMPLEX 45 MIN: CPT

## 2024-05-13 PROCEDURE — 63600175 PHARM REV CODE 636 W HCPCS: Performed by: NURSE PRACTITIONER

## 2024-05-13 PROCEDURE — 94760 N-INVAS EAR/PLS OXIMETRY 1: CPT

## 2024-05-13 PROCEDURE — 25000003 PHARM REV CODE 250: Performed by: INTERNAL MEDICINE

## 2024-05-13 PROCEDURE — 25000003 PHARM REV CODE 250: Performed by: NURSE PRACTITIONER

## 2024-05-13 PROCEDURE — 21400001 HC TELEMETRY ROOM

## 2024-05-13 PROCEDURE — 36415 COLL VENOUS BLD VENIPUNCTURE: CPT | Performed by: INTERNAL MEDICINE

## 2024-05-13 PROCEDURE — 25000242 PHARM REV CODE 250 ALT 637 W/ HCPCS: Performed by: INTERNAL MEDICINE

## 2024-05-13 PROCEDURE — 25000003 PHARM REV CODE 250: Performed by: PHYSICIAN ASSISTANT

## 2024-05-13 PROCEDURE — 83735 ASSAY OF MAGNESIUM: CPT | Performed by: INTERNAL MEDICINE

## 2024-05-13 RX ADMIN — RIFAXIMIN 550 MG: 550 TABLET ORAL at 09:05

## 2024-05-13 RX ADMIN — PANTOPRAZOLE SODIUM 40 MG: 40 TABLET, DELAYED RELEASE ORAL at 09:05

## 2024-05-13 RX ADMIN — CARVEDILOL 6.25 MG: 3.12 TABLET, FILM COATED ORAL at 09:05

## 2024-05-13 RX ADMIN — FOLIC ACID 1 MG: 1 TABLET ORAL at 09:05

## 2024-05-13 RX ADMIN — BUSPIRONE HYDROCHLORIDE 5 MG: 5 TABLET ORAL at 09:05

## 2024-05-13 RX ADMIN — ENOXAPARIN SODIUM 40 MG: 40 INJECTION SUBCUTANEOUS at 04:05

## 2024-05-13 RX ADMIN — GABAPENTIN 300 MG: 300 CAPSULE ORAL at 09:05

## 2024-05-13 RX ADMIN — DAPAGLIFLOZIN 10 MG: 10 TABLET, FILM COATED ORAL at 09:05

## 2024-05-13 RX ADMIN — NICOTINE 1 PATCH: 21 PATCH, EXTENDED RELEASE TRANSDERMAL at 09:05

## 2024-05-13 RX ADMIN — LEVALBUTEROL HYDROCHLORIDE 0.63 MG: 0.63 SOLUTION RESPIRATORY (INHALATION) at 07:05

## 2024-05-13 RX ADMIN — SACUBITRIL AND VALSARTAN 1 TABLET: 49; 51 TABLET, FILM COATED ORAL at 09:05

## 2024-05-13 RX ADMIN — LEVALBUTEROL HYDROCHLORIDE 0.63 MG: 0.63 SOLUTION RESPIRATORY (INHALATION) at 01:05

## 2024-05-13 RX ADMIN — ESCITALOPRAM OXALATE 5 MG: 5 TABLET, FILM COATED ORAL at 09:05

## 2024-05-13 RX ADMIN — GABAPENTIN 300 MG: 300 CAPSULE ORAL at 04:05

## 2024-05-13 RX ADMIN — SIMETHICONE 80 MG: 80 TABLET, CHEWABLE ORAL at 09:05

## 2024-05-13 RX ADMIN — LEVALBUTEROL HYDROCHLORIDE 0.63 MG: 0.63 SOLUTION RESPIRATORY (INHALATION) at 08:05

## 2024-05-13 RX ADMIN — FUROSEMIDE 40 MG: 40 TABLET ORAL at 09:05

## 2024-05-13 RX ADMIN — THIAMINE HCL TAB 100 MG 100 MG: 100 TAB at 09:05

## 2024-05-13 NOTE — PLAN OF CARE
Problem: Occupational Therapy  Goal: Occupational Therapy Goal  Description: Goals to be met by: 6/13/24     Patient will increase functional independence with ADLs by performing:    UE Dressing with Modified [place].  LE Dressing with Modified [place].  Grooming while standing at sink with Modified [place].  Toileting from toilet with Modified [place] for hygiene and clothing management.   Toilet transfer to toilet with Modified [place].    Outcome: Progressing

## 2024-05-13 NOTE — PROGRESS NOTES
[facility]  Hospital Medicine Progress Note      Chief Complaint:  Difficulty breathing    HPI: (personally reviewed by me and is documented from initial H&P)    64 y.o. White female with a past medical history of hypertension, hyperlipidemia, diabetes mellitus type 2, anxiety/depression, COPD  not on home oxygen  and alcoholic liver cirrhosis    The patient presented to [facility] on 5/8/2024 with a primary complaint of shortness of breath. Patient's ex  (Bruce [phone]) is at bedside giving today's history as patient has severe hearing loss.  He reports oxygen saturation was 62% yesterday.  Patient has been having chronic shortness of breath and swelling to the bilateral lower extremities with associated nausea.  Patient does not have a chronic cough. Patient had a month long admission to the hospital  approximately a month ago for in which she required paracentesis twice.  At baseline patient lives with son and grandson, ambulates with a cane / walker and needs assistance with activities of daily living.  She was a 2 pack-per-day smoker and a former alcohol user who quit in March of 2024.     Upon presentation to the ED, temperature 97.5° F, heart rate 88, blood pressure 173/81, respiratory rate 24 and SpO2 94% on 4 L nasal cannula.  Oxygen saturation decreased to 79 and patient was placed on OxyMask at 5 L. labs with H&H 11/35, , sodium 133, potassium 3.4, alkaline phosphatase 156, BNP 1045, troponin less than 0.01.  Influenza A/B, RSV and SARS-COV-2 PCR negative.  EKG normal sinus rhythm and previously cited septal infarct.  Chest x-ray enlarged cardiac silhouette with pulmonary edema.  In ED patient received DuoNebs, 1 g of magnesium sulfate, 60 mEq of potassium bicarbonate.  Patient was admitted to hospital medicine services for further medical management.  Chest x-ray reviewed and shows enlarged cardiac silhouette with pulmonary edema echocardiogram revealed a EF of  55-60% with grade 2 diastolic dysfunction.      Interval Hx:         __________________________________________________________________________________________________________________________________    Objective/physical exam:  Vital signs have been personally reviewed by me   General: Appears comfortable, no acute distress.  Resting comfortably in bed    Patient requiring supplemental oxygen with desaturations when supplemental oxygen is turned off, currently on 1 L and maintained greater than 94%    Integumentary: Warm, dry, intact.  Musculoskeletal: Purposeful movement noted.     Respiratory: Breath sounds are equal.  Cardiovascular: Regular rate.       VITAL SIGNS: 24 HRS MIN & MAX LAST   Temp  Min: 97.6 °F (36.4 °C)  Max: 99.1 °F (37.3 °C) 98.7 °F (37.1 °C)   BP  Min: 96/64  Max: 133/68 124/71   Pulse  Min: 70  Max: 89  80   Resp  Min: 16  Max: 20 18   SpO2  Min: 89 %  Max: 95 % (!) 89 %     Echo    Left Ventricle: The left ventricle is normal in size. Normal wall   thickness. There is normal systolic function with a visually estimated   ejection fraction of 55 - 60%. Grade 2 DD.    Right Ventricle: Normal right ventricular cavity size. Systolic   function is normal.    Aortic Valve: Moderately calcified cusps but not well visualized. There   is mild to moderate stenosis. Aortic valve area by VTI is 1.22 cm². Aortic   valve peak velocity is 2.38 m/s. Mean gradient is 13 mmHg. The   dimensionless index is 0.61. There is moderate aortic regurgitation.    Mitral Valve: There is moderate mitral annular calcification present.   There is moderate stenosis. The mean pressure gradient across the mitral   valve is 9 mmHg at a heart rate of 91  bpm. There is moderate   regurgitation.    Tricuspid Valve: There is mild regurgitation.    Pericardium: There is no pericardial effusion.    Recent Labs   Lab 05/09/24  0516 05/10/24  0555 05/12/24  1057   WBC 6.29 8.85 7.13   RBC 3.29* 3.35* 3.45*   HGB 11.8* 12.1 12.3   HCT  37.0 36.5* 38.6   .5* 109.0* 111.9*   MCH 35.9* 36.1* 35.7*   MCHC 31.9* 33.2 31.9*   RDW 18.4* 18.7* 17.6*    386 284   MPV 9.0 9.1 9.2       Recent Labs   Lab 05/08/24  2348 05/09/24  0530 05/09/24  0707 05/10/24  0555 05/12/24  1057 05/13/24  0527   *  --  137 138 140 140   K 3.4*  --  4.3 3.3* 3.5 3.2*   CO2 26  --  30 42* 35* 32*   BUN 7.4*  --  6.3* 5.8* 7.8* 8.3*   CREATININE 0.64  --  0.64 0.54* 0.60 0.57   CALCIUM 9.0  --  9.0 8.6 8.5 8.5   MG 1.80   < >  --  1.80 1.90 1.90   ALBUMIN 3.2*  --  3.1* 3.1*  --   --    ALKPHOS 156*  --  151* 154*  --   --    ALT 5  --  7 6  --   --    AST 13  --  22 13  --   --    BILITOT 0.3  --  0.3 0.4  --   --     < > = values in this interval not displayed.     Microbiology Results (last 7 days)       Procedure Component Value Units Date/Time    Clostridium Diff Toxin, A & B, EIA [[id number]]     Order Status: Canceled Specimen: Stool              Scheduled Med:   busPIRone  5 mg Oral BID    carvediloL  6.25 mg Oral BID    dapagliflozin propanediol  10 mg Oral Daily    enoxparin  40 mg Subcutaneous Daily    EScitalopram oxalate  5 mg Oral Daily    folic acid  1 mg Oral Daily    furosemide  40 mg Oral Daily    gabapentin  300 mg Oral TID    levalbuterol  0.63 mg Nebulization Q6H    nicotine  1 patch Transdermal Daily    pantoprazole  40 mg Oral BID    rifAXIMin  550 mg Oral BID    sacubitriL-valsartan  1 tablet Oral BID    thiamine  100 mg Oral Daily      Continuous Infusions:       PRN Meds:    Current Facility-Administered Medications:     acetaminophen, 650 mg, Oral, Q6H PRN    aluminum-magnesium hydroxide-simethicone, 30 mL, Oral, QID PRN    dextrose 10%, 12.5 g, Intravenous, PRN    dextrose 10%, 25 g, Intravenous, PRN    glucagon (human recombinant), 1 mg, Intramuscular, PRN    glucose, 16 g, Oral, PRN    glucose, 24 g, Oral, PRN    hydrALAZINE, 10 mg, Intravenous, Q4H PRN    insulin aspart U-100, 0-5 Units, Subcutaneous, QID (AC + HS) PRN     melatonin, 6 mg, Oral, Nightly PRN    naloxone, 0.02 mg, Intravenous, PRN    ondansetron, 4 mg, Intravenous, Q4H PRN    polyethylene glycol, 17 g, Oral, BID PRN    prochlorperazine, 5 mg, Intravenous, Q6H PRN    simethicone, 1 tablet, Oral, QID PRN   __________________________________________________________________________________________________________________________________  Assessment/Plan:  Acute hypoxic respiratory failure  Diastolic heart failure  Macrocytic anemia, stable   Hypokalemia, resolved  Hyponatremia   Elevated alkaline phosphatase     History of hypertension, hyperlipidemia, diabetes mellitus type 2, anxiety/depression and COPD     Above present on admission     Anticipated discharge and Disposition when medically stable:  Discharge home with son in a.m. with home health services   _______________________________________________________________________________________________________________________________  Presented for shortness of breath cardiac related with noted diastolic heart failure.  Patient medically optimized continue diuretic therapy with 40 mg Lasix at discharge.  Continue to monitor volume status and urinary output.  Continue Entresto beta-blocker therapy.    Consult placed to PT/ OT, obtain ambulatory pulse ox   Continue supportive care  Continue checking vital signs q4hrs.     --microcytic anemia, folic acid level  Nurse notified to page me if any changes occur     DVT prophylaxis initiated   Nutrition Status: diabetic/low sodium diet.     Consults: cardiology   I have personally reviewed the specialist documentation and/or have spoken to the specialist with regard to the care of this patient; recommendations are noted above.     _______________________________________________________________________________________________________________________________          I have spent 40 minutes on the day of the visit; time spent includes face to face time and non-face to face time  preparing to see the patient (eg, review of tests), independently reviewing and interpreting medical records, both past and current; documenting clinical information in the electronic or other health record, and communicating results to the patient/family/caregiver and care coordinator and nursing team.      All diagnosis and differential diagnosis have been reviewed,  interpreted and communicated appropriately to care team. assessment and plan has been documented; I have personally reviewed the labs and test results that are presently available and pertinent to this hospital course; I have reviewed medical records based upon their availability.    I will continue to monitor closely and make adjustments to medical management as needed.    Tabatha Stokes,    05/13/2024     This note was created with the assistance of Dragon voice recognition software. There may be transcription errors as a result of using this technology however minimal. Effort has been made to assure accuracy of transcription but any obvious errors or omissions should be clarified with the author of the document.

## 2024-05-13 NOTE — PT/OT/SLP EVAL
Occupational Therapy  Evaluation    Name: Luz Maria Alfaro  MRN: [id number]  Admitting Diagnosis: shortness of breath  Recent Surgery: * No surgery found *      Recommendations:     Discharge therapy intensity: Low Intensity Therapy   Discharge Equipment Recommendations:  none  Barriers to discharge:  None    Assessment:     Luz Maria Alfaro is a 64 y.o. female with a medical diagnosis of acute hypoxic respiratory failure.  She presents with the following performance deficits affecting function: weakness, impaired endurance, impaired self care skills, impaired functional mobility, gait instability, impaired balance, decreased lower extremity function, pain.     Pt with good tolerance to initial OT evaluation, limited in mobility and ADLs due to left heel pain and noted discoloration. RN notified. Pt SBA-CGA for mobility and ADLs using RW. Pt would benefit from acute OT services to build endurance and functional independence, as pt is alone for most of the day due to her family working. Recommending low intensity therapy upon discharge.    Rehab Prognosis: Good; patient would benefit from acute skilled OT services to address these deficits and reach maximum level of function.       Plan:     Patient to be seen 2 x/week to address the above listed problems via self-care/home management, therapeutic activities, therapeutic exercises  Plan of Care Expires: 06/13/24  Plan of Care Reviewed with: patient    Subjective     Chief Complaint: L heel pain with mobility  Patient/Family Comments/goals: to go home    Occupational Profile:  Living Environment: lives with son and grandson in [facility], 0 RADHA; tub shower with seat  Previous level of function: mod I for mobility, independent with ADLs  Equipment Used at Home: shower chair, walker, rolling  Assistance upon Discharge: family (not available 24/7)    Pain/Comfort:  Pain Rating 1: 8/10 (at L heel)  Pain Addressed 1: Reposition, Distraction, Cessation of Activity, Nurse  notified  Pain Rating Post-Intervention 1: 8/10    Patients cultural, spiritual, Shinto conflicts given the current situation: no    Objective:     OT communicated with RN prior to session.      Patient was found HOB elevated with oxygen, pulse ox (continuous), telemetry upon OT entry to room.    General Precautions: Standard, fall  Orthopedic Precautions: N/A  Braces: N/A    Vital Signs: Blood Pressure: 147/65  HR: 92  Sp02: 90-95% throughout  Respiratory Status: on room air; pt on 0.5L upon therapist entry; O2 removed for session and pt sats >90% throughout, left on RA and RN informed    Bed Mobility:    Patient completed Supine to Sit with contact guard assistance    Functional Mobility/Transfers:  Patient completed Sit <> Stand Transfer with stand by assistance  with  rolling walker   Patient completed Toilet Transfer Step Transfer technique with contact guard assistance with  rolling walker  Functional Mobility: SBA for mobility to and from bathroom commode using RW, no LOB noted but slow pace 2/2 L heel pain    Activities of Daily Living:  Lower Body Dressing: stand by assistance donning left sock, pt reports she has to don R sock lying in bed 2/2 hip ROM    Functional Cognition:  Safety Awareness: WFL  Insight into Deficits: WFL    Upper Extremity Function:  Right Upper Extremity:   Strength: WFL    Left Upper Extremity:  Strength: WFL    Balance:   Static sitting balance: WFL  Dynamic sitting balance:WFL  Static standing balance:SBA at RW  Dynamic standing balance:CGA at RW    Therapeutic Positioning  Risk for acquired pressure injuries is decreased due to ability to get to BSC/toilet with assist.    OT interventions performed during the course of today's session:   Education was provided on benefits of and recommendations for therapeutic positioning    Skin assessment: all bony prominences were assessed    Findings: new area of altered skin integrity discovered at L heel (discoloration) RN  informed    OT recommendations for therapeutic positioning throughout hospitalization:   Follow [facility] Pressure Injury Prevention Protocol    Patient Education:  Patient provided with verbal education education regarding OT role/goals/POC, fall prevention, safety awareness, and Discharge/DME recommendations.  Understanding was verbalized.     Patient left up in chair with all lines intact, call button in reach, and RN notified.    GOALS:   Multidisciplinary Problems       Occupational Therapy Goals          Problem: Occupational Therapy    Goal Priority Disciplines Outcome Interventions   Occupational Therapy Goal     OT, PT/OT Progressing    Description: Goals to be met by: 6/13/24     Patient will increase functional independence with ADLs by performing:    UE Dressing with Modified [place].  LE Dressing with Modified [place].  Grooming while standing at sink with Modified [place].  Toileting from toilet with Modified [place] for hygiene and clothing management.   Toilet transfer to toilet with Modified [place].                         History:     Past Medical History:   Diagnosis Date    Benign essential HTN     Mixed hyperlipidemia     Type 2 diabetes mellitus without complications          Past Surgical History:   Procedure Laterality Date    APPENDECTOMY      BACK SURGERY      HIP SURGERY      INTRAMEDULLARY RODDING OF FEMUR Right 10/27/2022    Procedure: RIGHT FEMUR INTERTROCH IMN;  Surgeon: Stewart Brambila MD;  Location: [facility];  Service: Orthopedics;  Laterality: Right;  [place] table, supine  3rd case  Synthes Short TFNA       Time Tracking:     OT Date of Treatment: 05/13/24  OT Start Time: 1039  OT Stop Time: 1059  OT Total Time (min): 20 min    Billable Minutes:Evaluation mod complexity    5/13/2024

## 2024-05-13 NOTE — PT/OT/SLP EVAL
Physical Therapy Evaluation    Patient Name:  Luz Maria Alfaro   MRN:  [id number]    Recommendations:     Discharge therapy intensity: Low Intensity Therapy   Discharge Equipment Recommendations: none   Barriers to discharge: None    Assessment:     Luz Maria Alfaro is a 64 y.o. female admitted with a medical diagnosis of acute hypoxic respiratory failure, diastolic HF, hx of COPD.  She presents with the following impairments/functional limitations: impaired endurance, impaired functional mobility, pain, impaired cardiopulmonary response to activity . Pt known to this therapist from prior admission - appears to be functioning near her baseline with mild O2 desaturation with mobility on room air. Pt c/o L heel pain with some discoloration noted to L medial foot - notified pt's RN and Dr. Stokes. Recommend low intensity therapy, RW use, and assist from son as needed upon DC.    Rehab Prognosis: Good; patient would benefit from acute skilled PT services to address these deficits and reach maximum level of function.    Recent Surgery: * No surgery found *      Plan:     During this hospitalization, patient would benefit from acute PT services 3 x/week to address the identified rehab impairments via gait training, therapeutic activities, therapeutic exercises and progress toward the following goals:    Plan of Care Expires:  06/13/24    Subjective     Chief Complaint: L heel pain when walking  Patient/Family Comments/goals: home  Pain/Comfort:  Pain Rating 1: 8/10  Location - Side 1: Left  Location 1: heel  Pain Addressed 1: Nurse notified, Cessation of Activity, Distraction, Reposition  Pain Rating Post-Intervention 1: 8/10    Patients cultural, spiritual, Oriental orthodox conflicts given the current situation: no    Living Environment:  Pt lives with her son and grandson (both work); [facility] no RADHA.  Prior to admission, patients level of function was [place] with RW.  Equipment used at home: walker, rolling.  DME owned (not  currently used): none.  Upon discharge, patient will have assistance from family    Objective:     Communicated with RN prior to session.  Patient found right sidelying with pulse ox (continuous), telemetry, oxygen  upon PT entry to room.    General Precautions: Standard, fall  Orthopedic Precautions:    Braces:    Respiratory Status: Room air SpO2 95% at rest, 90% with mobility  Blood Pressure: 147/65      Exams:  Cognitive Exam:  Patient is oriented to Person, Place, and Situation  RLE Strength: WFL  LLE Strength: WFL  Skin integrity:  L medial foot bruising vs. Discoloration?      Functional Mobility:  Bed Mobility:     Supine to Sit: minimum assistance  Transfers:     Sit to Stand:  stand by assistance with rolling walker  Toilet Transfer: stand by assistance with  rolling walker  using  Stand Pivot  Gait: 2x15ft with RW with SBA, distance limited by pain in L heel      AM-PAC 6 CLICK MOBILITY  Total Score:20       Treatment & Education:      Patient provided with verbal education education regarding PT role/goals/POC, fall prevention, safety awareness, and discharge/DME recommendations.  Understanding was verbalized.     Patient left up in chair with all lines intact, call button in reach, and RN notified.    GOALS:   Multidisciplinary Problems       Physical Therapy Goals          Problem: Physical Therapy    Goal Priority Disciplines Outcome Goal Variances Interventions   Physical Therapy Goal     PT, PT/OT Progressing     Description: Goals to be met by: 24     Patient will increase functional independence with mobility by performin. Supine to sit with [place]  2. Sit to stand transfer with Modified [place]  3. Bed to chair transfer with Modified [place] using Rolling Walker  4. Gait  x 150 feet with Modified [place] using Rolling Walker.                          History:     Past Medical History:   Diagnosis Date    Benign essential HTN     Mixed hyperlipidemia     Type 2  diabetes mellitus without complications        Past Surgical History:   Procedure Laterality Date    APPENDECTOMY      BACK SURGERY      HIP SURGERY      INTRAMEDULLARY RODDING OF FEMUR Right 10/27/2022    Procedure: RIGHT FEMUR INTERTROCH IMN;  Surgeon: Stewart Brambila MD;  Location: [facility];  Service: Orthopedics;  Laterality: Right;  [place] table, supine  3rd case  Synthes Short TFNA       Time Tracking:     PT Received On: 05/13/24  PT Start Time: 1041     PT Stop Time: 1059  PT Total Time (min): 18 min     Billable Minutes: Evaluation MOD      05/13/2024

## 2024-05-13 NOTE — PLAN OF CARE
Problem: Physical Therapy  Goal: Physical Therapy Goal  Description: Goals to be met by: 24     Patient will increase functional independence with mobility by performin. Supine to sit with [place]  2. Sit to stand transfer with Modified [place]  3. Bed to chair transfer with Modified [place] using Rolling Walker  4. Gait  x 150 feet with Modified [place] using Rolling Walker.     Outcome: Progressing

## 2024-05-14 VITALS
HEART RATE: 87 BPM | BODY MASS INDEX: 19.49 KG/M2 | WEIGHT: 110 LBS | TEMPERATURE: 98 F | DIASTOLIC BLOOD PRESSURE: 74 MMHG | SYSTOLIC BLOOD PRESSURE: 106 MMHG | RESPIRATION RATE: 20 BRPM | HEIGHT: 63 IN | OXYGEN SATURATION: 95 %

## 2024-05-14 PROBLEM — I50.43 ACUTE ON CHRONIC COMBINED SYSTOLIC AND DIASTOLIC HEART FAILURE: Status: ACTIVE | Noted: 2024-05-14

## 2024-05-14 PROCEDURE — 25000242 PHARM REV CODE 250 ALT 637 W/ HCPCS: Performed by: INTERNAL MEDICINE

## 2024-05-14 PROCEDURE — 99900035 HC TECH TIME PER 15 MIN (STAT)

## 2024-05-14 PROCEDURE — 25000003 PHARM REV CODE 250: Performed by: PHYSICIAN ASSISTANT

## 2024-05-14 PROCEDURE — 25000003 PHARM REV CODE 250

## 2024-05-14 PROCEDURE — S4991 NICOTINE PATCH NONLEGEND: HCPCS | Performed by: INTERNAL MEDICINE

## 2024-05-14 PROCEDURE — 27000221 HC OXYGEN, UP TO 24 HOURS

## 2024-05-14 PROCEDURE — 94640 AIRWAY INHALATION TREATMENT: CPT

## 2024-05-14 PROCEDURE — 25000003 PHARM REV CODE 250: Performed by: INTERNAL MEDICINE

## 2024-05-14 PROCEDURE — 99900031 HC PATIENT EDUCATION (STAT)

## 2024-05-14 PROCEDURE — 94761 N-INVAS EAR/PLS OXIMETRY MLT: CPT

## 2024-05-14 RX ORDER — DAPAGLIFLOZIN 10 MG/1
10 TABLET, FILM COATED ORAL DAILY
Qty: 30 TABLET | Refills: 0 | Status: SHIPPED | OUTPATIENT
Start: 2024-05-14

## 2024-05-14 RX ORDER — FUROSEMIDE 40 MG/1
40 TABLET ORAL DAILY
Qty: 30 TABLET | Refills: 0 | Status: ON HOLD | OUTPATIENT
Start: 2024-05-14 | End: 2024-05-22 | Stop reason: HOSPADM

## 2024-05-14 RX ADMIN — PANTOPRAZOLE SODIUM 40 MG: 40 TABLET, DELAYED RELEASE ORAL at 09:05

## 2024-05-14 RX ADMIN — CARVEDILOL 6.25 MG: 3.12 TABLET, FILM COATED ORAL at 09:05

## 2024-05-14 RX ADMIN — FUROSEMIDE 40 MG: 40 TABLET ORAL at 09:05

## 2024-05-14 RX ADMIN — NICOTINE 1 PATCH: 21 PATCH, EXTENDED RELEASE TRANSDERMAL at 09:05

## 2024-05-14 RX ADMIN — ESCITALOPRAM OXALATE 5 MG: 5 TABLET, FILM COATED ORAL at 09:05

## 2024-05-14 RX ADMIN — THIAMINE HCL TAB 100 MG 100 MG: 100 TAB at 09:05

## 2024-05-14 RX ADMIN — LEVALBUTEROL HYDROCHLORIDE 0.63 MG: 0.63 SOLUTION RESPIRATORY (INHALATION) at 12:05

## 2024-05-14 RX ADMIN — LEVALBUTEROL HYDROCHLORIDE 0.63 MG: 0.63 SOLUTION RESPIRATORY (INHALATION) at 08:05

## 2024-05-14 RX ADMIN — FOLIC ACID 1 MG: 1 TABLET ORAL at 09:05

## 2024-05-14 RX ADMIN — BUSPIRONE HYDROCHLORIDE 5 MG: 5 TABLET ORAL at 09:05

## 2024-05-14 RX ADMIN — RIFAXIMIN 550 MG: 550 TABLET ORAL at 09:05

## 2024-05-14 RX ADMIN — GABAPENTIN 300 MG: 300 CAPSULE ORAL at 09:05

## 2024-05-14 RX ADMIN — DAPAGLIFLOZIN 10 MG: 10 TABLET, FILM COATED ORAL at 09:05

## 2024-05-14 RX ADMIN — SACUBITRIL AND VALSARTAN 1 TABLET: 49; 51 TABLET, FILM COATED ORAL at 09:05

## 2024-05-14 NOTE — PLAN OF CARE
Problem: Adult Inpatient Plan of Care  Goal: Plan of Care Review  Outcome: Not Progressing  Goal: Patient-Specific Goal (Individualized)  Outcome: Not Progressing  Goal: Absence of Hospital-Acquired Illness or Injury  Outcome: Not Progressing  Goal: Optimal Comfort and Wellbeing  Outcome: Not Progressing  Goal: Readiness for Transition of Care  Outcome: Not Progressing     Problem: Wound  Goal: Optimal Coping  Outcome: Not Progressing  Goal: Optimal Functional Ability  Outcome: Not Progressing  Goal: Absence of Infection Signs and Symptoms  Outcome: Not Progressing  Goal: Improved Oral Intake  Outcome: Not Progressing  Goal: Optimal Pain Control and Function  Outcome: Not Progressing  Goal: Skin Health and Integrity  Outcome: Not Progressing  Goal: Optimal Wound Healing  Outcome: Not Progressing     Problem: Diabetes Comorbidity  Goal: Blood Glucose Level Within Targeted Range  Outcome: Not Progressing     Problem: Heart Failure  Goal: Optimal Cardiac Output  Outcome: Not Progressing  Goal: Stable Heart Rate and Rhythm  Outcome: Not Progressing  Goal: Optimal Functional Ability  Outcome: Not Progressing  Goal: Improved Oral Intake  Outcome: Not Progressing  Goal: Effective Oxygenation and Ventilation  Outcome: Not Progressing  Goal: Effective Breathing Pattern During Sleep  Outcome: Not Progressing     Problem: Fall Injury Risk  Goal: Absence of Fall and Fall-Related Injury  Outcome: Not Progressing     Problem: Infection  Goal: Absence of Infection Signs and Symptoms  Outcome: Not Progressing     Problem: Skin Injury Risk Increased  Goal: Skin Health and Integrity  Outcome: Not Progressing

## 2024-05-14 NOTE — DISCHARGE SUMMARY
[facility] Hospital Medicine Discharge Summary    Admit Date: 5/8/2024  Discharge Date and Time: 5/14/20243:56 PM  Admitting Physician: RATNA Team  Discharging Physician: Tabatha Stokes DO.  Primary Care Physician: Robyn Matthews, NP  Consults: Cardiology    Discharge Diagnoses:  Acute hypoxic respiratory failure  Diastolic heart failure  Macrocytic anemia, stable   Hypokalemia, resolved  Hyponatremia   Elevated alkaline phosphatase      History of hypertension, hyperlipidemia, diabetes mellitus type 2, anxiety/depression and COPD      Above present on admission     Hospital Course:   Chief Complaint:  Difficulty breathing     HPI: (personally reviewed by me and is documented from initial H&P)    64 y.o. White female with a past medical history of hypertension, hyperlipidemia, diabetes mellitus type 2, anxiety/depression, COPD  not on home oxygen  and alcoholic liver cirrhosis     The patient presented to [facility] on 5/8/2024 with a primary complaint of shortness of breath. Patient's ex  (Bruce [phone]) is at bedside giving today's history as patient has severe hearing loss.  He reports oxygen saturation was 62% yesterday.  Patient has been having chronic shortness of breath and swelling to the bilateral lower extremities with associated nausea.  Patient does not have a chronic cough. Patient had a month long admission to the hospital  approximately a month ago for in which she required paracentesis twice.  At baseline patient lives with son and grandson, ambulates with a cane / walker and needs assistance with activities of daily living.  She was a 2 pack-per-day smoker and a former alcohol user who quit in March of 2024.     Upon presentation to the ED, temperature 97.5° F, heart rate 88, blood pressure 173/81, respiratory rate 24 and SpO2 94% on 4 L nasal cannula.  Oxygen saturation decreased to 79 and patient was placed on OxyMask at 5 L. labs with H&H 11/35, ,  sodium 133, potassium 3.4, alkaline phosphatase 156, BNP 1045, troponin less than 0.01.  Influenza A/B, RSV and SARS-COV-2 PCR negative.  EKG normal sinus rhythm and previously cited septal infarct.  Chest x-ray enlarged cardiac silhouette with pulmonary edema.  In ED patient received DuoNebs, 1 g of magnesium sulfate, 60 mEq of potassium bicarbonate.  Patient was admitted to hospital medicine services for further medical management.  Chest x-ray reviewed and shows enlarged cardiac silhouette with pulmonary edema echocardiogram revealed a EF of 55-60% with grade 2 diastolic dysfunction.       Pt was seen and examined on the day of discharge.    Patient was weaned off of supplemental oxygen appropriately.    Patient noted to have diastolic heart failure and will continue diuretic therapy appropriately.  Patient was initiated on Entresto.  Prescription sent to pharmacy  .      Hospital course and discharge care plan has been discussed with patient, patient voices understanding and agreement with plan. All questions have been answered to the best of my ability. Patient is advised to return to ED or call 911 in case of emergency and or if symptoms worsen.    Vitals:  VITAL SIGNS: 24 HRS MIN & MAX LAST   Temp  Min: 97.3 °F (36.3 °C)  Max: 99.2 °F (37.3 °C) 97.9 °F (36.6 °C)   BP  Min: 106/74  Max: 163/76 106/74   Pulse  Min: 68  Max: 87  87   Resp  Min: 18  Max: 20 20   SpO2  Min: 92 %  Max: 96 % 95 %       Physical Exam:    General: Appears comfortable, no acute distress.  Integumentary: Warm, dry, intact.  Very hard of hearing.     Musculoskeletal: Purposeful movement noted.   Respiratory: No accessory muscle use. Breath sounds are equal.  Cardiovascular: Regular rate. No peripheral edema.      Procedures Performed: No admission procedures for hospital encounter.     Significant Diagnostic Studies: See Full reports for all details    Recent Labs   Lab 05/09/24  0516 05/10/24  0555 05/12/24  1057   WBC 6.29 8.85 7.13    RBC 3.29* 3.35* 3.45*   HGB 11.8* 12.1 12.3   HCT 37.0 36.5* 38.6   .5* 109.0* 111.9*   MCH 35.9* 36.1* 35.7*   MCHC 31.9* 33.2 31.9*   RDW 18.4* 18.7* 17.6*    386 284   MPV 9.0 9.1 9.2       Recent Labs   Lab 05/08/24  2348 05/09/24  0530 05/09/24  0707 05/10/24  0555 05/12/24  1057 05/13/24  0527   *  --  137 138 140 140   K 3.4*  --  4.3 3.3* 3.5 3.2*   CO2 26  --  30 42* 35* 32*   BUN 7.4*  --  6.3* 5.8* 7.8* 8.3*   CREATININE 0.64  --  0.64 0.54* 0.60 0.57   CALCIUM 9.0  --  9.0 8.6 8.5 8.5   MG 1.80   < >  --  1.80 1.90 1.90   ALBUMIN 3.2*  --  3.1* 3.1*  --   --    ALKPHOS 156*  --  151* 154*  --   --    ALT 5  --  7 6  --   --    AST 13  --  22 13  --   --    BILITOT 0.3  --  0.3 0.4  --   --     < > = values in this interval not displayed.        Microbiology Results (last 7 days)       Procedure Component Value Units Date/Time    Clostridium Diff Toxin, A & B, EIA [[id number]]     Order Status: Canceled Specimen: Stool              Echo    Left Ventricle: The left ventricle is normal in size. Normal wall   thickness. There is normal systolic function with a visually estimated   ejection fraction of 55 - 60%. Grade 2 DD.    Right Ventricle: Normal right ventricular cavity size. Systolic   function is normal.    Aortic Valve: Moderately calcified cusps but not well visualized. There   is mild to moderate stenosis. Aortic valve area by VTI is 1.22 cm². Aortic   valve peak velocity is 2.38 m/s. Mean gradient is 13 mmHg. The   dimensionless index is 0.61. There is moderate aortic regurgitation.    Mitral Valve: There is moderate mitral annular calcification present.   There is moderate stenosis. The mean pressure gradient across the mitral   valve is 9 mmHg at a heart rate of 91  bpm. There is moderate   regurgitation.    Tricuspid Valve: There is mild regurgitation.    Pericardium: There is no pericardial effusion.         Medication List        START taking these medications       dapagliflozin propanediol 10 mg tablet  Commonly known as: Farxiga  Take 1 tablet (10 mg total) by mouth once daily.     furosemide 40 MG tablet  Commonly known as: LASIX  Take 1 tablet (40 mg total) by mouth once daily.     sacubitriL-valsartan 49-51 mg per tablet  Commonly known as: ENTRESTO  Take 1 tablet by mouth 2 (two) times daily.            CONTINUE taking these medications      albuterol 90 mcg/actuation inhaler  Commonly known as: PROVENTIL/VENTOLIN HFA     ARIPiprazole 2 MG Tab  Commonly known as: ABILIFY  Take 1 tablet (2 mg total) by mouth once daily.     atorvastatin 10 MG tablet  Commonly known as: LIPITOR     busPIRone 5 MG Tab  Commonly known as: BUSPAR  Take 1 tablet (5 mg total) by mouth 2 (two) times daily.     carvediloL 6.25 MG tablet  Commonly known as: COREG  Take 1 tablet (6.25 mg total) by mouth 2 (two) times daily.     EScitalopram oxalate 5 MG Tab  Commonly known as: LEXAPRO  Take 1 tablet (5 mg total) by mouth once daily.     folic acid 1 MG tablet  Commonly known as: FOLVITE     gabapentin 300 MG capsule  Commonly known as: NEURONTIN     HYDROcodone-acetaminophen 5-325 mg per tablet  Commonly known as: NORCO     hyoscyamine 0.125 mg Subl  Commonly known as: Levsin     ondansetron 4 MG Tbdl  Commonly known as: ZOFRAN-ODT     pantoprazole 40 MG tablet  Commonly known as: PROTONIX     rifAXIMin 550 mg Tab  Commonly known as: XIFAXAN  Take 1 tablet (550 mg total) by mouth 2 (two) times daily.     sodium bicarbonate 650 MG tablet  Take 1 tablet (650 mg total) by mouth once daily.     thiamine 100 MG tablet  Take 1 tablet (100 mg total) by mouth once daily.            STOP taking these medications      amLODIPine 5 MG tablet  Commonly known as: NORVASC               Where to Get Your Medications        These medications were sent to [facility] [place] [facility] [address]  [address], [place] [address]      Phone:  [phone]   dapagliflozin propanediol 10 mg tablet  furosemide 40 MG tablet  sacubitriL-valsartan 49-51 mg per tablet          Explained in detail to the patient about the discharge plan, medications, and follow-up visits. Pt understands and agrees with the treatment plan  Discharge Disposition: Home-Health Care [facility]   Discharged Condition: stable  Diet-   Dietary Orders (From admission, onward)       Start     Ordered    05/09/24 1307  Diet diabetic Low Sodium,2gm; 1800 Calorie  Diet effective now        Question Answer Comment   Diet Modifier: Low Sodium,2gm    Total calories: 1800 Calorie        05/09/24 1306                   Medications Per DC med rec  Activities as tolerated   Follow-up Information       Caterina Huynh MD Follow up on 5/20/2024.    Specialties: Cardiovascular Disease, Cardiology  Why: @ 12:30PM  [facility]  Contact information:  [place] [id number]  [phone]               Caterina Huynh MD Follow up on 5/16/2024.    Specialties: Cardiovascular Disease, Cardiology  Why: @ 9:40AM for lab work  Contact information:  [place] [id number]  [phone]               Carlos Manriquez FNP Follow up on 5/24/2024.    Specialty: Cardiology  Why: @ 2:30PM  Contact information:  [address]  [place] [address]  [phone]                           For further questions contact hospitalist office    Discharge time 33 minutes    For worsening symptoms, chest pain, shortness of breath, increased abdominal pain, high grade fever, stroke or stroke like symptoms, immediately go to the nearest Emergency Room or call 911 as soon as possible.      Tabatha Sierra M.D, on 5/14/2024. at 3:56 PM.

## 2024-05-14 NOTE — NURSING
"Pt have discharge order and Will be going home with [facility].. Called Son Omer not available. Called Sister Vannesa if O2 Supplement is available home, Elicia States "No, Pt smokes so She do not have O2 tank home". Also mentioned of how pt will go home. She said Bruce partner will pick pt up. She will call me back if there will any changes in plan.   "

## 2024-05-14 NOTE — PLAN OF CARE
05/14/24 0935   Final Note   Assessment Type Final Discharge Note   Anticipated Discharge Disposition Home-Health  (FOC: The patient will be discharged from [facility] to his private residence with home health services through: [facility] as she is active with this agency.)   Hospital Resources/Appts/Education Provided Appointment suggestion unavailable   Post-Acute Status   Post-Acute Authorization Home Health   Home Health Status Set-up Complete/Auth obtained   Coverage Payor: MEDICARE - MEDICARE PART A & B -   Patient choice form signed by patient/caregiver List with quality metrics by geographic area provided   Discharge Delays None known at this time     The patient will be discharged from [facility] to her private residence with home health services through: [facility] as she is active with this agency. Clinical notes/updates and AVS and D/C Summary will be sent by GERARDO Chao via [employer] for review. The patient and her family were informed of the above D/C plans and all are in agreement. Transportation will be arranged by her family's personal vehicle.

## 2024-05-15 ENCOUNTER — PATIENT OUTREACH (OUTPATIENT)
Dept: ADMINISTRATIVE | Facility: CLINIC | Age: 64
End: 2024-05-15
Payer: MEDICARE

## 2024-05-15 LAB — POCT GLUCOSE: 89 MG/DL (ref 70–110)

## 2024-05-15 NOTE — PROGRESS NOTES
C3 nurse attempted to contact Luz Maria Alfaro for a TCC post hospital discharge follow up call. No answer. Voicemail left. The patient does not have a scheduled HOSFU appointment, and the pt does not have an [facility] PCP.  The patient does have f/u appts with Caterina Huynh MD (Cardiovascular Disease) on 5/20/2024; @ 12:30PM for Lexi[facility] and  5/16/2024; @ 9:40AM for lab work.  Also has f/u with Carlos Manriquez FNP (Cardiology) on 5/24/2024; @ 2:30PM.

## 2024-05-16 NOTE — PROGRESS NOTES
3rd attempt-C3 nurse attempted to contact Luz Maria Alfaro for a TCC post hospital discharge follow up call. No answer. Voicemail left. The patient does not have a scheduled HOSFU appointment, and the pt does not have an [facility] PCP.  The patient does have f/u appts with Caterina Huynh MD (Cardiovascular Disease) on 5/20/2024; @ 12:30PM for LexiPET and  5/16/2024; @ 9:40AM for lab work.  Also has f/u with Carlos Manriquez FNP (Cardiology) on 5/24/2024; @ 2:30PM.

## 2024-05-16 NOTE — PROGRESS NOTES
2nd attempt-C3 nurse attempted to contact [facility] Maria Alfaro for a TCC post hospital discharge follow up call. No answer. Voicemail left. The patient does not have a scheduled HOSFU appointment, and the pt does not have an [facility] PCP.  The patient does have f/u appts with Caterina Huynh MD (Cardiovascular Disease) on 5/20/2024; @ 12:30PM for LexiPET and  5/16/2024; @ 9:40AM for lab work.  Also has f/u with Carlos Manriquez FNP (Cardiology) on 5/24/2024; @ 2:30PM.

## 2024-05-20 ENCOUNTER — HOSPITAL ENCOUNTER (INPATIENT)
Facility: HOSPITAL | Age: 64
LOS: 1 days | Discharge: HOME HEALTH | DRG: 312 | End: 2024-05-22
Attending: EMERGENCY MEDICINE | Admitting: INTERNAL MEDICINE
Payer: MEDICARE

## 2024-05-20 DIAGNOSIS — R42 DIZZINESS: Primary | ICD-10-CM

## 2024-05-20 DIAGNOSIS — R07.9 CHEST PAIN: ICD-10-CM

## 2024-05-20 DIAGNOSIS — E87.6 DIURETIC-INDUCED HYPOKALEMIA: ICD-10-CM

## 2024-05-20 DIAGNOSIS — T50.2X5A DIURETIC-INDUCED HYPOKALEMIA: ICD-10-CM

## 2024-05-20 DIAGNOSIS — R53.1 WEAKNESS: ICD-10-CM

## 2024-05-20 DIAGNOSIS — I95.2 HYPOTENSION DUE TO DRUGS: ICD-10-CM

## 2024-05-20 LAB
ALBUMIN SERPL-MCNC: 3.4 G/DL (ref 3.4–4.8)
ALBUMIN/GLOB SERPL: 1 RATIO (ref 1.1–2)
ALP SERPL-CCNC: 298 UNIT/L (ref 40–150)
ALT SERPL-CCNC: 25 UNIT/L (ref 0–55)
ANION GAP SERPL CALC-SCNC: 11 MEQ/L
AST SERPL-CCNC: 48 UNIT/L (ref 5–34)
BACTERIA #/AREA URNS AUTO: ABNORMAL /HPF
BASOPHILS # BLD AUTO: 0.04 X10(3)/MCL
BASOPHILS NFR BLD AUTO: 0.5 %
BILIRUB SERPL-MCNC: 0.5 MG/DL
BILIRUB UR QL STRIP.AUTO: NEGATIVE
BNP BLD-MCNC: 386.7 PG/ML
BUN SERPL-MCNC: 11.8 MG/DL (ref 9.8–20.1)
CALCIUM SERPL-MCNC: 9.3 MG/DL (ref 8.4–10.2)
CHLORIDE SERPL-SCNC: 108 MMOL/L (ref 98–107)
CLARITY UR: CLEAR
CO2 SERPL-SCNC: 23 MMOL/L (ref 23–31)
COLOR UR AUTO: COLORLESS
CREAT SERPL-MCNC: 1.01 MG/DL (ref 0.55–1.02)
CREAT/UREA NIT SERPL: 12
EOSINOPHIL # BLD AUTO: 0.14 X10(3)/MCL (ref 0–0.9)
EOSINOPHIL NFR BLD AUTO: 1.9 %
ERYTHROCYTE [DISTWIDTH] IN BLOOD BY AUTOMATED COUNT: 17.2 % (ref 11.5–17)
GFR SERPLBLD CREATININE-BSD FMLA CKD-EPI: >60 ML/MIN/1.73/M2
GLOBULIN SER-MCNC: 3.4 GM/DL (ref 2.4–3.5)
GLUCOSE SERPL-MCNC: 104 MG/DL (ref 82–115)
GLUCOSE UR QL STRIP: ABNORMAL
HCT VFR BLD AUTO: 38.5 % (ref 37–47)
HGB BLD-MCNC: 12.2 G/DL (ref 12–16)
HGB UR QL STRIP: NEGATIVE
IMM GRANULOCYTES # BLD AUTO: 0.03 X10(3)/MCL (ref 0–0.04)
IMM GRANULOCYTES NFR BLD AUTO: 0.4 %
KETONES UR QL STRIP: NEGATIVE
LACTATE SERPL-SCNC: 1.6 MMOL/L (ref 0.5–2.2)
LEUKOCYTE ESTERASE UR QL STRIP: NEGATIVE
LYMPHOCYTES # BLD AUTO: 1.68 X10(3)/MCL (ref 0.6–4.6)
LYMPHOCYTES NFR BLD AUTO: 22.5 %
MAGNESIUM SERPL-MCNC: 1.7 MG/DL (ref 1.6–2.6)
MCH RBC QN AUTO: 34.3 PG (ref 27–31)
MCHC RBC AUTO-ENTMCNC: 31.7 G/DL (ref 33–36)
MCV RBC AUTO: 108.1 FL (ref 80–94)
MONOCYTES # BLD AUTO: 0.4 X10(3)/MCL (ref 0.1–1.3)
MONOCYTES NFR BLD AUTO: 5.4 %
NEUTROPHILS # BLD AUTO: 5.18 X10(3)/MCL (ref 2.1–9.2)
NEUTROPHILS NFR BLD AUTO: 69.3 %
NITRITE UR QL STRIP: NEGATIVE
NRBC BLD AUTO-RTO: 0 %
PH UR STRIP: 6.5 [PH]
PLATELET # BLD AUTO: 260 X10(3)/MCL (ref 130–400)
PMV BLD AUTO: 10.1 FL (ref 7.4–10.4)
POTASSIUM SERPL-SCNC: 2.9 MMOL/L (ref 3.5–5.1)
PROT SERPL-MCNC: 6.8 GM/DL (ref 5.8–7.6)
PROT UR QL STRIP: NEGATIVE
RBC # BLD AUTO: 3.56 X10(6)/MCL (ref 4.2–5.4)
RBC #/AREA URNS AUTO: ABNORMAL /HPF
SODIUM SERPL-SCNC: 142 MMOL/L (ref 136–145)
SP GR UR STRIP.AUTO: 1.01 (ref 1–1.03)
SQUAMOUS #/AREA URNS LPF: ABNORMAL /HPF
TROPONIN I SERPL-MCNC: <0.01 NG/ML (ref 0–0.04)
TSH SERPL-ACNC: 1.81 UIU/ML (ref 0.35–4.94)
UROBILINOGEN UR STRIP-ACNC: NORMAL
WBC # SPEC AUTO: 7.47 X10(3)/MCL (ref 4.5–11.5)
WBC #/AREA URNS AUTO: ABNORMAL /HPF

## 2024-05-20 PROCEDURE — 63600175 PHARM REV CODE 636 W HCPCS: Performed by: INTERNAL MEDICINE

## 2024-05-20 PROCEDURE — 84484 ASSAY OF TROPONIN QUANT: CPT | Performed by: EMERGENCY MEDICINE

## 2024-05-20 PROCEDURE — 83605 ASSAY OF LACTIC ACID: CPT | Performed by: EMERGENCY MEDICINE

## 2024-05-20 PROCEDURE — 87040 BLOOD CULTURE FOR BACTERIA: CPT | Performed by: EMERGENCY MEDICINE

## 2024-05-20 PROCEDURE — 96361 HYDRATE IV INFUSION ADD-ON: CPT

## 2024-05-20 PROCEDURE — 99285 EMERGENCY DEPT VISIT HI MDM: CPT | Mod: 25

## 2024-05-20 PROCEDURE — 93005 ELECTROCARDIOGRAM TRACING: CPT

## 2024-05-20 PROCEDURE — 83735 ASSAY OF MAGNESIUM: CPT | Performed by: EMERGENCY MEDICINE

## 2024-05-20 PROCEDURE — 96365 THER/PROPH/DIAG IV INF INIT: CPT

## 2024-05-20 PROCEDURE — 84443 ASSAY THYROID STIM HORMONE: CPT | Performed by: EMERGENCY MEDICINE

## 2024-05-20 PROCEDURE — 80053 COMPREHEN METABOLIC PANEL: CPT | Performed by: EMERGENCY MEDICINE

## 2024-05-20 PROCEDURE — 93010 ELECTROCARDIOGRAM REPORT: CPT | Mod: ,,, | Performed by: INTERNAL MEDICINE

## 2024-05-20 PROCEDURE — 63600175 PHARM REV CODE 636 W HCPCS: Performed by: EMERGENCY MEDICINE

## 2024-05-20 PROCEDURE — 83880 ASSAY OF NATRIURETIC PEPTIDE: CPT | Performed by: EMERGENCY MEDICINE

## 2024-05-20 PROCEDURE — G0378 HOSPITAL OBSERVATION PER HR: HCPCS

## 2024-05-20 PROCEDURE — 85025 COMPLETE CBC W/AUTO DIFF WBC: CPT | Performed by: EMERGENCY MEDICINE

## 2024-05-20 PROCEDURE — 96366 THER/PROPH/DIAG IV INF ADDON: CPT

## 2024-05-20 PROCEDURE — 81001 URINALYSIS AUTO W/SCOPE: CPT | Performed by: EMERGENCY MEDICINE

## 2024-05-20 PROCEDURE — 25000003 PHARM REV CODE 250: Performed by: INTERNAL MEDICINE

## 2024-05-20 RX ORDER — AMLODIPINE BESYLATE 5 MG/1
5 TABLET ORAL DAILY
COMMUNITY
End: 2024-05-20 | Stop reason: ALTCHOICE

## 2024-05-20 RX ORDER — AMOXICILLIN 250 MG
2 CAPSULE ORAL 2 TIMES DAILY PRN
Status: DISCONTINUED | OUTPATIENT
Start: 2024-05-20 | End: 2024-05-22 | Stop reason: HOSPADM

## 2024-05-20 RX ORDER — ALBUTEROL SULFATE 90 UG/1
2 AEROSOL, METERED RESPIRATORY (INHALATION) EVERY 4 HOURS PRN
Status: DISCONTINUED | OUTPATIENT
Start: 2024-05-20 | End: 2024-05-22 | Stop reason: HOSPADM

## 2024-05-20 RX ORDER — MAGNESIUM SULFATE HEPTAHYDRATE 40 MG/ML
2 INJECTION, SOLUTION INTRAVENOUS ONCE
Status: COMPLETED | OUTPATIENT
Start: 2024-05-20 | End: 2024-05-21

## 2024-05-20 RX ORDER — DEXTROSE MONOHYDRATE, SODIUM CHLORIDE, AND POTASSIUM CHLORIDE 50; 1.49; 4.5 G/1000ML; G/1000ML; G/1000ML
INJECTION, SOLUTION INTRAVENOUS CONTINUOUS
Status: CANCELLED | OUTPATIENT
Start: 2024-05-20

## 2024-05-20 RX ORDER — TALC
6 POWDER (GRAM) TOPICAL NIGHTLY PRN
Status: CANCELLED | OUTPATIENT
Start: 2024-05-20

## 2024-05-20 RX ORDER — TALC
6 POWDER (GRAM) TOPICAL NIGHTLY PRN
Status: DISCONTINUED | OUTPATIENT
Start: 2024-05-20 | End: 2024-05-22 | Stop reason: HOSPADM

## 2024-05-20 RX ORDER — PANTOPRAZOLE SODIUM 40 MG/1
40 TABLET, DELAYED RELEASE ORAL 2 TIMES DAILY
Status: DISCONTINUED | OUTPATIENT
Start: 2024-05-20 | End: 2024-05-22 | Stop reason: HOSPADM

## 2024-05-20 RX ORDER — ESCITALOPRAM OXALATE 5 MG/1
5 TABLET ORAL DAILY
Status: DISCONTINUED | OUTPATIENT
Start: 2024-05-21 | End: 2024-05-22 | Stop reason: HOSPADM

## 2024-05-20 RX ORDER — ACETAMINOPHEN 325 MG/1
650 TABLET ORAL EVERY 4 HOURS PRN
Status: DISCONTINUED | OUTPATIENT
Start: 2024-05-20 | End: 2024-05-22 | Stop reason: HOSPADM

## 2024-05-20 RX ORDER — PROCHLORPERAZINE EDISYLATE 5 MG/ML
5 INJECTION INTRAMUSCULAR; INTRAVENOUS EVERY 6 HOURS PRN
Status: DISCONTINUED | OUTPATIENT
Start: 2024-05-20 | End: 2024-05-22 | Stop reason: HOSPADM

## 2024-05-20 RX ORDER — HYOSCYAMINE SULFATE 0.12 MG/1
0.12 TABLET SUBLINGUAL 3 TIMES DAILY PRN
Status: DISCONTINUED | OUTPATIENT
Start: 2024-05-20 | End: 2024-05-22 | Stop reason: HOSPADM

## 2024-05-20 RX ORDER — ONDANSETRON 8 MG/1
8 TABLET, ORALLY DISINTEGRATING ORAL EVERY 6 HOURS PRN
COMMUNITY
Start: 2024-05-06

## 2024-05-20 RX ORDER — FOLIC ACID 1 MG/1
1 TABLET ORAL DAILY
Status: DISCONTINUED | OUTPATIENT
Start: 2024-05-21 | End: 2024-05-22 | Stop reason: HOSPADM

## 2024-05-20 RX ORDER — THIAMINE HCL 100 MG
100 TABLET ORAL DAILY
Status: DISCONTINUED | OUTPATIENT
Start: 2024-05-21 | End: 2024-05-22 | Stop reason: HOSPADM

## 2024-05-20 RX ORDER — ENOXAPARIN SODIUM 100 MG/ML
40 INJECTION SUBCUTANEOUS EVERY 24 HOURS
Status: CANCELLED | OUTPATIENT
Start: 2024-05-20

## 2024-05-20 RX ORDER — ENOXAPARIN SODIUM 100 MG/ML
40 INJECTION SUBCUTANEOUS EVERY 24 HOURS
Status: DISCONTINUED | OUTPATIENT
Start: 2024-05-21 | End: 2024-05-22 | Stop reason: HOSPADM

## 2024-05-20 RX ORDER — GABAPENTIN 300 MG/1
300 CAPSULE ORAL 3 TIMES DAILY
Status: DISCONTINUED | OUTPATIENT
Start: 2024-05-21 | End: 2024-05-22 | Stop reason: HOSPADM

## 2024-05-20 RX ORDER — SODIUM CHLORIDE 0.9 % (FLUSH) 0.9 %
10 SYRINGE (ML) INJECTION
Status: CANCELLED | OUTPATIENT
Start: 2024-05-20

## 2024-05-20 RX ORDER — FUROSEMIDE 40 MG/1
40 TABLET ORAL DAILY
Status: DISCONTINUED | OUTPATIENT
Start: 2024-05-21 | End: 2024-05-22

## 2024-05-20 RX ORDER — SODIUM CHLORIDE 0.9 % (FLUSH) 0.9 %
10 SYRINGE (ML) INJECTION
Status: DISCONTINUED | OUTPATIENT
Start: 2024-05-20 | End: 2024-05-22 | Stop reason: HOSPADM

## 2024-05-20 RX ORDER — ONDANSETRON HYDROCHLORIDE 2 MG/ML
4 INJECTION, SOLUTION INTRAVENOUS EVERY 4 HOURS PRN
Status: DISCONTINUED | OUTPATIENT
Start: 2024-05-20 | End: 2024-05-22 | Stop reason: HOSPADM

## 2024-05-20 RX ORDER — POLYETHYLENE GLYCOL 3350 17 G/17G
17 POWDER, FOR SOLUTION ORAL 2 TIMES DAILY PRN
Status: DISCONTINUED | OUTPATIENT
Start: 2024-05-20 | End: 2024-05-22 | Stop reason: HOSPADM

## 2024-05-20 RX ORDER — ONDANSETRON HYDROCHLORIDE 2 MG/ML
4 INJECTION, SOLUTION INTRAVENOUS EVERY 8 HOURS PRN
Status: CANCELLED | OUTPATIENT
Start: 2024-05-20

## 2024-05-20 RX ORDER — BUSPIRONE HYDROCHLORIDE 5 MG/1
5 TABLET ORAL 2 TIMES DAILY
Status: DISCONTINUED | OUTPATIENT
Start: 2024-05-20 | End: 2024-05-22 | Stop reason: HOSPADM

## 2024-05-20 RX ORDER — ARIPIPRAZOLE 2 MG/1
2 TABLET ORAL DAILY
Status: DISCONTINUED | OUTPATIENT
Start: 2024-05-21 | End: 2024-05-22 | Stop reason: HOSPADM

## 2024-05-20 RX ORDER — ALUMINUM HYDROXIDE, MAGNESIUM HYDROXIDE, AND SIMETHICONE 1200; 120; 1200 MG/30ML; MG/30ML; MG/30ML
30 SUSPENSION ORAL 4 TIMES DAILY PRN
Status: DISCONTINUED | OUTPATIENT
Start: 2024-05-20 | End: 2024-05-22 | Stop reason: HOSPADM

## 2024-05-20 RX ADMIN — MAGNESIUM SULFATE HEPTAHYDRATE 2 G: 40 INJECTION, SOLUTION INTRAVENOUS at 10:05

## 2024-05-20 RX ADMIN — PANTOPRAZOLE SODIUM 40 MG: 40 TABLET, DELAYED RELEASE ORAL at 11:05

## 2024-05-20 RX ADMIN — POTASSIUM BICARBONATE 50 MEQ: 977.5 TABLET, EFFERVESCENT ORAL at 10:05

## 2024-05-20 RX ADMIN — SODIUM CHLORIDE, POTASSIUM CHLORIDE, SODIUM LACTATE AND CALCIUM CHLORIDE 1000 ML: 600; 310; 30; 20 INJECTION, SOLUTION INTRAVENOUS at 03:05

## 2024-05-20 RX ADMIN — POTASSIUM BICARBONATE 50 MEQ: 977.5 TABLET, EFFERVESCENT ORAL at 11:05

## 2024-05-20 RX ADMIN — SODIUM CHLORIDE, POTASSIUM CHLORIDE, SODIUM LACTATE AND CALCIUM CHLORIDE 1000 ML: 600; 310; 30; 20 INJECTION, SOLUTION INTRAVENOUS at 05:05

## 2024-05-20 RX ADMIN — BUSPIRONE HYDROCHLORIDE 5 MG: 5 TABLET ORAL at 11:05

## 2024-05-20 NOTE — ED PROVIDER NOTES
Encounter Date: 5/20/2024    SCRIBE #1 NOTE: I, Robin Rosario, am scribing for, and in the presence of,  Evgeny Reynolds MD. I have scribed the following portions of the note - the EKG reading. Other sections scribed: HPI, ROS, PE.       History     Chief Complaint   Patient presents with    Hypotension     [facility] and became hypotensive 50 systolic, sent here for further eval. Given 100ml ns en route. Pt alert gcs 15     64 year old female with a pmhx of HTN, HLD, and DM presents to the ED for hypotension onset PTA.  The patient reports that she was on her way to an appointment at [facility] for a stress test when she reports having symptoms of photophobia.  She reports that at [facility] she almost fell.  The patient states that she had a low blood pressure in the office so they sent her here.  The patient denies any pain other than in her tailbone.  Per chart review, the patient reported feeling dizzy at [facility] along with her low blood pressure.  Per medication review, the patient started Lasix and Entresto last week.  Per chart review, the patient was admitted here from 5/8-5/14 for hypoxia, and was following up today with Dr. Huynh for a stress test.  The patient's PCP is Robyn Matthews NP.    The history is provided by the patient and medical records. No  was used.     Review of patient's allergies indicates:   Allergen Reactions    Iodinated contrast media Shortness Of Breath    Opioids - morphine analogues     Meperidine Rash and Hives     Past Medical History:   Diagnosis Date    Benign essential HTN     Mixed hyperlipidemia     Type 2 diabetes mellitus without complications      Past Surgical History:   Procedure Laterality Date    APPENDECTOMY      BACK SURGERY      HIP SURGERY      INTRAMEDULLARY RODDING OF FEMUR Right 10/27/2022    Procedure: RIGHT FEMUR INTERTROCH IMN;  Surgeon: Stewart Brambila MD;  Location: [facility];  Service: Orthopedics;  Laterality:  Right;  [place] table, supine  3rd case  Synthes Short TFNA     No family history on file.  Social History     Tobacco Use    Smoking status: Every Day     Current packs/day: 0.50     Types: Cigarettes    Smokeless tobacco: Current   Substance Use Topics    Alcohol use: Yes    Drug use: Never     Review of Systems   Constitutional:  Negative for fever.   HENT:  Negative for sore throat.    Eyes:  Positive for photophobia.   Respiratory:  Negative for shortness of breath.    Cardiovascular:  Negative for chest pain.   Gastrointestinal:  Negative for nausea.   Genitourinary:  Negative for dysuria.   Musculoskeletal:  Positive for back pain.   Skin:  Negative for rash.   Neurological:  Positive for dizziness. Negative for weakness.   Hematological:  Does not bruise/bleed easily.       Physical Exam     Initial Vitals [05/20/24 1413]   BP Pulse Resp Temp SpO2   99/60 (!) 55 16 97.5 °F (36.4 °C) 97 %      MAP       --         Physical Exam    Nursing note and vitals reviewed.  Constitutional: She appears well-developed and well-nourished.   Frail-appearing   HENT:   Head: Normocephalic and atraumatic.   Right Ear: External ear normal. Decreased hearing is noted.   Left Ear: External ear normal. Decreased hearing is noted.   Eyes: Conjunctivae and EOM are normal. Pupils are equal, round, and reactive to light.   Neck: Neck supple.   Normal range of motion.  Cardiovascular:  Normal rate, regular rhythm and intact distal pulses.           Murmur heard.  Systolic murmur is present with a grade of 2/6.  Pulmonary/Chest: Breath sounds normal.   Abdominal: Abdomen is soft. Bowel sounds are normal.   Musculoskeletal:         General: Normal range of motion.      Cervical back: Normal range of motion and neck supple.     Neurological: She is alert and oriented to person, place, and time. GCS score is 15. GCS eye subscore is 4. GCS verbal subscore is 5. GCS motor subscore is 6.   Skin: Skin is warm and dry. Capillary refill takes  less than 2 seconds.   Psychiatric: She has a normal mood and affect. Her behavior is normal. Judgment and thought content normal.         ED Course   Critical Care    Date/Time: 5/20/2024 5:20 PM    Performed by: Evgeny Reynolds MD  Authorized by: Evgeny Reynolds MD  Direct patient critical care time: 14 minutes  Additional history critical care time: 4 minutes  Ordering / reviewing critical care time: 5 minutes  Documentation critical care time: 12 minutes  Consulting other physicians critical care time: 3 minutes  Total critical care time (exclusive of procedural time) : 38 minutes  Critical care was necessary to treat or prevent imminent or life-threatening deterioration of the following conditions: circulatory failure, dehydration and metabolic crisis.  Critical care was time spent personally by me on the following activities: discussions with consultants, interpretation of cardiac output measurements, evaluation of patient's response to treatment, examination of patient, obtaining history from patient or surrogate, ordering and performing treatments and interventions, ordering and review of laboratory studies, ordering and review of radiographic studies, pulse oximetry, re-evaluation of patient's condition and review of old charts.        Labs Reviewed   COMPREHENSIVE METABOLIC PANEL - Abnormal; Notable for the following components:       Result Value    Potassium 2.9 (*)     Chloride 108 (*)     Albumin/Globulin Ratio 1.0 (*)      (*)     AST 48 (*)     All other components within normal limits   B-TYPE NATRIURETIC PEPTIDE - Abnormal; Notable for the following components:    Natriuretic Peptide 386.7 (*)     All other components within normal limits   URINALYSIS, REFLEX TO URINE CULTURE - Abnormal; Notable for the following components:    Glucose, UA 1+ (*)     All other components within normal limits   CBC WITH DIFFERENTIAL - Abnormal; Notable for the following components:    RBC  3.56 (*)     .1 (*)     MCH 34.3 (*)     MCHC 31.7 (*)     RDW 17.2 (*)     All other components within normal limits   LACTIC ACID, PLASMA - Normal   MAGNESIUM - Normal   TROPONIN I - Normal   TSH - Normal   BLOOD CULTURE [facility]   BLOOD CULTURE [facility]   CBC W/ AUTO DIFFERENTIAL    Narrative:     The following orders were created for panel order CBC auto differential.  Procedure                               Abnormality         Status                     ---------                               -----------         ------                     CBC with Differential[[id number]]       Abnormal            Final result                 Please view results for these tests on the individual orders.     EKG Readings: (Independently Interpreted)   Initial Reading: No STEMI. Rhythm: Normal Sinus Rhythm. Heart Rate: 61. Ectopy: No Ectopy. Conduction: Normal. ST Segments: Normal ST Segments. T Waves: Normal. Axis: Normal. Clinical Impression: Normal Sinus Rhythm   Performed at 1535       Imaging Results              X-Ray Chest AP Portable (Final result)  Result time 05/20/24 16:39:04      Final result by John Allen MD (05/20/24 16:39:04)                   Impression:      No acute cardiopulmonary process identified.      Electronically signed by: John Allen  Date:    05/20/2024  Time:    16:39               Narrative:    EXAMINATION:  XR CHEST AP PORTABLE    CLINICAL HISTORY:  weakness;    TECHNIQUE:  One view    COMPARISON:  May 9, 24.    FINDINGS:  Cardiopericardial silhouette is within normal limits.  No acute dense focal or segmental consolidation, congestive process, pleural effusions or pneumothorax.  Thoracolumbar stacey constructs.                                       Medications   lactated ringers bolus 1,000 mL (1,000 mLs Intravenous New Bag 5/20/24 1715)   lactated ringers bolus 1,000 mL (0 mLs Intravenous Stopped 5/20/24 1635)     Medical Decision Making  The differential diagnoses includes but is not limited  to: medication side effects, dehydration, acute kidney injury, infectious process, anemia.      Amount and/or Complexity of Data Reviewed  Independent Historian:      Details: Per chart review, the patient reported feeling dizzy at [facility] along with her low blood pressure.  Per medication review, the patient started Lasix and Entresto last week. Per chart review, the patient was admitted here from 5/8-5/14 for hypoxia, and was following up today with Dr. Huynh for a stress test.    Labs: ordered. Decision-making details documented in ED Course.  Radiology: ordered. Decision-making details documented in ED Course.  ECG/medicine tests: ordered and independent interpretation performed. Decision-making details documented in ED Course.     Details: Initial Reading: No STEMI. Rhythm: Normal Sinus Rhythm. Heart Rate: 61. Ectopy: No Ectopy. Conduction: Normal. ST Segments: Normal ST Segments. T Waves: Normal. Axis: Normal. Clinical Impression: Normal Sinus Rhythm   Performed at 1535               Scribe Attestation:   Scribe #1: I performed the above scribed service and the documentation accurately describes the services I performed. I attest to the accuracy of the note.    Attending Attestation:           Physician Attestation for Scribe:  Physician Attestation Statement for Scribe #1: I, Evgeny Reynolds MD, reviewed documentation, as scribed by Robin Rosario in my presence, and it is both accurate and complete.             ED Course as of 05/20/24 1725   Mon May 20, 2024   1718 Labs not terribly impressive - combination of Entresto and Lasix, which were added last week are the likely culprits of her hypotension.  I spoke with BOB Suresh ([facility] medicine) who agrees with observation and IVF and cardiology consultation. [CL]      ED Course User Index  [CL] Evgeny Reynolds MD                           Clinical Impression:  Final diagnoses:  [R42] Dizziness (Primary)  [I95.2] Hypotension due to  drugs  [R53.1] Weakness  [E87.6, T50.2X5A] Diuretic-induced hypokalemia          ED Disposition Condition    Observation Stable                Evgeny Reynolds MD  05/20/24 9078

## 2024-05-21 LAB
ALBUMIN SERPL-MCNC: 2.7 G/DL (ref 3.4–4.8)
ALBUMIN/GLOB SERPL: 1 RATIO (ref 1.1–2)
ALP SERPL-CCNC: 214 UNIT/L (ref 40–150)
ALT SERPL-CCNC: 15 UNIT/L (ref 0–55)
ANION GAP SERPL CALC-SCNC: 7 MEQ/L
AST SERPL-CCNC: 23 UNIT/L (ref 5–34)
BILIRUB SERPL-MCNC: 0.3 MG/DL
BUN SERPL-MCNC: 9.5 MG/DL (ref 9.8–20.1)
CALCIUM SERPL-MCNC: 8.6 MG/DL (ref 8.4–10.2)
CHLORIDE SERPL-SCNC: 109 MMOL/L (ref 98–107)
CO2 SERPL-SCNC: 27 MMOL/L (ref 23–31)
CREAT SERPL-MCNC: 0.72 MG/DL (ref 0.55–1.02)
CREAT/UREA NIT SERPL: 13
ERYTHROCYTE [DISTWIDTH] IN BLOOD BY AUTOMATED COUNT: 17.1 % (ref 11.5–17)
GFR SERPLBLD CREATININE-BSD FMLA CKD-EPI: >60 ML/MIN/1.73/M2
GLOBULIN SER-MCNC: 2.7 GM/DL (ref 2.4–3.5)
GLUCOSE SERPL-MCNC: 90 MG/DL (ref 82–115)
HCT VFR BLD AUTO: 35 % (ref 37–47)
HGB BLD-MCNC: 11.1 G/DL (ref 12–16)
INR PPP: 1
MAGNESIUM SERPL-MCNC: 2.3 MG/DL (ref 1.6–2.6)
MCH RBC QN AUTO: 34.6 PG (ref 27–31)
MCHC RBC AUTO-ENTMCNC: 31.7 G/DL (ref 33–36)
MCV RBC AUTO: 109 FL (ref 80–94)
NRBC BLD AUTO-RTO: 0 %
OHS QRS DURATION: 78 MS
OHS QTC CALCULATION: 485 MS
PHOSPHATE SERPL-MCNC: 3.4 MG/DL (ref 2.3–4.7)
PLATELET # BLD AUTO: 223 X10(3)/MCL (ref 130–400)
PMV BLD AUTO: 10.1 FL (ref 7.4–10.4)
POCT GLUCOSE: 85 MG/DL (ref 70–110)
POTASSIUM SERPL-SCNC: 4.4 MMOL/L (ref 3.5–5.1)
PROT SERPL-MCNC: 5.4 GM/DL (ref 5.8–7.6)
PROTHROMBIN TIME: 13.4 SECONDS (ref 12.5–14.5)
RBC # BLD AUTO: 3.21 X10(6)/MCL (ref 4.2–5.4)
SODIUM SERPL-SCNC: 143 MMOL/L (ref 136–145)
TROPONIN I SERPL-MCNC: <0.01 NG/ML (ref 0–0.04)
WBC # SPEC AUTO: 5.84 X10(3)/MCL (ref 4.5–11.5)

## 2024-05-21 PROCEDURE — 84484 ASSAY OF TROPONIN QUANT: CPT | Performed by: INTERNAL MEDICINE

## 2024-05-21 PROCEDURE — 63600175 PHARM REV CODE 636 W HCPCS: Performed by: INTERNAL MEDICINE

## 2024-05-21 PROCEDURE — 80053 COMPREHEN METABOLIC PANEL: CPT | Performed by: INTERNAL MEDICINE

## 2024-05-21 PROCEDURE — 11000001 HC ACUTE MED/SURG PRIVATE ROOM

## 2024-05-21 PROCEDURE — 84100 ASSAY OF PHOSPHORUS: CPT | Performed by: INTERNAL MEDICINE

## 2024-05-21 PROCEDURE — 97162 PT EVAL MOD COMPLEX 30 MIN: CPT

## 2024-05-21 PROCEDURE — 25000003 PHARM REV CODE 250: Performed by: INTERNAL MEDICINE

## 2024-05-21 PROCEDURE — 82962 GLUCOSE BLOOD TEST: CPT

## 2024-05-21 PROCEDURE — 85610 PROTHROMBIN TIME: CPT | Performed by: INTERNAL MEDICINE

## 2024-05-21 PROCEDURE — 85027 COMPLETE CBC AUTOMATED: CPT | Performed by: INTERNAL MEDICINE

## 2024-05-21 PROCEDURE — 83735 ASSAY OF MAGNESIUM: CPT | Performed by: INTERNAL MEDICINE

## 2024-05-21 RX ADMIN — GABAPENTIN 300 MG: 300 CAPSULE ORAL at 08:05

## 2024-05-21 RX ADMIN — ARIPIPRAZOLE 2 MG: 2 TABLET ORAL at 08:05

## 2024-05-21 RX ADMIN — BUSPIRONE HYDROCHLORIDE 5 MG: 5 TABLET ORAL at 08:05

## 2024-05-21 RX ADMIN — GABAPENTIN 300 MG: 300 CAPSULE ORAL at 03:05

## 2024-05-21 RX ADMIN — PANTOPRAZOLE SODIUM 40 MG: 40 TABLET, DELAYED RELEASE ORAL at 08:05

## 2024-05-21 RX ADMIN — ESCITALOPRAM OXALATE 5 MG: 5 TABLET, FILM COATED ORAL at 08:05

## 2024-05-21 RX ADMIN — RIFAXIMIN 550 MG: 550 TABLET ORAL at 08:05

## 2024-05-21 RX ADMIN — THERA TABS 1 TABLET: TAB at 08:05

## 2024-05-21 RX ADMIN — ENOXAPARIN SODIUM 40 MG: 40 INJECTION SUBCUTANEOUS at 04:05

## 2024-05-21 RX ADMIN — FUROSEMIDE 40 MG: 40 TABLET ORAL at 08:05

## 2024-05-21 RX ADMIN — THIAMINE HCL TAB 100 MG 100 MG: 100 TAB at 08:05

## 2024-05-21 RX ADMIN — FOLIC ACID 1 MG: 1 TABLET ORAL at 08:05

## 2024-05-21 NOTE — PROGRESS NOTES
[facility] Hospital Medicine Progress Note        Chief Complaint: Inpatient Follow-up for     HPI:   This is a 64-year-old female with medical history of Chronic HFpEF, alcoholic cirrhosis with ascites, hypertension, anxiety and depression who was recently hospitalized for decompensated cirrhosis and heart failure and underwent diuresis and medication adjustments were made including initiation of furosemide and increasing Entresto dose, continued on carvedilol and discontinued amlodipine and was scheduled for outpatient stress test and discharged home on 05/14/2024..     Present to the ED tonight 05/20/2024 with chief complaint of hypotension and feeling dizzy post or during stress test at [facility] for which she was sent to the ED. On arrival noted to be hypotensive systolic 90s over 50s.  Saturating 96% on room air.  EKG sinus rhythm and no new ST T-wave changes.  Labs notable for hemoglobin 12.2, potassium 2.9, creatinine 1.01, , troponin undetectable, normal lactic acid, normal TSH, urinalysis unremarkable.  Chest x-ray show no airspace disease.     She was given 2 L of lactated Ringer's her symptom and blood pressure normalized but she still seemed unsteady while ambulating, and referred to hospital medicine service for further evaluation and management.    Interval Hx:   Patient seen and examined by bedside.  Patient is a poor historian.  States that status son was bothering her yesterday and became hypotensive on the way to her stress test.  Says has been eating and drinking well and denies any other symptoms of dehydration.  She does not check her blood pressure at home.    Case was discussed with patient's nurse and  on the floor.    Objective/physical exam:  General: In no acute distress, afebrile  Chest: Clear to auscultation bilaterally  Heart: RRR, +S1, S2, no appreciable murmur  Abdomen: Soft, nontender, BS +  MSK: Warm, no lower extremity edema, no clubbing  or cyanosis  Neurologic: Alert and oriented x4, Cranial nerve II-XII intact, Strength 5/5 in all 4 extremities    VITAL SIGNS: 24 HRS MIN & MAX LAST   Temp  Min: 98.3 °F (36.8 °C)  Max: 98.8 °F (37.1 °C) 98.8 °F (37.1 °C)   BP  Min: 76/48  Max: 140/71 100/60   Pulse  Min: 61  Max: 85  75   Resp  Min: 10  Max: 22 18   SpO2  Min: 88 %  Max: 100 % (!) 94 %     I have reviewed the following labs:  Recent Labs   Lab 05/20/24  1540 05/21/24 0421   WBC 7.47 5.84   RBC 3.56* 3.21*   HGB 12.2 11.1*   HCT 38.5 35.0*   .1* 109.0*   MCH 34.3* 34.6*   MCHC 31.7* 31.7*   RDW 17.2* 17.1*    223   MPV 10.1 10.1     Recent Labs   Lab 05/20/24  1540 05/21/24 0421    143   K 2.9* 4.4   CO2 23 27   BUN 11.8 9.5*   CREATININE 1.01 0.72   CALCIUM 9.3 8.6   MG 1.70 2.30   ALBUMIN 3.4 2.7*   ALKPHOS 298* 214*   ALT 25 15   AST 48* 23   BILITOT 0.5 0.3     Microbiology Results (last 7 days)       Procedure Component Value Units Date/Time    Blood culture #1 **CANNOT BE ORDERED STAT** [[id number]] Collected: 05/20/24 1540    Order Status: Resulted Specimen: Blood Updated: 05/20/24 1554    Blood culture #2 **CANNOT BE ORDERED STAT** [[id number]] Collected: 05/20/24 1540    Order Status: Resulted Specimen: Blood Updated: 05/20/24 1554             See below for Radiology    Scheduled Med:   ARIPiprazole  2 mg Oral Daily    busPIRone  5 mg Oral BID    enoxparin  40 mg Subcutaneous Q24H (prophylaxis, 1700)    EScitalopram oxalate  5 mg Oral Daily    folic acid  1 mg Oral Daily    furosemide  40 mg Oral Daily    gabapentin  300 mg Oral TID    multivitamin  1 tablet Oral Daily    pantoprazole  40 mg Oral BID    rifAXIMin  550 mg Oral BID    thiamine  100 mg Oral Daily      Continuous Infusions:     PRN Meds:    Current Facility-Administered Medications:     acetaminophen, 650 mg, Oral, Q4H PRN    albuterol, 2 puff, Inhalation, Q4H PRN    aluminum-magnesium hydroxide-simethicone, 30 mL, Oral, QID PRN    hyoscyamine, 0.125 mg,  Sublingual, TID PRN    melatonin, 6 mg, Oral, Nightly PRN    ondansetron, 4 mg, Intravenous, Q4H PRN    polyethylene glycol, 17 g, Oral, BID PRN    prochlorperazine, 5 mg, Intravenous, Q6H PRN    senna-docusate 8.6-50 mg, 2 tablet, Oral, BID PRN    sodium chloride 0.9%, 10 mL, Intravenous, PRN     Assessment/Plan:  Hypotension, likely secondary to hypovolemia with meds and diuresis  Hypokalemia , resolved  Chronic HFpEF, compensated   Alcoholic cirrhosis, compensated   Elevated alk-phos, chronic  Anxiety/depression/chronic tobacco user    Patient received 2 L of IV fluids on admission   Appears euvolemic and denies any symptoms at this time   Also received potassium bicarb supplement, hypokalemia has resolved   Cardiology was consulted and recommended discontinue Entresto at discharge, recommend to resume Coreg and Farxiga tomorrow blood pressure is stable   Resume other home appropriate home meds   Repeat labs in a.m.    VTE prophylaxis:  Lovenox    Patient condition:  Stable/Fair/Guarded/ Serious/ Critical    Anticipated discharge and Disposition:   Likely home      All diagnosis and differential diagnosis have been reviewed; assessment and plan has been documented; I have personally reviewed the labs and test results that are presently available; I have reviewed the patients medication list; I have reviewed the consulting providers response and recommendations. I have reviewed or attempted to review medical records based upon their availability    All of the patient's questions have been  addressed and answered. Patient's is agreeable to the above stated plan. I will continue to monitor closely and make adjustments to medical management as needed.  _____________________________________________________________________    Nutrition Status:    Radiology:  I have personally reviewed the following imaging and agree with the radiologist.     X-Ray Chest AP Portable  Narrative: EXAMINATION:  XR CHEST AP  PORTABLE    CLINICAL HISTORY:  weakness;    TECHNIQUE:  One view    COMPARISON:  May 9, 24.    FINDINGS:  Cardiopericardial silhouette is within normal limits.  No acute dense focal or segmental consolidation, congestive process, pleural effusions or pneumothorax.  Thoracolumbar stacey constructs.  Impression: No acute cardiopulmonary process identified.    Electronically signed by: John Allen  Date:    05/20/2024  Time:    16:39    Emperatriz Ayala DO  Department of Hospital Medicine  [facility]  05/21/2024

## 2024-05-21 NOTE — H&P
[facility] Hospital Medicine - H&P Note    Patient Name: Luz Maria Alfaro  MRN: [id number]  PCP: Robyn Matthews NP  Admitting Physician: Mckayla Cary MD  Admission Class: OP- Observation   Date of Service: 05/20/2024  Code status: --    Chief Complaint   Hypotension (Pt was doing a stress test at [facility] and became hypotensive 50 systolic, sent here for further eval. Given 100ml ns en route. Pt alert gcs 15)      History of Present Illness   This is a 64-year-old female with medical history of Chronic HFpEF, alcoholic cirrhosis with ascites, hypertension, anxiety and depression who was recently hospitalized for decompensated cirrhosis and heart failure and underwent diuresis and medication adjustments were made including initiation of furosemide and increasing Entresto dose, continued on carvedilol and discontinued amlodipine and was scheduled for outpatient stress test and discharged home on 05/14/2024..    Present to the ED tonight 05/20/2024 with chief complaint of hypotension and feeling dizzy post or during stress test at [facility] for which she was sent to the ED. On arrival noted to be hypotensive systolic 90s over 50s.  Saturating 96% on room air.  EKG sinus rhythm and no new ST T-wave changes.  Labs notable for hemoglobin 12.2, potassium 2.9, creatinine 1.01, , troponin undetectable, normal lactic acid, normal TSH, urinalysis unremarkable.  Chest x-ray show no airspace disease.    She was given 2 L of lactated Ringer's her symptom and blood pressure normalized but she still seemed unsteady while ambulating, and referred to hospital medicine service for further evaluation and management.    ROS   Except as documented, all other systems reviewed and negative     Past Medical History   Chronic HFpEF  Alcoholic cirrhosis with ascites  Hypertension  Anxiety/depression    Surgical History     Past Surgical History:   Procedure Laterality Date    APPENDECTOMY      BACK SURGERY       HIP SURGERY      INTRAMEDULLARY RODDING OF FEMUR Right 10/27/2022    Procedure: RIGHT FEMUR INTERTROCH IMN;  Surgeon: Stewart Brambila MD;  Location: [facility];  Service: Orthopedics;  Laterality: Right;  [place] table, supine  3rd case  Synthes Short TFNA       Social History     Social History     Tobacco Use    Smoking status: Every Day     Current packs/day: 0.50     Types: Cigarettes    Smokeless tobacco: Current   Substance Use Topics    Alcohol use: Yes        Screening for Social Drivers of Health:  Patient screened for food insecurity, housing instability, transportation needs, utility difficulties, and interpersonal safety:  []Housing or food  []Transportation needs  []Utility difficulties  []Interpersonal safety  [x]None    Family History   Reviewed and negative    Allergies   Iodinated contrast media, Opioids - morphine analogues, and Meperidine    Home Medications     Prior to Admission medications    Medication Sig Start Date End Date Taking? Authorizing Provider   busPIRone (BUSPAR) 5 MG Tab Take 1 tablet (5 mg total) by mouth 2 (two) times daily. 4/17/24 7/16/24 Yes Pedrito Arechiga MD   carvediloL (COREG) 6.25 MG tablet Take 1 tablet (6.25 mg total) by mouth 2 (two) times daily. 4/17/24 7/16/24 Yes Pedrito Arechiga MD   dapagliflozin propanediol (FARXIGA) 10 mg tablet Take 1 tablet (10 mg total) by mouth once daily. 5/14/24  Yes Tabatha Stokes DO   EScitalopram oxalate (LEXAPRO) 5 MG Tab Take 1 tablet (5 mg total) by mouth once daily. 4/18/24 4/18/25 Yes Pedrito Arechiga MD   furosemide (LASIX) 40 MG tablet Take 1 tablet (40 mg total) by mouth once daily. 5/14/24  Yes Tabatha Stokes DO   gabapentin (NEURONTIN) 300 MG capsule Take 300 mg by mouth 3 (three) times daily.   Yes Provider, Historical   hyoscyamine (LEVSIN) 0.125 mg Subl Place 0.125 mg under the tongue 3 (three) times daily as needed (for abdominal cramping).   Yes Provider, Historical   ondansetron (ZOFRAN-ODT) 8 MG TbDL Take  8 mg by mouth every 6 (six) hours as needed. 5/6/24  Yes Provider, Historical   pantoprazole (PROTONIX) 40 MG tablet Take 40 mg by mouth 2 (two) times daily.   Yes Provider, Historical   sacubitriL-valsartan (ENTRESTO) 49-51 mg per tablet Take 1 tablet by mouth 2 (two) times daily. 5/14/24  Yes Tabatha Stokes DO   sodium bicarbonate 650 MG tablet Take 1 tablet (650 mg total) by mouth once daily. 4/18/24 6/17/24 Yes Pedrito Arechiga MD   amLODIPine (NORVASC) 5 MG tablet Take 5 mg by mouth once daily.  5/20/24 Yes Provider, Historical   albuterol (PROVENTIL/VENTOLIN HFA) 90 mcg/actuation inhaler Inhale 1 puff into the lungs every 4 (four) hours as needed for Shortness of Breath.    Provider, Historical   ARIPiprazole (ABILIFY) 2 MG Tab Take 1 tablet (2 mg total) by mouth once daily. 11/12/22   Adam Weston MD   rifAXIMin (XIFAXAN) 550 mg Tab Take 1 tablet (550 mg total) by mouth 2 (two) times daily. 4/17/24 7/16/24  Pedrito Arechiga MD   atorvastatin (LIPITOR) 10 MG tablet Take 10 mg by mouth once daily.  5/20/24  Provider, Historical   folic acid (FOLVITE) 1 MG tablet Take 1 mg by mouth once daily.  5/20/24  Provider, Historical   HYDROcodone-acetaminophen (NORCO) 5-325 mg per tablet Take 1 tablet by mouth 2 (two) times daily as needed. 7/10/23 5/20/24  Provider, Historical   ondansetron (ZOFRAN-ODT) 4 MG TbDL DISSOLVE ONE TABLET under the TONGUE THREE TIMES DAILY AS NEEDED FOR NAUSEA 6/16/23 5/20/24  Provider, Historical   thiamine 100 MG tablet Take 1 tablet (100 mg total) by mouth once daily. 11/12/22 5/20/24  Adam Weston MD        Physical Exam   Vital Signs  Temp:  [97.5 °F (36.4 °C)]   Pulse:  [55-73]   Resp:  [13-22]   BP: ()/(45-71)   SpO2:  [88 %-100 %]    General: Appears comfortable  HEENT: NC/AT  Neck:  No JVD  Chest: CTABL  CVS: Regular rhythm. Normal S1/S2.  No pedal edema  Abdomen: nondistended, normoactive BS, soft and non-tender.  MSK: No obvious deformity or joint swelling  Skin:  Warm and dry  Neuro: AAOx3, no focal neurological deficit  Psych: Cooperative    Labs     Recent Labs     05/20/24  1540   WBC 7.47   RBC 3.56*   HGB 12.2   HCT 38.5   .1*   MCH 34.3*   MCHC 31.7*   RDW 17.2*           Recent Labs     05/20/24  1540      K 2.9*   CHLORIDE 108*   CO2 23   BUN 11.8   CREATININE 1.01   EGFRNORACEVR >60   GLUCOSE 104   CALCIUM 9.3   MG 1.70   ALBUMIN 3.4   GLOBULIN 3.4   ALKPHOS 298*   ALT 25   AST 48*   BILITOT 0.5   TSH 1.811   .7*     Recent Labs     05/20/24  1540   LACTIC 1.6     Recent Labs     05/20/24  1540   TROPONINI <0.010        Microbiology Results (last 7 days)       Procedure Component Value Units Date/Time    Blood culture #1 **CANNOT BE ORDERED STAT** [[id number]] Collected: 05/20/24 1540    Order Status: Resulted Specimen: Blood Updated: 05/20/24 1554    Blood culture #2 **CANNOT BE ORDERED STAT** [[id number]] Collected: 05/20/24 1540    Order Status: Resulted Specimen: Blood Updated: 05/20/24 1554           Imaging     X-Ray Chest AP Portable   Final Result      No acute cardiopulmonary process identified.         Electronically signed by: John Allen   Date:    05/20/2024   Time:    16:39        Assessment   Hypotension-resolved  Hypovolemia due recent diuresis as well as Entresto up-titration  Hypokalemia  Chronic HFpEF-compensated  Alcoholic cirrhosis-compensated  Anxiety/depression/chronic tobacco smoker      Plan   Received 2 L of IV fluid I feel this is sufficient will hold further fluid resuscitation  Potassium bicarbonate 50 mEq PO Q1H x2  Magnesium sulfate 2 g IV  Resume p.o. Lasix 40 mg daily from tomorrow a.m.  Hold antihypertensives  Remaining home medication reviewed and resumed  Notify Cardiology  PT evaluation  VTE Prophylaxis:  Enoxaparin 40 mg subQ daily      Mckayla Cary MD  Internal Medicine  5/20/2024. at 10:41 PM.

## 2024-05-21 NOTE — PT/OT/SLP EVAL
Physical Therapy Evaluation    Patient Name:  Luz Maria Alfaro   MRN:  [id number]    Recommendations:     Discharge therapy intensity: Low Intensity Therapy   Discharge Equipment Recommendations: none   Barriers to discharge: Ongoing medical needs    Assessment:     Luz Maria Alfaro is a 64 y.o. female admitted with a medical diagnosis of hypotension at stress test with CIS, hypovolemia. She presents with the following impairments/functional limitations: weakness, impaired endurance, impaired functional mobility, gait instability. Patient tolerated PT evaluation well, mobilizing with SBA-CGA and RW. Patient with stable BP throughout mobility, SpO2 dropped to 87% when ambulating on room air, recovered to >92% with 2L NC reapplied. Patient was primarily limited by pain in back, which she reports is chronic from h/o spinal surgeries. Pt is appropriate for continued acute PT services to reduce fall risk and improve activity tolerance while in-house.     Rehab Prognosis: Good; patient would benefit from acute skilled PT services to address these deficits and reach maximum level of function.    Recent Surgery: * No surgery found *      Plan:     During this hospitalization, patient would benefit from acute PT services 5 x/week to address the identified rehab impairments via gait training, therapeutic activities, therapeutic exercises, neuromuscular re-education and progress toward the following goals:    Plan of Care Expires:  06/20/24    Subjective     Chief Complaint: none stated   Patient/Family Comments/goals: to go home  Pain/Comfort:  Pain Rating 1:  (c/o back pain- reports its chronic from h/o back surgeries)    Patients cultural, spiritual, Shinto conflicts given the current situation: no    Living Environment:  Pt lives with son and grandson in [facility].   Prior to admission, patients level of function was independent.  Equipment used at home: walker, rolling.  DME owned (not currently used): none.  Upon  discharge, patient will have assistance from family.    Objective:     Communicated with RN prior to session.  Patient found HOB elevated with oxygen, peripheral IV, pulse ox (continuous), telemetry, PureWick  upon PT entry to room.    General Precautions: Standard, fall  Orthopedic Precautions:N/A   Braces: N/A  Respiratory Status: Nasal cannula, flow 2 L/min SpO2: 95%, after ambulation on room air: 87%, recovered with rest and supplemental O2  Blood Pressure: 106/54, HR: 76   Re-checked with standin/64, HR: 83        Exams:  RLE ROM: WFL  RLE Strength: grossly 4/5  LLE ROM: WFL  LLE Strength: grossly 4/5  Skin integrity: Visible skin intact      Functional Mobility:  Bed Mobility:  Supine to Sit: stand by assistance  Transfers:  Sit to Stand:  contact guard assistance with rolling walker  Gait: patient ambulated 80ft with RW and CGA-SBA, slow gait speed, limited by chronic back pain      AM-PAC 6 CLICK MOBILITY  Total Score:20       Treatment & Education:    Patient provided with verbal education education regarding PT role/goals/POC and fall prevention.  Understanding was verbalized.     Patient left HOB elevated with all lines intact, call button in reach, and RN notified.    GOALS:   Multidisciplinary Problems       Physical Therapy Goals          Problem: Physical Therapy    Goal Priority Disciplines Outcome Goal Variances Interventions   Physical Therapy Goal     PT, PT/OT Progressing     Description: Goals to be met by: 24     Patient will increase functional independence with mobility by performin. Sit to stand transfer with Modified [place]  2. Gait  x 200 feet with Modified [place] using Rolling Walker.                          History:     Past Medical History:   Diagnosis Date    Benign essential HTN     Mixed hyperlipidemia     Type 2 diabetes mellitus without complications        Past Surgical History:   Procedure Laterality Date    APPENDECTOMY      BACK SURGERY      HIP  SURGERY      INTRAMEDULLARY RODDING OF FEMUR Right 10/27/2022    Procedure: RIGHT FEMUR INTERTROCH IMN;  Surgeon: Stewart Brambila MD;  Location: [facility];  Service: Orthopedics;  Laterality: Right;  [place] table, supine  3rd case  Synthes Short TFNA       Time Tracking:     PT Received On: 05/21/24  PT Start Time: 1055     PT Stop Time: 1110  PT Total Time (min): 15 min     Billable Minutes: Evaluation Mod complexity      05/21/2024

## 2024-05-21 NOTE — PLAN OF CARE
05/21/24 1608   Discharge Assessment   Assessment Type Discharge Planning Brief Assessment   Confirmed/corrected address, phone number and insurance Yes   Confirmed Demographics Correct on Facesheet   Source of Information family   Reason For Admission Hypokalemia,  Decompensated Cirrhosis   People in Home child(lala), adult   Do you have help at home or someone to help you manage your care at home? Yes   Who are your caregiver(s) and their phone number(s)? Omer Baum # [phone]   Prior to hospitilization cognitive status: Alert/Oriented   Current cognitive status: Alert/Oriented   Home Layout Able to live on 1st floor   Equipment Currently Used at Home walker, rolling;bedside commode;shower chair   Do you currently have service(s) that help you manage your care at home? Yes   Name and Contact number of agency Son Omer cannot remember name.   Is the pt/caregiver preference to resume services with current agency Yes   Who is going to help you get home at discharge? Seymour Tello is primary caregiver # [phone]   How do you get to doctors appointments? family or friend will provide   Discharge Plan A Home with family;Home Health   DME Needed Upon Discharge  none   Discharge Plan discussed with: Adult children   Transition of Care Barriers None     Went to pt's room to speak with pt at 1615- knocked and called name  but pt did not awaken- deep regular respirations noted.  Called pt's son Omer and he states he is the primary caregiver for the pt.  Pt has a RW, BSC and shower chair at home for use, but she hasn't been able to shower for about 6 months now.  He states she does get up to bathroom.  He acknowledges that there is a  agency, but does not remember the name at this time.  Looked thru EMR, but unable to determine.

## 2024-05-21 NOTE — PLAN OF CARE
Problem: Physical Therapy  Goal: Physical Therapy Goal  Description: Goals to be met by: 24     Patient will increase functional independence with mobility by performin. Sit to stand transfer with Modified [place]  2. Gait  x 200 feet with Modified [place] using Rolling Walker.     Outcome: Progressing

## 2024-05-21 NOTE — NURSING
Nurses Note -- 4 Eyes      5/21/2024   10:31 AM      Skin assessed during: Admit      [x] No Altered Skin Integrity Present    [x]Prevention Measures Documented      [] Yes- Altered Skin Integrity Present or Discovered   [] LDA Added if Not in Epic (Describe Wound)   [] New Altered Skin Integrity was Present on Admit and Documented in LDA   [] Wound Image Taken    Wound Care Consulted? No    Attending Nurse:  DEVAN Mello    Second RN/Staff Member:   DEVAN Renteria

## 2024-05-21 NOTE — CONSULTS
Inpatient consult to Cardiology  Consult performed by: Lanny Marcial FNP  Consult ordered by: Mckayla Cary MD  Reason for consult: Hypotension      [facility] - [facility] Tele[facility]    Cardiology  Consult Note    Patient Name: Luz Maria Alfaro  MRN: [id number]  Admission Date: 5/20/2024  Hospital Length of Stay: 0 days  Code Status: Full Code   Attending Provider: Mckayla Cary MD   Consulting Provider: ALEKS Claudio  Primary Care Physician: Robyn Matthews NP  Principal Problem:Hypokalemia    Patient information was obtained from patient, past medical records, ER records, and primary team.     Subjective:     Chief Complaint/Reason for Consult: Hypotension     HPI: Ms. Alfaro is a 65 y/o female with a history of HTN, HLD, VHD, COPD, DM II, who was known to Dr. Huynh (Last seen in 2022). She presented to the ER on 5.20.24 with complaints of hypotension during her stress test. Patient was at clinic for a stress test, when vitals checked BP was 58/53 on Right arm and 60/44 on the left. Stress Test was canceled and patient was sent to the ER. Upon arrival to the ER, the patient's blood pressure was 99/60. Significant labs include H&H 11.1/35.0, Chloride 109, , Albumin 2.7. Troponin negative x2. CXR unremarkable. EKG shows sinus rhythm with septal infarct (old). [facility] has been consulted for Hypotension management.       PMH: HTN, HLD, VHD, COPD, DM II  PSH: Breast Augmentation, Appendectomy, Back Surgery, Hip Surgery, IM Femur Rudy  Family History: Sister - MI, CAD   Social History: Current Smoker. Denies illicit drug use and alcohol use.      Previous Cardiac Diagnostics:   ECHO (5.9.24):  Left Ventricle: The left ventricle is normal in size. Normal wall thickness. There is normal systolic function with a visually estimated ejection fraction of 55 - 60%. Grade 2 DD. Right Ventricle: Normal right ventricular cavity size. Systolic function is normal. Aortic Valve: Moderately calcified  cusps but not well visualized. There is mild to moderate stenosis. Aortic valve area by VTI is 1.22 cm². Aortic valve peak velocity is 2.38 m/s. Mean gradient is 13 mmHg. The dimensionless index is 0.61. There is moderate aortic regurgitation. Mitral Valve: There is moderate mitral annular calcification present. There is moderate stenosis. The mean pressure gradient across the mitral valve is 9 mmHg at a heart rate of 91  bpm. There is moderate regurgitation. Tricuspid Valve: There is mild regurgitation. Pericardium: There is no pericardial effusion.     Venous US of Left Lower Leg (5.8.24):  There was no evidence of deep or superficial vein thrombosis in the left lower extremity      ECHO (2.14.22):  The study quality is average. The left ventricle is normal in size. Global left ventricular systolic function is hyperdynamic. The left ventricular ejection fraction is 70%. The left ventricle diastolic function is impaired (Grade II) with an elevated left atrial pressure. Volume index (42ml/m2) is consistent with moderate LA enlargement. Mild mitral stenosis is noted with a mean trans mitral gradient of 4.7 mmHg. The area by pressure half time is 2.37 cm². Moderate (2+) aortic regurgitation. Mild aortic valve stenosis is present. An aortic valve area by continuity equation of 1.6 cm² is obtained with a trans-aortic mean gradient of 9.1 mmHg. Moderate calcification and diffuse thickening of the aortic valve cusps is noted with reduced cuspal excursion. Breast implants are noted.    Review of patient's allergies indicates:   Allergen Reactions    Iodinated contrast media Shortness Of Breath    Opioids - morphine analogues     Meperidine Rash and Hives     No current facility-administered medications on file prior to encounter.     Current Outpatient Medications on File Prior to Encounter   Medication Sig    busPIRone (BUSPAR) 5 MG Tab Take 1 tablet (5 mg total) by mouth 2 (two) times daily.    carvediloL (COREG) 6.25 MG  tablet Take 1 tablet (6.25 mg total) by mouth 2 (two) times daily.    dapagliflozin propanediol (FARXIGA) 10 mg tablet Take 1 tablet (10 mg total) by mouth once daily.    EScitalopram oxalate (LEXAPRO) 5 MG Tab Take 1 tablet (5 mg total) by mouth once daily.    furosemide (LASIX) 40 MG tablet Take 1 tablet (40 mg total) by mouth once daily.    gabapentin (NEURONTIN) 300 MG capsule Take 300 mg by mouth 3 (three) times daily.    hyoscyamine (LEVSIN) 0.125 mg Subl Place 0.125 mg under the tongue 3 (three) times daily as needed (for abdominal cramping).    ondansetron (ZOFRAN-ODT) 8 MG TbDL Take 8 mg by mouth every 6 (six) hours as needed.    pantoprazole (PROTONIX) 40 MG tablet Take 40 mg by mouth 2 (two) times daily.    sacubitriL-valsartan (ENTRESTO) 49-51 mg per tablet Take 1 tablet by mouth 2 (two) times daily.    sodium bicarbonate 650 MG tablet Take 1 tablet (650 mg total) by mouth once daily.    albuterol (PROVENTIL/VENTOLIN HFA) 90 mcg/actuation inhaler Inhale 1 puff into the lungs every 4 (four) hours as needed for Shortness of Breath.    ARIPiprazole (ABILIFY) 2 MG Tab Take 1 tablet (2 mg total) by mouth once daily.    rifAXIMin (XIFAXAN) 550 mg Tab Take 1 tablet (550 mg total) by mouth 2 (two) times daily.       Review of Systems   Respiratory:  Negative for chest tightness and shortness of breath.    Cardiovascular:  Negative for chest pain, palpitations and leg swelling.   Neurological:  Positive for dizziness and light-headedness.   All other systems reviewed and are negative.      Objective:     Vital Signs (Most Recent):  Temp: 98.8 °F (37.1 °C) (05/21/24 1204)  Pulse: 75 (05/21/24 1204)  Resp: 18 (05/21/24 1204)  BP: 100/60 (05/21/24 1204)  SpO2: (!) 94 % (05/21/24 1204) Vital Signs (24h Range):  Temp:  [97.5 °F (36.4 °C)-98.8 °F (37.1 °C)] 98.8 °F (37.1 °C)  Pulse:  [55-85] 75  Resp:  [10-22] 18  SpO2:  [88 %-100 %] 94 %  BP: ()/(45-72) 100/60   Weight: 48.5 kg (107 lb)  Body mass index is 18.95  kg/m².  SpO2: (!) 94 %     No intake or output data in the 24 hours ending 05/21/24 1241  Lines/Drains/Airways       Peripheral Intravenous Line  Duration                  Peripheral IV - Single Lumen 05/20/24 1443 20 G Left;Posterior Hand <1 day                  Significant Labs:  Recent Results (from the past 72 hour(s))   EKG 12-lead    Collection Time: 05/20/24  3:35 PM   Result Value Ref Range    QRS Duration 78 ms    [facility] QTC Calculation 485 ms   Comprehensive metabolic panel    Collection Time: 05/20/24  3:40 PM   Result Value Ref Range    Sodium 142 136 - 145 mmol/L    Potassium 2.9 (L) 3.5 - 5.1 mmol/L    Chloride 108 (H) 98 - 107 mmol/L    CO2 23 23 - 31 mmol/L    Glucose 104 82 - 115 mg/dL    Blood Urea Nitrogen 11.8 9.8 - 20.1 mg/dL    Creatinine 1.01 0.55 - 1.02 mg/dL    Calcium 9.3 8.4 - 10.2 mg/dL    Protein Total 6.8 5.8 - 7.6 gm/dL    Albumin 3.4 3.4 - 4.8 g/dL    Globulin 3.4 2.4 - 3.5 gm/dL    Albumin/Globulin Ratio 1.0 (L) 1.1 - 2.0 ratio    Bilirubin Total 0.5 <=1.5 mg/dL     (H) 40 - 150 unit/L    ALT 25 0 - 55 unit/L    AST 48 (H) 5 - 34 unit/L    eGFR >60 mL/min/1.73/m2    Anion Gap 11.0 mEq/L    BUN/Creatinine Ratio 12    Brain natriuretic peptide    Collection Time: 05/20/24  3:40 PM   Result Value Ref Range    Natriuretic Peptide 386.7 (H) <=100.0 pg/mL   Lactic acid, plasma    Collection Time: 05/20/24  3:40 PM   Result Value Ref Range    Lactic Acid Level 1.6 0.5 - 2.2 mmol/L   Magnesium    Collection Time: 05/20/24  3:40 PM   Result Value Ref Range    Magnesium Level 1.70 1.60 - 2.60 mg/dL   Troponin I    Collection Time: 05/20/24  3:40 PM   Result Value Ref Range    Troponin-I <0.010 0.000 - 0.045 ng/mL   TSH    Collection Time: 05/20/24  3:40 PM   Result Value Ref Range    TSH 1.811 0.350 - 4.940 uIU/mL   CBC with Differential    Collection Time: 05/20/24  3:40 PM   Result Value Ref Range    WBC 7.47 4.50 - 11.50 x10(3)/mcL    RBC 3.56 (L) 4.20 - 5.40 x10(6)/mcL    Hgb 12.2 12.0  - 16.0 g/dL    Hct 38.5 37.0 - 47.0 %    .1 (H) 80.0 - 94.0 fL    MCH 34.3 (H) 27.0 - 31.0 pg    MCHC 31.7 (L) 33.0 - 36.0 g/dL    RDW 17.2 (H) 11.5 - 17.0 %    Platelet 260 130 - 400 x10(3)/mcL    MPV 10.1 7.4 - 10.4 fL    Neut % 69.3 %    Lymph % 22.5 %    Mono % 5.4 %    Eos % 1.9 %    Basophil % 0.5 %    Lymph # 1.68 0.6 - 4.6 x10(3)/mcL    Neut # 5.18 2.1 - 9.2 x10(3)/mcL    Mono # 0.40 0.1 - 1.3 x10(3)/mcL    Eos # 0.14 0 - 0.9 x10(3)/mcL    Baso # 0.04 <=0.2 x10(3)/mcL    IG# 0.03 0 - 0.04 x10(3)/mcL    IG% 0.4 %    NRBC% 0.0 %   Urinalysis, Reflex to Urine Culture    Collection Time: 05/20/24  4:32 PM    Specimen: Urine   Result Value Ref Range    Color, UA Colorless Yellow, Light-Yellow, Colorless, Straw, Dark-Yellow    Appearance, UA Clear Clear    Specific Gravity, UA 1.007 1.005 - 1.030    pH, UA 6.5 5.0 - 8.5    Protein, UA Negative Negative    Glucose, UA 1+ (A) Negative, Normal    Ketones, UA Negative Negative    Blood, UA Negative Negative    Bilirubin, UA Negative Negative    Urobilinogen, UA Normal 0.2, 1.0, Normal    Nitrites, UA Negative Negative    Leukocyte Esterase, UA Negative Negative    WBC, UA 0-5 None Seen, 0-2, 3-5, 0-5 /HPF    Bacteria, UA Trace None Seen, Trace /HPF    Squamous Epithelial Cells, UA Trace None Seen /HPF    RBC, UA 0-5 None Seen, 0-2, 3-5, 0-5 /HPF   CBC Without Differential    Collection Time: 05/21/24  4:21 AM   Result Value Ref Range    WBC 5.84 4.50 - 11.50 x10(3)/mcL    RBC 3.21 (L) 4.20 - 5.40 x10(6)/mcL    Hgb 11.1 (L) 12.0 - 16.0 g/dL    Hct 35.0 (L) 37.0 - 47.0 %    .0 (H) 80.0 - 94.0 fL    MCH 34.6 (H) 27.0 - 31.0 pg    MCHC 31.7 (L) 33.0 - 36.0 g/dL    RDW 17.1 (H) 11.5 - 17.0 %    Platelet 223 130 - 400 x10(3)/mcL    MPV 10.1 7.4 - 10.4 fL    NRBC% 0.0 %   Comprehensive Metabolic Panel    Collection Time: 05/21/24  4:21 AM   Result Value Ref Range    Sodium 143 136 - 145 mmol/L    Potassium 4.4 3.5 - 5.1 mmol/L    Chloride 109 (H) 98 - 107  mmol/L    CO2 27 23 - 31 mmol/L    Glucose 90 82 - 115 mg/dL    Blood Urea Nitrogen 9.5 (L) 9.8 - 20.1 mg/dL    Creatinine 0.72 0.55 - 1.02 mg/dL    Calcium 8.6 8.4 - 10.2 mg/dL    Protein Total 5.4 (L) 5.8 - 7.6 gm/dL    Albumin 2.7 (L) 3.4 - 4.8 g/dL    Globulin 2.7 2.4 - 3.5 gm/dL    Albumin/Globulin Ratio 1.0 (L) 1.1 - 2.0 ratio    Bilirubin Total 0.3 <=1.5 mg/dL     (H) 40 - 150 unit/L    ALT 15 0 - 55 unit/L    AST 23 5 - 34 unit/L    eGFR >60 mL/min/1.73/m2    Anion Gap 7.0 mEq/L    BUN/Creatinine Ratio 13    Magnesium    Collection Time: 05/21/24  4:21 AM   Result Value Ref Range    Magnesium Level 2.30 1.60 - 2.60 mg/dL   Phosphorus    Collection Time: 05/21/24  4:21 AM   Result Value Ref Range    Phosphorus Level 3.4 2.3 - 4.7 mg/dL   Protime-INR    Collection Time: 05/21/24  4:21 AM   Result Value Ref Range    PT 13.4 12.5 - 14.5 seconds    INR 1.0 <=1.3   Troponin I    Collection Time: 05/21/24  4:21 AM   Result Value Ref Range    Troponin-I <0.010 0.000 - 0.045 ng/mL   POCT glucose    Collection Time: 05/21/24  5:48 AM   Result Value Ref Range    POCT Glucose 85 70 - 110 mg/dL     Significant Imaging:  Imaging Results              X-Ray Chest AP Portable (Final result)  Result time 05/20/24 16:39:04      Final result by John Allen MD (05/20/24 16:39:04)                   Impression:      No acute cardiopulmonary process identified.      Electronically signed by: John Allen  Date:    05/20/2024  Time:    16:39               Narrative:    EXAMINATION:  XR CHEST AP PORTABLE    CLINICAL HISTORY:  weakness;    TECHNIQUE:  One view    COMPARISON:  May 9, 24.    FINDINGS:  Cardiopericardial silhouette is within normal limits.  No acute dense focal or segmental consolidation, congestive process, pleural effusions or pneumothorax.  Thoracolumbar stacey constructs.                                    EKG:       Telemetry:  SR    Physical Exam  Vitals and nursing note reviewed.   HENT:      Head:  Normocephalic.      Nose: Nose normal.      Mouth/Throat:      Mouth: Mucous membranes are moist.   Eyes:      Pupils: Pupils are equal, round, and reactive to light.   Cardiovascular:      Rate and Rhythm: Normal rate and regular rhythm.      Pulses: Normal pulses.      Heart sounds: Murmur heard.   Pulmonary:      Effort: Pulmonary effort is normal.   Abdominal:      General: Bowel sounds are normal.      Palpations: Abdomen is soft.   Musculoskeletal:         General: Normal range of motion.      Cervical back: Normal range of motion.   Skin:     General: Skin is warm.   Neurological:      Mental Status: She is alert and oriented to person, place, and time.   Psychiatric:         Mood and Affect: Mood normal.         Behavior: Behavior normal.     Home Medications:   No current facility-administered medications on file prior to encounter.     Current Outpatient Medications on File Prior to Encounter   Medication Sig Dispense Refill    busPIRone (BUSPAR) 5 MG Tab Take 1 tablet (5 mg total) by mouth 2 (two) times daily. 60 tablet 2    carvediloL (COREG) 6.25 MG tablet Take 1 tablet (6.25 mg total) by mouth 2 (two) times daily. 180 tablet 0    dapagliflozin propanediol (FARXIGA) 10 mg tablet Take 1 tablet (10 mg total) by mouth once daily. 30 tablet 0    EScitalopram oxalate (LEXAPRO) 5 MG Tab Take 1 tablet (5 mg total) by mouth once daily. 90 tablet 3    furosemide (LASIX) 40 MG tablet Take 1 tablet (40 mg total) by mouth once daily. 30 tablet 0    gabapentin (NEURONTIN) 300 MG capsule Take 300 mg by mouth 3 (three) times daily.      hyoscyamine (LEVSIN) 0.125 mg Subl Place 0.125 mg under the tongue 3 (three) times daily as needed (for abdominal cramping).      ondansetron (ZOFRAN-ODT) 8 MG TbDL Take 8 mg by mouth every 6 (six) hours as needed.      pantoprazole (PROTONIX) 40 MG tablet Take 40 mg by mouth 2 (two) times daily.      sacubitriL-valsartan (ENTRESTO) 49-51 mg per tablet Take 1 tablet by mouth 2 (two)  times daily. 30 tablet 0    sodium bicarbonate 650 MG tablet Take 1 tablet (650 mg total) by mouth once daily. 30 tablet 1    albuterol (PROVENTIL/VENTOLIN HFA) 90 mcg/actuation inhaler Inhale 1 puff into the lungs every 4 (four) hours as needed for Shortness of Breath.      ARIPiprazole (ABILIFY) 2 MG Tab Take 1 tablet (2 mg total) by mouth once daily. 30 tablet 1    rifAXIMin (XIFAXAN) 550 mg Tab Take 1 tablet (550 mg total) by mouth 2 (two) times daily. 60 tablet 2     Current Inpatient Medications:    Current Facility-Administered Medications:     acetaminophen tablet 650 mg, 650 mg, Oral, Q4H PRN, Mckayla Cary MD    albuterol inhaler 2 puff, 2 puff, Inhalation, Q4H PRN, Mckayla Cary MD    aluminum-magnesium hydroxide-simethicone 200-200-20 mg/5 mL suspension 30 mL, 30 mL, Oral, QID PRN, Mckayla Cary MD    ARIPiprazole tablet 2 mg, 2 mg, Oral, Daily, Mckayla Cary MD, 2 mg at 05/21/24 0831    busPIRone tablet 5 mg, 5 mg, Oral, BID, Mckayla Cary MD, 5 mg at 05/21/24 0831    enoxaparin injection 40 mg, 40 mg, Subcutaneous, Q24H (prophylaxis, 1700), Mckayla Cary MD    EScitalopram oxalate tablet 5 mg, 5 mg, Oral, Daily, Mckayla Cary MD, 5 mg at 05/21/24 0844    folic acid tablet 1 mg, 1 mg, Oral, Daily, Mckayla Cary MD, 1 mg at 05/21/24 0831    furosemide tablet 40 mg, 40 mg, Oral, Daily, Mckayla Cary MD, 40 mg at 05/21/24 0831    gabapentin capsule 300 mg, 300 mg, Oral, TID, Mckayla Cary MD, 300 mg at 05/21/24 0831    hyoscyamine ODT tablet 0.125 mg, 0.125 mg, Sublingual, TID PRN, Mckayla Cary MD    melatonin tablet 6 mg, 6 mg, Oral, Nightly PRN, Mckayla Cary MD    multivitamin tablet, 1 tablet, Oral, Daily, Luis DanielMckayla flores MD, 1 tablet at 05/21/24 0831    ondansetron injection 4 mg, 4 mg, Intravenous, Q4H PRN, Mckayla Cary MD    pantoprazole EC tablet 40 mg, 40 mg, Oral, BID, Luis DanielMckayla flores MD, 40 mg at 05/21/24 0831    polyethylene glycol packet 17 g, 17 g, Oral, BID PRN, Mckayla Cary MD     "prochlorperazine injection Soln 5 mg, 5 mg, Intravenous, Q6H PRN, Mckayla Cary MD    rifAXIMin tablet 550 mg, 550 mg, Oral, BID, Mckayla Cary MD, 550 mg at 05/21/24 0831    senna-docusate 8.6-50 mg per tablet 2 tablet, 2 tablet, Oral, BID PRN, Mckayla Cary MD    sodium chloride 0.9% flush 10 mL, 10 mL, Intravenous, PRN, Mckayla Cary MD    thiamine tablet 100 mg, 100 mg, Oral, Daily, Mckayla Cary MD, 100 mg at 05/21/24 0831  VTE Risk Mitigation (From admission, onward)           Ordered     enoxaparin injection 40 mg  Every 24 hours         05/20/24 2245     IP VTE HIGH RISK PATIENT  Once         05/20/24 2213     Place sequential compression device  Until discontinued         05/20/24 2213                  Assessment:   Hypotension    - History of HTN   Anemia  Hypokalemia - Resolved   Elevated LFT - Resolved   Chronic Diastolic Heart Failure    - Grade II Diastolic Dysfunction on ECHO (5.9.24)   Alcoholic Liver Cirrhosis   HLD  COPD  VHD    - Moderate MR/MS    - Mild to Moderate AS    - Moderate AR    - Mild TR   DM II   No known history of GI Bleed     "Iodine Allergy"    Plan:   Discontinue Entresto at discharge  Will resume Coreg  and Farxiga Tomorrow if BP is stable  Keep Mag > 2 and Potassium > 4  Labs in AM: CBC, BMP, and Mag     Thank you for your consult.     ALEKS Claudio  Cardiology  [facility] Tele[facility]  05/21/2024    I agree with the findings of the complexity of problems addressed and take responsibility for the plan's risks and complications. I approved the plan documented by Lanny Marcial NP.  Meds adjusted  Start Farxiga   "

## 2024-05-22 VITALS
BODY MASS INDEX: 18.71 KG/M2 | RESPIRATION RATE: 18 BRPM | WEIGHT: 105.63 LBS | TEMPERATURE: 98 F | DIASTOLIC BLOOD PRESSURE: 81 MMHG | SYSTOLIC BLOOD PRESSURE: 148 MMHG | HEART RATE: 107 BPM | OXYGEN SATURATION: 91 % | HEIGHT: 63 IN

## 2024-05-22 PROBLEM — S92.009A CALCANEAL FRACTURE: Status: ACTIVE | Noted: 2024-05-22

## 2024-05-22 PROBLEM — I95.2 HYPOTENSION DUE TO DRUGS: Status: ACTIVE | Noted: 2024-05-22

## 2024-05-22 LAB
ANION GAP SERPL CALC-SCNC: 10 MEQ/L
BUN SERPL-MCNC: 10.5 MG/DL (ref 9.8–20.1)
CALCIUM SERPL-MCNC: 9.1 MG/DL (ref 8.4–10.2)
CHLORIDE SERPL-SCNC: 99 MMOL/L (ref 98–107)
CO2 SERPL-SCNC: 32 MMOL/L (ref 23–31)
CREAT SERPL-MCNC: 0.73 MG/DL (ref 0.55–1.02)
CREAT/UREA NIT SERPL: 14
ERYTHROCYTE [DISTWIDTH] IN BLOOD BY AUTOMATED COUNT: 17.4 % (ref 11.5–17)
GFR SERPLBLD CREATININE-BSD FMLA CKD-EPI: >60 ML/MIN/1.73/M2
GLUCOSE SERPL-MCNC: 90 MG/DL (ref 82–115)
HCT VFR BLD AUTO: 37.2 % (ref 37–47)
HGB BLD-MCNC: 12.1 G/DL (ref 12–16)
MCH RBC QN AUTO: 34.5 PG (ref 27–31)
MCHC RBC AUTO-ENTMCNC: 32.5 G/DL (ref 33–36)
MCV RBC AUTO: 106 FL (ref 80–94)
NRBC BLD AUTO-RTO: 0 %
PLATELET # BLD AUTO: 269 X10(3)/MCL (ref 130–400)
PMV BLD AUTO: 10.1 FL (ref 7.4–10.4)
POTASSIUM SERPL-SCNC: 3.7 MMOL/L (ref 3.5–5.1)
RBC # BLD AUTO: 3.51 X10(6)/MCL (ref 4.2–5.4)
SODIUM SERPL-SCNC: 141 MMOL/L (ref 136–145)
WBC # SPEC AUTO: 5.11 X10(3)/MCL (ref 4.5–11.5)

## 2024-05-22 PROCEDURE — 36415 COLL VENOUS BLD VENIPUNCTURE: CPT | Performed by: STUDENT IN AN ORGANIZED HEALTH CARE EDUCATION/TRAINING PROGRAM

## 2024-05-22 PROCEDURE — 25000003 PHARM REV CODE 250

## 2024-05-22 PROCEDURE — 85027 COMPLETE CBC AUTOMATED: CPT | Performed by: STUDENT IN AN ORGANIZED HEALTH CARE EDUCATION/TRAINING PROGRAM

## 2024-05-22 PROCEDURE — 80048 BASIC METABOLIC PNL TOTAL CA: CPT | Performed by: STUDENT IN AN ORGANIZED HEALTH CARE EDUCATION/TRAINING PROGRAM

## 2024-05-22 PROCEDURE — 97530 THERAPEUTIC ACTIVITIES: CPT | Mod: CQ

## 2024-05-22 PROCEDURE — 25000003 PHARM REV CODE 250: Performed by: INTERNAL MEDICINE

## 2024-05-22 PROCEDURE — 97116 GAIT TRAINING THERAPY: CPT | Mod: CQ

## 2024-05-22 RX ORDER — FUROSEMIDE 20 MG/1
20 TABLET ORAL DAILY
Status: DISCONTINUED | OUTPATIENT
Start: 2024-05-23 | End: 2024-05-22 | Stop reason: HOSPADM

## 2024-05-22 RX ORDER — DAPAGLIFLOZIN 10 MG/1
10 TABLET, FILM COATED ORAL DAILY
Status: DISCONTINUED | OUTPATIENT
Start: 2024-05-22 | End: 2024-05-22 | Stop reason: HOSPADM

## 2024-05-22 RX ORDER — METOPROLOL SUCCINATE 25 MG/1
12.5 TABLET, EXTENDED RELEASE ORAL DAILY
Qty: 45 TABLET | Refills: 3 | Status: SHIPPED | OUTPATIENT
Start: 2024-05-23 | End: 2025-05-23

## 2024-05-22 RX ORDER — FUROSEMIDE 20 MG/1
20 TABLET ORAL DAILY
Qty: 30 TABLET | Refills: 11 | Status: SHIPPED | OUTPATIENT
Start: 2024-05-23 | End: 2025-05-23

## 2024-05-22 RX ORDER — LANOLIN ALCOHOL/MO/W.PET/CERES
100 CREAM (GRAM) TOPICAL DAILY
Qty: 30 TABLET | Refills: 3 | Status: SHIPPED | OUTPATIENT
Start: 2024-05-23

## 2024-05-22 RX ORDER — FOLIC ACID 1 MG/1
1 TABLET ORAL DAILY
Qty: 30 TABLET | Refills: 3 | Status: SHIPPED | OUTPATIENT
Start: 2024-05-23 | End: 2025-05-23

## 2024-05-22 RX ORDER — TRAMADOL HYDROCHLORIDE 50 MG/1
50 TABLET ORAL EVERY 8 HOURS PRN
Status: DISCONTINUED | OUTPATIENT
Start: 2024-05-22 | End: 2024-05-22 | Stop reason: HOSPADM

## 2024-05-22 RX ORDER — TRAMADOL HYDROCHLORIDE 50 MG/1
50 TABLET ORAL EVERY 8 HOURS PRN
Qty: 15 TABLET | Refills: 0 | Status: SHIPPED | OUTPATIENT
Start: 2024-05-22

## 2024-05-22 RX ADMIN — BUSPIRONE HYDROCHLORIDE 5 MG: 5 TABLET ORAL at 08:05

## 2024-05-22 RX ADMIN — FOLIC ACID 1 MG: 1 TABLET ORAL at 08:05

## 2024-05-22 RX ADMIN — PANTOPRAZOLE SODIUM 40 MG: 40 TABLET, DELAYED RELEASE ORAL at 08:05

## 2024-05-22 RX ADMIN — THERA TABS 1 TABLET: TAB at 08:05

## 2024-05-22 RX ADMIN — ESCITALOPRAM OXALATE 5 MG: 5 TABLET, FILM COATED ORAL at 08:05

## 2024-05-22 RX ADMIN — GABAPENTIN 300 MG: 300 CAPSULE ORAL at 08:05

## 2024-05-22 RX ADMIN — ARIPIPRAZOLE 2 MG: 2 TABLET ORAL at 08:05

## 2024-05-22 RX ADMIN — METOPROLOL SUCCINATE 12.5 MG: 25 TABLET, EXTENDED RELEASE ORAL at 01:05

## 2024-05-22 RX ADMIN — RIFAXIMIN 550 MG: 550 TABLET ORAL at 08:05

## 2024-05-22 RX ADMIN — FUROSEMIDE 40 MG: 40 TABLET ORAL at 08:05

## 2024-05-22 RX ADMIN — THIAMINE HCL TAB 100 MG 100 MG: 100 TAB at 08:05

## 2024-05-22 NOTE — PT/OT/SLP PROGRESS
Physical Therapy Treatment    Patient Name:  Luz Maria Alfaro   MRN:  [id number]    Recommendations:     Discharge therapy intensity: Low Intensity Therapy   Discharge Equipment Recommendations: none  Barriers to discharge: None    Assessment:     Luz Maria Alfaro is a 64 y.o. female admitted with a medical diagnosis of hypotension at stress test with [facility], hypovolemia .  She presents with the following impairments/functional limitations: weakness, impaired endurance, impaired functional mobility, gait instability . Pt reports MD told her she has an ankle fracture, nurse states ortho came to see patient and she is WBAT and can ambulate, if she wants a CAM boot for comfort it can be ordered. Pt with no complaints of pain during ambulation and does not want CAM boot.     Rehab Prognosis: Good; patient would benefit from acute skilled PT services to address these deficits and reach maximum level of function.    Recent Surgery: * No surgery found *      Plan:     During this hospitalization, patient would benefit from acute PT services 5 x/week to address the identified rehab impairments via gait training, therapeutic activities and progress toward the following goals:    Plan of Care Expires:  06/20/24    Subjective     Chief Complaint: I am ready to go home  Patient/Family Comments/goals:   Pain/Comfort:         Objective:     Communicated with nurse prior to session.  Patient found HOB elevated with pulse ox (continuous), peripheral IV, PureWick upon PT entry to room.     General Precautions: Standard, fall  Orthopedic Precautions: N/A  Braces: N/A  Respiratory Status: Room air  Blood Pressure: See orthostatics  Skin Integrity: Visible skin intact      Functional Mobility:  Bed Mobility:     Supine to Sit: modified independence  Sit to Supine: modified independence  Transfers:     Sit to Stand:  modified independence with rolling walker  Gait: pt amb x2 trials for 40ft with RW and SBA     Orthostatic vitals:   Supine-  146/72  Sitting EOB- 166/82  Standing- 148/81, no dizziness reported   Standnig x3 minutes- 154/88    Education:  Patient provided with verbal education education regarding PT role/goals/POC, fall prevention, and safety awareness.  Understanding was verbalized.     Patient left HOB elevated with all lines intact and call button in reach    GOALS:   Multidisciplinary Problems       Physical Therapy Goals          Problem: Physical Therapy    Goal Priority Disciplines Outcome Goal Variances Interventions   Physical Therapy Goal     PT, PT/OT Progressing     Description: Goals to be met by: 24     Patient will increase functional independence with mobility by performin. Sit to stand transfer with Modified Frio  2. Gait  x 200 feet with Modified Frio using Rolling Walker.                          Time Tracking:     PT Received On: 24  PT Start Time: 1103     PT Stop Time: 1131  PT Total Time (min): 28 min     Billable Minutes: Gait Training 13 and Therapeutic Activity 15    Treatment Type: Treatment  PT/PTA: PTA     Number of PTA visits since last PT visit: 2024

## 2024-05-22 NOTE — PROGRESS NOTES
Discharge instructions given to patient. New mediations and side effects discussed.  Follow-up appointments reviewed. IV and telemetry were discontinued. Pt will go home w/ home health. Pt will be wheeled down via wheelchair and picked up via private vehicle.

## 2024-05-22 NOTE — CONSULTS
[facility] - [facility] Tele[facility]  Orthopedics  Consult Note    Patient Name: Luz Maria Alfaro  MRN: [id number]  Admission Date: 5/20/2024  Hospital Length of Stay: 1 days  Attending Provider: Mckayla Cary MD  Primary Care Provider: Robyn Matthews NP  Principal Problem:Hypokalemia    Chief Complaint:   Chief Complaint   Patient presents with    Hypotension     Pt was doing a stress test at [facility] and became hypotensive 50 systolic, sent here for further eval. Given 100ml ns en route. Pt alert gcs 15        HPI: 65YO F seen in room 995. Ortho consulted for her left ankle pain. She is admitted for hypotension during a stress test. PHM Chronic HFpEF, alcoholic cirrhosis with ascites, hypertension, anxiety and depression who was recently hospitalized for decompensated cirrhosis and heart failure  She is hard of hearing. Tells me she has a history if an injury to her left foot when she was 58. Noted swelling and pain with sharlene bearing.     Past Medical History:   Diagnosis Date    Benign essential HTN     Mixed hyperlipidemia     Type 2 diabetes mellitus without complications        Past Surgical History:   Procedure Laterality Date    APPENDECTOMY      BACK SURGERY      HIP SURGERY      INTRAMEDULLARY RODDING OF FEMUR Right 10/27/2022    Procedure: RIGHT FEMUR INTERTROCH IMN;  Surgeon: Stewart Brambila MD;  Location: [facility];  Service: Orthopedics;  Laterality: Right;  [place] table, supine  3rd case  Synthes Short TFNA       Review of patient's allergies indicates:   Allergen Reactions    Iodinated contrast media Shortness Of Breath    Opioids - morphine analogues     Meperidine Rash and Hives       Current Facility-Administered Medications   Medication    acetaminophen tablet 650 mg    albuterol inhaler 2 puff    aluminum-magnesium hydroxide-simethicone 200-200-20 mg/5 mL suspension 30 mL    ARIPiprazole tablet 2 mg    busPIRone tablet 5 mg    dapagliflozin propanediol (Farxiga) tablet 10 mg     "enoxaparin injection 40 mg    EScitalopram oxalate tablet 5 mg    folic acid tablet 1 mg    [START ON 5/23/2024] furosemide tablet 20 mg    gabapentin capsule 300 mg    hyoscyamine ODT tablet 0.125 mg    melatonin tablet 6 mg    metoprolol succinate (TOPROL-XL) 24 hr split tablet 12.5 mg    multivitamin tablet    ondansetron injection 4 mg    pantoprazole EC tablet 40 mg    polyethylene glycol packet 17 g    prochlorperazine injection Soln 5 mg    rifAXIMin tablet 550 mg    senna-docusate 8.6-50 mg per tablet 2 tablet    sodium chloride 0.9% flush 10 mL    thiamine tablet 100 mg    traMADoL tablet 50 mg     Family History    None       Tobacco Use    Smoking status: Every Day     Current packs/day: 0.50     Types: Cigarettes    Smokeless tobacco: Current   Substance and Sexual Activity    Alcohol use: Yes    Drug use: Never    Sexual activity: Not Currently     ROS  Objective:     Vital Signs (Most Recent):  Temp: 98.2 °F (36.8 °C) (05/22/24 1118)  Pulse: 107 (05/22/24 1118)  Resp: 18 (05/22/24 1118)  BP: (!) 148/81 (05/22/24 1118)  SpO2: (!) 91 % (05/22/24 1118) Vital Signs (24h Range):  Temp:  [97.7 °F (36.5 °C)-99.1 °F (37.3 °C)] 98.2 °F (36.8 °C)  Pulse:  [] 107  Resp:  [18-20] 18  SpO2:  [91 %-95 %] 91 %  BP: (102-149)/(63-81) 148/81     Weight: 47.9 kg (105 lb 9.6 oz)  Height: 5' 3" (160 cm)  Body mass index is 18.71 kg/m².      Intake/Output Summary (Last 24 hours) at 5/22/2024 1232  Last data filed at 5/22/2024 0614  Gross per 24 hour   Intake --   Output 800 ml   Net -800 ml       Ortho/SPM Exam  General the patient is alert in no acute distress     Constitutional: Vital signs are examined and stable.  Resp: No signs of labored breathing  LLE: -Skin: skin in tact, no ecchymosis or erythema. Some edema noted, minimally TTP over heel           -MSK: +EHL/FHL, Gastroc/Tib anterior Strength 5/5           -Neuro:  Sensation intact to light touch           -CV: Capillary refill is less than 2 seconds. " Compartments soft and compressible        Significant Labs:   Recent Lab Results         05/22/24  0407        Anion Gap 10.0       BUN 10.5       BUN/CREAT RATIO 14       Calcium 9.1       Chloride 99       CO2 32       Creatinine 0.73       eGFR >60       Glucose 90       Hematocrit 37.2       Hemoglobin 12.1       MCH 34.5       MCHC 32.5       .0       MPV 10.1       nRBC 0.0       Platelet Count 269       Potassium 3.7       RBC 3.51       RDW 17.4       Sodium 141       WBC 5.11             All pertinent labs within the past 24 hours have been reviewed.  Recent Lab Results  (Last 5 results in the past 72 hours)        05/22/24  0407   05/21/24  0548   05/21/24  0421   05/20/24  1632   05/20/24  1540        Albumin/Globulin Ratio     1.0     1.0       Albumin     2.7     3.4       ALP     214     298       ALT     15     25       Anion Gap 10.0     7.0     11.0       Appearance, UA       Clear         AST     23     48       Bacteria, UA       Trace         Baso #         0.04       Basophil %         0.5       BILIRUBIN TOTAL     0.3     0.5       Bilirubin, UA       Negative         BLOOD CULTURE         No Growth At 24 Hours  [P]                No Growth At 24 Hours  [P]       BNP         386.7       BUN 10.5     9.5     11.8       BUN/CREAT RATIO 14     13     12       Calcium 9.1     8.6     9.3       Chloride 99     109     108       CO2 32     27     23       Color, UA       Colorless         Creatinine 0.73     0.72     1.01       eGFR >60     >60     >60       Eos #         0.14       Eos %         1.9       Globulin, Total     2.7     3.4       Glucose 90     90     104       Glucose, UA       1+         Hematocrit 37.2     35.0     38.5       Hemoglobin 12.1     11.1     12.2       Immature Grans (Abs)         0.03       Immature Granulocytes         0.4       INR     1.0           Ketones, UA       Negative         Lactic Acid Level         1.6       Leukocyte Esterase, UA       Negative          Lymph #         1.68       LYMPH %         22.5       Magnesium      2.30     1.70       MCH 34.5     34.6     34.3       MCHC 32.5     31.7     31.7       .0     109.0     108.1       Mono #         0.40       Mono %         5.4       MPV 10.1     10.1     10.1       Neut #         5.18       Neut %         69.3       NITRITE UA       Negative         nRBC 0.0     0.0     0.0       Blood, UA       Negative         pH, UA       6.5         Phosphorus Level     3.4           Platelet Count 269     223     260       POCT Glucose   85             Potassium 3.7     4.4     2.9       PROTEIN TOTAL     5.4     6.8       Protein, UA       Negative         PT     13.4           RBC 3.51     3.21     3.56       RBC, UA       0-5         RDW 17.4     17.1     17.2       Sodium 141     143     142       Specific Gravity,UA       1.007         Squamous Epithelial Cells, UA       Trace         Troponin I     <0.010     <0.010       TSH         1.811       Urobilinogen, UA       Normal         WBC, UA       0-5         WBC 5.11     5.84     7.47                               [P] - Preliminary Result                Significant Imaging: I have reviewed all pertinent imaging results/findings.  X-Ray Ankle 2 View Left    Result Date: 5/21/2024  EXAMINATION: Left ankle two-view. CLINICAL HISTORY: Swollen and painful, recent fall. TECHNIQUE: Three views. COMPARISON: None available. FINDINGS: Articular surfaces alignment is preserved.  There is demineralization of the bones.  There is irregular defined cortical defect along the plantar aspect of the calcaneus suspected for a subacute fracture.     Calcaneal plantar aspect defect is suspected to represent a subacute fracture. Electronically signed by:     Active Diagnoses:    Diagnosis Date Noted POA    PRINCIPAL PROBLEM:  Hypokalemia [E87.6] 05/20/2024 Yes    Hypotension due to drugs [I95.2] 05/22/2024 Yes    Calcaneal fracture [S92.009A] 05/22/2024 Yes    Malnutrition [E46]  03/21/2024 Yes      Problems Resolved During this Admission:   63 YO F with prior injury to her left foot; injury 6 years ago  Xrays showing subacute left calcaneus fracture  Has difficulty with weight bearing, ok to use boot or rocker bottom shoe as needed  Ice/Elevation PRN  OK for WBAT to LLE  No intervention needed from ortho  Follow up PRN  Please call with questions    Pt has acute injury with risk of severe bodily function with their injury.     The above findings, diagnostics, and treatment plan were discussed with Dr. Brambila who is in agreement with the plan of care except as stated in additional documentation.       Marialuisa Huggins, ALEKS  Orthopedic Trauma Surgery  [facility] - [facility]

## 2024-05-22 NOTE — PLAN OF CARE
05/22/24 1427   Final Note   Assessment Type Final Discharge Note   Anticipated Discharge Disposition Home-Health   Post-Acute Status   Post-Acute Authorization Home Health   Home Health Status Set-up Complete/Auth obtained   Discharge Delays None known at this time     Pt will dc to home with [facility]. ZOIES, dc info sent to .

## 2024-05-22 NOTE — SUBJECTIVE & OBJECTIVE
Interval History:     Review of Systems   Constitutional:  Positive for activity change and fatigue.   HENT: Negative.     Eyes: Negative.    Respiratory: Negative.     Cardiovascular: Negative.    Gastrointestinal: Negative.    Endocrine: Negative.    Genitourinary: Negative.    Musculoskeletal:  Positive for arthralgias and gait problem.   Skin: Negative.    Allergic/Immunologic: Negative.    Neurological:  Positive for weakness.   Hematological: Negative.    Psychiatric/Behavioral: Negative.       Objective:     Vital Signs (Most Recent):  Temp: 98.7 °F (37.1 °C) (05/22/24 0748)  Pulse: 76 (05/22/24 0748)  Resp: 18 (05/22/24 0748)  BP: 107/63 (05/22/24 0856)  SpO2: 95 % (05/22/24 0748) Vital Signs (24h Range):  Temp:  [97.7 °F (36.5 °C)-99.1 °F (37.3 °C)] 98.7 °F (37.1 °C)  Pulse:  [70-77] 76  Resp:  [18-20] 18  SpO2:  [93 %-97 %] 95 %  BP: (100-149)/(60-80) 107/63     Weight: 47.9 kg (105 lb 9.6 oz)  Body mass index is 18.71 kg/m².    Intake/Output Summary (Last 24 hours) at 5/22/2024 0949  Last data filed at 5/22/2024 0614  Gross per 24 hour   Intake --   Output 800 ml   Net -800 ml         Physical Exam  Constitutional:       Appearance: Normal appearance. She is underweight.   HENT:      Head: Normocephalic and atraumatic.      Nose: Nose normal.      Mouth/Throat:      Mouth: Mucous membranes are moist.      Pharynx: Oropharynx is clear.   Eyes:      Extraocular Movements: Extraocular movements intact.      Conjunctiva/sclera: Conjunctivae normal.      Pupils: Pupils are equal, round, and reactive to light.   Cardiovascular:      Rate and Rhythm: Normal rate and regular rhythm.      Pulses: Normal pulses.      Heart sounds: Normal heart sounds.   Pulmonary:      Effort: Pulmonary effort is normal.      Breath sounds: Normal breath sounds.   Abdominal:      General: Bowel sounds are normal.      Palpations: Abdomen is soft.   Musculoskeletal:         General: Tenderness present. Normal range of motion.       Cervical back: Normal range of motion and neck supple.   Skin:     General: Skin is warm and dry.      Capillary Refill: Capillary refill takes 2 to 3 seconds.   Neurological:      General: No focal deficit present.      Mental Status: She is alert. Mental status is at baseline.   Psychiatric:         Mood and Affect: Mood normal.         Behavior: Behavior normal.         Thought Content: Thought content normal.         Judgment: Judgment normal.             Significant Labs: All pertinent labs within the past 24 hours have been reviewed.  BMP:   Recent Labs   Lab 05/21/24  0421 05/22/24  0407    141   K 4.4 3.7   CO2 27 32*   BUN 9.5* 10.5   CREATININE 0.72 0.73   CALCIUM 8.6 9.1   MG 2.30  --      CBC:   Recent Labs   Lab 05/20/24  1540 05/21/24 0421 05/22/24  0407   WBC 7.47 5.84 5.11   HGB 12.2 11.1* 12.1   HCT 38.5 35.0* 37.2    223 269     CMP:   Recent Labs   Lab 05/20/24  1540 05/21/24  0421 05/22/24  0407    143 141   K 2.9* 4.4 3.7   CO2 23 27 32*   BUN 11.8 9.5* 10.5   CREATININE 1.01 0.72 0.73   CALCIUM 9.3 8.6 9.1   ALBUMIN 3.4 2.7*  --    BILITOT 0.5 0.3  --    ALKPHOS 298* 214*  --    AST 48* 23  --    ALT 25 15  --      Magnesium:   Recent Labs   Lab 05/20/24  1540 05/21/24  0421   MG 1.70 2.30       Significant Imaging: I have reviewed all pertinent imaging results/findings within the past 24 hours.

## 2024-05-22 NOTE — ASSESSMENT & PLAN NOTE
Patient has hypokalemia which is Acute and currently controlled. Most recent potassium levels reviewed-   Lab Results   Component Value Date    K 3.7 05/22/2024   . Will continue potassium replacement per protocol and recheck repeat levels after replacement completed.

## 2024-05-22 NOTE — PROGRESS NOTES
"[facility]    Cardiology  Progress Note    Patient Name: Luz Maria Alfaro  MRN: [id number]  Admission Date: 5/20/2024  Hospital Length of Stay: 1 days  Code Status: Full Code   Attending Physician: Mckayla Cary MD   Primary Care Physician: Robyn Matthews NP  Expected Discharge Date:   Principal Problem:Hypokalemia    Subjective:     Brief HPI/Hospital Course: Ms. Alfaro is a 63 y/o female with a history of HTN, HLD, VHD, COPD, DM II, who was known to Dr. Huynh (Last seen in 2022). She presented to the ER on 5.20.24 with complaints of hypotension during her stress test. Patient was at clinic for a stress test, when vitals checked BP was 58/53 on Right arm and 60/44 on the left. Stress Test was canceled and patient was sent to the ER. Upon arrival to the ER, the patient's blood pressure was 99/60. Significant labs include H&H 11.1/35.0, Chloride 109, , Albumin 2.7. Troponin negative x2. CXR unremarkable. EKG shows sinus rhythm with septal infarct (old). [facility] has been consulted for Hypotension management.       5.22.24: NAD. VSS. No Complaints of CP/SOB/Palps. "I feel okay"      PMH: HTN, HLD, VHD, COPD, DM II  PSH: Breast Augmentation, Appendectomy, Back Surgery, Hip Surgery, IM Femur Rudy  Family History: Sister - MI, CAD   Social History: Current Smoker. Denies illicit drug use and alcohol use.      Previous Cardiac Diagnostics:   ECHO (5.9.24):  Left Ventricle: The left ventricle is normal in size. Normal wall thickness. There is normal systolic function with a visually estimated ejection fraction of 55 - 60%. Grade 2 DD. Right Ventricle: Normal right ventricular cavity size. Systolic function is normal. Aortic Valve: Moderately calcified cusps but not well visualized. There is mild to moderate stenosis. Aortic valve area by VTI is 1.22 cm². Aortic valve peak velocity is 2.38 m/s. Mean gradient is 13 mmHg. The dimensionless index is 0.61. There is moderate " aortic regurgitation. Mitral Valve: There is moderate mitral annular calcification present. There is moderate stenosis. The mean pressure gradient across the mitral valve is 9 mmHg at a heart rate of 91  bpm. There is moderate regurgitation. Tricuspid Valve: There is mild regurgitation. Pericardium: There is no pericardial effusion.     Venous US of Left Lower Leg (5.8.24):  There was no evidence of deep or superficial vein thrombosis in the left lower extremity      ECHO (2.14.22):  The study quality is average. The left ventricle is normal in size. Global left ventricular systolic function is hyperdynamic. The left ventricular ejection fraction is 70%. The left ventricle diastolic function is impaired (Grade II) with an elevated left atrial pressure. Volume index (42ml/m2) is consistent with moderate LA enlargement. Mild mitral stenosis is noted with a mean trans mitral gradient of 4.7 mmHg. The area by pressure half time is 2.37 cm². Moderate (2+) aortic regurgitation. Mild aortic valve stenosis is present. An aortic valve area by continuity equation of 1.6 cm² is obtained with a trans-aortic mean gradient of 9.1 mmHg. Moderate calcification and diffuse thickening of the aortic valve cusps is noted with reduced cuspal excursion. Breast implants are noted.    Review of Systems   Cardiovascular:  Negative for chest pain, dyspnea on exertion, near-syncope and palpitations.   Respiratory:  Negative for cough and shortness of breath.    All other systems reviewed and are negative.    Objective:     Vital Signs (Most Recent):  Temp: 98.7 °F (37.1 °C) (05/22/24 0748)  Pulse: 107 (05/22/24 1118)  Resp: 18 (05/22/24 0748)  BP: (!) 148/81 (05/22/24 1118)  SpO2: (!) 91 % (05/22/24 1118) Vital Signs (24h Range):  Temp:  [97.7 °F (36.5 °C)-99.1 °F (37.3 °C)] 98.7 °F (37.1 °C)  Pulse:  [] 107  Resp:  [18-20] 18  SpO2:  [91 %-95 %] 91 %  BP: (100-149)/(60-81) 148/81   Weight: 47.9 kg (105 lb 9.6 oz)  Body mass index is  18.71 kg/m².  SpO2: (!) 91 %       Intake/Output Summary (Last 24 hours) at 5/22/2024 1159  Last data filed at 5/22/2024 0614  Gross per 24 hour   Intake --   Output 800 ml   Net -800 ml     Lines/Drains/Airways       Peripheral Intravenous Line  Duration                  Peripheral IV - Single Lumen 05/20/24 1443 20 G Left;Posterior Hand 1 day                  Significant Labs:   Recent Results (from the past 72 hour(s))   EKG 12-lead    Collection Time: 05/20/24  3:35 PM   Result Value Ref Range    QRS Duration 78 ms    [facility] QTC Calculation 485 ms   Comprehensive metabolic panel    Collection Time: 05/20/24  3:40 PM   Result Value Ref Range    Sodium 142 136 - 145 mmol/L    Potassium 2.9 (L) 3.5 - 5.1 mmol/L    Chloride 108 (H) 98 - 107 mmol/L    CO2 23 23 - 31 mmol/L    Glucose 104 82 - 115 mg/dL    Blood Urea Nitrogen 11.8 9.8 - 20.1 mg/dL    Creatinine 1.01 0.55 - 1.02 mg/dL    Calcium 9.3 8.4 - 10.2 mg/dL    Protein Total 6.8 5.8 - 7.6 gm/dL    Albumin 3.4 3.4 - 4.8 g/dL    Globulin 3.4 2.4 - 3.5 gm/dL    Albumin/Globulin Ratio 1.0 (L) 1.1 - 2.0 ratio    Bilirubin Total 0.5 <=1.5 mg/dL     (H) 40 - 150 unit/L    ALT 25 0 - 55 unit/L    AST 48 (H) 5 - 34 unit/L    eGFR >60 mL/min/1.73/m2    Anion Gap 11.0 mEq/L    BUN/Creatinine Ratio 12    Brain natriuretic peptide    Collection Time: 05/20/24  3:40 PM   Result Value Ref Range    Natriuretic Peptide 386.7 (H) <=100.0 pg/mL   Lactic acid, plasma    Collection Time: 05/20/24  3:40 PM   Result Value Ref Range    Lactic Acid Level 1.6 0.5 - 2.2 mmol/L   Magnesium    Collection Time: 05/20/24  3:40 PM   Result Value Ref Range    Magnesium Level 1.70 1.60 - 2.60 mg/dL   Troponin I    Collection Time: 05/20/24  3:40 PM   Result Value Ref Range    Troponin-I <0.010 0.000 - 0.045 ng/mL   TSH    Collection Time: 05/20/24  3:40 PM   Result Value Ref Range    TSH 1.811 0.350 - 4.940 uIU/mL   Blood culture #1 **CANNOT BE ORDERED STAT**    Collection Time: 05/20/24   3:40 PM    Specimen: Blood   Result Value Ref Range    Blood Culture No Growth At 24 Hours    Blood culture #2 **CANNOT BE ORDERED STAT**    Collection Time: 05/20/24  3:40 PM    Specimen: Blood   Result Value Ref Range    Blood Culture No Growth At 24 Hours    CBC with Differential    Collection Time: 05/20/24  3:40 PM   Result Value Ref Range    WBC 7.47 4.50 - 11.50 x10(3)/mcL    RBC 3.56 (L) 4.20 - 5.40 x10(6)/mcL    Hgb 12.2 12.0 - 16.0 g/dL    Hct 38.5 37.0 - 47.0 %    .1 (H) 80.0 - 94.0 fL    MCH 34.3 (H) 27.0 - 31.0 pg    MCHC 31.7 (L) 33.0 - 36.0 g/dL    RDW 17.2 (H) 11.5 - 17.0 %    Platelet 260 130 - 400 x10(3)/mcL    MPV 10.1 7.4 - 10.4 fL    Neut % 69.3 %    Lymph % 22.5 %    Mono % 5.4 %    Eos % 1.9 %    Basophil % 0.5 %    Lymph # 1.68 0.6 - 4.6 x10(3)/mcL    Neut # 5.18 2.1 - 9.2 x10(3)/mcL    Mono # 0.40 0.1 - 1.3 x10(3)/mcL    Eos # 0.14 0 - 0.9 x10(3)/mcL    Baso # 0.04 <=0.2 x10(3)/mcL    IG# 0.03 0 - 0.04 x10(3)/mcL    IG% 0.4 %    NRBC% 0.0 %   Urinalysis, Reflex to Urine Culture    Collection Time: 05/20/24  4:32 PM    Specimen: Urine   Result Value Ref Range    Color, UA Colorless Yellow, Light-Yellow, Colorless, Straw, Dark-Yellow    Appearance, UA Clear Clear    Specific Gravity, UA 1.007 1.005 - 1.030    pH, UA 6.5 5.0 - 8.5    Protein, UA Negative Negative    Glucose, UA 1+ (A) Negative, Normal    Ketones, UA Negative Negative    Blood, UA Negative Negative    Bilirubin, UA Negative Negative    Urobilinogen, UA Normal 0.2, 1.0, Normal    Nitrites, UA Negative Negative    Leukocyte Esterase, UA Negative Negative    WBC, UA 0-5 None Seen, 0-2, 3-5, 0-5 /HPF    Bacteria, UA Trace None Seen, Trace /HPF    Squamous Epithelial Cells, UA Trace None Seen /HPF    RBC, UA 0-5 None Seen, 0-2, 3-5, 0-5 /HPF   CBC Without Differential    Collection Time: 05/21/24  4:21 AM   Result Value Ref Range    WBC 5.84 4.50 - 11.50 x10(3)/mcL    RBC 3.21 (L) 4.20 - 5.40 x10(6)/mcL    Hgb 11.1 (L) 12.0 -  16.0 g/dL    Hct 35.0 (L) 37.0 - 47.0 %    .0 (H) 80.0 - 94.0 fL    MCH 34.6 (H) 27.0 - 31.0 pg    MCHC 31.7 (L) 33.0 - 36.0 g/dL    RDW 17.1 (H) 11.5 - 17.0 %    Platelet 223 130 - 400 x10(3)/mcL    MPV 10.1 7.4 - 10.4 fL    NRBC% 0.0 %   Comprehensive Metabolic Panel    Collection Time: 05/21/24  4:21 AM   Result Value Ref Range    Sodium 143 136 - 145 mmol/L    Potassium 4.4 3.5 - 5.1 mmol/L    Chloride 109 (H) 98 - 107 mmol/L    CO2 27 23 - 31 mmol/L    Glucose 90 82 - 115 mg/dL    Blood Urea Nitrogen 9.5 (L) 9.8 - 20.1 mg/dL    Creatinine 0.72 0.55 - 1.02 mg/dL    Calcium 8.6 8.4 - 10.2 mg/dL    Protein Total 5.4 (L) 5.8 - 7.6 gm/dL    Albumin 2.7 (L) 3.4 - 4.8 g/dL    Globulin 2.7 2.4 - 3.5 gm/dL    Albumin/Globulin Ratio 1.0 (L) 1.1 - 2.0 ratio    Bilirubin Total 0.3 <=1.5 mg/dL     (H) 40 - 150 unit/L    ALT 15 0 - 55 unit/L    AST 23 5 - 34 unit/L    eGFR >60 mL/min/1.73/m2    Anion Gap 7.0 mEq/L    BUN/Creatinine Ratio 13    Magnesium    Collection Time: 05/21/24  4:21 AM   Result Value Ref Range    Magnesium Level 2.30 1.60 - 2.60 mg/dL   Phosphorus    Collection Time: 05/21/24  4:21 AM   Result Value Ref Range    Phosphorus Level 3.4 2.3 - 4.7 mg/dL   Protime-INR    Collection Time: 05/21/24  4:21 AM   Result Value Ref Range    PT 13.4 12.5 - 14.5 seconds    INR 1.0 <=1.3   Troponin I    Collection Time: 05/21/24  4:21 AM   Result Value Ref Range    Troponin-I <0.010 0.000 - 0.045 ng/mL   POCT glucose    Collection Time: 05/21/24  5:48 AM   Result Value Ref Range    POCT Glucose 85 70 - 110 mg/dL   Basic Metabolic Panel    Collection Time: 05/22/24  4:07 AM   Result Value Ref Range    Sodium 141 136 - 145 mmol/L    Potassium 3.7 3.5 - 5.1 mmol/L    Chloride 99 98 - 107 mmol/L    CO2 32 (H) 23 - 31 mmol/L    Glucose 90 82 - 115 mg/dL    Blood Urea Nitrogen 10.5 9.8 - 20.1 mg/dL    Creatinine 0.73 0.55 - 1.02 mg/dL    BUN/Creatinine Ratio 14     Calcium 9.1 8.4 - 10.2 mg/dL    Anion Gap 10.0  mEq/L    eGFR >60 mL/min/1.73/m2   CBC Without Differential    Collection Time: 05/22/24  4:07 AM   Result Value Ref Range    WBC 5.11 4.50 - 11.50 x10(3)/mcL    RBC 3.51 (L) 4.20 - 5.40 x10(6)/mcL    Hgb 12.1 12.0 - 16.0 g/dL    Hct 37.2 37.0 - 47.0 %    .0 (H) 80.0 - 94.0 fL    MCH 34.5 (H) 27.0 - 31.0 pg    MCHC 32.5 (L) 33.0 - 36.0 g/dL    RDW 17.4 (H) 11.5 - 17.0 %    Platelet 269 130 - 400 x10(3)/mcL    MPV 10.1 7.4 - 10.4 fL    NRBC% 0.0 %     Telemetry:      Physical Exam  Vitals and nursing note reviewed.   HENT:      Head: Normocephalic.      Nose: Nose normal.      Mouth/Throat:      Mouth: Mucous membranes are moist.   Eyes:      Pupils: Pupils are equal, round, and reactive to light.   Cardiovascular:      Rate and Rhythm: Normal rate and regular rhythm.      Pulses: Normal pulses.   Pulmonary:      Effort: Pulmonary effort is normal.      Breath sounds: Normal breath sounds.   Abdominal:      General: Bowel sounds are normal.      Palpations: Abdomen is soft.   Musculoskeletal:         General: Normal range of motion.      Cervical back: Normal range of motion.   Skin:     General: Skin is warm.   Neurological:      Mental Status: She is alert and oriented to person, place, and time.   Psychiatric:         Mood and Affect: Mood normal.         Behavior: Behavior normal.       Current Inpatient Medications:    Current Facility-Administered Medications:     acetaminophen tablet 650 mg, 650 mg, Oral, Q4H PRN, Mckayla Cary MD    albuterol inhaler 2 puff, 2 puff, Inhalation, Q4H PRN, Mckayla Cary MD    aluminum-magnesium hydroxide-simethicone 200-200-20 mg/5 mL suspension 30 mL, 30 mL, Oral, QID PRN, Mckayla Cary MD    ARIPiprazole tablet 2 mg, 2 mg, Oral, Daily, Mckayla Cary MD, 2 mg at 05/22/24 0856    busPIRone tablet 5 mg, 5 mg, Oral, BID, Mckayla Cary MD, 5 mg at 05/22/24 0856    enoxaparin injection 40 mg, 40 mg, Subcutaneous, Q24H (prophylaxis, 1700), Luis Daniel, Mckayla PEREIRA MD, 40 mg at 05/21/24  1658    EScitalopram oxalate tablet 5 mg, 5 mg, Oral, Daily, Mckayla Cary MD, 5 mg at 05/22/24 0856    folic acid tablet 1 mg, 1 mg, Oral, Daily, Mckayla Cary MD, 1 mg at 05/22/24 0856    furosemide tablet 40 mg, 40 mg, Oral, Daily, Mckayla Cary MD, 40 mg at 05/22/24 0856    gabapentin capsule 300 mg, 300 mg, Oral, TID, Mckayla Cary MD, 300 mg at 05/22/24 0856    hyoscyamine ODT tablet 0.125 mg, 0.125 mg, Sublingual, TID PRN, Mckayla Cary MD    melatonin tablet 6 mg, 6 mg, Oral, Nightly PRN, Mckayla Cary MD    multivitamin tablet, 1 tablet, Oral, Daily, Mckayla Cary MD, 1 tablet at 05/22/24 0856    ondansetron injection 4 mg, 4 mg, Intravenous, Q4H PRN, Mckayla aCry MD    pantoprazole EC tablet 40 mg, 40 mg, Oral, BID, Mckayla Cary MD, 40 mg at 05/22/24 0856    polyethylene glycol packet 17 g, 17 g, Oral, BID PRN, Mckayla Cary MD    prochlorperazine injection Soln 5 mg, 5 mg, Intravenous, Q6H PRN, Mckayla Cary MD    rifAXIMin tablet 550 mg, 550 mg, Oral, BID, Mckayla Cary MD, 550 mg at 05/22/24 0856    senna-docusate 8.6-50 mg per tablet 2 tablet, 2 tablet, Oral, BID PRN, Mckayla Cary MD    sodium chloride 0.9% flush 10 mL, 10 mL, Intravenous, PRN, Mckayla Cary MD    thiamine tablet 100 mg, 100 mg, Oral, Daily, Mckayla Cary MD, 100 mg at 05/22/24 0856    traMADoL tablet 50 mg, 50 mg, Oral, Q8H PRN, Evgeny Mcfadden MD  VTE Risk Mitigation (From admission, onward)           Ordered     enoxaparin injection 40 mg  Every 24 hours         05/20/24 2245     IP VTE HIGH RISK PATIENT  Once         05/20/24 2213     Place sequential compression device  Until discontinued         05/20/24 2213                  Assessment:   Hypotension - Resolved     - History of HTN   Anemia  Hypokalemia - Resolved   Elevated LFT - Resolved   Chronic Diastolic Heart Failure    - Grade II Diastolic Dysfunction on ECHO (5.9.24)   Alcoholic Liver Cirrhosis   HLD  COPD  VHD    - Moderate MR/MS    - Mild to Moderate AS    -  "Moderate AR    - Mild TR   DM II   No known history of GI Bleed      "Iodine Allergy"      Plan:   Start Metoprolol XL 12.5 mg oral daily  Farxiga 10 mg oral daily  Decrease Lasix to 20 mg oral daily   Discontinue Entresto   Keep Mag > 2 and Potassium > 4  Okay to discharge from cardiology standpoint  [facility]   Follow-up with Dr. Huynh in 1-2 weeks      We will sign off at this time. Please call with any questions or concerns.     ALEKS Claudio  Cardiology  [facility]  05/22/2024    "

## 2024-05-22 NOTE — HPI
64-year-old female with medical history of Chronic HFpEF, alcoholic cirrhosis with ascites, hypertension, anxiety and depression who was recently hospitalized for decompensated cirrhosis and heart failure and underwent diuresis and medication adjustments were made including initiation of furosemide and increasing Entresto dose, continued on carvedilol and discontinued amlodipine and was scheduled for outpatient stress test and discharged home on 05/14/2024..     [facility] with chief complaint of hypotension and feeling dizzy post or during stress test at [facility] for which she was sent to the ED. On arrival noted to be hypotensive systolic 90s over 50s.  Saturating 96% on room air.  EKG sinus rhythm and no new ST T-wave changes.  Labs notable for hemoglobin 12.2, potassium 2.9, creatinine 1.01, , troponin undetectable, normal lactic acid, normal TSH, urinalysis unremarkable.  Chest x-ray show no airspace disease.     She was given 2 L of lactated Ringer's her symptom and blood pressure normalized but she still seemed unsteady while ambulating, and referred to hospital medicine service for further evaluation and management.

## 2024-05-22 NOTE — PROGRESS NOTES
[facility] Hospital Medicine  Progress Note    Patient Name: Luz Maria Alfaro  MRN: [id number]  Patient Class: IP- Inpatient   Admission Date: 5/20/2024  Length of Stay: 1 days  Attending Physician: Mckayla Cary MD  Primary Care Provider: Robyn Matthews NP        Subjective:     Principal Problem:Hypokalemia        HPI:  64-year-old female with medical history of Chronic HFpEF, alcoholic cirrhosis with ascites, hypertension, anxiety and depression who was recently hospitalized for decompensated cirrhosis and heart failure and underwent diuresis and medication adjustments were made including initiation of furosemide and increasing Entresto dose, continued on carvedilol and discontinued amlodipine and was scheduled for outpatient stress test and discharged home on 05/14/2024..     Present to the ED tonight 05/20/2024 with chief complaint of hypotension and feeling dizzy post or during stress test at [facility] for which she was sent to the ED. On arrival noted to be hypotensive systolic 90s over 50s.  Saturating 96% on room air.  EKG sinus rhythm and no new ST T-wave changes.  Labs notable for hemoglobin 12.2, potassium 2.9, creatinine 1.01, , troponin undetectable, normal lactic acid, normal TSH, urinalysis unremarkable.  Chest x-ray show no airspace disease.     She was given 2 L of lactated Ringer's her symptom and blood pressure normalized but she still seemed unsteady while ambulating, and referred to hospital medicine service for further evaluation and management.       Overview/Hospital Course:  5/22/24-Patient is c/o pain to her ankle.  X-ray does appear to show a subacute fracture to her calcaneal bone.  Orthopedics has been consulted.  Her vital signs and electrolytes are improved.    Interval History:     Review of Systems   Constitutional:  Positive for activity change and fatigue.   HENT: Negative.     Eyes: Negative.    Respiratory: Negative.      Cardiovascular: Negative.    Gastrointestinal: Negative.    Endocrine: Negative.    Genitourinary: Negative.    Musculoskeletal:  Positive for arthralgias and gait problem.   Skin: Negative.    Allergic/Immunologic: Negative.    Neurological:  Positive for weakness.   Hematological: Negative.    Psychiatric/Behavioral: Negative.       Objective:     Vital Signs (Most Recent):  Temp: 98.7 °F (37.1 °C) (05/22/24 0748)  Pulse: 76 (05/22/24 0748)  Resp: 18 (05/22/24 0748)  BP: 107/63 (05/22/24 0856)  SpO2: 95 % (05/22/24 0748) Vital Signs (24h Range):  Temp:  [97.7 °F (36.5 °C)-99.1 °F (37.3 °C)] 98.7 °F (37.1 °C)  Pulse:  [70-77] 76  Resp:  [18-20] 18  SpO2:  [93 %-97 %] 95 %  BP: (100-149)/(60-80) 107/63     Weight: 47.9 kg (105 lb 9.6 oz)  Body mass index is 18.71 kg/m².    Intake/Output Summary (Last 24 hours) at 5/22/2024 0949  Last data filed at 5/22/2024 0614  Gross per 24 hour   Intake --   Output 800 ml   Net -800 ml         Physical Exam  Constitutional:       Appearance: Normal appearance. She is underweight.   HENT:      Head: Normocephalic and atraumatic.      Nose: Nose normal.      Mouth/Throat:      Mouth: Mucous membranes are moist.      Pharynx: Oropharynx is clear.   Eyes:      Extraocular Movements: Extraocular movements intact.      Conjunctiva/sclera: Conjunctivae normal.      Pupils: Pupils are equal, round, and reactive to light.   Cardiovascular:      Rate and Rhythm: Normal rate and regular rhythm.      Pulses: Normal pulses.      Heart sounds: Normal heart sounds.   Pulmonary:      Effort: Pulmonary effort is normal.      Breath sounds: Normal breath sounds.   Abdominal:      General: Bowel sounds are normal.      Palpations: Abdomen is soft.   Musculoskeletal:         General: Tenderness present. Normal range of motion.      Cervical back: Normal range of motion and neck supple.   Skin:     General: Skin is warm and dry.      Capillary Refill: Capillary refill takes 2 to 3 seconds.    Neurological:      General: No focal deficit present.      Mental Status: She is alert. Mental status is at baseline.   Psychiatric:         Mood and Affect: Mood normal.         Behavior: Behavior normal.         Thought Content: Thought content normal.         Judgment: Judgment normal.             Significant Labs: All pertinent labs within the past 24 hours have been reviewed.  BMP:   Recent Labs   Lab 05/21/24  0421 05/22/24  0407    141   K 4.4 3.7   CO2 27 32*   BUN 9.5* 10.5   CREATININE 0.72 0.73   CALCIUM 8.6 9.1   MG 2.30  --      CBC:   Recent Labs   Lab 05/20/24  1540 05/21/24  0421 05/22/24  0407   WBC 7.47 5.84 5.11   HGB 12.2 11.1* 12.1   HCT 38.5 35.0* 37.2    223 269     CMP:   Recent Labs   Lab 05/20/24  1540 05/21/24  0421 05/22/24  0407    143 141   K 2.9* 4.4 3.7   CO2 23 27 32*   BUN 11.8 9.5* 10.5   CREATININE 1.01 0.72 0.73   CALCIUM 9.3 8.6 9.1   ALBUMIN 3.4 2.7*  --    BILITOT 0.5 0.3  --    ALKPHOS 298* 214*  --    AST 48* 23  --    ALT 25 15  --      Magnesium:   Recent Labs   Lab 05/20/24  1540 05/21/24  0421   MG 1.70 2.30       Significant Imaging: I have reviewed all pertinent imaging results/findings within the past 24 hours.    Assessment/Plan:      * Hypokalemia  Patient has hypokalemia which is Acute and currently controlled. Most recent potassium levels reviewed-   Lab Results   Component Value Date    K 3.7 05/22/2024   . Will continue potassium replacement per protocol and recheck repeat levels after replacement completed.     Calcaneal fracture  Orthopedics consulted for subacute fracture      Hypotension due to drugs  Monitor vital signs  Medications were adjusted      Malnutrition          VTE Risk Mitigation (From admission, onward)           Ordered     enoxaparin injection 40 mg  Every 24 hours         05/20/24 2245     IP VTE HIGH RISK PATIENT  Once         05/20/24 2213     Place sequential compression device  Until discontinued         05/20/24  2218                  DVT prophylaxis  Follow labs  Orthopedics consulted  Add ultram for pain  Discharge Planning   MILAN:      Code Status: Full Code   Is the patient medically ready for discharge?:     Reason for patient still in hospital (select all that apply): Patient trending condition, Laboratory test, Treatment, Consult recommendations, and Pending disposition  Discharge Plan A: Home with family, Home Health                  Evgeny Mcfadden MD  Department of Hospital Medicine   [facility]

## 2024-05-22 NOTE — DISCHARGE SUMMARY
[facility] - [facility] Hospital Medicine  Discharge Summary      Patient Name: Luz Maria Alfaro  MRN: [id number]  ABDIRIZAK: [id number]  Patient Class: IP- Inpatient  Admission Date: 5/20/2024  Hospital Length of Stay: 1 days  Discharge Date and Time:  05/22/2024 1:51 PM  Attending Physician: Mckayla Cary MD   Discharging Provider: Evgeny Mcfadden MD  Primary Care Provider: Robyn Matthews NP    Primary Care Team: Networked reference to record PCT     HPI:   64-year-old female with medical history of Chronic HFpEF, alcoholic cirrhosis with ascites, hypertension, anxiety and depression who was recently hospitalized for decompensated cirrhosis and heart failure and underwent diuresis and medication adjustments were made including initiation of furosemide and increasing Entresto dose, continued on carvedilol and discontinued amlodipine and was scheduled for outpatient stress test and discharged home on 05/14/2024..     Present to the ED tonight 05/20/2024 with chief complaint of hypotension and feeling dizzy post or during stress test at [facility] for which she was sent to the ED. On arrival noted to be hypotensive systolic 90s over 50s.  Saturating 96% on room air.  EKG sinus rhythm and no new ST T-wave changes.  Labs notable for hemoglobin 12.2, potassium 2.9, creatinine 1.01, , troponin undetectable, normal lactic acid, normal TSH, urinalysis unremarkable.  Chest x-ray show no airspace disease.     She was given 2 L of lactated Ringer's her symptom and blood pressure normalized but she still seemed unsteady while ambulating, and referred to hospital medicine service for further evaluation and management.       * No surgery found *      Hospital Course:   5/22/24-Patient is c/o pain to her ankle.  X-ray does appear to show a subacute fracture to her calcaneal bone.  Orthopedics has been consulted.  Her vital signs and electrolytes are improved.    5/22/24-Meganetn has been cleared for  discharge home per consultants.  She is stable.  Exam(A&O, NAD, RRR, CTA, BS ROM I)     Goals of Care Treatment Preferences:  Code Status: Full Code          What is most important right now is to focus on remaining as independent as possible, symptom/pain control, improvement in condition but with limits to invasive therapies, comfort and QOL .  Accordingly, we have decided that the best plan to meet the patient's goals includes continuing with treatment.      Consults:   Consults (From admission, onward)          Status Ordering Provider     Inpatient consult to Orthopedic Surgery  Once        Provider:  Stewart Brambila MD    Acknowledged MINDY MA     Inpatient consult to Cardiology  Once        Provider:  Ti Otero MD    Completed YOVANI SANDOVAL            No new Assessment & Plan notes have been filed under this hospital service since the last note was generated.  Service: Hospital Medicine    Final Active Diagnoses:    Diagnosis Date Noted POA    PRINCIPAL PROBLEM:  Hypokalemia [E87.6] 05/20/2024 Yes    Hypotension due to drugs [I95.2] 05/22/2024 Yes    Calcaneal fracture [S92.009A] 05/22/2024 Yes    Malnutrition [E46] 03/21/2024 Yes      Problems Resolved During this Admission:       Discharged Condition: stable    Disposition: Home or Self Care    Follow Up:   Follow-up Information       Caterina Huynh MD Follow up on 6/6/2024.    Specialties: Cardiovascular Disease, Cardiology  Why: @ 1:00PM with Carlos Manriquez NP  Contact information:  [address] [place] [id number]  [phone]               Robyn Matthews NP Follow up in 1 week(s).    Specialties: Urgent Care, Emergency Medicine  Contact information:  [address] [facility] [address]  [phone]               Caterina Huynh MD Follow up on 5/28/2024.    Specialties: Cardiovascular Disease, Cardiology  Why: @ 2:00PM for PET  Contact information:  [address] [place] [address]  [phone]                            Patient Instructions:   No discharge procedures on file.    Significant Diagnostic Studies: Labs: BMP:   Recent Labs   Lab 05/20/24  1540 05/21/24  0421 05/22/24  0407    143 141   K 2.9* 4.4 3.7   CO2 23 27 32*   BUN 11.8 9.5* 10.5   CREATININE 1.01 0.72 0.73   CALCIUM 9.3 8.6 9.1   MG 1.70 2.30  --    , CMP   Recent Labs   Lab 05/20/24  1540 05/21/24  0421 05/22/24  0407    143 141   K 2.9* 4.4 3.7   CO2 23 27 32*   BUN 11.8 9.5* 10.5   CREATININE 1.01 0.72 0.73   CALCIUM 9.3 8.6 9.1   ALBUMIN 3.4 2.7*  --    BILITOT 0.5 0.3  --    ALKPHOS 298* 214*  --    AST 48* 23  --    ALT 25 15  --    , and CBC   Recent Labs   Lab 05/20/24  1540 05/21/24  0421 05/22/24  0407   WBC 7.47 5.84 5.11   HGB 12.2 11.1* 12.1   HCT 38.5 35.0* 37.2    223 269       Pending Diagnostic Studies:       None           Medications:  Reconciled Home Medications:      Medication List        START taking these medications      folic acid 1 MG tablet  Commonly known as: FOLVITE  Take 1 tablet (1 mg total) by mouth once daily.  Start taking on: May 23, 2024     metoprolol succinate 25 MG 24 hr tablet  Commonly known as: TOPROL-XL  Take 0.5 tablets (12.5 mg total) by mouth once daily.  Start taking on: May 23, 2024     thiamine 100 MG tablet  Take 1 tablet (100 mg total) by mouth once daily.  Start taking on: May 23, 2024     traMADoL 50 mg tablet  Commonly known as: ULTRAM  Take 1 tablet (50 mg total) by mouth every 8 (eight) hours as needed for Pain.            CHANGE how you take these medications      furosemide 20 MG tablet  Commonly known as: LASIX  Take 1 tablet (20 mg total) by mouth once daily.  Start taking on: May 23, 2024  What changed:   medication strength  how much to take            CONTINUE taking these medications      albuterol 90 mcg/actuation inhaler  Commonly known as: PROVENTIL/VENTOLIN HFA  Inhale 1 puff into the lungs every 4 (four) hours as needed for Shortness of Breath.     ARIPiprazole 2 MG  Tab  Commonly known as: ABILIFY  Take 1 tablet (2 mg total) by mouth once daily.     busPIRone 5 MG Tab  Commonly known as: BUSPAR  Take 1 tablet (5 mg total) by mouth 2 (two) times daily.     dapagliflozin propanediol 10 mg tablet  Commonly known as: Farxiga  Take 1 tablet (10 mg total) by mouth once daily.     EScitalopram oxalate 5 MG Tab  Commonly known as: LEXAPRO  Take 1 tablet (5 mg total) by mouth once daily.     gabapentin 300 MG capsule  Commonly known as: NEURONTIN  Take 300 mg by mouth 3 (three) times daily.     hyoscyamine 0.125 mg Subl  Place 0.125 mg under the tongue 3 (three) times daily as needed (for abdominal cramping).     ondansetron 8 MG Tbdl  Commonly known as: ZOFRAN-ODT  Take 8 mg by mouth every 6 (six) hours as needed.     pantoprazole 40 MG tablet  Commonly known as: PROTONIX  Take 40 mg by mouth 2 (two) times daily.     rifAXIMin 550 mg Tab  Commonly known as: XIFAXAN  Take 1 tablet (550 mg total) by mouth 2 (two) times daily.            STOP taking these medications      carvediloL 6.25 MG tablet  Commonly known as: COREG     sacubitriL-valsartan 49-51 mg per tablet  Commonly known as: ENTRESTO     sodium bicarbonate 650 MG tablet              Indwelling Lines/Drains at time of discharge:   Lines/Drains/Airways       None                   Time spent on the discharge of patient: 36 minutes        Patient screened for food insecurity, housing instability, transportation needs, utility difficulties, and interpersonal safety.   No needs    Evgeny Mcfadden MD  Department of Hospital Medicine  [facility] - [facility]

## 2024-05-22 NOTE — HOSPITAL COURSE
5/22/24-Patient is c/o pain to her ankle.  X-ray does appear to show a subacute fracture to her calcaneal bone.  Orthopedics has been consulted.  Her vital signs and electrolytes are improved.    5/22/24-Patietn has been cleared for discharge home per consultants.  She is stable.  Exam(A&O, NAD, RRR, CTA, BS ROM I)

## 2024-05-23 ENCOUNTER — PATIENT OUTREACH (OUTPATIENT)
Dept: ADMINISTRATIVE | Facility: CLINIC | Age: 64
End: 2024-05-23
Payer: MEDICARE

## 2024-05-23 NOTE — PROGRESS NOTES
2nd attempt-C3 nurse attempted to contact Luz Maria Alfaro for a TCC post hospital discharge follow up call. No answer. Left voicemail with callback information. The patient has a scheduled HOS appointment with Luzmaria Flores FNP (Family Medicine) on 6/18/2024; @10:30am.

## 2024-05-23 NOTE — PROGRESS NOTES
C3 nurse attempted to contact Luz Maria Alfaro for a TCC post hospital discharge follow up call. No answer. Left voicemail with callback information. The patient has a scheduled HOSFU appointment with Luzmaria Flores FNP (Family Medicine) on 6/18/2024; @10:30am.

## 2024-05-24 NOTE — PROGRESS NOTES
3rd attempt-C3 nurse attempted to contact Luz Maria Alfaro for a TCC post hospital discharge follow up call. No answer. Left voicemail with callback information. The patient has a scheduled HOS appointment with Luzmaria Flores FNP (Family Medicine) on 6/18/2024; @10:30am.

## 2024-05-25 LAB
BACTERIA BLD CULT: NORMAL
BACTERIA BLD CULT: NORMAL

## 2024-06-13 ENCOUNTER — LAB REQUISITION (OUTPATIENT)
Dept: LAB | Facility: HOSPITAL | Age: 64
End: 2024-06-13
Payer: MEDICARE

## 2024-06-13 DIAGNOSIS — I10 ESSENTIAL (PRIMARY) HYPERTENSION: ICD-10-CM

## 2024-06-13 LAB
ALBUMIN SERPL-MCNC: 3 G/DL (ref 3.4–4.8)
ALBUMIN/GLOB SERPL: 1.1 RATIO (ref 1.1–2)
ALP SERPL-CCNC: 150 UNIT/L (ref 40–150)
ALT SERPL-CCNC: 8 UNIT/L (ref 0–55)
ANION GAP SERPL CALC-SCNC: 11 MEQ/L
AST SERPL-CCNC: 19 UNIT/L (ref 5–34)
BILIRUB SERPL-MCNC: 0.2 MG/DL
BUN SERPL-MCNC: 14.6 MG/DL (ref 9.8–20.1)
CALCIUM SERPL-MCNC: 8.4 MG/DL (ref 8.4–10.2)
CHLORIDE SERPL-SCNC: 109 MMOL/L (ref 98–107)
CO2 SERPL-SCNC: 25 MMOL/L (ref 23–31)
CREAT SERPL-MCNC: 0.84 MG/DL (ref 0.55–1.02)
CREAT/UREA NIT SERPL: 17
GFR SERPLBLD CREATININE-BSD FMLA CKD-EPI: >60 ML/MIN/1.73/M2
GLOBULIN SER-MCNC: 2.7 GM/DL (ref 2.4–3.5)
GLUCOSE SERPL-MCNC: 112 MG/DL (ref 82–115)
POTASSIUM SERPL-SCNC: 3.7 MMOL/L (ref 3.5–5.1)
PROT SERPL-MCNC: 5.7 GM/DL (ref 5.8–7.6)
SODIUM SERPL-SCNC: 145 MMOL/L (ref 136–145)

## 2024-06-13 PROCEDURE — 80053 COMPREHEN METABOLIC PANEL: CPT | Performed by: ORTHOPAEDIC SURGERY

## 2024-08-12 ENCOUNTER — HOSPITAL ENCOUNTER (INPATIENT)
Facility: HOSPITAL | Age: 64
LOS: 3 days | Discharge: HOME HEALTH | DRG: 917 | End: 2024-08-16
Attending: STUDENT IN AN ORGANIZED HEALTH CARE EDUCATION/TRAINING PROGRAM | Admitting: INTERNAL MEDICINE
Payer: MEDICARE

## 2024-08-12 DIAGNOSIS — R55 SYNCOPE, NEAR: ICD-10-CM

## 2024-08-12 DIAGNOSIS — G93.40 ACUTE ENCEPHALOPATHY: Primary | ICD-10-CM

## 2024-08-12 DIAGNOSIS — R07.9 CHEST PAIN: ICD-10-CM

## 2024-08-12 DIAGNOSIS — R41.82 AMS (ALTERED MENTAL STATUS): ICD-10-CM

## 2024-08-12 LAB
ALBUMIN SERPL-MCNC: 3.5 G/DL (ref 3.4–4.8)
ALBUMIN/GLOB SERPL: 1.3 RATIO (ref 1.1–2)
ALP SERPL-CCNC: 275 UNIT/L (ref 40–150)
ALT SERPL-CCNC: 44 UNIT/L (ref 0–55)
ANION GAP SERPL CALC-SCNC: 10 MEQ/L
AST SERPL-CCNC: 20 UNIT/L (ref 5–34)
BASOPHILS # BLD AUTO: 0.02 X10(3)/MCL
BASOPHILS NFR BLD AUTO: 0.3 %
BILIRUB SERPL-MCNC: 0.4 MG/DL
BUN SERPL-MCNC: 16.6 MG/DL (ref 9.8–20.1)
CALCIUM SERPL-MCNC: 8.5 MG/DL (ref 8.4–10.2)
CHLORIDE SERPL-SCNC: 117 MMOL/L (ref 98–107)
CO2 SERPL-SCNC: 14 MMOL/L (ref 23–31)
CREAT SERPL-MCNC: 0.88 MG/DL (ref 0.55–1.02)
CREAT/UREA NIT SERPL: 19
EOSINOPHIL # BLD AUTO: 0.03 X10(3)/MCL (ref 0–0.9)
EOSINOPHIL NFR BLD AUTO: 0.4 %
ERYTHROCYTE [DISTWIDTH] IN BLOOD BY AUTOMATED COUNT: 21.5 % (ref 11.5–17)
ETHANOL SERPL-MCNC: <10 MG/DL
GFR SERPLBLD CREATININE-BSD FMLA CKD-EPI: >60 ML/MIN/1.73/M2
GLOBULIN SER-MCNC: 2.8 GM/DL (ref 2.4–3.5)
GLUCOSE SERPL-MCNC: 88 MG/DL (ref 82–115)
HCT VFR BLD AUTO: 32.2 % (ref 37–47)
HGB BLD-MCNC: 10.5 G/DL (ref 12–16)
IMM GRANULOCYTES # BLD AUTO: 0.05 X10(3)/MCL (ref 0–0.04)
IMM GRANULOCYTES NFR BLD AUTO: 0.7 %
LYMPHOCYTES # BLD AUTO: 1.26 X10(3)/MCL (ref 0.6–4.6)
LYMPHOCYTES NFR BLD AUTO: 18 %
MAGNESIUM SERPL-MCNC: 2.7 MG/DL (ref 1.6–2.6)
MCH RBC QN AUTO: 33 PG (ref 27–31)
MCHC RBC AUTO-ENTMCNC: 32.6 G/DL (ref 33–36)
MCV RBC AUTO: 101.3 FL (ref 80–94)
MONOCYTES # BLD AUTO: 0.26 X10(3)/MCL (ref 0.1–1.3)
MONOCYTES NFR BLD AUTO: 3.7 %
NEUTROPHILS # BLD AUTO: 5.38 X10(3)/MCL (ref 2.1–9.2)
NEUTROPHILS NFR BLD AUTO: 76.9 %
NRBC BLD AUTO-RTO: 0 %
PLATELET # BLD AUTO: 145 X10(3)/MCL (ref 130–400)
PLATELETS.RETICULATED NFR BLD AUTO: 5.1 % (ref 0.9–11.2)
PMV BLD AUTO: 11.1 FL (ref 7.4–10.4)
POTASSIUM SERPL-SCNC: 3.3 MMOL/L (ref 3.5–5.1)
PROT SERPL-MCNC: 6.3 GM/DL (ref 5.8–7.6)
RBC # BLD AUTO: 3.18 X10(6)/MCL (ref 4.2–5.4)
SODIUM SERPL-SCNC: 141 MMOL/L (ref 136–145)
TROPONIN I SERPL-MCNC: <0.01 NG/ML (ref 0–0.04)
TSH SERPL-ACNC: 0.59 UIU/ML (ref 0.35–4.94)
WBC # BLD AUTO: 7 X10(3)/MCL (ref 4.5–11.5)

## 2024-08-12 PROCEDURE — 99285 EMERGENCY DEPT VISIT HI MDM: CPT | Mod: 25

## 2024-08-12 PROCEDURE — 84484 ASSAY OF TROPONIN QUANT: CPT | Performed by: STUDENT IN AN ORGANIZED HEALTH CARE EDUCATION/TRAINING PROGRAM

## 2024-08-12 PROCEDURE — 85025 COMPLETE CBC W/AUTO DIFF WBC: CPT | Performed by: STUDENT IN AN ORGANIZED HEALTH CARE EDUCATION/TRAINING PROGRAM

## 2024-08-12 PROCEDURE — 83735 ASSAY OF MAGNESIUM: CPT | Performed by: STUDENT IN AN ORGANIZED HEALTH CARE EDUCATION/TRAINING PROGRAM

## 2024-08-12 PROCEDURE — 84443 ASSAY THYROID STIM HORMONE: CPT | Performed by: STUDENT IN AN ORGANIZED HEALTH CARE EDUCATION/TRAINING PROGRAM

## 2024-08-12 PROCEDURE — 87086 URINE CULTURE/COLONY COUNT: CPT | Performed by: STUDENT IN AN ORGANIZED HEALTH CARE EDUCATION/TRAINING PROGRAM

## 2024-08-12 PROCEDURE — 82077 ASSAY SPEC XCP UR&BREATH IA: CPT | Performed by: STUDENT IN AN ORGANIZED HEALTH CARE EDUCATION/TRAINING PROGRAM

## 2024-08-12 PROCEDURE — 80307 DRUG TEST PRSMV CHEM ANLYZR: CPT | Performed by: STUDENT IN AN ORGANIZED HEALTH CARE EDUCATION/TRAINING PROGRAM

## 2024-08-12 PROCEDURE — 63600175 PHARM REV CODE 636 W HCPCS: Performed by: STUDENT IN AN ORGANIZED HEALTH CARE EDUCATION/TRAINING PROGRAM

## 2024-08-12 PROCEDURE — 96361 HYDRATE IV INFUSION ADD-ON: CPT

## 2024-08-12 PROCEDURE — 80053 COMPREHEN METABOLIC PANEL: CPT | Performed by: STUDENT IN AN ORGANIZED HEALTH CARE EDUCATION/TRAINING PROGRAM

## 2024-08-12 PROCEDURE — 81001 URINALYSIS AUTO W/SCOPE: CPT | Mod: XB | Performed by: STUDENT IN AN ORGANIZED HEALTH CARE EDUCATION/TRAINING PROGRAM

## 2024-08-12 RX ADMIN — SODIUM CHLORIDE, POTASSIUM CHLORIDE, SODIUM LACTATE AND CALCIUM CHLORIDE 1000 ML: 600; 310; 30; 20 INJECTION, SOLUTION INTRAVENOUS at 09:08

## 2024-08-13 PROBLEM — R41.82 AMS (ALTERED MENTAL STATUS): Status: ACTIVE | Noted: 2024-08-13

## 2024-08-13 LAB
ALBUMIN SERPL-MCNC: 3.2 G/DL (ref 3.4–4.8)
ALBUMIN/GLOB SERPL: 1.3 RATIO (ref 1.1–2)
ALP SERPL-CCNC: 258 UNIT/L (ref 40–150)
ALT SERPL-CCNC: 36 UNIT/L (ref 0–55)
AMPHET UR QL SCN: NEGATIVE
ANION GAP SERPL CALC-SCNC: 8 MEQ/L
APICAL FOUR CHAMBER EJECTION FRACTION: 68 %
APICAL TWO CHAMBER EJECTION FRACTION: 65 %
AST SERPL-CCNC: 17 UNIT/L (ref 5–34)
AV INDEX (PROSTH): 0.48
AV MEAN GRADIENT: 13 MMHG
AV PEAK GRADIENT: 23 MMHG
AV REGURGITATION PRESSURE HALF TIME: 376 MS
AV VALVE AREA BY VELOCITY RATIO: 1.2 CM²
AV VALVE AREA: 1.36 CM²
AV VELOCITY RATIO: 0.43
BACTERIA #/AREA URNS AUTO: ABNORMAL /HPF
BARBITURATE SCN PRESENT UR: NEGATIVE
BASOPHILS # BLD AUTO: 0.01 X10(3)/MCL
BASOPHILS NFR BLD AUTO: 0.2 %
BENZODIAZ UR QL SCN: POSITIVE
BILIRUB SERPL-MCNC: 0.3 MG/DL
BILIRUB UR QL STRIP.AUTO: NEGATIVE
BSA FOR ECHO PROCEDURE: 1.52 M2
BUN SERPL-MCNC: 12.1 MG/DL (ref 9.8–20.1)
CALCIUM SERPL-MCNC: 8.3 MG/DL (ref 8.4–10.2)
CANNABINOIDS UR QL SCN: NEGATIVE
CHLORIDE SERPL-SCNC: 115 MMOL/L (ref 98–107)
CLARITY UR: CLEAR
CO2 SERPL-SCNC: 15 MMOL/L (ref 23–31)
COCAINE UR QL SCN: NEGATIVE
COLOR UR AUTO: COLORLESS
CREAT SERPL-MCNC: 0.72 MG/DL (ref 0.55–1.02)
CREAT/UREA NIT SERPL: 17
CV ECHO LV RWT: 0.64 CM
DOP CALC AO PEAK VEL: 2.4 M/S
DOP CALC AO VTI: 46.2 CM
DOP CALC LVOT AREA: 2.8 CM2
DOP CALC LVOT DIAMETER: 1.9 CM
DOP CALC LVOT PEAK VEL: 1.02 M/S
DOP CALC LVOT STROKE VOLUME: 62.63 CM3
DOP CALC MV VTI: 46.3 CM
DOP CALCLVOT PEAK VEL VTI: 22.1 CM
E WAVE DECELERATION TIME: 222 MSEC
E/A RATIO: 0.94
E/E' RATIO: 26.17 M/S
ECHO LV POSTERIOR WALL: 1.4 CM (ref 0.6–1.1)
EOSINOPHIL # BLD AUTO: 0.02 X10(3)/MCL (ref 0–0.9)
EOSINOPHIL NFR BLD AUTO: 0.3 %
EPI CELLS #/AREA URNS HPF: ABNORMAL /HPF
ERYTHROCYTE [DISTWIDTH] IN BLOOD BY AUTOMATED COUNT: 21.5 % (ref 11.5–17)
FENTANYL UR QL SCN: NEGATIVE
FRACTIONAL SHORTENING: 45 % (ref 28–44)
GFR SERPLBLD CREATININE-BSD FMLA CKD-EPI: >60 ML/MIN/1.73/M2
GLOBULIN SER-MCNC: 2.5 GM/DL (ref 2.4–3.5)
GLUCOSE SERPL-MCNC: 81 MG/DL (ref 82–115)
GLUCOSE UR QL STRIP: NORMAL
HCT VFR BLD AUTO: 28.6 % (ref 37–47)
HGB BLD-MCNC: 9.4 G/DL (ref 12–16)
HGB UR QL STRIP: ABNORMAL
IMM GRANULOCYTES # BLD AUTO: 0.04 X10(3)/MCL (ref 0–0.04)
IMM GRANULOCYTES NFR BLD AUTO: 0.6 %
INTERVENTRICULAR SEPTUM: 1.1 CM (ref 0.6–1.1)
KETONES UR QL STRIP: NEGATIVE
LACTATE SERPL-SCNC: 0.8 MMOL/L (ref 0.5–2.2)
LEFT ATRIUM AREA SYSTOLIC (APICAL 2 CHAMBER): 23.2 CM2
LEFT ATRIUM AREA SYSTOLIC (APICAL 4 CHAMBER): 21.4 CM2
LEFT ATRIUM SIZE: 4.4 CM
LEFT ATRIUM VOLUME INDEX MOD: 42.5 ML/M2
LEFT ATRIUM VOLUME MOD: 65 CM3
LEFT INTERNAL DIMENSION IN SYSTOLE: 2.4 CM (ref 2.1–4)
LEFT VENTRICLE DIASTOLIC VOLUME INDEX: 57.32 ML/M2
LEFT VENTRICLE DIASTOLIC VOLUME: 87.7 ML
LEFT VENTRICLE END DIASTOLIC VOLUME APICAL 2 CHAMBER: 60.1 ML
LEFT VENTRICLE END DIASTOLIC VOLUME APICAL 4 CHAMBER: 61.7 ML
LEFT VENTRICLE END SYSTOLIC VOLUME APICAL 2 CHAMBER: 70.3 ML
LEFT VENTRICLE END SYSTOLIC VOLUME APICAL 4 CHAMBER: 60.3 ML
LEFT VENTRICLE MASS INDEX: 133 G/M2
LEFT VENTRICLE SYSTOLIC VOLUME INDEX: 13.2 ML/M2
LEFT VENTRICLE SYSTOLIC VOLUME: 20.2 ML
LEFT VENTRICULAR INTERNAL DIMENSION IN DIASTOLE: 4.4 CM (ref 3.5–6)
LEFT VENTRICULAR MASS: 203.05 G
LEUKOCYTE ESTERASE UR QL STRIP: 250
LV LATERAL E/E' RATIO: 19.63 M/S
LV SEPTAL E/E' RATIO: 39.25 M/S
LVED V (TEICH): 87.7 ML
LVES V (TEICH): 20.2 ML
LVOT MG: 2 MMHG
LVOT MV: 0.69 CM/S
LYMPHOCYTES # BLD AUTO: 1.26 X10(3)/MCL (ref 0.6–4.6)
LYMPHOCYTES NFR BLD AUTO: 19.1 %
MAGNESIUM SERPL-MCNC: 2.4 MG/DL (ref 1.6–2.6)
MCH RBC QN AUTO: 31.9 PG (ref 27–31)
MCHC RBC AUTO-ENTMCNC: 32.9 G/DL (ref 33–36)
MCV RBC AUTO: 96.9 FL (ref 80–94)
MDMA UR QL SCN: NEGATIVE
MONOCYTES # BLD AUTO: 0.31 X10(3)/MCL (ref 0.1–1.3)
MONOCYTES NFR BLD AUTO: 4.7 %
MV MEAN GRADIENT: 6 MMHG
MV PEAK A VEL: 1.67 M/S
MV PEAK E VEL: 1.57 M/S
MV PEAK GRADIENT: 14 MMHG
MV STENOSIS PRESSURE HALF TIME: 88 MS
MV VALVE AREA BY CONTINUITY EQUATION: 1.35 CM2
MV VALVE AREA P 1/2 METHOD: 2.5 CM2
NEUTROPHILS # BLD AUTO: 4.96 X10(3)/MCL (ref 2.1–9.2)
NEUTROPHILS NFR BLD AUTO: 75.1 %
NITRITE UR QL STRIP: NEGATIVE
NRBC BLD AUTO-RTO: 0.5 %
OHS LV EJECTION FRACTION SIMPSONS BIPLANE MOD: 67 %
OPIATES UR QL SCN: NEGATIVE
PCP UR QL: NEGATIVE
PH UR STRIP: 6.5 [PH]
PH UR: 6.5 [PH] (ref 3–11)
PHOSPHATE SERPL-MCNC: 1.7 MG/DL (ref 2.3–4.7)
PISA AR MAX VEL: 4.45 M/S
PLATELET # BLD AUTO: 142 X10(3)/MCL (ref 130–400)
PLATELETS.RETICULATED NFR BLD AUTO: 4.5 % (ref 0.9–11.2)
PMV BLD AUTO: 11.1 FL (ref 7.4–10.4)
POCT GLUCOSE: 73 MG/DL (ref 70–110)
POCT GLUCOSE: 94 MG/DL (ref 70–110)
POTASSIUM SERPL-SCNC: 3.2 MMOL/L (ref 3.5–5.1)
PROT SERPL-MCNC: 5.7 GM/DL (ref 5.8–7.6)
PROT UR QL STRIP: ABNORMAL
PV PEAK GRADIENT: 6 MMHG
PV PEAK VELOCITY: 1.18 M/S
RA PRESSURE ESTIMATED: 3 MMHG
RBC # BLD AUTO: 2.95 X10(6)/MCL (ref 4.2–5.4)
RBC #/AREA URNS AUTO: ABNORMAL /HPF
SODIUM SERPL-SCNC: 138 MMOL/L (ref 136–145)
SP GR UR STRIP.AUTO: 1.02 (ref 1–1.03)
SPECIFIC GRAVITY, URINE AUTO (.000) (OHS): 1.02 (ref 1–1.03)
SQUAMOUS #/AREA URNS LPF: ABNORMAL /HPF
TDI LATERAL: 0.08 M/S
TDI SEPTAL: 0.04 M/S
TDI: 0.06 M/S
TRICUSPID ANNULAR PLANE SYSTOLIC EXCURSION: 1.91 CM
UROBILINOGEN UR STRIP-ACNC: NORMAL
WBC # BLD AUTO: 6.6 X10(3)/MCL (ref 4.5–11.5)
WBC #/AREA URNS AUTO: ABNORMAL /HPF
Z-SCORE OF LEFT VENTRICULAR DIMENSION IN END DIASTOLE: -0.14
Z-SCORE OF LEFT VENTRICULAR DIMENSION IN END SYSTOLE: -1.13

## 2024-08-13 PROCEDURE — 63600175 PHARM REV CODE 636 W HCPCS: Performed by: NURSE PRACTITIONER

## 2024-08-13 PROCEDURE — 85025 COMPLETE CBC W/AUTO DIFF WBC: CPT | Performed by: NURSE PRACTITIONER

## 2024-08-13 PROCEDURE — 84100 ASSAY OF PHOSPHORUS: CPT | Performed by: NURSE PRACTITIONER

## 2024-08-13 PROCEDURE — 63600175 PHARM REV CODE 636 W HCPCS: Performed by: STUDENT IN AN ORGANIZED HEALTH CARE EDUCATION/TRAINING PROGRAM

## 2024-08-13 PROCEDURE — 11000001 HC ACUTE MED/SURG PRIVATE ROOM

## 2024-08-13 PROCEDURE — 25000003 PHARM REV CODE 250: Performed by: NURSE PRACTITIONER

## 2024-08-13 PROCEDURE — 25000003 PHARM REV CODE 250: Performed by: INTERNAL MEDICINE

## 2024-08-13 PROCEDURE — 80053 COMPREHEN METABOLIC PANEL: CPT | Performed by: NURSE PRACTITIONER

## 2024-08-13 PROCEDURE — 83735 ASSAY OF MAGNESIUM: CPT | Performed by: NURSE PRACTITIONER

## 2024-08-13 PROCEDURE — 96365 THER/PROPH/DIAG IV INF INIT: CPT

## 2024-08-13 PROCEDURE — 21400001 HC TELEMETRY ROOM

## 2024-08-13 PROCEDURE — 83605 ASSAY OF LACTIC ACID: CPT | Performed by: STUDENT IN AN ORGANIZED HEALTH CARE EDUCATION/TRAINING PROGRAM

## 2024-08-13 PROCEDURE — 25000003 PHARM REV CODE 250: Performed by: STUDENT IN AN ORGANIZED HEALTH CARE EDUCATION/TRAINING PROGRAM

## 2024-08-13 RX ORDER — DAPAGLIFLOZIN 10 MG/1
10 TABLET, FILM COATED ORAL DAILY
Status: DISCONTINUED | OUTPATIENT
Start: 2024-08-14 | End: 2024-08-16 | Stop reason: HOSPADM

## 2024-08-13 RX ORDER — GLUCAGON 1 MG
1 KIT INJECTION
Status: DISCONTINUED | OUTPATIENT
Start: 2024-08-13 | End: 2024-08-16 | Stop reason: HOSPADM

## 2024-08-13 RX ORDER — NALOXONE HCL 0.4 MG/ML
0.02 VIAL (ML) INJECTION
Status: DISCONTINUED | OUTPATIENT
Start: 2024-08-13 | End: 2024-08-16 | Stop reason: HOSPADM

## 2024-08-13 RX ORDER — IBUPROFEN 200 MG
24 TABLET ORAL
Status: DISCONTINUED | OUTPATIENT
Start: 2024-08-13 | End: 2024-08-16 | Stop reason: HOSPADM

## 2024-08-13 RX ORDER — SACUBITRIL AND VALSARTAN 24; 26 MG/1; MG/1
1 TABLET, FILM COATED ORAL 2 TIMES DAILY
COMMUNITY

## 2024-08-13 RX ORDER — PROCHLORPERAZINE EDISYLATE 5 MG/ML
5 INJECTION INTRAMUSCULAR; INTRAVENOUS EVERY 6 HOURS PRN
Status: DISCONTINUED | OUTPATIENT
Start: 2024-08-13 | End: 2024-08-15

## 2024-08-13 RX ORDER — IBUPROFEN 200 MG
16 TABLET ORAL
Status: DISCONTINUED | OUTPATIENT
Start: 2024-08-13 | End: 2024-08-16 | Stop reason: HOSPADM

## 2024-08-13 RX ORDER — SIMETHICONE 80 MG
1 TABLET,CHEWABLE ORAL 4 TIMES DAILY PRN
Status: DISCONTINUED | OUTPATIENT
Start: 2024-08-13 | End: 2024-08-16 | Stop reason: HOSPADM

## 2024-08-13 RX ORDER — TALC
6 POWDER (GRAM) TOPICAL NIGHTLY PRN
Status: DISCONTINUED | OUTPATIENT
Start: 2024-08-13 | End: 2024-08-16 | Stop reason: HOSPADM

## 2024-08-13 RX ORDER — ROSUVASTATIN CALCIUM 20 MG/1
20 TABLET, COATED ORAL DAILY
COMMUNITY

## 2024-08-13 RX ORDER — SODIUM CHLORIDE, SODIUM LACTATE, POTASSIUM CHLORIDE, CALCIUM CHLORIDE 600; 310; 30; 20 MG/100ML; MG/100ML; MG/100ML; MG/100ML
INJECTION, SOLUTION INTRAVENOUS CONTINUOUS
Status: DISCONTINUED | OUTPATIENT
Start: 2024-08-13 | End: 2024-08-13

## 2024-08-13 RX ORDER — ENOXAPARIN SODIUM 100 MG/ML
40 INJECTION SUBCUTANEOUS EVERY 24 HOURS
Status: DISCONTINUED | OUTPATIENT
Start: 2024-08-13 | End: 2024-08-16 | Stop reason: HOSPADM

## 2024-08-13 RX ORDER — ACETAMINOPHEN 325 MG/1
650 TABLET ORAL EVERY 6 HOURS PRN
Status: DISCONTINUED | OUTPATIENT
Start: 2024-08-13 | End: 2024-08-13

## 2024-08-13 RX ORDER — ARIPIPRAZOLE 2 MG/1
2 TABLET ORAL DAILY
Status: DISCONTINUED | OUTPATIENT
Start: 2024-08-14 | End: 2024-08-16 | Stop reason: HOSPADM

## 2024-08-13 RX ORDER — POLYETHYLENE GLYCOL 3350 17 G/17G
17 POWDER, FOR SOLUTION ORAL 2 TIMES DAILY PRN
Status: DISCONTINUED | OUTPATIENT
Start: 2024-08-13 | End: 2024-08-16 | Stop reason: HOSPADM

## 2024-08-13 RX ORDER — ONDANSETRON HYDROCHLORIDE 2 MG/ML
4 INJECTION, SOLUTION INTRAVENOUS EVERY 4 HOURS PRN
Status: DISCONTINUED | OUTPATIENT
Start: 2024-08-13 | End: 2024-08-16 | Stop reason: HOSPADM

## 2024-08-13 RX ORDER — ONDANSETRON HYDROCHLORIDE 2 MG/ML
4 INJECTION, SOLUTION INTRAVENOUS EVERY 4 HOURS PRN
Status: DISCONTINUED | OUTPATIENT
Start: 2024-08-13 | End: 2024-08-15

## 2024-08-13 RX ORDER — ACETAMINOPHEN 325 MG/1
650 TABLET ORAL EVERY 4 HOURS PRN
Status: DISCONTINUED | OUTPATIENT
Start: 2024-08-13 | End: 2024-08-16 | Stop reason: HOSPADM

## 2024-08-13 RX ORDER — THIAMINE HCL 100 MG
100 TABLET ORAL DAILY
Status: DISCONTINUED | OUTPATIENT
Start: 2024-08-13 | End: 2024-08-16 | Stop reason: HOSPADM

## 2024-08-13 RX ORDER — ALUMINUM HYDROXIDE, MAGNESIUM HYDROXIDE, AND SIMETHICONE 1200; 120; 1200 MG/30ML; MG/30ML; MG/30ML
30 SUSPENSION ORAL 4 TIMES DAILY PRN
Status: DISCONTINUED | OUTPATIENT
Start: 2024-08-13 | End: 2024-08-16 | Stop reason: HOSPADM

## 2024-08-13 RX ORDER — ESCITALOPRAM OXALATE 5 MG/1
5 TABLET ORAL DAILY
Status: DISCONTINUED | OUTPATIENT
Start: 2024-08-14 | End: 2024-08-16 | Stop reason: HOSPADM

## 2024-08-13 RX ORDER — SODIUM CHLORIDE 450 MG/100ML
INJECTION, SOLUTION INTRAVENOUS CONTINUOUS
Status: DISCONTINUED | OUTPATIENT
Start: 2024-08-13 | End: 2024-08-14

## 2024-08-13 RX ORDER — HYDROCODONE BITARTRATE AND ACETAMINOPHEN 5; 325 MG/1; MG/1
1 TABLET ORAL
Status: COMPLETED | OUTPATIENT
Start: 2024-08-13 | End: 2024-08-13

## 2024-08-13 RX ORDER — BUSPIRONE HYDROCHLORIDE 5 MG/1
5 TABLET ORAL 2 TIMES DAILY
Status: DISCONTINUED | OUTPATIENT
Start: 2024-08-13 | End: 2024-08-16 | Stop reason: HOSPADM

## 2024-08-13 RX ORDER — SODIUM CHLORIDE 0.9 % (FLUSH) 0.9 %
10 SYRINGE (ML) INJECTION
Status: DISCONTINUED | OUTPATIENT
Start: 2024-08-13 | End: 2024-08-16 | Stop reason: HOSPADM

## 2024-08-13 RX ORDER — POTASSIUM CHLORIDE 20 MEQ/1
40 TABLET, EXTENDED RELEASE ORAL 2 TIMES DAILY
Status: COMPLETED | OUTPATIENT
Start: 2024-08-13 | End: 2024-08-14

## 2024-08-13 RX ORDER — ACETAMINOPHEN 500 MG
1000 TABLET ORAL EVERY 6 HOURS PRN
Status: DISCONTINUED | OUTPATIENT
Start: 2024-08-13 | End: 2024-08-16 | Stop reason: HOSPADM

## 2024-08-13 RX ORDER — ATORVASTATIN CALCIUM 40 MG/1
80 TABLET, FILM COATED ORAL DAILY
Status: DISCONTINUED | OUTPATIENT
Start: 2024-08-13 | End: 2024-08-16 | Stop reason: HOSPADM

## 2024-08-13 RX ORDER — FOLIC ACID 1 MG/1
1 TABLET ORAL DAILY
Status: DISCONTINUED | OUTPATIENT
Start: 2024-08-13 | End: 2024-08-16 | Stop reason: HOSPADM

## 2024-08-13 RX ADMIN — POTASSIUM BICARBONATE 20 MEQ: 391 TABLET, EFFERVESCENT ORAL at 05:08

## 2024-08-13 RX ADMIN — SACUBITRIL AND VALSARTAN 1 TABLET: 24; 26 TABLET, FILM COATED ORAL at 09:08

## 2024-08-13 RX ADMIN — METOPROLOL SUCCINATE 12.5 MG: 25 TABLET, EXTENDED RELEASE ORAL at 03:08

## 2024-08-13 RX ADMIN — Medication 6 MG: at 09:08

## 2024-08-13 RX ADMIN — ENOXAPARIN SODIUM 40 MG: 40 INJECTION SUBCUTANEOUS at 05:08

## 2024-08-13 RX ADMIN — BUSPIRONE HYDROCHLORIDE 5 MG: 5 TABLET ORAL at 09:08

## 2024-08-13 RX ADMIN — FOLIC ACID 1 MG: 1 TABLET ORAL at 03:08

## 2024-08-13 RX ADMIN — ATORVASTATIN CALCIUM 80 MG: 40 TABLET, FILM COATED ORAL at 03:08

## 2024-08-13 RX ADMIN — SODIUM CHLORIDE: 4.5 INJECTION, SOLUTION INTRAVENOUS at 09:08

## 2024-08-13 RX ADMIN — ACETAMINOPHEN 1000 MG: 500 TABLET ORAL at 03:08

## 2024-08-13 RX ADMIN — THIAMINE HCL TAB 100 MG 100 MG: 100 TAB at 03:08

## 2024-08-13 RX ADMIN — ACETAMINOPHEN 650 MG: 500 TABLET ORAL at 09:08

## 2024-08-13 RX ADMIN — POTASSIUM CHLORIDE 40 MEQ: 1500 TABLET, EXTENDED RELEASE ORAL at 09:08

## 2024-08-13 RX ADMIN — HYDROCODONE BITARTRATE AND ACETAMINOPHEN 1 TABLET: 5; 325 TABLET ORAL at 05:08

## 2024-08-13 RX ADMIN — ACETAMINOPHEN 1000 MG: 500 TABLET ORAL at 09:08

## 2024-08-13 RX ADMIN — SODIUM CHLORIDE, POTASSIUM CHLORIDE, SODIUM LACTATE AND CALCIUM CHLORIDE: 600; 310; 30; 20 INJECTION, SOLUTION INTRAVENOUS at 07:08

## 2024-08-13 RX ADMIN — CEFTRIAXONE SODIUM 1 G: 1 INJECTION, POWDER, FOR SOLUTION INTRAMUSCULAR; INTRAVENOUS at 02:08

## 2024-08-13 NOTE — H&P
[facility] Hospital Medicine History & Physical Examination       Patient Name: Luz Maria Alfaro  MRN: [id number]  Patient Class: IP- Inpatient   Admission Date: 8/12/2024   Admitting Physician: RATNA Service   Length of Stay: 0  Attending Physician: Dr. Gavino Alonzo  Primary Care Provider: Robyn Matthews NP  Face-to-Face encounter date: 08/13/2024  Code Status: Full code  Chief Complaint: Altered Mental Status (Arrives [facility] unit 6 from home reports was called for AMS and pt slurring words concerns of pt taking too much medication for back injury. Pt states she only took 2 muscle relaxer and 2 gabapentin but [facility] reports gabapentin bottle empty. Pt c/o back pain and L knee pain, gcs 3/5/6)        Screening for Social Drivers for health:  Patient screened for food insecurity, housing instability, transportation needs, utility difficulties, and interpersonal safety (select all that apply as identified as concern)  []Housing or Food  []Transportation Needs  []Utility Difficulties  []Interpersonal safety  [x]None    Patient information was obtained from patient, patient's family, past medical records and/or ER records.     HISTORY OF PRESENT ILLNESS:   Luz Maria Alfaro is a 64 y.o. female who past medical history includes HTN, HLD, dm type 2, CHF, cirrhosis of the liver, COPD; presents to the ED at [facility] on 8/12/2024 with a primary complaint of  AMS and confusion with concerns of taking too much of her home medications per family reports to the ED provider. Patient was recently admitted at [facility] from 7/7-7/9/2024 which she was treated for COPD exacerbation and pneumonia.  Patient has chronic back pain and according to her medication list she is on muscle relaxers, gabapentin, and tramadol.  No reports of new foods or medications.  No reports of chest pain, fever, chills, cough, congestion, or shortness breath outside of her baseline.  Patient has back pain complaints and left knee pain  complaints.  Lab work reviewed demonstrated H&H 9.4/28.6, potassium 3.2, 115, CO2 15, alkaline phosphatase 258; other indices unremarkable.  Chest x-ray impression reviewed demonstrated no acute cardiopulmonary process.  X-ray of left knee impression reviewed demonstrated no convincing acute radiographic abnormality.  CT of the head without contrast impression reviewed demonstrated no acute intracranial process identified.  Urinalysis suggestive of acute UTI.  Initial vital signs /76 pulse 72 respirations 14 temperature 97.6° F O2 saturation 98% on room air.  Patient was given IV patient.  She was started on IV ceftriaxone therapy.  Patient is admitted to hospital medicine services for further management.    PAST MEDICAL HISTORY:   HTN   HLD  DM type 2   COPD   CHF   Chronic pain    PAST SURGICAL HISTORY:     Past Surgical History:   Procedure Laterality Date    APPENDECTOMY      BACK SURGERY      HIP SURGERY      INTRAMEDULLARY RODDING OF FEMUR Right 10/27/2022    Procedure: RIGHT FEMUR INTERTROCH IMN;  Surgeon: Stewart Brambila MD;  Location: [facility];  Service: Orthopedics;  Laterality: Right;  [place] table, supine  3rd case  Synthes Short TFNA       ALLERGIES:   Iodinated contrast media, Opioids - morphine analogues, and Meperidine    FAMILY HISTORY:   Reviewed and negative    SOCIAL HISTORY:     Social History     Tobacco Use    Smoking status: Every Day     Current packs/day: 0.50     Types: Cigarettes    Smokeless tobacco: Current   Substance Use Topics    Alcohol use: Yes        HOME MEDICATIONS:   As documented  Prior to Admission medications    Medication Sig Start Date End Date Taking? Authorizing Provider   busPIRone (BUSPAR) 5 MG Tab Take 1 tablet (5 mg total) by mouth 2 (two) times daily. 4/17/24 8/13/24 Yes Pedrito Arechiga MD   EScitalopram oxalate (LEXAPRO) 5 MG Tab Take 1 tablet (5 mg total) by mouth once daily. 4/18/24 4/18/25 Yes Pedrito Arechiga MD   folic acid (FOLVITE) 1 MG tablet  Take 1 tablet (1 mg total) by mouth once daily. 5/23/24 5/23/25 Yes Evgeny Mcfadden MD   furosemide (LASIX) 20 MG tablet Take 1 tablet (20 mg total) by mouth once daily. 5/23/24 5/23/25 Yes Evgeny Mcfadden MD   gabapentin (NEURONTIN) 300 MG capsule Take 300 mg by mouth 3 (three) times daily.   Yes Provider, Historical   metoprolol succinate (TOPROL-XL) 25 MG 24 hr tablet Take 0.5 tablets (12.5 mg total) by mouth once daily. 5/23/24 5/23/25 Yes Evgeny Mcfadden MD   pantoprazole (PROTONIX) 40 MG tablet Take 40 mg by mouth 2 (two) times daily.   Yes Provider, Historical   rosuvastatin (CRESTOR) 20 MG tablet Take 20 mg by mouth once daily.   Yes Provider, Historical   sacubitriL-valsartan (ENTRESTO) 24-26 mg per tablet Take 1 tablet by mouth 2 (two) times daily.   Yes Provider, Historical   albuterol (PROVENTIL/VENTOLIN HFA) 90 mcg/actuation inhaler Inhale 1 puff into the lungs every 4 (four) hours as needed for Shortness of Breath.    Provider, Historical   ARIPiprazole (ABILIFY) 2 MG Tab Take 1 tablet (2 mg total) by mouth once daily. 11/12/22   Adam Weston MD   dapagliflozin propanediol (FARXIGA) 10 mg tablet Take 1 tablet (10 mg total) by mouth once daily. 5/14/24   Tabatha Stokes,    hyoscyamine (LEVSIN) 0.125 mg Subl Place 0.125 mg under the tongue 3 (three) times daily as needed (for abdominal cramping).    Provider, Historical   ondansetron (ZOFRAN-ODT) 8 MG TbDL Take 8 mg by mouth every 6 (six) hours as needed. 5/6/24   Provider, Historical   thiamine 100 MG tablet Take 1 tablet (100 mg total) by mouth once daily. 5/23/24   Evgeny Mcfadden MD   traMADoL (ULTRAM) 50 mg tablet Take 1 tablet (50 mg total) by mouth every 8 (eight) hours as needed for Pain. 5/22/24   Evgeny Mcfadden MD       REVIEW OF SYSTEMS:   Except as documented, all other systems reviewed and negative     PHYSICAL EXAM:     VITAL SIGNS: 24 HRS MIN & MAX LAST   Temp  Min: 97.6 °F (36.4 °C)  Max: 97.6 °F (36.4  °C) 97.6 °F (36.4 °C)   BP  Min: 116/62  Max: 191/89 (!) 191/89   Pulse  Min: 70  Max: 79  76   Resp  Min: 12  Max: 23 16   SpO2  Min: 96 %  Max: 99 % 99 %       General appearance:  Chronically ill-appearing female looks older than stated age; nontoxic, fatigued; family at the bedside  HENT: Atraumatic head. Moist mucous membranes of oral cavity, [place]  Eyes: PERRL  Lungs: diminished bilaterally. No wheezing present.   Heart: Regular rate and rhythm. S1 and S2 present cap refill brisk  Abdomen: Soft, non-distended, non-tender. No rebound tenderness/guarding. Bowel sounds are normal.   Extremities: No cyanosis, clubbing, generalized weakness  Skin: warm and dry  Neuro:  Oriented x3, no acute focal deficits  Psych/mental status: flat affect, cooperative    LABS AND IMAGING:     Recent Labs   Lab 08/12/24 2120 08/13/24  0654   WBC 7.00 6.60   RBC 3.18* 2.95*   HGB 10.5* 9.4*   HCT 32.2* 28.6*   .3* 96.9*   MCH 33.0* 31.9*   MCHC 32.6* 32.9*   RDW 21.5* 21.5*    142   MPV 11.1* 11.1*       Recent Labs   Lab 08/12/24 2120 08/13/24  0654    138   K 3.3* 3.2*   * 115*   CO2 14* 15*   BUN 16.6 12.1   CREATININE 0.88 0.72   CALCIUM 8.5 8.3*   MG 2.70* 2.40   ALBUMIN 3.5 3.2*   ALKPHOS 275* 258*   ALT 44 36   AST 20 17   BILITOT 0.4 0.3       Microbiology Results (last 7 days)       Procedure Component Value Units Date/Time    Urine culture [[id number]] Collected: 08/12/24 2337    Order Status: Sent Specimen: Urine Updated: 08/13/24 0026             CT Head Without Contrast  Narrative: Technique: CT of the head was performed without intravenous contrast with axial as well as coronal and sagittal images.    Comparison: CT brain March 20, 2024 and MRI brain March 28, 2024.    Dosage Information: Automated Exposure Control was utilized 1033.98 mGy.cm.    Clinical history: AMS.    Findings:    Hemorrhage: No acute intracranial hemorrhage is seen.    CSF spaces: The ventricles, sulci and basal cisterns  all appear mildly prominent consistent with global cerebral atrophy.    Brain parenchyma: Chronic microvascular change is seen in portions of the periventricular and deep white matter tracts. Focal right parietal encephalomalacia is seen (series 2, image 42).    Sella and skull base: The sella appears to be within normal limits for age.    Herniation: None.    Calvarium: No acute linear or depressed skull fracture is seen.    Maxillofacial Structures:    Paranasal sinuses: The visualized paranasal sinuses appear clear with no mucoperiosteal thickening or air fluid levels identified.  Impression: Impression:    No acute intracranial process identified. Details and other findings as noted above.    No significant discrepancy with overnight report    Electronically signed by: John Allen  Date:    08/13/2024  Time:    07:34  X-Ray Knee Complete 4 or More Views Left  EXAMINATION  XR KNEE COMP 4 OR MORE VIEWS LEFT    CLINICAL HISTORY  pain;    TECHNIQUE  A total of 4 image(s) submitted of the left knee.    COMPARISON  None available at the time of initial interpretation.    FINDINGS  No displaced fracture or dislocation is identified. The visualized joint spaces are preserved and there are no findings indicative of a joint effusion. No aggressive osseous lesion or periosteal reaction is identified.    The included soft tissues are without acute abnormality.  Scattered vascular calcification is present.    IMPRESSION  No convincing acute radiographic abnormality.    Electronically signed by: Gaurang Montenegro  Date:    08/13/2024  Time:    06:43        ASSESSMENT & PLAN:   ASSESSMENT:  Acute encephalopathy-suspected secondary to polypharmacy-POA   Acute UTI with hematuria- POA  Fall- ground level- POA  Chronic pain syndrome- POA  DM type II - POA  HTN- essential- POA  Weakness- POA    Hx of COPD, CHF, HLD, Tobacco abuse, anxiety, depression    PLAN:  Gentle IV hydration with half saline solution  Replace potassium  Ammonia  levels for a.m.  Hold all sedating medications for now   Follow cultures and sensitivities   Continue with IV ceftriaxone therapy  Fall precautions  Neurochecks q.4 hours   Echocardiogram  PT OT eval and treat  Home medication as deemed necessary  Labs in the am      VTE Prophylaxis: Lovenox for DVT prophylaxis and will be advised to be as mobile as possible and sit in a chair as tolerated    Patient condition:  Stable    __________________________________________________________________________  INPATIENT LIST OF MEDICATIONS     Scheduled Meds:   [START ON 8/14/2024] cefTRIAXone (Rocephin) IV (PEDS and ADULTS)  1 g Intravenous Q24H    enoxparin  40 mg Subcutaneous Daily     Continuous Infusions:   lactated ringers   Intravenous Continuous 75 mL/hr at 08/13/24 0722 New Bag at 08/13/24 0722     PRN Meds:.  Current Facility-Administered Medications:     acetaminophen, 1,000 mg, Oral, Q6H PRN    acetaminophen, 650 mg, Oral, Q4H PRN    aluminum-magnesium hydroxide-simethicone, 30 mL, Oral, QID PRN    dextrose 10%, 12.5 g, Intravenous, PRN    dextrose 10%, 25 g, Intravenous, PRN    glucagon (human recombinant), 1 mg, Intramuscular, PRN    glucose, 16 g, Oral, PRN    glucose, 24 g, Oral, PRN    melatonin, 6 mg, Oral, Nightly PRN    naloxone, 0.02 mg, Intravenous, PRN    ondansetron, 4 mg, Intravenous, Q4H PRN    ondansetron, 4 mg, Intravenous, Q4H PRN    polyethylene glycol, 17 g, Oral, BID PRN    prochlorperazine, 5 mg, Intravenous, Q6H PRN    simethicone, 1 tablet, Oral, QID PRN    sodium chloride 0.9%, 10 mL, Intravenous, PRN      I, ALEKS Jordan have reviewed and discussed the case with   Dr. Gavino Alonzo.  Please see the following addendum for further assessment and plan from their attending MD.  ALEKS Gabriel   08/13/2024    Dr. Alonzo 08/13/2024-patient is a 64-year-old female with a history of hypertension/hyperlipidemia/diabetes type 2/CHF/cirrhosis of the liver/COPD that presented to  [facility] with complaints of altered mental status/confusion after she had a fall at home.  She refers concerns for taken too much of her home medications.  Patient is on muscle relaxers/gabapentin and tramadol.  On admission she is hypokalemic with a hyperchloremic metabolic acidosis.  We will switch IV fluids to half saline solution, replace potassium.  Get a.m. labs as well as ammonia levels.  We will hold all sedating medications.  Also urine suspicious for urinary tract infection on Rocephin awaiting final culture.  Upon entrance she told me that she needs to get out of the hospital tomorrow because she has not appointment.  I explained that he might take us a couple of days just to get a report on her urine meantime we will continue Rocephin and correct her electrolytes.  Agree with the assessment and plans done by NP Ms. Roberto Hurley.  Some corrections has been done to HPI/physical examination assessment and plans at the time of my evaluation.    HARLEEN Alonzo MD 08/13/2024    Discharge Planning and Disposition: No mobility needs. Ambulating well. Good social support system.   Anticipated discharge    All diagnosis and differential diagnosis have been reviewed; assessment and plan has been documented; I have personally reviewed the labs and test results that are presently available; I have reviewed the patients medication list; I have reviewed the consulting providers response and recommendations. I have reviewed or attempted to review medical records based upon their availability.    All of the patient and family questions have been addressed and answered. Patient's is agreeable to the above stated plan. I will continue to monitor closely and make adjustments to medical management as needed.

## 2024-08-13 NOTE — NURSING
Nurses Note -- 4 Eyes      8/13/2024   4:28 PM      Skin assessed during: Admit      [x] No Altered Skin Integrity Present    []Prevention Measures Documented      [] Yes- Altered Skin Integrity Present or Discovered   [] LDA Added if Not in Epic (Describe Wound)   [] New Altered Skin Integrity was Present on Admit and Documented in LDA   [] Wound Image Taken    Wound Care Consulted? No    Attending Nurse:  Lola Crawford RN/Staff Member:   nisreen

## 2024-08-13 NOTE — ED PROVIDER NOTES
Encounter Date: 8/12/2024    SCRIBE #1 NOTE: I, Esme Gunn, am scribing for, and in the presence of,  Carlos Coulter MD. I have scribed the following portions of the note - Other sections scribed: HPI, ROS, PE.       History     Chief Complaint   Patient presents with    Altered Mental Status     Arrives [facility] from home reports was called for AMS and pt slurring words concerns of pt taking too much medication for back injury. Pt states she only took 2 muscle relaxer and 2 gabapentin but [facility] reports gabapentin bottle empty. Pt c/o back pain and L knee pain, gcs 3/5/6     64 year old female with history of HTN, HLD, and DM presents to the ED via EMS for altered mental status. History from pt is limited due to AMS. She states that she was walking with her walker and lost her balance, causing her to fall. Pt notes that her son found her down and called EMS. She denies pain.     The history is provided by the patient. The history is limited by the condition of the patient. No  was used.     Review of patient's allergies indicates:   Allergen Reactions    Iodinated contrast media Shortness Of Breath    Opioids - morphine analogues     Meperidine Rash and Hives     Past Medical History:   Diagnosis Date    Benign essential HTN     Mixed hyperlipidemia     Type 2 diabetes mellitus without complications      Past Surgical History:   Procedure Laterality Date    APPENDECTOMY      BACK SURGERY      HIP SURGERY      INTRAMEDULLARY RODDING OF FEMUR Right 10/27/2022    Procedure: RIGHT FEMUR INTERTROCH IMN;  Surgeon: Stewart Brambila MD;  Location: [facility];  Service: Orthopedics;  Laterality: Right;  [place] table, supine  3rd case  Synthes Short TFNA     No family history on file.  Social History     Tobacco Use    Smoking status: Every Day     Current packs/day: 0.50     Types: Cigarettes    Smokeless tobacco: Current   Substance Use Topics    Alcohol use: Yes    Drug use: Never     Review of  Systems   Unable to perform ROS: Mental status change       Physical Exam     Initial Vitals [08/12/24 2029]   BP Pulse Resp Temp SpO2   (!) 166/76 72 14 97.6 °F (36.4 °C) 98 %      MAP       --         Physical Exam    Nursing note and vitals reviewed.  Constitutional:   Elderly, frail appearing.    HENT:   Head: Normocephalic and atraumatic.   Mouth/Throat: Mucous membranes are dry.   Eyes: Pupils are equal, round, and reactive to light.   Neck:   Normal range of motion.  Cardiovascular:  Normal rate and regular rhythm.           No murmur heard.  Pulmonary/Chest: Breath sounds normal. No respiratory distress. She has no wheezes. She has no rales.   Abdominal: Abdomen is soft. She exhibits no distension. There is no abdominal tenderness.   Musculoskeletal:      Cervical back: Normal range of motion.     Neurological: She is alert.   Slurred speech, unsure if chronic   Skin: Skin is warm. Capillary refill takes less than 2 seconds. No rash noted.   Old appearing bruises to bilateral UE.          ED Course   Procedures  Labs Reviewed   COMPREHENSIVE METABOLIC PANEL - Abnormal       Result Value    Sodium 141      Potassium 3.3 (*)     Chloride 117 (*)     CO2 14 (*)     Glucose 88      Blood Urea Nitrogen 16.6      Creatinine 0.88      Calcium 8.5      Protein Total 6.3      Albumin 3.5      Globulin 2.8      Albumin/Globulin Ratio 1.3      Bilirubin Total 0.4       (*)     ALT 44      AST 20      eGFR >60      Anion Gap 10.0      BUN/Creatinine Ratio 19     MAGNESIUM - Abnormal    Magnesium Level 2.70 (*)    URINALYSIS, REFLEX TO URINE CULTURE - Abnormal    Color, UA Colorless      Appearance, UA Clear      Specific Gravity, UA 1.018      pH, UA 6.5      Protein, UA 1+ (*)     Glucose, UA Normal      Ketones, UA Negative      Blood, UA 1+ (*)     Bilirubin, UA Negative      Urobilinogen, UA Normal      Nitrites, UA Negative      Leukocyte Esterase,  (*)     RBC, UA 0-5      WBC, UA 11-20 (*)      Bacteria, UA None Seen      Squamous Epithelial Cells, UA Trace      Transitional Epithelial Cells, UA Trace (*)    DRUG SCREEN, URINE (BEAKER) - Abnormal    Amphetamines, Urine Negative      Barbiturates, Urine Negative      Benzodiazepine, Urine Positive (*)     Cannabinoids, Urine Negative      Cocaine, Urine Negative      Fentanyl, Urine Negative      MDMA, Urine Negative      Opiates, Urine Negative      Phencyclidine, Urine Negative      pH, Urine 6.5      Specific Gravity, Urine Auto 1.018      Narrative:     Cut off concentrations:    Amphetamines - 1000 ng/ml  Barbiturates - 200 ng/ml  Benzodiazepine - 200 ng/ml  Cannabinoids (THC) - 50 ng/ml  Cocaine - 300 ng/ml  Fentanyl - 1.0 ng/ml  MDMA - 500 ng/ml  Opiates - 300 ng/ml   Phencyclidine (PCP) - 25 ng/ml    Specimen submitted for drug analysis and tested for pH and specific gravity in order to evaluate sample integrity. Suspect tampering if specific gravity is <1.003 and/or pH is not within the range of 4.5 - 8.0  False negatives may result form substances such as bleach added to urine.  False positives may result for the presence of a substance with similar chemical structure to the drug or its metabolite.    This test provides only a PRELIMINARY analytical test result. A more specific alternate chemical method must be used in order to obtain a confirmed analytical result. Gas chromatography/mass spectrometry (GC/MS) is the preferred confirmatory method. Other chemical confirmation methods are available. Clinical consideration and professional judgement should be applied to any drug of abuse test result, particularly when preliminary positive results are used.    Positive results will be confirmed only at the physicians request. Unconfirmed screening results are to be used only for medical purposes (treatment).        CBC WITH DIFFERENTIAL - Abnormal    WBC 7.00      RBC 3.18 (*)     Hgb 10.5 (*)     Hct 32.2 (*)     .3 (*)     MCH 33.0 (*)      MCHC 32.6 (*)     RDW 21.5 (*)     Platelet 145      MPV 11.1 (*)     Neut % 76.9      Lymph % 18.0      Mono % 3.7      Eos % 0.4      Basophil % 0.3      Lymph # 1.26      Neut # 5.38      Mono # 0.26      Eos # 0.03      Baso # 0.02      IG# 0.05 (*)     IG% 0.7      NRBC% 0.0      IPF 5.1     COMPREHENSIVE METABOLIC PANEL - Abnormal    Sodium 138      Potassium 3.2 (*)     Chloride 115 (*)     CO2 15 (*)     Glucose 81 (*)     Blood Urea Nitrogen 12.1      Creatinine 0.72      Calcium 8.3 (*)     Protein Total 5.7 (*)     Albumin 3.2 (*)     Globulin 2.5      Albumin/Globulin Ratio 1.3      Bilirubin Total 0.3       (*)     ALT 36      AST 17      eGFR >60      Anion Gap 8.0      BUN/Creatinine Ratio 17     PHOSPHORUS - Abnormal    Phosphorus Level 1.7 (*)    CBC WITH DIFFERENTIAL - Abnormal    WBC 6.60      RBC 2.95 (*)     Hgb 9.4 (*)     Hct 28.6 (*)     MCV 96.9 (*)     MCH 31.9 (*)     MCHC 32.9 (*)     RDW 21.5 (*)     Platelet 142      MPV 11.1 (*)     Neut % 75.1      Lymph % 19.1      Mono % 4.7      Eos % 0.3      Basophil % 0.2      Lymph # 1.26      Neut # 4.96      Mono # 0.31      Eos # 0.02      Baso # 0.01      IG# 0.04      IG% 0.6      NRBC% 0.5      IPF 4.5     TROPONIN I - Normal    Troponin-I <0.010     TSH - Normal    TSH 0.586     ALCOHOL,MEDICAL (ETHANOL) - Normal    Ethanol Level <10.0     LACTIC ACID, PLASMA - Normal    Lactic Acid Level 0.8     MAGNESIUM - Normal    Magnesium Level 2.40     CBC W/ AUTO DIFFERENTIAL    Narrative:     The following orders were created for panel order CBC auto differential.  Procedure                               Abnormality         Status                     ---------                               -----------         ------                     CBC with Differential[[id number]]       Abnormal            Final result                 Please view results for these tests on the individual orders.   CBC W/ AUTO DIFFERENTIAL    Narrative:     The  following orders were created for panel order CBC with Automated Differential.  Procedure                               Abnormality         Status                     ---------                               -----------         ------                     CBC with Differential[[id number]]       Abnormal            Final result                 Please view results for these tests on the individual orders.   POCT GLUCOSE    POCT Glucose 73     POCT GLUCOSE MONITORING CONTINUOUS   POCT GLUCOSE MONITORING CONTINUOUS          Imaging Results              X-Ray Knee Complete 4 or More Views Left (Final result)  Result time 08/13/24 06:43:10      Final result by Gaurang Montenegro MD (08/13/24 06:43:10)                   Narrative:    EXAMINATION  XR KNEE COMP 4 OR MORE VIEWS LEFT    CLINICAL HISTORY  pain;    TECHNIQUE  A total of 4 image(s) submitted of the left knee.    COMPARISON  None available at the time of initial interpretation.    FINDINGS  No displaced fracture or dislocation is identified. The visualized joint spaces are preserved and there are no findings indicative of a joint effusion. No aggressive osseous lesion or periosteal reaction is identified.    The included soft tissues are without acute abnormality.  Scattered vascular calcification is present.    IMPRESSION  No convincing acute radiographic abnormality.      Electronically signed by: Gaurang Montenegro  Date:    08/13/2024  Time:    06:43                                     CT Head Without Contrast (Final result)  Result time 08/13/24 07:34:35      Final result by John Allen MD (08/13/24 07:34:35)                   Impression:    Impression:    No acute intracranial process identified. Details and other findings as noted above.    No significant discrepancy with overnight report      Electronically signed by: John Allen  Date:    08/13/2024  Time:    07:34               Narrative:      Technique: CT of the head was performed without intravenous  contrast with axial as well as coronal and sagittal images.    Comparison: CT brain March 20, 2024 and MRI brain March 28, 2024.    Dosage Information: Automated Exposure Control was utilized 1033.98 mGy.cm.    Clinical history: AMS.    Findings:    Hemorrhage: No acute intracranial hemorrhage is seen.    CSF spaces: The ventricles, sulci and basal cisterns all appear mildly prominent consistent with global cerebral atrophy.    Brain parenchyma: Chronic microvascular change is seen in portions of the periventricular and deep white matter tracts. Focal right parietal encephalomalacia is seen (series 2, image 42).    Sella and skull base: The sella appears to be within normal limits for age.    Herniation: None.    Calvarium: No acute linear or depressed skull fracture is seen.    Maxillofacial Structures:    Paranasal sinuses: The visualized paranasal sinuses appear clear with no mucoperiosteal thickening or air fluid levels identified.                        Preliminary result by Chester Raman MD (08/12/24 22:21:17)                   Impression:    1. No acute intracranial process identified. Details and other findings as noted above.               Narrative:    START OF REPORT:  Technique: CT of the head was performed without intravenous contrast with axial as well as coronal and sagittal images.    Comparison: None.    Dosage Information: Automated Exposure Control was utilized 1033.98 mGy.cm.    Clinical history: AMS.    Findings:  Hemorrhage: No acute intracranial hemorrhage is seen.  CSF spaces: The ventricles, sulci and basal cisterns all appear mildly prominent consistent with global cerebral atrophy.  Brain parenchyma: Mild microvascular change is seen in portions of the periventricular and deep white matter tracts. Focal right parietal encephalomalacia is seen (series 2, image 42).  Cerebellum: Unremarkable.  Sella and skull base: The sella appears to be within normal limits for age.  Herniation:  None.  Intracranial calcifications: Incidental note is made of bilateral choroid plexus calcification. Incidental note is made of some pineal region calcification.  Calvarium: No acute linear or depressed skull fracture is seen.    Maxillofacial Structures:  Paranasal sinuses: The visualized paranasal sinuses appear clear with no mucoperiosteal thickening or air fluid levels identified.  Orbits: The orbits appear unremarkable.  Zygomatic arches: The zygomatic arches are intact and unremarkable.  Temporal bones and mastoids: The temporal bones and mastoids appear unremarkable.  TMJ: The mandibular condyles appear normally placed with respect to the mandibular fossa.                                         X-Ray Chest AP Portable (Final result)  Result time 08/12/24 21:15:17      Final result by Barrington Bocanegra MD (08/12/24 21:15:17)                   Impression:      No acute cardiopulmonary process.      Electronically signed by: Barrington Bocanegra  Date:    08/12/2024  Time:    21:15               Narrative:    EXAMINATION:  XR CHEST AP PORTABLE    CLINICAL HISTORY:  Altered mental status, unspecified    TECHNIQUE:  Single view of the chest    COMPARISON:  05/20/2024    FINDINGS:  No focal opacification, pleural effusion, or pneumothorax.    The cardiomediastinal silhouette is within normal limits.    No acute osseous abnormality.                                       Medications   melatonin tablet 6 mg (6 mg Oral Given 8/13/24 2115)   ondansetron injection 4 mg ( Intravenous Canceled Entry 8/14/24 2236)   prochlorperazine injection Soln 5 mg (5 mg Intravenous Given 8/14/24 2028)   polyethylene glycol packet 17 g (has no administration in time range)   simethicone chewable tablet 80 mg (has no administration in time range)   aluminum-magnesium hydroxide-simethicone 200-200-20 mg/5 mL suspension 30 mL (has no administration in time range)   naloxone 0.4 mg/mL injection 0.02 mg (has no administration in time range)    enoxaparin injection 40 mg (40 mg Subcutaneous Given 8/14/24 1821)   cefTRIAXone (Rocephin) 1 g in D5W 100 mL IVPB (MB+) (0 g Intravenous Stopped 8/15/24 0302)   sodium chloride 0.9% flush 10 mL (has no administration in time range)   ondansetron injection 4 mg (has no administration in time range)   acetaminophen tablet 650 mg (650 mg Oral Given 8/13/24 2115)   acetaminophen tablet 1,000 mg ( Oral Canceled Entry 8/14/24 2044)   glucose chewable tablet 16 g (has no administration in time range)   glucose chewable tablet 24 g (has no administration in time range)   glucagon (human recombinant) injection 1 mg (has no administration in time range)   dextrose 10% bolus 125 mL 125 mL (has no administration in time range)   dextrose 10% bolus 250 mL 250 mL (has no administration in time range)   ARIPiprazole tablet 2 mg (2 mg Oral Given 8/14/24 0838)   busPIRone tablet 5 mg (5 mg Oral Given 8/14/24 2028)   dapagliflozin propanediol (Farxiga) tablet 10 mg (10 mg Oral Given 8/14/24 0920)   EScitalopram oxalate tablet 5 mg (5 mg Oral Given 8/14/24 0920)   metoprolol succinate (TOPROL-XL) 24 hr split tablet 12.5 mg (12.5 mg Oral Given 8/14/24 0838)   atorvastatin tablet 80 mg (80 mg Oral Given 8/14/24 0838)   sacubitriL-valsartan 24-26 mg per tablet 1 tablet (1 tablet Oral Given 8/14/24 2028)   folic acid tablet 1 mg (1 mg Oral Given 8/14/24 0838)   thiamine tablet 100 mg (100 mg Oral Given 8/14/24 0838)   hydrALAZINE injection 10 mg (10 mg Intravenous Given 8/14/24 2107)   labetaloL injection 10 mg (has no administration in time range)   promethazine tablet 12.5 mg (has no administration in time range)   sodium chloride 0.9% flush 10 mL (10 mLs Intravenous Given 8/15/24 0507)     And   sodium chloride 0.9% flush 10 mL (has no administration in time range)   0.9 % NaCl with KCl 20 mEq infusion ( Intravenous New Bag 8/14/24 0117)   gabapentin capsule 300 mg (300 mg Oral Given 8/14/24 2028)   lactated ringers bolus 1,000 mL  (0 mLs Intravenous Stopped 8/12/24 2223)   cefTRIAXone (Rocephin) 1 g in D5W 100 mL IVPB (MB+) (0 g Intravenous Stopped 8/13/24 0321)   HYDROcodone-acetaminophen 5-325 mg per tablet 1 tablet (1 tablet Oral Given 8/13/24 0509)   potassium bicarbonate disintegrating tablet 20 mEq (20 mEq Oral Given 8/13/24 0509)   potassium chloride SA CR tablet 40 mEq (40 mEq Oral Given 8/14/24 2028)     Medical Decision Making  Problems Addressed:  AMS (altered mental status): acute illness or injury    Amount and/or Complexity of Data Reviewed  Labs: ordered.  Radiology: ordered. Decision-making details documented in ED Course.    Risk  Prescription drug management.  Decision regarding hospitalization.    Differential diagnosis (includes but is not limited to):   Medication misadventure, polysubstance abuse, CVA, TIA, ICH, electrolyte abnormalities, dehydration, kidney injury, ACS, arrhythmia    MDM Narrative  64-year-old female presents for evaluation of confusion and altered mental status.  EMS reports concern for unintentional overdose as the patient had an empty gabapentin bottle new her although the patient reports she only took 2 pills.  CT of the head performed and is negative for acute pathology.  X-rays of the sites of pain reviewed.  Labs reviewed.  Urine suggestive acute urinary tract infection, Rocephin ordered for antibiotic coverage, continue gentle IV hydration.  I discussed with the patient's case with the patient's next of kin, a family friend he was son lives with the patient.  The son works during the day and is unable to care for her when she is confused and altered like she was currently.  They do not feel that she is safe for discharge home.  Given this, case discussed with hospitalist, will admit    Dispo: Admit    My independent radiology interpretation: as above  Point of care US (independently performed and interpreted):   Decision rules/clinical scoring:     Sepsis Perfusion Assessment:     Amount and/or  Complexity of Data Reviewed  Independent historian: none   Summary of history:   External data reviewed: notes from previous ED visits and notes from clinic visits  Summary of data reviewed: Prior records reviewed  Risk and benefits of testing: discussed   Labs: ordered and reviewed  Radiology: ordered and independent interpretation performed (see above or ED course)  ECG/medicine tests: ordered and independent interpretation performed (see above or ED course)  Discussion of management or test interpretation with external provider(s): discussed with hospitalist physician   Summary of discussion: as above    Risk  Parenteral controlled substances   Drug therapy requiring intense monitoring for toxicity   Decision regarding hospitalization    Critical Care  none    Data Reviewed/Counseling: I have personally reviewed the patient's vital signs, nursing notes, and other relevant tests, information, and imaging. I had a detailed discussion regarding the historical points, exam findings, and any diagnostic results supporting the discharge diagnosis. I personally performed the history, PE, MDM and procedures as documented above and agree with the scribe's documentation.    Portions of this note were dictated using voice recognition software. Although it was reviewed for accuracy, some inherent voice recognition errors may have occurred and may be present in this document.            Attending Attestation:           Physician Attestation for Scribe:  Physician Attestation Statement for Scribe #1: I, Carlos Coulter MD, reviewed documentation, as scribed by Esme Gunn in my presence, and it is both accurate and complete.             ED Course as of 08/15/24 0620   Mon Aug 12, 2024   2124 X-Ray Chest AP Portable  Independently visualized/reviewed by me during the ED visit.  - No acute lobar consolidation, no PTX [MC]   Tue Aug 13, 2024   0516 X-Ray Knee Complete 4 or More Views Left  Independently visualized/reviewed  by me during the ED visit.  - No acute fracture or dislocation [MC]      ED Course User Index  [MC] Carlos Coulter MD                             Clinical Impression:  Final diagnoses:  [R41.82] AMS (altered mental status)  [G93.40] Acute encephalopathy (Primary)          ED Disposition Condition    Admit Stable                Carlos Coulter MD  08/15/24 0657

## 2024-08-14 LAB
ALBUMIN SERPL-MCNC: 3.2 G/DL (ref 3.4–4.8)
ALBUMIN/GLOB SERPL: 1 RATIO (ref 1.1–2)
ALP SERPL-CCNC: 266 UNIT/L (ref 40–150)
ALT SERPL-CCNC: 32 UNIT/L (ref 0–55)
AMMONIA PLAS-MSCNC: 61.1 UMOL/L (ref 18–72)
ANION GAP SERPL CALC-SCNC: 11 MEQ/L
AST SERPL-CCNC: 24 UNIT/L (ref 5–34)
BASOPHILS # BLD AUTO: 0.01 X10(3)/MCL
BASOPHILS NFR BLD AUTO: 0.1 %
BILIRUB SERPL-MCNC: 0.4 MG/DL
BUN SERPL-MCNC: 8.7 MG/DL (ref 9.8–20.1)
CALCIUM SERPL-MCNC: 8.8 MG/DL (ref 8.4–10.2)
CHLORIDE SERPL-SCNC: 107 MMOL/L (ref 98–107)
CO2 SERPL-SCNC: 17 MMOL/L (ref 23–31)
CREAT SERPL-MCNC: 0.62 MG/DL (ref 0.55–1.02)
CREAT/UREA NIT SERPL: 14
EOSINOPHIL # BLD AUTO: 0.01 X10(3)/MCL (ref 0–0.9)
EOSINOPHIL NFR BLD AUTO: 0.1 %
ERYTHROCYTE [DISTWIDTH] IN BLOOD BY AUTOMATED COUNT: 21.4 % (ref 11.5–17)
GFR SERPLBLD CREATININE-BSD FMLA CKD-EPI: >60 ML/MIN/1.73/M2
GLOBULIN SER-MCNC: 3.2 GM/DL (ref 2.4–3.5)
GLUCOSE SERPL-MCNC: 98 MG/DL (ref 82–115)
HCT VFR BLD AUTO: 29.1 % (ref 37–47)
HGB BLD-MCNC: 10.2 G/DL (ref 12–16)
IMM GRANULOCYTES # BLD AUTO: 0.06 X10(3)/MCL (ref 0–0.04)
IMM GRANULOCYTES NFR BLD AUTO: 0.9 %
LYMPHOCYTES # BLD AUTO: 1.15 X10(3)/MCL (ref 0.6–4.6)
LYMPHOCYTES NFR BLD AUTO: 16.5 %
MAGNESIUM SERPL-MCNC: 2.1 MG/DL (ref 1.6–2.6)
MCH RBC QN AUTO: 33.1 PG (ref 27–31)
MCHC RBC AUTO-ENTMCNC: 35.1 G/DL (ref 33–36)
MCV RBC AUTO: 94.5 FL (ref 80–94)
MONOCYTES # BLD AUTO: 0.58 X10(3)/MCL (ref 0.1–1.3)
MONOCYTES NFR BLD AUTO: 8.3 %
NEUTROPHILS # BLD AUTO: 5.14 X10(3)/MCL (ref 2.1–9.2)
NEUTROPHILS NFR BLD AUTO: 74.1 %
NRBC BLD AUTO-RTO: 0.3 %
PLATELET # BLD AUTO: 197 X10(3)/MCL (ref 130–400)
PLATELETS.RETICULATED NFR BLD AUTO: 4.5 % (ref 0.9–11.2)
PMV BLD AUTO: 10.9 FL (ref 7.4–10.4)
POCT GLUCOSE: 102 MG/DL (ref 70–110)
POTASSIUM SERPL-SCNC: 2.9 MMOL/L (ref 3.5–5.1)
PROT SERPL-MCNC: 6.4 GM/DL (ref 5.8–7.6)
RBC # BLD AUTO: 3.08 X10(6)/MCL (ref 4.2–5.4)
SODIUM SERPL-SCNC: 135 MMOL/L (ref 136–145)
WBC # BLD AUTO: 6.95 X10(3)/MCL (ref 4.5–11.5)

## 2024-08-14 PROCEDURE — 83735 ASSAY OF MAGNESIUM: CPT | Performed by: INTERNAL MEDICINE

## 2024-08-14 PROCEDURE — 63600175 PHARM REV CODE 636 W HCPCS: Performed by: INTERNAL MEDICINE

## 2024-08-14 PROCEDURE — 63600175 PHARM REV CODE 636 W HCPCS: Performed by: NURSE PRACTITIONER

## 2024-08-14 PROCEDURE — 25000003 PHARM REV CODE 250: Performed by: INTERNAL MEDICINE

## 2024-08-14 PROCEDURE — 80053 COMPREHEN METABOLIC PANEL: CPT | Performed by: NURSE PRACTITIONER

## 2024-08-14 PROCEDURE — 82140 ASSAY OF AMMONIA: CPT | Performed by: INTERNAL MEDICINE

## 2024-08-14 PROCEDURE — 85025 COMPLETE CBC W/AUTO DIFF WBC: CPT | Performed by: NURSE PRACTITIONER

## 2024-08-14 PROCEDURE — 25000003 PHARM REV CODE 250: Performed by: NURSE PRACTITIONER

## 2024-08-14 PROCEDURE — 36410 VNPNXR 3YR/> PHY/QHP DX/THER: CPT

## 2024-08-14 PROCEDURE — A4216 STERILE WATER/SALINE, 10 ML: HCPCS | Performed by: INTERNAL MEDICINE

## 2024-08-14 PROCEDURE — 36415 COLL VENOUS BLD VENIPUNCTURE: CPT | Performed by: INTERNAL MEDICINE

## 2024-08-14 PROCEDURE — 21400001 HC TELEMETRY ROOM

## 2024-08-14 PROCEDURE — C1751 CATH, INF, PER/CENT/MIDLINE: HCPCS

## 2024-08-14 RX ORDER — HYDRALAZINE HYDROCHLORIDE 20 MG/ML
10 INJECTION INTRAMUSCULAR; INTRAVENOUS EVERY 4 HOURS PRN
Status: DISCONTINUED | OUTPATIENT
Start: 2024-08-14 | End: 2024-08-16 | Stop reason: HOSPADM

## 2024-08-14 RX ORDER — GABAPENTIN 300 MG/1
300 CAPSULE ORAL 3 TIMES DAILY
Status: DISCONTINUED | OUTPATIENT
Start: 2024-08-14 | End: 2024-08-16 | Stop reason: HOSPADM

## 2024-08-14 RX ORDER — SODIUM CHLORIDE AND POTASSIUM CHLORIDE 150; 900 MG/100ML; MG/100ML
INJECTION, SOLUTION INTRAVENOUS CONTINUOUS
Status: DISCONTINUED | OUTPATIENT
Start: 2024-08-14 | End: 2024-08-15

## 2024-08-14 RX ORDER — SODIUM CHLORIDE 0.9 % (FLUSH) 0.9 %
10 SYRINGE (ML) INJECTION EVERY 6 HOURS
Status: DISCONTINUED | OUTPATIENT
Start: 2024-08-14 | End: 2024-08-16 | Stop reason: HOSPADM

## 2024-08-14 RX ORDER — PROMETHAZINE HYDROCHLORIDE 12.5 MG/1
12.5 TABLET ORAL EVERY 6 HOURS PRN
Status: DISCONTINUED | OUTPATIENT
Start: 2024-08-14 | End: 2024-08-15

## 2024-08-14 RX ORDER — SODIUM CHLORIDE 0.9 % (FLUSH) 0.9 %
10 SYRINGE (ML) INJECTION
Status: DISCONTINUED | OUTPATIENT
Start: 2024-08-14 | End: 2024-08-16 | Stop reason: HOSPADM

## 2024-08-14 RX ORDER — LABETALOL HYDROCHLORIDE 5 MG/ML
10 INJECTION, SOLUTION INTRAVENOUS EVERY 4 HOURS PRN
Status: DISCONTINUED | OUTPATIENT
Start: 2024-08-14 | End: 2024-08-16 | Stop reason: HOSPADM

## 2024-08-14 RX ADMIN — SODIUM CHLORIDE, PRESERVATIVE FREE 10 ML: 5 INJECTION INTRAVENOUS at 06:08

## 2024-08-14 RX ADMIN — ACETAMINOPHEN 1000 MG: 500 TABLET ORAL at 11:08

## 2024-08-14 RX ADMIN — BUSPIRONE HYDROCHLORIDE 5 MG: 5 TABLET ORAL at 08:08

## 2024-08-14 RX ADMIN — THIAMINE HCL TAB 100 MG 100 MG: 100 TAB at 08:08

## 2024-08-14 RX ADMIN — ONDANSETRON 4 MG: 2 INJECTION INTRAMUSCULAR; INTRAVENOUS at 06:08

## 2024-08-14 RX ADMIN — SACUBITRIL AND VALSARTAN 1 TABLET: 24; 26 TABLET, FILM COATED ORAL at 08:08

## 2024-08-14 RX ADMIN — POTASSIUM CHLORIDE 40 MEQ: 1500 TABLET, EXTENDED RELEASE ORAL at 08:08

## 2024-08-14 RX ADMIN — PROCHLORPERAZINE EDISYLATE 5 MG: 5 INJECTION INTRAMUSCULAR; INTRAVENOUS at 08:08

## 2024-08-14 RX ADMIN — FOLIC ACID 1 MG: 1 TABLET ORAL at 08:08

## 2024-08-14 RX ADMIN — POTASSIUM CHLORIDE AND SODIUM CHLORIDE: 900; 150 INJECTION, SOLUTION INTRAVENOUS at 03:08

## 2024-08-14 RX ADMIN — CEFTRIAXONE SODIUM 1 G: 1 INJECTION, POWDER, FOR SOLUTION INTRAMUSCULAR; INTRAVENOUS at 03:08

## 2024-08-14 RX ADMIN — METOPROLOL SUCCINATE 12.5 MG: 25 TABLET, EXTENDED RELEASE ORAL at 08:08

## 2024-08-14 RX ADMIN — HYDRALAZINE HYDROCHLORIDE 10 MG: 20 INJECTION, SOLUTION INTRAMUSCULAR; INTRAVENOUS at 09:08

## 2024-08-14 RX ADMIN — ENOXAPARIN SODIUM 40 MG: 40 INJECTION SUBCUTANEOUS at 06:08

## 2024-08-14 RX ADMIN — ARIPIPRAZOLE 2 MG: 2 TABLET ORAL at 08:08

## 2024-08-14 RX ADMIN — GABAPENTIN 300 MG: 300 CAPSULE ORAL at 08:08

## 2024-08-14 RX ADMIN — ESCITALOPRAM 5 MG: 5 TABLET, FILM COATED ORAL at 09:08

## 2024-08-14 RX ADMIN — ATORVASTATIN CALCIUM 80 MG: 40 TABLET, FILM COATED ORAL at 08:08

## 2024-08-14 RX ADMIN — HYDRALAZINE HYDROCHLORIDE 10 MG: 20 INJECTION, SOLUTION INTRAMUSCULAR; INTRAVENOUS at 01:08

## 2024-08-14 RX ADMIN — ONDANSETRON 4 MG: 2 INJECTION INTRAMUSCULAR; INTRAVENOUS at 01:08

## 2024-08-14 RX ADMIN — DAPAGLIFLOZIN 10 MG: 10 TABLET, FILM COATED ORAL at 09:08

## 2024-08-14 NOTE — ASSESSMENT & PLAN NOTE
Patient's FSGs are controlled on current medication regimen.  Last A1c reviewed-   Lab Results   Component Value Date    HGBA1C 5.5 03/21/2024     Most recent fingerstick glucose reviewed-   Recent Labs   Lab 08/13/24  2111 08/14/24  1156   POCTGLUCOSE 94 102     Current correctional scale  High  Maintain anti-hyperglycemic dose as follows-   Antihyperglycemics (From admission, onward)      Start     Stop Route Frequency Ordered    08/14/24 0900  dapagliflozin propanediol (Farxiga) tablet 10 mg        Question Answer Comment   Does this patient have a diagnosis of heart failure? Yes    Does this patient have type 1 diabetes or diabetic ketoacidosis? No    Does this patient have symptomatic hypotension? No    Is the patient NPO or pending major surgery in next 3 days or less? No        -- Oral Daily 08/13/24 1440          Hold Oral hypoglycemics while patient is in the hospital.

## 2024-08-14 NOTE — HPI
64 y.o. female who past medical history includes HTN, HLD, dm type 2, CHF, cirrhosis of the liver, COPD; presents to the ED at [facility] on 8/12/2024 with a primary complaint of  AMS and confusion with concerns of taking too much of her home medications per family reports to the ED provider. Patient was recently admitted at [facility] from 7/7-7/9/2024 which she was treated for COPD exacerbation and pneumonia.  Patient has chronic back pain and according to her medication list she is on muscle relaxers, gabapentin, and tramadol.  No reports of new foods or medications.  No reports of chest pain, fever, chills, cough, congestion, or shortness breath outside of her baseline.  Patient has back pain complaints and left knee pain complaints.  Lab work reviewed demonstrated H&H 9.4/28.6, potassium 3.2, 115, CO2 15, alkaline phosphatase 258; other indices unremarkable.  Chest x-ray impression reviewed demonstrated no acute cardiopulmonary process.  X-ray of left knee impression reviewed demonstrated no convincing acute radiographic abnormality.  CT of the head without contrast impression reviewed demonstrated no acute intracranial process identified.  Urinalysis suggestive of acute UTI.  Initial vital signs /76 pulse 72 respirations 14 temperature 97.6° F O2 saturation 98% on room air.  Patient was given IV patient.  She was started on IV ceftriaxone therapy.  Patient is admitted to hospital medicine services for further management.

## 2024-08-14 NOTE — PLAN OF CARE
08/14/24 0817   Discharge Assessment   Confirmed/corrected address, phone number and insurance Yes   Confirmed Demographics Correct on Facesheet   Source of Information patient;family  (Omer Baum (Son)  [phone] (Mobile))   If unable to respond/provide information was family/caregiver contacted? Yes   Contact Name/Number Omer Baum (Son)  [phone] (Mobile)   Communicated MILAN with patient/caregiver Date not available/Unable to determine   Reason For Admission Chief Complaint: Altered Mental Status (Arrives [facility] from home reports was called for AMS and pt slurring words concerns of pt taking too much medication for back injury. Pt states she only took 2 muscle relaxer and 2 gabapentin but [facility] reports gabapentin bottle empty. Pt c/o back pain and L knee pain, gcs 3/5/6)   People in Home child(lala), adult;grandchild(lala)  (The patient lives with her Adult Son: mOer Baum; [phone] and her 20 year-old grandson.)   Facility Arrived From: Private residence.   Do you expect to return to your current living situation? Yes   Do you have help at home or someone to help you manage your care at home? Yes   Who are your caregiver(s) and their phone number(s)? The patient lives with her Adult Son: Omer Baum; [phone] and her 20 year-old grandson.   Prior to hospitilization cognitive status: Alert/Oriented   Current cognitive status: Alert/Oriented   Walking or Climbing Stairs Difficulty yes   Walking or Climbing Stairs ambulation difficulty, requires equipment   Mobility Management The patient uses a rolling walker for mobility concerns.   Dressing/Bathing Difficulty yes   Dressing/Bathing bathing difficulty, requires equipment   Dressing/Bathing Management The patient has grab bars and a shower chair for hygiene needs.   Home Accessibility wheelchair accessible  (The patient's home is built on a slab.)   Home Layout Able to live on 1st floor   Equipment Currently Used at Home grab bar;shower  chair;walker, rolling   Readmission within 30 days? No   Patient currently being followed by outpatient case management? No   Do you currently have service(s) that help you manage your care at home? Yes   Name and Contact number of agency The patient is active with home health services but she is unable to re call the  agency.   Is the pt/caregiver preference to resume services with current agency Yes   Do you take prescription medications? Yes   Do you have prescription coverage? Yes   Coverage Payor: MEDICARE - MEDICARE PART A & B -   Do you have any problems affording any of your prescribed medications? No   Is the patient taking medications as prescribed? yes   Who is going to help you get home at discharge? The patient lives with her Adult Son: Omer Baum; [phone] and her 20 year-old grandson.   How do you get to doctors appointments? family or friend will provide   Are you on dialysis? No   Do you take coumadin? No   Discharge Plan A Home Health   Discharge Plan B Home Health   DME Needed Upon Discharge  none   Discharge Plan discussed with: Patient;Adult children  (The patient lives with her Adult Son: Omer Baum; [phone] and her 20 year-old grandson.)   Transition of Care Barriers None   Physical Activity   On average, how many days per week do you engage in moderate to strenuous exercise (like a brisk walk)? 5 days   On average, how many minutes do you engage in exercise at this level? 30 min   Financial Resource Strain   How hard is it for you to pay for the very basics like food, housing, medical care, and heating? Very Hard   Housing Stability   In the last 12 months, was there a time when you were not able to pay the mortgage or rent on time? N   At any time in the past 12 months, were you homeless or living in a shelter (including now)? N   Transportation Needs   Has the lack of transportation kept you from medical appointments, meetings, work or from getting things needed for daily  living? No   Food Insecurity   Within the past 12 months, you worried that your food would run out before you got the money to buy more. Never true   Within the past 12 months, the food you bought just didn't last and you didn't have money to get more. Never true   Stress   Do you feel stress - tense, restless, nervous, or anxious, or unable to sleep at night because your mind is troubled all the time - these days? Only a littl   Social Isolation   How often do you feel lonely or isolated from those around you?  Rarely   Alcohol Use   Q1: How often do you have a drink containing alcohol? Never   Q2: How many drinks containing alcohol do you have on a typical day when you are drinking? None   Q3: How often do you have six or more drinks on one occasion? Never   Utilities   In the past 12 months has the electric, gas, oil, or water company threatened to shut off services in your home? No   Health Literacy   How often do you need to have someone help you when you read instructions, pamphlets, or other written material from your doctor or pharmacy? Never   OTHER   Name(s) of People in Home The patient lives with her Adult Son: Omer Baum; [phone] and her 20 year-old grandson.   The patient is active with  services: [facility].

## 2024-08-14 NOTE — PLAN OF CARE
Problem: Adult Inpatient Plan of Care  Goal: Plan of Care Review  8/14/2024 0411 by Kelsey Campos RN  Outcome: Progressing  8/14/2024 0411 by Kelsey Campos RN  Outcome: Progressing  Goal: Patient-Specific Goal (Individualized)  8/14/2024 0411 by Kelsey Campos RN  Outcome: Progressing  8/14/2024 0411 by Kelsey Campos RN  Outcome: Progressing  Goal: Absence of Hospital-Acquired Illness or Injury  8/14/2024 0411 by Kelsey Campos RN  Outcome: Progressing  8/14/2024 0411 by Kelsey Campos RN  Outcome: Progressing  Goal: Optimal Comfort and Wellbeing  8/14/2024 0411 by Kelsey Campos RN  Outcome: Progressing  8/14/2024 0411 by Kelsey Campos RN  Outcome: Progressing  Goal: Readiness for Transition of Care  8/14/2024 0411 by Kelsey Campos RN  Outcome: Progressing  8/14/2024 0411 by Kelsey Campos RN  Outcome: Progressing     Problem: Wound  Goal: Optimal Coping  8/14/2024 0411 by Kelsey Campos RN  Outcome: Progressing  8/14/2024 0411 by Kelsey Camops RN  Outcome: Progressing  Goal: Optimal Functional Ability  8/14/2024 0411 by Kelsey Campos RN  Outcome: Progressing  8/14/2024 0411 by Kelsey Campos RN  Outcome: Progressing  Goal: Absence of Infection Signs and Symptoms  8/14/2024 0411 by Kelsey Campos RN  Outcome: Progressing  8/14/2024 0411 by Kelsey Campos RN  Outcome: Progressing  Goal: Improved Oral Intake  8/14/2024 0411 by Kelsey Campos RN  Outcome: Progressing  8/14/2024 0411 by Kelsey Campos RN  Outcome: Progressing  Goal: Optimal Pain Control and Function  8/14/2024 0411 by Kelsey Campos RN  Outcome: Progressing  8/14/2024 0411 by Kelsey Campos RN  Outcome: Progressing  Goal: Skin Health and Integrity  8/14/2024 0411 by Kelsey Campos, RN  Outcome: Progressing  8/14/2024 0411 by Kelsey Campos RN  Outcome: Progressing  Goal: Optimal Wound Healing  8/14/2024 0411 by Kelsey Campos, RN  Outcome: Progressing  8/14/2024 0411 by  Kelsey Campos, RN  Outcome: Progressing     Problem: Diabetes Comorbidity  Goal: Blood Glucose Level Within Targeted Range  8/14/2024 0411 by Kelsey Campos RN  Outcome: Progressing  8/14/2024 0411 by Kelsey Campos, RN  Outcome: Progressing

## 2024-08-14 NOTE — PLAN OF CARE
Problem: Adult Inpatient Plan of Care  Goal: Plan of Care Review  Outcome: Progressing  Goal: Patient-Specific Goal (Individualized)  Outcome: Progressing  Goal: Absence of Hospital-Acquired Illness or Injury  Outcome: Progressing  Goal: Optimal Comfort and Wellbeing  Outcome: Progressing  Goal: Readiness for Transition of Care  Outcome: Progressing     Problem: Wound  Goal: Optimal Coping  Outcome: Progressing  Goal: Optimal Functional Ability  Outcome: Progressing  Goal: Absence of Infection Signs and Symptoms  Outcome: Progressing  Goal: Improved Oral Intake  Outcome: Progressing  Goal: Optimal Pain Control and Function  Outcome: Progressing  Goal: Skin Health and Integrity  Outcome: Progressing  Goal: Optimal Wound Healing  Outcome: Progressing     Problem: Diabetes Comorbidity  Goal: Blood Glucose Level Within Targeted Range  Outcome: Progressing     Problem: Infection  Goal: Absence of Infection Signs and Symptoms  Outcome: Progressing

## 2024-08-14 NOTE — HOSPITAL COURSE
8/14/24-Patient still seems to be a little confused.  She is c/o pain which is her main issue.  She does have some polypharmacy and has been admitted for this in the past.  Urine culture is pending but will likely be negative.

## 2024-08-14 NOTE — SUBJECTIVE & OBJECTIVE
Interval History:     Review of Systems   Unable to perform ROS: Acuity of condition     Objective:     Vital Signs (Most Recent):  Temp: 98.1 °F (36.7 °C) (08/14/24 1117)  Pulse: 84 (08/14/24 1117)  Resp: 18 (08/14/24 1117)  BP: (!) 162/80 (08/14/24 1117)  SpO2: 96 % (08/14/24 1117) Vital Signs (24h Range):  Temp:  [97.8 °F (36.6 °C)-98.8 °F (37.1 °C)] 98.1 °F (36.7 °C)  Pulse:  [] 84  Resp:  [18] 18  SpO2:  [96 %-99 %] 96 %  BP: (143-192)/(73-97) 162/80     Weight: 52.2 kg (115 lb)  Body mass index is 20.37 kg/m².    Intake/Output Summary (Last 24 hours) at 8/14/2024 1532  Last data filed at 8/14/2024 1507  Gross per 24 hour   Intake 120 ml   Output 2552 ml   Net -2432 ml         Physical Exam  Constitutional:       Appearance: Normal appearance. She is normal weight.   HENT:      Head: Normocephalic and atraumatic.      Nose: Nose normal.      Mouth/Throat:      Mouth: Mucous membranes are moist.      Pharynx: Oropharynx is clear.   Eyes:      Extraocular Movements: Extraocular movements intact.      Conjunctiva/sclera: Conjunctivae normal.      Pupils: Pupils are equal, round, and reactive to light.   Cardiovascular:      Rate and Rhythm: Normal rate and regular rhythm.      Pulses: Normal pulses.      Heart sounds: Normal heart sounds.   Pulmonary:      Effort: Pulmonary effort is normal.      Breath sounds: Normal breath sounds.   Abdominal:      General: Bowel sounds are normal.      Palpations: Abdomen is soft.   Musculoskeletal:         General: Normal range of motion.      Cervical back: Normal range of motion and neck supple.   Skin:     General: Skin is warm and dry.   Neurological:      Mental Status: She is lethargic and disoriented.   Psychiatric:         Attention and Perception: She is inattentive.         Speech: Speech is delayed.         Behavior: Behavior is slowed.         Cognition and Memory: Cognition is impaired. Memory is impaired.             Significant Labs: All pertinent labs  within the past 24 hours have been reviewed.  BMP:   Recent Labs   Lab 08/14/24  0424   *   K 2.9*      CO2 17*   BUN 8.7*   CREATININE 0.62   CALCIUM 8.8   MG 2.10     CBC:   Recent Labs   Lab 08/12/24 2120 08/13/24  0654 08/14/24  0424   WBC 7.00 6.60 6.95   HGB 10.5* 9.4* 10.2*   HCT 32.2* 28.6* 29.1*    142 197     CMP:   Recent Labs   Lab 08/12/24 2120 08/13/24 0654 08/14/24  0424    138 135*   K 3.3* 3.2* 2.9*   * 115* 107   CO2 14* 15* 17*   BUN 16.6 12.1 8.7*   CREATININE 0.88 0.72 0.62   CALCIUM 8.5 8.3* 8.8   ALBUMIN 3.5 3.2* 3.2*   BILITOT 0.4 0.3 0.4   ALKPHOS 275* 258* 266*   AST 20 17 24   ALT 44 36 32     Magnesium:   Recent Labs   Lab 08/12/24 2120 08/13/24  0654 08/14/24  0424   MG 2.70* 2.40 2.10       Significant Imaging: I have reviewed all pertinent imaging results/findings within the past 24 hours.

## 2024-08-14 NOTE — PROCEDURES
"Luz Maria Alfaro is a 64 y.o. female patient.    Temp: 98.1 °F (36.7 °C) (08/14/24 1117)  Pulse: 84 (08/14/24 1117)  Resp: 18 (08/14/24 1117)  BP: (!) 162/80 (08/14/24 1117)  SpO2: 96 % (08/14/24 1117)  Weight: 52.2 kg (115 lb) (08/12/24 2029)  Height: 5' 3" (160 cm) (08/12/24 2029)    PICC  Date/Time: 8/14/2024 1:55 PM  Performed by: Opal Carlton RN  Consent Done: Yes  Time out: Immediately prior to procedure a time out was called to verify the correct patient, procedure, equipment, support staff and site/side marked as required  Indications: vascular access  Anesthesia: local infiltration  Local anesthetic: lidocaine 1% without epinephrine  Anesthetic Total (mL): 5  Preparation: skin prepped with ChloraPrep  Skin prep agent dried: skin prep agent completely dried prior to procedure  Sterile barriers: all five maximum sterile barriers used - cap, mask, sterile gown, sterile gloves, and large sterile sheet  Hand hygiene: hand hygiene performed prior to central venous catheter insertion  Location details: left basilic  Catheter type: single lumen  Catheter size: 4 Fr  Catheter Length: 15cm    Ultrasound guidance: yes  Vessel Caliber: medium and patent, compressibility normal  Needle advanced into vessel with real time Ultrasound guidance.  Guidewire confirmed in vessel.  Sterile sheath used.  Number of attempts: 1  Post-procedure: blood return through all ports, sterile dressing applied and chlorhexidine patch    Complications: none  Comments: Arm circumference 20cm          Name Opal Carlton RN  8/14/2024    "

## 2024-08-14 NOTE — ASSESSMENT & PLAN NOTE
Patient's most recent potassium results are listed below.   Recent Labs     08/12/24  2120 08/13/24  0654 08/14/24  0424   K 3.3* 3.2* 2.9*     Plan  - Replete potassium per protocol  - Monitor potassium Daily  - Patient's hypokalemia is worsening. Will continue current treatment  - replacement today will repeat in am

## 2024-08-14 NOTE — NURSING
Nurses Note -- 4 Eyes      8/14/2024   7:00 AM      Skin assessed during: Q Shift Change      [x] No Altered Skin Integrity Present    []Prevention Measures Documented      [] Yes- Altered Skin Integrity Present or Discovered   [] LDA Added if Not in Epic (Describe Wound)   [] New Altered Skin Integrity was Present on Admit and Documented in LDA   [] Wound Image Taken    Wound Care Consulted? No    Attending Nurse:  DEVAN Salas    Second RN/Staff Member:   DEVAN Cole

## 2024-08-14 NOTE — PROGRESS NOTES
[facility] Hospital Medicine  Progress Note    Patient Name: Luz Maria Alfaro  MRN: [id number]  Patient Class: IP- Inpatient   Admission Date: 8/12/2024  Length of Stay: 1 days  Attending Physician: Mckayla Cary MD  Primary Care Provider: Luzmaria Flores FNP        Subjective:     Principal Problem:Acute encephalopathy        HPI:  64 y.o. female who past medical history includes HTN, HLD, dm type 2, CHF, cirrhosis of the liver, COPD; presents to the ED at [facility] on 8/12/2024 with a primary complaint of  AMS and confusion with concerns of taking too much of her home medications per family reports to the ED provider. Patient was recently admitted at [facility] from 7/7-7/9/2024 which she was treated for COPD exacerbation and pneumonia.  Patient has chronic back pain and according to her medication list she is on muscle relaxers, gabapentin, and tramadol.  No reports of new foods or medications.  No reports of chest pain, fever, chills, cough, congestion, or shortness breath outside of her baseline.  Patient has back pain complaints and left knee pain complaints.  Lab work reviewed demonstrated H&H 9.4/28.6, potassium 3.2, 115, CO2 15, alkaline phosphatase 258; other indices unremarkable.  Chest x-ray impression reviewed demonstrated no acute cardiopulmonary process.  X-ray of left knee impression reviewed demonstrated no convincing acute radiographic abnormality.  CT of the head without contrast impression reviewed demonstrated no acute intracranial process identified.  Urinalysis suggestive of acute UTI.  Initial vital signs /76 pulse 72 respirations 14 temperature 97.6° F O2 saturation 98% on room air.  Patient was given IV patient.  She was started on IV ceftriaxone therapy.  Patient is admitted to hospital medicine services for further management.       Overview/Hospital Course:  8/14/24-Patient still seems to be a little confused.  She is c/o pain which is her main  issue.  She does have some polypharmacy and has been admitted for this in the past.  Urine culture is pending but will likely be negative.      Interval History:     Review of Systems   Unable to perform ROS: Acuity of condition     Objective:     Vital Signs (Most Recent):  Temp: 98.1 °F (36.7 °C) (08/14/24 1117)  Pulse: 84 (08/14/24 1117)  Resp: 18 (08/14/24 1117)  BP: (!) 162/80 (08/14/24 1117)  SpO2: 96 % (08/14/24 1117) Vital Signs (24h Range):  Temp:  [97.8 °F (36.6 °C)-98.8 °F (37.1 °C)] 98.1 °F (36.7 °C)  Pulse:  [] 84  Resp:  [18] 18  SpO2:  [96 %-99 %] 96 %  BP: (143-192)/(73-97) 162/80     Weight: 52.2 kg (115 lb)  Body mass index is 20.37 kg/m².    Intake/Output Summary (Last 24 hours) at 8/14/2024 1532  Last data filed at 8/14/2024 1507  Gross per 24 hour   Intake 120 ml   Output 2552 ml   Net -2432 ml         Physical Exam  Constitutional:       Appearance: Normal appearance. She is normal weight.   HENT:      Head: Normocephalic and atraumatic.      Nose: Nose normal.      Mouth/Throat:      Mouth: Mucous membranes are moist.      Pharynx: Oropharynx is clear.   Eyes:      Extraocular Movements: Extraocular movements intact.      Conjunctiva/sclera: Conjunctivae normal.      Pupils: Pupils are equal, round, and reactive to light.   Cardiovascular:      Rate and Rhythm: Normal rate and regular rhythm.      Pulses: Normal pulses.      Heart sounds: Normal heart sounds.   Pulmonary:      Effort: Pulmonary effort is normal.      Breath sounds: Normal breath sounds.   Abdominal:      General: Bowel sounds are normal.      Palpations: Abdomen is soft.   Musculoskeletal:         General: Normal range of motion.      Cervical back: Normal range of motion and neck supple.   Skin:     General: Skin is warm and dry.   Neurological:      Mental Status: She is lethargic and disoriented.   Psychiatric:         Attention and Perception: She is inattentive.         Speech: Speech is delayed.         Behavior:  Behavior is slowed.         Cognition and Memory: Cognition is impaired. Memory is impaired.             Significant Labs: All pertinent labs within the past 24 hours have been reviewed.  BMP:   Recent Labs   Lab 08/14/24  0424   *   K 2.9*      CO2 17*   BUN 8.7*   CREATININE 0.62   CALCIUM 8.8   MG 2.10     CBC:   Recent Labs   Lab 08/12/24 2120 08/13/24  0654 08/14/24  0424   WBC 7.00 6.60 6.95   HGB 10.5* 9.4* 10.2*   HCT 32.2* 28.6* 29.1*    142 197     CMP:   Recent Labs   Lab 08/12/24 2120 08/13/24 0654 08/14/24  0424    138 135*   K 3.3* 3.2* 2.9*   * 115* 107   CO2 14* 15* 17*   BUN 16.6 12.1 8.7*   CREATININE 0.88 0.72 0.62   CALCIUM 8.5 8.3* 8.8   ALBUMIN 3.5 3.2* 3.2*   BILITOT 0.4 0.3 0.4   ALKPHOS 275* 258* 266*   AST 20 17 24   ALT 44 36 32     Magnesium:   Recent Labs   Lab 08/12/24 2120 08/13/24 0654 08/14/24  0424   MG 2.70* 2.40 2.10       Significant Imaging: I have reviewed all pertinent imaging results/findings within the past 24 hours.    Assessment/Plan:      * Acute encephalopathy  Likely due to polypharmacy  Urine culture is pending      Hypokalemia  Patient's most recent potassium results are listed below.   Recent Labs     08/12/24 2120 08/13/24 0654 08/14/24  0424   K 3.3* 3.2* 2.9*     Plan  - Replete potassium per protocol  - Monitor potassium Daily  - Patient's hypokalemia is worsening. Will continue current treatment  - replacement today will repeat in am    Type 2 diabetes mellitus without complications  Patient's FSGs are controlled on current medication regimen.  Last A1c reviewed-   Lab Results   Component Value Date    HGBA1C 5.5 03/21/2024     Most recent fingerstick glucose reviewed-   Recent Labs   Lab 08/13/24 2111 08/14/24  1156   POCTGLUCOSE 94 102     Current correctional scale  High  Maintain anti-hyperglycemic dose as follows-   Antihyperglycemics (From admission, onward)      Start     Stop Route Frequency Ordered    08/14/24 0900   dapagliflozin propanediol (Farxiga) tablet 10 mg        Question Answer Comment   Does this patient have a diagnosis of heart failure? Yes    Does this patient have type 1 diabetes or diabetic ketoacidosis? No    Does this patient have symptomatic hypotension? No    Is the patient NPO or pending major surgery in next 3 days or less? No        -- Oral Daily 08/13/24 1440          Hold Oral hypoglycemics while patient is in the hospital.      VTE Risk Mitigation (From admission, onward)           Ordered     enoxaparin injection 40 mg  Daily         08/13/24 0646     IP VTE HIGH RISK PATIENT  Once         08/13/24 0646     Place sequential compression device  Until discontinued         08/13/24 0646                DVT prophylaxis  Urine culture pending  Will review home meds  OOB  PT    Discharge Planning   MILAN:      Code Status: Full Code   Is the patient medically ready for discharge?:     Reason for patient still in hospital (select all that apply): Patient trending condition, Laboratory test, Treatment, and PT / OT recommendations  Discharge Plan A: Home Health                  Evgeny Mcfadden MD  Department of Hospital Medicine   [facility] - [facility]

## 2024-08-15 LAB
ALBUMIN SERPL-MCNC: 3.4 G/DL (ref 3.4–4.8)
ALBUMIN/GLOB SERPL: 1.2 RATIO (ref 1.1–2)
ALLENS TEST BLOOD GAS (OHS): YES
ALP SERPL-CCNC: 290 UNIT/L (ref 40–150)
ALT SERPL-CCNC: 27 UNIT/L (ref 0–55)
ANION GAP SERPL CALC-SCNC: 14 MEQ/L
ANISOCYTOSIS BLD QL SMEAR: ABNORMAL
AST SERPL-CCNC: 25 UNIT/L (ref 5–34)
B-OH-BUTYR SERPL-MCNC: 2.2 MMOL/L
BASE EXCESS BLD CALC-SCNC: -3 MMOL/L (ref -2–2)
BASOPHILS # BLD AUTO: 0.01 X10(3)/MCL
BASOPHILS NFR BLD AUTO: 0.2 %
BILIRUB SERPL-MCNC: 0.5 MG/DL
BLOOD GAS SAMPLE TYPE (OHS): ABNORMAL
BUN SERPL-MCNC: 7.4 MG/DL (ref 9.8–20.1)
CA-I BLD-SCNC: 1.13 MMOL/L (ref 1.12–1.23)
CALCIUM SERPL-MCNC: 8.9 MG/DL (ref 8.4–10.2)
CHLORIDE SERPL-SCNC: 108 MMOL/L (ref 98–107)
CO2 BLDA-SCNC: 21.8 MMOL/L
CO2 SERPL-SCNC: 17 MMOL/L (ref 23–31)
COHGB MFR BLDA: 0.6 % (ref 0.5–1.5)
CREAT SERPL-MCNC: 0.64 MG/DL (ref 0.55–1.02)
CREAT/UREA NIT SERPL: 12
DRAWN BY BLOOD GAS (OHS): ABNORMAL
EOSINOPHIL # BLD AUTO: 0.02 X10(3)/MCL (ref 0–0.9)
EOSINOPHIL NFR BLD AUTO: 0.3 %
ERYTHROCYTE [DISTWIDTH] IN BLOOD BY AUTOMATED COUNT: 22.2 % (ref 11.5–17)
GFR SERPLBLD CREATININE-BSD FMLA CKD-EPI: >60 ML/MIN/1.73/M2
GLOBULIN SER-MCNC: 2.9 GM/DL (ref 2.4–3.5)
GLUCOSE SERPL-MCNC: 94 MG/DL (ref 82–115)
HCO3 BLDA-SCNC: 20.8 MMOL/L (ref 22–26)
HCT VFR BLD AUTO: 31.5 % (ref 37–47)
HGB BLD-MCNC: 10.7 G/DL (ref 12–16)
IMM GRANULOCYTES # BLD AUTO: 0.03 X10(3)/MCL (ref 0–0.04)
IMM GRANULOCYTES NFR BLD AUTO: 0.5 %
INHALED O2 CONCENTRATION: 21 %
LACTATE SERPL-SCNC: 0.7 MMOL/L (ref 0.5–2.2)
LYMPHOCYTES # BLD AUTO: 1.93 X10(3)/MCL (ref 0.6–4.6)
LYMPHOCYTES NFR BLD AUTO: 31.6 %
MACROCYTES BLD QL SMEAR: ABNORMAL
MAGNESIUM SERPL-MCNC: 2.2 MG/DL (ref 1.6–2.6)
MCH RBC QN AUTO: 32.8 PG (ref 27–31)
MCHC RBC AUTO-ENTMCNC: 34 G/DL (ref 33–36)
MCV RBC AUTO: 96.6 FL (ref 80–94)
METHGB MFR BLDA: 0 % (ref 0.4–1.5)
MONOCYTES # BLD AUTO: 0.61 X10(3)/MCL (ref 0.1–1.3)
MONOCYTES NFR BLD AUTO: 10 %
NEUTROPHILS # BLD AUTO: 3.51 X10(3)/MCL (ref 2.1–9.2)
NEUTROPHILS NFR BLD AUTO: 57.4 %
NRBC BLD AUTO-RTO: 0.7 %
O2 HB BLOOD GAS (OHS): 95.8 % (ref 94–97)
OXYHGB MFR BLDA: 10.5 G/DL (ref 12–16)
PCO2 BLDA: 32 MMHG (ref 35–45)
PH BLDA: 7.42 [PH] (ref 7.35–7.45)
PLATELET # BLD AUTO: 250 X10(3)/MCL (ref 130–400)
PLATELET # BLD EST: NORMAL 10*3/UL
PLATELETS.RETICULATED NFR BLD AUTO: 3.5 % (ref 0.9–11.2)
PMV BLD AUTO: 10.4 FL (ref 7.4–10.4)
PO2 BLDA: 84 MMHG (ref 80–100)
POCT GLUCOSE: 100 MG/DL (ref 70–110)
POCT GLUCOSE: 103 MG/DL (ref 70–110)
POTASSIUM BLOOD GAS (OHS): 3.2 MMOL/L (ref 3.5–5)
POTASSIUM SERPL-SCNC: 3.6 MMOL/L (ref 3.5–5.1)
PROT SERPL-MCNC: 6.3 GM/DL (ref 5.8–7.6)
RBC # BLD AUTO: 3.26 X10(6)/MCL (ref 4.2–5.4)
RBC MORPH BLD: ABNORMAL
SAMPLE SITE BLOOD GAS (OHS): ABNORMAL
SAO2 % BLDA: 96.5 %
SODIUM BLOOD GAS (OHS): 132 MMOL/L (ref 137–145)
SODIUM SERPL-SCNC: 139 MMOL/L (ref 136–145)
WBC # BLD AUTO: 6.11 X10(3)/MCL (ref 4.5–11.5)

## 2024-08-15 PROCEDURE — 63600175 PHARM REV CODE 636 W HCPCS: Performed by: STUDENT IN AN ORGANIZED HEALTH CARE EDUCATION/TRAINING PROGRAM

## 2024-08-15 PROCEDURE — 21400001 HC TELEMETRY ROOM

## 2024-08-15 PROCEDURE — 36600 WITHDRAWAL OF ARTERIAL BLOOD: CPT

## 2024-08-15 PROCEDURE — 36415 COLL VENOUS BLD VENIPUNCTURE: CPT | Performed by: STUDENT IN AN ORGANIZED HEALTH CARE EDUCATION/TRAINING PROGRAM

## 2024-08-15 PROCEDURE — 85025 COMPLETE CBC W/AUTO DIFF WBC: CPT | Performed by: INTERNAL MEDICINE

## 2024-08-15 PROCEDURE — A4216 STERILE WATER/SALINE, 10 ML: HCPCS | Performed by: INTERNAL MEDICINE

## 2024-08-15 PROCEDURE — 97162 PT EVAL MOD COMPLEX 30 MIN: CPT

## 2024-08-15 PROCEDURE — 63600175 PHARM REV CODE 636 W HCPCS: Performed by: NURSE PRACTITIONER

## 2024-08-15 PROCEDURE — 82803 BLOOD GASES ANY COMBINATION: CPT

## 2024-08-15 PROCEDURE — 83735 ASSAY OF MAGNESIUM: CPT | Performed by: INTERNAL MEDICINE

## 2024-08-15 PROCEDURE — 25000003 PHARM REV CODE 250: Performed by: INTERNAL MEDICINE

## 2024-08-15 PROCEDURE — 83605 ASSAY OF LACTIC ACID: CPT | Performed by: STUDENT IN AN ORGANIZED HEALTH CARE EDUCATION/TRAINING PROGRAM

## 2024-08-15 PROCEDURE — 36415 COLL VENOUS BLD VENIPUNCTURE: CPT | Performed by: INTERNAL MEDICINE

## 2024-08-15 PROCEDURE — 80053 COMPREHEN METABOLIC PANEL: CPT | Performed by: INTERNAL MEDICINE

## 2024-08-15 PROCEDURE — 25000003 PHARM REV CODE 250: Performed by: NURSE PRACTITIONER

## 2024-08-15 PROCEDURE — 82010 KETONE BODYS QUAN: CPT | Performed by: STUDENT IN AN ORGANIZED HEALTH CARE EDUCATION/TRAINING PROGRAM

## 2024-08-15 PROCEDURE — 99900035 HC TECH TIME PER 15 MIN (STAT)

## 2024-08-15 RX ORDER — PROMETHAZINE HYDROCHLORIDE 25 MG/ML
12.5 INJECTION, SOLUTION INTRAMUSCULAR; INTRAVENOUS EVERY 8 HOURS PRN
Status: DISCONTINUED | OUTPATIENT
Start: 2024-08-15 | End: 2024-08-16 | Stop reason: HOSPADM

## 2024-08-15 RX ORDER — IPRATROPIUM BROMIDE AND ALBUTEROL SULFATE 2.5; .5 MG/3ML; MG/3ML
3 SOLUTION RESPIRATORY (INHALATION) EVERY 6 HOURS
Status: DISCONTINUED | OUTPATIENT
Start: 2024-08-15 | End: 2024-08-16 | Stop reason: HOSPADM

## 2024-08-15 RX ADMIN — METOPROLOL SUCCINATE 12.5 MG: 25 TABLET, EXTENDED RELEASE ORAL at 09:08

## 2024-08-15 RX ADMIN — GABAPENTIN 300 MG: 300 CAPSULE ORAL at 09:08

## 2024-08-15 RX ADMIN — SODIUM CHLORIDE, PRESERVATIVE FREE 10 ML: 5 INJECTION INTRAVENOUS at 11:08

## 2024-08-15 RX ADMIN — THIAMINE HCL TAB 100 MG 100 MG: 100 TAB at 09:08

## 2024-08-15 RX ADMIN — SODIUM CHLORIDE, PRESERVATIVE FREE 10 ML: 5 INJECTION INTRAVENOUS at 02:08

## 2024-08-15 RX ADMIN — DAPAGLIFLOZIN 10 MG: 10 TABLET, FILM COATED ORAL at 09:08

## 2024-08-15 RX ADMIN — BUSPIRONE HYDROCHLORIDE 5 MG: 5 TABLET ORAL at 08:08

## 2024-08-15 RX ADMIN — SODIUM CHLORIDE, PRESERVATIVE FREE 10 ML: 5 INJECTION INTRAVENOUS at 05:08

## 2024-08-15 RX ADMIN — PROMETHAZINE HYDROCHLORIDE 12.5 MG: 25 INJECTION INTRAMUSCULAR; INTRAVENOUS at 05:08

## 2024-08-15 RX ADMIN — ATORVASTATIN CALCIUM 80 MG: 40 TABLET, FILM COATED ORAL at 09:08

## 2024-08-15 RX ADMIN — SODIUM CHLORIDE, PRESERVATIVE FREE 10 ML: 5 INJECTION INTRAVENOUS at 12:08

## 2024-08-15 RX ADMIN — GABAPENTIN 300 MG: 300 CAPSULE ORAL at 08:08

## 2024-08-15 RX ADMIN — GABAPENTIN 300 MG: 300 CAPSULE ORAL at 05:08

## 2024-08-15 RX ADMIN — ENOXAPARIN SODIUM 40 MG: 40 INJECTION SUBCUTANEOUS at 05:08

## 2024-08-15 RX ADMIN — ONDANSETRON 4 MG: 2 INJECTION INTRAMUSCULAR; INTRAVENOUS at 12:08

## 2024-08-15 RX ADMIN — ARIPIPRAZOLE 2 MG: 2 TABLET ORAL at 09:08

## 2024-08-15 RX ADMIN — SACUBITRIL AND VALSARTAN 1 TABLET: 24; 26 TABLET, FILM COATED ORAL at 09:08

## 2024-08-15 RX ADMIN — ONDANSETRON 4 MG: 2 INJECTION INTRAMUSCULAR; INTRAVENOUS at 08:08

## 2024-08-15 RX ADMIN — CEFTRIAXONE SODIUM 1 G: 1 INJECTION, POWDER, FOR SOLUTION INTRAMUSCULAR; INTRAVENOUS at 02:08

## 2024-08-15 RX ADMIN — BUSPIRONE HYDROCHLORIDE 5 MG: 5 TABLET ORAL at 09:08

## 2024-08-15 RX ADMIN — ESCITALOPRAM 5 MG: 5 TABLET, FILM COATED ORAL at 09:08

## 2024-08-15 RX ADMIN — PROCHLORPERAZINE EDISYLATE 5 MG: 5 INJECTION INTRAMUSCULAR; INTRAVENOUS at 09:08

## 2024-08-15 RX ADMIN — FOLIC ACID 1 MG: 1 TABLET ORAL at 09:08

## 2024-08-15 RX ADMIN — SACUBITRIL AND VALSARTAN 1 TABLET: 24; 26 TABLET, FILM COATED ORAL at 08:08

## 2024-08-15 NOTE — NURSING
Nurses Note -- 4 Eyes      8/15/2024   2:49 AM      Skin assessed during: Q Shift Change      [x] No Altered Skin Integrity Present    [x]Prevention Measures Documented      [] Yes- Altered Skin Integrity Present or Discovered   [] LDA Added if Not in Epic (Describe Wound)   [] New Altered Skin Integrity was Present on Admit and Documented in LDA   [] Wound Image Taken    Wound Care Consulted? No    Attending Nurse:  Shanell Miller RN    Second RN/Staff Member:  Maritza Quiroz RN

## 2024-08-15 NOTE — PT/OT/SLP EVAL
Physical Therapy Evaluation    Patient Name:  Luz Maria Alfaro   MRN:  [id number]    Recommendations:     Discharge therapy intensity:  (TBD pending progress)   Discharge Equipment Recommendations: none   Barriers to discharge: Impaired mobility and Ongoing medical needs    Assessment:     Luz Maria Alfaro is a 64 y.o. female admitted with a medical diagnosis of Acute encephalopathy.  She presents with the following impairments/functional limitations: weakness, impaired endurance, impaired functional mobility, impaired balance, pain, impaired cardiopulmonary response to activity.    Pt extremely [place] and complained of feeling nauseated today, evaluation limited 2/2 pt not feeling well and tachycardic. She required Min A for supine > sit, while sitting EOB HR observed to be in 120s. HR stayed between 110-120 with seated rest, however pt agreed to perform STS at bedside. She quickly sat back down and was assisted back to bed. RN notified of elevated HR. Will update discharge therapy recs as appropriate.     Rehab Prognosis: Good; patient would benefit from acute skilled PT services to address these deficits and reach maximum level of function.    Recent Surgery: * No surgery found *      Plan:     During this hospitalization, patient would benefit from acute PT services 5 x/week to address the identified rehab impairments via gait training, therapeutic activities, therapeutic exercises and progress toward the following goals:    Plan of Care Expires:  09/15/24    Subjective     Chief Complaint: nausea  Patient/Family Comments/goals: none stated  Pain/Comfort:  Location 1: back  Pain Addressed 1: Reposition, Distraction    Patients cultural, spiritual, Restorationism conflicts given the current situation: no    Living Environment:  Pt lives with her son and grandson in [facility]  Prior to admission, patients level of function was independent, ambulation w/ rollator.  Equipment used at home: grab bar, shower chair, walker,  rolling, rollator.  DME owned (not currently used): none.  Upon discharge, patient will have assistance from son?.    Objective:     Communicated with nurse prior to session.  Patient found HOB elevated with telemetry, peripheral IV, pulse ox (continuous), [place], BIN  upon PT entry to room.    General Precautions: Standard, fall  Orthopedic Precautions:N/A   Braces: N/A  Respiratory Status: Room air  Blood Pressure: NT      Exams:  RLE & LLE ROM: WFL  LE Strength: Functional weakness with mobility requiring assistance; difficult to formerly assess 2/2 pt very [place]  Skin integrity: Visible skin intact      Functional Mobility:  Bed Mobility:     Supine to Sit: minimum assistance  Sit to Supine: minimum assistance  Transfers:     Sit to Stand:  minimum assistance with hand-held assist      AM-PAC 6 CLICK MOBILITY  Total Score:16       Treatment & Education:  Education Provided:  Role and goals of PT, transfer training, bed mobility, gait training, balance training, safety awareness, assistive device, strengthening exercises, and importance of participating in PT to return to PLOF.        Patient left HOB elevated with all lines intact, call button in reach, and nurse present.    GOALS:   Multidisciplinary Problems       Physical Therapy Goals          Problem: Physical Therapy    Goal Priority Disciplines Outcome Goal Variances Interventions   Physical Therapy Goal     PT, PT/OT Progressing     Description: Goals to be met by: 9/15/24     Patient will increase functional independence with mobility by performin. Supine to Sit with independence  2. Sit to Stand with SBA  3. Ambulate 100 ft with RW with SBA                         History:     Past Medical History:   Diagnosis Date    Benign essential HTN     Mixed hyperlipidemia     Type 2 diabetes mellitus without complications        Past Surgical History:   Procedure Laterality Date    APPENDECTOMY      BACK SURGERY      HIP SURGERY      INTRAMEDULLARY  RODDING OF FEMUR Right 10/27/2022    Procedure: RIGHT FEMUR INTERTROCH IMN;  Surgeon: Stewart Brambila MD;  Location: [facility];  Service: Orthopedics;  Laterality: Right;  [place] table, supine  3rd case  Synthes Short TFNA       Time Tracking:     PT Received On: 08/15/24  PT Start Time: 0908     PT Stop Time: 0921  PT Total Time (min): 13 min     Billable Minutes: Evaluation 13      08/15/2024

## 2024-08-15 NOTE — NURSING
Nurses Note -- 4 Eyes      8/15/2024   7:00 AM      Skin assessed during: Q Shift Change      [x] No Altered Skin Integrity Present    []Prevention Measures Documented      [] Yes- Altered Skin Integrity Present or Discovered   [] LDA Added if Not in Epic (Describe Wound)   [] New Altered Skin Integrity was Present on Admit and Documented in LDA   [] Wound Image Taken    Wound Care Consulted? No    Attending Nurse:  DEVAN Salas    Second RN/Staff Member:   DEVAN Reece

## 2024-08-15 NOTE — PLAN OF CARE
Clinical updates sent to: [facility] as she is active with this agency. The patient will resume HH services upon D/C with this agency.

## 2024-08-15 NOTE — PROGRESS NOTES
[facility]  Hospital Medicine Progress Note        Chief Complaint: Inpatient Follow-up for     HPI:   64 y.o. female who past medical history includes HTN, HLD, dm type 2, CHF, cirrhosis of the liver, COPD; presents to the ED at [facility] on 8/12/2024 with a primary complaint of  AMS and confusion with concerns of taking too much of her home medications per family reports to the ED provider. Patient was recently admitted at [facility] from 7/7-7/9/2024 which she was treated for COPD exacerbation and pneumonia.  Patient has chronic back pain and according to her medication list she is on muscle relaxers, gabapentin, and tramadol.  No reports of new foods or medications.  No reports of chest pain, fever, chills, cough, congestion, or shortness breath outside of her baseline.  Patient has back pain complaints and left knee pain complaints.  Lab work reviewed demonstrated H&H 9.4/28.6, potassium 3.2, 115, CO2 15, alkaline phosphatase 258; other indices unremarkable.  Chest x-ray impression reviewed demonstrated no acute cardiopulmonary process.  X-ray of left knee impression reviewed demonstrated no convincing acute radiographic abnormality.  CT of the head without contrast impression reviewed demonstrated no acute intracranial process identified.  Urinalysis suggestive of acute UTI.    Initial vital signs /76 pulse 72 respirations 14 temperature 97.6° F O2 saturation 98% on room air.  She was started on IV ceftriaxone therapy.  Patient is admitted to hospital medicine services for further management.    Interval Hx:   Today, patient stated she was doing well and had no new complaints. AAO x 4. Did report losing her balance while walking at home and having a fall. Noted to be hard of hearing. Will await UCX speciation, growing GNRs.    Case was discussed with patient's nurse on the floor.    Objective/physical exam:  General: alert lady lying comfortably in bed, in no acute distress  HENT:  oral and oropharyngeal mucosa moist, pink, with no erythema or exudates, no ear pain or discharge  Neck: normal neck movement, no lymph nodes or swellings, no JVD or Carotid bruit  Respiratory: clear breathing sounds bilaterally, no crackles, rales, ronchi or wheezes  Cardiovascular: clear S1 and S2, no murmurs, rubs or gallops  Peripheral Vascular: no lesions, ulcers or erosions, normal peripheral pulses and no pedal edema  Gastrointestinal: soft, non-tender, non-distended abdomen, no guarding, rigidity or rebound tenderness, normal bowel sounds  Integumentary: normal skin color, no rashes or lesions  Neuro: AAO x 3; motor strength 5/5 in B/L UEs & LEs; sensation intact to gross and fine touch B/L; CN II-XII grossly intact    VITAL SIGNS: 24 HRS MIN & MAX LAST   Temp  Min: 97.9 °F (36.6 °C)  Max: 99.7 °F (37.6 °C) 98.3 °F (36.8 °C)   BP  Min: 116/65  Max: 192/92 116/65   Pulse  Min: 78  Max: 99  89   Resp  Min: 18  Max: 20 20   SpO2  Min: 94 %  Max: 100 % 96 %     I have reviewed the following labs:  Recent Labs   Lab 08/13/24  0654 08/14/24  0424 08/15/24  0440   WBC 6.60 6.95 6.11   RBC 2.95* 3.08* 3.26*   HGB 9.4* 10.2* 10.7*   HCT 28.6* 29.1* 31.5*   MCV 96.9* 94.5* 96.6*   MCH 31.9* 33.1* 32.8*   MCHC 32.9* 35.1 34.0   RDW 21.5* 21.4* 22.2*    197 250   MPV 11.1* 10.9* 10.4     Recent Labs   Lab 08/13/24 0654 08/14/24  0424 08/15/24  0440 08/15/24  1255    135* 139  --    K 3.2* 2.9* 3.6  --    * 107 108*  --    CO2 15* 17* 17*  --    BUN 12.1 8.7* 7.4*  --    CREATININE 0.72 0.62 0.64  --    CALCIUM 8.3* 8.8 8.9  --    PH  --   --   --  7.420   MG 2.40 2.10 2.20  --    ALBUMIN 3.2* 3.2* 3.4  --    ALKPHOS 258* 266* 290*  --    ALT 36 32 27  --    AST 17 24 25  --    BILITOT 0.3 0.4 0.5  --      Assessment/Plan:  Acute encephalopathy-suspected secondary to polypharmacy vs UTI  Acute UTI with hematuria  Fall- ground level  Chronic pain syndrome  DM type II   HTN  Weakness     Hx of COPD,  CHF, HLD, Tobacco abuse, anxiety, depression    Continues to be admitted  No new complaints  On IV Rocephin  UCX growing GNRs; awaiting speciation  PT consulted; follow recs  On Aripiprazole, Atorvastatin, Buspirone, Dapagliflozin, Escitalopram, Folic Acid, Gabapentin  TID, Metoprolol Succinate, Entresto BID, Thiamine  Continue monitoring symptoms    VTE prophylaxis: Lovenox    Patient condition:  Stable    Anticipated discharge and Disposition:     Pending    All diagnosis and differential diagnosis have been reviewed; assessment and plan has been documented; I have personally reviewed the labs and test results that are presently available; I have reviewed the patients medication list; I have reviewed the consulting providers response and recommendations. I have reviewed or attempted to review medical records based upon their availability    All of the patient's questions have been  addressed and answered. Patient's is agreeable to the above stated plan. I will continue to monitor closely and make adjustments to medical management as needed.    Portions of this note dictated using EMR integrated voice recognition software, and may be subject to voice recognition errors not corrected at proofreading. Please contact writer for clarification if needed.   _____________________________________________________________________    Radiology:  I have personally reviewed the following imaging and agree with the radiologist.     Echo    Left Ventricle: The left ventricle is normal in size. Mildly increased   wall thickness. There is normal systolic function with a visually   estimated ejection fraction of 60 - 65%. Grade I diastolic dysfunction.    Right Ventricle: Normal right ventricular cavity size. Systolic   function is normal. TAPSE is 1.91 cm.    Left Atrium: Left atrium is moderately dilated.    Aortic Valve: Mildly calcified cusps. Mildly restricted motion. There   is mild stenosis. Aortic valve area by VTI is 1.36  cm². Aortic valve peak   velocity is 2.40 m/s. Mean gradient is 13 mmHg. The dimensionless index is   0.48. There is moderate aortic regurgitation.    Mitral Valve: Mildly calcified leaflets. There is mild mitral annular   calcification present. There is no stenosis. The mean pressure gradient   across the mitral valve is 6 mmHg at a heart rate of  bpm. There is mild   regurgitation.    IVC/SVC: Normal venous pressure at 3 mmHg.  CT Head Without Contrast  Narrative: Technique: CT of the head was performed without intravenous contrast with axial as well as coronal and sagittal images.    Comparison: CT brain March 20, 2024 and MRI brain March 28, 2024.    Dosage Information: Automated Exposure Control was utilized 1033.98 mGy.cm.    Clinical history: AMS.    Findings:    Hemorrhage: No acute intracranial hemorrhage is seen.    CSF spaces: The ventricles, sulci and basal cisterns all appear mildly prominent consistent with global cerebral atrophy.    Brain parenchyma: Chronic microvascular change is seen in portions of the periventricular and deep white matter tracts. Focal right parietal encephalomalacia is seen (series 2, image 42).    Sella and skull base: The sella appears to be within normal limits for age.    Herniation: None.    Calvarium: No acute linear or depressed skull fracture is seen.    Maxillofacial Structures:    Paranasal sinuses: The visualized paranasal sinuses appear clear with no mucoperiosteal thickening or air fluid levels identified.  Impression: Impression:    No acute intracranial process identified. Details and other findings as noted above.    No significant discrepancy with overnight report    Electronically signed by: John Allen  Date:    08/13/2024  Time:    07:34  X-Ray Knee Complete 4 or More Views Left  EXAMINATION  XR KNEE COMP 4 OR MORE VIEWS LEFT    CLINICAL HISTORY  pain;    TECHNIQUE  A total of 4 image(s) submitted of the left knee.    COMPARISON  None available at the time  of initial interpretation.    FINDINGS  No displaced fracture or dislocation is identified. The visualized joint spaces are preserved and there are no findings indicative of a joint effusion. No aggressive osseous lesion or periosteal reaction is identified.    The included soft tissues are without acute abnormality.  Scattered vascular calcification is present.    IMPRESSION  No convincing acute radiographic abnormality.    Electronically signed by: Gaurang Montenegro  Date:    08/13/2024  Time:    06:43      Jamie Evans MD  Department of Hospital Medicine   [facility]   08/15/2024

## 2024-08-15 NOTE — PLAN OF CARE
Problem: Physical Therapy  Goal: Physical Therapy Goal  Description: Goals to be met by: 9/15/24     Patient will increase functional independence with mobility by performin. Supine to Sit with independence  2. Sit to Stand with SBA  3. Ambulate 100 ft with RW with SBA    Outcome: Progressing

## 2024-08-16 VITALS
SYSTOLIC BLOOD PRESSURE: 98 MMHG | DIASTOLIC BLOOD PRESSURE: 68 MMHG | BODY MASS INDEX: 20.38 KG/M2 | RESPIRATION RATE: 18 BRPM | WEIGHT: 115 LBS | OXYGEN SATURATION: 95 % | TEMPERATURE: 98 F | HEIGHT: 63 IN | HEART RATE: 96 BPM

## 2024-08-16 LAB
ANION GAP SERPL CALC-SCNC: 8 MEQ/L
BACTERIA UR CULT: ABNORMAL
BACTERIA UR CULT: ABNORMAL
BUN SERPL-MCNC: 10.8 MG/DL (ref 9.8–20.1)
CALCIUM SERPL-MCNC: 8.3 MG/DL (ref 8.4–10.2)
CHLORIDE SERPL-SCNC: 110 MMOL/L (ref 98–107)
CO2 SERPL-SCNC: 21 MMOL/L (ref 23–31)
CREAT SERPL-MCNC: 0.8 MG/DL (ref 0.55–1.02)
CREAT/UREA NIT SERPL: 14
GFR SERPLBLD CREATININE-BSD FMLA CKD-EPI: >60 ML/MIN/1.73/M2
GLUCOSE SERPL-MCNC: 109 MG/DL (ref 82–115)
POCT GLUCOSE: 157 MG/DL (ref 70–110)
POTASSIUM SERPL-SCNC: 3 MMOL/L (ref 3.5–5.1)
SODIUM SERPL-SCNC: 139 MMOL/L (ref 136–145)

## 2024-08-16 PROCEDURE — 97116 GAIT TRAINING THERAPY: CPT

## 2024-08-16 PROCEDURE — 80048 BASIC METABOLIC PNL TOTAL CA: CPT | Performed by: STUDENT IN AN ORGANIZED HEALTH CARE EDUCATION/TRAINING PROGRAM

## 2024-08-16 PROCEDURE — A4216 STERILE WATER/SALINE, 10 ML: HCPCS | Performed by: INTERNAL MEDICINE

## 2024-08-16 PROCEDURE — 36415 COLL VENOUS BLD VENIPUNCTURE: CPT | Performed by: STUDENT IN AN ORGANIZED HEALTH CARE EDUCATION/TRAINING PROGRAM

## 2024-08-16 PROCEDURE — 63600175 PHARM REV CODE 636 W HCPCS: Performed by: NURSE PRACTITIONER

## 2024-08-16 PROCEDURE — 25000242 PHARM REV CODE 250 ALT 637 W/ HCPCS: Performed by: STUDENT IN AN ORGANIZED HEALTH CARE EDUCATION/TRAINING PROGRAM

## 2024-08-16 PROCEDURE — 99900035 HC TECH TIME PER 15 MIN (STAT)

## 2024-08-16 PROCEDURE — 94640 AIRWAY INHALATION TREATMENT: CPT

## 2024-08-16 PROCEDURE — 25000003 PHARM REV CODE 250: Performed by: NURSE PRACTITIONER

## 2024-08-16 PROCEDURE — 94761 N-INVAS EAR/PLS OXIMETRY MLT: CPT

## 2024-08-16 PROCEDURE — 25000003 PHARM REV CODE 250: Performed by: STUDENT IN AN ORGANIZED HEALTH CARE EDUCATION/TRAINING PROGRAM

## 2024-08-16 PROCEDURE — 99900031 HC PATIENT EDUCATION (STAT)

## 2024-08-16 PROCEDURE — 94760 N-INVAS EAR/PLS OXIMETRY 1: CPT

## 2024-08-16 PROCEDURE — 25000003 PHARM REV CODE 250: Performed by: INTERNAL MEDICINE

## 2024-08-16 RX ORDER — CIPROFLOXACIN 500 MG/1
500 TABLET ORAL 2 TIMES DAILY
Qty: 10 TABLET | Refills: 0 | Status: SHIPPED | OUTPATIENT
Start: 2024-08-16 | End: 2024-08-21

## 2024-08-16 RX ORDER — POTASSIUM CHLORIDE 20 MEQ/1
20 TABLET, EXTENDED RELEASE ORAL 2 TIMES DAILY
Qty: 6 TABLET | Refills: 0 | Status: SHIPPED | OUTPATIENT
Start: 2024-08-16 | End: 2024-08-19

## 2024-08-16 RX ADMIN — ATORVASTATIN CALCIUM 80 MG: 40 TABLET, FILM COATED ORAL at 10:08

## 2024-08-16 RX ADMIN — BUSPIRONE HYDROCHLORIDE 5 MG: 5 TABLET ORAL at 10:08

## 2024-08-16 RX ADMIN — DAPAGLIFLOZIN 10 MG: 10 TABLET, FILM COATED ORAL at 10:08

## 2024-08-16 RX ADMIN — ARIPIPRAZOLE 2 MG: 2 TABLET ORAL at 10:08

## 2024-08-16 RX ADMIN — SODIUM CHLORIDE, PRESERVATIVE FREE 10 ML: 5 INJECTION INTRAVENOUS at 12:08

## 2024-08-16 RX ADMIN — GABAPENTIN 300 MG: 300 CAPSULE ORAL at 10:08

## 2024-08-16 RX ADMIN — POTASSIUM BICARBONATE 25 MEQ: 977.5 TABLET, EFFERVESCENT ORAL at 12:08

## 2024-08-16 RX ADMIN — CEFTRIAXONE SODIUM 1 G: 1 INJECTION, POWDER, FOR SOLUTION INTRAMUSCULAR; INTRAVENOUS at 02:08

## 2024-08-16 RX ADMIN — THIAMINE HCL TAB 100 MG 100 MG: 100 TAB at 10:08

## 2024-08-16 RX ADMIN — SACUBITRIL AND VALSARTAN 1 TABLET: 24; 26 TABLET, FILM COATED ORAL at 10:08

## 2024-08-16 RX ADMIN — IPRATROPIUM BROMIDE AND ALBUTEROL SULFATE 3 ML: .5; 3 SOLUTION RESPIRATORY (INHALATION) at 08:08

## 2024-08-16 RX ADMIN — ESCITALOPRAM 5 MG: 5 TABLET, FILM COATED ORAL at 10:08

## 2024-08-16 RX ADMIN — FOLIC ACID 1 MG: 1 TABLET ORAL at 10:08

## 2024-08-16 RX ADMIN — IPRATROPIUM BROMIDE AND ALBUTEROL SULFATE 3 ML: .5; 3 SOLUTION RESPIRATORY (INHALATION) at 12:08

## 2024-08-16 RX ADMIN — METOPROLOL SUCCINATE 12.5 MG: 25 TABLET, EXTENDED RELEASE ORAL at 10:08

## 2024-08-16 RX ADMIN — SODIUM CHLORIDE, PRESERVATIVE FREE 10 ML: 5 INJECTION INTRAVENOUS at 05:08

## 2024-08-16 NOTE — PROGRESS NOTES
[facility] [place] [facility] Progress Note        Chief Complaint: Inpatient Follow-up for     HPI:   64 y.o. female who past medical history includes HTN, HLD, dm type 2, CHF, cirrhosis of the liver, COPD; presents to the ED at [facility] on 8/12/2024 with a primary complaint of  AMS and confusion with concerns of taking too much of her home medications per family reports to the ED provider. Patient was recently admitted at [facility] from 7/7-7/9/2024 which she was treated for COPD exacerbation and pneumonia.  Patient has chronic back pain and according to her medication list she is on muscle relaxers, gabapentin, and tramadol.  No reports of new foods or medications.  No reports of chest pain, fever, chills, cough, congestion, or shortness breath outside of her baseline.  Patient has back pain complaints and left knee pain complaints.  Lab work reviewed demonstrated H&H 9.4/28.6, potassium 3.2, 115, CO2 15, alkaline phosphatase 258; other indices unremarkable.  Chest x-ray impression reviewed demonstrated no acute cardiopulmonary process.  X-ray of left knee impression reviewed demonstrated no convincing acute radiographic abnormality.  CT of the head without contrast impression reviewed demonstrated no acute intracranial process identified.  Urinalysis suggestive of acute UTI.    Initial vital signs /76 pulse 72 respirations 14 temperature 97.6° F O2 saturation 98% on room air.  She was started on IV ceftriaxone therapy.  Patient is admitted to hospital medicine services for further management.  Urine culture showing Klebsiella and Serratia. Did report losing her balance while walking at home and having a fall.  PT consulted; will follow recommendations.    Interval Hx:   Today, patient stated she was doing well and had no new complaints. Noted to be hard of hearing.  Will follow up on PT recommendations to start planning for DC.  Will switch over to PO Ciprofloxacin for DC.    Case was  discussed with patient's nurse on the floor.    Objective/physical exam:  General: alert lady lying comfortably in bed, in no acute distress  HENT: oral and oropharyngeal mucosa moist, pink, with no erythema or exudates, no ear pain or discharge  Neck: normal neck movement, no lymph nodes or swellings, no JVD or Carotid bruit  Respiratory: clear breathing sounds bilaterally, no crackles, rales, ronchi or wheezes  Cardiovascular: clear S1 and S2, no murmurs, rubs or gallops  Peripheral Vascular: no lesions, ulcers or erosions, normal peripheral pulses and no pedal edema  Gastrointestinal: soft, non-tender, non-distended abdomen, no guarding, rigidity or rebound tenderness, normal bowel sounds  Integumentary: normal skin color, no rashes or lesions  Neuro: AAO x 3; motor strength 5/5 in B/L UEs & LEs; sensation intact to gross and fine touch B/L; CN II-XII grossly intact    VITAL SIGNS: 24 HRS MIN & MAX LAST   Temp  Min: 97.4 °F (36.3 °C)  Max: 98.9 °F (37.2 °C) 98 °F (36.7 °C)   BP  Min: 101/63  Max: 150/76 109/65   Pulse  Min: 67  Max: 99  67   Resp  Min: 16  Max: 20 18   SpO2  Min: 94 %  Max: 100 % 100 %     I have reviewed the following labs:  Recent Labs   Lab 08/13/24  0654 08/14/24 0424 08/15/24  0440   WBC 6.60 6.95 6.11   RBC 2.95* 3.08* 3.26*   HGB 9.4* 10.2* 10.7*   HCT 28.6* 29.1* 31.5*   MCV 96.9* 94.5* 96.6*   MCH 31.9* 33.1* 32.8*   MCHC 32.9* 35.1 34.0   RDW 21.5* 21.4* 22.2*    197 250   MPV 11.1* 10.9* 10.4     Recent Labs   Lab 08/13/24  0654 08/14/24  0424 08/15/24  0440 08/15/24  1255 08/16/24  0514    135* 139  --  139   K 3.2* 2.9* 3.6  --  3.0*   * 107 108*  --  110*   CO2 15* 17* 17*  --  21*   BUN 12.1 8.7* 7.4*  --  10.8   CREATININE 0.72 0.62 0.64  --  0.80   CALCIUM 8.3* 8.8 8.9  --  8.3*   PH  --   --   --  7.420  --    MG 2.40 2.10 2.20  --   --    ALBUMIN 3.2* 3.2* 3.4  --   --    ALKPHOS 258* 266* 290*  --   --    ALT 36 32 27  --   --    AST 17 24 25  --   --     BILITOT 0.3 0.4 0.5  --   --      Assessment/Plan:  Acute encephalopathy-suspected secondary to polypharmacy vs UTI  Acute UTI with hematuria  Fall- ground level  Chronic pain syndrome  DM type II   HTN  Weakness     Hx of COPD, CHF, HLD, Tobacco abuse, anxiety, depression    Continues to be admitted  No new complaints  On IV Rocephin; will switch to p.o. ciprofloxacin for DC  UCX growing Klebsiella and Serratia  PT consulted; follow recs  On Aripiprazole, Atorvastatin, Buspirone, Dapagliflozin, Escitalopram, Folic Acid, Gabapentin  TID, Metoprolol Succinate, Entresto BID, Thiamine  Continue monitoring symptoms    VTE prophylaxis: Lovenox    Patient condition:  Stable    Anticipated discharge and Disposition:     Pending    All diagnosis and differential diagnosis have been reviewed; assessment and plan has been documented; I have personally reviewed the labs and test results that are presently available; I have reviewed the patients medication list; I have reviewed the consulting providers response and recommendations. I have reviewed or attempted to review medical records based upon their availability    All of the patient's questions have been  addressed and answered. Patient's is agreeable to the above stated plan. I will continue to monitor closely and make adjustments to medical management as needed.    Portions of this note dictated using EMR integrated voice recognition software, and may be subject to voice recognition errors not corrected at proofreading. Please contact writer for clarification if needed.   _____________________________________________________________________    Radiology:  I have personally reviewed the following imaging and agree with the radiologist.     Echo    Left Ventricle: The left ventricle is normal in size. Mildly increased   wall thickness. There is normal systolic function with a visually   estimated ejection fraction of 60 - 65%. Grade I diastolic dysfunction.    Right  Ventricle: Normal right ventricular cavity size. Systolic   function is normal. TAPSE is 1.91 cm.    Left Atrium: Left atrium is moderately dilated.    Aortic Valve: Mildly calcified cusps. Mildly restricted motion. There   is mild stenosis. Aortic valve area by VTI is 1.36 cm². Aortic valve peak   velocity is 2.40 m/s. Mean gradient is 13 mmHg. The dimensionless index is   0.48. There is moderate aortic regurgitation.    Mitral Valve: Mildly calcified leaflets. There is mild mitral annular   calcification present. There is no stenosis. The mean pressure gradient   across the mitral valve is 6 mmHg at a heart rate of  bpm. There is mild   regurgitation.    IVC/SVC: Normal venous pressure at 3 mmHg.  CT Head Without Contrast  Narrative: Technique: CT of the head was performed without intravenous contrast with axial as well as coronal and sagittal images.    Comparison: CT brain March 20, 2024 and MRI brain March 28, 2024.    Dosage Information: Automated Exposure Control was utilized 1033.98 mGy.cm.    Clinical history: AMS.    Findings:    Hemorrhage: No acute intracranial hemorrhage is seen.    CSF spaces: The ventricles, sulci and basal cisterns all appear mildly prominent consistent with global cerebral atrophy.    Brain parenchyma: Chronic microvascular change is seen in portions of the periventricular and deep white matter tracts. Focal right parietal encephalomalacia is seen (series 2, image 42).    Sella and skull base: The sella appears to be within normal limits for age.    Herniation: None.    Calvarium: No acute linear or depressed skull fracture is seen.    Maxillofacial Structures:    Paranasal sinuses: The visualized paranasal sinuses appear clear with no mucoperiosteal thickening or air fluid levels identified.  Impression: Impression:    No acute intracranial process identified. Details and other findings as noted above.    No significant discrepancy with overnight report    Electronically signed  by: John Allen  Date:    08/13/2024  Time:    07:34  X-Ray Knee Complete 4 or More Views Left  EXAMINATION  XR KNEE COMP 4 OR MORE VIEWS LEFT    CLINICAL HISTORY  pain;    TECHNIQUE  A total of 4 image(s) submitted of the left knee.    COMPARISON  None available at the time of initial interpretation.    FINDINGS  No displaced fracture or dislocation is identified. The visualized joint spaces are preserved and there are no findings indicative of a joint effusion. No aggressive osseous lesion or periosteal reaction is identified.    The included soft tissues are without acute abnormality.  Scattered vascular calcification is present.    IMPRESSION  No convincing acute radiographic abnormality.    Electronically signed by: Gaurang Montenegro  Date:    08/13/2024  Time:    06:43      Jamie Evans MD  Department of Hospital Medicine   [facility]   08/16/2024

## 2024-08-16 NOTE — PLAN OF CARE
08/16/24 0852   Final Note   Assessment Type Final Discharge Note   Anticipated Discharge Disposition Home-Health  (FOC: The patient is active with HH services through: [facility]. She will resume HH services with this agency upon discharge.)   Hospital Resources/Appts/Education Provided Appointments scheduled and added to AVS   Post-Acute Status   Post-Acute Authorization Home Health   Home Health Status Set-up Complete/Auth obtained   Coverage Financial Class: Medicare  Payor: MEDICARE - MEDICARE PART A & B -   Patient choice form signed by patient/caregiver List with quality metrics by geographic area provided   Discharge Delays None known at this time     The patient will be discharged from [facility] to her private residence. She will resume HH services with [facility] upon discharge home today. Clinical notes/updates and AVS and D/C Summary were sent via [employer] for review. The patient and her Son were informed of the above discharge plans. Transportation will be arranged by her family member's personal vehicle.

## 2024-08-16 NOTE — NURSING
Nurses Note -- 4 Eyes      8/15/2024   10:33 PM      Skin assessed during: Q Shift Change      [x] No New Altered Skin Integrity Present    [x]Prevention Measures Documented      [] Yes- Altered Skin Integrity Present or Discovered   [] LDA Added if Not in Epic (Describe Wound)   [] New Altered Skin Integrity was Present on Admit and Documented in LDA   [] Wound Image Taken    Wound Care Consulted? No    Attending Nurse:  Shanell Miller RN    Second RN/Staff Member:  Maritza Quiroz RN

## 2024-08-16 NOTE — NURSING
Nurses Note -- 4 Eyes      8/16/2024   11:34 AM      Skin assessed during: Q Shift Change      [x] No Altered Skin Integrity Present    [x]Prevention Measures Documented      [] Yes- Altered Skin Integrity Present or Discovered   [] LDA Added if Not in Epic (Describe Wound)   [] New Altered Skin Integrity was Present on Admit and Documented in LDA   [] Wound Image Taken    Wound Care Consulted? No    Attending Nurse:  Steve Crawford RN/Staff Member:  Shanell HARTMAN

## 2024-08-16 NOTE — PT/OT/SLP PROGRESS
Physical Therapy Treatment    Patient Name:  Luz Maria Alfaro   MRN:  [id number]    Recommendations:     Discharge therapy intensity: Low Intensity Therapy   Discharge Equipment Recommendations: none  Barriers to discharge: None    Assessment:     Luz Maria Alfaro is a 64 y.o. female admitted with a medical diagnosis of acute encephalopathy.  She presents with the following impairments/functional limitations: weakness, impaired endurance, impaired self care skills, impaired functional mobility, gait instability, impaired balance     Rehab Prognosis: Good; patient would benefit from acute skilled PT services to address these deficits and reach maximum level of function.    Recent Surgery: * No surgery found *      Plan:     During this hospitalization, patient would benefit from acute PT services 5 x/week to address the identified rehab impairments via gait training, therapeutic activities, therapeutic exercises and progress toward the following goals:    Plan of Care Expires:  09/15/24    Subjective     Chief Complaint:   Patient/Family Comments/goals:   Pain/Comfort:         Objective:     Communicated with nurse prior to session.  Patient found supine with peripheral IV, telemetry, pulse ox (continuous) upon PT entry to room.     General Precautions: Standard, fall  Orthopedic Precautions: N/A  Braces: N/A  Respiratory Status: Room air  Blood Pressure:   Skin Integrity: Visible skin intact      Functional Mobility:  Bed Mobility:     Supine to Sit: stand by assistance  Sit to Supine: stand by assistance  Transfers:     Sit to Stand:  minimum assistance with rolling walker  Bed to Chair: minimum assistance with  rolling walker  using  Step Transfer  Gait: pt wqalks slowly with a rw for about 20feet with cga . Pt does take very slow short steps    Therapeutic Activities/Exercises:      Education:  Patient provided with verbal education education regarding PT role/goals/POC, fall prevention, and safety awareness.   Understanding was verbalized.     Patient left supine with all lines intact and call button in reach    GOALS:   Multidisciplinary Problems       Physical Therapy Goals          Problem: Physical Therapy    Goal Priority Disciplines Outcome Goal Variances Interventions   Physical Therapy Goal     PT, PT/OT Progressing     Description: Goals to be met by: 9/15/24     Patient will increase functional independence with mobility by performin. Supine to Sit with independence  2. Sit to Stand with SBA  3. Ambulate 100 ft with RW with SBA                         Time Tracking:     PT Received On: 24  PT Start Time: 1200     PT Stop Time: 1220  PT Total Time (min): 20 min     Billable Minutes: Gait Training 20    Treatment Type: Treatment  PT/PTA: PT     Number of PTA visits since last PT visit: 1     2024

## 2024-08-19 ENCOUNTER — PATIENT OUTREACH (OUTPATIENT)
Dept: ADMINISTRATIVE | Facility: CLINIC | Age: 64
End: 2024-08-19
Payer: MEDICARE

## 2024-08-19 ENCOUNTER — PATIENT MESSAGE (OUTPATIENT)
Dept: ADMINISTRATIVE | Facility: CLINIC | Age: 64
End: 2024-08-19
Payer: MEDICARE

## 2024-08-19 NOTE — PROGRESS NOTES
C3 nurse attempted to contact Luz Maria Alfaro for a TCC post hospital discharge follow up call. The patient answered but declined the call d/t being [place]. The patient does not have a scheduled HOSFU appointment, and the pt does not have an [facility] PCP.

## 2024-08-28 NOTE — DISCHARGE SUMMARY
[facility] Hospital Medicine Discharge Summary    Admit Date: 8/12/2024  Discharge Date and Time: 08/16/2024  Admitting Physician:  Team  Discharging Physician: Jamie Evans MD.  Primary Care Physician: Luzmaria Flores FNP  Consults: Hospital Medicine    Discharge Diagnoses:  Acute encephalopathy-suspected secondary to polypharmacy vs UTI  Acute UTI with hematuria  Fall- ground level  Chronic pain syndrome  DM type II   HTN  Weakness     Hx of COPD, CHF, HLD, Tobacco abuse, anxiety, depression    Hospital Course:   64 y.o. female who past medical history includes HTN, HLD, dm type 2, CHF, cirrhosis of the liver, COPD; presents to the ED at [facility] on 8/12/2024 with a primary complaint of  AMS and confusion with concerns of taking too much of her home medications per family reports to the ED provider. Patient was recently admitted at [facility] from 7/7-7/9/2024 which she was treated for COPD exacerbation and pneumonia.  Patient has chronic back pain and according to her medication list she is on muscle relaxers, gabapentin, and tramadol.  No reports of new foods or medications.  No reports of chest pain, fever, chills, cough, congestion, or shortness breath outside of her baseline.  Patient has back pain complaints and left knee pain complaints.  Lab work reviewed demonstrated H&H 9.4/28.6, potassium 3.2, 115, CO2 15, alkaline phosphatase 258; other indices unremarkable.  Chest x-ray impression reviewed demonstrated no acute cardiopulmonary process.  X-ray of left knee impression reviewed demonstrated no convincing acute radiographic abnormality.  CT of the head without contrast impression reviewed demonstrated no acute intracranial process identified.  Urinalysis suggestive of acute UTI.     Initial vital signs /76 pulse 72 respirations 14 temperature 97.6° F O2 saturation 98% on room air.  She was started on IV ceftriaxone therapy.  Patient is admitted to hospital medicine  "services for further management.  Urine culture showing Klebsiella and Serratia. Did report losing her balance while walking at home and having a fall.  PT consulted; recommended low intensity therapy.    Pt was seen and examined on the day of discharge. She stated she was doing well and had no new complaints. Will transition to PO Ciprofloxacin.    Vitals:  VITAL SIGNS: 24 HRS MIN & MAX LAST   No data recorded 98.1 °F (36.7 °C)   No data recorded 98/68   No data recorded  96   No data recorded 18   No data recorded 95 %     Physical Exam:  General: alert lady lying comfortably in bed, in no acute distress  HENT: oral and oropharyngeal mucosa moist, pink, with no erythema or exudates, no ear pain or discharge  Neck: normal neck movement, no lymph nodes or swellings, no JVD or Carotid bruit  Respiratory: clear breathing sounds bilaterally, no crackles, rales, ronchi or wheezes  Cardiovascular: clear S1 and S2, no murmurs, rubs or gallops  Peripheral Vascular: no lesions, ulcers or erosions, normal peripheral pulses and no pedal edema  Gastrointestinal: soft, non-tender, non-distended abdomen, no guarding, rigidity or rebound tenderness, normal bowel sounds  Integumentary: normal skin color, no rashes or lesions  Neuro: AAO x 3; motor strength 5/5 in B/L UEs & LEs; sensation intact to gross and fine touch B/L; CN II-XII grossly intact     Procedures Performed: No admission procedures for hospital encounter.     Significant Diagnostic Studies: See Full reports for all details    No results for input(s): "WBC", "RBC", "HGB", "HCT", "MCV", "MCH", "MCHC", "RDW", "PLT", "MPV", "GRAN", "LYMPH", "MONO", "BASO", "NRBC" in the last 168 hours.    No results for input(s): "NA", "K", "CL", "CO2", "ANIONGAP", "BUN", "CREATININE", "GLU", "CALCIUM", "PH", "MG", "ALBUMIN", "PROT", "ALKPHOS", "ALT", "AST", "BILITOT" in the last 168 hours.     Microbiology Results (last 7 days)       Procedure Component Value Units Date/Time    Urine " culture [[id number]]  (Abnormal)  (Susceptibility) Collected: 08/12/24 4299    Order Status: Completed Specimen: Urine Updated: 08/16/24 0710     Urine Culture 10,000 - 25,000 colonies/ml Klebsiella pneumoniae ssp pneumoniae      10,000 - 25,000 colonies/ml Serratia marcescens             Echo    Left Ventricle: The left ventricle is normal in size. Mildly increased   wall thickness. There is normal systolic function with a visually   estimated ejection fraction of 60 - 65%. Grade I diastolic dysfunction.    Right Ventricle: Normal right ventricular cavity size. Systolic   function is normal. TAPSE is 1.91 cm.    Left Atrium: Left atrium is moderately dilated.    Aortic Valve: Mildly calcified cusps. Mildly restricted motion. There   is mild stenosis. Aortic valve area by VTI is 1.36 cm². Aortic valve peak   velocity is 2.40 m/s. Mean gradient is 13 mmHg. The dimensionless index is   0.48. There is moderate aortic regurgitation.    Mitral Valve: Mildly calcified leaflets. There is mild mitral annular   calcification present. There is no stenosis. The mean pressure gradient   across the mitral valve is 6 mmHg at a heart rate of  bpm. There is mild   regurgitation.    IVC/SVC: Normal venous pressure at 3 mmHg.  CT Head Without Contrast  Narrative: Technique: CT of the head was performed without intravenous contrast with axial as well as coronal and sagittal images.    Comparison: CT brain March 20, 2024 and MRI brain March 28, 2024.    Dosage Information: Automated Exposure Control was utilized 1033.98 mGy.cm.    Clinical history: AMS.    Findings:    Hemorrhage: No acute intracranial hemorrhage is seen.    CSF spaces: The ventricles, sulci and basal cisterns all appear mildly prominent consistent with global cerebral atrophy.    Brain parenchyma: Chronic microvascular change is seen in portions of the periventricular and deep white matter tracts. Focal right parietal encephalomalacia is seen (series 2, image  42).    Sella and skull base: The sella appears to be within normal limits for age.    Herniation: None.    Calvarium: No acute linear or depressed skull fracture is seen.    Maxillofacial Structures:    Paranasal sinuses: The visualized paranasal sinuses appear clear with no mucoperiosteal thickening or air fluid levels identified.  Impression: Impression:    No acute intracranial process identified. Details and other findings as noted above.    No significant discrepancy with overnight report    Electronically signed by: John Allen  Date:    08/13/2024  Time:    07:34  X-Ray Knee Complete 4 or More Views Left  EXAMINATION  XR KNEE COMP 4 OR MORE VIEWS LEFT    CLINICAL HISTORY  pain;    TECHNIQUE  A total of 4 image(s) submitted of the left knee.    COMPARISON  None available at the time of initial interpretation.    FINDINGS  No displaced fracture or dislocation is identified. The visualized joint spaces are preserved and there are no findings indicative of a joint effusion. No aggressive osseous lesion or periosteal reaction is identified.    The included soft tissues are without acute abnormality.  Scattered vascular calcification is present.    IMPRESSION  No convincing acute radiographic abnormality.    Electronically signed by: Gaurang Montenegro  Date:    08/13/2024  Time:    06:43         Medication List        CONTINUE taking these medications      albuterol 90 mcg/actuation inhaler  Commonly known as: PROVENTIL/VENTOLIN HFA     ARIPiprazole 2 MG Tab  Commonly known as: ABILIFY  Take 1 tablet (2 mg total) by mouth once daily.     busPIRone 5 MG Tab  Commonly known as: BUSPAR  Take 1 tablet (5 mg total) by mouth 2 (two) times daily.     dapagliflozin propanediol 10 mg tablet  Commonly known as: Farxiga  Take 1 tablet (10 mg total) by mouth once daily.     ENTRESTO 24-26 mg per tablet  Generic drug: sacubitriL-valsartan     EScitalopram oxalate 5 MG Tab  Commonly known as: LEXAPRO  Take 1 tablet (5 mg total) by  mouth once daily.     folic acid 1 MG tablet  Commonly known as: FOLVITE  Take 1 tablet (1 mg total) by mouth once daily.     furosemide 20 MG tablet  Commonly known as: LASIX  Take 1 tablet (20 mg total) by mouth once daily.     gabapentin 300 MG capsule  Commonly known as: NEURONTIN     hyoscyamine 0.125 mg Subl     metoprolol succinate 25 MG 24 hr tablet  Commonly known as: TOPROL-XL  Take 0.5 tablets (12.5 mg total) by mouth once daily.     ondansetron 8 MG Tbdl  Commonly known as: ZOFRAN-ODT     pantoprazole 40 MG tablet  Commonly known as: PROTONIX     rosuvastatin 20 MG tablet  Commonly known as: CRESTOR     thiamine 100 MG tablet  Take 1 tablet (100 mg total) by mouth once daily.     traMADoL 50 mg tablet  Commonly known as: ULTRAM  Take 1 tablet (50 mg total) by mouth every 8 (eight) hours as needed for Pain.            ASK your doctor about these medications      ciprofloxacin HCl 500 MG tablet  Commonly known as: CIPRO  Take 1 tablet (500 mg total) by mouth 2 (two) times daily. for 5 days  Ask about: Should I take this medication?     potassium chloride SA 20 MEQ tablet  Commonly known as: K-DUR,KLOR-CON  Take 1 tablet (20 mEq total) by mouth 2 (two) times daily. for 3 days  Ask about: Should I take this medication?               Where to Get Your Medications        These medications were sent to [id number] - [place], [place] - [address]  [address] [facility] [place] [address]      Phone: [phone]   ciprofloxacin HCl 500 MG tablet  potassium chloride SA 20 MEQ tablet          Explained in detail to the patient about the discharge plan, medications, and follow-up visits. Pt understands and agrees with the treatment plan  Discharge Disposition: [place]-Health Care [facility]   Discharged Condition: stable  Diet-    Medications Per DC med rec  Activities as tolerated   Contact information for follow-up providers       Luzmaria Flores FNP Follow up in 2 week(s).    Specialty:  Family Medicine  Why: F/up appt after discharge.  Contact information:  [facility] [facility] [address]  [phone]                       Contact information for after-discharge care       [facility] Medical [facility]       [facility] .    Service: Home Health Services  Contact information:  [address] [place] [facility] [address]  [phone]                                 For further questions contact hospitalist office    Discharge time 33 minutes    For worsening symptoms, chest pain, shortness of breath, increased abdominal pain, high grade fever, stroke or stroke like symptoms, immediately go to the nearest Emergency Room or call 911 as soon as possible.      Jamie Sharp M.D.

## 2024-09-27 DIAGNOSIS — G89.4 CHRONIC PAIN SYNDROME: Primary | ICD-10-CM

## 2024-10-03 ENCOUNTER — HOSPITAL ENCOUNTER (INPATIENT)
Facility: HOSPITAL | Age: 64
LOS: 1 days | Discharge: HOME | DRG: 682 | End: 2024-10-05
Attending: STUDENT IN AN ORGANIZED HEALTH CARE EDUCATION/TRAINING PROGRAM | Admitting: INTERNAL MEDICINE
Payer: MEDICARE

## 2024-10-03 DIAGNOSIS — N17.9 AKI (ACUTE KIDNEY INJURY): Primary | ICD-10-CM

## 2024-10-03 DIAGNOSIS — R07.9 CHEST PAIN: ICD-10-CM

## 2024-10-03 DIAGNOSIS — I95.9 HYPOTENSION: ICD-10-CM
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
## 2024-10-03 DIAGNOSIS — M25.569 KNEE PAIN: ICD-10-CM

## 2024-10-03 LAB
ALBUMIN SERPL-MCNC: 3.5 G/DL (ref 3.4–4.8)
ALBUMIN/GLOB SERPL: 1.3 RATIO (ref 1.1–2)
ALP SERPL-CCNC: 176 UNIT/L (ref 40–150)
ALT SERPL-CCNC: <5 UNIT/L (ref 0–55)
ANION GAP SERPL CALC-SCNC: 17 MEQ/L
AST SERPL-CCNC: 9 UNIT/L (ref 5–34)
BASOPHILS # BLD AUTO: 0.05 X10(3)/MCL
BASOPHILS NFR BLD AUTO: 0.7 %
BILIRUB SERPL-MCNC: 0.2 MG/DL
BUN SERPL-MCNC: 19.6 MG/DL (ref 9.8–20.1)
CALCIUM SERPL-MCNC: 7.6 MG/DL (ref 8.4–10.2)
CHLORIDE SERPL-SCNC: 100 MMOL/L (ref 98–107)
CO2 SERPL-SCNC: 22 MMOL/L (ref 23–31)
CREAT SERPL-MCNC: 2.08 MG/DL (ref 0.55–1.02)
CREAT/UREA NIT SERPL: 9
EOSINOPHIL # BLD AUTO: 0.22 X10(3)/MCL (ref 0–0.9)
EOSINOPHIL NFR BLD AUTO: 2.9 %
ERYTHROCYTE [DISTWIDTH] IN BLOOD BY AUTOMATED COUNT: 19.6 % (ref 11.5–17)
GFR SERPLBLD CREATININE-BSD FMLA CKD-EPI: 26 ML/MIN/1.73/M2
GLOBULIN SER-MCNC: 2.8 GM/DL (ref 2.4–3.5)
GLUCOSE SERPL-MCNC: 92 MG/DL (ref 82–115)
HCT VFR BLD AUTO: 30.7 % (ref 37–47)
HGB BLD-MCNC: 10.7 G/DL (ref 12–16)
IMM GRANULOCYTES # BLD AUTO: 0.03 X10(3)/MCL (ref 0–0.04)
IMM GRANULOCYTES NFR BLD AUTO: 0.4 %
LYMPHOCYTES # BLD AUTO: 2.78 X10(3)/MCL (ref 0.6–4.6)
LYMPHOCYTES NFR BLD AUTO: 36.6 %
MAGNESIUM SERPL-MCNC: 2.4 MG/DL (ref 1.6–2.6)
MCH RBC QN AUTO: 32.7 PG (ref 27–31)
MCHC RBC AUTO-ENTMCNC: 34.9 G/DL (ref 33–36)
MCV RBC AUTO: 93.9 FL (ref 80–94)
MONOCYTES # BLD AUTO: 0.63 X10(3)/MCL (ref 0.1–1.3)
MONOCYTES NFR BLD AUTO: 8.3 %
NEUTROPHILS # BLD AUTO: 3.88 X10(3)/MCL (ref 2.1–9.2)
NEUTROPHILS NFR BLD AUTO: 51.1 %
NRBC BLD AUTO-RTO: 0 %
PLATELET # BLD AUTO: 240 X10(3)/MCL (ref 130–400)
PMV BLD AUTO: 11.6 FL (ref 7.4–10.4)
POTASSIUM SERPL-SCNC: 2.5 MMOL/L (ref 3.5–5.1)
PROT SERPL-MCNC: 6.3 GM/DL (ref 5.8–7.6)
RBC # BLD AUTO: 3.27 X10(6)/MCL (ref 4.2–5.4)
SODIUM SERPL-SCNC: 139 MMOL/L (ref 136–145)
TROPONIN I SERPL-MCNC: <0.01 NG/ML (ref 0–0.04)
WBC # BLD AUTO: 7.59 X10(3)/MCL (ref 4.5–11.5)

## 2024-10-03 PROCEDURE — 80053 COMPREHEN METABOLIC PANEL: CPT

## 2024-10-03 PROCEDURE — 63600175 PHARM REV CODE 636 W HCPCS: Performed by: STUDENT IN AN ORGANIZED HEALTH CARE EDUCATION/TRAINING PROGRAM

## 2024-10-03 PROCEDURE — 80307 DRUG TEST PRSMV CHEM ANLYZR: CPT

## 2024-10-03 PROCEDURE — 96374 THER/PROPH/DIAG INJ IV PUSH: CPT

## 2024-10-03 PROCEDURE — 25000003 PHARM REV CODE 250: Performed by: STUDENT IN AN ORGANIZED HEALTH CARE EDUCATION/TRAINING PROGRAM

## 2024-10-03 PROCEDURE — 93005 ELECTROCARDIOGRAM TRACING: CPT

## 2024-10-03 PROCEDURE — 93010 ELECTROCARDIOGRAM REPORT: CPT | Mod: ,,, | Performed by: INTERNAL MEDICINE

## 2024-10-03 PROCEDURE — 81003 URINALYSIS AUTO W/O SCOPE: CPT

## 2024-10-03 PROCEDURE — 85025 COMPLETE CBC W/AUTO DIFF WBC: CPT

## 2024-10-03 PROCEDURE — 25000003 PHARM REV CODE 250

## 2024-10-03 PROCEDURE — G0378 HOSPITAL OBSERVATION PER HR: HCPCS

## 2024-10-03 PROCEDURE — 83735 ASSAY OF MAGNESIUM: CPT | Performed by: STUDENT IN AN ORGANIZED HEALTH CARE EDUCATION/TRAINING PROGRAM

## 2024-10-03 PROCEDURE — 99285 EMERGENCY DEPT VISIT HI MDM: CPT | Mod: 25

## 2024-10-03 PROCEDURE — 84484 ASSAY OF TROPONIN QUANT: CPT

## 2024-10-03 RX ORDER — AMOXICILLIN 250 MG
1 CAPSULE ORAL 2 TIMES DAILY PRN
Status: DISCONTINUED | OUTPATIENT
Start: 2024-10-03 | End: 2024-10-05 | Stop reason: HOSPADM

## 2024-10-03 RX ORDER — POTASSIUM CHLORIDE 7.45 MG/ML
10 INJECTION INTRAVENOUS
Status: COMPLETED | OUTPATIENT
Start: 2024-10-03 | End: 2024-10-04

## 2024-10-03 RX ORDER — PROCHLORPERAZINE EDISYLATE 5 MG/ML
5 INJECTION INTRAMUSCULAR; INTRAVENOUS EVERY 6 HOURS PRN
Status: DISCONTINUED | OUTPATIENT
Start: 2024-10-03 | End: 2024-10-05 | Stop reason: HOSPADM

## 2024-10-03 RX ORDER — NALOXONE HCL 0.4 MG/ML
0.02 VIAL (ML) INJECTION
Status: DISCONTINUED | OUTPATIENT
Start: 2024-10-03 | End: 2024-10-05 | Stop reason: HOSPADM

## 2024-10-03 RX ORDER — BISACODYL 10 MG/1
10 SUPPOSITORY RECTAL DAILY PRN
Status: DISCONTINUED | OUTPATIENT
Start: 2024-10-03 | End: 2024-10-05 | Stop reason: HOSPADM

## 2024-10-03 RX ORDER — POTASSIUM CHLORIDE 7.45 MG/ML
10 INJECTION INTRAVENOUS
Status: DISCONTINUED | OUTPATIENT
Start: 2024-10-03 | End: 2024-10-03

## 2024-10-03 RX ORDER — ENOXAPARIN SODIUM 100 MG/ML
40 INJECTION SUBCUTANEOUS EVERY 24 HOURS
Status: DISCONTINUED | OUTPATIENT
Start: 2024-10-04 | End: 2024-10-05 | Stop reason: HOSPADM

## 2024-10-03 RX ORDER — GLUCAGON 1 MG
1 KIT INJECTION
Status: DISCONTINUED | OUTPATIENT
Start: 2024-10-03 | End: 2024-10-05 | Stop reason: HOSPADM

## 2024-10-03 RX ORDER — SODIUM CHLORIDE, SODIUM LACTATE, POTASSIUM CHLORIDE, CALCIUM CHLORIDE 600; 310; 30; 20 MG/100ML; MG/100ML; MG/100ML; MG/100ML
INJECTION, SOLUTION INTRAVENOUS CONTINUOUS
Status: DISCONTINUED | OUTPATIENT
Start: 2024-10-03 | End: 2024-10-05

## 2024-10-03 RX ORDER — IBUPROFEN 200 MG
16 TABLET ORAL
Status: DISCONTINUED | OUTPATIENT
Start: 2024-10-03 | End: 2024-10-05 | Stop reason: HOSPADM

## 2024-10-03 RX ORDER — SODIUM CHLORIDE 9 MG/ML
500 INJECTION, SOLUTION INTRAVENOUS
Status: COMPLETED | OUTPATIENT
Start: 2024-10-03 | End: 2024-10-03

## 2024-10-03 RX ORDER — ONDANSETRON HYDROCHLORIDE 2 MG/ML
4 INJECTION, SOLUTION INTRAVENOUS EVERY 4 HOURS PRN
Status: DISCONTINUED | OUTPATIENT
Start: 2024-10-03 | End: 2024-10-05 | Stop reason: HOSPADM

## 2024-10-03 RX ORDER — SODIUM CHLORIDE 0.9 % (FLUSH) 0.9 %
10 SYRINGE (ML) INJECTION
Status: DISCONTINUED | OUTPATIENT
Start: 2024-10-03 | End: 2024-10-05 | Stop reason: HOSPADM

## 2024-10-03 RX ORDER — ALUMINUM HYDROXIDE, MAGNESIUM HYDROXIDE, AND SIMETHICONE 1200; 120; 1200 MG/30ML; MG/30ML; MG/30ML
30 SUSPENSION ORAL 4 TIMES DAILY PRN
Status: DISCONTINUED | OUTPATIENT
Start: 2024-10-03 | End: 2024-10-05 | Stop reason: HOSPADM

## 2024-10-03 RX ORDER — IBUPROFEN 200 MG
24 TABLET ORAL
Status: DISCONTINUED | OUTPATIENT
Start: 2024-10-03 | End: 2024-10-05 | Stop reason: HOSPADM

## 2024-10-03 RX ORDER — POTASSIUM CHLORIDE 20 MEQ/1
40 TABLET, EXTENDED RELEASE ORAL
Status: COMPLETED | OUTPATIENT
Start: 2024-10-03 | End: 2024-10-03

## 2024-10-03 RX ORDER — ACETAMINOPHEN 500 MG
1000 TABLET ORAL EVERY 6 HOURS PRN
Status: DISCONTINUED | OUTPATIENT
Start: 2024-10-03 | End: 2024-10-04

## 2024-10-03 RX ORDER — TALC
6 POWDER (GRAM) TOPICAL NIGHTLY PRN
Status: DISCONTINUED | OUTPATIENT
Start: 2024-10-03 | End: 2024-10-05 | Stop reason: HOSPADM

## 2024-10-03 RX ADMIN — POTASSIUM CHLORIDE 10 MEQ: 7.46 INJECTION, SOLUTION INTRAVENOUS at 11:10

## 2024-10-03 RX ADMIN — SODIUM CHLORIDE 500 ML: 9 INJECTION, SOLUTION INTRAVENOUS at 07:10

## 2024-10-03 RX ADMIN — POTASSIUM CHLORIDE 40 MEQ: 1500 TABLET, EXTENDED RELEASE ORAL at 10:10

## 2024-10-03 RX ADMIN — POTASSIUM CHLORIDE 10 MEQ: 7.46 INJECTION, SOLUTION INTRAVENOUS at 10:10

## 2024-10-03 RX ADMIN — POTASSIUM CHLORIDE 10 MEQ: 7.46 INJECTION, SOLUTION INTRAVENOUS at 09:10

## 2024-10-03 NOTE — ED PROVIDER NOTES
Encounter Date: 10/3/2024       History     Chief Complaint   Patient presents with    Fall     c/o left knee pain s/p fall on yesterday or today     64 y.o. White female presents to Emergency Department with a chief complaint of L knee pain. Symptoms began 1 week ago after hitting her L knee. Details of incident unknown. Patient reports she took Robaxin prior to arrival. Associated symptoms include none. Symptoms are aggravated with palpation and there are no alleviating factors. The patient denies CP, SOB, fever, chills, vomiting, or abdominal pain. No other reported symptoms at this time      The history is provided by the patient. No  was used.   Illness   The current episode started today. The problem occurs continuously. The problem has been unchanged. Pertinent negatives include no fever, no photophobia, no abdominal pain, no nausea, no vomiting, no stridor, no cough, no shortness of breath and no wheezing. She has received no recent medical care.     Review of patient's allergies indicates:   Allergen Reactions    Iodinated contrast media Shortness Of Breath    Opioids - morphine analogues     Meperidine Rash and Hives     Past Medical History:   Diagnosis Date    Benign essential HTN     Mixed hyperlipidemia     Type 2 diabetes mellitus without complications      Past Surgical History:   Procedure Laterality Date    APPENDECTOMY      BACK SURGERY      HIP SURGERY      INTRAMEDULLARY RODDING OF FEMUR Right 10/27/2022    Procedure: RIGHT FEMUR INTERTROCH IMN;  Surgeon: Stewart Brambila MD;  Location: [facility];  Service: Orthopedics;  Laterality: Right;  Canyon table, supine  3rd case  Synthes Short TFNA     No family history on file.  Social History     Tobacco Use    Smoking status: Every Day     Current packs/day: 0.50     Types: Cigarettes    Smokeless tobacco: Current   Substance Use Topics    Alcohol use: Yes    Drug use: Never     Review of Systems   Constitutional:  Negative for  diaphoresis, fatigue and fever.   Eyes:  Negative for photophobia and visual disturbance.   Respiratory:  Negative for cough, shortness of breath, wheezing and stridor.    Cardiovascular:  Negative for chest pain, palpitations and leg swelling.   Gastrointestinal:  Negative for abdominal pain, nausea and vomiting.   Musculoskeletal:  Positive for arthralgias. Negative for myalgias.   Skin:  Negative for color change.   All other systems reviewed and are negative.      Physical Exam     Initial Vitals [10/03/24 1702]   BP Pulse Resp Temp SpO2   115/80 67 18 98.6 °F (37 °C) 97 %      MAP       --         Physical Exam    Nursing note and vitals reviewed.  Constitutional: She appears well-developed and well-nourished. She is not diaphoretic. She is cooperative.  Non-toxic appearance. No distress.   HENT:   Head: Normocephalic.   Right Ear: External ear normal.   Left Ear: External ear normal.   Nose: Nose normal.   Eyes: Conjunctivae and EOM are normal. Pupils are equal, round, and reactive to light.   Neck: Neck supple.   Normal range of motion.  Cardiovascular:  Normal rate, regular rhythm, S1 normal, S2 normal, normal heart sounds, intact distal pulses and normal pulses.           Pulses:       Dorsalis pedis pulses are 2+ on the right side and 2+ on the left side.        Posterior tibial pulses are 2+ on the right side and 2+ on the left side.   Pulmonary/Chest: Breath sounds normal. No respiratory distress. She has no wheezes. She has no rales.   Abdominal: Abdomen is soft. Bowel sounds are normal. She exhibits no distension. There is no abdominal tenderness. There is no rebound.   Musculoskeletal:         General: Tenderness present. Normal range of motion.      Cervical back: Normal range of motion and neck supple.      Right knee: Normal. No swelling, deformity or ecchymosis. Normal pulse.      Left knee: No swelling, deformity or ecchymosis. Tenderness present. Normal pulse.      Comments: Tenderness noted to  L knee. Full 5/5 ROM noted. CMS intact. All other adjacent joints otherwise normal.        Neurological: She is alert and oriented to person, place, and time. She has normal strength. No sensory deficit. GCS score is 15. GCS eye subscore is 4. GCS verbal subscore is 5. GCS motor subscore is 6.   Patient arousable with verbal stimuli.    Skin: Skin is warm and dry. Capillary refill takes less than 2 seconds. No erythema.   Psychiatric: She has a normal mood and affect. Thought content normal.         ED Course   Procedures  Labs Reviewed   COMPREHENSIVE METABOLIC PANEL - Abnormal       Result Value    Sodium 139      Potassium 2.5 (*)     Chloride 100      CO2 22 (*)     Glucose 92      Blood Urea Nitrogen 19.6      Creatinine 2.08 (*)     Calcium 7.6 (*)     Protein Total 6.3      Albumin 3.5      Globulin 2.8      Albumin/Globulin Ratio 1.3      Bilirubin Total 0.2       (*)     ALT <5      AST 9      eGFR 26      Anion Gap 17.0      BUN/Creatinine Ratio 9     CBC WITH DIFFERENTIAL - Abnormal    WBC 7.59      RBC 3.27 (*)     Hgb 10.7 (*)     Hct 30.7 (*)     MCV 93.9      MCH 32.7 (*)     MCHC 34.9      RDW 19.6 (*)     Platelet 240      MPV 11.6 (*)     Neut % 51.1      Lymph % 36.6      Mono % 8.3      Eos % 2.9      Basophil % 0.7      Lymph # 2.78      Neut # 3.88      Mono # 0.63      Eos # 0.22      Baso # 0.05      IG# 0.03      IG% 0.4      NRBC% 0.0     TROPONIN I - Normal    Troponin-I <0.010     MAGNESIUM - Normal    Magnesium Level 2.40     CBC W/ AUTO DIFFERENTIAL    Narrative:     The following orders were created for panel order CBC auto differential.  Procedure                               Abnormality         Status                     ---------                               -----------         ------                     CBC with Differential[[id number]]       Abnormal            Final result                 Please view results for these tests on the individual orders.   URINALYSIS, REFLEX  TO URINE CULTURE   DRUG SCREEN, URINE (BEAKER)     EKG Readings: (Independently Interpreted)   Initial Reading: No STEMI. Rhythm: Sinus Bradycardia. Heart Rate: 58.       Imaging Results              CT Cervical Spine Without Contrast (Preliminary result)  Result time 10/03/24 21:18:48      Preliminary result by Alexys Rodriguez Jr., MD (10/03/24 21:18:48)                   Narrative:    START OF REPORT:  Technique: CT of the cervical spine was performed without intravenous contrast with axial as well as sagittal and coronal images.    Comparison: None.    Dosage Information: Automated exposure control was utilized.    Clinical history: 64 y.o. White female presents to Emergency Department with a chief complaint of L knee pain. Symptoms began today after falling and landing on her knee and have been constant since onset. Patient reports she took Robaxin prior to arrival. Details of fall unknown. Associated symptoms include none. Symptoms are aggravated with palpation and there are no alleviating factors. The patient denies CP, SOB, fever, chills, vomiting, or abdominal pain. No other reported symptoms at this time; PT head was scanned a to.    Findings:  Position: Supine.  Lung apices: The visualized lung apices appear unremarkable.  Spine:  Spinal canal: The spinal canal appears unremarkable.  Mineralization: Within normal limits for age.  Rotation: No significant rotation is seen.  Scoliosis: No significant scoliosis is seen.  Vertebral Fusion: No vertebral fusion is identified.  Listhesis: There is grade I anterolisthesis of C4 on C5 and C7 on T1.  Lordosis: Moderate straightening of the cervical lordosis is seen.  Intervertebral disc spaces: Moderately decreased disc height is seen at C5-C6 and C6-C7.  Osteophytes: Mild to moderate multilevel endplate osteophytes are seen.  Endplate Sclerosis: Moderate endplate sclerosis is seen off the opposing endplates at C5-C6 and C6-C7.  Uncovertebral degenerative  changes: Moderate multilevel uncovertebral joint arthrosis is seen.  Facet degenerative changes: Moderate multilevel facet degenerative changes are seen.  Calcifications: None.  Fractures: Grade I anterior wedge compression of C5 and C6 .      Impression:  1. No acute cervical spine fracture dislocation or subluxation is seen.  2. Degenerative changes and other details as above.                          Preliminary result by Interface, Rad Results In (10/03/24 21:18:48)                   Narrative:    START OF REPORT:  Technique: CT of the cervical spine was performed without intravenous contrast with axial as well as sagittal and coronal images.    Comparison: None.    Dosage Information: Automated exposure control was utilized.    Clinical history: 64 y.o. White female presents to Emergency Department with a chief complaint of L knee pain. Symptoms began today after falling and landing on her knee and have been constant since onset. Patient reports she took Robaxin prior to arrival. Details of fall unknown. Associated symptoms include none. Symptoms are aggravated with palpation and there are no alleviating factors. The patient denies CP, SOB, fever, chills, vomiting, or abdominal pain. No other reported symptoms at this time; PT head was scanned a to.    Findings:  Position: Supine.  Lung apices: The visualized lung apices appear unremarkable.  Spine:  Spinal canal: The spinal canal appears unremarkable.  Mineralization: Within normal limits for age.  Rotation: No significant rotation is seen.  Scoliosis: No significant scoliosis is seen.  Vertebral Fusion: No vertebral fusion is identified.  Listhesis: There is grade I anterolisthesis of C4 on C5 and C7 on T1.  Lordosis: Moderate straightening of the cervical lordosis is seen.  Intervertebral disc spaces: Moderately decreased disc height is seen at C5-C6 and C6-C7.  Osteophytes: Mild to moderate multilevel endplate osteophytes are seen.  Endplate Sclerosis:  Moderate endplate sclerosis is seen off the opposing endplates at C5-C6 and C6-C7.  Uncovertebral degenerative changes: Moderate multilevel uncovertebral joint arthrosis is seen.  Facet degenerative changes: Moderate multilevel facet degenerative changes are seen.  Calcifications: None.  Fractures: Grade I anterior wedge compression of C5 and C6 .      Impression:  1. No acute cervical spine fracture dislocation or subluxation is seen.  2. Degenerative changes and other details as above.                                         CT Head Without Contrast (Preliminary result)  Result time 10/03/24 21:13:03      Preliminary result by Alexys Rodriguez Jr., MD (10/03/24 21:13:03)                   Narrative:    START OF REPORT:  Technique: CT of the head was performed without intravenous contrast with axial as well as coronal and sagittal images.    Comparison: Comparison is with study dated 2024-08-12 22:05:14.    Dosage Information: Automated Exposure Control was utilized 2230.80 mGy.cm.    Clinical history: 64 y.o. White female presents to Emergency Department with a chief complaint of L knee pain. Symptoms began today after falling and landing on her knee and have been constant since onset. Patient reports she took Robaxin prior to arrival. Details of fall unknown. Associated symptoms include none. Symptoms are aggravated with palpation and there are no alleviating factors. The patient denies CP, SOB, fever, chills, vomiting, or abdominal pain. No other reported symptoms at this time; PT head was scanned a to.    Findings:  Hemorrhage: No acute intracranial hemorrhage is seen.  CSF spaces: The ventricles, sulci and basal cisterns all appear mildly prominent global cerebral atrophy.  Brain parenchyma: No acute infarct. Mild microvascular change is seen in portions of the periventricular and deep white matter tracts. There is stable focal encephalomalacia in the right parietal lobe.  Cerebellum:  Unremarkable.  Vascular: Unremarkable venous sinuses. Mild atheromatous calcification of the intracranial arteries is seen.  Sella and skull base: The sella appears to be within normal limits for age.  Cerebellopontine angles: Within normal limits.  Herniation: None.  Intracranial calcifications: Incidental note is made of bilateral choroid plexus calcification. Incidental note is made of some pineal region calcification.  Calvarium: No acute linear or depressed skull fracture is seen.  Scalp: No scalp soft tissue swelling is seen.    Maxillofacial Structures:  Paranasal sinuses: The visualized paranasal sinuses appear clear with no mucoperiosteal thickening or air fluid levels identified.  Orbits: The orbits appear unremarkable.  Zygomatic arches: The zygomatic arches are intact and unremarkable.  Temporal bones and mastoids: The temporal bones and mastoids appear unremarkable.  TMJ: The mandibular condyles appear normally placed with respect to the mandibular fossa.      Impression:  1. There is stable focal encephalomalacia in the right parietal lobe.  2. No acute intracranial traumatic injury identified. Details and other findings as noted above.                                         X-Ray Knee Complete 4 or More Views Left (Final result)  Result time 10/03/24 17:27:52      Final result by John Allen MD (10/03/24 17:27:52)                   Impression:      No acute findings identified.      Electronically signed by: John Allen  Date:    10/03/2024  Time:    17:27               Narrative:    EXAMINATION:  XR KNEE COMP 4 OR MORE VIEWS LEFT    CLINICAL HISTORY:  Pain in unspecified knee    TECHNIQUE:  Two-view    COMPARISON:  August 13, 2024    FINDINGS:  There is minimal narrowing of the medial compartment of left knee without significant interval change.  No significant subchondral sclerosis or osteophytes.  Demineralization of the bones.                                       Medications   potassium  chloride SA CR tablet 40 mEq (has no administration in time range)   potassium chloride 10 mEq in 100 mL IVPB (10 mEq Intravenous New Bag 10/3/24 2108)   potassium chloride 10 mEq in 100 mL IVPB (has no administration in time range)   lactated ringers infusion (has no administration in time range)   0.9%  NaCl infusion (500 mLs Intravenous New Bag 10/3/24 1917)     Medical Decision Making  Patient awake, alert, has non-labored breathing, and follows commands appropriately. Arrived to ED due to L knee pain. Symptoms began after a fall. Afebrile. NAD Noted.     Judging by the patient's chief complaint and pertinent history, the patient has the following possible differential diagnoses, including but not limited to the following: Knee Pain, Fracture, Knee Sprain, Fall     Some of these are deemed to be lower likelihood and some more likely based on my physical exam and history combined with possible lab work and/or imaging studies. Please see the pertinent studies, and refer to the HPI. Some of these diagnoses will take further evaluation to fully rule out, perhaps as an outpatient and the patient was encouraged to follow up when discharged for more comprehensive evaluation.       Amount and/or Complexity of Data Reviewed  Labs: ordered. Decision-making details documented in ED Course.     Details: No leukocytosis noted. Cr elevated. K 2.5. Anemia noted. Informed patient of results.   Radiology: ordered. Decision-making details documented in ED Course.     Details: Informed patient of results.   ECG/medicine tests: ordered.  Discussion of management or test interpretation with external provider(s): Due to patient's initial presentation, labs requiring intervention, and hypotension, will admit to  services for further evaluation and treatment. Discussed plan of care and interventions with patient. Agreed to and aware of plan of care. Comfortable being admitted.     Risk  Decision regarding hospitalization.                ED Course as of 10/03/24 2154   Thu Oct 03, 2024   1730 X-Ray Knee Complete 4 or More Views Left  There is minimal narrowing of the medial compartment of left knee without significant interval change.  No significant subchondral sclerosis or osteophytes.  Demineralization of the bones. [JA]   1846 Patient's BP hypotensive. Patient resting, reports she took Robaxin prior to arrival.  [JA]   2020 Potassium(!!): 2.5  Will replace with oral and IV K.  [JA]   2020 Creatinine(!): 2.08  Trending higher than previous labs.  [JA]   2117 CT Head Without Contrast  1. There is stable focal encephalomalacia in the right parietal lobe.  2. No acute intracranial traumatic injury identified. Details and other findings as noted above. [JA]   2119 CT Cervical Spine Without Contrast  1. No acute cervical spine fracture dislocation or subluxation is seen.  2. Degenerative changes and other details as above.   [JA]   2125 Dr. Hester assessed the patient at bedside. Will admit to  services for dehydration and hypokalemia.  [JA]   2148 Discussed patient with GEORGINA Deal, with . Will admit to services and order 30 meQ of K. No further instruction or recommendations given. [JA]   2153 Discussed results with Dr. Reardon, with [employer] [facility] radiology. Reports fractures on cervical spine CT are chronic. Informed HM.  [JA]      ED Course User Index  [JA] Keena Mccullough, GEORGINA                           Clinical Impression:  Final diagnoses:  [M25.569] Knee pain  [I95.9] Hypotension  [N17.9] SILVESTRE (acute kidney injury) (Primary)  [W19.XXXA] Fall, initial encounter          ED Disposition Condition    Observation                 Keena Mccullough, NP  10/03/24 2154

## 2024-10-03 NOTE — Clinical Note
Diagnosis: SILVESTRE (acute kidney injury) [[id number]]   Future Attending Provider: YOVANI SANDOVAL [[id number]]   Admit to which facility:: [facility] [[id number]]   Special Needs:: No Special Needs [1]

## 2024-10-04 LAB
ABS NEUT CALC (OHS): 2.2 X10(3)/MCL (ref 2.1–9.2)
AMPHET UR QL SCN: NEGATIVE
ANION GAP SERPL CALC-SCNC: 12 MEQ/L
ANISOCYTOSIS BLD QL SMEAR: ABNORMAL
BACTERIA #/AREA URNS AUTO: ABNORMAL /HPF
BARBITURATE SCN PRESENT UR: NEGATIVE
BENZODIAZ UR QL SCN: POSITIVE
BILIRUB UR QL STRIP.AUTO: NEGATIVE
BUN SERPL-MCNC: 14.1 MG/DL (ref 9.8–20.1)
CALCIUM SERPL-MCNC: 8.2 MG/DL (ref 8.4–10.2)
CANNABINOIDS UR QL SCN: NEGATIVE
CHLORIDE SERPL-SCNC: 109 MMOL/L (ref 98–107)
CLARITY UR: CLEAR
CO2 SERPL-SCNC: 21 MMOL/L (ref 23–31)
COCAINE UR QL SCN: NEGATIVE
COLOR UR AUTO: YELLOW
CREAT SERPL-MCNC: 1.44 MG/DL (ref 0.55–1.02)
CREAT/UREA NIT SERPL: 10
EOSINOPHIL NFR BLD MANUAL: 0.05 X10(3)/MCL (ref 0–0.9)
EOSINOPHIL NFR BLD MANUAL: 1 % (ref 0–8)
ERYTHROCYTE [DISTWIDTH] IN BLOOD BY AUTOMATED COUNT: 19.4 % (ref 11.5–17)
FENTANYL UR QL SCN: NEGATIVE
GFR SERPLBLD CREATININE-BSD FMLA CKD-EPI: 40 ML/MIN/1.73/M2
GLUCOSE SERPL-MCNC: 78 MG/DL (ref 82–115)
GLUCOSE UR QL STRIP: NEGATIVE
HCT VFR BLD AUTO: 33 % (ref 37–47)
HGB BLD-MCNC: 10.5 G/DL (ref 12–16)
HGB UR QL STRIP: NEGATIVE
HYPOCHROMIA BLD QL SMEAR: ABNORMAL
KETONES UR QL STRIP: NEGATIVE
LEUKOCYTE ESTERASE UR QL STRIP: NEGATIVE
LYMPHOCYTES NFR BLD MANUAL: 2.44 X10(3)/MCL
LYMPHOCYTES NFR BLD MANUAL: 50 % (ref 13–40)
MACROCYTES BLD QL SMEAR: ABNORMAL
MAGNESIUM SERPL-MCNC: 2.4 MG/DL (ref 1.6–2.6)
MCH RBC QN AUTO: 29.9 PG (ref 27–31)
MCHC RBC AUTO-ENTMCNC: 31.8 G/DL (ref 33–36)
MCV RBC AUTO: 94 FL (ref 80–94)
MDMA UR QL SCN: NEGATIVE
MICROCYTES BLD QL SMEAR: ABNORMAL
MONOCYTES NFR BLD MANUAL: 0.2 X10(3)/MCL (ref 0.1–1.3)
MONOCYTES NFR BLD MANUAL: 4 % (ref 2–11)
NEUTROPHILS NFR BLD MANUAL: 45 % (ref 47–80)
NITRITE UR QL STRIP: NEGATIVE
NRBC BLD AUTO-RTO: 0 %
OHS QRS DURATION: 90 MS
OHS QTC CALCULATION: 526 MS
OPIATES UR QL SCN: NEGATIVE
PCP UR QL: NEGATIVE
PH UR STRIP: 6.5 [PH]
PH UR: 6.5 [PH] (ref 3–11)
PLATELET # BLD AUTO: 200 X10(3)/MCL (ref 130–400)
PLATELET # BLD EST: ADEQUATE 10*3/UL
PMV BLD AUTO: 10.2 FL (ref 7.4–10.4)
POCT GLUCOSE: 74 MG/DL (ref 70–110)
POTASSIUM SERPL-SCNC: 3.2 MMOL/L (ref 3.5–5.1)
PROT UR QL STRIP: ABNORMAL
RBC # BLD AUTO: 3.51 X10(6)/MCL (ref 4.2–5.4)
RBC #/AREA URNS AUTO: ABNORMAL /HPF
RBC MORPH BLD: ABNORMAL
SODIUM SERPL-SCNC: 142 MMOL/L (ref 136–145)
SP GR UR STRIP.AUTO: 1.01 (ref 1–1.03)
SPECIFIC GRAVITY, URINE AUTO (.000) (OHS): 1.01 (ref 1–1.03)
SQUAMOUS #/AREA URNS AUTO: ABNORMAL /HPF
UROBILINOGEN UR STRIP-ACNC: 0.2
WBC # BLD AUTO: 4.89 X10(3)/MCL (ref 4.5–11.5)
WBC #/AREA URNS AUTO: ABNORMAL /HPF

## 2024-10-04 PROCEDURE — 83735 ASSAY OF MAGNESIUM: CPT | Performed by: NURSE PRACTITIONER

## 2024-10-04 PROCEDURE — 21400001 HC TELEMETRY ROOM

## 2024-10-04 PROCEDURE — 85027 COMPLETE CBC AUTOMATED: CPT | Performed by: INTERNAL MEDICINE

## 2024-10-04 PROCEDURE — 80048 BASIC METABOLIC PNL TOTAL CA: CPT | Performed by: NURSE PRACTITIONER

## 2024-10-04 PROCEDURE — 11000001 HC ACUTE MED/SURG PRIVATE ROOM

## 2024-10-04 PROCEDURE — 25000003 PHARM REV CODE 250: Performed by: INTERNAL MEDICINE

## 2024-10-04 PROCEDURE — 63600175 PHARM REV CODE 636 W HCPCS: Performed by: STUDENT IN AN ORGANIZED HEALTH CARE EDUCATION/TRAINING PROGRAM

## 2024-10-04 PROCEDURE — 63600175 PHARM REV CODE 636 W HCPCS: Performed by: NURSE PRACTITIONER

## 2024-10-04 PROCEDURE — 63600175 PHARM REV CODE 636 W HCPCS: Performed by: INTERNAL MEDICINE

## 2024-10-04 PROCEDURE — 25000003 PHARM REV CODE 250: Performed by: NURSE PRACTITIONER

## 2024-10-04 RX ORDER — BUSPIRONE HYDROCHLORIDE 5 MG/1
5 TABLET ORAL 2 TIMES DAILY
Status: DISCONTINUED | OUTPATIENT
Start: 2024-10-05 | End: 2024-10-05 | Stop reason: HOSPADM

## 2024-10-04 RX ORDER — DIAZEPAM 5 MG/1
5 TABLET ORAL DAILY PRN
COMMUNITY

## 2024-10-04 RX ORDER — POTASSIUM CHLORIDE 14.9 MG/ML
20 INJECTION INTRAVENOUS
Status: ACTIVE | OUTPATIENT
Start: 2024-10-04 | End: 2024-10-04

## 2024-10-04 RX ORDER — HYDROCODONE BITARTRATE AND ACETAMINOPHEN 5; 325 MG/1; MG/1
1 TABLET ORAL 2 TIMES DAILY PRN
COMMUNITY
End: 2024-10-17 | Stop reason: ALTCHOICE

## 2024-10-04 RX ORDER — PANTOPRAZOLE SODIUM 40 MG/1
40 TABLET, DELAYED RELEASE ORAL 2 TIMES DAILY
Status: DISCONTINUED | OUTPATIENT
Start: 2024-10-04 | End: 2024-10-05 | Stop reason: HOSPADM

## 2024-10-04 RX ORDER — METHOCARBAMOL 750 MG/1
750 TABLET, FILM COATED ORAL 3 TIMES DAILY
COMMUNITY

## 2024-10-04 RX ORDER — ACETAMINOPHEN 325 MG/1
325 TABLET ORAL EVERY 4 HOURS PRN
Status: DISCONTINUED | OUTPATIENT
Start: 2024-10-04 | End: 2024-10-05 | Stop reason: HOSPADM

## 2024-10-04 RX ORDER — HYDRALAZINE HYDROCHLORIDE 20 MG/ML
10 INJECTION INTRAMUSCULAR; INTRAVENOUS
Status: DISCONTINUED | OUTPATIENT
Start: 2024-10-04 | End: 2024-10-05 | Stop reason: HOSPADM

## 2024-10-04 RX ORDER — ATORVASTATIN CALCIUM 40 MG/1
80 TABLET, FILM COATED ORAL DAILY
Status: DISCONTINUED | OUTPATIENT
Start: 2024-10-04 | End: 2024-10-05 | Stop reason: HOSPADM

## 2024-10-04 RX ORDER — ESCITALOPRAM OXALATE 5 MG/1
5 TABLET ORAL DAILY
Status: DISCONTINUED | OUTPATIENT
Start: 2024-10-05 | End: 2024-10-05 | Stop reason: HOSPADM

## 2024-10-04 RX ORDER — MUPIROCIN 20 MG/G
OINTMENT TOPICAL 2 TIMES DAILY
Status: DISCONTINUED | OUTPATIENT
Start: 2024-10-04 | End: 2024-10-05 | Stop reason: HOSPADM

## 2024-10-04 RX ORDER — POTASSIUM CHLORIDE 7.45 MG/ML
40 INJECTION INTRAVENOUS ONCE
Status: DISCONTINUED | OUTPATIENT
Start: 2024-10-04 | End: 2024-10-04

## 2024-10-04 RX ORDER — HYDROCODONE BITARTRATE AND ACETAMINOPHEN 5; 325 MG/1; MG/1
1 TABLET ORAL EVERY 4 HOURS PRN
Status: DISCONTINUED | OUTPATIENT
Start: 2024-10-04 | End: 2024-10-05 | Stop reason: HOSPADM

## 2024-10-04 RX ADMIN — HYDRALAZINE HYDROCHLORIDE 10 MG: 20 INJECTION INTRAMUSCULAR; INTRAVENOUS at 06:10

## 2024-10-04 RX ADMIN — ATORVASTATIN CALCIUM 80 MG: 40 TABLET, FILM COATED ORAL at 05:10

## 2024-10-04 RX ADMIN — POTASSIUM CHLORIDE 10 MEQ: 7.46 INJECTION, SOLUTION INTRAVENOUS at 12:10

## 2024-10-04 RX ADMIN — SODIUM CHLORIDE, POTASSIUM CHLORIDE, SODIUM LACTATE AND CALCIUM CHLORIDE: 600; 310; 30; 20 INJECTION, SOLUTION INTRAVENOUS at 12:10

## 2024-10-04 RX ADMIN — SODIUM CHLORIDE, POTASSIUM CHLORIDE, SODIUM LACTATE AND CALCIUM CHLORIDE: 600; 310; 30; 20 INJECTION, SOLUTION INTRAVENOUS at 03:10

## 2024-10-04 RX ADMIN — ACETAMINOPHEN 1000 MG: 500 TABLET ORAL at 10:10

## 2024-10-04 RX ADMIN — ACETAMINOPHEN 325MG 325 MG: 325 TABLET ORAL at 05:10

## 2024-10-04 RX ADMIN — ENOXAPARIN SODIUM 40 MG: 40 INJECTION SUBCUTANEOUS at 05:10

## 2024-10-04 RX ADMIN — MUPIROCIN: 20 OINTMENT TOPICAL at 10:10

## 2024-10-04 RX ADMIN — PANTOPRAZOLE SODIUM 40 MG: 40 TABLET, DELAYED RELEASE ORAL at 10:10

## 2024-10-04 RX ADMIN — SODIUM CHLORIDE, POTASSIUM CHLORIDE, SODIUM LACTATE AND CALCIUM CHLORIDE: 600; 310; 30; 20 INJECTION, SOLUTION INTRAVENOUS at 05:10

## 2024-10-04 NOTE — PROGRESS NOTES
[facility] EMERGENCY DEPARTMENT  Hospital Medicine Progress Note      Patient Name: Luz Maria Alfaro  MRN: [id number]  Admission Date: 10/3/2024   Length of Stay: 0  Attending Physician: Noha Lamb MD  Primary Care Provider: Luzmaria Flores FNP  Face-to-Face encounter date: 10/04/2024    Code Status: Full Code        Chief Complaint:   Fall (c/o left knee pain s/p fall on yesterday or today)        HPI:   The patient is a 64-year-old female with the history of muscular skeletal pain who presents after striking her knee one week ago with worsening pain. She presented to the emergency department for evaluation. Earlier today, she did take a muscle relaxer in the form of Robaxin. Well in the emergency department blood pressure was initially normal then dropped into the 80s systolic. Patient then became extremely somnolent. She got IV fluids and also potassium completion. Potassium was found to be 2.5. Patient is also positive for fabian currently she is extremely sin and is unable to give any history. Blood pressure is 109 systolic at this time. We are admitting for observation.Knee is not swollen and has full ROM. No concern for septic arthritis.     Overview/Hospital Course:  No notes on file       Interval Hx:   Pt sleeping, initially difficult to arouse, now awake and alert, hard of hearing, says her knee hurts with bending, no swelling/warmth, xray neg.  No f/c.    Review of Systems   All other systems reviewed and are negative.      Objective/physical exam:  General: In no acute distress, afebrile  Chest: Clear to auscultation bilaterally  Heart: RRR, +S1, S2, + murmur  Abdomen: Soft, nontender, BS +  MSK: Warm, no lower extremity edema, no clubbing or cyanosis, left knee tender with movement  Neurologic: no focal deficits    VITAL SIGNS: 24 HRS MIN & MAX LAST   Temp  Min: 98.4 °F (36.9 °C)  Max: 98.6 °F (37 °C) 98.4 °F (36.9 °C)   BP  Min: 87/53  Max: 170/84 (!)  125/56   Pulse  Min: 57  Max: 69  69   Resp  Min: 11  Max: 20 17   SpO2  Min: 92 %  Max: 97 % (!) 94 %       Recent Labs   Lab 10/03/24  1908 10/04/24  0634   WBC 7.59 4.89   RBC 3.27* 3.51*   HGB 10.7* 10.5*   HCT 30.7* 33.0*   MCV 93.9 94.0   MCH 32.7* 29.9   MCHC 34.9 31.8*   RDW 19.6* 19.4*    200   MPV 11.6* 10.2       Recent Labs   Lab 10/03/24  1908 10/04/24  0634    142   K 2.5* 3.2*    109*   CO2 22* 21*   BUN 19.6 14.1   CREATININE 2.08* 1.44*   CALCIUM 7.6* 8.2*   MG 2.40 2.40   ALBUMIN 3.5  --    ALKPHOS 176*  --    ALT <5  --    AST 9  --    BILITOT 0.2  --         Microbiology Results (last 7 days)       ** No results found for the last 168 hours. **             Radiology:  US Retroperitoneal Limited  Narrative: EXAMINATION:  US RETROPERITONEAL LIMITED    CLINICAL HISTORY:  marianela;    TECHNIQUE:  Ultrasound evaluation of the kidneys.    COMPARISON:  CT 03/20/2024    FINDINGS:  No hydronephrosis. Normal renal echogenicity.    Measurements:    Right kidney: 9.1 cm in length    Left kidney: 10.3 cm in length  Impression: No hydronephrosis.    Electronically signed by: Reji Thibodeaux  Date:    10/04/2024  Time:    12:56  CT Cervical Spine Without Contrast  Narrative: EXAMINATION:  CT CERVICAL SPINE WITHOUT CONTRAST    CLINICAL HISTORY:  Polytrauma, blunt;    TECHNIQUE:  Low dose axial images, sagittal and coronal reformations were performed though the cervical spine. Contrast was not administered. Dose reduction techniques including automatic exposure control (AEC) were utilized.    Dose (DLP): 2231 mGycm    COMPARISON:  CT soft tissue neck 10/09/2019.    FINDINGS:  No fracture or acute subluxation.  No aggressive osseous lesion.  No acute or significant paraspinous soft tissue abnormality.  Atherosclerotic calcifications along the common carotid artery bifurcations bilaterally.    C2- C3: Mild disc degeneration, right uncovertebral hypertrophy, and mild right facet joint degeneration  causing mild right foraminal stenosis.  Central canal and left foramina are patent.    C3-C4: Mild disc degeneration, bilateral uncovertebral hypertrophy, and mild left facet joint degeneration causing moderate left foraminal stenosis.  The central canal and right foramina are patent.    C4-C5: Trace degenerative anterolisthesis, mild disc degeneration, mild left uncovertebral hypertrophy, and moderate left facet joint degeneration causing moderate left foraminal stenosis.  The central canal and right foramen are patent.    C5-C6: Severe disc degeneration with shallow broad-based disc osteophyte and mild left facet joint degeneration causing moderate left foraminal stenosis.  The central canal and right foramina are patent.    C6-C7: Severe disc degeneration with shallow broad-based disc osteophyte causing mild central canal stenosis and severe bilateral foraminal stenosis.    C7-T1: Mild disc degeneration with no stenosis.  Impression: No acute fracture or subluxation.    Multilevel degenerative changes of the cervical spine as detailed above.    Findings are consistent with preliminary report.    Electronically signed by: Jack Kirkland  Date:    10/04/2024  Time:    08:05  CT Head Without Contrast  Narrative: EXAMINATION:  CT HEAD WITHOUT CONTRAST    CLINICAL HISTORY:  Mental status change, unknown cause;    TECHNIQUE:  Low dose axial images were obtained through the head.  Coronal and sagittal reformations were also performed. Contrast was not administered.  Dose reduction techniques including automatic exposure control (AEC) were utilized.    Dose (DLP): 2231 mGycm    COMPARISON:  CT head without contrast 08/12/2024.    FINDINGS:  INTRACRANIAL: Small focus of encephalomalacia in the peripheral aspect of the right parietal lobe.  Mild chronic small-vessel ischemic changes of the anterior limbs of the internal capsules and frontal periventricular white matter.  Mild generalized brain atrophy.  No acute intracranial  hemorrhage.  No hydrocephalus.  No intracranial mass effect.    SINUSES: Visualized paranasal sinuses and mastoids are clear.    SKULL/SCALP: Skull base and calvarium are intact.  Moderate left and mild right TMJ degeneration.    ORBITS: Visualized orbits are normal.  Impression: No acute intracranial pathology.    Small focus of encephalomalacia in the right parietal lobe.    Mild chronic small vessel ischemic changes of the supratentorial white matter.    Mild generalized brain atrophy.    Findings are consistent with the preliminary report.    Electronically signed by: Jack Kirkland  Date:    10/04/2024  Time:    07:54      Scheduled Med:   enoxparin  40 mg Subcutaneous Daily        Continuous Infusions:   lactated ringers   Intravenous Continuous 125 mL/hr at 10/04/24 1256 New Bag at 10/04/24 1256        PRN Meds:    Current Facility-Administered Medications:     acetaminophen, 1,000 mg, Oral, Q6H PRN    aluminum-magnesium hydroxide-simethicone, 30 mL, Oral, QID PRN    bisacodyL, 10 mg, Rectal, Daily PRN    dextrose 10%, 12.5 g, Intravenous, PRN    dextrose 10%, 25 g, Intravenous, PRN    glucagon (human recombinant), 1 mg, Intramuscular, PRN    glucose, 16 g, Oral, PRN    glucose, 24 g, Oral, PRN    melatonin, 6 mg, Oral, Nightly PRN    naloxone, 0.02 mg, Intravenous, PRN    ondansetron, 4 mg, Intravenous, Q4H PRN    prochlorperazine, 5 mg, Intravenous, Q6H PRN    senna-docusate 8.6-50 mg, 1 tablet, Oral, BID PRN    sodium chloride 0.9%, 10 mL, Intravenous, PRN     Nutrition Status:      Assessment/Plan:  Hypotension after taking medication-resolved  Hypokalemia   Acute encephalopathy resolved  Dm2  Travis    Plan    Give 40kcl  Labs in am  Resume home meds/iss  Consult PT    Dvt proph: lovenox            All diagnosis and differential diagnosis have been reviewed; assessment and plan has been documented; I have personally reviewed the labs and test results that are presently available; I have reviewed the patients  medication list; I have reviewed the consulting providers response and recommendations. I have reviewed or attempted to review medical records based upon their availability      _____________________________________________________________________            Noah Lamb MD   10/04/2024

## 2024-10-04 NOTE — PT/OT/SLP PROGRESS
Physical Therapy      Patient Name:  Luz Maria Alfaro   MRN:  [id number]    Patient not seen today secondary to currently sleeping soundly. RN stating pt just recently transferred from ED to the floor, RN about to do vital/assessment. Requesting we return in AM once pt more alert.

## 2024-10-04 NOTE — H&P
[facility] - Emergency Dept    History & Physical      Patient Name: Luz Maria Alfaro  MRN: [id number]  Admission Date: 10/3/2024  Attending Physician: Mckayla Cary MD   Primary Care Provider: Luzmaria Flores FNP         Patient information was obtained from patient and ER records.     Subjective:     Principal Problem:<principal problem not specified>    Chief Complaint: AMS  Chief Complaint   Patient presents with    Fall     c/o left knee pain s/p fall on yesterday or today        HPI: The patient is a 64-year-old female with the history of muscular skeletal pain who presents after striking her knee one week ago with worsening pain. She presented to the emergency department for evaluation. Earlier today, she did take a muscle relaxer in the form of Robaxin. Well in the emergency department blood pressure was initially normal then dropped into the 80s systolic. Patient then became extremely somnolent. She got IV fluids and also potassium completion. Potassium was found to be 2.5. Patient is also positive for fabian currently she is extremely sin and is unable to give any history. Blood pressure is 109 systolic at this time. We are admitting for observation.Knee is not swollen and has full ROM. No concern for septic arthritis.    Past Medical History:   Diagnosis Date    Benign essential HTN     Mixed hyperlipidemia     Type 2 diabetes mellitus without complications        Past Surgical History:   Procedure Laterality Date    APPENDECTOMY      BACK SURGERY      HIP SURGERY      INTRAMEDULLARY RODDING OF FEMUR Right 10/27/2022    Procedure: RIGHT FEMUR INTERTROCH IMN;  Surgeon: Stewart Brambila MD;  Location: [facility];  Service: Orthopedics;  Laterality: Right;  [place] table, supine  3rd case  Synthes Short TFNA       Review of patient's allergies indicates:   Allergen Reactions    Iodinated contrast media Shortness Of Breath    Opioids - morphine analogues     Meperidine Rash and Hives       No  current facility-administered medications on file prior to encounter.     Current Outpatient Medications on File Prior to Encounter   Medication Sig    EScitalopram oxalate (LEXAPRO) 5 MG Tab Take 1 tablet (5 mg total) by mouth once daily.    folic acid (FOLVITE) 1 MG tablet Take 1 tablet (1 mg total) by mouth once daily.    furosemide (LASIX) 20 MG tablet Take 1 tablet (20 mg total) by mouth once daily.    metoprolol succinate (TOPROL-XL) 25 MG 24 hr tablet Take 0.5 tablets (12.5 mg total) by mouth once daily.    pantoprazole (PROTONIX) 40 MG tablet Take 40 mg by mouth 2 (two) times daily.    rosuvastatin (CRESTOR) 20 MG tablet Take 20 mg by mouth once daily.    sacubitriL-valsartan (ENTRESTO) 24-26 mg per tablet Take 1 tablet by mouth 2 (two) times daily.    albuterol (PROVENTIL/VENTOLIN HFA) 90 mcg/actuation inhaler Inhale 1 puff into the lungs every 4 (four) hours as needed for Shortness of Breath.    ARIPiprazole (ABILIFY) 2 MG Tab Take 1 tablet (2 mg total) by mouth once daily.    busPIRone (BUSPAR) 5 MG Tab Take 1 tablet (5 mg total) by mouth 2 (two) times daily.    dapagliflozin propanediol (FARXIGA) 10 mg tablet Take 1 tablet (10 mg total) by mouth once daily.    gabapentin (NEURONTIN) 300 MG capsule Take 300 mg by mouth 3 (three) times daily.    hyoscyamine (LEVSIN) 0.125 mg Subl Place 0.125 mg under the tongue 3 (three) times daily as needed (for abdominal cramping).    ondansetron (ZOFRAN-ODT) 8 MG TbDL Take 8 mg by mouth every 6 (six) hours as needed.    thiamine 100 MG tablet Take 1 tablet (100 mg total) by mouth once daily.    traMADoL (ULTRAM) 50 mg tablet Take 1 tablet (50 mg total) by mouth every 8 (eight) hours as needed for Pain.     Family History    None       Tobacco Use    Smoking status: Every Day     Current packs/day: 0.50     Types: Cigarettes    Smokeless tobacco: Current   Substance and Sexual Activity    Alcohol use: Yes    Drug use: Never    Sexual activity: Not Currently     Review  of Systems 10 pt ROS performed and is negative unless otherwise noted    Social Drivers of Health     Tobacco Use: High Risk (10/3/2024)    Patient History     Smoking Tobacco Use: Every Day     Smokeless Tobacco Use: Current     Passive Exposure: Not on file   Alcohol Use: Not At Risk (8/14/2024)    AUDIT-C     Frequency of Alcohol Consumption: Never     Average Number of Drinks: Patient does not drink     Frequency of Binge Drinking: Never   Financial Resource Strain: High Risk (8/14/2024)    Overall Financial Resource Strain (CARDIA)     Difficulty of Paying Living Expenses: Very hard   Food Insecurity: No Food Insecurity (8/14/2024)    Hunger Vital Sign     Worried About Running Out of Food in the Last Year: Never true     Ran Out of Food in the Last Year: Never true   Transportation Needs: No Transportation Needs (8/14/2024)    TRANSPORTATION NEEDS     Transportation : No   Physical Activity: Sufficiently Active (8/14/2024)    Exercise Vital Sign     Days of Exercise per Week: 5 days     Minutes of Exercise per Session: 30 min   Stress: No Stress Concern Present (8/14/2024)    [facility] - Occupational Stress Questionnaire     Feeling of Stress : Only a little   Housing Stability: Low Risk  (8/14/2024)    Housing Stability Vital Sign     Unable to Pay for Housing in the Last Year: No     Homeless in the Last Year: No   Depression: Not at risk (7/7/2024)    Received from [facility]idi[facility] and Affiliates    PHQ-2     PHQ-2 Total: 2   Utilities: Not At Risk (8/14/2024)    C Utilities     Threatened with loss of utilities: No   Health Literacy: Adequate Health Literacy (8/14/2024)     Health Literacy     Frequency of need for help with medical instructions: Never   Social Isolation: Moderately Integrated (8/14/2024)    Social Isolation     Social Isolation: 2      Objective:     Vital Signs (Most Recent):  Temp: 98.6 °F (37  "°C) (10/03/24 1702)  Pulse: 63 (10/04/24 0130)  Resp: 11 (10/04/24 0130)  BP: 128/80 (10/04/24 0130)  SpO2: (!) 93 % (10/04/24 0130) Vital Signs (24h Range):  Temp:  [98.6 °F (37 °C)] 98.6 °F (37 °C)  Pulse:  [57-67] 63  Resp:  [11-20] 11  SpO2:  [92 %-97 %] 93 %  BP: ()/(53-95) 128/80     Weight: 52.2 kg (115 lb)  Body mass index is 20.37 kg/m².    Physical Exam   /80   Pulse 63   Temp 98.6 °F (37 °C) (Oral)   Resp 11   Ht 5' 3" (1.6 m)   Wt 52.2 kg (115 lb)   SpO2 (!) 93%   Breastfeeding No   BMI 20.37 kg/m²     General Appearance:  Very somnolent.    Head:  Normocephalic, without obvious abnormality, atraumatic   Eyes:  PERRL, conjunctiva/corneas clear, EOM's intact, fundi benign, both eyes   Ears:  Normal TM's and external ear canals, both ears   Nose: Nares normal, septum midline,mucosa normal, no drainage or sinus tenderness   Throat: Lips, mucosa, and tongue normal; teeth and gums normal   Neck: Supple, symmetrical, trachea midline, no adenopathy;  thyroid: not enlarged, symmetric, no tenderness/mass/nodules; no carotid bruit or JVD   Back:   Symmetric, no curvature, ROM normal, no CVA tenderness   Lungs:   Clear to auscultation bilaterally, respirations unlabored   Breasts:  No masses or tenderness   Heart:  Regular rate and rhythm, S1 and S2 normal, no murmur, rub, or gallop   Abdomen:   Soft, non-tender, bowel sounds active all four quadrants,  no masses, no organomegaly   Pelvic: Deferred   Extremities: Extremities normal, atraumatic, no cyanosis or edema   Pulses: 2+ and symmetric   Skin: Skin color, texture, turgor normal, no rashes or lesions   Lymph nodes: Cervical, supraclavicular, and axillary nodes normal   Neurologic: Normal         Significant Labs: All pertinent labs within the past 24 hours have been reviewed.  Recent Lab Results         10/03/24  8904   10/03/24  1908        Phencyclidine Negative         Albumin/Globulin Ratio   1.3       Albumin   3.5       ALP   176    "    ALT   <5       Amphetamines, Urine Negative         Anion Gap   17.0       Appearance, UA Clear         AST   9       Bacteria, UA Rare         Barbituates, Urine Negative         Baso #   0.05       Basophil %   0.7       Benzodiazepine, Urine Positive         Bilirubin (UA) Negative         BILIRUBIN TOTAL   0.2       BUN   19.6       BUN/CREAT RATIO   9       Calcium   7.6       Cannabinoids, Urine Negative         Chloride   100       CO2   22       Cocaine, Urine Negative         Color, UA Yellow         Creatinine   2.08       eGFR   26       Eos #   0.22       Eos %   2.9       Fentanyl, Urine Negative         Globulin, Total   2.8       Glucose   92       Glucose, UA Negative         Hematocrit   30.7       Hemoglobin   10.7       Immature Grans (Abs)   0.03       Immature Granulocytes   0.4       Ketones, UA Negative         Leukocyte Esterase, UA Negative         Lymph #   2.78       LYMPH %   36.6       Magnesium    2.40       MCH   32.7       MCHC   34.9       MCV   93.9       MDMA, Urine Negative         Mono #   0.63       Mono %   8.3       MPV   11.6       Neut #   3.88       Neut %   51.1       NITRITE UA Negative         nRBC   0.0       Blood, UA Negative         Opiates, Urine Negative         pH, UA 6.5         pH, Urine 6.5         Platelet Count   240       Potassium   2.5  Comment: Critical Result called and verified by verbal readback to: Dr Hester on 10/03/2024 at 19:58. Reported by [id number].       PROTEIN TOTAL   6.3       Protein, UA Trace         RBC   3.27       RBC, UA 0-2         RDW   19.6       Sodium   139       Specific Gravity,UA 1.010         Specific Gravity, Urine Auto 1.010         Squam Epithel, UA Few         Troponin I   <0.010       Urobilinogen, UA 0.2         WBC, UA 0-5         WBC   7.59               Significant Imaging: I have reviewed all pertinent imaging results/findings within the past 24 hours.  I have reviewed and interpreted all pertinent imaging  results/findings within the past 24 hours.    Assessment/Plan:     Active Diagnoses:    Diagnosis Date Noted POA    Hypotension due to drugs [I95.2] 05/22/2024 Yes    Hypokalemia [E87.6] 05/20/2024 Yes    Acute encephalopathy [G93.40] 03/21/2024 Yes       * Type 2 diabetes mellitus without complications [E11.9]  SILVESTRE  Yes     - Replace K  - Await clinical sobriety  - IVF repletion  - Holding po meds as pt is too somnolent to take po  - SSI while in house  - Follow clinical course  - Avoid nephrotoxins. Follow kidney function.     This encounter was completed via telemedicine (audio/video) w/ nursing at bedside ot assist w/ clinical exam.  SOC Audio/Visual Equipment is using HIPPA Compliant Web Platform. The patient is located in the hospital and the provider is located off site. This patient is placed in observation status under my care.       Participants on Call: Bedside RN, Patient, Physician on Call.   Problems Resolved During this Admission:     VTE Risk Mitigation (From admission, onward)           Ordered     enoxaparin injection 40 mg  Daily         10/03/24 2155     IP VTE HIGH RISK PATIENT  Once         10/03/24 2155     Place sequential compression device  Until discontinued         10/03/24 2155                      Juan Pablo French MD  Department of Hospital Medicine   [facility] - Emergency Dept

## 2024-10-05 VITALS
DIASTOLIC BLOOD PRESSURE: 75 MMHG | OXYGEN SATURATION: 97 % | BODY MASS INDEX: 20.64 KG/M2 | HEIGHT: 63 IN | WEIGHT: 116.5 LBS | HEART RATE: 84 BPM | TEMPERATURE: 98 F | SYSTOLIC BLOOD PRESSURE: 140 MMHG | RESPIRATION RATE: 20 BRPM

## 2024-10-05 PROBLEM — M25.569 KNEE PAIN: Status: ACTIVE | Noted: 2024-10-05

## 2024-10-05 LAB
ANION GAP SERPL CALC-SCNC: 10 MEQ/L
BASOPHILS # BLD AUTO: 0.04 X10(3)/MCL
BASOPHILS NFR BLD AUTO: 0.7 %
BUN SERPL-MCNC: 11.7 MG/DL (ref 9.8–20.1)
CALCIUM SERPL-MCNC: 9 MG/DL (ref 8.4–10.2)
CHLORIDE SERPL-SCNC: 109 MMOL/L (ref 98–107)
CO2 SERPL-SCNC: 22 MMOL/L (ref 23–31)
CREAT SERPL-MCNC: 0.77 MG/DL (ref 0.55–1.02)
CREAT/UREA NIT SERPL: 15
EOSINOPHIL # BLD AUTO: 0.11 X10(3)/MCL (ref 0–0.9)
EOSINOPHIL NFR BLD AUTO: 2 %
ERYTHROCYTE [DISTWIDTH] IN BLOOD BY AUTOMATED COUNT: 19.2 % (ref 11.5–17)
EST. AVERAGE GLUCOSE BLD GHB EST-MCNC: 108.3 MG/DL
GFR SERPLBLD CREATININE-BSD FMLA CKD-EPI: >60 ML/MIN/1.73/M2
GLUCOSE SERPL-MCNC: 79 MG/DL (ref 82–115)
HBA1C MFR BLD: 5.4 %
HCT VFR BLD AUTO: 30 % (ref 37–47)
HGB BLD-MCNC: 9.6 G/DL (ref 12–16)
IMM GRANULOCYTES # BLD AUTO: 0.01 X10(3)/MCL (ref 0–0.04)
IMM GRANULOCYTES NFR BLD AUTO: 0.2 %
LYMPHOCYTES # BLD AUTO: 1.83 X10(3)/MCL (ref 0.6–4.6)
LYMPHOCYTES NFR BLD AUTO: 33 %
MCH RBC QN AUTO: 29.3 PG (ref 27–31)
MCHC RBC AUTO-ENTMCNC: 32 G/DL (ref 33–36)
MCV RBC AUTO: 91.5 FL (ref 80–94)
MONOCYTES # BLD AUTO: 0.29 X10(3)/MCL (ref 0.1–1.3)
MONOCYTES NFR BLD AUTO: 5.2 %
NEUTROPHILS # BLD AUTO: 3.27 X10(3)/MCL (ref 2.1–9.2)
NEUTROPHILS NFR BLD AUTO: 58.9 %
NRBC BLD AUTO-RTO: 0 %
PLATELET # BLD AUTO: 190 X10(3)/MCL (ref 130–400)
PMV BLD AUTO: 10.1 FL (ref 7.4–10.4)
POTASSIUM SERPL-SCNC: 3.1 MMOL/L (ref 3.5–5.1)
RBC # BLD AUTO: 3.28 X10(6)/MCL (ref 4.2–5.4)
SODIUM SERPL-SCNC: 141 MMOL/L (ref 136–145)
WBC # BLD AUTO: 5.55 X10(3)/MCL (ref 4.5–11.5)

## 2024-10-05 PROCEDURE — 80048 BASIC METABOLIC PNL TOTAL CA: CPT | Performed by: INTERNAL MEDICINE

## 2024-10-05 PROCEDURE — 36415 COLL VENOUS BLD VENIPUNCTURE: CPT | Performed by: INTERNAL MEDICINE

## 2024-10-05 PROCEDURE — 83036 HEMOGLOBIN GLYCOSYLATED A1C: CPT | Performed by: NURSE PRACTITIONER

## 2024-10-05 PROCEDURE — 25000003 PHARM REV CODE 250: Performed by: INTERNAL MEDICINE

## 2024-10-05 PROCEDURE — 85025 COMPLETE CBC W/AUTO DIFF WBC: CPT | Performed by: INTERNAL MEDICINE

## 2024-10-05 PROCEDURE — 25000003 PHARM REV CODE 250: Performed by: NURSE PRACTITIONER

## 2024-10-05 PROCEDURE — 97161 PT EVAL LOW COMPLEX 20 MIN: CPT

## 2024-10-05 RX ORDER — INSULIN ASPART 100 [IU]/ML
1-10 INJECTION, SOLUTION INTRAVENOUS; SUBCUTANEOUS
Status: DISCONTINUED | OUTPATIENT
Start: 2024-10-05 | End: 2024-10-05 | Stop reason: HOSPADM

## 2024-10-05 RX ORDER — POTASSIUM CHLORIDE 20 MEQ/1
40 TABLET, EXTENDED RELEASE ORAL ONCE
Status: COMPLETED | OUTPATIENT
Start: 2024-10-05 | End: 2024-10-05

## 2024-10-05 RX ADMIN — ESCITALOPRAM OXALATE 5 MG: 5 TABLET, FILM COATED ORAL at 09:10

## 2024-10-05 RX ADMIN — PANTOPRAZOLE SODIUM 40 MG: 40 TABLET, DELAYED RELEASE ORAL at 09:10

## 2024-10-05 RX ADMIN — MUPIROCIN: 20 OINTMENT TOPICAL at 09:10

## 2024-10-05 RX ADMIN — POTASSIUM CHLORIDE 40 MEQ: 1500 TABLET, EXTENDED RELEASE ORAL at 09:10

## 2024-10-05 RX ADMIN — HYDROCODONE BITARTRATE AND ACETAMINOPHEN 1 TABLET: 5; 325 TABLET ORAL at 09:10

## 2024-10-05 RX ADMIN — ACETAMINOPHEN 325MG 325 MG: 325 TABLET ORAL at 03:10

## 2024-10-05 RX ADMIN — ACETAMINOPHEN 325MG 325 MG: 325 TABLET ORAL at 11:10

## 2024-10-05 RX ADMIN — METOPROLOL SUCCINATE 12.5 MG: 25 TABLET, EXTENDED RELEASE ORAL at 09:10

## 2024-10-05 RX ADMIN — ATORVASTATIN CALCIUM 80 MG: 40 TABLET, FILM COATED ORAL at 09:10

## 2024-10-05 RX ADMIN — BUSPIRONE HYDROCHLORIDE 5 MG: 5 TABLET ORAL at 09:10

## 2024-10-05 NOTE — DISCHARGE SUMMARY
HISTORY OF PRESENT ILLNESS:   Luz Maria Alfaro is a 64 y.o. female with a PMHx of essential hypertension, hyperlipidemia, type 2 DM, COPD, chronic HFpEF, chronic pain syndrome, alcoholic cirrhosis, anxiety, depression who presented to [facility] orthopedic ED on 10/3/2024 with c/o left knee pain following a fall that occurred on 10/02/2024.  She states that she also fell one month ago and her knee has been bothering her since then. She was prescribed Robaxin for arthritis, but it was not helping. Reportedly took Robaxin prior to coming to the ED. EMS reported systolic BP in 80s.     Labs were notable for potassium 2.5, creatinine 2.08, , hemoglobin 10.7, hematocrit 30.7.  Troponin undetectable.  UDS positive for benzodiazepines.  CT C-spine without contrast unremarkable.  CT head without contrast demonstrated stable focal encephalomalacia in the right parietal lobe, no acute intracranial traumatic injury identified.  Left knee x-ray unremarkable.  Potassium was repleted in the ED. She was admitted to hospital medicine service.  Transferred to [facility] via EMS.     REVIEW OF SYSTEMS:   Except as documented, all other systems reviewed and negative.     PAST MEDICAL HISTORY:   Essential hypertension   Hyperlipidemia   Type 2 DM   COPD   Chronic HFpEF  Chronic pain syndrome  Alcoholic cirrhosis   Anxiety   Depression     PAST SURGICAL HISTORY:            Past Surgical History:   Procedure Laterality Date    APPENDECTOMY        BACK SURGERY        HIP SURGERY        INTRAMEDULLARY RODDING OF FEMUR Right 10/27/2022     Procedure: RIGHT FEMUR INTERTROCH IMN;  Surgeon: Stewart Brambila MD;  Location: [facility];  Service: Orthopedics;  Laterality: Right;  [place] table, supine  3rd case  Synthes Short TFNA         FAMILY HISTORY:   Reviewed and negative     SOCIAL HISTORY:   Denied alcohol, or illicit drug use.  Smokes half pack of cigarettes per day.     SCREENING FOR SOCIAL DRIVERS FOR HEALTH:   Patient screened for food  insecurity, housing instability, transportation needs, utility difficulties, and interpersonal safety (select all that apply as identified as concern):  []Housing or Food  []Transportation Needs  []Utility Difficulties  []Interpersonal safety  [x]None     ALLERGIES:   Iodinated contrast media, Opioids - morphine analogues, and Meperidine     HOME MEDICATIONS:              Prior to Admission medications    Medication Sig Start Date End Date Taking? Authorizing Provider   diazePAM (VALIUM) 5 MG tablet Take 5 mg by mouth daily as needed for Anxiety.     Yes Provider, Historical   EScitalopram oxalate (LEXAPRO) 5 MG Tab Take 1 tablet (5 mg total) by mouth once daily. 4/18/24 4/18/25 Yes Pedrito Arechiga MD   folic acid (FOLVITE) 1 MG tablet Take 1 tablet (1 mg total) by mouth once daily. 5/23/24 5/23/25 Yes Evgeny Mcfadden MD   furosemide (LASIX) 20 MG tablet Take 1 tablet (20 mg total) by mouth once daily. 5/23/24 5/23/25 Yes Evgeny Mcfadden MD   HYDROcodone-acetaminophen (NORCO) 5-325 mg per tablet Take 1 tablet by mouth 2 (two) times daily as needed.     Yes Provider, Historical   methocarbamoL (ROBAXIN) 750 MG Tab Take 750 mg by mouth 3 (three) times daily.     Yes Provider, Historical   metoprolol succinate (TOPROL-XL) 25 MG 24 hr tablet Take 0.5 tablets (12.5 mg total) by mouth once daily. 5/23/24 5/23/25 Yes Evgeny Mcfadden MD   pantoprazole (PROTONIX) 40 MG tablet Take 40 mg by mouth 2 (two) times daily.     Yes Provider, Historical   rosuvastatin (CRESTOR) 20 MG tablet Take 20 mg by mouth once daily.     Yes Provider, Historical   sacubitriL-valsartan (ENTRESTO) 24-26 mg per tablet Take 1 tablet by mouth 2 (two) times daily.     Yes Provider, Historical   albuterol (PROVENTIL/VENTOLIN HFA) 90 mcg/actuation inhaler Inhale 1 puff into the lungs every 4 (four) hours as needed for Shortness of Breath.       Provider, Historical   ARIPiprazole (ABILIFY) 2 MG Tab Take 1 tablet (2 mg total) by  mouth once daily. 11/12/22     Adam Weston MD   busPIRone (BUSPAR) 5 MG Tab Take 1 tablet (5 mg total) by mouth 2 (two) times daily. 4/17/24 8/13/24   Pedrito Arechiga MD   dapagliflozin propanediol (FARXIGA) 10 mg tablet Take 1 tablet (10 mg total) by mouth once daily. 5/14/24     Tabatha Stokes,    gabapentin (NEURONTIN) 300 MG capsule Take 300 mg by mouth 3 (three) times daily.       Provider, Historical   hyoscyamine (LEVSIN) 0.125 mg Subl Place 0.125 mg under the tongue 3 (three) times daily as needed (for abdominal cramping).       Provider, Historical   ondansetron (ZOFRAN-ODT) 8 MG TbDL Take 8 mg by mouth every 6 (six) hours as needed. 5/6/24     Provider, Historical   thiamine 100 MG tablet Take 1 tablet (100 mg total) by mouth once daily. 5/23/24     Evgeny Mcfadden MD   traMADoL (ULTRAM) 50 mg tablet Take 1 tablet (50 mg total) by mouth every 8 (eight) hours as needed for Pain. 5/22/24     Evgeny Mcfadden MD      ________________________________________________________________________  INPATIENT LIST OF MEDICATIONS     Current Medications      Current Facility-Administered Medications:     acetaminophen tablet 325 mg, 325 mg, Oral, Q4H PRN, Noah Lamb MD, 325 mg at 10/05/24 0341    aluminum-magnesium hydroxide-simethicone 200-200-20 mg/5 mL suspension 30 mL, 30 mL, Oral, QID PRN, Toyin Golden FNP    atorvastatin tablet 80 mg, 80 mg, Oral, Daily, Noah Lamb MD, 80 mg at 10/04/24 4970    bisacodyL suppository 10 mg, 10 mg, Rectal, Daily PRN, Molbert, Toyin L, FNP    busPIRone tablet 5 mg, 5 mg, Oral, BID, Noah Lamb MD    dextrose 10% bolus 125 mL 125 mL, 12.5 g, Intravenous, PRN, Toyin Golden, ALEKS    dextrose 10% bolus 250 mL 250 mL, 25 g, Intravenous, PRN, Toyin Golden, ALEKS    enoxaparin injection 40 mg, 40 mg, Subcutaneous, Daily, Toyin Golden FNP, 40 mg at 10/04/24 1759    EScitalopram oxalate tablet 5 mg, 5 mg, Oral, Daily, Adonis  Noah NOLAND MD    glucagon (human recombinant) injection 1 mg, 1 mg, Intramuscular, PRN, Toyin Golden, YARIELP    glucose chewable tablet 16 g, 16 g, Oral, PRN, Toyin Golden, ALEKS    glucose chewable tablet 24 g, 24 g, Oral, PRN, Toyin Golden, YARIELP    hydrALAZINE injection 10 mg, 10 mg, Intravenous, Q3H PRN, Noah Lamb MD, 10 mg at 10/04/24 1810    HYDROcodone-acetaminophen 5-325 mg per tablet 1 tablet, 1 tablet, Oral, Q4H PRN, Noah Lamb MD    lactated ringers infusion, , Intravenous, Continuous, Juan Pablo French MD, Last Rate: 125 mL/hr at 10/04/24 1543, New Bag at 10/04/24 1543    melatonin tablet 6 mg, 6 mg, Oral, Nightly PRN, Toyin Golden, ALEKS    metoprolol succinate (TOPROL-XL) 24 hr split tablet 12.5 mg, 12.5 mg, Oral, Daily, Noah Lamb MD    mupirocin 2 % ointment, , Nasal, BID, Helena Spain MD, Given at 10/04/24 2224    naloxone 0.4 mg/mL injection 0.02 mg, 0.02 mg, Intravenous, PRN, Toyin Golden, ALEKS    ondansetron injection 4 mg, 4 mg, Intravenous, Q4H PRN, Toyin Golden FNP    pantoprazole EC tablet 40 mg, 40 mg, Oral, BID, Noah Lamb MD, 40 mg at 10/04/24 2223    prochlorperazine injection Soln 5 mg, 5 mg, Intravenous, Q6H PRN, Toyin Golden, ALEKS    senna-docusate 8.6-50 mg per tablet 1 tablet, 1 tablet, Oral, BID PRN, Toyin Golden, ALEKS    sodium chloride 0.9% flush 10 mL, 10 mL, Intravenous, PRN, Toyin Golden, YARIELP        Scheduled Meds:   atorvastatin  80 mg Oral Daily    busPIRone  5 mg Oral BID    enoxparin  40 mg Subcutaneous Daily    EScitalopram oxalate  5 mg Oral Daily    metoprolol succinate  12.5 mg Oral Daily    mupirocin   Nasal BID    pantoprazole  40 mg Oral BID      Continuous Infusions:   lactated ringers   Intravenous Continuous 125 mL/hr at 10/04/24 1543 New Bag at 10/04/24 1543      PRN Meds:.  Current Facility-Administered Medications:     acetaminophen, 325 mg, Oral, Q4H PRN    aluminum-magnesium  hydroxide-simethicone, 30 mL, Oral, QID PRN    bisacodyL, 10 mg, Rectal, Daily PRN    dextrose 10%, 12.5 g, Intravenous, PRN    dextrose 10%, 25 g, Intravenous, PRN    glucagon (human recombinant), 1 mg, Intramuscular, PRN    glucose, 16 g, Oral, PRN    glucose, 24 g, Oral, PRN    hydrALAZINE, 10 mg, Intravenous, Q3H PRN    HYDROcodone-acetaminophen, 1 tablet, Oral, Q4H PRN    melatonin, 6 mg, Oral, Nightly PRN    naloxone, 0.02 mg, Intravenous, PRN    ondansetron, 4 mg, Intravenous, Q4H PRN    prochlorperazine, 5 mg, Intravenous, Q6H PRN    senna-docusate 8.6-50 mg, 1 tablet, Oral, BID PRN    sodium chloride 0.9%, 10 mL, Intravenous, PRN     PHYSICAL EXAM:      VITAL SIGNS: 24 HRS MIN & MAX LAST   Temp  Min: 97.6 °F (36.4 °C)  Max: 98.6 °F (37 °C) 98.1 °F (36.7 °C)   BP  Min: 113/63  Max: 195/88 (!) 140/75   Pulse  Min: 60  Max: 84  84   Resp  Min: 16  Max: 20 20   SpO2  Min: 94 %  Max: 97 % 97 %         General appearance: Well-developed female in no apparent distress.  HENT: Atraumatic head. Moist mucous membranes of oral cavity. Very hard of hearing.   Eyes: Normal extraocular movements.   Neck: Supple.   Lungs: Clear to auscultation bilaterally.   Heart: Regular rate and rhythm. S1 and S2 present. No pedal edema.  Abdomen: Soft, non-distended, non-tender.  Extremities: No cyanosis, clubbing, or edema.  Skin: No Rash.   Neuro: Motor and sensory exams grossly intact.   Psych/mental status: Awake and alert. Appropriate mood and affect. Responds appropriately to questions.      LABS AND IMAGING:            Recent Labs   Lab 10/03/24  1908 10/04/24  0634 10/05/24  0650   WBC 7.59 4.89 5.55   RBC 3.27* 3.51* 3.28*   HGB 10.7* 10.5* 9.6*   HCT 30.7* 33.0* 30.0*   MCV 93.9 94.0 91.5   MCH 32.7* 29.9 29.3   MCHC 34.9 31.8* 32.0*   RDW 19.6* 19.4* 19.2*    200 190   MPV 11.6* 10.2 10.1               Recent Labs   Lab 10/03/24  1908 10/04/24  0634 10/05/24  0650    142 141   K 2.5* 3.2* 3.1*    109*  109*   CO2 22* 21* 22*   BUN 19.6 14.1 11.7   CREATININE 2.08* 1.44* 0.77   CALCIUM 7.6* 8.2* 9.0   MG 2.40 2.40  --    ALBUMIN 3.5  --   --    ALKPHOS 176*  --   --    ALT <5  --   --    AST 9  --   --    BILITOT 0.2  --   --          Microbiology Results (last 7 days)         ** No results found for the last 168 hours. **                US Retroperitoneal Limited  Narrative: EXAMINATION:  US RETROPERITONEAL LIMITED     CLINICAL HISTORY:  marianela;     TECHNIQUE:  Ultrasound evaluation of the kidneys.     COMPARISON:  CT 03/20/2024     FINDINGS:  No hydronephrosis. Normal renal echogenicity.     Measurements:     Right kidney: 9.1 cm in length     Left kidney: 10.3 cm in length  Impression: No hydronephrosis.     Electronically signed by:Reji Thibodeaux  Date:                                                  10/04/2024  Time:                                                  12:56  CT Cervical Spine Without Contrast  Narrative: EXAMINATION:  CT CERVICAL SPINE WITHOUT CONTRAST     CLINICAL HISTORY:  Polytrauma, blunt;     TECHNIQUE:  Low dose axial images, sagittal and coronal reformations were performed though the cervical spine. Contrast was not administered. Dose reduction techniques including automatic exposure control (AEC) were utilized.     Dose (DLP): 2231 mGycm     COMPARISON:  CT soft tissue neck 10/09/2019.     FINDINGS:  No fracture or acute subluxation.  No aggressive osseous lesion.  No acute or significant paraspinous soft tissue abnormality.  Atherosclerotic calcifications along the common carotid artery bifurcations bilaterally.     C2- C3: Mild disc degeneration, right uncovertebral hypertrophy, and mild right facet joint degeneration causing mild right foraminal stenosis.  Central canal and left foramina are patent.     C3-C4: Mild disc degeneration, bilateral uncovertebral hypertrophy, and mild left facet joint degeneration causing moderate left foraminal stenosis.  The central canal and right foramina  are patent.     C4-C5: Trace degenerative anterolisthesis, mild disc degeneration, mild left uncovertebral hypertrophy, and moderate left facet joint degeneration causing moderate left foraminal stenosis.  The central canal and right foramen are patent.     C5-C6: Severe disc degeneration with shallow broad-based disc osteophyte and mild left facet joint degeneration causing moderate left foraminal stenosis.  The central canal and right foramina are patent.     C6-C7: Severe disc degeneration with shallow broad-based disc osteophyte causing mild central canal stenosis and severe bilateral foraminal stenosis.     C7-T1: Mild disc degeneration with no stenosis.  Impression: No acute fracture or subluxation.     Multilevel degenerative changes of the cervical spine as detailed above.     Findings are consistent with preliminary report.     Electronically signed by:Jack Kirkland  Date:                                                  10/04/2024  Time:                                                  08:05  CT Head Without Contrast  Narrative: EXAMINATION:  CT HEAD WITHOUT CONTRAST     CLINICAL HISTORY:  Mental status change, unknown cause;     TECHNIQUE:  Low dose axial images were obtained through the head.  Coronal and sagittal reformations were also performed. Contrast was not administered.  Dose reduction techniques including automatic exposure control (AEC) were utilized.     Dose (DLP): 2231 mGycm     COMPARISON:  CT head without contrast 08/12/2024.     FINDINGS:  INTRACRANIAL: Small focus of encephalomalacia in the peripheral aspect of the right parietal lobe.  Mild chronic small-vessel ischemic changes of the anterior limbs of the internal capsules and frontal periventricular white matter.  Mild generalized brain atrophy.  No acute intracranial hemorrhage.  No hydrocephalus.  No intracranial mass effect.     SINUSES: Visualized paranasal sinuses and mastoids are clear.     SKULL/SCALP: Skull base and calvarium  are intact.  Moderate left and mild right TMJ degeneration.     ORBITS: Visualized orbits are normal.  Impression: No acute intracranial pathology.     Small focus of encephalomalacia in the right parietal lobe.     Mild chronic small vessel ischemic changes of the supratentorial white matter.     Mild generalized brain atrophy.     Findings are consistent with the preliminary report.     Electronically signed by:Jack Dimasanibal  Date:                                                  10/04/2024  Time:                                                  07:54           ASSESSMENT:   Acute hypotension-resolved   Hypokalemia -repleted  Acute metabolic encephalopathy-resolved   SILVESTRE - resolved  Normocytic anemia     History:  essential hypertension, hyperlipidemia, type 2 DM, COPD, chronic HFpEF, chronic pain syndrome, alcoholic cirrhosis, anxiety, depression        PLAN:   Fall precautions   PT consultation to evaluate and treat   Discontinue IV fluids   Discontinue Nguyen catheter  Replete potassium  SILVESTRE has resolved  Encephalopathy has resolved  Stable for discharge home     VTE Prophylaxis: Lovenox     Discharge Planning and Disposition: TBD     I, Gabriela Starkey, NP have reviewed and discussed the case with Dr. Metcalf.  Please see the attending MD's addendum for further assessment and plan.     Gabriela Starkey, [facility]  Department of Hospital Medicine   [facility]   10/05/2024     This note was created with the assistance of [employer] Voice Recognition Software. There may be transcription errors as a result of using this technology however minimal, and effort has been made to assure accuracy of transcription, but any obvious errors or omissions should be clarified with the author of the document.    _______________________________________________________________________________  MD Addendum:  I, Dr. Gomez, assumed care of this patient today  For the patient encounter, I performed the  substantive portion of the visit, I reviewed the NP/PA documentation, treatment plan, and medical decision making.  I had face to face time with this patient      A. History:  Patient had a fall and hurt her left knee. No other issues  Denies any fever, chills. Chest pain or dysuria.      B. Physical exam:  Heart RRR  Lungs clear   Abdomen soft and non tender   Neuro: No FND    Nnamdi knee not swollen or tender      C. Medical decision making:  Patients labs and vitals reviewed  She is stable and asymptomatic  Potassium low and was replaced  Will dc to home today     All diagnosis and differential diagnosis have been reviewed; assessment and plan has been documented; I have personally reviewed the labs and test results that are presently available; I have reviewed the patients medication list; I have reviewed the consulting providers response and recommendations. I have reviewed or attempted to review medical records based upon their availability.    All of the patient and family questions have been addressed and answered. Patient's is agreeable to the above stated plan. I will continue to monitor closely and make adjustments to medical management as needed.        10/05/2024           Dc to home today     Dc 31 minutes

## 2024-10-05 NOTE — PT/OT/SLP EVAL
Physical Therapy Evaluation and Discharge Note    Patient Name:  Luz Maria Alfaro   MRN:  [id number]    Recommendations:     Discharge therapy intensity: Low Intensity Therapy   Discharge Equipment Recommendations: none   Barriers to discharge: None    Assessment:     Luz Maria Alfaro is a 64 y.o. female admitted with a medical diagnosis of fall with c/o L knee pain. Pt is extremely hard of hearing. At this time, patient is functioning at their prior level of function and does not require further acute PT services. Pt lives with son and grandson and uses RW at baseline. Pt able to demo independent mobility in room with RW, safe to d/c home with low intensity PT.    Recent Surgery: * No surgery found *      Plan:     During this hospitalization, patient does not require further acute PT services.  Please re-consult if situation changes.      Subjective     Chief Complaint: L knee pain  Patient/Family Comments/goals: to go home  Pain/Comfort:  Pain Rating 1:  (did not rate, L knee pain)    Patients cultural, spiritual, [employer]alism conflicts given the current situation: no    Living Environment:  Pt lives with son, grandson in [facility] with thershold entrance.  Prior to admission, patients level of function was Otto with RW.  Equipment used at home: walker, rolling.  DME owned (not currently used): none.  Upon discharge, patient will have assistance from son, grandson.    Objective:     Communicated with RN prior to session.  Patient found HOB elevated with   upon PT entry to room.    General Precautions: Standard,      Orthopedic Precautions:N/A   Braces: N/A  Respiratory Status: Room air  Blood Pressure:     Exams:  RLE ROM: WFL  RLE Strength: WFL  LLE ROM: WFL  LLE Strength: WFL    Functional Mobility:  Bed Mobility:     Supine to Sit: independence  Sit to Supine: independence  Transfers:     Sit to Stand:  modified independence with rolling walker  Gait: Pt ambulated x 30' Otto with RW, very slow donny with slight  antalgic gait towards end of trial     AM-PAC 6 CLICK MOBILITY  Total Score:        Treatment and Education:    Patient provided with verbal education education regarding PT role/goals/POC, fall prevention, and discharge/DME recommendations.  Understanding was verbalized.     Patient left HOB elevated with call button in reach.    GOALS:   Multidisciplinary Problems       Physical Therapy Goals       Not on file                    History:     Past Medical History:   Diagnosis Date    Benign essential HTN     Mixed hyperlipidemia     Type 2 diabetes mellitus without complications        Past Surgical History:   Procedure Laterality Date    APPENDECTOMY      BACK SURGERY      HIP SURGERY      INTRAMEDULLARY RODDING OF FEMUR Right 10/27/2022    Procedure: RIGHT FEMUR INTERTROCH IMN;  Surgeon: Stewart Brambila MD;  Location: [facility];  Service: Orthopedics;  Laterality: Right;  [place] table, supine  3rd case  Synthes Short TFNA       Time Tracking:     PT Received On: 10/05/24  PT Start Time: 1158     PT Stop Time: 1207  PT Total Time (min): 9 min     Billable Minutes: Evaluation 9      10/05/2024

## 2024-10-05 NOTE — H&P
[facility]  Hospital Medicine History & Physical Examination       Patient Name: Luz Maria Alfaro  MRN: [id number]  Patient Class: IP- Inpatient   Admission Date: 10/05/2024   Admitting Service: Hospital Medicine   Length of Stay: 1  Attending Physician: Dr. Metcalf  Primary Care Provider: Luzmaria Flores FNP  Face-to-Face encounter date: 10/05/2024  Code Status: Full  Chief Complaint: Fall (c/o left knee pain s/p fall on yesterday or today)      Patient information was obtained from patient, patient's family, past medical records and ER records.    HISTORY OF PRESENT ILLNESS:   Luz Maria Alfaro is a 64 y.o. female with a PMHx of essential hypertension, hyperlipidemia, type 2 DM, COPD, chronic HFpEF, chronic pain syndrome, alcoholic cirrhosis, anxiety, depression who presented to [facility] orthopedic ED on 10/3/2024 with c/o left knee pain following a fall that occurred on 10/02/2024.  She states that she also fell one month ago and her knee has been bothering her since then. She was prescribed Robaxin for arthritis, but it was not helping. Reportedly took Robaxin prior to coming to the ED. EMS reported systolic BP in 80s.    Labs were notable for potassium 2.5, creatinine 2.08, , hemoglobin 10.7, hematocrit 30.7.  Troponin undetectable.  UDS positive for benzodiazepines.  CT C-spine without contrast unremarkable.  CT head without contrast demonstrated stable focal encephalomalacia in the right parietal lobe, no acute intracranial traumatic injury identified.  Left knee x-ray unremarkable.  Potassium was repleted in the ED. She was admitted to hospital medicine service.  Transferred to [facility] via EMS.    REVIEW OF SYSTEMS:   Except as documented, all other systems reviewed and negative.    PAST MEDICAL HISTORY:   Essential hypertension   Hyperlipidemia   Type 2 DM   COPD   Chronic HFpEF  Chronic pain syndrome  Alcoholic cirrhosis   Anxiety   Depression    PAST SURGICAL HISTORY:      Past Surgical History:   Procedure Laterality Date    APPENDECTOMY      BACK SURGERY      HIP SURGERY      INTRAMEDULLARY RODDING OF FEMUR Right 10/27/2022    Procedure: RIGHT FEMUR INTERTROCH IMN;  Surgeon: Stewart Brambila MD;  Location: [facility];  Service: Orthopedics;  Laterality: Right;  Marble table, supine  3rd case  Synthes Short TFNA       FAMILY HISTORY:   Reviewed and negative    SOCIAL HISTORY:   Denied alcohol, or illicit drug use.  Smokes half pack of cigarettes per day.    SCREENING FOR SOCIAL DRIVERS FOR HEALTH:   Patient screened for food insecurity, housing instability, transportation needs, utility difficulties, and interpersonal safety (select all that apply as identified as concern):  []Housing or Food  []Transportation Needs  []Utility Difficulties  []Interpersonal safety  [x]None    ALLERGIES:   Iodinated contrast media, Opioids - morphine analogues, and Meperidine    HOME MEDICATIONS:     Prior to Admission medications    Medication Sig Start Date End Date Taking? Authorizing Provider   diazePAM (VALIUM) 5 MG tablet Take 5 mg by mouth daily as needed for Anxiety.   Yes Provider, Historical   EScitalopram oxalate (LEXAPRO) 5 MG Tab Take 1 tablet (5 mg total) by mouth once daily. 4/18/24 4/18/25 Yes Pedrito Arechiga MD   folic acid (FOLVITE) 1 MG tablet Take 1 tablet (1 mg total) by mouth once daily. 5/23/24 5/23/25 Yes Evgeny Mcfadden MD   furosemide (LASIX) 20 MG tablet Take 1 tablet (20 mg total) by mouth once daily. 5/23/24 5/23/25 Yes Evgeny Mcfadden MD   HYDROcodone-acetaminophen (NORCO) 5-325 mg per tablet Take 1 tablet by mouth 2 (two) times daily as needed.   Yes Provider, Historical   methocarbamoL (ROBAXIN) 750 MG Tab Take 750 mg by mouth 3 (three) times daily.   Yes Provider, Historical   metoprolol succinate (TOPROL-XL) 25 MG 24 hr tablet Take 0.5 tablets (12.5 mg total) by mouth once daily. 5/23/24 5/23/25 Yes Evgeny Mcfadden MD   pantoprazole (PROTONIX) 40  MG tablet Take 40 mg by mouth 2 (two) times daily.   Yes Provider, Historical   rosuvastatin (CRESTOR) 20 MG tablet Take 20 mg by mouth once daily.   Yes Provider, Historical   sacubitriL-valsartan (ENTRESTO) 24-26 mg per tablet Take 1 tablet by mouth 2 (two) times daily.   Yes Provider, Historical   albuterol (PROVENTIL/VENTOLIN HFA) 90 mcg/actuation inhaler Inhale 1 puff into the lungs every 4 (four) hours as needed for Shortness of Breath.    Provider, Historical   ARIPiprazole (ABILIFY) 2 MG Tab Take 1 tablet (2 mg total) by mouth once daily. 11/12/22   Adam Weston MD   busPIRone (BUSPAR) 5 MG Tab Take 1 tablet (5 mg total) by mouth 2 (two) times daily. 4/17/24 8/13/24  Pedrito Arechiga MD   dapagliflozin propanediol (FARXIGA) 10 mg tablet Take 1 tablet (10 mg total) by mouth once daily. 5/14/24   Tabatha Stokes,    gabapentin (NEURONTIN) 300 MG capsule Take 300 mg by mouth 3 (three) times daily.    Provider, Historical   hyoscyamine (LEVSIN) 0.125 mg Subl Place 0.125 mg under the tongue 3 (three) times daily as needed (for abdominal cramping).    Provider, Historical   ondansetron (ZOFRAN-ODT) 8 MG TbDL Take 8 mg by mouth every 6 (six) hours as needed. 5/6/24   Provider, Historical   thiamine 100 MG tablet Take 1 tablet (100 mg total) by mouth once daily. 5/23/24   Evgeny Mcfadden MD   traMADoL (ULTRAM) 50 mg tablet Take 1 tablet (50 mg total) by mouth every 8 (eight) hours as needed for Pain. 5/22/24   Evgeny Mcfadden MD     ________________________________________________________________________  INPATIENT LIST OF MEDICATIONS     Current Facility-Administered Medications:     acetaminophen tablet 325 mg, 325 mg, Oral, Q4H PRN, Noah Lamb MD, 325 mg at 10/05/24 0341    aluminum-magnesium hydroxide-simethicone 200-200-20 mg/5 mL suspension 30 mL, 30 mL, Oral, QID PRN, Toyin Golden, YARIELP    atorvastatin tablet 80 mg, 80 mg, Oral, Daily, Noah Lamb MD, 80 mg at 10/04/24  1759    bisacodyL suppository 10 mg, 10 mg, Rectal, Daily PRN, Toyin Golden, FNP    busPIRone tablet 5 mg, 5 mg, Oral, BID, Noah Lamb MD    dextrose 10% bolus 125 mL 125 mL, 12.5 g, Intravenous, PRN, Toyin Golden, FNP    dextrose 10% bolus 250 mL 250 mL, 25 g, Intravenous, PRN, Toyin Golden, FNP    enoxaparin injection 40 mg, 40 mg, Subcutaneous, Daily, Toyin Golden, FNP, 40 mg at 10/04/24 1759    EScitalopram oxalate tablet 5 mg, 5 mg, Oral, Daily, Noah Lamb MD    glucagon (human recombinant) injection 1 mg, 1 mg, Intramuscular, PRN, Toyin Golden, FNP    glucose chewable tablet 16 g, 16 g, Oral, PRN, Toyin Golden, FNP    glucose chewable tablet 24 g, 24 g, Oral, PRN, Toyin Golden, FNP    hydrALAZINE injection 10 mg, 10 mg, Intravenous, Q3H PRN, Noah Lamb MD, 10 mg at 10/04/24 1810    HYDROcodone-acetaminophen 5-325 mg per tablet 1 tablet, 1 tablet, Oral, Q4H PRN, Noah Lamb MD    lactated ringers infusion, , Intravenous, Continuous, Juan Pablo Frecnh MD, Last Rate: 125 mL/hr at 10/04/24 1543, New Bag at 10/04/24 1543    melatonin tablet 6 mg, 6 mg, Oral, Nightly PRN, Toyin Golden, FNP    metoprolol succinate (TOPROL-XL) 24 hr split tablet 12.5 mg, 12.5 mg, Oral, Daily, Noah Lamb MD    mupirocin 2 % ointment, , Nasal, BID, Helena Spain MD, Given at 10/04/24 2224    naloxone 0.4 mg/mL injection 0.02 mg, 0.02 mg, Intravenous, PRN, Toyin Golden, FNP    ondansetron injection 4 mg, 4 mg, Intravenous, Q4H PRN, Toyin Golden, YARIELP    pantoprazole EC tablet 40 mg, 40 mg, Oral, BID, Noah Lamb MD, 40 mg at 10/04/24 2223    prochlorperazine injection Soln 5 mg, 5 mg, Intravenous, Q6H PRBERNICE, Toyin Golden FNP    senna-docusate 8.6-50 mg per tablet 1 tablet, 1 tablet, Oral, BID PRNChantel Brandy L, FNP    sodium chloride 0.9% flush 10 mL, 10 mL, Intravenous, PRN, Toyin Golden FNP    Scheduled Meds:   atorvastatin   80 mg Oral Daily    busPIRone  5 mg Oral BID    enoxparin  40 mg Subcutaneous Daily    EScitalopram oxalate  5 mg Oral Daily    metoprolol succinate  12.5 mg Oral Daily    mupirocin   Nasal BID    pantoprazole  40 mg Oral BID     Continuous Infusions:   lactated ringers   Intravenous Continuous 125 mL/hr at 10/04/24 1543 New Bag at 10/04/24 1543     PRN Meds:.  Current Facility-Administered Medications:     acetaminophen, 325 mg, Oral, Q4H PRN    aluminum-magnesium hydroxide-simethicone, 30 mL, Oral, QID PRN    bisacodyL, 10 mg, Rectal, Daily PRN    dextrose 10%, 12.5 g, Intravenous, PRN    dextrose 10%, 25 g, Intravenous, PRN    glucagon (human recombinant), 1 mg, Intramuscular, PRN    glucose, 16 g, Oral, PRN    glucose, 24 g, Oral, PRN    hydrALAZINE, 10 mg, Intravenous, Q3H PRN    HYDROcodone-acetaminophen, 1 tablet, Oral, Q4H PRN    melatonin, 6 mg, Oral, Nightly PRN    naloxone, 0.02 mg, Intravenous, PRN    ondansetron, 4 mg, Intravenous, Q4H PRN    prochlorperazine, 5 mg, Intravenous, Q6H PRN    senna-docusate 8.6-50 mg, 1 tablet, Oral, BID PRN    sodium chloride 0.9%, 10 mL, Intravenous, PRN    PHYSICAL EXAM:     VITAL SIGNS: 24 HRS MIN & MAX LAST   Temp  Min: 97.6 °F (36.4 °C)  Max: 98.6 °F (37 °C) 98.1 °F (36.7 °C)   BP  Min: 113/63  Max: 195/88 (!) 140/75   Pulse  Min: 60  Max: 84  84   Resp  Min: 16  Max: 20 20   SpO2  Min: 94 %  Max: 97 % 97 %       General appearance: Well-developed female in no apparent distress.  HENT: Atraumatic head. Moist mucous membranes of oral cavity. Very hard of hearing.   Eyes: Normal extraocular movements.   Neck: Supple.   Lungs: Clear to auscultation bilaterally.   Heart: Regular rate and rhythm. S1 and S2 present. No pedal edema.  Abdomen: Soft, non-distended, non-tender.  Extremities: No cyanosis, clubbing, or edema.  Skin: No Rash.   Neuro: Motor and sensory exams grossly intact.   Psych/mental status: Awake and alert. Appropriate mood and affect. Responds  appropriately to questions.     LABS AND IMAGING:     Recent Labs   Lab 10/03/24  1908 10/04/24  0634 10/05/24  0650   WBC 7.59 4.89 5.55   RBC 3.27* 3.51* 3.28*   HGB 10.7* 10.5* 9.6*   HCT 30.7* 33.0* 30.0*   MCV 93.9 94.0 91.5   MCH 32.7* 29.9 29.3   MCHC 34.9 31.8* 32.0*   RDW 19.6* 19.4* 19.2*    200 190   MPV 11.6* 10.2 10.1       Recent Labs   Lab 10/03/24  1908 10/04/24  0634 10/05/24  0650    142 141   K 2.5* 3.2* 3.1*    109* 109*   CO2 22* 21* 22*   BUN 19.6 14.1 11.7   CREATININE 2.08* 1.44* 0.77   CALCIUM 7.6* 8.2* 9.0   MG 2.40 2.40  --    ALBUMIN 3.5  --   --    ALKPHOS 176*  --   --    ALT <5  --   --    AST 9  --   --    BILITOT 0.2  --   --        Microbiology Results (last 7 days)       ** No results found for the last 168 hours. **             US Retroperitoneal Limited  Narrative: EXAMINATION:  US RETROPERITONEAL LIMITED    CLINICAL HISTORY:  marianela;    TECHNIQUE:  Ultrasound evaluation of the kidneys.    COMPARISON:  CT 03/20/2024    FINDINGS:  No hydronephrosis. Normal renal echogenicity.    Measurements:    Right kidney: 9.1 cm in length    Left kidney: 10.3 cm in length  Impression: No hydronephrosis.    Electronically signed by: Reji Thibodeaux  Date:    10/04/2024  Time:    12:56  CT Cervical Spine Without Contrast  Narrative: EXAMINATION:  CT CERVICAL SPINE WITHOUT CONTRAST    CLINICAL HISTORY:  Polytrauma, blunt;    TECHNIQUE:  Low dose axial images, sagittal and coronal reformations were performed though the cervical spine. Contrast was not administered. Dose reduction techniques including automatic exposure control (AEC) were utilized.    Dose (DLP): 2231 mGycm    COMPARISON:  CT soft tissue neck 10/09/2019.    FINDINGS:  No fracture or acute subluxation.  No aggressive osseous lesion.  No acute or significant paraspinous soft tissue abnormality.  Atherosclerotic calcifications along the common carotid artery bifurcations bilaterally.    C2- C3: Mild disc degeneration,  right uncovertebral hypertrophy, and mild right facet joint degeneration causing mild right foraminal stenosis.  Central canal and left foramina are patent.    C3-C4: Mild disc degeneration, bilateral uncovertebral hypertrophy, and mild left facet joint degeneration causing moderate left foraminal stenosis.  The central canal and right foramina are patent.    C4-C5: Trace degenerative anterolisthesis, mild disc degeneration, mild left uncovertebral hypertrophy, and moderate left facet joint degeneration causing moderate left foraminal stenosis.  The central canal and right foramen are patent.    C5-C6: Severe disc degeneration with shallow broad-based disc osteophyte and mild left facet joint degeneration causing moderate left foraminal stenosis.  The central canal and right foramina are patent.    C6-C7: Severe disc degeneration with shallow broad-based disc osteophyte causing mild central canal stenosis and severe bilateral foraminal stenosis.    C7-T1: Mild disc degeneration with no stenosis.  Impression: No acute fracture or subluxation.    Multilevel degenerative changes of the cervical spine as detailed above.    Findings are consistent with preliminary report.    Electronically signed by: Jack Kirkland  Date:    10/04/2024  Time:    08:05  CT Head Without Contrast  Narrative: EXAMINATION:  CT HEAD WITHOUT CONTRAST    CLINICAL HISTORY:  Mental status change, unknown cause;    TECHNIQUE:  Low dose axial images were obtained through the head.  Coronal and sagittal reformations were also performed. Contrast was not administered.  Dose reduction techniques including automatic exposure control (AEC) were utilized.    Dose (DLP): 2231 mGycm    COMPARISON:  CT head without contrast 08/12/2024.    FINDINGS:  INTRACRANIAL: Small focus of encephalomalacia in the peripheral aspect of the right parietal lobe.  Mild chronic small-vessel ischemic changes of the anterior limbs of the internal capsules and frontal  periventricular white matter.  Mild generalized brain atrophy.  No acute intracranial hemorrhage.  No hydrocephalus.  No intracranial mass effect.    SINUSES: Visualized paranasal sinuses and mastoids are clear.    SKULL/SCALP: Skull base and calvarium are intact.  Moderate left and mild right TMJ degeneration.    ORBITS: Visualized orbits are normal.  Impression: No acute intracranial pathology.    Small focus of encephalomalacia in the right parietal lobe.    Mild chronic small vessel ischemic changes of the supratentorial white matter.    Mild generalized brain atrophy.    Findings are consistent with the preliminary report.    Electronically signed by: Jack Kirkland  Date:    10/04/2024  Time:    07:54        ASSESSMENT:   Acute hypotension-resolved   Hypokalemia -repleted  Acute metabolic encephalopathy-resolved   SILVESTRE - resolved  Normocytic anemia    History:  essential hypertension, hyperlipidemia, type 2 DM, COPD, chronic HFpEF, chronic pain syndrome, alcoholic cirrhosis, anxiety, depression      PLAN:   Fall precautions   PT consultation to evaluate and treat   Discontinue IV fluids   Discontinue Nguyen catheter  Replete potassium  SILVESTRE has resolved  Encephalopathy has resolved  Stable for discharge home    VTE Prophylaxis: Lovenox    Discharge Planning and Disposition: TBD    I, Gabriela Starkey NP have reviewed and discussed the case with Dr. Metcalf.  Please see the attending MD's addendum for further assessment and plan.    Gabriela Starkey [facility]  Department of Hospital Medicine   [facility]   10/05/2024    This note was created with the assistance of [employer] Voice Recognition Software. There may be transcription errors as a result of using this technology however minimal, and effort has been made to assure accuracy of transcription, but any obvious errors or omissions should be clarified with the author of the document.     _______________________________________________________________________________  MD Addendum:  I, Dr. Gomez, assumed care of this patient today  For the patient encounter, I performed the substantive portion of the visit, I reviewed the NP/PA documentation, treatment plan, and medical decision making.  I had face to face time with this patient     A. History:  Patient had a fall and hurt her left knee. No other issues  Denies any fever, chills. Chest pain or dysuria.     B. Physical exam:  Heart RRR  Lungs clear   Abdomen soft and non tender   Neuro: No FND    Nnamdi knee not swollen or tender     C. Medical decision making:  Patients labs and vitals reviewed  She is stable and asymptomatic  Potassium low and was replaced  Will dc to home today    All diagnosis and differential diagnosis have been reviewed; assessment and plan has been documented; I have personally reviewed the labs and test results that are presently available; I have reviewed the patients medication list; I have reviewed the consulting providers response and recommendations. I have reviewed or attempted to review medical records based upon their availability.    All of the patient and family questions have been addressed and answered. Patient's is agreeable to the above stated plan. I will continue to monitor closely and make adjustments to medical management as needed.      10/05/2024

## 2024-10-07 ENCOUNTER — PATIENT OUTREACH (OUTPATIENT)
Dept: ADMINISTRATIVE | Facility: CLINIC | Age: 64
End: 2024-10-07
Payer: MEDICARE

## 2024-10-07 ENCOUNTER — PATIENT MESSAGE (OUTPATIENT)
Dept: ADMINISTRATIVE | Facility: CLINIC | Age: 64
End: 2024-10-07
Payer: MEDICARE

## 2024-10-07 LAB — POCT GLUCOSE: 98 MG/DL (ref 70–110)

## 2024-10-07 NOTE — PROGRESS NOTES
C3 nurse attempted to contact Luz Maria Alfaro  for a TCC post hospital discharge follow up call. No answer. Left voicemail with callback information. The patient does not have a scheduled HOSFU appointment noted. Unable to message PCP staff.    Message sent via patient's portal regarding follow up call.

## 2024-10-08 NOTE — PROGRESS NOTES
C3 nurse attempted to contact Luz Maria Alfaro  for a TCC post hospital discharge follow up call. No answer. Left voicemail with callback information. The patient does not have a scheduled HOSFU appointment noted. Unable to message PCP staff.

## 2024-10-17 ENCOUNTER — HOSPITAL ENCOUNTER (EMERGENCY)
Facility: HOSPITAL | Age: 64
Discharge: HOME | End: 2024-10-17
Attending: EMERGENCY MEDICINE
Payer: MEDICARE

## 2024-10-17 VITALS
DIASTOLIC BLOOD PRESSURE: 86 MMHG | HEART RATE: 70 BPM | SYSTOLIC BLOOD PRESSURE: 184 MMHG | OXYGEN SATURATION: 100 % | RESPIRATION RATE: 13 BRPM | TEMPERATURE: 98 F

## 2024-10-17 DIAGNOSIS — G89.29 OTHER CHRONIC PAIN: ICD-10-CM

## 2024-10-17 DIAGNOSIS — M79.673 FOOT PAIN: ICD-10-CM

## 2024-10-17 DIAGNOSIS — M84.462A INSUFFICIENCY FRACTURE OF LEFT TIBIA: ICD-10-CM

## 2024-10-17 DIAGNOSIS — M25.562 KNEE PAIN, LEFT: ICD-10-CM

## 2024-10-17 DIAGNOSIS — E87.6 HYPOKALEMIA: ICD-10-CM

## 2024-10-17 LAB
ALBUMIN SERPL-MCNC: 3.5 G/DL (ref 3.4–4.8)
ALBUMIN/GLOB SERPL: 1.1 RATIO (ref 1.1–2)
ALP SERPL-CCNC: 299 UNIT/L (ref 40–150)
ALT SERPL-CCNC: 5 UNIT/L (ref 0–55)
ANION GAP SERPL CALC-SCNC: 10 MEQ/L
AST SERPL-CCNC: 9 UNIT/L (ref 5–34)
BASOPHILS # BLD AUTO: 0.03 X10(3)/MCL
BASOPHILS NFR BLD AUTO: 0.5 %
BILIRUB SERPL-MCNC: 0.2 MG/DL
BUN SERPL-MCNC: 6.6 MG/DL (ref 9.8–20.1)
CALCIUM SERPL-MCNC: 8.5 MG/DL (ref 8.4–10.2)
CHLORIDE SERPL-SCNC: 117 MMOL/L (ref 98–107)
CO2 SERPL-SCNC: 14 MMOL/L (ref 23–31)
CREAT SERPL-MCNC: 0.78 MG/DL (ref 0.55–1.02)
CREAT/UREA NIT SERPL: 8
CRP SERPL-MCNC: 2.7 MG/L
EOSINOPHIL # BLD AUTO: 0.07 X10(3)/MCL (ref 0–0.9)
EOSINOPHIL NFR BLD AUTO: 1.1 %
ERYTHROCYTE [DISTWIDTH] IN BLOOD BY AUTOMATED COUNT: 20.4 % (ref 11.5–17)
ERYTHROCYTE [SEDIMENTATION RATE] IN BLOOD: 25 MM/HR (ref 0–20)
GFR SERPLBLD CREATININE-BSD FMLA CKD-EPI: >60 ML/MIN/1.73/M2
GLOBULIN SER-MCNC: 3.2 GM/DL (ref 2.4–3.5)
GLUCOSE SERPL-MCNC: 93 MG/DL (ref 82–115)
HCT VFR BLD AUTO: 33.3 % (ref 37–47)
HGB BLD-MCNC: 10.4 G/DL (ref 12–16)
IMM GRANULOCYTES # BLD AUTO: 0.02 X10(3)/MCL (ref 0–0.04)
IMM GRANULOCYTES NFR BLD AUTO: 0.3 %
LYMPHOCYTES # BLD AUTO: 1.92 X10(3)/MCL (ref 0.6–4.6)
LYMPHOCYTES NFR BLD AUTO: 30.1 %
MAGNESIUM SERPL-MCNC: 2.4 MG/DL (ref 1.6–2.6)
MCH RBC QN AUTO: 29.8 PG (ref 27–31)
MCHC RBC AUTO-ENTMCNC: 31.2 G/DL (ref 33–36)
MCV RBC AUTO: 95.4 FL (ref 80–94)
MONOCYTES # BLD AUTO: 0.27 X10(3)/MCL (ref 0.1–1.3)
MONOCYTES NFR BLD AUTO: 4.2 %
NEUTROPHILS # BLD AUTO: 4.07 X10(3)/MCL (ref 2.1–9.2)
NEUTROPHILS NFR BLD AUTO: 63.8 %
NRBC BLD AUTO-RTO: 0 %
PLATELET # BLD AUTO: 291 X10(3)/MCL (ref 130–400)
PMV BLD AUTO: 10.5 FL (ref 7.4–10.4)
POTASSIUM SERPL-SCNC: 2.7 MMOL/L (ref 3.5–5.1)
PROT SERPL-MCNC: 6.7 GM/DL (ref 5.8–7.6)
RBC # BLD AUTO: 3.49 X10(6)/MCL (ref 4.2–5.4)
SODIUM SERPL-SCNC: 141 MMOL/L (ref 136–145)
WBC # BLD AUTO: 6.38 X10(3)/MCL (ref 4.5–11.5)

## 2024-10-17 PROCEDURE — 85025 COMPLETE CBC W/AUTO DIFF WBC: CPT | Performed by: EMERGENCY MEDICINE

## 2024-10-17 PROCEDURE — 86140 C-REACTIVE PROTEIN: CPT | Performed by: EMERGENCY MEDICINE

## 2024-10-17 PROCEDURE — 25000003 PHARM REV CODE 250: Performed by: EMERGENCY MEDICINE

## 2024-10-17 PROCEDURE — 83735 ASSAY OF MAGNESIUM: CPT | Performed by: EMERGENCY MEDICINE

## 2024-10-17 PROCEDURE — 80053 COMPREHEN METABOLIC PANEL: CPT | Performed by: EMERGENCY MEDICINE

## 2024-10-17 PROCEDURE — 85652 RBC SED RATE AUTOMATED: CPT | Performed by: EMERGENCY MEDICINE

## 2024-10-17 PROCEDURE — 99285 EMERGENCY DEPT VISIT HI MDM: CPT | Mod: 25

## 2024-10-17 RX ORDER — TRAMADOL HYDROCHLORIDE 50 MG/1
50 TABLET ORAL EVERY 12 HOURS PRN
Qty: 10 TABLET | Refills: 0 | Status: SHIPPED | OUTPATIENT
Start: 2024-10-17 | End: 2024-10-22

## 2024-10-17 RX ORDER — LIDOCAINE 50 MG/G
1 PATCH TOPICAL DAILY
Qty: 30 PATCH | Refills: 0 | Status: SHIPPED | OUTPATIENT
Start: 2024-10-17 | End: 2024-11-16

## 2024-10-17 RX ORDER — POTASSIUM CHLORIDE 7.45 MG/ML
10 INJECTION INTRAVENOUS
Status: DISCONTINUED | OUTPATIENT
Start: 2024-10-17 | End: 2024-10-17

## 2024-10-17 RX ORDER — MELOXICAM 7.5 MG/1
15 TABLET ORAL
Status: COMPLETED | OUTPATIENT
Start: 2024-10-17 | End: 2024-10-17

## 2024-10-17 RX ORDER — POTASSIUM CHLORIDE 20 MEQ/1
40 TABLET, EXTENDED RELEASE ORAL
Status: COMPLETED | OUTPATIENT
Start: 2024-10-17 | End: 2024-10-17

## 2024-10-17 RX ORDER — LIDOCAINE 50 MG/G
1 PATCH TOPICAL
Status: COMPLETED | OUTPATIENT
Start: 2024-10-17 | End: 2024-10-17

## 2024-10-17 RX ORDER — HYDROCODONE BITARTRATE AND ACETAMINOPHEN 5; 325 MG/1; MG/1
1 TABLET ORAL
Status: COMPLETED | OUTPATIENT
Start: 2024-10-17 | End: 2024-10-17

## 2024-10-17 RX ADMIN — LIDOCAINE 1 PATCH: 50 PATCH TOPICAL at 03:10

## 2024-10-17 RX ADMIN — POTASSIUM CHLORIDE 40 MEQ: 1500 TABLET, EXTENDED RELEASE ORAL at 07:10

## 2024-10-17 RX ADMIN — HYDROCODONE BITARTRATE AND ACETAMINOPHEN 1 TABLET: 5; 325 TABLET ORAL at 02:10

## 2024-10-17 RX ADMIN — MELOXICAM 15 MG: 7.5 TABLET ORAL at 03:10

## 2024-10-17 NOTE — ED NOTES
"Patient's bp is 184/86, is refusing bp medications "I want to go home and take my own", informed Dr. Davidson, no acute distress at this time  "

## 2024-10-17 NOTE — ED PROVIDER NOTES
Encounter Date: 10/17/2024       History     Chief Complaint   Patient presents with    Medication Refill     Arrives [facility] reports hx chronic pain out of norco that was filled 5 days ago     63 y/o F with hx HTN, HLD, DM2, chronic back pain w/ prior lumbar surgery, hx femur fx in 2022 s/p IMN, presents w/ complaint of continued left knee pain, left foot pain after running out of Norco prescription at home. Patient is very hard of hearing and poor historian with regards to how long she has been having pain and if any of her symptoms are new/different today than they have been before. She denies any fall or injury.           Review of patient's allergies indicates:   Allergen Reactions    Iodinated contrast media Shortness Of Breath    Opioids - morphine analogues     Meperidine Rash and Hives     Past Medical History:   Diagnosis Date    Benign essential HTN     Mixed hyperlipidemia     Type 2 diabetes mellitus without complications      Past Surgical History:   Procedure Laterality Date    APPENDECTOMY      BACK SURGERY      HIP SURGERY      INTRAMEDULLARY RODDING OF FEMUR Right 10/27/2022    Procedure: RIGHT FEMUR INTERTROCH IMN;  Surgeon: Stewart Brambila MD;  Location: [facility];  Service: Orthopedics;  Laterality: Right;  [place] table, supine  3rd case  Synthes Short TFNA     No family history on file.  Social History     Tobacco Use    Smoking status: Every Day     Current packs/day: 0.50     Types: Cigarettes    Smokeless tobacco: Current   Substance Use Topics    Alcohol use: Yes    Drug use: Never     Review of Systems   Constitutional:  Negative for fever.   Musculoskeletal:  Positive for arthralgias and myalgias. Negative for back pain.       Physical Exam     Initial Vitals [10/17/24 0210]   BP Pulse Resp Temp SpO2   (!) 163/70 68 16 98 °F (36.7 °C) 100 %      MAP       --         Physical Exam    Nursing note and vitals reviewed.  Constitutional: She appears well-developed and well-nourished. No  distress.   HENT:   Head: Normocephalic and atraumatic. Mouth/Throat: Oropharynx is clear and moist.   Eyes: Conjunctivae are normal. No scleral icterus.   Neck: Neck supple.   Normal range of motion.  Cardiovascular:  Normal rate and regular rhythm.           2+ dorsalis pedis pulses bilaterally   Pulmonary/Chest: No respiratory distress.   Abdominal: Abdomen is soft. She exhibits no distension. There is no abdominal tenderness.   Musculoskeletal:         General: No edema.      Cervical back: Normal range of motion and neck supple.      Comments: Left medial knee tenderness, no obvious deformity, full range of motion.  No laxity.  No erythema or warmth.  Full range of motion of the bilateral hips, bilateral ankles without difficulty.  Complains of pain with palpation of the left foot, no point tenderness   No thoracic or lumbar tenderness to palpation, no step-off deformity     Skin: Skin is warm and dry. Capillary refill takes less than 2 seconds. No rash noted. No erythema.         ED Course   Procedures  Labs Reviewed   COMPREHENSIVE METABOLIC PANEL - Abnormal       Result Value    Sodium 141      Potassium 2.7 (*)     Chloride 117 (*)     CO2 14 (*)     Glucose 93      Blood Urea Nitrogen 6.6 (*)     Creatinine 0.78      Calcium 8.5      Protein Total 6.7      Albumin 3.5      Globulin 3.2      Albumin/Globulin Ratio 1.1      Bilirubin Total 0.2       (*)     ALT 5      AST 9      eGFR >60      Anion Gap 10.0      BUN/Creatinine Ratio 8     SEDIMENTATION RATE, AUTOMATED - Abnormal    Sed Rate 25 (*)    CBC WITH DIFFERENTIAL - Abnormal    WBC 6.38      RBC 3.49 (*)     Hgb 10.4 (*)     Hct 33.3 (*)     MCV 95.4 (*)     MCH 29.8      MCHC 31.2 (*)     RDW 20.4 (*)     Platelet 291      MPV 10.5 (*)     Neut % 63.8      Lymph % 30.1      Mono % 4.2      Eos % 1.1      Basophil % 0.5      Lymph # 1.92      Neut # 4.07      Mono # 0.27      Eos # 0.07      Baso # 0.03      IG# 0.02      IG% 0.3      NRBC%  0.0     C-REACTIVE PROTEIN - Normal    CRP 2.70     MAGNESIUM - Normal    Magnesium Level 2.40     CBC W/ AUTO DIFFERENTIAL    Narrative:     The following orders were created for panel order CBC auto differential.  Procedure                               Abnormality         Status                     ---------                               -----------         ------                     CBC with Differential[[id number]]       Abnormal            Final result                 Please view results for these tests on the individual orders.          Imaging Results              CT Lumbar Spine Without Contrast (Final result)  Result time 10/17/24 09:35:22      Final result by John Allen MD (10/17/24 09:35:22)                   Impression:    Impression:    1. No lumbar spine acute fracture dislocation or subluxation is seen.    2. There are chronic compression deformities which is severe in T10 and moderate in T11-T12 with posterior decompression and fusion device on T9 through L1. L1-2 endplate destructive changes with subchondral sclerosis suggesting discitis with chronic elements. Similar findings at T11-12 with some endplate cortication probably quiescent discitis or from discogenic disease.    3. Degenerative changes and other findings as above.    No significant discrepancy with overnight report      Electronically signed by: John Allen  Date:    10/17/2024  Time:    09:35               Narrative:      Technique: CT of the lumbar spine was performed without intravenous contrast with direct axial as well as sagittal and coronal reconstruction images.    Comparison: None.    Clinical history: Lumbar radiculopathy, trauma; Lumbar radiculopathy, osteoporosis presence or risk.    Findings:    Anatomy: Unremarkable.    Mineralization: Mild osteopenia is seen of the visualized bony structures.    Congenital: None.    Bone alignment: Unremarkable with no significant listhesis.    Curvature: The lumbar lordosis is  maintained.    Bone and bone marrow: There are chronic compression deformities which is severe in T10 and moderate in T11 and T12 with posterior decompression and fusion device on T9 through L1. L1-2 endplate destructive changes with subchondral sclerosis suggesting discitis with chronic elements. Similar findings at T11-12 with some endplate cortication probably quiescent discitis or from discogenic disease.    Intervertebral disc spaces: Moderate to severely decreased disc height is seen at T9- T10 down through L5-S1.    Osteophytes: There are small and medium sized osteophytes at T12 down through S1.    Endplate Sclerosis: Moderate endplate sclerosis is seen off the opposing endplates at L1-L2.    Spinal canal: Unremarkable with no bony spinal canal stenosis identified.    Fractures: No lumbar spine acute fracture dislocation or subluxation is seen.    Vertebral Fusion: None.                        Preliminary result by Alexys Rodriguez Jr., MD (10/17/24 04:30:37)                   Impression:    1. No lumbar spine acute fracture dislocation or subluxation is seen.  2. There are chronic compression deformities which is severe in T10 and moderate in T11-T12 with posterior decompression and fusion device on T9 through L1. L1-2 endplate destructive changes with subchondral sclerosis suggesting discitis with chronic elements. Similar findings at T11-12 with some endplate cortication probably quiescent discitis or from discogenic disease.  3. Degenerative changes and other findings as above.               Narrative:    START OF REPORT:  Technique: CT of the lumbar spine was performed without intravenous contrast with direct axial as well as sagittal and coronal reconstruction images.    Comparison: None.    Clinical history: Lumbar radiculopathy, trauma; Lumbar radiculopathy, osteoporosis presence or risk.    Findings:  Anatomy: Unremarkable.  Mineralization: Mild osteopenia is seen of the visualized bony  structures.  Congenital: None.  Bone alignment: Unremarkable with no significant listhesis.  Curvature: The lumbar lordosis is maintained.  Bone and bone marrow: There are chronic compression deformities which is severe in T10 and moderate in T11-T12 with posterior decompression and fusion device on T9 through L1. L1-2 endplate destructive changes with subchondral sclerosis suggesting discitis with chronic elements. Similar findings at T11-12 with some endplate cortication probably quiescent discitis or from discogenic disease.  Intervertebral disc spaces: Moderate to severely decreased disc height is seen at T9- T10 down through L5-S1.  Osteophytes: There are small and medium sized osteophytes at T12 down through S1.  Endplate Sclerosis: Moderate endplate sclerosis is seen off the opposing endplates at L1-L2.  Spinal canal: Unremarkable with no bony spinal canal stenosis identified.  Fractures: No lumbar spine acute fracture dislocation or subluxation is seen.  Vertebral Fusion: None.                                         X-Ray Foot Complete Left (Final result)  Result time 10/17/24 10:49:46      Final result by lCint Bosch MD (10/17/24 10:49:46)                   Impression:      Significant demineralization of the visualized osseous structures more than expected for patient's age.    Significant degenerative changes.    Deformity of the he in foot likely related to remote trauma with significant degenerative changes.      Electronically signed by: Clint Bosch  Date:    10/17/2024  Time:    10:49               Narrative:    EXAMINATION:  XR FOOT COMPLETE 3 VIEW LEFT    CLINICAL HISTORY:  Pain in unspecified foot    COMPARISON:  None.    FINDINGS:  There are some deformities of the midshaft of the 2nd and 3rd metatarsals might be related to remote trauma    There is demineralization of the visualized osseous structures more than expected for patient's age    There is narrowing of the proximal and  distal interphalangeal joints there are significant changes at the he in foot with degenerative changes of the tibia talar, talar navicular and talar calcaneal joints with deformity of the calcaneus likely related to remote trauma    No blastic or lytic lesions.    Soft tissues within normal limits.                                        X-Ray Knee Complete 4 or More Views Left (Final result)  Result time 10/17/24 13:47:52      Final result by Devi Richey MD (10/17/24 13:47:52)                   Impression:      Insufficiency fracture of the proximal tibia.    This report was flagged in Epic as abnormal.      Electronically signed by: Devi Richey  Date:    10/17/2024  Time:    13:47               Narrative:    EXAMINATION:  XR KNEE COMP 4 OR MORE VIEWS LEFT    CLINICAL HISTORY:  Pain in left knee    TECHNIQUE:  AP, lateral, and bilateral oblique views of the left knee.    COMPARISON:  None.    FINDINGS:  The bones are demineralized.  There is an insufficiency fracture of the proximal tibia.    Regional soft tissues are normal.                                       Medications   HYDROcodone-acetaminophen 5-325 mg per tablet 1 tablet (1 tablet Oral Given 10/17/24 0226)   meloxicam tablet 15 mg (15 mg Oral Given 10/17/24 0344)   LIDOcaine 5 % patch 1 patch (1 patch Transdermal Patch Applied 10/17/24 0343)   potassium chloride SA CR tablet 40 mEq (40 mEq Oral Given 10/17/24 0720)     Medical Decision Making  Problems Addressed:  Foot pain: chronic illness or injury with exacerbation, progression, or side effects of treatment  Hypokalemia: acute illness or injury  Knee pain, left: chronic illness or injury with exacerbation, progression, or side effects of treatment  Other chronic pain: chronic illness or injury    Amount and/or Complexity of Data Reviewed  Labs: ordered.  Radiology: ordered.    Risk  Prescription drug management.      ED assessment:    Ms. Alfaro presented for evaluation of acute on  chronic left knee, left foot pain.  No reported injury.  No obvious swelling or deformity.  No indication of joint effusion at this time.  States ran out of her home pain medications.  Norco prescribed 5 days ago x 20 tablets.    Differential diagnosis (including but not limited to):   Chronic pain, degenerative joint disease, fracture, history of lumbar diskitis/osteomyelitis, consider insufficiency fracture, progressed degenerative changes, consider stress fracture of the foot, lower extremity, opioid dependence, risk for polypharmacy    ED management:   We had a lot of difficulty communicating with the patient as, even with her hearing aids in place, very hard of hearing, had difficulty understanding what I was trying to say.  States she came in today because her knees have been bothering her for months and she was not scheduled to see Orthopedics until December.  Denies any acute change in those symptoms.  No external evidence of traumatic injury, no indication of joint effusion to suggest infection.  X-rays obtained demonstrate no displaced fracture or subluxation in the knee or foot.  As she does have a history of lumbar pathology, CT scan was obtained to evaluate for possible changes that which may be causing radicular symptoms into the left lower extremity, appears stable compared to previous.  Laboratory studies with hypokalemia but otherwise unremarkable.  Oral supplementation provided.  With ambulation trial in the emergency department, she was very slow moving however is able to ambulate with bilateral assistance, states walks with a walker at home.  We had a long conversation and I offered to admit her if she felt like her pain was more than usual or if she felt like she was not able to safely get around at home; however, she insisted multiple times while in the emergency department that she wanted to go home.  She even contacted her son at 1 point and he came to the hospital; however, I was in the care  of a critical patient at that time and did not have the opportunity to talk with him and he unfortunately had to leave to go to work.  We continued to observe her in the emergency department for a period of time trying to help her arrange another right home.  She reported that her pain was controlled with the medications administered here.  Will fill a short course of oral analgesics at home, encouraged topical analgesics as well.  She does have a history of admission for acute kidney injury so will avoid prescription NSAIDs, advised she can use over-the-counter acetaminophen and consider topical NSAIDs as well.  She verbalizes understanding and agreement with the plan.      Patient was hypertensive at time of discharge, reports that she has not taken her medications yet today and does not want us to administer any medications in the emergency department.  She would prefer to go home and take her blood pressure medications when she gets there.    Following her discharge from the hospital, radiology over-read the knee x-ray as above concerning for possible insufficiency fracture.  I reviewed this with her orthopedist advised him specific management needed at this time, likely due to severe osteopenia.  We did contact the patient to let her know about the over-read report and to contact her orthopedic clinic to see if they could move her appointment up sooner than previously planned or be added to a cancellation list.  She states that she was at home resting and was feeling much improved over interim presentation earlier this morning.        My independent radiology interpretation:   X-ray left knee: No displaced fracture, demineralization and degenerative changes   X-ray left foot: Degenerative changes, no displaced fracture    Amount and/or Complexity of Data Reviewed  Independent historian: EMS   Summary of history: called for pain, stated out of her home medications, no meds given prior to arrival  External data  reviewed: notes from previous ED visits, prior imaging, and prescription medications   Summary of data reviewed: as summarized above. Chronic opioid dependence, prior admissions related to [facility], polypharmacy, encephalopathy etc attributed to home medication use. Prior spinal imaging in 1/2024 with sequelae of prior osteomyelitis diskitis with residual fluid bright signal at L2.  Residual infection can not be entirely excluded.  Nonspecific T11-T12 and T12-L1 vertebral body increased non fluid bright signal similar to prior limited by ferromagnetic artifact  Knee x-ray from 10/03/2024 with no acute findings identified.      Risk and benefits of testing: discussed   Labs: ordered and reviewed  Radiology: ordered and independent interpretation performed (see above or ED course)    Discussion of management or test interpretation with external provider(s): discussed with orthopedic surgery consultant and discussed with radiologist   Summary of discussion: as above    Risk  Prescription drug management   Decision regarding hospitalization  Shared decision making     Critical Care  none    I, Roseann Davidson MD personally performed the history, PE, MDM, and procedures as documented above and agree with the scribe's documentation.              ED Course as of 10/17/24 1716   Thu Oct 17, 2024   0538 Imaging as above with degenerative changes, possible diskitis/osteomyelitis type changes however these have been described previously.  Patient was unfortunately poor historian, continues to change the story, states that back pain has gotten better and she has been able to get around the house; however, now refusing to walk in the emergency department stating that she has been very weak and is unable to get around.  She was recently admitted for an acute kidney injury, will check labs at this time including inflammatory markers.  [KS]   0602 Patient agreeable to attempt ambulation.  Able to walk a few steps with assistance.  States  would normally get around at home with a walker.  Moaning throughout entire course.  Advised if she was in his much pain and having difficulty getting around that I recommend that we admitted to the hospital for PT/mobility evaluation possible additional inpatient workup.  She states she wants to go home and in fact his already called her son to come and pick her up. [KS]   0932 Patient continues to be adamant that she wants to go home.  We had a long discussion about the findings on her imaging, low potassium, I again discussed admission with her for mobility assessment, pain control, correction of electrolyte derangements consideration of further workup of her pain; however, she states she feels better and wants to go home. [KS]      ED Course User Index  [KS] Roseann Davidson MD                             Clinical Impression:  Final diagnoses:  [M25.562] Knee pain, left  [M79.673] Foot pain  [G89.29] Other chronic pain  [E87.6] Hypokalemia  [M84.462A] Insufficiency fracture of left tibia          ED Disposition Condition    Discharge Stable          ED Prescriptions       Medication Sig Dispense Start Date End Date Auth. Provider    traMADoL (ULTRAM) 50 mg tablet Take 1 tablet (50 mg total) by mouth every 12 (twelve) hours as needed for Pain. 10 tablet 10/17/2024 10/22/2024 Roseann Davidson MD    LIDOcaine (LIDODERM) 5 % Place 1 patch onto the skin once daily. Remove & Discard patch within 12 hours or as directed by MD 30 patch 10/17/2024 11/16/2024 Roseann Davidson MD          Follow-up Information       Follow up With Specialties Details Why Contact Info    Luzmaria Flores, [facility] Schedule an appointment as soon as possible for a visit   [address] [place] [address] [phone]      [facility] - Emergency Dept Emergency Medicine  As needed, If symptoms worsen [facility] [address] [phone]    Flako Rivas MD Orthopedic Surgery   keep scheduled follow up appointment - call in AM to see if they can move your appoitment up any sooner. [address] [facility].  [address]  [facility] [address]  [phone]               Roseann Davidson MD  10/17/24 [id number]

## 2024-10-17 NOTE — PROGRESS NOTES
Per nurse Irina, pt needs ride home, forgot walker but reportedly is able to ambulate from door into home. Pt has key to enter home. Messageorly Wells CM director. Awaiting transport time from [place] with Gianluca Sanchez.

## 2024-11-11 ENCOUNTER — OFFICE VISIT (OUTPATIENT)
Dept: ORTHOPEDICS | Facility: CLINIC | Age: 64
End: 2024-11-11
Payer: MEDICARE

## 2024-11-11 ENCOUNTER — HOSPITAL ENCOUNTER (OUTPATIENT)
Dept: RADIOLOGY | Facility: CLINIC | Age: 64
Discharge: HOME | End: 2024-11-11
Attending: REHABILITATION UNIT
Payer: MEDICARE

## 2024-11-11 VITALS
BODY MASS INDEX: 20.62 KG/M2 | HEART RATE: 78 BPM | SYSTOLIC BLOOD PRESSURE: 99 MMHG | WEIGHT: 116.38 LBS | DIASTOLIC BLOOD PRESSURE: 59 MMHG | HEIGHT: 63 IN

## 2024-11-11 DIAGNOSIS — M25.562 LEFT KNEE PAIN, UNSPECIFIED CHRONICITY: Primary | ICD-10-CM

## 2024-11-11 DIAGNOSIS — M25.562 LEFT KNEE PAIN, UNSPECIFIED CHRONICITY: ICD-10-CM

## 2024-11-11 PROCEDURE — 73560 X-RAY EXAM OF KNEE 1 OR 2: CPT | Mod: LT,,, | Performed by: REHABILITATION UNIT

## 2024-11-11 PROCEDURE — 99213 OFFICE O/P EST LOW 20 MIN: CPT | Mod: ,,, | Performed by: REHABILITATION UNIT

## 2024-11-11 NOTE — PROGRESS NOTES
Subjective:      Patient ID: Luz Maria Alfaro is a 64 y.o. female.    Chief Complaint: Injury of the Left Knee (Stated has pain in lt knee and swelling. Present today in wheelchair. Went to ER when fall occurred did not give any pain medication. Unable to bare weight on leg and is having muscle spasms. Would like to get any pain medication.  Denies any numbness or tingling.  )    HPI:   Luz Maria Alfaro is a 64 y.o. female who presents today for initial evaluation of her L knee    History of Present Illness    CHIEF COMPLAINT:  - Luz Maria presents today for initial evaluation of left knee pain and weakness following a fall approximately one month ago.    HPI:  Luz Maria presents with knee pain following a fall that occurred approximately a month ago. She reports fracturing her ankle and falling on her entire leg. Her entire knee had swollen significantly. She has been unable to walk for about 4-5 months due to lack of strength. Her caregiver states that she does not have the strength anymore. Luz Maria spends most of her day in bed or in a chair, leading to significant deconditioning.    She went to the ER after the fall, where X-rays were taken, showing no fractures. She reports pain in multiple areas, including her hip, knee, and ankle. Luz Maria has a history of two major back surgeries and a right hip partial replacement. She mentions previous fractures in her foot.    She has been receiving pain management from Dr. Cole for back issues, taking Narco (Narcotics). However, she's been off the medication recently as the doctor wanted her to see this practitioner first for the knee issue. Luz Maria expresses concern about her mobility, stating that she may never be able to walk normally again or wear heels.    Previously, the patient had home care therapy about a year ago, but it was short-lived. Her bones are described as weak and brittle, likely due to prolonged inactivity. She also mentions a history of suspected  "gout, which was later disproven.    Luz Maria denies any recent trauma or injury to the knee area other than the fall a month ago. Luz Maria denies having gout.    MEDICATIONS:  - Norco: for back issues    SURGICAL HISTORY:  - Two major back surgeries  - Right hip partial replacement    WORK STATUS:  - Luz Maria stays in bed most of the day and does not walk    ROS:  Musculoskeletal: +joint pain, -muscle pain, +muscle weakness         Past Medical History:   Diagnosis Date    Benign essential HTN     Mixed hyperlipidemia     Type 2 diabetes mellitus without complications      Past Surgical History:   Procedure Laterality Date    APPENDECTOMY      BACK SURGERY      BREAST SURGERY  01 13 2002    Augmantation    HEMIARTHROPLASTY OF HIP Left 12/20/2024    Procedure: HEMIARTHROPLASTY, HIP;  Surgeon: Jose J Montemayor DO;  Location: [facility] OR;  Service: Orthopedics;  Laterality: Left;  lateral peg board kevin    HIP SURGERY      INTRAMEDULLARY RODDING OF FEMUR Right 10/27/2022    Procedure: RIGHT FEMUR INTERTROCH IMN;  Surgeon: Stewart Brambila MD;  Location: [facility] OR;  Service: Orthopedics;  Laterality: Right;  [place] table, supine  3rd case  Synthes Short TFNA     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    Sexual activity: Not Currently     Partners: Male     Social Drivers of Health     Financial Resource Strain: Patient Unable To Answer (11/19/2024)    [facility]aries and Affiliates    Overall Financial Resource Strain (CARDIA)     Difficulty of Paying Living Expenses: Patient unable to answer   Food Insecurity: Patient Unable To Answer " (11/19/2024)    Received from [facility] and Its Subsidiaries and Affiliates    Hunger Vital Sign     Worried About Running Out of Food in the Last Year: Patient unable to answer     Ran Out of Food in the Last Year: Patient unable to answer   Transportation Needs: Unmet Transportation Needs (12/26/2024)    PRAPARE - Transportation     Lack of Transportation (Medical): Yes     Lack of Transportation (Non-Medical): No   Physical Activity: Sufficiently Active (8/14/2024)    Exercise Vital Sign     Days of Exercise per Week: 5 days     Minutes of Exercise per Session: 30 min   Stress: No Stress Concern Present (8/14/2024)    [employer] - Occupational Stress Questionnaire     Feeling of Stress : Only a little   Housing Stability: Patient Unable To Answer (11/19/2024)    [facility]    Housing Stability Vital Sign     Unable to Pay for Housing in the Last Year: Patient unable to answer     Number of Times Moved in the Last Year: 1     Homeless in the Last Year: Patient unable to answer       No current facility-administered medications for this visit.  No current outpatient medications on file.    Facility-Administered Medications Ordered in Other Visits:     acetaminophen tablet 650 mg, 650 mg, Oral, Q6H, Bere, Moy A, FNP, 650 mg at 12/27/24 1202    acetaminophen tablet 650 mg, 650 mg, Oral, BID PRN, Dayo, Tania A, FNP    ALPRAZolam tablet 0.25 mg, 0.25 mg, Oral, TID PRN, Bere, Moy A, FNP, 0.25 mg at 12/26/24 2030    ARIPiprazole tablet 2 mg, 2 mg, Oral, Daily, Bere, Moy A, FNP, 2 mg at 12/27/24 0745    atorvastatin tablet 40 mg, 40 mg, Oral, QHS, Bere, Moy A, FNP, 40 mg at 12/26/24 2030    benzonatate capsule 100 mg, 100 mg, Oral, TID PRN, Bere, Moy A, FNP    busPIRone tablet 5 mg, 5 mg, Oral, BID, Moy Blackburn  A, FNP, 5 mg at 12/27/24 0746    dextrose 50% injection 12.5 g, 12.5 g, Intravenous, PRN, Bere, Moy A, FNP    dextrose 50% injection 25 g, 25 g, Intravenous, PRN, Bere, Moy A, FNP    docusate sodium capsule 100 mg, 100 mg, Oral, BID, Bere, Moy A, FNP, 100 mg at 12/27/24 0746    enoxaparin injection 30 mg, 30 mg, Subcutaneous, Q24H (prophylaxis, 1700), Bere, Moy A, FNP, 30 mg at 12/26/24 1621    EScitalopram oxalate tablet 5 mg, 5 mg, Oral, Daily, Tania Oshea, FNP, 5 mg at 12/27/24 0745    [START ON 12/28/2024] gabapentin capsule 100 mg, 100 mg, Oral, Before dinner, Oswaldo Osheayn JULI, FNP    gabapentin capsule 300 mg, 300 mg, Oral, QHS, Bere, Moy A, FNP, 300 mg at 12/26/24 2030    glucagon (human recombinant) injection 1 mg, 1 mg, Intramuscular, PRN, Bere, Moy A, FNP    glucose chewable tablet 16 g, 16 g, Oral, PRN, Bere, Moy A, FNP    glucose chewable tablet 24 g, 24 g, Oral, PRN, Bere, Moy A, FNP    hydrALAZINE injection 10 mg, 10 mg, Intravenous, Q4H PRN, Bere, Moy A, FNP    HYDROcodone-acetaminophen 5-325 mg per tablet 1 tablet, 1 tablet, Oral, Daily PRN, Bere, Moy A, FNP    hydrOXYzine pamoate capsule 50 mg, 50 mg, Oral, Nightly PRN, Oswaldo Osheayn A, FNP    insulin aspart U-100 injection 0-10 Units, 0-10 Units, Subcutaneous, QID (AC + HS) PRN, Bere, Moy A, FNP    labetalol 20 mg/4 mL (5 mg/mL) IV syring, 10 mg, Intravenous, Q4H PRN, Bere, Moy A, FNP    LIDOcaine 5 % patch 2 patch, 2 patch, Transdermal, Q24H, Tania Oshea FNP, 2 patch at 12/27/24 0525    methocarbamoL tablet 500 mg, 500 mg, Oral, Q6H, Tania Oshea FNP    metoprolol injection 10 mg, 10 mg, Intravenous, Q2H PRN, Moy Blackburn FNP    nicotine 21 mg/24 hr 1 patch, 1 patch, Transdermal, Daily, Moy Blackburn FNP, 1 patch at 12/27/24 0745    nitroGLYCERIN SL tablet 0.4 mg, 0.4 mg, Sublingual, Q5 Min PRN,  "Bere, Moy A, FNP    ondansetron disintegrating tablet 4 mg, 4 mg, Oral, Q6H PRN, Bere, Moy A, FNP    ondansetron disintegrating tablet 8 mg, 8 mg, Oral, Q8H PRN, Bere, Moy A, FNP    ondansetron injection 4 mg, 4 mg, Intravenous, Q8H PRN, Bere, Moy A, FNP    oxyCODONE immediate release tablet 10 mg, 10 mg, Oral, Q4H PRN, Bere, Moy A, FNP, 10 mg at 12/27/24 1202    oxyCODONE immediate release tablet 5 mg, 5 mg, Oral, Q4H PRN, Bere, Moy A, FNP, 5 mg at 12/27/24 0022    pantoprazole EC tablet 40 mg, 40 mg, Oral, BID, Bere, Moy A, FNP, 40 mg at 12/27/24 0746    polyethylene glycol packet 17 g, 17 g, Oral, BID PRN, Bere, Moy A, FNP, 17 g at 12/26/24 0747    sodium bicarbonate tablet 650 mg, 650 mg, Oral, BID, Bere, Moy A, FNP, 650 mg at 12/27/24 0746  Review of patient's allergies indicates:   Allergen Reactions    Iodinated contrast media Shortness Of Breath    Opioids - morphine analogues     Meperidine Rash and Hives       BP (!) 99/59   Pulse 78   Ht 5' 3" (1.6 m)   Wt 52.8 kg (116 lb 6.5 oz)   BMI 20.62 kg/m²     Comprehensive review of systems completed and negative except as per HPI.        Objective:   Head: Normocephalic, without obvious abnormality, atraumatic  Eyes: conjunctivae/corneas clear. EOM's intact  Ears: normal external appearance  Nose: Nares normal. Septum midline. Mucosa normal. No drainage  Throat: normal findings: lips normal without lesions  Lungs: unlabored breathing on room air  Chest wall: symmetric chest rise  Heart: regular rate and rhythm  Pulses: 2+ and symmetric  Skin: Skin color, texture, turgor normal. No rashes or lesions  Neurologic: Grossly normal    left KNEE:     Alignment: neutral  Appearance: skin is intact without lesion  Effusion: none  Gait: alexandr in wheelchair  Ankle ROM: decreased   Knee ROM:  5-95  Tenderness: diffuse  Negrita Test: alexandr  Valgus Stress @ 0 deg: stable  Valgus Stress @ 30 deg: " stable  Varus Stress @ 0 deg: stable  Varus Stress @ 30 deg: stable  Lachman: stable  Ant Drawer: negative   Post Drawer: negative  Posterior Sag Sign: negative  Neurological deficits: SILT dp/sp/t distributions  Motor: 4/5 EHL/FHL/TA/GS    Warm well perfused lower extremity with capillary refill less than 2 seconds and sensation intact to light touch in the terminal nerve distributions. Calf soft and easily compressible without clinical sign of DVT. No palpable popliteal lymphadenopathy    Assessment:     Imagin v L knee WB show no appreciable acute fractures. Lucency about posterior and proximal condyle on lateral view      1. Left knee pain, unspecified chronicity          Plan:       Orders Placed This Encounter    X-Ray Knee 1 or 2 View Left    CT Knee Without Contrast Left        Imaging and exam findings discussed.     Assessment & Plan      IMAGING ORDERS:  - Ordered CT of the knee to evaluate a concerning spot and rule out fractures. Explained to the patient that the scan will help guide further treatment decisions.    Avoid aggravating activities. Symptomatic management.     FOLLOW UP:  - Follow up after CT is completed to review results and determine next steps in treatment.         All questions were answered. Patient happy and in agreement with the plan.     This note was generated with the assistance of ambient listening technology. Verbal consent was obtained by the patient and accompanying visitor(s) for the recording of patient appointment to facilitate this note. I attest to having reviewed and edited the generated note for accuracy, though some syntax or spelling errors may persist. Please contact the author of this note for any clarification.

## 2024-12-18 ENCOUNTER — HOSPITAL ENCOUNTER (OUTPATIENT)
Dept: RADIOLOGY | Facility: CLINIC | Age: 64
Discharge: HOME | End: 2024-12-18
Attending: PHYSICIAN ASSISTANT
Payer: MEDICARE

## 2024-12-18 ENCOUNTER — OFFICE VISIT (OUTPATIENT)
Dept: ORTHOPEDICS | Facility: CLINIC | Age: 64
End: 2024-12-18
Payer: MEDICARE

## 2024-12-18 VITALS
SYSTOLIC BLOOD PRESSURE: 135 MMHG | BODY MASS INDEX: 17.36 KG/M2 | WEIGHT: 98 LBS | DIASTOLIC BLOOD PRESSURE: 82 MMHG | HEIGHT: 63 IN | HEART RATE: 83 BPM

## 2024-12-18 DIAGNOSIS — M25.552 LEFT HIP PAIN: Primary | ICD-10-CM

## 2024-12-18 DIAGNOSIS — S72.002A CLOSED DISPLACED FRACTURE OF LEFT FEMORAL NECK: ICD-10-CM

## 2024-12-18 DIAGNOSIS — M25.552 LEFT HIP PAIN: ICD-10-CM

## 2024-12-18 PROCEDURE — 73502 X-RAY EXAM HIP UNI 2-3 VIEWS: CPT | Mod: LT,,, | Performed by: PHYSICIAN ASSISTANT

## 2024-12-18 PROCEDURE — 99214 OFFICE O/P EST MOD 30 MIN: CPT | Mod: ,,, | Performed by: PHYSICIAN ASSISTANT

## 2024-12-18 NOTE — PROGRESS NOTES
Chief Complaint:   Chief Complaint   Patient presents with    Hip Pain     L hip pain - ongoing for about a month. States she had a fall a few months ago. Has not able to put weight on it. Reports pain in groin.         History of present illness:    This is a 64 y.o. year old female who complains of patient he is in the room with family member and history is very difficult to obtain as he is a both poor historians as family member does not live with the patient full time.    From what I can ascertain she has had some falls in the past in her most recent ED visit was at [facility] November  the 18th..    She is admitted for some anorexia and dehydration acute renal injury.    She had CTs and workup of her head and abdomen but there was no hip x-rays taken at that time.    She is now at a point where she can not ambulate due to the pain she is going to wheelchair.    She is unsure exact dates of falls but it may have been as long as 2 months ago.          Current Outpatient Medications   Medication Sig    albuterol (PROVENTIL/VENTOLIN HFA) 90 mcg/actuation inhaler Inhale 1 puff into the lungs every 4 (four) hours as needed for Shortness of Breath.    ARIPiprazole (ABILIFY) 2 MG Tab Take 1 tablet (2 mg total) by mouth once daily.    EScitalopram oxalate (LEXAPRO) 5 MG Tab Take 1 tablet (5 mg total) by mouth once daily.    hyoscyamine (LEVSIN) 0.125 mg Subl Place 0.125 mg under the tongue 3 (three) times daily as needed (for abdominal cramping).    ondansetron (ZOFRAN-ODT) 8 MG TbDL Take 8 mg by mouth every 6 (six) hours as needed.    pantoprazole (PROTONIX) 40 MG tablet Take 40 mg by mouth 2 (two) times daily.    rosuvastatin (CRESTOR) 20 MG tablet Take 20 mg by mouth once daily.    busPIRone (BUSPAR) 5 MG Tab Take 1 tablet (5 mg total) by mouth 2 (two) times daily.    dapagliflozin propanediol (FARXIGA) 10 mg tablet Take 1 tablet (10 mg total) by mouth once daily. (Patient not taking: Reported on 12/18/2024)    diazePAM  "(VALIUM) 5 MG tablet Take 5 mg by mouth daily as needed for Anxiety. (Patient not taking: Reported on 12/18/2024)    folic acid (FOLVITE) 1 MG tablet Take 1 tablet (1 mg total) by mouth once daily. (Patient not taking: Reported on 12/18/2024)    furosemide (LASIX) 20 MG tablet Take 1 tablet (20 mg total) by mouth once daily. (Patient not taking: Reported on 12/18/2024)    gabapentin (NEURONTIN) 300 MG capsule Take 300 mg by mouth 3 (three) times daily. (Patient not taking: Reported on 12/18/2024)    methocarbamoL (ROBAXIN) 750 MG Tab Take 750 mg by mouth 3 (three) times daily. (Patient not taking: Reported on 12/18/2024)    metoprolol succinate (TOPROL-XL) 25 MG 24 hr tablet Take 0.5 tablets (12.5 mg total) by mouth once daily. (Patient not taking: Reported on 12/18/2024)    sacubitriL-valsartan (ENTRESTO) 24-26 mg per tablet Take 1 tablet by mouth 2 (two) times daily. (Patient not taking: Reported on 12/18/2024)    thiamine 100 MG tablet Take 1 tablet (100 mg total) by mouth once daily. (Patient not taking: Reported on 12/18/2024)     No current facility-administered medications for this visit.       Review of Systems:    Constitution:   Denies chills, fever, and sweats.  HENT:   Denies headaches or blurry vision.  Cardiovascular:  Denies chest pain or irregular heart beat.  Respiratory:   Denies cough or shortness of breath.  Gastrointestinal:  Denies abdominal pain, nausea, or vomiting.  Musculoskeletal:   Denies muscle cramps.  Neurological:   Denies dizziness or focal weakness.  Psychiatric/Behavior: Normal mental status.  Hematology/Lymph:  Denies bleeding problem or easy bruising/bleeding.  Skin:    Denies rash or suspicious lesions.    Examination:    Vital Signs:    Vitals:    12/18/24 1010   BP: 135/82   Pulse: 83   Weight: 44.5 kg (98 lb)   Height: 5' 3" (1.6 m)       Body mass index is 17.36 kg/m².    Constitution:   Well-developed, well nourished patient in no acute distress.  Neurological:   Alert and " oriented x 3 and cooperative to examination.     Psychiatric/Behavior: Normal mental status.  Respiratory:   No shortness of breath.  Eyes:    Extraoccular muscles intact  Skin:    No scars, rash or suspicious lesions.    Physical Exam:   Hip Exam:  Left   Leg is shortened and rotated  Range of motion is significantly limited due to pain Negative PABLO test.   The patient has pain with any attempt at flexion or rotation of the hip   Negative  greater trochanteric tenderness.   The patient has weakness in the leg secondary to pain.    She has intact range of motion and motor at the foot ankle and knee    Imaging: X-rays ordered and images interpreted today personally by me of left hip three views   Patient has a older appearing displaced femoral neck fracture        Assessment: Left hip pain  -     X-Ray Hip 2 or 3 views Left with Pelvis when performed; Future; Expected date: 12/18/2024    Closed displaced fracture of left femoral neck         Plan:  At this point the patient is going to be referred to see Dr. Hubert burnett tomorrow for discussion of possible surgical fixation as long as she is medically stable.    The patient we will more than likely need at a minimum a hemiarthroplasty versus a total hip replacement.    The patient has seen Dr. Hubert che tomorrow for more definitive surgical planning          DISCLAIMER: This note may have been dictated using voice recognition software and may contain grammatical errors.     NOTE: Consult report sent to referring provider via [employer].

## 2024-12-19 ENCOUNTER — OFFICE VISIT (OUTPATIENT)
Dept: ORTHOPEDICS | Facility: CLINIC | Age: 64
End: 2024-12-19
Payer: MEDICARE

## 2024-12-19 ENCOUNTER — HOSPITAL ENCOUNTER (INPATIENT)
Facility: HOSPITAL | Age: 64
LOS: 5 days | Discharge: NURSING, REHAB OR LONG-TERM CARE | DRG: 522 | End: 2024-12-24
Attending: STUDENT IN AN ORGANIZED HEALTH CARE EDUCATION/TRAINING PROGRAM | Admitting: INTERNAL MEDICINE
Payer: MEDICARE

## 2024-12-19 VITALS
DIASTOLIC BLOOD PRESSURE: 77 MMHG | BODY MASS INDEX: 17.36 KG/M2 | HEIGHT: 63 IN | WEIGHT: 98 LBS | HEART RATE: 96 BPM | SYSTOLIC BLOOD PRESSURE: 129 MMHG

## 2024-12-19 DIAGNOSIS — F19.90 MISUSE OF OVER-THE-COUNTER MEDICATIONS: ICD-10-CM

## 2024-12-19 DIAGNOSIS — S72.002A DISPLACED FRACTURE OF LEFT FEMORAL NECK: ICD-10-CM

## 2024-12-19 DIAGNOSIS — R07.9 CHEST PAIN: ICD-10-CM

## 2024-12-19 DIAGNOSIS — S72.002A CLOSED DISPLACED FRACTURE OF LEFT FEMORAL NECK: Primary | ICD-10-CM

## 2024-12-19 DIAGNOSIS — Z01.818 PREOP TESTING: ICD-10-CM

## 2024-12-19 DIAGNOSIS — Z01.818 PRE-OP EVALUATION: ICD-10-CM

## 2024-12-19 DIAGNOSIS — S72.002D CLOSED FRACTURE OF LEFT HIP WITH ROUTINE HEALING, SUBSEQUENT ENCOUNTER: Primary | ICD-10-CM

## 2024-12-19 LAB
ALBUMIN SERPL-MCNC: 3.5 G/DL (ref 3.4–4.8)
ALBUMIN SERPL-MCNC: 3.8 G/DL (ref 3.4–4.8)
ALBUMIN/GLOB SERPL: 1 RATIO (ref 1.1–2)
ALBUMIN/GLOB SERPL: 1.1 RATIO (ref 1.1–2)
ALP SERPL-CCNC: 354 UNIT/L (ref 40–150)
ALP SERPL-CCNC: 380 UNIT/L (ref 40–150)
ALT SERPL-CCNC: 16 UNIT/L (ref 0–55)
ALT SERPL-CCNC: 19 UNIT/L (ref 0–55)
ANION GAP SERPL CALC-SCNC: 10 MEQ/L
ANION GAP SERPL CALC-SCNC: 9 MEQ/L
ANISOCYTOSIS BLD QL SMEAR: ABNORMAL
APAP SERPL-MCNC: <10 UG/ML (ref 10–30)
APAP SERPL-MCNC: <10 UG/ML (ref 10–30)
APTT PPP: 29.6 SECONDS (ref 23.4–33.9)
AST SERPL-CCNC: 25 UNIT/L (ref 5–34)
AST SERPL-CCNC: 36 UNIT/L (ref 5–34)
BASOPHILS # BLD AUTO: 0.04 X10(3)/MCL
BASOPHILS NFR BLD AUTO: 0.6 %
BILIRUB SERPL-MCNC: 0.4 MG/DL
BILIRUB SERPL-MCNC: 0.4 MG/DL
BUN SERPL-MCNC: 12.4 MG/DL (ref 9.8–20.1)
BUN SERPL-MCNC: 13 MG/DL (ref 9.8–20.1)
CALCIUM SERPL-MCNC: 9.1 MG/DL (ref 8.4–10.2)
CALCIUM SERPL-MCNC: 9.5 MG/DL (ref 8.4–10.2)
CHLORIDE SERPL-SCNC: 116 MMOL/L (ref 98–107)
CHLORIDE SERPL-SCNC: 116 MMOL/L (ref 98–107)
CO2 SERPL-SCNC: 12 MMOL/L (ref 23–31)
CO2 SERPL-SCNC: 14 MMOL/L (ref 23–31)
CREAT SERPL-MCNC: 0.67 MG/DL (ref 0.55–1.02)
CREAT SERPL-MCNC: 0.74 MG/DL (ref 0.55–1.02)
CREAT/UREA NIT SERPL: 18
CREAT/UREA NIT SERPL: 19
EOSINOPHIL # BLD AUTO: 0.02 X10(3)/MCL (ref 0–0.9)
EOSINOPHIL NFR BLD AUTO: 0.3 %
ERYTHROCYTE [DISTWIDTH] IN BLOOD BY AUTOMATED COUNT: ABNORMAL %
GFR SERPLBLD CREATININE-BSD FMLA CKD-EPI: >60 ML/MIN/1.73/M2
GFR SERPLBLD CREATININE-BSD FMLA CKD-EPI: >60 ML/MIN/1.73/M2
GLOBULIN SER-MCNC: 3.2 GM/DL (ref 2.4–3.5)
GLOBULIN SER-MCNC: 3.7 GM/DL (ref 2.4–3.5)
GLUCOSE SERPL-MCNC: 85 MG/DL (ref 82–115)
GLUCOSE SERPL-MCNC: 95 MG/DL (ref 82–115)
HCT VFR BLD AUTO: 38.9 % (ref 37–47)
HGB BLD-MCNC: 12.2 G/DL (ref 12–16)
HYPOCHROMIA BLD QL SMEAR: ABNORMAL
IMM GRANULOCYTES # BLD AUTO: 0.02 X10(3)/MCL (ref 0–0.04)
IMM GRANULOCYTES NFR BLD AUTO: 0.3 %
INR PPP: 1.1 (ref 2–3)
LYMPHOCYTES # BLD AUTO: 1.54 X10(3)/MCL (ref 0.6–4.6)
LYMPHOCYTES NFR BLD AUTO: 24.7 %
MACROCYTES BLD QL SMEAR: ABNORMAL
MCH RBC QN AUTO: 32 PG (ref 27–31)
MCHC RBC AUTO-ENTMCNC: 31.4 G/DL (ref 33–36)
MCV RBC AUTO: 102.1 FL (ref 80–94)
MICROCYTES BLD QL SMEAR: SLIGHT
MONOCYTES # BLD AUTO: 0.33 X10(3)/MCL (ref 0.1–1.3)
MONOCYTES NFR BLD AUTO: 5.3 %
NEUTROPHILS # BLD AUTO: 4.28 X10(3)/MCL (ref 2.1–9.2)
NEUTROPHILS NFR BLD AUTO: 68.8 %
NRBC BLD AUTO-RTO: 0 %
OHS QRS DURATION: 76 MS
OHS QTC CALCULATION: 467 MS
PLATELET # BLD AUTO: 272 X10(3)/MCL (ref 130–400)
PLATELET # BLD EST: ADEQUATE 10*3/UL
PMV BLD AUTO: 9 FL (ref 7.4–10.4)
POTASSIUM SERPL-SCNC: 4.3 MMOL/L (ref 3.5–5.1)
POTASSIUM SERPL-SCNC: 5 MMOL/L (ref 3.5–5.1)
PROT SERPL-MCNC: 6.7 GM/DL (ref 5.8–7.6)
PROT SERPL-MCNC: 7.5 GM/DL (ref 5.8–7.6)
PROTHROMBIN TIME: 14.3 SECONDS (ref 11.7–14.5)
RBC # BLD AUTO: 3.81 X10(6)/MCL (ref 4.2–5.4)
RBC MORPH BLD: ABNORMAL
SALICYLATES SERPL-MCNC: <5 MG/DL (ref 15–30)
SODIUM SERPL-SCNC: 138 MMOL/L (ref 136–145)
SODIUM SERPL-SCNC: 139 MMOL/L (ref 136–145)
WBC # BLD AUTO: 6.23 X10(3)/MCL (ref 4.5–11.5)

## 2024-12-19 PROCEDURE — 85730 THROMBOPLASTIN TIME PARTIAL: CPT | Performed by: STUDENT IN AN ORGANIZED HEALTH CARE EDUCATION/TRAINING PROGRAM

## 2024-12-19 PROCEDURE — 93005 ELECTROCARDIOGRAM TRACING: CPT

## 2024-12-19 PROCEDURE — 99285 EMERGENCY DEPT VISIT HI MDM: CPT | Mod: 25

## 2024-12-19 PROCEDURE — 25000003 PHARM REV CODE 250: Performed by: STUDENT IN AN ORGANIZED HEALTH CARE EDUCATION/TRAINING PROGRAM

## 2024-12-19 PROCEDURE — 80179 DRUG ASSAY SALICYLATE: CPT | Performed by: STUDENT IN AN ORGANIZED HEALTH CARE EDUCATION/TRAINING PROGRAM

## 2024-12-19 PROCEDURE — 93010 ELECTROCARDIOGRAM REPORT: CPT | Mod: ,,, | Performed by: INTERNAL MEDICINE

## 2024-12-19 PROCEDURE — 85025 COMPLETE CBC W/AUTO DIFF WBC: CPT | Performed by: STUDENT IN AN ORGANIZED HEALTH CARE EDUCATION/TRAINING PROGRAM

## 2024-12-19 PROCEDURE — 85610 PROTHROMBIN TIME: CPT | Performed by: STUDENT IN AN ORGANIZED HEALTH CARE EDUCATION/TRAINING PROGRAM

## 2024-12-19 PROCEDURE — 63600175 PHARM REV CODE 636 W HCPCS: Performed by: STUDENT IN AN ORGANIZED HEALTH CARE EDUCATION/TRAINING PROGRAM

## 2024-12-19 PROCEDURE — 11000001 HC ACUTE MED/SURG PRIVATE ROOM

## 2024-12-19 PROCEDURE — 80053 COMPREHEN METABOLIC PANEL: CPT | Performed by: STUDENT IN AN ORGANIZED HEALTH CARE EDUCATION/TRAINING PROGRAM

## 2024-12-19 PROCEDURE — 80143 DRUG ASSAY ACETAMINOPHEN: CPT | Performed by: STUDENT IN AN ORGANIZED HEALTH CARE EDUCATION/TRAINING PROGRAM

## 2024-12-19 RX ORDER — SODIUM CHLORIDE 0.9 % (FLUSH) 0.9 %
10 SYRINGE (ML) INJECTION
Status: DISCONTINUED | OUTPATIENT
Start: 2024-12-19 | End: 2024-12-24 | Stop reason: HOSPADM

## 2024-12-19 RX ORDER — HYDROCODONE BITARTRATE AND ACETAMINOPHEN 5; 325 MG/1; MG/1
1 TABLET ORAL EVERY 4 HOURS PRN
Status: DISCONTINUED | OUTPATIENT
Start: 2024-12-19 | End: 2024-12-24 | Stop reason: HOSPADM

## 2024-12-19 RX ORDER — TALC
6 POWDER (GRAM) TOPICAL NIGHTLY PRN
Status: DISCONTINUED | OUTPATIENT
Start: 2024-12-19 | End: 2024-12-20

## 2024-12-19 RX ADMIN — HYDROCODONE BITARTRATE AND ACETAMINOPHEN 1 TABLET: 5; 325 TABLET ORAL at 04:12

## 2024-12-19 RX ADMIN — HYDROCODONE BITARTRATE AND ACETAMINOPHEN 1 TABLET: 5; 325 TABLET ORAL at 08:12

## 2024-12-19 NOTE — ED PROVIDER NOTES
"Encounter Date: 12/19/2024       History     Chief Complaint   Patient presents with    Hip Pain     Pt instructed to go to ed by Dr Mares with hx of fx left hip approx 2 months ago     HPI    64-year-old female with a past medical history of hypertension, CHF, type 2 diabetes, GERD and psychiatric disorder presents emergency department from ortho to get admitted for a left hip fracture.  She fell 2 months ago and a fracture was missed.  It was found yesterday and ortho wants to do surgery.  She states she has been taking Tylenol for pain.  Supposedly she takes upwards to 20+ Tylenol per day.  Took 8 Tylenol so far today.  These Tylenol pills are 500 mg each.    Review of patient's allergies indicates:   Allergen Reactions    Iodinated contrast media Shortness Of Breath    Opioids - morphine analogues     Meperidine Rash and Hives     Past Medical History:   Diagnosis Date    Benign essential HTN     Mixed hyperlipidemia     Type 2 diabetes mellitus without complications      Past Surgical History:   Procedure Laterality Date    APPENDECTOMY      BACK SURGERY      BREAST SURGERY  01 13 2002    Augmantation    HIP SURGERY      INTRAMEDULLARY RODDING OF FEMUR Right 10/27/2022    Procedure: RIGHT FEMUR INTERTROCH IMN;  Surgeon: Stewart Brambila MD;  Location: [facility];  Service: Orthopedics;  Laterality: Right;  [place] table, supine  3rd case  Synthes Short TFNA     No family history on file.  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   Respiratory:  Negative for cough.    Cardiovascular:  Negative for chest pain.   Gastrointestinal:  Negative for abdominal pain, constipation, diarrhea, nausea and " vomiting.   Musculoskeletal:  Positive for arthralgias.   Neurological:  Negative for headaches.   All other systems reviewed and are negative.      Physical Exam     Initial Vitals [12/19/24 1127]   BP Pulse Resp Temp SpO2   (!) 143/80 80 18 98.6 °F (37 °C) 100 %      MAP       --         Physical Exam    Nursing note and vitals reviewed.  Constitutional: She appears well-developed and well-nourished. No distress.   Cardiovascular:  Normal rate and regular rhythm.           Pulmonary/Chest: Breath sounds normal. No respiratory distress. She has no wheezes. She has no rhonchi. She has no rales.   Abdominal: Abdomen is soft. There is no abdominal tenderness. There is no rebound and no guarding.   Musculoskeletal:         General: Tenderness (global tenderness about the left hip) present. Normal range of motion.     Neurological: She is alert and oriented to person, place, and time. She has normal strength.   Skin: Skin is warm. Capillary refill takes less than 2 seconds.         ED Course   Procedures  Labs Reviewed   COMPREHENSIVE METABOLIC PANEL - Abnormal       Result Value    Sodium 138      Potassium 5.0      Chloride 116 (*)     CO2 12 (*)     Glucose 95      Blood Urea Nitrogen 13.0      Creatinine 0.74      Calcium 9.5      Protein Total 7.5      Albumin 3.8      Globulin 3.7 (*)     Albumin/Globulin Ratio 1.0 (*)     Bilirubin Total 0.4       (*)     ALT 19      AST 36 (*)     eGFR >60      Anion Gap 10.0      BUN/Creatinine Ratio 18     PROTIME-INR - Abnormal    PT 14.3      INR 1.1 (*)    CBC WITH DIFFERENTIAL - Abnormal    WBC 6.23      RBC 3.81 (*)     Hgb 12.2      Hct 38.9      .1 (*)     MCH 32.0 (*)     MCHC 31.4 (*)     RDW        Platelet 272      MPV 9.0      Neut % 68.8      Lymph % 24.7      Mono % 5.3      Eos % 0.3      Basophil % 0.6      Lymph # 1.54      Neut # 4.28      Mono # 0.33      Eos # 0.02      Baso # 0.04      IG# 0.02      IG% 0.3      NRBC% 0.0     ACETAMINOPHEN  LEVEL - Abnormal    Acetaminophen Level <10.0 (*)    BLOOD SMEAR MICROSCOPIC EXAM (OLG) - Abnormal    RBC Morph Abnormal (*)     Anisocytosis 3+ (*)     Hypochromasia 1+ (*)     Macrocytosis 1+ (*)     Microcytosis Slight (*)     Platelets Adequate     SALICYLATE LEVEL - Abnormal    Salicylate Level <5.0 (*)    ACETAMINOPHEN LEVEL - Abnormal    Acetaminophen Level <10.0 (*)    COMPREHENSIVE METABOLIC PANEL - Abnormal    Sodium 139      Potassium 4.3      Chloride 116 (*)     CO2 14 (*)     Glucose 85      Blood Urea Nitrogen 12.4      Creatinine 0.67      Calcium 9.1      Protein Total 6.7      Albumin 3.5      Globulin 3.2      Albumin/Globulin Ratio 1.1      Bilirubin Total 0.4       (*)     ALT 16      AST 25      eGFR >60      Anion Gap 9.0      BUN/Creatinine Ratio 19     APTT - Normal    PTT 29.6     CBC W/ AUTO DIFFERENTIAL    Narrative:     The following orders were created for panel order CBC auto differential.  Procedure                               Abnormality         Status                     ---------                               -----------         ------                     CBC with Differential[[id number]]       Abnormal            Final result                 Please view results for these tests on the individual orders.     EKG Readings: (Independently Interpreted)   Initial Reading: No STEMI. Rhythm: Normal Sinus Rhythm. Heart Rate: 79. Ectopy: No Ectopy. Conduction: Normal. ST Segments: Normal ST Segments. T Waves: Normal. Clinical Impression: Normal Sinus Rhythm     ECG Results              EKG 12-lead (Final result)        Collection Time Result Time QRS Duration OHS QTC Calculation    12/19/24 11:46:29 12/19/24 12:55:02 76 467                     Final result by Interface, Lab In [facility] (12/19/24 12:55:13)                   Narrative:    Test Reason : Z01.818,    Vent. Rate :  79 BPM     Atrial Rate :  79 BPM     P-R Int : 162 ms          QRS Dur :  76 ms      QT Int : 408 ms        P-R-T Axes :  58  10  66 degrees    QTcB Int : 467 ms    Normal sinus rhythm  Septal infarct (cited on or before 20-Mar-2024)  Abnormal ECG  When compared with ECG of 03-Oct-2024 18:56,  Questionable change in initial forces of Septal leads  QT has shortened  Confirmed by George Chapman ([id number]) on 12/19/2024 12:55:00 PM    Referred By: AAAREFERRAL SELF           Confirmed By: George Chapman                                  Imaging Results              X-Ray Chest 1 View (Final result)  Result time 12/19/24 12:37:49      Final result by Clint Bosch MD (12/19/24 12:37:49)                   Impression:      No acute chest disease is identified.      Electronically signed by: Clint Bosch  Date:    12/19/2024  Time:    12:37               Narrative:    EXAMINATION:  XR CHEST 1 VIEW    CLINICAL HISTORY:  , Encounter for other preprocedural examination.    COMPARISON:  August 12, 2024    FINDINGS:  No alveolar consolidation, effusion, or pneumothorax is seen.   The thoracic aorta is normal  cardiac silhouette, central pulmonary vessels and mediastinum are normal in size and are grossly unremarkable. There is surgical manipulation of the thoracic spine    Exam is rotated                                       Medications   sodium chloride 0.9% flush 10 mL (has no administration in time range)   melatonin tablet 6 mg (has no administration in time range)   HYDROcodone-acetaminophen 5-325 mg per tablet 1 tablet (has no administration in time range)     Medical Decision Making  Initial Assessment:       Hip fracture      Differential Diagnosis:   Judging by the patient's chief complaint and pertinent history, the patient has the following possible differential diagnoses, including but not limited to the following.  Some of these are deemed to be lower likelihood and some more likely based on my physical exam and history combined with possible lab work and/or imaging studies.   Please see the pertinent studies, and refer  to the [facility].  Some of these diagnoses will take further evaluation to fully rule out, perhaps as an outpatient and the patient was encouraged to follow up when discharged for more comprehensive evaluation.      Hip fracture, liver damage, acetaminophen abuse,  as well as multiple other possible etiologies      Problems Addressed:  Closed fracture of left hip with routine healing, subsequent encounter: chronic illness or injury  Misuse of over-the-counter medications: undiagnosed new problem with uncertain prognosis    Amount and/or Complexity of Data Reviewed  Labs: ordered. Decision-making details documented in ED Course.  Radiology: ordered. Decision-making details documented in ED Course.  ECG/medicine tests: ordered and independent interpretation performed.    Risk  OTC drugs.  Prescription drug management.  Decision regarding hospitalization.  Diagnosis or treatment significantly limited by social determinants of health.               ED Course as of 12/19/24 1534   Thu Dec 19, 2024   1200 Poison control contacted secondary to significant acetaminophen abuse [BS]   1209 Poison control recommends giving Acetadote no matter the level of her acetaminophen level or liver enzymes [BS]   1240 X-Ray Chest 1 View  Lungs clear no consolidations [BS]   1257 WBC: 6.23 [BS]   1257 Hemoglobin: 12.2 [BS]   1257 Hematocrit: 38.9 [BS]   1257 Platelet Count: 272 [BS]   1257 INR(!): 1.1 [BS]   1319 Spoke with orthopedist, Dr. Rivas, aware of questionable Tylenol overdose inpatient receiving Acetadote.  Agrees with admission to [facility] because of this. [BS]   1331 Acetaminophen Level(!): <10.0 [BS]   1331 ALT: 19 [BS]   1331 AST(!): 36 [BS]   1331 Sodium: 138 [BS]   1331 Potassium: 5.0 [BS]   1331 Chloride(!): 116 [BS]   1331 CO2(!): 12 [BS]   1331 Glucose: 95 [BS]   1331 BUN: 13.0 [BS]   1331 Creatinine: 0.74 [BS]   1331 Calcium: 9.5 [BS]   1341 Acetaminophen level renal function INR all normal.  Poison control recommends  holding off on Acetadote.  Repeat acetaminophen level and CMP into hours after original [BS]   1423 Salicylate Level(!): <5.0 [BS]   1531 Hospitalist agrees with the admission [BS]      ED Course User Index  [BS] Guerrero Hester MD                           Clinical Impression:  Final diagnoses:  [Z01.818] Preop testing  [S72.002D] Closed fracture of left hip with routine healing, subsequent encounter (Primary)  [F19.90] Misuse of over-the-counter medications          ED Disposition Condition    Admit                 Guerrero Hester MD  12/19/24 2183

## 2024-12-19 NOTE — PROGRESS NOTES
Chief Complaint:   Chief Complaint   Patient presents with    Left Hip - Pain     Left hip DOI 10/2024 patient states her leg hurts and whole leg starting to feel numb and she has trouble sleeping. She takes a lot of OTC to help with pain. She always have to lift leg with her hand to move it. Muscle rub does not help she use two old goats. She is in bed all day and has a toilet next to her bed.       History of present illness:    History of Present Illness  The patient presents for evaluation of a left hip fracture. She is accompanied by her ex-.    She sustained a fall approximately 1 month ago, resulting in a fracture of her left hip. Her mobility has been significantly compromised since the incident, with her last ambulation occurring about 1.5 months ago. Prior to the fall, she was able to walk independently within her home without the aid of a walker. However, her mobility was primarily confined to her residence. She expresses a desire to undergo surgical intervention for her hip as soon as possible. She reports that her pain has been progressively worsening over the past few days, describing it as constant and unrelieved by Tylenol or topical analgesics. The pain is so severe that it prevents her from lying on her left side or back. She rates her current pain level as 10 out of 10. Despite the severity of her symptoms, she does not wish to seek emergency care or hospital admission.    MEDICATIONS  Current: Tylenol    Past Medical History:   Diagnosis Date    Benign essential HTN     Mixed hyperlipidemia     Type 2 diabetes mellitus without complications        Past Surgical History:   Procedure Laterality Date    APPENDECTOMY      BACK SURGERY      BREAST SURGERY  01 13 2002    Augmantation    HIP SURGERY      INTRAMEDULLARY RODDING OF FEMUR Right 10/27/2022    Procedure: RIGHT FEMUR INTERTROCH IMN;  Surgeon: Stewart Brambila MD;  Location: [facility];  Service: Orthopedics;  Laterality: Right;  [place]  table, supine  3rd case  Synthes Short TFNA       No current facility-administered medications for this visit.     Current Outpatient Medications   Medication Sig    EScitalopram oxalate (LEXAPRO) 5 MG Tab Take 1 tablet (5 mg total) by mouth once daily.    pantoprazole (PROTONIX) 40 MG tablet Take 40 mg by mouth 2 (two) times daily.    albuterol (PROVENTIL/VENTOLIN HFA) 90 mcg/actuation inhaler Inhale 1 puff into the lungs every 4 (four) hours as needed for Shortness of Breath.    ARIPiprazole (ABILIFY) 2 MG Tab Take 1 tablet (2 mg total) by mouth once daily.    busPIRone (BUSPAR) 5 MG Tab Take 1 tablet (5 mg total) by mouth 2 (two) times daily.    dapagliflozin propanediol (FARXIGA) 10 mg tablet Take 1 tablet (10 mg total) by mouth once daily. (Patient not taking: Reported on 12/18/2024)    diazePAM (VALIUM) 5 MG tablet Take 5 mg by mouth daily as needed for Anxiety. (Patient not taking: Reported on 12/18/2024)    folic acid (FOLVITE) 1 MG tablet Take 1 tablet (1 mg total) by mouth once daily. (Patient not taking: Reported on 12/18/2024)    furosemide (LASIX) 20 MG tablet Take 1 tablet (20 mg total) by mouth once daily. (Patient not taking: Reported on 12/18/2024)    gabapentin (NEURONTIN) 300 MG capsule Take 300 mg by mouth 3 (three) times daily. (Patient not taking: Reported on 12/18/2024)    hyoscyamine (LEVSIN) 0.125 mg Subl Place 0.125 mg under the tongue 3 (three) times daily as needed (for abdominal cramping).    methocarbamoL (ROBAXIN) 750 MG Tab Take 750 mg by mouth 3 (three) times daily. (Patient not taking: Reported on 12/18/2024)    metoprolol succinate (TOPROL-XL) 25 MG 24 hr tablet Take 0.5 tablets (12.5 mg total) by mouth once daily. (Patient not taking: Reported on 12/18/2024)    ondansetron (ZOFRAN-ODT) 8 MG TbDL Take 8 mg by mouth every 6 (six) hours as needed.    rosuvastatin (CRESTOR) 20 MG tablet Take 20 mg by mouth once daily.    sacubitriL-valsartan (ENTRESTO) 24-26 mg per tablet Take 1  "tablet by mouth 2 (two) times daily. (Patient not taking: Reported on 12/18/2024)    thiamine 100 MG tablet Take 1 tablet (100 mg total) by mouth once daily. (Patient not taking: Reported on 12/18/2024)     Facility-Administered Medications Ordered in Other Visits   Medication    acetylcysteine 20% (200 mg/ml) (ACETADOTE) 2,200 mg in D5W 500 mL infusion    acetylcysteine 20% (200 mg/ml) (ACETADOTE) 4,500 mg in D5W 1,000 mL infusion    acetylcysteine 20% (200 mg/ml) (ACETADOTE) 6,700 mg in D5W 250 mL infusion       Review of patient's allergies indicates:   Allergen Reactions    Iodinated contrast media Shortness Of Breath    Opioids - morphine analogues     Meperidine Rash and Hives       No family history on file.    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    Sexual activity: Not Currently     Partners: Male     Social Drivers of Health     Financial Resource Strain: Patient Unable To Answer (11/19/2024)    Received from [facility] and Its Subsidiaries and Affiliates    Overall Financial Resource Strain (CARDIA)     Difficulty of Paying Living Expenses: Patient unable to answer   Food Insecurity: Patient Unable To Answer (11/19/2024)    Received from [facility] and Its Subsidiaries and Affiliates    Hunger Vital Sign     Worried About Running Out of Food in the Last Year: Patient unable to answer     Ran Out of Food in the Last Year: Patient unable to answer   Transportation Needs: Patient Unable To Answer (11/19/2024)    Received from [facility] and Its Subsidiaries and " Affiliates    PRAPARE - Transportation     Lack of Transportation (Medical): Patient unable to answer     Lack of Transportation (Non-Medical): Patient unable to answer   Physical Activity: Sufficiently Active (8/14/2024)    Exercise Vital Sign     Days of Exercise per Week: 5 days     Minutes of Exercise per Session: 30 min   Stress: No Stress Concern Present (8/14/2024)    [employer] - Occupational Stress Questionnaire     Feeling of Stress : Only a little   Housing Stability: Patient Unable To Answer (11/19/2024)    [facility] Its Subsidiaries and Affiliates    Housing Stability Vital Sign     Unable to Pay for Housing in the Last Year: Patient unable to answer     Number of Times Moved in the Last Year: 1     Homeless in the Last Year: Patient unable to answer           Review of Systems:    Constitution: Negative for chills, fever, and sweats.  Negative for unexplained weight loss.    HENT:  Negative for headaches and blurry vision.    Cardiovascular:Negative for chest pain or irregular heart beat. Negative for hypertension.    Respiratory:  Negative for cough and shortness of breath.    Gastrointestinal: Negative for abdominal pain, heartburn, melena, nausea, and vomitting.    Genitourinary:  Negative bladder incontinence and dysuria.    Musculoskeletal:  See HPI    Neurological: Negative for numbness.    Psychiatric/Behavioral: Negative for depression.  The patient is not nervous/anxious.      Endocrine: Negative for polyuria    Hematologic/Lymphatic: Negative for bleeding problem.  Does not bruise/bleed easily.    Skin: Negative for poor would healing and rash      Physical Examination:    Vital Signs:    Vitals:    12/19/24 1010   BP: 129/77   Pulse: 96       Body mass index is 17.36 kg/m².    General: No acute distress, alert and oriented, healthy appearing    HEENT: Head is atraumatic, mucous membranes are  moist    Neck: Supples, no JVD    Cardiovascular: Palpable dorsalis pedis and posterior tibial pulses, regular rate and rhythm to those pulses    Lungs: Breathing non-labored    Skin: no rashes appreciated    Neurologic: Can flex and extend knees, ankles, and toes. Sensation is grossly intact    Left hip:  Range of motion of the left hip with significant groin pain.  Brisk cap refill disappeared sensation intact distally.  Shortened left lower extremity.    X-rays:  Three views left hip reviewed.  Patient with a chronic left femoral neck fracture with significant superior displacement     Assessment::  Left displaced femoral neck fracture    Plan:  Patient is component chronic fracture of the left hip.  We had discussed all treatment options today.  She is in severe pain.  She is unable to stabilize the home.  Patient needs a hemiarthroplasty given her activity level and preoperative comorbidities.  She is going to the ER to get admitted due to her severe pain.  Plan for surgery tomorrow.    This note was generated with the assistance of ambient listening technology. Verbal consent was obtained by the patient and accompanying visitor(s) for the recording of patient appointment to facilitate this note. I attest to having reviewed and edited the generated note for accuracy, though some syntax or spelling errors may persist. Please contact the author of this note for any clarification.      This note was created using [employer] voice recognition software that occasionally misinterpreted phrases or words.    Consult note is delivered via Epic messaging service.

## 2024-12-19 NOTE — Clinical Note
Diagnosis: Closed fracture of left hip with routine healing, subsequent encounter [[id number]]   Future Attending Provider: ANGELA ROBLERO [[id number]]   Admit to which facility:: [facility] [[id number]]   Reason for IP Medical Treatment  (Clinical interventions that can only be accomplished in the IP setting? ) :: ortho   Plans for Post-Acute care--if anticipated (pick the single best option):: F. IP Rehabilitation Unit Placement

## 2024-12-20 ENCOUNTER — ANESTHESIA (OUTPATIENT)
Dept: SURGERY | Facility: HOSPITAL | Age: 64
End: 2024-12-20
Payer: MEDICARE

## 2024-12-20 ENCOUNTER — ANESTHESIA EVENT (OUTPATIENT)
Dept: SURGERY | Facility: HOSPITAL | Age: 64
End: 2024-12-20
Payer: MEDICARE

## 2024-12-20 PROBLEM — S72.002A DISPLACED FRACTURE OF LEFT FEMORAL NECK: Status: ACTIVE | Noted: 2024-12-20

## 2024-12-20 LAB
ALBUMIN SERPL-MCNC: 3.1 G/DL (ref 3.4–4.8)
ALBUMIN/GLOB SERPL: 1.1 RATIO (ref 1.1–2)
ALP SERPL-CCNC: 327 UNIT/L (ref 40–150)
ALT SERPL-CCNC: 14 UNIT/L (ref 0–55)
ANION GAP SERPL CALC-SCNC: 6 MEQ/L
AST SERPL-CCNC: 27 UNIT/L (ref 5–34)
BASOPHILS # BLD AUTO: 0.03 X10(3)/MCL
BASOPHILS NFR BLD AUTO: 0.5 %
BILIRUB SERPL-MCNC: 0.4 MG/DL
BUN SERPL-MCNC: 18.5 MG/DL (ref 9.8–20.1)
CALCIUM SERPL-MCNC: 8.7 MG/DL (ref 8.4–10.2)
CHLORIDE SERPL-SCNC: 116 MMOL/L (ref 98–107)
CO2 SERPL-SCNC: 15 MMOL/L (ref 23–31)
CREAT SERPL-MCNC: 0.58 MG/DL (ref 0.55–1.02)
CREAT/UREA NIT SERPL: 32
EOSINOPHIL # BLD AUTO: 0.04 X10(3)/MCL (ref 0–0.9)
EOSINOPHIL NFR BLD AUTO: 0.6 %
ERYTHROCYTE [DISTWIDTH] IN BLOOD BY AUTOMATED COUNT: ABNORMAL %
GFR SERPLBLD CREATININE-BSD FMLA CKD-EPI: >60 ML/MIN/1.73/M2
GGT SERPL-CCNC: 285 U/L (ref 9–36)
GLOBULIN SER-MCNC: 2.8 GM/DL (ref 2.4–3.5)
GLUCOSE SERPL-MCNC: 97 MG/DL (ref 82–115)
GROUP & RH: NORMAL
HCT VFR BLD AUTO: 31.1 % (ref 37–47)
HGB BLD-MCNC: 10.1 G/DL (ref 12–16)
IMM GRANULOCYTES # BLD AUTO: 0.02 X10(3)/MCL (ref 0–0.04)
IMM GRANULOCYTES NFR BLD AUTO: 0.3 %
INDIRECT COOMBS: NORMAL
LACTATE SERPL-SCNC: 1 MMOL/L (ref 0.5–2.2)
LYMPHOCYTES # BLD AUTO: 2.44 X10(3)/MCL (ref 0.6–4.6)
LYMPHOCYTES NFR BLD AUTO: 36.9 %
M2 QUANT (OHS): 2.3 U/ML
MCH RBC QN AUTO: 32.4 PG (ref 27–31)
MCHC RBC AUTO-ENTMCNC: 32.5 G/DL (ref 33–36)
MCV RBC AUTO: 99.7 FL (ref 80–94)
MONOCYTES # BLD AUTO: 0.53 X10(3)/MCL (ref 0.1–1.3)
MONOCYTES NFR BLD AUTO: 8 %
NEUTROPHILS # BLD AUTO: 3.55 X10(3)/MCL (ref 2.1–9.2)
NEUTROPHILS NFR BLD AUTO: 53.7 %
NRBC BLD AUTO-RTO: 0 %
OHS QRS DURATION: 70 MS
OHS QTC CALCULATION: 453 MS
PLATELET # BLD AUTO: 202 X10(3)/MCL (ref 130–400)
PMV BLD AUTO: 9.3 FL (ref 7.4–10.4)
POCT GLUCOSE: 102 MG/DL (ref 70–110)
POCT GLUCOSE: 111 MG/DL (ref 70–110)
POCT GLUCOSE: 128 MG/DL (ref 70–110)
POCT GLUCOSE: 136 MG/DL (ref 70–110)
POCT GLUCOSE: 143 MG/DL (ref 70–110)
POCT GLUCOSE: 96 MG/DL (ref 70–110)
POTASSIUM SERPL-SCNC: 4 MMOL/L (ref 3.5–5.1)
PROT SERPL-MCNC: 5.9 GM/DL (ref 5.8–7.6)
RBC # BLD AUTO: 3.12 X10(6)/MCL (ref 4.2–5.4)
SODIUM SERPL-SCNC: 137 MMOL/L (ref 136–145)
SPECIMEN OUTDATE: NORMAL
WBC # BLD AUTO: 6.61 X10(3)/MCL (ref 4.5–11.5)

## 2024-12-20 PROCEDURE — 25000003 PHARM REV CODE 250: Performed by: NURSE ANESTHETIST, CERTIFIED REGISTERED

## 2024-12-20 PROCEDURE — P9047 ALBUMIN (HUMAN), 25%, 50ML: HCPCS | Mod: JZ,JG | Performed by: NURSE ANESTHETIST, CERTIFIED REGISTERED

## 2024-12-20 PROCEDURE — 99900031 HC PATIENT EDUCATION (STAT)

## 2024-12-20 PROCEDURE — 99900035 HC TECH TIME PER 15 MIN (STAT)

## 2024-12-20 PROCEDURE — 83516 IMMUNOASSAY NONANTIBODY: CPT | Performed by: INTERNAL MEDICINE

## 2024-12-20 PROCEDURE — 27236 TREAT THIGH FRACTURE: CPT | Mod: LT,,, | Performed by: ORTHOPAEDIC SURGERY

## 2024-12-20 PROCEDURE — 36000710: Performed by: ORTHOPAEDIC SURGERY

## 2024-12-20 PROCEDURE — S4991 NICOTINE PATCH NONLEGEND: HCPCS | Performed by: STUDENT IN AN ORGANIZED HEALTH CARE EDUCATION/TRAINING PROGRAM

## 2024-12-20 PROCEDURE — 63600175 PHARM REV CODE 636 W HCPCS: Performed by: PHYSICIAN ASSISTANT

## 2024-12-20 PROCEDURE — 37000009 HC ANESTHESIA EA ADD 15 MINS: Performed by: ORTHOPAEDIC SURGERY

## 2024-12-20 PROCEDURE — 0SRS0JZ REPLACEMENT OF LEFT HIP JOINT, FEMORAL SURFACE WITH SYNTHETIC SUBSTITUTE, OPEN APPROACH: ICD-10-PCS | Performed by: ORTHOPAEDIC SURGERY

## 2024-12-20 PROCEDURE — 36000711: Performed by: ORTHOPAEDIC SURGERY

## 2024-12-20 PROCEDURE — 27236 TREAT THIGH FRACTURE: CPT | Mod: AS,LT,, | Performed by: PHYSICIAN ASSISTANT

## 2024-12-20 PROCEDURE — 25000003 PHARM REV CODE 250: Performed by: STUDENT IN AN ORGANIZED HEALTH CARE EDUCATION/TRAINING PROGRAM

## 2024-12-20 PROCEDURE — C1713 ANCHOR/SCREW BN/BN,TIS/BN: HCPCS | Performed by: ORTHOPAEDIC SURGERY

## 2024-12-20 PROCEDURE — 99222 1ST HOSP IP/OBS MODERATE 55: CPT | Mod: 57,ICN,, | Performed by: ORTHOPAEDIC SURGERY

## 2024-12-20 PROCEDURE — 63600175 PHARM REV CODE 636 W HCPCS: Performed by: ANESTHESIOLOGY

## 2024-12-20 PROCEDURE — 63600175 PHARM REV CODE 636 W HCPCS: Performed by: ORTHOPAEDIC SURGERY

## 2024-12-20 PROCEDURE — 27201423 OPTIME MED/SURG SUP & DEVICES STERILE SUPPLY: Performed by: ORTHOPAEDIC SURGERY

## 2024-12-20 PROCEDURE — 37000008 HC ANESTHESIA 1ST 15 MINUTES: Performed by: ORTHOPAEDIC SURGERY

## 2024-12-20 PROCEDURE — 71000033 HC RECOVERY, INTIAL HOUR: Performed by: ORTHOPAEDIC SURGERY

## 2024-12-20 PROCEDURE — 36415 COLL VENOUS BLD VENIPUNCTURE: CPT | Performed by: STUDENT IN AN ORGANIZED HEALTH CARE EDUCATION/TRAINING PROGRAM

## 2024-12-20 PROCEDURE — 85025 COMPLETE CBC W/AUTO DIFF WBC: CPT | Performed by: STUDENT IN AN ORGANIZED HEALTH CARE EDUCATION/TRAINING PROGRAM

## 2024-12-20 PROCEDURE — 25000003 PHARM REV CODE 250: Performed by: PHYSICIAN ASSISTANT

## 2024-12-20 PROCEDURE — 80053 COMPREHEN METABOLIC PANEL: CPT | Performed by: STUDENT IN AN ORGANIZED HEALTH CARE EDUCATION/TRAINING PROGRAM

## 2024-12-20 PROCEDURE — 11000001 HC ACUTE MED/SURG PRIVATE ROOM

## 2024-12-20 PROCEDURE — 27000221 HC OXYGEN, UP TO 24 HOURS

## 2024-12-20 PROCEDURE — 86900 BLOOD TYPING SEROLOGIC ABO: CPT | Performed by: STUDENT IN AN ORGANIZED HEALTH CARE EDUCATION/TRAINING PROGRAM

## 2024-12-20 PROCEDURE — 83605 ASSAY OF LACTIC ACID: CPT | Performed by: STUDENT IN AN ORGANIZED HEALTH CARE EDUCATION/TRAINING PROGRAM

## 2024-12-20 PROCEDURE — C1776 JOINT DEVICE (IMPLANTABLE): HCPCS | Performed by: ORTHOPAEDIC SURGERY

## 2024-12-20 PROCEDURE — 63600175 PHARM REV CODE 636 W HCPCS: Performed by: NURSE PRACTITIONER

## 2024-12-20 PROCEDURE — 82977 ASSAY OF GGT: CPT | Performed by: STUDENT IN AN ORGANIZED HEALTH CARE EDUCATION/TRAINING PROGRAM

## 2024-12-20 PROCEDURE — 63600175 PHARM REV CODE 636 W HCPCS: Performed by: NURSE ANESTHETIST, CERTIFIED REGISTERED

## 2024-12-20 PROCEDURE — 63600175 PHARM REV CODE 636 W HCPCS: Performed by: STUDENT IN AN ORGANIZED HEALTH CARE EDUCATION/TRAINING PROGRAM

## 2024-12-20 DEVICE — IMPLANTABLE DEVICE: Type: IMPLANTABLE DEVICE | Site: HIP | Status: FUNCTIONAL

## 2024-12-20 DEVICE — SET CABLE BEADED 2MM: Type: IMPLANTABLE DEVICE | Site: HIP | Status: FUNCTIONAL

## 2024-12-20 RX ORDER — TRAMADOL HYDROCHLORIDE 50 MG/1
50 TABLET ORAL EVERY 6 HOURS PRN
Status: DISCONTINUED | OUTPATIENT
Start: 2024-12-20 | End: 2024-12-24 | Stop reason: HOSPADM

## 2024-12-20 RX ORDER — FENTANYL CITRATE 50 UG/ML
INJECTION, SOLUTION INTRAMUSCULAR; INTRAVENOUS
Status: DISCONTINUED | OUTPATIENT
Start: 2024-12-20 | End: 2024-12-20

## 2024-12-20 RX ORDER — ALBUMIN HUMAN 250 G/1000ML
SOLUTION INTRAVENOUS
Status: DISCONTINUED | OUTPATIENT
Start: 2024-12-20 | End: 2024-12-20

## 2024-12-20 RX ORDER — PANTOPRAZOLE SODIUM 40 MG/1
40 TABLET, DELAYED RELEASE ORAL 2 TIMES DAILY
Status: DISCONTINUED | OUTPATIENT
Start: 2024-12-20 | End: 2024-12-24 | Stop reason: HOSPADM

## 2024-12-20 RX ORDER — IBUPROFEN 200 MG
16 TABLET ORAL
Status: DISCONTINUED | OUTPATIENT
Start: 2024-12-20 | End: 2024-12-24 | Stop reason: HOSPADM

## 2024-12-20 RX ORDER — ONDANSETRON HYDROCHLORIDE 2 MG/ML
4 INJECTION, SOLUTION INTRAVENOUS ONCE
Status: DISCONTINUED | OUTPATIENT
Start: 2024-12-20 | End: 2024-12-20 | Stop reason: HOSPADM

## 2024-12-20 RX ORDER — MIDAZOLAM HYDROCHLORIDE 1 MG/ML
INJECTION INTRAMUSCULAR; INTRAVENOUS
Status: DISCONTINUED | OUTPATIENT
Start: 2024-12-20 | End: 2024-12-20

## 2024-12-20 RX ORDER — POLYETHYLENE GLYCOL 3350 17 G/17G
17 POWDER, FOR SOLUTION ORAL 3 TIMES DAILY PRN
Status: DISCONTINUED | OUTPATIENT
Start: 2024-12-20 | End: 2024-12-24 | Stop reason: HOSPADM

## 2024-12-20 RX ORDER — ACETAMINOPHEN 10 MG/ML
INJECTION, SOLUTION INTRAVENOUS
Status: DISCONTINUED | OUTPATIENT
Start: 2024-12-20 | End: 2024-12-20

## 2024-12-20 RX ORDER — LIDOCAINE HYDROCHLORIDE 20 MG/ML
INJECTION, SOLUTION EPIDURAL; INFILTRATION; INTRACAUDAL; PERINEURAL
Status: DISCONTINUED | OUTPATIENT
Start: 2024-12-20 | End: 2024-12-20

## 2024-12-20 RX ORDER — PROPOFOL 10 MG/ML
VIAL (ML) INTRAVENOUS
Status: DISCONTINUED | OUTPATIENT
Start: 2024-12-20 | End: 2024-12-20

## 2024-12-20 RX ORDER — KETOROLAC TROMETHAMINE 30 MG/ML
15 INJECTION, SOLUTION INTRAMUSCULAR; INTRAVENOUS EVERY 6 HOURS
Status: COMPLETED | OUTPATIENT
Start: 2024-12-20 | End: 2024-12-21

## 2024-12-20 RX ORDER — IBUPROFEN 200 MG
24 TABLET ORAL
Status: DISCONTINUED | OUTPATIENT
Start: 2024-12-20 | End: 2024-12-24 | Stop reason: HOSPADM

## 2024-12-20 RX ORDER — ESCITALOPRAM OXALATE 5 MG/1
5 TABLET ORAL DAILY
Status: DISCONTINUED | OUTPATIENT
Start: 2024-12-20 | End: 2024-12-24 | Stop reason: HOSPADM

## 2024-12-20 RX ORDER — PHENYLEPHRINE HYDROCHLORIDE 10 MG/ML
INJECTION INTRAVENOUS
Status: DISCONTINUED | OUTPATIENT
Start: 2024-12-20 | End: 2024-12-20

## 2024-12-20 RX ORDER — IBUPROFEN 200 MG
1 TABLET ORAL DAILY
Status: DISCONTINUED | OUTPATIENT
Start: 2024-12-20 | End: 2024-12-24 | Stop reason: HOSPADM

## 2024-12-20 RX ORDER — HYDROMORPHONE HYDROCHLORIDE 2 MG/ML
0.5 INJECTION, SOLUTION INTRAMUSCULAR; INTRAVENOUS; SUBCUTANEOUS EVERY 5 MIN PRN
Status: DISCONTINUED | OUTPATIENT
Start: 2024-12-20 | End: 2024-12-20 | Stop reason: HOSPADM

## 2024-12-20 RX ORDER — IPRATROPIUM BROMIDE AND ALBUTEROL SULFATE 2.5; .5 MG/3ML; MG/3ML
3 SOLUTION RESPIRATORY (INHALATION) EVERY 6 HOURS PRN
Status: DISCONTINUED | OUTPATIENT
Start: 2024-12-20 | End: 2024-12-24 | Stop reason: HOSPADM

## 2024-12-20 RX ORDER — HYDROMORPHONE HYDROCHLORIDE 2 MG/ML
0.2 INJECTION, SOLUTION INTRAMUSCULAR; INTRAVENOUS; SUBCUTANEOUS EVERY 5 MIN PRN
Status: DISCONTINUED | OUTPATIENT
Start: 2024-12-20 | End: 2024-12-20 | Stop reason: HOSPADM

## 2024-12-20 RX ORDER — HYOSCYAMINE SULFATE 0.12 MG/1
0.12 TABLET SUBLINGUAL 3 TIMES DAILY PRN
Status: DISCONTINUED | OUTPATIENT
Start: 2024-12-20 | End: 2024-12-24 | Stop reason: HOSPADM

## 2024-12-20 RX ORDER — CEFAZOLIN 2 G/1
INJECTION, POWDER, FOR SOLUTION INTRAMUSCULAR; INTRAVENOUS
Status: DISCONTINUED | OUTPATIENT
Start: 2024-12-20 | End: 2024-12-20

## 2024-12-20 RX ORDER — ENOXAPARIN SODIUM 100 MG/ML
40 INJECTION SUBCUTANEOUS EVERY 24 HOURS
Status: DISCONTINUED | OUTPATIENT
Start: 2024-12-20 | End: 2024-12-21

## 2024-12-20 RX ORDER — CEFAZOLIN SODIUM 2 G/50ML
2 SOLUTION INTRAVENOUS ONCE
Status: DISCONTINUED | OUTPATIENT
Start: 2024-12-20 | End: 2024-12-20 | Stop reason: HOSPADM

## 2024-12-20 RX ORDER — ONDANSETRON 4 MG/1
8 TABLET, ORALLY DISINTEGRATING ORAL EVERY 8 HOURS PRN
Status: DISCONTINUED | OUTPATIENT
Start: 2024-12-20 | End: 2024-12-24 | Stop reason: HOSPADM

## 2024-12-20 RX ORDER — ONDANSETRON HYDROCHLORIDE 2 MG/ML
4 INJECTION, SOLUTION INTRAVENOUS EVERY 6 HOURS PRN
Status: DISCONTINUED | OUTPATIENT
Start: 2024-12-20 | End: 2024-12-24 | Stop reason: HOSPADM

## 2024-12-20 RX ORDER — DEXAMETHASONE SODIUM PHOSPHATE 4 MG/ML
INJECTION, SOLUTION INTRA-ARTICULAR; INTRALESIONAL; INTRAMUSCULAR; INTRAVENOUS; SOFT TISSUE
Status: DISCONTINUED | OUTPATIENT
Start: 2024-12-20 | End: 2024-12-20

## 2024-12-20 RX ORDER — OXYCODONE HYDROCHLORIDE 10 MG/1
10 TABLET ORAL EVERY 4 HOURS PRN
Status: DISCONTINUED | OUTPATIENT
Start: 2024-12-20 | End: 2024-12-24 | Stop reason: HOSPADM

## 2024-12-20 RX ORDER — MEPERIDINE HYDROCHLORIDE 25 MG/ML
12.5 INJECTION INTRAMUSCULAR; INTRAVENOUS; SUBCUTANEOUS ONCE
Status: DISCONTINUED | OUTPATIENT
Start: 2024-12-20 | End: 2024-12-20 | Stop reason: HOSPADM

## 2024-12-20 RX ORDER — BISACODYL 10 MG/1
10 SUPPOSITORY RECTAL DAILY PRN
Status: DISCONTINUED | OUTPATIENT
Start: 2024-12-20 | End: 2024-12-24 | Stop reason: HOSPADM

## 2024-12-20 RX ORDER — DIPHENHYDRAMINE HYDROCHLORIDE 50 MG/ML
25 INJECTION INTRAMUSCULAR; INTRAVENOUS EVERY 6 HOURS PRN
Status: DISCONTINUED | OUTPATIENT
Start: 2024-12-20 | End: 2024-12-20 | Stop reason: HOSPADM

## 2024-12-20 RX ORDER — MORPHINE SULFATE 4 MG/ML
4 INJECTION, SOLUTION INTRAMUSCULAR; INTRAVENOUS EVERY 6 HOURS PRN
Status: DISCONTINUED | OUTPATIENT
Start: 2024-12-20 | End: 2024-12-24 | Stop reason: HOSPADM

## 2024-12-20 RX ORDER — GLUCAGON 1 MG
1 KIT INJECTION
Status: DISCONTINUED | OUTPATIENT
Start: 2024-12-20 | End: 2024-12-24 | Stop reason: HOSPADM

## 2024-12-20 RX ORDER — TALC
9 POWDER (GRAM) TOPICAL NIGHTLY PRN
Status: DISCONTINUED | OUTPATIENT
Start: 2024-12-20 | End: 2024-12-24 | Stop reason: HOSPADM

## 2024-12-20 RX ORDER — GABAPENTIN 300 MG/1
300 CAPSULE ORAL NIGHTLY
Status: DISCONTINUED | OUTPATIENT
Start: 2024-12-20 | End: 2024-12-24 | Stop reason: HOSPADM

## 2024-12-20 RX ORDER — GLUCAGON 1 MG
1 KIT INJECTION
Status: DISCONTINUED | OUTPATIENT
Start: 2024-12-20 | End: 2024-12-20 | Stop reason: HOSPADM

## 2024-12-20 RX ORDER — ARIPIPRAZOLE 2 MG/1
2 TABLET ORAL DAILY
Status: DISCONTINUED | OUTPATIENT
Start: 2024-12-20 | End: 2024-12-24 | Stop reason: HOSPADM

## 2024-12-20 RX ORDER — BUSPIRONE HYDROCHLORIDE 5 MG/1
5 TABLET ORAL 2 TIMES DAILY
Status: DISCONTINUED | OUTPATIENT
Start: 2024-12-20 | End: 2024-12-24 | Stop reason: HOSPADM

## 2024-12-20 RX ORDER — ROCURONIUM BROMIDE 10 MG/ML
INJECTION, SOLUTION INTRAVENOUS
Status: DISCONTINUED | OUTPATIENT
Start: 2024-12-20 | End: 2024-12-20

## 2024-12-20 RX ORDER — ACETAMINOPHEN 325 MG/1
650 TABLET ORAL EVERY 4 HOURS PRN
Status: DISCONTINUED | OUTPATIENT
Start: 2024-12-20 | End: 2024-12-24 | Stop reason: HOSPADM

## 2024-12-20 RX ORDER — NALOXONE HCL 0.4 MG/ML
0.02 VIAL (ML) INJECTION
Status: DISCONTINUED | OUTPATIENT
Start: 2024-12-20 | End: 2024-12-24 | Stop reason: HOSPADM

## 2024-12-20 RX ORDER — GLUCAGON 1 MG
1 KIT INJECTION
Status: DISCONTINUED | OUTPATIENT
Start: 2024-12-20 | End: 2024-12-20

## 2024-12-20 RX ORDER — SIMETHICONE 80 MG
1 TABLET,CHEWABLE ORAL 4 TIMES DAILY PRN
Status: DISCONTINUED | OUTPATIENT
Start: 2024-12-20 | End: 2024-12-24 | Stop reason: HOSPADM

## 2024-12-20 RX ORDER — INSULIN ASPART 100 [IU]/ML
0-10 INJECTION, SOLUTION INTRAVENOUS; SUBCUTANEOUS EVERY 6 HOURS PRN
Status: DISCONTINUED | OUTPATIENT
Start: 2024-12-20 | End: 2024-12-24 | Stop reason: HOSPADM

## 2024-12-20 RX ORDER — SODIUM CHLORIDE 0.9 % (FLUSH) 0.9 %
10 SYRINGE (ML) INJECTION
Status: DISCONTINUED | OUTPATIENT
Start: 2024-12-20 | End: 2024-12-20

## 2024-12-20 RX ORDER — OXYCODONE HYDROCHLORIDE 5 MG/1
5 TABLET ORAL EVERY 6 HOURS PRN
Status: DISCONTINUED | OUTPATIENT
Start: 2024-12-20 | End: 2024-12-24 | Stop reason: HOSPADM

## 2024-12-20 RX ORDER — CEFAZOLIN 2 G/1
2 INJECTION, POWDER, FOR SOLUTION INTRAMUSCULAR; INTRAVENOUS
Status: COMPLETED | OUTPATIENT
Start: 2024-12-20 | End: 2024-12-21

## 2024-12-20 RX ORDER — ATORVASTATIN CALCIUM 40 MG/1
40 TABLET, FILM COATED ORAL NIGHTLY
Status: DISCONTINUED | OUTPATIENT
Start: 2024-12-20 | End: 2024-12-24 | Stop reason: HOSPADM

## 2024-12-20 RX ORDER — ONDANSETRON HYDROCHLORIDE 2 MG/ML
INJECTION, SOLUTION INTRAMUSCULAR; INTRAVENOUS
Status: DISCONTINUED | OUTPATIENT
Start: 2024-12-20 | End: 2024-12-20

## 2024-12-20 RX ORDER — ONDANSETRON HYDROCHLORIDE 2 MG/ML
4 INJECTION, SOLUTION INTRAVENOUS EVERY 8 HOURS PRN
Status: DISCONTINUED | OUTPATIENT
Start: 2024-12-20 | End: 2024-12-24 | Stop reason: HOSPADM

## 2024-12-20 RX ORDER — VANCOMYCIN HYDROCHLORIDE 1 G/20ML
INJECTION, POWDER, LYOPHILIZED, FOR SOLUTION INTRAVENOUS
Status: DISCONTINUED | OUTPATIENT
Start: 2024-12-20 | End: 2024-12-20 | Stop reason: HOSPADM

## 2024-12-20 RX ORDER — ALUMINUM HYDROXIDE, MAGNESIUM HYDROXIDE, AND SIMETHICONE 1200; 120; 1200 MG/30ML; MG/30ML; MG/30ML
30 SUSPENSION ORAL 4 TIMES DAILY PRN
Status: DISCONTINUED | OUTPATIENT
Start: 2024-12-20 | End: 2024-12-24 | Stop reason: HOSPADM

## 2024-12-20 RX ORDER — METHOCARBAMOL 750 MG/1
750 TABLET, FILM COATED ORAL 3 TIMES DAILY
Status: DISCONTINUED | OUTPATIENT
Start: 2024-12-20 | End: 2024-12-24 | Stop reason: HOSPADM

## 2024-12-20 RX ORDER — SODIUM CHLORIDE, SODIUM LACTATE, POTASSIUM CHLORIDE, CALCIUM CHLORIDE 600; 310; 30; 20 MG/100ML; MG/100ML; MG/100ML; MG/100ML
INJECTION, SOLUTION INTRAVENOUS CONTINUOUS
Status: DISCONTINUED | OUTPATIENT
Start: 2024-12-20 | End: 2024-12-21

## 2024-12-20 RX ADMIN — KETOROLAC TROMETHAMINE 15 MG: 30 INJECTION, SOLUTION INTRAMUSCULAR at 05:12

## 2024-12-20 RX ADMIN — ROCURONIUM BROMIDE 50 MG: 10 SOLUTION INTRAVENOUS at 11:12

## 2024-12-20 RX ADMIN — OXYCODONE HYDROCHLORIDE 10 MG: 10 TABLET ORAL at 01:12

## 2024-12-20 RX ADMIN — HYDROMORPHONE HYDROCHLORIDE 0.5 MG: 2 INJECTION INTRAMUSCULAR; INTRAVENOUS; SUBCUTANEOUS at 01:12

## 2024-12-20 RX ADMIN — ONDANSETRON 4 MG: 2 INJECTION INTRAMUSCULAR; INTRAVENOUS at 11:12

## 2024-12-20 RX ADMIN — PHENYLEPHRINE HYDROCHLORIDE 100 MCG: 10 INJECTION INTRAVENOUS at 11:12

## 2024-12-20 RX ADMIN — PROPOFOL 120 MG: 10 INJECTION, EMULSION INTRAVENOUS at 11:12

## 2024-12-20 RX ADMIN — HYDROMORPHONE HYDROCHLORIDE 0.5 MG: 2 INJECTION INTRAMUSCULAR; INTRAVENOUS; SUBCUTANEOUS at 12:12

## 2024-12-20 RX ADMIN — PANTOPRAZOLE SODIUM 40 MG: 40 TABLET, DELAYED RELEASE ORAL at 09:12

## 2024-12-20 RX ADMIN — LIDOCAINE HYDROCHLORIDE 100 MG: 20 INJECTION, SOLUTION INTRAVENOUS at 12:12

## 2024-12-20 RX ADMIN — LIDOCAINE HYDROCHLORIDE 40 MG: 20 INJECTION, SOLUTION INTRAVENOUS at 11:12

## 2024-12-20 RX ADMIN — CEFAZOLIN 2 G: 2 INJECTION, POWDER, FOR SOLUTION INTRAMUSCULAR; INTRAVENOUS at 08:12

## 2024-12-20 RX ADMIN — ACETAMINOPHEN 1000 MG: 10 INJECTION, SOLUTION INTRAVENOUS at 12:12

## 2024-12-20 RX ADMIN — METHOCARBAMOL 750 MG: 750 TABLET ORAL at 09:12

## 2024-12-20 RX ADMIN — DEXAMETHASONE SODIUM PHOSPHATE 4 MG: 4 INJECTION, SOLUTION INTRA-ARTICULAR; INTRALESIONAL; INTRAMUSCULAR; INTRAVENOUS; SOFT TISSUE at 11:12

## 2024-12-20 RX ADMIN — KETOROLAC TROMETHAMINE 15 MG: 30 INJECTION, SOLUTION INTRAMUSCULAR at 12:12

## 2024-12-20 RX ADMIN — ATORVASTATIN CALCIUM 40 MG: 40 TABLET, FILM COATED ORAL at 09:12

## 2024-12-20 RX ADMIN — ESCITALOPRAM OXALATE 5 MG: 5 TABLET, FILM COATED ORAL at 09:12

## 2024-12-20 RX ADMIN — PHENYLEPHRINE HYDROCHLORIDE 100 MCG: 10 INJECTION INTRAVENOUS at 12:12

## 2024-12-20 RX ADMIN — METHOCARBAMOL 750 MG: 750 TABLET ORAL at 02:12

## 2024-12-20 RX ADMIN — SUGAMMADEX 200 MG: 100 INJECTION, SOLUTION INTRAVENOUS at 12:12

## 2024-12-20 RX ADMIN — ENOXAPARIN SODIUM 40 MG: 40 INJECTION SUBCUTANEOUS at 05:12

## 2024-12-20 RX ADMIN — NICOTINE 1 PATCH: 14 PATCH TRANSDERMAL at 09:12

## 2024-12-20 RX ADMIN — ALBUMIN (HUMAN) 100 ML: 12.5 SOLUTION INTRAVENOUS at 11:12

## 2024-12-20 RX ADMIN — SODIUM CHLORIDE: 9 INJECTION, SOLUTION INTRAVENOUS at 11:12

## 2024-12-20 RX ADMIN — MIDAZOLAM HYDROCHLORIDE 2 MG: 1 INJECTION, SOLUTION INTRAMUSCULAR; INTRAVENOUS at 11:12

## 2024-12-20 RX ADMIN — BUSPIRONE HYDROCHLORIDE 5 MG: 5 TABLET ORAL at 09:12

## 2024-12-20 RX ADMIN — KETOROLAC TROMETHAMINE 15 MG: 30 INJECTION, SOLUTION INTRAMUSCULAR at 11:12

## 2024-12-20 RX ADMIN — SODIUM CHLORIDE, POTASSIUM CHLORIDE, SODIUM LACTATE AND CALCIUM CHLORIDE: 600; 310; 30; 20 INJECTION, SOLUTION INTRAVENOUS at 10:12

## 2024-12-20 RX ADMIN — CEFAZOLIN 2 G: 2 INJECTION, POWDER, FOR SOLUTION INTRAMUSCULAR; INTRAVENOUS at 11:12

## 2024-12-20 RX ADMIN — ARIPIPRAZOLE 2 MG: 2 TABLET ORAL at 09:12

## 2024-12-20 RX ADMIN — HYDROCODONE BITARTRATE AND ACETAMINOPHEN 1 TABLET: 5; 325 TABLET ORAL at 09:12

## 2024-12-20 RX ADMIN — HYDROCODONE BITARTRATE AND ACETAMINOPHEN 1 TABLET: 5; 325 TABLET ORAL at 12:12

## 2024-12-20 RX ADMIN — GABAPENTIN 300 MG: 300 CAPSULE ORAL at 09:12

## 2024-12-20 RX ADMIN — HYDROCODONE BITARTRATE AND ACETAMINOPHEN 1 TABLET: 5; 325 TABLET ORAL at 07:12

## 2024-12-20 RX ADMIN — FENTANYL CITRATE 50 MCG: 50 INJECTION, SOLUTION INTRAMUSCULAR; INTRAVENOUS at 11:12

## 2024-12-20 RX ADMIN — SODIUM CHLORIDE, POTASSIUM CHLORIDE, SODIUM LACTATE AND CALCIUM CHLORIDE 250 ML: 600; 310; 30; 20 INJECTION, SOLUTION INTRAVENOUS at 08:12

## 2024-12-20 NOTE — PROGRESS NOTES
Pt Hx and procdure discussed in detail. Post op orders reviewed. Pt attached to v/s machine and vitals assessed. Post op orders discussed. Pt attached to v/s machine and vitals WNL. Everyone understands pt info with no further questions.

## 2024-12-20 NOTE — PROGRESS NOTES
Discussed case with joint partner. Will put patient on schedule tomorrow for hip janusz. Admit to medicine. NPO MN   EKG    This note/OR report was created with the assistance of  voice recognition software or phone  dictation.  There may be transcription errors as a result of using this technology however minimal. Effort has been made to assure accuracy of transcription but any obvious errors or omissions should be clarified with the author of the document.       Jose J Montemayor, DO  Orthopedic Trauma Surgery

## 2024-12-20 NOTE — CONSULTS
[facility]  Orthopedic Trauma  Consult Note    Patient Name: Luz Maria Alfaro  MRN: [id number]  Admission Date: 12/19/2024  Hospital Length of Stay: 1 days  Attending Provider: Helena Spain MD  Primary Care Provider: Luzmaria Flores FNP        Inpatient consult to Orthopedic Surgery  Consult performed by: Jose J Montemayor DO  Consult ordered by: Reyes, Thairy G, DO        Subjective:         Chief Complaint:   Chief Complaint   Patient presents with    Hip Pain     Pt instructed to go to ed by Dr Mares with hx of fx left hip approx 2 months ago        HPI:  Patient has Left hip pain status post ground level fall.  She is diagnosed with a left femoral neck proximal femur fracture dull achy pain to the hip without radiation no previous injury.  Patient has no numbness or tingling.  Pain medication makes it better rest makes it better movement makes it worse.  Fracture occurred a proximally 3-4 days ago.    Past Medical History:   Diagnosis Date    Benign essential HTN     Mixed hyperlipidemia     Type 2 diabetes mellitus without complications        Past Surgical History:   Procedure Laterality Date    APPENDECTOMY      BACK SURGERY      BREAST SURGERY  01 13 2002    Augmantation    HIP SURGERY      INTRAMEDULLARY RODDING OF FEMUR Right 10/27/2022    Procedure: RIGHT FEMUR INTERTROCH IMN;  Surgeon: Stewart Brambila MD;  Location: [facility];  Service: Orthopedics;  Laterality: Right;  [place] table, supine  3rd case  Synthes Short TFNA       Review of patient's allergies indicates:   Allergen Reactions    Iodinated contrast media Shortness Of Breath    Opioids - morphine analogues     Meperidine Rash and Hives       Current Facility-Administered Medications   Medication    acetaminophen tablet 650 mg    albuterol-ipratropium 2.5 mg-0.5 mg/3 mL nebulizer solution 3 mL    aluminum-magnesium hydroxide-simethicone 200-200-20 mg/5 mL suspension 30 mL    ARIPiprazole tablet 2 mg     "atorvastatin tablet 40 mg    bisacodyL suppository 10 mg    busPIRone tablet 5 mg    dextrose 50% injection 12.5 g    dextrose 50% injection 25 g    enoxaparin injection 40 mg    EScitalopram oxalate tablet 5 mg    glucagon (human recombinant) injection 1 mg    glucose chewable tablet 16 g    glucose chewable tablet 24 g    HYDROcodone-acetaminophen 5-325 mg per tablet 1 tablet    hyoscyamine SL tablet 0.125 mg    insulin aspart U-100 injection 0-10 Units    melatonin tablet 9 mg    naloxone 0.4 mg/mL injection 0.02 mg    nicotine 14 mg/24 hr 1 patch    ondansetron disintegrating tablet 8 mg    ondansetron injection 4 mg    pantoprazole EC tablet 40 mg    polyethylene glycol packet 17 g    simethicone chewable tablet 80 mg    sodium chloride 0.9% flush 10 mL     Family History    None       Tobacco Use    Smoking status: Every Day     Current packs/day: 0.50     Types: Cigarettes    Smokeless tobacco: Current   Substance and Sexual Activity    Alcohol use: Not Currently     Comment: 6 a week anand    Drug use: Not Currently     Types: "Crack" cocaine     Comment: Been clean 8 years    Sexual activity: Not Currently     Partners: Male       ROS:  Constitutional: Denies fever chills  Eyes: No change in vision  ENT: No ringing or current infections  CV: No chest pain  Resp: No labored breathing  MSK: Pain evident at site of injury located in HPI,   Integ: No signs of abrasions or lacerations  Neuro: No numbness or tingling  Lymphatic: No swelling outside the area of injury   Objective:     Vital Signs (Most Recent):  Temp: 98.3 °F (36.8 °C) (12/20/24 0442)  Pulse: 82 (12/20/24 0442)  Resp: 17 (12/20/24 0442)  BP: 121/66 (12/20/24 0442)  SpO2: 96 % (12/20/24 0442) Vital Signs (24h Range):  Temp:  [98.3 °F (36.8 °C)-98.7 °F (37.1 °C)] 98.3 °F (36.8 °C)  Pulse:  [78-96] 82  Resp:  [16-19] 17  SpO2:  [95 %-100 %] 96 %  BP: (118-143)/(61-80) 121/66     Weight: 42.7 kg (94 lb 3.2 oz)  Height: 5' 3" (160 cm)  Body mass index " is 16.69 kg/m².      Intake/Output Summary (Last 24 hours) at 12/20/2024 0716  Last data filed at 12/19/2024 1705  Gross per 24 hour   Intake --   Output 300 ml   Net -300 ml       Ortho/SPM Exam  General the patient is alert no acute distress nontoxic-appearing appropriate affect.    Constitutional: Vital signs are examined and stable.  Resp: No signs of labored breathing               LLE: -Skin:  Painful logroll painful axial compression test           -MSK:  EHL/FHL, Gastroc/Tib anterior Strength 5/5           -Neuro:  Sensation intact to light touch L3-S1 dermatomes           -Lymphatic: No signs of lymphadenopathy           -CV: Capillary refill is less than 2 seconds. DP/PT pulses 2/4. Compartments soft and compressible                          Significant Labs:   Recent Lab Results  (Last 5 results in the past 24 hours)        12/20/24  0505   12/20/24  0318   12/20/24  0056   12/19/24  1412   12/19/24  1217        Albumin/Globulin Ratio 1.1       1.1   1.0       Acetaminophen Level       <10.0   <10.0       Albumin 3.1       3.5   3.8              354   380       ALT 14       16   19       Anion Gap 6.0       9.0   10.0       Aniso         3+       PTT         29.6  Comment: For Minimal Heparin Infusion, the goal aPTT 64-85 seconds corresponds to an anti-Xa of 0.3-0.5.    For Low Intensity and High Intensity Heparin, the goal aPTT  seconds corresponds to an anti-Xa of 0.3-0.7       AST 27       25   36       Baso # 0.03         0.04       Basophil % 0.5         0.6       BILIRUBIN TOTAL 0.4       0.4   0.4       BUN 18.5       12.4   13.0       BUN/CREAT RATIO 32       19   18       Calcium 8.7       9.1   9.5       Chloride 116       116   116       CO2 15       14   12       Creatinine 0.58       0.67   0.74       eGFR >60       >60   >60       Eos # 0.04         0.02       Eos % 0.6         0.3                      Globulin, Total 2.8       3.2   3.7       Glucose 97       85   95        Group & Rh   B POS             Hematocrit 31.1         38.9       Hemoglobin 10.1         12.2       Hypo         1+       Immature Grans (Abs) 0.02         0.02       Immature Granulocytes 0.3         0.3       Indirect Demi GEL   NEG             INR         1.1       Lactic Acid Level 1.0               Lymph # 2.44         1.54       LYMPH % 36.9         24.7       Macrocytosis         1+       MCH 32.4         32.0       MCHC 32.5         31.4       MCV 99.7         102.1       Microcytosis         Slight       Mono # 0.53         0.33       Mono % 8.0         5.3       MPV 9.3         9.0       Neut # 3.55         4.28       Neut % 53.7         68.8       nRBC 0.0         0.0       Platelet Estimate         Adequate       Platelet Count 202         272       POCT Glucose     102           Potassium 4.0       4.3   5.0       PROTEIN TOTAL 5.9       6.7   7.5       PT         14.3       RBC 3.12         3.81       RBC Morph         Abnormal       RDW   Comment: N/A           Comment: Unable to calculate       Salicylate Level         <5.0       Sodium 137       139   138       Specimen Outdate   12/23/2024 23:59             WBC 6.61         6.23                            All pertinent labs within the past 24 hours have been reviewed.  Recent Lab Results  (Last 5 results in the past 72 hours)        12/20/24  0505   12/20/24  0318   12/20/24  0056   12/19/24  1412   12/19/24  1217        Albumin/Globulin Ratio 1.1       1.1   1.0       Acetaminophen Level       <10.0   <10.0       Albumin 3.1       3.5   3.8              354   380       ALT 14       16   19       Anion Gap 6.0       9.0   10.0       Aniso         3+       PTT         29.6  Comment: For Minimal Heparin Infusion, the goal aPTT 64-85 seconds corresponds to an anti-Xa of 0.3-0.5.    For Low Intensity and High Intensity Heparin, the goal aPTT  seconds corresponds to an anti-Xa of 0.3-0.7       AST 27       25   36       Baso # 0.03          0.04       Basophil % 0.5         0.6       BILIRUBIN TOTAL 0.4       0.4   0.4       BUN 18.5       12.4   13.0       BUN/CREAT RATIO 32       19   18       Calcium 8.7       9.1   9.5       Chloride 116       116   116       CO2 15       14   12       Creatinine 0.58       0.67   0.74       eGFR >60       >60   >60       Eos # 0.04         0.02       Eos % 0.6         0.3                      Globulin, Total 2.8       3.2   3.7       Glucose 97       85   95       Group & Rh   B POS             Hematocrit 31.1         38.9       Hemoglobin 10.1         12.2       Hypo         1+       Immature Grans (Abs) 0.02         0.02       Immature Granulocytes 0.3         0.3       Indirect Demi GEL   NEG             INR         1.1       Lactic Acid Level 1.0               Lymph # 2.44         1.54       LYMPH % 36.9         24.7       Macrocytosis         1+       MCH 32.4         32.0       MCHC 32.5         31.4       MCV 99.7         102.1       Microcytosis         Slight       Mono # 0.53         0.33       Mono % 8.0         5.3       MPV 9.3         9.0       Neut # 3.55         4.28       Neut % 53.7         68.8       nRBC 0.0         0.0       Platelet Estimate         Adequate       Platelet Count 202         272       POCT Glucose     102           Potassium 4.0       4.3   5.0       PROTEIN TOTAL 5.9       6.7   7.5       PT         14.3       RBC 3.12         3.81       RBC Morph         Abnormal       RDW   Comment: N/A           Comment: Unable to calculate       Salicylate Level         <5.0       Sodium 137       139   138       Specimen Outdate   12/23/2024 23:59             WBC 6.61         6.23                               Significant Imaging: I have reviewed all pertinent imaging results/findings.  X-Ray Chest 1 View    Result Date: 12/19/2024  EXAMINATION: XR CHEST 1 VIEW CLINICAL HISTORY: , Encounter for other preprocedural examination. COMPARISON: August 12, 2024 FINDINGS: No  alveolar consolidation, effusion, or pneumothorax is seen.   The thoracic aorta is normal  cardiac silhouette, central pulmonary vessels and mediastinum are normal in size and are grossly unremarkable. There is surgical manipulation of the thoracic spine Exam is rotated     No acute chest disease is identified. Electronically signed by: Clint Bosch Date:    12/19/2024 Time:    12:37    X-Ray Hip 2 or 3 views Left with Pelvis when performed    Result Date: 12/18/2024  See Provider Notes for results. IMPRESSION: Please see provider office/clinic notes for radiology interpretation This procedure was auto-finalized by: Virtual Radiologist    XR LUMBAR SPINE 2 OR 3 VIEWS    Result Date: 11/21/2024  Indication: Low back pain initial encounter, November 21, 2024 at 8:15 AM FINDINGS: Frontal and lateral views of the lumbar spine reveal pedicles intact at all levels. There is a lumbar fusion with pedicle screws at T10 and L1 with good alignment. There is multilevel disc endplate and facet spondylosis. The pedicles are intact at all levels. There is no fracture dislocation or metallic failure.    No fractures or dislocations.    US ABDOMEN COMPLETE    Result Date: 11/20/2024  EXAM: Ultrasound Abdomen DATE: 11/20/2024 4:14 PM COMPARISON: None INDICATION: 64 years Female with right upper quadrant pain TECHNIQUE: Sonographic imaging of the abdomen was performed with a curved array transducer. FINDINGS: The pancreas is within normal limits. The liver is not enlarged with coarsened echotexture and no lesions identified. Small perihepatic ascites. It measures 13.6 cm. Main portal vein is patent with monophasic antegrade flow. The spleen appears sonographically normal and measures 12.8 cm. The gallbladder contains no stones or sludge. Negative sonographic Kurtz's. There is no pericholecystic fluid or wall thickening. Common bile duct measures 3.9 mm. Right kidneys demonstrate appropriate echotexture without lesions.  The  right kidney measures 10.1 x 5.1 x 4.5 cm. The left kidney measures 8.8 x 4.7 x 4.5 cm. The visualized aorta and IVC are sonographically within normal limits.     No cholelithiasis or sonographic evidence of acute cholecystitis. Coarsened echotexture of the liver. Trace perihepatic ascites..       Assessment/Plan:     Active Diagnoses:    Diagnosis Date Noted POA    PRINCIPAL PROBLEM:  Displaced fracture of left femoral neck [S72.002A] 12/20/2024 Yes      Problems Resolved During this Admission:       Independent Radiology ordered by other provider:   Three views left hip skeletally mature individual shows a displaced femoral neck fracture    Pt has acute injury with risk of severe bodily function with their injury.              Patient has a femoral neck femur fracture meeting surgical indications for hip hemiarthroplasty after discussion with arthroplasty partner Dr. Davila.  .  Patient and family understand risks best procedure stated below.  This will allow immediate weight-bearing and excellent pain control.  Patient has left femoral neck has been fracture for proximally 3 days   I explained that surgery and the nature of their condition are not without risks. These include, but are not limited to, bleeding, infection, neurovascular compromise, malunion, nonunion, hardware complications, wound complications, scarring, cosmetic defects, need for later and/or repeated surgeries, pain, loss of ROM, loss of function, PTOA, deformity, stance/gait and/or functional abnormalities, thromboembolic complications, compartment syndrome, loss of limb, loss of life, anesthetic complications, and other imponderables. I explained that these can occur despite the adequacy of treatments rendered, and that their risks are heightened given the nature of their condition. They verbalized understanding. They would like to continue with surgery at this time. If appropriate family was involved with surgical discussion.           This  note/OR report was created with the assistance of  voice recognition software or phone  dictation.  There may be transcription errors as a result of using this technology however minimal. Effort has been made to assure accuracy of transcription but any obvious errors or omissions should be clarified with the author of the document.       Jose J Montemayor,    Orthopedic Trauma Surgery  [facility]

## 2024-12-20 NOTE — PT/OT/SLP PROGRESS
Occupational Therapy      Patient Name:  Luz Maria Alfaro   MRN:  [id number]    OT eval orders received. Pt pending sx c ortho for L femoral neck fx. OT to follow up as appropriate.     12/20/2024

## 2024-12-20 NOTE — ANESTHESIA PREPROCEDURE EVALUATION
12/20/2024  Luz Maria Alfaro is a 64 y.o., female.   Left hip pain status post ground level fall.  She is diagnosed with a left femoral neck proximal femur fracture dull achy pain to the hip without radiation no previous injury.  Patient has no numbness or tingling.  Pain medication makes it better rest makes it better movement makes it worse.  Fracture occurred a proximally 3-4 days ago.          Past Medical History:   Diagnosis Date    Benign essential HTN      Mixed hyperlipidemia      Type 2 diabetes mellitus without complications                 Past Surgical History:   Procedure Laterality Date    APPENDECTOMY        BACK SURGERY        BREAST SURGERY   01 13 2002     Augmantation    HIP SURGERY        INTRAMEDULLARY RODDING OF FEMUR Right 10/27/2022     Procedure: RIGHT FEMUR INTERTROCH IMN;  Surgeon: Stewart Brambila MD;  Locatio      Echo 4 mo ago:    Left Ventricle: The left ventricle is normal in size. Mildly increased wall thickness. There is normal systolic function with a visually estimated ejection fraction of 60 - 65%. Grade I diastolic dysfunction.    Right Ventricle: Normal right ventricular cavity size. Systolic function is normal. TAPSE is 1.91 cm.    Left Atrium: Left atrium is moderately dilated.    Aortic Valve: Mildly calcified cusps. Mildly restricted motion. There is mild stenosis. Aortic valve area by VTI is 1.36 cm². Aortic valve peak velocity is 2.40 m/s. Mean gradient is 13 mmHg. The dimensionless index is 0.48. There is moderate aortic regurgitation.    Mitral Valve: Mildly calcified leaflets. There is mild mitral annular calcification present. There is no stenosis. The mean pressure gradient across the mitral valve is 6 mmHg at a heart rate of  bpm. There is mild regurgitation.    IVC/SVC: Normal venous pressure at 3 mmHg.     Pre-op Assessment    I have reviewed the Patient  Summary Reports.     I have reviewed the Nursing Notes. I have reviewed the NPO Status.   I have reviewed the Medications.     Review of Systems  Cardiovascular:     Hypertension                                    Hypertension         Endocrine:  Diabetes    Diabetes                          Physical Exam  General: Well nourished, Cooperative, Alert and Oriented    Airway:  Mallampati: II   Mouth Opening: Normal  TM Distance: Normal  Tongue: Normal  Neck ROM: Normal ROM    Dental:  Upper eedentulous      Anesthesia Plan  Type of Anesthesia, risks & benefits discussed:    Anesthesia Type: Gen ETT  Intra-op Monitoring Plan: Standard ASA Monitors  Post Op Pain Control Plan: multimodal analgesia  Induction:  IV  Airway Plan: Direct, Post-Induction  Informed Consent: Informed consent signed with the Patient and all parties understand the risks and agree with anesthesia plan.  All questions answered. Patient consented to blood products? Yes  ASA Score: 3  Day of Surgery Review of History & Physical: H&P Update referred to the surgeon/provider.    Ready For Surgery From Anesthesia Perspective.     .

## 2024-12-20 NOTE — TRANSFER OF CARE
"Anesthesia Transfer of Care Note    Patient: Luz Maria Alfaro    Procedure(s) Performed: Procedure(s) (LRB):  HEMIARTHROPLASTY, HIP (Left)    Patient location: PACU    Anesthesia Type: general    Transport from OR: Transported from OR on room air with adequate spontaneous ventilation    Post pain: adequate analgesia    Post assessment: no apparent anesthetic complications and tolerated procedure well    Post vital signs: stable    Level of consciousness: responds to stimulation    Nausea/Vomiting: no nausea/vomiting    Complications: none    Transfer of care protocol was followed    Last vitals: Visit Vitals  BP (!) 165/82   Pulse 110   Temp (!) 0.5 °C (32.9 °F)   Resp 17   Ht 5' 3" (1.6 m)   Wt 42.7 kg (94 lb 3.2 oz)   SpO2 96%   Breastfeeding No   BMI 16.69 kg/m²     "

## 2024-12-20 NOTE — H&P
[facility] MEDICINE - H&P ADMISSION NOTE    Patient Name: Luz Maria Alfaro  MRN: [id number]  Patient Class: IP- Inpatient   Admission Date: 12/19/2024   Admitting Physician: RATNA Service   Attending Physician: Thairy G Reyes, DO  Primary Care Provider: Luzmaria Flores FNP  Face-to-Face encounter date: 12/20/2024      CHIEF COMPLAINT     Chief Complaint   Patient presents with    Hip Pain     Pt instructed to go to ed by Dr Mares with hx of fx left hip approx 2 months ago       HISTORY OF PRESENTING ILLNESS   64-year-old female with a past medical history of primary hypertension, diabetes mellitus, depression, GERD, tobacco dependence who presents from [facility] after she presented there from persistent left hip pain secondary to a fall that occurred 2 months ago.  Patient was transferred to our facility for planned surgery on 12/20 with Dr. Jose J Montemayor.    At baseline the patient lives with her son and is independent.  Requesting a nicotine patch.  PAST MEDICAL HISTORY     Past Medical History:   Diagnosis Date    Benign essential HTN     Mixed hyperlipidemia     Type 2 diabetes mellitus without complications        PAST SURGICAL HISTORY     Past Surgical History:   Procedure Laterality Date    APPENDECTOMY      BACK SURGERY      BREAST SURGERY  01 13 2002    Augmantation    HIP SURGERY      INTRAMEDULLARY RODDING OF FEMUR Right 10/27/2022    Procedure: RIGHT FEMUR INTERTROCH IMN;  Surgeon: Stewart Brambila MD;  Location: [facility];  Service: Orthopedics;  Laterality: Right;  [place] table, supine  3rd case  Synthes Short TFNA       FAMILY HISTORY   Reviewed and noncontributory to this case    SOCIAL HISTORY     Social History     Socioeconomic History    Marital status:    Tobacco Use    Smoking status: Every Day     Current packs/day: 0.50     Types: Cigarettes    Smokeless tobacco: Current   Substance and Sexual Activity    Alcohol use: Not Currently     Comment: 6 a  "week mabe    Drug use: Not Currently     Types: "Crack" cocaine     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    Overall Financial Resource Strain (CARDIA)     Difficulty of Paying Living Expenses: Patient unable to answer   Food Insecurity: Patient Unable To Answer (11/19/2024)    [facility]    Hunger Vital Sign     Worried About Running Out of Food in the Last Year: Patient unable to answer     Ran Out of Food in the Last Year: Patient unable to answer   Transportation Needs: Patient Unable To Answer (11/19/2024)    [facility]    PRAPARE - Transportation     Lack of Transportation (Medical): Patient unable to answer     Lack of Transportation (Non-Medical): Patient unable to answer   Physical Activity: Sufficiently Active (8/14/2024)    Exercise Vital Sign     Days of Exercise per Week: 5 days     Minutes of Exercise per Session: 30 min   Stress: No Stress Concern Present (8/14/2024)    [employer] - Occupational Stress Questionnaire     Feeling of Stress : Only a little   Housing Stability: Patient Unable To Answer (11/19/2024)    [facility]    Housing Stability Vital Sign     Unable to Pay for Housing in the Last Year: Patient unable to answer     Number of Times Moved in the Last Year: 1     Homeless in the Last Year: Patient unable to answer       HOME MEDICATIONS     Prior to Admission medications    Medication Sig Start Date End Date Taking? Authorizing Provider   albuterol " (PROVENTIL/VENTOLIN HFA) 90 mcg/actuation inhaler Inhale 1 puff into the lungs every 4 (four) hours as needed for Shortness of Breath.    Provider, Historical   ARIPiprazole (ABILIFY) 2 MG Tab Take 1 tablet (2 mg total) by mouth once daily. 11/12/22   Adam Weston MD   busPIRone (BUSPAR) 5 MG Tab Take 1 tablet (5 mg total) by mouth 2 (two) times daily. 4/17/24 8/13/24  Pedrito Arechiga MD   EScitalopram oxalate (LEXAPRO) 5 MG Tab Take 1 tablet (5 mg total) by mouth once daily. 4/18/24 4/18/25  Pedrito Arechiga MD   hyoscyamine (LEVSIN) 0.125 mg Subl Place 0.125 mg under the tongue 3 (three) times daily as needed (for abdominal cramping).    Provider, Historical   ondansetron (ZOFRAN-ODT) 8 MG TbDL Take 8 mg by mouth every 6 (six) hours as needed. 5/6/24   Provider, Historical   pantoprazole (PROTONIX) 40 MG tablet Take 40 mg by mouth 2 (two) times daily.    Provider, Historical   rosuvastatin (CRESTOR) 20 MG tablet Take 20 mg by mouth once daily.    Provider, Historical   sacubitriL-valsartan (ENTRESTO) 24-26 mg per tablet Take 1 tablet by mouth 2 (two) times daily.  Patient not taking: Reported on 12/18/2024    Provider, Historical   thiamine 100 MG tablet Take 1 tablet (100 mg total) by mouth once daily.  Patient not taking: Reported on 12/18/2024 5/23/24   Evgeny Mcfadden MD   dapagliflozin propanediol (FARXIGA) 10 mg tablet Take 1 tablet (10 mg total) by mouth once daily.  Patient not taking: Reported on 12/18/2024 5/14/24 12/20/24  Tabatha Stokes DO   diazePAM (VALIUM) 5 MG tablet Take 5 mg by mouth daily as needed for Anxiety.  Patient not taking: Reported on 12/18/2024 12/20/24  Provider, Historical   folic acid (FOLVITE) 1 MG tablet Take 1 tablet (1 mg total) by mouth once daily.  Patient not taking: Reported on 12/18/2024 5/23/24 12/20/24  Evgeny Mcfadden MD   furosemide (LASIX) 20 MG tablet Take 1 tablet (20 mg total) by mouth once daily.  Patient not taking: Reported on 12/18/2024  5/23/24 12/20/24  Evgeny Mcfadden MD   gabapentin (NEURONTIN) 300 MG capsule Take 300 mg by mouth 3 (three) times daily.  Patient not taking: Reported on 12/18/2024 12/20/24  Provider, Historical   methocarbamoL (ROBAXIN) 750 MG Tab Take 750 mg by mouth 3 (three) times daily.  Patient not taking: Reported on 12/18/2024 12/20/24  Provider, Historical   metoprolol succinate (TOPROL-XL) 25 MG 24 hr tablet Take 0.5 tablets (12.5 mg total) by mouth once daily.  Patient not taking: Reported on 12/18/2024 5/23/24 12/20/24  Evgeny Mcfadden MD       ALLERGIES   Iodinated contrast media, Opioids - morphine analogues, and Meperidine    REVIEW OF SYSTEMS   Except as documented above, all other systems reviewed and negative    PHYSICAL EXAM     Vitals:    12/20/24 0057   BP:    Pulse:    Resp: 19   Temp:       General:  In no acute distress, resting comfortably, frail  Head and neck:  Atraumatic, normocephalic, moist mucous membranes, supple neck  Chest:  Clear to auscultation bilaterally  Heart:  S1, S2, no appreciable murmur  Abdomen:  Soft, nontender, BS +  MSK:  Warm, no lower extremity edema, no clubbing or cyanosis  Neuro:  Alert and oriented x4, moving all extremities with good strength  Integumentary:  No obvious skin rash  Psychiatry:  Appropriate mood and affect  ASSESSMENT AND PLAN   Left displaced femoral neck fracture   -surgery with Dr. Montemayor on 12/20   -NPO, PT OT after surgery    Elevated alk phos   -pending GGT  -liver US from 11/2024: liver is not enlarged with coarsened echotexture and no lesions identified. Small perihepatic ascites. Main portal vein is patent with monophasic antegrade flow. The spleen appears sonographically normal and measures 12.8 cm. The gallbladder contains no stones or sludge. Negative sonographic Kurtz's. There is no pericholecystic fluid or wall thickening. Common bile duct measures 3.9 mm.   -LFTs within normal limits, no pain  -AMA ordered    History of: primary  hypertension, diabetes mellitus, depression, GERD, tobacco dependence (1 pack per day)    DVT prophylaxis:  Lovenox  Code status: Full code  Patient's screen for food insecurity, housing instability, transportation needs, utility difficulties, and interpersonal safety. Social work consulted for home health care  __________________________________________________________________  LABS/MICRO/MEDS/DIAGNOSTICS       LABS  Recent Labs     12/19/24  1412      K 4.3   CO2 14*   BUN 12.4   CREATININE 0.67   GLUCOSE 85   CALCIUM 9.1   ALKPHOS 354*   AST 25   ALT 16   ALBUMIN 3.5     Recent Labs     12/19/24  1217   WBC 6.23   RBC 3.81*   HCT 38.9   .1*          MICROBIOLOGY  Microbiology Results (last 7 days)       ** No results found for the last 168 hours. **            MEDICATIONS   ARIPiprazole  2 mg Oral Daily    atorvastatin  40 mg Oral QHS    busPIRone  5 mg Oral BID    enoxparin  40 mg Subcutaneous Daily    EScitalopram oxalate  5 mg Oral Daily    nicotine  1 patch Transdermal Daily    pantoprazole  40 mg Oral BID      INFUSIONS      DIAGNOSTIC TESTS  X-Ray Chest 1 View   Final Result      No acute chest disease is identified.         Electronically signed by: Clint Bosch   Date:    12/19/2024   Time:    12:37             Patient information was obtained from patient, patient's family, past medical records and ER records.   All diagnosis and differential diagnosis have been reviewed; assessment and plan has been documented. I have personally reviewed the labs and test results that are presently available; I have reviewed the patients medication list. I have reviewed the consulting providers response and recommendations. I have reviewed or attempted to review medical records based upon their availability.  All of the patient's questions have been addressed and answered. Patient's is agreeable to the above stated plan. I will continue to monitor closely and make adjustments to medical  management as needed.  This note was created using [employer] voice recognition software that occasionally misinterpreted phrases or words.  Please contact me if any questions may rise regarding documentation to clarify verbiage.        Thairy G Reyes, DO   Internal Medicine  Department of [facility] Medicine  [facility]    64 YEARS FEMALE H/O DM WAS HOSPITALIZED FOR LEFT FEMORAL NECK FRACTURE    ORTHOPEDIC SURGEON PERFORMED LEFT HEMIARTHROPLASTY    PT IS MEDICALLY STABLE AFTER SURGERY

## 2024-12-20 NOTE — PT/OT/SLP PROGRESS
Physical Therapy      Patient Name:  Luz Maria Alfaro   MRN:  [id number]    PT eval orders received. Patient pending surgery for L femoral neck fx. PT to follow-up as appropriate.    12/20/2024

## 2024-12-20 NOTE — ANESTHESIA POSTPROCEDURE EVALUATION
Anesthesia Post Evaluation    Patient: Luz Maria Alfaro    Procedure(s) Performed: Procedure(s) (LRB):  HEMIARTHROPLASTY, HIP (Left)    Final Anesthesia Type: general      Patient location during evaluation: PACU  Patient participation: Yes- Able to Participate  Level of consciousness: awake and alert  Post-procedure vital signs: reviewed and stable  Pain management: adequate  Airway patency: patent      Anesthetic complications: no      Cardiovascular status: hemodynamically stable  Respiratory status: unassisted  Hydration status: euvolemic  Follow-up not needed.              Vitals Value Taken Time   /76 12/20/24 1331   Temp 36.4 °C (97.5 °F) 12/20/24 1342   Pulse 107 12/20/24 1342   Resp 18 12/20/24 1354   SpO2 99 % 12/20/24 1342   Vitals shown include unfiled device data.      Event Time   Out of Recovery 12/20/2024 13:31:16         Pain/Saroj Score: Pain Rating Prior to Med Admin: 10 (12/20/2024  1:54 PM)  Saroj Score: 8 (12/20/2024  1:31 PM)

## 2024-12-21 LAB
ABO + RH BLD: NORMAL
ABO + RH BLD: NORMAL
ALBUMIN SERPL-MCNC: 3.9 G/DL (ref 3.4–4.8)
ALBUMIN/GLOB SERPL: 2 RATIO (ref 1.1–2)
ALP SERPL-CCNC: 254 UNIT/L (ref 40–150)
ALT SERPL-CCNC: 12 UNIT/L (ref 0–55)
ANION GAP SERPL CALC-SCNC: 11 MEQ/L
AST SERPL-CCNC: 25 UNIT/L (ref 5–34)
BASOPHILS # BLD AUTO: 0.01 X10(3)/MCL
BASOPHILS NFR BLD AUTO: 0.1 %
BILIRUB SERPL-MCNC: 0.3 MG/DL
BLD PROD TYP BPU: NORMAL
BLD PROD TYP BPU: NORMAL
BLOOD UNIT EXPIRATION DATE: NORMAL
BLOOD UNIT EXPIRATION DATE: NORMAL
BLOOD UNIT TYPE CODE: 1700
BLOOD UNIT TYPE CODE: 7300
BUN SERPL-MCNC: 23.3 MG/DL (ref 9.8–20.1)
CALCIUM SERPL-MCNC: 8.5 MG/DL (ref 8.4–10.2)
CHLORIDE SERPL-SCNC: 108 MMOL/L (ref 98–107)
CO2 SERPL-SCNC: 14 MMOL/L (ref 23–31)
CREAT SERPL-MCNC: 0.82 MG/DL (ref 0.55–1.02)
CREAT/UREA NIT SERPL: 28
CROSSMATCH INTERPRETATION: NORMAL
CROSSMATCH INTERPRETATION: NORMAL
DISPENSE STATUS: NORMAL
DISPENSE STATUS: NORMAL
EOSINOPHIL # BLD AUTO: 0 X10(3)/MCL (ref 0–0.9)
EOSINOPHIL NFR BLD AUTO: 0 %
ERYTHROCYTE [DISTWIDTH] IN BLOOD BY AUTOMATED COUNT: ABNORMAL %
GFR SERPLBLD CREATININE-BSD FMLA CKD-EPI: >60 ML/MIN/1.73/M2
GLOBULIN SER-MCNC: 2 GM/DL (ref 2.4–3.5)
GLUCOSE SERPL-MCNC: 114 MG/DL (ref 82–115)
HCT VFR BLD AUTO: 17.9 % (ref 37–47)
HGB BLD-MCNC: 5.7 G/DL (ref 12–16)
IMM GRANULOCYTES # BLD AUTO: 0.06 X10(3)/MCL (ref 0–0.04)
IMM GRANULOCYTES NFR BLD AUTO: 0.6 %
LYMPHOCYTES # BLD AUTO: 2.12 X10(3)/MCL (ref 0.6–4.6)
LYMPHOCYTES NFR BLD AUTO: 19.9 %
MCH RBC QN AUTO: 32.4 PG (ref 27–31)
MCHC RBC AUTO-ENTMCNC: 31.8 G/DL (ref 33–36)
MCV RBC AUTO: 101.7 FL (ref 80–94)
MONOCYTES # BLD AUTO: 0.66 X10(3)/MCL (ref 0.1–1.3)
MONOCYTES NFR BLD AUTO: 6.2 %
NEUTROPHILS # BLD AUTO: 7.81 X10(3)/MCL (ref 2.1–9.2)
NEUTROPHILS NFR BLD AUTO: 73.2 %
NRBC BLD AUTO-RTO: 0 %
PLATELET # BLD AUTO: 210 X10(3)/MCL (ref 130–400)
PMV BLD AUTO: 9.8 FL (ref 7.4–10.4)
POCT GLUCOSE: 112 MG/DL (ref 70–110)
POCT GLUCOSE: 129 MG/DL (ref 70–110)
POCT GLUCOSE: 130 MG/DL (ref 70–110)
POTASSIUM SERPL-SCNC: 4.5 MMOL/L (ref 3.5–5.1)
PROT SERPL-MCNC: 5.9 GM/DL (ref 5.8–7.6)
RBC # BLD AUTO: 1.76 X10(6)/MCL (ref 4.2–5.4)
SODIUM SERPL-SCNC: 133 MMOL/L (ref 136–145)
UNIT NUMBER: NORMAL
UNIT NUMBER: NORMAL
WBC # BLD AUTO: 10.66 X10(3)/MCL (ref 4.5–11.5)

## 2024-12-21 PROCEDURE — 80074 ACUTE HEPATITIS PANEL: CPT | Performed by: STUDENT IN AN ORGANIZED HEALTH CARE EDUCATION/TRAINING PROGRAM

## 2024-12-21 PROCEDURE — 85025 COMPLETE CBC W/AUTO DIFF WBC: CPT | Performed by: PHYSICIAN ASSISTANT

## 2024-12-21 PROCEDURE — 86923 COMPATIBILITY TEST ELECTRIC: CPT | Mod: 91 | Performed by: STUDENT IN AN ORGANIZED HEALTH CARE EDUCATION/TRAINING PROGRAM

## 2024-12-21 PROCEDURE — 25000003 PHARM REV CODE 250: Performed by: PHYSICIAN ASSISTANT

## 2024-12-21 PROCEDURE — 63600175 PHARM REV CODE 636 W HCPCS: Performed by: STUDENT IN AN ORGANIZED HEALTH CARE EDUCATION/TRAINING PROGRAM

## 2024-12-21 PROCEDURE — 11000001 HC ACUTE MED/SURG PRIVATE ROOM

## 2024-12-21 PROCEDURE — S4991 NICOTINE PATCH NONLEGEND: HCPCS | Performed by: STUDENT IN AN ORGANIZED HEALTH CARE EDUCATION/TRAINING PROGRAM

## 2024-12-21 PROCEDURE — P9016 RBC LEUKOCYTES REDUCED: HCPCS | Performed by: STUDENT IN AN ORGANIZED HEALTH CARE EDUCATION/TRAINING PROGRAM

## 2024-12-21 PROCEDURE — 80053 COMPREHEN METABOLIC PANEL: CPT | Performed by: PHYSICIAN ASSISTANT

## 2024-12-21 PROCEDURE — 25000003 PHARM REV CODE 250: Performed by: STUDENT IN AN ORGANIZED HEALTH CARE EDUCATION/TRAINING PROGRAM

## 2024-12-21 PROCEDURE — 36430 TRANSFUSION BLD/BLD COMPNT: CPT

## 2024-12-21 PROCEDURE — 36415 COLL VENOUS BLD VENIPUNCTURE: CPT | Performed by: STUDENT IN AN ORGANIZED HEALTH CARE EDUCATION/TRAINING PROGRAM

## 2024-12-21 PROCEDURE — 63600175 PHARM REV CODE 636 W HCPCS: Performed by: PHYSICIAN ASSISTANT

## 2024-12-21 PROCEDURE — 30233N1 TRANSFUSION OF NONAUTOLOGOUS RED BLOOD CELLS INTO PERIPHERAL VEIN, PERCUTANEOUS APPROACH: ICD-10-PCS | Performed by: ORTHOPAEDIC SURGERY

## 2024-12-21 PROCEDURE — 99900035 HC TECH TIME PER 15 MIN (STAT)

## 2024-12-21 PROCEDURE — 36415 COLL VENOUS BLD VENIPUNCTURE: CPT | Performed by: PHYSICIAN ASSISTANT

## 2024-12-21 RX ORDER — SODIUM CHLORIDE, SODIUM LACTATE, POTASSIUM CHLORIDE, CALCIUM CHLORIDE 600; 310; 30; 20 MG/100ML; MG/100ML; MG/100ML; MG/100ML
INJECTION, SOLUTION INTRAVENOUS CONTINUOUS
Status: DISCONTINUED | OUTPATIENT
Start: 2024-12-21 | End: 2024-12-22

## 2024-12-21 RX ORDER — HYDROCODONE BITARTRATE AND ACETAMINOPHEN 500; 5 MG/1; MG/1
TABLET ORAL
Status: DISCONTINUED | OUTPATIENT
Start: 2024-12-21 | End: 2024-12-24 | Stop reason: HOSPADM

## 2024-12-21 RX ORDER — SODIUM BICARBONATE 650 MG/1
650 TABLET ORAL 2 TIMES DAILY
Status: DISCONTINUED | OUTPATIENT
Start: 2024-12-21 | End: 2024-12-24 | Stop reason: HOSPADM

## 2024-12-21 RX ADMIN — ENOXAPARIN SODIUM 40 MG: 40 INJECTION SUBCUTANEOUS at 04:12

## 2024-12-21 RX ADMIN — METHOCARBAMOL 750 MG: 750 TABLET ORAL at 09:12

## 2024-12-21 RX ADMIN — PANTOPRAZOLE SODIUM 40 MG: 40 TABLET, DELAYED RELEASE ORAL at 10:12

## 2024-12-21 RX ADMIN — METHOCARBAMOL 750 MG: 750 TABLET ORAL at 04:12

## 2024-12-21 RX ADMIN — METHOCARBAMOL 750 MG: 750 TABLET ORAL at 10:12

## 2024-12-21 RX ADMIN — ESCITALOPRAM OXALATE 5 MG: 5 TABLET, FILM COATED ORAL at 11:12

## 2024-12-21 RX ADMIN — KETOROLAC TROMETHAMINE 15 MG: 30 INJECTION, SOLUTION INTRAMUSCULAR at 05:12

## 2024-12-21 RX ADMIN — PANTOPRAZOLE SODIUM 40 MG: 40 TABLET, DELAYED RELEASE ORAL at 09:12

## 2024-12-21 RX ADMIN — OXYCODONE HYDROCHLORIDE 10 MG: 10 TABLET ORAL at 06:12

## 2024-12-21 RX ADMIN — ATORVASTATIN CALCIUM 40 MG: 40 TABLET, FILM COATED ORAL at 09:12

## 2024-12-21 RX ADMIN — HYDROCODONE BITARTRATE AND ACETAMINOPHEN 1 TABLET: 5; 325 TABLET ORAL at 03:12

## 2024-12-21 RX ADMIN — CEFAZOLIN 2 G: 2 INJECTION, POWDER, FOR SOLUTION INTRAMUSCULAR; INTRAVENOUS at 11:12

## 2024-12-21 RX ADMIN — NICOTINE 1 PATCH: 14 PATCH TRANSDERMAL at 10:12

## 2024-12-21 RX ADMIN — SODIUM CHLORIDE, POTASSIUM CHLORIDE, SODIUM LACTATE AND CALCIUM CHLORIDE: 600; 310; 30; 20 INJECTION, SOLUTION INTRAVENOUS at 09:12

## 2024-12-21 RX ADMIN — OXYCODONE HYDROCHLORIDE 10 MG: 10 TABLET ORAL at 11:12

## 2024-12-21 RX ADMIN — BUSPIRONE HYDROCHLORIDE 5 MG: 5 TABLET ORAL at 09:12

## 2024-12-21 RX ADMIN — CEFAZOLIN 2 G: 2 INJECTION, POWDER, FOR SOLUTION INTRAMUSCULAR; INTRAVENOUS at 03:12

## 2024-12-21 RX ADMIN — BUSPIRONE HYDROCHLORIDE 5 MG: 5 TABLET ORAL at 10:12

## 2024-12-21 RX ADMIN — SODIUM BICARBONATE 650 MG TABLET 650 MG: at 09:12

## 2024-12-21 RX ADMIN — GABAPENTIN 300 MG: 300 CAPSULE ORAL at 09:12

## 2024-12-21 RX ADMIN — ARIPIPRAZOLE 2 MG: 2 TABLET ORAL at 10:12

## 2024-12-21 RX ADMIN — SODIUM BICARBONATE 650 MG TABLET 650 MG: at 10:12

## 2024-12-21 NOTE — PROGRESS NOTES
[facility]  Orthopedics  Progress Note    Patient Name: Luz Maria Alfaro  MRN: [id number]  Admission Date: 12/19/2024  Hospital Length of Stay: 2 days  Attending Provider: Emperatriz Ayala DO  Primary Care Provider: Luzmaria Flores FNP    Subjective:     Principal Problem:Displaced fracture of left femoral neck    Principal Orthopedic Problem: 1 Day Post-Op   Left hip hemiarthroplasty    Interval History: Seen this am. Very hard of hearing. Pain controlled. Having her breakfast. Likely transfusing today for ABLA hgb 6.1 redrawing to confirm     Review of patient's allergies indicates:   Allergen Reactions    Iodinated contrast media Shortness Of Breath    Opioids - morphine analogues     Meperidine Rash and Hives       Current Facility-Administered Medications   Medication    0.9%  NaCl infusion (for blood administration)    acetaminophen tablet 650 mg    albuterol-ipratropium 2.5 mg-0.5 mg/3 mL nebulizer solution 3 mL    aluminum-magnesium hydroxide-simethicone 200-200-20 mg/5 mL suspension 30 mL    ARIPiprazole tablet 2 mg    atorvastatin tablet 40 mg    bisacodyL suppository 10 mg    busPIRone tablet 5 mg    ceFAZolin 2 g    dextrose 50% injection 12.5 g    dextrose 50% injection 25 g    enoxaparin injection 40 mg    EScitalopram oxalate tablet 5 mg    gabapentin capsule 300 mg    glucagon (human recombinant) injection 1 mg    glucose chewable tablet 16 g    glucose chewable tablet 24 g    HYDROcodone-acetaminophen 5-325 mg per tablet 1 tablet    hyoscyamine SL tablet 0.125 mg    insulin aspart U-100 injection 0-10 Units    lactated ringers infusion    melatonin tablet 9 mg    methocarbamoL tablet 750 mg    morphine injection 4 mg    naloxone 0.4 mg/mL injection 0.02 mg    nicotine 14 mg/24 hr 1 patch    ondansetron disintegrating tablet 8 mg    ondansetron injection 4 mg    ondansetron injection 4 mg    oxyCODONE immediate release tablet 5 mg    oxyCODONE immediate release tablet Tab  "10 mg    pantoprazole EC tablet 40 mg    polyethylene glycol packet 17 g    simethicone chewable tablet 80 mg    sodium bicarbonate tablet 650 mg    sodium chloride 0.9% flush 10 mL    traMADoL tablet 50 mg     Objective:     Vital Signs (Most Recent):  Temp: 98.5 °F (36.9 °C) (12/21/24 0700)  Pulse: 102 (12/21/24 0700)  Resp: 18 (12/21/24 0700)  BP: (!) 95/57 (12/21/24 0700)  SpO2: 96 % (12/21/24 0700) Vital Signs (24h Range):  Temp:  [32.9 °F (0.5 °C)-98.5 °F (36.9 °C)] 98.5 °F (36.9 °C)  Pulse:  [] 102  Resp:  [10-19] 18  SpO2:  [85 %-100 %] 96 %  BP: ()/() 95/57     Weight: 42.7 kg (94 lb 3.2 oz)  Height: 5' 3" (160 cm)  Body mass index is 16.69 kg/m².      Intake/Output Summary (Last 24 hours) at 12/21/2024 1024  Last data filed at 12/20/2024 1833  Gross per 24 hour   Intake 620 ml   Output 675 ml   Net -55 ml       Physical Exam:   General the patient is alert and in no acute distress;  nontoxic-appearing appropriate affect.    Constitutional: Vital signs are examined and stable.  Resp: No signs of labored breathing    LLE: -Skin: Dressing CDI, swollen but soft           -MSK:  +EHL/FHL, + DF/PF           -Neuro:  Sensation intact to light touch throughout           -CV: Capillary refill is less than 2 seconds. +DP. Compartments soft and compressible   Diagnostic Findings:     Significant Labs: CBC:   Recent Labs   Lab 12/19/24  1217 12/20/24  0505 12/21/24  0822   WBC 6.23 6.61 10.66   HGB 12.2 10.1* 5.7*   HCT 38.9 31.1* 17.9*    202 210     All pertinent labs within the past 24 hours have been reviewed.    Significant Imaging: I have reviewed all pertinent imaging results/findings.     Assessment/Plan:     Active Diagnoses:    Diagnosis Date Noted POA    PRINCIPAL PROBLEM:  Displaced fracture of left femoral neck [S72.002A] 12/20/2024 Yes      Problems Resolved During this Admission:   63 YO F with chronic L hip fx  POD #1 Left hip hemiarthroplasty    Diet: As tolerated  Pain: " multimodal PRN  DVT: Lovenox; OK for ASA 81mg BID on DC x30D  ABLA post op; Hgb 6.1 this am transfusing this am  ABX: periop ancef  PT/OT: Eval and Treat  Activity: WBAT LLE  Dry dressing changes begin tomorrow; No creams/ointments     The above findings, diagnostics, and treatment plan were discussed with Dr Brambila who is in agreement with the plan of care except as stated in additional documentation.      ALEKS Jimenez   Orthopedic Trauma Surgery  [facility]

## 2024-12-21 NOTE — PT/OT/SLP PROGRESS
Physical Therapy      Patient Name:  Luz Maria Alfaro   MRN:  [id number]    Patient not seen today secondary to low H/H 5.7/17.9 . Will follow-up tomorrow as appropriate.    12/21/24

## 2024-12-22 LAB
ALBUMIN SERPL-MCNC: 3.2 G/DL (ref 3.4–4.8)
ALBUMIN/GLOB SERPL: 1.7 RATIO (ref 1.1–2)
ALP SERPL-CCNC: 209 UNIT/L (ref 40–150)
ALT SERPL-CCNC: 6 UNIT/L (ref 0–55)
ANION GAP SERPL CALC-SCNC: 9 MEQ/L
AST SERPL-CCNC: 16 UNIT/L (ref 5–34)
BASOPHILS # BLD AUTO: 0.02 X10(3)/MCL
BASOPHILS NFR BLD AUTO: 0.2 %
BILIRUB SERPL-MCNC: 0.9 MG/DL
BUN SERPL-MCNC: 14.4 MG/DL (ref 9.8–20.1)
CALCIUM SERPL-MCNC: 8.1 MG/DL (ref 8.4–10.2)
CHLORIDE SERPL-SCNC: 110 MMOL/L (ref 98–107)
CO2 SERPL-SCNC: 19 MMOL/L (ref 23–31)
CREAT SERPL-MCNC: 0.6 MG/DL (ref 0.55–1.02)
CREAT/UREA NIT SERPL: 24
EOSINOPHIL # BLD AUTO: 0.02 X10(3)/MCL (ref 0–0.9)
EOSINOPHIL NFR BLD AUTO: 0.2 %
ERYTHROCYTE [DISTWIDTH] IN BLOOD BY AUTOMATED COUNT: 25.2 % (ref 11.5–17)
GFR SERPLBLD CREATININE-BSD FMLA CKD-EPI: >60 ML/MIN/1.73/M2
GLOBULIN SER-MCNC: 1.9 GM/DL (ref 2.4–3.5)
GLUCOSE SERPL-MCNC: 105 MG/DL (ref 82–115)
HCT VFR BLD AUTO: 25.6 % (ref 37–47)
HGB BLD-MCNC: 8.8 G/DL (ref 12–16)
IMM GRANULOCYTES # BLD AUTO: 0.07 X10(3)/MCL (ref 0–0.04)
IMM GRANULOCYTES NFR BLD AUTO: 0.8 %
LYMPHOCYTES # BLD AUTO: 2.17 X10(3)/MCL (ref 0.6–4.6)
LYMPHOCYTES NFR BLD AUTO: 26.3 %
MCH RBC QN AUTO: 31.5 PG (ref 27–31)
MCHC RBC AUTO-ENTMCNC: 34.4 G/DL (ref 33–36)
MCV RBC AUTO: 91.8 FL (ref 80–94)
MONOCYTES # BLD AUTO: 0.92 X10(3)/MCL (ref 0.1–1.3)
MONOCYTES NFR BLD AUTO: 11.2 %
NEUTROPHILS # BLD AUTO: 5.05 X10(3)/MCL (ref 2.1–9.2)
NEUTROPHILS NFR BLD AUTO: 61.3 %
NRBC BLD AUTO-RTO: 0 %
PLATELET # BLD AUTO: 142 X10(3)/MCL (ref 130–400)
PLATELETS.RETICULATED NFR BLD AUTO: 2.4 % (ref 0.9–11.2)
PMV BLD AUTO: 9 FL (ref 7.4–10.4)
POCT GLUCOSE: 114 MG/DL (ref 70–110)
POCT GLUCOSE: 116 MG/DL (ref 70–110)
POTASSIUM SERPL-SCNC: 3.6 MMOL/L (ref 3.5–5.1)
PROT SERPL-MCNC: 5.1 GM/DL (ref 5.8–7.6)
RBC # BLD AUTO: 2.79 X10(6)/MCL (ref 4.2–5.4)
SODIUM SERPL-SCNC: 138 MMOL/L (ref 136–145)
WBC # BLD AUTO: 8.25 X10(3)/MCL (ref 4.5–11.5)

## 2024-12-22 PROCEDURE — 97166 OT EVAL MOD COMPLEX 45 MIN: CPT

## 2024-12-22 PROCEDURE — 25000003 PHARM REV CODE 250: Performed by: STUDENT IN AN ORGANIZED HEALTH CARE EDUCATION/TRAINING PROGRAM

## 2024-12-22 PROCEDURE — 11000001 HC ACUTE MED/SURG PRIVATE ROOM

## 2024-12-22 PROCEDURE — 80053 COMPREHEN METABOLIC PANEL: CPT | Performed by: PHYSICIAN ASSISTANT

## 2024-12-22 PROCEDURE — 63600175 PHARM REV CODE 636 W HCPCS: Performed by: STUDENT IN AN ORGANIZED HEALTH CARE EDUCATION/TRAINING PROGRAM

## 2024-12-22 PROCEDURE — 25000003 PHARM REV CODE 250: Performed by: PHYSICIAN ASSISTANT

## 2024-12-22 PROCEDURE — 85025 COMPLETE CBC W/AUTO DIFF WBC: CPT | Performed by: PHYSICIAN ASSISTANT

## 2024-12-22 PROCEDURE — S4991 NICOTINE PATCH NONLEGEND: HCPCS | Performed by: STUDENT IN AN ORGANIZED HEALTH CARE EDUCATION/TRAINING PROGRAM

## 2024-12-22 PROCEDURE — 97162 PT EVAL MOD COMPLEX 30 MIN: CPT

## 2024-12-22 PROCEDURE — 36415 COLL VENOUS BLD VENIPUNCTURE: CPT | Performed by: PHYSICIAN ASSISTANT

## 2024-12-22 RX ORDER — ENOXAPARIN SODIUM 100 MG/ML
30 INJECTION SUBCUTANEOUS EVERY 24 HOURS
Status: DISCONTINUED | OUTPATIENT
Start: 2024-12-22 | End: 2024-12-24 | Stop reason: HOSPADM

## 2024-12-22 RX ADMIN — GABAPENTIN 300 MG: 300 CAPSULE ORAL at 09:12

## 2024-12-22 RX ADMIN — BUSPIRONE HYDROCHLORIDE 5 MG: 5 TABLET ORAL at 10:12

## 2024-12-22 RX ADMIN — SODIUM BICARBONATE 650 MG TABLET 650 MG: at 09:12

## 2024-12-22 RX ADMIN — OXYCODONE HYDROCHLORIDE 10 MG: 10 TABLET ORAL at 10:12

## 2024-12-22 RX ADMIN — NICOTINE 1 PATCH: 14 PATCH TRANSDERMAL at 10:12

## 2024-12-22 RX ADMIN — METHOCARBAMOL 750 MG: 750 TABLET ORAL at 05:12

## 2024-12-22 RX ADMIN — METHOCARBAMOL 750 MG: 750 TABLET ORAL at 09:12

## 2024-12-22 RX ADMIN — ESCITALOPRAM OXALATE 5 MG: 5 TABLET, FILM COATED ORAL at 10:12

## 2024-12-22 RX ADMIN — BUSPIRONE HYDROCHLORIDE 5 MG: 5 TABLET ORAL at 09:12

## 2024-12-22 RX ADMIN — PANTOPRAZOLE SODIUM 40 MG: 40 TABLET, DELAYED RELEASE ORAL at 10:12

## 2024-12-22 RX ADMIN — ARIPIPRAZOLE 2 MG: 2 TABLET ORAL at 10:12

## 2024-12-22 RX ADMIN — METHOCARBAMOL 750 MG: 750 TABLET ORAL at 10:12

## 2024-12-22 RX ADMIN — ENOXAPARIN SODIUM 30 MG: 30 INJECTION SUBCUTANEOUS at 05:12

## 2024-12-22 RX ADMIN — OXYCODONE HYDROCHLORIDE 10 MG: 10 TABLET ORAL at 05:12

## 2024-12-22 RX ADMIN — ATORVASTATIN CALCIUM 40 MG: 40 TABLET, FILM COATED ORAL at 09:12

## 2024-12-22 RX ADMIN — SODIUM BICARBONATE 650 MG TABLET 650 MG: at 10:12

## 2024-12-22 RX ADMIN — PANTOPRAZOLE SODIUM 40 MG: 40 TABLET, DELAYED RELEASE ORAL at 09:12

## 2024-12-22 NOTE — PT/OT/SLP EVAL
Physical Therapy Evaluation    Patient Name:  Luz Maria Alfaro   MRN:  [id number]    Recommendations:     Discharge therapy intensity: High Intensity Therapy   Discharge Equipment Recommendations:  (TBD by next level of care)   Barriers to discharge: Impaired mobility    Assessment:     Luz Maria Alfaro is a 64 y.o. female admitted with a medical diagnosis of  L subacute displaced femoral neck fx s/p L hip hemiarthroplasty. Prior to admit, patient lived with son in a multi-level home. At baseline, she is independent and ambulates with a RW.  She presents with the following impairments/functional limitations: weakness, impaired endurance, impaired self care skills, impaired functional mobility, gait instability, impaired balance, decreased lower extremity function, decreased safety awareness, pain, orthopedic precautions. She required MOD A for bed mobility. MIN A for sit<>stand. Able to take 2 side-steps with RW & CGA. Limited by pain. Patient to benefit from skilled PT to address deficits, improve functional mobility, and progress towards functional independence. Recommending high intensity therapy at discharge. Progress as tolerated.    Rehab Prognosis: Good; patient would benefit from acute skilled PT services to address these deficits and reach maximum level of function.    Recent Surgery: Procedure(s) (LRB):  HEMIARTHROPLASTY, HIP (Left) 2 Days Post-Op    Plan:     During this hospitalization, patient would benefit from acute PT services 6 x/week to address the identified rehab impairments via gait training, therapeutic activities, therapeutic exercises, neuromuscular re-education and progress toward the following goals:    Plan of Care Expires:  01/22/25    Subjective     Chief Complaint: pain  Patient/Family Comments/goals: none  Pain/Comfort:  Pain Rating 1: 10/10  Location - Side 1: Left  Location 1: hip  Pain Addressed 1: Distraction, Reposition  Pain Rating Post-Intervention 1: 10/10    Patients  cultural, spiritual, Jehovah's witness conflicts given the current situation: no    Living Environment:  Prior to admit, patient lived with son in a multi-level home. Patient does not go upstairs. At baseline, she is independent and ambulates with a RW. Equipment used at home: walker, rolling, bedside commode (Pt has access to w/c).  DME owned (not currently used): wheelchair.  Upon discharge, patient will have assistance from TBD.    Objective:     Communicated with nurse prior to session.  Patient found supine with Jayant STEWARD  upon PT entry to room.    General Precautions: Standard, fall  Orthopedic Precautions:LLE weight bearing as tolerated (LLE: No IR/ER)   Braces: N/A  Respiratory Status: Room air    Exams:  Cognitive Exam:  Patient is oriented to Person, Place, Time, and Situation  Sensation: -       Intact  RLE ROM: WFL  RLE Strength: WFL  LLE ROM: WFL  LLE Strength: 3/5 grossly  Skin integrity: Visible skin intact      Functional Mobility:  Bed Mobility:  Supine to Sit: moderate assistance  Sit to Supine: moderate assistance  Transfers:  Sit to Stand:  minimum assistance with rolling walker  Gait: 2 side-steps with RW & CGA. Limited by pain.  Balance: fair/poor      AM-PAC 6 CLICK MOBILITY  Total Score:14     Education Provided:  Role and goals of PT, transfer training, bed mobility, gait training, balance training, safety awareness, assistive device, strengthening exercises, and importance of participating in PT to return to PLOF.    Patient left HOB elevated with all lines intact, call button in reach, and bed alarm on.    GOALS:   Multidisciplinary Problems       Physical Therapy Goals          Problem: Physical Therapy    Goal Priority Disciplines Outcome Interventions   Physical Therapy Goal     PT, PT/OT Progressing    Description: Goals to be met by: 25     Patient will increase functional independence with mobility by performin. Supine to sit with Set-up [place]  2. Sit to supine with  Set-up [place]  3. Sit to stand transfer with Supervision  4. Bed to chair transfer with Supervision using Rolling Walker  5. Gait  x 200 feet with Supervision using Rolling Walker.                          History:     Past Medical History:   Diagnosis Date    Benign essential HTN     Mixed hyperlipidemia     Type 2 diabetes mellitus without complications        Past Surgical History:   Procedure Laterality Date    APPENDECTOMY      BACK SURGERY      BREAST SURGERY  01 13 2002    Augmantation    HIP SURGERY      INTRAMEDULLARY RODDING OF FEMUR Right 10/27/2022    Procedure: RIGHT FEMUR INTERTROCH IMN;  Surgeon: Stewart Brambila MD;  Location: [facility];  Service: Orthopedics;  Laterality: Right;  [place] table, supine  3rd case  Synthes Short TFNA       Time Tracking:     PT Received On: 12/22/24  PT Start Time: 0755     PT Stop Time: 0811  PT Total Time (min): 16 min     Billable Minutes: Evaluation 16 minutes      12/22/2024

## 2024-12-22 NOTE — PROGRESS NOTES
[facility] Neuro  Orthopedics  Progress Note    Patient Name: Luz Marai Alfaro  MRN: [id number]  Admission Date: 12/19/2024  Hospital Length of Stay: 3 days  Attending Provider: Emperatriz Ayala DO  Primary Care Provider: Luzmaria Flores FNP    Subjective:     Principal Problem:Displaced fracture of left femoral neck    Principal Orthopedic Problem: 2 Days Post-Op   Left hip hemiarthroplasty    Interval History: Doing well; transfused yesterday with appropriate increase in Hgb. Pain controlled.   Review of patient's allergies indicates:   Allergen Reactions    Iodinated contrast media Shortness Of Breath    Opioids - morphine analogues     Meperidine Rash and Hives       Current Facility-Administered Medications   Medication    0.9%  NaCl infusion (for blood administration)    acetaminophen tablet 650 mg    albuterol-ipratropium 2.5 mg-0.5 mg/3 mL nebulizer solution 3 mL    aluminum-magnesium hydroxide-simethicone 200-200-20 mg/5 mL suspension 30 mL    ARIPiprazole tablet 2 mg    atorvastatin tablet 40 mg    bisacodyL suppository 10 mg    busPIRone tablet 5 mg    dextrose 50% injection 12.5 g    dextrose 50% injection 25 g    EScitalopram oxalate tablet 5 mg    gabapentin capsule 300 mg    glucagon (human recombinant) injection 1 mg    glucose chewable tablet 16 g    glucose chewable tablet 24 g    HYDROcodone-acetaminophen 5-325 mg per tablet 1 tablet    hyoscyamine SL tablet 0.125 mg    insulin aspart U-100 injection 0-10 Units    lactated ringers infusion    melatonin tablet 9 mg    methocarbamoL tablet 750 mg    morphine injection 4 mg    naloxone 0.4 mg/mL injection 0.02 mg    nicotine 14 mg/24 hr 1 patch    ondansetron disintegrating tablet 8 mg    ondansetron injection 4 mg    ondansetron injection 4 mg    oxyCODONE immediate release tablet 5 mg    oxyCODONE immediate release tablet Tab 10 mg    pantoprazole EC tablet 40 mg    polyethylene glycol packet 17 g    simethicone chewable tablet  "80 mg    sodium bicarbonate tablet 650 mg    sodium chloride 0.9% flush 10 mL    traMADoL tablet 50 mg     Objective:     Vital Signs (Most Recent):  Temp: 98.3 °F (36.8 °C) (12/22/24 1130)  Pulse: 99 (12/22/24 1130)  Resp: 18 (12/22/24 1130)  BP: 108/64 (12/22/24 1130)  SpO2: 96 % (12/22/24 1130) Vital Signs (24h Range):  Temp:  [98.1 °F (36.7 °C)-99.9 °F (37.7 °C)] 98.3 °F (36.8 °C)  Pulse:  [] 99  Resp:  [18] 18  SpO2:  [92 %-97 %] 96 %  BP: ()/(56-73) 108/64     Weight: 42.7 kg (94 lb 3.2 oz)  Height: 5' 3" (160 cm)  Body mass index is 16.69 kg/m².      Intake/Output Summary (Last 24 hours) at 12/22/2024 1340  Last data filed at 12/22/2024 0300  Gross per 24 hour   Intake --   Output 800 ml   Net -800 ml       Physical Exam:   General the patient is alert and in no acute distress;  nontoxic-appearing appropriate affect.    Constitutional: Vital signs are examined and stable.  Resp: No signs of labored breathing    LLE: -Skin: dressing down, incision well approximated, left open to air. swollen but soft           -MSK:  +EHL/FHL, + DF/PF           -Neuro:  Sensation intact to light touch throughout           -CV: Capillary refill is less than 2 seconds. +DP. Compartments soft and compressible   Diagnostic Findings:     Significant Labs: CBC:   Recent Labs   Lab 12/21/24  0822 12/22/24  0536   WBC 10.66 8.25   HGB 5.7* 8.8*   HCT 17.9* 25.6*    142     All pertinent labs within the past 24 hours have been reviewed.    Significant Imaging: I have reviewed all pertinent imaging results/findings.     Assessment/Plan:     Active Diagnoses:    Diagnosis Date Noted POA    PRINCIPAL PROBLEM:  Displaced fracture of left femoral neck [S72.002A] 12/20/2024 Yes      Problems Resolved During this Admission:   63 YO F with chronic L hip fx  POD #2 Left hip hemiarthroplasty    Diet: As tolerated  Pain: multimodal PRN  DVT: Lovenox; OK for ASA 81mg BID on DC x30D  ABLA post op; stable post transfusion  ABX: " periop ancef  PT/OT: Eval and Treat  Activity: WBAT LLE  Dry dressing daily or open to air; No creams/ointments   Follow up with Dr Anne office in 3 weeks     The above findings, diagnostics, and treatment plan were discussed with Dr Brambila who is in agreement with the plan of care except as stated in additional documentation.      Marialuisa Huggins, ALEKS   Orthopedic Trauma Surgery  [facility]

## 2024-12-22 NOTE — PROGRESS NOTES
[facility]  Hospital Medicine Progress Note        Chief Complaint: Inpatient Follow-up for     HPI:   64-year-old female with a past medical history of primary hypertension, diabetes mellitus, depression, GERD, tobacco dependence who presents from [facility] after she presented there from persistent left hip pain secondary to a fall that occurred 2 months ago.  Patient was transferred to our facility for planned surgery on 12/20 with Dr. Jose J Montemayor.     At baseline the patient lives with her son and is independent.  Requesting a nicotine patch.    Patient had some postop anemia noted on 12/21 and was given 2 units of blood.    Interval Hx:   Patient seen and examined by bedside.  No acute overnight events.  Doing much better this morning.  Feels a lot better also.  Worked with therapy this morning    Case was discussed with patient's nurse and  on the floor.    Objective/physical exam:  General: In no acute distress, afebrile, pale appearing  Chest: Clear to auscultation bilaterally  Heart: RRR, +S1, S2, no appreciable murmur  Abdomen: Soft, nontender, BS +  MSK: Warm, no lower extremity edema, no clubbing or cyanosis  Neurologic: Alert and oriented x4, Cranial nerve II-XII intact, Strength 5/5 in all 4 extremities    VITAL SIGNS: 24 HRS MIN & MAX LAST   Temp  Min: 98.1 °F (36.7 °C)  Max: 99.9 °F (37.7 °C) 98.3 °F (36.8 °C)   BP  Min: 99/59  Max: 129/72 108/64   Pulse  Min: 90  Max: 119  99   Resp  Min: 18  Max: 18 18   SpO2  Min: 92 %  Max: 97 % 96 %     I have reviewed the following labs:  Recent Labs   Lab 12/20/24  0505 12/21/24  0822 12/22/24  0536   WBC 6.61 10.66 8.25   RBC 3.12* 1.76* 2.79*   HGB 10.1* 5.7* 8.8*   HCT 31.1* 17.9* 25.6*   MCV 99.7* 101.7* 91.8   MCH 32.4* 32.4* 31.5*   MCHC 32.5* 31.8* 34.4   RDW  --   --  25.2*    210 142   MPV 9.3 9.8 9.0     Recent Labs   Lab 12/20/24  0505 12/21/24  0537 12/22/24  0536    133* 138   K 4.0 4.5 3.6    * 108* 110*   CO2 15* 14* 19*   BUN 18.5 23.3* 14.4   CREATININE 0.58 0.82 0.60   CALCIUM 8.7 8.5 8.1*   ALBUMIN 3.1* 3.9 3.2*   ALKPHOS 327* 254* 209*   ALT 14 12 6   AST 27 25 16   BILITOT 0.4 0.3 0.9     Microbiology Results (last 7 days)       ** No results found for the last 168 hours. **             See below for Radiology    Assessment/Plan:  Left displaced femoral neck fracture status post left hip hemiarthroplasty  Acute blood loss anemia , secondary to above  Elevated alk-phos  Metabolic acidosis  History of: primary hypertension, diabetes mellitus, depression, GERD, tobacco dependence (1 pack per day)     Follow up Orthopedic surgery's postop recs   2 units of PRBC being transfused for hemoglobin of 5.7 noted 12/21  hemoglobin improved to 8.8 this a.m.  Alk-phos improving   DC IV fluids  Resume Lovenox tonight  Start sodium bicarb tablets for acidosis   Monitor hemoglobin and stools closely   Obtain hepatitis panel for elevated alk-phos , in process  Autoimmune antibody normal  Further workup can be done outpatient and monitored as patient is currently asymptomatic  Labs in am    VTE prophylaxis: scds    Patient condition:  Stable/Fair/Guarded/ Serious/ Critical    Anticipated discharge and Disposition:   tbd      All diagnosis and differential diagnosis have been reviewed; assessment and plan has been documented; I have personally reviewed the labs and test results that are presently available; I have reviewed the patients medication list; I have reviewed the consulting providers response and recommendations. I have reviewed or attempted to review medical records based upon their availability    All of the patient's questions have been  addressed and answered. Patient's is agreeable to the above stated plan. I will continue to monitor closely and make adjustments to medical management as needed.    Portions of this note dictated using EMR integrated voice recognition software, and may be subject  to voice recognition errors not corrected at proofreading. Please contact writer for clarification if needed.   _____________________________________________________________________    Malnutrition Status:  Nutrition consulted. Most recent weight and BMI monitored-     Measurements:  Wt Readings from Last 1 Encounters:   12/19/24 42.7 kg (94 lb 3.2 oz)   Body mass index is 16.69 kg/m².    Patient has been screened and assessed by RD.    Malnutrition Type:  Context:    Level:      Malnutrition Characteristic Summary:       Interventions/Recommendations (treatment strategy):        Scheduled Med:   ARIPiprazole  2 mg Oral Daily    atorvastatin  40 mg Oral QHS    busPIRone  5 mg Oral BID    EScitalopram oxalate  5 mg Oral Daily    gabapentin  300 mg Oral QHS    methocarbamoL  750 mg Oral TID    nicotine  1 patch Transdermal Daily    pantoprazole  40 mg Oral BID    sodium bicarbonate  650 mg Oral BID      Continuous Infusions:   lactated ringers   Intravenous Continuous 75 mL/hr at 12/21/24 2156 New Bag at 12/21/24 2156      PRN Meds:    Current Facility-Administered Medications:     0.9%  NaCl infusion (for blood administration), , Intravenous, Q24H PRN    acetaminophen, 650 mg, Oral, Q4H PRN    albuterol-ipratropium, 3 mL, Nebulization, Q6H PRN    aluminum-magnesium hydroxide-simethicone, 30 mL, Oral, QID PRN    bisacodyL, 10 mg, Rectal, Daily PRN    dextrose 50%, 12.5 g, Intravenous, PRN    dextrose 50%, 25 g, Intravenous, PRN    glucagon (human recombinant), 1 mg, Intramuscular, PRN    glucose, 16 g, Oral, PRN    glucose, 24 g, Oral, PRN    HYDROcodone-acetaminophen, 1 tablet, Oral, Q4H PRN    hyoscyamine, 0.125 mg, Sublingual, TID PRN    insulin aspart U-100, 0-10 Units, Subcutaneous, Q6H PRN    melatonin, 9 mg, Oral, Nightly PRN    morphine, 4 mg, Intravenous, Q6H PRN    naloxone, 0.02 mg, Intravenous, PRN    ondansetron, 8 mg, Oral, Q8H PRN    ondansetron, 4 mg, Intravenous, Q8H PRN    ondansetron, 4 mg,  Intravenous, Q6H PRN    oxyCODONE, 5 mg, Oral, Q6H PRN    oxyCODONE, 10 mg, Oral, Q4H PRN    polyethylene glycol, 17 g, Oral, TID PRN    simethicone, 1 tablet, Oral, QID PRN    sodium chloride 0.9%, 10 mL, Intravenous, PRN    traMADoL, 50 mg, Oral, Q6H PRN     Radiology:  I have personally reviewed the following imaging and agree with the radiologist.     X-Ray Hip 2 or 3 views Left with Pelvis when performed  Narrative: EXAMINATION:  XR HIP WITH PELVIS WHEN PERFORMED 2 OR 3 VIEWS LEFT    CLINICAL HISTORY:  post-op in pacu;    COMPARISON:  X-rays dated 12/18/2024    FINDINGS:  There is satisfactory alignment following left hip arthroplasty.  The soft tissues are unremarkable.  Impression: Satisfactory alignment following left hip arthroplasty.    Electronically signed by: Tatiana Miller  Date:    12/20/2024  Time:    13:03      Emperatriz Ayala DO  Department of Hospital Medicine  [facility]  12/22/2024

## 2024-12-22 NOTE — PT/OT/SLP EVAL
Occupational Therapy  Evaluation    Name: Luz Maria Alfaro  MRN: [id number]  Admitting Diagnosis: L subacute displaced femoral neck fx s/p L hip hemiarthroplasty  Recent Surgery: Procedure(s) (LRB):  HEMIARTHROPLASTY, HIP (Left) 2 Days Post-Op    Recommendations:     Discharge therapy intensity: High Intensity Therapy   Discharge Equipment Recommendations:  to be determined by next level of care  Barriers to discharge:  Other (Comment) (Severity of deficits)    Assessment:     Luz Maria Alfaro is a 64 y.o. female with a medical diagnosis of  L subacute displaced femoral neck fx s/p L hip hemiarthroplasty. Pt reported she is IND c ADLs and ambulates c a RW at baseline. She presents with the following performance deficits affecting function: weakness, impaired endurance, orthopedic precautions, impaired self care skills, impaired functional mobility, gait instability, impaired balance, pain, decreased safety awareness. Pt educated on orthopedic precautions prior to mobility. Pt required max A for LB dressing, Total A for toileting(purewick), Mod A for bed mobility, and Min A to stand and take steps along EOB using RW. Recommend high intensity therapy at d/c.     Rehab Prognosis: Good; patient would benefit from acute skilled OT services to address these deficits and reach maximum level of function.       Plan:     Patient to be seen 6 x/week to address the above listed problems via self-care/home management, therapeutic exercises, therapeutic activities  Plan of Care Expires: 01/19/25  Plan of Care Reviewed with: patient    Subjective     Chief Complaint: Pain in LLE   Patient/Family Comments/goals: To return to PLOF     Occupational Profile:  Living Environment: Pt lives c her son and 2 grandkids in a 2 story home. Reported bed and bathroom are on 1st story. Has a tub/shower.   Previous level of function: Ind c ADLs and mobility using RW   Roles and Routines: Mother  Equipment Used at Home: walker, rolling, bedside  commode (Pt has access to w/c)  Assistance upon Discharge: Pt's family     Pain/Comfort:  Pain Rating 1: 10/10  Location - Side 1: Left  Location 1: leg  Pain Addressed 1: Reposition, Distraction    Patients cultural, spiritual, Protestant conflicts given the current situation: no    Objective:     OT communicated with RN prior to session.      Patient was found HOB elevated with Jayant STEWARD upon OT entry to room.    General Precautions: Standard, fall  Orthopedic Precautions: LLE weight bearing as tolerated (LLE: No IR/ER)  Braces: N/A      Bed Mobility:    Patient completed Supine to Sit with moderate assistance  Patient completed Sit to Supine with moderate assistance    Functional Mobility/Transfers:  Patient completed Sit <> Stand Transfer with minimum assistance  with  rolling walker   Functional Mobility: Pt took side steps along EOB using RW c Min A; Limited by pain    Activities of Daily Living:  Lower Body Dressing: maximal assistance    Toileting: total assistance jayant    AMPA 6 Click ADL:  AMPAC Total Score: 18    Upper Extremity Function:  Right Upper Extremity:   WFL    Left Upper Extremity:  WFL      Therapeutic Positioning  Risk for acquired pressure injuries is increased due to impaired mobility.    OT interventions performed during the course of today's session:   Education was provided on benefits of and recommendations for therapeutic positioning    Patient Education:  Patient provided with verbal education education regarding OT role/goals/POC, post op precautions, and fall prevention.  Understanding was verbalized.     Patient left HOB elevated with all lines intact and call button in reach.    GOALS:   Multidisciplinary Problems       Occupational Therapy Goals          Problem: Occupational Therapy    Goal Priority Disciplines Outcome Interventions   Occupational Therapy Goal     OT, PT/OT Progressing    Description: LTG: Pt will perform basic ADLs and ADL transfers with Modified  independence using LRAD by discharge.    STG: to be met by 1/19/25:    Pt will complete grooming standing at sink with LRAD with SBA.  Pt will complete UB dressing with SBA.  Pt will complete LB dressing with SBA using LRAD.  Pt will complete toileting with SBA using LRAD.  Pt will complete functional mobility to/from toilet and toilet transfer with SBA using LRAD.                        History:     Past Medical History:   Diagnosis Date    Benign essential HTN     Mixed hyperlipidemia     Type 2 diabetes mellitus without complications          Past Surgical History:   Procedure Laterality Date    APPENDECTOMY      BACK SURGERY      BREAST SURGERY  01 13 2002    Augmantation    HIP SURGERY      INTRAMEDULLARY RODDING OF FEMUR Right 10/27/2022    Procedure: RIGHT FEMUR INTERTROCH IMN;  Surgeon: Stewart Brambila MD;  Location: [facility];  Service: Orthopedics;  Laterality: Right;  [place] table, supine  3rd case  Synthes Short TFNA       Time Tracking:     OT Date of Treatment: 12/22/24  OT Start Time: 0833  OT Stop Time: 0847  OT Total Time (min): 14 min    Billable Minutes:Evaluation Moderate complexity     12/22/2024

## 2024-12-22 NOTE — PLAN OF CARE
Problem: Occupational Therapy  Goal: Occupational Therapy Goal  Description: LTG: Pt will perform basic ADLs and ADL transfers with Modified independence using LRAD by discharge.    STG: to be met by 1/19/25:    Pt will complete grooming standing at sink with LRAD with SBA.  Pt will complete UB dressing with SBA.  Pt will complete LB dressing with SBA using LRAD.  Pt will complete toileting with SBA using LRAD.  Pt will complete functional mobility to/from toilet and toilet transfer with SBA using LRAD.   Outcome: Progressing

## 2024-12-22 NOTE — PLAN OF CARE
Problem: Adult Inpatient Plan of Care  Goal: Plan of Care Review  Outcome: Progressing  Goal: Patient-Specific Goal (Individualized)  Outcome: Progressing  Goal: Absence of Hospital-Acquired Illness or Injury  Outcome: Progressing  Goal: Optimal Comfort and Wellbeing  Outcome: Progressing  Goal: Readiness for Transition of Care  Outcome: Progressing     Problem: Diabetes Comorbidity  Goal: Blood Glucose Level Within Targeted Range  Outcome: Progressing     Problem: Skin Injury Risk Increased  Goal: Skin Health and Integrity  Outcome: Progressing     Problem: Fall Injury Risk  Goal: Absence of Fall and Fall-Related Injury  Outcome: Progressing     Problem: Infection  Goal: Absence of Infection Signs and Symptoms  Outcome: Progressing     Problem: Wound  Goal: Optimal Coping  Outcome: Progressing  Goal: Optimal Functional Ability  Outcome: Progressing  Goal: Absence of Infection Signs and Symptoms  Outcome: Progressing  Goal: Improved Oral Intake  Outcome: Progressing  Goal: Optimal Pain Control and Function  Outcome: Progressing  Goal: Skin Health and Integrity  Outcome: Progressing  Goal: Optimal Wound Healing  Outcome: Progressing

## 2024-12-22 NOTE — PLAN OF CARE
Problem: Physical Therapy  Goal: Physical Therapy Goal  Description: Goals to be met by: 25     Patient will increase functional independence with mobility by performin. Supine to sit with Set-up [place]  2. Sit to supine with Set-up [place]  3. Sit to stand transfer with Supervision  4. Bed to chair transfer with Supervision using Rolling Walker  5. Gait  x 200 feet with Supervision using Rolling Walker.     Outcome: Progressing

## 2024-12-22 NOTE — PROGRESS NOTES
[facility]  Hospital Medicine Progress Note        Chief Complaint: Inpatient Follow-up for     HPI:   64-year-old female with a past medical history of primary hypertension, diabetes mellitus, depression, GERD, tobacco dependence who presents from [facility] after she presented there from persistent left hip pain secondary to a fall that occurred 2 months ago.  Patient was transferred to our facility for planned surgery on 12/20 with Dr. Jose J Montemayor.     At baseline the patient lives with her son and is independent.  Requesting a nicotine patch.    Interval Hx:   Patient seen and examined by bedside.  Appears lethargic and pale appearing.  Does arouse to verbal stimuli.  Says that she feels really weak.  Critical hemoglobin noted this a.m.    Case was discussed with patient's nurse and  on the floor.    Objective/physical exam:  General: In no acute distress, afebrile, pale appearing  Chest: Clear to auscultation bilaterally  Heart: RRR, +S1, S2, no appreciable murmur  Abdomen: Soft, nontender, BS +  MSK: Warm, no lower extremity edema, no clubbing or cyanosis  Neurologic: Alert and oriented x4, Cranial nerve II-XII intact, Strength 5/5 in all 4 extremities    VITAL SIGNS: 24 HRS MIN & MAX LAST   Temp  Min: 97.9 °F (36.6 °C)  Max: 99.3 °F (37.4 °C) 99.2 °F (37.3 °C)   BP  Min: 92/52  Max: 115/66 115/66   Pulse  Min: 95  Max: 123  110   Resp  Min: 16  Max: 19 18   SpO2  Min: 95 %  Max: 99 % 97 %     I have reviewed the following labs:  Recent Labs   Lab 12/19/24  1217 12/20/24  0505 12/21/24  0822   WBC 6.23 6.61 10.66   RBC 3.81* 3.12* 1.76*   HGB 12.2 10.1* 5.7*   HCT 38.9 31.1* 17.9*   .1* 99.7* 101.7*   MCH 32.0* 32.4* 32.4*   MCHC 31.4* 32.5* 31.8*    202 210   MPV 9.0 9.3 9.8     Recent Labs   Lab 12/19/24  1412 12/20/24  0505 12/21/24  0537    137 133*   K 4.3 4.0 4.5   * 116* 108*   CO2 14* 15* 14*   BUN 12.4 18.5 23.3*   CREATININE 0.67 0.58  0.82   CALCIUM 9.1 8.7 8.5   ALBUMIN 3.5 3.1* 3.9   ALKPHOS 354* 327* 254*   ALT 16 14 12   AST 25 27 25   BILITOT 0.4 0.4 0.3     Microbiology Results (last 7 days)       ** No results found for the last 168 hours. **             See below for Radiology    Assessment/Plan:  Left displaced femoral neck fracture status post left hip hemiarthroplasty  Acute blood loss anemia , secondary to above  Elevated alk-phos  Metabolic acidosis  History of: primary hypertension, diabetes mellitus, depression, GERD, tobacco dependence (1 pack per day)     Follow up Orthopedic surgery's postop recs   2 units of PRBC being transfused for hemoglobin of 5.7 noted this a.m.   Alk-phos improving   Continue IV fluids after blood transfusion is done   Hold Lovenox   Start sodium bicarb tablets for acidosis   Monitor hemoglobin and stools closely   Obtain hepatitis panel for elevated alk-phos   Autoimmune antibody normal    Critical care note:  Critical care diagnosis:  Acute blood loss anemia requiring PRBC  Critical care interventions: Hands-on evaluation, review of labs/radiographs/records and discussion with patient and family if present  Critical care time spent: 35 minutes      VTE prophylaxis: scds    Patient condition:  Stable/Fair/Guarded/ Serious/ Critical    Anticipated discharge and Disposition:   tbd      All diagnosis and differential diagnosis have been reviewed; assessment and plan has been documented; I have personally reviewed the labs and test results that are presently available; I have reviewed the patients medication list; I have reviewed the consulting providers response and recommendations. I have reviewed or attempted to review medical records based upon their availability    All of the patient's questions have been  addressed and answered. Patient's is agreeable to the above stated plan. I will continue to monitor closely and make adjustments to medical management as needed.    Portions of this note dictated  using EMR integrated voice recognition software, and may be subject to voice recognition errors not corrected at proofreading. Please contact writer for clarification if needed.   _____________________________________________________________________    Malnutrition Status:  Nutrition consulted. Most recent weight and BMI monitored-     Measurements:  Wt Readings from Last 1 Encounters:   12/19/24 42.7 kg (94 lb 3.2 oz)   Body mass index is 16.69 kg/m².    Patient has been screened and assessed by RD.    Malnutrition Type:  Context:    Level:      Malnutrition Characteristic Summary:       Interventions/Recommendations (treatment strategy):        Scheduled Med:   ARIPiprazole  2 mg Oral Daily    atorvastatin  40 mg Oral QHS    busPIRone  5 mg Oral BID    enoxparin  40 mg Subcutaneous Daily    EScitalopram oxalate  5 mg Oral Daily    gabapentin  300 mg Oral QHS    methocarbamoL  750 mg Oral TID    nicotine  1 patch Transdermal Daily    pantoprazole  40 mg Oral BID    sodium bicarbonate  650 mg Oral BID      Continuous Infusions:   lactated ringers   Intravenous Continuous 50 mL/hr at 12/20/24 2209 New Bag at 12/20/24 2209      PRN Meds:    Current Facility-Administered Medications:     0.9%  NaCl infusion (for blood administration), , Intravenous, Q24H PRN    acetaminophen, 650 mg, Oral, Q4H PRN    albuterol-ipratropium, 3 mL, Nebulization, Q6H PRN    aluminum-magnesium hydroxide-simethicone, 30 mL, Oral, QID PRN    bisacodyL, 10 mg, Rectal, Daily PRN    dextrose 50%, 12.5 g, Intravenous, PRN    dextrose 50%, 25 g, Intravenous, PRN    glucagon (human recombinant), 1 mg, Intramuscular, PRN    glucose, 16 g, Oral, PRN    glucose, 24 g, Oral, PRN    HYDROcodone-acetaminophen, 1 tablet, Oral, Q4H PRN    hyoscyamine, 0.125 mg, Sublingual, TID PRN    insulin aspart U-100, 0-10 Units, Subcutaneous, Q6H PRN    melatonin, 9 mg, Oral, Nightly PRN    morphine, 4 mg, Intravenous, Q6H PRN    naloxone, 0.02 mg, Intravenous, PRN     ondansetron, 8 mg, Oral, Q8H PRN    ondansetron, 4 mg, Intravenous, Q8H PRN    ondansetron, 4 mg, Intravenous, Q6H PRN    oxyCODONE, 5 mg, Oral, Q6H PRN    oxyCODONE, 10 mg, Oral, Q4H PRN    polyethylene glycol, 17 g, Oral, TID PRN    simethicone, 1 tablet, Oral, QID PRN    sodium chloride 0.9%, 10 mL, Intravenous, PRN    traMADoL, 50 mg, Oral, Q6H PRN     Radiology:  I have personally reviewed the following imaging and agree with the radiologist.     X-Ray Hip 2 or 3 views Left with Pelvis when performed  Narrative: EXAMINATION:  XR HIP WITH PELVIS WHEN PERFORMED 2 OR 3 VIEWS LEFT    CLINICAL HISTORY:  post-op in pacu;    COMPARISON:  X-rays dated 12/18/2024    FINDINGS:  There is satisfactory alignment following left hip arthroplasty.  The soft tissues are unremarkable.  Impression: Satisfactory alignment following left hip arthroplasty.    Electronically signed by: Tatiana Miller  Date:    12/20/2024  Time:    13:03      Emperatriz Ayala DO  Department of Hospital Medicine  [facility]  12/21/2024

## 2024-12-23 LAB
ALBUMIN SERPL-MCNC: 3.1 G/DL (ref 3.4–4.8)
ALBUMIN/GLOB SERPL: 1.4 RATIO (ref 1.1–2)
ALP SERPL-CCNC: 210 UNIT/L (ref 40–150)
ALT SERPL-CCNC: 5 UNIT/L (ref 0–55)
ANION GAP SERPL CALC-SCNC: 7 MEQ/L
AST SERPL-CCNC: 22 UNIT/L (ref 5–34)
BASOPHILS # BLD AUTO: 0.02 X10(3)/MCL
BASOPHILS NFR BLD AUTO: 0.3 %
BILIRUB SERPL-MCNC: 0.7 MG/DL
BUN SERPL-MCNC: 9.1 MG/DL (ref 9.8–20.1)
CALCIUM SERPL-MCNC: 8.5 MG/DL (ref 8.4–10.2)
CHLORIDE SERPL-SCNC: 108 MMOL/L (ref 98–107)
CO2 SERPL-SCNC: 25 MMOL/L (ref 23–31)
CREAT SERPL-MCNC: 0.52 MG/DL (ref 0.55–1.02)
CREAT/UREA NIT SERPL: 18
EOSINOPHIL # BLD AUTO: 0.05 X10(3)/MCL (ref 0–0.9)
EOSINOPHIL NFR BLD AUTO: 0.7 %
ERYTHROCYTE [DISTWIDTH] IN BLOOD BY AUTOMATED COUNT: 25.6 % (ref 11.5–17)
GFR SERPLBLD CREATININE-BSD FMLA CKD-EPI: >60 ML/MIN/1.73/M2
GLOBULIN SER-MCNC: 2.2 GM/DL (ref 2.4–3.5)
GLUCOSE SERPL-MCNC: 96 MG/DL (ref 82–115)
HCT VFR BLD AUTO: 24.5 % (ref 37–47)
HGB BLD-MCNC: 8.4 G/DL (ref 12–16)
IMM GRANULOCYTES # BLD AUTO: 0.07 X10(3)/MCL (ref 0–0.04)
IMM GRANULOCYTES NFR BLD AUTO: 0.9 %
LYMPHOCYTES # BLD AUTO: 1.74 X10(3)/MCL (ref 0.6–4.6)
LYMPHOCYTES NFR BLD AUTO: 22.7 %
MCH RBC QN AUTO: 32.1 PG (ref 27–31)
MCHC RBC AUTO-ENTMCNC: 34.3 G/DL (ref 33–36)
MCV RBC AUTO: 93.5 FL (ref 80–94)
MONOCYTES # BLD AUTO: 0.69 X10(3)/MCL (ref 0.1–1.3)
MONOCYTES NFR BLD AUTO: 9 %
NEUTROPHILS # BLD AUTO: 5.08 X10(3)/MCL (ref 2.1–9.2)
NEUTROPHILS NFR BLD AUTO: 66.4 %
NRBC BLD AUTO-RTO: 0 %
PLATELET # BLD AUTO: 152 X10(3)/MCL (ref 130–400)
PMV BLD AUTO: 9.4 FL (ref 7.4–10.4)
POCT GLUCOSE: 113 MG/DL (ref 70–110)
POCT GLUCOSE: 135 MG/DL (ref 70–110)
POCT GLUCOSE: 136 MG/DL (ref 70–110)
POCT GLUCOSE: 148 MG/DL (ref 70–110)
POCT GLUCOSE: 161 MG/DL (ref 70–110)
POTASSIUM SERPL-SCNC: 4 MMOL/L (ref 3.5–5.1)
PROT SERPL-MCNC: 5.3 GM/DL (ref 5.8–7.6)
RBC # BLD AUTO: 2.62 X10(6)/MCL (ref 4.2–5.4)
SODIUM SERPL-SCNC: 140 MMOL/L (ref 136–145)
WBC # BLD AUTO: 7.65 X10(3)/MCL (ref 4.5–11.5)

## 2024-12-23 PROCEDURE — 27000221 HC OXYGEN, UP TO 24 HOURS

## 2024-12-23 PROCEDURE — 11000001 HC ACUTE MED/SURG PRIVATE ROOM

## 2024-12-23 PROCEDURE — 63600175 PHARM REV CODE 636 W HCPCS: Performed by: STUDENT IN AN ORGANIZED HEALTH CARE EDUCATION/TRAINING PROGRAM

## 2024-12-23 PROCEDURE — 99900035 HC TECH TIME PER 15 MIN (STAT)

## 2024-12-23 PROCEDURE — 83550 IRON BINDING TEST: CPT | Performed by: STUDENT IN AN ORGANIZED HEALTH CARE EDUCATION/TRAINING PROGRAM

## 2024-12-23 PROCEDURE — S4991 NICOTINE PATCH NONLEGEND: HCPCS | Performed by: STUDENT IN AN ORGANIZED HEALTH CARE EDUCATION/TRAINING PROGRAM

## 2024-12-23 PROCEDURE — 80053 COMPREHEN METABOLIC PANEL: CPT | Performed by: PHYSICIAN ASSISTANT

## 2024-12-23 PROCEDURE — 97110 THERAPEUTIC EXERCISES: CPT | Mod: CQ

## 2024-12-23 PROCEDURE — 94760 N-INVAS EAR/PLS OXIMETRY 1: CPT

## 2024-12-23 PROCEDURE — 85025 COMPLETE CBC W/AUTO DIFF WBC: CPT | Performed by: PHYSICIAN ASSISTANT

## 2024-12-23 PROCEDURE — 97116 GAIT TRAINING THERAPY: CPT | Mod: CQ

## 2024-12-23 PROCEDURE — 36415 COLL VENOUS BLD VENIPUNCTURE: CPT | Performed by: PHYSICIAN ASSISTANT

## 2024-12-23 PROCEDURE — 25000003 PHARM REV CODE 250: Performed by: STUDENT IN AN ORGANIZED HEALTH CARE EDUCATION/TRAINING PROGRAM

## 2024-12-23 PROCEDURE — 25000003 PHARM REV CODE 250: Performed by: PHYSICIAN ASSISTANT

## 2024-12-23 RX ADMIN — METHOCARBAMOL 750 MG: 750 TABLET ORAL at 03:12

## 2024-12-23 RX ADMIN — INSULIN ASPART 2 UNITS: 100 INJECTION, SOLUTION INTRAVENOUS; SUBCUTANEOUS at 01:12

## 2024-12-23 RX ADMIN — ESCITALOPRAM OXALATE 5 MG: 5 TABLET, FILM COATED ORAL at 10:12

## 2024-12-23 RX ADMIN — ATORVASTATIN CALCIUM 40 MG: 40 TABLET, FILM COATED ORAL at 08:12

## 2024-12-23 RX ADMIN — METHOCARBAMOL 750 MG: 750 TABLET ORAL at 08:12

## 2024-12-23 RX ADMIN — SODIUM BICARBONATE 650 MG TABLET 650 MG: at 10:12

## 2024-12-23 RX ADMIN — BUSPIRONE HYDROCHLORIDE 5 MG: 5 TABLET ORAL at 10:12

## 2024-12-23 RX ADMIN — OXYCODONE HYDROCHLORIDE 10 MG: 10 TABLET ORAL at 03:12

## 2024-12-23 RX ADMIN — METHOCARBAMOL 750 MG: 750 TABLET ORAL at 10:12

## 2024-12-23 RX ADMIN — OXYCODONE HYDROCHLORIDE 10 MG: 10 TABLET ORAL at 08:12

## 2024-12-23 RX ADMIN — GABAPENTIN 300 MG: 300 CAPSULE ORAL at 08:12

## 2024-12-23 RX ADMIN — PANTOPRAZOLE SODIUM 40 MG: 40 TABLET, DELAYED RELEASE ORAL at 10:12

## 2024-12-23 RX ADMIN — OXYCODONE HYDROCHLORIDE 10 MG: 10 TABLET ORAL at 05:12

## 2024-12-23 RX ADMIN — BUSPIRONE HYDROCHLORIDE 5 MG: 5 TABLET ORAL at 08:12

## 2024-12-23 RX ADMIN — SODIUM BICARBONATE 650 MG TABLET 650 MG: at 08:12

## 2024-12-23 RX ADMIN — PANTOPRAZOLE SODIUM 40 MG: 40 TABLET, DELAYED RELEASE ORAL at 08:12

## 2024-12-23 RX ADMIN — ARIPIPRAZOLE 2 MG: 2 TABLET ORAL at 10:12

## 2024-12-23 RX ADMIN — ENOXAPARIN SODIUM 30 MG: 30 INJECTION SUBCUTANEOUS at 04:12

## 2024-12-23 RX ADMIN — NICOTINE 1 PATCH: 14 PATCH TRANSDERMAL at 10:12

## 2024-12-23 NOTE — PT/OT/SLP PROGRESS
Physical Therapy Treatment    Patient Name:  Luz Maria Alfaro   MRN:  [id number]    Recommendations:     Discharge therapy intensity: High Intensity Therapy   Discharge Equipment Recommendations:  (TBD by next level of care)  Barriers to discharge: Decreased caregiver support, Impaired mobility, and Ongoing medical needs    Assessment:     Luz Maria Alfaro is a 64 y.o. female admitted with a medical diagnosis of  L subacute displaced femoral neck fx s/p L hip hemiarthroplasty. Prior to admit, patient lived with son in a multi-level home. At baseline, she is independent and ambulates with a RW.  She presents with the following impairments/functional limitations: weakness, impaired endurance, impaired self care skills, impaired functional mobility, gait instability, impaired balance, decreased lower extremity function, decreased safety awareness, pain, orthopedic precautions.   Rehab Prognosis: Good; patient would benefit from acute skilled PT services to address these deficits and reach maximum level of function.    Recent Surgery: Procedure(s) (LRB):  HEMIARTHROPLASTY, HIP (Left) 3 Days Post-Op    Plan:     During this hospitalization, patient would benefit from acute PT services 6 x/week to address the identified rehab impairments via gait training, therapeutic activities, therapeutic exercises, neuromuscular re-education and progress toward the following goals:    Plan of Care Expires:  01/22/25    Subjective     Chief Complaint: pain     Objective:     Communicated with nurse prior to session.  Patient found supine with SCD, PureWick upon PT entry to room.     General Precautions: Standard, fall  Orthopedic Precautions: LLE weight bearing as tolerated (LLE: No IR/ER)  Braces: N/A  Respiratory Status: Room air  Blood Pressure:   Skin Integrity: Visible skin intact      Functional Mobility:  Min assist to get EOB and same for STS  Gait 20 ft x 2 RW min assist  Pt performed LE PRE's to increase strenth, ROM, and  endurance to improve overall independence.    Education Provided:  Role and goals of PT, transfer training, bed mobility, gait training, balance training, safety awareness, assistive device, strengthening exercises, and importance of participating in PT to return to PLOF.     Patient left up in chair with all lines intact and call button in reach    GOALS:   Multidisciplinary Problems       Physical Therapy Goals          Problem: Physical Therapy    Goal Priority Disciplines Outcome Interventions   Physical Therapy Goal     PT, PT/OT Progressing    Description: Goals to be met by: 25     Patient will increase functional independence with mobility by performin. Supine to sit with Set-up Buckholts  2. Sit to supine with Set-up Buckholts  3. Sit to stand transfer with Supervision  4. Bed to chair transfer with Supervision using Rolling Walker  5. Gait  x 200 feet with Supervision using Rolling Walker.                          Time Tracking:     Billable Minutes: Gait Training 15 and Therapeutic Exercise 10    Treatment Type: Treatment  PT/PTA: PTA     Number of PTA visits since last PT visit: 2024

## 2024-12-23 NOTE — PLAN OF CARE
12/23/24 1102   Discharge Assessment   Assessment Type Discharge Planning Assessment   Confirmed/corrected address, phone number and insurance Yes   Confirmed Demographics Correct on Facesheet   Source of Information patient   Communicated MILAN with patient/caregiver Date not available/Unable to determine   Reason For Admission hip fx   People in Home grandchild(lala);child(lala), adult   Do you expect to return to your current living situation? No   Do you have help at home or someone to help you manage your care at home? No   Prior to hospitilization cognitive status: Unable to Assess   Current cognitive status: Alert/Oriented   Home Layout Able to live on 1st floor   Equipment Currently Used at Home bedside commode;hip kit;wheelchair;walker, rolling   Readmission within 30 days? No   Patient currently being followed by outpatient case management? No   Do you currently have service(s) that help you manage your care at home? No   Do you take prescription medications? Yes   Do you have prescription coverage? Yes   Coverage [employer], [employer] [place] take charge   Do you have any problems affording any of your prescribed medications? No   Is the patient taking medications as prescribed? yes   Who is going to help you get home at discharge? family   How do you get to doctors appointments? family or friend will provide   Are you on dialysis? No   Do you take coumadin? No   Discharge Plan A Rehab   Discharge Plan B Rehab   DME Needed Upon Discharge  none   Discharge Plan discussed with: Patient   Transition of Care Barriers None   OTHER   Name(s) of People in Home son and his two 21 y/o children     Completed assessment with pt at bedside. Introduced self and explained role as SW. Pt verb understanding to all questions asked. PCP is Luzmaria Flores and pharmacy is [facility]. Pt agreeable with [facility], but requested I call sister, Vannesa, to obtain choice. Attempted to contact Vannesa, but had to leave .    Antonette Barron, [facility]    0325 Referral  sent to [facility], per pt's sister's request.

## 2024-12-23 NOTE — PROGRESS NOTES
[facility]  Hospital Medicine Progress Note        Chief Complaint: Inpatient Follow-up for     HPI:   64-year-old female with a past medical history of primary hypertension, diabetes mellitus, depression, GERD, tobacco dependence who presents from [facility] after she presented there from persistent left hip pain secondary to a fall that occurred 2 months ago.  Patient was transferred to our facility for planned surgery on 12/20 with Dr. Jose J Montemayor.     At baseline the patient lives with her son and is independent.  Requesting a nicotine patch.    Patient had some postop anemia noted on 12/21 and was given 2 units of blood.    Interval Hx:   Patient seen and examined by bedside.  Working well with therapy this morning.  Sitting up in chair.  Denied any concerns at this time    Case was discussed with patient's nurse and  on the floor.    Objective/physical exam:  General: In no acute distress, afebrile, pale appearing  Chest: Clear to auscultation bilaterally  Heart: RRR, +S1, S2, no appreciable murmur  Abdomen: Soft, nontender, BS +  MSK: Warm, no lower extremity edema, no clubbing or cyanosis  Neurologic: Alert and oriented x4, Cranial nerve II-XII intact, Strength 5/5 in all 4 extremities    VITAL SIGNS: 24 HRS MIN & MAX LAST   Temp  Min: 98.1 °F (36.7 °C)  Max: 99.4 °F (37.4 °C) 98.3 °F (36.8 °C)   BP  Min: 93/60  Max: 111/67 105/64   Pulse  Min: 84  Max: 96  94   Resp  Min: 17  Max: 18 18   SpO2  Min: 94 %  Max: 98 % (!) 94 %     I have reviewed the following labs:  Recent Labs   Lab 12/21/24  0822 12/22/24  0536 12/23/24  0459   WBC 10.66 8.25 7.65   RBC 1.76* 2.79* 2.62*   HGB 5.7* 8.8* 8.4*   HCT 17.9* 25.6* 24.5*   .7* 91.8 93.5   MCH 32.4* 31.5* 32.1*   MCHC 31.8* 34.4 34.3   RDW  --  25.2* 25.6*    142 152   MPV 9.8 9.0 9.4     Recent Labs   Lab 12/21/24  0537 12/22/24  0536 12/23/24  0459   * 138 140   K 4.5 3.6 4.0   * 110* 108*    CO2 14* 19* 25   BUN 23.3* 14.4 9.1*   CREATININE 0.82 0.60 0.52*   CALCIUM 8.5 8.1* 8.5   ALBUMIN 3.9 3.2* 3.1*   ALKPHOS 254* 209* 210*   ALT 12 6 5   AST 25 16 22   BILITOT 0.3 0.9 0.7     Microbiology Results (last 7 days)       ** No results found for the last 168 hours. **             See below for Radiology    Assessment/Plan:  Left displaced femoral neck fracture status post left hip hemiarthroplasty  Acute blood loss anemia , secondary to above  Elevated alk-phos  Metabolic acidosis, resolved  History of: primary hypertension, diabetes mellitus, depression, GERD, tobacco dependence (1 pack per day)     Follow up Orthopedic surgery's postop recs   2 units of PRBC being transfused for hemoglobin of 5.7 noted 12/21  Hemoglobin stable  Alk-phos improving   sodium bicarb tablets for acidosis   Monitor hemoglobin and stools closely   Obtain hepatitis panel for elevated alk-phos , in process  Autoimmune antibody normal  Further workup can be done outpatient and monitored as patient is currently asymptomatic  COntinue PT and OT, recommending rehab  Case management consulted for placement  Labs in am    VTE prophylaxis: scds    Patient condition:  Stable/Fair/Guarded/ Serious/ Critical    Anticipated discharge and Disposition:   tbd      All diagnosis and differential diagnosis have been reviewed; assessment and plan has been documented; I have personally reviewed the labs and test results that are presently available; I have reviewed the patients medication list; I have reviewed the consulting providers response and recommendations. I have reviewed or attempted to review medical records based upon their availability    All of the patient's questions have been  addressed and answered. Patient's is agreeable to the above stated plan. I will continue to monitor closely and make adjustments to medical management as needed.    Portions of this note dictated using EMR integrated voice recognition software, and may be  subject to voice recognition errors not corrected at proofreading. Please contact writer for clarification if needed.   _____________________________________________________________________    Malnutrition Status:  Nutrition consulted. Most recent weight and BMI monitored-     Measurements:  Wt Readings from Last 1 Encounters:   12/19/24 42.7 kg (94 lb 3.2 oz)   Body mass index is 16.69 kg/m².    Patient has been screened and assessed by RD.    Malnutrition Type:  Context:    Level:      Malnutrition Characteristic Summary:       Interventions/Recommendations (treatment strategy):        Scheduled Med:   ARIPiprazole  2 mg Oral Daily    atorvastatin  40 mg Oral QHS    busPIRone  5 mg Oral BID    enoxparin  30 mg Subcutaneous Q24H (prophylaxis, 1700)    EScitalopram oxalate  5 mg Oral Daily    gabapentin  300 mg Oral QHS    methocarbamoL  750 mg Oral TID    nicotine  1 patch Transdermal Daily    pantoprazole  40 mg Oral BID    sodium bicarbonate  650 mg Oral BID      Continuous Infusions:       PRN Meds:    Current Facility-Administered Medications:     0.9%  NaCl infusion (for blood administration), , Intravenous, Q24H PRN    acetaminophen, 650 mg, Oral, Q4H PRN    albuterol-ipratropium, 3 mL, Nebulization, Q6H PRN    aluminum-magnesium hydroxide-simethicone, 30 mL, Oral, QID PRN    bisacodyL, 10 mg, Rectal, Daily PRN    dextrose 50%, 12.5 g, Intravenous, PRN    dextrose 50%, 25 g, Intravenous, PRN    glucagon (human recombinant), 1 mg, Intramuscular, PRN    glucose, 16 g, Oral, PRN    glucose, 24 g, Oral, PRN    HYDROcodone-acetaminophen, 1 tablet, Oral, Q4H PRN    hyoscyamine, 0.125 mg, Sublingual, TID PRN    insulin aspart U-100, 0-10 Units, Subcutaneous, Q6H PRN    melatonin, 9 mg, Oral, Nightly PRN    morphine, 4 mg, Intravenous, Q6H PRN    naloxone, 0.02 mg, Intravenous, PRN    ondansetron, 8 mg, Oral, Q8H PRN    ondansetron, 4 mg, Intravenous, Q8H PRN    ondansetron, 4 mg, Intravenous, Q6H PRN    oxyCODONE, 5  mg, Oral, Q6H PRN    oxyCODONE, 10 mg, Oral, Q4H PRN    polyethylene glycol, 17 g, Oral, TID PRN    simethicone, 1 tablet, Oral, QID PRN    sodium chloride 0.9%, 10 mL, Intravenous, PRN    traMADoL, 50 mg, Oral, Q6H PRN     Radiology:  I have personally reviewed the following imaging and agree with the radiologist.     X-Ray Hip 2 or 3 views Left with Pelvis when performed  Narrative: EXAMINATION:  XR HIP WITH PELVIS WHEN PERFORMED 2 OR 3 VIEWS LEFT    CLINICAL HISTORY:  post-op in pacu;    COMPARISON:  X-rays dated 12/18/2024    FINDINGS:  There is satisfactory alignment following left hip arthroplasty.  The soft tissues are unremarkable.  Impression: Satisfactory alignment following left hip arthroplasty.    Electronically signed by: Tatiana Miller  Date:    12/20/2024  Time:    13:03      Emperatriz Ayala DO  Department of Hospital Medicine  [facility]  12/23/2024

## 2024-12-24 ENCOUNTER — HOSPITAL ENCOUNTER (INPATIENT)
Facility: HOSPITAL | Age: 64
LOS: 13 days | Discharge: HOME HEALTH | DRG: 560 | End: 2025-01-06
Attending: INTERNAL MEDICINE | Admitting: INTERNAL MEDICINE
Payer: MEDICARE

## 2024-12-24 VITALS
HEART RATE: 92 BPM | HEIGHT: 63 IN | TEMPERATURE: 98 F | DIASTOLIC BLOOD PRESSURE: 65 MMHG | BODY MASS INDEX: 16.69 KG/M2 | RESPIRATION RATE: 18 BRPM | WEIGHT: 94.19 LBS | SYSTOLIC BLOOD PRESSURE: 125 MMHG | OXYGEN SATURATION: 98 %

## 2024-12-24 DIAGNOSIS — S72.002A DISPLACED FRACTURE OF LEFT FEMORAL NECK: Primary | ICD-10-CM

## 2024-12-24 DIAGNOSIS — R50.9 FEVER, UNSPECIFIED FEVER CAUSE: ICD-10-CM

## 2024-12-24 DIAGNOSIS — E11.9 TYPE 2 DIABETES MELLITUS WITHOUT COMPLICATION, UNSPECIFIED WHETHER LONG TERM INSULIN USE: ICD-10-CM

## 2024-12-24 DIAGNOSIS — S72.002D CLOSED FRACTURE OF LEFT HIP WITH ROUTINE HEALING, SUBSEQUENT ENCOUNTER: ICD-10-CM

## 2024-12-24 DIAGNOSIS — S72.002A CLOSED LEFT HIP FRACTURE: ICD-10-CM

## 2024-12-24 LAB
HAV IGM SERPL QL IA: NONREACTIVE
HBV CORE IGM SERPL QL IA: NONREACTIVE
HBV SURFACE AG SERPL QL IA: NONREACTIVE
HCT VFR BLD AUTO: 25.2 % (ref 37–47)
HCV AB SERPL QL IA: NONREACTIVE
HGB BLD-MCNC: 8.1 G/DL (ref 12–16)
IRON SATN MFR SERPL: 10 % (ref 20–50)
IRON SERPL-MCNC: 17 UG/DL (ref 50–170)
PATH REV: NORMAL
POCT GLUCOSE: 107 MG/DL (ref 70–110)
POCT GLUCOSE: 115 MG/DL (ref 70–110)
POCT GLUCOSE: 119 MG/DL (ref 70–110)
TIBC SERPL-MCNC: 150 UG/DL (ref 70–310)
TIBC SERPL-MCNC: 167 UG/DL (ref 250–450)
TRANSFERRIN SERPL-MCNC: 149 MG/DL (ref 173–360)

## 2024-12-24 PROCEDURE — 85018 HEMOGLOBIN: CPT | Performed by: STUDENT IN AN ORGANIZED HEALTH CARE EDUCATION/TRAINING PROGRAM

## 2024-12-24 PROCEDURE — 94799 UNLISTED PULMONARY SVC/PX: CPT

## 2024-12-24 PROCEDURE — 97110 THERAPEUTIC EXERCISES: CPT

## 2024-12-24 PROCEDURE — 11800000 HC REHAB PRIVATE ROOM

## 2024-12-24 PROCEDURE — 25000003 PHARM REV CODE 250: Performed by: PHYSICIAN ASSISTANT

## 2024-12-24 PROCEDURE — 25000003 PHARM REV CODE 250: Performed by: STUDENT IN AN ORGANIZED HEALTH CARE EDUCATION/TRAINING PROGRAM

## 2024-12-24 PROCEDURE — 94761 N-INVAS EAR/PLS OXIMETRY MLT: CPT

## 2024-12-24 PROCEDURE — 63600175 PHARM REV CODE 636 W HCPCS: Performed by: NURSE PRACTITIONER

## 2024-12-24 PROCEDURE — 97530 THERAPEUTIC ACTIVITIES: CPT

## 2024-12-24 PROCEDURE — S4991 NICOTINE PATCH NONLEGEND: HCPCS | Performed by: STUDENT IN AN ORGANIZED HEALTH CARE EDUCATION/TRAINING PROGRAM

## 2024-12-24 PROCEDURE — 97166 OT EVAL MOD COMPLEX 45 MIN: CPT

## 2024-12-24 PROCEDURE — 97162 PT EVAL MOD COMPLEX 30 MIN: CPT

## 2024-12-24 PROCEDURE — 36415 COLL VENOUS BLD VENIPUNCTURE: CPT | Performed by: STUDENT IN AN ORGANIZED HEALTH CARE EDUCATION/TRAINING PROGRAM

## 2024-12-24 PROCEDURE — 99900031 HC PATIENT EDUCATION (STAT)

## 2024-12-24 PROCEDURE — 25000003 PHARM REV CODE 250: Performed by: NURSE PRACTITIONER

## 2024-12-24 PROCEDURE — 97535 SELF CARE MNGMENT TRAINING: CPT

## 2024-12-24 PROCEDURE — 63600175 PHARM REV CODE 636 W HCPCS: Performed by: STUDENT IN AN ORGANIZED HEALTH CARE EDUCATION/TRAINING PROGRAM

## 2024-12-24 RX ORDER — HYDRALAZINE HYDROCHLORIDE 20 MG/ML
10 INJECTION INTRAMUSCULAR; INTRAVENOUS EVERY 4 HOURS PRN
Status: DISCONTINUED | OUTPATIENT
Start: 2024-12-24 | End: 2025-01-06 | Stop reason: HOSPADM

## 2024-12-24 RX ORDER — ALPRAZOLAM 0.25 MG/1
0.25 TABLET ORAL 3 TIMES DAILY PRN
Status: DISCONTINUED | OUTPATIENT
Start: 2024-12-24 | End: 2025-01-06 | Stop reason: HOSPADM

## 2024-12-24 RX ORDER — NITROGLYCERIN 0.4 MG/1
0.4 TABLET SUBLINGUAL EVERY 5 MIN PRN
Status: DISCONTINUED | OUTPATIENT
Start: 2024-12-24 | End: 2025-01-06 | Stop reason: HOSPADM

## 2024-12-24 RX ORDER — GABAPENTIN 300 MG/1
300 CAPSULE ORAL NIGHTLY
Status: DISCONTINUED | OUTPATIENT
Start: 2024-12-24 | End: 2025-01-06 | Stop reason: HOSPADM

## 2024-12-24 RX ORDER — ONDANSETRON HYDROCHLORIDE 2 MG/ML
4 INJECTION, SOLUTION INTRAVENOUS EVERY 8 HOURS PRN
Status: DISCONTINUED | OUTPATIENT
Start: 2024-12-24 | End: 2025-01-06 | Stop reason: HOSPADM

## 2024-12-24 RX ORDER — ATORVASTATIN CALCIUM 40 MG/1
40 TABLET, FILM COATED ORAL NIGHTLY
Status: DISCONTINUED | OUTPATIENT
Start: 2024-12-24 | End: 2025-01-06 | Stop reason: HOSPADM

## 2024-12-24 RX ORDER — ESCITALOPRAM OXALATE 5 MG/1
5 TABLET ORAL DAILY
Status: DISCONTINUED | OUTPATIENT
Start: 2024-12-25 | End: 2024-12-26

## 2024-12-24 RX ORDER — DOCUSATE SODIUM 100 MG/1
100 CAPSULE, LIQUID FILLED ORAL 2 TIMES DAILY
Status: DISCONTINUED | OUTPATIENT
Start: 2024-12-24 | End: 2025-01-06 | Stop reason: HOSPADM

## 2024-12-24 RX ORDER — BUSPIRONE HYDROCHLORIDE 5 MG/1
5 TABLET ORAL 2 TIMES DAILY
Status: DISCONTINUED | OUTPATIENT
Start: 2024-12-24 | End: 2025-01-06 | Stop reason: HOSPADM

## 2024-12-24 RX ORDER — BENZONATATE 100 MG/1
100 CAPSULE ORAL 3 TIMES DAILY PRN
Status: DISCONTINUED | OUTPATIENT
Start: 2024-12-24 | End: 2025-01-06 | Stop reason: HOSPADM

## 2024-12-24 RX ORDER — ARIPIPRAZOLE 2 MG/1
2 TABLET ORAL DAILY
Status: DISCONTINUED | OUTPATIENT
Start: 2024-12-25 | End: 2025-01-06 | Stop reason: HOSPADM

## 2024-12-24 RX ORDER — POLYETHYLENE GLYCOL 3350 17 G/17G
17 POWDER, FOR SOLUTION ORAL 2 TIMES DAILY PRN
Status: DISCONTINUED | OUTPATIENT
Start: 2024-12-24 | End: 2024-12-31

## 2024-12-24 RX ORDER — IBUPROFEN 200 MG
16 TABLET ORAL
Status: DISCONTINUED | OUTPATIENT
Start: 2024-12-24 | End: 2025-01-06 | Stop reason: HOSPADM

## 2024-12-24 RX ORDER — METOPROLOL TARTRATE 1 MG/ML
10 INJECTION, SOLUTION INTRAVENOUS
Status: DISCONTINUED | OUTPATIENT
Start: 2024-12-24 | End: 2025-01-06 | Stop reason: HOSPADM

## 2024-12-24 RX ORDER — PANTOPRAZOLE SODIUM 40 MG/1
40 TABLET, DELAYED RELEASE ORAL 2 TIMES DAILY
Status: DISCONTINUED | OUTPATIENT
Start: 2024-12-24 | End: 2025-01-06 | Stop reason: HOSPADM

## 2024-12-24 RX ORDER — FERROUS SULFATE 325(65) MG
325 TABLET ORAL
Status: ON HOLD
Start: 2024-12-24 | End: 2025-03-24

## 2024-12-24 RX ORDER — SODIUM BICARBONATE 650 MG/1
650 TABLET ORAL 2 TIMES DAILY
Status: DISCONTINUED | OUTPATIENT
Start: 2024-12-24 | End: 2025-01-06 | Stop reason: HOSPADM

## 2024-12-24 RX ORDER — IBUPROFEN 200 MG
1 TABLET ORAL DAILY
Status: DISCONTINUED | OUTPATIENT
Start: 2024-12-25 | End: 2025-01-06 | Stop reason: HOSPADM

## 2024-12-24 RX ORDER — INSULIN ASPART 100 [IU]/ML
0-10 INJECTION, SOLUTION INTRAVENOUS; SUBCUTANEOUS
Status: DISCONTINUED | OUTPATIENT
Start: 2024-12-24 | End: 2025-01-06 | Stop reason: HOSPADM

## 2024-12-24 RX ORDER — ACETAMINOPHEN 325 MG/1
650 TABLET ORAL EVERY 6 HOURS PRN
Status: DISCONTINUED | OUTPATIENT
Start: 2024-12-24 | End: 2024-12-26

## 2024-12-24 RX ORDER — HYDROCODONE BITARTRATE AND ACETAMINOPHEN 5; 325 MG/1; MG/1
1 TABLET ORAL EVERY 4 HOURS PRN
Status: ON HOLD
Start: 2024-12-24

## 2024-12-24 RX ORDER — ENOXAPARIN SODIUM 100 MG/ML
30 INJECTION SUBCUTANEOUS EVERY 24 HOURS
Status: DISCONTINUED | OUTPATIENT
Start: 2024-12-24 | End: 2025-01-01

## 2024-12-24 RX ORDER — IBUPROFEN 200 MG
24 TABLET ORAL
Status: DISCONTINUED | OUTPATIENT
Start: 2024-12-24 | End: 2025-01-06 | Stop reason: HOSPADM

## 2024-12-24 RX ORDER — GLUCAGON 1 MG
1 KIT INJECTION
Status: DISCONTINUED | OUTPATIENT
Start: 2024-12-24 | End: 2025-01-06 | Stop reason: HOSPADM

## 2024-12-24 RX ORDER — ONDANSETRON 4 MG/1
8 TABLET, ORALLY DISINTEGRATING ORAL EVERY 8 HOURS PRN
Status: DISCONTINUED | OUTPATIENT
Start: 2024-12-24 | End: 2025-01-06 | Stop reason: HOSPADM

## 2024-12-24 RX ORDER — LABETALOL HCL 20 MG/4 ML
10 SYRINGE (ML) INTRAVENOUS EVERY 4 HOURS PRN
Status: DISCONTINUED | OUTPATIENT
Start: 2024-12-24 | End: 2025-01-06 | Stop reason: HOSPADM

## 2024-12-24 RX ORDER — OXYCODONE HYDROCHLORIDE 5 MG/1
5 TABLET ORAL EVERY 4 HOURS PRN
Status: DISCONTINUED | OUTPATIENT
Start: 2024-12-24 | End: 2025-01-06 | Stop reason: HOSPADM

## 2024-12-24 RX ORDER — ACETAMINOPHEN 325 MG/1
650 TABLET ORAL EVERY 6 HOURS
Status: DISCONTINUED | OUTPATIENT
Start: 2024-12-24 | End: 2024-12-30

## 2024-12-24 RX ORDER — METHOCARBAMOL 750 MG/1
750 TABLET, FILM COATED ORAL 3 TIMES DAILY
Status: ON HOLD
Start: 2024-12-24 | End: 2025-01-03

## 2024-12-24 RX ORDER — METHOCARBAMOL 750 MG/1
750 TABLET, FILM COATED ORAL 3 TIMES DAILY
Status: DISCONTINUED | OUTPATIENT
Start: 2024-12-24 | End: 2024-12-26

## 2024-12-24 RX ORDER — HYDROCODONE BITARTRATE AND ACETAMINOPHEN 5; 325 MG/1; MG/1
1 TABLET ORAL DAILY PRN
Status: DISCONTINUED | OUTPATIENT
Start: 2024-12-24 | End: 2025-01-06 | Stop reason: HOSPADM

## 2024-12-24 RX ORDER — HYDROXYZINE PAMOATE 50 MG/1
50 CAPSULE ORAL NIGHTLY PRN
Status: DISCONTINUED | OUTPATIENT
Start: 2024-12-24 | End: 2024-12-26

## 2024-12-24 RX ORDER — IBUPROFEN 200 MG
1 TABLET ORAL DAILY
Status: ON HOLD
Start: 2024-12-24

## 2024-12-24 RX ORDER — OXYCODONE HYDROCHLORIDE 5 MG/1
10 TABLET ORAL EVERY 4 HOURS PRN
Status: DISCONTINUED | OUTPATIENT
Start: 2024-12-24 | End: 2025-01-06 | Stop reason: HOSPADM

## 2024-12-24 RX ORDER — ONDANSETRON 4 MG/1
4 TABLET, ORALLY DISINTEGRATING ORAL EVERY 6 HOURS PRN
Status: DISCONTINUED | OUTPATIENT
Start: 2024-12-24 | End: 2025-01-06 | Stop reason: HOSPADM

## 2024-12-24 RX ADMIN — BUSPIRONE HYDROCHLORIDE 5 MG: 5 TABLET ORAL at 09:12

## 2024-12-24 RX ADMIN — ACETAMINOPHEN 650 MG: 325 TABLET ORAL at 10:12

## 2024-12-24 RX ADMIN — SODIUM BICARBONATE 650 MG TABLET 650 MG: at 09:12

## 2024-12-24 RX ADMIN — ENOXAPARIN SODIUM 30 MG: 30 INJECTION SUBCUTANEOUS at 04:12

## 2024-12-24 RX ADMIN — POLYETHYLENE GLYCOL 3350 17 G: 17 POWDER, FOR SOLUTION ORAL at 02:12

## 2024-12-24 RX ADMIN — GABAPENTIN 300 MG: 300 CAPSULE ORAL at 09:12

## 2024-12-24 RX ADMIN — ESCITALOPRAM OXALATE 5 MG: 5 TABLET, FILM COATED ORAL at 09:12

## 2024-12-24 RX ADMIN — METHOCARBAMOL 750 MG: 750 TABLET ORAL at 02:12

## 2024-12-24 RX ADMIN — ACETAMINOPHEN 650 MG: 325 TABLET ORAL at 06:12

## 2024-12-24 RX ADMIN — OXYCODONE 10 MG: 5 TABLET ORAL at 02:12

## 2024-12-24 RX ADMIN — ATORVASTATIN CALCIUM 40 MG: 40 TABLET, FILM COATED ORAL at 09:12

## 2024-12-24 RX ADMIN — OXYCODONE HYDROCHLORIDE 10 MG: 10 TABLET ORAL at 02:12

## 2024-12-24 RX ADMIN — PANTOPRAZOLE SODIUM 40 MG: 40 TABLET, DELAYED RELEASE ORAL at 09:12

## 2024-12-24 RX ADMIN — METHOCARBAMOL 750 MG: 750 TABLET ORAL at 09:12

## 2024-12-24 RX ADMIN — DOCUSATE SODIUM 100 MG: 100 CAPSULE, LIQUID FILLED ORAL at 09:12

## 2024-12-24 RX ADMIN — SODIUM BICARBONATE 650 MG: 650 TABLET ORAL at 09:12

## 2024-12-24 RX ADMIN — OXYCODONE 10 MG: 5 TABLET ORAL at 07:12

## 2024-12-24 RX ADMIN — OXYCODONE HYDROCHLORIDE 10 MG: 10 TABLET ORAL at 09:12

## 2024-12-24 RX ADMIN — SODIUM CHLORIDE 125 MG: 9 INJECTION, SOLUTION INTRAVENOUS at 10:12

## 2024-12-24 RX ADMIN — ARIPIPRAZOLE 2 MG: 2 TABLET ORAL at 09:12

## 2024-12-24 RX ADMIN — NICOTINE 1 PATCH: 14 PATCH TRANSDERMAL at 09:12

## 2024-12-24 RX ADMIN — ALPRAZOLAM 0.25 MG: 0.25 TABLET ORAL at 10:12

## 2024-12-24 NOTE — CONSULTS
[facility] - Rehab Inpatient Services  Inpatient Rehab Prescreen    PATIENT INFORMATION     Assessment date/time: 12/24/24 0845    Name: Luz Maria Alfaro Phone: [phone]   Address:   [address] [place] [id number] SSN:    YOB: 1960 Age: 64 y.o. Gender: female   Race: White   Marital Status:    Advance Directives: Full code     COVERAGE INFORMATION     Patient Medicare #:  [id number]     Primary Insurance Type: MEDICARE/MEDICARE PART A & B effective 2/1/23 /  Secondary Insurance Type: MEDICA[place]  /    Policy #:   Policy #:  [id number]    Insurance contact name/number:  Insurance contact name/number:    Authorization #:  Authorization #:    Verified Coverage: Insurance Carrier   Pending Coverage:    Prescription Coverage:  Insurance details/comments:      PHYSICIAN/REFERRAL INFORMATION     Primary Care Physician: Luzmaria Flores FNP Attending Physician: George Do MD   Consulting physician/specialist:  Referring Physician:    Referring facility:  Referring contact name/phone:    Physician details/comments:      CONTACT INFORMATION   Extended Emergency Contact Information  Primary Emergency Contact: Omer Baum  Mobile Phone: [phone]  Relation: Son  Secondary Emergency Contact: Vannesa Brambila  Mobile Phone: [phone]  Relation: Sister  Preferred language: English   needed? No    PRIOR LIVING SITUATION    Patient lives with son and 2 adult grandchildren in a 2 story home with a threshold entrance     Bedroom location/setup: 1st floor   Bathroom location/setup: 1st floor; tub/shower combo with railing, and normal height commode   Equipment at home: rolling walker, bedside commode, and wheelchair   Prior device use:  rolling walker, bedside commode, and wheelchair     PRIOR LEVEL OF FUNCTION     Did a helper need to assist with the following activities prior to the current illness, exacerbation, or injury?  "  Self-care:  Yes, patient needed minimal assist for sponge bathing for about 1 year, but able to dress, toilet, and groom prior to fall in September 2024   Indoor mobility:  No, modified independent with rolling walker prior to fall in September 2024   Stairs: unknown    Functional Cognition: No, independent    Comments: Patient was modified independent for mobility with use of RW prior to Sept of 2024, and most ADLs. Patient did need minimal assist for sponge bathing hard to reach areas for about a year due to chronic back pain.    Caregivers providing assistance: Bruce Baum- ex-- [phone] (primary contact), Vannesa Brambila- sister- [phone]   Pre-hospital home care service: yes  Name of Agency: [facility]   Home/personal responsibilities: personal ADLs, cooks, grocery shops online, manages finances, and manages medications. Patient does not drive so ex- transports as needed. Son and grandchildren cook, clean, and do laundry.    Pre-hospital vocational category: Retired for age   Pre-hospital vocational effort: Retired for age   Occupation/profession:  Return to work/school plan:    Educational history:    Hobbies/leisure activities:  Resources used prior to admission:    Available resources:  Resource information comments:      REHABILITATION DIAGNOSIS     History of present illness: This is a 64 year old female with a PMH of HTN, DM, depression, HLD, GERD, tobacco dependence, very [place], chronic back pain with multiple surgeries in past, hx of substance abuse of "crack" cocaine (clean 8 years), and right hip sx in past who presented to the ED at [facility] on 12/19/24 with a left displaced femoral neck fracture which possibly happened after a fall in September of 2024. Patient had XR of hip on 12/18 which showed fracture ordered from orthopedic clinic with Dr Rivas with instruction to go to ED. Patient reported she had been taking upwards to 20+ Tylenol 500mg per day, and reported she had " taken 8 tylenol so far on arrival to ED. Labs showed elevated MCV of 102.1, elevated MCH of 32.0, low MCHC of 31.4, low INR of 1.1, elevated chloride of 116, low CO2 of 12, elevated ALP of 380, elevated AST of 36, acetaminophen level of <10.0, salicylate level of <5.0, Chest XR showed no acute chest disease identified. Poison control was contacted secondary to significant acetaminophen abuse with initial recommendation to give Acetadote, but then recommended holding off due to lab results with continued monitoring of acetaminophen level and CMP. Due to possible liver damage, patient was transferred to [facility]. On 12/20. Nicotine patch given for tobacco use. Patient underwent a left hip hemiarthroplasty by Dr. INDIRA Montemayor, and placed WBAT to LLE without external or internal rotation of the hip. On 12/21, labs showed low H&H of 5.7 & 17.9 so transfused 2 units of PRBCs. Sodium bicarb tablets started for acidosis, held Lovenox, and hepatitis panel was nonreactive. On 12/22, PT/OT evals completed with deficits noted with recommendation for high intensity therapy needed. Labs showed low H&H of 8.8 & 25.6 post transfusion. Started Lovenox, and OK for ASA 81mg BID on DC for 30 days. Dry dressing daily or open to air (No creams/ointments), and will need follow up with Dr Montemayor office in 3 weeks. IV fluids stopped. On 12/23, labs showed low RBC of 2.62, low H&H of 8.4 & 24.5, elevated MCH of 32.1, elevated RDW of 25.6, low iron of 17, low TIBC of 167, low transferrin of 149, low iron sat of 10, elevated ALP of 210, low protein of 5.3, and low albumin of 3.1. Vital signs stable. On 12/24, labs showed low H&H of 8.1 & 25.2. Last BM noted on 12/18/24. Purewick in place. Prior to hospitalization, patient was living with son and 2 adult grandchildren in a 2 story home with a threshold entrance, but does not access 2nd story. Patient has a tub/shower combo with railing (has not use in a year), and a normal height commode. Patient was  modified independent with use of rolling walker prior to fall in September of 2024, and modified independent for most Adls. Patient had been sponge bathing with minimal assist for a year due to chronic back pain. Currently, patient is AAOx4; extremely [place]; minimal assist for bed mobility, minimal assist for transfers with RW, walked 20ft x 2 with RW at minimal assist, max assist for lower body dressing, total assist for toileting with purewick in place, and setup assist for eating and grooming while seated. Pt. Requires acute inpatient rehab admission with 24-hour nursing and active physician oversight to monitor and manage acute medical comorbid conditions, labs, pain, and functional deficits.  Patient/family will also require teaching and integration of improving functional skills into daily living.  She will also require an individualized, interdisciplinary approach to her care, receiving PT, OT services 3 hours per day, 5 days per week.    Required care cannot be provided at a lower level of care. Patient is anticipated to require approximately 10-12 days LOS with expected discharge home with family with HH   Impairment group (IGC):   Unilateral Hip Fracture 08.11 Etiologic diagnosis/description: Fall with left displaced femoral neck fracture s/p left hip hemiarthroplasty   Date of onset:  12/18/24 Date of surgery: 12/20/24   Allergies: Iodinated contrast media, Opioids - morphine analogues, and Meperidine   Comorbid condition: Anemia, Depression, Diabetes, and Hypertension, tobacco dependence, elevated alk-phos, metabolic acidosis, GERD, HLD, chronic back pain   Medical/functional conditions requiring inpatient rehabilitation: This patient requires medical management/24-hour nursing of complex   comorbidities (Fall with left displaced femoral neck fracture s/p left hip hemiarthroplasty, Anemia, Depression, Diabetes, and Hypertension, tobacco dependence, elevated alk-phos, metabolic acidosis, GERD, HLD, chronic  back pain), labs, medications (see medications list), pain, sleep   hygiene, anticoagulation, nutrition, hydration, neurological,   pulmonary, cardiac status, and preventive healthcare.      This patient requires intense therapy and an integrated,   interdisciplinary approach to address safety, impaired mobility,   impaired ADL's (dressing, toileting, grooming, showering), dysphagia,   impaired cognition, judgment, and memory, communication,   pulmonary insufficiency, bowel/bladder problems,   preventive healthcare, medication management, integration of   improving functional skills into daily living, and home caregiver   support and training.     Risk for medical/clinical complications: This patient is at risk for the following complications: DVT/PE, pneumonia,   malnutrition, neurological decline, respiratory insufficiency,   worsening activity intolerance, complications from anticoagulation,   Surgical wound care, skin breakdown, inadequate   sleep, and constipation.     SPECIAL REHABILITATION NEEDS     IV: 20 G right forearm Hearing impaired and Preferred Language: english         Peripheral IV - Single Lumen 12/21/24 0430 20 G Anterior;Right Forearm (Active)   Site Assessment Clean;Dry;Intact 12/24/24 0400   Extremity Assessment Distal to IV No abnormal discoloration;No redness;No swelling;No warmth 12/24/24 0400   Line Status Capped;Flushed;Saline locked 12/24/24 0400   Dressing Status Clean;Dry;Intact 12/24/24 0400   Dressing Intervention Integrity maintained 12/24/24 0400          PRECAUTIONS     Safety/fall precautions: High fall risk and Weight-bearing status: Weight-bearing as tolerated: LLE     PAST MEDICAL, SOCIAL, FAMILY HISTORY     Pertinent past medical history:   Past Medical History:   Diagnosis Date    Benign essential HTN     Mixed hyperlipidemia     Type 2 diabetes mellitus without complications       Has the patient had two or more falls in the past year or any fall with injury in the past  "year: Yes    Has the patient had major surgery during the 100 days prior to admission: Yes    Family Medical History: No family history on file.   Substance use history:   Social History     Substance and Sexual Activity   Alcohol Use Not Currently    Comment: 6 a week anand     Social History     Tobacco Use   Smoking Status Every Day    Current packs/day: 0.50    Types: Cigarettes   Smokeless Tobacco Current     Social History     Substance and Sexual Activity   Drug Use Not Currently    Types: "Crack" cocaine    Comment: Been clean 8 years      VITALS   BP:  125/73     Temp: 98.5 °F (36.9 °C)     HR: 89     Resp: 18     SpO2: 99 %     Height: 5' 3" (1.6 m)     Weight: 42.7 kg (94 lb 3.2 oz)     BMI: 16.7          MED/LABS/DIAGNOSITICS   No current facility-administered medications for this encounter.     No current outpatient medications on file.     Facility-Administered Medications Ordered in Other Encounters   Medication Dose Route Frequency Provider Last Rate Last Admin    0.9%  NaCl infusion (for blood administration)   Intravenous Q24H PRN Emperatriz Ayala, DO        acetaminophen tablet 650 mg  650 mg Oral Q4H PRN Reyes, Thairy G, DO        albuterol-ipratropium 2.5 mg-0.5 mg/3 mL nebulizer solution 3 mL  3 mL Nebulization Q6H PRN Reyes, Thairy G, DO        aluminum-magnesium hydroxide-simethicone 200-200-20 mg/5 mL suspension 30 mL  30 mL Oral QID PRN Reyes, Thairy G, DO        ARIPiprazole tablet 2 mg  2 mg Oral Daily Reyes, Thairy G, DO   2 mg at 12/23/24 1007    atorvastatin tablet 40 mg  40 mg Oral QHS Reyes, Thairy G, DO   40 mg at 12/23/24 2009    bisacodyL suppository 10 mg  10 mg Rectal Daily PRN Reyes, Thairy G, DO        busPIRone tablet 5 mg  5 mg Oral BID Reyes, Thairy G, DO   5 mg at 12/23/24 2009    dextrose 50% injection 12.5 g  12.5 g Intravenous PRN Reyes, Thairy G, DO        dextrose 50% injection 25 g  25 g Intravenous PRN Reyes, Thairy G, DO        enoxaparin injection 30 mg  30 mg " Subcutaneous Q24H (prophylaxis, 1700) BuxEmperatriz, DO   30 mg at 12/22/24 1703    EScitalopram oxalate tablet 5 mg  5 mg Oral Daily Reyes, Thairy G, DO   5 mg at 12/23/24 1007    ferric gluconate (FERRLECIT) 125 mg in 0.9% NaCl 100 mL IVPB  125 mg Intravenous Once Bux, Emperatriz, DO        gabapentin capsule 300 mg  300 mg Oral QHS Shonda Lewis PA-C   300 mg at 12/23/24 2009    glucagon (human recombinant) injection 1 mg  1 mg Intramuscular PRN Reyes, Thairy G, DO        glucose chewable tablet 16 g  16 g Oral PRN Reyes, Thairy G, DO        glucose chewable tablet 24 g  24 g Oral PRN Reyes, Thairy G, DO        HYDROcodone-acetaminophen 5-325 mg per tablet 1 tablet  1 tablet Oral Q4H PRN Reyes, Thairy G, DO   1 tablet at 12/21/24 0301    hyoscyamine SL tablet 0.125 mg  0.125 mg Sublingual TID PRN Reyes, Thairy G, DO        insulin aspart U-100 injection 0-10 Units  0-10 Units Subcutaneous Q6H PRN Reyes, Thairy G, DO   2 Units at 12/23/24 1301    melatonin tablet 9 mg  9 mg Oral Nightly PRN Reyes, Thairy G, DO        methocarbamoL tablet 750 mg  750 mg Oral TID Shonda Lewis PA-C   750 mg at 12/23/24 2009    morphine injection 4 mg  4 mg Intravenous Q6H PRN Shonda Lewis PA-C        naloxone 0.4 mg/mL injection 0.02 mg  0.02 mg Intravenous PRN Reyes, Thairy G, DO        nicotine 14 mg/24 hr 1 patch  1 patch Transdermal Daily Reyes, Thairy G, DO   1 patch at 12/23/24 1008    ondansetron disintegrating tablet 8 mg  8 mg Oral Q8H PRN Reyes, Thairy G,         ondansetron injection 4 mg  4 mg Intravenous Q8H PRN Reyes, Thairy G,         ondansetron injection 4 mg  4 mg Intravenous Q6H PRN Shonda Lewis PA-C        oxyCODONE immediate release tablet 5 mg  5 mg Oral Q6H PRN Shonda Lewis PA-C        oxyCODONE immediate release tablet Tab 10 mg  10 mg Oral Q4H PRN Shonda Lewis PA-C   10 mg at 12/24/24 0250    pantoprazole EC tablet 40 mg  40 mg Oral BID Reyes, Thairy G, DO    40 mg at 12/23/24 2009    polyethylene glycol packet 17 g  17 g Oral TID PRN Reyes, Thairy G, DO        simethicone chewable tablet 80 mg  1 tablet Oral QID PRN Reyes, Thairy G, DO        sodium bicarbonate tablet 650 mg  650 mg Oral BID BuEmperatriz samson DO   650 mg at 12/23/24 2009    sodium chloride 0.9% flush 10 mL  10 mL Intravenous PRN Reyes, Thairy G, DO        traMADoL tablet 50 mg  50 mg Oral Q6H PRN Azalea Earl AGACNP-BC          Pertinent lab results:   Lab Results   Component Value Date    WBC 7.65 12/23/2024    HGB 8.1 (L) 12/24/2024    HCT 25.2 (L) 12/24/2024     12/23/2024     12/23/2024    K 4.0 12/23/2024    BUN 9.1 (L) 12/23/2024    CO2 25 12/23/2024     (H) 12/23/2024      Pertinent diagnostic studies:    Additional labs and diagnostic studies required prior to admission:      QI: GG Care Tool     QI: GG Care Tool  Therapy Evaluation   Swallowing/  Nutritional Status   Diet/Feeding/  Swallowing Setup assist    Eating       Oral Hygiene   Grooming Setup assist while seated    Toileting Hygiene       Shower/Bathe   Bathing    Upper Body Dressing   Dressing Upper    Lower Body Dressing   Dressing Lower maximal assistance     Putting On/Taking Off Footwear       Toilet Transfer   Toileting total assistance purewick    Bladder Continence Status   Bladder     Bowel Continence Status   Bowel     Roll Left and Right   Bed Mobility Min assist to get EOB    Sit to Lying       Lying to Sitting on Side of Bed       Sit to Stand       Chair/Bed-to- Chair   Transfers [place] assist for transfers with RW     Car Transfer       Walk 10 Feet   Equipment      Walk 50 Feet with Two Turns   Balance fair/poor    Walk 150 Feet   Endurance    Walk 10 Feet on Uneven Surfaces   Gait 20 ft x 2 RW min assist    Picking up an Object       Wheel 50 Feet with Two Turns   Wheelchair    Wheel 150 Feet       1 Step (Curb)   Stairs    4 Steps       12 Steps       Expression of Ideas and Wants   Communication  Independent    Understanding  Verbal and Non-Verbal Content       Cognitive Patterns   Cognition Independent       Safety Precautions       Lower Extremity      Strength RLE Strength: WFL  LLE Strength: 3/5 grossly      ROM RLE ROM: WFL  LLE ROM: WFL      Upper Extremity      Strength Right Upper Extremity:   WFL     Left Upper Extremity:  WFL      ROM Right Upper Extremity:   WFL     Left Upper Extremity:  WFL     Care Score Value Definitions  6: Independent. [place] provides no assistance with tasks. A device may or may not have been used.  5: Set-up or clean-up assistance. [place] sets up or cleans up, but does not assist with tasks. [place] may have assisted prior to or following the activity.  4: Supervision or touching assistance. [place] provides verbal cues or touching/steadying or contact guard assistance. Assistance may be provided throughout the activity or intermittently.  3: Partial/moderate assistance. [place] does less than half the effort. [place] lifts, holds, or supports trunk or limbs, but provides less than half the effort.  2: Substantial/maximal assistance. [place] does more than half the effort. [place] lifts or holds trunk or limbs, and provides more than half the effort.  1: Dependent. [place] does all of the effort, or the assistance of two or more helpers is required for the patient to complete the activity.  Care Score Activity Not Attempted Value Definitions  7: Patient refused.  9: Not applicable. Not attempted and the patient did not perform this activity prior to the current illness, exacerbation, or injury.  10: Not attempted due to environmental limitations (e.g., lack of equipment, weather constraints).  88: Not attempted due to medical condition or safety concerns.    DISCHARGE GOALS/ANTICPATED INTERVENTIONS/SERVICES     Expected Level of Improvement for Safe Discharge: Patient/caregivers anticipate discharge home at or near baseline level of functioning and/or decreased burden of care.    Patient/Family/Caregiver Goals: Patient desires to increase current level of functioning to modified independence to minimal assist for mobility and all ADLs so that she is able to discharge home safely with family    Required Treatments/Services: Rehabilitation Nursing, Respiratory Therapy, Dietitian/nutrition, Social , and Case Management   Required Therapy Therapy Type Min/Day Days/Week Duration of Therapy   Physical Therapy 90 5 10-12 days    Occupational Therapy 90 5 10-12 days   Speech and Language Pathology      Prosthetic/Orthotics      Recreational Therapy      Anticipated Services upon Discharge:  home health with therapy    Additional rehabilitation needs:  none    Expected Discharge Destination:  home with family    Barriers to Discharge: None   Discharge Support: Patient has a caregiver available, Discharge plan has been verified with patient's caregiver, and Caregiver is in agreement with the discharge plan   Patient/Family/Caregiver Orientation: Patient/family/caregiver oriented and agreeable to inpatient rehabilitation plan   Estimated Length of Stay: 10-12 days   Projected Admission Date: 12/24/24   Medical Prognosis: fair   Physicians Review and Admission Determination:   I have discussed patient with Nurse Liaison. I have reviewed the prescreen. Patient is in need of, appropriate for and should tolerate and benefit from an aggressive IRF stay. Patient has functional and medical needs best addressed at this level of care.

## 2024-12-24 NOTE — PLAN OF CARE
12/24/24 0957   Final Note   Assessment Type Final Discharge Note   Anticipated Discharge Disposition Rehab   Hospital Resources/Appts/Education Provided Post-Acute resouces added to AVS   Post-Acute Status   Post-Acute Authorization Placement   Post-Acute Placement Status Set-up Complete/Auth obtained   Discharge Delays None known at this time     Pt [facility] to [facility]. Transport with [facility] at 1pm. Met with pt at bedside and provided update. Notified nurse and  of pickup time.     Antonette Barron LCSW

## 2024-12-24 NOTE — PLAN OF CARE
Problem: Occupational Therapy  Goal: Occupational Therapy Goal  Description: ADLs:  Pt to perform grooming tasks with SBA standing at sink.  Pt to perform feeding tasks with independence.  Pt to perform UB dressing with SBA.  Pt to perform LB dressing with max assist with AE as needed.   Pt to perform putting on/off footwear task with max assist with AE as needed.  Pt to perform toileting with min assist.  Pt to perform bathing with mod assist with AE as needed.    Functional Transfers:  Pt to perform toilet transfers with CGA to toilet in bathroom.  Pt to perform a tub transfer with mod assist with AE as needed onto TTB.    IADLs:  Pt to perform simple meal prep with mod assist.    Balance, Strengthening, Endurance, Balance:  Pt to consistently demonstrate adherence to orthopedic precautions during all ADL's as instructed by OT.  Pt to demonstrate good dynamic standing balance as required to perform ADL's from standing level.  Pt to demonstrate good BUE strength during functional task   Pt to demonstrate consistent adherence to breathing control and energy conservation techniques as educated by OT.   Outcome: Progressing

## 2024-12-24 NOTE — PT/OT/SLP EVAL
Physical Therapy Rehab Evaluation    Patient Name:  Luz Maria Alfaro   MRN:  [id number]    Recommendations:     Discharge Recommendations:  High Intensity Therapy   Discharge Equipment Recommendations: walker, rolling   Barriers to discharge: Increased level of assist, Ongoing medical treatment, and Impaired functional mobility     Assessment:     Luz Maria Alfaro is a 64 y.o. female admitted with a medical diagnosis of Displaced fracture of left femoral neck.  She presents with the following impairments/functional limitations:  weakness, impaired endurance, impaired self care skills, impaired functional mobility, gait instability, impaired balance, decreased lower extremity function, pain, decreased ROM, edema, orthopedic precautions .    Rehab Diagnosis: L femoral neck fx    General Precautions: Standard, fall     Orthopedic Precautions: LLE weight bearing as tolerated (No IR/ER)     Braces: N/A    Rehab Prognosis: Fair; patient would benefit from acute skilled PT services to address these deficits and reach maximum level of function.      History:     Past Medical History:   Diagnosis Date    Benign essential HTN     Mixed hyperlipidemia     Type 2 diabetes mellitus without complications        Past Surgical History:   Procedure Laterality Date    APPENDECTOMY      BACK SURGERY      BREAST SURGERY  01 13 2002    Augmantation    HEMIARTHROPLASTY OF HIP Left 12/20/2024    Procedure: HEMIARTHROPLASTY, HIP;  Surgeon: Jose J Montemayor DO;  Location: [facility];  Service: Orthopedics;  Laterality: Left;  lateral peg board kevin    HIP SURGERY      INTRAMEDULLARY RODDING OF FEMUR Right 10/27/2022    Procedure: RIGHT FEMUR INTERTROCH IMN;  Surgeon: Stewart Brambila MD;  Location: [facility];  Service: Orthopedics;  Laterality: Right;  [place] table, supine  3rd case  Synthes Short TFNA       Subjective         Patient Comments: I will walk to the bathroom door and that's it    Patients cultural, spiritual, Worship conflicts  given the current situation: no       Living Environment  People in Home: child(lala), adult, grandchild(lala)  Name(s) of People in Home: Son-Jaxon; HEATHER - Carlos  Living Arrangements: house (2-story)  Home Accessibility: wheelchair accessible  Number of Stairs, Within Home, Primary: other (see comments) (flight)  Home Layout: Able to live on 1st floor  Equipment Currently Used at Home: shower chair, walker, rolling, rollator (HHS)    Prior Level of Function       Equipment used at home: shower chair, walker, rolling, rollator.  DME owned (not currently used): rolling walker.      Upon discharge, patient will have assistance from family.    Objective:     Communicated with nurse prior to session.  Patient found  on BSC with OT  with peripheral IV  upon PT entry to room.    Vitals   Vitals at Rest  BP     HR     O2 Sat     Pain Pain Rating 1: 10/10  Location - Side 1: Left  Location 1: hip  Pain Addressed 1: Reposition, Distraction  Pain Rating Post-Intervention 1: 9/10     Vitals With Activity  BP     HR     O2 Sat     Pain Pain Rating 1: 10/10  Location - Side 1: Left  Location 1: hip  Pain Addressed 1: Reposition, Distraction  Pain Rating Post-Intervention 1: 9/10     Respiratory Status: Room air    Exams  RLE ROM:          -       WFL  RLE Strength:          -       WFL  LLE ROM: Limited dt affected side  LLE Strength:          -       Limited dt affected side    Functional Mobility      GGs   Admit Current   Status Goal   Functional Area: Care Score: Care Score: Care Score:   Roll Left and Right 3 3  Min A  Independent   Sit to Lying 2 2  Mod A for LEs Independent   Lying to Sitting on Side of Bed 2 2  Mod A for LEs Independent   Sit to Stand 3 3  Min A with RW Independent   Chair/Bed-to-Chair Transfer 3 3  Min A with RW Independent   Car Transfer 7 7 Independent   Walk 10 Feet 7 7 Independent   Walk 50 Feet with Two Turns 7 7 Independent   Walk 150 Feet 7 7 Independent   Walk 10 Feet Uneven Surface 7 7  Independent   1 Step (Curb) 88 88 Supervision or touching assistance   4 Steps 88 88 Supervision or touching assistance   12 Steps 9 9     Picking Up Object 88 88 Supervision or touching assistance   Wheel 50 Feet with Two Turns 7 7 Independent   Wheel 150 Feet 7 7 Independent     Therapeutic Activities and Exercises:  Pt edu on importance of frequent mobility    Activity Tolerance: Fair    Patient left supine with all lines intact, call button in reach, and nurse notified.    Education Provided: roles and goals of PT/PTA, transfer training, bed mob, gait training, safety awareness, body mechanics, and assistive device    Expected compliance: High compliance      Plan:     During this hospitalization, patient to be seen 5 x/week to address the identified rehab impairments via gait training, therapeutic exercises, therapeutic activities and progress toward the following goals:    GOALS:   Multidisciplinary Problems       Physical Therapy Goals       Not on file                    Plan of Care Expires:  12/30/24  PT Next Visit Date: 12/30/24  Plan of Care reviewed with: patient    Additional Infomation:           Time Tracking:     Therapy Time   PT Received On: 12/24/24  PT Start Time: 1510  PT Stop Time: 1522  PT Total Time (min): 12 min  PT Individual: 12  Missed Time:    Time Missed due to:      Billable Minutes: Evaluation 12    12/24/2024

## 2024-12-24 NOTE — H&P
[facility] ([facility])  Rehab Admission H&P    Patient Name: Luz Maria Alfaro  MRN: [id number]  Age: 64 y.o. Sex: female  : 1960  Hospital Length of Stay: 1 days  Date of Service: 2024   Chief Complaint: Displaced fracture of left femoral neck s/p left hemiarthroplasty on 2024    Subjective:   History of Present Illness   63-year-old white female presented to [facility] clinical on 2024 for follow up visit for left hip pain that has been progressively worsening.  Reportedly fell 2 months ago.  Imaging on  significant for left displaced femoral neck fracture.  Recommended to go to [facility] ED on  for admission.  PMH significant for HTN, dm type 2, HLD, GERD, and nicotine dependence.  Workup significant for left displaced femoral neck fracture.  Tolerated left hip hemiarthroplasty on  without perioperative complications.  She did require 2 units PRBC.  Sodium bicarb tablets initiated due to metabolic acidosis.  Currently Min assist.  Tolerated transfer to [facility] inpatient rehab unit on  without incident.  Sitting up in chair.  Reports good sleep and appetite.  Last BM .  Vital signs at no recorded fevers.  H&H trending up.  Iron level at goal.  CMP unremarkable.  Albumin 2.9/prealbumin 13.3.  Recent imaging reported below.  Past Medical History: Displaced fracture of left femoral neck s/p left hemiarthroplasty on 2024, normocytic anemia, depression/anxiety, HTN, HLD, nicotine dependence, GERD, DM type 2  Procedure history:  Appendectomy, back surgery, breast augmentation, right IM nailing on 10/27/2022, left hip arthroplasty on 2024  Family History:  Mother: Unknown + at age 83.  Father:  Alcohol abuse + in his 80s.  Social History:   (+) TOB. 1 PPD    (-) ETOH.   (-) Illicit drug use.  History of cocaine dependence, clean since 2017  Completed 10th grade.  Retired  for [employer].   and .   Her son and 2 grandchildren live with her.  They cook, clean, and do laundry.  Prior level of function:  Needed Min assist for sponge bathing hard to reach areas due to chronic back pain over the last year.  She uses a rolling walker for mobility in a wheelchair for appointments.  She does not drive.  She cooks, grocery shopping online, manages or own medications and finances.  She has a medical alert.  Residence:  Lives in a two-story home with a threshold.  Gain interest.  Lives with her son and 2 grandchildren.  She does not need to go up stairs.  She has a tub/shower combination with grab bars in no units now shower.  She does not have an elevated toilet.  She has a grab bar near the toilet.  DME:  Rolling walker, bedside commode, wheelchair, and grab bars.  Anticipated discharge destination:  Home with home health    Review of patient's allergies indicates:   Allergen Reactions    Iodinated contrast media Shortness Of Breath    Opioids - morphine analogues     Meperidine Rash and Hives        Current Facility-Administered Medications:     acetaminophen tablet 650 mg, 650 mg, Oral, Q6H, Bere, Moy A, FNP, 650 mg at 12/25/24 0530    acetaminophen tablet 650 mg, 650 mg, Oral, Q6H PRN, Bere, Moy A, FNP    ALPRAZolam tablet 0.25 mg, 0.25 mg, Oral, TID PRN, Bere, Moy A, FNP, 0.25 mg at 12/24/24 2223    ARIPiprazole tablet 2 mg, 2 mg, Oral, Daily, Bere, Moy A, FNP, 2 mg at 12/25/24 0744    atorvastatin tablet 40 mg, 40 mg, Oral, QHS, Bere, Moy A, FNP, 40 mg at 12/24/24 2111    benzonatate capsule 100 mg, 100 mg, Oral, TID PRN, Bere, Moy A, FNP    busPIRone tablet 5 mg, 5 mg, Oral, BID, Bere, Moy A, FNP, 5 mg at 12/25/24 0744    dextrose 50% injection 12.5 g, 12.5 g, Intravenous, PRN, Bere, Moy A, FNP    dextrose 50% injection 25 g, 25 g, Intravenous, PRN, Moy Blackburn FNP    docusate sodium capsule 100 mg, 100 mg, Oral, BID, Bere,  Moy A, FNP, 100 mg at 12/25/24 0744    enoxaparin injection 30 mg, 30 mg, Subcutaneous, Q24H (prophylaxis, 1700), Bere, Moy A, FNP, 30 mg at 12/24/24 1636    EScitalopram oxalate tablet 5 mg, 5 mg, Oral, Daily, Bere, Moy A, FNP, 5 mg at 12/25/24 0744    gabapentin capsule 300 mg, 300 mg, Oral, QHS, Bere, Moy A, FNP, 300 mg at 12/24/24 2111    glucagon (human recombinant) injection 1 mg, 1 mg, Intramuscular, PRN, Bere, Moy A, FNP    glucose chewable tablet 16 g, 16 g, Oral, PRN, Bere, Moy A, FNP    glucose chewable tablet 24 g, 24 g, Oral, PRN, Bere, Moy A, FNP    hydrALAZINE injection 10 mg, 10 mg, Intravenous, Q4H PRN, Bere, Moy A, FNP    HYDROcodone-acetaminophen 5-325 mg per tablet 1 tablet, 1 tablet, Oral, Daily PRN, Bere, Moy A, FNP    hydrOXYzine pamoate capsule 50 mg, 50 mg, Oral, Nightly PRN, Bere, Moy A, FNP    insulin aspart U-100 injection 0-10 Units, 0-10 Units, Subcutaneous, QID (AC + HS) PRN, Bere, Moy A, FNP    labetalol 20 mg/4 mL (5 mg/mL) IV syring, 10 mg, Intravenous, Q4H PRN, Bere, Moy A, FNP    methocarbamoL tablet 750 mg, 750 mg, Oral, TID, Bere, Moy A, FNP, 750 mg at 12/25/24 0530    metoprolol injection 10 mg, 10 mg, Intravenous, Q2H PRN, Bere, Moy A, FNP    nicotine 21 mg/24 hr 1 patch, 1 patch, Transdermal, Daily, Bere, Moy A, FNP, 1 patch at 12/25/24 0744    nitroGLYCERIN SL tablet 0.4 mg, 0.4 mg, Sublingual, Q5 Min PRN, Bere, Moy A, FNP    ondansetron disintegrating tablet 4 mg, 4 mg, Oral, Q6H PRN, Bere, Moy A, FNP    ondansetron disintegrating tablet 8 mg, 8 mg, Oral, Q8H PRN, Bere, Moy A, FNP    ondansetron injection 4 mg, 4 mg, Intravenous, Q8H PRN, Bere, Moy A, FNP    oxyCODONE immediate release tablet 10 mg, 10 mg, Oral, Q4H PRN, Bere, Moy A, FNP, 10 mg at 12/25/24 0744    oxyCODONE immediate release tablet 5  "mg, 5 mg, Oral, Q4H PRN, Bere, Moy A, FNP    pantoprazole EC tablet 40 mg, 40 mg, Oral, BID, Bere, Moy A, FNP, 40 mg at 12/25/24 0744    polyethylene glycol packet 17 g, 17 g, Oral, BID PRN, Bere, Moy A, FNP, 17 g at 12/24/24 1446    sodium bicarbonate tablet 650 mg, 650 mg, Oral, BID, Bere, Moy A, FNP, 650 mg at 12/25/24 0744     Review of Systems   Complete 12-point review of symptoms negative except for what's mentioned in HPI     Objective:     /60   Pulse 99   Temp 98.1 °F (36.7 °C) (Oral)   Resp 18   Ht 5' 3" (1.6 m)   Wt 50.9 kg (112 lb 3.4 oz)   SpO2 97%   BMI 19.88 kg/m²     Physical Exam  Constitutional:       Appearance: Normal appearance.   HENT:      Head: Normocephalic.      Mouth/Throat:      Mouth: Mucous membranes are moist.   Eyes:      Pupils: Pupils are equal, round, and reactive to light.   Cardiovascular:      Rate and Rhythm: Normal rate and regular rhythm.      Heart sounds: Normal heart sounds.   Pulmonary:      Effort: Pulmonary effort is normal.      Breath sounds: Normal breath sounds.   Abdominal:      General: Bowel sounds are normal.      Palpations: Abdomen is soft.   Musculoskeletal:      Cervical back: Neck supple.      Comments: Left hip dressing clean and intact   Skin:     General: Skin is warm and dry.   Neurological:      General: No focal deficit present.      Mental Status: She is alert and oriented to person, place, and time.   Psychiatric:         Mood and Affect: Mood normal.         Behavior: Behavior normal.         Thought Content: Thought content normal.         Judgment: Judgment normal.     *MD performed and documented physical examination       Lines/Drains/Airways       Peripheral Intravenous Line  Duration                  Peripheral IV - Single Lumen 12/21/24 0430 20 G Anterior;Right Forearm 3 days                    Labs  Admission on 12/24/2024   Component Date Value Ref Range Status    POCT Glucose 12/24/2024 " 119 (H)  70 - 110 mg/dL Final    POCT Glucose 12/24/2024 115 (H)  70 - 110 mg/dL Final    Sodium 12/25/2024 140  136 - 145 mmol/L Final    Potassium 12/25/2024 3.7  3.5 - 5.1 mmol/L Final    Chloride 12/25/2024 106  98 - 107 mmol/L Final    CO2 12/25/2024 25  23 - 31 mmol/L Final    Glucose 12/25/2024 82  82 - 115 mg/dL Final    Blood Urea Nitrogen 12/25/2024 8.4 (L)  9.8 - 20.1 mg/dL Final    Creatinine 12/25/2024 0.63  0.55 - 1.02 mg/dL Final    Calcium 12/25/2024 8.8  8.4 - 10.2 mg/dL Final    Protein Total 12/25/2024 5.8  5.8 - 7.6 gm/dL Final    Albumin 12/25/2024 2.9 (L)  3.4 - 4.8 g/dL Final    Globulin 12/25/2024 2.9  2.4 - 3.5 gm/dL Final    Albumin/Globulin Ratio 12/25/2024 1.0 (L)  1.1 - 2.0 ratio Final    Bilirubin Total 12/25/2024 1.0  <=1.5 mg/dL Final    ALP 12/25/2024 215 (H)  40 - 150 unit/L Final    ALT 12/25/2024 32  0 - 55 unit/L Final    AST 12/25/2024 52 (H)  5 - 34 unit/L Final    eGFR 12/25/2024 >60  mL/min/1.73/m2 Final    Anion Gap 12/25/2024 9.0  mEq/L Final    BUN/Creatinine Ratio 12/25/2024 13   Final    Magnesium Level 12/25/2024 1.80  1.60 - 2.60 mg/dL Final    Phosphorus Level 12/25/2024 2.7  2.3 - 4.7 mg/dL Final    Prealbumin 12/25/2024 13.3 (L)  14.0 - 37.0 mg/dL Final    Ferritin Level 12/25/2024 318.94 (H)  4.63 - 204.00 ng/mL Final    Iron Binding Capacity Unsaturated 12/25/2024 131  70 - 310 ug/dL Final    Iron Level 12/25/2024 52  50 - 170 ug/dL Final    Transferrin 12/25/2024 160 (L)  173 - 360 mg/dL Final    Iron Binding Capacity Total 12/25/2024 183 (L)  250 - 450 ug/dL Final    Iron Saturation 12/25/2024 28  20 - 50 % Final    WBC 12/25/2024 7.08  4.50 - 11.50 x10(3)/mcL Final    RBC 12/25/2024 2.79 (L)  4.20 - 5.40 x10(6)/mcL Final    Hgb 12/25/2024 8.9 (L)  12.0 - 16.0 g/dL Final    Hct 12/25/2024 27.9 (L)  37.0 - 47.0 % Final    MCV 12/25/2024 100.0 (H)  80.0 - 94.0 fL Final    MCH 12/25/2024 31.9 (H)  27.0 - 31.0 pg Final    MCHC 12/25/2024 31.9 (L)  33.0 - 36.0 g/dL  Final    RDW 12/25/2024    Final    Unable to calculate      Platelet 12/25/2024 219  130 - 400 x10(3)/mcL Final    MPV 12/25/2024 9.6  7.4 - 10.4 fL Final    Neut % 12/25/2024 66.2  % Final    Lymph % 12/25/2024 20.5  % Final    Mono % 12/25/2024 10.7  % Final    Eos % 12/25/2024 1.7  % Final    Basophil % 12/25/2024 0.3  % Final    Lymph # 12/25/2024 1.45  0.6 - 4.6 x10(3)/mcL Final    Neut # 12/25/2024 4.69  2.1 - 9.2 x10(3)/mcL Final    Mono # 12/25/2024 0.76  0.1 - 1.3 x10(3)/mcL Final    Eos # 12/25/2024 0.12  0 - 0.9 x10(3)/mcL Final    Baso # 12/25/2024 0.02  <=0.2 x10(3)/mcL Final    IG# 12/25/2024 0.04  0 - 0.04 x10(3)/mcL Final    IG% 12/25/2024 0.6  % Final    NRBC% 12/25/2024 0.0  % Final    RBC Morph 12/25/2024 Abnormal (A)  Normal Final    Anisocytosis 12/25/2024 3+ (A)  (none) Final    Hypochromasia 12/25/2024 1+ (A)  (none) Final    Macrocytosis 12/25/2024 1+ (A)  (none) Final    Platelets 12/25/2024 Adequate  Normal, Adequate Final     Radiology  Left hip and pelvis XR on 12/20/2024, IMPRESSION:  Satisfactory alignment following left hip arthroplasty.    Assessment/Plan:     64 y.o. WF admitted on 12/24/2024    Displaced fracture of left femoral neck   - s/p left hemiarthroplasty on 12/20/2024  - WBAT to LLE without external or internal rotation of the hip  - staples to be removed on 01/10  - continue    Tylenol 650 mg every 6 hours    Gabapentin 300 mg nightly     Robaxin 750 mg t.i.d.    Norco 5 mg/325 mg daily p.r.n. prior to therapy    Oxycodone 5 mg q.4 hours p.r.n. for moderate pain     Oxycodone 10 mg q.4 hours p.r.n. for severe pain  - Consult physiatry for rehab and pain management  - PT/OT/RT/ST to eval and treat     Normocytic anemia  - asymptomatic  - s/p transfusion   x2 units PRBC on 12/21/2024  - H/H stable   - no evidence of active bleeds  - will closely monitor and transfuse if needed     Depression/anxiety  - stable  - continue    Abilify 2 mg daily     BuSpar 5 mg b.i.d.      Lexapro 5 mg daily    HLD  - FLP outpatient  - continue    Lipitor 40 mg nightly     Nicotine dependence  - smoking cessation counseling initiated on admission  - continue    Nicotine patch daily    GERD  - Avoid spicy foods, and nothing to eat or drink within x2 hours of bedtime or laying flat (water is ok)   - Avoid NSAIDs (Advil, ibuprofen, naproxen...) and chino-2 inhibitors (Mobic, Celebrex)    - continue    Protonix 40 mg b.i.d.    Constipation  - stable   - continue  Colace 100 mg BID   Miralax 17 gm b.i.d. p.r.n.    Metabolic acidosis  - sodium bicarb 25  - continue    Sodium bicarbonate 650 mg b.i.d.    Moderate protein calorie malnutrition  - albumin 2.9/prealbumin 13.3  - encourage oral nutrition  - registered dietitian following    DM type II  - HgA1c with next labs  - continue    ISS   - CBGs AC/HS    HTN  - BP at goal  - continue    Hydralazine 10 mg every 2 hours as needed for BP > 160/90    Labetalol 10 mg every 2 hours as needed for BP > 160/90  - low sodium diet    MEDICAL PLAN:  - Admit to [facility]  - Medical reconciliation completed and included in the electronic health record  - Draw admit labs including CBC, CMP, and Prealbumin  - Maintain weightbearing status as ordered  - Monitor postoperative surgical incision changing dressing daily  - Teach skin protection and wound prevention techniques  - Initiate fall precaution  - Initiate bowel training program  - Initiate bladder training as indicated  - Pain management  - IS q2 hours while awake    THERAPEUTIC PLAN:  - Physical therapy 60-90 minutes 5 to week to increase functional mobility, maintain weightbearing precautions, increase lower extremity restrengthening, increase overall activity tolerance, teach balance and safety measures as well as fall precautions, make equipment and orthotic recommendations, be involved in family training and discharge planning, monitor pain levels and vital signs  during therapy adjusting treatment accordingly  - Occupational therapy 60-90 minutes 5 times a week to increase functional independence with activities of daily living, maintain weightbearing precautions, teach use of adaptive equipment, increase upper extremity restrengthening, increase overall activity tolerance, teach balance and safety measures as well as fall precautions, make equipment and orthotic recommendations, be involved in family training and discharge planning, monitor pain levels and vital signs during therapy adjusting treatment accordingly  - Speech and language pathology will do an in-depth evaluation of patient's cognition, phonation, and swallowing. They will initiate therapy 30- 60 minutes 5 times a week as indicated  - Recreational therapy will incorporate all patient's functional abilities  into recreational activities that will require visual, spatial, and perceptual abilities; gross and fine motor coordination skills; the cognition to follow multistep commands; dynamic and static balance and reaching abilities. They will also incorporate animal assisted therapy into their weekly program  - Rehabilitation nursing will act as patient family physician liaison. They will integrate patient's functional abilities, after discussions with therapist, into everyday activities with the CNA's,  LPN's and RNs that will include but are not limited to dressing, bathing, feeding, grooming, toileting, transfers, hygiene etc. They will administer medications watching for wanted as well as unwanted effects. They will initiate DVT/VTE prophylaxis as ordered. Will monitor vital signs, lab values, and pain levels, making appropriate physician notification. They will monitor skin integrity including postop incision, making daily dressing changes. They will teach skin protection and wound prevention techniques. They will initiate bowel training They will initiate bladder training as indicated. They will initiate  fall precautions  - Rehabilitation counseling/case management will act as patient and family liaison. They will set up family conferences, family training, aid in discharge planning, and aid in the procurement of assistive devices  - Patient will have 24 hour availability to physiatric care  - Patient will have nutritional services involved, monitoring oral input and visceral protein stores. They will make dietary and supplement recommendations and follow-up as necessary  - Patient will have weekly interdisciplinary team conferences  - Patient will have initial family conference and follow up conferences as indicated  - Patient will have family training prior to discharge     Estimated length of stay - 14-17 days  Anticipated discharge destination - Home with   Medical status - stabilizing postoperatively; will need to monitor pain levels, postoperative skin integrity, watch for evidence of deep vein thrombosis, monitor postoperative H&H, monitor vital signs closely.  Medical prognosis - Good for post-op healing and functional improvement  Previous functional Status:  Independent to Min assist  Present Functional Status:  Min-mod assist    VTE Prophylaxis:  Lovenox 30 mg daily  COVID-19 testing:  Unknown  COVID-19 vaccination status:  Unvaccinated    POA:  No  Living will:  No  Contacts:  Jaxon Baum (son) [phone]    CODE STATUS:  Full code  Internal Medicine (attending): George Do MD  Physiatry (consulting):  Nam Swift MD    OUTPATIENT PROVIDERS  PCP: ALEKS Mcfadden  Orthopedic surgery:  Jose J Montemayor D.O.    DISPOSITION: Condition stable. Tolerated transfer.  Recent labs and imaging reviewed.  Rehab admission orders initiated.  Med reconciliation completed.  Consult physiatry for rehab and pain management.  PT/OT/RT/ST to eval and treat.  Sleep hygiene, bowel maintenance, and appetite at goal.  Vital signs at goal with no recorded fevers.  Lab work stable.  H&H trending up.  Encourage  oral nutrition.  Monitor closely.  Notify of acute changes.    PHYSICIAN ATTESTATION: I have been involved in the patient's rehabilitation prescreening process and I have now completed the post admission physician evaluation. Patient is in need of, appropriate for, and should tolerate the above outlined inpatient rehabilitation plan. I believe this is necessary to reach maximal postoperative healing and maximal functional abilities. I do not believe this would be possible in a timely fashion in a less intensive setting      Mao Blackburn NP conducted independent physical examination and assisted with medical documentation.    Total time spent on this encounter including chart review and direct MD + NP 1-on-1 patient interaction: 102 minutes   Over 50% of this time was spent in counseling and coordination of care

## 2024-12-24 NOTE — PT/OT/SLP PROGRESS
Physical Therapy Treatment    Patient Name:  Luz Maria Alfaro   MRN:  [id number]    Recommendations:     Discharge therapy intensity: High Intensity Therapy   Discharge Equipment Recommendations:  (TBD by next level of care)  Barriers to discharge: None    Assessment:     Luz Maria Alfaro is a 64 y.o. female admitted with a medical diagnosis of L subacute displaced femoral neck fx s/p L hip hemiarthroplasty.  She presents with the following impairments/functional limitations: weakness, impaired endurance, impaired self care skills, impaired functional mobility, gait instability, impaired balance, decreased lower extremity function, decreased safety awareness, pain.    Rehab Prognosis: Good; patient would benefit from acute skilled PT services to address these deficits and reach maximum level of function.    Recent Surgery: Procedure(s) (LRB):  HEMIARTHROPLASTY, HIP (Left) 4 Days Post-Op    Plan:     During this hospitalization, patient would benefit from acute PT services 6 x/week to address the identified rehab impairments via gait training, therapeutic activities, therapeutic exercises, neuromuscular re-education and progress toward the following goals:    Plan of Care Expires:  01/22/25    Subjective     Chief Complaint: pain  Patient/Family Comments/goals: none  Pain/Comfort:  Pain Rating 1: 7/10  Location - Side 1: Left  Location 1: hip  Pain Addressed 1: Distraction, Reposition  Pain Rating Post-Intervention 1: 7/10      Objective:     Communicated with nurse prior to session.  Patient found supine with Jayant STEWARD upon PT entry to room.     General Precautions: Standard, fall  Orthopedic Precautions: LLE weight bearing as tolerated (No IR/ER)  Braces: N/A  Respiratory Status: Room air  Skin Integrity: Visible skin intact      Functional Mobility:  Bed Mobility:  Supine to Sit: minimum assistance  Sit to Supine: minimum assistance  Transfers:  Sit to Stand:  contact guard assistance with rolling  walker  Gait: 42 ft with RW & CGA. Slowed pace. Occasional cueing for safety  Balance: fair    Therapeutic Exercises:  Semi supine AP, heel slides, SLR, hip ABD/ADD, quad sets, glute sets. 1 x 20 reps    Education Provided:  Role and goals of PT, transfer training, bed mobility, gait training, balance training, safety awareness, assistive device, strengthening exercises, and importance of participating in PT to return to PLOF.    Patient left supine with all lines intact, call button in reach, and bed alarm on    GOALS:   Multidisciplinary Problems       Physical Therapy Goals          Problem: Physical Therapy    Goal Priority Disciplines Outcome Interventions   Physical Therapy Goal     PT, PT/OT Progressing    Description: Goals to be met by: 25     Patient will increase functional independence with mobility by performin. Supine to sit with Set-up Dexter  2. Sit to supine with Set-up Dexter  3. Sit to stand transfer with Supervision  4. Bed to chair transfer with Supervision using Rolling Walker  5. Gait  x 200 feet with Supervision using Rolling Walker.                          Time Tracking:     PT Received On: 24  PT Start Time: 1047     PT Stop Time: 1110  PT Total Time (min): 23 min     Billable Minutes: Therapeutic Activity 13 minutes and Therapeutic Exercise 10 minutes    Treatment Type: Treatment  PT/PTA: PT     Number of PTA visits since last PT visit: 2     2024

## 2024-12-24 NOTE — NURSING
Report called to Yamilka at [facility]. All questions answered. Vital signs stable. IV flushed and kept intact. All belongings gathered. Patient being transported via [facility].

## 2024-12-24 NOTE — PT/OT/SLP EVAL
"Occupational Therapy Inpatient Rehab Evaluation    Name: Luz Maria Alfaro  MRN: [id number]    Recommendations:     Discharge Recommendations:  Low Intensity Therapy   Discharge Equipment Recommendations: bath bench   Barriers to discharge:  severity of pain with movement, decreased ADL performance and functional mobility.    Assessment:  Luz Maria Alfaro is a 64 y.o. female admitted with a medical diagnosis of Displaced fracture of left femoral neck.  She presents with the following impairments/functional limitations:  weakness, impaired endurance, impaired self care skills, impaired functional mobility, gait instability, impaired balance, decreased lower extremity function, decreased coordination, decreased safety awareness, pain.    General Precautions: Standard, fall, hearing impaired    Orthopedic Precautions:LLE weight bearing as tolerated (No IR/ER)     Braces: N/A    Rehab Prognosis: Good; patient would benefit from acute skilled OT services to address these deficits and reach maximum level of function.      History:     Past Medical History:   Diagnosis Date    Benign essential HTN     Mixed hyperlipidemia     Type 2 diabetes mellitus without complications        Past Surgical History:   Procedure Laterality Date    APPENDECTOMY      BACK SURGERY      BREAST SURGERY  01 13 2002    Augmantation    HEMIARTHROPLASTY OF HIP Left 12/20/2024    Procedure: HEMIARTHROPLASTY, HIP;  Surgeon: Jose J Montemayor DO;  Location: [facility];  Service: Orthopedics;  Laterality: Left;  lateral peg board kevin    HIP SURGERY      INTRAMEDULLARY RODDING OF FEMUR Right 10/27/2022    Procedure: RIGHT FEMUR INTERTROCH IMN;  Surgeon: Stewart Brambila MD;  Location: [facility];  Service: Orthopedics;  Laterality: Right;  Mascot table, supine  3rd case  Synthes Short TFNA       Subjective     Orientation: Oriented x4    Chief Complaint: pain L hip    Patient/Family Comments/goals: "I can't do anything until I get some pain " "meds."    Vitals  Vitals at Rest  BP     HR     O2 Sat     Pain Pain Rating 1: 10/10  Location - Side 1: Left  Location - Orientation 1: lateral  Location 1: hip  Pain Addressed 1: Reposition, Distraction, Nurse notified, Cessation of Activity  Pain Rating Post-Intervention 1: 10/10     Vitals With Activity  BP     HR     O2 Sat     Pain Pain Rating 1: 10/10  Location - Side 1: Left  Location - Orientation 1: lateral  Location 1: hip  Pain Addressed 1: Reposition, Distraction, Nurse notified, Cessation of Activity  Pain Rating Post-Intervention 1: 10/10     Respiratory Status: Room air    Patients cultural, spiritual, Pentecostal conflicts given the current situation: no     Living Environment   Living Environment  People in Home: child(lala), adult  Name(s) of People in Home: Son-Jaxon; HEATHER - Alem and Isaías  Living Arrangements: house  Home Accessibility: wheelchair accessible  Number of Stairs, Within Home, Primary: other (see comments) (flight)  Home Layout: Able to live on 1st floor  Equipment Currently Used at Home: shower chair, walker, rolling, rollator  Shower Setup: Tub/Shower combo with grab bars and HHS - only has shower chair    Prior Level of Function  BADL: Independent prior to 2 months ago    IADL: Independent prior to 2 months ago    Equipment used at home: shower chair, walker, rolling, rollator (HHS).     Upon discharge, patient will have assistance from son and grand children.    Objective:     Patient found right sidelying with peripheral IV  upon OT entry to room.    Mobility   Patient completed:  Supine to Sit with moderate assistance  Sit to Stand Transfer with minimum assistance with rolling walker  Stand to Sit Transfer with minimum assistance with rolling walker  Toilet Transfer Stand Pivot technique with minimum assistance with  rolling walker    Functional Mobility   Pt only tolerated stand pivot transfer to [facility] [place] to bed due to pain.    ADLs - Pt declined most of ADLs due to pain.     " Current Status   Eating 88   Oral Hygiene 88   Shower, Bathe Self 88   Upper Body Dressing 88   Lower Body Dressing 88   Toileting Hygiene 3   Toilet Transfer 3   Putting On, Taking Off Footwear 88     Limiting Factors for ADLs: motor, psychsocial, endurance, limited ROM, balance, weakness, coordination, safety awareness, and pain    Exams     ROM:          -       WFL    Hand Dominance: Right    ROM Hand  Left Hand: WFL  Right Hand: WFL    Strength  Overall Strength:          -       WFL     Strength:   WFL    Pinch Strength:   WFL    Sensation  Left:          -       WNL  Right:          -       WNL    Coordination:      -       Intact    Tone  Left: WNL  Right: WNL    Visual/Perceptual  Intact - wears glasses    Cognition:   WFL but very [place]    Balance    Sitting  Sitting Surface: EOB  Static: Bilateral UE Extremity Support, Fair  Dynamic: Bilateral UE extremity support, Poor    Patient left  up on BSC with PT, Maria E  with all lines intact and call button in reach.    Education provided: Roles and goals of OT, ADLs, transfer training, bed mobility, body mechanics, assistive device, modified goals, sequencing, safety precautions, fall prevention, and post-op precautions    Multidisciplinary Problems       Occupational Therapy Goals          Problem: Occupational Therapy    Goal Priority Disciplines Outcome Interventions   Occupational Therapy Goal     OT, PT/OT Progressing    Description: ADLs:  Pt to perform grooming tasks with SBA standing at sink.  Pt to perform feeding tasks with independence.  Pt to perform UB dressing with SBA.  Pt to perform LB dressing with max assist with AE as needed.   Pt to perform putting on/off footwear task with max assist with AE as needed.  Pt to perform toileting with min assist.  Pt to perform bathing with mod assist with AE as needed.    Functional Transfers:  Pt to perform toilet transfers with CGA to toilet in bathroom.  Pt to perform a tub transfer with mod assist with  AE as needed onto TTB.    IADLs:  Pt to perform simple meal prep with mod assist.    Balance, Strengthening, Endurance, Balance:  Pt to consistently demonstrate adherence to orthopedic precautions during all ADL's as instructed by OT.  Pt to demonstrate good dynamic standing balance as required to perform ADL's from standing level.  Pt to demonstrate good BUE strength during functional task   Pt to demonstrate consistent adherence to breathing control and energy conservation techniques as educated by OT.                        Plan     During this hospitalization, patient to be seen 5 x/week (5-7 days per week) to address the identified rehab impairments via self-care/home management, therapeutic activities, therapeutic exercises and progress toward the following goals:    Plan of Care Expires:  12/30/24    Time Tracking     OT Received On: 12/24/24  Time In 1410     Time Out 1510  Total Time 60 min  Therapy Time: OT Individual: 60  Missed Time:    Missed Time Reason:      Billable Minutes: Evaluation 30 and Self Care/Home Management 30    12/24/2024

## 2024-12-24 NOTE — PLAN OF CARE
Problem: Diabetes Comorbidity  Goal: Blood Glucose Level Within Targeted Range  Outcome: Progressing     Problem: Rehabilitation (IRF) Plan of Care  Goal: Plan of Care Review  Outcome: Progressing  Goal: Patient-Specific Goal (Individualized)  Outcome: Progressing  Goal: Absence of New-Onset Illness or Injury  Outcome: Progressing  Goal: Optimal Comfort and Wellbeing  Outcome: Progressing  Goal: Home and Community Transition Plan Established  Outcome: Progressing     Problem: Wound  Goal: Optimal Coping  Outcome: Progressing  Goal: Optimal Functional Ability  Outcome: Progressing  Goal: Absence of Infection Signs and Symptoms  Outcome: Progressing  Goal: Improved Oral Intake  Outcome: Progressing  Goal: Optimal Pain Control and Function  Outcome: Progressing  Goal: Skin Health and Integrity  Outcome: Progressing  Goal: Optimal Wound Healing  Outcome: Progressing     Problem: Fall Injury Risk  Goal: Absence of Fall and Fall-Related Injury  Outcome: Progressing

## 2024-12-24 NOTE — PLAN OF CARE
Problem: Physical Therapy  Goal: Physical Therapy Goal  Description:   PT Short Term goals     Bed Mobility:  Roll left and right with supervision/touching assist.  Sit to supine transfer with partial/moderate assist.  Supine to sit transfer with partial/moderate assist.    Transfers:  Sit to stand transfer with setup/clean-up assist using RW.  Bed to chair transferStand step with setup/clean-up assist using RW.  Car transfer with setup/clean-up assist using RW.   an object from the ground in standing position with supervision/touching assist using RW.    Mobility:  Ambulate 150 feet with partial/moderate assist using RW.  Ambulate 10 feet on uneven surfaces/ramps with partial/moderate assist using RW.  Ascend/descend a 4 inch curb with partial/moderate assist using RW.   Ascend/descend 12 stairs with partial/moderate assist using bilateral handrails.  Manual wheelchair 150 feet with supervision/touching assist using Bilateral upper extremity.      Time Frame: by Re-evaluation   Outcome: Progressing

## 2024-12-24 NOTE — PLAN OF CARE
Problem: Adult Inpatient Plan of Care  Goal: Plan of Care Review  Outcome: Adequate for Care Transition  Goal: Patient-Specific Goal (Individualized)  Outcome: Adequate for Care Transition  Goal: Absence of Hospital-Acquired Illness or Injury  Outcome: Adequate for Care Transition  Goal: Optimal Comfort and Wellbeing  Outcome: Adequate for Care Transition  Goal: Readiness for Transition of Care  Outcome: Adequate for Care Transition     Problem: Diabetes Comorbidity  Goal: Blood Glucose Level Within Targeted Range  Outcome: Adequate for Care Transition     Problem: Skin Injury Risk Increased  Goal: Skin Health and Integrity  Outcome: Adequate for Care Transition     Problem: Fall Injury Risk  Goal: Absence of Fall and Fall-Related Injury  Outcome: Adequate for Care Transition     Problem: Infection  Goal: Absence of Infection Signs and Symptoms  Outcome: Adequate for Care Transition     Problem: Wound  Goal: Optimal Coping  Outcome: Adequate for Care Transition  Goal: Optimal Functional Ability  Outcome: Adequate for Care Transition  Goal: Absence of Infection Signs and Symptoms  Outcome: Adequate for Care Transition  Goal: Improved Oral Intake  Outcome: Adequate for Care Transition  Goal: Optimal Pain Control and Function  Outcome: Adequate for Care Transition  Goal: Skin Health and Integrity  Outcome: Adequate for Care Transition  Goal: Optimal Wound Healing  Outcome: Adequate for Care Transition

## 2024-12-25 LAB
ALBUMIN SERPL-MCNC: 2.9 G/DL (ref 3.4–4.8)
ALBUMIN/GLOB SERPL: 1 RATIO (ref 1.1–2)
ALP SERPL-CCNC: 215 UNIT/L (ref 40–150)
ALT SERPL-CCNC: 32 UNIT/L (ref 0–55)
ANION GAP SERPL CALC-SCNC: 9 MEQ/L
ANISOCYTOSIS BLD QL SMEAR: ABNORMAL
AST SERPL-CCNC: 52 UNIT/L (ref 5–34)
BASOPHILS # BLD AUTO: 0.02 X10(3)/MCL
BASOPHILS NFR BLD AUTO: 0.3 %
BILIRUB SERPL-MCNC: 1 MG/DL
BUN SERPL-MCNC: 8.4 MG/DL (ref 9.8–20.1)
CALCIUM SERPL-MCNC: 8.8 MG/DL (ref 8.4–10.2)
CHLORIDE SERPL-SCNC: 106 MMOL/L (ref 98–107)
CO2 SERPL-SCNC: 25 MMOL/L (ref 23–31)
CREAT SERPL-MCNC: 0.63 MG/DL (ref 0.55–1.02)
CREAT/UREA NIT SERPL: 13
EOSINOPHIL # BLD AUTO: 0.12 X10(3)/MCL (ref 0–0.9)
EOSINOPHIL NFR BLD AUTO: 1.7 %
ERYTHROCYTE [DISTWIDTH] IN BLOOD BY AUTOMATED COUNT: ABNORMAL %
EST. AVERAGE GLUCOSE BLD GHB EST-MCNC: 96.8 MG/DL
FERRITIN SERPL-MCNC: 318.94 NG/ML (ref 4.63–204)
GFR SERPLBLD CREATININE-BSD FMLA CKD-EPI: >60 ML/MIN/1.73/M2
GLOBULIN SER-MCNC: 2.9 GM/DL (ref 2.4–3.5)
GLUCOSE SERPL-MCNC: 82 MG/DL (ref 82–115)
HBA1C MFR BLD: 5 %
HCT VFR BLD AUTO: 27.9 % (ref 37–47)
HGB BLD-MCNC: 8.9 G/DL (ref 12–16)
HYPOCHROMIA BLD QL SMEAR: ABNORMAL
IMM GRANULOCYTES # BLD AUTO: 0.04 X10(3)/MCL (ref 0–0.04)
IMM GRANULOCYTES NFR BLD AUTO: 0.6 %
IRON SATN MFR SERPL: 28 % (ref 20–50)
IRON SERPL-MCNC: 52 UG/DL (ref 50–170)
LYMPHOCYTES # BLD AUTO: 1.45 X10(3)/MCL (ref 0.6–4.6)
LYMPHOCYTES NFR BLD AUTO: 20.5 %
MACROCYTES BLD QL SMEAR: ABNORMAL
MAGNESIUM SERPL-MCNC: 1.8 MG/DL (ref 1.6–2.6)
MCH RBC QN AUTO: 31.9 PG (ref 27–31)
MCHC RBC AUTO-ENTMCNC: 31.9 G/DL (ref 33–36)
MCV RBC AUTO: 100 FL (ref 80–94)
MONOCYTES # BLD AUTO: 0.76 X10(3)/MCL (ref 0.1–1.3)
MONOCYTES NFR BLD AUTO: 10.7 %
NEUTROPHILS # BLD AUTO: 4.69 X10(3)/MCL (ref 2.1–9.2)
NEUTROPHILS NFR BLD AUTO: 66.2 %
NRBC BLD AUTO-RTO: 0 %
PHOSPHATE SERPL-MCNC: 2.7 MG/DL (ref 2.3–4.7)
PLATELET # BLD AUTO: 219 X10(3)/MCL (ref 130–400)
PLATELET # BLD EST: ADEQUATE 10*3/UL
PMV BLD AUTO: 9.6 FL (ref 7.4–10.4)
POCT GLUCOSE: 102 MG/DL (ref 70–110)
POCT GLUCOSE: 107 MG/DL (ref 70–110)
POCT GLUCOSE: 113 MG/DL (ref 70–110)
POCT GLUCOSE: 124 MG/DL (ref 70–110)
POTASSIUM SERPL-SCNC: 3.7 MMOL/L (ref 3.5–5.1)
PREALB SERPL-MCNC: 13.3 MG/DL (ref 14–37)
PROT SERPL-MCNC: 5.8 GM/DL (ref 5.8–7.6)
RBC # BLD AUTO: 2.79 X10(6)/MCL (ref 4.2–5.4)
RBC MORPH BLD: ABNORMAL
SODIUM SERPL-SCNC: 140 MMOL/L (ref 136–145)
TIBC SERPL-MCNC: 131 UG/DL (ref 70–310)
TIBC SERPL-MCNC: 183 UG/DL (ref 250–450)
TRANSFERRIN SERPL-MCNC: 160 MG/DL (ref 173–360)
WBC # BLD AUTO: 7.08 X10(3)/MCL (ref 4.5–11.5)

## 2024-12-25 PROCEDURE — 84134 ASSAY OF PREALBUMIN: CPT | Performed by: NURSE PRACTITIONER

## 2024-12-25 PROCEDURE — 80053 COMPREHEN METABOLIC PANEL: CPT | Performed by: NURSE PRACTITIONER

## 2024-12-25 PROCEDURE — 85025 COMPLETE CBC W/AUTO DIFF WBC: CPT | Performed by: NURSE PRACTITIONER

## 2024-12-25 PROCEDURE — 94761 N-INVAS EAR/PLS OXIMETRY MLT: CPT

## 2024-12-25 PROCEDURE — 82728 ASSAY OF FERRITIN: CPT | Performed by: NURSE PRACTITIONER

## 2024-12-25 PROCEDURE — 11800000 HC REHAB PRIVATE ROOM

## 2024-12-25 PROCEDURE — 63600175 PHARM REV CODE 636 W HCPCS: Performed by: NURSE PRACTITIONER

## 2024-12-25 PROCEDURE — 25000003 PHARM REV CODE 250: Performed by: NURSE PRACTITIONER

## 2024-12-25 PROCEDURE — 36415 COLL VENOUS BLD VENIPUNCTURE: CPT | Performed by: NURSE PRACTITIONER

## 2024-12-25 PROCEDURE — 83550 IRON BINDING TEST: CPT | Performed by: NURSE PRACTITIONER

## 2024-12-25 PROCEDURE — 84100 ASSAY OF PHOSPHORUS: CPT | Performed by: NURSE PRACTITIONER

## 2024-12-25 PROCEDURE — 83735 ASSAY OF MAGNESIUM: CPT | Performed by: NURSE PRACTITIONER

## 2024-12-25 PROCEDURE — 94799 UNLISTED PULMONARY SVC/PX: CPT

## 2024-12-25 PROCEDURE — 99900031 HC PATIENT EDUCATION (STAT)

## 2024-12-25 PROCEDURE — S4991 NICOTINE PATCH NONLEGEND: HCPCS | Performed by: NURSE PRACTITIONER

## 2024-12-25 PROCEDURE — 83036 HEMOGLOBIN GLYCOSYLATED A1C: CPT | Performed by: NURSE PRACTITIONER

## 2024-12-25 RX ADMIN — OXYCODONE 10 MG: 5 TABLET ORAL at 01:12

## 2024-12-25 RX ADMIN — OXYCODONE 10 MG: 5 TABLET ORAL at 12:12

## 2024-12-25 RX ADMIN — ACETAMINOPHEN 650 MG: 325 TABLET ORAL at 11:12

## 2024-12-25 RX ADMIN — ESCITALOPRAM OXALATE 5 MG: 5 TABLET, FILM COATED ORAL at 07:12

## 2024-12-25 RX ADMIN — SODIUM BICARBONATE 650 MG: 650 TABLET ORAL at 07:12

## 2024-12-25 RX ADMIN — DOCUSATE SODIUM 100 MG: 100 CAPSULE, LIQUID FILLED ORAL at 08:12

## 2024-12-25 RX ADMIN — OXYCODONE 5 MG: 5 TABLET ORAL at 05:12

## 2024-12-25 RX ADMIN — NICOTINE 1 PATCH: 21 PATCH, EXTENDED RELEASE TRANSDERMAL at 07:12

## 2024-12-25 RX ADMIN — BUSPIRONE HYDROCHLORIDE 5 MG: 5 TABLET ORAL at 08:12

## 2024-12-25 RX ADMIN — OXYCODONE 10 MG: 5 TABLET ORAL at 07:12

## 2024-12-25 RX ADMIN — OXYCODONE 10 MG: 5 TABLET ORAL at 09:12

## 2024-12-25 RX ADMIN — METHOCARBAMOL 750 MG: 750 TABLET ORAL at 09:12

## 2024-12-25 RX ADMIN — ACETAMINOPHEN 650 MG: 325 TABLET ORAL at 05:12

## 2024-12-25 RX ADMIN — GABAPENTIN 300 MG: 300 CAPSULE ORAL at 08:12

## 2024-12-25 RX ADMIN — ATORVASTATIN CALCIUM 40 MG: 40 TABLET, FILM COATED ORAL at 08:12

## 2024-12-25 RX ADMIN — SODIUM BICARBONATE 650 MG: 650 TABLET ORAL at 08:12

## 2024-12-25 RX ADMIN — DOCUSATE SODIUM 100 MG: 100 CAPSULE, LIQUID FILLED ORAL at 07:12

## 2024-12-25 RX ADMIN — PANTOPRAZOLE SODIUM 40 MG: 40 TABLET, DELAYED RELEASE ORAL at 08:12

## 2024-12-25 RX ADMIN — METHOCARBAMOL 750 MG: 750 TABLET ORAL at 05:12

## 2024-12-25 RX ADMIN — ENOXAPARIN SODIUM 30 MG: 30 INJECTION SUBCUTANEOUS at 05:12

## 2024-12-25 RX ADMIN — ALPRAZOLAM 0.25 MG: 0.25 TABLET ORAL at 08:12

## 2024-12-25 RX ADMIN — ARIPIPRAZOLE 2 MG: 2 TABLET ORAL at 07:12

## 2024-12-25 RX ADMIN — PANTOPRAZOLE SODIUM 40 MG: 40 TABLET, DELAYED RELEASE ORAL at 07:12

## 2024-12-25 RX ADMIN — BUSPIRONE HYDROCHLORIDE 5 MG: 5 TABLET ORAL at 07:12

## 2024-12-25 RX ADMIN — METHOCARBAMOL 750 MG: 750 TABLET ORAL at 02:12

## 2024-12-25 NOTE — ACP (ADVANCE CARE PLANNING)
Advance Directives:   Living Will: No        Oral Declaration: Yes  LaPOST: No    Do Not Resuscitate Status: No    Medical Power of : No        Oral Declaration: Yes    Decision Making:  Patient answered questions  Goals of Care: What is most important right now is to focus on remaining as independent as possible, symptom/pain control, improvement in condition but with limits to invasive therapies, comfort and QOL . Accordingly, we have decided that the best plan to meet the patient's goals includes continuing with treatment.    Time: 17 minutes

## 2024-12-25 NOTE — PLAN OF CARE
Problem: Diabetes Comorbidity  Goal: Blood Glucose Level Within Targeted Range  Outcome: Progressing     Problem: Rehabilitation (IRF) Plan of Care  Goal: Absence of New-Onset Illness or Injury  Outcome: Progressing  Goal: Optimal Comfort and Wellbeing  Outcome: Progressing     Problem: Wound  Goal: Optimal Coping  Outcome: Progressing  Goal: Absence of Infection Signs and Symptoms  Outcome: Progressing  Goal: Improved Oral Intake  Outcome: Progressing  Goal: Optimal Pain Control and Function  Outcome: Progressing  Goal: Skin Health and Integrity  Outcome: Progressing  Goal: Optimal Wound Healing  Outcome: Progressing     Problem: Fall Injury Risk  Goal: Absence of Fall and Fall-Related Injury  Outcome: Progressing

## 2024-12-25 NOTE — DISCHARGE SUMMARY
[facility] Hospital Medicine Discharge Summary    Admit Date: 12/19/2024  Discharge Date and Time: 12/24/20246:31 PM  Admitting Physician: RATNA Team  Discharging Physician: Emperatriz Ayala DO.  Primary Care Physician: Luzmaria Flores FNP  Consults: Orthopedic Surgery    Discharge Diagnoses:  Left displaced femoral neck fracture status post left hip hemiarthroplasty  Acute blood loss anemia , secondary to above  Elevated alk-phos  Metabolic acidosis, resolved  History of: primary hypertension, diabetes mellitus, depression, GERD, tobacco dependence (1 pack per day)     Hospital Course:   64-year-old female with a past medical history of primary hypertension, diabetes mellitus, depression, GERD, tobacco dependence who presents from  ortho after she presented there from persistent left hip pain secondary to a fall that occurred 2 months ago.  Patient was transferred to our facility for planned surgery on 12/20 with Dr. Jose J Montemayor.     At baseline the patient lives with her son and is independent.  Requesting a nicotine patch.     Patient had some postop anemia noted on 12/21 and was given 2 units of blood.  Follow up Orthopedic surgery's postop recs   2 units of PRBC being transfused for hemoglobin of 5.7 noted 12/21  Hemoglobin stable  Alk-phos improving   sodium bicarb tablets for acidosis   Monitor hemoglobin and stools closely   Obtain hepatitis panel for elevated alk-phos , normal  Autoimmune antibody normal  Previous previous iron panel workup showed patient does have iron-deficiency anemia   needs to be on iron supplementation at discharge  Patient will be discharging to ortho rehab.  Denies any issue at this time.  No further concerns at this time.    Pt was seen and examined on the day of discharge  Vitals:  VITAL SIGNS: 24 HRS MIN & MAX LAST   Temp  Min: 97.8 °F (36.6 °C)  Max: 99.3 °F (37.4 °C) 97.8 °F (36.6 °C)   BP  Min: 104/64  Max: 125/65 125/65   Pulse  Min: 86  Max: 98  92    Resp  Min: 16  Max: 18 18   SpO2  Min: 94 %  Max: 99 % 98 %       Physical Exam:  General: In no acute distress, afebrile, pale appearing  Chest: Clear to auscultation bilaterally  Heart: RRR, +S1, S2, no appreciable murmur  Abdomen: Soft, nontender, BS +  MSK: Warm, no lower extremity edema, no clubbing or cyanosis  Neurologic: Alert and oriented x4, Cranial nerve II-XII intact, Strength 5/5 in all 4 extremities    Procedures Performed: No admission procedures for hospital encounter.     Significant Diagnostic Studies: See Full reports for all details    Recent Labs   Lab 12/21/24  0822 12/22/24  0536 12/23/24  0459 12/24/24  0554   WBC 10.66 8.25 7.65  --    RBC 1.76* 2.79* 2.62*  --    HGB 5.7* 8.8* 8.4* 8.1*   HCT 17.9* 25.6* 24.5* 25.2*   .7* 91.8 93.5  --    MCH 32.4* 31.5* 32.1*  --    MCHC 31.8* 34.4 34.3  --    RDW  --  25.2* 25.6*  --     142 152  --    MPV 9.8 9.0 9.4  --        Recent Labs   Lab 12/21/24  0537 12/22/24  0536 12/23/24  0459   * 138 140   K 4.5 3.6 4.0   * 110* 108*   CO2 14* 19* 25   BUN 23.3* 14.4 9.1*   CREATININE 0.82 0.60 0.52*   CALCIUM 8.5 8.1* 8.5   ALBUMIN 3.9 3.2* 3.1*   ALKPHOS 254* 209* 210*   ALT 12 6 5   AST 25 16 22   BILITOT 0.3 0.9 0.7        Microbiology Results (last 7 days)       ** No results found for the last 168 hours. **             X-Ray Hip 2 or 3 views Left with Pelvis when performed  Narrative: EXAMINATION:  XR HIP WITH PELVIS WHEN PERFORMED 2 OR 3 VIEWS LEFT    CLINICAL HISTORY:  post-op in pacu;    COMPARISON:  X-rays dated 12/18/2024    FINDINGS:  There is satisfactory alignment following left hip arthroplasty.  The soft tissues are unremarkable.  Impression: Satisfactory alignment following left hip arthroplasty.    Electronically signed by: Tatiana Miller  Date:    12/20/2024  Time:    13:03         Medication List        START taking these medications      ferrous sulfate 325 mg (65 mg iron) Tab tablet  Commonly known as:  IRON (FERROUS SULFATE)  Take 1 tablet (325 mg total) by mouth daily with breakfast.     HYDROcodone-acetaminophen 5-325 mg per tablet  Commonly known as: NORCO  Take 1 tablet by mouth every 4 (four) hours as needed for Pain.     methocarbamoL 750 MG Tab  Commonly known as: ROBAXIN  Take 1 tablet (750 mg total) by mouth 3 (three) times daily. for 10 days     nicotine 14 mg/24 hr  Commonly known as: NICODERM CQ  Place 1 patch onto the skin once daily.            CONTINUE taking these medications      albuterol 90 mcg/actuation inhaler  Commonly known as: PROVENTIL/VENTOLIN HFA     ARIPiprazole 2 MG Tab  Commonly known as: ABILIFY  Take 1 tablet (2 mg total) by mouth once daily.     busPIRone 5 MG Tab  Commonly known as: BUSPAR  Take 1 tablet (5 mg total) by mouth 2 (two) times daily.     EScitalopram oxalate 5 MG Tab  Commonly known as: LEXAPRO  Take 1 tablet (5 mg total) by mouth once daily.     hyoscyamine 0.125 mg Subl     ondansetron 8 MG Tbdl  Commonly known as: ZOFRAN-ODT     pantoprazole 40 MG tablet  Commonly known as: PROTONIX     rosuvastatin 20 MG tablet  Commonly known as: CRESTOR            STOP taking these medications      ENTRESTO 24-26 mg per tablet  Generic drug: sacubitriL-valsartan     thiamine 100 MG tablet               Where to Get Your Medications        Information about where to get these medications is not yet available    Ask your nurse or doctor about these medications  ferrous sulfate 325 mg (65 mg iron) Tab tablet  HYDROcodone-acetaminophen 5-325 mg per tablet  methocarbamoL 750 MG Tab  nicotine 14 mg/24 hr          Explained in detail to the patient about the discharge plan, medications, and follow-up visits. Pt understands and agrees with the treatment plan  Discharge Disposition: Rehab Facility   Discharged Condition: stable  Diet-    Medications Per DC med rec  Activities as tolerated   Contact information for follow-up providers       Shonda Lewis PA-C. Go on 1/14/2025.     Specialty: Orthopedic Surgery  Why: For wound check and suture/staple removal post op, With Dr Aranas PA, For follow up X-rays    01/14/25 at 8:00AM  Contact information:  [address]  [address]  [facility] [id number] [phone]               Luzmaria Flores FNP. Schedule an appointment as soon as possible for a visit in 1 week(s).    Specialty: Family Medicine  Why: after dc from rehab  Contact information:  [address] [facility] [address] [phone]                       Contact information for after-discharge care       Destination       [facility] .    Service: Inpatient Rehabilitation  Contact information:  [address]  [facility] [address] [phone]                                 For further questions contact hospitalist office    Discharge time 33 minutes    For worsening symptoms, chest pain, shortness of breath, increased abdominal pain, high grade fever, stroke or stroke like symptoms, immediately go to the nearest Emergency Room or call 911 as soon as possible.      Emperatriz Ayala DO  Department of Hospital Medicine  [facility]  12/24/2024

## 2024-12-26 PROBLEM — F32.A DEPRESSIVE DISORDER: Status: ACTIVE | Noted: 2024-12-26

## 2024-12-26 PROBLEM — H93.8X3: Status: ACTIVE | Noted: 2024-12-26

## 2024-12-26 PROBLEM — I38 HEART VALVE DISEASE: Status: ACTIVE | Noted: 2024-12-26

## 2024-12-26 PROBLEM — J44.9 COPD (CHRONIC OBSTRUCTIVE PULMONARY DISEASE): Status: ACTIVE | Noted: 2024-07-07

## 2024-12-26 PROBLEM — K74.60 UNSPECIFIED CIRRHOSIS OF LIVER: Status: ACTIVE | Noted: 2024-12-26

## 2024-12-26 PROBLEM — F19.11 HISTORY OF SUBSTANCE ABUSE: Status: ACTIVE | Noted: 2024-12-26

## 2024-12-26 PROBLEM — I50.32 CHRONIC DIASTOLIC HEART FAILURE: Status: ACTIVE | Noted: 2024-07-07

## 2024-12-26 PROBLEM — Z86.0100 HISTORY OF COLONIC POLYPS: Status: ACTIVE | Noted: 2024-12-26

## 2024-12-26 PROBLEM — K22.2 STRICTURE OF ESOPHAGUS: Status: ACTIVE | Noted: 2024-12-26

## 2024-12-26 PROBLEM — K21.9 GASTROESOPHAGEAL REFLUX DISEASE: Status: ACTIVE | Noted: 2024-12-26

## 2024-12-26 PROBLEM — K70.0 ALCOHOLIC FATTY LIVER: Status: ACTIVE | Noted: 2024-12-26

## 2024-12-26 PROBLEM — R63.4 WEIGHT LOSS, ABNORMAL: Status: ACTIVE | Noted: 2024-12-26

## 2024-12-26 PROBLEM — E44.0 MODERATE MALNUTRITION: Status: ACTIVE | Noted: 2024-12-26

## 2024-12-26 PROBLEM — D12.4 BENIGN NEOPLASM OF DESCENDING COLON: Status: ACTIVE | Noted: 2023-09-01

## 2024-12-26 PROBLEM — Z72.0 TOBACCO USER: Status: ACTIVE | Noted: 2024-12-26

## 2024-12-26 PROBLEM — D12.2 BENIGN NEOPLASM OF ASCENDING COLON: Status: ACTIVE | Noted: 2023-09-01

## 2024-12-26 PROBLEM — N18.32 STAGE 3B CHRONIC KIDNEY DISEASE: Status: ACTIVE | Noted: 2024-12-17

## 2024-12-26 PROBLEM — K64.8 OTHER HEMORRHOIDS: Status: ACTIVE | Noted: 2023-09-01

## 2024-12-26 LAB
POCT GLUCOSE: 106 MG/DL (ref 70–110)
POCT GLUCOSE: 112 MG/DL (ref 70–110)
POCT GLUCOSE: 123 MG/DL (ref 70–110)
POCT GLUCOSE: 143 MG/DL (ref 70–110)

## 2024-12-26 PROCEDURE — 99223 1ST HOSP IP/OBS HIGH 75: CPT | Mod: ,,, | Performed by: NURSE PRACTITIONER

## 2024-12-26 PROCEDURE — 94799 UNLISTED PULMONARY SVC/PX: CPT

## 2024-12-26 PROCEDURE — 25000003 PHARM REV CODE 250: Performed by: NURSE PRACTITIONER

## 2024-12-26 PROCEDURE — S4991 NICOTINE PATCH NONLEGEND: HCPCS | Performed by: NURSE PRACTITIONER

## 2024-12-26 PROCEDURE — 99900031 HC PATIENT EDUCATION (STAT)

## 2024-12-26 PROCEDURE — 97535 SELF CARE MNGMENT TRAINING: CPT

## 2024-12-26 PROCEDURE — 97110 THERAPEUTIC EXERCISES: CPT | Mod: CQ

## 2024-12-26 PROCEDURE — 97530 THERAPEUTIC ACTIVITIES: CPT | Mod: CQ

## 2024-12-26 PROCEDURE — 11800000 HC REHAB PRIVATE ROOM

## 2024-12-26 PROCEDURE — 63600175 PHARM REV CODE 636 W HCPCS: Performed by: NURSE PRACTITIONER

## 2024-12-26 PROCEDURE — 94761 N-INVAS EAR/PLS OXIMETRY MLT: CPT

## 2024-12-26 PROCEDURE — 99900035 HC TECH TIME PER 15 MIN (STAT)

## 2024-12-26 PROCEDURE — 97116 GAIT TRAINING THERAPY: CPT

## 2024-12-26 RX ORDER — ACETAMINOPHEN 325 MG/1
650 TABLET ORAL 2 TIMES DAILY PRN
Status: DISCONTINUED | OUTPATIENT
Start: 2024-12-26 | End: 2025-01-06 | Stop reason: HOSPADM

## 2024-12-26 RX ORDER — ESCITALOPRAM OXALATE 5 MG/1
5 TABLET ORAL DAILY
Status: DISCONTINUED | OUTPATIENT
Start: 2024-12-27 | End: 2025-01-06 | Stop reason: HOSPADM

## 2024-12-26 RX ORDER — LIDOCAINE 50 MG/G
2 PATCH TOPICAL
Status: DISCONTINUED | OUTPATIENT
Start: 2024-12-27 | End: 2025-01-06 | Stop reason: HOSPADM

## 2024-12-26 RX ORDER — DULOXETIN HYDROCHLORIDE 30 MG/1
30 CAPSULE, DELAYED RELEASE ORAL DAILY
Status: DISCONTINUED | OUTPATIENT
Start: 2024-12-27 | End: 2024-12-26

## 2024-12-26 RX ORDER — METHOCARBAMOL 750 MG/1
750 TABLET, FILM COATED ORAL EVERY 6 HOURS
Status: DISCONTINUED | OUTPATIENT
Start: 2024-12-26 | End: 2024-12-27

## 2024-12-26 RX ORDER — LIDOCAINE 50 MG/G
2 PATCH TOPICAL ONCE
Status: COMPLETED | OUTPATIENT
Start: 2024-12-26 | End: 2024-12-26

## 2024-12-26 RX ORDER — HYDROXYZINE PAMOATE 50 MG/1
50 CAPSULE ORAL
Status: DISCONTINUED | OUTPATIENT
Start: 2024-12-26 | End: 2024-12-27

## 2024-12-26 RX ORDER — GABAPENTIN 100 MG/1
100 CAPSULE ORAL
Status: DISCONTINUED | OUTPATIENT
Start: 2024-12-26 | End: 2024-12-27

## 2024-12-26 RX ADMIN — LIDOCAINE 2 PATCH: 50 PATCH CUTANEOUS at 12:12

## 2024-12-26 RX ADMIN — BUSPIRONE HYDROCHLORIDE 5 MG: 5 TABLET ORAL at 07:12

## 2024-12-26 RX ADMIN — METHOCARBAMOL 750 MG: 750 TABLET ORAL at 05:12

## 2024-12-26 RX ADMIN — HYDROXYZINE PAMOATE 50 MG: 50 CAPSULE ORAL at 05:12

## 2024-12-26 RX ADMIN — OXYCODONE 10 MG: 5 TABLET ORAL at 04:12

## 2024-12-26 RX ADMIN — GABAPENTIN 300 MG: 300 CAPSULE ORAL at 08:12

## 2024-12-26 RX ADMIN — OXYCODONE 10 MG: 5 TABLET ORAL at 08:12

## 2024-12-26 RX ADMIN — ESCITALOPRAM OXALATE 5 MG: 5 TABLET, FILM COATED ORAL at 07:12

## 2024-12-26 RX ADMIN — POLYETHYLENE GLYCOL 3350 17 G: 17 POWDER, FOR SOLUTION ORAL at 07:12

## 2024-12-26 RX ADMIN — PANTOPRAZOLE SODIUM 40 MG: 40 TABLET, DELAYED RELEASE ORAL at 08:12

## 2024-12-26 RX ADMIN — OXYCODONE 10 MG: 5 TABLET ORAL at 02:12

## 2024-12-26 RX ADMIN — BUSPIRONE HYDROCHLORIDE 5 MG: 5 TABLET ORAL at 08:12

## 2024-12-26 RX ADMIN — ENOXAPARIN SODIUM 30 MG: 30 INJECTION SUBCUTANEOUS at 04:12

## 2024-12-26 RX ADMIN — METHOCARBAMOL 750 MG: 750 TABLET ORAL at 12:12

## 2024-12-26 RX ADMIN — SODIUM BICARBONATE 650 MG: 650 TABLET ORAL at 08:12

## 2024-12-26 RX ADMIN — ACETAMINOPHEN 650 MG: 325 TABLET ORAL at 12:12

## 2024-12-26 RX ADMIN — NICOTINE 1 PATCH: 21 PATCH, EXTENDED RELEASE TRANSDERMAL at 07:12

## 2024-12-26 RX ADMIN — OXYCODONE 10 MG: 5 TABLET ORAL at 12:12

## 2024-12-26 RX ADMIN — OXYCODONE 10 MG: 5 TABLET ORAL at 07:12

## 2024-12-26 RX ADMIN — PANTOPRAZOLE SODIUM 40 MG: 40 TABLET, DELAYED RELEASE ORAL at 07:12

## 2024-12-26 RX ADMIN — DOCUSATE SODIUM 100 MG: 100 CAPSULE, LIQUID FILLED ORAL at 07:12

## 2024-12-26 RX ADMIN — GABAPENTIN 100 MG: 100 CAPSULE ORAL at 04:12

## 2024-12-26 RX ADMIN — GABAPENTIN 100 MG: 100 CAPSULE ORAL at 12:12

## 2024-12-26 RX ADMIN — ACETAMINOPHEN 650 MG: 325 TABLET ORAL at 05:12

## 2024-12-26 RX ADMIN — SODIUM BICARBONATE 650 MG: 650 TABLET ORAL at 07:12

## 2024-12-26 RX ADMIN — ALPRAZOLAM 0.25 MG: 0.25 TABLET ORAL at 08:12

## 2024-12-26 RX ADMIN — ARIPIPRAZOLE 2 MG: 2 TABLET ORAL at 07:12

## 2024-12-26 RX ADMIN — ATORVASTATIN CALCIUM 40 MG: 40 TABLET, FILM COATED ORAL at 08:12

## 2024-12-26 RX ADMIN — DOCUSATE SODIUM 100 MG: 100 CAPSULE, LIQUID FILLED ORAL at 08:12

## 2024-12-26 NOTE — PLAN OF CARE
Luz Maria Alfaro age: 64 *LLE WBAT, [place]       Dx: Left subacute displaced femoral neck fracture s/p left hemiarthroplasty 12/20/2024. Pt. Has a past medical history of HTN, DM, depression, GERD, and tobacco dependence. Pt. Is also very [place] and does not like to wear hearing aids.  Her ex- states that he usually writes things down for her to read or texts her.   *See chart for full and complete medical history*          Hx mental health/substance abuse:Pt. Has a history of depression. She is a tobacco smoker. She has a history of ETOH and substance abuse but quit many years ago.       Insurance: Medicare Part A&B/Medicaid        Is there evidence of an acute change in mental status from the patients baseline? No      Education//Work Hx: Pt. Completed 10th grade. She has no  history. She is a retired  for [employer].      Pt.is  and . Her ex-, Bruce Baum ([phone]) takes the patient to appointments and runs errands.  The patients son and 2 grandchildren live with her.  They cook, clean, and do laundry.  Bruce hires someone for lawn maintenance.         Children: (1) /Friends/Family: 1) Bruce Baum, ex- ([phone]) 2) Vannesa Brambila, sister ([phone]) 3) Omer Baum, son ([phone])       Power of  (no) Living Will (no)       Prior Level of Fx: Pt. Needed min assist. For sponge bathing hard to reach areas due to chronic back pain over the last year. She uses a RW for mobility and a wheelchair for appointments. She does not drive.  She cooks, grocery shops online, manages her own finances, and manages her own medications.  She has a medic alert.       Residence: Pt., son, and his 2 children live in [place] in a 2-story home with a threshold entrance. She does not need to go upstairs.  She has a tub/shower combination with grab bars and no HHS. She does not have an elevated toilet. She has a grab bar near the toilet.      DME: RW,  BSC, wheelchair, and grab bars. Will use [employer] for DME.      HH/OP tx: Pt. Has a history of home health with [facility]. Will use [facility] for HH.      FOC obtained for [facility] for DME and [facility] for HH.   Pharmacy: [facility] on [address] / (has prescription drug coverage)      MD(s): PCP: ALEKS Mcfadden ([phone]) at [facility]. Ortho: Shonda Lewis PA-C ([phone]) Appointment 1/14/2025 at 8 AM for wound check, suture/staple removal post op, and follow-up X-rays with Dr. Charmaine BUITRAGO

## 2024-12-26 NOTE — PT/OT/SLP PROGRESS
Physical Therapy Inpatient Rehab Treatment    Patient Name:  Luz Maria Alfaro   MRN:  [id number]    Recommendations:     Discharge Recommendations:  High Intensity Therapy   Discharge Equipment Recommendations:     Barriers to discharge: Increased level of assist, Ongoing medical treatment, and Impaired functional mobility     Assessment:     Luz Maria Alfaro is a 64 y.o. female admitted with a medical diagnosis of Displaced fracture of left femoral neck.  She presents with the following impairments/functional limitations:  weakness, impaired endurance, impaired self care skills, impaired functional mobility, gait instability, impaired balance, decreased lower extremity function, pain, decreased ROM, edema, orthopedic precautions .    PT NOTE: Pt has hearing difficulties and PT may need to speak at higher pitch or utilize writing board. Pt does not have bed railings at home and requires Min-ModA with bed mobility. Pt was not scored on some bed mobility as she utilized bed rails rather than PT assist. Pt states she has no steps at home.    Rehab Diagnosis: L femoral neck fx    General Precautions: Standard, fall     Orthopedic Precautions:LLE weight bearing as tolerated (No IR/ER)     Braces: N/A    Rehab Prognosis: Good; patient would benefit from acute skilled PT services to address these deficits and reach maximum level of function.      History:     Past Medical History:   Diagnosis Date    Benign essential HTN     Mixed hyperlipidemia     Type 2 diabetes mellitus without complications        Past Surgical History:   Procedure Laterality Date    APPENDECTOMY      BACK SURGERY      BREAST SURGERY  01 13 2002    Augmantation    HEMIARTHROPLASTY OF HIP Left 12/20/2024    Procedure: HEMIARTHROPLASTY, HIP;  Surgeon: Jose J Montemayor DO;  Location: [facility];  Service: Orthopedics;  Laterality: Left;  lateral peg board kevin    HIP SURGERY      INTRAMEDULLARY RODDING OF FEMUR Right 10/27/2022    Procedure: RIGHT FEMUR  INTERTROCH IMN;  Surgeon: Stewart Brambila MD;  Location: [facility];  Service: Orthopedics;  Laterality: Right;  [place] table, supine  3rd case  Synthes Short TFNA       Subjective     Patient comments: left hip pain will get worse when I start moving    Respiratory Status: Room air    Patients cultural, spiritual, Orthodox conflicts given the current situation: no    Objective:     Communicated with nsg prior to session.  Patient found up in chair with peripheral IV  upon PT entry to room.    Pt is Alert, Cooperative, and Motivated.    Vitals     Pain Pain Rating 1: 8/10  Location - Side 1: Left  Location - Orientation 1: lateral  Location 1: hip  Pain Addressed 1: Reposition, Distraction, Nurse notified  Pain Rating Post-Intervention 1: 10/10  Pain Rating 2: 0/10  Location - Side 2: Left  Location - Orientation 2: generalized  Location 2: leg  Pain Addressed 2: Nurse notified, Reposition, Distraction       Functional Mobility:   Bed Mobility:     Rolling Left:  with bed railing, contact guard  Rolling Right: with bed railing, contact guard  Bridging: Supervision or touching assistance   Supine to Sit: with bed railing, contact guard     Current   Status  Discharge   Goal   Functional Area: Care Score:    Sit to Lying 2  ModA for bilateral lower extremities Independent   Sit to Stand 4  Contact guard with RW, 7 trials Independent   Chair/Bed-to-Chair Transfer 4  Cga with RW, wc to bed Independent   Car Transfer 4  Cga with RW, step transfer, vc required Independent   Walk 10 Feet 4  Cga with RW, pt ambulated 20' with slow, step-to gait pattern, small step length Independent   Walk 50 Feet with Two Turns 88 Independent   Walk 150 Feet 88 Independent   Walk 10 Feet Uneven Surface 4  Cga with RW, mat, increased time Independent   1 Step (Curb) 4  Cga with RW, vc and increased time required. Supervision or touching assistance       Therapeutic Activities and Exercises:  Patient educated on role of acute care PT and PT  POC, safety while in hospital including calling nurse for mobility, and call light usage  Patient educated about importance of OOB mobility and remaining up in chair most of the day.  Patient educated about pursed lip breathing technique and cued for use with mobility.  Pt performed 2 toilet transfers - cga - step transfer, 2 positive BM, 2 positive voids; pt was able to manage clothing and wipe without assistance.    Therapeutic exercises were performed unsupported seated at edge of chair:    marches with 0# weights  hip abduction with red theraband  hamstring curls with red theraband  calf raises/toe raises with 0# weights  All therex performed 3x10 reps each, BLE     Timed Up & Go Test (TUG)  Assistive Device and/or Bracing Used: Rolling Walker  TUG Time: 131.37 seconds   Test time of > or equal to 12 sec. are associated with high fall risk.    Slow gait speed, cga, vc required.    Activity Tolerance: Excellent    Patient left HOB elevated with call button in reach and nursing notified.    Education provided: roles and goals of PT/PTA, transfer training, bed mob, gait training, stair training, balance training, safety awareness, body mechanics, assistive device, strengthening exercises, and fall prevention    Expected compliance: High compliance    Plan:     During this hospitalization, patient to be seen 5 x/week to address the identified rehab impairments via gait training, therapeutic exercises, therapeutic activities and progress toward the following goals:    GOALS:   Multidisciplinary Problems       Physical Therapy Goals          Problem: Physical Therapy    Goal Priority Disciplines Outcome Interventions   Physical Therapy Goal     PT, PT/OT Progressing    Description:   PT Short Term goals     Bed Mobility:  Roll left and right with supervision/touching assist.  Sit to supine transfer with partial/moderate assist.  Supine to sit transfer with partial/moderate assist.    Transfers:  Sit to stand transfer  with setup/clean-up assist using RW.  Bed to chair transferStand step with setup/clean-up assist using RW.  Car transfer with setup/clean-up assist using RW.   an object from the ground in standing position with supervision/touching assist using RW.    Mobility:  Ambulate 150 feet with partial/moderate assist using RW.  Ambulate 10 feet on uneven surfaces/ramps with partial/moderate assist using RW.  Ascend/descend a 4 inch curb with partial/moderate assist using RW.   Ascend/descend 12 stairs with partial/moderate assist using bilateral handrails.  Manual wheelchair 150 feet with supervision/touching assist using Bilateral upper extremity.      Time Frame: by Re-evaluation                        Plan of Care Expires:  12/30/24  PT Next Visit Date: 12/30/24  Plan of Care reviewed with: patient    Additional Information:         Time Tracking:     Therapy Time  PT Received On: 12/26/24  PT Start Time: 1030  PT Stop Time: 1200  PT Total Time (min): 90 min   PT Individual: 90  Missed Time:    Time Missed due to:      Billable Minutes: Gait Training 10 min, Therapeutic Activity 55 min, and Therapeutic Exercise 25 min    12/26/2024

## 2024-12-26 NOTE — CONSULTS
Inpatient Nutrition Assessment    Admit Date: 12/24/2024   Total duration of encounter: 2 days   Patient Age: 64 y.o.    Nutrition Recommendation/Prescription     2000 kcal Diabetic Diet; Double Protein.   Boost Breeze BID (provides 250 kcal and 9 g protein per serving).  Medical mgmt of blood glucose.  Bowel regimen per MD.  Will continue to monitor wt, labs, and po intake.    Communication of Recommendations: reviewed with patient and EMR    Nutrition Assessment     Malnutrition Assessment/Nutrition-Focused Physical Exam    Malnutrition Context: chronic illness (12/26/24 1323)  Malnutrition Level: moderate (12/26/24 1323)     Weight Loss (Malnutrition): other (see comments) (Does not meet criteria) (12/26/24 1323)  Subcutaneous Fat (Malnutrition): severe depletion (12/26/24 1323)  Orbital Region (Subcutaneous Fat Loss): moderate depletion  Upper Arm Region (Subcutaneous Fat Loss): severe depletion     Muscle Mass (Malnutrition): mild depletion (12/26/24 1323)  Mandaen Region (Muscle Loss): mild depletion  Clavicle Bone Region (Muscle Loss): mild depletion  Clavicle and Acromion Bone Region (Muscle Loss): mild depletion     Dorsal Hand (Muscle Loss): mild depletion     Anterior Thigh Region (Muscle Loss): mild depletion  Posterior Calf Region (Muscle Loss): mild depletion  Fluid Accumulation (Malnutrition): other (see comments) (Not present) (12/26/24 1323)     Hand  Strength, Right (Malnutrition): Unable to assess (12/26/24 1323)  A minimum of two characteristics is recommended for diagnosis of either severe or non-severe malnutrition.    Chart Review    Reason Seen: physician consult for eval and treat    Malnutrition Screening Tool Results   Have you recently lost weight without trying?: Yes: 2-13 lbs  Have you been eating poorly because of a decreased appetite?: No   MST Score: 1   Diagnosis:  Displaced fracture of left femoral neck 12/20/2024      Closed right hip fracture s/p Intramedullary nailing  10/26/2022       Relevant Medical History: Displaced fracture of left femoral neck s/p left hemiarthroplasty on 12/20/2024, normocytic anemia, depression/anxiety, HTN, HLD, nicotine dependence, GERD, DM type 2     Scheduled Medications:  acetaminophen, 650 mg, Q6H  ARIPiprazole, 2 mg, Daily  atorvastatin, 40 mg, QHS  busPIRone, 5 mg, BID  docusate sodium, 100 mg, BID  [START ON 12/27/2024] DULoxetine, 30 mg, Daily  enoxparin, 30 mg, Q24H (prophylaxis, 1700)  gabapentin, 100 mg, TID AC  gabapentin, 300 mg, QHS  hydrOXYzine pamoate, 50 mg, After dinner  LIDOcaine, 2 patch, Once  [START ON 12/27/2024] LIDOcaine, 2 patch, Q24H  methocarbamoL, 750 mg, Q6H  nicotine, 1 patch, Daily  pantoprazole, 40 mg, BID  sodium bicarbonate, 650 mg, BID    Continuous Infusions:   PRN Medications:  acetaminophen, 650 mg, BID PRN  ALPRAZolam, 0.25 mg, TID PRN  benzonatate, 100 mg, TID PRN  dextrose 50%, 12.5 g, PRN  dextrose 50%, 25 g, PRN  glucagon (human recombinant), 1 mg, PRN  glucose, 16 g, PRN  glucose, 24 g, PRN  hydrALAZINE, 10 mg, Q4H PRN  HYDROcodone-acetaminophen, 1 tablet, Daily PRN  insulin aspart U-100, 0-10 Units, QID (AC + HS) PRN  labetalol, 10 mg, Q4H PRN  metoprolol, 10 mg, Q2H PRN  nitroGLYCERIN, 0.4 mg, Q5 Min PRN  ondansetron, 4 mg, Q6H PRN  ondansetron, 8 mg, Q8H PRN  ondansetron, 4 mg, Q8H PRN  oxyCODONE, 10 mg, Q4H PRN  oxyCODONE, 5 mg, Q4H PRN  polyethylene glycol, 17 g, BID PRN    Calorie Containing IV Medications: no significant kcals from medications at this time    Recent Labs   Lab 12/19/24  1412 12/20/24  0505 12/21/24  0537 12/21/24  0822 12/22/24  0536 12/23/24  0459 12/24/24  0554 12/25/24  0540    137 133*  --  138 140  --  140   K 4.3 4.0 4.5  --  3.6 4.0  --  3.7   CALCIUM 9.1 8.7 8.5  --  8.1* 8.5  --  8.8   PHOS  --   --   --   --   --   --   --  2.7   MG  --   --   --   --   --   --   --  1.80   * 116* 108*  --  110* 108*  --  106   CO2 14* 15* 14*  --  19* 25  --  25   BUN 12.4 18.5  "23.3*  --  14.4 9.1*  --  8.4*   CREATININE 0.67 0.58 0.82  --  0.60 0.52*  --  0.63   EGFRNORACEVR >60 >60 >60  --  >60 >60  --  >60   GLUCOSE 85 97 114  --  105 96  --  82   BILITOT 0.4 0.4 0.3  --  0.9 0.7  --  1.0   ALKPHOS 354* 327* 254*  --  209* 210*  --  215*   ALT 16 14 12  --  6 5  --  32   AST 25 27 25  --  16 22  --  52*   ALBUMIN 3.5 3.1* 3.9  --  3.2* 3.1*  --  2.9*   PREALB  --   --   --   --   --   --   --  13.3*   HGBA1C  --   --   --   --   --   --   --  5.0   WBC  --  6.61  --  10.66 8.25 7.65  --  7.08   HGB  --  10.1*  --  5.7* 8.8* 8.4* 8.1* 8.9*   HCT  --  31.1*  --  17.9* 25.6* 24.5* 25.2* 27.9*     Nutrition Orders:  Diet diabetic Double Protein; 2000 Calories (up to 75 gm per meal)  Dietary nutrition supplements BID; Boost Breeze - Wild Berry    Appetite/Oral Intake: fair/50-75% of meals  Factors Affecting Nutritional Intake: none identified  Social Needs Impacting Access to Food: none identified  Food/Yazdanism/Cultural Preferences: unable to obtain  Food Allergies: no known food allergies  Last Bowel Movement: 12/26/24  Wound(s):  noted    Comments    12/26/24: Pt sleeping at time of visit. NFPE showed signs of mild muscle wasting and moderate to severe fat wasting. Pt meets criteria for moderate malnutrition. Per EMR, appears pt may have lost wt then gained it back; will re-assess upon f/u. Pt ate about 25% of lunch. Pt eating fairly per EMR. No issues c/s and no gi symptoms noted. NP added Boost Breeze BID. Last BM today. Labs and meds reviewed; Alb 2.9, Prealb 13.3.    Anthropometrics    Height: 5' 3" (160 cm), Height Method: Stated  Last Weight: 50.9 kg (112 lb 3.4 oz) (12/24/24 1502), Weight Method: Bed Scale  BMI (Calculated): 19.9  BMI Classification: underweight (BMI less than 22 if >65 years of age)     Ideal Body Weight (IBW), Female: 115 lb     % Ideal Body Weight, Female (lb): 97.58 %                             Usual Weight Provided By: EMR weight history    Wt Readings from " Last 5 Encounters:   12/24/24 50.9 kg (112 lb 3.4 oz)   12/19/24 42.7 kg (94 lb 3.2 oz)   12/19/24 44.5 kg (98 lb)   12/18/24 44.5 kg (98 lb)   11/11/24 52.8 kg (116 lb 6.5 oz)     Weight Change(s) Since Admission: no new wts  Wt Readings from Last 1 Encounters:   12/24/24 1502 50.9 kg (112 lb 3.4 oz)   12/24/24 1424 50.9 kg (112 lb 3.4 oz)   Admit Weight: 50.9 kg (112 lb 3.4 oz) (12/24/24 1424), Weight Method: Bed Scale    Estimated Needs    Weight Used For Calorie Calculations: 50.9 kg (112 lb 3.4 oz)  Energy Calorie Requirements (kcal): 0435-6658 kcal (30-35 kcal/kg)  Energy Need Method: Kcal/kg  Weight Used For Protein Calculations: 50.9 kg (112 lb 3.4 oz)  Protein Requirements: 61-66 g (1.2-1.3 g/kg)  Fluid Requirements (mL): 1527 mL/d (1.0 mL/kcal)  CHO Requirement: 202-247 g/d (45-55% of EER)     Enteral Nutrition     Patient not receiving enteral nutrition at this time.    Parenteral Nutrition     Patient not receiving parenteral nutrition support at this time.    Evaluation of Received Nutrient Intake    Calories: not meeting estimated needs  Protein: not meeting estimated needs    Patient Education     Not applicable.    Nutrition Diagnosis     PES: Moderate chronic disease or condition related malnutrition Related to chronic illness As Evidenced by:  - muscle mass depletion: 6 areas of mild muscle loss (Acromion, Gastrocnemius, Temporalis, Clavicle, Quadriceps, Interosseous) - loss of subcutaneous fat: 1 area of severe fat loss (Triceps Skinfold), 1 area of moderate fat loss (Infraorbital) new    Nutrition Interventions     Intervention(s): general/healthful diet, modified composition of meals/snacks, commercial beverage, multivitamin/mineral supplement therapy, prescription medication, and collaboration with other providers  Intervention(s): Oral nutritional supplement;Oral diet/nutrient modifications    Goal: Meet greater than 80% of nutritional needs by follow-up. (new)    Nutrition Goals & Monitoring      Dietitian will monitor: food and beverage intake, weight, food/nutrition knowledge skill, and glucose/endocrine profile  Discharge planning: resume home regimen  Nutrition Risk/Follow-Up: moderate (follow-up in 3-5 days)   Please consult if re-assessment needed sooner.

## 2024-12-26 NOTE — PROGRESS NOTES
[facility] ([facility])  Rehab Progress Note    Patient Name: Luz Maria Alfaro  MRN: [id number]  Age: 64 y.o. Sex: female  : 1960  Hospital Length of Stay: 2 days  Date of Service: 2024   Chief Complaint: Displaced fracture of left femoral neck s/p left hemiarthroplasty on 2024     Subjective:     Basic Information  Admit Information: 63-year-old white female presented to [facility] clinical on 2024 for follow up visit for left hip pain that has been progressively worsening.  Reportedly fell 2 months ago.  Imaging on  significant for left displaced femoral neck fracture.  Recommended to go to [facility] ED on  for admission.  PMH significant for HTN, dm type 2, HLD, GERD, and nicotine dependence.  Workup significant for left displaced femoral neck fracture.  Tolerated left hip hemiarthroplasty on  without perioperative complications.  She did require 2 units PRBC.  Sodium bicarb tablets initiated due to metabolic acidosis.  Currently Min assist.  Tolerated transfer to [facility] inpatient rehab unit on  without incident.   Today's Information: No acute events overnight.  Sitting up in chair.  Reports improved sleep.  Appetite improving.  Last BM .  Vital signs at goal with no recorded fevers.  Good glycemic control.  No new labs or imaging today.    Review of patient's allergies indicates:   Allergen Reactions    Iodinated contrast media Shortness Of Breath    Opioids - morphine analogues     Meperidine Rash and Hives        Current Facility-Administered Medications:     acetaminophen tablet 650 mg, 650 mg, Oral, Q6H, Bere, Moy A, FNP, 650 mg at 24 05    acetaminophen tablet 650 mg, 650 mg, Oral, Q6H PRN, Bere, Moy A, FNP    ALPRAZolam tablet 0.25 mg, 0.25 mg, Oral, TID PRN, Bere, Moy A, FNP, 0.25 mg at 24    ARIPiprazole tablet 2 mg, 2 mg, Oral, Daily, Bere, Moy A, FNP, 2 mg at 24 0727     atorvastatin tablet 40 mg, 40 mg, Oral, QHS, Bere, Moy A, FNP, 40 mg at 12/25/24 2026    benzonatate capsule 100 mg, 100 mg, Oral, TID PRN, Bere, Moy A, FNP    busPIRone tablet 5 mg, 5 mg, Oral, BID, Bere, Moy A, FNP, 5 mg at 12/26/24 0747    dextrose 50% injection 12.5 g, 12.5 g, Intravenous, PRN, Bere, Moy A, FNP    dextrose 50% injection 25 g, 25 g, Intravenous, PRN, Bere, Moy A, FNP    docusate sodium capsule 100 mg, 100 mg, Oral, BID, Bere, Moy A, FNP, 100 mg at 12/26/24 0747    enoxaparin injection 30 mg, 30 mg, Subcutaneous, Q24H (prophylaxis, 1700), Bere, Moy A, FNP, 30 mg at 12/25/24 1743    EScitalopram oxalate tablet 5 mg, 5 mg, Oral, Daily, Bere, Moy A, FNP, 5 mg at 12/26/24 0747    gabapentin capsule 300 mg, 300 mg, Oral, QHS, Bere, Moy A, FNP, 300 mg at 12/25/24 2026    glucagon (human recombinant) injection 1 mg, 1 mg, Intramuscular, PRN, Bere, Moy A, FNP    glucose chewable tablet 16 g, 16 g, Oral, PRN, Bere, Moy A, FNP    glucose chewable tablet 24 g, 24 g, Oral, PRN, Bere, Moy A, FNP    hydrALAZINE injection 10 mg, 10 mg, Intravenous, Q4H PRN, Bere, Moy A, FNP    HYDROcodone-acetaminophen 5-325 mg per tablet 1 tablet, 1 tablet, Oral, Daily PRN, Bere, Moy A, FNP    hydrOXYzine pamoate capsule 50 mg, 50 mg, Oral, Nightly PRN, Bere, Moy A, FNP    insulin aspart U-100 injection 0-10 Units, 0-10 Units, Subcutaneous, QID (AC + HS) PRN, Bere, Moy A, FNP    labetalol 20 mg/4 mL (5 mg/mL) IV syring, 10 mg, Intravenous, Q4H PRN, Moy Blackburn FNP    methocarbamoL tablet 750 mg, 750 mg, Oral, TID, Moy Blackburn FNP, 750 mg at 12/26/24 0513    metoprolol injection 10 mg, 10 mg, Intravenous, Q2H PRN, Moy Blackburn FNP    nicotine 21 mg/24 hr 1 patch, 1 patch, Transdermal, Daily, Moy Blackburn FNP, 1 patch at 12/26/24 0748    nitroGLYCERIN  "SL tablet 0.4 mg, 0.4 mg, Sublingual, Q5 Min PRN, Bere, Moy A, FNP    ondansetron disintegrating tablet 4 mg, 4 mg, Oral, Q6H PRN, Bere, Moy A, FNP    ondansetron disintegrating tablet 8 mg, 8 mg, Oral, Q8H PRN, Bere, Moy A, FNP    ondansetron injection 4 mg, 4 mg, Intravenous, Q8H PRN, Bere, Moy A, FNP    oxyCODONE immediate release tablet 10 mg, 10 mg, Oral, Q4H PRN, Bere, Moy A, FNP, 10 mg at 12/26/24 0755    oxyCODONE immediate release tablet 5 mg, 5 mg, Oral, Q4H PRN, Bere, Moy A, FNP, 5 mg at 12/25/24 1743    pantoprazole EC tablet 40 mg, 40 mg, Oral, BID, Bere, Moy A, FNP, 40 mg at 12/26/24 0748    polyethylene glycol packet 17 g, 17 g, Oral, BID PRN, Bere, Moy A, FNP, 17 g at 12/26/24 0747    sodium bicarbonate tablet 650 mg, 650 mg, Oral, BID, Bere, Moy A, FNP, 650 mg at 12/26/24 0748     Review of Systems   Complete 12-point review of symptoms negative except for what's mentioned in HPI     Objective:     /65   Pulse 94   Temp 97.9 °F (36.6 °C) (Oral)   Resp 18   Ht 5' 3" (1.6 m)   Wt 50.9 kg (112 lb 3.4 oz)   SpO2 (!) 92%   BMI 19.88 kg/m²      Physical Exam  Constitutional:       Appearance: Normal appearance.   HENT:      Head: Normocephalic.      Mouth/Throat:      Mouth: Mucous membranes are moist.   Eyes:      Pupils: Pupils are equal, round, and reactive to light.   Cardiovascular:      Rate and Rhythm: Normal rate and regular rhythm.      Heart sounds: Normal heart sounds.   Pulmonary:      Effort: Pulmonary effort is normal.      Breath sounds: Normal breath sounds.   Abdominal:      General: Bowel sounds are normal.      Palpations: Abdomen is soft.   Musculoskeletal:      Cervical back: Neck supple.      Comments: Left hip dressing clean and intact    Skin:     General: Skin is warm and dry.   Neurological:      Mental Status: She is alert and oriented to person, place, and time.      Motor: Weakness " present.   Psychiatric:         Mood and Affect: Mood normal.         Behavior: Behavior normal.         Thought Content: Thought content normal.         Judgment: Judgment normal.     *MD performed and documented physical examination       Lines/Drains/Airways       Peripheral Intravenous Line  Duration                  Peripheral IV - Single Lumen 12/21/24 0430 20 G Anterior;Right Forearm 5 days                  Labs  Admission on 12/24/2024   Component Date Value Ref Range Status    POCT Glucose 12/24/2024 119 (H)  70 - 110 mg/dL Final    POCT Glucose 12/24/2024 115 (H)  70 - 110 mg/dL Final    Sodium 12/25/2024 140  136 - 145 mmol/L Final    Potassium 12/25/2024 3.7  3.5 - 5.1 mmol/L Final    Chloride 12/25/2024 106  98 - 107 mmol/L Final    CO2 12/25/2024 25  23 - 31 mmol/L Final    Glucose 12/25/2024 82  82 - 115 mg/dL Final    Blood Urea Nitrogen 12/25/2024 8.4 (L)  9.8 - 20.1 mg/dL Final    Creatinine 12/25/2024 0.63  0.55 - 1.02 mg/dL Final    Calcium 12/25/2024 8.8  8.4 - 10.2 mg/dL Final    Protein Total 12/25/2024 5.8  5.8 - 7.6 gm/dL Final    Albumin 12/25/2024 2.9 (L)  3.4 - 4.8 g/dL Final    Globulin 12/25/2024 2.9  2.4 - 3.5 gm/dL Final    Albumin/Globulin Ratio 12/25/2024 1.0 (L)  1.1 - 2.0 ratio Final    Bilirubin Total 12/25/2024 1.0  <=1.5 mg/dL Final    ALP 12/25/2024 215 (H)  40 - 150 unit/L Final    ALT 12/25/2024 32  0 - 55 unit/L Final    AST 12/25/2024 52 (H)  5 - 34 unit/L Final    eGFR 12/25/2024 >60  mL/min/1.73/m2 Final    Anion Gap 12/25/2024 9.0  mEq/L Final    BUN/Creatinine Ratio 12/25/2024 13   Final    Magnesium Level 12/25/2024 1.80  1.60 - 2.60 mg/dL Final    Phosphorus Level 12/25/2024 2.7  2.3 - 4.7 mg/dL Final    Prealbumin 12/25/2024 13.3 (L)  14.0 - 37.0 mg/dL Final    Ferritin Level 12/25/2024 318.94 (H)  4.63 - 204.00 ng/mL Final    Iron Binding Capacity Unsaturated 12/25/2024 131  70 - 310 ug/dL Final    Iron Level 12/25/2024 52  50 - 170 ug/dL Final    Transferrin  12/25/2024 160 (L)  173 - 360 mg/dL Final    Iron Binding Capacity Total 12/25/2024 183 (L)  250 - 450 ug/dL Final    Iron Saturation 12/25/2024 28  20 - 50 % Final    WBC 12/25/2024 7.08  4.50 - 11.50 x10(3)/mcL Final    RBC 12/25/2024 2.79 (L)  4.20 - 5.40 x10(6)/mcL Final    Hgb 12/25/2024 8.9 (L)  12.0 - 16.0 g/dL Final    Hct 12/25/2024 27.9 (L)  37.0 - 47.0 % Final    MCV 12/25/2024 100.0 (H)  80.0 - 94.0 fL Final    MCH 12/25/2024 31.9 (H)  27.0 - 31.0 pg Final    MCHC 12/25/2024 31.9 (L)  33.0 - 36.0 g/dL Final    RDW 12/25/2024    Final    Unable to calculate      Platelet 12/25/2024 219  130 - 400 x10(3)/mcL Final    MPV 12/25/2024 9.6  7.4 - 10.4 fL Final    Neut % 12/25/2024 66.2  % Final    Lymph % 12/25/2024 20.5  % Final    Mono % 12/25/2024 10.7  % Final    Eos % 12/25/2024 1.7  % Final    Basophil % 12/25/2024 0.3  % Final    Lymph # 12/25/2024 1.45  0.6 - 4.6 x10(3)/mcL Final    Neut # 12/25/2024 4.69  2.1 - 9.2 x10(3)/mcL Final    Mono # 12/25/2024 0.76  0.1 - 1.3 x10(3)/mcL Final    Eos # 12/25/2024 0.12  0 - 0.9 x10(3)/mcL Final    Baso # 12/25/2024 0.02  <=0.2 x10(3)/mcL Final    IG# 12/25/2024 0.04  0 - 0.04 x10(3)/mcL Final    IG% 12/25/2024 0.6  % Final    NRBC% 12/25/2024 0.0  % Final    RBC Morph 12/25/2024 Abnormal (A)  Normal Final    Anisocytosis 12/25/2024 3+ (A)  (none) Final    Hypochromasia 12/25/2024 1+ (A)  (none) Final    Macrocytosis 12/25/2024 1+ (A)  (none) Final    Platelets 12/25/2024 Adequate  Normal, Adequate Final    Hemoglobin A1c 12/25/2024 5.0  <=7.0 % Final    Estimated Average Glucose 12/25/2024 96.8  mg/dL Final    POCT Glucose 12/25/2024 107  70 - 110 mg/dL Final    POCT Glucose 12/25/2024 113 (H)  70 - 110 mg/dL Final    POCT Glucose 12/25/2024 102  70 - 110 mg/dL Final    POCT Glucose 12/25/2024 112 (H)  70 - 110 mg/dL Final     Radiology  Left hip and pelvis XR on 12/20/2024, IMPRESSION:  Satisfactory alignment following left hip  arthroplasty.    Assessment/Plan:     64 y.o. WF admitted on 12/24/2024    Displaced fracture of left femoral neck   - s/p left hemiarthroplasty on 12/20/2024  - WBAT to LLE without external or internal rotation of the hip  - staples to be removed on 01/10  - continue                Tylenol 650 mg every 6 hours                Gabapentin 300 mg nightly                 Robaxin 750 mg t.i.d.                Norco 5 mg/325 mg daily p.r.n. prior to therapy                Oxycodone 5 mg q.4 hours p.r.n. for moderate pain                 Oxycodone 10 mg q.4 hours p.r.n. for severe pain  - defer to physiatry for rehab and pain management  - PT/OT/RT/ST following     Normocytic anemia  - asymptomatic  - s/p transfusion           x2 units PRBC on 12/21/2024  - H/H stable   - no evidence of active bleeds  - will closely monitor and transfuse if needed      Depression/anxiety  - stable  - continue                Abilify 2 mg daily                 BuSpar 5 mg b.i.d.                 Lexapro 5 mg daily     HLD  - FLP outpatient  - continue                Lipitor 40 mg nightly      Nicotine dependence  - smoking cessation counseling initiated on admission  - continue                Nicotine patch daily     GERD  - Avoid spicy foods, and nothing to eat or drink within x2 hours of bedtime or laying flat (water is ok)   - Avoid NSAIDs (Advil, ibuprofen, naproxen...) and chino-2 inhibitors (Mobic, Celebrex)    - continue                Protonix 40 mg b.i.d.     Constipation  - stable   - continue  Colace 100 mg BID   Miralax 17 gm b.i.d. p.r.n.     Metabolic acidosis  - sodium bicarb 25  - continue                Sodium bicarbonate 650 mg b.i.d.     Moderate protein calorie malnutrition  - albumin 2.9/prealbumin 13.3  - encourage oral nutrition  - registered dietitian following     DM type II  - HgA1c 5.0 on 12/25/2024  - continue                ISS   - CBGs AC/HS     HTN  - BP at goal  - continue                Hydralazine 10 mg every 2  hours as needed for BP > 160/90                Labetalol 10 mg every 2 hours as needed for BP > 160/90  - low sodium diet     VTE Prophylaxis:  Lovenox 30 mg daily  COVID-19 testing:  Unknown  COVID-19 vaccination status:  Unvaccinated     POA:  No  Living will:  No  Contacts:  Jaxon Baum (son) [phone]     CODE STATUS:  Full code  Internal Medicine (attending): George Do MD  Physiatry (consulting):  Nam Swift MD     OUTPATIENT PROVIDERS  PCP: ALEKS Mcfadden  Orthopedic surgery:  Jose J Montemayor D.O.     DISPOSITION:  Sleep hygiene, bowel maintenance, and appetite at goal.  Vital signs at goal with no recorded fevers.  Good glycemic control.  No new labs or imaging today.     Mao Blackburn NP conducted independent physical examination and assisted with medical documentation.     Total time spent on this encounter including chart review and direct MD + NP 1-on-1 patient interaction: 54 minutes   Over 50% of this time was spent in counseling and coordination of care

## 2024-12-26 NOTE — PT/OT/SLP PROGRESS
"Occupational Therapy Inpatient Rehab Treatment    Name: Luz Maria Alfaro  MRN: [id number]    Assessment:  Luz Maria Alafro is a 64 y.o. female admitted with a medical diagnosis of Displaced fracture of left femoral neck.  She presents with the following impairments/functional limitations:  weakness, impaired endurance, impaired self care skills, impaired functional mobility, gait instability, impaired balance, decreased lower extremity function, decreased safety awareness, pain.    General Precautions: Standard, fall, hearing impaired     Orthopedic Precautions:LLE non weight bearing (no IR/ER)     Braces: N/A    Rehab Prognosis: Good; patient would benefit from acute skilled OT services to address these deficits and reach maximum level of function.      History:     Past Medical History:   Diagnosis Date    Benign essential HTN     Mixed hyperlipidemia     Type 2 diabetes mellitus without complications        Past Surgical History:   Procedure Laterality Date    APPENDECTOMY      BACK SURGERY      BREAST SURGERY  01 13 2002    Augmantation    HEMIARTHROPLASTY OF HIP Left 12/20/2024    Procedure: HEMIARTHROPLASTY, HIP;  Surgeon: Jose J Montemayor DO;  Location: [facility];  Service: Orthopedics;  Laterality: Left;  lateral peg board kevin    HIP SURGERY      INTRAMEDULLARY RODDING OF FEMUR Right 10/27/2022    Procedure: RIGHT FEMUR INTERTROCH IMN;  Surgeon: Stewart Brambila MD;  Location: [facility];  Service: Orthopedics;  Laterality: Right;  [place] table, supine  3rd case  Synthes Short TFNA       Subjective     Orientation: Oriented x4    Chief Complaint: pain    Patient/Family Comments/goals: "I in pain right now but I want to do a shower."    Respiratory Status: Room air    Patients cultural, spiritual, Amish conflicts given the current situation: no     Objective:     Patient found supine with peripheral IV  upon OT entry to room.    Mobility   Patient completed:  Supine to Sit with contact guard assistance  Sit " to Supine with minimum assistance  Sit to Stand Transfer with contact guard assistance with rolling walker  Stand to Sit Transfer with contact guard assistance with rolling walker  Tub Transfer Step Transfer technique with contact guard assistance with rolling walker onto TTB.    Functional Mobility  In room mobility for ADLs with RW with CGA.    ADLs   Current Status   Eating 5   Oral Hygiene 4 standing at sink    Shower, Bathe Self 4 incidental touching, used LH sponge   Upper Body Dressing 4    Lower Body Dressing 3 used reacher initially but pain increased and required more assist   Putting On, Taking Off Footwear 2 able to doffed sock with reacher, but assist with donning socks due to increased pain.     Limiting Factors for ADLs: motor, psychsocial, endurance, limited ROM, balance, weakness, safety awareness, and pain     Patient left supine with all lines intact and call button in reach.     Education provided: Roles and goals of OT, ADLs, transfer training, bed mobility, assistive device, sequencing, safety precautions, and fall prevention    Multidisciplinary Problems       Occupational Therapy Goals          Problem: Occupational Therapy    Goal Priority Disciplines Outcome Interventions   Occupational Therapy Goal     OT, PT/OT Progressing    Description: ADLs:  Pt to perform grooming tasks with SBA standing at sink.  Pt to perform feeding tasks with independence.  Pt to perform UB dressing with SBA.  Pt to perform LB dressing with max assist with AE as needed.   Pt to perform putting on/off footwear task with max assist with AE as needed.  Pt to perform toileting with min assist.  Pt to perform bathing with mod assist with AE as needed.    Functional Transfers:  Pt to perform toilet transfers with CGA to toilet in bathroom.  Pt to perform a tub transfer with mod assist with AE as needed onto TTB.    IADLs:  Pt to perform simple meal prep with mod assist.    Balance, Strengthening, Endurance, Balance:  Pt  to consistently demonstrate adherence to orthopedic precautions during all ADL's as instructed by OT.  Pt to demonstrate good dynamic standing balance as required to perform ADL's from standing level.  Pt to demonstrate good BUE strength during functional task   Pt to demonstrate consistent adherence to breathing control and energy conservation techniques as educated by OT.                        Time Tracking     OT Received On: 12/26/24  Time In 1300     Time Out 1400  Total Time 60 min  Therapy Time: OT Individual: 60  Missed Time:    Missed Time Reason:      Billable Minutes: Self Care/Home Management 60    12/26/2024

## 2024-12-26 NOTE — PLAN OF CARE
12/26/24 0822   Depression Screen (Over the Past Two Weeks)   Have You Felt Down, Depressed or Hopeless? yes   Have You Felt Little Interest or Pleasure in Doing Things? yes   Depression Patient Health Questionnaire (PHQ-9)   Over the last two weeks how often have you been bothered by little interest or pleasure in doing things 3   Over the last two weeks how often have you been bothered by feeling down, depressed or hopeless 3   Over the last two weeks how often have you been bothered by trouble falling or staying asleep, or sleeping too much 2   Over the last two weeks how often have you been bothered by feeling tired or having little energy 0   Over the last two weeks how often have you been bothered by a poor appetite or overeating 0   Over the last two weeks how often have you been bothered by feeling bad about yourself - or that you are a failure or have let yourself or your family down 0   Over the last two weeks how often have you been bothered by trouble concentrating on things, such as reading the newspaper or watching television 0   Over the last two weeks how often have you been bothered by moving or speaking so slowly that other people could have noticed. Or the opposite - being so fidgety or restless that you have been moving around a lot more than usual. 3   Over the last two weeks how often have you been bothered by thoughts that you would be better off dead, or of hurting yourself 0   If you checked off any problems, how difficult have these problems made it for you to do your work, take care of things at home or get along with other people? Extremely difficult   PHQ-9 Score 11   PHQ-9 Interpretation Moderate     Admission PHQ.  Alerting Nps to result.  Pt attributes most sxs on her physical decline and dependence on family members over the last several months.  She feels her decline and pain were dismissed by medical staff she was seeing from home.

## 2024-12-26 NOTE — CONSULTS
"Dos 12/26/24  I have evaluated the patient on initial IRF admit. I have been involved in rehab prescreen. I have reviewed records.I have reviewed admit orders.  I find the patient appropriate for, in need of and should tolerate an aggressive IRF program with good potential for functional improvement.  I am in agreement with initial Plan of Care  I have been involved in the creation of this initial PM&R consult with Kamini Swift MD  Chief Complaint  left displaced femoral neck fracture    Reason for Consultation  Physiatry    History of Present Illness  Admit MD: George Do MD  Consult Physiatry: Nam Swift MD  HPI: 64 year old WF with a PMH of HTN, DM, depression, HLD, GERD, tobacco dependence, very [place], chronic back pain with multiple surgeries in past, hx of substance abuse of "crack" cocaine (clean 8 years), and right hip sx in past presented to the ED at [facility] on 12/19/24 with a left displaced femoral neck fracture which possibly happened after a fall in September of 2024. Patient had XR of hip on 12/18 which showed fracture ordered from orthopedic clinic with Dr Rivas with instruction to go to ED. Patient reported she had been taking upwards of 20+ Tylenol 500mg per day, and reported she had taken 8 tylenol so far that day of arrival to ED. Labs showed elevated MCV of 102.1, elevated MCH of 32.0, low MCHC of 31.4, low INR of 1.1, elevated chloride of 116, low CO2 of 12, elevated ALP of 380, elevated AST of 36, acetaminophen level of <10.0, salicylate level of <5.0. Chest XR showed no acute chest disease identified. Poison control was contacted secondary to significant acetaminophen abuse with initial recommendation to give Acetadote, but then recommended holding off due to lab results with continued monitoring of acetaminophen level and CMP. Due to possible liver damage, patient was transferred to [facility] on 12/20. Nicotine patch given for tobacco use. Patient underwent a left hip " hemiarthroplasty by Dr. INDIRA Montemayor, and placed WBAT to LLE without external or internal rotation of the hip. On 12/21, labs showed low H&H of 5.7 & 17.9 so transfused 2 units of PRBCs. Sodium bicarb tablets started for acidosis, held Lovenox, and hepatitis panel was nonreactive. On 12/22, PT/OT evals completed with deficits noted with recommendation for high intensity therapy needed. Labs showed low H&H of 8.8 & 25.6 post transfusion. Started Lovenox, and OK for ASA 81mg BID on DC for 30 days. Dry dressing daily or open to air (No creams/ointments), and will need follow up with Dr Montemayor office in 3 weeks. IV fluids stopped. On 12/23, labs showed low RBC of 2.62, low H&H of 8.4 & 24.5, elevated MCH of 32.1, elevated RDW of 25.6, low iron of 17, low TIBC of 167, low transferrin of 149, low iron sat of 10, elevated ALP of 210, low protein of 5.3, and low albumin of 3.1. Vital signs stable. On 12/24, labs showed low H&H of 8.1 & 25.2. Last BM noted on 12/18. Purewick in place. Pratient is AAOx4; extremely [place].  Functional status includes minimal assist for bed mobility, minimal assist for transfers with RW, walked 20ft x 2 with RW at minimal assist, max assist for lower body dressing, total assist for toileting with purewick in place, and setup assist for eating and grooming while seated. Participating with therapy. Patient was evaluated, accepted, and admitted to inpatient rehab to improve functional status. Transferred to [facility] on 12/24 without incident.     12/26: Seen in patient room, resting comfortably in bed following PT. Patient very [place] and does not wake with verbal cues. 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 Calling a friend about her hearing aids. Tells me history of breaking her ankle and seeing a podiatrist that gave her no pain medication and her friend didn't care for so she was told to not continue following. Denies being in any bracing or boot for ankle fracture. She then fell  "on her knees when RW caught on bedroom floor, and thought that she had a knee injury prior to being told it was her hip. States that the NP that was giving her pain medication, stopped prescribing and she went into DT's. Agrees that she could benefit from a pain management doctor. Discussed current pain related to Left hip surgery and advised to not apply creams/lotions to left leg above the knee, only ice. Medications adjusted. Reports sleep has been affected by pain as well. Added medication for tonight. States that she is hungry and her appetite is doing great. Wanting lunch at 1030am. Add double protein to her diet and Boost Breeze BID as she does not want milk 2/2 constipation. Relayed preferences to . LBM documented today, but nursing relays that patient "digging out" stool. Patient tells me this is a common practice for her at home as well. Discussed use of fleet enema's daily to fully evacuate and resolve current constipation along with diet adjustments. States that she needs to put fixadent on her list for family or friend to bring her, as she hasn't tried to secure her top dentures. States that they are loose from weight loss, and she has lost her bottom dentures. Therapy evaluating, but limited by pain. VSSAF.                Review of Systems  Barriers to Discharge:  Social: Completed 10th grade. She has no  history. She is a retired  for [employer].  and . Her ex- takes the patient to appointments and runs errands.  The patients son and 2 grandchildren live with her. They cook, clean, and do laundry.  Ex- hires someone for lawn maintenance.  Children: (1)    Medical:   Displaced fracture of left femoral neck s/p left hemiarthroplasty on 12/20/2024, normocytic anemia, depression/anxiety, HTN, HLD, nicotine dependence, GERD, DM type 2     Functional:    Prior Level of Fx:  Needed min assist for sponge bathing hard to reach areas due to " chronic back pain over the last year. She uses a RW for mobility and a wheelchair for appointments. She does not drive.  She cooks, grocery shops online, manages her own finances, and manages her own medications.  She has a medic alert.    Residence: Patient, son, and his 2 children live in [place] in a 2-story home with a threshold entrance. She does not need to go upstairs.  She has a tub/shower combination with grab bars and no HHS. She does not have an elevated toilet. She has a grab bar near the toilet.   DME: RW, BSC, wheelchair, and grab bars.   Psychiatric: Has a history of depression. She is a tobacco smoker. She has a history of ETOH and substance abuse (crack cocaine) but quit many years ago (8y).    Depression/Anxiety:      busPIRone tablet 5 mg BID  EScitalopram oxalate tablet 5 mg qd  nicotine 21 mg/24 hr 1 patch qd  ALPRAZolam tablet 0.25 mg TID PRN anxiety  Pain:  chronic pain c/o including old ankle and knee fractures and LBP, spasms/tingling pains radiating down legs. Cymbalta contraindicated with Liver disease.  acetaminophen tablet 650 mg q6h  ADD LIDOcaine 5 % patch 2 patch q24h, 0600a, left posterior hip and anterior thigh   ADD gabapentin capsule 100mg TID AC  gabapentin capsule 300 mg qHS  methocarbamoL tablet 750 mg TID. Increase q6h  acetaminophen tablet 650 mg q6h PRN mild pain. Decrease BID PRN mild pain  oxyCODONE immediate release tablet 5 mg q4h PRN mod pain  oxyCODONE immediate release tablet 10 mg q4h PRN severe pain    Bowels/Bladder: last BM 12/26. Complaints of constipation and digging stool out (here and at home). Agreeable to daily fleet enema's to fully evacuate. Discussed w/Nurse.     Appetite: good. She is hungry for lunch at 1030am. Add Double protein and Boost Breeze as she feels like milk, potatoes, and noodles further constipate her. Pudding helps like a laxative per patient. Relayed to Kitchen.      Sleep: reports decrease with pain. Sleeps well after medication.      hydrOXYzine pamoate capsule 50 mg qHS PRN Insomnia/Itching. Schedule qPM (1800)        Physical Exam  General: well-developed, well-nourished, in no acute distress, [place]  Eye: EOMI, clear conjunctiva, eyelids normal, glasses  HENT: normocephalic, [place], ill fitting top dentures, poor dentition with missing lower teeth  Neck: full range of motion, supple  Respiratory: equal chest rise, no SOB, no audible wheeze  Cardiovascular: regular rate and rhythm, no edema  Gastrointestinal: soft, non-tender, non-distended   Musculoskeletal: decreased ROM/strength to LLE, generalized weakness  Integumentary: no rashes or skin lesions present, left hip incision-surgical glue, dressing-c/d/I; LUE skin tear-foam dressing  Neurologic: cranial nerves intact, +peripheral neurological deficit-tingling LE  *MD performed and documented physical examination           Labs:   Latest Reference Range & Units 12/25/24 05:40   WBC 4.50 - 11.50 x10(3)/mcL 7.08   RBC 4.20 - 5.40 x10(6)/mcL 2.79 (L)   Hemoglobin 12.0 - 16.0 g/dL 8.9 (L)   Hematocrit 37.0 - 47.0 % 27.9 (L)   MCV 80.0 - 94.0 fL 100.0 (H)   MCH 27.0 - 31.0 pg 31.9 (H)   MCHC 33.0 - 36.0 g/dL 31.9 (L)   RDW  See Comments   Platelet Count 130 - 400 x10(3)/mcL 219   MPV 7.4 - 10.4 fL 9.6   Platelet Estimate Normal, Adequate  Adequate   Neut % % 66.2   LYMPH % % 20.5   Mono % % 10.7   Eos % % 1.7   Basophil % % 0.3   Immature Granulocytes % 0.6   Neut # 2.1 - 9.2 x10(3)/mcL 4.69   Lymph # 0.6 - 4.6 x10(3)/mcL 1.45   Mono # 0.1 - 1.3 x10(3)/mcL 0.76   Eos # 0 - 0.9 x10(3)/mcL 0.12   Baso # <=0.2 x10(3)/mcL 0.02   Immature Grans (Abs) 0 - 0.04 x10(3)/mcL 0.04   nRBC % 0.0   Macrocytosis (none)  1+ !   Aniso (none)  3+ !   Hypo (none)  1+ !   RBC Morph Normal  Abnormal !   Iron 50 - 170 ug/dL 52   TIBC 250 - 450 ug/dL 183 (L)   UIBC 70 - 310 ug/dL 131   Transferrin 173 - 360 mg/dL 160 (L)   Ferritin 4.63 - 204.00 ng/mL 318.94 (H)   Iron Saturation 20 - 50 % 28   Sodium 136 - 145 mmol/L 140    Potassium 3.5 - 5.1 mmol/L 3.7   Chloride 98 - 107 mmol/L 106   CO2 23 - 31 mmol/L 25   Anion Gap mEq/L 9.0   BUN 9.8 - 20.1 mg/dL 8.4 (L)   Creatinine 0.55 - 1.02 mg/dL 0.63   BUN/CREAT RATIO  13   eGFR mL/min/1.73/m2 >60   Glucose 82 - 115 mg/dL 82   Calcium 8.4 - 10.2 mg/dL 8.8   Phosphorus Level 2.3 - 4.7 mg/dL 2.7   Magnesium  1.60 - 2.60 mg/dL 1.80   ALP 40 - 150 unit/L 215 (H)   PROTEIN TOTAL 5.8 - 7.6 gm/dL 5.8   Albumin 3.4 - 4.8 g/dL 2.9 (L)   Albumin/Globulin Ratio 1.1 - 2.0 ratio 1.0 (L)   Prealbumin 14.0 - 37.0 mg/dL 13.3 (L)   BILIRUBIN TOTAL <=1.5 mg/dL 1.0   AST 5 - 34 unit/L 52 (H)   ALT 0 - 55 unit/L 32   Globulin, Total 2.4 - 3.5 gm/dL 2.9   Hemoglobin A1C External <=7.0 % 5.0   Estimated Avg Glucose mg/dL 96.8   (L): Data is abnormally low  (H): Data is abnormally high  !: Data is abnormal          Diagnostics:  XR CHEST 1 VIEW   Impression:  No acute chest disease is identified.  Date:                                            12/19/2024  Time:                                           12:37      XR HIP WITH PELVIS WHEN PERFORMED 2 OR 3 VIEWS LEFT   FINDINGS:  There is satisfactory alignment following left hip arthroplasty.  The soft tissues are unremarkable.  Impression:  Satisfactory alignment following left hip arthroplasty.  Date:                                            12/20/2024  Time:                                           13:03                Assessment/Plan  Hospital   Displaced fracture of left femoral neck   Moderate malnutrition     Non-Hospital   DVT (deep venous thrombosis)   Type 2 diabetes mellitus without complications   Malnutrition   Acute encephalopathy   Acute on chronic combined systolic and diastolic heart failure   Hypokalemia   Hypotension due to drugs   Calcaneal fracture   AMS (altered mental status)   Knee pain   Benign essential HTN   Mixed hyperlipidemia   Alcoholic fatty liver   COPD (chronic obstructive pulmonary disease)   Chronic diastolic heart failure    Depressive disorder   Gastroesophageal reflux disease   Heart valve disease   History of colonic polyps   Other hemorrhoids   Stage 3b chronic kidney disease   Stricture of esophagus   Tobacco user   Unspecified cirrhosis of liver   Weight loss, abnormal   Benign neoplasm of ascending colon   Benign neoplasm of descending colon   Ototoxicity of both ears   History of substance abuse       Wounds: left hip incision-surgical glue, dressing-c/d/I; LUE skin tear-foam dressing  S/p left hip hemiarthroplasty on 12/20 by Dr. INDIRA Montemayor   Precautions: WBAT to LLE without external or internal rotation of the hip  Bracing/AD: RW  Swallowing: Diabetic Diet, double protein  Function: Tolerating therapy. Continue PT/OT  VTE Prophylaxis:   enoxaparin injection 30 mg SubQ q24hr  Code Status: FULL CODE   Discharge: Patient, son, and his 2 children live in [place] in a 2-story home with a threshold entrance. She does not need to go upstairs. Completed 10th grade. She has no  history. She is a retired  for [employer].  and . Her ex- takes the patient to appointments and runs errands.  The patients son and 2 grandchildren live with her. They cook, clean, and do laundry.  Ex- hires someone for lawn maintenance.  Children: (1). Date pending.                 Tania Oshea NP, conducted additional independent physical examination and assisted with medical documentation.

## 2024-12-26 NOTE — PLAN OF CARE
12/26/24 0800   Discharge Reassessment   Assessment Type Discharge Planning Assessment   Did the patient's condition or plan change since previous assessment? No   Discharge Plan discussed with: Patient   Communicated MILAN with patient/caregiver Date not available/Unable to determine   Discharge Plan A Home Health   DME Needed Upon Discharge  other (see comments)  (TBD by rehab team)   Transition of Care Barriers None   Why the patient remains in the hospital Requires continued medical care   Post-Acute Status   Patient choice form signed by patient/caregiver List from System Post-Acute Care   Discharge Delays None known at this time

## 2024-12-26 NOTE — PROGRESS NOTES
"[facility]opa[facility] - Rehab Inpatient Services  Wound Care    Patient Name:  Luz Maria Alfaro   MRN:  [id number]  Date: 12/26/2024  Diagnosis: Displaced fracture of left femoral neck    History:     Past Medical History:   Diagnosis Date    Benign essential HTN     Mixed hyperlipidemia     Type 2 diabetes mellitus without complications        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    Sexual activity: Not Currently     Partners: Male     Social Drivers of Health     Financial Resource Strain: Patient Unable To Answer (11/19/2024)    [facility]    Overall Financial Resource Strain (CARDIA)     Difficulty of Paying Living Expenses: Patient unable to answer   Food Insecurity: Patient Unable To Answer (11/19/2024)    Received from [facility] Subsidi[facility] and Aff[facility]    Hunger Vital Sign     Worried About Running Out of Food in the Last Year: Patient unable to answer     Ran Out of Food in the Last Year: Patient unable to answer   Transportation Needs: Unmet Transportation Needs (12/26/2024)    PRAPARE - Transportation     Lack of Transportation (Medical): Yes     Lack of Transportation (Non-Medical): No   Physical Activity: Sufficiently Active (8/14/2024)    Exercise Vital Sign     Days of Exercise per Week: 5 days     Minutes of Exercise per Session: 30 min   Stress: No Stress Concern Present (8/14/2024)    [employer] [place] [employer] - Occupational Stress Questionnaire     Feeling of Stress : Only a little   Housing Stability: Patient " Unable To Answer (11/19/2024)    [facility] from [facility] Subsidiaries and Affiliates    Housing Stability Vital Sign     Unable to Pay for Housing in the Last Year: Patient unable to answer     Number of Times Moved in the Last Year: 1     Homeless in the Last Year: Patient unable to answer       Precautions:     Allergies as of 12/24/2024 - Reviewed 12/24/2024   Allergen Reaction Noted    Iodinated contrast media Shortness Of Breath 05/09/2024    Opioids - morphine analogues  05/08/2024    Meperidine Rash and Hives 08/12/2021       WOC Assessment Details/Treatment      12/26/24 1600   Pain/Comfort/Sleep   Preferred Pain Scale number (Numeric Rating Pain Scale)   Pain Rating (0-10): Rest 7   Incentive Spirometer (IS)   Administration (IS) self-administered   Number of Repetitions (IS) 10   Level Incentive Spirometer (mL) 1500   Patient Tolerance (IS) good   Incentive Spirometer Predicted Level (mL) 2000        Peripheral IV - Single Lumen 12/21/24 0430 20 G Anterior;Right Forearm   Placement Date/Time: 12/21/24 0430   Inserted by: RN  Size (G): 20 G  Orientation: Anterior;Right  Location: Forearm   Site Assessment Clean;Dry;Intact   Dressing Intervention Integrity maintained        Wound 12/26/24 1712 Skin Tear Left Arm   Date First Assessed/Time First Assessed: 12/26/24 1712   Present on Original Admission: Yes  Primary Wound Type: Skin Tear  Side: Left  Location: Arm   Dressing Appearance Intact   Drainage Amount Small   Drainage Characteristics/Odor Sanguineous   Appearance Red   Wound Length (cm) 2.5 cm   Wound Width (cm) 0.5 cm   Wound Surface Area (cm^2) 1.25 cm^2   Care Applied:   Dressing Non-adherent;Foam   Safety   Infection Prevention hand hygiene promoted;environmental surveillance performed;rest/sleep promoted   Safety Management   Safety Promotion/Fall Prevention assistive device/personal item within reach;Fall Risk reviewed with patient/family;nonskid  shoes/socks when out of bed   Patient Rounds bed in low position;bed wheels locked;call light in patient/parent reach;clutter free environment maintained;ID band on;placement of personal items at bedside;toileting offered;visualized patient   Safety Bands on Patient Fall Risk Band;Allergy Band     Lt arm with skin tear; Cleanse with saline, pat dry, apply Sensicare prep pad michael wound for adhesion and Adaptic + bordered foam.

## 2024-12-26 NOTE — PT/OT/SLP PROGRESS
Physical Therapy Inpatient Rehab Treatment    Patient Name:  Luz Maria Alfaro   MRN:  [id number]    Recommendations:     Discharge Recommendations:  High Intensity Therapy   Discharge Equipment Recommendations:     Barriers to discharge: Impaired functional mobility  and Severity of Deficits     Assessment:     Luz Maria Alfaro is a 64 y.o. female admitted with a medical diagnosis of Displaced fracture of left femoral neck.  She presents with the following impairments/functional limitations:  weakness, impaired endurance, impaired self care skills, impaired functional mobility, gait instability, impaired balance, decreased lower extremity function, pain, decreased ROM, edema, orthopedic precautions       PT note: pt limited 2/2 pain throughout session. Pt is very [place] at times required written instructions. Pt states she will get ex- (carl) to get hearing aids. Pt expressed concerns about swelling. Educated pt on elevation and continued mobility .    Rehab Diagnosis: L femoral neck fx    General Precautions: Standard, fall     Orthopedic Precautions:LLE weight bearing as tolerated (No IR/ER)     Braces: N/A    Rehab Prognosis: Good; patient would benefit from acute skilled PT services to address these deficits and reach maximum level of function.      History:     Past Medical History:   Diagnosis Date    Benign essential HTN     Mixed hyperlipidemia     Type 2 diabetes mellitus without complications        Past Surgical History:   Procedure Laterality Date    APPENDECTOMY      BACK SURGERY      BREAST SURGERY  01 13 2002    Augmantation    HEMIARTHROPLASTY OF HIP Left 12/20/2024    Procedure: HEMIARTHROPLASTY, HIP;  Surgeon: Jose J Montemayor DO;  Location: [facility];  Service: Orthopedics;  Laterality: Left;  lateral peg board kevin    HIP SURGERY      INTRAMEDULLARY RODDING OF FEMUR Right 10/27/2022    Procedure: RIGHT FEMUR INTERTROCH IMN;  Surgeon: Stewart Brambila MD;  Location: [facility];  Service:  Orthopedics;  Laterality: Right;  Tresckow table, supine  3rd case  Synthes Short TFNA       Subjective     Patient comments: N/A    Respiratory Status: Room air    Patients cultural, spiritual, Mu-ism conflicts given the current situation: no    Objective:     Communicated with TR prior to session.  Patient found up in chair with peripheral IV  upon PT entry to room.    Pt is Oriented x3 and Alert, Cooperative, and Motivated.    Vitals   Vitals at Rest  BP    HR    O2 Sat    Pain      Vitals With Activity  BP     HR     O2 Sat     Pain Pain Rating 1: 8/10  Location - Side 1: Left  Location - Orientation 1: lateral  Location 1: hip  Pain Addressed 1: Reposition, Distraction       Functional Mobility:        Current   Status  Discharge   Goal   Functional Area: Care Score:    Sit to Stand 4 Independent   Chair/Bed-to-Chair Transfer 4  VC for proper hand placement on RW overall CGA Independent   Walk 10 Feet 4  ~20' overall CGA with RW. Very slowed pace and decreased step length noted. VC to correct. Pt with limited distance 2/2 fatigue and pain Independent   Walk 50 Feet with Two Turns 88 Independent   Walk 150 Feet 88 Independent       Therapeutic Activities and Exercises:  Patient educated on role of acute care PT and PT POC and safety while in hospital including calling nurse for mobility  Patient educated about importance of OOB mobility and remaining up in chair most of the day.    Therapeutic exercises were performed seated in wheelchair with support due to pt refused 2/2 pain :    long arc quads with 0 weights  calf raises/toe raises with 0 weights  All therex performed 15x3 reps each         Activity Tolerance: Fair    Patient left up in chair with  CNA present.    Education provided: roles and goals of PT/PTA, transfer training, gait training, safety awareness, body mechanics, assistive device, and strengthening exercises    Expected compliance: High compliance    Plan:     During this hospitalization,  patient to be seen 5 x/week to address the identified rehab impairments via gait training, therapeutic exercises, therapeutic activities and progress toward the following goals:    GOALS:   Multidisciplinary Problems       Physical Therapy Goals          Problem: Physical Therapy    Goal Priority Disciplines Outcome Interventions   Physical Therapy Goal     PT, PT/OT Progressing    Description:   PT Short Term goals     Bed Mobility:  Roll left and right with supervision/touching assist.  Sit to supine transfer with partial/moderate assist.  Supine to sit transfer with partial/moderate assist.    Transfers:  Sit to stand transfer with setup/clean-up assist using RW.  Bed to chair transferStand step with setup/clean-up assist using RW.  Car transfer with setup/clean-up assist using RW.   an object from the ground in standing position with supervision/touching assist using RW.    Mobility:  Ambulate 150 feet with partial/moderate assist using RW.  Ambulate 10 feet on uneven surfaces/ramps with partial/moderate assist using RW.  Ascend/descend a 4 inch curb with partial/moderate assist using RW.   Ascend/descend 12 stairs with partial/moderate assist using bilateral handrails.  Manual wheelchair 150 feet with supervision/touching assist using Bilateral upper extremity.      Time Frame: by Re-evaluation                        Plan of Care Expires:  12/30/24  PT Next Visit Date: 12/30/24  Plan of Care reviewed with: patient    Additional Information:         Time Tracking:     Therapy Time  PT Received On: 12/26/24  PT Start Time: 0900  PT Stop Time: 0930  PT Total Time (min): 30 min   PT Individual: 30  Missed Time:    Time Missed due to:      Billable Minutes: Gait Training 20 and Therapeutic Exercise 10    12/26/2024

## 2024-12-27 LAB
POCT GLUCOSE: 132 MG/DL (ref 70–110)
POCT GLUCOSE: 93 MG/DL (ref 70–110)

## 2024-12-27 PROCEDURE — 97530 THERAPEUTIC ACTIVITIES: CPT

## 2024-12-27 PROCEDURE — 97530 THERAPEUTIC ACTIVITIES: CPT | Mod: CQ

## 2024-12-27 PROCEDURE — 94761 N-INVAS EAR/PLS OXIMETRY MLT: CPT

## 2024-12-27 PROCEDURE — 97110 THERAPEUTIC EXERCISES: CPT

## 2024-12-27 PROCEDURE — 99233 SBSQ HOSP IP/OBS HIGH 50: CPT | Mod: ,,, | Performed by: NURSE PRACTITIONER

## 2024-12-27 PROCEDURE — 94799 UNLISTED PULMONARY SVC/PX: CPT

## 2024-12-27 PROCEDURE — 97535 SELF CARE MNGMENT TRAINING: CPT

## 2024-12-27 PROCEDURE — 25000003 PHARM REV CODE 250: Performed by: NURSE PRACTITIONER

## 2024-12-27 PROCEDURE — 99900031 HC PATIENT EDUCATION (STAT)

## 2024-12-27 PROCEDURE — 97110 THERAPEUTIC EXERCISES: CPT | Mod: CQ

## 2024-12-27 PROCEDURE — 63600175 PHARM REV CODE 636 W HCPCS: Performed by: NURSE PRACTITIONER

## 2024-12-27 PROCEDURE — 11800000 HC REHAB PRIVATE ROOM

## 2024-12-27 PROCEDURE — S4991 NICOTINE PATCH NONLEGEND: HCPCS | Performed by: NURSE PRACTITIONER

## 2024-12-27 PROCEDURE — 97116 GAIT TRAINING THERAPY: CPT | Mod: CQ

## 2024-12-27 RX ORDER — HYDROXYZINE PAMOATE 50 MG/1
50 CAPSULE ORAL NIGHTLY PRN
Status: DISCONTINUED | OUTPATIENT
Start: 2024-12-27 | End: 2025-01-06 | Stop reason: HOSPADM

## 2024-12-27 RX ORDER — GABAPENTIN 100 MG/1
100 CAPSULE ORAL
Status: DISCONTINUED | OUTPATIENT
Start: 2024-12-28 | End: 2025-01-06 | Stop reason: HOSPADM

## 2024-12-27 RX ORDER — METHOCARBAMOL 500 MG/1
500 TABLET, FILM COATED ORAL EVERY 6 HOURS
Status: DISCONTINUED | OUTPATIENT
Start: 2024-12-27 | End: 2025-01-06 | Stop reason: HOSPADM

## 2024-12-27 RX ADMIN — ACETAMINOPHEN 650 MG: 325 TABLET ORAL at 12:12

## 2024-12-27 RX ADMIN — OXYCODONE 5 MG: 5 TABLET ORAL at 05:12

## 2024-12-27 RX ADMIN — METHOCARBAMOL 750 MG: 750 TABLET ORAL at 05:12

## 2024-12-27 RX ADMIN — ATORVASTATIN CALCIUM 40 MG: 40 TABLET, FILM COATED ORAL at 08:12

## 2024-12-27 RX ADMIN — DOCUSATE SODIUM 100 MG: 100 CAPSULE, LIQUID FILLED ORAL at 07:12

## 2024-12-27 RX ADMIN — ENOXAPARIN SODIUM 30 MG: 30 INJECTION SUBCUTANEOUS at 05:12

## 2024-12-27 RX ADMIN — METHOCARBAMOL 750 MG: 750 TABLET ORAL at 12:12

## 2024-12-27 RX ADMIN — ONDANSETRON 4 MG: 4 TABLET, ORALLY DISINTEGRATING ORAL at 02:12

## 2024-12-27 RX ADMIN — ESCITALOPRAM OXALATE 5 MG: 5 TABLET, FILM COATED ORAL at 07:12

## 2024-12-27 RX ADMIN — OXYCODONE 10 MG: 5 TABLET ORAL at 12:12

## 2024-12-27 RX ADMIN — SODIUM BICARBONATE 650 MG: 650 TABLET ORAL at 07:12

## 2024-12-27 RX ADMIN — ACETAMINOPHEN 650 MG: 325 TABLET ORAL at 05:12

## 2024-12-27 RX ADMIN — SODIUM BICARBONATE 650 MG: 650 TABLET ORAL at 08:12

## 2024-12-27 RX ADMIN — DOCUSATE SODIUM 100 MG: 100 CAPSULE, LIQUID FILLED ORAL at 08:12

## 2024-12-27 RX ADMIN — BUSPIRONE HYDROCHLORIDE 5 MG: 5 TABLET ORAL at 07:12

## 2024-12-27 RX ADMIN — LIDOCAINE 5% 2 PATCH: 700 PATCH TOPICAL at 05:12

## 2024-12-27 RX ADMIN — OXYCODONE 10 MG: 5 TABLET ORAL at 09:12

## 2024-12-27 RX ADMIN — PANTOPRAZOLE SODIUM 40 MG: 40 TABLET, DELAYED RELEASE ORAL at 07:12

## 2024-12-27 RX ADMIN — ARIPIPRAZOLE 2 MG: 2 TABLET ORAL at 07:12

## 2024-12-27 RX ADMIN — BUSPIRONE HYDROCHLORIDE 5 MG: 5 TABLET ORAL at 08:12

## 2024-12-27 RX ADMIN — METHOCARBAMOL 500 MG: 500 TABLET ORAL at 05:12

## 2024-12-27 RX ADMIN — NICOTINE 1 PATCH: 21 PATCH, EXTENDED RELEASE TRANSDERMAL at 07:12

## 2024-12-27 RX ADMIN — OXYCODONE 5 MG: 5 TABLET ORAL at 12:12

## 2024-12-27 RX ADMIN — OXYCODONE 10 MG: 5 TABLET ORAL at 05:12

## 2024-12-27 RX ADMIN — GABAPENTIN 100 MG: 100 CAPSULE ORAL at 05:12

## 2024-12-27 RX ADMIN — GABAPENTIN 300 MG: 300 CAPSULE ORAL at 08:12

## 2024-12-27 RX ADMIN — METHOCARBAMOL 500 MG: 500 TABLET ORAL at 11:12

## 2024-12-27 RX ADMIN — PANTOPRAZOLE SODIUM 40 MG: 40 TABLET, DELAYED RELEASE ORAL at 08:12

## 2024-12-27 NOTE — PT/OT/SLP EVAL
Recreational Therapy Evaluation      Date of Treatment: 12/27/24  Start Time: 1330  Stop Time: 1400  Total Time: 30 min  Missed Time:     Assessment      Luz Maria Alfaro is a 64 y.o. female admitted with a medical diagnosis of Displaced fracture of left femoral neck.  She presents with the following impairments/functional limitations:  weakness, impaired endurance, impaired functional mobility, gait instability, decreased lower extremity function, decreased safety awareness, pain .    Rehab Diagnosis:     Recent Surgery:     General Precautions: Standard, fall, hearing impaired     Orthopedic Precautions:LLE weight bearing as tolerated     Braces: N/A    Rehab Prognosis: Good; patient would benefit from acute skilled Recreational Therapy services to address these deficits and reach maximum level of function.      Impairments: Balance deficits, Coordination deficits, Endurance deficits, Mobility deficits, Pain, Safety awareness deficits, and Strength deficits  Rehab Potential: Good  Treatment Recommendations: Continue with Skill TR Service to facilitate functional independence and address impairments/limitations   Treatment Diagnosis: Re-admit, L subacute displaced femoral neck fx, L hemiarthroplasty, HTN, DM, depression, GERD, tobacco dependent, [place]  Orientation: Oriented x4  Affect/Behavior: Cooperative  Safety/Judgement: intact   Basic Command Following: intact  Spiritual Cultural: no        History     Past Medical History:   Diagnosis Date    Benign essential HTN     Mixed hyperlipidemia     Type 2 diabetes mellitus without complications        Past Surgical History:   Procedure Laterality Date    APPENDECTOMY      BACK SURGERY      BREAST SURGERY  01 13 2002    Augmantation    HEMIARTHROPLASTY OF HIP Left 12/20/2024    Procedure: HEMIARTHROPLASTY, HIP;  Surgeon: Jose J Montemayor DO;  Location: [facility];  Service: Orthopedics;  Laterality: Left;  lateral peg board kevin    HIP SURGERY      INTRAMEDULLARY  "RODDING OF FEMUR Right 10/27/2022    Procedure: RIGHT FEMUR INTERTROCH IMN;  Surgeon: Stewart Brambila MD;  Location: [facility];  Service: Orthopedics;  Laterality: Right;  [place] table, supine  3rd case  Synthes Short TFNA       Home Environment     Admit Date: 12/24/24  Living Situation  People in Home: child(lala), adult, grandchild(lala)  Name(s) of People in Home: Son-Jaxon; HEATHER Gonzalez  Lives in: house  Patients Responsibilities: Financial management, Leisure/play/hobbies, Meal preparation, Shopping  Number of Children: 1  Occupation:Retired: [employer]    Instrumental Activities of Daily Living     Previous Hand Dominance: Right Current Hand Dominance: Right     Other iADL Information:        Cognitive Skills Building         Cognitive Observation Activity Assist Position Equipment Response            Comment:      Dynamic Activities      Activity Assist Position Equipment Response   Activity 1 Bowling contact guard assistance Standing Rolling walker and Rubber bowling balls good   Comment: W/c to toilet transfer was contact guard  Sit to stand was contact guard as was dynamic standing balance/reaching. Standing tolerance increased to 5 minutes with sitting rest breaks.  UE coordination was setup as were problem solving skills and sequencing skills.  She c/o having to use the bathroom a lot today        Fine Motor Activities      Activity Assist Position Equipment Response           Comment:        Goals     Patient Goals  Patient Goal 1: "To be able to walk without a cane."    Short Term Goals    Goal  Goal Status   Will increase sit to stand to supervision Initiated   Will improve dynamic standing balance/reaching to supervision Initiated                 Long Term Goals    Goal Goal Status   To increase standing tolerance to 5 minutes Initiated   Will improve dynamic standing balance/reaching to setup Initiated                     Plan       Patient to be seen: Daily  Duration: 2 weeks  Treatments planned: " Coordination, Energy conservation training, Safety education  Treatment plan/goals established with Patient/Caregiver: Yes

## 2024-12-27 NOTE — PT/OT/SLP PROGRESS
"Occupational Therapy Inpatient Rehab Treatment    Name: Luz Maria Alfaro  MRN: [id number]    Assessment:  Luz Maria Alfaro is a 64 y.o. female admitted with a medical diagnosis of Displaced fracture of left femoral neck.  She presents with the following impairments/functional limitations:  weakness, impaired endurance, impaired self care skills, impaired functional mobility, gait instability, impaired balance, decreased lower extremity function, decreased upper extremity function, pain, decreased ROM, orthopedic precautions.    General Precautions: Standard, fall, hearing impaired     Orthopedic Precautions:LLE weight bearing as tolerated. No IR/ER    Braces: N/A    Rehab Prognosis: Good; patient would benefit from acute skilled OT services to address these deficits and reach maximum level of function.      History:     Past Medical History:   Diagnosis Date    Benign essential HTN     Mixed hyperlipidemia     Type 2 diabetes mellitus without complications        Past Surgical History:   Procedure Laterality Date    APPENDECTOMY      BACK SURGERY      BREAST SURGERY  01 13 2002    Augmantation    HEMIARTHROPLASTY OF HIP Left 12/20/2024    Procedure: HEMIARTHROPLASTY, HIP;  Surgeon: Jose J Montemayor DO;  Location: [facility];  Service: Orthopedics;  Laterality: Left;  lateral peg board kevin    HIP SURGERY      INTRAMEDULLARY RODDING OF FEMUR Right 10/27/2022    Procedure: RIGHT FEMUR INTERTROCH IMN;  Surgeon: Stewart Brambila MD;  Location: [facility];  Service: Orthopedics;  Laterality: Right;  [place] table, supine  3rd case  Synthes Short TFNA       Subjective     Orientation: Oriented x4    Chief Complaint: Pain in L hip    Patient/Family Comments/goals: "I am so tired. I can't even keep my eyes open."    Respiratory Status: Room air    Patients cultural, spiritual, Zoroastrian conflicts given the current situation: no       Objective:     Patient found supine upon OT entry to room.    Mobility   Patient " completed:  Supine to Sit with stand by assistance  Sit to Stand Transfer with contact guard assistance with rolling walker  Stand to Sit Transfer with contact guard assistance with rolling walker  Toilet Transfer Step Transfer technique with contact guard assistance with  rolling walker    Functional Mobility  Pt performed short FM in room with CGA using RW, limited by pain.     ADLs   Current Status   Lower Body Dressing 4 Pt donned brief and pants using reacher, dressing sx leg first. CGA in standing for safety    Toileting Hygiene 4 CGA when managing clothing. +BM    Toilet Transfer 4 CGA for safety. BSC over toilet    Putting On, Taking Off Footwear 4 Pt donned/doffed socks using reacher and and sock aide      Limiting Factors for ADLs: motor, endurance, limited ROM, balance, weakness, safety awareness, and pain     Therapeutic Activities  Pt completed game of suspend in unsupported sit in wheelchair. Unable to stand to participate in game due to severe pain. Ice pack placed on L hip during activity     Therapeutic Exercise  Pt completed 4 min F on UBE in unsupported sit to address UE strength and endurance. Pt stated she was falling asleep while pedaling and did not want to continue due to fear of hitting head on arm bike once asleep.   Pt completed UE exercises using 1 lb dowel in unsupported sit in wheelchair to address UE strength. 3x10 reps bicep curls, shoulder press, shoulder flexion raises, and rows. Tolerated well     Patient left up in chair with all lines intact and Randi, RT present.     Education provided: Roles and goals of OT, ADLs, transfer training, modified goals, sequencing, safety precautions, and fall prevention    Multidisciplinary Problems       Occupational Therapy Goals          Problem: Occupational Therapy    Goal Priority Disciplines Outcome Interventions   Occupational Therapy Goal     OT, PT/OT Progressing    Description: ADLs:  Pt to perform grooming tasks with SBA standing at  sink.  Pt to perform feeding tasks with independence.  Pt to perform UB dressing with SBA.  Pt to perform LB dressing with max assist with AE as needed.   Pt to perform putting on/off footwear task with max assist with AE as needed.  Pt to perform toileting with min assist.  Pt to perform bathing with mod assist with AE as needed.    Functional Transfers:  Pt to perform toilet transfers with CGA to toilet in bathroom.  Pt to perform a tub transfer with mod assist with AE as needed onto TTB.    IADLs:  Pt to perform simple meal prep with mod assist.    Balance, Strengthening, Endurance, Balance:  Pt to consistently demonstrate adherence to orthopedic precautions during all ADL's as instructed by OT.  Pt to demonstrate good dynamic standing balance as required to perform ADL's from standing level.  Pt to demonstrate good BUE strength during functional task   Pt to demonstrate consistent adherence to breathing control and energy conservation techniques as educated by OT.                        Time Tracking     OT Received On: 12/27/24  Time In  (0830, 1300)     Time Out  (0930, 1330)  Total Time    Therapy Time: OT Individual: 90  Missed Time:    Missed Time Reason:      Billable Minutes: Self Care/Home Management 30, Therapeutic Activity 30, and Therapeutic Exercise 30    12/27/2024

## 2024-12-27 NOTE — PROGRESS NOTES
"Seen and evaluated during RT today  Working on visual-spatial activities, following commands, dynamic and static balance and increasing endurance.  Progressing and tolerating  Case discussed with all therapeutic, nursing and medical team members  Agree with present POC  Navdeep Swift MD    Subjective  HPI: 64 year old WF with a PMH of HTN, DM, depression, HLD, GERD, tobacco dependence, very [place], chronic back pain with multiple surgeries in past, hx of substance abuse of "crack" cocaine (clean 8 years), and right hip sx in past presented to the ED at [facility] on 12/19/24 with a left displaced femoral neck fracture which possibly happened after a fall in September of 2024. Patient had XR of hip on 12/18 which showed fracture ordered from orthopedic clinic with Dr Rivas with instruction to go to ED. Patient reported she had been taking upwards of 20+ Tylenol 500mg per day, and reported she had taken 8 tylenol so far that day of arrival to ED. Labs showed elevated MCV of 102.1, elevated MCH of 32.0, low MCHC of 31.4, low INR of 1.1, elevated chloride of 116, low CO2 of 12, elevated ALP of 380, elevated AST of 36, acetaminophen level of <10.0, salicylate level of <5.0. Chest XR showed no acute chest disease identified. Poison control was contacted secondary to significant acetaminophen abuse with initial recommendation to give Acetadote, but then recommended holding off due to lab results with continued monitoring of acetaminophen level and CMP. Due to possible liver damage, patient was transferred to [facility] on 12/20. Nicotine patch given for tobacco use. Patient underwent a left hip hemiarthroplasty by Dr. INDIRA Montemayor, and placed WBAT to LLE without external or internal rotation of the hip. On 12/21, labs showed low H&H of 5.7 & 17.9 so transfused 2 units of PRBCs. Sodium bicarb tablets started for acidosis, held Lovenox, and hepatitis panel was nonreactive. On 12/22, PT/OT evals completed with deficits noted with " recommendation for high intensity therapy needed. Labs showed low H&H of 8.8 & 25.6 post transfusion. Started Lovenox, and OK for ASA 81mg BID on DC for 30 days. Dry dressing daily or open to air (No creams/ointments), and will need follow up with Dr Sim green in 3 weeks. IV fluids stopped. On 12/23, labs showed low RBC of 2.62, low H&H of 8.4 & 24.5, elevated MCH of 32.1, elevated RDW of 25.6, low iron of 17, low TIBC of 167, low transferrin of 149, low iron sat of 10, elevated ALP of 210, low protein of 5.3, and low albumin of 3.1. Vital signs stable. On 12/24, labs showed low H&H of 8.1 & 25.2. Last BM noted on 12/18. Purewick in place. Pratient is AAOx4; extremely [place].  Functional status includes minimal assist for bed mobility, minimal assist for transfers with RW, walked 20ft x 2 with RW at minimal assist, max assist for lower body dressing, total assist for toileting with purewick in place, and setup assist for eating and grooming while seated. Participating with therapy. Patient was evaluated, accepted, and admitted to inpatient rehab to improve functional status. Transferred to [facility] on 12/24 without incident.     12/27: Seen with OT, seated in WC at arm exercise bike. [place] effecting conversation. Hearing aids requested through family. Reports being incredibly sleepy this morning, and struggling to keep her eyes open. States that she still has the pain, and has become accustomed to being in pain without medication. Decrease medications. States that her sleep was unchanged and she never gets into a deep sleep. She can always hear activity around her, and it keeps her up. Obtain a white noise sound machine for her room. Fleet enema given this morning with following BM. States like she feels like she may need to go again, but it hurts to sit on the commode. Asking to return to sleep. Encouraged to continue participation in therapy and rest during breaks. VSSAF.                Review of Systems  Psychiatric:  Has a history of depression. She is a tobacco smoker. She has a history of ETOH and substance abuse (crack cocaine) but quit many years ago.    Depression/Anxiety:      busPIRone tablet 5 mg BID  EScitalopram oxalate tablet 5 mg qd  nicotine 21 mg/24 hr 1 patch qd  ALPRAZolam tablet 0.25 mg TID PRN anxiety  Pain:  chronic pain c/o including old ankle and knee fractures and LBP, spasms/tingling pains radiating down legs. Cymbalta contraindicated with Liver disease.  acetaminophen tablet 650 mg q6h  LIDOcaine 5 % patch 2 patch q24h, 0600a, left posterior hip and anterior thigh   gabapentin capsule 100mg TID AC. Change to 100mg qPM, 1645.   gabapentin capsule 300 mg qHS  methocarbamoL tablet 750 mg TID. Increase q6h. Decrease to 500mg q6h  acetaminophen tablet 650 mg q6h PRN mild pain. Decrease BID PRN mild pain  oxyCODONE immediate release tablet 5 mg q4h PRN mod pain  oxyCODONE immediate release tablet 10 mg q4h PRN severe pain    Bowels/Bladder: last BM 12/26. Complaints of constipation and digging stool out (here and at home). Agreeable to daily fleet enema's to fully evacuate. Discussed w/Nurse.     Appetite: good.      Sleep: unable to get to a deep sleep 2/2 environment    hydrOXYzine pamoate capsule 50 mg qHS PRN Insomnia/Itching. Schedule qPM (1800)- change back to PRN        Physical Exam  General: well-developed, well-nourished, in no acute distress  HENT: normocephalic, [place], ill fitting top dentures, poor dentition with missing lower teeth  Neck: full range of motion, supple  Respiratory: equal chest rise, no SOB, no audible wheeze  Cardiovascular: regular rate and rhythm, no edema  Gastrointestinal: soft, non-tender, non-distended   Musculoskeletal: decreased ROM/strength to LLE, generalized weakness  Integumentary: no rashes or skin lesions present, left hip incision-surgical glue, dressing-c/d/I; LUE skin tear-foam dressing  Neurologic: cranial nerves intact, +peripheral neurological deficit-tingling  DARLENE  *MD performed and documented physical examination                           Assessment/Plan  Hospital   Displaced fracture of left femoral neck   Moderate malnutrition     Non-Hospital   DVT (deep venous thrombosis)   Type 2 diabetes mellitus without complications   Malnutrition   Acute encephalopathy   Acute on chronic combined systolic and diastolic heart failure   Hypokalemia   Hypotension due to drugs   Calcaneal fracture   AMS (altered mental status)   Knee pain   Benign essential HTN   Mixed hyperlipidemia   Alcoholic fatty liver   COPD (chronic obstructive pulmonary disease)   Chronic diastolic heart failure   Depressive disorder   Gastroesophageal reflux disease   Heart valve disease   History of colonic polyps   Other hemorrhoids   Stage 3b chronic kidney disease   Stricture of esophagus   Tobacco user   Unspecified cirrhosis of liver   Weight loss, abnormal   Benign neoplasm of ascending colon   Benign neoplasm of descending colon   Ototoxicity of both ears   History of substance abuse       Wounds: left hip incision-surgical glue, dressing-c/d/I; LUE skin tear-foam dressing  S/p left hip hemiarthroplasty on 12/20 by Dr. INDIRA Montemayor   Precautions: WBAT to LLE without external or internal rotation of the hip  Bracing/AD: RW  Swallowing: Diabetic Diet, double protein  Function: Tolerating therapy. Continue PT/OT  VTE Prophylaxis:   enoxaparin injection 30 mg SubQ q24hr  Code Status: FULL CODE   Discharge: Patient, son, and his 2 children live in [place] in a 2-story home with a threshold entrance. She does not need to go upstairs. Completed 10th grade. She has no  history. She is a retired  for [employer].  and . Her ex- takes the patient to appointments and runs errands.  The patients son and 2 grandchildren live with her. They cook, clean, and do laundry.  Ex- hires someone for lawn maintenance.  Children: (1). Date pending.                 Tania  Dayo ERICKSON, conducted additional independent physical examination and assisted with medical documentation.

## 2024-12-27 NOTE — PROGRESS NOTES
"   12/27/24 1330   Rec Therapy Time Calculation   Date of Treatment 12/27/24   Rec Start Time 1330   Rec Stop Time 1400   Rec Total Time (min) 30 min   Time   Treatment time 2 units   Charges   $Therapeutic Exercise 2 units   Precautions   General Precautions fall;hearing impaired   Orthopedic Precautions  LLE weight bearing as tolerated   Braces N/A   Pain/Comfort   Pain Rating 1 no pain   OTHER   Treatment Diagnosis Re-admit, L subacute displaced femoral neck fx, L hemiarthroplasty, HTN, DM, depression, GERD, tobacco dependent, [place]   Rehab identified problem list/impairments weakness;impaired endurance;impaired functional mobility;gait instability;decreased lower extremity function;decreased safety awareness;pain   Values/Beliefs/Spiritual Care   Spiritual, Cultural Beliefs, Moravian Practices, Values that Affect Care no   Prior Living/ADLs   Admit Date 12/24/24   People in Home child(lala), adult;grandchild(lala)   Living Arrangements house   Patient responsibilites: Financial management;Leisure/play/hobbies;Meal preparation;Shopping   Number of Children 1   Occupation Retired: [employer]   Previous Hand Domiance Right   Current Hand Dominance Right   Overall Level of Functioning   Activity Tolerance Independent   Dynamic Sitting Balance/Reaching Independent   Dynamic Standing Balance/Reaching Min A   Right UE Coodination/Dexterity Mod Indep   Left UE Coordination/Dexterity Mod Indep   Problem Solving/Sequencing Skills Mod Indep   Memory Recall Mod Indep   R/L Neglect/Inattention Does not occur   Attention Span Mod Indep   Social Interaction Mod Indep   Patient Goals   Patient Goal 1 "To be able to walk without a cane."   Recreational Therapy Short Term Goals   Short Term Goal 1 Will increase sit to stand to supervision   Short Term Goal 1 Progression Initiated   Short Term Goal 2 Will improve dynamic standing balance/reaching to supervision   Short Term Goal 2 Progression Initiated   Recreational Therapy Long Term " Goals   Long Term Goal 1 To increase standing tolerance to 5 minutes   Long Term Goal 1 Progression Initiated   Long Term Goal 2 Will improve dynamic standing balance/reaching to setup   Long Term Goal 2 Progression Initiated   Plan   Patient to be seen Daily   Planned Duration 2 weeks   Treatments Planned Coordination;Energy conservation training;Safety education   Treatment plan/goals estblished with Patient/Caregiver Yes

## 2024-12-27 NOTE — PROGRESS NOTES
[facility] ([facility])  Rehab Progress Note    Patient Name: Luz Maria Alfaro  MRN: [id number]  Age: 64 y.o. Sex: female  : 1960  Hospital Length of Stay: 3 days  Date of Service: 2024   Chief Complaint: Displaced fracture of left femoral neck s/p left hemiarthroplasty on 2024     Subjective:     Basic Information  Admit Information: 63-year-old white female presented to [facility] clinical on 2024 for follow up visit for left hip pain that has been progressively worsening.  Reportedly fell 2 months ago.  Imaging on  significant for left displaced femoral neck fracture.  Recommended to go to [facility] ED on  for admission.  PMH significant for HTN, dm type 2, HLD, GERD, and nicotine dependence.  Workup significant for left displaced femoral neck fracture.  Tolerated left hip hemiarthroplasty on  without perioperative complications.  She did require 2 units PRBC.  Sodium bicarb tablets initiated due to metabolic acidosis.  Currently Min assist.  Tolerated transfer to [facility] inpatient rehab unit on  without incident.   Today's Information: No acute events overnight.  Sleep hygiene, bowel maintenance, and appetite at goal.  Last BM .  Vital signs at goal with no recorded fevers.  Good glycemic control.  No new labs or imaging today.    Review of patient's allergies indicates:   Allergen Reactions    Iodinated contrast media Shortness Of Breath    Opioids - morphine analogues     Meperidine Rash and Hives        Current Facility-Administered Medications:     acetaminophen tablet 650 mg, 650 mg, Oral, Q6H, Bere, Moy A, FNP, 650 mg at 24 0524    acetaminophen tablet 650 mg, 650 mg, Oral, BID PRN, Dayo, Tania A, FNP    ALPRAZolam tablet 0.25 mg, 0.25 mg, Oral, TID PRN, Bere, Moy A, FNP, 0.25 mg at 24    ARIPiprazole tablet 2 mg, 2 mg, Oral, Daily, Bere, Moy A, FNP, 2 mg at 24 0745    atorvastatin  tablet 40 mg, 40 mg, Oral, QHS, Bere, Moy A, FNP, 40 mg at 12/26/24 2030    benzonatate capsule 100 mg, 100 mg, Oral, TID PRN, Bere, Moy A, FNP    busPIRone tablet 5 mg, 5 mg, Oral, BID, Bere, Moy A, FNP, 5 mg at 12/27/24 0746    dextrose 50% injection 12.5 g, 12.5 g, Intravenous, PRN, Bere, Moy A, FNP    dextrose 50% injection 25 g, 25 g, Intravenous, PRN, Bere, Moy A, FNP    docusate sodium capsule 100 mg, 100 mg, Oral, BID, Bere, Moy A, FNP, 100 mg at 12/27/24 0746    enoxaparin injection 30 mg, 30 mg, Subcutaneous, Q24H (prophylaxis, 1700), Bere, Moy A, FNP, 30 mg at 12/26/24 1621    EScitalopram oxalate tablet 5 mg, 5 mg, Oral, Daily, Caraway Tania A, FNP, 5 mg at 12/27/24 0745    gabapentin capsule 100 mg, 100 mg, Oral, TID AC, Caraway, Tania A, FNP, 100 mg at 12/27/24 0525    gabapentin capsule 300 mg, 300 mg, Oral, QHS, Bere, Moy A, FNP, 300 mg at 12/26/24 2030    glucagon (human recombinant) injection 1 mg, 1 mg, Intramuscular, PRN, Bere, Moy A, FNP    glucose chewable tablet 16 g, 16 g, Oral, PRN, Bere, Moy A, FNP    glucose chewable tablet 24 g, 24 g, Oral, PRN, Bere, Moy A, FNP    hydrALAZINE injection 10 mg, 10 mg, Intravenous, Q4H PRN, Bere, Moy A, FNP    HYDROcodone-acetaminophen 5-325 mg per tablet 1 tablet, 1 tablet, Oral, Daily PRN, Bere, Moy A, FNP    hydrOXYzine pamoate capsule 50 mg, 50 mg, Oral, After dinner, Neema Osheahryn A, FNP, 50 mg at 12/26/24 1735    insulin aspart U-100 injection 0-10 Units, 0-10 Units, Subcutaneous, QID (AC + HS) PRN, Bere Moy A, FNP    labetalol 20 mg/4 mL (5 mg/mL) IV syring, 10 mg, Intravenous, Q4H PRN, Bere Moy A, FNP    LIDOcaine 5 % patch 2 patch, 2 patch, Transdermal, Q24H, Tania Oshea A, FNP, 2 patch at 12/27/24 0525    methocarbamoL tablet 750 mg, 750 mg, Oral, Q6H, Dayo Tania A, FNP, 750 mg at 12/27/24 0525     "metoprolol injection 10 mg, 10 mg, Intravenous, Q2H PRN, Bere, Moy A, FNP    nicotine 21 mg/24 hr 1 patch, 1 patch, Transdermal, Daily, Bere, Moy A, FNP, 1 patch at 12/27/24 0745    nitroGLYCERIN SL tablet 0.4 mg, 0.4 mg, Sublingual, Q5 Min PRN, Bere, Moy A, FNP    ondansetron disintegrating tablet 4 mg, 4 mg, Oral, Q6H PRN, Bere, Moy A, FNP    ondansetron disintegrating tablet 8 mg, 8 mg, Oral, Q8H PRN, Bere, Moy A, FNP    ondansetron injection 4 mg, 4 mg, Intravenous, Q8H PRN, Bere, Moy A, FNP    oxyCODONE immediate release tablet 10 mg, 10 mg, Oral, Q4H PRN, Bere, Moy A, FNP, 10 mg at 12/27/24 0524    oxyCODONE immediate release tablet 5 mg, 5 mg, Oral, Q4H PRN, Bere, Moy A, FNP, 5 mg at 12/27/24 0022    pantoprazole EC tablet 40 mg, 40 mg, Oral, BID, Bere, Moy A, FNP, 40 mg at 12/27/24 0746    polyethylene glycol packet 17 g, 17 g, Oral, BID PRN, Bere, Moy A, FNP, 17 g at 12/26/24 0747    sodium bicarbonate tablet 650 mg, 650 mg, Oral, BID, Bere, Moy A, FNP, 650 mg at 12/27/24 0746     Review of Systems   Complete 12-point review of symptoms negative except for what's mentioned in HPI     Objective:     /73   Pulse 88   Temp 97.5 °F (36.4 °C) (Oral)   Resp 16   Ht 5' 3" (1.6 m)   Wt 50.9 kg (112 lb 3.4 oz)   SpO2 (!) 91%   BMI 19.88 kg/m²      Physical Exam  Constitutional:       Appearance: Normal appearance.   HENT:      Head: Normocephalic.      Mouth/Throat:      Mouth: Mucous membranes are moist.   Eyes:      Pupils: Pupils are equal, round, and reactive to light.   Cardiovascular:      Rate and Rhythm: Normal rate and regular rhythm.      Heart sounds: Normal heart sounds.   Pulmonary:      Effort: Pulmonary effort is normal.      Breath sounds: Normal breath sounds.   Abdominal:      General: Bowel sounds are normal.      Palpations: Abdomen is soft.   Musculoskeletal:      Cervical back: Neck " supple.      Comments: Left hip dressing clean and intact    Skin:     General: Skin is warm and dry.   Neurological:      Mental Status: She is alert and oriented to person, place, and time.      Motor: Weakness present.   Psychiatric:         Mood and Affect: Mood normal.         Behavior: Behavior normal.         Thought Content: Thought content normal.         Judgment: Judgment normal.     *MD performed and documented physical examination       Lines/Drains/Airways       Peripheral Intravenous Line  Duration                  Peripheral IV - Single Lumen 12/21/24 0430 20 G Anterior;Right Forearm 6 days                  Labs  Admission on 12/24/2024   Component Date Value Ref Range Status    POCT Glucose 12/24/2024 119 (H)  70 - 110 mg/dL Final    POCT Glucose 12/24/2024 115 (H)  70 - 110 mg/dL Final    Sodium 12/25/2024 140  136 - 145 mmol/L Final    Potassium 12/25/2024 3.7  3.5 - 5.1 mmol/L Final    Chloride 12/25/2024 106  98 - 107 mmol/L Final    CO2 12/25/2024 25  23 - 31 mmol/L Final    Glucose 12/25/2024 82  82 - 115 mg/dL Final    Blood Urea Nitrogen 12/25/2024 8.4 (L)  9.8 - 20.1 mg/dL Final    Creatinine 12/25/2024 0.63  0.55 - 1.02 mg/dL Final    Calcium 12/25/2024 8.8  8.4 - 10.2 mg/dL Final    Protein Total 12/25/2024 5.8  5.8 - 7.6 gm/dL Final    Albumin 12/25/2024 2.9 (L)  3.4 - 4.8 g/dL Final    Globulin 12/25/2024 2.9  2.4 - 3.5 gm/dL Final    Albumin/Globulin Ratio 12/25/2024 1.0 (L)  1.1 - 2.0 ratio Final    Bilirubin Total 12/25/2024 1.0  <=1.5 mg/dL Final    ALP 12/25/2024 215 (H)  40 - 150 unit/L Final    ALT 12/25/2024 32  0 - 55 unit/L Final    AST 12/25/2024 52 (H)  5 - 34 unit/L Final    eGFR 12/25/2024 >60  mL/min/1.73/m2 Final    Anion Gap 12/25/2024 9.0  mEq/L Final    BUN/Creatinine Ratio 12/25/2024 13   Final    Magnesium Level 12/25/2024 1.80  1.60 - 2.60 mg/dL Final    Phosphorus Level 12/25/2024 2.7  2.3 - 4.7 mg/dL Final    Prealbumin 12/25/2024 13.3 (L)  14.0 - 37.0 mg/dL  Final    Ferritin Level 12/25/2024 318.94 (H)  4.63 - 204.00 ng/mL Final    Iron Binding Capacity Unsaturated 12/25/2024 131  70 - 310 ug/dL Final    Iron Level 12/25/2024 52  50 - 170 ug/dL Final    Transferrin 12/25/2024 160 (L)  173 - 360 mg/dL Final    Iron Binding Capacity Total 12/25/2024 183 (L)  250 - 450 ug/dL Final    Iron Saturation 12/25/2024 28  20 - 50 % Final    WBC 12/25/2024 7.08  4.50 - 11.50 x10(3)/mcL Final    RBC 12/25/2024 2.79 (L)  4.20 - 5.40 x10(6)/mcL Final    Hgb 12/25/2024 8.9 (L)  12.0 - 16.0 g/dL Final    Hct 12/25/2024 27.9 (L)  37.0 - 47.0 % Final    MCV 12/25/2024 100.0 (H)  80.0 - 94.0 fL Final    MCH 12/25/2024 31.9 (H)  27.0 - 31.0 pg Final    MCHC 12/25/2024 31.9 (L)  33.0 - 36.0 g/dL Final    RDW 12/25/2024    Final    Unable to calculate      Platelet 12/25/2024 219  130 - 400 x10(3)/mcL Final    MPV 12/25/2024 9.6  7.4 - 10.4 fL Final    Neut % 12/25/2024 66.2  % Final    Lymph % 12/25/2024 20.5  % Final    Mono % 12/25/2024 10.7  % Final    Eos % 12/25/2024 1.7  % Final    Basophil % 12/25/2024 0.3  % Final    Lymph # 12/25/2024 1.45  0.6 - 4.6 x10(3)/mcL Final    Neut # 12/25/2024 4.69  2.1 - 9.2 x10(3)/mcL Final    Mono # 12/25/2024 0.76  0.1 - 1.3 x10(3)/mcL Final    Eos # 12/25/2024 0.12  0 - 0.9 x10(3)/mcL Final    Baso # 12/25/2024 0.02  <=0.2 x10(3)/mcL Final    IG# 12/25/2024 0.04  0 - 0.04 x10(3)/mcL Final    IG% 12/25/2024 0.6  % Final    NRBC% 12/25/2024 0.0  % Final    RBC Morph 12/25/2024 Abnormal (A)  Normal Final    Anisocytosis 12/25/2024 3+ (A)  (none) Final    Hypochromasia 12/25/2024 1+ (A)  (none) Final    Macrocytosis 12/25/2024 1+ (A)  (none) Final    Platelets 12/25/2024 Adequate  Normal, Adequate Final    Hemoglobin A1c 12/25/2024 5.0  <=7.0 % Final    Estimated Average Glucose 12/25/2024 96.8  mg/dL Final    POCT Glucose 12/25/2024 107  70 - 110 mg/dL Final    POCT Glucose 12/25/2024 113 (H)  70 - 110 mg/dL Final    POCT Glucose 12/25/2024 102  70 -  110 mg/dL Final    POCT Glucose 12/25/2024 112 (H)  70 - 110 mg/dL Final    POCT Glucose 12/26/2024 106  70 - 110 mg/dL Final    POCT Glucose 12/26/2024 123 (H)  70 - 110 mg/dL Final    POCT Glucose 12/26/2024 143 (H)  70 - 110 mg/dL Final    POCT Glucose 12/27/2024 93  70 - 110 mg/dL Final     Radiology  Left hip and pelvis XR on 12/20/2024, IMPRESSION:  Satisfactory alignment following left hip arthroplasty.    Assessment/Plan:     64 y.o. WF admitted on 12/24/2024    Displaced fracture of left femoral neck   - s/p left hemiarthroplasty on 12/20/2024  - WBAT to LLE without external or internal rotation of the hip  - staples to be removed on 01/10  - continue                Tylenol 650 mg every 6 hours                Gabapentin 300 mg nightly                 Robaxin 750 mg t.i.d.                Norco 5 mg/325 mg daily p.r.n. prior to therapy                Oxycodone 5 mg q.4 hours p.r.n. for moderate pain                 Oxycodone 10 mg q.4 hours p.r.n. for severe pain  - defer to physiatry for rehab and pain management  - PT/OT/RT/ST following     Normocytic anemia  - asymptomatic  - s/p transfusion           x2 units PRBC on 12/21/2024  - H/H stable   - no evidence of active bleeds  - will closely monitor and transfuse if needed      Depression/anxiety  - stable  - continue                Abilify 2 mg daily                 BuSpar 5 mg b.i.d.                 Lexapro 5 mg daily     HLD  - FLP outpatient  - continue                Lipitor 40 mg nightly      Nicotine dependence  - smoking cessation counseling initiated on admission  - continue                Nicotine patch daily     GERD  - Avoid spicy foods, and nothing to eat or drink within x2 hours of bedtime or laying flat (water is ok)   - Avoid NSAIDs (Advil, ibuprofen, naproxen...) and chino-2 inhibitors (Mobic, Celebrex)    - continue                Protonix 40 mg b.i.d.     Constipation  - stable   - continue  Colace 100 mg BID   Miralax 17 gm b.i.d. p.r.n.      Metabolic acidosis  - sodium bicarb 25  - continue                Sodium bicarbonate 650 mg b.i.d.     Moderate protein calorie malnutrition  - albumin 2.9/prealbumin 13.3  - encourage oral nutrition  - registered dietitian following     DM type II  - HgA1c 5.0 on 12/25/2024  - continue                ISS   - CBGs AC/HS     HTN  - BP at goal  - continue                Hydralazine 10 mg every 2 hours as needed for BP > 160/90                Labetalol 10 mg every 2 hours as needed for BP > 160/90  - low sodium diet     VTE Prophylaxis:  Lovenox 30 mg daily  COVID-19 testing:  Unknown  COVID-19 vaccination status:  Unvaccinated     POA:  No  Living will:  No  Contacts:  Jaxon Baum (son) [phone]     CODE STATUS:  Full code  Internal Medicine (attending): George Do MD  Physiatry (consulting):  Nam Swift MD     OUTPATIENT PROVIDERS  PCP: ALEKS Mcfadden  Orthopedic surgery:  Jose J Montemayor D.O.     DISPOSITION:  Sleep hygiene, bowel maintenance, and appetite at goal.  Vital signs at goal with no recorded fevers.  Good glycemic control.  No new labs or imaging today.  Continue current POC.  Monitor closely.  Notify of acute changes.     Mao Blackburn NP conducted independent physical examination and assisted with medical documentation.

## 2024-12-27 NOTE — PT/OT/SLP PROGRESS
Physical Therapy Inpatient Rehab Treatment    Patient Name:  Luz Maria Alfaro   MRN:  [id number]    Recommendations:     Discharge Recommendations:  High Intensity Therapy   Discharge Equipment Recommendations:     Barriers to discharge: Impaired functional mobility  and Severity of Deficits     Assessment:     Luz Maria Alfaro is a 64 y.o. female admitted with a medical diagnosis of Displaced fracture of left femoral neck.  She presents with the following impairments/functional limitations:  weakness, impaired endurance, impaired self care skills, impaired functional mobility, gait instability, impaired balance, decreased lower extremity function, pain, decreased ROM, edema, orthopedic precautions .    PT NOTE: prior to start of am session, pt was given an enema. During the session, pt did not want to leave room stating it was too hard standing, sitting and going to the restroom. Pt required increased time and encouragement with activities. In pm session, pt was not willing to attempt stairs. Pt stated her reacher was removed from her room.    Rehab Diagnosis: L femoral neck fx    General Precautions: Standard, fall     Orthopedic Precautions:LLE weight bearing as tolerated (No IR/ER)     Braces: N/A    Rehab Prognosis: Good; patient would benefit from acute skilled PT services to address these deficits and reach maximum level of function.      History:     Past Medical History:   Diagnosis Date    Benign essential HTN     Mixed hyperlipidemia     Type 2 diabetes mellitus without complications        Past Surgical History:   Procedure Laterality Date    APPENDECTOMY      BACK SURGERY      BREAST SURGERY  01 13 2002    Augmantation    HEMIARTHROPLASTY OF HIP Left 12/20/2024    Procedure: HEMIARTHROPLASTY, HIP;  Surgeon: Jose J Montemayor DO;  Location: [facility];  Service: Orthopedics;  Laterality: Left;  lateral peg board kevin    HIP SURGERY      INTRAMEDULLARY RODDING OF FEMUR Right 10/27/2022    Procedure: RIGHT  FEMUR INTERTROCH IMN;  Surgeon: Stewart Brambila MD;  Location: [facility] OR;  Service: Orthopedics;  Laterality: Right;  Parrish table, supine  3rd case  Synthes Short TFNA       Subjective     Patient comments: whole (right) leg is swollen    Respiratory Status: Room air    Patients cultural, spiritual, Faith conflicts given the current situation: no    Objective:     Communicated with nsg prior to session.  Patient found up in chair with peripheral IV  upon PT entry to room.    Pt is  Cooperative, Motivated, Agitated, and Lethargic.    Vitals     Pain Pain Rating 1: 6/10  Location - Side 1: Left  Location - Orientation 1: lateral  Location 1: hip  Pain Addressed 1: Pre-medicate for activity, Reposition, Distraction  Pain Rating Post-Intervention 1: 10/10  Pain Rating 2: 0/10  Location - Side 2: Right  Location - Orientation 2: lower  Location 2: back  Pain Addressed 2: Pre-medicate for activity, Reposition, Distraction  Pain Rating Post-Intervention 2: 10/10     14:30, 15:00 - 10/10 for left anterior/lateral hip, and mid-low back.  Functional Mobility:      Current   Status  Discharge   Goal   Functional Area: Care Score:    Roll Left and Right 4  Incidental touching, no bed rails Independent   Sit to Lying 3  [place] for LLE; 2 trials Independent   Lying to Sitting on Side of Bed 4  Incidental touching, vc and increased time required; 2 trials Independent   Sit to Stand 5  Set up, with RW, several trials in am session, 2 trials in pm session Independent   Chair/Bed-to-Chair Transfer 4  Cga with RW, 4 trials Independent   Walk 10 Feet 4  Cga with RW, pt ambulated 37', 20', and 5', with slow, step-to gait pattern in am session; 5' trial stopped by pt 2/2 increased pain; 15' in pm session Independent   Walk 50 Feet with Two Turns 88 Independent   Walk 150 Feet 88 Independent   Wheel 50 Feet with Two Turns 4 Independent   Wheel 150 Feet 4  Cga with BUE, pt propelled 150', and 155', increased time and vc required  Independent       Therapeutic Activities and Exercises:  Patient educated on role of acute care PT and PT POC, safety while in hospital including calling nurse for mobility, and call light usage  Patient educated about importance of OOB mobility and remaining up in chair most of the day.  Patient educated about pursed lip breathing technique and cued for use with mobility.  AM: toilet transfer x2, step transfer, positive BMX2, Positive voidx2, increased time required    Therapeutic exercises were performed supine in bed:    quad sets  glute sets  hip abduction with 0# weights, active assist of LLE  heel slides with 0# weights  straight leg raises with 0# weights, active assist of LLE  All therex performed 2x15 reps each, BLE    Unsupported seated exercises at edge of wheelchair:  Long arc quads, marches, ankle pumps, and abduction with BLE - 1 set of 15 reps, with noted decreased ROM with LLE.    Activity Tolerance: Excellent    Patient left HOB elevated with call button in reach.    Education provided: roles and goals of PT/PTA, transfer training, bed mob, gait training, balance training, safety awareness, body mechanics, assistive device, wheelchair management, strengthening exercises, and fall prevention    Expected compliance: High compliance    Plan:     During this hospitalization, patient to be seen 5 x/week to address the identified rehab impairments via gait training, therapeutic exercises, therapeutic activities and progress toward the following goals:    GOALS:   Multidisciplinary Problems       Physical Therapy Goals          Problem: Physical Therapy    Goal Priority Disciplines Outcome Interventions   Physical Therapy Goal     PT, PT/OT Progressing    Description:   PT Short Term goals     Bed Mobility:  Roll left and right with supervision/touching assist.  Sit to supine transfer with partial/moderate assist.  Supine to sit transfer with partial/moderate assist.    Transfers:  Sit to stand transfer  with setup/clean-up assist using RW.  Bed to chair transferStand step with setup/clean-up assist using RW.  Car transfer with setup/clean-up assist using RW.   an object from the ground in standing position with supervision/touching assist using RW.    Mobility:  Ambulate 150 feet with partial/moderate assist using RW.  Ambulate 10 feet on uneven surfaces/ramps with partial/moderate assist using RW.  Ascend/descend a 4 inch curb with partial/moderate assist using RW.   Ascend/descend 12 stairs with partial/moderate assist using bilateral handrails.  Manual wheelchair 150 feet with supervision/touching assist using Bilateral upper extremity.      Time Frame: by Re-evaluation                        Plan of Care Expires:  12/30/24  PT Next Visit Date: 12/30/24  Plan of Care reviewed with: patient    Additional Information:         Time Tracking:     Therapy Time  PT Received On: 12/27/24  PT Start Time:  (9:30; 14:30)  PT Stop Time:  (11:00; 15:00)   PT Individual: 120  Missed Time:    Time Missed due to:      Billable Minutes: Gait Training 25 Min, Therapeutic Activity 50 min, and Therapeutic Exercise 45 min    12/27/2024

## 2024-12-28 LAB
POCT GLUCOSE: 115 MG/DL (ref 70–110)
POCT GLUCOSE: 83 MG/DL (ref 70–110)

## 2024-12-28 PROCEDURE — 94799 UNLISTED PULMONARY SVC/PX: CPT

## 2024-12-28 PROCEDURE — 25000003 PHARM REV CODE 250: Performed by: NURSE PRACTITIONER

## 2024-12-28 PROCEDURE — 94761 N-INVAS EAR/PLS OXIMETRY MLT: CPT

## 2024-12-28 PROCEDURE — 97530 THERAPEUTIC ACTIVITIES: CPT

## 2024-12-28 PROCEDURE — S4991 NICOTINE PATCH NONLEGEND: HCPCS | Performed by: NURSE PRACTITIONER

## 2024-12-28 PROCEDURE — 63600175 PHARM REV CODE 636 W HCPCS: Performed by: NURSE PRACTITIONER

## 2024-12-28 PROCEDURE — 99900031 HC PATIENT EDUCATION (STAT)

## 2024-12-28 PROCEDURE — 97116 GAIT TRAINING THERAPY: CPT | Mod: CQ

## 2024-12-28 PROCEDURE — 97110 THERAPEUTIC EXERCISES: CPT | Mod: CQ

## 2024-12-28 PROCEDURE — 97110 THERAPEUTIC EXERCISES: CPT

## 2024-12-28 PROCEDURE — 11800000 HC REHAB PRIVATE ROOM

## 2024-12-28 RX ORDER — POTASSIUM CHLORIDE 20 MEQ/1
40 TABLET, EXTENDED RELEASE ORAL ONCE
Status: COMPLETED | OUTPATIENT
Start: 2024-12-28 | End: 2024-12-28

## 2024-12-28 RX ORDER — FUROSEMIDE 10 MG/ML
40 INJECTION INTRAMUSCULAR; INTRAVENOUS ONCE
Status: COMPLETED | OUTPATIENT
Start: 2024-12-28 | End: 2024-12-28

## 2024-12-28 RX ADMIN — ATORVASTATIN CALCIUM 40 MG: 40 TABLET, FILM COATED ORAL at 08:12

## 2024-12-28 RX ADMIN — ALPRAZOLAM 0.25 MG: 0.25 TABLET ORAL at 08:12

## 2024-12-28 RX ADMIN — BUSPIRONE HYDROCHLORIDE 5 MG: 5 TABLET ORAL at 07:12

## 2024-12-28 RX ADMIN — BUSPIRONE HYDROCHLORIDE 5 MG: 5 TABLET ORAL at 08:12

## 2024-12-28 RX ADMIN — ACETAMINOPHEN 650 MG: 325 TABLET ORAL at 11:12

## 2024-12-28 RX ADMIN — LIDOCAINE 5% 2 PATCH: 700 PATCH TOPICAL at 05:12

## 2024-12-28 RX ADMIN — ARIPIPRAZOLE 2 MG: 2 TABLET ORAL at 07:12

## 2024-12-28 RX ADMIN — SODIUM BICARBONATE 650 MG: 650 TABLET ORAL at 07:12

## 2024-12-28 RX ADMIN — OXYCODONE 10 MG: 5 TABLET ORAL at 02:12

## 2024-12-28 RX ADMIN — ENOXAPARIN SODIUM 30 MG: 30 INJECTION SUBCUTANEOUS at 04:12

## 2024-12-28 RX ADMIN — PANTOPRAZOLE SODIUM 40 MG: 40 TABLET, DELAYED RELEASE ORAL at 07:12

## 2024-12-28 RX ADMIN — GABAPENTIN 100 MG: 100 CAPSULE ORAL at 04:12

## 2024-12-28 RX ADMIN — METHOCARBAMOL 500 MG: 500 TABLET ORAL at 05:12

## 2024-12-28 RX ADMIN — GABAPENTIN 300 MG: 300 CAPSULE ORAL at 08:12

## 2024-12-28 RX ADMIN — METHOCARBAMOL 500 MG: 500 TABLET ORAL at 04:12

## 2024-12-28 RX ADMIN — DOCUSATE SODIUM 100 MG: 100 CAPSULE, LIQUID FILLED ORAL at 08:12

## 2024-12-28 RX ADMIN — ESCITALOPRAM OXALATE 5 MG: 5 TABLET, FILM COATED ORAL at 07:12

## 2024-12-28 RX ADMIN — NICOTINE 1 PATCH: 21 PATCH, EXTENDED RELEASE TRANSDERMAL at 07:12

## 2024-12-28 RX ADMIN — FUROSEMIDE 40 MG: 10 INJECTION, SOLUTION INTRAMUSCULAR; INTRAVENOUS at 12:12

## 2024-12-28 RX ADMIN — METHOCARBAMOL 500 MG: 500 TABLET ORAL at 11:12

## 2024-12-28 RX ADMIN — ACETAMINOPHEN 650 MG: 325 TABLET ORAL at 05:12

## 2024-12-28 RX ADMIN — SODIUM BICARBONATE 650 MG: 650 TABLET ORAL at 08:12

## 2024-12-28 RX ADMIN — POTASSIUM CHLORIDE 40 MEQ: 1500 TABLET, EXTENDED RELEASE ORAL at 12:12

## 2024-12-28 RX ADMIN — PANTOPRAZOLE SODIUM 40 MG: 40 TABLET, DELAYED RELEASE ORAL at 08:12

## 2024-12-28 RX ADMIN — HYDROCODONE BITARTRATE AND ACETAMINOPHEN 1 TABLET: 5; 325 TABLET ORAL at 07:12

## 2024-12-28 RX ADMIN — ACETAMINOPHEN 650 MG: 325 TABLET ORAL at 04:12

## 2024-12-28 RX ADMIN — OXYCODONE 10 MG: 5 TABLET ORAL at 08:12

## 2024-12-28 NOTE — PROGRESS NOTES
[facility] ([facility])  Rehab Progress Note    Patient Name: Luz Maria Alfaro  MRN: [id number]  Age: 64 y.o. Sex: female  : 1960  Hospital Length of Stay: 4 days  Date of Service: 2024   Chief Complaint: Displaced fracture of left femoral neck s/p left hemiarthroplasty on 2024     Subjective:     Basic Information  Admit Information: 63-year-old white female presented to [facility] clinical on 2024 for follow up visit for left hip pain that has been progressively worsening.  Reportedly fell 2 months ago.  Imaging on  significant for left displaced femoral neck fracture.  Recommended to go to [facility] ED on  for admission.  PMH significant for HTN, dm type 2, HLD, GERD, and nicotine dependence.  Workup significant for left displaced femoral neck fracture.  Tolerated left hip hemiarthroplasty on  without perioperative complications.  She did require 2 units PRBC.  Sodium bicarb tablets initiated due to metabolic acidosis.  Currently Min assist.  Tolerated transfer to [facility] inpatient rehab unit on  without incident.   Today's Information: No acute events overnight.  Sleep hygiene, bowel maintenance, and appetite at goal.  Last BM .  Vital signs at goal with no recorded fevers.  Good glycemic control.  No new labs or imaging today.    Review of patient's allergies indicates:   Allergen Reactions    Iodinated contrast media Shortness Of Breath    Opioids - morphine analogues     Meperidine Rash and Hives        Current Facility-Administered Medications:     acetaminophen tablet 650 mg, 650 mg, Oral, Q6H, Bere, Moy A, FNP, 650 mg at 24 0512    acetaminophen tablet 650 mg, 650 mg, Oral, BID PRN, Dayo, Tania A, FNP    ALPRAZolam tablet 0.25 mg, 0.25 mg, Oral, TID PRN, Bere, Moy A, FNP, 0.25 mg at 24    ARIPiprazole tablet 2 mg, 2 mg, Oral, Daily, Bere, Moy A, FNP, 2 mg at 24 0728    atorvastatin  tablet 40 mg, 40 mg, Oral, QHS, Bere, Moy A, FNP, 40 mg at 12/27/24 2029    benzonatate capsule 100 mg, 100 mg, Oral, TID PRN, Bere, Moy A, FNP    busPIRone tablet 5 mg, 5 mg, Oral, BID, Bere, Moy A, FNP, 5 mg at 12/28/24 0729    dextrose 50% injection 12.5 g, 12.5 g, Intravenous, PRN, Bere, Moy A, FNP    dextrose 50% injection 25 g, 25 g, Intravenous, PRN, Bere, Moy A, FNP    docusate sodium capsule 100 mg, 100 mg, Oral, BID, Bere, Moy A, FNP, 100 mg at 12/27/24 2029    enoxaparin injection 30 mg, 30 mg, Subcutaneous, Q24H (prophylaxis, 1700), Bere, Myo A, FNP, 30 mg at 12/27/24 1713    EScitalopram oxalate tablet 5 mg, 5 mg, Oral, Daily, LibertyNeemaTania A, FNP, 5 mg at 12/28/24 0729    gabapentin capsule 100 mg, 100 mg, Oral, Before dinner, Oswaldo Osheayn JULI, FNP    gabapentin capsule 300 mg, 300 mg, Oral, QHS, Bere, Moy A, FNP, 300 mg at 12/27/24 2029    glucagon (human recombinant) injection 1 mg, 1 mg, Intramuscular, PRN, Bere, Moy A, FNP    glucose chewable tablet 16 g, 16 g, Oral, PRN, Bere, Moy A, FNP    glucose chewable tablet 24 g, 24 g, Oral, PRN, Bere, Moy A, FNP    hydrALAZINE injection 10 mg, 10 mg, Intravenous, Q4H PRN, Bere, Moy A, FNP    HYDROcodone-acetaminophen 5-325 mg per tablet 1 tablet, 1 tablet, Oral, Daily PRN, Bere, Moy A, FNP, 1 tablet at 12/28/24 0728    hydrOXYzine pamoate capsule 50 mg, 50 mg, Oral, Nightly PRN, LibertyOswaldoyn A, FNP    insulin aspart U-100 injection 0-10 Units, 0-10 Units, Subcutaneous, QID (AC + HS) PRN, Moy Blackburn A, FNP    labetalol 20 mg/4 mL (5 mg/mL) IV syring, 10 mg, Intravenous, Q4H PRN, Bere Moy A, FNP    LIDOcaine 5 % patch 2 patch, 2 patch, Transdermal, Q24H, Tania Oshea, FNP, 2 patch at 12/28/24 0512    methocarbamoL tablet 500 mg, 500 mg, Oral, Q6H, DayoNeemaTania A, FNP, 500 mg at 12/28/24 0512    metoprolol  "injection 10 mg, 10 mg, Intravenous, Q2H PRN, Bere, Moy A, FNP    nicotine 21 mg/24 hr 1 patch, 1 patch, Transdermal, Daily, Bere, Moy A, FNP, 1 patch at 12/28/24 0730    nitroGLYCERIN SL tablet 0.4 mg, 0.4 mg, Sublingual, Q5 Min PRN, Bere, Moy A, FNP    ondansetron disintegrating tablet 4 mg, 4 mg, Oral, Q6H PRN, Bere, Moy A, FNP, 4 mg at 12/27/24 1418    ondansetron disintegrating tablet 8 mg, 8 mg, Oral, Q8H PRN, Bere, Moy A, FNP    ondansetron injection 4 mg, 4 mg, Intravenous, Q8H PRN, Bere, Moy A, FNP    oxyCODONE immediate release tablet 10 mg, 10 mg, Oral, Q4H PRN, Bere, Moy A, FNP, 10 mg at 12/28/24 0227    oxyCODONE immediate release tablet 5 mg, 5 mg, Oral, Q4H PRN, Bere, Moy A, FNP, 5 mg at 12/27/24 1714    pantoprazole EC tablet 40 mg, 40 mg, Oral, BID, Bere, Moy A, FNP, 40 mg at 12/28/24 0728    polyethylene glycol packet 17 g, 17 g, Oral, BID PRN, Bere, Moy A, FNP, 17 g at 12/26/24 0747    sodium bicarbonate tablet 650 mg, 650 mg, Oral, BID, Bere, Moy A, FNP, 650 mg at 12/28/24 0729     Review of Systems   Complete 12-point review of symptoms negative except for what's mentioned in HPI     Objective:     /69 (Patient Position: Lying)   Pulse 88   Temp 98.9 °F (37.2 °C) (Oral)   Resp 18   Ht 5' 3" (1.6 m)   Wt 50.9 kg (112 lb 3.4 oz)   SpO2 (!) 93%   BMI 19.88 kg/m²      Physical Exam  Nursing note reviewed.   Constitutional:       Appearance: Normal appearance.   HENT:      Head: Normocephalic.      Mouth/Throat:      Mouth: Mucous membranes are moist.   Eyes:      Pupils: Pupils are equal, round, and reactive to light.   Cardiovascular:      Rate and Rhythm: Normal rate and regular rhythm.      Heart sounds: Normal heart sounds.   Pulmonary:      Effort: Pulmonary effort is normal.      Breath sounds: Normal breath sounds.   Abdominal:      General: Bowel sounds are normal.      Palpations: " Abdomen is soft.   Musculoskeletal:      Cervical back: Neck supple.      Comments: Left hip dressing clean and intact    Skin:     General: Skin is warm and dry.   Neurological:      Mental Status: She is alert and oriented to person, place, and time.      Motor: Weakness present.   Psychiatric:         Mood and Affect: Mood normal.         Behavior: Behavior normal.         Thought Content: Thought content normal.         Judgment: Judgment normal.         Lines/Drains/Airways       Peripheral Intravenous Line  Duration                  Peripheral IV - Single Lumen 12/21/24 0430 20 G Anterior;Right Forearm 7 days                  Labs  Admission on 12/24/2024   Component Date Value Ref Range Status    POCT Glucose 12/24/2024 119 (H)  70 - 110 mg/dL Final    POCT Glucose 12/24/2024 115 (H)  70 - 110 mg/dL Final    Sodium 12/25/2024 140  136 - 145 mmol/L Final    Potassium 12/25/2024 3.7  3.5 - 5.1 mmol/L Final    Chloride 12/25/2024 106  98 - 107 mmol/L Final    CO2 12/25/2024 25  23 - 31 mmol/L Final    Glucose 12/25/2024 82  82 - 115 mg/dL Final    Blood Urea Nitrogen 12/25/2024 8.4 (L)  9.8 - 20.1 mg/dL Final    Creatinine 12/25/2024 0.63  0.55 - 1.02 mg/dL Final    Calcium 12/25/2024 8.8  8.4 - 10.2 mg/dL Final    Protein Total 12/25/2024 5.8  5.8 - 7.6 gm/dL Final    Albumin 12/25/2024 2.9 (L)  3.4 - 4.8 g/dL Final    Globulin 12/25/2024 2.9  2.4 - 3.5 gm/dL Final    Albumin/Globulin Ratio 12/25/2024 1.0 (L)  1.1 - 2.0 ratio Final    Bilirubin Total 12/25/2024 1.0  <=1.5 mg/dL Final    ALP 12/25/2024 215 (H)  40 - 150 unit/L Final    ALT 12/25/2024 32  0 - 55 unit/L Final    AST 12/25/2024 52 (H)  5 - 34 unit/L Final    eGFR 12/25/2024 >60  mL/min/1.73/m2 Final    Anion Gap 12/25/2024 9.0  mEq/L Final    BUN/Creatinine Ratio 12/25/2024 13   Final    Magnesium Level 12/25/2024 1.80  1.60 - 2.60 mg/dL Final    Phosphorus Level 12/25/2024 2.7  2.3 - 4.7 mg/dL Final    Prealbumin 12/25/2024 13.3 (L)  14.0 - 37.0  mg/dL Final    Ferritin Level 12/25/2024 318.94 (H)  4.63 - 204.00 ng/mL Final    Iron Binding Capacity Unsaturated 12/25/2024 131  70 - 310 ug/dL Final    Iron Level 12/25/2024 52  50 - 170 ug/dL Final    Transferrin 12/25/2024 160 (L)  173 - 360 mg/dL Final    Iron Binding Capacity Total 12/25/2024 183 (L)  250 - 450 ug/dL Final    Iron Saturation 12/25/2024 28  20 - 50 % Final    WBC 12/25/2024 7.08  4.50 - 11.50 x10(3)/mcL Final    RBC 12/25/2024 2.79 (L)  4.20 - 5.40 x10(6)/mcL Final    Hgb 12/25/2024 8.9 (L)  12.0 - 16.0 g/dL Final    Hct 12/25/2024 27.9 (L)  37.0 - 47.0 % Final    MCV 12/25/2024 100.0 (H)  80.0 - 94.0 fL Final    MCH 12/25/2024 31.9 (H)  27.0 - 31.0 pg Final    MCHC 12/25/2024 31.9 (L)  33.0 - 36.0 g/dL Final    RDW 12/25/2024    Final    Unable to calculate      Platelet 12/25/2024 219  130 - 400 x10(3)/mcL Final    MPV 12/25/2024 9.6  7.4 - 10.4 fL Final    Neut % 12/25/2024 66.2  % Final    Lymph % 12/25/2024 20.5  % Final    Mono % 12/25/2024 10.7  % Final    Eos % 12/25/2024 1.7  % Final    Basophil % 12/25/2024 0.3  % Final    Lymph # 12/25/2024 1.45  0.6 - 4.6 x10(3)/mcL Final    Neut # 12/25/2024 4.69  2.1 - 9.2 x10(3)/mcL Final    Mono # 12/25/2024 0.76  0.1 - 1.3 x10(3)/mcL Final    Eos # 12/25/2024 0.12  0 - 0.9 x10(3)/mcL Final    Baso # 12/25/2024 0.02  <=0.2 x10(3)/mcL Final    IG# 12/25/2024 0.04  0 - 0.04 x10(3)/mcL Final    IG% 12/25/2024 0.6  % Final    NRBC% 12/25/2024 0.0  % Final    RBC Morph 12/25/2024 Abnormal (A)  Normal Final    Anisocytosis 12/25/2024 3+ (A)  (none) Final    Hypochromasia 12/25/2024 1+ (A)  (none) Final    Macrocytosis 12/25/2024 1+ (A)  (none) Final    Platelets 12/25/2024 Adequate  Normal, Adequate Final    Hemoglobin A1c 12/25/2024 5.0  <=7.0 % Final    Estimated Average Glucose 12/25/2024 96.8  mg/dL Final    POCT Glucose 12/25/2024 107  70 - 110 mg/dL Final    POCT Glucose 12/25/2024 113 (H)  70 - 110 mg/dL Final    POCT Glucose 12/25/2024 102   70 - 110 mg/dL Final    POCT Glucose 12/25/2024 112 (H)  70 - 110 mg/dL Final    POCT Glucose 12/26/2024 106  70 - 110 mg/dL Final    POCT Glucose 12/26/2024 123 (H)  70 - 110 mg/dL Final    POCT Glucose 12/26/2024 143 (H)  70 - 110 mg/dL Final    POCT Glucose 12/27/2024 93  70 - 110 mg/dL Final    POCT Glucose 12/27/2024 132 (H)  70 - 110 mg/dL Final    POCT Glucose 12/28/2024 83  70 - 110 mg/dL Final     Radiology  Left hip and pelvis XR on 12/20/2024, IMPRESSION:  Satisfactory alignment following left hip arthroplasty.    Assessment/Plan:     64 y.o. WF admitted on 12/24/2024    Displaced fracture of left femoral neck   - s/p left hemiarthroplasty on 12/20/2024  - WBAT to LLE without external or internal rotation of the hip  - staples to be removed on 01/10  - continue                Tylenol 650 mg every 6 hours                Gabapentin 300 mg nightly                 Robaxin 750 mg t.i.d.                Norco 5 mg/325 mg daily p.r.n. prior to therapy                Oxycodone 5 mg q.4 hours p.r.n. for moderate pain                 Oxycodone 10 mg q.4 hours p.r.n. for severe pain  - defer to physiatry for rehab and pain management  - PT/OT/RT/ST following     Normocytic anemia  - asymptomatic  - s/p transfusion           x2 units PRBC on 12/21/2024  - H/H stable   - no evidence of active bleeds  - will closely monitor and transfuse if needed      Depression/anxiety  - stable  - continue                Abilify 2 mg daily                 BuSpar 5 mg b.i.d.                 Lexapro 5 mg daily     HLD  - FLP outpatient  - continue                Lipitor 40 mg nightly      Nicotine dependence  - smoking cessation counseling initiated on admission  - continue                Nicotine patch daily     GERD  - Avoid spicy foods, and nothing to eat or drink within x2 hours of bedtime or laying flat (water is ok)   - Avoid NSAIDs (Advil, ibuprofen, naproxen...) and chino-2 inhibitors (Mobic, Celebrex)    - continue                 Protonix 40 mg b.i.d.     Constipation  - stable   - continue  Colace 100 mg BID   Miralax 17 gm b.i.d. p.r.n.     Metabolic acidosis  - sodium bicarb 25  - continue                Sodium bicarbonate 650 mg b.i.d.     Moderate protein calorie malnutrition  - albumin 2.9/prealbumin 13.3  - encourage oral nutrition  - registered dietitian following     DM type II  - HgA1c 5.0 on 12/25/2024  - continue                ISS   - CBGs AC/HS     HTN  - BP at goal  - continue                Hydralazine 10 mg every 2 hours as needed for BP > 160/90                Labetalol 10 mg every 2 hours as needed for BP > 160/90  - low sodium diet     VTE Prophylaxis:  Lovenox 30 mg daily  COVID-19 testing:  Unknown  COVID-19 vaccination status:  Unvaccinated     POA:  No  Living will:  No  Contacts:  Jaxon Baum (son) [phone]     CODE STATUS:  Full code  Internal Medicine (attending): George Do MD  Physiatry (consulting):  Nam Swift MD     OUTPATIENT PROVIDERS  PCP: ALEKS Mcfadden  Orthopedic surgery:  Jose J Montemayor D.O.     DISPOSITION:  Sleep hygiene, bowel maintenance, and appetite at goal.  Vital signs at goal with no recorded fevers.  Good glycemic control.  No new labs or imaging today.  Continue current POC.  Monitor closely.  Notify of acute changes.

## 2024-12-28 NOTE — PT/OT/SLP PROGRESS
Physical Therapy Inpatient Rehab Treatment    Patient Name:  Luz Maria Alfaro   MRN:  [id number]    Recommendations:     Discharge Recommendations:  High Intensity Therapy   Discharge Equipment Recommendations:     Barriers to discharge: Impaired functional mobility  and Severity of Deficits     Assessment:     Luz Maria Alfaro is a 64 y.o. female admitted with a medical diagnosis of Displaced fracture of left femoral neck.  She presents with the following impairments/functional limitations:  weakness, impaired endurance, impaired self care skills, impaired functional mobility, gait instability, impaired balance, decreased lower extremity function, pain, decreased ROM, edema, orthopedic precautions .    PT NOTE: pt state she was given lasix for LLE swelling and as a result, pt had to urinate frequently. Pt has little notice when urge hits and must urinate within less than five minutes or pt will have an accident. Pt ambulated for short distances but refused to ambulate for additional trials or longer distances 2/2 pain. Pt requires encouragement and would prefer to stay in bed.    Rehab Diagnosis: L femoral neck fx    General Precautions: Standard, fall     Orthopedic Precautions:LLE weight bearing as tolerated (No IR/ER)     Braces: N/A    Rehab Prognosis: Good; patient would benefit from acute skilled PT services to address these deficits and reach maximum level of function.      History:     Past Medical History:   Diagnosis Date    Benign essential HTN     Mixed hyperlipidemia     Type 2 diabetes mellitus without complications        Past Surgical History:   Procedure Laterality Date    APPENDECTOMY      BACK SURGERY      BREAST SURGERY  01 13 2002    Augmantation    HEMIARTHROPLASTY OF HIP Left 12/20/2024    Procedure: HEMIARTHROPLASTY, HIP;  Surgeon: Jose J Montemayor DO;  Location: [facility];  Service: Orthopedics;  Laterality: Left;  lateral peg board kevin    HIP SURGERY      INTRAMEDULLARY RODDING OF FEMUR  Right 10/27/2022    Procedure: RIGHT FEMUR INTERTROCH IMN;  Surgeon: Stewart Brambila MD;  Location: [facility];  Service: Orthopedics;  Laterality: Right;  Randlett table, supine  3rd case  Synthes Short TFNA       Subjective     Patient comments: my whole (left) leg is swollen now    Respiratory Status: Room air    Patients cultural, spiritual, Mandaen conflicts given the current situation: no    Objective:     Communicated with nsg prior to session.  Patient found HOB elevated with peripheral IV  upon PT entry to room.    Pt is  Alert, Cooperative, and Motivated.    Vitals     Pain Pain Rating 1: 10/10  Location - Side 1: Left  Location - Orientation 1: posterior  Location 1: hip  Pain Addressed 1: Reposition, Distraction, Nurse notified, Other (see comments) (ice pack)  Pain Rating Post-Intervention 1: 10/10  Pain Rating 2: 10/10  Location - Side 2: Right  Location - Orientation 2: lower  Location 2: back  Pain Addressed 2: Reposition, Distraction, Nurse notified, Other (see comments) (ice applied)  Pain Rating Post-Intervention 2: 10/10       Functional Mobility:   Bed Mobility:     Supine to Sit: 3 trials, pt utilized bed rails, secondary to urge to urinate quickly; set-up assistance     Current   Status  Discharge   Goal   Functional Area: Care Score:    Sit to Lying 3  Cornell for LLE, 3 trials Independent   Sit to Stand 5  Set-up with RW, several trials Independent   Walk 10 Feet 4  Cga with RW, pt ambulated 20', 15', and 10' with slow, step-to gait pattern with small step lengths Independent   Walk 50 Feet with Two Turns 88 Independent   Walk 150 Feet 88 Independent       Therapeutic Activities and Exercises:  Patient educated on role of acute care PT and PT POC, safety while in hospital including calling nurse for mobility, and call light usage  Patient educated about importance of OOB mobility and remaining up in chair most of the day.  Patient educated about pursed lip breathing technique and cued for use with  mobility.  Pt performed toileting transfer to bed side commode - step pivot - 3 trials - positive voidx3, positive bmx2; increased time required    Therapeutic exercises were performed unsupported seated at edge of bed with 2# on RLE, 0# on LLE:    marches  hip adductions isometrics against a pillow  long arc quads  calf raises/toe raises  supine in bed:    heel slides  Straight leg raises with Active assist of LLE  Hip abduction with active assist of LLE  Posterior pelvic tilts  All therex performed 2x15 reps each, BLE      Activity Tolerance: Good    Patient left HOB elevated with call button in reach and nsg notified.    Education provided: roles and goals of PT/PTA, transfer training, bed mob, gait training, balance training, safety awareness, body mechanics, assistive device, strengthening exercises, fall prevention, and hip precautions    Expected compliance: High compliance    Plan:     During this hospitalization, patient to be seen 5 x/week to address the identified rehab impairments via gait training, therapeutic exercises, therapeutic activities and progress toward the following goals:    GOALS:   Multidisciplinary Problems       Physical Therapy Goals          Problem: Physical Therapy    Goal Priority Disciplines Outcome Interventions   Physical Therapy Goal     PT, PT/OT Progressing    Description:   PT Short Term goals     Bed Mobility:  Roll left and right with supervision/touching assist.  Sit to supine transfer with partial/moderate assist.  Supine to sit transfer with partial/moderate assist.    Transfers:  Sit to stand transfer with setup/clean-up assist using RW.MET  Bed to chair transferStand step with setup/clean-up assist using RW.  Car transfer with setup/clean-up assist using RW.   an object from the ground in standing position with supervision/touching assist using RW.    Mobility:  Ambulate 150 feet with partial/moderate assist using RW.  Ambulate 10 feet on uneven surfaces/ramps  with partial/moderate assist using RW.  Ascend/descend a 4 inch curb with partial/moderate assist using RW.   Ascend/descend 12 stairs with partial/moderate assist using bilateral handrails.  Manual wheelchair 150 feet with supervision/touching assist using Bilateral upper extremity.      Time Frame: by Re-evaluation                        Plan of Care Expires:  12/30/24  PT Next Visit Date: 12/30/24  Plan of Care reviewed with: patient    Additional Information:         Time Tracking:     Therapy Time  PT Received On: 12/28/24  PT Start Time: 1300  PT Stop Time: 1430  PT Total Time (min): 90 min   PT Individual: 90  Missed Time:    Time Missed due to:      Billable Minutes: Gait Training 15 min, Therapeutic Activity 25 min, and Therapeutic Exercise 40 min    12/28/2024

## 2024-12-28 NOTE — PT/OT/SLP PROGRESS
Physical Therapy Inpatient Rehab Treatment    Patient Name:  Luz Maria Alfaro   MRN:  [id number]    Recommendations:     Discharge Recommendations:  High Intensity Therapy   Discharge Equipment Recommendations: walker, rolling   Barriers to discharge: Decreased caregiver support    Assessment:     Luz Maria Alfaro is a 64 y.o. female admitted with a medical diagnosis of Displaced fracture of left femoral neck.  She presents with the following impairments/functional limitations:    Decreased functional mobility, decreased LE strength, decreased gait endurance and stability. .    Rehab Diagnosis: L femoral neck fx    Recent Surgery: * No surgery found *      General Precautions: Standard, fall     Orthopedic Precautions:LLE weight bearing as tolerated (No IR/ER)     Braces: N/A    Rehab Prognosis: Fair; patient would benefit from acute skilled PT services to address these deficits and reach maximum level of function.      History:     Past Medical History:   Diagnosis Date    Benign essential HTN     Mixed hyperlipidemia     Type 2 diabetes mellitus without complications        Past Surgical History:   Procedure Laterality Date    APPENDECTOMY      BACK SURGERY      BREAST SURGERY  01 13 2002    Augmantation    HEMIARTHROPLASTY OF HIP Left 12/20/2024    Procedure: HEMIARTHROPLASTY, HIP;  Surgeon: Jose J Montemayor DO;  Location: [facility];  Service: Orthopedics;  Laterality: Left;  lateral peg board kevin    HIP SURGERY      INTRAMEDULLARY RODDING OF FEMUR Right 10/27/2022    Procedure: RIGHT FEMUR INTERTROCH IMN;  Surgeon: Stewart Brambila MD;  Location: [facility];  Service: Orthopedics;  Laterality: Right;  [place] table, supine  3rd case  Synthes Short TFNA       Subjective     Chief Complaint: States her low back and leg are spasming and no one will giver her a pain pill.    Respiratory Status: Room air    Patients cultural, spiritual, Mormonism conflicts given the current situation: no      Objective:     Communicated  with pt prior to session.  Patient found up in chair with    upon PT entry to room.    Pt is Oriented x3 and Alert and Cooperative.    Vitals   Vitals at Rest  /58   HR 89   O2 Sat    Pain 10/10         Functional Mobility:   Transfers:     Sit to Stand: Set-up or clean-up assistance  with rolling walker  Gait: Pt ambulates 75' with RW with Supervision or touching assistance .      Current   Status  Discharge   Goal   Functional Area: Care Score:    Roll Left and Right   Independent   Sit to Lying   Independent   Lying to Sitting on Side of Bed   Independent   Sit to Stand 5 Independent   Chair/Bed-to-Chair Transfer   Independent   Car Transfer   Independent   Walk 10 Feet   Independent   Walk 50 Feet with Two Turns   Independent   Walk 150 Feet   Independent   Walk 10 Feet Uneven Surface   Independent   1 Step (Curb)   Supervision or touching assistance   4 Steps   Supervision or touching assistance   12 Steps       Picking Up Object   Supervision or touching assistance   Wheel 50 Feet with Two Turns   Independent   Wheel 150 Feet   Independent       Therapeutic Activities and Exercises:  Pt ambulated 50 ft then 25 ft CGA. Good performance with transfers. Pt reported having BM while ambulating. Performed toileting and changes pull ups. Performed 5 sit to stands throughout session, pt Jessika with bed mobility    Activity Tolerance: Good    Patient left supine with all lines intact and call button in reach.    Education provided: transfer training, bed mob, and gait training    Expected compliance: Moderate compliance    GOALS:   Multidisciplinary Problems       Physical Therapy Goals          Problem: Physical Therapy    Goal Priority Disciplines Outcome Interventions   Physical Therapy Goal     PT, PT/OT Progressing    Description:   PT Short Term goals     Bed Mobility:  Roll left and right with supervision/touching assist.  Sit to supine transfer with partial/moderate assist.  Supine to sit transfer with  partial/moderate assist.    Transfers:  Sit to stand transfer with setup/clean-up assist using RW.MET  Bed to chair transferStand step with setup/clean-up assist using RW.  Car transfer with setup/clean-up assist using RW.   an object from the ground in standing position with supervision/touching assist using RW.    Mobility:  Ambulate 150 feet with partial/moderate assist using RW.  Ambulate 10 feet on uneven surfaces/ramps with partial/moderate assist using RW.  Ascend/descend a 4 inch curb with partial/moderate assist using RW.   Ascend/descend 12 stairs with partial/moderate assist using bilateral handrails.  Manual wheelchair 150 feet with supervision/touching assist using Bilateral upper extremity.      Time Frame: by Re-evaluation                        Plan:     During this hospitalization, patient to be seen 5 x/week to address the identified rehab impairments via gait training, therapeutic exercises, therapeutic activities and progress toward the following goals:    Plan of Care Expires:  12/30/24  PT Next Visit Date: 12/30/24  Plan of Care reviewed with: patient    Additional Information:         Time Tracking:     Therapy Time  PT Received On: 12/28/24  PT Start Time: 1000  PT Stop Time: 1030  PT Total Time (min): 30 min   PT Individual: 30  Missed Time:    Time Missed due to:      Billable Minutes: Gait Training 15 and Therapeutic Activity 15    12/28/2024

## 2024-12-28 NOTE — PT/OT/SLP PROGRESS
"Occupational Therapy Inpatient Rehab Treatment    Name: Luz Maria Alfaro  MRN: [id number]    Assessment:  Luz Maria Alfaro is a 64 y.o. female admitted with a medical diagnosis of Displaced fracture of left femoral neck.  She presents with the following impairments/functional limitations:  weakness, impaired endurance, impaired self care skills, impaired functional mobility, gait instability, impaired balance, decreased lower extremity function, pain, decreased ROM, orthopedic precautions.    General Precautions: Standard, fall, hearing impaired     Orthopedic Precautions:LLE weight bearing as tolerated     Braces: N/A    Rehab Prognosis: Good; patient would benefit from acute skilled OT services to address these deficits and reach maximum level of function.      History:     Past Medical History:   Diagnosis Date    Benign essential HTN     Mixed hyperlipidemia     Type 2 diabetes mellitus without complications        Past Surgical History:   Procedure Laterality Date    APPENDECTOMY      BACK SURGERY      BREAST SURGERY  01 13 2002    Augmantation    HEMIARTHROPLASTY OF HIP Left 12/20/2024    Procedure: HEMIARTHROPLASTY, HIP;  Surgeon: Jose J Montemayor DO;  Location: [facility];  Service: Orthopedics;  Laterality: Left;  lateral peg board kevin    HIP SURGERY      INTRAMEDULLARY RODDING OF FEMUR Right 10/27/2022    Procedure: RIGHT FEMUR INTERTROCH IMN;  Surgeon: Stewart Brambila MD;  Location: [facility];  Service: Orthopedics;  Laterality: Right;  [place] table, supine  3rd case  Synthes Short TFNA       Subjective     Orientation: Oriented x4    Chief Complaint: Pain in L hip     Patient/Family Comments/goals: "I have to get home to get away from these crazy people."    Respiratory Status: Room air    Patients cultural, spiritual, Zoroastrianism conflicts given the current situation: no       Objective:     Patient found up in chair  upon OT entry to room.    Mobility   Patient completed:  Sit to Stand Transfer with contact " guard assistance with rolling walker  Stand to Sit Transfer with contact guard assistance with rolling walker    Functional Mobility  Pt performed short FM in OT gym to door, ~20 ft, limited by pain. CGA using RW     ADLs   Current Status   Putting On, Taking Off Footwear  OT donned compression stockings after speaking with nursing. Increased swelling and skin glossiness noted on L leg compared to yesterday     Limiting Factors for ADLs: motor, endurance, limited ROM, balance, weakness, safety awareness, and pain     Therapeutic Activities  Pt completed large piece puzzle to address standing tolerance and standing balance. Pt able to tolerate 4 min of standing before requiring seated rest break due to pain. Pt finished puzzle in unsupported sit in wheelchair     Therapeutic Exercise  Pt performed 4 min F and 4 min B on UBE in unsupported sit to address UE strength and overall endurance.     Patient left up in chair with all lines intact and call button in reach.     Education provided: Roles and goals of OT, transfer training, modified goals, sequencing, safety precautions, and fall prevention  Verbalized importance of elevating L leg when up in recliner or in bed to decrease swelling.     Multidisciplinary Problems       Occupational Therapy Goals          Problem: Occupational Therapy    Goal Priority Disciplines Outcome Interventions   Occupational Therapy Goal     OT, PT/OT Progressing    Description: ADLs:  Pt to perform grooming tasks with SBA standing at sink.  Pt to perform feeding tasks with independence.  Pt to perform UB dressing with SBA.  Pt to perform LB dressing with max assist with AE as needed.   Pt to perform putting on/off footwear task with max assist with AE as needed.  Pt to perform toileting with min assist.  Pt to perform bathing with mod assist with AE as needed.    Functional Transfers:  Pt to perform toilet transfers with CGA to toilet in bathroom.  Pt to perform a tub transfer with mod  assist with AE as needed onto TTB.    IADLs:  Pt to perform simple meal prep with mod assist.    Balance, Strengthening, Endurance, Balance:  Pt to consistently demonstrate adherence to orthopedic precautions during all ADL's as instructed by OT.  Pt to demonstrate good dynamic standing balance as required to perform ADL's from standing level.  Pt to demonstrate good BUE strength during functional task   Pt to demonstrate consistent adherence to breathing control and energy conservation techniques as educated by OT.                        Time Tracking     OT Received On: 12/28/24  Time In 0730     Time Out 0830  Total Time 60 min  Therapy Time: OT Individual: 60  Missed Time:    Missed Time Reason:      Billable Minutes: Therapeutic Activity 45 and Therapeutic Exercise 15    12/28/2024

## 2024-12-29 LAB — POCT GLUCOSE: 120 MG/DL (ref 70–110)

## 2024-12-29 PROCEDURE — 97535 SELF CARE MNGMENT TRAINING: CPT

## 2024-12-29 PROCEDURE — 97116 GAIT TRAINING THERAPY: CPT | Mod: CQ

## 2024-12-29 PROCEDURE — 97530 THERAPEUTIC ACTIVITIES: CPT | Mod: CQ

## 2024-12-29 PROCEDURE — 99900031 HC PATIENT EDUCATION (STAT)

## 2024-12-29 PROCEDURE — 94799 UNLISTED PULMONARY SVC/PX: CPT

## 2024-12-29 PROCEDURE — 25000003 PHARM REV CODE 250: Performed by: NURSE PRACTITIONER

## 2024-12-29 PROCEDURE — S4991 NICOTINE PATCH NONLEGEND: HCPCS | Performed by: NURSE PRACTITIONER

## 2024-12-29 PROCEDURE — 11800000 HC REHAB PRIVATE ROOM

## 2024-12-29 PROCEDURE — 63600175 PHARM REV CODE 636 W HCPCS: Performed by: NURSE PRACTITIONER

## 2024-12-29 PROCEDURE — 94761 N-INVAS EAR/PLS OXIMETRY MLT: CPT

## 2024-12-29 PROCEDURE — 97530 THERAPEUTIC ACTIVITIES: CPT

## 2024-12-29 PROCEDURE — 97110 THERAPEUTIC EXERCISES: CPT

## 2024-12-29 RX ADMIN — OXYCODONE 10 MG: 5 TABLET ORAL at 02:12

## 2024-12-29 RX ADMIN — ACETAMINOPHEN 650 MG: 325 TABLET ORAL at 11:12

## 2024-12-29 RX ADMIN — NICOTINE 1 PATCH: 21 PATCH, EXTENDED RELEASE TRANSDERMAL at 07:12

## 2024-12-29 RX ADMIN — BUSPIRONE HYDROCHLORIDE 5 MG: 5 TABLET ORAL at 08:12

## 2024-12-29 RX ADMIN — METHOCARBAMOL 500 MG: 500 TABLET ORAL at 11:12

## 2024-12-29 RX ADMIN — ENOXAPARIN SODIUM 30 MG: 30 INJECTION SUBCUTANEOUS at 04:12

## 2024-12-29 RX ADMIN — ATORVASTATIN CALCIUM 40 MG: 40 TABLET, FILM COATED ORAL at 08:12

## 2024-12-29 RX ADMIN — GABAPENTIN 300 MG: 300 CAPSULE ORAL at 08:12

## 2024-12-29 RX ADMIN — OXYCODONE 10 MG: 5 TABLET ORAL at 04:12

## 2024-12-29 RX ADMIN — ACETAMINOPHEN 650 MG: 325 TABLET ORAL at 06:12

## 2024-12-29 RX ADMIN — ALPRAZOLAM 0.25 MG: 0.25 TABLET ORAL at 08:12

## 2024-12-29 RX ADMIN — ESCITALOPRAM OXALATE 5 MG: 5 TABLET, FILM COATED ORAL at 07:12

## 2024-12-29 RX ADMIN — METHOCARBAMOL 500 MG: 500 TABLET ORAL at 04:12

## 2024-12-29 RX ADMIN — DOCUSATE SODIUM 100 MG: 100 CAPSULE, LIQUID FILLED ORAL at 08:12

## 2024-12-29 RX ADMIN — PANTOPRAZOLE SODIUM 40 MG: 40 TABLET, DELAYED RELEASE ORAL at 08:12

## 2024-12-29 RX ADMIN — METHOCARBAMOL 500 MG: 500 TABLET ORAL at 05:12

## 2024-12-29 RX ADMIN — HYDROCODONE BITARTRATE AND ACETAMINOPHEN 1 TABLET: 5; 325 TABLET ORAL at 07:12

## 2024-12-29 RX ADMIN — PANTOPRAZOLE SODIUM 40 MG: 40 TABLET, DELAYED RELEASE ORAL at 07:12

## 2024-12-29 RX ADMIN — OXYCODONE 10 MG: 5 TABLET ORAL at 11:12

## 2024-12-29 RX ADMIN — BUSPIRONE HYDROCHLORIDE 5 MG: 5 TABLET ORAL at 07:12

## 2024-12-29 RX ADMIN — GABAPENTIN 100 MG: 100 CAPSULE ORAL at 04:12

## 2024-12-29 RX ADMIN — ACETAMINOPHEN 650 MG: 325 TABLET ORAL at 05:12

## 2024-12-29 RX ADMIN — SODIUM BICARBONATE 650 MG: 650 TABLET ORAL at 07:12

## 2024-12-29 RX ADMIN — DOCUSATE SODIUM 100 MG: 100 CAPSULE, LIQUID FILLED ORAL at 07:12

## 2024-12-29 RX ADMIN — LIDOCAINE 5% 2 PATCH: 700 PATCH TOPICAL at 06:12

## 2024-12-29 RX ADMIN — SODIUM BICARBONATE 650 MG: 650 TABLET ORAL at 08:12

## 2024-12-29 RX ADMIN — ARIPIPRAZOLE 2 MG: 2 TABLET ORAL at 07:12

## 2024-12-29 RX ADMIN — OXYCODONE 10 MG: 5 TABLET ORAL at 08:12

## 2024-12-29 NOTE — PT/OT/SLP PROGRESS
"Physical Therapy Inpatient Rehab Treatment    Patient Name:  Luz Maria Alfaro   MRN:  [id number]    Recommendations:     Discharge Recommendations:  High Intensity Therapy   Discharge Equipment Recommendations:     Barriers to discharge: Increased level of assist, Decreased caregiver support, Ongoing medical treatment, Impaired functional mobility , and Severity of Deficits     Assessment:     Luz Maria Alfaro is a 64 y.o. female admitted with a medical diagnosis of Displaced fracture of left femoral neck.  She presents with the following impairments/functional limitations:  weakness, impaired endurance, impaired self care skills, impaired functional mobility, gait instability, impaired balance, decreased lower extremity function, pain, decreased ROM, edema, orthopedic precautions .    Rehab Diagnosis: L femoral neck fx    General Precautions: Standard, fall     Orthopedic Precautions:LLE weight bearing as tolerated (No IR/ER)     Braces: N/A    Rehab Prognosis: Fair; patient would benefit from acute skilled PT services to address these deficits and reach maximum level of function.      History:     Past Medical History:   Diagnosis Date    Benign essential HTN     Mixed hyperlipidemia     Type 2 diabetes mellitus without complications        Past Surgical History:   Procedure Laterality Date    APPENDECTOMY      BACK SURGERY      BREAST SURGERY  01 13 2002    Augmantation    HEMIARTHROPLASTY OF HIP Left 12/20/2024    Procedure: HEMIARTHROPLASTY, HIP;  Surgeon: Jose J Montemayor DO;  Location: [facility];  Service: Orthopedics;  Laterality: Left;  lateral peg board kevin    HIP SURGERY      INTRAMEDULLARY RODDING OF FEMUR Right 10/27/2022    Procedure: RIGHT FEMUR INTERTROCH IMN;  Surgeon: Stewart Brambila MD;  Location: [facility];  Service: Orthopedics;  Laterality: Right;  [place] table, supine  3rd case  Synthes Short TFNA       Subjective     Patient comments: "I need my meds"     Respiratory Status: Room " air    Patients cultural, spiritual, Pentecostal conflicts given the current situation: no    Objective:     Communicated with nursing  prior to session.  Patient found up in chair with peripheral IV  upon PT entry to room.    Pt is Oriented x3 and Alert and Cooperative.    Vitals   Pain Pain Rating 1: 8/10  Location - Side 1: Left  Location 1: hip  Pain Addressed 1: Reposition, Nurse notified, Pre-medicate for activity, Distraction       Functional Mobility:   Transfers:     Toilet Transfer: Set-up or clean-up assistance  with  rolling walker  using  Step Transfer and + void and bm increased time no assist with managing toilet hygiene      Current   Status  Discharge   Goal   Functional Area: Care Score:    Roll Left and Right   Independent   Sit to Lying 4  Use of lle leg  vc for tech with  and positioning  Independent   Lying to Sitting on Side of Bed   Independent   Sit to Stand 5  Use of rw  Independent   Chair/Bed-to-Chair Transfer 4  Use of rw sba  Independent   Car Transfer   Independent   Walk 10 Feet 4  Use of rw sba lle wbat 40ft with turn  Independent   Walk 50 Feet with Two Turns 88 Independent   Walk 150 Feet 88 Independent   Walk 10 Feet Uneven Surface   Independent   1 Step (Curb)   Supervision or touching assistance   4 Steps   Supervision or touching assistance   12 Steps       Picking Up Object 6  Use of rw and reacher  Supervision or touching assistance   Wheel 50 Feet with Two Turns   Independent   Wheel 150 Feet   Independent       Therapeutic Activities and Exercises:  Education on use of leg  to self manage lle into bed for decrease assist     Activity Tolerance: Good    Patient left HOB elevated with call button in reach and pt ashlyn tx pt requesting for supine rest break secondary to recliner uncomfortable and long rest at this time .  .    Education provided: transfer training, bed mob, gait training, balance training, safety awareness, body mechanics, assistive device, and  fall prevention    Expected compliance: High compliance    Plan:     During this hospitalization, patient to be seen 5 x/week to address the identified rehab impairments via gait training, therapeutic exercises, therapeutic activities and progress toward the following goals:    GOALS:   Multidisciplinary Problems       Physical Therapy Goals          Problem: Physical Therapy    Goal Priority Disciplines Outcome Interventions   Physical Therapy Goal     PT, PT/OT Progressing    Description:   PT Short Term goals     Bed Mobility:  Roll left and right with supervision/touching assist.  Sit to supine transfer with partial/moderate assist.  Supine to sit transfer with partial/moderate assist.    Transfers:  Sit to stand transfer with setup/clean-up assist using RW.MET  Bed to chair transferStand step with setup/clean-up assist using RW.  Car transfer with setup/clean-up assist using RW.   an object from the ground in standing position with supervision/touching assist using RW.    Mobility:  Ambulate 150 feet with partial/moderate assist using RW.  Ambulate 10 feet on uneven surfaces/ramps with partial/moderate assist using RW.  Ascend/descend a 4 inch curb with partial/moderate assist using RW.   Ascend/descend 12 stairs with partial/moderate assist using bilateral handrails.  Manual wheelchair 150 feet with supervision/touching assist using Bilateral upper extremity.      Time Frame: by Re-evaluation                        Plan of Care Expires:  12/30/24  PT Next Visit Date: 12/30/24  Plan of Care reviewed with: patient    Additional Information:         Time Tracking:     Therapy Time  PT Received On: 12/29/24  PT Start Time: 0730  PT Stop Time: 0800  PT Total Time (min): 30 min   PT Individual: 30  Missed Time:    Time Missed due to:      Billable Minutes: Gait Training 10min and Therapeutic Activity 20min    12/29/2024

## 2024-12-29 NOTE — PROGRESS NOTES
[facility] ([facility])  Rehab Progress Note    Patient Name: Luz Maria Alfaro  MRN: [id number]  Age: 64 y.o. Sex: female  : 1960  Hospital Length of Stay: 5 days  Date of Service: 2024   Chief Complaint: Displaced fracture of left femoral neck s/p left hemiarthroplasty on 2024     Subjective:     Basic Information  Admit Information: 63-year-old white female presented to [facility] clinical on 2024 for follow up visit for left hip pain that has been progressively worsening.  Reportedly fell 2 months ago.  Imaging on  significant for left displaced femoral neck fracture.  Recommended to go to [facility] ED on  for admission.  PMH significant for HTN, dm type 2, HLD, GERD, and nicotine dependence.  Workup significant for left displaced femoral neck fracture.  Tolerated left hip hemiarthroplasty on  without perioperative complications.  She did require 2 units PRBC.  Sodium bicarb tablets initiated due to metabolic acidosis.  Currently Min assist.  Tolerated transfer to [facility] inpatient rehab unit on  without incident.   Today's Information: No acute events overnight.  Sleep hygiene, bowel maintenance, and appetite at goal.  Last BM .  Vital signs at goal with no recorded fevers.  Good glycemic control.  No new labs or imaging today.    Review of patient's allergies indicates:   Allergen Reactions    Iodinated contrast media Shortness Of Breath    Opioids - morphine analogues     Meperidine Rash and Hives        Current Facility-Administered Medications:     acetaminophen tablet 650 mg, 650 mg, Oral, Q6H, Bere, Moy A, FNP, 650 mg at 24 0550    acetaminophen tablet 650 mg, 650 mg, Oral, BID PRN, Dayo, Tania A, FNP    ALPRAZolam tablet 0.25 mg, 0.25 mg, Oral, TID PRN, Bere, Moy A, FNP, 0.25 mg at 24    ARIPiprazole tablet 2 mg, 2 mg, Oral, Daily, Bere, Moy A, FNP, 2 mg at 24 0728    atorvastatin  tablet 40 mg, 40 mg, Oral, QHS, Bere, Moy A, FNP, 40 mg at 12/28/24 2016    benzonatate capsule 100 mg, 100 mg, Oral, TID PRN, Bere, Moy A, FNP    busPIRone tablet 5 mg, 5 mg, Oral, BID, Bere, Moy A, FNP, 5 mg at 12/29/24 0728    dextrose 50% injection 12.5 g, 12.5 g, Intravenous, PRN, Bere, Moy A, FNP    dextrose 50% injection 25 g, 25 g, Intravenous, PRN, Bere, Moy A, FNP    docusate sodium capsule 100 mg, 100 mg, Oral, BID, Bere, Moy A, FNP, 100 mg at 12/29/24 0727    enoxaparin injection 30 mg, 30 mg, Subcutaneous, Q24H (prophylaxis, 1700), Bere, Moy A, FNP, 30 mg at 12/28/24 1642    EScitalopram oxalate tablet 5 mg, 5 mg, Oral, Daily, [place], Tania A, FNP, 5 mg at 12/29/24 0728    gabapentin capsule 100 mg, 100 mg, Oral, Before dinner, Dayo Tania A, FNP, 100 mg at 12/28/24 1642    gabapentin capsule 300 mg, 300 mg, Oral, QHS, Bere, Moy A, FNP, 300 mg at 12/28/24 2016    glucagon (human recombinant) injection 1 mg, 1 mg, Intramuscular, PRN, Bere, Moy A, FNP    glucose chewable tablet 16 g, 16 g, Oral, PRN, Bere, Moy A, FNP    glucose chewable tablet 24 g, 24 g, Oral, PRN, Bere, Moy A, FNP    hydrALAZINE injection 10 mg, 10 mg, Intravenous, Q4H PRN, Bere, Moy A, FNP    HYDROcodone-acetaminophen 5-325 mg per tablet 1 tablet, 1 tablet, Oral, Daily PRN, Bere, Moy A, FNP, 1 tablet at 12/29/24 0730    hydrOXYzine pamoate capsule 50 mg, 50 mg, Oral, Nightly PRN, Loco HillsOswaldoyn A, FNP    insulin aspart U-100 injection 0-10 Units, 0-10 Units, Subcutaneous, QID (AC + HS) PRN, Bere Moy A, FNP    labetalol 20 mg/4 mL (5 mg/mL) IV syring, 10 mg, Intravenous, Q4H PRN, Bere Moy A, FNP    LIDOcaine 5 % patch 2 patch, 2 patch, Transdermal, Q24H, Tania Oshea, FNP, 2 patch at 12/29/24 0600    methocarbamoL tablet 500 mg, 500 mg, Oral, Q6H, Oswaldo Osheayn A, FNP, 500 mg at 12/29/24  "0550    metoprolol injection 10 mg, 10 mg, Intravenous, Q2H PRN, Bere, Moy A, FNP    nicotine 21 mg/24 hr 1 patch, 1 patch, Transdermal, Daily, Bere, Moy A, FNP, 1 patch at 12/29/24 0728    nitroGLYCERIN SL tablet 0.4 mg, 0.4 mg, Sublingual, Q5 Min PRN, Bere, Moy A, FNP    ondansetron disintegrating tablet 4 mg, 4 mg, Oral, Q6H PRN, Bere, Moy A, FNP, 4 mg at 12/27/24 1418    ondansetron disintegrating tablet 8 mg, 8 mg, Oral, Q8H PRN, Bere, Moy A, FNP    ondansetron injection 4 mg, 4 mg, Intravenous, Q8H PRN, Bere, Moy A, FNP    oxyCODONE immediate release tablet 10 mg, 10 mg, Oral, Q4H PRN, Bere, Moy A, FNP, 10 mg at 12/29/24 0248    oxyCODONE immediate release tablet 5 mg, 5 mg, Oral, Q4H PRN, Bere, Moy A, FNP, 5 mg at 12/27/24 1714    pantoprazole EC tablet 40 mg, 40 mg, Oral, BID, Bere, Moy A, FNP, 40 mg at 12/29/24 0728    polyethylene glycol packet 17 g, 17 g, Oral, BID PRN, Bere, Moy A, FNP, 17 g at 12/26/24 0747    sodium bicarbonate tablet 650 mg, 650 mg, Oral, BID, Bere, Moy A, FNP, 650 mg at 12/29/24 0728     Review of Systems   Complete 12-point review of symptoms negative except for what's mentioned in HPI     Objective:     BP (!) 154/79   Pulse 86   Temp 97.5 °F (36.4 °C) (Oral)   Resp 18   Ht 5' 3" (1.6 m)   Wt 50.9 kg (112 lb 3.4 oz)   SpO2 (!) 94%   BMI 19.88 kg/m²      Physical Exam  Nursing note reviewed.   Constitutional:       Appearance: Normal appearance.   HENT:      Head: Normocephalic.      Mouth/Throat:      Mouth: Mucous membranes are moist.   Eyes:      Pupils: Pupils are equal, round, and reactive to light.   Cardiovascular:      Rate and Rhythm: Normal rate and regular rhythm.      Heart sounds: Normal heart sounds.   Pulmonary:      Effort: Pulmonary effort is normal.      Breath sounds: Normal breath sounds.   Abdominal:      General: Bowel sounds are normal.      Palpations: " Abdomen is soft.   Musculoskeletal:      Cervical back: Neck supple.      Comments: Left hip dressing clean and intact    Skin:     General: Skin is warm and dry.   Neurological:      Mental Status: She is alert and oriented to person, place, and time.      Motor: Weakness present.   Psychiatric:         Mood and Affect: Mood normal.         Behavior: Behavior normal.         Thought Content: Thought content normal.         Judgment: Judgment normal.         Lines/Drains/Airways       Peripheral Intravenous Line  Duration                  Peripheral IV - Single Lumen 12/21/24 0430 20 G Anterior;Right Forearm 8 days                  Labs  Admission on 12/24/2024   Component Date Value Ref Range Status    POCT Glucose 12/24/2024 119 (H)  70 - 110 mg/dL Final    POCT Glucose 12/24/2024 115 (H)  70 - 110 mg/dL Final    Sodium 12/25/2024 140  136 - 145 mmol/L Final    Potassium 12/25/2024 3.7  3.5 - 5.1 mmol/L Final    Chloride 12/25/2024 106  98 - 107 mmol/L Final    CO2 12/25/2024 25  23 - 31 mmol/L Final    Glucose 12/25/2024 82  82 - 115 mg/dL Final    Blood Urea Nitrogen 12/25/2024 8.4 (L)  9.8 - 20.1 mg/dL Final    Creatinine 12/25/2024 0.63  0.55 - 1.02 mg/dL Final    Calcium 12/25/2024 8.8  8.4 - 10.2 mg/dL Final    Protein Total 12/25/2024 5.8  5.8 - 7.6 gm/dL Final    Albumin 12/25/2024 2.9 (L)  3.4 - 4.8 g/dL Final    Globulin 12/25/2024 2.9  2.4 - 3.5 gm/dL Final    Albumin/Globulin Ratio 12/25/2024 1.0 (L)  1.1 - 2.0 ratio Final    Bilirubin Total 12/25/2024 1.0  <=1.5 mg/dL Final    ALP 12/25/2024 215 (H)  40 - 150 unit/L Final    ALT 12/25/2024 32  0 - 55 unit/L Final    AST 12/25/2024 52 (H)  5 - 34 unit/L Final    eGFR 12/25/2024 >60  mL/min/1.73/m2 Final    Anion Gap 12/25/2024 9.0  mEq/L Final    BUN/Creatinine Ratio 12/25/2024 13   Final    Magnesium Level 12/25/2024 1.80  1.60 - 2.60 mg/dL Final    Phosphorus Level 12/25/2024 2.7  2.3 - 4.7 mg/dL Final    Prealbumin 12/25/2024 13.3 (L)  14.0 - 37.0  mg/dL Final    Ferritin Level 12/25/2024 318.94 (H)  4.63 - 204.00 ng/mL Final    Iron Binding Capacity Unsaturated 12/25/2024 131  70 - 310 ug/dL Final    Iron Level 12/25/2024 52  50 - 170 ug/dL Final    Transferrin 12/25/2024 160 (L)  173 - 360 mg/dL Final    Iron Binding Capacity Total 12/25/2024 183 (L)  250 - 450 ug/dL Final    Iron Saturation 12/25/2024 28  20 - 50 % Final    WBC 12/25/2024 7.08  4.50 - 11.50 x10(3)/mcL Final    RBC 12/25/2024 2.79 (L)  4.20 - 5.40 x10(6)/mcL Final    Hgb 12/25/2024 8.9 (L)  12.0 - 16.0 g/dL Final    Hct 12/25/2024 27.9 (L)  37.0 - 47.0 % Final    MCV 12/25/2024 100.0 (H)  80.0 - 94.0 fL Final    MCH 12/25/2024 31.9 (H)  27.0 - 31.0 pg Final    MCHC 12/25/2024 31.9 (L)  33.0 - 36.0 g/dL Final    RDW 12/25/2024    Final    Unable to calculate      Platelet 12/25/2024 219  130 - 400 x10(3)/mcL Final    MPV 12/25/2024 9.6  7.4 - 10.4 fL Final    Neut % 12/25/2024 66.2  % Final    Lymph % 12/25/2024 20.5  % Final    Mono % 12/25/2024 10.7  % Final    Eos % 12/25/2024 1.7  % Final    Basophil % 12/25/2024 0.3  % Final    Lymph # 12/25/2024 1.45  0.6 - 4.6 x10(3)/mcL Final    Neut # 12/25/2024 4.69  2.1 - 9.2 x10(3)/mcL Final    Mono # 12/25/2024 0.76  0.1 - 1.3 x10(3)/mcL Final    Eos # 12/25/2024 0.12  0 - 0.9 x10(3)/mcL Final    Baso # 12/25/2024 0.02  <=0.2 x10(3)/mcL Final    IG# 12/25/2024 0.04  0 - 0.04 x10(3)/mcL Final    IG% 12/25/2024 0.6  % Final    NRBC% 12/25/2024 0.0  % Final    RBC Morph 12/25/2024 Abnormal (A)  Normal Final    Anisocytosis 12/25/2024 3+ (A)  (none) Final    Hypochromasia 12/25/2024 1+ (A)  (none) Final    Macrocytosis 12/25/2024 1+ (A)  (none) Final    Platelets 12/25/2024 Adequate  Normal, Adequate Final    Hemoglobin A1c 12/25/2024 5.0  <=7.0 % Final    Estimated Average Glucose 12/25/2024 96.8  mg/dL Final    POCT Glucose 12/25/2024 107  70 - 110 mg/dL Final    POCT Glucose 12/25/2024 113 (H)  70 - 110 mg/dL Final    POCT Glucose 12/25/2024 102   70 - 110 mg/dL Final    POCT Glucose 12/25/2024 112 (H)  70 - 110 mg/dL Final    POCT Glucose 12/26/2024 106  70 - 110 mg/dL Final    POCT Glucose 12/26/2024 123 (H)  70 - 110 mg/dL Final    POCT Glucose 12/26/2024 143 (H)  70 - 110 mg/dL Final    POCT Glucose 12/27/2024 93  70 - 110 mg/dL Final    POCT Glucose 12/27/2024 132 (H)  70 - 110 mg/dL Final    POCT Glucose 12/28/2024 83  70 - 110 mg/dL Final    POCT Glucose 12/28/2024 115 (H)  70 - 110 mg/dL Final     Radiology  Left hip and pelvis XR on 12/20/2024, IMPRESSION:  Satisfactory alignment following left hip arthroplasty.    Assessment/Plan:     64 y.o. WF admitted on 12/24/2024    Displaced fracture of left femoral neck   - s/p left hemiarthroplasty on 12/20/2024  - WBAT to LLE without external or internal rotation of the hip  - staples to be removed on 01/10  - continue                Tylenol 650 mg every 6 hours                Gabapentin 300 mg nightly                 Robaxin 750 mg t.i.d.                Norco 5 mg/325 mg daily p.r.n. prior to therapy                Oxycodone 5 mg q.4 hours p.r.n. for moderate pain                 Oxycodone 10 mg q.4 hours p.r.n. for severe pain  - defer to physiatry for rehab and pain management  - PT/OT/RT/ST following     Normocytic anemia  - asymptomatic  - s/p transfusion           x2 units PRBC on 12/21/2024  - H/H stable   - no evidence of active bleeds  - will closely monitor and transfuse if needed      Depression/anxiety  - stable  - continue                Abilify 2 mg daily                 BuSpar 5 mg b.i.d.                 Lexapro 5 mg daily     HLD  - FLP outpatient  - continue                Lipitor 40 mg nightly      Nicotine dependence  - smoking cessation counseling initiated on admission  - continue                Nicotine patch daily     GERD  - Avoid spicy foods, and nothing to eat or drink within x2 hours of bedtime or laying flat (water is ok)   - Avoid NSAIDs (Advil, ibuprofen, naproxen...) and chino-2  inhibitors (Mobic, Celebrex)    - continue                Protonix 40 mg b.i.d.     Constipation  - stable   - continue  Colace 100 mg BID   Miralax 17 gm b.i.d. p.r.n.     Metabolic acidosis  - sodium bicarb 25  - continue                Sodium bicarbonate 650 mg b.i.d.     Moderate protein calorie malnutrition  - albumin 2.9/prealbumin 13.3  - encourage oral nutrition  - registered dietitian following     DM type II  - HgA1c 5.0 on 12/25/2024  - continue                ISS   - CBGs AC/HS     HTN  - BP at goal  - continue                Hydralazine 10 mg every 2 hours as needed for BP > 160/90                Labetalol 10 mg every 2 hours as needed for BP > 160/90  - low sodium diet     VTE Prophylaxis:  Lovenox 30 mg daily  COVID-19 testing:  Unknown  COVID-19 vaccination status:  Unvaccinated     POA:  No  Living will:  No  Contacts:  Jaxon Baum (son) [phone]     CODE STATUS:  Full code  Internal Medicine (attending): George Do MD  Physiatry (consulting):  Nam Swift MD     OUTPATIENT PROVIDERS  PCP: ALEKS Mcfadden  Orthopedic surgery:  Jose J Montemayor D.O.     DISPOSITION:  Sleep hygiene, bowel maintenance, and appetite at goal.  Vital signs at goal with no recorded fevers.  Good glycemic control.  No new labs or imaging today.  Continue current POC.  Monitor closely.  Notify of acute changes.

## 2024-12-29 NOTE — PLAN OF CARE
Problem: Diabetes Comorbidity  Goal: Blood Glucose Level Within Targeted Range  Outcome: Progressing     Problem: Rehabilitation (IRF) Plan of Care  Goal: Plan of Care Review  Outcome: Progressing  Goal: Patient-Specific Goal (Individualized)  Outcome: Progressing  Goal: Absence of New-Onset Illness or Injury  Outcome: Progressing  Goal: Optimal Comfort and Wellbeing  Outcome: Progressing  Goal: Home and Community Transition Plan Established  Outcome: Progressing     Problem: Wound  Goal: Optimal Coping  Outcome: Progressing  Goal: Optimal Functional Ability  Outcome: Progressing  Goal: Absence of Infection Signs and Symptoms  Outcome: Progressing  Goal: Improved Oral Intake  Outcome: Progressing  Goal: Optimal Pain Control and Function  Outcome: Progressing  Goal: Skin Health and Integrity  Outcome: Progressing  Goal: Optimal Wound Healing  Outcome: Progressing

## 2024-12-29 NOTE — PT/OT/SLP PROGRESS
Physical Therapy Inpatient Rehab Treatment    Patient Name:  Luz Maria Alfaro   MRN:  [id number]    Recommendations:     Discharge Recommendations:  High Intensity Therapy   Discharge Equipment Recommendations: walker, rolling   Barriers to discharge: Decreased caregiver support    Assessment:     Luz Maria Alfaro is a 64 y.o. female admitted with a medical diagnosis of Displaced fracture of left femoral neck.  She presents with the following impairments/functional limitations:  weakness, impaired endurance, impaired functional mobility, gait instability, impaired balance, decreased lower extremity function, pain, decreased ROM .    Rehab Diagnosis: L femoral neck fx    Recent Surgery: * No surgery found *      General Precautions: Standard, hearing impaired, fall     Orthopedic Precautions:LLE weight bearing as tolerated     Braces: N/A    Rehab Prognosis: Good; patient would benefit from acute skilled PT services to address these deficits and reach maximum level of function.      History:     Past Medical History:   Diagnosis Date    Benign essential HTN     Mixed hyperlipidemia     Type 2 diabetes mellitus without complications        Past Surgical History:   Procedure Laterality Date    APPENDECTOMY      BACK SURGERY      BREAST SURGERY  01 13 2002    Augmantation    HEMIARTHROPLASTY OF HIP Left 12/20/2024    Procedure: HEMIARTHROPLASTY, HIP;  Surgeon: Jose J Montemayor DO;  Location: [facility];  Service: Orthopedics;  Laterality: Left;  lateral peg board kevin    HIP SURGERY      INTRAMEDULLARY RODDING OF FEMUR Right 10/27/2022    Procedure: RIGHT FEMUR INTERTROCH IMN;  Surgeon: Stewart Brambila MD;  Location: [facility];  Service: Orthopedics;  Laterality: Right;  [place] table, supine  3rd case  Synthes Short TFNA       Subjective     Chief Complaint: hip pain      Respiratory Status: Room air    Patients cultural, spiritual, Gnosticist conflicts given the current situation: no      Objective:     Communicated with pt  prior to session.  Patient found supine with peripheral IV  upon PT entry to room.    Pt is Oriented x3 and Alert, Cooperative, and Motivated.        Functional Mobility:   Bed Mobility:     Supine to Sit: Supervision or touching assistance   Sit to Supine: Partial/moderate assistance   Transfers:     Sit to Stand: Set-up or clean-up assistance  with rolling walker  Gait: Pt ambulates 25 ft x 4 bouts with RW with Supervision or touching assistance .      Current   Status  Discharge   Goal   Functional Area: Care Score:    Roll Left and Right   Independent   Sit to Lying 4 Independent   Lying to Sitting on Side of Bed 4 Independent   Sit to Stand 5 Independent   Chair/Bed-to-Chair Transfer 4 Independent   Car Transfer   Independent   Walk 10 Feet 4 Independent   Walk 50 Feet with Two Turns 88 Independent   Walk 150 Feet 88 Independent   Walk 10 Feet Uneven Surface   Independent   1 Step (Curb)   Supervision or touching assistance   4 Steps   Supervision or touching assistance   12 Steps       Picking Up Object 6 Supervision or touching assistance   Wheel 50 Feet with Two Turns   Independent   Wheel 150 Feet   Independent       Therapeutic Activities and Exercises:  Ambulated 4 x25 feet. Pt performed laqs with 2# ankle weight 2x15. Seated marches 2x10, hs curls 2x15 red theraband. Performed 3 way hip in // bars 2x10 ea, calf raises 2x10, and mini squats 2x10. Perfomed 2x5 sit to stands. After last bout of ambulation pt requested to return to room because she was in too much pain to continue.     Activity Tolerance: Good    Patient left supine with all lines intact and call button in reach.    Education provided: transfer training, bed mob, gait training, body mechanics, assistive device, and strengthening exercises    Expected compliance: High compliance    GOALS:   Multidisciplinary Problems       Physical Therapy Goals          Problem: Physical Therapy    Goal Priority Disciplines Outcome Interventions   Physical  Therapy Goal     PT, PT/OT Progressing    Description:   PT Short Term goals     Bed Mobility:  Roll left and right with supervision/touching assist.  Sit to supine transfer with partial/moderate assist.  Supine to sit transfer with partial/moderate assist.    Transfers:  Sit to stand transfer with setup/clean-up assist using RW.MET  Bed to chair transferStand step with setup/clean-up assist using RW.  Car transfer with setup/clean-up assist using RW.   an object from the ground in standing position with supervision/touching assist using RW.    Mobility:  Ambulate 150 feet with partial/moderate assist using RW.  Ambulate 10 feet on uneven surfaces/ramps with partial/moderate assist using RW.  Ascend/descend a 4 inch curb with partial/moderate assist using RW.   Ascend/descend 12 stairs with partial/moderate assist using bilateral handrails.  Manual wheelchair 150 feet with supervision/touching assist using Bilateral upper extremity.      Time Frame: by Re-evaluation                        Plan:     During this hospitalization, patient to be seen 5 x/week to address the identified rehab impairments via gait training, therapeutic exercises, therapeutic activities and progress toward the following goals:    Plan of Care Expires:  12/30/24  PT Next Visit Date: 12/30/24  Plan of Care reviewed with: patient    Additional Information:         Time Tracking:     Therapy Time  PT Received On: 12/29/24  PT Start Time: 1306  PT Stop Time: 1430  PT Total Time (min): 84 min   PT Individual: 84  Missed Time:    Time Missed due to:      Billable Minutes: Gait Training 30, Therapeutic Activity 19, and Therapeutic Exercise 35    12/29/2024

## 2024-12-29 NOTE — PT/OT/SLP PROGRESS
Occupational Therapy Inpatient Rehab Treatment    Name: Luz Maria Alfaro  MRN: [id number]    Assessment:  Luz Maria Alfaro is a 64 y.o. female admitted with a medical diagnosis of Displaced fracture of left femoral neck.  She presents with the following impairments/functional limitations:  weakness, impaired endurance, impaired self care skills, impaired functional mobility, gait instability, impaired balance, decreased lower extremity function, pain, decreased ROM, orthopedic precautions.    General Precautions: Standard, fall, hearing impaired     Orthopedic Precautions:LLE weight bearing as tolerated (No IR/ER)     Braces: N/A    Rehab Prognosis: Good; patient would benefit from acute skilled OT services to address these deficits and reach maximum level of function.      History:     Past Medical History:   Diagnosis Date    Benign essential HTN     Mixed hyperlipidemia     Type 2 diabetes mellitus without complications        Past Surgical History:   Procedure Laterality Date    APPENDECTOMY      BACK SURGERY      BREAST SURGERY  01 13 2002    Augmantation    HEMIARTHROPLASTY OF HIP Left 12/20/2024    Procedure: HEMIARTHROPLASTY, HIP;  Surgeon: Jose J Montemayor DO;  Location: [facility];  Service: Orthopedics;  Laterality: Left;  lateral peg board kevin    HIP SURGERY      INTRAMEDULLARY RODDING OF FEMUR Right 10/27/2022    Procedure: RIGHT FEMUR INTERTROCH IMN;  Surgeon: Stewart Brambila MD;  Location: [facility];  Service: Orthopedics;  Laterality: Right;  Walker table, supine  3rd case  Synthes Short TFNA       Subjective     Orientation: Oriented x4    Chief Complaint: Pain in L hip     Respiratory Status: Room air    Patients cultural, spiritual, Gnosticism conflicts given the current situation: no       Objective:     Patient found supine  upon OT entry to room.    Mobility   Patient completed:  Supine to Sit with stand by assistance  Sit to Supine with minimum assistance  Sit to Stand Transfer with stand by  assistance with rolling walker  Stand to Sit Transfer with stand by assistance with rolling walker  Toilet Transfer Step Transfer technique with stand by assistance with  rolling walker    Functional Mobility  Pt performed short FM in room with SBA using RW to perform ADLs     ADLs   Current Status   Toileting Hygiene 4 CGA when wiping and managing clothing    Toilet Transfer 4 SPV for safety     Limiting Factors for ADLs: motor, endurance, limited ROM, balance, weakness, and pain     Therapeutic Activities  Pt completed game of pokeno against therapist in standing position with RW to address standing balance, standing tolerance, and weight shifting onto L LE. Pt able to stand ~3 min. Good standing balance throughout    Therapeutic Exercise  Pt completed 4 min F and 4 min B on UBE in standing position to address standing tolerance, UE strength, and overall endurance. Pt tolerated well. Short seated rest break provided btw bouts      Patient left supine with all lines intact and call button in reach.     Education provided: Roles and goals of OT, ADLs, transfer training, bed mobility, modified goals, sequencing, safety precautions, and fall prevention    Multidisciplinary Problems       Occupational Therapy Goals          Problem: Occupational Therapy    Goal Priority Disciplines Outcome Interventions   Occupational Therapy Goal     OT, PT/OT Progressing    Description: ADLs:  Pt to perform grooming tasks with SBA standing at sink.  Pt to perform feeding tasks with independence.  Pt to perform UB dressing with SBA.  Pt to perform LB dressing with max assist with AE as needed.   Pt to perform putting on/off footwear task with max assist with AE as needed.  Pt to perform toileting with min assist.  Pt to perform bathing with mod assist with AE as needed.    Functional Transfers:  Pt to perform toilet transfers with CGA to toilet in bathroom.  Pt to perform a tub transfer with mod assist with AE as needed onto  TTB.    IADLs:  Pt to perform simple meal prep with mod assist.    Balance, Strengthening, Endurance, Balance:  Pt to consistently demonstrate adherence to orthopedic precautions during all ADL's as instructed by OT.  Pt to demonstrate good dynamic standing balance as required to perform ADL's from standing level.  Pt to demonstrate good BUE strength during functional task   Pt to demonstrate consistent adherence to breathing control and energy conservation techniques as educated by OT.                        Time Tracking     OT Received On: 12/29/24  Time In 1000     Time Out 1100  Total Time 60 min  Therapy Time: OT Individual: 60  Missed Time:    Missed Time Reason:      Billable Minutes: Self Care/Home Management 15, Therapeutic Activity 30, and Therapeutic Exercise 15    12/29/2024

## 2024-12-30 LAB
ALBUMIN SERPL-MCNC: 2.7 G/DL (ref 3.4–4.8)
ALBUMIN/GLOB SERPL: 1 RATIO (ref 1.1–2)
ALP SERPL-CCNC: 498 UNIT/L (ref 40–150)
ALT SERPL-CCNC: 48 UNIT/L (ref 0–55)
ANION GAP SERPL CALC-SCNC: 8 MEQ/L
ANISOCYTOSIS BLD QL SMEAR: ABNORMAL
AST SERPL-CCNC: 123 UNIT/L (ref 5–34)
BASOPHILS # BLD AUTO: 0.02 X10(3)/MCL
BASOPHILS NFR BLD AUTO: 0.2 %
BILIRUB SERPL-MCNC: 1.4 MG/DL
BUN SERPL-MCNC: 7.6 MG/DL (ref 9.8–20.1)
CALCIUM SERPL-MCNC: 8.4 MG/DL (ref 8.4–10.2)
CHLORIDE SERPL-SCNC: 105 MMOL/L (ref 98–107)
CO2 SERPL-SCNC: 25 MMOL/L (ref 23–31)
CREAT SERPL-MCNC: 0.62 MG/DL (ref 0.55–1.02)
CREAT/UREA NIT SERPL: 12
EOSINOPHIL # BLD AUTO: 0.09 X10(3)/MCL (ref 0–0.9)
EOSINOPHIL NFR BLD AUTO: 1.1 %
ERYTHROCYTE [DISTWIDTH] IN BLOOD BY AUTOMATED COUNT: 26.8 % (ref 11.5–17)
GFR SERPLBLD CREATININE-BSD FMLA CKD-EPI: >60 ML/MIN/1.73/M2
GLOBULIN SER-MCNC: 2.6 GM/DL (ref 2.4–3.5)
GLUCOSE SERPL-MCNC: 103 MG/DL (ref 82–115)
HCT VFR BLD AUTO: 28.2 % (ref 37–47)
HGB BLD-MCNC: 8.7 G/DL (ref 12–16)
HYPOCHROMIA BLD QL SMEAR: ABNORMAL
IMM GRANULOCYTES # BLD AUTO: 0.03 X10(3)/MCL (ref 0–0.04)
IMM GRANULOCYTES NFR BLD AUTO: 0.4 %
LYMPHOCYTES # BLD AUTO: 1.61 X10(3)/MCL (ref 0.6–4.6)
LYMPHOCYTES NFR BLD AUTO: 20.1 %
MACROCYTES BLD QL SMEAR: ABNORMAL
MAGNESIUM SERPL-MCNC: 1.7 MG/DL (ref 1.6–2.6)
MCH RBC QN AUTO: 31.9 PG (ref 27–31)
MCHC RBC AUTO-ENTMCNC: 30.9 G/DL (ref 33–36)
MCV RBC AUTO: 103.3 FL (ref 80–94)
MICROCYTES BLD QL SMEAR: SLIGHT
MONOCYTES # BLD AUTO: 0.61 X10(3)/MCL (ref 0.1–1.3)
MONOCYTES NFR BLD AUTO: 7.6 %
NEUTROPHILS # BLD AUTO: 5.66 X10(3)/MCL (ref 2.1–9.2)
NEUTROPHILS NFR BLD AUTO: 70.6 %
NRBC BLD AUTO-RTO: 0 %
PHOSPHATE SERPL-MCNC: 2.6 MG/DL (ref 2.3–4.7)
PLATELET # BLD AUTO: 286 X10(3)/MCL (ref 130–400)
PLATELET # BLD EST: ADEQUATE 10*3/UL
PMV BLD AUTO: 8.7 FL (ref 7.4–10.4)
POCT GLUCOSE: 91 MG/DL (ref 70–110)
POCT GLUCOSE: 94 MG/DL (ref 70–110)
POTASSIUM SERPL-SCNC: 3.9 MMOL/L (ref 3.5–5.1)
PREALB SERPL-MCNC: 10.6 MG/DL (ref 14–37)
PROT SERPL-MCNC: 5.3 GM/DL (ref 5.8–7.6)
RBC # BLD AUTO: 2.73 X10(6)/MCL (ref 4.2–5.4)
RBC MORPH BLD: ABNORMAL
SODIUM SERPL-SCNC: 138 MMOL/L (ref 136–145)
WBC # BLD AUTO: 8.02 X10(3)/MCL (ref 4.5–11.5)

## 2024-12-30 PROCEDURE — 85025 COMPLETE CBC W/AUTO DIFF WBC: CPT | Performed by: NURSE PRACTITIONER

## 2024-12-30 PROCEDURE — 80053 COMPREHEN METABOLIC PANEL: CPT | Performed by: NURSE PRACTITIONER

## 2024-12-30 PROCEDURE — 97168 OT RE-EVAL EST PLAN CARE: CPT

## 2024-12-30 PROCEDURE — 25000003 PHARM REV CODE 250: Performed by: NURSE PRACTITIONER

## 2024-12-30 PROCEDURE — 99900035 HC TECH TIME PER 15 MIN (STAT)

## 2024-12-30 PROCEDURE — 97110 THERAPEUTIC EXERCISES: CPT

## 2024-12-30 PROCEDURE — 99233 SBSQ HOSP IP/OBS HIGH 50: CPT | Mod: ,,, | Performed by: NURSE PRACTITIONER

## 2024-12-30 PROCEDURE — 99900031 HC PATIENT EDUCATION (STAT)

## 2024-12-30 PROCEDURE — 84134 ASSAY OF PREALBUMIN: CPT | Performed by: NURSE PRACTITIONER

## 2024-12-30 PROCEDURE — 94799 UNLISTED PULMONARY SVC/PX: CPT

## 2024-12-30 PROCEDURE — 63600175 PHARM REV CODE 636 W HCPCS: Performed by: NURSE PRACTITIONER

## 2024-12-30 PROCEDURE — 83735 ASSAY OF MAGNESIUM: CPT | Performed by: NURSE PRACTITIONER

## 2024-12-30 PROCEDURE — 97164 PT RE-EVAL EST PLAN CARE: CPT

## 2024-12-30 PROCEDURE — 97535 SELF CARE MNGMENT TRAINING: CPT

## 2024-12-30 PROCEDURE — 94761 N-INVAS EAR/PLS OXIMETRY MLT: CPT

## 2024-12-30 PROCEDURE — S4991 NICOTINE PATCH NONLEGEND: HCPCS | Performed by: NURSE PRACTITIONER

## 2024-12-30 PROCEDURE — 36415 COLL VENOUS BLD VENIPUNCTURE: CPT | Performed by: NURSE PRACTITIONER

## 2024-12-30 PROCEDURE — 84100 ASSAY OF PHOSPHORUS: CPT | Performed by: NURSE PRACTITIONER

## 2024-12-30 PROCEDURE — 11800000 HC REHAB PRIVATE ROOM

## 2024-12-30 RX ORDER — DICLOFENAC SODIUM 10 MG/G
4 GEL TOPICAL
Status: DISCONTINUED | OUTPATIENT
Start: 2024-12-30 | End: 2025-01-06 | Stop reason: HOSPADM

## 2024-12-30 RX ORDER — SODIUM CHLORIDE 9 MG/ML
INJECTION, SOLUTION INTRAVENOUS CONTINUOUS
Status: ACTIVE | OUTPATIENT
Start: 2024-12-30 | End: 2024-12-30

## 2024-12-30 RX ADMIN — DOCUSATE SODIUM 100 MG: 100 CAPSULE, LIQUID FILLED ORAL at 07:12

## 2024-12-30 RX ADMIN — ALPRAZOLAM 0.25 MG: 0.25 TABLET ORAL at 08:12

## 2024-12-30 RX ADMIN — OXYCODONE 10 MG: 5 TABLET ORAL at 01:12

## 2024-12-30 RX ADMIN — ENOXAPARIN SODIUM 30 MG: 30 INJECTION SUBCUTANEOUS at 05:12

## 2024-12-30 RX ADMIN — ATORVASTATIN CALCIUM 40 MG: 40 TABLET, FILM COATED ORAL at 08:12

## 2024-12-30 RX ADMIN — GABAPENTIN 300 MG: 300 CAPSULE ORAL at 08:12

## 2024-12-30 RX ADMIN — NICOTINE 1 PATCH: 21 PATCH, EXTENDED RELEASE TRANSDERMAL at 07:12

## 2024-12-30 RX ADMIN — SODIUM BICARBONATE 650 MG: 650 TABLET ORAL at 08:12

## 2024-12-30 RX ADMIN — SODIUM BICARBONATE 650 MG: 650 TABLET ORAL at 07:12

## 2024-12-30 RX ADMIN — OXYCODONE 10 MG: 5 TABLET ORAL at 02:12

## 2024-12-30 RX ADMIN — ARIPIPRAZOLE 2 MG: 2 TABLET ORAL at 07:12

## 2024-12-30 RX ADMIN — ESCITALOPRAM OXALATE 5 MG: 5 TABLET, FILM COATED ORAL at 07:12

## 2024-12-30 RX ADMIN — SODIUM CHLORIDE: 9 INJECTION, SOLUTION INTRAVENOUS at 10:12

## 2024-12-30 RX ADMIN — OXYCODONE 10 MG: 5 TABLET ORAL at 08:12

## 2024-12-30 RX ADMIN — METHOCARBAMOL 500 MG: 500 TABLET ORAL at 12:12

## 2024-12-30 RX ADMIN — METHOCARBAMOL 500 MG: 500 TABLET ORAL at 05:12

## 2024-12-30 RX ADMIN — BUSPIRONE HYDROCHLORIDE 5 MG: 5 TABLET ORAL at 08:12

## 2024-12-30 RX ADMIN — PANTOPRAZOLE SODIUM 40 MG: 40 TABLET, DELAYED RELEASE ORAL at 07:12

## 2024-12-30 RX ADMIN — DICLOFENAC SODIUM 4 G: 10 GEL TOPICAL at 05:12

## 2024-12-30 RX ADMIN — BUSPIRONE HYDROCHLORIDE 5 MG: 5 TABLET ORAL at 07:12

## 2024-12-30 RX ADMIN — PANTOPRAZOLE SODIUM 40 MG: 40 TABLET, DELAYED RELEASE ORAL at 08:12

## 2024-12-30 RX ADMIN — DOCUSATE SODIUM 100 MG: 100 CAPSULE, LIQUID FILLED ORAL at 08:12

## 2024-12-30 RX ADMIN — OXYCODONE 10 MG: 5 TABLET ORAL at 09:12

## 2024-12-30 RX ADMIN — GABAPENTIN 100 MG: 100 CAPSULE ORAL at 05:12

## 2024-12-30 RX ADMIN — ACETAMINOPHEN 650 MG: 325 TABLET ORAL at 05:12

## 2024-12-30 RX ADMIN — LIDOCAINE 5% 2 PATCH: 700 PATCH TOPICAL at 05:12

## 2024-12-30 NOTE — PT/OT/SLP PROGRESS
Recreational Therapy Treatment    Date of Treatment: 12/30/24  Start Time: 1330  Stop Time: 1400  Total Time: 30 min  Missed Time:     General Precautions: fall, hearing impaired  Ortho Precautions: LLE weight bearing as tolerated  Braces: N/A    Vitals   Vitals at Rest  BP    HR    O2 Sat    Pain 5/10     Vitals With Activity  BP    HR    O2 Sat    Pain 5/10       Treatment     Cognitive Skills Building   Cognitive Observation Activity Assist Position Equipment Response            Comment:          Dynamic Activities   Activity Assist Position Equipment Response   Activity 1 Bean bag toos supervision and contact guard assistance Standing Rolling walker and Bean bags fair   Comment: Sit to stand was contact guard/supervision as was dynamic standing balance/reaching.  Standing tolerance was 3 minutes. She sat for the rest of this treatment due to 5/10 L hip pain.  UE coordination was I as were problem solving skills.  She was less irritated by end of treatment        Fine Motor Activities   Activity Assist Position Equipment Response           Comment:          Additional Info: Pt bed to w/c with rw transfer was contact guard.  She needed prompting to attend treatment.  Affect was irritable and stated that she was hurting.  W/c to toilet with rw transfer was supervision    Goals     Short Term Goals    Goal  Goal Status   Will increase sit to stand to supervision Met   Will improve dynamic standing balance/reaching to supervision Met                 Long Term Goals    Goal Goal Status   To increase standing tolerance to 5 minutes Progressing   Will improve dynamic standing balance/reaching to setup Progressing

## 2024-12-30 NOTE — PROGRESS NOTES
12/30/24 1330   Rec Therapy Time Calculation   Date of Treatment 12/30/24   Rec Start Time 1330   Rec Stop Time 1400   Rec Total Time (min) 30 min   Time   Treatment time 2 units   Charges   $Therapeutic Exercise 2 units   Precautions   General Precautions fall;hearing impaired   Orthopedic Precautions  LLE weight bearing as tolerated   Braces N/A   Pain/Comfort   Pain Rating 1 5/10   Location - Side 1 Left   Location - Orientation 1 lower   Location 1 hip   Pain Addressed 1 Reposition  (Ice pack)   OTHER   Rehab identified problem list/impairments weakness;impaired endurance;impaired functional mobility;gait instability;decreased lower extremity function;decreased safety awareness   Values/Beliefs/Spiritual Care   Spiritual, Cultural Beliefs, Orthodox Practices, Values that Affect Care no   Overall Level of Functioning   Activity Tolerance Mod Indep   Dynamic Sitting Balance/Reaching Mod Indep   Dynamic Standing Balance/Reaching Standby Assist   Right UE Coodination/Dexterity Independent   Left UE Coordination/Dexterity Independent   Problem Solving/Sequencing Skills Independent   Memory Recall Independent   R/L Neglect/Inattention Does not occur   Attention Span Mod Indep   Social Interaction Independent   Recreational Therapy Short Term Goals   Short Term Goal 1 Progression Met   Short Term Goal 2 Progression Met   Recreational Therapy Long Term Goals   Long Term Goal 1 Progression Progressing   Long Term Goal 2 Progression Progressing   Plan   Patient to be seen Daily   Planned Duration 1 week   Treatments Planned Energy conservation training;Safety education   Treatment plan/goals estblished with Patient/Caregiver Yes

## 2024-12-30 NOTE — PLAN OF CARE
Problem: Occupational Therapy  Goal: Occupational Therapy Goal  Description: ADLs:  MET - Pt to perform grooming tasks with SBA standing at sink.  MET - Pt to perform feeding tasks with independence.  MET - Pt to perform UB dressing with SBA.  MET - Pt to perform LB dressing with max assist with AE as needed.   Updated goal: Pt to perform LB dressing with set up assist with AE as needed.  MET - Pt to perform putting on/off footwear task with max assist with AE as needed.  Updated goal: Pt to perform putting on/off footwear task with set up assist with AE as needed.  MET - Pt to perform toileting with min assist.  MET - Pt to perform bathing with mod assist with AE as needed.  Updated goal: Pt to perform bathing independently with AE as needed.    Functional Transfers:  MET - Pt to perform toilet transfers with CGA to toilet in bathroom.  Updated goal: Pt to perform toilet transfers with SBA with RW to toilet in bathroom.  MET: Pt to perform a tub transfer with mod assist with AE as needed onto TTB.  Updated goal: Pt to perform a tub transfer with set up assist with AE as needed onto TTB.    IADLs:  Pt to perform simple meal prep with mod assist.    Balance, Strengthening, Endurance, Balance:  Pt to consistently demonstrate adherence to orthopedic precautions during all ADL's as instructed by OT.  Pt to demonstrate good dynamic standing balance as required to perform ADL's from standing level.  Pt to demonstrate good BUE strength during functional task   Pt to demonstrate consistent adherence to breathing control and energy conservation techniques as educated by OT.   Outcome: Progressing

## 2024-12-30 NOTE — PT/OT/SLP EVAL
Physical Therapy Rehab Re-Evaluation    Patient Name:  Luz Maria Alfaro   MRN:  [id number]    Recommendations:     Discharge Recommendations:  High Intensity Therapy   Discharge Equipment Recommendations: walker, rolling   Barriers to discharge: Increased level of assist and Impaired functional mobility     Assessment:     Luz Maria Alfaro is a 64 y.o. female admitted with a medical diagnosis of Displaced fracture of left femoral neck.  She presents with the following impairments/functional limitations:  weakness, impaired endurance, impaired self care skills, impaired functional mobility, gait instability, impaired balance, decreased lower extremity function, decreased safety awareness, pain, edema       PT NOTE: pt with increased reports of pain and very [place]. Pt requires increased time to complete all tasks .    Rehab Diagnosis: L femoral neck fx    General Precautions: Standard, hearing impaired, fall     Orthopedic Precautions: LLE weight bearing as tolerated (NO IR or ER)     Braces: N/A    Rehab Prognosis: Good; patient would benefit from acute skilled PT services to address these deficits and reach maximum level of function.      History:     Past Medical History:   Diagnosis Date    Benign essential HTN     Mixed hyperlipidemia     Type 2 diabetes mellitus without complications        Past Surgical History:   Procedure Laterality Date    APPENDECTOMY      BACK SURGERY      BREAST SURGERY  01 13 2002    Augmantation    HEMIARTHROPLASTY OF HIP Left 12/20/2024    Procedure: HEMIARTHROPLASTY, HIP;  Surgeon: Jose J Montemayor DO;  Location: [facility] OR;  Service: Orthopedics;  Laterality: Left;  lateral peg board kevin    HIP SURGERY      INTRAMEDULLARY RODDING OF FEMUR Right 10/27/2022    Procedure: RIGHT FEMUR INTERTROCH IMN;  Surgeon: Stewart Brambila MD;  Location: [facility] OR;  Service: Orthopedics;  Laterality: Right;  [place] table, supine  3rd case  Synthes Short TFNA       Subjective     Patient Goal: To get better      Patient Comments: my knee it hurting today     Patients cultural, spiritual, Sikhism conflicts given the current situation: no       Living Environment  People in Home: child(lala), adult, grandchild(lala)  Name(s) of People in Home: Son-Jaxon; HEATHER - Carlos  Living Arrangements: house (2-story)  Home Accessibility: wheelchair accessible  Number of Stairs, Within Home, Primary: other (see comments) (flight)  Home Layout: Able to live on 1st floor  Equipment Currently Used at Home: shower chair, walker, rolling, rollator ([facility])        Objective:     Communicated with RN prior to session.  Patient found up in chair with peripheral IV  upon PT entry to room.    Vitals   Vitals at Rest  BP     HR     O2 Sat     Pain Pain Rating 1: 8/10  Location - Side 1: Left  Location - Orientation 1: lateral  Location 1: hip  Pain Addressed 1: Reposition, Distraction     Vitals With Activity  BP     HR     O2 Sat     Pain Pain Rating 1: 10/10  Location - Side 1: Left  Location - Orientation 1: lower  Location 1: knee  Pain Addressed 1: Reposition, Distraction, Nurse notified     Respiratory Status: Room air    Exams  Cognitive Exam:  Patient is oriented to Person, Place, Time, and Situation  Sensation:          -       WNL  RLE ROM:          -       WFL  RLE Strength:          -       WFL  LLE ROM:          -       WFL  LLE Strength:          -       WFL    Functional Mobility    Toilet t/f: performed x 2 trials overall CGA to SBA    1 + void and BM    1 + void     GGs   Admit Current   Status Goal   Functional Area: Care Score: Care Score: Care Score:   Roll Left and Right 3 6   Independent   Sit to Lying 2 4  With out use of bed rails. VC to maintain No IR or ER  Independent   Lying to Sitting on Side of Bed 2 4 Independent   Sit to Stand 3 4 Independent   Chair/Bed-to-Chair Transfer 3 4  With Rw slowed pace and VC for step length  Independent   Car Transfer 7 4 Independent   Walk 10 Feet 7 4 Independent   Walk 50 Feet  with Two Turns 7 4  51' overall CGA slowed pace and decreased step length. VC and encouragement for increased distance. Pt limited 2/2 fatigue and pain  Independent   Walk 150 Feet 7 88 Independent   4 Steps 88 88 Supervision or touching assistance   12 Steps 9 9     Picking Up Object 88 6  With reacher and RW  Supervision or touching assistance     Therapeutic Activities and Exercises:  Patient educated on role of acute care PT and PT POC and safety while in hospital including calling nurse for mobility  Patient educated about importance of OOB mobility and remaining up in chair most of the day.    Therapeutic exercises were performed seated at edge of chair:    marches with RLE 2# weights  hip adductions isometrics against a ball  long arc quads with RLE 2# weights  calf raises/toe raises with RLE 2# weights  All therex performed 15x3 reps each         Activity Tolerance: Good    Patient left up in chair with all lines intact and call button in reach.    Education Provided: roles and goals of PT/PTA, transfer training, bed mob, gait training, stair training, balance training, safety awareness, body mechanics, assistive device, strengthening exercises, and fall prevention    Expected compliance: High compliance      Plan:     During this hospitalization, patient to be seen 5 x/week to address the identified rehab impairments via gait training, therapeutic exercises, therapeutic activities and progress toward the following goals:    GOALS:   Multidisciplinary Problems       Physical Therapy Goals          Problem: Physical Therapy    Goal Priority Disciplines Outcome Interventions   Physical Therapy Goal     PT, PT/OT Progressing    Description:   PT Short Term goals     Bed Mobility:  Roll left and right with supervision/touching assist.  Sit to supine transfer with partial/moderate assist.  Supine to sit transfer with partial/moderate assist.    Transfers:  Sit to stand transfer with setup/clean-up assist using  RW.MET  Bed to chair transferStand step with setup/clean-up assist using RW.  Car transfer with setup/clean-up assist using RW.   an object from the ground in standing position with supervision/touching assist using RW.    Mobility:  Ambulate 150 feet with partial/moderate assist using RW.  Ambulate 10 feet on uneven surfaces/ramps with partial/moderate assist using RW.  Ascend/descend a 4 inch curb with partial/moderate assist using RW.   Ascend/descend 12 stairs with partial/moderate assist using bilateral handrails.  Manual wheelchair 150 feet with supervision/touching assist using Bilateral upper extremity.      Time Frame: by Re-evaluation                        Plan of Care Expires:  01/03/25  PT Next Visit Date: 01/03/25  Plan of Care reviewed with: patient      PT Care conference held with PTA. Short term goals updated appropriately. Plan of care updates discussed and reviewed with PTA Bridgette Cochran    Additional Infomation:           Time Tracking:     Therapy Time   PT Received On: 12/30/24  PT Start Time: 1100  PT Stop Time: 1200  PT Total Time (min): 60 min  PT Individual: 60  Missed Time:    Time Missed due to:      Billable Minutes: Re-eval 15 and Therapeutic Activity 45    12/30/2024

## 2024-12-30 NOTE — PT/OT/SLP PROGRESS
Physical Therapy Inpatient Rehab Treatment    Patient Name:  Luz Maria Alfaro   MRN:  [id number]    Recommendations:     Discharge Recommendations:  High Intensity Therapy   Discharge Equipment Recommendations:     Barriers to discharge: Impaired functional mobility     Assessment:     Luz Maria Alfaro is a 64 y.o. female admitted with a medical diagnosis of Displaced fracture of left femoral neck.  She presents with the following impairments/functional limitations:  weakness, impaired endurance, impaired functional mobility, gait instability, impaired balance, decreased lower extremity function, pain, decreased ROM .      PT NOTE: Upon arrival, pt supine in bed. Pt reports 10/10 pain and requested to remain in bed. Educated pt on importance of OOB activity. Pt agreed to perform tomorrow.     Rehab Diagnosis: L femoral neck fx    General Precautions: Standard, hearing impaired, fall     Orthopedic Precautions:LLE weight bearing as tolerated (NO IR or ER)     Braces: N/A    Rehab Prognosis: Good; patient would benefit from acute skilled PT services to address these deficits and reach maximum level of function.      History:     Past Medical History:   Diagnosis Date    Benign essential HTN     Mixed hyperlipidemia     Type 2 diabetes mellitus without complications        Past Surgical History:   Procedure Laterality Date    APPENDECTOMY      BACK SURGERY      BREAST SURGERY  01 13 2002    Augmantation    HEMIARTHROPLASTY OF HIP Left 12/20/2024    Procedure: HEMIARTHROPLASTY, HIP;  Surgeon: Jose J Montemayor DO;  Location: [facility] OR;  Service: Orthopedics;  Laterality: Left;  lateral peg board kevin    HIP SURGERY      INTRAMEDULLARY RODDING OF FEMUR Right 10/27/2022    Procedure: RIGHT FEMUR INTERTROCH IMN;  Surgeon: Stewart Brambila MD;  Location: [facility] OR;  Service: Orthopedics;  Laterality: Right;  [place] table, supine  3rd case  Synthes Short TFNA       Subjective     Patient comments: N/A    Respiratory Status: Room  air    Patients cultural, spiritual, Islam conflicts given the current situation: no    Objective:     Communicated with TR prior to session.  Patient found supine with peripheral IV  upon PT entry to room.    Pt is Oriented x3 and Alert, Cooperative, and Motivated.    Vitals   Vitals at Rest  BP    HR    O2 Sat    Pain      Vitals With Activity  BP     HR     O2 Sat     Pain Pain Rating 1: 10/10  Location - Side 1: Left  Location - Orientation 1: lower  Location 1: knee  Pain Addressed 1: Reposition, Distraction           Therapeutic Activities and Exercises:  Patient educated on role of acute care PT and PT POC, safety while in hospital including calling nurse for mobility, and call light usage  Patient educated about importance of OOB mobility and remaining up in chair most of the day.    Therapeutic exercises were performed supine in bed:    quad sets  glute sets  short arc quads with 0 weights  hip abduction with 0 weights  heel slides with 0 weights  All therex performed 15x3 reps each         Activity Tolerance: Excellent    Patient left up in chair with all lines intact and call button in reach.    Education provided: roles and goals of PT/PTA, transfer training, bed mob, and safety awareness    Expected compliance: High compliance    Plan:     During this hospitalization, patient to be seen 5 x/week to address the identified rehab impairments via gait training, therapeutic exercises, therapeutic activities and progress toward the following goals:    GOALS:   Multidisciplinary Problems       Physical Therapy Goals          Problem: Physical Therapy    Goal Priority Disciplines Outcome Interventions   Physical Therapy Goal     PT, PT/OT Progressing    Description:   PT Short Term goals     Bed Mobility:  Roll left and right with supervision/touching assist.  Sit to supine transfer with partial/moderate assist.  Supine to sit transfer with partial/moderate assist.    Transfers:  Sit to stand transfer with  setup/clean-up assist using RW.MET  Bed to chair transferStand step with setup/clean-up assist using RW.  Car transfer with setup/clean-up assist using RW.   an object from the ground in standing position with supervision/touching assist using RW.    Mobility:  Ambulate 150 feet with partial/moderate assist using RW.  Ambulate 10 feet on uneven surfaces/ramps with partial/moderate assist using RW.  Ascend/descend a 4 inch curb with partial/moderate assist using RW.   Ascend/descend 12 stairs with partial/moderate assist using bilateral handrails.  Manual wheelchair 150 feet with supervision/touching assist using Bilateral upper extremity.      Time Frame: by Re-evaluation                        Plan of Care Expires:  01/03/25  PT Next Visit Date: 01/03/25  Plan of Care reviewed with: patient    Additional Information:         Time Tracking:     Therapy Time  PT Received On: 12/30/24  PT Start Time: 1430  PT Stop Time: 1500  PT Total Time (min): 30 min   PT Individual: 30  Missed Time:    Time Missed due to:      Billable Minutes: Therapeutic Exercise 30 12/30/2024

## 2024-12-30 NOTE — PROGRESS NOTES
[facility] ([facility])  Rehab Progress Note    Patient Name: Luz Maria Alfaro  MRN: [id number]  Age: 64 y.o. Sex: female  : 1960  Hospital Length of Stay: 6 days  Date of Service: 2024   Chief Complaint: Displaced fracture of left femoral neck s/p left hemiarthroplasty on 2024     Subjective:     Basic Information  Admit Information: 63-year-old white female presented to [facility] clinical on 2024 for follow up visit for left hip pain that has been progressively worsening.  Reportedly fell 2 months ago.  Imaging on  significant for left displaced femoral neck fracture.  Recommended to go to [facility] ED on  for admission.  PMH significant for HTN, dm type 2, HLD, GERD, and nicotine dependence.  Workup significant for left displaced femoral neck fracture.  Tolerated left hip hemiarthroplasty on  without perioperative complications.  She did require 2 units PRBC.  Sodium bicarb tablets initiated due to metabolic acidosis.  Currently Min assist.  Tolerated transfer to [facility] inpatient rehab unit on  without incident.   Today's Information: No acute events overnight.  Sleep hygiene, bowel maintenance, and appetite at goal.  Last BM .  Vital signs at goal with no recorded fevers.  Good glycemic control.  ALP and AST elevated.  No imaging today.     Review of patient's allergies indicates:   Allergen Reactions    Iodinated contrast media Shortness Of Breath    Opioids - morphine analogues     Meperidine Rash and Hives        Current Facility-Administered Medications:     0.9% NaCl infusion, , Intravenous, Continuous, Bere, Moy A, FNP    acetaminophen tablet 650 mg, 650 mg, Oral, BID PRN, Sultan, Tania A, FNP    ALPRAZolam tablet 0.25 mg, 0.25 mg, Oral, TID PRN, Bere, Moy A, FNP, 0.25 mg at 24    ARIPiprazole tablet 2 mg, 2 mg, Oral, Daily, Bere, Moy A, FNP, 2 mg at 24 0728    atorvastatin tablet 40 mg, 40  mg, Oral, QHS, Bere, Moy A, FNP, 40 mg at 12/29/24 2050    benzonatate capsule 100 mg, 100 mg, Oral, TID PRN, Bere, Moy A, FNP    busPIRone tablet 5 mg, 5 mg, Oral, BID, Bere, Moy A, FNP, 5 mg at 12/29/24 2050    dextrose 50% injection 12.5 g, 12.5 g, Intravenous, PRN, Bere, Moy A, FNP    dextrose 50% injection 25 g, 25 g, Intravenous, PRN, Bere, Moy A, FNP    docusate sodium capsule 100 mg, 100 mg, Oral, BID, Bere, Moy A, FNP, 100 mg at 12/29/24 2050    enoxaparin injection 30 mg, 30 mg, Subcutaneous, Q24H (prophylaxis, 1700), Beer, Moy A, FNP, 30 mg at 12/29/24 1630    EScitalopram oxalate tablet 5 mg, 5 mg, Oral, Daily, Spring RunNeemaTania A, FNP, 5 mg at 12/29/24 0728    gabapentin capsule 100 mg, 100 mg, Oral, Before dinner, Dayo Tania A, FNP, 100 mg at 12/29/24 1630    gabapentin capsule 300 mg, 300 mg, Oral, QHS, Bere, Moy A, FNP, 300 mg at 12/29/24 2050    glucagon (human recombinant) injection 1 mg, 1 mg, Intramuscular, PRN, Bere, Moy A, FNP    glucose chewable tablet 16 g, 16 g, Oral, PRN, Bere, Moy A, FNP    glucose chewable tablet 24 g, 24 g, Oral, PRN, Bere, Moy A, FNP    hydrALAZINE injection 10 mg, 10 mg, Intravenous, Q4H PRN, Bere, Moy A, FNP    HYDROcodone-acetaminophen 5-325 mg per tablet 1 tablet, 1 tablet, Oral, Daily PRN, Bere, Moy A, FNP, 1 tablet at 12/29/24 0730    hydrOXYzine pamoate capsule 50 mg, 50 mg, Oral, Nightly PRN, Oswaldo Osheayn A, FNP    insulin aspart U-100 injection 0-10 Units, 0-10 Units, Subcutaneous, QID (AC + HS) PRN, Moy Blackburn, FNP    labetalol 20 mg/4 mL (5 mg/mL) IV syring, 10 mg, Intravenous, Q4H PRN, Bere Moy A, FNP    LIDOcaine 5 % patch 2 patch, 2 patch, Transdermal, Q24H, Tania Oshea, FNP, 2 patch at 12/30/24 0529    methocarbamoL tablet 500 mg, 500 mg, Oral, Q6H, Oswaldo Osheayn A, FNP, 500 mg at 12/30/24 0529     "metoprolol injection 10 mg, 10 mg, Intravenous, Q2H PRN, Bere, Moy A, FNP    nicotine 21 mg/24 hr 1 patch, 1 patch, Transdermal, Daily, Bere, Moy A, FNP, 1 patch at 12/29/24 0728    nitroGLYCERIN SL tablet 0.4 mg, 0.4 mg, Sublingual, Q5 Min PRN, Bere, Moy A, FNP    ondansetron disintegrating tablet 4 mg, 4 mg, Oral, Q6H PRN, Bere, Moy A, FNP, 4 mg at 12/27/24 1418    ondansetron disintegrating tablet 8 mg, 8 mg, Oral, Q8H PRN, Bere, Moy A, FNP    ondansetron injection 4 mg, 4 mg, Intravenous, Q8H PRN, Bere, Omy A, FNP    oxyCODONE immediate release tablet 10 mg, 10 mg, Oral, Q4H PRN, Bere, Moy A, FNP, 10 mg at 12/30/24 0156    oxyCODONE immediate release tablet 5 mg, 5 mg, Oral, Q4H PRN, Bere, Moy A, FNP, 5 mg at 12/27/24 1714    pantoprazole EC tablet 40 mg, 40 mg, Oral, BID, Bere, Moy A, FNP, 40 mg at 12/29/24 2050    polyethylene glycol packet 17 g, 17 g, Oral, BID PRN, Bere, Moy A, FNP, 17 g at 12/26/24 0747    sodium bicarbonate tablet 650 mg, 650 mg, Oral, BID, Bere, Moy A, FNP, 650 mg at 12/29/24 2050     Review of Systems   Complete 12-point review of symptoms negative except for what's mentioned in HPI     Objective:     /63   Pulse 94   Temp 98.5 °F (36.9 °C) (Oral)   Resp 20   Ht 5' 3" (1.6 m)   Wt 50.9 kg (112 lb 3.4 oz)   SpO2 (!) 93%   BMI 19.88 kg/m²      Physical Exam  Nursing note reviewed.   Constitutional:       Appearance: Normal appearance.   HENT:      Head: Normocephalic.      Mouth/Throat:      Mouth: Mucous membranes are moist.   Eyes:      Pupils: Pupils are equal, round, and reactive to light.   Cardiovascular:      Rate and Rhythm: Normal rate and regular rhythm.      Heart sounds: Normal heart sounds.   Pulmonary:      Effort: Pulmonary effort is normal.      Breath sounds: Normal breath sounds.   Abdominal:      General: Bowel sounds are normal.      Palpations: Abdomen is " soft.   Musculoskeletal:      Cervical back: Neck supple.      Comments: Left hip dressing clean and intact    Skin:     General: Skin is warm and dry.   Neurological:      Mental Status: She is alert and oriented to person, place, and time.      Motor: Weakness present.   Psychiatric:         Mood and Affect: Mood normal.         Behavior: Behavior normal.         Thought Content: Thought content normal.         Judgment: Judgment normal.     *MD performed and documented physical examination       Lines/Drains/Airways       Peripheral Intravenous Line  Duration                  Peripheral IV - Single Lumen 12/21/24 0430 20 G Anterior;Right Forearm 9 days                  Labs  Admission on 12/24/2024   Component Date Value Ref Range Status    POCT Glucose 12/24/2024 119 (H)  70 - 110 mg/dL Final    POCT Glucose 12/24/2024 115 (H)  70 - 110 mg/dL Final    Sodium 12/25/2024 140  136 - 145 mmol/L Final    Potassium 12/25/2024 3.7  3.5 - 5.1 mmol/L Final    Chloride 12/25/2024 106  98 - 107 mmol/L Final    CO2 12/25/2024 25  23 - 31 mmol/L Final    Glucose 12/25/2024 82  82 - 115 mg/dL Final    Blood Urea Nitrogen 12/25/2024 8.4 (L)  9.8 - 20.1 mg/dL Final    Creatinine 12/25/2024 0.63  0.55 - 1.02 mg/dL Final    Calcium 12/25/2024 8.8  8.4 - 10.2 mg/dL Final    Protein Total 12/25/2024 5.8  5.8 - 7.6 gm/dL Final    Albumin 12/25/2024 2.9 (L)  3.4 - 4.8 g/dL Final    Globulin 12/25/2024 2.9  2.4 - 3.5 gm/dL Final    Albumin/Globulin Ratio 12/25/2024 1.0 (L)  1.1 - 2.0 ratio Final    Bilirubin Total 12/25/2024 1.0  <=1.5 mg/dL Final    ALP 12/25/2024 215 (H)  40 - 150 unit/L Final    ALT 12/25/2024 32  0 - 55 unit/L Final    AST 12/25/2024 52 (H)  5 - 34 unit/L Final    eGFR 12/25/2024 >60  mL/min/1.73/m2 Final    Anion Gap 12/25/2024 9.0  mEq/L Final    BUN/Creatinine Ratio 12/25/2024 13   Final    Magnesium Level 12/25/2024 1.80  1.60 - 2.60 mg/dL Final    Phosphorus Level 12/25/2024 2.7  2.3 - 4.7 mg/dL Final     Prealbumin 12/25/2024 13.3 (L)  14.0 - 37.0 mg/dL Final    Ferritin Level 12/25/2024 318.94 (H)  4.63 - 204.00 ng/mL Final    Iron Binding Capacity Unsaturated 12/25/2024 131  70 - 310 ug/dL Final    Iron Level 12/25/2024 52  50 - 170 ug/dL Final    Transferrin 12/25/2024 160 (L)  173 - 360 mg/dL Final    Iron Binding Capacity Total 12/25/2024 183 (L)  250 - 450 ug/dL Final    Iron Saturation 12/25/2024 28  20 - 50 % Final    WBC 12/25/2024 7.08  4.50 - 11.50 x10(3)/mcL Final    RBC 12/25/2024 2.79 (L)  4.20 - 5.40 x10(6)/mcL Final    Hgb 12/25/2024 8.9 (L)  12.0 - 16.0 g/dL Final    Hct 12/25/2024 27.9 (L)  37.0 - 47.0 % Final    MCV 12/25/2024 100.0 (H)  80.0 - 94.0 fL Final    MCH 12/25/2024 31.9 (H)  27.0 - 31.0 pg Final    MCHC 12/25/2024 31.9 (L)  33.0 - 36.0 g/dL Final    RDW 12/25/2024    Final    Unable to calculate      Platelet 12/25/2024 219  130 - 400 x10(3)/mcL Final    MPV 12/25/2024 9.6  7.4 - 10.4 fL Final    Neut % 12/25/2024 66.2  % Final    Lymph % 12/25/2024 20.5  % Final    Mono % 12/25/2024 10.7  % Final    Eos % 12/25/2024 1.7  % Final    Basophil % 12/25/2024 0.3  % Final    Lymph # 12/25/2024 1.45  0.6 - 4.6 x10(3)/mcL Final    Neut # 12/25/2024 4.69  2.1 - 9.2 x10(3)/mcL Final    Mono # 12/25/2024 0.76  0.1 - 1.3 x10(3)/mcL Final    Eos # 12/25/2024 0.12  0 - 0.9 x10(3)/mcL Final    Baso # 12/25/2024 0.02  <=0.2 x10(3)/mcL Final    IG# 12/25/2024 0.04  0 - 0.04 x10(3)/mcL Final    IG% 12/25/2024 0.6  % Final    NRBC% 12/25/2024 0.0  % Final    RBC Morph 12/25/2024 Abnormal (A)  Normal Final    Anisocytosis 12/25/2024 3+ (A)  (none) Final    Hypochromasia 12/25/2024 1+ (A)  (none) Final    Macrocytosis 12/25/2024 1+ (A)  (none) Final    Platelets 12/25/2024 Adequate  Normal, Adequate Final    Hemoglobin A1c 12/25/2024 5.0  <=7.0 % Final    Estimated Average Glucose 12/25/2024 96.8  mg/dL Final    POCT Glucose 12/25/2024 107  70 - 110 mg/dL Final    POCT Glucose 12/25/2024 113 (H)  70 - 110  mg/dL Final    POCT Glucose 12/25/2024 102  70 - 110 mg/dL Final    POCT Glucose 12/25/2024 112 (H)  70 - 110 mg/dL Final    POCT Glucose 12/26/2024 106  70 - 110 mg/dL Final    POCT Glucose 12/26/2024 123 (H)  70 - 110 mg/dL Final    POCT Glucose 12/26/2024 143 (H)  70 - 110 mg/dL Final    POCT Glucose 12/27/2024 93  70 - 110 mg/dL Final    POCT Glucose 12/27/2024 132 (H)  70 - 110 mg/dL Final    POCT Glucose 12/28/2024 83  70 - 110 mg/dL Final    POCT Glucose 12/28/2024 115 (H)  70 - 110 mg/dL Final    POCT Glucose 12/29/2024 120 (H)  70 - 110 mg/dL Final    Sodium 12/30/2024 138  136 - 145 mmol/L Final    Every Monday    Potassium 12/30/2024 3.9  3.5 - 5.1 mmol/L Final    Every Monday    Chloride 12/30/2024 105  98 - 107 mmol/L Final    Every Monday    CO2 12/30/2024 25  23 - 31 mmol/L Final    Every Monday    Glucose 12/30/2024 103  82 - 115 mg/dL Final    Every Monday    Blood Urea Nitrogen 12/30/2024 7.6 (L)  9.8 - 20.1 mg/dL Final    Every Monday    Creatinine 12/30/2024 0.62  0.55 - 1.02 mg/dL Final    Every Monday    Calcium 12/30/2024 8.4  8.4 - 10.2 mg/dL Final    Every Monday    Protein Total 12/30/2024 5.3 (L)  5.8 - 7.6 gm/dL Final    Every Monday    Albumin 12/30/2024 2.7 (L)  3.4 - 4.8 g/dL Final    Every Monday    Globulin 12/30/2024 2.6  2.4 - 3.5 gm/dL Final    Albumin/Globulin Ratio 12/30/2024 1.0 (L)  1.1 - 2.0 ratio Final    Bilirubin Total 12/30/2024 1.4  <=1.5 mg/dL Final    Every Monday    ALP 12/30/2024 498 (H)  40 - 150 unit/L Final    Every Monday    ALT 12/30/2024 48  0 - 55 unit/L Final    Every Monday    AST 12/30/2024 123 (H)  5 - 34 unit/L Final    Every Monday    eGFR 12/30/2024 >60  mL/min/1.73/m2 Final    Anion Gap 12/30/2024 8.0  mEq/L Final    BUN/Creatinine Ratio 12/30/2024 12   Final    Prealbumin 12/30/2024 10.6 (L)  14.0 - 37.0 mg/dL Final    Magnesium Level 12/30/2024 1.70  1.60 - 2.60 mg/dL Final    Every Monday    Phosphorus Level 12/30/2024 2.6  2.3 - 4.7 mg/dL Final     Every Monday    WBC 12/30/2024 8.02  4.50 - 11.50 x10(3)/mcL Final    RBC 12/30/2024 2.73 (L)  4.20 - 5.40 x10(6)/mcL Final    Hgb 12/30/2024 8.7 (L)  12.0 - 16.0 g/dL Final    Hct 12/30/2024 28.2 (L)  37.0 - 47.0 % Final    MCV 12/30/2024 103.3 (H)  80.0 - 94.0 fL Final    MCH 12/30/2024 31.9 (H)  27.0 - 31.0 pg Final    MCHC 12/30/2024 30.9 (L)  33.0 - 36.0 g/dL Final    RDW 12/30/2024 26.8 (H)  11.5 - 17.0 % Final    Platelet 12/30/2024 286  130 - 400 x10(3)/mcL Final    MPV 12/30/2024 8.7  7.4 - 10.4 fL Final    Neut % 12/30/2024 70.6  % Final    Lymph % 12/30/2024 20.1  % Final    Mono % 12/30/2024 7.6  % Final    Eos % 12/30/2024 1.1  % Final    Basophil % 12/30/2024 0.2  % Final    Lymph # 12/30/2024 1.61  0.6 - 4.6 x10(3)/mcL Final    Neut # 12/30/2024 5.66  2.1 - 9.2 x10(3)/mcL Final    Mono # 12/30/2024 0.61  0.1 - 1.3 x10(3)/mcL Final    Eos # 12/30/2024 0.09  0 - 0.9 x10(3)/mcL Final    Baso # 12/30/2024 0.02  <=0.2 x10(3)/mcL Final    IG# 12/30/2024 0.03  0 - 0.04 x10(3)/mcL Final    IG% 12/30/2024 0.4  % Final    NRBC% 12/30/2024 0.0  % Final    POCT Glucose 12/30/2024 94  70 - 110 mg/dL Final    RBC Morph 12/30/2024 Abnormal (A)  Normal Final    Anisocytosis 12/30/2024 3+ (A)  (none) Final    Hypochromasia 12/30/2024 2+ (A)  (none) Final    Macrocytosis 12/30/2024 1+ (A)  (none) Final    Microcytosis 12/30/2024 Slight (A)  (none) Final    Platelets 12/30/2024 Adequate  Normal, Adequate Final     Radiology  Left hip and pelvis XR on 12/20/2024, IMPRESSION:  Satisfactory alignment following left hip arthroplasty.    Assessment/Plan:     64 y.o. WF admitted on 12/24/2024    Displaced fracture of left femoral neck   - s/p left hemiarthroplasty on 12/20/2024  - WBAT to LLE without external or internal rotation of the hip  - staples to be removed on 01/10  - discontinued Tylenol 650 mg every 6 hours on 12/30   - continue                  Gabapentin 300 mg nightly                 Robaxin 750 mg t.i.d.                 Norco 5 mg/325 mg daily p.r.n. prior to therapy                Oxycodone 5 mg q.4 hours p.r.n. for moderate pain                 Oxycodone 10 mg q.4 hours p.r.n. for severe pain  - defer to physiatry for rehab and pain management  - PT/OT/RT/ST following     Normocytic anemia  - asymptomatic  - s/p transfusion           x2 units PRBC on 12/21/2024  - H/H stable   - no evidence of active bleeds  - will closely monitor and transfuse if needed      Depression/anxiety  - stable  - continue                Abilify 2 mg daily                 BuSpar 5 mg b.i.d.                 Lexapro 5 mg daily     HLD  - FLP outpatient  - continue                Lipitor 40 mg nightly      Nicotine dependence  - smoking cessation counseling initiated on admission  - continue                Nicotine patch daily     GERD  - Avoid spicy foods, and nothing to eat or drink within x2 hours of bedtime or laying flat (water is ok)   - Avoid NSAIDs (Advil, ibuprofen, naproxen...) and chino-2 inhibitors (Mobic, Celebrex)    - continue                Protonix 40 mg b.i.d.     Constipation  - stable   - continue  Colace 100 mg BID   Miralax 17 gm b.i.d. p.r.n.     Metabolic acidosis  - sodium bicarb 25  - continue                Sodium bicarbonate 650 mg b.i.d.     Moderate protein calorie malnutrition  - albumin 2.9/prealbumin 13.3  - encourage oral nutrition  - registered dietitian following     DM type II  - HgA1c 5.0 on 12/25/2024  - continue                ISS   - CBGs AC/HS     HTN  - BP at goal  - continue                Hydralazine 10 mg every 2 hours as needed for BP > 160/90                Labetalol 10 mg every 2 hours as needed for BP > 160/90  - low sodium diet     VTE Prophylaxis:  Lovenox 30 mg daily  COVID-19 testing:  Unknown  COVID-19 vaccination status:  Unvaccinated     POA:  No  Living will:  No  Contacts:  Jaxon Baum (son) [phone]     CODE STATUS:  Full code  Internal Medicine (attending): George Do,  MD  Physiatry (consulting):  Nam Swift MD     OUTPATIENT PROVIDERS  PCP: ALEKS Mcfadden  Orthopedic surgery:  Jose J Montemayor D.O.     DISPOSITION:  Sleep hygiene, bowel maintenance, and appetite at goal.  Vital signs at goal with no recorded fevers.  Good glycemic control.   CBC unremarkable.  ALP and AST trending up.  CMP otherwise unremarkable.  No new imaging today.  Discontinue Tylenol.  Initiate NS 75 mL/HR x1 L. continue aggressive mobilization as tolerated. Monitor closely.  Notify of acute changes.     Mao Blackburn NP conducted independent physical examination and assisted with medical documentation.    Total time spent on this encounter including chart review and direct MD + NP 1-on-1 patient interaction: 54 minutes   Over 50% of this time was spent in counseling and coordination of care

## 2024-12-30 NOTE — PT/OT/SLP RE-EVAL
"Occupational Therapy Inpatient Rehab Re-Evaluation    Name: Luz Maria Alfaro  MRN: [id number]    Recommendations:     Discharge Recommendations:  Low Intensity Therapy   Discharge Equipment Recommendations: bath bench   Barriers to discharge: decreased independence with ADL performance, decreased functional mobility and safety awareness.    Assessment:  Luz Maria Alfaro is a 64 y.o. female admitted with a medical diagnosis of Displaced fracture of left femoral neck.  She presents with the following impairments/functional limitations:  weakness, impaired endurance, impaired self care skills, impaired functional mobility, gait instability, impaired balance, decreased lower extremity function, decreased safety awareness, pain, edema.    General Precautions: Standard, hearing impaired, fall     Orthopedic Precautions:LLE weight bearing as tolerated (no IR/ER)     Braces: N/A    Rehab Prognosis: Good; patient would benefit from acute skilled OT services to address these deficits and reach maximum level of function.      History:     Past Medical History:   Diagnosis Date    Benign essential HTN     Mixed hyperlipidemia     Type 2 diabetes mellitus without complications        Past Surgical History:   Procedure Laterality Date    APPENDECTOMY      BACK SURGERY      BREAST SURGERY  01 13 2002    Augmantation    HEMIARTHROPLASTY OF HIP Left 12/20/2024    Procedure: HEMIARTHROPLASTY, HIP;  Surgeon: Jose J Montemayor DO;  Location: [facility];  Service: Orthopedics;  Laterality: Left;  lateral peg board kevin    HIP SURGERY      INTRAMEDULLARY RODDING OF FEMUR Right 10/27/2022    Procedure: RIGHT FEMUR INTERTROCH IMN;  Surgeon: Stewart Brambila MD;  Location: [facility];  Service: Orthopedics;  Laterality: Right;  Lincoln table, supine  3rd case  Synthes Short TFNA       Subjective     Orientation: Oriented x4    Patient/Family Comments/goals: "to get better to go home."    Vitals  Vitals With Activity  BP     HR     O2 Sat     Pain " Pain Rating 1: 6/10  Location - Side 1: Left  Location - Orientation 1: lateral  Location 1: hip  Pain Addressed 1: Reposition, Distraction  Pain Rating Post-Intervention 1: 9/10     Respiratory Status: Room air    Patients cultural, spiritual, Yazdanism conflicts given the current situation: no     Living Environment   Living Environment  People in Home: child(lala), adult, grandchild(lala)  Name(s) of People in Home: Son-Jaxon; HEATHER - Carlos  Living Arrangements: house  Home Accessibility: wheelchair accessible  Number of Stairs, Within Home, Primary: other (see comments) (flight)  Home Layout: Able to live on 1st floor  Equipment Currently Used at Home: shower chair, walker, rolling, rollator  Shower Setup: Tub/Shower combo with grab bars and HHS - only a shower chair    Prior Level of Function  BADL: Independent    IADL: Independent    Equipment used at home: shower chair, walker, rolling, rollator (HHS).   Upon discharge, patient will have assistance from son and grandchildren.    Objective:     Patient found on toilet with peripheral IV  upon OT entry to room.    Mobility   Patient completed:  Sit to Stand Transfer with stand by assistance with rolling walker  Stand to Sit Transfer with stand by assistance with rolling walker  Toilet Transfer Stand Pivot technique with stand by assistance with  rolling walker  Tub Transfer Stand Pivot technique with stand by assistance with rolling walker    Functional Mobility   In room mobility with ADL with RW.    ADLs     Current Status   Eating 6   Oral Hygiene 6 standing at sink   Shower, Bathe Self 5   Upper Body Dressing 6   Lower Body Dressing 4 with reacher, verbal cues for technique   Toileting Hygiene 6   Toilet Transfer 4   Putting On, Taking Off Footwear 4 sock aide     Limiting Factors for ADLs: motor, endurance, limited ROM, balance, weakness, safety awareness, and pain    Exams     ROM:          -       WFL    Hand Dominance: Right    ROM Hand  Left  Hand: WFL  Right Hand: WFL    Strength  Overall Strength:          -       WFL    Sensation  WFL    Coordination:   WFL    Tone  WFL    Visual/Perceptual  Intact    Cognition:   WFL but occasionally demonstrates decreased safety awareness with RW.    Balance  Standing  Static: No UE Support, Fair (+)  Dynamic: One UE Extremity, Fair    Righting Reaction:   WNL    Additional Treatment: Pt performed AROM with 3 # [x] dowel [] free weight []  soft weight [] Theraband [] medicine ball. Pt completed 2 sets, 10 reps []  standing [x] sitting unsupported []  sitting supported to improve overall UE strength as a means to increase independence and safety with ADL performance.    [x]  Chest press  [x]  Shoulder press  [x]  Shoulder flexion  [x]  Shoulder extension  [x]  Shoulder abduction/adduction  [x]  Horizontal shoulder abduction/adduction  [x]  Shoulder diagonals  [x]  Bicep flexion  [x]  Triceps extension  []  Supination/pronation  []  Hand flexion/extension 3  Patient left up in chair with all lines intact and call button in reach.    Education provided: Roles and goals of OT, ADLs, transfer training, assistive device, sequencing, safety precautions, fall prevention, post-op precautions, equipment recommendations, and home safety    Multidisciplinary Problems       Occupational Therapy Goals          Problem: Occupational Therapy    Goal Priority Disciplines Outcome Interventions   Occupational Therapy Goal     OT, PT/OT Progressing    Description: ADLs:  MET - Pt to perform grooming tasks with SBA standing at sink.  MET - Pt to perform feeding tasks with independence.  MET - Pt to perform UB dressing with SBA.  MET - Pt to perform LB dressing with max assist with AE as needed.   Updated goal: Pt to perform LB dressing with set up assist with AE as needed.  MET - Pt to perform putting on/off footwear task with max assist with AE as needed.  Updated goal: Pt to perform putting on/off footwear task with set up assist with  AE as needed.  MET - Pt to perform toileting with min assist.  MET - Pt to perform bathing with mod assist with AE as needed.  Updated goal: Pt to perform bathing independently with AE as needed.    Functional Transfers:  MET - Pt to perform toilet transfers with CGA to toilet in bathroom.  Updated goal: Pt to perform toilet transfers with SBA with RW to toilet in bathroom.  MET: Pt to perform a tub transfer with mod assist with AE as needed onto TTB.  Updated goal: Pt to perform a tub transfer with set up assist with AE as needed onto TTB.    IADLs:  Pt to perform simple meal prep with mod assist.    Balance, Strengthening, Endurance, Balance:  Pt to consistently demonstrate adherence to orthopedic precautions during all ADL's as instructed by OT.  Pt to demonstrate good dynamic standing balance as required to perform ADL's from standing level.  Pt to demonstrate good BUE strength during functional task   Pt to demonstrate consistent adherence to breathing control and energy conservation techniques as educated by OT.                        Plan     During this hospitalization, patient to be seen 5 x/week (5-7 days/wk) to address the identified rehab impairments via self-care/home management, therapeutic activities, therapeutic exercises and progress toward the following goals:    Plan of Care Expires:  01/03/25    Time Tracking     OT Received On: 12/30/24  Time In 0800     Time Out 0930  Total Time 90 min  Therapy Time: OT Individual: 90  Missed Time:    Missed Time Reason:      Billable Minutes: Re-eval 15, Self Care/Home Management 60, and Therapeutic Exercise 15    12/30/2024

## 2024-12-30 NOTE — PROGRESS NOTES
"Dos 12/30/24  Patient seen and evaluated in OT today  Continues to participate and make progress toward goals  Working on ADL's and IADL's  Discussed with patient and OT  Reviewed chart and discussed with nursing, Dr Do's primary medicine team, as well as Kamini Oshea, my NP  Agree with present POC  Subjective  HPI: 64 year old WF with a PMH of HTN, DM, depression, HLD, GERD, tobacco dependence, very [place], chronic back pain with multiple surgeries in past, hx of substance abuse of "crack" cocaine (clean 8 years), and right hip sx in past presented to the ED at [facility] on 12/19/24 with a left displaced femoral neck fracture which possibly happened after a fall in September of 2024. Patient had XR of hip on 12/18 which showed fracture ordered from orthopedic clinic with Dr Rivas with instruction to go to ED. Patient reported she had been taking upwards of 20+ Tylenol 500mg per day, and reported she had taken 8 tylenol so far that day of arrival to ED. Labs showed elevated MCV of 102.1, elevated MCH of 32.0, low MCHC of 31.4, low INR of 1.1, elevated chloride of 116, low CO2 of 12, elevated ALP of 380, elevated AST of 36, acetaminophen level of <10.0, salicylate level of <5.0. Chest XR showed no acute chest disease identified. Poison control was contacted secondary to significant acetaminophen abuse with initial recommendation to give Acetadote, but then recommended holding off due to lab results with continued monitoring of acetaminophen level and CMP. Due to possible liver damage, patient was transferred to [facility] on 12/20. Nicotine patch given for tobacco use. Patient underwent a left hip hemiarthroplasty by Dr. INDIRA Montemayor, and placed WBAT to LLE without external or internal rotation of the hip. On 12/21, labs showed low H&H of 5.7 & 17.9 so transfused 2 units of PRBCs. Sodium bicarb tablets started for acidosis, held Lovenox, and hepatitis panel was nonreactive. On 12/22, PT/OT evals completed with deficits noted with " recommendation for high intensity therapy needed. Labs showed low H&H of 8.8 & 25.6 post transfusion. Started Lovenox, and OK for ASA 81mg BID on DC for 30 days. Dry dressing daily or open to air (No creams/ointments), and will need follow up with Dr Sim green in 3 weeks. IV fluids stopped. On 12/23, labs showed low RBC of 2.62, low H&H of 8.4 & 24.5, elevated MCH of 32.1, elevated RDW of 25.6, low iron of 17, low TIBC of 167, low transferrin of 149, low iron sat of 10, elevated ALP of 210, low protein of 5.3, and low albumin of 3.1. Vital signs stable. On 12/24, labs showed low H&H of 8.1 & 25.2. Last BM noted on 12/18. Purewick in place. Pratient is AAOx4; extremely [place].  Functional status includes minimal assist for bed mobility, minimal assist for transfers with RW, walked 20ft x 2 with RW at minimal assist, max assist for lower body dressing, total assist for toileting with purewick in place, and setup assist for eating and grooming while seated. Participating with therapy. Patient was evaluated, accepted, and admitted to inpatient rehab to improve functional status. Transferred to [facility] on 12/24 without incident.     12/30: Seen with OT following shower, mobilizing in WC. States that it is a lot harder than it looks. Says that she got her hearing aid a little wet in the shower. OT relays that hearing aid with little to no improvement of [place] prior. Continues with complaints of left knee pain. Add Voltaren gel TID Left knee. Patient did not tolerate daytime gabapentin as it made her very sleepy. Do not want to increase Robaxin with near hypotension. Participating in therapy. VSSAF.                Review of Systems  Psychiatric: Has a history of depression. She is a tobacco smoker. She has a history of ETOH and substance abuse (crack cocaine) but quit many years ago.    Depression/Anxiety:      busPIRone tablet 5 mg BID  EScitalopram oxalate tablet 5 mg qd  nicotine 21 mg/24 hr 1 patch qd  ALPRAZolam tablet  0.25 mg TID PRN anxiety  Pain:  chronic pain c/o including old ankle and knee fractures and LBP, spasms/tingling pains radiating down legs. Cymbalta contraindicated with Liver disease.  acetaminophen tablet 650 mg q6h  LIDOcaine 5 % patch 2 patch q24h, 0600a, left posterior hip and anterior thigh   gabapentin capsule 100mg TID AC. Change to 100mg qPM, 1645.   gabapentin capsule 300 mg qHS  methocarbamoL tablet 750 mg TID. Increase q6h. Decrease to 500mg q6h  acetaminophen tablet 650 mg q6h PRN mild pain. Decrease BID PRN mild pain  oxyCODONE immediate release tablet 5 mg q4h PRN mod pain  oxyCODONE immediate release tablet 10 mg q4h PRN severe pain    Bowels/Bladder: last BM 12/28   Complaints of constipation and digging stool out (here and at home). Agreeable to daily fleet enema's to fully evacuate. Discussed w/Nurse.     Appetite: good.      Sleep: unable to get to a deep sleep 2/2 environment-improving    hydrOXYzine pamoate capsule 50 mg qHS PRN Insomnia/Itching.         Physical Exam  General: well-developed, well-nourished, in no acute distress  HENT: normocephalic, [place], ill fitting top dentures, poor dentition with missing lower teeth  Neck: full range of motion, supple  Respiratory: equal chest rise, no SOB, no audible wheeze  Cardiovascular: regular rate and rhythm, no edema  Gastrointestinal: soft, non-tender, non-distended   Musculoskeletal: decreased ROM/strength to LLE, generalized weakness  Integumentary: no rashes or skin lesions present, left hip incision-surgical glue, dressing-c/d/I; LUE skin tear-foam dressing  Neurologic: cranial nerves intact, +peripheral neurological deficit-tingling LE  *MD performed and documented physical examination             Labs:   Latest Reference Range & Units 12/30/24 05:42   WBC 4.50 - 11.50 x10(3)/mcL 8.02   RBC 4.20 - 5.40 x10(6)/mcL 2.73 (L)   Hemoglobin 12.0 - 16.0 g/dL 8.7 (L)   Hematocrit 37.0 - 47.0 % 28.2 (L)   MCV 80.0 - 94.0 fL 103.3 (H)   MCH 27.0 - 31.0  pg 31.9 (H)   MCHC 33.0 - 36.0 g/dL 30.9 (L)   RDW 11.5 - 17.0 % 26.8 (H)   Platelet Count 130 - 400 x10(3)/mcL 286   MPV 7.4 - 10.4 fL 8.7   Platelet Estimate Normal, Adequate  Adequate   Neut % % 70.6   LYMPH % % 20.1   Mono % % 7.6   Eos % % 1.1   Basophil % % 0.2   Immature Granulocytes % 0.4   Neut # 2.1 - 9.2 x10(3)/mcL 5.66   Lymph # 0.6 - 4.6 x10(3)/mcL 1.61   Mono # 0.1 - 1.3 x10(3)/mcL 0.61   Eos # 0 - 0.9 x10(3)/mcL 0.09   Baso # <=0.2 x10(3)/mcL 0.02   Immature Grans (Abs) 0 - 0.04 x10(3)/mcL 0.03   nRBC % 0.0   Macrocytosis (none)  1+ !   Microcytosis (none)  Slight !   Aniso (none)  3+ !   Hypo (none)  2+ !   RBC Morph Normal  Abnormal !   Sodium 136 - 145 mmol/L 138   Potassium 3.5 - 5.1 mmol/L 3.9   Chloride 98 - 107 mmol/L 105   CO2 23 - 31 mmol/L 25   Anion Gap mEq/L 8.0   BUN 9.8 - 20.1 mg/dL 7.6 (L)   Creatinine 0.55 - 1.02 mg/dL 0.62   BUN/CREAT RATIO  12   eGFR mL/min/1.73/m2 >60   Glucose 82 - 115 mg/dL 103   Calcium 8.4 - 10.2 mg/dL 8.4   Phosphorus Level 2.3 - 4.7 mg/dL 2.6   Magnesium  1.60 - 2.60 mg/dL 1.70   ALP 40 - 150 unit/L 498 (H)   PROTEIN TOTAL 5.8 - 7.6 gm/dL 5.3 (L)   Albumin 3.4 - 4.8 g/dL 2.7 (L)   Albumin/Globulin Ratio 1.1 - 2.0 ratio 1.0 (L)   Prealbumin 14.0 - 37.0 mg/dL 10.6 (L)   BILIRUBIN TOTAL <=1.5 mg/dL 1.4   AST 5 - 34 unit/L 123 (H)   ALT 0 - 55 unit/L 48   Globulin, Total 2.4 - 3.5 gm/dL 2.6   (L): Data is abnormally low  (H): Data is abnormally high  !: Data is abnormal                Assessment/Plan  Hospital   Displaced fracture of left femoral neck   Moderate malnutrition     Non-Hospital   DVT (deep venous thrombosis)   Type 2 diabetes mellitus without complications   Malnutrition   Acute encephalopathy   Acute on chronic combined systolic and diastolic heart failure   Hypokalemia   Hypotension due to drugs   Calcaneal fracture   AMS (altered mental status)   Knee pain   Benign essential HTN   Mixed hyperlipidemia   Alcoholic fatty liver   COPD (chronic  obstructive pulmonary disease)   Chronic diastolic heart failure   Depressive disorder   Gastroesophageal reflux disease   Heart valve disease   History of colonic polyps   Other hemorrhoids   Stage 3b chronic kidney disease   Stricture of esophagus   Tobacco user   Unspecified cirrhosis of liver   Weight loss, abnormal   Benign neoplasm of ascending colon   Benign neoplasm of descending colon   Ototoxicity of both ears   History of substance abuse       Wounds: left hip incision-surgical glue, dressing-c/d/I; LUE skin tear-foam dressing  S/p left hip hemiarthroplasty on 12/20 by Dr. INDIRA Montemayor   Precautions: WBAT to LLE without external or internal rotation of the hip  Bracing/AD: RW  Swallowing: Diabetic Diet, double protein  Function: Tolerating therapy. Continue PT/OT  VTE Prophylaxis:   enoxaparin injection 30 mg SubQ q24hr  Code Status: FULL CODE   Discharge: Patient, son, and his 2 children live in [place] in a 2-story home with a threshold entrance. She does not need to go upstairs. Completed 10th grade. She has no  history. She is a retired  for [employer].  and . Her ex- takes the patient to appointments and runs errands.  The patients son and 2 grandchildren live with her. They cook, clean, and do laundry.  Ex- hires someone for lawn maintenance.  Children: (1). Date pending.                 Tania Oshea NP, conducted additional independent physical examination and assisted with medical documentation.

## 2024-12-31 PROCEDURE — 97110 THERAPEUTIC EXERCISES: CPT

## 2024-12-31 PROCEDURE — 63600175 PHARM REV CODE 636 W HCPCS: Performed by: NURSE PRACTITIONER

## 2024-12-31 PROCEDURE — 11800000 HC REHAB PRIVATE ROOM

## 2024-12-31 PROCEDURE — 97530 THERAPEUTIC ACTIVITIES: CPT

## 2024-12-31 PROCEDURE — 25000003 PHARM REV CODE 250: Performed by: NURSE PRACTITIONER

## 2024-12-31 PROCEDURE — 94799 UNLISTED PULMONARY SVC/PX: CPT

## 2024-12-31 PROCEDURE — S4991 NICOTINE PATCH NONLEGEND: HCPCS | Performed by: NURSE PRACTITIONER

## 2024-12-31 PROCEDURE — 94761 N-INVAS EAR/PLS OXIMETRY MLT: CPT

## 2024-12-31 PROCEDURE — 25000003 PHARM REV CODE 250: Performed by: INTERNAL MEDICINE

## 2024-12-31 PROCEDURE — 99900031 HC PATIENT EDUCATION (STAT)

## 2024-12-31 PROCEDURE — 97116 GAIT TRAINING THERAPY: CPT

## 2024-12-31 PROCEDURE — 97535 SELF CARE MNGMENT TRAINING: CPT

## 2024-12-31 RX ORDER — POLYETHYLENE GLYCOL 3350 17 G/17G
17 POWDER, FOR SOLUTION ORAL 2 TIMES DAILY
Status: DISCONTINUED | OUTPATIENT
Start: 2024-12-31 | End: 2025-01-03

## 2024-12-31 RX ORDER — POLYETHYLENE GLYCOL 3350 17 G/17G
17 POWDER, FOR SOLUTION ORAL DAILY
Status: DISCONTINUED | OUTPATIENT
Start: 2024-12-31 | End: 2024-12-31

## 2024-12-31 RX ADMIN — NICOTINE 1 PATCH: 21 PATCH, EXTENDED RELEASE TRANSDERMAL at 07:12

## 2024-12-31 RX ADMIN — DOCUSATE SODIUM 100 MG: 100 CAPSULE, LIQUID FILLED ORAL at 08:12

## 2024-12-31 RX ADMIN — METHOCARBAMOL 500 MG: 500 TABLET ORAL at 12:12

## 2024-12-31 RX ADMIN — DICLOFENAC SODIUM 4 G: 10 GEL TOPICAL at 06:12

## 2024-12-31 RX ADMIN — BUSPIRONE HYDROCHLORIDE 5 MG: 5 TABLET ORAL at 07:12

## 2024-12-31 RX ADMIN — BUSPIRONE HYDROCHLORIDE 5 MG: 5 TABLET ORAL at 08:12

## 2024-12-31 RX ADMIN — GABAPENTIN 300 MG: 300 CAPSULE ORAL at 08:12

## 2024-12-31 RX ADMIN — OXYCODONE 10 MG: 5 TABLET ORAL at 12:12

## 2024-12-31 RX ADMIN — ENOXAPARIN SODIUM 30 MG: 30 INJECTION SUBCUTANEOUS at 04:12

## 2024-12-31 RX ADMIN — POLYETHYLENE GLYCOL 3350 17 G: 17 POWDER, FOR SOLUTION ORAL at 08:12

## 2024-12-31 RX ADMIN — LIDOCAINE 5% 2 PATCH: 700 PATCH TOPICAL at 05:12

## 2024-12-31 RX ADMIN — ARIPIPRAZOLE 2 MG: 2 TABLET ORAL at 07:12

## 2024-12-31 RX ADMIN — ATORVASTATIN CALCIUM 40 MG: 40 TABLET, FILM COATED ORAL at 08:12

## 2024-12-31 RX ADMIN — SODIUM BICARBONATE 650 MG: 650 TABLET ORAL at 08:12

## 2024-12-31 RX ADMIN — OXYCODONE 10 MG: 5 TABLET ORAL at 08:12

## 2024-12-31 RX ADMIN — METHOCARBAMOL 500 MG: 500 TABLET ORAL at 05:12

## 2024-12-31 RX ADMIN — PANTOPRAZOLE SODIUM 40 MG: 40 TABLET, DELAYED RELEASE ORAL at 08:12

## 2024-12-31 RX ADMIN — DOCUSATE SODIUM 100 MG: 100 CAPSULE, LIQUID FILLED ORAL at 07:12

## 2024-12-31 RX ADMIN — POLYETHYLENE GLYCOL 3350 17 G: 17 POWDER, FOR SOLUTION ORAL at 12:12

## 2024-12-31 RX ADMIN — SODIUM BICARBONATE 650 MG: 650 TABLET ORAL at 07:12

## 2024-12-31 RX ADMIN — ESCITALOPRAM OXALATE 5 MG: 5 TABLET, FILM COATED ORAL at 07:12

## 2024-12-31 RX ADMIN — OXYCODONE 10 MG: 5 TABLET ORAL at 05:12

## 2024-12-31 RX ADMIN — OXYCODONE 10 MG: 5 TABLET ORAL at 04:12

## 2024-12-31 RX ADMIN — GABAPENTIN 100 MG: 100 CAPSULE ORAL at 04:12

## 2024-12-31 RX ADMIN — PANTOPRAZOLE SODIUM 40 MG: 40 TABLET, DELAYED RELEASE ORAL at 07:12

## 2024-12-31 RX ADMIN — OXYCODONE 10 MG: 5 TABLET ORAL at 10:12

## 2024-12-31 NOTE — PT/OT/SLP RE-EVAL
Recreational Therapy Re-Evaluation    Pt progress, plan of care and discharge plans were discussed during staffing at 0930      Date of Treatment: 12/31/24  Start Time: 1330  Stop Time: 1400  Total Time: 30 min  Missed Time:    Assessment      Luz Maria Alfaro is a 64 y.o. female admitted with a medical diagnosis of Displaced fracture of left femoral neck.  She presents with the following impairments/functional limitations:  weakness, impaired endurance, impaired functional mobility, gait instability, decreased lower extremity function, decreased safety awareness, pain .    Rehab Diagnosis:     Recent Surgery:     General Precautions: Standard, fall, hearing impaired     Orthopedic Precautions:LLE weight bearing as tolerated     Braces: N/A    Rehab Prognosis: Good; patient would benefit from acute skilled Recreational Therapy services to address these deficits and reach maximum level of function.      Impairments: Coordination deficits, Endurance deficits, Mobility deficits, Pain, Safety awareness deficits, and Strength deficits  Rehab Potential: Good  Treatment Recommendations: Continue with current plan of care   Treatment Diagnosis: Re-admit, L subacute displaced femoral neck fx, L hemiarthroplasty, HTN, DM, depression, GERD, tobacco dependent, [place]  Orientation: Oriented x4  Affect/Behavior: Cooperative and Distracted  Safety/Judgement: intact   Basic Command Following: intact  Spiritual Cultural: no        History     Past Medical History:   Diagnosis Date    Benign essential HTN     Mixed hyperlipidemia     Type 2 diabetes mellitus without complications        Past Surgical History:   Procedure Laterality Date    APPENDECTOMY      BACK SURGERY      BREAST SURGERY  01 13 2002    Augmantation    HEMIARTHROPLASTY OF HIP Left 12/20/2024    Procedure: HEMIARTHROPLASTY, HIP;  Surgeon: Jose J Montemayor DO;  Location: [facility];  Service: Orthopedics;  Laterality: Left;  lateral peg board kevin    HIP SURGERY       "INTRAMEDULLARY RODDING OF FEMUR Right 10/27/2022    Procedure: RIGHT FEMUR INTERTROCH IMN;  Surgeon: Stewart Brambila MD;  Location: [facility];  Service: Orthopedics;  Laterality: Right;  [place] table, supine  3rd case  Synthes Short TFNA       Home Environment     Admit Date: 12/24/24  Living Situation  People in Home: child(lala), adult, grandchild(lala)  Name(s) of People in Home: Son-Jaxon; HEATHER - Alem and Isaías  Lives in: house  Patients Responsibilities: Financial management, Leisure/play/hobbies, Meal preparation, Shopping  Number of Children: 1  Occupation:Retired: [employer]    Instrumental Activities of Daily Living     Previous Hand Dominance: Right Current Hand Dominance: Right     Other iADL Information:        Cognitive Skills Building         Cognitive Observation Activity Assist Position Equipment Response            Comment:      Dynamic Activities      Activity Assist Position Equipment Response   Activity 1 Bocce ball modified independence and supervision Standing Rolling walker and Bocce balls good   Comment: Bed to toilet to w/c with rw was supervision.  Sit to stand was supervision/setup as was dynamic standing balance/reaching.  Standing tolerance was 3 minutes with sitting rest breaks.  UE coordination and sequencing and problem solving skills setup.  More cooperative today       Fine Motor Activities      Activity Assist Position Equipment Response           Comment:        Goals     Patient Goals  Patient Goal 1: "To be able to walk without a cane."    Short Term Goals    Goal  Goal Status   Will increase sit to stand to supervision Met   Will improve dynamic standing balance/reaching to supervision Met                 Long Term Goals    Goal Goal Status   To increase standing tolerance to 5 minutes Progressing   Will improve dynamic standing balance/reaching to setup Progressing                     Plan       Patient to be seen: Daily  Duration: 1 week  Treatments planned: Energy conservation " training, Safety education  Treatment plan/goals established with Patient/Caregiver: Yes

## 2024-12-31 NOTE — PLAN OF CARE
Spoke with ex- Bruce ([phone]) and scheduled family training for Thurs, 1/2, at 1000 (he will call if that schedule will not work for his grandson, who he plans to invite).      Discussed pt's granddtr supervising showering at home.  Bruce will have his sister order pt a TTB online.    I will arrange for HH via pt's previous HH agency, [facility].      Bruce will plan for pickup on 1/6 at 1100/1115.    1511:  Sent order for HH PT/OT/nursing to [facility] via [employer].  FOC on chart.  Pt has hx with agency.

## 2024-12-31 NOTE — PROGRESS NOTES
12/31/24 1330   Rec Therapy Time Calculation   Date of Treatment 12/31/24   Rec Start Time 1330   Rec Stop Time 1400   Rec Total Time (min) 30 min   Time   Treatment time 2 units   Charges   $Therapeutic Exercise 2 units   Precautions   General Precautions fall;hearing impaired   Orthopedic Precautions  LLE weight bearing as tolerated   Braces N/A   Pain/Comfort   Pain Rating 1 10/10   Location - Side 1 Left   Location - Orientation 1 lower   Location 1 hip   Pain Addressed 1 Reposition   OTHER   Rehab identified problem list/impairments weakness;impaired endurance;impaired functional mobility;gait instability;decreased lower extremity function;decreased safety awareness;pain   Values/Beliefs/Spiritual Care   Spiritual, Cultural Beliefs, Zoroastrianism Practices, Values that Affect Care no   Overall Level of Functioning   Activity Tolerance Independent   Dynamic Sitting Balance/Reaching Independent   Dynamic Standing Balance/Reaching Standby Assist   Right UE Coodination/Dexterity Mod Indep   Left UE Coordination/Dexterity Mod Indep   Problem Solving/Sequencing Skills Mod Indep   Memory Recall Mod Indep   R/L Neglect/Inattention Does not occur   Attention Span Mod Indep   Social Interaction Mod Indep   Recreational Therapy Short Term Goals   Short Term Goal 1 Progression Met   Short Term Goal 2 Progression Met   Recreational Therapy Long Term Goals   Long Term Goal 1 Progression Progressing   Long Term Goal 2 Progression Progressing   Plan   Patient to be seen Daily   Planned Duration 1 week   Treatments Planned Energy conservation training;Safety education   Treatment plan/goals estblished with Patient/Caregiver Yes

## 2024-12-31 NOTE — PROGRESS NOTES
Inpatient Nutrition Assessment    Admit Date: 12/24/2024   Total duration of encounter: 7 days   Patient Age: 64 y.o.    Nutrition Recommendation/Prescription     2000 kcal Diabetic Diet; Double Protein. Add Easy to Chew (IDDSI Level 7) modifier.  Change ONS to Boost Plus BID (provides 360 kcal and 14 g protein per serving).  Medical mgmt of blood glucose.  Bowel regimen per MD.  Will continue to monitor wt, labs, and po intake.    Communication of Recommendations: reviewed with patient    Nutrition Assessment     Malnutrition Assessment/Nutrition-Focused Physical Exam    Malnutrition Context: chronic illness (12/26/24 1323)  Malnutrition Level: moderate (12/26/24 1323)     Weight Loss (Malnutrition): other (see comments) (Does not meet criteria) (12/26/24 1323)  Subcutaneous Fat (Malnutrition): severe depletion (12/26/24 1323)  Orbital Region (Subcutaneous Fat Loss): moderate depletion  Upper Arm Region (Subcutaneous Fat Loss): severe depletion     Muscle Mass (Malnutrition): mild depletion (12/26/24 1323)  [place] Region (Muscle Loss): mild depletion  Clavicle Bone Region (Muscle Loss): mild depletion  Clavicle and Acromion Bone Region (Muscle Loss): mild depletion     Dorsal Hand (Muscle Loss): mild depletion     Anterior Thigh Region (Muscle Loss): mild depletion  Posterior Calf Region (Muscle Loss): mild depletion  Fluid Accumulation (Malnutrition): other (see comments) (Not present) (12/26/24 1323)     Hand  Strength, Right (Malnutrition): Unable to assess (12/26/24 1323)  A minimum of two characteristics is recommended for diagnosis of either severe or non-severe malnutrition.    Chart Review    Reason Seen: follow-up    Malnutrition Screening Tool Results   Have you recently lost weight without trying?: Yes: 2-13 lbs  Have you been eating poorly because of a decreased appetite?: No   MST Score: 1   Diagnosis:  Displaced fracture of left femoral neck 12/20/2024      Closed right hip fracture s/p  Intramedullary nailing 10/26/2022       Relevant Medical History: Displaced fracture of left femoral neck s/p left hemiarthroplasty on 12/20/2024, normocytic anemia, depression/anxiety, HTN, HLD, nicotine dependence, GERD, DM type 2     Scheduled Medications:  ARIPiprazole, 2 mg, Daily  atorvastatin, 40 mg, QHS  busPIRone, 5 mg, BID  diclofenac sodium, 4 g, TID AC  docusate sodium, 100 mg, BID  enoxparin, 30 mg, Q24H (prophylaxis, 1700)  EScitalopram oxalate, 5 mg, Daily  gabapentin, 100 mg, Before dinner  gabapentin, 300 mg, QHS  LIDOcaine, 2 patch, Q24H  methocarbamoL, 500 mg, Q6H  nicotine, 1 patch, Daily  pantoprazole, 40 mg, BID  polyethylene glycol, 17 g, Daily  sodium bicarbonate, 650 mg, BID    Continuous Infusions:   PRN Medications:  acetaminophen, 650 mg, BID PRN  ALPRAZolam, 0.25 mg, TID PRN  benzonatate, 100 mg, TID PRN  dextrose 50%, 12.5 g, PRN  dextrose 50%, 25 g, PRN  glucagon (human recombinant), 1 mg, PRN  glucose, 16 g, PRN  glucose, 24 g, PRN  hydrALAZINE, 10 mg, Q4H PRN  HYDROcodone-acetaminophen, 1 tablet, Daily PRN  hydrOXYzine pamoate, 50 mg, Nightly PRN  insulin aspart U-100, 0-10 Units, QID (AC + HS) PRN  labetalol, 10 mg, Q4H PRN  metoprolol, 10 mg, Q2H PRN  nitroGLYCERIN, 0.4 mg, Q5 Min PRN  ondansetron, 4 mg, Q6H PRN  ondansetron, 8 mg, Q8H PRN  ondansetron, 4 mg, Q8H PRN  oxyCODONE, 10 mg, Q4H PRN  oxyCODONE, 5 mg, Q4H PRN    Calorie Containing IV Medications: no significant kcals from medications at this time    Recent Labs   Lab 12/25/24  0540 12/30/24  0542    138   K 3.7 3.9   CALCIUM 8.8 8.4   PHOS 2.7 2.6   MG 1.80 1.70    105   CO2 25 25   BUN 8.4* 7.6*   CREATININE 0.63 0.62   EGFRNORACEVR >60 >60   GLUCOSE 82 103   BILITOT 1.0 1.4   ALKPHOS 215* 498*   ALT 32 48   AST 52* 123*   ALBUMIN 2.9* 2.7*   PREALB 13.3* 10.6*   HGBA1C 5.0  --    WBC 7.08 8.02   HGB 8.9* 8.7*   HCT 27.9* 28.2*     Nutrition Orders:  Diet diabetic Double Protein; 2000 Calories (up to 75 gm  "per meal)  Dietary nutrition supplements BID; Boost Breeze - Wild Berry    Appetite/Oral Intake: good/% of meals  Factors Affecting Nutritional Intake: chewing difficulty  Social Needs Impacting Access to Food: none identified  Food/Church/Cultural Preferences:  cannot have peppery foods and doesn't drink milk  Food Allergies: no known food allergies  Last Bowel Movement: 12/28/24 (Pt refusing enema)  Wound(s):  noted    Comments    12/26/24: Pt sleeping at time of visit. NFPE showed signs of mild muscle wasting and moderate to severe fat wasting. Pt meets criteria for moderate malnutrition. Per EMR, appears pt may have lost wt then gained it back; will re-assess upon f/u. Pt ate about 25% of lunch. Pt eating fairly per EMR. No issues c/s and no gi symptoms noted. NP added Boost Breeze BID. Last BM today. Labs and meds reviewed; Alb 2.9, Prealb 13.3.    12/31/24: Pt reports good appetite and intake. States the gumbo is too peppery and she can't eat it. Pt reports occasional issues chewing 2/2 dentures being too big; says it's because she lost about 50# since 2023 then gained it back and they don't fit properly anymore. Pt denies n/v. Pt having some constipation (last BM 3 days ago) but refused enema. Pt dislikes the Boost Breeze and prefers the chocolate and vanilla Boost; will update order.    Anthropometrics    Height: 5' 3" (160 cm), Height Method: Stated  Last Weight: 50.9 kg (112 lb 3.4 oz) (12/28/24 0516), Weight Method: Bed Scale  BMI (Calculated): 19.9  BMI Classification: underweight (BMI less than 22 if >65 years of age)     Ideal Body Weight (IBW), Female: 115 lb     % Ideal Body Weight, Female (lb): 97.58 %                             Usual Weight Provided By: EMR weight history    Wt Readings from Last 5 Encounters:   12/28/24 50.9 kg (112 lb 3.4 oz)   12/19/24 42.7 kg (94 lb 3.2 oz)   12/19/24 44.5 kg (98 lb)   12/18/24 44.5 kg (98 lb)   11/11/24 52.8 kg (116 lb 6.5 oz)     Weight Change(s) " Since Admission: wt stable  Wt Readings from Last 1 Encounters:   12/28/24 0516 50.9 kg (112 lb 3.4 oz)   12/24/24 1502 50.9 kg (112 lb 3.4 oz)   12/24/24 1424 50.9 kg (112 lb 3.4 oz)   Admit Weight: 50.9 kg (112 lb 3.4 oz) (12/24/24 1424), Weight Method: Bed Scale    Estimated Needs    Weight Used For Calorie Calculations: 50.9 kg (112 lb 3.4 oz)  Energy Calorie Requirements (kcal): 8740-6017 kcal (30-35 kcal/kg)  Energy Need Method: Kcal/kg  Weight Used For Protein Calculations: 50.9 kg (112 lb 3.4 oz)  Protein Requirements: 61-66 g (1.2-1.3 g/kg)  Fluid Requirements (mL): 1527 mL/d (1.0 mL/kcal)  CHO Requirement: 202-247 g/d (45-55% of EER)     Enteral Nutrition     Patient not receiving enteral nutrition at this time.    Parenteral Nutrition     Patient not receiving parenteral nutrition support at this time.    Evaluation of Received Nutrient Intake    Calories: meeting estimated needs  Protein: meeting estimated needs    Patient Education     Not applicable.    Nutrition Diagnosis     PES: Moderate chronic disease or condition related malnutrition Related to chronic illness As Evidenced by:  - muscle mass depletion: 6 areas of mild muscle loss (Acromion, Gastrocnemius, Temporalis, Clavicle, Quadriceps, Interosseous) - loss of subcutaneous fat: 1 area of severe fat loss (Triceps Skinfold), 1 area of moderate fat loss (Infraorbital) new    Nutrition Interventions     Intervention(s): general/healthful diet, modified composition of meals/snacks, commercial beverage, multivitamin/mineral supplement therapy, prescription medication, and collaboration with other providers  Intervention(s): Oral nutritional supplement;Oral diet/nutrient modifications    Goal: Meet greater than 80% of nutritional needs by follow-up. (goal progressing)    Nutrition Goals & Monitoring     Dietitian will monitor: food and beverage intake, weight, food/nutrition knowledge skill, and glucose/endocrine profile  Discharge planning: resume  home regimen  Nutrition Risk/Follow-Up: moderate (follow-up in 3-5 days)   Please consult if re-assessment needed sooner.

## 2024-12-31 NOTE — PROGRESS NOTES
[facility] ([facility])  Rehab Progress Note    Patient Name: Luz Maria Alfaro  MRN: [id number]  Age: 64 y.o. Sex: female  : 1960  Hospital Length of Stay: 7 days  Date of Service: 2024   Chief Complaint: Displaced fracture of left femoral neck s/p left hemiarthroplasty on 2024     Subjective:     Basic Information  Admit Information: 63-year-old white female presented to [facility] clinical on 2024 for follow up visit for left hip pain that has been progressively worsening.  Reportedly fell 2 months ago.  Imaging on  significant for left displaced femoral neck fracture.  Recommended to go to [facility] ED on  for admission.  PMH significant for HTN, dm type 2, HLD, GERD, and nicotine dependence.  Workup significant for left displaced femoral neck fracture.  Tolerated left hip hemiarthroplasty on  without perioperative complications.  She did require 2 units PRBC.  Sodium bicarb tablets initiated due to metabolic acidosis.  Currently Min assist.  Tolerated transfer to [facility] inpatient rehab unit on  without incident.   Today's Information: No acute events overnight.  Sleep hygiene, bowel maintenance, and appetite at goal.  Last BM .  Vital signs at goal with no recorded fevers.  Good glycemic control.  No new labs or imaging today.    Review of patient's allergies indicates:   Allergen Reactions    Iodinated contrast media Shortness Of Breath    Opioids - morphine analogues     Meperidine Rash and Hives        Current Facility-Administered Medications:     acetaminophen tablet 650 mg, 650 mg, Oral, BID PRN, Dayo, Tania A, FNP    ALPRAZolam tablet 0.25 mg, 0.25 mg, Oral, TID PRN, Bere, Moy A, FNP, 0.25 mg at 24    ARIPiprazole tablet 2 mg, 2 mg, Oral, Daily, Bere, Moy A, FNP, 2 mg at 24 0738    atorvastatin tablet 40 mg, 40 mg, Oral, QHS, Bere, Moy A, FNP, 40 mg at 24    benzonatate capsule  100 mg, 100 mg, Oral, TID PRN, Bere, Moy A, FNP    busPIRone tablet 5 mg, 5 mg, Oral, BID, Bere, Moy A, FNP, 5 mg at 12/31/24 0738    dextrose 50% injection 12.5 g, 12.5 g, Intravenous, PRN, Bere, Moy A, FNP    dextrose 50% injection 25 g, 25 g, Intravenous, PRN, Bere, Moy A, FNP    diclofenac sodium 1 % gel 4 g, 4 g, Topical (Top), TID AC, Dayo, Tania A, FNP, 4 g at 12/31/24 0645    docusate sodium capsule 100 mg, 100 mg, Oral, BID, Bere, Moy A, FNP, 100 mg at 12/31/24 0738    enoxaparin injection 30 mg, 30 mg, Subcutaneous, Q24H (prophylaxis, 1700), Bere, Moy A, FNP, 30 mg at 12/30/24 1716    EScitalopram oxalate tablet 5 mg, 5 mg, Oral, Daily, [place], Tania A, FNP, 5 mg at 12/31/24 0738    gabapentin capsule 100 mg, 100 mg, Oral, Before dinner, Dayo Tania A, FNP, 100 mg at 12/30/24 1716    gabapentin capsule 300 mg, 300 mg, Oral, QHS, Bere, Moy A, FNP, 300 mg at 12/30/24 2001    glucagon (human recombinant) injection 1 mg, 1 mg, Intramuscular, PRN, Bere, Moy A, FNP    glucose chewable tablet 16 g, 16 g, Oral, PRN, Bere, Moy A, FNP    glucose chewable tablet 24 g, 24 g, Oral, PRN, Bere, Moy A, FNP    hydrALAZINE injection 10 mg, 10 mg, Intravenous, Q4H PRN, Bere, Moy A, FNP    HYDROcodone-acetaminophen 5-325 mg per tablet 1 tablet, 1 tablet, Oral, Daily PRN, Bere, Moy A, FNP, 1 tablet at 12/29/24 0730    hydrOXYzine pamoate capsule 50 mg, 50 mg, Oral, Nightly PRN, Dayo, Tania A, FNP    insulin aspart U-100 injection 0-10 Units, 0-10 Units, Subcutaneous, QID (AC + HS) PRN, Bere Moy A, FNP    labetalol 20 mg/4 mL (5 mg/mL) IV syring, 10 mg, Intravenous, Q4H PRN, Bere, Moy A, FNP    LIDOcaine 5 % patch 2 patch, 2 patch, Transdermal, Q24H, Tania Oshea A, FNP, 2 patch at 12/31/24 0535    methocarbamoL tablet 500 mg, 500 mg, Oral, Q6H, Neema Osheahryn A, FNP, 500 mg at  "12/31/24 0535    metoprolol injection 10 mg, 10 mg, Intravenous, Q2H PRN, Bere, Moy A, FNP    nicotine 21 mg/24 hr 1 patch, 1 patch, Transdermal, Daily, Bere, Moy A, FNP, 1 patch at 12/31/24 0739    nitroGLYCERIN SL tablet 0.4 mg, 0.4 mg, Sublingual, Q5 Min PRN, Bere, Moy A, FNP    ondansetron disintegrating tablet 4 mg, 4 mg, Oral, Q6H PRN, Bere, Moy A, FNP, 4 mg at 12/27/24 1418    ondansetron disintegrating tablet 8 mg, 8 mg, Oral, Q8H PRN, Bere, Moy A, FNP    ondansetron injection 4 mg, 4 mg, Intravenous, Q8H PRN, Bere, Moy A, FNP    oxyCODONE immediate release tablet 10 mg, 10 mg, Oral, Q4H PRN, Bere, Moy A, FNP, 10 mg at 12/31/24 0534    oxyCODONE immediate release tablet 5 mg, 5 mg, Oral, Q4H PRN, Bere, Moy A, FNP, 5 mg at 12/27/24 1714    pantoprazole EC tablet 40 mg, 40 mg, Oral, BID, Bere, Moy A, FNP, 40 mg at 12/31/24 0737    polyethylene glycol packet 17 g, 17 g, Oral, BID PRN, Bere, Moy A, FNP, 17 g at 12/26/24 0747    sodium bicarbonate tablet 650 mg, 650 mg, Oral, BID, Bere, Moy A, FNP, 650 mg at 12/31/24 0737     Review of Systems   Complete 12-point review of symptoms negative except for what's mentioned in HPI     Objective:     /78   Pulse 98   Temp 98 °F (36.7 °C) (Oral)   Resp 16   Ht 5' 3" (1.6 m)   Wt 50.9 kg (112 lb 3.4 oz)   SpO2 95%   BMI 19.88 kg/m²      Physical Exam  Nursing note reviewed.   Constitutional:       Appearance: Normal appearance.   HENT:      Head: Normocephalic.      Mouth/Throat:      Mouth: Mucous membranes are moist.   Eyes:      Pupils: Pupils are equal, round, and reactive to light.   Cardiovascular:      Rate and Rhythm: Normal rate and regular rhythm.      Heart sounds: Normal heart sounds.   Pulmonary:      Effort: Pulmonary effort is normal.      Breath sounds: Normal breath sounds.   Abdominal:      General: Bowel sounds are normal.      Palpations: " Abdomen is soft.   Musculoskeletal:      Cervical back: Neck supple.      Comments: Left hip dressing clean and intact    Skin:     General: Skin is warm and dry.   Neurological:      Mental Status: She is alert and oriented to person, place, and time.      Motor: Weakness present.   Psychiatric:         Mood and Affect: Mood normal.         Behavior: Behavior normal.         Thought Content: Thought content normal.         Judgment: Judgment normal.     *MD performed and documented physical examination       Lines/Drains/Airways       None                 Labs  Admission on 12/24/2024   Component Date Value Ref Range Status    POCT Glucose 12/24/2024 119 (H)  70 - 110 mg/dL Final    POCT Glucose 12/24/2024 115 (H)  70 - 110 mg/dL Final    Sodium 12/25/2024 140  136 - 145 mmol/L Final    Potassium 12/25/2024 3.7  3.5 - 5.1 mmol/L Final    Chloride 12/25/2024 106  98 - 107 mmol/L Final    CO2 12/25/2024 25  23 - 31 mmol/L Final    Glucose 12/25/2024 82  82 - 115 mg/dL Final    Blood Urea Nitrogen 12/25/2024 8.4 (L)  9.8 - 20.1 mg/dL Final    Creatinine 12/25/2024 0.63  0.55 - 1.02 mg/dL Final    Calcium 12/25/2024 8.8  8.4 - 10.2 mg/dL Final    Protein Total 12/25/2024 5.8  5.8 - 7.6 gm/dL Final    Albumin 12/25/2024 2.9 (L)  3.4 - 4.8 g/dL Final    Globulin 12/25/2024 2.9  2.4 - 3.5 gm/dL Final    Albumin/Globulin Ratio 12/25/2024 1.0 (L)  1.1 - 2.0 ratio Final    Bilirubin Total 12/25/2024 1.0  <=1.5 mg/dL Final    ALP 12/25/2024 215 (H)  40 - 150 unit/L Final    ALT 12/25/2024 32  0 - 55 unit/L Final    AST 12/25/2024 52 (H)  5 - 34 unit/L Final    eGFR 12/25/2024 >60  mL/min/1.73/m2 Final    Anion Gap 12/25/2024 9.0  mEq/L Final    BUN/Creatinine Ratio 12/25/2024 13   Final    Magnesium Level 12/25/2024 1.80  1.60 - 2.60 mg/dL Final    Phosphorus Level 12/25/2024 2.7  2.3 - 4.7 mg/dL Final    Prealbumin 12/25/2024 13.3 (L)  14.0 - 37.0 mg/dL Final    Ferritin Level 12/25/2024 318.94 (H)  4.63 - 204.00 ng/mL Final     Iron Binding Capacity Unsaturated 12/25/2024 131  70 - 310 ug/dL Final    Iron Level 12/25/2024 52  50 - 170 ug/dL Final    Transferrin 12/25/2024 160 (L)  173 - 360 mg/dL Final    Iron Binding Capacity Total 12/25/2024 183 (L)  250 - 450 ug/dL Final    Iron Saturation 12/25/2024 28  20 - 50 % Final    WBC 12/25/2024 7.08  4.50 - 11.50 x10(3)/mcL Final    RBC 12/25/2024 2.79 (L)  4.20 - 5.40 x10(6)/mcL Final    Hgb 12/25/2024 8.9 (L)  12.0 - 16.0 g/dL Final    Hct 12/25/2024 27.9 (L)  37.0 - 47.0 % Final    MCV 12/25/2024 100.0 (H)  80.0 - 94.0 fL Final    MCH 12/25/2024 31.9 (H)  27.0 - 31.0 pg Final    MCHC 12/25/2024 31.9 (L)  33.0 - 36.0 g/dL Final    RDW 12/25/2024    Final    Unable to calculate      Platelet 12/25/2024 219  130 - 400 x10(3)/mcL Final    MPV 12/25/2024 9.6  7.4 - 10.4 fL Final    Neut % 12/25/2024 66.2  % Final    Lymph % 12/25/2024 20.5  % Final    Mono % 12/25/2024 10.7  % Final    Eos % 12/25/2024 1.7  % Final    Basophil % 12/25/2024 0.3  % Final    Lymph # 12/25/2024 1.45  0.6 - 4.6 x10(3)/mcL Final    Neut # 12/25/2024 4.69  2.1 - 9.2 x10(3)/mcL Final    Mono # 12/25/2024 0.76  0.1 - 1.3 x10(3)/mcL Final    Eos # 12/25/2024 0.12  0 - 0.9 x10(3)/mcL Final    Baso # 12/25/2024 0.02  <=0.2 x10(3)/mcL Final    IG# 12/25/2024 0.04  0 - 0.04 x10(3)/mcL Final    IG% 12/25/2024 0.6  % Final    NRBC% 12/25/2024 0.0  % Final    RBC Morph 12/25/2024 Abnormal (A)  Normal Final    Anisocytosis 12/25/2024 3+ (A)  (none) Final    Hypochromasia 12/25/2024 1+ (A)  (none) Final    Macrocytosis 12/25/2024 1+ (A)  (none) Final    Platelets 12/25/2024 Adequate  Normal, Adequate Final    Hemoglobin A1c 12/25/2024 5.0  <=7.0 % Final    Estimated Average Glucose 12/25/2024 96.8  mg/dL Final    POCT Glucose 12/25/2024 107  70 - 110 mg/dL Final    POCT Glucose 12/25/2024 113 (H)  70 - 110 mg/dL Final    POCT Glucose 12/25/2024 102  70 - 110 mg/dL Final    POCT Glucose 12/25/2024 112 (H)  70 - 110 mg/dL Final     POCT Glucose 12/26/2024 106  70 - 110 mg/dL Final    POCT Glucose 12/26/2024 123 (H)  70 - 110 mg/dL Final    POCT Glucose 12/26/2024 143 (H)  70 - 110 mg/dL Final    POCT Glucose 12/27/2024 93  70 - 110 mg/dL Final    POCT Glucose 12/27/2024 132 (H)  70 - 110 mg/dL Final    POCT Glucose 12/28/2024 83  70 - 110 mg/dL Final    POCT Glucose 12/28/2024 115 (H)  70 - 110 mg/dL Final    POCT Glucose 12/29/2024 120 (H)  70 - 110 mg/dL Final    Sodium 12/30/2024 138  136 - 145 mmol/L Final    Every Monday    Potassium 12/30/2024 3.9  3.5 - 5.1 mmol/L Final    Every Monday    Chloride 12/30/2024 105  98 - 107 mmol/L Final    Every Monday    CO2 12/30/2024 25  23 - 31 mmol/L Final    Every Monday    Glucose 12/30/2024 103  82 - 115 mg/dL Final    Every Monday    Blood Urea Nitrogen 12/30/2024 7.6 (L)  9.8 - 20.1 mg/dL Final    Every Monday    Creatinine 12/30/2024 0.62  0.55 - 1.02 mg/dL Final    Every Monday    Calcium 12/30/2024 8.4  8.4 - 10.2 mg/dL Final    Every Monday    Protein Total 12/30/2024 5.3 (L)  5.8 - 7.6 gm/dL Final    Every Monday    Albumin 12/30/2024 2.7 (L)  3.4 - 4.8 g/dL Final    Every Monday    Globulin 12/30/2024 2.6  2.4 - 3.5 gm/dL Final    Albumin/Globulin Ratio 12/30/2024 1.0 (L)  1.1 - 2.0 ratio Final    Bilirubin Total 12/30/2024 1.4  <=1.5 mg/dL Final    Every Monday    ALP 12/30/2024 498 (H)  40 - 150 unit/L Final    Every Monday    ALT 12/30/2024 48  0 - 55 unit/L Final    Every Monday    AST 12/30/2024 123 (H)  5 - 34 unit/L Final    Every Monday    eGFR 12/30/2024 >60  mL/min/1.73/m2 Final    Anion Gap 12/30/2024 8.0  mEq/L Final    BUN/Creatinine Ratio 12/30/2024 12   Final    Prealbumin 12/30/2024 10.6 (L)  14.0 - 37.0 mg/dL Final    Magnesium Level 12/30/2024 1.70  1.60 - 2.60 mg/dL Final    Every Monday    Phosphorus Level 12/30/2024 2.6  2.3 - 4.7 mg/dL Final    Every Monday    WBC 12/30/2024 8.02  4.50 - 11.50 x10(3)/mcL Final    RBC 12/30/2024 2.73 (L)  4.20 - 5.40 x10(6)/mcL  Final    Hgb 12/30/2024 8.7 (L)  12.0 - 16.0 g/dL Final    Hct 12/30/2024 28.2 (L)  37.0 - 47.0 % Final    MCV 12/30/2024 103.3 (H)  80.0 - 94.0 fL Final    MCH 12/30/2024 31.9 (H)  27.0 - 31.0 pg Final    MCHC 12/30/2024 30.9 (L)  33.0 - 36.0 g/dL Final    RDW 12/30/2024 26.8 (H)  11.5 - 17.0 % Final    Platelet 12/30/2024 286  130 - 400 x10(3)/mcL Final    MPV 12/30/2024 8.7  7.4 - 10.4 fL Final    Neut % 12/30/2024 70.6  % Final    Lymph % 12/30/2024 20.1  % Final    Mono % 12/30/2024 7.6  % Final    Eos % 12/30/2024 1.1  % Final    Basophil % 12/30/2024 0.2  % Final    Lymph # 12/30/2024 1.61  0.6 - 4.6 x10(3)/mcL Final    Neut # 12/30/2024 5.66  2.1 - 9.2 x10(3)/mcL Final    Mono # 12/30/2024 0.61  0.1 - 1.3 x10(3)/mcL Final    Eos # 12/30/2024 0.09  0 - 0.9 x10(3)/mcL Final    Baso # 12/30/2024 0.02  <=0.2 x10(3)/mcL Final    IG# 12/30/2024 0.03  0 - 0.04 x10(3)/mcL Final    IG% 12/30/2024 0.4  % Final    NRBC% 12/30/2024 0.0  % Final    POCT Glucose 12/30/2024 94  70 - 110 mg/dL Final    RBC Morph 12/30/2024 Abnormal (A)  Normal Final    Anisocytosis 12/30/2024 3+ (A)  (none) Final    Hypochromasia 12/30/2024 2+ (A)  (none) Final    Macrocytosis 12/30/2024 1+ (A)  (none) Final    Microcytosis 12/30/2024 Slight (A)  (none) Final    Platelets 12/30/2024 Adequate  Normal, Adequate Final    POCT Glucose 12/29/2024 91  70 - 110 mg/dL Final     Radiology  Left hip and pelvis XR on 12/20/2024, IMPRESSION:  Satisfactory alignment following left hip arthroplasty.    Assessment/Plan:     64 y.o. WF admitted on 12/24/2024    Displaced fracture of left femoral neck   - s/p left hemiarthroplasty on 12/20/2024  - WBAT to LLE without external or internal rotation of the hip  - staples to be removed on 01/10  - discontinued Tylenol 650 mg every 6 hours on 12/30   - continue                  Gabapentin 300 mg nightly                 Robaxin 750 mg t.i.d.                Norco 5 mg/325 mg daily p.r.n. prior to therapy                 Oxycodone 5 mg q.4 hours p.r.n. for moderate pain                 Oxycodone 10 mg q.4 hours p.r.n. for severe pain  - defer to physiatry for rehab and pain management  - PT/OT/RT/ST following     Normocytic anemia  - asymptomatic  - s/p transfusion           x2 units PRBC on 12/21/2024  - H/H stable   - no evidence of active bleeds  - will closely monitor and transfuse if needed      Depression/anxiety  - stable  - continue                Abilify 2 mg daily                 BuSpar 5 mg b.i.d.                 Lexapro 5 mg daily     HLD  - FLP outpatient  - continue                Lipitor 40 mg nightly      Nicotine dependence  - smoking cessation counseling initiated on admission  - continue                Nicotine patch daily     GERD  - Avoid spicy foods, and nothing to eat or drink within x2 hours of bedtime or laying flat (water is ok)   - Avoid NSAIDs (Advil, ibuprofen, naproxen...) and chino-2 inhibitors (Mobic, Celebrex)    - continue                Protonix 40 mg b.i.d.     Constipation  - reports hard stools on 12/31  - initiate   Movantik 25 mg daily (initiated 12/31)  - continue  Colace 100 mg BID   Miralax 17 gm b.i.d. (increased 12/31)     Metabolic acidosis  - sodium bicarb 25  - continue                Sodium bicarbonate 650 mg b.i.d.     Moderate protein calorie malnutrition  - albumin 2.9/prealbumin 13.3  - encourage oral nutrition  - registered dietitian following     DM type II  - HgA1c 5.0 on 12/25/2024  - continue                ISS   - CBGs AC/HS     HTN  - BP at goal  - continue                Hydralazine 10 mg every 2 hours as needed for BP > 160/90                Labetalol 10 mg every 2 hours as needed for BP > 160/90  - low sodium diet     VTE Prophylaxis:  Lovenox 30 mg daily  COVID-19 testing:  Unknown  COVID-19 vaccination status:  Unvaccinated     POA:  No  Living will:  No  Contacts:  Jaxon Baum (son) [phone]     CODE STATUS:  Full code  Internal Medicine (attending): George  Sg Do MD  Physiatry (consulting):  Nam Swift MD     OUTPATIENT PROVIDERS  PCP: ALEKS Mcfadden  Orthopedic surgery:  Jose J Montemayor D.O.     DISPOSITION:  Sleep hygiene and appetite at goal.  Reports hard stools.  Vitals signs at goal with no recorded fevers.  Good glycemic control.  No new labs or imaging today.  Initiate Movantik 25 mg daily and increase MiraLax 17 g b.i.d..  Continue aggressive mobilization as tolerated.  Monitor closely.  Notify of acute changes.  Staffing completed today.    Staffing 12/31/2024: Continent of bowel and bladder. RT: CGA to supervision.  Limited by standing tolerance and pain.  Needs encouragement. Appetite is good at 75%.  Ill-fitting dentures. PT: COSMO to SBA for bed mobility.  Sit to stand with tranfers.  CGA ambulating 80 feet. Limited by pain. OT: Progressing.  Limited by pain.  Supervision to independent with ADLS. With adaptive equipment.  Needs TTB upon discharge. Projected discharge 1/6 home with .      Mao Blackburn NP conducted independent physical examination and assisted with medical documentation.    Total time spent on this encounter including chart review and direct MD + NP 1-on-1 patient interaction: 54 minutes   Over 50% of this time was spent in counseling and coordination of care

## 2024-12-31 NOTE — PT/OT/SLP PROGRESS
Physical Therapy Inpatient Rehab Treatment    Patient Name:  Luz Maria Alfaro   MRN:  [id number]    Recommendations:     Discharge Recommendations:  High Intensity Therapy   Discharge Equipment Recommendations:     Barriers to discharge: Impaired functional mobility  and Severity of Deficits     Assessment:     Luz Maria Alfaro is a 64 y.o. female admitted with a medical diagnosis of Displaced fracture of left femoral neck.  She presents with the following impairments/functional limitations:  weakness, impaired endurance, impaired functional mobility, gait instability, impaired balance, decreased lower extremity function, pain, decreased ROM .    Rehab Diagnosis: L femoral neck fx    General Precautions: Standard, hearing impaired, fall     Orthopedic Precautions:LLE weight bearing as tolerated (NO IR or ER)     Braces: N/A    Rehab Prognosis: Good; patient would benefit from acute skilled PT services to address these deficits and reach maximum level of function.      History:     Past Medical History:   Diagnosis Date    Benign essential HTN     Mixed hyperlipidemia     Type 2 diabetes mellitus without complications        Past Surgical History:   Procedure Laterality Date    APPENDECTOMY      BACK SURGERY      BREAST SURGERY  01 13 2002    Augmantation    HEMIARTHROPLASTY OF HIP Left 12/20/2024    Procedure: HEMIARTHROPLASTY, HIP;  Surgeon: Jose J Montemayor DO;  Location: [facility];  Service: Orthopedics;  Laterality: Left;  lateral peg board kevin    HIP SURGERY      INTRAMEDULLARY RODDING OF FEMUR Right 10/27/2022    Procedure: RIGHT FEMUR INTERTROCH IMN;  Surgeon: Stewart Brambila MD;  Location: [facility];  Service: Orthopedics;  Laterality: Right;  Eunice table, supine  3rd case  Synthes Short TFNA       Subjective     Patient comments: My back hurts     Respiratory Status: Room air    Patients cultural, spiritual, Hoahaoism conflicts given the current situation: no    Objective:     Communicated with TR prior to  session.  Patient found up in chair with peripheral IV  upon PT entry to room.    Pt is Oriented x3 and Alert, Cooperative, and Motivated.    Vitals   Vitals at Rest  BP    HR    O2 Sat    Pain      Vitals With Activity  BP     HR     O2 Sat     Pain Pain Rating 1: 10/10  Location - Side 1: Left  Location - Orientation 1: lower  Location 1: hip  Pain Addressed 1: Reposition, Distraction, Nurse notified       Functional Mobility:        Current   Status  Discharge   Goal   Functional Area: Care Score:    Roll Left and Right   Independent   Sit to Lying 6  With leg   Independent   Sit to Stand 6 Independent   Chair/Bed-to-Chair Transfer 6  With RW and increased time  Independent   Car Transfer   Independent   Walk 10 Feet 6 Independent   Walk 50 Feet with Two Turns 4  ~50' overall Mod I to SBA for increased distance 2/2 increased pain and decreased stability  Independent   Walk 150 Feet   Independent   Walk 10 Feet Uneven Surface   Independent       Therapeutic Activities and Exercises:  Patient educated on role of acute care PT and PT POC, safety while in hospital including calling nurse for mobility, and call light usage  Patient educated about importance of OOB mobility and remaining up in chair most of the day.    Therapeutic exercises were performed supine in bed:    quad sets  glute sets  All therex performed 15x3 reps each         Activity Tolerance: Good    Patient left HOB elevated with all lines intact and call button in reach.    Education provided: roles and goals of PT/PTA, transfer training, bed mob, gait training, balance training, safety awareness, body mechanics, assistive device, strengthening exercises, and fall prevention    Expected compliance: High compliance    Plan:     During this hospitalization, patient to be seen 5 x/week to address the identified rehab impairments via gait training, therapeutic exercises, therapeutic activities and progress toward the following goals:    GOALS:    Multidisciplinary Problems       Physical Therapy Goals          Problem: Physical Therapy    Goal Priority Disciplines Outcome Interventions   Physical Therapy Goal     PT, PT/OT Progressing    Description:   PT Short Term goals     Bed Mobility:  Roll left and right with supervision/touching assist.  Sit to supine transfer with partial/moderate assist.  Supine to sit transfer with partial/moderate assist.    Transfers:  Sit to stand transfer with setup/clean-up assist using RW.MET  Bed to chair transferStand step with setup/clean-up assist using RW.  Car transfer with setup/clean-up assist using RW.   an object from the ground in standing position with supervision/touching assist using RW.    Mobility:  Ambulate 150 feet with partial/moderate assist using RW.  Ambulate 10 feet on uneven surfaces/ramps with partial/moderate assist using RW.  Ascend/descend a 4 inch curb with partial/moderate assist using RW.   Ascend/descend 12 stairs with partial/moderate assist using bilateral handrails.  Manual wheelchair 150 feet with supervision/touching assist using Bilateral upper extremity.      Time Frame: by Re-evaluation                        Plan of Care Expires:  01/03/25  PT Next Visit Date: 01/03/25  Plan of Care reviewed with: patient    Additional Information:         Time Tracking:     Therapy Time  PT Received On: 12/31/24  PT Start Time: 1400  PT Stop Time: 1430  PT Total Time (min): 30 min   PT Individual: 30  Missed Time:    Time Missed due to:      Billable Minutes: Gait Training 20 and Therapeutic Exercise 10    12/31/2024

## 2024-12-31 NOTE — PT/OT/SLP PROGRESS
"Occupational Therapy Inpatient Rehab Treatment    Name: Luz Maria Alfaro  MRN: [id number]    Assessment:  Luz Maria Alfaro is a 64 y.o. female admitted with a medical diagnosis of Displaced fracture of left femoral neck.  She presents with the following impairments/functional limitations:  weakness, impaired endurance, impaired functional mobility, gait instability, impaired balance, decreased safety awareness, pain.    General Precautions: Standard, fall, hearing impaired     Orthopedic Precautions:LLE weight bearing as tolerated     Braces: N/A    Rehab Prognosis: Good; patient would benefit from acute skilled OT services to address these deficits and reach maximum level of function.      History:     Past Medical History:   Diagnosis Date    Benign essential HTN     Mixed hyperlipidemia     Type 2 diabetes mellitus without complications        Past Surgical History:   Procedure Laterality Date    APPENDECTOMY      BACK SURGERY      BREAST SURGERY  01 13 2002    Augmantation    HEMIARTHROPLASTY OF HIP Left 12/20/2024    Procedure: HEMIARTHROPLASTY, HIP;  Surgeon: Jose J Montemayor DO;  Location: [facility];  Service: Orthopedics;  Laterality: Left;  lateral peg board kevin    HIP SURGERY      INTRAMEDULLARY RODDING OF FEMUR Right 10/27/2022    Procedure: RIGHT FEMUR INTERTROCH IMN;  Surgeon: Stewart Brambila MD;  Location: [facility];  Service: Orthopedics;  Laterality: Right;  [place] table, supine  3rd case  Synthes Short TFNA       Subjective     Orientation: Oriented x4    Chief Complaint: pain    Patient/Family Comments/goals: "I need my pain meds - I was suppose to get them at 0940."    Vitals   Vitals at Rest  BP    HR    O2 Sat    Pain 10/10     Vitals With Activity  BP    HR    O2 Sat    Pain 8/10 after meds given post 30 mins     Respiratory Status: Room air    Patients cultural, spiritual, Oriental orthodox conflicts given the current situation: no     Objective:     Patient found supine with peripheral IV  upon OT " entry to room.    Mobility   Patient completed:  Supine to Sit with supervision  Sit to Supine with supervision and with leg   Sit to Stand Transfer with supervision with rolling walker  Stand to Sit Transfer with supervision with rolling walker  Bed to Chair Transfer using Step Transfer technique with supervision with rolling walker  Toilet Transfer Step Transfer technique with supervision with  rolling walker    Functional Mobility  In room mobility for toileting with SPV with RW and in OT gym mobility with RW to retrieve items off the floor with SPV. Pt also performed W/C mobility 250 ft SPV at slow pace.    ADLs   Current Status   Toileting Hygiene 6   Toilet Transfer 4     Limiting Factors for ADLs: motor, psychsocial, endurance, limited ROM, balance, weakness, cognition, safety awareness, and pain     IADLs: Retrieved 5 items off floor using reacher and RW and place items in walker bag. Pt used good walker safety for task.    Therapeutic Activities  In standing with RW, pt performed activity of placing 15 items to tall mirror via suction cups and then removing them. Pt needed a seated rest break before removing items. Tolerated standing 2 trials X 2 mins    Therapeutic Exercise  Pt tolerated B UE AROM 10 min on UBE at 1.5 perez going 5 mins forward and 5 mins back. Pt. completed [] standing [x] sitting unsupported [] sitting supported to improve overall UE and cardiovascular endurance to improve safety and independence with FM, functional transfers and ADL performance.      Pt performed 2 sets of 10 W/C push-ups.     Patient left supine with all lines intact and call button in reach.     Education provided: Roles and goals of OT, ADLs, transfer training, bed mobility, assistive device, wheelchair precautions, sequencing, safety precautions, and fall prevention    Multidisciplinary Problems       Occupational Therapy Goals          Problem: Occupational Therapy    Goal Priority Disciplines Outcome  Interventions   Occupational Therapy Goal     OT, PT/OT Progressing    Description: ADLs:  MET - Pt to perform grooming tasks with SBA standing at sink.  MET - Pt to perform feeding tasks with independence.  MET - Pt to perform UB dressing with SBA.  MET - Pt to perform LB dressing with max assist with AE as needed.   Updated goal: Pt to perform LB dressing with set up assist with AE as needed.  MET - Pt to perform putting on/off footwear task with max assist with AE as needed.  Updated goal: Pt to perform putting on/off footwear task with set up assist with AE as needed.  MET - Pt to perform toileting with min assist.  MET - Pt to perform bathing with mod assist with AE as needed.  Updated goal: Pt to perform bathing independently with AE as needed.    Functional Transfers:  MET - Pt to perform toilet transfers with CGA to toilet in bathroom.  Updated goal: Pt to perform toilet transfers with SBA with RW to toilet in bathroom.  MET: Pt to perform a tub transfer with mod assist with AE as needed onto TTB.  Updated goal: Pt to perform a tub transfer with set up assist with AE as needed onto TTB.    IADLs:  Pt to perform simple meal prep with mod assist.    Balance, Strengthening, Endurance, Balance:  Pt to consistently demonstrate adherence to orthopedic precautions during all ADL's as instructed by OT.  Pt to demonstrate good dynamic standing balance as required to perform ADL's from standing level.  Pt to demonstrate good BUE strength during functional task   Pt to demonstrate consistent adherence to breathing control and energy conservation techniques as educated by OT.                        Time Tracking     OT Received On: 12/31/24  Time In 1030     Time Out 1200  Total Time 90 min  Therapy Time: OT Individual: 90  Missed Time:    Missed Time Reason:      Billable Minutes: Self Care/Home Management 15, Therapeutic Activity 60, and Therapeutic Exercise 15    12/31/2024

## 2024-12-31 NOTE — PT/OT/SLP PROGRESS
Physical Therapy Inpatient Rehab Treatment    Patient Name:  Luz Maria Alfaro   MRN:  [id number]    Recommendations:     Discharge Recommendations:  High Intensity Therapy   Discharge Equipment Recommendations:     Barriers to discharge: Impaired functional mobility     Assessment:     Luz Maria Alfaro is a 64 y.o. female admitted with a medical diagnosis of Displaced fracture of left femoral neck.  She presents with the following impairments/functional limitations:  weakness, impaired endurance, impaired functional mobility, gait instability, impaired balance, decreased lower extremity function, pain, decreased ROM .    Rehab Diagnosis: L femoral neck fx    General Precautions: Standard, hearing impaired, fall     Orthopedic Precautions:LLE weight bearing as tolerated (NO IR or ER)     Braces: N/A    Rehab Prognosis: Good; patient would benefit from acute skilled PT services to address these deficits and reach maximum level of function.      History:     Past Medical History:   Diagnosis Date    Benign essential HTN     Mixed hyperlipidemia     Type 2 diabetes mellitus without complications        Past Surgical History:   Procedure Laterality Date    APPENDECTOMY      BACK SURGERY      BREAST SURGERY  01 13 2002    Augmantation    HEMIARTHROPLASTY OF HIP Left 12/20/2024    Procedure: HEMIARTHROPLASTY, HIP;  Surgeon: Jose J Montemayor DO;  Location: [facility];  Service: Orthopedics;  Laterality: Left;  lateral peg board kevin    HIP SURGERY      INTRAMEDULLARY RODDING OF FEMUR Right 10/27/2022    Procedure: RIGHT FEMUR INTERTROCH IMN;  Surgeon: Stewart Brambila MD;  Location: [facility];  Service: Orthopedics;  Laterality: Right;  [place] table, supine  3rd case  Synthes Short TFNA       Subjective     Patient comments: N/A    Respiratory Status: Room air    Patients cultural, spiritual, Gnosticism conflicts given the current situation: no    Objective:     Communicated with tr prior to session.  Patient found up in chair  with peripheral IV  upon PT entry to room.    Pt is Oriented x3 and Alert, Cooperative, and Motivated.    Vitals   Vitals at Rest  BP    HR    O2 Sat    Pain      Vitals With Activity  BP     HR     O2 Sat     Pain Pain Rating 1: 8/10  Location - Side 1: Left  Location - Orientation 1: lower  Location 1: hip  Pain Addressed 1: Reposition, Distraction       Functional Mobility:     Toilet t/f: + void overall CGA to SBA      Current   Status  Discharge   Goal   Functional Area: Care Score:    Roll Left and Right   Independent   Sit to Lying   Independent   Lying to Sitting on Side of Bed 4 Independent   Sit to Stand 4 Independent   Chair/Bed-to-Chair Transfer 4  CGA with RW Independent   Car Transfer   Independent   Walk 10 Feet   Independent   Walk 50 Feet with Two Turns   Independent   Walk 150 Feet   Independent   Walk 10 Feet Uneven Surface   Independent   1 Step (Curb) 4  With RW  Supervision or touching assistance   4 Steps 4  In // bars step ups VC for proper WB through arms  Supervision or touching assistance   12 Steps       Picking Up Object   Supervision or touching assistance   Wheel 50 Feet with Two Turns   Independent   Wheel 150 Feet   Independent       Therapeutic Activities and Exercises:  Patient educated on role of acute care PT and PT POC and safety while in hospital including calling nurse for mobility  Patient educated about importance of OOB mobility and remaining up in chair most of the day.    Therapeutic exercises were performed seated at edge of chair:    marches with 2 RLE weights  hip adductions isometrics against a ball  long arc quads with 2 RLE weights  calf raises/toe raises with 2 RLE weights  All therex performed 15x3 reps each         Activity Tolerance: Excellent    Patient left up in chair with all lines intact and call button in reach.    Education provided: roles and goals of PT/PTA, transfer training, bed mob, gait training, stair training, safety awareness, body mechanics, and  strengthening exercises    Expected compliance: High compliance    Plan:     During this hospitalization, patient to be seen 5 x/week to address the identified rehab impairments via gait training, therapeutic exercises, therapeutic activities and progress toward the following goals:    GOALS:   Multidisciplinary Problems       Physical Therapy Goals          Problem: Physical Therapy    Goal Priority Disciplines Outcome Interventions   Physical Therapy Goal     PT, PT/OT Progressing    Description:   PT Short Term goals     Bed Mobility:  Roll left and right with supervision/touching assist.  Sit to supine transfer with partial/moderate assist.  Supine to sit transfer with partial/moderate assist.    Transfers:  Sit to stand transfer with setup/clean-up assist using RW.MET  Bed to chair transferStand step with setup/clean-up assist using RW.  Car transfer with setup/clean-up assist using RW.   an object from the ground in standing position with supervision/touching assist using RW.    Mobility:  Ambulate 150 feet with partial/moderate assist using RW.  Ambulate 10 feet on uneven surfaces/ramps with partial/moderate assist using RW.  Ascend/descend a 4 inch curb with partial/moderate assist using RW.   Ascend/descend 12 stairs with partial/moderate assist using bilateral handrails.  Manual wheelchair 150 feet with supervision/touching assist using Bilateral upper extremity.      Time Frame: by Re-evaluation                        Plan of Care Expires:  01/03/25  PT Next Visit Date: 01/03/25  Plan of Care reviewed with: patient    Additional Information:         Time Tracking:     Therapy Time  PT Received On: 12/31/24  PT Start Time: 0830  PT Stop Time: 0930  PT Total Time (min): 60 min   PT Individual: 60  Missed Time:    Time Missed due to:      Billable Minutes: Therapeutic Activity 45 and Therapeutic Exercise 15    12/31/2024

## 2025-01-01 LAB
ANION GAP SERPL CALC-SCNC: 8 MEQ/L
ANISOCYTOSIS BLD QL SMEAR: ABNORMAL
BASOPHILS # BLD AUTO: 0.02 X10(3)/MCL
BASOPHILS NFR BLD AUTO: 0.2 %
BILIRUB UR QL STRIP.AUTO: NEGATIVE
BUN SERPL-MCNC: 6.6 MG/DL (ref 9.8–20.1)
BURR CELLS (OLG): SLIGHT
CALCIUM SERPL-MCNC: 8.5 MG/DL (ref 8.4–10.2)
CHLORIDE SERPL-SCNC: 103 MMOL/L (ref 98–107)
CLARITY UR: CLEAR
CO2 SERPL-SCNC: 26 MMOL/L (ref 23–31)
COLOR UR AUTO: ABNORMAL
CREAT SERPL-MCNC: 0.59 MG/DL (ref 0.55–1.02)
CREAT/UREA NIT SERPL: 11
EOSINOPHIL # BLD AUTO: 0.1 X10(3)/MCL (ref 0–0.9)
EOSINOPHIL NFR BLD AUTO: 0.8 %
ERYTHROCYTE [DISTWIDTH] IN BLOOD BY AUTOMATED COUNT: 26.5 % (ref 11.5–17)
FLUAV AG UPPER RESP QL IA.RAPID: NOT DETECTED
FLUBV AG UPPER RESP QL IA.RAPID: NOT DETECTED
GFR SERPLBLD CREATININE-BSD FMLA CKD-EPI: >60 ML/MIN/1.73/M2
GLUCOSE SERPL-MCNC: 104 MG/DL (ref 82–115)
GLUCOSE UR QL STRIP: NEGATIVE
HCT VFR BLD AUTO: 29 % (ref 37–47)
HGB BLD-MCNC: 8.9 G/DL (ref 12–16)
HGB UR QL STRIP: NEGATIVE
IMM GRANULOCYTES # BLD AUTO: 0.06 X10(3)/MCL (ref 0–0.04)
IMM GRANULOCYTES NFR BLD AUTO: 0.5 %
KETONES UR QL STRIP: NEGATIVE
LEUKOCYTE ESTERASE UR QL STRIP: NEGATIVE
LYMPHOCYTES # BLD AUTO: 1.57 X10(3)/MCL (ref 0.6–4.6)
LYMPHOCYTES NFR BLD AUTO: 13.2 %
MCH RBC QN AUTO: 32.6 PG (ref 27–31)
MCHC RBC AUTO-ENTMCNC: 30.7 G/DL (ref 33–36)
MCV RBC AUTO: 106.2 FL (ref 80–94)
MONOCYTES # BLD AUTO: 0.95 X10(3)/MCL (ref 0.1–1.3)
MONOCYTES NFR BLD AUTO: 8 %
NEUTROPHILS # BLD AUTO: 9.21 X10(3)/MCL (ref 2.1–9.2)
NEUTROPHILS NFR BLD AUTO: 77.3 %
NITRITE UR QL STRIP: NEGATIVE
NRBC BLD AUTO-RTO: 0 %
OVALOCYTES (OLG): ABNORMAL
PH UR STRIP: 7 [PH]
PLATELET # BLD AUTO: 324 X10(3)/MCL (ref 130–400)
PLATELET # BLD EST: ADEQUATE 10*3/UL
PMV BLD AUTO: 9.3 FL (ref 7.4–10.4)
POCT GLUCOSE: 162 MG/DL (ref 70–110)
POTASSIUM SERPL-SCNC: 3.7 MMOL/L (ref 3.5–5.1)
PROT UR QL STRIP: NEGATIVE
RBC # BLD AUTO: 2.73 X10(6)/MCL (ref 4.2–5.4)
RSV A 5' UTR RNA NPH QL NAA+PROBE: NOT DETECTED
SARS-COV-2 RNA RESP QL NAA+PROBE: NOT DETECTED
SODIUM SERPL-SCNC: 137 MMOL/L (ref 136–145)
SP GR UR STRIP.AUTO: <=1.005 (ref 1–1.03)
UROBILINOGEN UR STRIP-ACNC: 2
WBC # BLD AUTO: 11.91 X10(3)/MCL (ref 4.5–11.5)

## 2025-01-01 PROCEDURE — 25000003 PHARM REV CODE 250: Performed by: NURSE PRACTITIONER

## 2025-01-01 PROCEDURE — 81003 URINALYSIS AUTO W/O SCOPE: CPT | Performed by: NURSE PRACTITIONER

## 2025-01-01 PROCEDURE — 97530 THERAPEUTIC ACTIVITIES: CPT | Mod: CQ

## 2025-01-01 PROCEDURE — 63600175 PHARM REV CODE 636 W HCPCS: Performed by: INTERNAL MEDICINE

## 2025-01-01 PROCEDURE — 25000003 PHARM REV CODE 250: Performed by: INTERNAL MEDICINE

## 2025-01-01 PROCEDURE — 97530 THERAPEUTIC ACTIVITIES: CPT

## 2025-01-01 PROCEDURE — 97110 THERAPEUTIC EXERCISES: CPT

## 2025-01-01 PROCEDURE — 99900031 HC PATIENT EDUCATION (STAT)

## 2025-01-01 PROCEDURE — 11800000 HC REHAB PRIVATE ROOM

## 2025-01-01 PROCEDURE — 97110 THERAPEUTIC EXERCISES: CPT | Mod: CQ

## 2025-01-01 PROCEDURE — 0241U COVID/RSV/FLU A&B PCR: CPT | Performed by: NURSE PRACTITIONER

## 2025-01-01 PROCEDURE — 80048 BASIC METABOLIC PNL TOTAL CA: CPT | Performed by: INTERNAL MEDICINE

## 2025-01-01 PROCEDURE — 97535 SELF CARE MNGMENT TRAINING: CPT

## 2025-01-01 PROCEDURE — 94799 UNLISTED PULMONARY SVC/PX: CPT

## 2025-01-01 PROCEDURE — 36415 COLL VENOUS BLD VENIPUNCTURE: CPT | Performed by: INTERNAL MEDICINE

## 2025-01-01 PROCEDURE — 85025 COMPLETE CBC W/AUTO DIFF WBC: CPT | Performed by: NURSE PRACTITIONER

## 2025-01-01 PROCEDURE — 94761 N-INVAS EAR/PLS OXIMETRY MLT: CPT

## 2025-01-01 PROCEDURE — S4991 NICOTINE PATCH NONLEGEND: HCPCS | Performed by: NURSE PRACTITIONER

## 2025-01-01 RX ORDER — ENOXAPARIN SODIUM 100 MG/ML
1 INJECTION SUBCUTANEOUS EVERY 12 HOURS
Status: DISCONTINUED | OUTPATIENT
Start: 2025-01-01 | End: 2025-01-02

## 2025-01-01 RX ADMIN — ESCITALOPRAM OXALATE 5 MG: 5 TABLET, FILM COATED ORAL at 07:01

## 2025-01-01 RX ADMIN — OXYCODONE 10 MG: 5 TABLET ORAL at 09:01

## 2025-01-01 RX ADMIN — METHOCARBAMOL 500 MG: 500 TABLET ORAL at 11:01

## 2025-01-01 RX ADMIN — PANTOPRAZOLE SODIUM 40 MG: 40 TABLET, DELAYED RELEASE ORAL at 07:01

## 2025-01-01 RX ADMIN — METHOCARBAMOL 500 MG: 500 TABLET ORAL at 05:01

## 2025-01-01 RX ADMIN — ARIPIPRAZOLE 2 MG: 2 TABLET ORAL at 07:01

## 2025-01-01 RX ADMIN — DOCUSATE SODIUM 100 MG: 100 CAPSULE, LIQUID FILLED ORAL at 07:01

## 2025-01-01 RX ADMIN — ATORVASTATIN CALCIUM 40 MG: 40 TABLET, FILM COATED ORAL at 08:01

## 2025-01-01 RX ADMIN — ONDANSETRON 4 MG: 4 TABLET, ORALLY DISINTEGRATING ORAL at 07:01

## 2025-01-01 RX ADMIN — BUSPIRONE HYDROCHLORIDE 5 MG: 5 TABLET ORAL at 07:01

## 2025-01-01 RX ADMIN — OXYCODONE 10 MG: 5 TABLET ORAL at 05:01

## 2025-01-01 RX ADMIN — BUSPIRONE HYDROCHLORIDE 5 MG: 5 TABLET ORAL at 08:01

## 2025-01-01 RX ADMIN — ENOXAPARIN SODIUM 50 MG: 60 INJECTION SUBCUTANEOUS at 08:01

## 2025-01-01 RX ADMIN — PANTOPRAZOLE SODIUM 40 MG: 40 TABLET, DELAYED RELEASE ORAL at 08:01

## 2025-01-01 RX ADMIN — SODIUM BICARBONATE 650 MG: 650 TABLET ORAL at 07:01

## 2025-01-01 RX ADMIN — DICLOFENAC SODIUM 4 G: 10 GEL TOPICAL at 11:01

## 2025-01-01 RX ADMIN — ACETAMINOPHEN 650 MG: 325 TABLET ORAL at 05:01

## 2025-01-01 RX ADMIN — OXYCODONE 10 MG: 5 TABLET ORAL at 01:01

## 2025-01-01 RX ADMIN — SODIUM BICARBONATE 650 MG: 650 TABLET ORAL at 08:01

## 2025-01-01 RX ADMIN — GABAPENTIN 100 MG: 100 CAPSULE ORAL at 04:01

## 2025-01-01 RX ADMIN — NALOXEGOL OXALATE 25 MG: 25 TABLET, FILM COATED ORAL at 07:01

## 2025-01-01 RX ADMIN — METHOCARBAMOL 500 MG: 500 TABLET ORAL at 12:01

## 2025-01-01 RX ADMIN — NICOTINE 1 PATCH: 21 PATCH, EXTENDED RELEASE TRANSDERMAL at 07:01

## 2025-01-01 RX ADMIN — OXYCODONE 10 MG: 5 TABLET ORAL at 12:01

## 2025-01-01 RX ADMIN — POLYETHYLENE GLYCOL 3350 17 G: 17 POWDER, FOR SOLUTION ORAL at 07:01

## 2025-01-01 RX ADMIN — DICLOFENAC SODIUM 4 G: 10 GEL TOPICAL at 05:01

## 2025-01-01 RX ADMIN — HYDROXYZINE PAMOATE 50 MG: 50 CAPSULE ORAL at 09:01

## 2025-01-01 RX ADMIN — GABAPENTIN 300 MG: 300 CAPSULE ORAL at 08:01

## 2025-01-01 RX ADMIN — LIDOCAINE 5% 2 PATCH: 700 PATCH TOPICAL at 05:01

## 2025-01-01 NOTE — PT/OT/SLP PROGRESS
"Occupational Therapy Inpatient Rehab Treatment    Name: Luz Maria Alfaro  MRN: [id number]    Assessment:  Luz Maria Alfaro is a 64 y.o. female admitted with a medical diagnosis of Displaced fracture of left femoral neck.  She presents with the following impairments/functional limitations:  weakness, impaired endurance, gait instability, impaired balance, pain, decreased safety awareness.    General Precautions: Standard, fall, hearing impaired     Orthopedic Precautions:LLE weight bearing as tolerated     Braces: N/A    Rehab Prognosis: Good; patient would benefit from acute skilled OT services to address these deficits and reach maximum level of function.      History:     Past Medical History:   Diagnosis Date    Benign essential HTN     Mixed hyperlipidemia     Type 2 diabetes mellitus without complications        Past Surgical History:   Procedure Laterality Date    APPENDECTOMY      BACK SURGERY      BREAST SURGERY  01 13 2002    Augmantation    HEMIARTHROPLASTY OF HIP Left 12/20/2024    Procedure: HEMIARTHROPLASTY, HIP;  Surgeon: Jose J Montemayor DO;  Location: [facility];  Service: Orthopedics;  Laterality: Left;  lateral peg board kevin    HIP SURGERY      INTRAMEDULLARY RODDING OF FEMUR Right 10/27/2022    Procedure: RIGHT FEMUR INTERTROCH IMN;  Surgeon: Stewart Brambila MD;  Location: [facility];  Service: Orthopedics;  Laterality: Right;  [place] table, supine  3rd case  Synthes Short TFNA       Subjective     Orientation: Oriented x4    Chief Complaint: pain and loose bowels    Patient/Family Comments/goals: "I can't stop having diarrhea."    Vitals   Vitals at Rest  BP    HR    O2 Sat    Pain 8/10     Vitals With Activity  BP    HR    O2 Sat    Pain 10/10     Respiratory Status: Room air    Patients cultural, spiritual, Islam conflicts given the current situation: no     Objective:     Patient found right sidelying upon OT entry to room.    Mobility   Patient completed:  Supine to Sit with independence  Sit " to Supine with independence  Sit to Stand Transfer with independence with rolling walker  Stand to Sit Transfer with independence with rolling walker  Bed to Chair Transfer using Step Transfer technique with independence with rolling walker  Toilet Transfer Step Transfer technique with independence with  rolling walker    Functional Mobility  At beginning of session, pt was able to perform FM in hallway ~50 ft with SBA with RW until she needed to use the restroom.    ADLs   Current Status   Oral Hygiene 6   Lower Body Dressing 5 with reacher   Toileting Hygiene 6   Toilet Transfer 6   Putting On, Taking Off Footwear 5 with sock aide   NOTE: Pt voided 7 times in the course of 75 mins, mostly loose stool. Notified nursing, DEVAN Arevalo. Pt had to change brief twice. Pt was exhausted and in more pain at around 75 mins into session and was left on toilet with nursing notified.    Limiting Factors for ADLs: motor, endurance, limited ROM, balance, weakness, safety awareness, and pain     Therapeutic Exercise  Pt performed AROM with 3 # [] dowel [] free weight []  soft weight [] Theraband [x] medicine ball. Pt completed 1 sets, 10 reps []  standing [x] sitting unsupported []  sitting supported to improve overall UE strength as a means to increase independence and safety with ADL performance.    [x]  Chest press  [x]  Shoulder press  [x]  Shoulder flexion  [x]  Shoulder extension  []  Shoulder abduction/adduction  [x]  Horizontal shoulder abduction/adduction  [x]  Shoulder diagonals  [x]  Bicep flexion  [x]  Triceps extension  []  Supination/pronation  []  Hand flexion/extension   NOTE: Pt only tolerated one set of 10 due to needing to use the restroom.    Patient left  sitting on the toilet  with all lines intact, call button in reach, and nursing notified.     Education provided: Roles and goals of OT, ADLs, transfer training, bed mobility, assistive device, sequencing, safety precautions, and fall  prevention    Multidisciplinary Problems       Occupational Therapy Goals          Problem: Occupational Therapy    Goal Priority Disciplines Outcome Interventions   Occupational Therapy Goal     OT, PT/OT Progressing    Description: ADLs:  MET - Pt to perform grooming tasks with SBA standing at sink.  MET - Pt to perform feeding tasks with independence.  MET - Pt to perform UB dressing with SBA.  MET - Pt to perform LB dressing with max assist with AE as needed.   Updated goal: Pt to perform LB dressing with set up assist with AE as needed.  MET - Pt to perform putting on/off footwear task with max assist with AE as needed.  Updated goal: Pt to perform putting on/off footwear task with set up assist with AE as needed.  MET - Pt to perform toileting with min assist.  MET - Pt to perform bathing with mod assist with AE as needed.  Updated goal: Pt to perform bathing independently with AE as needed.    Functional Transfers:  MET - Pt to perform toilet transfers with CGA to toilet in bathroom.  Updated goal: Pt to perform toilet transfers with SBA with RW to toilet in bathroom.  MET: Pt to perform a tub transfer with mod assist with AE as needed onto TTB.  Updated goal: Pt to perform a tub transfer with set up assist with AE as needed onto TTB.    IADLs:  Pt to perform simple meal prep with mod assist.    Balance, Strengthening, Endurance, Balance:  Pt to consistently demonstrate adherence to orthopedic precautions during all ADL's as instructed by OT.  Pt to demonstrate good dynamic standing balance as required to perform ADL's from standing level.  Pt to demonstrate good BUE strength during functional task   Pt to demonstrate consistent adherence to breathing control and energy conservation techniques as educated by OT.                        Time Tracking     OT Received On: 01/01/25  Time In 0800     Time Out 0930  Total Time 90 min  Therapy Time: OT Individual: 75  Missed Time: 15 Minutes  Missed Time Reason:  Toileting, Bowel/bladder accident, Increased agitation, Pain    Billable Minutes: Self Care/Home Management 45, Therapeutic Activity 15, and Therapeutic Exercise 15    01/01/2025

## 2025-01-01 NOTE — PT/OT/SLP PROGRESS
Physical Therapy Inpatient Rehab Treatment    Patient Name:  Luz Maria Alfaro   MRN:  [id number]    Recommendations:     Discharge Recommendations:  High Intensity Therapy   Discharge Equipment Recommendations:     Barriers to discharge: Impaired functional mobility  and Severity of Deficits     Assessment:     Luz Maria Alfaro is a 64 y.o. female admitted with a medical diagnosis of Displaced fracture of left femoral neck.  She presents with the following impairments/functional limitations:  weakness, impaired endurance, impaired functional mobility, gait instability, impaired balance, decreased lower extremity function, pain, decreased ROM .    PT NOTE: pt experienced diarrhea throughout day and was unwilling to ambulated as pt was fearful she would not be able to make it to the bathroom timely. Pt required increased time with activities and was easily distracted, requiring redirection.    Rehab Diagnosis: L femoral neck fx    General Precautions: Standard, hearing impaired, fall     Orthopedic Precautions:LLE weight bearing as tolerated (NO IR or ER)     Braces: N/A    Rehab Prognosis: Fair; patient would benefit from acute skilled PT services to address these deficits and reach maximum level of function.      History:     Past Medical History:   Diagnosis Date    Benign essential HTN     Mixed hyperlipidemia     Type 2 diabetes mellitus without complications        Past Surgical History:   Procedure Laterality Date    APPENDECTOMY      BACK SURGERY      BREAST SURGERY  01 13 2002    Augmantation    HEMIARTHROPLASTY OF HIP Left 12/20/2024    Procedure: HEMIARTHROPLASTY, HIP;  Surgeon: Jose J Montemayor DO;  [facility];  Service: Orthopedics;  Laterality: Left;  lateral peg board kevin    HIP SURGERY      INTRAMEDULLARY RODDING OF FEMUR Right 10/27/2022    Procedure: RIGHT FEMUR INTERTROCH IMN;  Surgeon: Stewart Brambila MD;  Location: [facility] OR;  Service: Orthopedics;  Laterality: Right;  Jj elizabeth,  supine  3rd case  S[employer] Short TFNA       Subjective     Patient comments: I've had diarrhea all day; It keeps coming out, I can't stop it.     Respiratory Status: Room air    Patients cultural, spiritual, Mandaeism conflicts given the current situation: no    Objective:     Communicated with nsg prior to session.  Patient found HOB elevated with peripheral IV  upon PT entry to room.    Pt is Alert, Cooperative, and Easily distractible.    Vitals     Pain Pain Rating 1: 10/10  Location - Side 1: Left  Location - Orientation 1: lower  Location 1: back  Pain Addressed 1: Pre-medicate for activity, Reposition, Distraction, Other (see comments) (ice pack)  Pain Rating Post-Intervention 1: 10/10  Pain Rating 2: 10/10  Location - Side 2: Left  Location - Orientation 2: generalized  Location 2: leg  Pain Addressed 2: Pre-medicate for activity, Reposition, Distraction  Pain Rating Post-Intervention 2: 10/10       Functional Mobility:        Current   Status  Discharge   Goal   Functional Area: Care Score:    Sit to Lying 6  Independent with leg  for LLE Independent   Lying to Sitting on Side of Bed 4  Supervision with use of bed rails, 2 trials Independent   Sit to Stand 6  Independent with RW, 2 trials Independent       Therapeutic Activities and Exercises:  Patient educated on role of acute care PT and PT POC, safety while in hospital including calling nurse for mobility, and call light usage  Patient educated about importance of OOB mobility and remaining up in chair most of the day.  Patient educated about pursed lip breathing technique and cued for use with mobility.  Pt ambulated 10' with RW with step-to, slow, gait, then turned around and entered the restroom for toileting.  Pt performed two toilet transfers, step transfer, with RW, independently, positive voidx2, positive bmx2, increased time required.  PT reviewed hip precautions with pt. And pt was able to maintain hip precautions throughout  session.    Therapeutic exercises were performed seated at edge of bed:    marches with 0# weights  hip abduction with #0 weights; PT assist with LLE to prevent hip IR/ER.  long arc quads with 0# weights  calf raises/toe raises with 0# weights  supine in bed:    quad sets  glute sets  All therex performed 2x15 reps each, BLE         Activity Tolerance: Fair    Patient left HOB elevated with call button in reach.    Education provided: roles and goals of PT/PTA, transfer training, bed mob, gait training, balance training, safety awareness, body mechanics, assistive device, strengthening exercises, fall prevention, and hip precautions    Expected compliance: Moderate compliance    Plan:     During this hospitalization, patient to be seen 5 x/week to address the identified rehab impairments via gait training, therapeutic exercises, therapeutic activities and progress toward the following goals:    GOALS:   Multidisciplinary Problems       Physical Therapy Goals          Problem: Physical Therapy    Goal Priority Disciplines Outcome Interventions   Physical Therapy Goal     PT, PT/OT Progressing    Description:   PT Short Term goals     Bed Mobility:  Roll left and right with supervision/touching assist.  Sit to supine transfer with partial/moderate assist.  Supine to sit transfer with partial/moderate assist.    Transfers:  Sit to stand transfer with setup/clean-up assist using RW.MET  Bed to chair transferStand step with setup/clean-up assist using RW.  Car transfer with setup/clean-up assist using RW.   an object from the ground in standing position with supervision/touching assist using RW.    Mobility:  Ambulate 150 feet with partial/moderate assist using RW.  Ambulate 10 feet on uneven surfaces/ramps with partial/moderate assist using RW.  Ascend/descend a 4 inch curb with partial/moderate assist using RW.   Ascend/descend 12 stairs with partial/moderate assist using bilateral handrails.  Manual wheelchair  150 feet with supervision/touching assist using Bilateral upper extremity.      Time Frame: by Re-evaluation                        Plan of Care Expires:  01/03/25  PT Next Visit Date: 01/03/25  Plan of Care reviewed with: patient    Additional Information:         Time Tracking:     Therapy Time  PT Received On: 01/01/25  PT Start Time: 1300  PT Stop Time: 1430  PT Total Time (min): 90 min   PT Individual: 90  Missed Time:    Time Missed due to:      Billable Minutes: Therapeutic Activity 55 min and Therapeutic Exercise 35 min    01/01/2025

## 2025-01-01 NOTE — PROGRESS NOTES
[facility] ([facility])  Rehab Progress Note    Patient Name: Luz Maria Alfaro  MRN: [id number]  Age: 64 y.o. Sex: female  : 1960  Hospital Length of Stay: 8 days  Date of Service: 2025   Chief Complaint: Displaced fracture of left femoral neck s/p left hemiarthroplasty on 2024     Subjective:     Basic Information  Admit Information: 63-year-old white female presented to [facility] clinical on 2024 for follow up visit for left hip pain that has been progressively worsening.  Reportedly fell 2 months ago.  Imaging on  significant for left displaced femoral neck fracture.  Recommended to go to [facility] ED on  for admission.  PMH significant for HTN, dm type 2, HLD, GERD, and nicotine dependence.  Workup significant for left displaced femoral neck fracture.  Tolerated left hip hemiarthroplasty on  without perioperative complications.  She did require 2 units PRBC.  Sodium bicarb tablets initiated due to metabolic acidosis.  Currently Min assist.  Tolerated transfer to [facility] inpatient rehab unit on  without incident.   Today's Information: No acute events overnight.  Sleep hygiene, bowel maintenance, and appetite at goal.  Fever 101° F reported earlier this morning.  Fever otherwise unremarkable.  WBC 11.9 (from 8).  BMP unremarkable.  COVID/flu/RSV negative.  CXR negative.  UA negative.    Review of patient's allergies indicates:   Allergen Reactions    Iodinated contrast media Shortness Of Breath    Opioids - morphine analogues     Meperidine Rash and Hives        Current Facility-Administered Medications:     acetaminophen tablet 650 mg, 650 mg, Oral, BID PRN, Tania Oshea, FNP, 650 mg at 25    ALPRAZolam tablet 0.25 mg, 0.25 mg, Oral, TID PRN, Moy Blackburn A, FNP, 0.25 mg at 24    ARIPiprazole tablet 2 mg, 2 mg, Oral, Daily, Bere, Moy A, FNP, 2 mg at 25 0713    atorvastatin tablet 40 mg, 40 mg, Oral,  QHS, Bere, Moy A, FNP, 40 mg at 12/31/24 2022    benzonatate capsule 100 mg, 100 mg, Oral, TID PRN, Bere, Moy A, FNP    busPIRone tablet 5 mg, 5 mg, Oral, BID, Bere, Moy A, FNP, 5 mg at 01/01/25 0713    dextrose 50% injection 12.5 g, 12.5 g, Intravenous, PRN, Bere, Moy A, FNP    dextrose 50% injection 25 g, 25 g, Intravenous, PRN, Bere, Moy A, FNP    diclofenac sodium 1 % gel 4 g, 4 g, Topical (Top), TID AC, Dayo, Tania A, FNP, 4 g at 12/31/24 0645    docusate sodium capsule 100 mg, 100 mg, Oral, BID, Bere, Moy A, FNP, 100 mg at 01/01/25 0713    enoxaparin injection 30 mg, 30 mg, Subcutaneous, Q24H (prophylaxis, 1700), Bere, Moy A, FNP, 30 mg at 12/31/24 1619    EScitalopram oxalate tablet 5 mg, 5 mg, Oral, Daily, Hinton, Tania A, FNP, 5 mg at 01/01/25 0713    gabapentin capsule 100 mg, 100 mg, Oral, Before dinner, Dayo, Tania A, FNP, 100 mg at 12/31/24 1619    gabapentin capsule 300 mg, 300 mg, Oral, QHS, Bere, Moy A, FNP, 300 mg at 12/31/24 2022    glucagon (human recombinant) injection 1 mg, 1 mg, Intramuscular, PRN, Bere, Moy A, FNP    glucose chewable tablet 16 g, 16 g, Oral, PRN, Bere, Moy A, FNP    glucose chewable tablet 24 g, 24 g, Oral, PRN, Bere, Moy A, FNP    hydrALAZINE injection 10 mg, 10 mg, Intravenous, Q4H PRN, Bere, Moy A, FNP    HYDROcodone-acetaminophen 5-325 mg per tablet 1 tablet, 1 tablet, Oral, Daily PRN, Bere, Moy A, FNP, 1 tablet at 12/29/24 0730    hydrOXYzine pamoate capsule 50 mg, 50 mg, Oral, Nightly PRN, Tania Oshea FNP    insulin aspart U-100 injection 0-10 Units, 0-10 Units, Subcutaneous, QID (AC + HS) PRN, Moy Blackburn FNP    labetalol 20 mg/4 mL (5 mg/mL) IV syring, 10 mg, Intravenous, Q4H PRN, Moy Blackburn FNP    LIDOcaine 5 % patch 2 patch, 2 patch, Transdermal, Q24H, Tania Oshea FNP, 2 patch at 01/01/25 0521     "methocarbamoL tablet 500 mg, 500 mg, Oral, Q6H, Tania Oshea FNP, 500 mg at 01/01/25 0521    metoprolol injection 10 mg, 10 mg, Intravenous, Q2H PRN, Bere Moy A, FNP    naloxegoL (MOVANTIK) tablet 25 mg, 25 mg, Oral, Daily, George Do MD, 25 mg at 01/01/25 0713    nicotine 21 mg/24 hr 1 patch, 1 patch, Transdermal, Daily, Bere Moy A, FNP, 1 patch at 01/01/25 0713    nitroGLYCERIN SL tablet 0.4 mg, 0.4 mg, Sublingual, Q5 Min PRN, Bere, Moy A, FNP    ondansetron disintegrating tablet 4 mg, 4 mg, Oral, Q6H PRN, Bere, Moy A, FNP, 4 mg at 01/01/25 0712    ondansetron disintegrating tablet 8 mg, 8 mg, Oral, Q8H PRN, Bere, Moy A, FNP    ondansetron injection 4 mg, 4 mg, Intravenous, Q8H PRN, Bere, Moy A, FNP    oxyCODONE immediate release tablet 10 mg, 10 mg, Oral, Q4H PRN, Bere, Moy A, FNP, 10 mg at 01/01/25 0913    oxyCODONE immediate release tablet 5 mg, 5 mg, Oral, Q4H PRN, Bere, Moy A, FNP, 5 mg at 12/27/24 1714    pantoprazole EC tablet 40 mg, 40 mg, Oral, BID, Bere, Moy A, FNP, 40 mg at 01/01/25 0713    polyethylene glycol packet 17 g, 17 g, Oral, BID, George Do MD, 17 g at 01/01/25 0713    sodium bicarbonate tablet 650 mg, 650 mg, Oral, BID, Bere, Moy A, FNP, 650 mg at 01/01/25 0713     Review of Systems   Complete 12-point review of symptoms negative except for what's mentioned in HPI     Objective:     /74 (Patient Position: Lying)   Pulse 100   Temp 97.5 °F (36.4 °C) (Oral)   Resp 18   Ht 5' 3" (1.6 m)   Wt 50.9 kg (112 lb 3.4 oz)   SpO2 (!) 94%   BMI 19.88 kg/m²      Physical Exam  Nursing note reviewed.   Constitutional:       Appearance: Normal appearance.   HENT:      Head: Normocephalic.      Mouth/Throat:      Mouth: Mucous membranes are moist.   Eyes:      Pupils: Pupils are equal, round, and reactive to light.   Cardiovascular:      Rate and Rhythm: Normal rate and regular rhythm.      " Heart sounds: Normal heart sounds.   Pulmonary:      Effort: Pulmonary effort is normal.      Breath sounds: Normal breath sounds.   Abdominal:      General: Bowel sounds are normal.      Palpations: Abdomen is soft.   Musculoskeletal:      Cervical back: Neck supple.      Comments: Left hip dressing clean and intact    Skin:     General: Skin is warm and dry.   Neurological:      Mental Status: She is alert and oriented to person, place, and time.      Motor: Weakness present.   Psychiatric:         Mood and Affect: Mood normal.         Behavior: Behavior normal.         Thought Content: Thought content normal.         Judgment: Judgment normal.     *MD performed and documented physical examination       Lines/Drains/Airways       None                 Labs  Admission on 12/24/2024   Component Date Value Ref Range Status    POCT Glucose 12/24/2024 119 (H)  70 - 110 mg/dL Final    POCT Glucose 12/24/2024 115 (H)  70 - 110 mg/dL Final    Sodium 12/25/2024 140  136 - 145 mmol/L Final    Potassium 12/25/2024 3.7  3.5 - 5.1 mmol/L Final    Chloride 12/25/2024 106  98 - 107 mmol/L Final    CO2 12/25/2024 25  23 - 31 mmol/L Final    Glucose 12/25/2024 82  82 - 115 mg/dL Final    Blood Urea Nitrogen 12/25/2024 8.4 (L)  9.8 - 20.1 mg/dL Final    Creatinine 12/25/2024 0.63  0.55 - 1.02 mg/dL Final    Calcium 12/25/2024 8.8  8.4 - 10.2 mg/dL Final    Protein Total 12/25/2024 5.8  5.8 - 7.6 gm/dL Final    Albumin 12/25/2024 2.9 (L)  3.4 - 4.8 g/dL Final    Globulin 12/25/2024 2.9  2.4 - 3.5 gm/dL Final    Albumin/Globulin Ratio 12/25/2024 1.0 (L)  1.1 - 2.0 ratio Final    Bilirubin Total 12/25/2024 1.0  <=1.5 mg/dL Final    ALP 12/25/2024 215 (H)  40 - 150 unit/L Final    ALT 12/25/2024 32  0 - 55 unit/L Final    AST 12/25/2024 52 (H)  5 - 34 unit/L Final    eGFR 12/25/2024 >60  mL/min/1.73/m2 Final    Anion Gap 12/25/2024 9.0  mEq/L Final    BUN/Creatinine Ratio 12/25/2024 13   Final    Magnesium Level 12/25/2024 1.80  1.60 -  2.60 mg/dL Final    Phosphorus Level 12/25/2024 2.7  2.3 - 4.7 mg/dL Final    Prealbumin 12/25/2024 13.3 (L)  14.0 - 37.0 mg/dL Final    Ferritin Level 12/25/2024 318.94 (H)  4.63 - 204.00 ng/mL Final    Iron Binding Capacity Unsaturated 12/25/2024 131  70 - 310 ug/dL Final    Iron Level 12/25/2024 52  50 - 170 ug/dL Final    Transferrin 12/25/2024 160 (L)  173 - 360 mg/dL Final    Iron Binding Capacity Total 12/25/2024 183 (L)  250 - 450 ug/dL Final    Iron Saturation 12/25/2024 28  20 - 50 % Final    WBC 12/25/2024 7.08  4.50 - 11.50 x10(3)/mcL Final    RBC 12/25/2024 2.79 (L)  4.20 - 5.40 x10(6)/mcL Final    Hgb 12/25/2024 8.9 (L)  12.0 - 16.0 g/dL Final    Hct 12/25/2024 27.9 (L)  37.0 - 47.0 % Final    MCV 12/25/2024 100.0 (H)  80.0 - 94.0 fL Final    MCH 12/25/2024 31.9 (H)  27.0 - 31.0 pg Final    MCHC 12/25/2024 31.9 (L)  33.0 - 36.0 g/dL Final    RDW 12/25/2024    Final    Unable to calculate      Platelet 12/25/2024 219  130 - 400 x10(3)/mcL Final    MPV 12/25/2024 9.6  7.4 - 10.4 fL Final    Neut % 12/25/2024 66.2  % Final    Lymph % 12/25/2024 20.5  % Final    Mono % 12/25/2024 10.7  % Final    Eos % 12/25/2024 1.7  % Final    Basophil % 12/25/2024 0.3  % Final    Lymph # 12/25/2024 1.45  0.6 - 4.6 x10(3)/mcL Final    Neut # 12/25/2024 4.69  2.1 - 9.2 x10(3)/mcL Final    Mono # 12/25/2024 0.76  0.1 - 1.3 x10(3)/mcL Final    Eos # 12/25/2024 0.12  0 - 0.9 x10(3)/mcL Final    Baso # 12/25/2024 0.02  <=0.2 x10(3)/mcL Final    IG# 12/25/2024 0.04  0 - 0.04 x10(3)/mcL Final    IG% 12/25/2024 0.6  % Final    NRBC% 12/25/2024 0.0  % Final    RBC Morph 12/25/2024 Abnormal (A)  Normal Final    Anisocytosis 12/25/2024 3+ (A)  (none) Final    Hypochromasia 12/25/2024 1+ (A)  (none) Final    Macrocytosis 12/25/2024 1+ (A)  (none) Final    Platelets 12/25/2024 Adequate  Normal, Adequate Final    Hemoglobin A1c 12/25/2024 5.0  <=7.0 % Final    Estimated Average Glucose 12/25/2024 96.8  mg/dL Final    POCT Glucose  12/25/2024 107  70 - 110 mg/dL Final    POCT Glucose 12/25/2024 113 (H)  70 - 110 mg/dL Final    POCT Glucose 12/25/2024 102  70 - 110 mg/dL Final    POCT Glucose 12/25/2024 112 (H)  70 - 110 mg/dL Final    POCT Glucose 12/26/2024 106  70 - 110 mg/dL Final    POCT Glucose 12/26/2024 123 (H)  70 - 110 mg/dL Final    POCT Glucose 12/26/2024 143 (H)  70 - 110 mg/dL Final    POCT Glucose 12/27/2024 93  70 - 110 mg/dL Final    POCT Glucose 12/27/2024 132 (H)  70 - 110 mg/dL Final    POCT Glucose 12/28/2024 83  70 - 110 mg/dL Final    POCT Glucose 12/28/2024 115 (H)  70 - 110 mg/dL Final    POCT Glucose 12/29/2024 120 (H)  70 - 110 mg/dL Final    Sodium 12/30/2024 138  136 - 145 mmol/L Final    Every Monday    Potassium 12/30/2024 3.9  3.5 - 5.1 mmol/L Final    Every Monday    Chloride 12/30/2024 105  98 - 107 mmol/L Final    Every Monday    CO2 12/30/2024 25  23 - 31 mmol/L Final    Every Monday    Glucose 12/30/2024 103  82 - 115 mg/dL Final    Every Monday    Blood Urea Nitrogen 12/30/2024 7.6 (L)  9.8 - 20.1 mg/dL Final    Every Monday    Creatinine 12/30/2024 0.62  0.55 - 1.02 mg/dL Final    Every Monday    Calcium 12/30/2024 8.4  8.4 - 10.2 mg/dL Final    Every Monday    Protein Total 12/30/2024 5.3 (L)  5.8 - 7.6 gm/dL Final    Every Monday    Albumin 12/30/2024 2.7 (L)  3.4 - 4.8 g/dL Final    Every Monday    Globulin 12/30/2024 2.6  2.4 - 3.5 gm/dL Final    Albumin/Globulin Ratio 12/30/2024 1.0 (L)  1.1 - 2.0 ratio Final    Bilirubin Total 12/30/2024 1.4  <=1.5 mg/dL Final    Every Monday    ALP 12/30/2024 498 (H)  40 - 150 unit/L Final    Every Monday    ALT 12/30/2024 48  0 - 55 unit/L Final    Every Monday    AST 12/30/2024 123 (H)  5 - 34 unit/L Final    Every Monday    eGFR 12/30/2024 >60  mL/min/1.73/m2 Final    Anion Gap 12/30/2024 8.0  mEq/L Final    BUN/Creatinine Ratio 12/30/2024 12   Final    Prealbumin 12/30/2024 10.6 (L)  14.0 - 37.0 mg/dL Final    Magnesium Level 12/30/2024 1.70  1.60 - 2.60 mg/dL  Final    Every Monday    Phosphorus Level 12/30/2024 2.6  2.3 - 4.7 mg/dL Final    Every Monday    WBC 12/30/2024 8.02  4.50 - 11.50 x10(3)/mcL Final    RBC 12/30/2024 2.73 (L)  4.20 - 5.40 x10(6)/mcL Final    Hgb 12/30/2024 8.7 (L)  12.0 - 16.0 g/dL Final    Hct 12/30/2024 28.2 (L)  37.0 - 47.0 % Final    MCV 12/30/2024 103.3 (H)  80.0 - 94.0 fL Final    MCH 12/30/2024 31.9 (H)  27.0 - 31.0 pg Final    MCHC 12/30/2024 30.9 (L)  33.0 - 36.0 g/dL Final    RDW 12/30/2024 26.8 (H)  11.5 - 17.0 % Final    Platelet 12/30/2024 286  130 - 400 x10(3)/mcL Final    MPV 12/30/2024 8.7  7.4 - 10.4 fL Final    Neut % 12/30/2024 70.6  % Final    Lymph % 12/30/2024 20.1  % Final    Mono % 12/30/2024 7.6  % Final    Eos % 12/30/2024 1.1  % Final    Basophil % 12/30/2024 0.2  % Final    Lymph # 12/30/2024 1.61  0.6 - 4.6 x10(3)/mcL Final    Neut # 12/30/2024 5.66  2.1 - 9.2 x10(3)/mcL Final    Mono # 12/30/2024 0.61  0.1 - 1.3 x10(3)/mcL Final    Eos # 12/30/2024 0.09  0 - 0.9 x10(3)/mcL Final    Baso # 12/30/2024 0.02  <=0.2 x10(3)/mcL Final    IG# 12/30/2024 0.03  0 - 0.04 x10(3)/mcL Final    IG% 12/30/2024 0.4  % Final    NRBC% 12/30/2024 0.0  % Final    POCT Glucose 12/30/2024 94  70 - 110 mg/dL Final    RBC Morph 12/30/2024 Abnormal (A)  Normal Final    Anisocytosis 12/30/2024 3+ (A)  (none) Final    Hypochromasia 12/30/2024 2+ (A)  (none) Final    Macrocytosis 12/30/2024 1+ (A)  (none) Final    Microcytosis 12/30/2024 Slight (A)  (none) Final    Platelets 12/30/2024 Adequate  Normal, Adequate Final    POCT Glucose 12/29/2024 91  70 - 110 mg/dL Final    Sodium 01/01/2025 137  136 - 145 mmol/L Final    Potassium 01/01/2025 3.7  3.5 - 5.1 mmol/L Final    Chloride 01/01/2025 103  98 - 107 mmol/L Final    CO2 01/01/2025 26  23 - 31 mmol/L Final    Glucose 01/01/2025 104  82 - 115 mg/dL Final    Blood Urea Nitrogen 01/01/2025 6.6 (L)  9.8 - 20.1 mg/dL Final    Creatinine 01/01/2025 0.59  0.55 - 1.02 mg/dL Final    BUN/Creatinine  Ratio 01/01/2025 11   Final    Calcium 01/01/2025 8.5  8.4 - 10.2 mg/dL Final    Anion Gap 01/01/2025 8.0  mEq/L Final    eGFR 01/01/2025 >60  mL/min/1.73/m2 Final    Influenza A PCR 01/01/2025 Not Detected  Not Detected Final    Influenza B PCR 01/01/2025 Not Detected  Not Detected Final    Respiratory Syncytial Virus PCR 01/01/2025 Not Detected  Not Detected Final    SARS-CoV-2 PCR 01/01/2025 Not Detected  Not Detected, Negative Final    WBC 01/01/2025 11.91 (H)  4.50 - 11.50 x10(3)/mcL Final    RBC 01/01/2025 2.73 (L)  4.20 - 5.40 x10(6)/mcL Final    Hgb 01/01/2025 8.9 (L)  12.0 - 16.0 g/dL Final    Hct 01/01/2025 29.0 (L)  37.0 - 47.0 % Final    MCV 01/01/2025 106.2 (H)  80.0 - 94.0 fL Final    MCH 01/01/2025 32.6 (H)  27.0 - 31.0 pg Final    MCHC 01/01/2025 30.7 (L)  33.0 - 36.0 g/dL Final    RDW 01/01/2025 26.5 (H)  11.5 - 17.0 % Final    Platelet 01/01/2025 324  130 - 400 x10(3)/mcL Final    MPV 01/01/2025 9.3  7.4 - 10.4 fL Final    Neut % 01/01/2025 77.3  % Final    Lymph % 01/01/2025 13.2  % Final    Mono % 01/01/2025 8.0  % Final    Eos % 01/01/2025 0.8  % Final    Basophil % 01/01/2025 0.2  % Final    Lymph # 01/01/2025 1.57  0.6 - 4.6 x10(3)/mcL Final    Neut # 01/01/2025 9.21 (H)  2.1 - 9.2 x10(3)/mcL Final    Mono # 01/01/2025 0.95  0.1 - 1.3 x10(3)/mcL Final    Eos # 01/01/2025 0.10  0 - 0.9 x10(3)/mcL Final    Baso # 01/01/2025 0.02  <=0.2 x10(3)/mcL Final    IG# 01/01/2025 0.06 (H)  0 - 0.04 x10(3)/mcL Final    IG% 01/01/2025 0.5  % Final    NRBC% 01/01/2025 0.0  % Final    Anisocytosis 01/01/2025 3+ (A)  (none) Final    Elidia Cells 01/01/2025 Slight (A)  (none) Final    Ovalocytes 01/01/2025 1+ (A)  (none) Final    Platelets 01/01/2025 Adequate  Normal, Adequate Final     Radiology  Left hip and pelvis XR on 12/20/2024, IMPRESSION:  Satisfactory alignment following left hip arthroplasty.    Assessment/Plan:     64 y.o. WF admitted on 12/24/2024    Displaced fracture of left femoral neck   - s/p  left hemiarthroplasty on 12/20/2024  - WBAT to LLE without external or internal rotation of the hip  - staples to be removed on 01/10  - discontinued Tylenol 650 mg every 6 hours on 12/30   - continue                  Gabapentin 300 mg nightly                 Robaxin 750 mg t.i.d.                Norco 5 mg/325 mg daily p.r.n. prior to therapy                Oxycodone 5 mg q.4 hours p.r.n. for moderate pain                 Oxycodone 10 mg q.4 hours p.r.n. for severe pain  - defer to physiatry for rehab and pain management  - PT/OT/RT/ST following     Normocytic anemia  - asymptomatic  - s/p transfusion           x2 units PRBC on 12/21/2024  - H/H stable   - no evidence of active bleeds  - will closely monitor and transfuse if needed      Depression/anxiety  - stable  - continue                Abilify 2 mg daily                 BuSpar 5 mg b.i.d.                 Lexapro 5 mg daily     HLD  - FLP outpatient  - continue                Lipitor 40 mg nightly      Nicotine dependence  - smoking cessation counseling initiated on admission  - continue                Nicotine patch daily     GERD  - Avoid spicy foods, and nothing to eat or drink within x2 hours of bedtime or laying flat (water is ok)   - Avoid NSAIDs (Advil, ibuprofen, naproxen...) and chino-2 inhibitors (Mobic, Celebrex)    - continue                Protonix 40 mg b.i.d.     Constipation  - reports hard stools on 12/31  - initiate   Movantik 25 mg daily (initiated 12/31)  - continue  Colace 100 mg BID   Miralax 17 gm b.i.d. (increased 12/31)     Metabolic acidosis  - sodium bicarb 25  - continue                Sodium bicarbonate 650 mg b.i.d.     Moderate protein calorie malnutrition  - albumin 2.9/prealbumin 13.3  - encourage oral nutrition  - registered dietitian following     DM type II  - HgA1c 5.0 on 12/25/2024  - continue                ISS   - CBGs AC/HS     HTN  - BP at goal  - continue                Hydralazine 10 mg every 2 hours as needed for BP >  160/90                Labetalol 10 mg every 2 hours as needed for BP > 160/90  - low sodium diet    Fever  - isolated  - with mild leukocytosis  - RSV/flu/COVID negative  - CXR negative  - UA negative  - repeat lab work in the morning  - obtain left lower extremity ultrasound to rule out DVT and presence of isolated fever  - initiate   Lovenox 1 milligram/kilogram b.i.d. (increased 1/1)     VTE Prophylaxis:  Lovenox 1 milligram/kilogram b.i.d. (increased 1/1)  COVID-19 testing:  Unknown  COVID-19 vaccination status:  Unvaccinated     POA:  No  Living will:  No  Contacts:  Jaxon Baum (son) [phone]     CODE STATUS:  Full code  Internal Medicine (attending): George Do MD  Physiatry (consulting):  Nam Swift MD     OUTPATIENT PROVIDERS  PCP: ALEKS Mcfadden  Orthopedic surgery:  Jose J Montemayor D.O.     DISPOSITION:  Sleep hygiene and appetite at goal.  Reported loose stools this morning.  She had been having hard stools the last few days.  Isolated fever of 101° F.  Vital signs otherwise at goal with no recorded fevers.  CBC 11.9-trending up (from 8).  BMP unremarkable.  CXR negative.  COVID/flu/RSV negative.  UA negative.  Continue current POC.  Repeat lab work in the morning.  Monitor closely.  Notify of acute changes.  Obtain left lower extremity NIVA to rule out DVT with isolated fever with no evidence of infection.  Initiate Lovenox 1 milligram/kilogram b.i.d..  Discontinue Lovenox 30 mg daily.    Staffing 12/31/2024: Continent of bowel and bladder. RT: CGA to supervision.  Limited by standing tolerance and pain.  Needs encouragement. Appetite is good at 75%.  Ill-fitting dentures. PT: COSMO to SBA for bed mobility.  Sit to stand with tranfers.  CGA ambulating 80 feet. Limited by pain. OT: Progressing.  Limited by pain.  Supervision to independent with ADLS. With adaptive equipment.  Needs TTB upon discharge. Projected discharge 1/6 home with .      Mao Blackburn NP conducted independent  physical examination and assisted with medical documentation.    Total time spent on this encounter including chart review and direct MD + NP 1-on-1 patient interaction: 53 minutes   Over 50% of this time was spent in counseling and coordination of care

## 2025-01-02 LAB
ANION GAP SERPL CALC-SCNC: 8 MEQ/L
ANISOCYTOSIS BLD QL SMEAR: ABNORMAL
BASOPHILS # BLD AUTO: 0.02 X10(3)/MCL
BASOPHILS NFR BLD AUTO: 0.2 %
BUN SERPL-MCNC: 5 MG/DL (ref 9.8–20.1)
CALCIUM SERPL-MCNC: 8.1 MG/DL (ref 8.4–10.2)
CHLORIDE SERPL-SCNC: 108 MMOL/L (ref 98–107)
CO2 SERPL-SCNC: 25 MMOL/L (ref 23–31)
CREAT SERPL-MCNC: 0.53 MG/DL (ref 0.55–1.02)
CREAT/UREA NIT SERPL: 9
EOSINOPHIL # BLD AUTO: 0.12 X10(3)/MCL (ref 0–0.9)
EOSINOPHIL NFR BLD AUTO: 1.3 %
ERYTHROCYTE [DISTWIDTH] IN BLOOD BY AUTOMATED COUNT: 25.8 % (ref 11.5–17)
GFR SERPLBLD CREATININE-BSD FMLA CKD-EPI: >60 ML/MIN/1.73/M2
GLUCOSE SERPL-MCNC: 93 MG/DL (ref 82–115)
HCT VFR BLD AUTO: 25.4 % (ref 37–47)
HGB BLD-MCNC: 8.1 G/DL (ref 12–16)
HYPOCHROMIA BLD QL SMEAR: ABNORMAL
IMM GRANULOCYTES # BLD AUTO: 0.04 X10(3)/MCL (ref 0–0.04)
IMM GRANULOCYTES NFR BLD AUTO: 0.4 %
LYMPHOCYTES # BLD AUTO: 1.89 X10(3)/MCL (ref 0.6–4.6)
LYMPHOCYTES NFR BLD AUTO: 20.6 %
MACROCYTES BLD QL SMEAR: ABNORMAL
MCH RBC QN AUTO: 33.3 PG (ref 27–31)
MCHC RBC AUTO-ENTMCNC: 31.9 G/DL (ref 33–36)
MCV RBC AUTO: 104.5 FL (ref 80–94)
MICROCYTES BLD QL SMEAR: SLIGHT
MONOCYTES # BLD AUTO: 0.65 X10(3)/MCL (ref 0.1–1.3)
MONOCYTES NFR BLD AUTO: 7.1 %
NEUTROPHILS # BLD AUTO: 6.47 X10(3)/MCL (ref 2.1–9.2)
NEUTROPHILS NFR BLD AUTO: 70.4 %
NRBC BLD AUTO-RTO: 0 %
OVALOCYTES (OLG): ABNORMAL
PLATELET # BLD AUTO: 260 X10(3)/MCL (ref 130–400)
PLATELET # BLD EST: ADEQUATE 10*3/UL
PMV BLD AUTO: 8.4 FL (ref 7.4–10.4)
POCT GLUCOSE: 92 MG/DL (ref 70–110)
POLYCHROMASIA BLD QL SMEAR: ABNORMAL
POTASSIUM SERPL-SCNC: 3.6 MMOL/L (ref 3.5–5.1)
RBC # BLD AUTO: 2.43 X10(6)/MCL (ref 4.2–5.4)
RBC MORPH BLD: ABNORMAL
SODIUM SERPL-SCNC: 141 MMOL/L (ref 136–145)
WBC # BLD AUTO: 9.19 X10(3)/MCL (ref 4.5–11.5)

## 2025-01-02 PROCEDURE — S4991 NICOTINE PATCH NONLEGEND: HCPCS | Performed by: NURSE PRACTITIONER

## 2025-01-02 PROCEDURE — 97530 THERAPEUTIC ACTIVITIES: CPT

## 2025-01-02 PROCEDURE — 63600175 PHARM REV CODE 636 W HCPCS: Performed by: NURSE PRACTITIONER

## 2025-01-02 PROCEDURE — 25000003 PHARM REV CODE 250: Performed by: NURSE PRACTITIONER

## 2025-01-02 PROCEDURE — 99900031 HC PATIENT EDUCATION (STAT)

## 2025-01-02 PROCEDURE — 25000003 PHARM REV CODE 250: Performed by: INTERNAL MEDICINE

## 2025-01-02 PROCEDURE — 97535 SELF CARE MNGMENT TRAINING: CPT | Mod: CQ

## 2025-01-02 PROCEDURE — 97110 THERAPEUTIC EXERCISES: CPT | Mod: CQ

## 2025-01-02 PROCEDURE — 97535 SELF CARE MNGMENT TRAINING: CPT

## 2025-01-02 PROCEDURE — 85025 COMPLETE CBC W/AUTO DIFF WBC: CPT | Performed by: NURSE PRACTITIONER

## 2025-01-02 PROCEDURE — 80048 BASIC METABOLIC PNL TOTAL CA: CPT | Performed by: NURSE PRACTITIONER

## 2025-01-02 PROCEDURE — 63600175 PHARM REV CODE 636 W HCPCS: Performed by: INTERNAL MEDICINE

## 2025-01-02 PROCEDURE — 97110 THERAPEUTIC EXERCISES: CPT

## 2025-01-02 PROCEDURE — 11800000 HC REHAB PRIVATE ROOM

## 2025-01-02 PROCEDURE — 97116 GAIT TRAINING THERAPY: CPT | Mod: CQ

## 2025-01-02 PROCEDURE — 36415 COLL VENOUS BLD VENIPUNCTURE: CPT | Performed by: NURSE PRACTITIONER

## 2025-01-02 PROCEDURE — 94799 UNLISTED PULMONARY SVC/PX: CPT

## 2025-01-02 PROCEDURE — 99233 SBSQ HOSP IP/OBS HIGH 50: CPT | Mod: ,,, | Performed by: NURSE PRACTITIONER

## 2025-01-02 PROCEDURE — 94761 N-INVAS EAR/PLS OXIMETRY MLT: CPT

## 2025-01-02 PROCEDURE — 97530 THERAPEUTIC ACTIVITIES: CPT | Mod: CQ

## 2025-01-02 RX ORDER — ENOXAPARIN SODIUM 100 MG/ML
30 INJECTION SUBCUTANEOUS EVERY 24 HOURS
Status: DISCONTINUED | OUTPATIENT
Start: 2025-01-02 | End: 2025-01-05

## 2025-01-02 RX ORDER — FUROSEMIDE 10 MG/ML
40 INJECTION INTRAMUSCULAR; INTRAVENOUS ONCE
Status: COMPLETED | OUTPATIENT
Start: 2025-01-02 | End: 2025-01-02

## 2025-01-02 RX ORDER — POTASSIUM CHLORIDE 20 MEQ/1
40 TABLET, EXTENDED RELEASE ORAL ONCE
Status: COMPLETED | OUTPATIENT
Start: 2025-01-02 | End: 2025-01-02

## 2025-01-02 RX ADMIN — SODIUM BICARBONATE 650 MG: 650 TABLET ORAL at 08:01

## 2025-01-02 RX ADMIN — POTASSIUM CHLORIDE 40 MEQ: 1500 TABLET, EXTENDED RELEASE ORAL at 12:01

## 2025-01-02 RX ADMIN — METHOCARBAMOL 500 MG: 500 TABLET ORAL at 11:01

## 2025-01-02 RX ADMIN — ESCITALOPRAM OXALATE 5 MG: 5 TABLET, FILM COATED ORAL at 08:01

## 2025-01-02 RX ADMIN — METHOCARBAMOL 500 MG: 500 TABLET ORAL at 05:01

## 2025-01-02 RX ADMIN — GABAPENTIN 100 MG: 100 CAPSULE ORAL at 04:01

## 2025-01-02 RX ADMIN — BUSPIRONE HYDROCHLORIDE 5 MG: 5 TABLET ORAL at 08:01

## 2025-01-02 RX ADMIN — PANTOPRAZOLE SODIUM 40 MG: 40 TABLET, DELAYED RELEASE ORAL at 08:01

## 2025-01-02 RX ADMIN — OXYCODONE 10 MG: 5 TABLET ORAL at 09:01

## 2025-01-02 RX ADMIN — LIDOCAINE 5% 2 PATCH: 700 PATCH TOPICAL at 09:01

## 2025-01-02 RX ADMIN — HYDROXYZINE PAMOATE 50 MG: 50 CAPSULE ORAL at 08:01

## 2025-01-02 RX ADMIN — OXYCODONE 10 MG: 5 TABLET ORAL at 05:01

## 2025-01-02 RX ADMIN — NICOTINE 1 PATCH: 21 PATCH, EXTENDED RELEASE TRANSDERMAL at 09:01

## 2025-01-02 RX ADMIN — NALOXEGOL OXALATE 25 MG: 25 TABLET, FILM COATED ORAL at 08:01

## 2025-01-02 RX ADMIN — GABAPENTIN 300 MG: 300 CAPSULE ORAL at 08:01

## 2025-01-02 RX ADMIN — ENOXAPARIN SODIUM 50 MG: 60 INJECTION SUBCUTANEOUS at 08:01

## 2025-01-02 RX ADMIN — OXYCODONE 10 MG: 5 TABLET ORAL at 02:01

## 2025-01-02 RX ADMIN — ATORVASTATIN CALCIUM 40 MG: 40 TABLET, FILM COATED ORAL at 08:01

## 2025-01-02 RX ADMIN — DOCUSATE SODIUM 100 MG: 100 CAPSULE, LIQUID FILLED ORAL at 08:01

## 2025-01-02 RX ADMIN — OXYCODONE 10 MG: 5 TABLET ORAL at 08:01

## 2025-01-02 RX ADMIN — HYDROCODONE BITARTRATE AND ACETAMINOPHEN 1 TABLET: 5; 325 TABLET ORAL at 11:01

## 2025-01-02 RX ADMIN — ARIPIPRAZOLE 2 MG: 2 TABLET ORAL at 08:01

## 2025-01-02 RX ADMIN — FUROSEMIDE 40 MG: 10 INJECTION, SOLUTION INTRAMUSCULAR; INTRAVENOUS at 12:01

## 2025-01-02 RX ADMIN — METHOCARBAMOL 500 MG: 500 TABLET ORAL at 12:01

## 2025-01-02 NOTE — PT/OT/SLP PROGRESS
"Occupational Therapy Inpatient Rehab Treatment    Name: Luz Maria Alfaro  MRN: [id number]    Assessment:  Luz Maria Alfaro is a 64 y.o. female admitted with a medical diagnosis of Displaced fracture of left femoral neck.  She presents with the following impairments/functional limitations:  impaired endurance, impaired self care skills, impaired functional mobility, decreased safety awareness, orthopedic precautions, impaired joint extensibility, decreased ROM, pain, decreased lower extremity function.    General Precautions: Standard, fall     Orthopedic Precautions:LLE weight bearing as tolerated (no internal or external rotation)     Braces: N/A    Rehab Prognosis: Fair; patient would benefit from acute skilled OT services to address these deficits and reach maximum level of function.      History:     Past Medical History:   Diagnosis Date    Benign essential HTN     Mixed hyperlipidemia     Type 2 diabetes mellitus without complications        Past Surgical History:   Procedure Laterality Date    APPENDECTOMY      BACK SURGERY      BREAST SURGERY  01 13 2002    Augmantation    HEMIARTHROPLASTY OF HIP Left 12/20/2024    Procedure: HEMIARTHROPLASTY, HIP;  Surgeon: Jose J Montemayor DO;  Location: [facility];  Service: Orthopedics;  Laterality: Left;  lateral peg board kevin    HIP SURGERY      INTRAMEDULLARY RODDING OF FEMUR Right 10/27/2022    Procedure: RIGHT FEMUR INTERTROCH IMN;  Surgeon: Stewart Brambila MD;  Location: [facility];  Service: Orthopedics;  Laterality: Right;  [place] table, supine  3rd case  Synthes Short TFNA       Subjective     Orientation: Oriented x4    Chief Complaint: fatigue    Patient/Family Comments/goals: "Can you come back later, I just need to take it easy today."    Respiratory Status: Room air    Patients cultural, spiritual, Gnosticist conflicts given the current situation: no       Objective:     Patient found right sidelying upon OT entry to room.    Mobility   Patient completed:  Sit " to Stand Transfer with independence with rolling walker  Stand to Sit Transfer with independence with rolling walker  Toilet Transfer Step Transfer technique with independence with  rolling walker and bedside commode  Tub Transfer Step Transfer technique with independence with rolling walker and TTB    Functional Mobility  In-room FM for demonstrating bathroom transfers, RW and SBA    ADLs   Current Status   Oral Hygiene 6   Lower Body Dressing 6   Toilet Transfer 6   Putting On, Taking Off Footwear 6     Limiting Factors for ADLs: motor, endurance, limited ROM, weakness, and safety awareness     Additional Treatments: OT attempted to begin session at 9, as scheduled. Patient asleep upon entry and requested OT come back later; she wants to take it easy after having diarrhea yesterday. OT encouraged patient to participate in therapy and complete ADL as scheduled, but patient adamant about not showering because she doesn't have any clean clothes.  OT returned at 10:00 for family training. Ex-, Bruce, present. Patient demonstrated toilet transfer, tub transfer, donning grippy socks with sock aid. OT recommended tub transfer bench, provided Bruce with information on obtaining locally and online. He verbalized understanding.     Patient left sitting edge of bed with all lines intact, call button in reach, and ex- present.     Education provided: Roles and goals of OT, ADLs, transfer training, bed mobility, body mechanics, assistive device, sequencing, safety precautions, fall prevention, post-op precautions, equipment recommendations, and home safety    Multidisciplinary Problems       Occupational Therapy Goals          Problem: Occupational Therapy    Goal Priority Disciplines Outcome Interventions   Occupational Therapy Goal     OT, PT/OT Progressing    Description: ADLs:  MET - Pt to perform grooming tasks with SBA standing at sink.  MET - Pt to perform feeding tasks with independence.  MET - Pt to  perform UB dressing with SBA.  MET - Pt to perform LB dressing with max assist with AE as needed.   Updated goal: Pt to perform LB dressing with set up assist with AE as needed.  MET - Pt to perform putting on/off footwear task with max assist with AE as needed.  Updated goal: Pt to perform putting on/off footwear task with set up assist with AE as needed.  MET - Pt to perform toileting with min assist.  MET - Pt to perform bathing with mod assist with AE as needed.  Updated goal: Pt to perform bathing independently with AE as needed.    Functional Transfers:  MET - Pt to perform toilet transfers with CGA to toilet in bathroom.  Updated goal: Pt to perform toilet transfers with SBA with RW to toilet in bathroom.  MET: Pt to perform a tub transfer with mod assist with AE as needed onto TTB.  Updated goal: Pt to perform a tub transfer with set up assist with AE as needed onto TTB.    IADLs:  Pt to perform simple meal prep with mod assist.    Balance, Strengthening, Endurance, Balance:  Pt to consistently demonstrate adherence to orthopedic precautions during all ADL's as instructed by OT.  Pt to demonstrate good dynamic standing balance as required to perform ADL's from standing level.  Pt to demonstrate good BUE strength during functional task   Pt to demonstrate consistent adherence to breathing control and energy conservation techniques as educated by OT.                        Time Tracking     OT Received On: 01/02/25  Time In 0900     Time Out 1030  Total Time 90 min  Therapy Time: OT Individual: 30  Missed Time: 60 Minutes  Missed Time Reason: Patient unwilling to participate    Billable Minutes: Self Care/Home Management 30 01/02/2025

## 2025-01-02 NOTE — PLAN OF CARE
Received call from Mallory with [facility] checking on patient.  Evidently, [facility] (who were following patient prior to this admission) reached out [facility] (their sister agency) the day before pt admitted with new fx.  They felt pt qualified for hospice services and obtained a physician order for eval.  [facility] was scheduled for eval the day pt was brought to the hospital.  Explained to Mallory that pt nor family nor [facility] aware [facility] this and that  services have already been arranged for therapy/nursing. She will f/u with [facility] after pt's release on 1/6 and may seek transition after pt has received therapy services for a few weeks before they continue with their eval process.

## 2025-01-02 NOTE — PT/OT/SLP PROGRESS
"Occupational Therapy Inpatient Rehab Treatment    Name: Luz Maria Alfaro  MRN: [id number]    Assessment:  Luz Maria Alfaro is a 64 y.o. female admitted with a medical diagnosis of Displaced fracture of left femoral neck.  She presents with the following impairments/functional limitations:  weakness, pain, orthopedic precautions, impaired joint extensibility, decreased ROM, decreased safety awareness.    General Precautions: Standard, fall     Orthopedic Precautions:LLE weight bearing as tolerated (no IR/ER)     Braces: N/A    Rehab Prognosis: Fair; patient would benefit from acute skilled OT services to address these deficits and reach maximum level of function.      History:     Past Medical History:   Diagnosis Date    Benign essential HTN     Mixed hyperlipidemia     Type 2 diabetes mellitus without complications        Past Surgical History:   Procedure Laterality Date    APPENDECTOMY      BACK SURGERY      BREAST SURGERY  01 13 2002    Augmantation    HEMIARTHROPLASTY OF HIP Left 12/20/2024    Procedure: HEMIARTHROPLASTY, HIP;  Surgeon: Jose J Montemayor DO;  Location: [facility];  Service: Orthopedics;  Laterality: Left;  lateral peg board kevin    HIP SURGERY      INTRAMEDULLARY RODDING OF FEMUR Right 10/27/2022    Procedure: RIGHT FEMUR INTERTROCH IMN;  Surgeon: Stewart Brambila MD;  Location: [facility];  Service: Orthopedics;  Laterality: Right;  [place] table, supine  3rd case  Synthes Short TFNA       Subjective     Orientation: Oriented x4    Chief Complaint: bowel movements    Patient/Family Comments/goals: "I'm so tired of sitting on this toilet."    Respiratory Status: Room air    Patients cultural, spiritual, Faith conflicts given the current situation: no       Objective:     Patient found supine with peripheral IV  upon OT entry to room.    Mobility   Patient completed:  Sit to Stand Transfer with independence with rolling walker  Stand to Sit Transfer with independence with rolling walker  Toilet " Transfer Step Transfer technique with independence with  rolling walker and bedside commode    Functional Mobility  In-room FM for toileting    ADLs   Current Status   Lower Body Dressing 6   Toileting Hygiene 6   Toilet Transfer 6     Limiting Factors for ADLs: motor, endurance, weakness, and pain     Therapeutic Exercise  While supine, patient performed BUE strengthening as that was the only thing she agreed to do. Tolerated 10 reps of the following exercises: alternating bicep curls, front raises, and alternating punches. Abruptly stopped for bowel movement    Patient left supine with all lines intact and call button in reach.     Education provided: Roles and goals of OT, ADLs, transfer training, bed mobility, body mechanics, assistive device, sequencing, safety precautions, fall prevention, and post-op precautions    Multidisciplinary Problems       Occupational Therapy Goals          Problem: Occupational Therapy    Goal Priority Disciplines Outcome Interventions   Occupational Therapy Goal     OT, PT/OT Progressing    Description: ADLs:  MET - Pt to perform grooming tasks with SBA standing at sink.  MET - Pt to perform feeding tasks with independence.  MET - Pt to perform UB dressing with SBA.  MET - Pt to perform LB dressing with max assist with AE as needed.   Updated goal: Pt to perform LB dressing with set up assist with AE as needed.  MET - Pt to perform putting on/off footwear task with max assist with AE as needed.  Updated goal: Pt to perform putting on/off footwear task with set up assist with AE as needed.  MET - Pt to perform toileting with min assist.  MET - Pt to perform bathing with mod assist with AE as needed.  Updated goal: Pt to perform bathing independently with AE as needed.    Functional Transfers:  MET - Pt to perform toilet transfers with CGA to toilet in bathroom.  Updated goal: Pt to perform toilet transfers with SBA with RW to toilet in bathroom.  MET: Pt to perform a tub transfer  with mod assist with AE as needed onto TTB.  Updated goal: Pt to perform a tub transfer with set up assist with AE as needed onto TTB.    IADLs:  Pt to perform simple meal prep with mod assist.    Balance, Strengthening, Endurance, Balance:  Pt to consistently demonstrate adherence to orthopedic precautions during all ADL's as instructed by OT.  Pt to demonstrate good dynamic standing balance as required to perform ADL's from standing level.  Pt to demonstrate good BUE strength during functional task   Pt to demonstrate consistent adherence to breathing control and energy conservation techniques as educated by OT.                        Time Tracking     OT Received On: 01/02/25  Time In 1300     Time Out 1330  Total Time 30 min  Therapy Time: OT Individual: 30      Billable Minutes: Self Care/Home Management 20 and Therapeutic Exercise 10    01/02/2025

## 2025-01-02 NOTE — PROGRESS NOTES
Education Provided: diabetic diet  Teaching Method: explanation and printed materials  Comprehension: verbalizes understanding  Barriers to Learning: none evident  Expected Compliance: fair  Comments: All questions were answered and dietitian's contact information was provided.   Met with pt and family member.

## 2025-01-02 NOTE — PROGRESS NOTES
"[facility] - Rehab Inpatient Services  Wound Care    Patient Name:  Luz Maria Alfaro   MRN:  [id number]  Date: 1/2/2025  Diagnosis: Displaced fracture of left femoral neck    History:     Past Medical History:   Diagnosis Date    Benign essential HTN     Mixed hyperlipidemia     Type 2 diabetes mellitus without complications        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    Sexual activity: Not Currently     Partners: Male     Social Drivers of Health     Financial Resource Strain: Low Risk  (12/27/2024)    Overall Financial Resource Strain (CARDIA)     Difficulty of Paying Living Expenses: Not hard at all   Food Insecurity: No Food Insecurity (12/27/2024)    Hunger Vital Sign     Worried About Running Out of Food in the Last Year: Never true     Ran Out of Food in the Last Year: Never true   Transportation Needs: No Transportation Needs (12/27/2024)    TRANSPORTATION NEEDS     Transportation : No   Recent Concern: Transportation Needs - Unmet Transportation Needs (12/26/2024)    PRAPARE - Transportation     Lack of Transportation (Medical): Yes     Lack of Transportation (Non-Medical): No   Physical Activity: Sufficiently Active (8/14/2024)    Exercise Vital Sign     Days of Exercise per Week: 5 days     Minutes of Exercise per Session: 30 min   Stress: No Stress Concern Present (12/27/2024)    [place] [employer] - Occupational Stress Questionnaire     Feeling of Stress : Not at all   Housing Stability: Low Risk  (12/27/2024)    Housing Stability Vital Sign     Unable to Pay for Housing in the Last Year: No     Homeless in the Last Year: No "       Precautions:     Allergies as of 12/24/2024 - Reviewed 12/24/2024   Allergen Reaction Noted    Iodinated contrast media Shortness Of Breath 05/09/2024    Opioids - morphine analogues  05/08/2024    Meperidine Rash and Hives 08/12/2021       [facility] Assessment Details/Treatment      01/02/25 1000        Wound 12/26/24 1712 Skin Tear Left Arm   Date First Assessed/Time First Assessed: 12/26/24 1712   Present on Original Admission: Yes  Primary Wound Type: Skin Tear  Side: Left  Location: Arm   Dressing Appearance Dry;Intact;Clean   Drainage Amount None   Appearance   (mild scabbing/ almost healed)   Wound Length (cm) 1.5 cm   Wound Width (cm) 0.2 cm   Wound Surface Area (cm^2) 0.3 cm^2   Dressing Foam   Safety Management   Safety Promotion/Fall Prevention assistive device/personal item within reach   Patient Rounds ID band on;visualized patient     Left arm skin tear: light scabbing. Almost healed.  Continue foam.  01/02/2025

## 2025-01-02 NOTE — PT/OT/SLP PROGRESS
Physical Therapy Inpatient Rehab Treatment    Patient Name:  Luz Maria Alfaro   MRN:  [id number]    Recommendations:     Discharge Recommendations:  High Intensity Therapy   Discharge Equipment Recommendations:     Barriers to discharge: Increased level of assist and Impaired functional mobility     Assessment:     Luz Maria Alfaro is a 64 y.o. female admitted with a medical diagnosis of Displaced fracture of left femoral neck.  She presents with the following impairments/functional limitations:  weakness, impaired endurance, impaired functional mobility, gait instability, impaired balance, decreased lower extremity function, pain, decreased ROM .    PT NOTE: pt initially refused physical therapy but agreed to physical therapy in her room due to diarrhea. Pt demonstrated little notice for needing to void/have bowel movement and struggled to get to bathroom in time for toileting even if close to bathroom. Pt is easily distracted and had to be redirected to complete exercises.    Rehab Diagnosis: L femoral neck fx    General Precautions: Standard, hearing impaired, fall     Orthopedic Precautions:LLE weight bearing as tolerated (NO IR or ER)     Braces: N/A    Rehab Prognosis: Fair; patient would benefit from acute skilled PT services to address these deficits and reach maximum level of function.      History:     Past Medical History:   Diagnosis Date    Benign essential HTN     Mixed hyperlipidemia     Type 2 diabetes mellitus without complications        Past Surgical History:   Procedure Laterality Date    APPENDECTOMY      BACK SURGERY      BREAST SURGERY  01 13 2002    Augmantation    HEMIARTHROPLASTY OF HIP Left 12/20/2024    Procedure: HEMIARTHROPLASTY, HIP;  Surgeon: Jose J Montemayor DO;  Location: [facility];  Service: Orthopedics;  Laterality: Left;  lateral peg board kevin    HIP SURGERY      INTRAMEDULLARY RODDING OF FEMUR Right 10/27/2022    Procedure: RIGHT FEMUR INTERTROCH IMN;  Surgeon: Stewart Brambila  MD;  Location: [facility];  Service: Orthopedics;  Laterality: Right;  [place] table, supine  3rd case  Synthes Short TFNA       Subjective     Patient comments: on lasix for leg swelling    Respiratory Status: Room air    Patients cultural, spiritual, Pentecostal conflicts given the current situation: no    Objective:     Patient found HOB elevated with peripheral IV  upon PT entry to room.    Pt is and Alert, Cooperative, and Easily distractible.    Vitals     Pain Pain Rating 1: 10/10  Location - Side 1: Left  Location - Orientation 1: lower  Location 1: back  Pain Addressed 1: Reposition, Distraction, Nurse notified (ice pack)  Pain Rating Post-Intervention 1: 10/10  Pain Rating 2: 10/10  Location - Side 2: Left  Location - Orientation 2: generalized  Location 2: leg  Pain Addressed 2: Reposition, Distraction, Nurse notified  Pain Rating Post-Intervention 2: 10/10       Functional Mobility:   Ambulate 25 feet on even surfaces with rolling walker with Supervision or touching assistance with step-to, then step-through gait, small step length, slow gait speed with narrow SERGO and increased bilateral toe out.  Pt rolled left and right with use of bed rails to adjust with bed.  Supine>sit, supervision with use of bed rails due to urgency to get to bathroom     Current   Status  Discharge   Goal   Functional Area: Care Score:    Sit to Lying 6  Ind, with leg  for LLE Independent   Sit to Stand 6  Ind with RW, 4 trials Independent   Chair/Bed-to-Chair Transfer 6  Ind with RW, step transfer, wc>bed Independent   Wheel 50 Feet with Two Turns 4 Independent   Wheel 150 Feet 4  Pt propelled wc with ' with slow speed and was able to self-correct when too close to wall Independent       Therapeutic Activities and Exercises:  Patient educated on role of acute care PT and PT POC, safety while in hospital including calling nurse for mobility, and call light usage  Patient educated about importance of OOB mobility and  remaining up in chair most of the day.  Patient educated about pursed lip breathing technique and cued for use with mobility.  Pt performed toilet transfer, step transfer with RW, positive void and BM; pt had to change diaper due to incontinence of bowels, increased time required    Therapeutic exercises were performed supine in bed with 0# on BLE:    quad sets  glute sets  short arc quads   hip abduction - active assist with LLE to maintain hip precautions  heel slides   straight leg raises - active assist with LLE  All therex performed 3x15 reps each, BLE, increased time required       Activity Tolerance: Good    Patient left HOB elevated with call button in reach.    Education provided: roles and goals of PT/PTA, transfer training, bed mob, gait training, balance training, safety awareness, body mechanics, assistive device, wheelchair management, strengthening exercises, fall prevention, and hip precautions    Expected compliance: Moderate compliance    Plan:     During this hospitalization, patient to be seen 5 x/week to address the identified rehab impairments via gait training, therapeutic exercises, therapeutic activities and progress toward the following goals:    GOALS:   Multidisciplinary Problems       Physical Therapy Goals          Problem: Physical Therapy    Goal Priority Disciplines Outcome Interventions   Physical Therapy Goal     PT, PT/OT Progressing    Description:   PT Short Term goals     Bed Mobility:  Roll left and right with supervision/touching assist.  Sit to supine transfer with partial/moderate assist.  Supine to sit transfer with partial/moderate assist.    Transfers:  Sit to stand transfer with setup/clean-up assist using RW.MET  Bed to chair transferStand step with setup/clean-up assist using RW.  Car transfer with setup/clean-up assist using RW.   an object from the ground in standing position with supervision/touching assist using RW.    Mobility:  Ambulate 150 feet with  partial/moderate assist using RW.  Ambulate 10 feet on uneven surfaces/ramps with partial/moderate assist using RW.  Ascend/descend a 4 inch curb with partial/moderate assist using RW.   Ascend/descend 12 stairs with partial/moderate assist using bilateral handrails.  Manual wheelchair 150 feet with supervision/touching assist using Bilateral upper extremity.      Time Frame: by Re-evaluation                        Plan of Care Expires:  01/03/25  PT Next Visit Date: 01/03/25  Plan of Care reviewed with: patient    Additional Information:         Time Tracking:     Therapy Time  PT Received On: 01/02/25  PT Start Time: 1400  PT Stop Time: 1530  PT Total Time (min): 90 min   PT Individual: 90  Missed Time:    Time Missed due to:      Billable Minutes: Gait Training 10 min, Therapeutic Activity 30 min, and Therapeutic Exercise 50 min    01/02/2025

## 2025-01-02 NOTE — PT/OT/SLP PROGRESS
"Physical Therapy Inpatient Rehab Treatment    Patient Name:  Luz Maria Alfaro   MRN:  [id number]    Recommendations:     Discharge Recommendations:  High Intensity Therapy   Discharge Equipment Recommendations:     Barriers to discharge: Increased level of assist, Decreased caregiver support, Ongoing medical treatment, Impaired functional mobility , and Severity of Deficits     Assessment:     Luz Maria Alfaro is a 64 y.o. female admitted with a medical diagnosis of Displaced fracture of left femoral neck.  She presents with the following impairments/functional limitations:  weakness, impaired endurance, impaired functional mobility, gait instability, impaired balance, decreased lower extremity function, pain, decreased ROM .    Rehab Diagnosis: L femoral neck fx    General Precautions: Standard, hearing impaired, fall     Orthopedic Precautions:LLE weight bearing as tolerated (NO IR or ER)     Braces: N/A    Rehab Prognosis: Fair; patient would benefit from acute skilled PT services to address these deficits and reach maximum level of function.      History:     Past Medical History:   Diagnosis Date    Benign essential HTN     Mixed hyperlipidemia     Type 2 diabetes mellitus without complications        Past Surgical History:   Procedure Laterality Date    APPENDECTOMY      BACK SURGERY      BREAST SURGERY  01 13 2002    Augmantation    HEMIARTHROPLASTY OF HIP Left 12/20/2024    Procedure: HEMIARTHROPLASTY, HIP;  Surgeon: Jose J Montemayor DO;  Location: [facility];  Service: Orthopedics;  Laterality: Left;  lateral peg board kevin    HIP SURGERY      INTRAMEDULLARY RODDING OF FEMUR Right 10/27/2022    Procedure: RIGHT FEMUR INTERTROCH IMN;  Surgeon: Stewart Brambila MD;  Location: [facility];  Service: Orthopedics;  Laterality: Right;  [place] table, supine  3rd case  Synthes Short TFNA       Subjective     Patient comments: "I need my muscle relaxer call for it"    Respiratory Status: Room air    Patients cultural, " "spiritual, Orthodox conflicts given the current situation: no    Objective:     Communicated with nursing  prior to session.  Patient found supine with peripheral IV  upon PT entry to room.    Pt is Oriented x3 and Alert and Cooperative.    Vitals   Pain Pain Rating 1: 8/10  Location - Side 1: Left  Location 1: knee  Pain Addressed 1: Reposition, Distraction (ace wrap per pt request)       Functional Mobility:   Transfers:     Toilet Transfer: Independent with  rolling walker  using  Step Transfer and + void and bm increased time      Current   Status  Discharge   Goal   Functional Area: Care Score:    Roll Left and Right   Independent   Sit to Lying 6 Independent   Lying to Sitting on Side of Bed 6 Independent   Sit to Stand 6  Use of rw  Independent   Chair/Bed-to-Chair Transfer 6  Use of rw  Independent   Car Transfer 4  Use of rw into/out of family personal car  Independent   Walk 10 Feet 6 Independent   Walk 50 Feet with Two Turns 4  Use of rw supervision with fatigue  Independent   Walk 150 Feet 88 Independent   Walk 10 Feet Uneven Surface 4  Use of rw outside uneven surface  Independent   1 Step (Curb) 4  Use of rw cga 4" curb outside environment  Supervision or touching assistance       Therapeutic Activities and Exercises:  Family training with pt and pts family member on assist pt requires with bed mob, reviewed/performed HEP and given handouts, reviewed hip pxs . , car t/f use of rw, ambulation use of rw , toilet t/f use of rw , wc mob , curb management with use of rw , and home safety for fall prevention     Activity Tolerance: Good, pt ashlyn tx with good understanding verbalized on assist pt will require in home environment and safety in home.     Patient left up in chair with call button in reach, family member present, and pt and family member verbalizing understanding of assist pt requires  .    Education provided: transfer training, bed mob, gait training, stair training, balance training, safety " awareness, body mechanics, assistive device, wheelchair management, strengthening exercises, fall prevention, and hip precautions    Expected compliance: High compliance    Plan:     During this hospitalization, patient to be seen 5 x/week to address the identified rehab impairments via gait training, therapeutic exercises, therapeutic activities and progress toward the following goals:    GOALS:   Multidisciplinary Problems       Physical Therapy Goals          Problem: Physical Therapy    Goal Priority Disciplines Outcome Interventions   Physical Therapy Goal     PT, PT/OT Progressing    Description:   PT Short Term goals     Bed Mobility:  Roll left and right with supervision/touching assist.  Sit to supine transfer with partial/moderate assist.  Supine to sit transfer with partial/moderate assist.    Transfers:  Sit to stand transfer with setup/clean-up assist using RW.MET  Bed to chair transferStand step with setup/clean-up assist using RW.  Car transfer with setup/clean-up assist using RW.   an object from the ground in standing position with supervision/touching assist using RW.    Mobility:  Ambulate 150 feet with partial/moderate assist using RW.  Ambulate 10 feet on uneven surfaces/ramps with partial/moderate assist using RW.  Ascend/descend a 4 inch curb with partial/moderate assist using RW.   Ascend/descend 12 stairs with partial/moderate assist using bilateral handrails.  Manual wheelchair 150 feet with supervision/touching assist using Bilateral upper extremity.      Time Frame: by Re-evaluation                        Plan of Care Expires:  01/03/25  PT Next Visit Date: 01/03/25  Plan of Care reviewed with: patient    Additional Information:         Time Tracking:     Therapy Time  PT Received On: 01/02/25  PT Start Time: 1030  PT Stop Time: 1100  PT Total Time (min): 30 min   PT Individual: 30  Missed Time:    Time Missed due to:      Billable Minutes: Therapeutic Activity  30min    01/02/2025

## 2025-01-02 NOTE — PROGRESS NOTES
[facility] ([facility])  Rehab Progress Note    Patient Name: Luz Maria Alfaro  MRN: [id number]  Age: 64 y.o. Sex: female  : 1960  Hospital Length of Stay: 9 days  Date of Service: 2025   Chief Complaint: Displaced fracture of left femoral neck s/p left hemiarthroplasty on 2024     Subjective:     Basic Information  Admit Information: 63-year-old white female presented to [facility] clinical on 2024 for follow up visit for left hip pain that has been progressively worsening.  Reportedly fell 2 months ago.  Imaging on  significant for left displaced femoral neck fracture.  Recommended to go to [facility] ED on  for admission.  PMH significant for HTN, dm type 2, HLD, GERD, and nicotine dependence.  Workup significant for left displaced femoral neck fracture.  Tolerated left hip hemiarthroplasty on  without perioperative complications.  She did require 2 units PRBC.  Sodium bicarb tablets initiated due to metabolic acidosis.  Currently Min assist.  Tolerated transfer to [facility] inpatient rehab unit on  without incident.   Today's Information: No acute events overnight.  Sitting up in bed.  Reports good sleep and appetite.  Last BM .  Vital signs at goal with no recorded fevers.  No recorded fevers over 24 hours.  H&H 8..4-trending down.  WBC 9 (from 11).  BMP unremarkable.  Left upper extremity ultrasound negative for DVT.    Review of patient's allergies indicates:   Allergen Reactions    Iodinated contrast media Shortness Of Breath    Opioids - morphine analogues     Meperidine Rash and Hives        Current Facility-Administered Medications:     acetaminophen tablet 650 mg, 650 mg, Oral, BID PRN, Dayo, Tania A, FNP, 650 mg at 25 05    ALPRAZolam tablet 0.25 mg, 0.25 mg, Oral, TID PRN, Moy Blackburn A, FNP, 0.25 mg at 24    ARIPiprazole tablet 2 mg, 2 mg, Oral, Daily, Bere, Moy A, FNP, 2 mg at 25 0854     atorvastatin tablet 40 mg, 40 mg, Oral, QHS, Bere, Moy A, FNP, 40 mg at 01/01/25 2040    benzonatate capsule 100 mg, 100 mg, Oral, TID PRN, Bere, Moy A, FNP    busPIRone tablet 5 mg, 5 mg, Oral, BID, Bere, Moy A, FNP, 5 mg at 01/02/25 0854    dextrose 50% injection 12.5 g, 12.5 g, Intravenous, PRN, Bere, Moy A, FNP    dextrose 50% injection 25 g, 25 g, Intravenous, PRN, Bere, Moy A, FNP    diclofenac sodium 1 % gel 4 g, 4 g, Topical (Top), TID AC, Tania Oshea, FNP, 4 g at 01/01/25 1719    docusate sodium capsule 100 mg, 100 mg, Oral, BID, Bere, Moy A, FNP, 100 mg at 01/01/25 0713    enoxaparin injection 50 mg, 1 mg/kg, Subcutaneous, Q12H (prophylaxis, 0900/2100), George Do MD, 50 mg at 01/02/25 0855    EScitalopram oxalate tablet 5 mg, 5 mg, Oral, Daily, Tania Oshea FNP, 5 mg at 01/02/25 0854    furosemide injection 40 mg, 40 mg, Intravenous, Once, Bere, Moy A, FNP    gabapentin capsule 100 mg, 100 mg, Oral, Before dinner, Tania Oshea FNP, 100 mg at 01/01/25 1638    gabapentin capsule 300 mg, 300 mg, Oral, QHS, Bere, Moy A, FNP, 300 mg at 01/01/25 2040    glucagon (human recombinant) injection 1 mg, 1 mg, Intramuscular, PRN, Bere, Moy A, FNP    glucose chewable tablet 16 g, 16 g, Oral, PRN, Bere, Moy A, FNP    glucose chewable tablet 24 g, 24 g, Oral, PRN, Bere, Moy A, FNP    hydrALAZINE injection 10 mg, 10 mg, Intravenous, Q4H PRN, Bere, Moy A, FNP    HYDROcodone-acetaminophen 5-325 mg per tablet 1 tablet, 1 tablet, Oral, Daily PRN, Moy Blackburn FNP, 1 tablet at 12/29/24 0730    hydrOXYzine pamoate capsule 50 mg, 50 mg, Oral, Nightly PRN, Tania Oshea FNP, 50 mg at 01/01/25 2129    insulin aspart U-100 injection 0-10 Units, 0-10 Units, Subcutaneous, QID (AC + HS) PRN, Moy Blackburn FNP    labetalol 20 mg/4 mL (5 mg/mL) IV syring, 10 mg, Intravenous, Q4H PRN,  "Mao Blackburnothy A, FNP    LIDOcaine 5 % patch 2 patch, 2 patch, Transdermal, Q24H, Tania Oshea FNP, 2 patch at 01/01/25 0521    methocarbamoL tablet 500 mg, 500 mg, Oral, Q6H, Tania Oshea, FNP, 500 mg at 01/02/25 0526    metoprolol injection 10 mg, 10 mg, Intravenous, Q2H PRN, Bere Moy A, FNP    naloxegoL (MOVANTIK) tablet 25 mg, 25 mg, Oral, Daily, George Do MD, 25 mg at 01/02/25 0854    nicotine 21 mg/24 hr 1 patch, 1 patch, Transdermal, Daily, Moy Blackburn, FNP, 1 patch at 01/01/25 0713    nitroGLYCERIN SL tablet 0.4 mg, 0.4 mg, Sublingual, Q5 Min PRN, Bere, Moy A, FNP    ondansetron disintegrating tablet 4 mg, 4 mg, Oral, Q6H PRN, Bere Moy A, FNP, 4 mg at 01/01/25 0712    ondansetron disintegrating tablet 8 mg, 8 mg, Oral, Q8H PRN, Bere Moy A, FNP    ondansetron injection 4 mg, 4 mg, Intravenous, Q8H PRN, Bere, Moy A, FNP    oxyCODONE immediate release tablet 10 mg, 10 mg, Oral, Q4H PRN, Bere, Moy A, FNP, 10 mg at 01/02/25 0526    oxyCODONE immediate release tablet 5 mg, 5 mg, Oral, Q4H PRN, Bere, Moy A, FNP, 5 mg at 12/27/24 1714    pantoprazole EC tablet 40 mg, 40 mg, Oral, BID, Bere, Moy A, FNP, 40 mg at 01/02/25 0854    polyethylene glycol packet 17 g, 17 g, Oral, BID, George Do MD, 17 g at 01/01/25 0713    potassium chloride SA CR tablet 40 mEq, 40 mEq, Oral, Once, Bere, Moy A, FNP    sodium bicarbonate tablet 650 mg, 650 mg, Oral, BID, Moy Blackburn FNP, 650 mg at 01/02/25 0854     Review of Systems   Complete 12-point review of symptoms negative except for what's mentioned in HPI     Objective:     /80   Pulse 87   Temp 97.7 °F (36.5 °C) (Oral)   Resp 20   Ht 5' 3" (1.6 m)   Wt 50.9 kg (112 lb 3.4 oz)   SpO2 96%   BMI 19.88 kg/m²      Physical Exam  Nursing note reviewed.   Constitutional:       Appearance: Normal appearance.   HENT:      Head: Normocephalic.      " Mouth/Throat:      Mouth: Mucous membranes are moist.   Eyes:      Pupils: Pupils are equal, round, and reactive to light.   Cardiovascular:      Rate and Rhythm: Normal rate and regular rhythm.      Heart sounds: Normal heart sounds.   Pulmonary:      Effort: Pulmonary effort is normal.      Breath sounds: Normal breath sounds.   Abdominal:      General: Bowel sounds are normal.      Palpations: Abdomen is soft.   Musculoskeletal:      Cervical back: Neck supple.      Comments: Left hip dressing clean and intact    Skin:     General: Skin is warm and dry.   Neurological:      Mental Status: She is alert and oriented to person, place, and time.      Motor: Weakness present.   Psychiatric:         Mood and Affect: Mood normal.         Behavior: Behavior normal.         Thought Content: Thought content normal.         Judgment: Judgment normal.     *MD performed and documented physical examination       Lines/Drains/Airways       None                 Labs  Recent Results (from the past 24 hours)   COVID/RSV/FLU A&B PCR    Collection Time: 01/01/25  9:51 AM   Result Value Ref Range    Influenza A PCR Not Detected Not Detected    Influenza B PCR Not Detected Not Detected    Respiratory Syncytial Virus PCR Not Detected Not Detected    SARS-CoV-2 PCR Not Detected Not Detected, Negative   Urinalysis, Reflex to Urine Culture    Collection Time: 01/01/25 11:36 AM    Specimen: Urine   Result Value Ref Range    Color, UA Straw Yellow, Light-Yellow, Dark Yellow, [place], Straw    Appearance, UA Clear Clear    Specific Gravity, UA <=1.005 1.005 - 1.030    pH, UA 7.0 5.0 - 8.5    Protein, UA Negative Negative    Glucose, UA Negative Negative, Normal    Ketones, UA Negative Negative    Blood, UA Negative Negative    Bilirubin, UA Negative Negative    Urobilinogen, UA 2.0 (A) 0.2, 1.0, Normal    Nitrites, UA Negative Negative    Leukocyte Esterase, UA Negative Negative   POCT glucose    Collection Time: 01/01/25  4:40 PM   Result  Value Ref Range    POCT Glucose 162 (H) 70 - 110 mg/dL   Basic Metabolic Panel    Collection Time: 01/02/25  5:11 AM   Result Value Ref Range    Sodium 141 136 - 145 mmol/L    Potassium 3.6 3.5 - 5.1 mmol/L    Chloride 108 (H) 98 - 107 mmol/L    CO2 25 23 - 31 mmol/L    Glucose 93 82 - 115 mg/dL    Blood Urea Nitrogen 5.0 (L) 9.8 - 20.1 mg/dL    Creatinine 0.53 (L) 0.55 - 1.02 mg/dL    BUN/Creatinine Ratio 9     Calcium 8.1 (L) 8.4 - 10.2 mg/dL    Anion Gap 8.0 mEq/L    eGFR >60 mL/min/1.73/m2   CBC with Differential    Collection Time: 01/02/25  5:11 AM   Result Value Ref Range    WBC 9.19 4.50 - 11.50 x10(3)/mcL    RBC 2.43 (L) 4.20 - 5.40 x10(6)/mcL    Hgb 8.1 (L) 12.0 - 16.0 g/dL    Hct 25.4 (L) 37.0 - 47.0 %    .5 (H) 80.0 - 94.0 fL    MCH 33.3 (H) 27.0 - 31.0 pg    MCHC 31.9 (L) 33.0 - 36.0 g/dL    RDW 25.8 (H) 11.5 - 17.0 %    Platelet 260 130 - 400 x10(3)/mcL    MPV 8.4 7.4 - 10.4 fL    Neut % 70.4 %    Lymph % 20.6 %    Mono % 7.1 %    Eos % 1.3 %    Basophil % 0.2 %    Lymph # 1.89 0.6 - 4.6 x10(3)/mcL    Neut # 6.47 2.1 - 9.2 x10(3)/mcL    Mono # 0.65 0.1 - 1.3 x10(3)/mcL    Eos # 0.12 0 - 0.9 x10(3)/mcL    Baso # 0.02 <=0.2 x10(3)/mcL    IG# 0.04 0 - 0.04 x10(3)/mcL    IG% 0.4 %    NRBC% 0.0 %   Blood Smear Microscopic Exam    Collection Time: 01/02/25  5:11 AM   Result Value Ref Range    RBC Morph Abnormal (A) Normal    Anisocytosis 3+ (A) (none)    Hypochromasia 1+ (A) (none)    Macrocytosis 1+ (A) (none)    Microcytosis Slight (A) (none)    Ovalocytes 1+ (A) (none)    Polychromasia 1+ (A) (none)    Platelets Adequate Normal, Adequate   POCT glucose    Collection Time: 01/02/25  5:31 AM   Result Value Ref Range    POCT Glucose 92 70 - 110 mg/dL   Radiology   CXR one view on 01/01/2025, IMPRESSION:  No acute disease is seen.  Radiology  Left hip and pelvis XR on 12/20/2024, IMPRESSION:  Satisfactory alignment following left hip arthroplasty.    Assessment/Plan:     64 y.o. WF admitted on  12/24/2024    Displaced fracture of left femoral neck   - s/p left hemiarthroplasty on 12/20/2024  - WBAT to LLE without external or internal rotation of the hip  - staples to be removed on 01/10  - discontinued Tylenol 650 mg every 6 hours on 12/30   - continue                  Gabapentin 300 mg nightly                 Robaxin 750 mg t.i.d.                Norco 5 mg/325 mg daily p.r.n. prior to therapy                Oxycodone 5 mg q.4 hours p.r.n. for moderate pain                 Oxycodone 10 mg q.4 hours p.r.n. for severe pain  - defer to physiatry for rehab and pain management  - PT/OT/RT/ST following     Normocytic anemia  - asymptomatic  - s/p transfusion           x2 units PRBC on 12/21/2024  - H/H stable   - no evidence of active bleeds  - will closely monitor and transfuse if needed      Depression/anxiety  - stable  - continue                Abilify 2 mg daily                 BuSpar 5 mg b.i.d.                 Lexapro 5 mg daily     HLD  - FLP outpatient  - continue                Lipitor 40 mg nightly      Nicotine dependence  - smoking cessation counseling initiated on admission  - continue                Nicotine patch daily     GERD  - Avoid spicy foods, and nothing to eat or drink within x2 hours of bedtime or laying flat (water is ok)   - Avoid NSAIDs (Advil, ibuprofen, naproxen...) and chino-2 inhibitors (Mobic, Celebrex)    - continue                Protonix 40 mg b.i.d.     Constipation  - reports hard stools on 12/31  - initiate   Movantik 25 mg daily (initiated 12/31)  - continue  Colace 100 mg BID   Miralax 17 gm b.i.d. (increased 12/31)     Metabolic acidosis  - sodium bicarb 25  - continue                Sodium bicarbonate 650 mg b.i.d.     Moderate protein calorie malnutrition  - albumin 2.9/prealbumin 13.3  - encourage oral nutrition  - registered dietitian following     DM type II  - HgA1c 5.0 on 12/25/2024  - continue                ISS   - CBGs AC/HS     HTN  - BP at goal  - continue                 Hydralazine 10 mg every 2 hours as needed for BP > 160/90                Labetalol 10 mg every 2 hours as needed for BP > 160/90  - low sodium diet    Fever  - isolated  - RSV/flu/COVID negative  - CXR negative  - UA negative  - WBC trending down within normal limits  - LLE NIVA negative for DVT on 01/02  - continue   Lovenox 30 daily     VTE Prophylaxis:  Lovenox 30 mg daily  COVID-19 testing:  Unknown  COVID-19 vaccination status:  Unvaccinated     POA:  No  Living will:  No  Contacts:  Jaxon Baum (son) [phone]     CODE STATUS:  Full code  Internal Medicine (attending): George Do MD  Physiatry (consulting):  Nam Swift MD     OUTPATIENT PROVIDERS  PCP: ALEKS Mcfadden  Orthopedic surgery:  Jose J Montemayor D.O.     DISPOSITION:  Sleep hygiene, bowel maintenance, and appetite at goal.  Vital signs at goal with no recorded fevers.  No recorded fevers over 24 hours.  H&H trending down.  WBC now within normal limits.  BMP unremarkable.  LLE NIVA negative for DVT.  Initiate Lasix 40 mg IVP x1 and replace potassium.  Continue current POC.  Monitor Closely.  Notify of acute changes.    Staffing 12/31/2024: Continent of bowel and bladder. RT: CGA to supervision.  Limited by standing tolerance and pain.  Needs encouragement. Appetite is good at 75%.  Ill-fitting dentures. PT: COSMO to SBA for bed mobility.  Sit to stand with tranfers.  CGA ambulating 80 feet. Limited by pain. OT: Progressing.  Limited by pain.  Supervision to independent with ADLS. With adaptive equipment.  Needs TTB upon discharge. Projected discharge 1/6 home with HH.      Mao Blackburn NP conducted independent physical examination and assisted with medical documentation.    Total time spent on this encounter including chart review and direct MD + NP 1-on-1 patient interaction: 51 minutes   Over 50% of this time was spent in counseling and coordination of care

## 2025-01-02 NOTE — PROGRESS NOTES
"Dos 1/2/24  Patient seen in PT gym today  Participation and progress toward goals noted  Continues working on strength, mobility, balance and increasing endurance  Progress and problems discussed with patient and therapists  Questions answered  Chart reviewed and discussed with Dr rojas and medicine team as well as Kamini Oshea, my FNP  Continue present management  Subjective  HPI: 64 year old WF with a PMH of HTN, DM, depression, HLD, GERD, tobacco dependence, very [place], chronic back pain with multiple surgeries in past, hx of substance abuse of "crack" cocaine (clean 8 years), and right hip sx in past presented to the ED at [facility] on 12/19/24 with a left displaced femoral neck fracture which possibly happened after a fall in September of 2024. Patient had XR of hip on 12/18 which showed fracture ordered from orthopedic clinic with Dr Rivas with instruction to go to ED. Patient reported she had been taking upwards of 20+ Tylenol 500mg per day, and reported she had taken 8 tylenol so far that day of arrival to ED. Labs showed elevated MCV of 102.1, elevated MCH of 32.0, low MCHC of 31.4, low INR of 1.1, elevated chloride of 116, low CO2 of 12, elevated ALP of 380, elevated AST of 36, acetaminophen level of <10.0, salicylate level of <5.0. Chest XR showed no acute chest disease identified. Poison control was contacted secondary to significant acetaminophen abuse with initial recommendation to give Acetadote, but then recommended holding off due to lab results with continued monitoring of acetaminophen level and CMP. Due to possible liver damage, patient was transferred to [facility] on 12/20. Nicotine patch given for tobacco use. Patient underwent a left hip hemiarthroplasty by Dr. INDIRA Montemayor, and placed WBAT to LLE without external or internal rotation of the hip. On 12/21, labs showed low H&H of 5.7 & 17.9 so transfused 2 units of PRBCs. Sodium bicarb tablets started for acidosis, held Lovenox, and hepatitis panel was " nonreactive. On 12/22, PT/OT evals completed with deficits noted with recommendation for high intensity therapy needed. Labs showed low H&H of 8.8 & 25.6 post transfusion. Started Lovenox, and OK for ASA 81mg BID on DC for 30 days. Dry dressing daily or open to air (No creams/ointments), and will need follow up with Dr Sim green in 3 weeks. IV fluids stopped. On 12/23, labs showed low RBC of 2.62, low H&H of 8.4 & 24.5, elevated MCH of 32.1, elevated RDW of 25.6, low iron of 17, low TIBC of 167, low transferrin of 149, low iron sat of 10, elevated ALP of 210, low protein of 5.3, and low albumin of 3.1. Vital signs stable. On 12/24, labs showed low H&H of 8.1 & 25.2. Last BM noted on 12/18. Purewick in place. Pratient is AAOx4; extremely [place].  Functional status includes minimal assist for bed mobility, minimal assist for transfers with RW, walked 20ft x 2 with RW at minimal assist, max assist for lower body dressing, total assist for toileting with purewick in place, and setup assist for eating and grooming while seated. Participating with therapy. Patient was evaluated, accepted, and admitted to inpatient rehab to improve functional status. Transferred to [facility] on 12/24 without incident.     1/2: Seen with PT, mobilizing in WC. Reports bowel medicine making her nauseous. Movantik initiated on 1/1 due to continued constipation. Participating in therapy this afternoon. VSSAF.                Review of Systems  Psychiatric: Has a history of depression. She is a tobacco smoker. She has a history of ETOH and substance abuse (crack cocaine) but quit many years ago.    Depression/Anxiety:      busPIRone tablet 5 mg BID  EScitalopram oxalate tablet 5 mg qd  nicotine 21 mg/24 hr 1 patch qd  ALPRAZolam tablet 0.25 mg TID PRN anxiety  Pain:  chronic pain c/o including old ankle and knee fractures and LBP, spasms/tingling pains radiating down legs. Cymbalta contraindicated with Liver disease.  acetaminophen tablet 650 mg  q6h  LIDOcaine 5 % patch 2 patch q24h, 0600a, left posterior hip and anterior thigh   gabapentin capsule  100mg qPM, 1645.   gabapentin capsule 300 mg qHS  methocarbamoL tablet 750 mg TID. Increase q6h. Decrease to 500mg q6h  acetaminophen tablet 650 mg q6h PRN mild pain. Decrease BID PRN mild pain  oxyCODONE immediate release tablet 5 mg q4h PRN mod pain  oxyCODONE immediate release tablet 10 mg q4h PRN severe pain    Bowels/Bladder: last BM 1/1 x 8 following intervention   Complaints of constipation and digging stool out (here and at home). Agreeable to daily fleet enema's to fully evacuate. Discussed w/Nurse. Refused 2/2 irritation    Appetite: good.      Sleep: unable to get to a deep sleep 2/2 environment-improving    hydrOXYzine pamoate capsule 50 mg qHS PRN Insomnia/Itching.         Physical Exam  General: well-developed, well-nourished, in no acute distress  HENT: normocephalic, [place], ill fitting top dentures, poor dentition with missing lower teeth  Neck: full range of motion, supple  Respiratory: equal chest rise, no SOB, no audible wheeze  Cardiovascular: regular rate and rhythm, no edema  Gastrointestinal: soft, non-tender, non-distended   Musculoskeletal: decreased ROM/strength to LLE, generalized weakness  Integumentary: no rashes or skin lesions present, left hip incision-surgical glue, dressing-c/d/I; LUE skin tear-foam dressing  Neurologic: cranial nerves intact, +peripheral neurological deficit-tingling LE  *MD performed and documented physical examination             Labs:     Latest Reference Range & Units 01/02/25 05:11   WBC 4.50 - 11.50 x10(3)/mcL 9.19   RBC 4.20 - 5.40 x10(6)/mcL 2.43 (L)   Hemoglobin 12.0 - 16.0 g/dL 8.1 (L)   Hematocrit 37.0 - 47.0 % 25.4 (L)   MCV 80.0 - 94.0 fL 104.5 (H)   MCH 27.0 - 31.0 pg 33.3 (H)   MCHC 33.0 - 36.0 g/dL 31.9 (L)   RDW 11.5 - 17.0 % 25.8 (H)   Platelet Count 130 - 400 x10(3)/mcL 260   MPV 7.4 - 10.4 fL 8.4   Platelet Estimate Normal, Adequate  Adequate    Neut % % 70.4   LYMPH % % 20.6   Mono % % 7.1   Eos % % 1.3   Basophil % % 0.2   Immature Granulocytes % 0.4   Neut # 2.1 - 9.2 x10(3)/mcL 6.47   Lymph # 0.6 - 4.6 x10(3)/mcL 1.89   Mono # 0.1 - 1.3 x10(3)/mcL 0.65   Eos # 0 - 0.9 x10(3)/mcL 0.12   Baso # <=0.2 x10(3)/mcL 0.02   Immature Grans (Abs) 0 - 0.04 x10(3)/mcL 0.04   nRBC % 0.0   Macrocytosis (none)  1+ !   Microcytosis (none)  Slight !   Ovalocytes (none)  1+ !   Aniso (none)  3+ !   Poly (none)  1+ !   Hypo (none)  1+ !   RBC Morph Normal  Abnormal !   Sodium 136 - 145 mmol/L 141   Potassium 3.5 - 5.1 mmol/L 3.6   Chloride 98 - 107 mmol/L 108 (H)   CO2 23 - 31 mmol/L 25   Anion Gap mEq/L 8.0   BUN 9.8 - 20.1 mg/dL 5.0 (L)   Creatinine 0.55 - 1.02 mg/dL 0.53 (L)   BUN/CREAT RATIO  9   eGFR mL/min/1.73/m2 >60   Glucose 82 - 115 mg/dL 93   Calcium 8.4 - 10.2 mg/dL 8.1 (L)   (L): Data is abnormally low  (H): Data is abnormally high  !: Data is abnormal   Latest Reference Range & Units 01/01/25 09:51 01/01/25 11:36   Influenza A, Molecular Not Detected  Not Detected    Influenza B, Molecular Not Detected  Not Detected    RSV Ag by Molecular Method Not Detected  Not Detected    SARS-CoV2 (COVID-19) Qualitative PCR Not Detected, Negative  Not Detected    Color, UA Yellow, Light-Yellow, Dark Yellow, [place], Straw   Straw   Appearance, UA Clear   Clear   Specific Gravity,UA 1.005 - 1.030   <=1.005   pH, UA 5.0 - 8.5   7.0   Protein, UA Negative   Negative   Glucose, UA Negative, Normal   Negative   Ketones, UA Negative   Negative   Blood, UA Negative   Negative   NITRITE UA Negative   Negative   Bilirubin (UA) Negative   Negative   Urobilinogen, UA 0.2, 1.0, Normal   2.0 !   Leukocyte Esterase, UA Negative   Negative   !: Data is abnormal              Diagnostics:  XR CHEST 1 VIEW   Impression:  No acute disease is seen  Date:                                            01/01/2025  Time:                                            11:24        Assessment/Plan  Hospital   Displaced fracture of left femoral neck   Moderate malnutrition     Non-Hospital   DVT (deep venous thrombosis)   Type 2 diabetes mellitus without complications   Malnutrition   Acute encephalopathy   Acute on chronic combined systolic and diastolic heart failure   Hypokalemia   Hypotension due to drugs   Calcaneal fracture   AMS (altered mental status)   Knee pain   Benign essential HTN   Mixed hyperlipidemia   Alcoholic fatty liver   COPD (chronic obstructive pulmonary disease)   Chronic diastolic heart failure   Depressive disorder   Gastroesophageal reflux disease   Heart valve disease   History of colonic polyps   Other hemorrhoids   Stage 3b chronic kidney disease   Stricture of esophagus   Tobacco user   Unspecified cirrhosis of liver   Weight loss, abnormal   Benign neoplasm of ascending colon   Benign neoplasm of descending colon   Ototoxicity of both ears   History of substance abuse       Wounds: left hip incision-surgical glue, dressing-c/d/I; LUE skin tear-foam dressing  S/p left hip hemiarthroplasty on 12/20 by Dr. INDIRA Montemayor   Precautions: WBAT to LLE without external or internal rotation of the hip  Bracing/AD: RW  Swallowing: Diabetic Diet, double protein  Function: Tolerating therapy. Continue PT/OT  VTE Prophylaxis:   enoxaparin injection 30 mg SubQ q24hr  Code Status: FULL CODE   Discharge: Patient, son, and his 2 children live in [place] in a 2-story home with a threshold entrance. She does not need to go upstairs. Completed 10th grade. She has no  history. She is a retired  for [employer].  and . Her ex- takes the patient to appointments and runs errands.  The patients son and 2 grandchildren live with her. They cook, clean, and do laundry.  Ex- hires someone for lawn maintenance.  Children: (1). Date 1/6 Monday.                 Tania Oshea NP, conducted additional independent physical examination  and assisted with medical documentation.

## 2025-01-03 LAB
FLUAV AG UPPER RESP QL IA.RAPID: NOT DETECTED
FLUBV AG UPPER RESP QL IA.RAPID: NOT DETECTED
POCT GLUCOSE: 116 MG/DL (ref 70–110)
POCT GLUCOSE: 91 MG/DL (ref 70–110)
POCT GLUCOSE: 98 MG/DL (ref 70–110)
SARS-COV-2 RNA RESP QL NAA+PROBE: NOT DETECTED

## 2025-01-03 PROCEDURE — 87427 SHIGA-LIKE TOXIN AG IA: CPT | Performed by: NURSE PRACTITIONER

## 2025-01-03 PROCEDURE — 97535 SELF CARE MNGMENT TRAINING: CPT

## 2025-01-03 PROCEDURE — 25000003 PHARM REV CODE 250: Performed by: INTERNAL MEDICINE

## 2025-01-03 PROCEDURE — 0240U COVID/FLU A&B PCR: CPT | Performed by: NURSE PRACTITIONER

## 2025-01-03 PROCEDURE — 94761 N-INVAS EAR/PLS OXIMETRY MLT: CPT

## 2025-01-03 PROCEDURE — 11800000 HC REHAB PRIVATE ROOM

## 2025-01-03 PROCEDURE — S4991 NICOTINE PATCH NONLEGEND: HCPCS | Performed by: NURSE PRACTITIONER

## 2025-01-03 PROCEDURE — 99233 SBSQ HOSP IP/OBS HIGH 50: CPT | Mod: ,,, | Performed by: NURSE PRACTITIONER

## 2025-01-03 PROCEDURE — 97110 THERAPEUTIC EXERCISES: CPT | Mod: CQ

## 2025-01-03 PROCEDURE — 97110 THERAPEUTIC EXERCISES: CPT

## 2025-01-03 PROCEDURE — 94799 UNLISTED PULMONARY SVC/PX: CPT

## 2025-01-03 PROCEDURE — 25000003 PHARM REV CODE 250: Performed by: NURSE PRACTITIONER

## 2025-01-03 PROCEDURE — 63600175 PHARM REV CODE 636 W HCPCS: Performed by: NURSE PRACTITIONER

## 2025-01-03 PROCEDURE — 97116 GAIT TRAINING THERAPY: CPT | Mod: CQ

## 2025-01-03 PROCEDURE — 97530 THERAPEUTIC ACTIVITIES: CPT | Mod: CQ

## 2025-01-03 PROCEDURE — 99900031 HC PATIENT EDUCATION (STAT)

## 2025-01-03 RX ORDER — POLYETHYLENE GLYCOL 3350 17 G/17G
17 POWDER, FOR SOLUTION ORAL 2 TIMES DAILY PRN
Status: DISCONTINUED | OUTPATIENT
Start: 2025-01-03 | End: 2025-01-06 | Stop reason: HOSPADM

## 2025-01-03 RX ORDER — FUROSEMIDE 10 MG/ML
40 INJECTION INTRAMUSCULAR; INTRAVENOUS ONCE
Status: COMPLETED | OUTPATIENT
Start: 2025-01-03 | End: 2025-01-03

## 2025-01-03 RX ORDER — POTASSIUM CHLORIDE 20 MEQ/1
40 TABLET, EXTENDED RELEASE ORAL ONCE
Status: COMPLETED | OUTPATIENT
Start: 2025-01-03 | End: 2025-01-03

## 2025-01-03 RX ADMIN — SODIUM BICARBONATE 650 MG: 650 TABLET ORAL at 08:01

## 2025-01-03 RX ADMIN — ATORVASTATIN CALCIUM 40 MG: 40 TABLET, FILM COATED ORAL at 08:01

## 2025-01-03 RX ADMIN — OXYCODONE 10 MG: 5 TABLET ORAL at 04:01

## 2025-01-03 RX ADMIN — PANTOPRAZOLE SODIUM 40 MG: 40 TABLET, DELAYED RELEASE ORAL at 08:01

## 2025-01-03 RX ADMIN — FUROSEMIDE 40 MG: 10 INJECTION, SOLUTION INTRAMUSCULAR; INTRAVENOUS at 01:01

## 2025-01-03 RX ADMIN — HYDROXYZINE PAMOATE 50 MG: 50 CAPSULE ORAL at 08:01

## 2025-01-03 RX ADMIN — HYDROCODONE BITARTRATE AND ACETAMINOPHEN 1 TABLET: 5; 325 TABLET ORAL at 08:01

## 2025-01-03 RX ADMIN — DICLOFENAC SODIUM 4 G: 10 GEL TOPICAL at 05:01

## 2025-01-03 RX ADMIN — METHOCARBAMOL 500 MG: 500 TABLET ORAL at 05:01

## 2025-01-03 RX ADMIN — GABAPENTIN 100 MG: 100 CAPSULE ORAL at 04:01

## 2025-01-03 RX ADMIN — OXYCODONE 10 MG: 5 TABLET ORAL at 12:01

## 2025-01-03 RX ADMIN — OXYCODONE 10 MG: 5 TABLET ORAL at 05:01

## 2025-01-03 RX ADMIN — ALPRAZOLAM 0.25 MG: 0.25 TABLET ORAL at 11:01

## 2025-01-03 RX ADMIN — BUSPIRONE HYDROCHLORIDE 5 MG: 5 TABLET ORAL at 08:01

## 2025-01-03 RX ADMIN — METHOCARBAMOL 500 MG: 500 TABLET ORAL at 11:01

## 2025-01-03 RX ADMIN — GABAPENTIN 300 MG: 300 CAPSULE ORAL at 08:01

## 2025-01-03 RX ADMIN — ESCITALOPRAM OXALATE 5 MG: 5 TABLET, FILM COATED ORAL at 08:01

## 2025-01-03 RX ADMIN — ENOXAPARIN SODIUM 30 MG: 30 INJECTION SUBCUTANEOUS at 04:01

## 2025-01-03 RX ADMIN — NICOTINE 1 PATCH: 21 PATCH, EXTENDED RELEASE TRANSDERMAL at 08:01

## 2025-01-03 RX ADMIN — POTASSIUM CHLORIDE 40 MEQ: 1500 TABLET, EXTENDED RELEASE ORAL at 01:01

## 2025-01-03 RX ADMIN — OXYCODONE 10 MG: 5 TABLET ORAL at 08:01

## 2025-01-03 RX ADMIN — METHOCARBAMOL 500 MG: 500 TABLET ORAL at 12:01

## 2025-01-03 RX ADMIN — ARIPIPRAZOLE 2 MG: 2 TABLET ORAL at 08:01

## 2025-01-03 RX ADMIN — LIDOCAINE 5% 2 PATCH: 700 PATCH TOPICAL at 05:01

## 2025-01-03 RX ADMIN — METHOCARBAMOL 500 MG: 500 TABLET ORAL at 06:01

## 2025-01-03 RX ADMIN — NALOXEGOL OXALATE 25 MG: 25 TABLET, FILM COATED ORAL at 08:01

## 2025-01-03 NOTE — PT/OT/SLP DISCHARGE
Recreational Therapy Discharge      Date of Treatment: 01/03/25  Start Time: 1300  Stop Time: 1330  Total Time: 30 min  Missed Time:      Assessment      Luz Maria Alfaro is a 64 y.o. female admitted with a medical diagnosis of Displaced fracture of left femoral neck.  She presents with the following impairments/functional limitations:  weakness, impaired endurance, impaired functional mobility, gait instability, decreased lower extremity function, decreased safety awareness .    Rehab Diagnosis:     Recent Surgery:     General Precautions: Standard, fall     Orthopedic Precautions:LLE weight bearing as tolerated     Braces: N/A    Rehab Prognosis: Good; patient would benefit from acute skilled Recreational Therapy services to address these deficits and reach maximum level of function.      Impairments: Endurance deficits, Mobility deficits, Safety awareness deficits, and Strength deficits  Rehab Potential: Good  Treatment Recommendations: Complete discharge plan  Treatment Diagnosis: Re-admit, L subacute displaced femoral neck fx, L hemiarthroplasty, HTN, DM, depression, GERD, tobacco dependent, [place]  Orientation: Oriented x4  Affect/Behavior: Cooperative  Safety/Judgement: intact   Basic Command Following: intact  Spiritual Cultural: no        History     Past Medical History:   Diagnosis Date    Benign essential HTN     Mixed hyperlipidemia     Type 2 diabetes mellitus without complications        Past Surgical History:   Procedure Laterality Date    APPENDECTOMY      BACK SURGERY      BREAST SURGERY  01 13 2002    Augmantation    HEMIARTHROPLASTY OF HIP Left 12/20/2024    Procedure: HEMIARTHROPLASTY, HIP;  Surgeon: Jose J Montemayor DO;  [facility];  Service: Orthopedics;  Laterality: Left;  lateral peg board kevin    HIP SURGERY      INTRAMEDULLARY RODDING OF FEMUR Right 10/27/2022    Procedure: RIGHT FEMUR INTERTROCH IMN;  Surgeon: Stewart Brambila MD;  Location: [facility] OR;  Service: Orthopedics;   "Laterality: Right;  [place] table, supine  3rd case  Synthes Short TFNA       Home Environment     Admit Date: 12/24/24  Living Situation  People in Home: child(lala), adult, grandchild(lala)  Name(s) of People in Home: Son-Jaxon; HEATHER - Carlos  Lives in: house  Patients Responsibilities: Financial management, Leisure/play/hobbies, Meal preparation, Shopping  Number of Children: 1  Occupation:Retired: [employer]    Instrumental Activities of Daily Living     Previous Hand Dominance: Right Current Hand Dominance: Right     Other iADL Information:        Cognitive Skills Building         Cognitive Observation Activity Assist Position Equipment Response            Comment:      Dynamic Activities      Activity Assist Position Equipment Response   Activity 1 Bowling modified independence Standing Rolling walker and Rubber bowling balls good   Comment: Bed to toilet to w/c transfer was setup.  Sit to stand was setup as was dynamic standing balance/reaching.  Standing tolerance increased to 5 minutes.  UE coordination was I as were problem solving skills.  Participated more willingly       Fine Motor Activities      Activity Assist Position Equipment Response           Comment:        Goals     Patient Goals  Patient Goal 1: "To be able to walk without a cane."    Short Term Goals    Goal  Goal Status   Will increase sit to stand to supervision Met   Will improve dynamic standing balance/reaching to supervision Met                 Long Term Goals    Goal Goal Status   To increase standing tolerance to 5 minutes Met   Will improve dynamic standing balance/reaching to setup Met                     Plan       Patient to be seen: Daily  Duration: Other (Comment) (2 days)  Treatments planned: Energy conservation training, Safety education  Treatment plan/goals established with Patient/Caregiver: Yes     "

## 2025-01-03 NOTE — PLAN OF CARE
Problem: Diabetes Comorbidity  Goal: Blood Glucose Level Within Targeted Range  Outcome: Progressing     Problem: Rehabilitation (IRF) Plan of Care  Goal: Optimal Comfort and Wellbeing  Outcome: Progressing     Problem: Rehabilitation (IRF) Plan of Care  Goal: Absence of New-Onset Illness or Injury  Outcome: Progressing     Problem: Wound  Goal: Optimal Functional Ability  Outcome: Progressing     Problem: Wound  Goal: Absence of Infection Signs and Symptoms  Outcome: Progressing     Problem: Wound  Goal: Optimal Pain Control and Function  Outcome: Progressing     Problem: Wound  Goal: Skin Health and Integrity  Outcome: Progressing     Problem: Wound  Goal: Optimal Wound Healing  Outcome: Progressing     Problem: Fall Injury Risk  Goal: Absence of Fall and Fall-Related Injury  Outcome: Progressing

## 2025-01-03 NOTE — PROGRESS NOTES
01/03/25 1300   Rec Therapy Time Calculation   Date of Treatment 01/03/25   Rec Start Time 1300   Rec Stop Time 1330   Rec Total Time (min) 30 min   Time   Treatment time 2 units   Charges   $Therapeutic Exercise 2 units   Precautions   General Precautions fall   Orthopedic Precautions  LLE weight bearing as tolerated   Braces N/A   Pain/Comfort   Pain Rating 1 no pain   OTHER   Rehab identified problem list/impairments weakness;impaired endurance;impaired functional mobility;gait instability;decreased lower extremity function;decreased safety awareness   Values/Beliefs/Spiritual Care   Spiritual, Cultural Beliefs, Roman Catholic Practices, Values that Affect Care no   Overall Level of Functioning   Activity Tolerance Independent   Dynamic Sitting Balance/Reaching Independent   Dynamic Standing Balance/Reaching Mod Indep   Right UE Coodination/Dexterity Independent   Left UE Coordination/Dexterity Independent   Problem Solving/Sequencing Skills Independent   Memory Recall Independent   R/L Neglect/Inattention Does not occur   Attention Span Mod Indep   Social Interaction Independent   Recreational Therapy Short Term Goals   Short Term Goal 1 Progression Met   Short Term Goal 2 Progression Met   Recreational Therapy Long Term Goals   Long Term Goal 1 Progression Met   Long Term Goal 2 Progression Met   Plan   Patient to be seen Daily   Planned Duration Other (Comment)  (2 days)   Treatments Planned Energy conservation training;Safety education   Treatment plan/goals estblished with Patient/Caregiver Yes

## 2025-01-03 NOTE — PROGRESS NOTES
"Dos 1/3/24  Patient seen in OT gym today  Participation and progress toward goals noted  Continues working on strength, mobility, balance and increasing endurance  Progress and problems discussed with patient and therapists  Questions answered  Chart reviewed and discussed with Dr rojas and medicine team as well as Kamini Oshea, my FNP  Continue present management  Subjective  HPI: 64 year old WF with a PMH of HTN, DM, depression, HLD, GERD, tobacco dependence, very [place], chronic back pain with multiple surgeries in past, hx of substance abuse of "crack" cocaine (clean 8 years), and right hip sx in past presented to the ED at [facility] on 12/19/24 with a left displaced femoral neck fracture which possibly happened after a fall in September of 2024. Patient had XR of hip on 12/18 which showed fracture ordered from orthopedic clinic with Dr Rivas with instruction to go to ED. Patient reported she had been taking upwards of 20+ Tylenol 500mg per day, and reported she had taken 8 tylenol so far that day of arrival to ED. Labs showed elevated MCV of 102.1, elevated MCH of 32.0, low MCHC of 31.4, low INR of 1.1, elevated chloride of 116, low CO2 of 12, elevated ALP of 380, elevated AST of 36, acetaminophen level of <10.0, salicylate level of <5.0. Chest XR showed no acute chest disease identified. Poison control was contacted secondary to significant acetaminophen abuse with initial recommendation to give Acetadote, but then recommended holding off due to lab results with continued monitoring of acetaminophen level and CMP. Due to possible liver damage, patient was transferred to [facility] on 12/20. Nicotine patch given for tobacco use. Patient underwent a left hip hemiarthroplasty by Dr. INDIRA Montemayor, and placed WBAT to LLE without external or internal rotation of the hip. On 12/21, labs showed low H&H of 5.7 & 17.9 so transfused 2 units of PRBCs. Sodium bicarb tablets started for acidosis, held Lovenox, and hepatitis panel was " nonreactive. On 12/22, PT/OT evals completed with deficits noted with recommendation for high intensity therapy needed. Labs showed low H&H of 8.8 & 25.6 post transfusion. Started Lovenox, and OK for ASA 81mg BID on DC for 30 days. Dry dressing daily or open to air (No creams/ointments), and will need follow up with Dr Sim green in 3 weeks. IV fluids stopped. On 12/23, labs showed low RBC of 2.62, low H&H of 8.4 & 24.5, elevated MCH of 32.1, elevated RDW of 25.6, low iron of 17, low TIBC of 167, low transferrin of 149, low iron sat of 10, elevated ALP of 210, low protein of 5.3, and low albumin of 3.1. Vital signs stable. On 12/24, labs showed low H&H of 8.1 & 25.2. Last BM noted on 12/18. Purewick in place. Pratient is AAOx4; extremely [place].  Functional status includes minimal assist for bed mobility, minimal assist for transfers with RW, walked 20ft x 2 with RW at minimal assist, max assist for lower body dressing, total assist for toileting with purewick in place, and setup assist for eating and grooming while seated. Participating with therapy. Patient was evaluated, accepted, and admitted to inpatient rehab to improve functional status. Transferred to [facility] on 12/24 without incident.     1/3: Seen with OT, seated in WC at arm exercise bike. OT relays that patient had bowel movement with her that began with small formed stool and then large amounts of loose stool. Movantik started on 1/1 and patient has had multiple loose stools since. Refusing Miralax, therefore changed to PRN constipation. Patient previously with chronic constipation and digging out stool at home, as well as during hospitalization. Participating in therapy this morning, some of which was contained to the restroom. VSS with noted hypotension, SpO2 93%, and febrile prior to morning medications. IM evaluating.                Review of Systems  Psychiatric: Has a history of depression. She is a tobacco smoker. She has a history of ETOH and  substance abuse (crack cocaine) but quit many years ago.    Depression/Anxiety:      busPIRone tablet 5 mg BID  EScitalopram oxalate tablet 5 mg qd  nicotine 21 mg/24 hr 1 patch qd  ALPRAZolam tablet 0.25 mg TID PRN anxiety  Pain:  chronic pain c/o including old ankle and knee fractures and LBP, spasms/tingling pains radiating down legs. Cymbalta contraindicated with Liver disease.  acetaminophen tablet 650 mg q6h  LIDOcaine 5 % patch 2 patch q24h, 0600a, left posterior hip and anterior thigh   gabapentin capsule  100mg qPM, 1645.   gabapentin capsule 300 mg qHS  methocarbamoL tablet 750 mg TID. Increase q6h. Decrease to 500mg q6h  acetaminophen tablet 650 mg q6h PRN mild pain. Decrease BID PRN mild pain  oxyCODONE immediate release tablet 5 mg q4h PRN mod pain  oxyCODONE immediate release tablet 10 mg q4h PRN severe pain    Bowels/Bladder: last BM 1/3-small/formed, then large loose stool (1/1 x 8 following initiation of Movantik) Stool culture ordered  12/26: Complaints of constipation and digging stool out (here and at home). Agreeable to daily fleet enema's to fully evacuate. Discussed w/Nurse. Refused after 2nd day 2/2 irritation    Appetite: good.      Sleep: fair   hydrOXYzine pamoate capsule 50 mg qHS PRN Insomnia/Itching.         Physical Exam  General: well-developed, well-nourished, in no acute distress  HENT: normocephalic, [place], ill fitting top dentures, poor dentition with missing lower teeth  Neck: full range of motion, supple  Respiratory: equal chest rise, no SOB, no audible wheeze  Cardiovascular: regular rate and rhythm, no edema  Gastrointestinal: soft, non-tender, non-distended   Musculoskeletal: decreased ROM/strength to LLE, generalized weakness  Integumentary: no rashes or skin lesions present, left hip incision-surgical glue, dressing-c/d/I; LUE skin tear-foam dressing  Neurologic: cranial nerves intact, +peripheral neurological deficit-tingling LE  *MD performed and documented physical  examination             Labs:     Latest Reference Range & Units 01/02/25 05:11   WBC 4.50 - 11.50 x10(3)/mcL 9.19   RBC 4.20 - 5.40 x10(6)/mcL 2.43 (L)   Hemoglobin 12.0 - 16.0 g/dL 8.1 (L)   Hematocrit 37.0 - 47.0 % 25.4 (L)   MCV 80.0 - 94.0 fL 104.5 (H)   MCH 27.0 - 31.0 pg 33.3 (H)   MCHC 33.0 - 36.0 g/dL 31.9 (L)   RDW 11.5 - 17.0 % 25.8 (H)   Platelet Count 130 - 400 x10(3)/mcL 260   MPV 7.4 - 10.4 fL 8.4   Platelet Estimate Normal, Adequate  Adequate   Neut % % 70.4   LYMPH % % 20.6   Mono % % 7.1   Eos % % 1.3   Basophil % % 0.2   Immature Granulocytes % 0.4   Neut # 2.1 - 9.2 x10(3)/mcL 6.47   Lymph # 0.6 - 4.6 x10(3)/mcL 1.89   Mono # 0.1 - 1.3 x10(3)/mcL 0.65   Eos # 0 - 0.9 x10(3)/mcL 0.12   Baso # <=0.2 x10(3)/mcL 0.02   Immature Grans (Abs) 0 - 0.04 x10(3)/mcL 0.04   nRBC % 0.0   Macrocytosis (none)  1+ !   Microcytosis (none)  Slight !   Ovalocytes (none)  1+ !   Aniso (none)  3+ !   Poly (none)  1+ !   Hypo (none)  1+ !   RBC Morph Normal  Abnormal !   Sodium 136 - 145 mmol/L 141   Potassium 3.5 - 5.1 mmol/L 3.6   Chloride 98 - 107 mmol/L 108 (H)   CO2 23 - 31 mmol/L 25   Anion Gap mEq/L 8.0   BUN 9.8 - 20.1 mg/dL 5.0 (L)   Creatinine 0.55 - 1.02 mg/dL 0.53 (L)   BUN/CREAT RATIO  9   eGFR mL/min/1.73/m2 >60   Glucose 82 - 115 mg/dL 93   Calcium 8.4 - 10.2 mg/dL 8.1 (L)   (L): Data is abnormally low  (H): Data is abnormally high  !: Data is abnormal   Latest Reference Range & Units 01/01/25 09:51 01/01/25 11:36   Influenza A, Molecular Not Detected  Not Detected    Influenza B, Molecular Not Detected  Not Detected    RSV Ag by Molecular Method Not Detected  Not Detected    SARS-CoV2 (COVID-19) Qualitative PCR Not Detected, Negative  Not Detected    Color, UA Yellow, Light-Yellow, Dark Yellow, [place], Straw   Straw   Appearance, UA Clear   Clear   Specific Gravity,UA 1.005 - 1.030   <=1.005   pH, UA 5.0 - 8.5   7.0   Protein, UA Negative   Negative   Glucose, UA Negative, Normal   Negative   Ketones,  UA Negative   Negative   Blood, UA Negative   Negative   NITRITE UA Negative   Negative   Bilirubin (UA) Negative   Negative   Urobilinogen, UA 0.2, 1.0, Normal   2.0 !   Leukocyte Esterase, UA Negative   Negative   !: Data is abnormal              Diagnostics:  XR CHEST 1 VIEW   Impression:  No acute disease is seen  Date:                                            01/01/2025  Time:                                           11:24      US LOWER EXTREMITY VEINS LEFT   Impression:  Negative exam for left lower extremity deep venous thrombosis.  Date:                                            01/02/2025  Time:                                           11:57          Assessment/Plan  Hospital   Displaced fracture of left femoral neck   Moderate malnutrition     Non-Hospital   DVT (deep venous thrombosis)   Type 2 diabetes mellitus without complications   Malnutrition   Acute encephalopathy   Acute on chronic combined systolic and diastolic heart failure   Hypokalemia   Hypotension due to drugs   Calcaneal fracture   AMS (altered mental status)   Knee pain   Benign essential HTN   Mixed hyperlipidemia   Alcoholic fatty liver   COPD (chronic obstructive pulmonary disease)   Chronic diastolic heart failure   Depressive disorder   Gastroesophageal reflux disease   Heart valve disease   History of colonic polyps   Other hemorrhoids   Stage 3b chronic kidney disease   Stricture of esophagus   Tobacco user   Unspecified cirrhosis of liver   Weight loss, abnormal   Benign neoplasm of ascending colon   Benign neoplasm of descending colon   Ototoxicity of both ears   History of substance abuse       Wounds: left hip incision-surgical glue, dressing-c/d/I; LUE skin tear-foam dressing  S/p left hip hemiarthroplasty on 12/20 by Dr. INDIRA Montemayor   Precautions: WBAT to LLE without external or internal rotation of the hip  Bracing/AD: RW  Swallowing: Diabetic Diet, double protein  Function: Tolerating therapy. Continue PT/OT  VTE  Prophylaxis:   enoxaparin injection 30 mg SubQ q24hr  Code Status: FULL CODE   Discharge: Patient, son, and his 2 children live in [place] in a 2-story home with a threshold entrance. She does not need to go upstairs. Completed 10th grade. She has no  history. She is a retired  for [employer].  and . Her ex- takes the patient to appointments and runs errands.  The patients son and 2 grandchildren live with her. They cook, clean, and do laundry.  Ex- hires someone for lawn maintenance.  Children: (1). Date 1/6 Monday.                 Tania Oshea NP, conducted additional independent physical examination and assisted with medical documentation.

## 2025-01-03 NOTE — PT/OT/SLP PROGRESS
"Occupational Therapy Inpatient Rehab Treatment    Name: Luz Maria Alfaro  MRN: [id number]    Assessment:  Luz Maria Alfaro is a 64 y.o. female admitted with a medical diagnosis of Displaced fracture of left femoral neck.  She presents with the following impairments/functional limitations:  weakness, impaired endurance, impaired self care skills, impaired functional mobility, gait instability, impaired balance, impaired cognition, decreased lower extremity function, decreased safety awareness, pain, edema.    General Precautions: Standard, fall     Orthopedic Precautions:LLE weight bearing as tolerated (no IR/ER)     Braces: N/A    Rehab Prognosis: Good; patient would benefit from acute skilled OT services to address these deficits and reach maximum level of function.      History:     Past Medical History:   Diagnosis Date    Benign essential HTN     Mixed hyperlipidemia     Type 2 diabetes mellitus without complications        Past Surgical History:   Procedure Laterality Date    APPENDECTOMY      BACK SURGERY      BREAST SURGERY  01 13 2002    Augmantation    HEMIARTHROPLASTY OF HIP Left 12/20/2024    Procedure: HEMIARTHROPLASTY, HIP;  Surgeon: Jose J Montemayor DO;  Location: [facility];  Service: Orthopedics;  Laterality: Left;  lateral peg board kevin    HIP SURGERY      INTRAMEDULLARY RODDING OF FEMUR Right 10/27/2022    Procedure: RIGHT FEMUR INTERTROCH IMN;  Surgeon: Stewart Brambila MD;  Location: [facility];  Service: Orthopedics;  Laterality: Right;  Preston table, supine  3rd case  Synthes Short TFNA       Subjective     Orientation: Oriented x4    Chief Complaint: pain and needing to use the restroom    Patient/Family Comments/goals: "I got some clothes yesterday and ready to take a shower."    Vitals   Vitals at Rest  BP    HR    O2 Sat    Pain 9/10     Vitals With Activity  BP    HR    O2 Sat    Pain 10/10     Respiratory Status: Room air    Patients cultural, spiritual, Sabianist conflicts given the current " situation: no       Objective:     Patient found up in chair with peripheral IV  upon OT entry to room.    Mobility   Patient completed:  Sit to Stand Transfer with independence with rolling walker  Stand to Sit Transfer with independence with rolling walker  Toilet Transfer Step Transfer technique with SPV/independence with  rolling walker  Tub Transfer Stand Pivot technique with SPV/independence with rolling walker    Functional Mobility  In room mobility for ADLs with RW with SPV/IND. (Pt needs SPV when she needing to use the restroom due to urgency of situation, but otherwise is independent with RW.)    ADLs   Current Status   Eating 6   Oral Hygiene 6   Shower, Bathe Self 5   Upper Body Dressing 6   Lower Body Dressing 6   Toileting Hygiene 6   Toilet Transfer 6   Putting On, Taking Off Footwear 6     Limiting Factors for ADLs: motor, endurance, limited ROM, balance, weakness, safety awareness, and pain     IADLs: In OT gym, pt started to fold clothes but was interrupted with bowel urgency and taken back to room.    Therapeutic Exercise  Pt tolerated B UE AROM 10 min on UBE at 1 perez going 5 mins forward and 5 mins back. Pt. completed [] standing [] sitting unsupported [x] sitting semi-supported to improve overall UE and cardiovascular endurance to improve safety and independence with FM, functional transfers and ADL performance.       Patient left up in chair with all lines intact and call button in reach.     Education provided: Roles and goals of OT, ADLs, transfer training, assistive device, sequencing, safety precautions, fall prevention, post-op precautions, and home safety    Multidisciplinary Problems       Occupational Therapy Goals          Problem: Occupational Therapy    Goal Priority Disciplines Outcome Interventions   Occupational Therapy Goal     OT, PT/OT Progressing    Description: ADLs:  MET - Pt to perform grooming tasks with SBA standing at sink.  MET - Pt to perform feeding tasks with  independence.  MET - Pt to perform UB dressing with SBA.  MET - Pt to perform LB dressing with max assist with AE as needed.   Updated goal: Pt to perform LB dressing with set up assist with AE as needed.  MET - Pt to perform putting on/off footwear task with max assist with AE as needed.  Updated goal: Pt to perform putting on/off footwear task with set up assist with AE as needed.  MET - Pt to perform toileting with min assist.  MET - Pt to perform bathing with mod assist with AE as needed.  Updated goal: Pt to perform bathing independently with AE as needed.    Functional Transfers:  MET - Pt to perform toilet transfers with CGA to toilet in bathroom.  Updated goal: Pt to perform toilet transfers with SBA with RW to toilet in bathroom.  MET: Pt to perform a tub transfer with mod assist with AE as needed onto TTB.  Updated goal: Pt to perform a tub transfer with set up assist with AE as needed onto TTB.    IADLs:  Pt to perform simple meal prep with mod assist.    Balance, Strengthening, Endurance, Balance:  Pt to consistently demonstrate adherence to orthopedic precautions during all ADL's as instructed by OT.  Pt to demonstrate good dynamic standing balance as required to perform ADL's from standing level.  Pt to demonstrate good BUE strength during functional task   Pt to demonstrate consistent adherence to breathing control and energy conservation techniques as educated by OT.                        Time Tracking     OT Received On: 01/03/25  Time In 0830     Time Out 1000  Total Time 90 min  Therapy Time: OT Individual: 90  Missed Time:    Missed Time Reason:      Billable Minutes: Self Care/Home Management 75 and Therapeutic Exercise 15    01/03/2025

## 2025-01-03 NOTE — PROGRESS NOTES
[facility] ([facility])  Rehab Progress Note    Patient Name: Luz Maria Alfaro  MRN: [id number]  Age: 64 y.o. Sex: female  : 1960  Hospital Length of Stay: 10 days  Date of Service: 1/3/2025   Chief Complaint: Displaced fracture of left femoral neck s/p left hemiarthroplasty on 2024     Subjective:     Basic Information  Admit Information: 63-year-old white female presented to [facility] clinical on 2024 for follow up visit for left hip pain that has been progressively worsening.  Reportedly fell 2 months ago.  Imaging on  significant for left displaced femoral neck fracture.  Recommended to go to [facility] ED on  for admission.  PMH significant for HTN, dm type 2, HLD, GERD, and nicotine dependence.  Workup significant for left displaced femoral neck fracture.  Tolerated left hip hemiarthroplasty on  without perioperative complications.  She did require 2 units PRBC.  Sodium bicarb tablets initiated due to metabolic acidosis.  Currently Min assist.  Tolerated transfer to [facility] inpatient rehab unit on  without incident.   Today's Information: No acute events overnight.  Sitting up in chair.  Reports good sleep and appetite.  Last BM .  Fever of 100.6 this morning.  Vital signs otherwise unremarkable.  Good glycemic control.  No new labs or imaging today.  COVID/flu remains negative.    Review of patient's allergies indicates:   Allergen Reactions    Iodinated contrast media Shortness Of Breath    Opioids - morphine analogues     Meperidine Rash and Hives        Current Facility-Administered Medications:     acetaminophen tablet 650 mg, 650 mg, Oral, BID PRN, Tania Oshea A, FNP, 650 mg at 25 0525    ALPRAZolam tablet 0.25 mg, 0.25 mg, Oral, TID PRN, Bere, Moy A, FNP, 0.25 mg at 24    ARIPiprazole tablet 2 mg, 2 mg, Oral, Daily, Bere, Moy A, FNP, 2 mg at 25 0840    atorvastatin tablet 40 mg, 40 mg, Oral, QHS,  Bere, Moy A, FNP, 40 mg at 01/02/25 2010    benzonatate capsule 100 mg, 100 mg, Oral, TID PRN, Bere, Moy A, FNP    busPIRone tablet 5 mg, 5 mg, Oral, BID, Bere, Moy A, FNP, 5 mg at 01/03/25 0840    dextrose 50% injection 12.5 g, 12.5 g, Intravenous, PRN, Bere, Moy A, FNP    dextrose 50% injection 25 g, 25 g, Intravenous, PRN, Bere, Moy A, FNP    diclofenac sodium 1 % gel 4 g, 4 g, Topical (Top), TID AC, Dayo, Tania A, FNP, 4 g at 01/03/25 0500    docusate sodium capsule 100 mg, 100 mg, Oral, BID, Bere, Moy A, FNP, 100 mg at 01/02/25 2010    enoxaparin injection 30 mg, 30 mg, Subcutaneous, Q24H (prophylaxis, 1700), Bere, Moy A, FNP    EScitalopram oxalate tablet 5 mg, 5 mg, Oral, Daily, Sheridan, Tania A, FNP, 5 mg at 01/03/25 0840    gabapentin capsule 100 mg, 100 mg, Oral, Before dinner, Dayo Tania A, FNP, 100 mg at 01/02/25 1656    gabapentin capsule 300 mg, 300 mg, Oral, QHS, Bere, Moy A, FNP, 300 mg at 01/02/25 2010    glucagon (human recombinant) injection 1 mg, 1 mg, Intramuscular, PRN, Bere, Moy A, FNP    glucose chewable tablet 16 g, 16 g, Oral, PRN, Bere, Moy A, FNP    glucose chewable tablet 24 g, 24 g, Oral, PRN, Bere, Moy A, FNP    hydrALAZINE injection 10 mg, 10 mg, Intravenous, Q4H PRN, Bere, Moy A, FNP    HYDROcodone-acetaminophen 5-325 mg per tablet 1 tablet, 1 tablet, Oral, Daily PRN, Bere, Moy A, FNP, 1 tablet at 01/03/25 0840    hydrOXYzine pamoate capsule 50 mg, 50 mg, Oral, Nightly PRN, Tania Oshea A, FNP, 50 mg at 01/02/25 2017    insulin aspart U-100 injection 0-10 Units, 0-10 Units, Subcutaneous, QID (AC + HS) PRN, Bere Moy A, FNP    labetalol 20 mg/4 mL (5 mg/mL) IV syring, 10 mg, Intravenous, Q4H PRN, Bere Moy A, FNP    LIDOcaine 5 % patch 2 patch, 2 patch, Transdermal, Q24H, Tania Oshea A, FNP, 2 patch at 01/03/25 0500    methocarbamoL  "tablet 500 mg, 500 mg, Oral, Q6H, Tania Oshea, FNP, 500 mg at 01/03/25 0500    metoprolol injection 10 mg, 10 mg, Intravenous, Q2H PRN, Bere, Moy A, FNP    naloxegoL (MOVANTIK) tablet 25 mg, 25 mg, Oral, Daily, George Do MD, 25 mg at 01/03/25 0840    nicotine 21 mg/24 hr 1 patch, 1 patch, Transdermal, Daily, Moy Blackburn A, FNP, 1 patch at 01/03/25 0843    nitroGLYCERIN SL tablet 0.4 mg, 0.4 mg, Sublingual, Q5 Min PRN, Bere, Moy A, FNP    ondansetron disintegrating tablet 4 mg, 4 mg, Oral, Q6H PRN, Bere, Moy A, FNP, 4 mg at 01/01/25 0712    ondansetron disintegrating tablet 8 mg, 8 mg, Oral, Q8H PRN, Bere, Moy A, FNP    ondansetron injection 4 mg, 4 mg, Intravenous, Q8H PRN, Bere, Moy A, FNP    oxyCODONE immediate release tablet 10 mg, 10 mg, Oral, Q4H PRN, Bere, Moy A, FNP, 10 mg at 01/03/25 0500    oxyCODONE immediate release tablet 5 mg, 5 mg, Oral, Q4H PRN, Bere, Moy A, FNP, 5 mg at 12/27/24 1714    pantoprazole EC tablet 40 mg, 40 mg, Oral, BID, Bere, Moy A, FNP, 40 mg at 01/03/25 0840    polyethylene glycol packet 17 g, 17 g, Oral, BID, George Do MD, 17 g at 01/01/25 0713    sodium bicarbonate tablet 650 mg, 650 mg, Oral, BID, Bere, Moy A, FNP, 650 mg at 01/03/25 0840     Review of Systems   Complete 12-point review of symptoms negative except for what's mentioned in HPI     Objective:     BP (!) 96/54   Pulse 101   Temp (!) 100.6 °F (38.1 °C) (Oral)   Resp 18   Ht 5' 3" (1.6 m)   Wt 50.9 kg (112 lb 3.4 oz)   SpO2 (!) 93%   BMI 19.88 kg/m²      Physical Exam  Nursing note reviewed.   Constitutional:       Appearance: Normal appearance.   HENT:      Head: Normocephalic.      Mouth/Throat:      Mouth: Mucous membranes are moist.   Eyes:      Pupils: Pupils are equal, round, and reactive to light.   Cardiovascular:      Rate and Rhythm: Normal rate and regular rhythm.      Heart sounds: Normal heart " sounds.   Pulmonary:      Effort: Pulmonary effort is normal.      Breath sounds: Normal breath sounds.   Abdominal:      General: Bowel sounds are normal.      Palpations: Abdomen is soft.   Musculoskeletal:      Cervical back: Neck supple.      Comments: Left hip dressing clean and intact    Skin:     General: Skin is warm and dry.   Neurological:      Mental Status: She is alert and oriented to person, place, and time.      Motor: Weakness present.   Psychiatric:         Mood and Affect: Mood normal.         Behavior: Behavior normal.         Thought Content: Thought content normal.         Judgment: Judgment normal.     *MD performed and documented physical examination       Lines/Drains/Airways       Peripheral Intravenous Line  Duration                  Peripheral IV - Single Lumen 01/02/25 1200 22 G Left;Posterior Hand <1 day                  Labs  Recent Results (from the past 24 hours)   POCT glucose    Collection Time: 01/03/25  5:02 AM   Result Value Ref Range    POCT Glucose 91 70 - 110 mg/dL   Radiology   CXR one view on 01/01/2025, IMPRESSION:  No acute disease is seen.  Radiology  Left hip and pelvis XR on 12/20/2024, IMPRESSION:  Satisfactory alignment following left hip arthroplasty.    Assessment/Plan:     64 y.o. WF admitted on 12/24/2024    Displaced fracture of left femoral neck   - s/p left hemiarthroplasty on 12/20/2024  - WBAT to LLE without external or internal rotation of the hip  - staples to be removed on 01/10  - discontinued Tylenol 650 mg every 6 hours on 12/30   - initiate   Lasix 40 mg IVP x1 on 01/03  - continue                  Gabapentin 300 mg nightly                 Robaxin 750 mg t.i.d.                Norco 5 mg/325 mg daily p.r.n. prior to therapy                Oxycodone 5 mg q.4 hours p.r.n. for moderate pain                 Oxycodone 10 mg q.4 hours p.r.n. for severe pain  - defer to physiatry for rehab and pain management  - PT/OT/RT/ST following     Normocytic anemia  -  asymptomatic  - s/p transfusion           x2 units PRBC on 12/21/2024  - H/H stable   - no evidence of active bleeds  - will closely monitor and transfuse if needed      Depression/anxiety  - stable  - continue                Abilify 2 mg daily                 BuSpar 5 mg b.i.d.                 Lexapro 5 mg daily     HLD  - FLP outpatient  - continue                Lipitor 40 mg nightly      Nicotine dependence  - smoking cessation counseling initiated on admission  - continue                Nicotine patch daily     GERD  - Avoid spicy foods, and nothing to eat or drink within x2 hours of bedtime or laying flat (water is ok)   - Avoid NSAIDs (Advil, ibuprofen, naproxen...) and chino-2 inhibitors (Mobic, Celebrex)    - continue                Protonix 40 mg b.i.d.     Constipation  - reports hard stools on 12/31  - initiate   Movantik 25 mg daily (initiated 12/31)  - continue  Colace 100 mg BID   Miralax 17 gm b.i.d. (increased 12/31)     Metabolic acidosis  - sodium bicarb 25  - continue                Sodium bicarbonate 650 mg b.i.d.     Moderate protein calorie malnutrition  - albumin 2.9/prealbumin 13.3  - encourage oral nutrition  - registered dietitian following     DM type II  - HgA1c 5.0 on 12/25/2024  - continue                ISS   - CBGs AC/HS     HTN  - BP at goal  - continue                Hydralazine 10 mg every 2 hours as needed for BP > 160/90                Labetalol 10 mg every 2 hours as needed for BP > 160/90  - low sodium diet    Fever  - isolated, again with low-grade fever on 01/03  - RSV/flu/COVID negative 12/31 and 1/3  - CXR negative  - UA negative  - WBC trending down within normal limits  - LLE NIVA negative for DVT on 01/02  - continue   Lovenox 30 daily     VTE Prophylaxis:  Lovenox 30 mg daily  COVID-19 testing:  Unknown  COVID-19 vaccination status:  Unvaccinated     POA:  No  Living will:  No  Contacts:  Jaxon Baum (son) [phone]     CODE STATUS:  Full code  Internal Medicine  (attending): George Do MD  Physiatry (consulting):  Nam Swift MD     OUTPATIENT PROVIDERS  PCP: ALEKS Mcfadden  Orthopedic surgery:  Jose J Montemayor D.O.     DISPOSITION:  Sleep hygiene, bowel maintenance, and appetite at goal.  Last BM 1/2.  Low-grade fever of 100.6° F reported.  Vital signs otherwise at goal.  No new labs or imaging today.  Good glycemic control.  COVID/flu negative.  Recent knee been negative for DVT.  Still with left lower extremity edema.  Initiate Lasix 40 mg IVP x1 dose now.  Replace potassium.  Repeat BMP in the morning.  Monitor closely.  Notify of acute changes.    Staffing 12/31/2024: Continent of bowel and bladder. RT: CGA to supervision.  Limited by standing tolerance and pain.  Needs encouragement. Appetite is good at 75%.  Ill-fitting dentures. PT: COSMO to SBA for bed mobility.  Sit to stand with tranfers.  CGA ambulating 80 feet. Limited by pain. OT: Progressing.  Limited by pain.  Supervision to independent with ADLS. With adaptive equipment.  Needs TTB upon discharge. Projected discharge 1/6 home with .      Mao Blackburn NP conducted independent physical examination and assisted with medical documentation.    Total time spent on this encounter including chart review and direct MD + NP 1-on-1 patient interaction: 51 minutes   Over 50% of this time was spent in counseling and coordination of care

## 2025-01-03 NOTE — PROGRESS NOTES
Inpatient Nutrition Assessment    Admit Date: 12/24/2024   Total duration of encounter: 10 days   Patient Age: 64 y.o.    Nutrition Recommendation/Prescription     Continue 2000 kcal Diabetic Diet; Double Protein. With Easy to Chew (IDDSI Level 7) modifier.    Extra gravy on meats  Continue ONS Boost Plus BID (provides 360 kcal and 14 g protein per serving).  Encourage intake  4.   Medical mgmt of blood glucose.  5.   Bowel regimen per MD.  Will continue to monitor wt, labs, and po intake.    Communication of Recommendations: reviewed with patient    Nutrition Assessment     Malnutrition Assessment/Nutrition-Focused Physical Exam    Malnutrition Context: chronic illness (12/26/24 1323)  Malnutrition Level: moderate (12/26/24 1323)     Weight Loss (Malnutrition): other (see comments) (Does not meet criteria) (12/26/24 1323)  Subcutaneous Fat (Malnutrition): severe depletion (12/26/24 1323)  Orbital Region (Subcutaneous Fat Loss): moderate depletion  Upper Arm Region (Subcutaneous Fat Loss): severe depletion     Muscle Mass (Malnutrition): mild depletion (12/26/24 1323)  Latter-day Region (Muscle Loss): mild depletion  Clavicle Bone Region (Muscle Loss): mild depletion  Clavicle and Acromion Bone Region (Muscle Loss): mild depletion     Dorsal Hand (Muscle Loss): mild depletion     Anterior Thigh Region (Muscle Loss): mild depletion  Posterior Calf Region (Muscle Loss): mild depletion  Fluid Accumulation (Malnutrition): other (see comments) (Not present) (12/26/24 1323)     Hand  Strength, Right (Malnutrition): Unable to assess (12/26/24 1323)  A minimum of two characteristics is recommended for diagnosis of either severe or non-severe malnutrition.    Chart Review    Reason Seen: follow-up    Malnutrition Screening Tool Results   Have you recently lost weight without trying?: Yes: 2-13 lbs  Have you been eating poorly because of a decreased appetite?: No   MST Score: 1   Diagnosis:  Displaced fracture of left femoral  neck 12/20/2024      Closed right hip fracture s/p Intramedullary nailing 10/26/2022       Relevant Medical History: Displaced fracture of left femoral neck s/p left hemiarthroplasty on 12/20/2024, normocytic anemia, depression/anxiety, HTN, HLD, nicotine dependence, GERD, DM type 2     Scheduled Medications:  ARIPiprazole, 2 mg, Daily  atorvastatin, 40 mg, QHS  busPIRone, 5 mg, BID  diclofenac sodium, 4 g, TID AC  docusate sodium, 100 mg, BID  enoxparin, 30 mg, Q24H (prophylaxis, 1700)  EScitalopram oxalate, 5 mg, Daily  furosemide (LASIX) injection, 40 mg, Once  gabapentin, 100 mg, Before dinner  gabapentin, 300 mg, QHS  LIDOcaine, 2 patch, Q24H  methocarbamoL, 500 mg, Q6H  naloxegoL, 25 mg, Daily  nicotine, 1 patch, Daily  pantoprazole, 40 mg, BID  potassium chloride, 40 mEq, Once  sodium bicarbonate, 650 mg, BID    Continuous Infusions:   PRN Medications:  acetaminophen, 650 mg, BID PRN  ALPRAZolam, 0.25 mg, TID PRN  benzonatate, 100 mg, TID PRN  dextrose 50%, 12.5 g, PRN  dextrose 50%, 25 g, PRN  glucagon (human recombinant), 1 mg, PRN  glucose, 16 g, PRN  glucose, 24 g, PRN  hydrALAZINE, 10 mg, Q4H PRN  HYDROcodone-acetaminophen, 1 tablet, Daily PRN  hydrOXYzine pamoate, 50 mg, Nightly PRN  insulin aspart U-100, 0-10 Units, QID (AC + HS) PRN  labetalol, 10 mg, Q4H PRN  metoprolol, 10 mg, Q2H PRN  nitroGLYCERIN, 0.4 mg, Q5 Min PRN  ondansetron, 4 mg, Q6H PRN  ondansetron, 8 mg, Q8H PRN  ondansetron, 4 mg, Q8H PRN  oxyCODONE, 10 mg, Q4H PRN  oxyCODONE, 5 mg, Q4H PRN  polyethylene glycol, 17 g, BID PRN    Calorie Containing IV Medications: no significant kcals from medications at this time    Recent Labs   Lab 12/30/24  0542 01/01/25  0514 01/02/25  0511    137 141   K 3.9 3.7 3.6   CALCIUM 8.4 8.5 8.1*   PHOS 2.6  --   --    MG 1.70  --   --     103 108*   CO2 25 26 25   BUN 7.6* 6.6* 5.0*   CREATININE 0.62 0.59 0.53*   EGFRNORACEVR >60 >60 >60   GLUCOSE 103 104 93   BILITOT 1.4  --   --    ALKPHOS  498*  --   --    ALT 48  --   --    *  --   --    ALBUMIN 2.7*  --   --    PREALB 10.6*  --   --    WBC 8.02 11.91* 9.19   HGB 8.7* 8.9* 8.1*   HCT 28.2* 29.0* 25.4*     Nutrition Orders:  Diet diabetic Double Protein, Easy to Chew (IDDSI Level 7); 2000 Calories (up to 75 gm per meal); Standard Tray  Dietary nutrition supplements BID; Boost Plus Nutritional Drink - Any flavor    Appetite/Oral Intake: good/% of meals  Factors Affecting Nutritional Intake: chewing difficulty and diarrhea  Social Needs Impacting Access to Food: none identified  Food/Voodoo/Cultural Preferences:  cannot have peppery foods and doesn't drink milk ; extra salt packet with meals.  Food Allergies: no known food allergies  Last Bowel Movement: 01/02/25  Wound(s):  none noted    Comments    12/26/24: Pt sleeping at time of visit. NFPE showed signs of mild muscle wasting and moderate to severe fat wasting. Pt meets criteria for moderate malnutrition. Per EMR, appears pt may have lost wt then gained it back; will re-assess upon f/u. Pt ate about 25% of lunch. Pt eating fairly per EMR. No issues c/s and no gi symptoms noted. NP added Boost Breeze BID. Last BM today. Labs and meds reviewed; Alb 2.9, Prealb 13.3.    12/31/24: Pt reports good appetite and intake. States the gumbo is too peppery and she can't eat it. Pt reports occasional issues chewing 2/2 dentures being too big; says it's because she lost about 50# since 2023 then gained it back and they don't fit properly anymore. Pt denies n/v. Pt having some constipation (last BM 3 days ago) but refused enema. Pt dislikes the Boost Breeze and prefers the chocolate and vanilla Boost; will update order.    1/3/25:  Pt continues with good appetite and intake.  Reported chicken thigh was too dry to eat.  Request extra gravy with meats and extra salt pkt to kitchen per pt request.  Pt states drinking Boost Plus.  Pt also c/o diarrhea - noted new med for constipation started - monitor.  " Labs / meds reviewed     Anthropometrics    Height: 5' 3" (160 cm), Height Method: Stated  Last Weight: 50.9 kg (112 lb 3.4 oz) (12/28/24 0516), Weight Method: Bed Scale  BMI (Calculated): 19.9  BMI Classification: underweight (BMI less than 22 if >65 years of age)     Ideal Body Weight (IBW), Female: 115 lb     % Ideal Body Weight, Female (lb): 97.58 %                             Usual Weight Provided By: EMR weight history    Wt Readings from Last 5 Encounters:   12/28/24 50.9 kg (112 lb 3.4 oz)   12/19/24 42.7 kg (94 lb 3.2 oz)   12/19/24 44.5 kg (98 lb)   12/18/24 44.5 kg (98 lb)   11/11/24 52.8 kg (116 lb 6.5 oz)     Weight Change(s) Since Admission: wt stable - 1/3/25: no new wts from last f/up  Wt Readings from Last 1 Encounters:   12/28/24 0516 50.9 kg (112 lb 3.4 oz)   12/24/24 1502 50.9 kg (112 lb 3.4 oz)   12/24/24 1424 50.9 kg (112 lb 3.4 oz)   Admit Weight: 50.9 kg (112 lb 3.4 oz) (12/24/24 1424), Weight Method: Bed Scale    Estimated Needs    Weight Used For Calorie Calculations: 50.9 kg (112 lb 3.4 oz)  Energy Calorie Requirements (kcal): 6405-4332 kcal (30-35 kcal/kg)  Energy Need Method: Kcal/kg  Weight Used For Protein Calculations: 50.9 kg (112 lb 3.4 oz)  Protein Requirements: 61-66 g (1.2-1.3 g/kg)  Fluid Requirements (mL): 1527 mL/d (1.0 mL/kcal)  CHO Requirement: 202-247 g/d (45-55% of EER)     Enteral Nutrition     Patient not receiving enteral nutrition at this time.    Parenteral Nutrition     Patient not receiving parenteral nutrition support at this time.    Evaluation of Received Nutrient Intake    Calories: meeting estimated needs  Protein: meeting estimated needs    Patient Education     Not applicable.    Nutrition Diagnosis     PES: Moderate chronic disease or condition related malnutrition Related to chronic illness As Evidenced by:  - muscle mass depletion: 6 areas of mild muscle loss (Acromion, Gastrocnemius, Temporalis, Clavicle, Quadriceps, Interosseous) - loss of subcutaneous " fat: 1 area of severe fat loss (Triceps Skinfold), 1 area of moderate fat loss (Infraorbital) new    Nutrition Interventions     Intervention(s): modified composition of meals/snacks, commercial beverage, and collaboration with other providers  Intervention(s): Oral nutritional supplement;Oral diet/nutrient modifications    Goal: Meet greater than 80% of nutritional needs by follow-up. (goal progressing)    Nutrition Goals & Monitoring     Dietitian will monitor: food and beverage intake, weight, and glucose/endocrine profile  Discharge planning: resume home regimen  Nutrition Risk/Follow-Up: moderate (follow-up in 3-5 days)   Please consult if re-assessment needed sooner.

## 2025-01-04 LAB
ANION GAP SERPL CALC-SCNC: 7 MEQ/L
BUN SERPL-MCNC: 8.9 MG/DL (ref 9.8–20.1)
CALCIUM SERPL-MCNC: 8.4 MG/DL (ref 8.4–10.2)
CHLORIDE SERPL-SCNC: 105 MMOL/L (ref 98–107)
CO2 SERPL-SCNC: 25 MMOL/L (ref 23–31)
CREAT SERPL-MCNC: 0.57 MG/DL (ref 0.55–1.02)
CREAT/UREA NIT SERPL: 16
GFR SERPLBLD CREATININE-BSD FMLA CKD-EPI: >60 ML/MIN/1.73/M2
GLUCOSE SERPL-MCNC: 102 MG/DL (ref 82–115)
MAGNESIUM SERPL-MCNC: 2.1 MG/DL (ref 1.6–2.6)
POCT GLUCOSE: 106 MG/DL (ref 70–110)
POCT GLUCOSE: 99 MG/DL (ref 70–110)
POTASSIUM SERPL-SCNC: 3.7 MMOL/L (ref 3.5–5.1)
SODIUM SERPL-SCNC: 137 MMOL/L (ref 136–145)

## 2025-01-04 PROCEDURE — 99900031 HC PATIENT EDUCATION (STAT)

## 2025-01-04 PROCEDURE — S4991 NICOTINE PATCH NONLEGEND: HCPCS | Performed by: NURSE PRACTITIONER

## 2025-01-04 PROCEDURE — 25000003 PHARM REV CODE 250: Performed by: NURSE PRACTITIONER

## 2025-01-04 PROCEDURE — 36415 COLL VENOUS BLD VENIPUNCTURE: CPT | Performed by: NURSE PRACTITIONER

## 2025-01-04 PROCEDURE — 11800000 HC REHAB PRIVATE ROOM

## 2025-01-04 PROCEDURE — 97530 THERAPEUTIC ACTIVITIES: CPT

## 2025-01-04 PROCEDURE — 97116 GAIT TRAINING THERAPY: CPT

## 2025-01-04 PROCEDURE — 83735 ASSAY OF MAGNESIUM: CPT | Performed by: NURSE PRACTITIONER

## 2025-01-04 PROCEDURE — 94761 N-INVAS EAR/PLS OXIMETRY MLT: CPT

## 2025-01-04 PROCEDURE — 25000003 PHARM REV CODE 250: Performed by: INTERNAL MEDICINE

## 2025-01-04 PROCEDURE — 80048 BASIC METABOLIC PNL TOTAL CA: CPT | Performed by: NURSE PRACTITIONER

## 2025-01-04 PROCEDURE — 63600175 PHARM REV CODE 636 W HCPCS: Performed by: NURSE PRACTITIONER

## 2025-01-04 PROCEDURE — 94799 UNLISTED PULMONARY SVC/PX: CPT

## 2025-01-04 PROCEDURE — 97164 PT RE-EVAL EST PLAN CARE: CPT

## 2025-01-04 RX ADMIN — PANTOPRAZOLE SODIUM 40 MG: 40 TABLET, DELAYED RELEASE ORAL at 08:01

## 2025-01-04 RX ADMIN — ESCITALOPRAM OXALATE 5 MG: 5 TABLET, FILM COATED ORAL at 08:01

## 2025-01-04 RX ADMIN — METHOCARBAMOL 500 MG: 500 TABLET ORAL at 06:01

## 2025-01-04 RX ADMIN — NALOXEGOL OXALATE 25 MG: 25 TABLET, FILM COATED ORAL at 07:01

## 2025-01-04 RX ADMIN — SODIUM BICARBONATE 650 MG: 650 TABLET ORAL at 08:01

## 2025-01-04 RX ADMIN — NICOTINE 1 PATCH: 21 PATCH, EXTENDED RELEASE TRANSDERMAL at 07:01

## 2025-01-04 RX ADMIN — BUSPIRONE HYDROCHLORIDE 5 MG: 5 TABLET ORAL at 08:01

## 2025-01-04 RX ADMIN — OXYCODONE 10 MG: 5 TABLET ORAL at 12:01

## 2025-01-04 RX ADMIN — METHOCARBAMOL 500 MG: 500 TABLET ORAL at 12:01

## 2025-01-04 RX ADMIN — ATORVASTATIN CALCIUM 40 MG: 40 TABLET, FILM COATED ORAL at 08:01

## 2025-01-04 RX ADMIN — OXYCODONE 10 MG: 5 TABLET ORAL at 06:01

## 2025-01-04 RX ADMIN — METHOCARBAMOL 500 MG: 500 TABLET ORAL at 04:01

## 2025-01-04 RX ADMIN — ALPRAZOLAM 0.25 MG: 0.25 TABLET ORAL at 08:01

## 2025-01-04 RX ADMIN — GABAPENTIN 100 MG: 100 CAPSULE ORAL at 04:01

## 2025-01-04 RX ADMIN — ARIPIPRAZOLE 2 MG: 2 TABLET ORAL at 07:01

## 2025-01-04 RX ADMIN — SODIUM BICARBONATE 650 MG: 650 TABLET ORAL at 07:01

## 2025-01-04 RX ADMIN — PANTOPRAZOLE SODIUM 40 MG: 40 TABLET, DELAYED RELEASE ORAL at 07:01

## 2025-01-04 RX ADMIN — HYDROXYZINE PAMOATE 50 MG: 50 CAPSULE ORAL at 08:01

## 2025-01-04 RX ADMIN — ENOXAPARIN SODIUM 30 MG: 30 INJECTION SUBCUTANEOUS at 04:01

## 2025-01-04 RX ADMIN — OXYCODONE 10 MG: 5 TABLET ORAL at 08:01

## 2025-01-04 RX ADMIN — DICLOFENAC SODIUM 4 G: 10 GEL TOPICAL at 06:01

## 2025-01-04 RX ADMIN — GABAPENTIN 300 MG: 300 CAPSULE ORAL at 08:01

## 2025-01-04 RX ADMIN — BUSPIRONE HYDROCHLORIDE 5 MG: 5 TABLET ORAL at 07:01

## 2025-01-04 NOTE — PLAN OF CARE
Problem: Diabetes Comorbidity  Goal: Blood Glucose Level Within Targeted Range  Outcome: Progressing     Problem: Wound  Goal: Optimal Functional Ability  Outcome: Progressing     Problem: Wound  Goal: Absence of Infection Signs and Symptoms  Outcome: Progressing     Problem: Wound  Goal: Skin Health and Integrity  Outcome: Progressing     Problem: Wound  Goal: Optimal Wound Healing  Outcome: Progressing     Problem: Fall Injury Risk  Goal: Absence of Fall and Fall-Related Injury  Outcome: Progressing

## 2025-01-04 NOTE — PT/OT/SLP RE-EVAL
Physical Therapy Rehab Re-Evaluation    Patient Name:  Luz Maria Alfaro   MRN:  [id number]    Recommendations:     Discharge Recommendations:  High Intensity Therapy   Discharge Equipment Recommendations: walker, rolling   Barriers to discharge: Decreased caregiver support    Assessment:     Luz Maria Alfaro is a 64 y.o. female admitted with a medical diagnosis of Displaced fracture of left femoral neck.  She presents with the following impairments/functional limitations:  weakness, impaired endurance, impaired functional mobility, gait instability, impaired balance, decreased lower extremity function, pain .    Rehab Diagnosis: L femoral neck fx    Recent Surgery: * No surgery found *      General Precautions: Standard, fall     Orthopedic Precautions: LLE weight bearing as tolerated     Braces: N/A    Rehab Prognosis: Good; patient would benefit from acute skilled PT services to address these deficits and reach maximum level of function.      History:     Past Medical History:   Diagnosis Date    Benign essential HTN     Mixed hyperlipidemia     Type 2 diabetes mellitus without complications        Past Surgical History:   Procedure Laterality Date    APPENDECTOMY      BACK SURGERY      BREAST SURGERY  01 13 2002    Augmantation    HEMIARTHROPLASTY OF HIP Left 12/20/2024    Procedure: HEMIARTHROPLASTY, HIP;  Surgeon: Jose J Montemayor DO;  Location: [facility];  Service: Orthopedics;  Laterality: Left;  lateral peg board kevin    HIP SURGERY      INTRAMEDULLARY RODDING OF FEMUR Right 10/27/2022    Procedure: RIGHT FEMUR INTERTROCH IMN;  Surgeon: Stewart Brambila MD;  Location: [facility];  Service: Orthopedics;  Laterality: Right;  [place] table, supine  3rd case  Synthes Short TFNA       Subjective     Chief Complaint: I am feeling ok.  Patient/Family Comments/goals: To get better.     Patients cultural, spiritual, Jew conflicts given the current situation: no       Living Environment  People in Home: child(lala),  "adult, grandchild(lala)  Name(s) of People in Home: Son-Jaxon; GC - Carlos  Living Arrangements: house (2-story)  Home Accessibility: wheelchair accessible  Number of Stairs, Within Home, Primary: other (see comments) (flight)  Home Layout: Able to live on 1st floor  Equipment Currently Used at Home: shower chair, walker, rolling, rollator (HHS)    Prior Level of Function       Equipment used at home: shower chair, walker, rolling, rollator.  DME owned (not currently used): none.      Upon discharge, patient will have assistance from Family.    Objective:     Communicated with pt prior to session.  Patient found up in chair with peripheral IV  upon PT entry to room.      Vitals   BP 97/62   HR 86   O2 Sat     Pain Pain Rating 1: 4/10  Location - Orientation 1: lower  Location 1: back  Pain Addressed 1: Reposition, Distraction     Respiratory Status: Room air    Exams  RLE ROM:          -       WFL  RLE Strength:          -       WFL  LLE ROM:          -       WFL, except limited by pain  LLE Strength:          -       WFL, except limited by pain.     Functional Mobility  Bed Mobility:     Rolling Left:  Independent  Rolling Right: Independent  Supine to Sit: Independent  Sit to Supine: Independent  Transfers:     Sit to Stand: Independent with rolling walker  Gait: Pt ambulates 100' with RW with Supervision or touching assistance .   4 stairs with bilateral handrails with Supervision or touching assistance   6" curb with bilateral handrails with Supervision or touching assistance   Ambulate 15 feet on uneven surfaces/ramps with rolling walker with Supervision or touching assistance    object from ground in standing position without reacher with rolling walker with Independent    GGs   Admit Current   Status Goal   Functional Area: Care Score: Care Score: Care Score:   Roll Left and Right 3 6 Independent   Sit to Lying 2 6 Independent   Lying to Sitting on Side of Bed 2 6 Independent   Sit to Stand 3 6 " Independent   Chair/Bed-to-Chair Transfer 3 6 Independent   Car Transfer 7 5 Independent   Walk 10 Feet 7 6 Independent   Walk 50 Feet with Two Turns 7 4 Independent   Walk 150 Feet 7 88 Independent   Walk 10 Feet Uneven Surface 7 4 Independent   1 Step (Curb) 88 4 Supervision or touching assistance   4 Steps 88 4 Supervision or touching assistance   12 Steps 9 7     Picking Up Object 88 6 Supervision or touching assistance   Wheel 50 Feet with Two Turns 7 7 Independent   Wheel 150 Feet 7 7 Independent     Therapeutic Activities and Exercises:  Pt tolerated session well. Performed well with bed mobility not needing assistance or cuing. Ambulates at slow pace with step through gait pattern with RW. Unable to ambulate more than 100 ft before complaining of low back pain and having to stop. Did stairs and ramp needing seated rest break between, supervision/touching assist with ramp, supervision and vc for proper sequencing with stairs. Melvi with transfers, set up with car transfer.     Activity Tolerance: Good    Patient left semi supine with all lines intact and call button in reach.    Education Provided: transfer training, bed mob, gait training, stair training, safety awareness, body mechanics, and assistive device    Expected compliance: Moderate compliance    GOALS:   Multidisciplinary Problems       Physical Therapy Goals          Problem: Physical Therapy    Goal Priority Disciplines Outcome Interventions   Physical Therapy Goal     PT, PT/OT Progressing    Description:   PT Short Term goals     Bed Mobility:  Roll left and right with supervision/touching assist.MET  Sit to supine transfer with partial/moderate assist.MET  Supine to sit transfer with partial/moderate assist.MET    Transfers:  Sit to stand transfer with setup/clean-up assist using RW.MET  Bed to chair transferStand step with setup/clean-up assist using RW.MET  Car transfer with setup/clean-up assist using RW.MET   an object from the  ground in standing position with supervision/touching assist using RW.MET    Mobility:  Ambulate 150 feet with partial/moderate assist using RW.  Ambulate 10 feet on uneven surfaces/ramps with partial/moderate assist using RW.MET  Ascend/descend a 4 inch curb with partial/moderate assist using RW. MET  Ascend/descend 12 stairs with partial/moderate assist using bilateral handrails.  Manual wheelchair 150 feet with supervision/touching assist using Bilateral upper extremity.      Time Frame: by Re-evaluation                        Plan:     During this hospitalization, patient to be seen 5 x/week to address the identified rehab impairments via gait training, therapeutic exercises, therapeutic activities and progress toward the following goals:    Plan of Care Expires:  01/03/25  PT Next Visit Date: 01/03/25  Plan of Care reviewed with: patient      Additional Infomation:           Time Tracking:     Therapy Time   PT Received On: 01/04/25  PT Start Time: 0830  PT Stop Time: 0930  PT Total Time (min): 60 min  PT Individual: 60  Missed Time:    Time Missed due to:      Billable Minutes: Re-eval 15, Gait Training 15, and Therapeutic Activity 30    01/04/2025

## 2025-01-04 NOTE — PLAN OF CARE
Problem: Diabetes Comorbidity  Goal: Blood Glucose Level Within Targeted Range  Outcome: Progressing     Problem: Rehabilitation (IRF) Plan of Care  Goal: Plan of Care Review  Outcome: Progressing     Problem: Wound  Goal: Optimal Pain Control and Function  Outcome: Progressing  Intervention: Prevent or Manage Pain  Flowsheets (Taken 1/3/2025 1850)  Pain Management Interventions:   around-the-clock dosing utilized   care clustered   pain management plan reviewed with patient/caregiver     Problem: Fall Injury Risk  Goal: Absence of Fall and Fall-Related Injury  Outcome: Progressing  Intervention: Identify and Manage Contributors  Flowsheets (Taken 1/3/2025 1850)  Medication Review/Management: medications reviewed

## 2025-01-04 NOTE — PROGRESS NOTES
[facility] ([facility])  Rehab Progress Note    Patient Name: Luz Maria Alfaro  MRN: [id number]  Age: 64 y.o. Sex: female  : 1960  Hospital Length of Stay: 11 days  Date of Service: 2025   Chief Complaint: Displaced fracture of left femoral neck s/p left hemiarthroplasty on 2024     Subjective:     Basic Information  Admit Information: 63-year-old white female presented to [facility] clinical on 2024 for follow up visit for left hip pain that has been progressively worsening.  Reportedly fell 2 months ago.  Imaging on  significant for left displaced femoral neck fracture.  Recommended to go to [facility] ED on  for admission.  PMH significant for HTN, dm type 2, HLD, GERD, and nicotine dependence.  Workup significant for left displaced femoral neck fracture.  Tolerated left hip hemiarthroplasty on  without perioperative complications.  She did require 2 units PRBC.  Sodium bicarb tablets initiated due to metabolic acidosis.  Currently Min assist.  Tolerated transfer to [facility] inpatient rehab unit on  without incident.   Today's Information: No acute events overnight.  Laying comfortably in bed.  Pleasant mood.  Improved pain control.  Compliant with therapies.  Good sleep and appetite.  No bowel or bladder issues reported.  Last reported BM on .  T-max 100.6° on  at 4:30 a.m..  No subsequent recorded temperatures over 100°.  Vitals otherwise stable.  Good glycemic control.  Labs reviewed.  Stool culture collected on  currently in lab.  BMP and magnesium at goal.  Labs otherwise unremarkable.  No new imaging.    Review of patient's allergies indicates:   Allergen Reactions    Iodinated contrast media Shortness Of Breath    Opioids - morphine analogues     Meperidine Rash and Hives        Current Facility-Administered Medications:     acetaminophen tablet 650 mg, 650 mg, Oral, BID PRN, Tania Oshea FNP, 650 mg at 25 [id number]     ALPRAZolam tablet 0.25 mg, 0.25 mg, Oral, TID PRN, Bere, Moy A, FNP, 0.25 mg at 01/03/25 2358    ARIPiprazole tablet 2 mg, 2 mg, Oral, Daily, Bere, Moy A, FNP, 2 mg at 01/04/25 0753    atorvastatin tablet 40 mg, 40 mg, Oral, QHS, Bere, Moy A, FNP, 40 mg at 01/03/25 2033    benzonatate capsule 100 mg, 100 mg, Oral, TID PRN, Bere, Moy A, FNP    busPIRone tablet 5 mg, 5 mg, Oral, BID, Bere, Moy A, FNP, 5 mg at 01/04/25 0754    dextrose 50% injection 12.5 g, 12.5 g, Intravenous, PRN, Bere, Moy A, FNP    dextrose 50% injection 25 g, 25 g, Intravenous, PRN, Bere, Moy A, FNP    diclofenac sodium 1 % gel 4 g, 4 g, Topical (Top), TID AC, Gilmore, Tania A, FNP, 4 g at 01/04/25 0600    docusate sodium capsule 100 mg, 100 mg, Oral, BID, Bere, Moy A, FNP, 100 mg at 01/02/25 2010    enoxaparin injection 30 mg, 30 mg, Subcutaneous, Q24H (prophylaxis, 1700), Bere, Moy A, FNP, 30 mg at 01/03/25 1621    EScitalopram oxalate tablet 5 mg, 5 mg, Oral, Daily, Dayo, Tania A, FNP, 5 mg at 01/04/25 0800    gabapentin capsule 100 mg, 100 mg, Oral, Before dinner, Dayo, Tania A, FNP, 100 mg at 01/03/25 1621    gabapentin capsule 300 mg, 300 mg, Oral, QHS, Bere, Moy A, FNP, 300 mg at 01/03/25 2033    glucagon (human recombinant) injection 1 mg, 1 mg, Intramuscular, PRN, Bere, Moy A, FNP    glucose chewable tablet 16 g, 16 g, Oral, PRN, Bere, Moy A, FNP    glucose chewable tablet 24 g, 24 g, Oral, PRN, Mao Blackburnothy A, FNP    hydrALAZINE injection 10 mg, 10 mg, Intravenous, Q4H PRN, Moy Blackburn, FNP    HYDROcodone-acetaminophen 5-325 mg per tablet 1 tablet, 1 tablet, Oral, Daily PRN, Moy Blackburn, FNP, 1 tablet at 01/03/25 0840    hydrOXYzine pamoate capsule 50 mg, 50 mg, Oral, Nightly PRN, Tania Oshea, FNP, 50 mg at 01/03/25 2033    insulin aspart U-100 injection 0-10 Units, 0-10 Units, Subcutaneous,  "QID (AC + HS) PRN, Bere, Moy A, FNP    labetalol 20 mg/4 mL (5 mg/mL) IV syring, 10 mg, Intravenous, Q4H PRN, Bere, Moy A, FNP    LIDOcaine 5 % patch 2 patch, 2 patch, Transdermal, Q24H, Tania Oshea FNP, 2 patch at 01/03/25 0500    methocarbamoL tablet 500 mg, 500 mg, Oral, Q6H, Tania Oshea, FNP, 500 mg at 01/04/25 0607    metoprolol injection 10 mg, 10 mg, Intravenous, Q2H PRN, Bere, Moy A, FNP    naloxegoL (MOVANTIK) tablet 25 mg, 25 mg, Oral, Daily, George Do MD, 25 mg at 01/04/25 0754    nicotine 21 mg/24 hr 1 patch, 1 patch, Transdermal, Daily, Moy Blackburn, FNP, 1 patch at 01/04/25 0755    nitroGLYCERIN SL tablet 0.4 mg, 0.4 mg, Sublingual, Q5 Min PRN, Bere, Moy A, FNP    ondansetron disintegrating tablet 4 mg, 4 mg, Oral, Q6H PRN, Bere, Moy A, FNP, 4 mg at 01/01/25 0712    ondansetron disintegrating tablet 8 mg, 8 mg, Oral, Q8H PRN, Bere, Moy A, FNP    ondansetron injection 4 mg, 4 mg, Intravenous, Q8H PRN, Bere, Moy A, FNP    oxyCODONE immediate release tablet 10 mg, 10 mg, Oral, Q4H PRN, Bere, Moy A, FNP, 10 mg at 01/04/25 0616    oxyCODONE immediate release tablet 5 mg, 5 mg, Oral, Q4H PRN, Bere, Moy A, FNP, 5 mg at 12/27/24 1714    pantoprazole EC tablet 40 mg, 40 mg, Oral, BID, Bere, Moy A, FNP, 40 mg at 01/04/25 0755    polyethylene glycol packet 17 g, 17 g, Oral, BID PRN, Tania Oshea FNP    sodium bicarbonate tablet 650 mg, 650 mg, Oral, BID, Moy Blackburn, FNP, 650 mg at 01/04/25 1114     Review of Systems   Complete 12-point review of symptoms negative except for what's mentioned in HPI     Objective:     /69   Pulse 80   Temp 98.2 °F (36.8 °C) (Oral)   Resp 18   Ht 5' 3" (1.6 m)   Wt 50.9 kg (112 lb 3.4 oz)   SpO2 (!) 91%   BMI 19.88 kg/m²      Physical Exam  Constitutional:       Appearance: Normal appearance.      Comments: Thin appearing   HENT:      Head: " Normocephalic.      Ears:      Comments: Bilateral hard of hearing  Eyes:      Pupils: Pupils are equal, round, and reactive to light.   Cardiovascular:      Rate and Rhythm: Normal rate and regular rhythm.      Heart sounds: Normal heart sounds.   Pulmonary:      Effort: Pulmonary effort is normal. No respiratory distress.      Breath sounds: Normal breath sounds. No wheezing or rhonchi.   Abdominal:      General: Bowel sounds are normal.      Palpations: Abdomen is soft.      Tenderness: There is no abdominal tenderness. There is no guarding.   Musculoskeletal:      Cervical back: Neck supple.      Comments: Left hip dressing clean and intact    Skin:     General: Skin is warm and dry.   Neurological:      Mental Status: She is alert and oriented to person, place, and time.      Motor: Weakness present.   Psychiatric:         Mood and Affect: Mood normal.         Behavior: Behavior normal.         Thought Content: Thought content normal.         Judgment: Judgment normal.       Lines/Drains/Airways       Peripheral Intravenous Line  Duration                  Peripheral IV - Single Lumen 01/02/25 1200 22 G Left;Posterior Hand 1 day                Labs  Recent Results (from the past 24 hours)   COVID/FLU A&B PCR    Collection Time: 01/03/25 10:49 AM   Result Value Ref Range    Influenza A PCR Not Detected Not Detected    Influenza B PCR Not Detected Not Detected    SARS-CoV-2 PCR Not Detected Not Detected, Negative   POCT glucose    Collection Time: 01/03/25  4:26 PM   Result Value Ref Range    POCT Glucose 116 (H) 70 - 110 mg/dL   POCT glucose    Collection Time: 01/04/25  6:05 AM   Result Value Ref Range    POCT Glucose 99 70 - 110 mg/dL   Basic Metabolic Panel    Collection Time: 01/04/25  6:41 AM   Result Value Ref Range    Sodium 137 136 - 145 mmol/L    Potassium 3.7 3.5 - 5.1 mmol/L    Chloride 105 98 - 107 mmol/L    CO2 25 23 - 31 mmol/L    Glucose 102 82 - 115 mg/dL    Blood Urea Nitrogen 8.9 (L) 9.8 - 20.1  mg/dL    Creatinine 0.57 0.55 - 1.02 mg/dL    BUN/Creatinine Ratio 16     Calcium 8.4 8.4 - 10.2 mg/dL    Anion Gap 7.0 mEq/L    eGFR >60 mL/min/1.73/m2   Magnesium    Collection Time: 01/04/25  6:41 AM   Result Value Ref Range    Magnesium Level 2.10 1.60 - 2.60 mg/dL     Radiology   CXR one view on 01/01/2025, IMPRESSION:  No acute disease is seen.  Radiology  Left hip and pelvis XR on 12/20/2024, IMPRESSION:  Satisfactory alignment following left hip arthroplasty.    Assessment/Plan:     64 y.o. WF admitted on 12/24/2024    Displaced fracture of left femoral neck   - s/p left hemiarthroplasty on 12/20/2024  - WBAT to LLE without external or internal rotation of the hip  - staples to be removed on 01/10  - discontinued Tylenol 650 mg every 6 hours on 12/30   - s/p Lasix 40 mg IVP x1 on 01/03  - continue                  Gabapentin 300 mg nightly                 Robaxin 750 mg t.i.d.                Norco 5 mg/325 mg daily p.r.n. prior to therapy                Oxycodone 5 mg q.4 hours p.r.n. for moderate pain                 Oxycodone 10 mg q.4 hours p.r.n. for severe pain  - defer to physiatry for rehab and pain management  - PT/OT/RT/ST following     Normocytic anemia  - asymptomatic  - s/p transfusion           x2 units PRBC on 12/21/2024  - H/H stable   - no evidence of active bleeds  - will closely monitor and transfuse if needed      Depression/anxiety  - stable  - continue                Abilify 2 mg daily                 BuSpar 5 mg b.i.d.                 Lexapro 5 mg daily     HLD  - FLP outpatient  - continue                Lipitor 40 mg nightly      Nicotine dependence  - smoking cessation counseling initiated on admission  - continue                Nicotine patch daily     GERD  - Avoid spicy foods, and nothing to eat or drink within x2 hours of bedtime or laying flat (water is ok)   - Avoid NSAIDs (Advil, ibuprofen, naproxen...) and chino-2 inhibitors (Mobic, Celebrex)    - continue                 Protonix 40 mg b.i.d.     Constipation  - reports hard stools on 12/31  - continue   Movantik 25 mg daily (initiated 12/31)  Colace 100 mg BID   Miralax 17 gm b.i.d. (increased 12/31)     Metabolic acidosis  - sodium bicarb 25  - continue                Sodium bicarbonate 650 mg b.i.d.     Moderate protein calorie malnutrition  - stable  - strongly encourage oral nutrition  - registered dietitian following     DM type II  - HgA1c 5.0 on 12/25/2024  - continue                ISS   - CBGs AC/HS     HTN  - BP at goal  - continue                Hydralazine 10 mg every 2 hours as needed for BP > 160/90                Labetalol 10 mg every 2 hours as needed for BP > 160/90  - low sodium diet    Fever  - isolated 100.6 temperature on 1/3 at 400 am... no subsequent temp > 99.9  - RSV/flu/COVID negative 12/31 and 1/3  - CXR negative  - UA negative  - WBC trending down and within normal limits  - LLE NIVA negative for DVT on 01/02  - continue   Lovenox 30 daily     VTE Prophylaxis:  Lovenox 30 mg daily  COVID-19 testing:  Unknown  COVID-19 vaccination status:  Unvaccinated     POA:  No  Living will:  No  Contacts:  Jaxon Baum (son) [phone]     CODE STATUS:  Full code  Internal Medicine (attending): George Do MD  Physiatry (consulting):  Nam Swift MD     OUTPATIENT PROVIDERS  PCP: ALEKS Mcfadden  Orthopedic surgery:  Jose J Montemayor D.O.     DISPOSITION:  Sleep hygiene, bowel maintenance, and appetite at goal.  Improved pain control.  No urinary complaints.  Last reported BM on 01/03.  T-max 100.6° on 01/03 at 4:30 a.m..  No subsequent recorded temperatures > 100°.  Vitals otherwise stable.  Good glycemic control.  Labs reviewed.  Stool culture collected on 01/03 currently in lab.  BMP and magnesium at goal.  Labs otherwise unremarkable.  No new imaging.    Surgical staples to be removed on 01/10.  Encourage frequent pressure offloading to minimize risk of pressure ulcer formation.  Out of bed WA as  tolerated.  Strongly encourage nutrition and hydration.  Registered dietician following.  Encourage compliance with hourly incentive spirometer use WA.  Supplemental oxygen as needed to maintain saturations >92%.  WBAT to LLE without external or internal rotation of the hip.  Continue aggressive therapies and nutritional support.  Monitor closely and notify with any acute changes.     Staffing 12/31/2024: Continent of bowel and bladder. RT: CGA to supervision.  Limited by standing tolerance and pain.  Needs encouragement. Appetite is good at 75%.  Ill-fitting dentures. PT: COSMO to SBA for bed mobility.  Sit to stand with tranfers.  CGA ambulating 80 feet. Limited by pain. OT: Progressing.  Limited by pain.  Supervision to independent with ADLS. With adaptive equipment.  Needs TTB upon discharge. Projected discharge 1/6 home with HH.

## 2025-01-05 LAB
BACTERIA STL CULT: NORMAL
POCT GLUCOSE: 110 MG/DL (ref 70–110)
POCT GLUCOSE: 90 MG/DL (ref 70–110)

## 2025-01-05 PROCEDURE — 94761 N-INVAS EAR/PLS OXIMETRY MLT: CPT

## 2025-01-05 PROCEDURE — 97535 SELF CARE MNGMENT TRAINING: CPT

## 2025-01-05 PROCEDURE — 25000003 PHARM REV CODE 250: Performed by: INTERNAL MEDICINE

## 2025-01-05 PROCEDURE — 97116 GAIT TRAINING THERAPY: CPT

## 2025-01-05 PROCEDURE — 25000003 PHARM REV CODE 250: Performed by: NURSE PRACTITIONER

## 2025-01-05 PROCEDURE — 11800000 HC REHAB PRIVATE ROOM

## 2025-01-05 PROCEDURE — 97110 THERAPEUTIC EXERCISES: CPT

## 2025-01-05 PROCEDURE — 63600175 PHARM REV CODE 636 W HCPCS: Performed by: NURSE PRACTITIONER

## 2025-01-05 PROCEDURE — 97530 THERAPEUTIC ACTIVITIES: CPT

## 2025-01-05 PROCEDURE — 94799 UNLISTED PULMONARY SVC/PX: CPT

## 2025-01-05 PROCEDURE — S4991 NICOTINE PATCH NONLEGEND: HCPCS | Performed by: NURSE PRACTITIONER

## 2025-01-05 PROCEDURE — 99900031 HC PATIENT EDUCATION (STAT)

## 2025-01-05 RX ORDER — ENOXAPARIN SODIUM 100 MG/ML
40 INJECTION SUBCUTANEOUS EVERY 24 HOURS
Status: DISCONTINUED | OUTPATIENT
Start: 2025-01-06 | End: 2025-01-06 | Stop reason: HOSPADM

## 2025-01-05 RX ADMIN — ATORVASTATIN CALCIUM 40 MG: 40 TABLET, FILM COATED ORAL at 08:01

## 2025-01-05 RX ADMIN — METHOCARBAMOL 500 MG: 500 TABLET ORAL at 11:01

## 2025-01-05 RX ADMIN — METHOCARBAMOL 500 MG: 500 TABLET ORAL at 05:01

## 2025-01-05 RX ADMIN — OXYCODONE 10 MG: 5 TABLET ORAL at 02:01

## 2025-01-05 RX ADMIN — HYDROXYZINE PAMOATE 50 MG: 50 CAPSULE ORAL at 09:01

## 2025-01-05 RX ADMIN — PANTOPRAZOLE SODIUM 40 MG: 40 TABLET, DELAYED RELEASE ORAL at 07:01

## 2025-01-05 RX ADMIN — SODIUM BICARBONATE 650 MG: 650 TABLET ORAL at 07:01

## 2025-01-05 RX ADMIN — GABAPENTIN 100 MG: 100 CAPSULE ORAL at 04:01

## 2025-01-05 RX ADMIN — BUSPIRONE HYDROCHLORIDE 5 MG: 5 TABLET ORAL at 07:01

## 2025-01-05 RX ADMIN — ENOXAPARIN SODIUM 30 MG: 30 INJECTION SUBCUTANEOUS at 04:01

## 2025-01-05 RX ADMIN — NICOTINE 1 PATCH: 21 PATCH, EXTENDED RELEASE TRANSDERMAL at 07:01

## 2025-01-05 RX ADMIN — ESCITALOPRAM OXALATE 5 MG: 5 TABLET, FILM COATED ORAL at 07:01

## 2025-01-05 RX ADMIN — METHOCARBAMOL 500 MG: 500 TABLET ORAL at 04:01

## 2025-01-05 RX ADMIN — GABAPENTIN 300 MG: 300 CAPSULE ORAL at 08:01

## 2025-01-05 RX ADMIN — OXYCODONE 10 MG: 5 TABLET ORAL at 07:01

## 2025-01-05 RX ADMIN — METHOCARBAMOL 500 MG: 500 TABLET ORAL at 12:01

## 2025-01-05 RX ADMIN — OXYCODONE 10 MG: 5 TABLET ORAL at 09:01

## 2025-01-05 RX ADMIN — BUSPIRONE HYDROCHLORIDE 5 MG: 5 TABLET ORAL at 08:01

## 2025-01-05 RX ADMIN — ARIPIPRAZOLE 2 MG: 2 TABLET ORAL at 07:01

## 2025-01-05 RX ADMIN — PANTOPRAZOLE SODIUM 40 MG: 40 TABLET, DELAYED RELEASE ORAL at 08:01

## 2025-01-05 RX ADMIN — SODIUM BICARBONATE 650 MG: 650 TABLET ORAL at 08:01

## 2025-01-05 RX ADMIN — NALOXEGOL OXALATE 25 MG: 25 TABLET, FILM COATED ORAL at 07:01

## 2025-01-05 RX ADMIN — DICLOFENAC SODIUM 4 G: 10 GEL TOPICAL at 06:01

## 2025-01-05 RX ADMIN — ALPRAZOLAM 0.25 MG: 0.25 TABLET ORAL at 09:01

## 2025-01-05 NOTE — PROGRESS NOTES
Pharmacist Renal Dose Adjustment Note    Luz Maria Alfaro is a 64 y.o. female being treated with the medication enoxaparin    Patient Data:    Vital Signs (Most Recent):  Temp: 98.4 °F (36.9 °C) (01/05/25 1455)  Pulse: 87 (01/05/25 1455)  Resp: 18 (01/05/25 1455)  BP: 129/72 (01/05/25 1455)  SpO2: 96 % (01/05/25 1455) Vital Signs (72h Range):  Temp:  [98.1 °F (36.7 °C)-100.6 °F (38.1 °C)]   Pulse:  []   Resp:  [16-18]   BP: ()/(54-76)   SpO2:  [91 %-98 %]      Recent Labs   Lab 01/01/25  0514 01/02/25  0511 01/04/25  0641   CREATININE 0.59 0.53* 0.57     Serum creatinine: 0.57 mg/dL 01/04/25 0641  Estimated creatinine clearance: 78 mL/min    Medication:enoxaparin dose: 30mg frequency daily will be changed to medication:enoxaparin dose:40mg frequency:daily    Pharmacist's Name: Lori Restrepo  Pharmacist's Extension: [phone]

## 2025-01-05 NOTE — PT/OT/SLP PROGRESS
Physical Therapy Inpatient Rehab Treatment    Patient Name:  Luz Maria Alfaro   MRN:  [id number]    Recommendations:     Discharge Recommendations:  High Intensity Therapy   Discharge Equipment Recommendations:   walker, rolling  Barriers to discharge: Decreased caregiver support    Assessment:     Luz Maria Alfaro is a 64 y.o. female admitted with a medical diagnosis of Displaced fracture of left femoral neck.  She presents with the following impairments/functional limitations:  weakness, impaired endurance, impaired functional mobility, gait instability, impaired balance, decreased lower extremity function, pain.    Rehab Diagnosis: L femoral neck fx    General Precautions: Standard, fall, hearing impaired    Orthopedic Precautions:LLE weight bearing as tolerated (no IR/ER)     Braces: N/A    Rehab Prognosis: Good; patient would benefit from acute skilled PT services to address these deficits and reach maximum level of function.      History:     Past Medical History:   Diagnosis Date    Benign essential HTN     Mixed hyperlipidemia     Type 2 diabetes mellitus without complications        Past Surgical History:   Procedure Laterality Date    APPENDECTOMY      BACK SURGERY      BREAST SURGERY  01 13 2002    Augmantation    HEMIARTHROPLASTY OF HIP Left 12/20/2024    Procedure: HEMIARTHROPLASTY, HIP;  Surgeon: Jose J Montemayor DO;  Location: [facility];  Service: Orthopedics;  Laterality: Left;  lateral peg board kevin    HIP SURGERY      INTRAMEDULLARY RODDING OF FEMUR Right 10/27/2022    Procedure: RIGHT FEMUR INTERTROCH IMN;  Surgeon: Stewart Brambila MD;  Location: [facility];  Service: Orthopedics;  Laterality: Right;  [place] table, supine  3rd case  Synthes Short TFNA       Subjective     Patient comments: pain    Respiratory Status: Room air    Patients cultural, spiritual, Cheondoism conflicts given the current situation: no    Objective:     Communicated with RN prior to session.  Patient found supine with  peripheral IV  upon PT entry to room.    Pt is Alert, Cooperative, and Motivated.    Pain Pain Rating 1: 8/10  Location - Side 1: Left  Location - Orientation 1: lower  Location 1: back  Pain Addressed 1: Reposition, Distraction, Nurse notified (ice pack donned)       Functional Mobility:      Current   Status  Discharge   Goal   Functional Area: Care Score:    Roll Left and Right 6  HOB flat Independent   Sit to Lying 6  HOB flat Independent   Sit to Stand 6  With RW Independent   Chair/Bed-to-Chair Transfer 6  Stand-step t/f with RW Independent   Car Transfer 6  Stand-step t/f with RW Independent   Walk 10 Feet 6 Independent   Walk 50 Feet with Two Turns 4  The pt ambulated 35' + 35' + 80' with RW and overall SBA; seated rest breaks between bouts. Very slowed pace. Step-through gait pattern. No LOB noted Independent   Walk 10 Feet Uneven Surface 4  SBA with RW; ramp Independent   1 Step (Curb) 4  Curb step with RW and SBA; cues for form Supervision or touching assistance   4 Steps 4  4 steps with B handrails and SBA; cues for form Supervision or touching assistance   Picking Up Object 6  With RW Supervision or touching assistance   Wheel 50 Feet with Two Turns 5 Independent   Wheel 150 Feet 5  The pt manually propelled ~200' with BUE propulsion Independent       Therapeutic Activities and Exercises:  Toilet transfer: independent with RW using stand-step transfer    Patient educated on role of acute care PT and PT POC, safety while in hospital including calling nurse for mobility, and call light usage  Patient educated about importance of OOB mobility and remaining up in chair most of the day.      Activity Tolerance: Good    Patient left  in restroom  with all lines intact, call button in reach, CNA notified, and pt educated to call for nursing when finished using restroom and not to get up on own .    Education provided: roles and goals of PT/PTA, transfer training, bed mob, gait training, stair training, balance  training, safety awareness, body mechanics, wheelchair management, strengthening exercises, and fall prevention    Expected compliance: High compliance    Plan:     During this hospitalization, patient to be seen 5 x/week to address the identified rehab impairments via gait training, therapeutic exercises, therapeutic activities and progress toward the following goals:    GOALS:   Multidisciplinary Problems       Physical Therapy Goals          Problem: Physical Therapy    Goal Priority Disciplines Outcome Interventions   Physical Therapy Goal     PT, PT/OT Progressing    Description:   PT Short Term goals     Bed Mobility:  Roll left and right with supervision/touching assist.MET  Sit to supine transfer with partial/moderate assist.MET  Supine to sit transfer with partial/moderate assist.MET    Transfers:  Sit to stand transfer with setup/clean-up assist using RW.MET  Bed to chair transferStand step with setup/clean-up assist using RW.MET  Car transfer with setup/clean-up assist using RW.MET   an object from the ground in standing position with supervision/touching assist using RW.MET    Mobility:  Ambulate 150 feet with partial/moderate assist using RW.  Ambulate 10 feet on uneven surfaces/ramps with partial/moderate assist using RW.MET  Ascend/descend a 4 inch curb with partial/moderate assist using RW. MET  Ascend/descend 12 stairs with partial/moderate assist using bilateral handrails.  Manual wheelchair 150 feet with supervision/touching assist using Bilateral upper extremity.      Time Frame: by Re-evaluation                        Plan of Care Expires:  01/03/25  PT Next Visit Date: 01/03/25  Plan of Care reviewed with: patient    Additional Information:         Time Tracking:     Therapy Time  PT Received On: 01/05/25  PT Start Time: 0730  PT Stop Time: 0900  PT Total Time (min): 90 min   PT Individual: 90  Missed Time:    Time Missed due to:      Billable Minutes: Gait Training 30 and Therapeutic  Activity 60    01/05/2025

## 2025-01-05 NOTE — PT/OT/SLP PROGRESS
"Occupational Therapy Inpatient Rehab Treatment    Name: Luz Maria Alfaro  MRN: [id number]    Assessment:  Luz Maria Alfaro is a 64 y.o. female admitted with a medical diagnosis of Displaced fracture of left femoral neck.  She presents with the following impairments/functional limitations:  impaired functional mobility, decreased lower extremity function, pain, decreased ROM, orthopedic precautions, weakness, impaired endurance.    General Precautions: Standard, fall     Orthopedic Precautions:LLE weight bearing as tolerated (no IR/ER)     Braces: N/A    Rehab Prognosis: Good; patient would benefit from acute skilled OT services to address these deficits and reach maximum level of function.      History:     Past Medical History:   Diagnosis Date    Benign essential HTN     Mixed hyperlipidemia     Type 2 diabetes mellitus without complications        Past Surgical History:   Procedure Laterality Date    APPENDECTOMY      BACK SURGERY      BREAST SURGERY  01 13 2002    Augmantation    HEMIARTHROPLASTY OF HIP Left 12/20/2024    Procedure: HEMIARTHROPLASTY, HIP;  Surgeon: Jose J Montemayor DO;  Location: [facility];  Service: Orthopedics;  Laterality: Left;  lateral peg board kevin    HIP SURGERY      INTRAMEDULLARY RODDING OF FEMUR Right 10/27/2022    Procedure: RIGHT FEMUR INTERTROCH IMN;  Surgeon: Stewart Brambila MD;  Location: [facility];  Service: Orthopedics;  Laterality: Right;  [place] table, supine  3rd case  Synthes Short TFNA       Subjective     Orientation: Oriented x4    Chief Complaint: Pain in L knee    Patient/Family Comments/goals: "The pain is getting a little better each day."    Respiratory Status: Room air    Patients cultural, spiritual, Roman Catholic conflicts given the current situation: no       Objective:     Patient found supine with peripheral IV  upon OT entry to room.    Mobility   Patient completed:  Supine to Sit with independence  Sit to Supine with independence  Sit to Stand Transfer with " independence with rolling walker  Stand to Sit Transfer with independence with rolling walker  Toilet Transfer Step Transfer technique with independence with  rolling walker    Functional Mobility  Pt performed FM in room and OT gym with independence using RW     ADLs   Current Status   Eating 6 Pt ate some of the oatmeal she served herself in OT kitchen   Toileting Hygiene 6    Toilet Transfer 6 2x during treatment session      Limiting Factors for ADLs: motor, endurance, limited ROM, balance, weakness, and pain     IADLs: Patient prepared herself a bowl of oatmeal in OT kitchen to address IADL independence before discharging home. Pt retrieved all items needed from cabinets. Pt able to prepare and microwave oatmeal without difficulty. Once finished with oatmeal, pt stood at sink with rolling walker to wash the dishes used. Pt SPV throughout activity for safety.     Therapeutic Exercise  While outside sitting in the sun in wheelchair, pt completed 3x10 reps bicep curls, shoulder press, forward circles, and shoulder flexion raises with weighted dowel to increase UE strength and endurance for functional tasks and transfers.   Pt performed 4 min F and 4 min B on UBE to address UE strength and endurance. Pt remained seated in wheelchair during exercise due to knee pain.     Patient left supine with all lines intact and call button in reach.     Education provided: Roles and goals of OT, ADLs, transfer training, modified goals, sequencing, safety precautions, and fall prevention    Multidisciplinary Problems       Occupational Therapy Goals          Problem: Occupational Therapy    Goal Priority Disciplines Outcome Interventions   Occupational Therapy Goal     OT, PT/OT Progressing    Description: ADLs:  MET - Pt to perform grooming tasks with SBA standing at sink.  MET - Pt to perform feeding tasks with independence.  MET - Pt to perform UB dressing with SBA.  MET - Pt to perform LB dressing with max assist with AE as  needed.   Updated goal: Pt to perform LB dressing with set up assist with AE as needed.  MET - Pt to perform putting on/off footwear task with max assist with AE as needed.  Updated goal: Pt to perform putting on/off footwear task with set up assist with AE as needed.  MET - Pt to perform toileting with min assist.  MET - Pt to perform bathing with mod assist with AE as needed.  Updated goal: Pt to perform bathing independently with AE as needed.    Functional Transfers:  MET - Pt to perform toilet transfers with CGA to toilet in bathroom.  Updated goal: Pt to perform toilet transfers with SBA with RW to toilet in bathroom.  MET: Pt to perform a tub transfer with mod assist with AE as needed onto TTB.  Updated goal: Pt to perform a tub transfer with set up assist with AE as needed onto TTB.    IADLs:  Pt to perform simple meal prep with mod assist.    Balance, Strengthening, Endurance, Balance:  Pt to consistently demonstrate adherence to orthopedic precautions during all ADL's as instructed by OT.  Pt to demonstrate good dynamic standing balance as required to perform ADL's from standing level.  Pt to demonstrate good BUE strength during functional task   Pt to demonstrate consistent adherence to breathing control and energy conservation techniques as educated by OT.                      Time Tracking     OT Received On: 01/05/25  Time In 1000     Time Out 1130  Total Time 90 min  Therapy Time: OT Individual: 90  Missed Time:    Missed Time Reason:      Billable Minutes: Self Care/Home Management 30 and Therapeutic Exercise 60    01/05/2025

## 2025-01-05 NOTE — PLAN OF CARE
Problem: Diabetes Comorbidity  Goal: Blood Glucose Level Within Targeted Range  Outcome: Progressing     Problem: Wound  Goal: Optimal Functional Ability  Outcome: Progressing     Problem: Wound  Goal: Absence of Infection Signs and Symptoms  Outcome: Progressing     Problem: Wound  Goal: Optimal Pain Control and Function  Outcome: Progressing     Problem: Wound  Goal: Optimal Wound Healing  Outcome: Progressing     Problem: Fall Injury Risk  Goal: Absence of Fall and Fall-Related Injury  Outcome: Progressing

## 2025-01-06 VITALS
HEART RATE: 79 BPM | WEIGHT: 112.19 LBS | RESPIRATION RATE: 18 BRPM | HEIGHT: 63 IN | SYSTOLIC BLOOD PRESSURE: 126 MMHG | OXYGEN SATURATION: 93 % | DIASTOLIC BLOOD PRESSURE: 65 MMHG | TEMPERATURE: 99 F | BODY MASS INDEX: 19.88 KG/M2

## 2025-01-06 DIAGNOSIS — G89.29 OTHER CHRONIC PAIN: Primary | ICD-10-CM

## 2025-01-06 LAB — POCT GLUCOSE: 94 MG/DL (ref 70–110)

## 2025-01-06 PROCEDURE — 97535 SELF CARE MNGMENT TRAINING: CPT

## 2025-01-06 PROCEDURE — 25000003 PHARM REV CODE 250: Performed by: NURSE PRACTITIONER

## 2025-01-06 PROCEDURE — 25000003 PHARM REV CODE 250: Performed by: INTERNAL MEDICINE

## 2025-01-06 PROCEDURE — S4991 NICOTINE PATCH NONLEGEND: HCPCS | Performed by: NURSE PRACTITIONER

## 2025-01-06 PROCEDURE — 97116 GAIT TRAINING THERAPY: CPT

## 2025-01-06 RX ORDER — ARIPIPRAZOLE 2 MG/1
2 TABLET ORAL DAILY
Qty: 30 TABLET | Refills: 1 | Status: SHIPPED | OUTPATIENT
Start: 2025-01-06 | End: 2025-04-06

## 2025-01-06 RX ORDER — ROSUVASTATIN CALCIUM 20 MG/1
20 TABLET, COATED ORAL DAILY
Qty: 90 TABLET | Refills: 0 | Status: SHIPPED | OUTPATIENT
Start: 2025-01-06 | End: 2025-04-06

## 2025-01-06 RX ORDER — SODIUM BICARBONATE 650 MG/1
650 TABLET ORAL 2 TIMES DAILY
Qty: 14 TABLET | Refills: 0 | Status: SHIPPED | OUTPATIENT
Start: 2025-01-06 | End: 2025-01-13

## 2025-01-06 RX ORDER — OXYCODONE AND ACETAMINOPHEN 10; 325 MG/1; MG/1
1 TABLET ORAL EVERY 8 HOURS PRN
Qty: 15 TABLET | Refills: 0 | Status: SHIPPED | OUTPATIENT
Start: 2025-01-06 | End: 2025-01-11

## 2025-01-06 RX ORDER — PANTOPRAZOLE SODIUM 40 MG/1
40 TABLET, DELAYED RELEASE ORAL 2 TIMES DAILY
Qty: 60 TABLET | Refills: 0 | Status: SHIPPED | OUTPATIENT
Start: 2025-01-06 | End: 2025-02-05

## 2025-01-06 RX ORDER — METHOCARBAMOL 500 MG/1
500 TABLET, FILM COATED ORAL EVERY 6 HOURS
Qty: 40 TABLET | Refills: 0 | Status: SHIPPED | OUTPATIENT
Start: 2025-01-06 | End: 2025-01-16

## 2025-01-06 RX ORDER — ASPIRIN 81 MG/1
81 TABLET ORAL 2 TIMES DAILY
Qty: 60 TABLET | Refills: 0 | Status: SHIPPED | OUTPATIENT
Start: 2025-01-06 | End: 2025-02-05

## 2025-01-06 RX ORDER — DICLOFENAC SODIUM 10 MG/G
4 GEL TOPICAL
Qty: 450 G | Refills: 1 | Status: SHIPPED | OUTPATIENT
Start: 2025-01-06 | End: 2025-02-05

## 2025-01-06 RX ORDER — ESCITALOPRAM OXALATE 5 MG/1
5 TABLET ORAL DAILY
Qty: 90 TABLET | Refills: 0 | Status: SHIPPED | OUTPATIENT
Start: 2025-01-06 | End: 2025-04-06

## 2025-01-06 RX ORDER — IBUPROFEN 200 MG
1 TABLET ORAL DAILY
Qty: 30 PATCH | Refills: 0 | Status: SHIPPED | OUTPATIENT
Start: 2025-01-07 | End: 2025-02-06

## 2025-01-06 RX ORDER — FERROUS SULFATE 325(65) MG
325 TABLET ORAL
Start: 2025-01-06 | End: 2025-04-06

## 2025-01-06 RX ORDER — BUSPIRONE HYDROCHLORIDE 5 MG/1
5 TABLET ORAL 2 TIMES DAILY
Qty: 60 TABLET | Refills: 2 | Status: SHIPPED | OUTPATIENT
Start: 2025-01-06 | End: 2025-04-06

## 2025-01-06 RX ORDER — GABAPENTIN 300 MG/1
300 CAPSULE ORAL 3 TIMES DAILY
Qty: 90 CAPSULE | Refills: 0 | Status: SHIPPED | OUTPATIENT
Start: 2025-01-06 | End: 2025-02-05

## 2025-01-06 RX ORDER — DOCUSATE SODIUM 100 MG/1
100 CAPSULE, LIQUID FILLED ORAL 2 TIMES DAILY
Qty: 20 CAPSULE | Refills: 0 | Status: SHIPPED | OUTPATIENT
Start: 2025-01-06 | End: 2025-01-16

## 2025-01-06 RX ADMIN — ARIPIPRAZOLE 2 MG: 2 TABLET ORAL at 07:01

## 2025-01-06 RX ADMIN — NICOTINE 1 PATCH: 21 PATCH, EXTENDED RELEASE TRANSDERMAL at 07:01

## 2025-01-06 RX ADMIN — METHOCARBAMOL 500 MG: 500 TABLET ORAL at 05:01

## 2025-01-06 RX ADMIN — BUSPIRONE HYDROCHLORIDE 5 MG: 5 TABLET ORAL at 07:01

## 2025-01-06 RX ADMIN — ESCITALOPRAM OXALATE 5 MG: 5 TABLET, FILM COATED ORAL at 07:01

## 2025-01-06 RX ADMIN — SODIUM BICARBONATE 650 MG: 650 TABLET ORAL at 07:01

## 2025-01-06 RX ADMIN — OXYCODONE 10 MG: 5 TABLET ORAL at 09:01

## 2025-01-06 RX ADMIN — PANTOPRAZOLE SODIUM 40 MG: 40 TABLET, DELAYED RELEASE ORAL at 07:01

## 2025-01-06 RX ADMIN — DICLOFENAC SODIUM 4 G: 10 GEL TOPICAL at 06:01

## 2025-01-06 RX ADMIN — NALOXEGOL OXALATE 25 MG: 25 TABLET, FILM COATED ORAL at 07:01

## 2025-01-06 RX ADMIN — OXYCODONE 10 MG: 5 TABLET ORAL at 05:01

## 2025-01-06 NOTE — PLAN OF CARE
Problem: Diabetes Comorbidity  Goal: Blood Glucose Level Within Targeted Range  Outcome: Adequate for Care Transition     Problem: Rehabilitation (IRF) Plan of Care  Goal: Plan of Care Review  Outcome: Adequate for Care Transition  Goal: Patient-Specific Goal (Individualized)  Outcome: Adequate for Care Transition  Goal: Absence of New-Onset Illness or Injury  Outcome: Adequate for Care Transition  Goal: Optimal Comfort and Wellbeing  Outcome: Adequate for Care Transition  Goal: Home and Community Transition Plan Established  Outcome: Adequate for Care Transition     Problem: Wound  Goal: Optimal Coping  Outcome: Adequate for Care Transition  Goal: Optimal Functional Ability  Outcome: Adequate for Care Transition  Goal: Absence of Infection Signs and Symptoms  Outcome: Adequate for Care Transition  Goal: Improved Oral Intake  Outcome: Adequate for Care Transition  Goal: Optimal Pain Control and Function  Outcome: Adequate for Care Transition  Goal: Skin Health and Integrity  Outcome: Adequate for Care Transition  Goal: Optimal Wound Healing  Outcome: Adequate for Care Transition     Problem: Fall Injury Risk  Goal: Absence of Fall and Fall-Related Injury  Outcome: Adequate for Care Transition

## 2025-01-06 NOTE — PT/OT/SLP DISCHARGE
Physical Therapy Discharge Summary    Name: Luz Maria Alfaro  MRN: [id number]   Principal Problem: Displaced fracture of left femoral neck     Patient Discharged from acute Physical Therapy on 1/6/25.  Please refer to prior PT noted date on 1/6/25 for functional status.     Assessment:     Patient made good progress towards goals with therapy intervention. Patient limited by pain. Family training completed with all questions & concerns addressed.     Objective:     GOALS:   Multidisciplinary Problems       Physical Therapy Goals       Not on file              Multidisciplinary Problems (Resolved)          Problem: Physical Therapy    Goal Priority Disciplines Outcome Interventions   Physical Therapy Goal   (Resolved)     PT, PT/OT Met    Description:   PT Short Term goals     Bed Mobility:  Roll left and right with supervision/touching assist.MET  Sit to supine transfer with partial/moderate assist.MET  Supine to sit transfer with partial/moderate assist.MET    Transfers:  Sit to stand transfer with setup/clean-up assist using RW.MET  Bed to chair transferStand step with setup/clean-up assist using RW.MET  Car transfer with setup/clean-up assist using RW.MET   an object from the ground in standing position with supervision/touching assist using RW.MET    Mobility:  Ambulate 150 feet with partial/moderate assist using RW. - NOT MET  Ambulate 10 feet on uneven surfaces/ramps with partial/moderate assist using RW.MET  Ascend/descend a 4 inch curb with partial/moderate assist using RW. MET  Ascend/descend 12 stairs with partial/moderate assist using bilateral handrails. - NOT MET  Manual wheelchair 150 feet with supervision/touching assist using Bilateral upper extremity. - MET      Time Frame: by Re-evaluation                        Care Scores:   Admission Assessment Current   Status  Discharge   Goal   Functional Area: Care Score:  Care Score:    Roll Left and Right 3 6 Independent   Sit to Lying 2 6  Independent   Lying to Sitting on Side of Bed 2 6 Independent   Sit to Stand 3 6 Independent   Chair/Bed-to-Chair Transfer 3 6 Independent   Car Transfer 7 6 Independent   Walk 10 Feet 7 6 Independent   Walk 50 Feet with Two Turns 7 4 Independent   Walk 150 Feet 7 88 Independent   Walk 10 Feet Uneven Surface 7 4 Independent   1 Step (Curb) 88 4 Supervision or touching assistance   4 Steps 88 4 Supervision or touching assistance   12 Steps 9 7     Picking Up Object 88 6 Supervision or touching assistance   Wheel 50 Feet with Two Turns 7 5 Independent   Wheel 150 Feet 7 5 Independent       Reasons for Discontinuation of Therapy Services  Satisfactory goal achievement.      Plan:     Patient Discharged to: Home with Home Health Service.    1/6/2025

## 2025-01-06 NOTE — PLAN OF CARE
01/06/25 0752   Depression Screen (Over the Past Two Weeks)   Have You Felt Down, Depressed or Hopeless? yes   Have You Felt Little Interest or Pleasure in Doing Things? no   Depression Patient Health Questionnaire (PHQ-9)   Over the last two weeks how often have you been bothered by little interest or pleasure in doing things 0   Over the last two weeks how often have you been bothered by feeling down, depressed or hopeless 1     Only depressed 1-2 days in past 2 weeks.  Excited about her release today.

## 2025-01-06 NOTE — PLAN OF CARE
Problem: Diabetes Comorbidity  Goal: Blood Glucose Level Within Targeted Range  Outcome: Progressing     Problem: Rehabilitation (IRF) Plan of Care  Goal: Optimal Comfort and Wellbeing  Outcome: Progressing     Problem: Rehabilitation (IRF) Plan of Care  Goal: Home and Community Transition Plan Established  Outcome: Progressing     Problem: Wound  Goal: Optimal Functional Ability  Outcome: Progressing     Problem: Wound  Goal: Absence of Infection Signs and Symptoms  Outcome: Progressing     Problem: Wound  Goal: Optimal Wound Healing  Outcome: Progressing     Problem: Fall Injury Risk  Goal: Absence of Fall and Fall-Related Injury  Outcome: Progressing     Problem: Wound  Goal: Optimal Pain Control and Function  Outcome: Progressing

## 2025-01-06 NOTE — PROGRESS NOTES
[facility] ([facility])  Rehab Progress Note    Patient Name: Luz Maria Alfaro  MRN: [id number]  Age: 64 y.o. Sex: female  : 1960  Hospital Length of Stay: 12 days  Date of Service: 2025   Chief Complaint: Displaced fracture of left femoral neck s/p left hemiarthroplasty on 2024     Subjective:     Basic Information  Admit Information: 63-year-old white female presented to [facility] clinical on 2024 for follow up visit for left hip pain that has been progressively worsening.  Reportedly fell 2 months ago.  Imaging on  significant for left displaced femoral neck fracture.  Recommended to go to [facility] ED on  for admission.  PMH significant for HTN, dm type 2, HLD, GERD, and nicotine dependence.  Workup significant for left displaced femoral neck fracture.  Tolerated left hip hemiarthroplasty on  without perioperative complications.  She did require 2 units PRBC.  Sodium bicarb tablets initiated due to metabolic acidosis.  Currently Min assist.  Tolerated transfer to [facility] inpatient rehab unit on  without incident.   Today's Information: No acute events overnight.  Laying comfortably in bed.  Pleasant mood.  Excited about upcoming discharge.  Improved pain control.  Compliant with therapies.  Good sleep and appetite.  No bowel or bladder issues reported.  Last reported BM on .  Vitals stable with no recent recorded fevers.  Good glycemic control.  No new labs or imaging.    Review of patient's allergies indicates:   Allergen Reactions    Iodinated contrast media Shortness Of Breath    Opioids - morphine analogues     Meperidine Rash and Hives        Current Facility-Administered Medications:     acetaminophen tablet 650 mg, 650 mg, Oral, BID PRN, Tania Oshea, FNP, 650 mg at 25 0525    ALPRAZolam tablet 0.25 mg, 0.25 mg, Oral, TID PRN, Moy Blackburn FNP, 0.25 mg at 25 215    ARIPiprazole tablet 2 mg, 2 mg, Oral, Daily,  Bere, Moy A, FNP, 2 mg at 01/05/25 0722    atorvastatin tablet 40 mg, 40 mg, Oral, QHS, Bere, Moy A, FNP, 40 mg at 01/05/25 2001    benzonatate capsule 100 mg, 100 mg, Oral, TID PRN, Ebre, Moy A, FNP    busPIRone tablet 5 mg, 5 mg, Oral, BID, Bere, Moy A, FNP, 5 mg at 01/05/25 2003    dextrose 50% injection 12.5 g, 12.5 g, Intravenous, PRN, Bere, Moy A, FNP    dextrose 50% injection 25 g, 25 g, Intravenous, PRN, Bere, Moy A, FNP    diclofenac sodium 1 % gel 4 g, 4 g, Topical (Top), TID AC, Oswaldo Osheayn A, FNP, 4 g at 01/05/25 0600    docusate sodium capsule 100 mg, 100 mg, Oral, BID, Bere, Moy A, FNP, 100 mg at 01/02/25 2010    [START ON 1/6/2025] enoxaparin injection 40 mg, 40 mg, Subcutaneous, Q24H (prophylaxis, 1700), George Do MD    EScitalopram oxalate tablet 5 mg, 5 mg, Oral, Daily, Tania Oshea A, FNP, 5 mg at 01/05/25 0721    gabapentin capsule 100 mg, 100 mg, Oral, Before dinner, Tania Oshea A, FNP, 100 mg at 01/05/25 1659    gabapentin capsule 300 mg, 300 mg, Oral, QHS, Bere, Moy A, FNP, 300 mg at 01/05/25 2001    glucagon (human recombinant) injection 1 mg, 1 mg, Intramuscular, PRN, Bere, Moy A, FNP    glucose chewable tablet 16 g, 16 g, Oral, PRN, Bere, Moy A, FNP    glucose chewable tablet 24 g, 24 g, Oral, PRN, Bere, Moy A, FNP    hydrALAZINE injection 10 mg, 10 mg, Intravenous, Q4H PRN, Bere, Moy A, FNP    HYDROcodone-acetaminophen 5-325 mg per tablet 1 tablet, 1 tablet, Oral, Daily PRN, Moy Blackburn A, FNP, 1 tablet at 01/03/25 0840    hydrOXYzine pamoate capsule 50 mg, 50 mg, Oral, Nightly PRN, Tania Oshea, FNP, 50 mg at 01/05/25 2157    insulin aspart U-100 injection 0-10 Units, 0-10 Units, Subcutaneous, QID (AC + HS) PRN, Bere Moy A, FNP    labetalol 20 mg/4 mL (5 mg/mL) IV syring, 10 mg, Intravenous, Q4H PRN, Bere Moy A, FNP    LIDOcaine 5 %  "patch 2 patch, 2 patch, Transdermal, Q24H, Tania Oshea, FNP, 2 patch at 01/03/25 0500    methocarbamoL tablet 500 mg, 500 mg, Oral, Q6H, Dayo Tania A, FNP, 500 mg at 01/05/25 1659    metoprolol injection 10 mg, 10 mg, Intravenous, Q2H PRN, Bere, Moy A, FNP    naloxegoL (MOVANTIK) tablet 25 mg, 25 mg, Oral, Daily, George Do MD, 25 mg at 01/05/25 0722    nicotine 21 mg/24 hr 1 patch, 1 patch, Transdermal, Daily, Bere, Moy A, FNP, 1 patch at 01/05/25 0719    nitroGLYCERIN SL tablet 0.4 mg, 0.4 mg, Sublingual, Q5 Min PRN, Bere, Moy A, FNP    ondansetron disintegrating tablet 4 mg, 4 mg, Oral, Q6H PRN, Bere, Omy A, FNP, 4 mg at 01/01/25 0712    ondansetron disintegrating tablet 8 mg, 8 mg, Oral, Q8H PRN, Bere, Moy A, FNP    ondansetron injection 4 mg, 4 mg, Intravenous, Q8H PRN, Bere, Moy A, FNP    oxyCODONE immediate release tablet 10 mg, 10 mg, Oral, Q4H PRN, Bere, Moy A, FNP, 10 mg at 01/05/25 1918    oxyCODONE immediate release tablet 5 mg, 5 mg, Oral, Q4H PRN, Bere, Moy A, FNP, 5 mg at 12/27/24 1714    pantoprazole EC tablet 40 mg, 40 mg, Oral, BID, Bere, Moy A, FNP, 40 mg at 01/05/25 2003    polyethylene glycol packet 17 g, 17 g, Oral, BID PRN, Oswaldo Osheayn A, FNP    sodium bicarbonate tablet 650 mg, 650 mg, Oral, BID, Bere, Moy A, FNP, 650 mg at 01/05/25 2003     Review of Systems   Complete 12-point review of symptoms negative except for what's mentioned in HPI     Objective:     /72   Pulse 86   Temp 98.4 °F (36.9 °C) (Oral)   Resp 16   Ht 5' 3" (1.6 m)   Wt 50.9 kg (112 lb 3.4 oz)   SpO2 97%   BMI 19.88 kg/m²      Physical Exam  Constitutional:       Appearance: Normal appearance.      Comments: Thin appearing   HENT:      Head: Normocephalic.      Ears:      Comments: Bilateral hard of hearing  Eyes:      Pupils: Pupils are equal, round, and reactive to light.   Cardiovascular:      Rate " and Rhythm: Normal rate and regular rhythm.      Heart sounds: Normal heart sounds.   Pulmonary:      Effort: Pulmonary effort is normal. No respiratory distress.      Breath sounds: Normal breath sounds. No wheezing or rhonchi.   Abdominal:      General: Bowel sounds are normal.      Palpations: Abdomen is soft.      Tenderness: There is no abdominal tenderness. There is no guarding.   Musculoskeletal:      Cervical back: Neck supple.      Comments: Left hip dressing clean and intact    Skin:     General: Skin is warm and dry.   Neurological:      Mental Status: She is alert and oriented to person, place, and time.      Motor: Weakness present.   Psychiatric:         Mood and Affect: Mood normal.         Behavior: Behavior normal.         Thought Content: Thought content normal.         Judgment: Judgment normal.       Lines/Drains/Airways       None               Labs  Recent Results (from the past 24 hours)   POCT glucose    Collection Time: 01/05/25  5:06 AM   Result Value Ref Range    POCT Glucose 90 70 - 110 mg/dL   POCT glucose    Collection Time: 01/05/25  4:37 PM   Result Value Ref Range    POCT Glucose 110 70 - 110 mg/dL     Radiology   CXR one view on 01/01/2025, IMPRESSION:  No acute disease is seen.  Radiology  Left hip and pelvis XR on 12/20/2024, IMPRESSION:  Satisfactory alignment following left hip arthroplasty.    Assessment/Plan:     64 y.o. WF admitted on 12/24/2024    Displaced fracture of left femoral neck   - s/p left hemiarthroplasty on 12/20/2024  - WBAT to LLE without external or internal rotation of the hip  - staples to be removed on 01/10  - discontinued Tylenol 650 mg every 6 hours on 12/30   - s/p Lasix 40 mg IVP x1 on 01/03  - continue                  Gabapentin 300 mg nightly                 Robaxin 750 mg t.i.d.                Norco 5 mg/325 mg daily p.r.n. prior to therapy                Oxycodone 5 mg q.4 hours p.r.n. for moderate pain                 Oxycodone 10 mg q.4 hours  p.r.n. for severe pain  - defer to physiatry for rehab and pain management  - PT/OT/RT/ST following     Normocytic anemia  - asymptomatic  - s/p transfusion           x2 units PRBC on 12/21/2024  - H/H stable   - no evidence of active bleeds  - will closely monitor and transfuse if needed      Depression/anxiety  - stable  - continue                Abilify 2 mg daily                 BuSpar 5 mg b.i.d.                 Lexapro 5 mg daily     HLD  - FLP outpatient  - continue                Lipitor 40 mg nightly      Nicotine dependence  - smoking cessation counseling initiated on admission  - continue                Nicotine patch daily     GERD  - Avoid spicy foods, and nothing to eat or drink within x2 hours of bedtime or laying flat (water is ok)   - Avoid NSAIDs (Advil, ibuprofen, naproxen...) and chino-2 inhibitors (Mobic, Celebrex)    - continue                Protonix 40 mg b.i.d.     Constipation  - reports hard stools on 12/31  - continue   Movantik 25 mg daily (initiated 12/31)  Colace 100 mg BID   Miralax 17 gm b.i.d. (increased 12/31)     Metabolic acidosis  - sodium bicarb 25  - continue                Sodium bicarbonate 650 mg b.i.d.     Moderate protein calorie malnutrition  - stable  - strongly encourage oral nutrition  - registered dietitian following     DM type II  - HgA1c 5.0 on 12/25/2024  - continue                ISS   - CBGs AC/HS     HTN  - BP at goal  - continue                Hydralazine 10 mg every 2 hours as needed for BP > 160/90                Labetalol 10 mg every 2 hours as needed for BP > 160/90  - low sodium diet    Fever  - isolated 100.6 temperature on 1/3 at 400 am... no subsequent temp > 99.9  - RSV/flu/COVID negative 12/31 and 1/3  - CXR negative  - UA negative  - WBC trending down and within normal limits  - LLE NIVA negative for DVT on 01/02  - continue   Lovenox 30 daily     VTE Prophylaxis:  Lovenox 30 mg daily  COVID-19 testing:  Unknown  COVID-19 vaccination status:   Unvaccinated     POA:  No  Living will:  No  Contacts:  Jaxon Baum (son) [phone]     CODE STATUS:  Full code  Internal Medicine (attending): George Do MD  Physiatry (consulting):  Nam Swift MD     OUTPATIENT PROVIDERS  PCP: ALEKS Mcfadden  Orthopedic surgery:  Jose J Montemayor D.O.     DISPOSITION:  Sleep hygiene, bowel maintenance, and appetite at goal.  Improved pain control.  No urinary complaints.  Last reported BM on 01/05.  Vitals stable with no recent recorded fevers.  Good glycemic control.  No new labs or imaging.    Okay to discontinue IV and cancel morning labs.  Surgical staples to be removed on 01/10.  Encourage frequent pressure offloading to minimize risk of pressure ulcer formation.  Out of bed WA as tolerated.  Strongly encourage nutrition and hydration.  Registered dietician following.  Encourage compliance with hourly incentive spirometer use WA.  Supplemental oxygen as needed to maintain saturations >92%.  WBAT to LLE without external or internal rotation of the hip.  Continue aggressive therapies and nutritional support.  Monitor closely and notify with any acute changes.     Staffing 12/31/2024: Continent of bowel and bladder. RT: CGA to supervision.  Limited by standing tolerance and pain.  Needs encouragement. Appetite is good at 75%.  Ill-fitting dentures. PT: COSMO to SBA for bed mobility.  Sit to stand with tranfers.  CGA ambulating 80 feet. Limited by pain. OT: Progressing.  Limited by pain.  Supervision to independent with ADLS. With adaptive equipment.  Needs TTB upon discharge. Projected discharge 1/6 home with .

## 2025-01-06 NOTE — PLAN OF CARE
01/06/25 0735   Medicare Message   Important Message from Medicare regarding Discharge Appeal Rights Given to patient/caregiver   Date IMM was signed 01/06/25   Time IMM was signed 0700

## 2025-01-06 NOTE — PLAN OF CARE
Problem: Occupational Therapy  Goal: Occupational Therapy Goal  Description: ADLs:  MET - Pt to perform grooming tasks with SBA standing at sink.  MET - Pt to perform feeding tasks with independence.  MET - Pt to perform UB dressing with SBA.  MET - Pt to perform LB dressing with max assist with AE as needed.   MET- Updated goal: Pt to perform LB dressing with set up assist with AE as needed.  MET - Pt to perform putting on/off footwear task with max assist with AE as needed.  MET - Updated goal: Pt to perform putting on/off footwear task with set up assist with AE as needed.  MET - Pt to perform toileting with min assist.  MET - Pt to perform bathing with mod assist with AE as needed.  Met - Updated goal: Pt to perform bathing independently with AE as needed.    Functional Transfers:  MET - Pt to perform toilet transfers with CGA to toilet in bathroom.  MET -Updated goal: Pt to perform toilet transfers with SBA with RW to toilet in bathroom.  MET: Pt to perform a tub transfer with mod assist with AE as needed onto TTB.  MET - Updated goal: Pt to perform a tub transfer with set up assist with AE as needed onto TTB.    IADLs:  MET - Pt to perform simple meal prep with mod assist.    Balance, Strengthening, Endurance, Balance:  MET - Pt to consistently demonstrate adherence to orthopedic precautions during all ADL's as instructed by OT.  MET - Pt to demonstrate good dynamic standing balance as required to perform ADL's from standing level.  MET -Pt to demonstrate good BUE strength during functional task   MET -Pt to demonstrate consistent adherence to breathing control and energy conservation techniques as educated by OT.   Outcome: Met

## 2025-01-06 NOTE — PT/OT/SLP PROGRESS
"Occupational Therapy Inpatient Rehab Treatment    Name: Luz Maria Alfaro  MRN: [id number]    Assessment:  Luz Maria Alfaro is a 64 y.o. female admitted with a medical diagnosis of Displaced fracture of left femoral neck.  She presents with the following impairments/functional limitations:  weakness, impaired functional mobility, gait instability, pain, decreased safety awareness.    General Precautions: Standard, fall     Orthopedic Precautions:LLE weight bearing as tolerated (no IR/ER)     Braces: N/A    Rehab Prognosis: Good; patient would benefit from acute skilled OT services to address these deficits and reach maximum level of function.      History:     Past Medical History:   Diagnosis Date    Benign essential HTN     Mixed hyperlipidemia     Type 2 diabetes mellitus without complications        Past Surgical History:   Procedure Laterality Date    APPENDECTOMY      BACK SURGERY      BREAST SURGERY  01 13 2002    Augmantation    HEMIARTHROPLASTY OF HIP Left 12/20/2024    Procedure: HEMIARTHROPLASTY, HIP;  Surgeon: Jose J Montemayor DO;  Location: [facility];  Service: Orthopedics;  Laterality: Left;  lateral peg board kevin    HIP SURGERY      INTRAMEDULLARY RODDING OF FEMUR Right 10/27/2022    Procedure: RIGHT FEMUR INTERTROCH IMN;  Surgeon: Stewart Brambila MD;  Location: [facility];  Service: Orthopedics;  Laterality: Right;  Faxon table, supine  3rd case  Synthes Short TFNA       Subjective     Orientation: Oriented x4    Chief Complaint: discomfort/pain    Patient/Family Comments/goals: "I'm ready to go home today."    Vitals   Vitals at Rest  BP    HR    O2 Sat    Pain 7/10     Respiratory Status: Room air    Patients cultural, spiritual, Latter-day conflicts given the current situation: no       Objective:     Patient found up in chair upon OT entry to room.    Mobility   Patient completed:  Sit to Stand Transfer with independence with rolling walker  Stand to Sit Transfer with independence with rolling " walker  Toilet Transfer Step Transfer technique with independence with  rolling walker  Tub Transfer Step Transfer technique with independence with rolling walker    Functional Mobility  Independent with functional mobility for ADLs.    ADLs   Current Status   Eating 6   Oral Hygiene 6   Shower, Bathe Self 6   Upper Body Dressing 6   Lower Body Dressing 6   Toileting Hygiene 6   Toilet Transfer 6   Putting On, Taking Off Footwear 6     Limiting Factors for ADLs: endurance, weakness, safety awareness, and pain     Patient left up in chair with all lines intact and call button in reach.     Education provided: Roles and goals of OT, ADLs, transfer training, assistive device, sequencing, safety precautions, fall prevention, and home safety    Multidisciplinary Problems       Occupational Therapy Goals       Not on file              Multidisciplinary Problems (Resolved)          Problem: Occupational Therapy    Goal Priority Disciplines Outcome Interventions   Occupational Therapy Goal   (Resolved)     OT, PT/OT Met    Description: ADLs:  MET - Pt to perform grooming tasks with SBA standing at sink.  MET - Pt to perform feeding tasks with independence.  MET - Pt to perform UB dressing with SBA.  MET - Pt to perform LB dressing with max assist with AE as needed.   MET- Updated goal: Pt to perform LB dressing with set up assist with AE as needed.  MET - Pt to perform putting on/off footwear task with max assist with AE as needed.  MET - Updated goal: Pt to perform putting on/off footwear task with set up assist with AE as needed.  MET - Pt to perform toileting with min assist.  MET - Pt to perform bathing with mod assist with AE as needed.  Met - Updated goal: Pt to perform bathing independently with AE as needed.    Functional Transfers:  MET - Pt to perform toilet transfers with CGA to toilet in bathroom.  MET -Updated goal: Pt to perform toilet transfers with SBA with RW to toilet in bathroom.  MET: Pt to perform a tub  transfer with mod assist with AE as needed onto TTB.  MET - Updated goal: Pt to perform a tub transfer with set up assist with AE as needed onto TTB.    IADLs:  MET - Pt to perform simple meal prep with mod assist.    Balance, Strengthening, Endurance, Balance:  MET - Pt to consistently demonstrate adherence to orthopedic precautions during all ADL's as instructed by OT.  MET - Pt to demonstrate good dynamic standing balance as required to perform ADL's from standing level.  MET -Pt to demonstrate good BUE strength during functional task   MET -Pt to demonstrate consistent adherence to breathing control and energy conservation techniques as educated by OT.                        Time Tracking     OT Received On: 01/06/25  Time In 0800     Time Out 0845  Total Time 45 min  Therapy Time: OT Individual: 45  Missed Time:    Missed Time Reason:      Billable Minutes: Self Care/Home Management 45    01/06/2025

## 2025-01-06 NOTE — PLAN OF CARE
Discharge planned today.    Alerted [facility] for HH PT/OT/nursing.  Will send AVS later/once complete.    No home DME ordered (family agreed to obtain TTB on own).    Family training completed.

## 2025-01-06 NOTE — DISCHARGE SUMMARY
[facility] ([facility])  Rehab Discharge Summary    Patient Name: Luz Maria Alfaro  MRN: [id number]  Age: 64 y.o. Sex: female  : 1960  Hospital Length of Stay: 13 days   Date of Service: 2025      Discharge Information   Date of Admission: 2024  Date of Discharge: 2025  Admit Diagnosis:  Displaced fracture of left femoral neck          Normocytic anemia           Depression/anxiety           HLD           Nicotine dependence           GERD           Constipation           Metabolic acidosis           Moderate protein calorie malnutrition           DM type 2           HTN  Discharge Diagnosis: Displaced fracture of left femoral neck (stable)           Normocytic anemia (stable)           Depression/anxiety (stable)           HLD (stable)           Nicotine dependence (stable)           GERD (stable)           Constipation (stable)           Metabolic acidosis (resolved)           Moderate protein calorie malnutrition (stable)           DM type 2 (stable)           HTN           Fever (resolved)    COVID-19 testing:  Unknown  COVID-19 vaccination status:  Unvaccinated    Internal Medicine (attending): George Do MD  Physiatry (consulting):  Nam Swift MD     OUTPATIENT PROVIDERS  PCP: ALEKS Mcfadden  Orthopedic surgery:  Jose J Montemayor D.O.    Hospital Course   63-year-old white female presented to [facility] clinical on 2024 for follow up visit for left hip pain that has been progressively worsening.  Reportedly fell 2 months ago.  Imaging on  significant for left displaced femoral neck fracture.  Recommended to go to [facility] ED on  for admission.  PMH significant for HTN, dm type 2, HLD, GERD, and nicotine dependence.  Workup significant for left displaced femoral neck fracture.  Tolerated left hip hemiarthroplasty on  without perioperative complications.  She did require 2 units PRBC.  Sodium bicarb tablets initiated due to metabolic  "acidosis.  Currently Min assist.  Tolerated transfer to [facility] inpatient rehab unit on 12/24 without incident.     During inpatient rehab course liver enzymes mildly elevated.  Tylenol discontinued and NS x1 L initiated.  Isolated fever of 101° F reported on 1/1.  Vital signs otherwise unremarkable.  Mild leukocytosis.  COVID/flu/RSV negative.  UA negative.  CXR negative.  Left lower extremity NIVA negative.  Lasix 40 mg IVP x1 initiated on 01/02.  Again received Lasix 40 mg IVP on 01/03.  Low-grade fever 100.6 reported on 1/3.  COVID/flu negative.  Stool culture negative.  Vital signs and lab work stable.  Blood glucose stable.  Remained continent of bowel and bladder.  Overall supervision ambulating greater than 100 ft. Overall supervision with ADLs.  Med reconciliation completed.  Discharge orders initiated.  Stable for transfer home with home health.  To follow up with scheduled appointments documented below.    Chief Complaint: Displaced fracture of left femoral neck s/p left hemiarthroplasty on 12/20/2024     /65 (Patient Position: Lying)   Pulse 79   Temp 98.6 °F (37 °C) (Oral)   Resp 18   Ht 5' 3" (1.6 m)   Wt 50.9 kg (112 lb 3.4 oz)   SpO2 (!) 93%   BMI 19.88 kg/m²      Physical Exam  Constitutional:       Appearance: Normal appearance.      Comments: Thin appearing   HENT:      Head: Normocephalic.      Ears:      Comments: Bilateral hard of hearing  Eyes:      Pupils: Pupils are equal, round, and reactive to light.   Cardiovascular:      Rate and Rhythm: Normal rate and regular rhythm.      Heart sounds: Normal heart sounds.   Pulmonary:      Effort: Pulmonary effort is normal. No respiratory distress.      Breath sounds: Normal breath sounds. No wheezing or rhonchi.   Abdominal:      General: Bowel sounds are normal.      Palpations: Abdomen is soft.      Tenderness: There is no abdominal tenderness. There is no guarding.   Musculoskeletal:      Cervical back: Neck supple.      Comments: Left " hip dressing clean and intact    Skin:     General: Skin is warm and dry.   Neurological:      Mental Status: She is alert and oriented to person, place, and time.      Motor: Weakness present.   Psychiatric:         Mood and Affect: Mood normal.         Behavior: Behavior normal.         Thought Content: Thought content normal.         Judgment: Judgment normal.  *MD performed and documented physical examination        Labs  Recent Results (from the past 24 hours)   POCT glucose    Collection Time: 01/05/25  4:37 PM   Result Value Ref Range    POCT Glucose 110 70 - 110 mg/dL   POCT glucose    Collection Time: 01/06/25  5:07 AM   Result Value Ref Range    POCT Glucose 94 70 - 110 mg/dL     Radiology   CXR one view on 01/01/2025, IMPRESSION:  No acute disease is seen.  Radiology  Left hip and pelvis XR on 12/20/2024, IMPRESSION:  Satisfactory alignment following left hip arthroplasty.    Discharge Summary Plan   Discharge Status:  Improved    Location: Discharge home with home health    Medications: See discharge medicine reconciliation    Activity:  As tolerated    Diet:  ADA    Instructions:  Take all medications as prescribed.      Attend appointments as scheduled.      Return to ED if symptoms worsen, or if t > 100.4.    Education:  DM type 2.  Nicotine dependence.  HTN.    Follow-up:  ALEKS Mcfadden on 01/13/2025 at 10:30 a.m.      Shonda Lewis PA-C on 01/14/2025 at 8:00 a.m.      Stephanie Franco MD within 2 weeks    Discussed plan of care, and patient communicated understanding. Agreed to comply with recommendations.    Mao lBackburn NP conducted independent examination and assisted with medical documentation.     Discharge Time: 43 minutes

## 2025-01-06 NOTE — PT/OT/SLP PROGRESS
Physical Therapy Inpatient Rehab Treatment    Patient Name:  Luz Maria Alfaro   MRN:  [id number]    Recommendations:     Discharge Recommendations:  Low Intensity Therapy   Discharge Equipment Recommendations:   walker, rolling  Barriers to discharge: None    Assessment:     Luz Maria Alfaro is a 64 y.o. female admitted with a medical diagnosis of Displaced fracture of left femoral neck.  She presents with the following impairments/functional limitations:  weakness, impaired endurance, impaired functional mobility, gait instability, impaired balance, decreased lower extremity function, pain.    Rehab Diagnosis: L femoral neck fx    General Precautions: Standard, fall, hearing impaired    Orthopedic Precautions:LLE weight bearing as tolerated (no IR/ER)     Braces: N/A    Rehab Prognosis: Good; patient would benefit from acute skilled PT services to address these deficits and reach maximum level of function.      History:     Past Medical History:   Diagnosis Date    Benign essential HTN     Mixed hyperlipidemia     Type 2 diabetes mellitus without complications        Past Surgical History:   Procedure Laterality Date    APPENDECTOMY      BACK SURGERY      BREAST SURGERY  01 13 2002    Augmantation    HEMIARTHROPLASTY OF HIP Left 12/20/2024    Procedure: HEMIARTHROPLASTY, HIP;  Surgeon: Jose J Montemayor DO;  Location: [facility];  Service: Orthopedics;  Laterality: Left;  lateral peg board kevin    HIP SURGERY      INTRAMEDULLARY RODDING OF FEMUR Right 10/27/2022    Procedure: RIGHT FEMUR INTERTROCH IMN;  Surgeon: Stewart Brambila MD;  Location: [facility];  Service: Orthopedics;  Laterality: Right;  Starks table, supine  3rd case  Synthes Short TFNA       Subjective     Patient comments: L hip pain    Respiratory Status: Room air    Patients cultural, spiritual, Voodoo conflicts given the current situation: no    Objective:     Patient found up in chair with peripheral IV  upon PT entry to room.    Pt is Alert,  Cooperative, and Motivated.    Pain Pain Rating 1: 10/10  Location - Side 1: Left  Location 1: hip  Pain Addressed 1: Nurse notified, Distraction (ice pack donned)       Functional Mobility:      Current   Status  Discharge   Goal   Functional Area: Care Score:    Sit to Lying 6  HOB flat Independent   Sit to Stand 6  With RW Independent   Chair/Bed-to-Chair Transfer 6  Stand-step transfer with RW Independent   Walk 10 Feet 6 Independent   Walk 50 Feet with Two Turns 4  The pt ambulated 85' x 2 with RW and SBA to ind. Seated rest break between bouts. Slowed pace. Step-through gait pattern. No LOB noted. Independent       Therapeutic Activities and Exercises:  Patient educated on role of acute care PT and PT POC, safety while in hospital including calling nurse for mobility, and call light usage  Patient educated about importance of OOB mobility and remaining up in chair most of the day.      Activity Tolerance: Good    Patient left supine with all lines intact and call button in reach.    Education provided: roles and goals of PT/PTA, transfer training, bed mob, gait training, safety awareness, and fall prevention    Expected compliance: High compliance    Plan:     During this hospitalization, patient to be seen 5 x/week to address the identified rehab impairments via gait training, therapeutic exercises, therapeutic activities and progress toward the following goals:    GOALS:   Multidisciplinary Problems       Physical Therapy Goals          Problem: Physical Therapy    Goal Priority Disciplines Outcome Interventions   Physical Therapy Goal     PT, PT/OT Progressing    Description:   PT Short Term goals     Bed Mobility:  Roll left and right with supervision/touching assist.MET  Sit to supine transfer with partial/moderate assist.MET  Supine to sit transfer with partial/moderate assist.MET    Transfers:  Sit to stand transfer with setup/clean-up assist using RW.MET  Bed to chair transferStand step with  setup/clean-up assist using RW.MET  Car transfer with setup/clean-up assist using RW.MET   an object from the ground in standing position with supervision/touching assist using RW.MET    Mobility:  Ambulate 150 feet with partial/moderate assist using RW.  Ambulate 10 feet on uneven surfaces/ramps with partial/moderate assist using RW.MET  Ascend/descend a 4 inch curb with partial/moderate assist using RW. MET  Ascend/descend 12 stairs with partial/moderate assist using bilateral handrails.  Manual wheelchair 150 feet with supervision/touching assist using Bilateral upper extremity.      Time Frame: by Re-evaluation                        Plan of Care Expires:  01/03/25  PT Next Visit Date: 01/03/25  Plan of Care reviewed with: patient    Additional Information:         Time Tracking:     Therapy Time  PT Received On: 01/06/25  PT Start Time: 0900  PT Stop Time: 0930  PT Total Time (min): 30 min   PT Individual: 30  Missed Time:    Time Missed due to:      Billable Minutes: Gait Training 30    01/06/2025

## 2025-01-06 NOTE — PT/OT/SLP DISCHARGE
Occupational Therapy Discharge Summary    Luz Maria Alfaro  MRN: [id number]   Principal Problem: Displaced fracture of left femoral neck      Patient Discharged from acute Occupational Therapy on 1/6/25.  Please refer to prior OT note dated 1/6/25 for functional status.    Assessment:      Pt made good progress with therapy intervention. Pt limitations were pain and active bowels. Family training completed and all questions and concerns addressed.    Objective:     GOALS:   Multidisciplinary Problems       Occupational Therapy Goals       Not on file              Multidisciplinary Problems (Resolved)          Problem: Occupational Therapy    Goal Priority Disciplines Outcome Interventions   Occupational Therapy Goal   (Resolved)     OT, PT/OT Met    Description: ADLs:  MET - Pt to perform grooming tasks with SBA standing at sink.  MET - Pt to perform feeding tasks with independence.  MET - Pt to perform UB dressing with SBA.  MET - Pt to perform LB dressing with max assist with AE as needed.   MET- Updated goal: Pt to perform LB dressing with set up assist with AE as needed.  MET - Pt to perform putting on/off footwear task with max assist with AE as needed.  MET - Updated goal: Pt to perform putting on/off footwear task with set up assist with AE as needed.  MET - Pt to perform toileting with min assist.  MET - Pt to perform bathing with mod assist with AE as needed.  Met - Updated goal: Pt to perform bathing independently with AE as needed.    Functional Transfers:  MET - Pt to perform toilet transfers with CGA to toilet in bathroom.  MET -Updated goal: Pt to perform toilet transfers with SBA with RW to toilet in bathroom.  MET: Pt to perform a tub transfer with mod assist with AE as needed onto TTB.  MET - Updated goal: Pt to perform a tub transfer with set up assist with AE as needed onto TTB.    IADLs:  MET - Pt to perform simple meal prep with mod assist.    Balance, Strengthening, Endurance, Balance:  MET - Pt  to consistently demonstrate adherence to orthopedic precautions during all ADL's as instructed by OT.  MET - Pt to demonstrate good dynamic standing balance as required to perform ADL's from standing level.  MET -Pt to demonstrate good BUE strength during functional task   MET -Pt to demonstrate consistent adherence to breathing control and energy conservation techniques as educated by OT.                        Care Scores:   Admission Assessment Current Status Discharge  Goal   Functional Area: Care Score:  Care Score:    Eating 88 6 Independent   Oral Hygiene 88 6 Independent   Toileting Hygiene 3 6 Independent   Shower/Bathe Self 88 6 Set-up/clean-up   Upper Body Dressing 88 6 Independent   Lower Body Dressing 88 6 Set-up/clean-up   Putting On/Taking Off Footwear 88 6 Set-up/clean-up   Toilet Transfer 3 6 Independent     Reasons for Discontinuation of Therapy Services   Satisfactory goal achievement.      Plan:     Patient Discharged to: Home with Home Health Service      1/6/2025

## 2025-01-07 ENCOUNTER — PATIENT OUTREACH (OUTPATIENT)
Dept: ADMINISTRATIVE | Facility: CLINIC | Age: 65
End: 2025-01-07
Payer: MEDICARE

## 2025-01-07 NOTE — PROGRESS NOTES
2nd attempt-C3 nurse attempted to contact Luz Maria Alfaro for a TCC post hospital discharge follow up call. No answer. Left voicemail with callback information. The patient has a scheduled HOSFU appointment with Luzmaria Flores FNP on 1/13/25 @ 10:30.

## 2025-01-07 NOTE — PROGRESS NOTES
C3 nurse attempted to contact Luz Maria Alfaro for a TCC post hospital discharge follow up call. No answer. Left voicemail with callback information. The patient has a scheduled HOSFU appointment with Luzmaria Flores FNP on 1/13/25 @ 10:30.

## 2025-01-08 NOTE — PROGRESS NOTES
3rd attempt-C3 nurse attempted to contact Luz Maria Alfaro for a TCC post hospital discharge follow up call. No answer. Left voicemail with callback information. The patient has a scheduled HOSFU appointment with Luzmaria Flores FNP on 1/13/25 @ 10:30.

## 2025-01-14 ENCOUNTER — OFFICE VISIT (OUTPATIENT)
Dept: ORTHOPEDICS | Facility: CLINIC | Age: 65
End: 2025-01-14
Payer: MEDICARE

## 2025-01-14 ENCOUNTER — HOSPITAL ENCOUNTER (OUTPATIENT)
Dept: RADIOLOGY | Facility: CLINIC | Age: 65
Discharge: HOME | End: 2025-01-14
Attending: ORTHOPAEDIC SURGERY
Payer: MEDICARE

## 2025-01-14 VITALS
HEART RATE: 93 BPM | HEIGHT: 63 IN | WEIGHT: 112.19 LBS | SYSTOLIC BLOOD PRESSURE: 171 MMHG | DIASTOLIC BLOOD PRESSURE: 80 MMHG | BODY MASS INDEX: 19.88 KG/M2

## 2025-01-14 DIAGNOSIS — S72.002A CLOSED DISPLACED FRACTURE OF LEFT FEMORAL NECK: Primary | ICD-10-CM

## 2025-01-14 DIAGNOSIS — S72.002D CLOSED FRACTURE OF LEFT HIP WITH ROUTINE HEALING, SUBSEQUENT ENCOUNTER: ICD-10-CM

## 2025-01-14 DIAGNOSIS — S72.002A CLOSED DISPLACED FRACTURE OF LEFT FEMORAL NECK: ICD-10-CM

## 2025-01-14 PROCEDURE — 99024 POSTOP FOLLOW-UP VISIT: CPT | Mod: POP,,, | Performed by: PHYSICIAN ASSISTANT

## 2025-01-14 PROCEDURE — 73502 X-RAY EXAM HIP UNI 2-3 VIEWS: CPT | Mod: LT,,, | Performed by: ORTHOPAEDIC SURGERY

## 2025-01-14 NOTE — PROGRESS NOTES
Subjective:       Patient ID: Luz Maria Alfaro is a 64 y.o. female.  Chief Complaint   Patient presents with    Left Hip - Post-op Evaluation     3.5 wks, Lt hip hemiarthroplasty sx 12/20/24 gl 3/20/25, wbat, ambulates with walker, denies pain in hip, states has more pain in lt knee, states having to wrap knee to help with pain,         HPI    Patient presents for 3.5 week follow up left hip hemiarthroplasty. She has been home from rehab x 1 week. She does have home health. Home PT is supposed to come out for the first time today. Having some pain in the knee and ankle. She has the knee only wrapped with ace wrap today. Moderate swelling distally to the ace at the lower leg and ankle. Removed today. Discussed wrapping from foot to knee to help with swelling. She is WB with the use of a walker and ambulatory at home. She denies any groin pain today. States the hip is doing very well.     ROS:  Constitutional: Denies fever chills  Eyes: No change in vision  ENT: No ringing or current infections  CV: No chest pain  Resp: No labored breathing  MSK: Pain evident at site of injury located in HPI,   Integ: No signs of abrasions or lacerations  Neuro: No numbness or tingling  Lymphatic: No swelling outside the area of injury     Current Outpatient Medications on File Prior to Visit   Medication Sig Dispense Refill    ARIPiprazole (ABILIFY) 2 MG Tab Take 1 tablet (2 mg total) by mouth once daily. 30 tablet 1    aspirin (ECOTRIN) 81 MG EC tablet Take 1 tablet (81 mg total) by mouth 2 (two) times a day. 60 tablet 0    busPIRone (BUSPAR) 5 MG Tab Take 1 tablet (5 mg total) by mouth 2 (two) times daily. 60 tablet 2    diclofenac sodium (VOLTAREN) 1 % Gel Apply 4 g topically 3 (three) times daily before meals. 450 g 1    docusate sodium (COLACE) 100 MG capsule Take 1 capsule (100 mg total) by mouth 2 (two) times daily. for 10 days 20 capsule 0    EScitalopram oxalate (LEXAPRO) 5 MG Tab Take 1 tablet (5 mg total) by mouth once  "daily. 90 tablet 0    ferrous sulfate (IRON, FERROUS SULFATE,) 325 mg (65 mg iron) Tab tablet Take 1 tablet (325 mg total) by mouth daily with breakfast.      gabapentin (NEURONTIN) 300 MG capsule Take 1 capsule (300 mg total) by mouth 3 (three) times daily. 90 capsule 0    methocarbamoL (ROBAXIN) 500 MG Tab Take 1 tablet (500 mg total) by mouth every 6 (six) hours. for 10 days 40 tablet 0    nicotine (NICODERM CQ) 21 mg/24 hr Place 1 patch onto the skin once daily. 30 patch 0    pantoprazole (PROTONIX) 40 MG tablet Take 1 tablet (40 mg total) by mouth 2 (two) times daily. 60 tablet 0    rosuvastatin (CRESTOR) 20 MG tablet Take 1 tablet (20 mg total) by mouth once daily. 90 tablet 0    sodium bicarbonate 650 MG tablet Take 1 tablet (650 mg total) by mouth 2 (two) times daily. for 7 days 14 tablet 0     No current facility-administered medications on file prior to visit.          Objective:      BP (!) 171/80   Pulse 93   Ht 5' 3" (1.6 m)   Wt 50.9 kg (112 lb 3.4 oz)   BMI 19.88 kg/m²   Physical Exam  General the patient is alert and oriented x3 no acute distress nontoxic-appearing appropriate affect.    Constitutional: Vital signs are examined and stable.  Resp: No signs of labored breathing               LLE: -Skin: incisions well healed. Dermabond remains in place over incisions, dry.            -MSK: Hip and Knee F/E, EHL/FHL, Gastroc/Tib anterior Strength 5/5; Tolerates full passive circumduction of the hip           -Neuro:  Sensation intact to light touch L3-S1 dermatomes           -Lymphatic: No signs of lymphadenopathy           -CV: Capillary refill is less than 2 seconds.           Body mass index is 19.88 kg/m².  Ideal body weight: 52.4 kg (115 lb 8.3 oz)  Hemoglobin A1c   Date Value Ref Range Status   12/25/2024 5.0 <=7.0 % Final   10/05/2024 5.4 <=7.0 % Final     Hgb   Date Value Ref Range Status   01/02/2025 8.1 (L) 12.0 - 16.0 g/dL Final   01/01/2025 8.9 (L) 12.0 - 16.0 g/dL Final     Hct   Date " "Value Ref Range Status   01/02/2025 25.4 (L) 37.0 - 47.0 % Final   01/01/2025 29.0 (L) 37.0 - 47.0 % Final     Iron   Date Value Ref Range Status   11/19/2024 9 (L) 25 - 156 UG/DL Final     Iron Level   Date Value Ref Range Status   12/25/2024 52 50 - 170 ug/dL Final   12/23/2024 17 (L) 50 - 170 ug/dL Final     No components found for: "FROLATE"  Vitamin D   Date Value Ref Range Status   03/03/2022 5.7 30.0 - 80.0      WBC   Date Value Ref Range Status   01/02/2025 9.19 4.50 - 11.50 x10(3)/mcL Final   01/01/2025 11.91 (H) 4.50 - 11.50 x10(3)/mcL Final   10/31/2022 6.3 x10(3)/mcL Final       Radiology:  X-ray left hip reveals implant in place length and alignment stable as compared to the contralateral side.  No evidence of lucency to indicate loosening.  Stable alignment.        Assessment:         1. Closed displaced fracture of left femoral neck  X-Ray Hip 2 or 3 views Left with Pelvis when performed              Plan:         No follow-ups on file.    Luz Maria was seen today for post-op evaluation.    Diagnoses and all orders for this visit:    Closed displaced fracture of left femoral neck  -     X-Ray Hip 2 or 3 views Left with Pelvis when performed; Future        -continue weight-bearing as tolerated range of motion as tolerated.  Continue ambulation with a walker.  Home PT supposed to come out of the house today for initial evaluation.  We will continue home physical therapy until her next evaluation in the progress to outpatient therapy if she is able.  Discussed nicotine use enroll in delayed healing.  May shower and went incisions pat dry.  Do not apply ointments or creams.  Discussed wrapping the foot for compression to help with swelling.  Discussed that she would need to wrap the foot up to above the knee if she is going to run out of the knee.  No placement of Ace wrap around the knee only as it acts as a tourniquet.  This was discussed with her in detail today.  Family present voiced understanding as " well.  Follow up in about 6 weeks for repeat x-rays and evaluation.  -ED precautions given    The above findings, diagnostics, and treatment plan were discussed with Dr. Montemayor who is in agreement with the plan of care except as stated in additional documentation.       Shonda Lewis PA-C          Future Appointments   Date Time Provider Department Center   3/17/2025 10:00 AM [facility] [place]

## 2025-01-15 RX ORDER — OXYCODONE AND ACETAMINOPHEN 10; 325 MG/1; MG/1
1 TABLET ORAL EVERY 6 HOURS PRN
Qty: 28 TABLET | Refills: 0 | Status: SHIPPED | OUTPATIENT
Start: 2025-01-15 | End: 2025-01-30 | Stop reason: SDUPTHER

## 2025-01-30 DIAGNOSIS — S72.002D CLOSED FRACTURE OF LEFT HIP WITH ROUTINE HEALING, SUBSEQUENT ENCOUNTER: ICD-10-CM

## 2025-01-31 RX ORDER — OXYCODONE AND ACETAMINOPHEN 10; 325 MG/1; MG/1
1 TABLET ORAL EVERY 8 HOURS PRN
Qty: 21 TABLET | Refills: 0 | Status: SHIPPED | OUTPATIENT
Start: 2025-01-31 | End: 2025-02-07

## 2025-02-07 DIAGNOSIS — S72.002D CLOSED FRACTURE OF LEFT HIP WITH ROUTINE HEALING, SUBSEQUENT ENCOUNTER: ICD-10-CM

## 2025-02-07 RX ORDER — OXYCODONE AND ACETAMINOPHEN 10; 325 MG/1; MG/1
1 TABLET ORAL EVERY 8 HOURS PRN
Qty: 21 TABLET | Refills: 0 | Status: SHIPPED | OUTPATIENT
Start: 2025-02-07 | End: 2025-02-13 | Stop reason: SDUPTHER

## 2025-02-13 DIAGNOSIS — S72.002D CLOSED FRACTURE OF LEFT HIP WITH ROUTINE HEALING, SUBSEQUENT ENCOUNTER: ICD-10-CM

## 2025-02-13 RX ORDER — OXYCODONE AND ACETAMINOPHEN 10; 325 MG/1; MG/1
1 TABLET ORAL EVERY 12 HOURS PRN
Qty: 14 TABLET | Refills: 0 | Status: SHIPPED | OUTPATIENT
Start: 2025-02-13 | End: 2025-02-20

## 2025-02-20 DIAGNOSIS — S72.002D CLOSED FRACTURE OF LEFT HIP WITH ROUTINE HEALING, SUBSEQUENT ENCOUNTER: ICD-10-CM

## 2025-02-20 RX ORDER — OXYCODONE AND ACETAMINOPHEN 10; 325 MG/1; MG/1
1 TABLET ORAL
Qty: 7 TABLET | Refills: 0 | Status: SHIPPED | OUTPATIENT
Start: 2025-02-20 | End: 2025-02-27

## 2025-03-13 ENCOUNTER — OFFICE VISIT (OUTPATIENT)
Facility: CLINIC | Age: 65
End: 2025-03-13
Payer: MEDICARE

## 2025-03-13 VITALS
HEIGHT: 63 IN | HEART RATE: 77 BPM | DIASTOLIC BLOOD PRESSURE: 63 MMHG | SYSTOLIC BLOOD PRESSURE: 107 MMHG | BODY MASS INDEX: 18.25 KG/M2 | OXYGEN SATURATION: 97 % | WEIGHT: 103 LBS

## 2025-03-13 DIAGNOSIS — M53.3 SACROILIAC JOINT PAIN: Chronic | ICD-10-CM

## 2025-03-13 DIAGNOSIS — M51.362 DEGENERATION OF INTERVERTEBRAL DISC OF LUMBAR REGION WITH DISCOGENIC BACK PAIN AND LOWER EXTREMITY PAIN: Chronic | ICD-10-CM

## 2025-03-13 DIAGNOSIS — M96.1 POSTLAMINECTOMY SYNDROME OF LUMBAR REGION: Chronic | ICD-10-CM

## 2025-03-13 DIAGNOSIS — M54.16 LUMBAR RADICULOPATHY, CHRONIC: Primary | Chronic | ICD-10-CM

## 2025-03-13 DIAGNOSIS — M47.816 FACET ARTHRITIS OF LUMBAR REGION: Chronic | ICD-10-CM

## 2025-03-13 DIAGNOSIS — M48.00 CENTRAL STENOSIS OF SPINAL CANAL: Chronic | ICD-10-CM

## 2025-03-13 PROCEDURE — 99205 OFFICE O/P NEW HI 60 MIN: CPT | Mod: ,,, | Performed by: STUDENT IN AN ORGANIZED HEALTH CARE EDUCATION/TRAINING PROGRAM

## 2025-03-13 RX ORDER — METOPROLOL SUCCINATE 25 MG/1
25 TABLET, EXTENDED RELEASE ORAL DAILY
COMMUNITY

## 2025-03-13 RX ORDER — AMOXICILLIN 500 MG/1
1000 CAPSULE ORAL EVERY MORNING
COMMUNITY
Start: 2025-02-17

## 2025-03-13 RX ORDER — METHOCARBAMOL 500 MG/1
TABLET, FILM COATED ORAL 4 TIMES DAILY
COMMUNITY

## 2025-03-13 RX ORDER — OXYCODONE AND ACETAMINOPHEN 10; 325 MG/1; MG/1
1 TABLET ORAL EVERY 4 HOURS PRN
COMMUNITY

## 2025-03-13 RX ORDER — QUETIAPINE FUMARATE 25 MG/1
TABLET, FILM COATED ORAL
COMMUNITY

## 2025-03-13 RX ORDER — BUPROPION HYDROCHLORIDE 100 MG/1
100 TABLET, EXTENDED RELEASE ORAL 2 TIMES DAILY
COMMUNITY

## 2025-03-13 NOTE — PROGRESS NOTES
Demarcus Polk M.D.   [facility]  Interventional Pain    New Patient Visit      Referring provider: Tania Oshea    Chief Complaint   Patient presents with    Low-back Pain     Patient here today for lower back pain that radiates down left hip  Patient takes about 20 tylenol a day  Hip pain radiated down the leg above the knee  Pain level today 10/10  Patient was taking gabapentin- it was helping   States when she leans forward she can feel a pulling sensation   02/17/25  X-Ray Hip 2/3 views, Left w/Pelvis when performed       Assessment and Plan:     Luz Maria Alfaro is a 65 y.o. female with low back pain. A detailed and lengthy discussion was undertaken regarding the patient's presentation including history,  physical examination, past treatments, relevant laboratory, imaging results and future management strategies:     Assessment: 65 y.o. year old female with        ICD-10-CM ICD-9-CM   1. Lumbar radiculopathy, chronic  M54.16 724.4   2. Facet arthritis of lumbar region  M47.816 721.3   3. Degeneration of intervertebral disc of lumbar region with discogenic back pain and lower extremity pain  M51.362 722.52   4. Postlaminectomy syndrome of lumbar region  M96.1 722.83   5. Sacroiliac joint pain  M53.3 724.6   6. Central stenosis of spinal canal  M48.00 724.00       The mentioned diagnosis is Worsening  and is accompanied by significant limitation of ADL's as noted by PDI.  Patient's presentation today is pertinent for chronic lumbar radiculopathy and facet arthritis of the lumbar region.  Patient was accompanied by her .  Patient was seeking opioids because she was communicated previously that being a pain management doctor I prescribed pain medications.  On further clarification she was referring to oxycodone.  I clearly communicated that I do not offer oxycodone as a choice of medication for her current presentation.  I offered her gabapentin however patient was reluctant for that  medication.  I also offered interventional management which patient is not sure about at this point of time.  We extensively discussed patient's possible pain generators including chronic lumbar radiculopathy in the setting of central and foraminal stenosis, significant facet arthritis in the lower lumbar region, discogenic pain in the 2nd setting of significant degenerative disc disease, sacroiliac joint disease.  Most of the conversation was carried over with her  given the patient could not understand the conversation.  The  noted that he will try to discuss this further when back at home with her.  I clearly communicated that if only seeking medication management they could consider primary care for the same.  Risks and benefits of opioids in the long term were also provided to the patient and given her age and her comorbidities I again communicated that this may not be the best management strategy in the long term.    Lumbar radiculopathy, chronic  -     Ambulatory referral/consult to Pain Clinic    Facet arthritis of lumbar region    Degeneration of intervertebral disc of lumbar region with discogenic back pain and lower extremity pain    Postlaminectomy syndrome of lumbar region    Sacroiliac joint pain    Central stenosis of spinal canal        Plan:  The plan was clearly communicated to the patient, who verbalized understanding of the same.     Diagnostics: Reviewed the followed diagnostic results  Labs/EMG: Pertinent for anemia Hb 8.1  Imaging:   MRI/CT:   08/12/2024 MRI Lumbar Spine Impression: Grossly unchanged sequelae of diskitis osteomyelitis at L1-2 without abnormal enhancement.  No focal drainable fluid collection is identified.  There is multilevel degenerative changes as detailed above.  Xray:  11/21/2024 Xray Lumbar Spine Impression: Frontal and lateral views of the lumbar spine reveal pedicles intact at all levels. There is a lumbar fusion with pedicle screws at T10 and L1 with  good alignment. There is multilevel disc endplate and facet spondylosis. The pedicles are intact at all levels. There is no fracture dislocation or metallic failure   Medications:  Plan/Ordered: None   Current: Oxycodone 10 mg since 01/06/25 (On opiates since 03/2023), Aripiprazole, Buspirone, escitalopram  Previous: Hydrocodone 5 mg by Dr. Cole since 08/2024 with Diazepam till 09/24, Tramadol, Gabapentin 300 mg TID (last 01/06/2025 by Mao BravoBere), Diclofenac gel 1%, Methocarbamol   Anticoagulants/Antiplatelets: Yes aspirin   Non-Pharmacologic  Therapy:  Patient has previously completed physician directed physical therapy for lower back pain.  Patient is currently doing physical therapy after hip surgeries.  She has home health PT.  Pain Psychology: None  New Consults:  None  Interventions:  Injections:  Plan/Ordered:   None  Previous:  Patient has previously undergone injections prior to her lumbar fusion surgery  Information on the procedure was provided to the patient today. Risks and benefits were discussed. All questions were answered.  Surgical Spine Interventions: Lumbar fusion 02/16/2022, Thoracic fusion for 04/14/2021, Cervical spinal fusion 04/04/2015  Relevant Risk Factors for Interventions:  Medical-Surgical History/Devices: 12/20/2024 - Left Hip Hemiarthroplasty, History of alcohol abuse, diabetes, chronic GERD, valvular heart disease, hypertension, hyperlipidemia, depression, tobacco use, history of compression fracture, spinal stenosis, liver cirrhosis, esophageal stricture, anemia.  Allergies: Meperidine  Other:   I reviewed the prior notes from each unique source dated 01/14/2025 with ROSI Garcia (Orthopedics) and 02/15/2022 with Dr. Cole (NeuroSurgery)  Old Records: Decision was made to not obtain old records   Follow Up: Plan for patient to follow up as when considering interventional pain management    Current Visit Imaging Interpretation:  I independently visualized/interpreted  the following images dated 08/12/2024 MRI lumbar spine   Pertinent findings include:  Facet arthropathy noted across the lumbar spine from L1-2 through L5-S1.  There is disc bulge that is is also resulting in severe bilateral foraminal stenosis at L1-2.  Patient has moderate bilateral foraminal stenosis from L2, L3, L4 nerve roots.  And moderate to severe at L5-S1.  There is central spinal stenosis noted also across lumbar spine region.  There is also mild height loss and endplate irregularity noted at L1.  Key Images:       The above plan and management options were discussed at length with patient. Patient is in agreement with the above and verbalized understanding.    - I discussed the goals of interventional chronic pain management with the patient on today's visit. I explained the utility of injections for diagnostic and therapeutic purposes.  We discussed a multimodal approach to pain including treating the patient's given worst pain at any given time.  We will use a systematic approach to addressing pain.  We will also adopt a multimodal approach that includes injections, adjuvant medications, physical therapy, at times psychiatry.  There may be a limited role for opioid use intermittently in the treatment of pain, more particularly for acute pain although no one approach can be used as a sole treatment modality. I emphasized the importance of regular exercise, core strengthening and stretching, diet and weight loss as a cornerstone of long-term pain management.    - This condition does not require this patient to take time off of work, and the primary goal of our Pain Management services is to improve the patient's functional capacity.  - Patient Questions: Answered all of the patient's questions regarding diagnoses, therapy, treatment and next steps      History of Present Illness:     03/13/2025 HPI Summary    Context/Inciting Event:  Ongoing lower back pain following her recent left hip surgery  Location:   Lower back  Radiation:  Bilaterally lower extremities to her buttock and also ties.  Infrequently traverses to her foot along L4-L5 distribution   Onset and Progress:  Insidious onset and gradually worsening  Description/Character: aching, boring, shooting, and stabbing  Frequency and Timing:  Constant with periods of exacerbation  Pain Score:  8/10 on the best day and 10/10 on the worst day  Symptoms Worse With: Sitting and Standing  Symptoms Improved With: heat, ice, and medications    Work Related: no    Associated Conditions:  Numbness: yes  Weakness: yes  Sleep problems: yes  H/o Depression and anxiety: yes  Opiate use disorder: yes  Inflammatory conditions like Rheumatoid/Ankylosing spondylitis/Psoriatic: no  Functional/Activity Limitations: yes, Symptoms interfere with daily activity, sleeping, and work    Patient denies night fever/night sweats, urinary incontinence, bowel incontinence, significant weight loss, significant motor weakness, and loss of sensations.    Previous Treatments:  Medications:   Current: Oxycodone 10 mg since 01/06/25 (On opiates since 03/2023), Aripiprazole, Buspirone, escitalopram  Previous: Hydrocodone 5 mg by Dr. Cole since 08/2024 with Diazepam till 09/24, Tramadol, Gabapentin 300 mg TID (last 01/06/2025 by Mao Blackburn), Diclofenac gel 1%, Methocarbamol   Anticoagulants/Antiplatelets: Yes aspirin     Physical Therapy/Home Exercise: yes  Ice/Heat:yes  TENS: no  Acupuncture: no  Massage: no  Chiropractic: no      Interventional Procedures:  Patient has undergone injections prior to her lumbar surgery  Spine Surgery: Lumbar fusion 02/16/2022, Thoracic fusion for 04/14/2021, Cervical spinal fusion 04/04/2015  Other: None      Summary of Other Notes:  01/14/2025 with ROSI Garcia (Orthopedics)  Patient presents for 3.5 week follow up left hip hemiarthroplasty. She has been home from rehab x 1 week. She does have home health. Home PT is supposed to come out for the first  time today. Having some pain in the knee and ankle. She has the knee only wrapped with ace wrap today. Moderate swelling distally to the ace at the lower leg and ankle. Removed today. Discussed wrapping from foot to knee to help with swelling. She is WB with the use of a walker and ambulatory at home. She denies any groin pain today. States the hip is doing very well.     Plan  No follow-ups on file.  Luz Maria was seen today for post-op evaluation.  Diagnoses and all orders for this visit:  Closed displaced fracture of left femoral neck  -     X-Ray Hip 2 or 3 views Left with Pelvis when performed; Future  -continue weight-bearing as tolerated range of motion as tolerated.  Continue ambulation with a walker.  Home PT supposed to come out of the house today for initial evaluation.  We will continue home physical therapy until her next evaluation in the progress to outpatient therapy if she is able.  Discussed nicotine use enroll in delayed healing.  May shower and went incisions pat dry.  Do not apply ointments or creams.  Discussed wrapping the foot for compression to help with swelling.  Discussed that she would need to wrap the foot up to above the knee if she is going to run out of the knee.  No placement of Ace wrap around the knee only as it acts as a tourniquet.  This was discussed with her in detail today.  Family present voiced understanding as well.  Follow up in about 6 weeks for repeat x-rays and evaluation.  -ED precautions given     The above findings, diagnostics, and treatment plan were discussed with Dr. Montemayor who is in agreement with the plan of care except as stated in additional documentation.    02/15/2022 with Dr. Cole (NeuroSurgery)  Lumbar fusion 02/16/2022  Thoracic fusion for 04/14/2021  Cervical spinal fusion 04/04/2015  History of alcohol abuse, diabetes, chronic GERD, valvular heart disease, hypertension, hyperlipidemia, depression, tobacco use, history of compression fracture, spinal  "stenosis, liver cirrhosis, esophageal stricture, anemia.       Pain Disability Index:          3/13/2025     1:13 PM   Last 3 PDI Scores   Pain Disability Index (PDI) 59          Review:  Reviewed and consistent with medication use as prescribed.        Review of patient's allergies indicates:   Allergen Reactions    Iodinated contrast media Shortness Of Breath    Opioids - morphine analogues     Meperidine Rash and Hives       Past Medical History:   Diagnosis Date    Benign essential HTN     Mixed hyperlipidemia     Type 2 diabetes mellitus without complications        Past Surgical History:   Procedure Laterality Date    APPENDECTOMY      BACK SURGERY      BREAST SURGERY  01 13 2002    Augmantation    HEMIARTHROPLASTY OF HIP Left 12/20/2024    Procedure: HEMIARTHROPLASTY, HIP;  Surgeon: Jose J Montemayor DO;  Location: [facility] OR;  Service: Orthopedics;  Laterality: Left;  lateral peg board kevin    HIP SURGERY      INTRAMEDULLARY RODDING OF FEMUR Right 10/27/2022    Procedure: RIGHT FEMUR INTERTROCH IMN;  Surgeon: Stewart Brambila MD;  Location: [facility] OR;  Service: Orthopedics;  Laterality: Right;  [place] table, supine  3rd case  Synthes Short TFNA        reports that she has been smoking cigarettes. She uses smokeless tobacco. She reports that she does not currently use alcohol. She reports that she does not currently use drugs after having used the following drugs: "Crack" cocaine.    No family history on file.    Current Outpatient Medications   Medication Sig    ARIPiprazole (ABILIFY) 2 MG Tab Take 1 tablet (2 mg total) by mouth once daily.    buPROPion (WELLBUTRIN SR) 100 MG TBSR 12 hr tablet Take 100 mg by mouth 2 (two) times daily.    EScitalopram oxalate (LEXAPRO) 5 MG Tab Take 1 tablet (5 mg total) by mouth once daily.    methocarbamoL (ROBAXIN) 500 MG Tab Take by mouth 4 (four) times daily.    metoprolol succinate (TOPROL-XL) 25 MG 24 hr tablet Take 25 mg by mouth once daily.    oxyCODONE-acetaminophen " "(PERCOCET)  mg per tablet Take 1 tablet by mouth every 4 (four) hours as needed for Pain.    QUEtiapine (SEROQUEL) 25 MG Tab Take by mouth.    rosuvastatin (CRESTOR) 20 MG tablet Take 1 tablet (20 mg total) by mouth once daily.    amoxicillin (AMOXIL) 500 MG capsule Take 1,000 mg by mouth every morning. (Patient not taking: Reported on 3/13/2025)    aspirin (ECOTRIN) 81 MG EC tablet Take 1 tablet (81 mg total) by mouth 2 (two) times a day.    busPIRone (BUSPAR) 5 MG Tab Take 1 tablet (5 mg total) by mouth 2 (two) times daily. (Patient not taking: Reported on 3/13/2025)    diclofenac sodium (VOLTAREN) 1 % Gel Apply 4 g topically 3 (three) times daily before meals.    ferrous sulfate (IRON, FERROUS SULFATE,) 325 mg (65 mg iron) Tab tablet Take 1 tablet (325 mg total) by mouth daily with breakfast. (Patient not taking: Reported on 3/13/2025)    gabapentin (NEURONTIN) 300 MG capsule Take 1 capsule (300 mg total) by mouth 3 (three) times daily.    pantoprazole (PROTONIX) 40 MG tablet Take 1 tablet (40 mg total) by mouth 2 (two) times daily.    sodium bicarbonate 650 MG tablet Take 1 tablet (650 mg total) by mouth 2 (two) times daily. for 7 days     No current facility-administered medications for this visit.       Review of Systems:     Review of Systems   Constitutional: Negative.  Negative for chills, diaphoresis and fever.   HENT: Negative.     Eyes: Negative.    Respiratory: Negative.  Negative for shortness of breath and wheezing.    Cardiovascular: Negative.  Negative for chest pain and palpitations.   Gastrointestinal: Negative.  Negative for blood in stool.   Genitourinary: Negative.    Musculoskeletal:         Refer to HPI   Skin: Negative.  Negative for rash.   Neurological:  Negative for tremors and speech change.   Endo/Heme/Allergies: Negative.    Psychiatric/Behavioral: Negative.         PHYSICAL EXAM:   Visit Vitals  /63 (BP Location: Left arm, Patient Position: Sitting)   Pulse 77   Ht 5' 3" " (1.6 m)   Wt 46.7 kg (103 lb)   SpO2 97%   BMI 18.25 kg/m²       General Appearance: healthy, well developed, well nourished, appropriately dressed  Mental Status: Alert, oriented, thought content appropriate; Normal affect  Psych: Mood and affect appropriate  Head: Normocephalic, atraumatic  Eyes: Extraocular movements intact.   Neck: No asymmetry, masses or scars; supple; midline   Skin: Warm and dry; No rashes visible on exposed areas  Lung: Respiratory effort normal, symmetrical chest rise  Cardiovascular: RRR with palpation of the radial artery.    Neuro:     Motor & Sensory:     Strength and Sensation:     Lower Extremity:  Nerve L2 L3 L4/L5 L5 S1 S2   Muscle Group Hip Flexion Hip  Adduction Knee Extension Ankle Dorsiflexion Toe  Dorsiflexion Hip  Abduction Ankle Plantarflexion Toe  Plantarflexion   Right 5/5 5/5 5/5 5/5 5/5 5/5 5/5 5/5   Left 5/5 5/5 5/5 5/5 5/5 5/5 5/5 5/5     Nerve Root (area) Light Touch    R L   L1 (iliac crest/groin) 2 2   L2 (anterior/inner thigh) 2 2   L3 (anterior thigh/medial knee) 2 2   L4 (lateral thigh/ant. knee/medial foot border) 2 2   L5 (lateral leg/dorsum foot below 2-4 toes) 2 2   S1 (lateral foot border) 2 2   S2 (plantar heel) 2 2       Reflexes:    R L   Biceps (C5) 2+ 2+   Brachioradialis (C6) 2+ 2+   Triceps (C7) 2+ 2+   Patellar (L4) 2+ 2+   Achilles (S1) 2+ 2+       Upper Tract Signs:    R L   Rodrigues's Negative Negative   Plantar Response Down-going Down-going   Clonus Negative Negative     Gait:   Heel walking: Absent  Toe walking: Absent  Tandem walking: Absent      MSK:    Lumbar Exam  Inspection:  Surgical scar noted  Palpation:  Tenderness to palpation along the lower lumbar spine in the paraspinal muscles  ROM:  Significant limitation and also painful range of motion for extension, rotation, lateral bending and minimally for flexion  Special Tests:   Facet Loading:  Positive bilateral  Straight Leg raise:  Positive bilateral     Data     Imaging of Relevance:      08/12/2024 MRI Lumbar Spine Impression:  Grossly unchanged sequelae of diskitis osteomyelitis at L1-2 without abnormal enhancement.  No focal drainable fluid collection is identified.  There is multilevel degenerative changes as detailed above.      Results for orders placed during the hospital encounter of 10/17/24    CT Lumbar Spine Without Contrast    Narrative  Technique: CT of the lumbar spine was performed without intravenous contrast with direct axial as well as sagittal and coronal reconstruction images.    Comparison: None.    Clinical history: Lumbar radiculopathy, trauma; Lumbar radiculopathy, osteoporosis presence or risk.    Findings:    Anatomy: Unremarkable.    Mineralization: Mild osteopenia is seen of the visualized bony structures.    Congenital: None.    Bone alignment: Unremarkable with no significant listhesis.    Curvature: The lumbar lordosis is maintained.    Bone and bone marrow: There are chronic compression deformities which is severe in T10 and moderate in T11 and T12 with posterior decompression and fusion device on T9 through L1. L1-2 endplate destructive changes with subchondral sclerosis suggesting discitis with chronic elements. Similar findings at T11-12 with some endplate cortication probably quiescent discitis or from discogenic disease.    Intervertebral disc spaces: Moderate to severely decreased disc height is seen at T9- T10 down through L5-S1.    Osteophytes: There are small and medium sized osteophytes at T12 down through S1.    Endplate Sclerosis: Moderate endplate sclerosis is seen off the opposing endplates at L1-L2.    Spinal canal: Unremarkable with no bony spinal canal stenosis identified.    Fractures: No lumbar spine acute fracture dislocation or subluxation is seen.    Vertebral Fusion: None.    Impression  Impression: 1. No lumbar spine acute fracture dislocation or subluxation is seen. 2. There are chronic compression deformities which is severe in T10 and  moderate in T11-T12 with posterior decompression and fusion device on T9 through L1. L1-2 endplate destructive changes with subchondral sclerosis suggesting discitis with chronic elements. Similar findings at T11-12 with some endplate cortication probably quiescent discitis or from discogenic disease. 3. Degenerative changes and other findings as above. No significant discrepancy with overnight report      Electronically signed by: John Allen  Date:    10/17/2024  Time:    09:35      Results for orders placed during the hospital encounter of 10/03/24    CT Cervical Spine Without Contrast    Narrative  EXAMINATION:  CT CERVICAL SPINE WITHOUT CONTRAST    CLINICAL HISTORY:  Polytrauma, blunt;    TECHNIQUE:  Low dose axial images, sagittal and coronal reformations were performed though the cervical spine. Contrast was not administered. Dose reduction techniques including automatic exposure control (AEC) were utilized.    Dose (DLP): 2231 mGycm    COMPARISON:  CT soft tissue neck 10/09/2019.    FINDINGS:  No fracture or acute subluxation.  No aggressive osseous lesion.  No acute or significant paraspinous soft tissue abnormality.  Atherosclerotic calcifications along the common carotid artery bifurcations bilaterally.    C2- C3: Mild disc degeneration, right uncovertebral hypertrophy, and mild right facet joint degeneration causing mild right foraminal stenosis.  Central canal and left foramina are patent.    C3-C4: Mild disc degeneration, bilateral uncovertebral hypertrophy, and mild left facet joint degeneration causing moderate left foraminal stenosis.  The central canal and right foramina are patent.    C4-C5: Trace degenerative anterolisthesis, mild disc degeneration, mild left uncovertebral hypertrophy, and moderate left facet joint degeneration causing moderate left foraminal stenosis.  The central canal and right foramen are patent.    C5-C6: Severe disc degeneration with shallow broad-based disc osteophyte and  mild left facet joint degeneration causing moderate left foraminal stenosis.  The central canal and right foramina are patent.    C6-C7: Severe disc degeneration with shallow broad-based disc osteophyte causing mild central canal stenosis and severe bilateral foraminal stenosis.    C7-T1: Mild disc degeneration with no stenosis.    Impression  No acute fracture or subluxation. Multilevel degenerative changes of the cervical spine as detailed above. Findings are consistent with preliminary report.      Electronically signed by: Jack Kirkland  Date:    10/04/2024  Time:    08:05    Labs of Relevance:  Chemistry:  Lab Results   Component Value Date     01/04/2025    K 3.7 01/04/2025    BUN 8.9 (L) 01/04/2025    CREATININE 0.57 01/04/2025    EGFRNORACEVR >60 01/04/2025    GLUCOSE 102 01/04/2025    CALCIUM 8.4 01/04/2025    ALKPHOS 498 (H) 12/30/2024    LABPROT 5.3 (L) 12/30/2024    ALBUMIN 2.7 (L) 12/30/2024    BILIDIR 0.2 02/28/2022    IBILI 0.20 02/28/2022     (H) 12/30/2024    ALT 48 12/30/2024    MG 2.10 01/04/2025    PHOS 2.6 12/30/2024    [id number] 5.7 03/03/2022        Lab Results   Component Value Date    HGBA1C 5.0 12/25/2024    HGBA1C 5.4 10/05/2024    HGBA1C 5.5 03/21/2024        Hematology:  Lab Results   Component Value Date    WBC 9.19 01/02/2025    HGB 8.1 (L) 01/02/2025    HCT 25.4 (L) 01/02/2025     01/02/2025    [id number] 1,958 (H) 03/20/2024    FOLATE 11.9 03/20/2024      Lab Results   Component Value Date    INR 1.1 (L) 12/19/2024    INR 1.5 11/18/2024    INR 1.0 05/21/2024         Demarcus Polk MD  Interventional Pain Management  [facility]     Disclaimer:  This note was prepared using voice recognition system and is likely to have sound alike errors that may have been overlooked even after proof reading.  Please call me with any questions

## 2025-03-14 PROBLEM — M54.16 LUMBAR RADICULOPATHY, CHRONIC: Status: ACTIVE | Noted: 2025-03-14

## 2025-03-14 PROBLEM — M48.00 CENTRAL STENOSIS OF SPINAL CANAL: Status: ACTIVE | Noted: 2025-03-14

## 2025-03-14 PROBLEM — M47.816 FACET ARTHRITIS OF LUMBAR REGION: Status: ACTIVE | Noted: 2025-03-14

## 2025-03-14 PROBLEM — M53.3 SACROILIAC JOINT PAIN: Status: ACTIVE | Noted: 2025-03-14

## 2025-03-14 PROBLEM — M51.362 DEGENERATION OF INTERVERTEBRAL DISC OF LUMBAR REGION WITH DISCOGENIC BACK PAIN AND LOWER EXTREMITY PAIN: Status: ACTIVE | Noted: 2025-03-14

## 2025-03-14 PROBLEM — M96.1 POSTLAMINECTOMY SYNDROME OF LUMBAR REGION: Status: ACTIVE | Noted: 2025-03-14

## 2025-04-16 ENCOUNTER — HOSPITAL ENCOUNTER (OUTPATIENT)
Dept: RADIOLOGY | Facility: CLINIC | Age: 65
Discharge: HOME | End: 2025-04-16
Attending: ORTHOPAEDIC SURGERY
Payer: MEDICARE

## 2025-04-16 ENCOUNTER — OFFICE VISIT (OUTPATIENT)
Dept: ORTHOPEDICS | Facility: CLINIC | Age: 65
End: 2025-04-16
Payer: MEDICARE

## 2025-04-16 VITALS
SYSTOLIC BLOOD PRESSURE: 110 MMHG | DIASTOLIC BLOOD PRESSURE: 65 MMHG | WEIGHT: 101.44 LBS | HEIGHT: 63 IN | HEART RATE: 79 BPM | BODY MASS INDEX: 17.97 KG/M2

## 2025-04-16 DIAGNOSIS — S72.002A CLOSED DISPLACED FRACTURE OF LEFT FEMORAL NECK: Primary | ICD-10-CM

## 2025-04-16 DIAGNOSIS — S72.002A CLOSED DISPLACED FRACTURE OF LEFT FEMORAL NECK: ICD-10-CM

## 2025-04-16 PROCEDURE — 99213 OFFICE O/P EST LOW 20 MIN: CPT | Mod: ,,, | Performed by: ORTHOPAEDIC SURGERY

## 2025-04-16 PROCEDURE — 73502 X-RAY EXAM HIP UNI 2-3 VIEWS: CPT | Mod: LT,,, | Performed by: ORTHOPAEDIC SURGERY

## 2025-04-16 RX ORDER — FOLIC ACID 1 MG/1
1 TABLET ORAL EVERY MORNING
COMMUNITY
Start: 2024-05-23 | End: 2025-05-24

## 2025-04-16 RX ORDER — POTASSIUM CHLORIDE 20 MEQ/1
20 TABLET, EXTENDED RELEASE ORAL 2 TIMES DAILY
COMMUNITY
Start: 2024-11-27

## 2025-04-16 RX ORDER — METOPROLOL TARTRATE 25 MG/1
12.5 TABLET, FILM COATED ORAL
COMMUNITY

## 2025-04-16 NOTE — PROGRESS NOTES
Subjective:       Patient ID: Luz Maria Alfaro is a 65 y.o. female.  Chief Complaint   Patient presents with    Left Hip - Follow-up     3.5 month f/u from left hip janusz. Reports intermittent pain but believes it is coming from back. Currently working with physical therapy.         HPI    History of Present Illness    HPI:  Ms. Alfaro presents for follow-up of left hip surgery, reporting pain primarily in her back, specifically on the left side. Pain worsens when standing up and walking after sitting for XRs. She has a history of two back surgeries and reports her vertebrae are collapsing. She has been using massage to alleviate back pain.    Her wife indicates she has been seeking pain medication, specifically Percocet and gabapentin, which were previously prescribed but have been discontinued. She saw a pain management provider who recommended an epidural injection, which she declined.    Her wife expresses concern about her quality of life and suggests considering hospice or palliative care if she is unwilling to undergo back procedures.    Imaging shows her hip appears normal, but surgical notes indicated poor bone quality.    She denies any new medical diagnoses.    PREVIOUS TREATMENTS:  Ms. Alfaro has used massage therapy to alleviate back pain.    IMAGING:  An X-ray of the hip was performed. The hip appears normal, although surgical notes indicate poor bone quality.    MEDICATIONS:  Ms. Alfaro has discontinued both Percocet and Gabapentin.    SOCIAL HISTORY:  Ms. Alfaro is .      ROS:  ENT: +hearing loss, +difficulty hearing  Musculoskeletal: denies joint pain, +back pain, +pain with movement, +difficulty standing up, +difficulty walking           ROS:  Constitutional: Denies fever chills  Eyes: No change in vision  ENT: No ringing or current infections  CV: No chest pain  Resp: No labored breathing  MSK: Pain evident at site of injury located in HPI,   Integ: No signs of abrasions or  lacerations  Neuro: No numbness or tingling  Lymphatic: No swelling outside the area of injury     Current Outpatient Medications on File Prior to Visit   Medication Sig Dispense Refill    folic acid (FOLVITE) 1 MG tablet Take 1 tablet by mouth every morning.      metoprolol succinate (TOPROL-XL) 25 MG 24 hr tablet Take 25 mg by mouth once daily.      metoprolol tartrate (LOPRESSOR) 25 MG tablet Take 12.5 mg by mouth.      potassium chloride SA (K-DUR,KLOR-CON) 20 MEQ tablet Take 20 mEq by mouth 2 (two) times daily.      QUEtiapine (SEROQUEL) 25 MG Tab Take by mouth.      amoxicillin (AMOXIL) 500 MG capsule Take 1,000 mg by mouth every morning. (Patient not taking: Reported on 4/16/2025)      ARIPiprazole (ABILIFY) 2 MG Tab Take 1 tablet (2 mg total) by mouth once daily. 30 tablet 1    aspirin (ECOTRIN) 81 MG EC tablet Take 1 tablet (81 mg total) by mouth 2 (two) times a day. 60 tablet 0    buPROPion (WELLBUTRIN SR) 100 MG TBSR 12 hr tablet Take 100 mg by mouth 2 (two) times daily. (Patient not taking: Reported on 4/16/2025)      busPIRone (BUSPAR) 5 MG Tab Take 1 tablet (5 mg total) by mouth 2 (two) times daily. (Patient not taking: Reported on 3/13/2025) 60 tablet 2    diclofenac sodium (VOLTAREN) 1 % Gel Apply 4 g topically 3 (three) times daily before meals. 450 g 1    EScitalopram oxalate (LEXAPRO) 5 MG Tab Take 1 tablet (5 mg total) by mouth once daily. 90 tablet 0    gabapentin (NEURONTIN) 300 MG capsule Take 1 capsule (300 mg total) by mouth 3 (three) times daily. 90 capsule 0    methocarbamoL (ROBAXIN) 500 MG Tab Take by mouth 4 (four) times daily. (Patient not taking: Reported on 4/16/2025)      oxyCODONE-acetaminophen (PERCOCET)  mg per tablet Take 1 tablet by mouth every 4 (four) hours as needed for Pain. (Patient not taking: Reported on 4/16/2025)      pantoprazole (PROTONIX) 40 MG tablet Take 1 tablet (40 mg total) by mouth 2 (two) times daily. 60 tablet 0    rosuvastatin (CRESTOR) 20 MG tablet  "Take 1 tablet (20 mg total) by mouth once daily. 90 tablet 0    sodium bicarbonate 650 MG tablet Take 1 tablet (650 mg total) by mouth 2 (two) times daily. for 7 days 14 tablet 0     No current facility-administered medications on file prior to visit.          Objective:      /65   Pulse 79   Ht 5' 3" (1.6 m)   Wt 46 kg (101 lb 6.6 oz)   BMI 17.96 kg/m²   Physical Exam  General the patient is alert and oriented x3 no acute distress nontoxic-appearing appropriate affect.    Constitutional: Vital signs are examined and stable.  Resp: No signs of labored breathing               LLE: -Skin: incisions well healed.           -MSK: Hip and Knee F/E, EHL/FHL, Gastroc/Tib anterior Strength 5/5; Tolerates full passive circumduction of the hip           -Neuro:  Sensation intact to light touch L3-S1 dermatomes           -Lymphatic: No signs of lymphadenopathy           -CV: Capillary refill is less than 2 seconds.           Body mass index is 17.96 kg/m².  Ideal body weight: 52.4 kg (115 lb 8.3 oz)  Hemoglobin A1c   Date Value Ref Range Status   12/25/2024 5.0 <=7.0 % Final   10/05/2024 5.4 <=7.0 % Final     Hgb   Date Value Ref Range Status   01/02/2025 8.1 (L) 12.0 - 16.0 g/dL Final   01/01/2025 8.9 (L) 12.0 - 16.0 g/dL Final     Hct   Date Value Ref Range Status   01/02/2025 25.4 (L) 37.0 - 47.0 % Final   01/01/2025 29.0 (L) 37.0 - 47.0 % Final     Iron   Date Value Ref Range Status   11/19/2024 9 (L) 25 - 156 UG/DL Final     Iron Level   Date Value Ref Range Status   12/25/2024 52 50 - 170 ug/dL Final   12/23/2024 17 (L) 50 - 170 ug/dL Final     No components found for: "FROLATE"  Vitamin D   Date Value Ref Range Status   03/03/2022 5.7 30.0 - 80.0      WBC   Date Value Ref Range Status   01/02/2025 9.19 4.50 - 11.50 x10(3)/mcL Final   01/01/2025 11.91 (H) 4.50 - 11.50 x10(3)/mcL Final   10/31/2022 6.3 x10(3)/mcL Final       Radiology:  X-ray left hip reveals implant in place length and alignment stable as " compared to the contralateral side.  No evidence of lucency to indicate loosening.  Stable alignment.        Assessment:         1. Closed displaced fracture of left femoral neck  X-Ray Hip 2 or 3 views Left with Pelvis when performed              Plan:         No follow-ups on file.    Luz Maria was seen today for follow-up.    Diagnoses and all orders for this visit:    Closed displaced fracture of left femoral neck  -     X-Ray Hip 2 or 3 views Left with Pelvis when performed; Future    Assessment & Plan    PLAN SUMMARY:  - Follow-up every 6 months for hip check.  - Transition to yearly visits after initial follow-ups.    FOLLOW UP:  - Follow up every 6 months for hip check, transitioning to yearly visits.          Thigh.  She does have neck back pain and pain in her gluteal region.  I do believe this is not typically related to the hip surgery.  She has been neurosurgeon.  She has been resting in a again chronic pain management which we politely declined.  She has been to a pain has been doctor who does injections she denied the injection.  Recommend she explore other alternative by a proper position for possible medical marijuana versus not chronic narcotic pain management.  We have discussed palliative can hospice care has a way to possibly control her pain as well.      This note was generated with the assistance of ambient listening technology. Verbal consent was obtained by the patient and accompanying visitor(s) for the recording of patient appointment to facilitate this note. I attest to having reviewed and edited the generated note for accuracy, though some syntax or spelling errors may persist. Please contact the author of this note for any clarification.            Future Appointments   Date Time Provider Department Center   7/16/2025  9:30 AM Jose J Montemayor DO [facility] [place]   7/24/2025  9:40 AM Roslyn Trevino MD [facility]

## 2025-06-25 ENCOUNTER — HOSPITAL ENCOUNTER (INPATIENT)
Facility: HOSPITAL | Age: 65
LOS: 3 days | Discharge: HOME | DRG: 392 | End: 2025-06-29
Attending: STUDENT IN AN ORGANIZED HEALTH CARE EDUCATION/TRAINING PROGRAM
Payer: MEDICARE

## 2025-06-25 DIAGNOSIS — R11.0 NAUSEA: ICD-10-CM

## 2025-06-25 DIAGNOSIS — E86.0 DEHYDRATION: ICD-10-CM

## 2025-06-25 DIAGNOSIS — R10.9 ABDOMINAL PAIN: ICD-10-CM

## 2025-06-25 DIAGNOSIS — R07.9 CHEST PAIN: ICD-10-CM

## 2025-06-25 DIAGNOSIS — R00.0 TACHYCARDIA: ICD-10-CM

## 2025-06-25 DIAGNOSIS — K22.2 STRICTURE OF ESOPHAGUS: Primary | ICD-10-CM

## 2025-06-25 PROBLEM — R11.2 INTRACTABLE NAUSEA AND VOMITING: Status: ACTIVE | Noted: 2025-06-25

## 2025-06-25 LAB
ACCEPTIBLE SP GR UR QL: 1.03 (ref 1–1.03)
ALBUMIN SERPL-MCNC: 4.8 G/DL (ref 3.4–4.8)
ALBUMIN/GLOB SERPL: 1.1 RATIO (ref 1.1–2)
ALP SERPL-CCNC: 193 UNIT/L (ref 40–150)
ALT SERPL-CCNC: 10 UNIT/L (ref 0–55)
AMPHET UR QL SCN: NEGATIVE
ANION GAP SERPL CALC-SCNC: 15 MEQ/L
AST SERPL-CCNC: 17 UNIT/L (ref 11–45)
B PERT.PT PRMT NPH QL NAA+NON-PROBE: NOT DETECTED
BACTERIA #/AREA URNS AUTO: ABNORMAL /HPF
BARBITURATE SCN PRESENT UR: NEGATIVE
BASE EXCESS BLD CALC-SCNC: -4.9 MMOL/L
BASOPHILS # BLD AUTO: 0.01 X10(3)/MCL
BASOPHILS # BLD AUTO: 0.02 X10(3)/MCL
BASOPHILS NFR BLD AUTO: 0.2 %
BASOPHILS NFR BLD AUTO: 0.2 %
BENZODIAZ UR QL SCN: NEGATIVE
BILIRUB SERPL-MCNC: 0.8 MG/DL
BILIRUB UR QL STRIP.AUTO: NEGATIVE
BLOOD GAS SAMPLE TYPE (OHS): ABNORMAL
BUN SERPL-MCNC: 12.5 MG/DL (ref 9.8–20.1)
C PNEUM DNA NPH QL NAA+NON-PROBE: NOT DETECTED
CA-I BLD-SCNC: 1.1 MMOL/L (ref 1.12–1.23)
CALCIUM SERPL-MCNC: 10.4 MG/DL (ref 8.4–10.2)
CANNABINOIDS UR QL SCN: NEGATIVE
CHLORIDE SERPL-SCNC: 108 MMOL/L (ref 98–107)
CK SERPL-CCNC: 62 U/L (ref 29–168)
CLARITY UR: CLEAR
CO2 BLDA-SCNC: 18.1 MMOL/L
CO2 SERPL-SCNC: 15 MMOL/L (ref 23–31)
COCAINE UR QL SCN: NEGATIVE
COHGB MFR BLDA: 2.6 %
COLOR UR AUTO: COLORLESS
CREAT SERPL-MCNC: 0.9 MG/DL (ref 0.55–1.02)
CREAT/UREA NIT SERPL: 14
DRAWN BY BLOOD GAS (OHS): ABNORMAL
EOSINOPHIL # BLD AUTO: 0.01 X10(3)/MCL (ref 0–0.9)
EOSINOPHIL # BLD AUTO: 0.04 X10(3)/MCL (ref 0–0.9)
EOSINOPHIL NFR BLD AUTO: 0.2 %
EOSINOPHIL NFR BLD AUTO: 0.5 %
ERYTHROCYTE [DISTWIDTH] IN BLOOD BY AUTOMATED COUNT: 13.2 % (ref 11.5–17)
ERYTHROCYTE [DISTWIDTH] IN BLOOD BY AUTOMATED COUNT: 13.4 % (ref 11.5–17)
ETHANOL SERPL-MCNC: <10 MG/DL
FENTANYL UR QL SCN: NEGATIVE
FLUAV AG UPPER RESP QL IA.RAPID: NOT DETECTED
FLUBV AG UPPER RESP QL IA.RAPID: NOT DETECTED
GFR SERPLBLD CREATININE-BSD FMLA CKD-EPI: >60 ML/MIN/1.73/M2
GLOBULIN SER-MCNC: 4.2 GM/DL (ref 2.4–3.5)
GLUCOSE SERPL-MCNC: 144 MG/DL (ref 82–115)
GLUCOSE UR QL STRIP: ABNORMAL
HADV DNA NPH QL NAA+NON-PROBE: NOT DETECTED
HCO3 BLDA-SCNC: 17.3 MMOL/L
HCOV 229E RNA NPH QL NAA+NON-PROBE: NOT DETECTED
HCOV HKU1 RNA NPH QL NAA+NON-PROBE: NOT DETECTED
HCOV NL63 RNA NPH QL NAA+NON-PROBE: NOT DETECTED
HCOV OC43 RNA NPH QL NAA+NON-PROBE: NOT DETECTED
HCT VFR BLD AUTO: 42.4 % (ref 37–47)
HCT VFR BLD AUTO: 50.9 % (ref 37–47)
HGB BLD-MCNC: 14.7 G/DL (ref 12–16)
HGB BLD-MCNC: 17.3 G/DL (ref 12–16)
HGB UR QL STRIP: NEGATIVE
HMPV RNA NPH QL NAA+NON-PROBE: NOT DETECTED
HPIV1 RNA NPH QL NAA+NON-PROBE: NOT DETECTED
HPIV2 RNA NPH QL NAA+NON-PROBE: NOT DETECTED
HPIV3 RNA NPH QL NAA+NON-PROBE: NOT DETECTED
HPIV4 RNA NPH QL NAA+NON-PROBE: NOT DETECTED
IMM GRANULOCYTES # BLD AUTO: 0.01 X10(3)/MCL (ref 0–0.04)
IMM GRANULOCYTES # BLD AUTO: 0.02 X10(3)/MCL (ref 0–0.04)
IMM GRANULOCYTES NFR BLD AUTO: 0.2 %
IMM GRANULOCYTES NFR BLD AUTO: 0.2 %
KETONES UR QL STRIP: ABNORMAL
LACTATE SERPL-SCNC: 2 MMOL/L (ref 0.5–2.2)
LACTATE SERPL-SCNC: 2.5 MMOL/L (ref 0.5–2.2)
LACTATE SERPL-SCNC: 2.8 MMOL/L (ref 0.5–2.2)
LACTATE SERPL-SCNC: 2.9 MMOL/L (ref 0.5–2.2)
LEUKOCYTE ESTERASE UR QL STRIP: NEGATIVE
LIPASE SERPL-CCNC: 7 U/L
LYMPHOCYTES # BLD AUTO: 1.16 X10(3)/MCL (ref 0.6–4.6)
LYMPHOCYTES # BLD AUTO: 3.43 X10(3)/MCL (ref 0.6–4.6)
LYMPHOCYTES NFR BLD AUTO: 19.5 %
LYMPHOCYTES NFR BLD AUTO: 39.3 %
M PNEUMO DNA NPH QL NAA+NON-PROBE: NOT DETECTED
MAGNESIUM SERPL-MCNC: 1.8 MG/DL (ref 1.6–2.6)
MCH RBC QN AUTO: 32.8 PG (ref 27–31)
MCH RBC QN AUTO: 33.6 PG (ref 27–31)
MCHC RBC AUTO-ENTMCNC: 34 G/DL (ref 33–36)
MCHC RBC AUTO-ENTMCNC: 34.7 G/DL (ref 33–36)
MCV RBC AUTO: 96.6 FL (ref 80–94)
MCV RBC AUTO: 97 FL (ref 80–94)
MDMA UR QL SCN: NEGATIVE
METHGB MFR BLDA: 1 %
MONOCYTES # BLD AUTO: 0.08 X10(3)/MCL (ref 0.1–1.3)
MONOCYTES # BLD AUTO: 0.37 X10(3)/MCL (ref 0.1–1.3)
MONOCYTES NFR BLD AUTO: 1.3 %
MONOCYTES NFR BLD AUTO: 4.2 %
NEUTROPHILS # BLD AUTO: 4.68 X10(3)/MCL (ref 2.1–9.2)
NEUTROPHILS # BLD AUTO: 4.85 X10(3)/MCL (ref 2.1–9.2)
NEUTROPHILS NFR BLD AUTO: 55.6 %
NEUTROPHILS NFR BLD AUTO: 78.6 %
NITRITE UR QL STRIP: NEGATIVE
NRBC BLD AUTO-RTO: 0 %
NRBC BLD AUTO-RTO: 0 %
O2 HB BLOOD GAS (OHS): 94 %
OHS QRS DURATION: 78 MS
OHS QRS DURATION: 80 MS
OHS QRS DURATION: 80 MS
OHS QTC CALCULATION: 459 MS
OHS QTC CALCULATION: 492 MS
OHS QTC CALCULATION: 596 MS
OPIATES UR QL SCN: POSITIVE
OXYHGB MFR BLDA: 17.6 G/DL
PCO2 BLDA: 26 MMHG (ref 20–50)
PCP UR QL: NEGATIVE
PH BLDA: 7.43 [PH] (ref 7.3–7.6)
PH UR STRIP: 7 [PH]
PH UR: 7 [PH] (ref 3–11)
PLATELET # BLD AUTO: 162 X10(3)/MCL (ref 130–400)
PLATELET # BLD AUTO: 244 X10(3)/MCL (ref 130–400)
PMV BLD AUTO: 10.2 FL (ref 7.4–10.4)
PMV BLD AUTO: 9.8 FL (ref 7.4–10.4)
PO2 BLDA: 123 MMHG
POTASSIUM BLOOD GAS (OHS): 3.9 MMOL/L (ref 3.5–5)
POTASSIUM SERPL-SCNC: 4.1 MMOL/L (ref 3.5–5.1)
PROT SERPL-MCNC: 9 GM/DL (ref 5.8–7.6)
PROT UR QL STRIP: NEGATIVE
RBC # BLD AUTO: 4.37 X10(6)/MCL (ref 4.2–5.4)
RBC # BLD AUTO: 5.27 X10(6)/MCL (ref 4.2–5.4)
RBC #/AREA URNS AUTO: ABNORMAL /HPF
RSV A 5' UTR RNA NPH QL NAA+PROBE: NOT DETECTED
RSV RNA NPH QL NAA+NON-PROBE: NOT DETECTED
RV+EV RNA NPH QL NAA+NON-PROBE: NOT DETECTED
SAO2 % BLDA: 98.9 %
SARS-COV-2 RNA RESP QL NAA+PROBE: NOT DETECTED
SODIUM BLOOD GAS (OHS): 132 MMOL/L (ref 137–145)
SODIUM SERPL-SCNC: 138 MMOL/L (ref 136–145)
SP GR UR STRIP.AUTO: 1.02 (ref 1–1.03)
SQUAMOUS #/AREA URNS LPF: ABNORMAL /HPF
TROPONIN I SERPL-MCNC: 0.02 NG/ML (ref 0–0.04)
TROPONIN I SERPL-MCNC: <0.01 NG/ML (ref 0–0.04)
TROPONIN I SERPL-MCNC: <0.01 NG/ML (ref 0–0.04)
UROBILINOGEN UR STRIP-ACNC: NORMAL
WBC # BLD AUTO: 5.95 X10(3)/MCL (ref 4.5–11.5)
WBC # BLD AUTO: 8.73 X10(3)/MCL (ref 4.5–11.5)
WBC #/AREA URNS AUTO: ABNORMAL /HPF

## 2025-06-25 PROCEDURE — 63600175 PHARM REV CODE 636 W HCPCS: Performed by: NURSE PRACTITIONER

## 2025-06-25 PROCEDURE — G0378 HOSPITAL OBSERVATION PER HR: HCPCS

## 2025-06-25 PROCEDURE — 82077 ASSAY SPEC XCP UR&BREATH IA: CPT

## 2025-06-25 PROCEDURE — 85025 COMPLETE CBC W/AUTO DIFF WBC: CPT | Mod: 91 | Performed by: HOSPITALIST

## 2025-06-25 PROCEDURE — 25000242 PHARM REV CODE 250 ALT 637 W/ HCPCS: Performed by: STUDENT IN AN ORGANIZED HEALTH CARE EDUCATION/TRAINING PROGRAM

## 2025-06-25 PROCEDURE — 0DB68ZX EXCISION OF STOMACH, VIA NATURAL OR ARTIFICIAL OPENING ENDOSCOPIC, DIAGNOSTIC: ICD-10-PCS | Performed by: INTERNAL MEDICINE

## 2025-06-25 PROCEDURE — 87798 DETECT AGENT NOS DNA AMP: CPT | Mod: 59

## 2025-06-25 PROCEDURE — 63600175 PHARM REV CODE 636 W HCPCS: Performed by: HOSPITALIST

## 2025-06-25 PROCEDURE — 81001 URINALYSIS AUTO W/SCOPE: CPT | Mod: XB | Performed by: STUDENT IN AN ORGANIZED HEALTH CARE EDUCATION/TRAINING PROGRAM

## 2025-06-25 PROCEDURE — 99900035 HC TECH TIME PER 15 MIN (STAT)

## 2025-06-25 PROCEDURE — 93010 ELECTROCARDIOGRAM REPORT: CPT | Mod: 76,,, | Performed by: INTERNAL MEDICINE

## 2025-06-25 PROCEDURE — 96376 TX/PRO/DX INJ SAME DRUG ADON: CPT

## 2025-06-25 PROCEDURE — 96375 TX/PRO/DX INJ NEW DRUG ADDON: CPT

## 2025-06-25 PROCEDURE — 83605 ASSAY OF LACTIC ACID: CPT | Mod: 59 | Performed by: STUDENT IN AN ORGANIZED HEALTH CARE EDUCATION/TRAINING PROGRAM

## 2025-06-25 PROCEDURE — 0241U COVID/RSV/FLU A&B PCR: CPT

## 2025-06-25 PROCEDURE — 84484 ASSAY OF TROPONIN QUANT: CPT | Mod: 91

## 2025-06-25 PROCEDURE — 96374 THER/PROPH/DIAG INJ IV PUSH: CPT

## 2025-06-25 PROCEDURE — 87040 BLOOD CULTURE FOR BACTERIA: CPT | Mod: 59

## 2025-06-25 PROCEDURE — 96372 THER/PROPH/DIAG INJ SC/IM: CPT | Performed by: NURSE PRACTITIONER

## 2025-06-25 PROCEDURE — 36415 COLL VENOUS BLD VENIPUNCTURE: CPT

## 2025-06-25 PROCEDURE — 99285 EMERGENCY DEPT VISIT HI MDM: CPT | Mod: 25

## 2025-06-25 PROCEDURE — 93005 ELECTROCARDIOGRAM TRACING: CPT

## 2025-06-25 PROCEDURE — 83735 ASSAY OF MAGNESIUM: CPT | Performed by: STUDENT IN AN ORGANIZED HEALTH CARE EDUCATION/TRAINING PROGRAM

## 2025-06-25 PROCEDURE — 25000003 PHARM REV CODE 250

## 2025-06-25 PROCEDURE — 0DB78ZX EXCISION OF STOMACH, PYLORUS, VIA NATURAL OR ARTIFICIAL OPENING ENDOSCOPIC, DIAGNOSTIC: ICD-10-PCS | Performed by: INTERNAL MEDICINE

## 2025-06-25 PROCEDURE — 27000221 HC OXYGEN, UP TO 24 HOURS

## 2025-06-25 PROCEDURE — 25500020 PHARM REV CODE 255: Performed by: STUDENT IN AN ORGANIZED HEALTH CARE EDUCATION/TRAINING PROGRAM

## 2025-06-25 PROCEDURE — 63600175 PHARM REV CODE 636 W HCPCS

## 2025-06-25 PROCEDURE — 84484 ASSAY OF TROPONIN QUANT: CPT | Mod: 59 | Performed by: STUDENT IN AN ORGANIZED HEALTH CARE EDUCATION/TRAINING PROGRAM

## 2025-06-25 PROCEDURE — 82803 BLOOD GASES ANY COMBINATION: CPT

## 2025-06-25 PROCEDURE — 83690 ASSAY OF LIPASE: CPT | Performed by: STUDENT IN AN ORGANIZED HEALTH CARE EDUCATION/TRAINING PROGRAM

## 2025-06-25 PROCEDURE — 63600175 PHARM REV CODE 636 W HCPCS: Performed by: STUDENT IN AN ORGANIZED HEALTH CARE EDUCATION/TRAINING PROGRAM

## 2025-06-25 PROCEDURE — 0D748ZZ DILATION OF ESOPHAGOGASTRIC JUNCTION, VIA NATURAL OR ARTIFICIAL OPENING ENDOSCOPIC: ICD-10-PCS | Performed by: INTERNAL MEDICINE

## 2025-06-25 PROCEDURE — 96361 HYDRATE IV INFUSION ADD-ON: CPT

## 2025-06-25 PROCEDURE — 83605 ASSAY OF LACTIC ACID: CPT | Mod: 91

## 2025-06-25 PROCEDURE — 80053 COMPREHEN METABOLIC PANEL: CPT | Performed by: STUDENT IN AN ORGANIZED HEALTH CARE EDUCATION/TRAINING PROGRAM

## 2025-06-25 PROCEDURE — 80307 DRUG TEST PRSMV CHEM ANLYZR: CPT

## 2025-06-25 PROCEDURE — 85025 COMPLETE CBC W/AUTO DIFF WBC: CPT | Performed by: STUDENT IN AN ORGANIZED HEALTH CARE EDUCATION/TRAINING PROGRAM

## 2025-06-25 PROCEDURE — 25000003 PHARM REV CODE 250: Performed by: NURSE PRACTITIONER

## 2025-06-25 PROCEDURE — 82550 ASSAY OF CK (CPK): CPT | Performed by: STUDENT IN AN ORGANIZED HEALTH CARE EDUCATION/TRAINING PROGRAM

## 2025-06-25 PROCEDURE — 93010 ELECTROCARDIOGRAM REPORT: CPT | Mod: ,,, | Performed by: INTERNAL MEDICINE

## 2025-06-25 RX ORDER — DROPERIDOL 2.5 MG/ML
1.25 INJECTION, SOLUTION INTRAMUSCULAR; INTRAVENOUS ONCE
Status: COMPLETED | OUTPATIENT
Start: 2025-06-25 | End: 2025-06-25

## 2025-06-25 RX ORDER — IBUPROFEN 200 MG
24 TABLET ORAL
Status: DISCONTINUED | OUTPATIENT
Start: 2025-06-25 | End: 2025-06-29 | Stop reason: HOSPADM

## 2025-06-25 RX ORDER — TALC
6 POWDER (GRAM) TOPICAL NIGHTLY PRN
Status: DISCONTINUED | OUTPATIENT
Start: 2025-06-25 | End: 2025-06-29 | Stop reason: HOSPADM

## 2025-06-25 RX ORDER — LABETALOL HYDROCHLORIDE 5 MG/ML
10 INJECTION, SOLUTION INTRAVENOUS
Status: COMPLETED | OUTPATIENT
Start: 2025-06-25 | End: 2025-06-25

## 2025-06-25 RX ORDER — HYDRALAZINE HYDROCHLORIDE 20 MG/ML
10 INJECTION INTRAMUSCULAR; INTRAVENOUS EVERY 4 HOURS PRN
Status: DISCONTINUED | OUTPATIENT
Start: 2025-06-25 | End: 2025-06-26

## 2025-06-25 RX ORDER — LEVALBUTEROL INHALATION SOLUTION 1.25 MG/3ML
1.25 SOLUTION RESPIRATORY (INHALATION) EVERY 4 HOURS PRN
Status: DISCONTINUED | OUTPATIENT
Start: 2025-06-25 | End: 2025-06-29 | Stop reason: HOSPADM

## 2025-06-25 RX ORDER — HYDROCODONE BITARTRATE AND ACETAMINOPHEN 7.5; 325 MG/1; MG/1
1 TABLET ORAL 2 TIMES DAILY PRN
COMMUNITY

## 2025-06-25 RX ORDER — METOCLOPRAMIDE HYDROCHLORIDE 5 MG/ML
10 INJECTION INTRAMUSCULAR; INTRAVENOUS
Status: COMPLETED | OUTPATIENT
Start: 2025-06-25 | End: 2025-06-25

## 2025-06-25 RX ORDER — MORPHINE SULFATE 4 MG/ML
4 INJECTION, SOLUTION INTRAMUSCULAR; INTRAVENOUS
Refills: 0 | Status: COMPLETED | OUTPATIENT
Start: 2025-06-25 | End: 2025-06-25

## 2025-06-25 RX ORDER — AMOXICILLIN 250 MG
1 CAPSULE ORAL 2 TIMES DAILY PRN
Status: DISCONTINUED | OUTPATIENT
Start: 2025-06-25 | End: 2025-06-29 | Stop reason: HOSPADM

## 2025-06-25 RX ORDER — MAGNESIUM SULFATE HEPTAHYDRATE 40 MG/ML
2 INJECTION, SOLUTION INTRAVENOUS ONCE
Status: COMPLETED | OUTPATIENT
Start: 2025-06-25 | End: 2025-06-25

## 2025-06-25 RX ORDER — IBUPROFEN 200 MG
16 TABLET ORAL
Status: DISCONTINUED | OUTPATIENT
Start: 2025-06-25 | End: 2025-06-29 | Stop reason: HOSPADM

## 2025-06-25 RX ORDER — ENOXAPARIN SODIUM 100 MG/ML
40 INJECTION SUBCUTANEOUS EVERY 24 HOURS
Status: DISCONTINUED | OUTPATIENT
Start: 2025-06-25 | End: 2025-06-28

## 2025-06-25 RX ORDER — ACETAMINOPHEN 500 MG
1000 TABLET ORAL EVERY 6 HOURS PRN
Status: DISCONTINUED | OUTPATIENT
Start: 2025-06-25 | End: 2025-06-26

## 2025-06-25 RX ORDER — IPRATROPIUM BROMIDE AND ALBUTEROL SULFATE 2.5; .5 MG/3ML; MG/3ML
3 SOLUTION RESPIRATORY (INHALATION)
Status: COMPLETED | OUTPATIENT
Start: 2025-06-25 | End: 2025-06-25

## 2025-06-25 RX ORDER — SODIUM CHLORIDE 0.9 % (FLUSH) 0.9 %
10 SYRINGE (ML) INJECTION
Status: DISCONTINUED | OUTPATIENT
Start: 2025-06-25 | End: 2025-06-29 | Stop reason: HOSPADM

## 2025-06-25 RX ORDER — HYDRALAZINE HYDROCHLORIDE 20 MG/ML
20 INJECTION INTRAMUSCULAR; INTRAVENOUS
Status: DISCONTINUED | OUTPATIENT
Start: 2025-06-25 | End: 2025-06-25

## 2025-06-25 RX ORDER — GLUCAGON 1 MG
1 KIT INJECTION
Status: DISCONTINUED | OUTPATIENT
Start: 2025-06-25 | End: 2025-06-29 | Stop reason: HOSPADM

## 2025-06-25 RX ORDER — ONDANSETRON HYDROCHLORIDE 2 MG/ML
4 INJECTION, SOLUTION INTRAVENOUS EVERY 4 HOURS PRN
Status: DISCONTINUED | OUTPATIENT
Start: 2025-06-25 | End: 2025-06-29 | Stop reason: HOSPADM

## 2025-06-25 RX ORDER — NALOXONE HCL 0.4 MG/ML
0.02 VIAL (ML) INJECTION
Status: DISCONTINUED | OUTPATIENT
Start: 2025-06-25 | End: 2025-06-29 | Stop reason: HOSPADM

## 2025-06-25 RX ORDER — ALUMINUM HYDROXIDE, MAGNESIUM HYDROXIDE, AND SIMETHICONE 1200; 120; 1200 MG/30ML; MG/30ML; MG/30ML
30 SUSPENSION ORAL 4 TIMES DAILY PRN
Status: DISCONTINUED | OUTPATIENT
Start: 2025-06-25 | End: 2025-06-29 | Stop reason: HOSPADM

## 2025-06-25 RX ORDER — SCOPOLAMINE 1 MG/3D
1 PATCH, EXTENDED RELEASE TRANSDERMAL
Status: DISCONTINUED | OUTPATIENT
Start: 2025-06-25 | End: 2025-06-29 | Stop reason: HOSPADM

## 2025-06-25 RX ORDER — FAMOTIDINE 10 MG/ML
20 INJECTION, SOLUTION INTRAVENOUS
Status: COMPLETED | OUTPATIENT
Start: 2025-06-25 | End: 2025-06-25

## 2025-06-25 RX ORDER — LABETALOL HYDROCHLORIDE 5 MG/ML
10 INJECTION, SOLUTION INTRAVENOUS ONCE
Status: COMPLETED | OUTPATIENT
Start: 2025-06-25 | End: 2025-06-25

## 2025-06-25 RX ORDER — PROCHLORPERAZINE EDISYLATE 5 MG/ML
5 INJECTION INTRAMUSCULAR; INTRAVENOUS EVERY 6 HOURS PRN
Status: DISCONTINUED | OUTPATIENT
Start: 2025-06-25 | End: 2025-06-29 | Stop reason: HOSPADM

## 2025-06-25 RX ORDER — SODIUM CHLORIDE, SODIUM LACTATE, POTASSIUM CHLORIDE, CALCIUM CHLORIDE 600; 310; 30; 20 MG/100ML; MG/100ML; MG/100ML; MG/100ML
INJECTION, SOLUTION INTRAVENOUS CONTINUOUS
Status: ACTIVE | OUTPATIENT
Start: 2025-06-25 | End: 2025-06-25

## 2025-06-25 RX ORDER — METHYLPREDNISOLONE SOD SUCC 125 MG
125 VIAL (EA) INJECTION
Status: COMPLETED | OUTPATIENT
Start: 2025-06-25 | End: 2025-06-25

## 2025-06-25 RX ORDER — HYDRALAZINE HYDROCHLORIDE 20 MG/ML
10 INJECTION INTRAMUSCULAR; INTRAVENOUS
Status: COMPLETED | OUTPATIENT
Start: 2025-06-25 | End: 2025-06-25

## 2025-06-25 RX ORDER — FAMOTIDINE 10 MG/ML
20 INJECTION, SOLUTION INTRAVENOUS DAILY
Status: DISCONTINUED | OUTPATIENT
Start: 2025-06-26 | End: 2025-06-28

## 2025-06-25 RX ORDER — BISACODYL 10 MG/1
10 SUPPOSITORY RECTAL DAILY PRN
Status: DISCONTINUED | OUTPATIENT
Start: 2025-06-25 | End: 2025-06-29 | Stop reason: HOSPADM

## 2025-06-25 RX ORDER — CLONIDINE HYDROCHLORIDE 0.1 MG/1
0.1 TABLET ORAL ONCE
Status: COMPLETED | OUTPATIENT
Start: 2025-06-25 | End: 2025-06-25

## 2025-06-25 RX ORDER — ONDANSETRON HYDROCHLORIDE 2 MG/ML
4 INJECTION, SOLUTION INTRAVENOUS
Status: COMPLETED | OUTPATIENT
Start: 2025-06-25 | End: 2025-06-25

## 2025-06-25 RX ORDER — HYDRALAZINE HYDROCHLORIDE 20 MG/ML
20 INJECTION INTRAMUSCULAR; INTRAVENOUS
Status: COMPLETED | OUTPATIENT
Start: 2025-06-25 | End: 2025-06-25

## 2025-06-25 RX ADMIN — IPRATROPIUM BROMIDE AND ALBUTEROL SULFATE 3 ML: .5; 3 SOLUTION RESPIRATORY (INHALATION) at 03:06

## 2025-06-25 RX ADMIN — MAGNESIUM SULFATE HEPTAHYDRATE 2 G: 40 INJECTION, SOLUTION INTRAVENOUS at 10:06

## 2025-06-25 RX ADMIN — IOHEXOL 100 ML: 350 INJECTION, SOLUTION INTRAVENOUS at 03:06

## 2025-06-25 RX ADMIN — METOCLOPRAMIDE 10 MG: 5 INJECTION, SOLUTION INTRAMUSCULAR; INTRAVENOUS at 04:06

## 2025-06-25 RX ADMIN — ENOXAPARIN SODIUM 40 MG: 40 INJECTION SUBCUTANEOUS at 04:06

## 2025-06-25 RX ADMIN — HYDRALAZINE HYDROCHLORIDE 20 MG: 20 INJECTION INTRAMUSCULAR; INTRAVENOUS at 05:06

## 2025-06-25 RX ADMIN — SODIUM CHLORIDE, POTASSIUM CHLORIDE, SODIUM LACTATE AND CALCIUM CHLORIDE 1000 ML: 600; 310; 30; 20 INJECTION, SOLUTION INTRAVENOUS at 03:06

## 2025-06-25 RX ADMIN — LABETALOL HYDROCHLORIDE 10 MG: 5 INJECTION, SOLUTION INTRAVENOUS at 04:06

## 2025-06-25 RX ADMIN — SODIUM CHLORIDE, POTASSIUM CHLORIDE, SODIUM LACTATE AND CALCIUM CHLORIDE: 600; 310; 30; 20 INJECTION, SOLUTION INTRAVENOUS at 10:06

## 2025-06-25 RX ADMIN — ONDANSETRON 4 MG: 2 INJECTION INTRAMUSCULAR; INTRAVENOUS at 07:06

## 2025-06-25 RX ADMIN — HYDRALAZINE HYDROCHLORIDE 10 MG: 20 INJECTION INTRAMUSCULAR; INTRAVENOUS at 08:06

## 2025-06-25 RX ADMIN — SCOPOLAMINE 1 PATCH: 1 PATCH TRANSDERMAL at 08:06

## 2025-06-25 RX ADMIN — ACETAMINOPHEN 1000 MG: 500 TABLET ORAL at 04:06

## 2025-06-25 RX ADMIN — CLONIDINE HYDROCHLORIDE 0.1 MG: 0.1 TABLET ORAL at 10:06

## 2025-06-25 RX ADMIN — SODIUM CHLORIDE, POTASSIUM CHLORIDE, SODIUM LACTATE AND CALCIUM CHLORIDE 500 ML: 600; 310; 30; 20 INJECTION, SOLUTION INTRAVENOUS at 09:06

## 2025-06-25 RX ADMIN — LABETALOL HYDROCHLORIDE 10 MG: 5 INJECTION, SOLUTION INTRAVENOUS at 09:06

## 2025-06-25 RX ADMIN — DROPERIDOL 1.25 MG: 2.5 INJECTION, SOLUTION INTRAMUSCULAR; INTRAVENOUS at 08:06

## 2025-06-25 RX ADMIN — METHYLPREDNISOLONE SODIUM SUCCINATE 125 MG: 125 INJECTION, POWDER, FOR SOLUTION INTRAMUSCULAR; INTRAVENOUS at 03:06

## 2025-06-25 RX ADMIN — ACETAMINOPHEN 1000 MG: 500 TABLET ORAL at 09:06

## 2025-06-25 RX ADMIN — SODIUM CHLORIDE, POTASSIUM CHLORIDE, SODIUM LACTATE AND CALCIUM CHLORIDE 500 ML: 600; 310; 30; 20 INJECTION, SOLUTION INTRAVENOUS at 12:06

## 2025-06-25 RX ADMIN — FAMOTIDINE 20 MG: 10 INJECTION, SOLUTION INTRAVENOUS at 09:06

## 2025-06-25 RX ADMIN — MORPHINE SULFATE 4 MG: 4 INJECTION INTRAVENOUS at 03:06

## 2025-06-25 RX ADMIN — SODIUM CHLORIDE, POTASSIUM CHLORIDE, SODIUM LACTATE AND CALCIUM CHLORIDE: 600; 310; 30; 20 INJECTION, SOLUTION INTRAVENOUS at 03:06

## 2025-06-25 RX ADMIN — ONDANSETRON 4 MG: 2 INJECTION INTRAMUSCULAR; INTRAVENOUS at 03:06

## 2025-06-25 RX ADMIN — HYDRALAZINE HYDROCHLORIDE 10 MG: 20 INJECTION INTRAMUSCULAR; INTRAVENOUS at 04:06

## 2025-06-25 NOTE — H&P
.[facility] Hospital Medicine - H&P Note    Patient Name: Luz Maria Alfaro  : 1960  MRN: [id number]  PCP: Luzmaria Flores FNP  Admitting Physician:   Admission Class: OP- Observation   Code status: FULL    Allergies   Meperidine    Chief Complaint     Abdominal pain    History of Present Illness     Patient is a 65-year-old F with a PMH of COPD (not oxygen dependent), HFpEF, HLD, dm type 2, and HTN who presented to the ED with complaints of abdominal pain with associated nausea vomiting that began 3 days prior. Patient explains that she has experienced right upper quadrant and left upper quadrant abdominal pain, and has been unable to keep food or drink down since his symptoms began. Patient states that she has had a small amount of diarrhea, denies fever, denies sick contacts, denies abdominal surgical history.    Upon arrival to the ED patient was afebrile, tachypneic with a respiratory rate of 35, hypertensive with a BP of 223/104, but maintaining normal sats on room air.  ED workup revealed hemoglobin 17.3, hematocrit 50.9, CO2 15, lactic acid 2.8, UA was positive for glucose and ketones, CT abdomen and pelvis with contrast showed no definite acute intra-abdominal or pelvic solid organ or bowel pathology, CT head without showed no acute intracranial pathology, small focus of encephalomalacia in the right parietal lobe, mild to moderate chronic small-vessel ischemic changes of the supratentorial white matter. Patient was given hydralazine 30 mg IV, labetalol 10 mg IV, Reglan 10 mg IV, morphine 4 mg IV, Zofran 4 mg IV, Solu-Medrol 125 mg IV, and a DuoNeb in the ED and admitted to Hospital Medicine for further care.    ROS   Except as documented, all other systems reviewed and negative     Past Medical History     Past Medical History:   Diagnosis Date    Benign essential HTN     Mixed hyperlipidemia     Type 2 diabetes mellitus without complications        Past Surgical  History     Past Surgical History:   Procedure Laterality Date    APPENDECTOMY      BACK SURGERY      BREAST SURGERY  01 13 2002    Augmantation    HEMIARTHROPLASTY OF HIP Left 12/20/2024    Procedure: HEMIARTHROPLASTY, HIP;  Surgeon: Jose J Montemayor DO;  Location: [facility] OR;  Service: Orthopedics;  Laterality: Left;  lateral peg board kevin    HIP SURGERY      INTRAMEDULLARY RODDING OF FEMUR Right 10/27/2022    Procedure: RIGHT FEMUR INTERTROCH IMN;  Surgeon: Stewart Brambila MD;  Location: [facility] OR;  Service: Orthopedics;  Laterality: Right;  Atkinson table, supine  3rd case  Synthes Short TFNA       Social History   Denies alcohol, tobacco or illicit drug use.     Screening for Social Drivers for health:  Patient screened for food insecurity, housing instability, transportation needs, utility difficulties, and interpersonal safety (select all that apply as identified as concern)  []Housing or Food  []Transportation Needs  []Utility Difficulties  []Interpersonal safety  [x]None        Family History   Reviewed and negative    Home Medications     Prior to Admission medications    Medication Sig Start Date End Date Taking? Authorizing Provider   amoxicillin (AMOXIL) 500 MG capsule Take 1,000 mg by mouth every morning.  Patient not taking: Reported on 4/16/2025 2/17/25   Provider, Historical   ARIPiprazole (ABILIFY) 2 MG Tab Take 1 tablet (2 mg total) by mouth once daily. 1/6/25 4/6/25  Moy Blackbunr FNP   aspirin (ECOTRIN) 81 MG EC tablet Take 1 tablet (81 mg total) by mouth 2 (two) times a day. 1/6/25 2/5/25  Moy Blackburn FNP   buPROPion (WELLBUTRIN SR) 100 MG TBSR 12 hr tablet Take 100 mg by mouth 2 (two) times daily.  Patient not taking: Reported on 4/16/2025    Provider, Historical   busPIRone (BUSPAR) 5 MG Tab Take 1 tablet (5 mg total) by mouth 2 (two) times daily.  Patient not taking: Reported on 3/13/2025 1/6/25 4/6/25  Moy Blackburn FNP   diclofenac sodium (VOLTAREN) 1 % Gel Apply 4 g  topically 3 (three) times daily before meals. 1/6/25 2/5/25  Moy Blackburn FNP   EScitalopram oxalate (LEXAPRO) 5 MG Tab Take 1 tablet (5 mg total) by mouth once daily. 1/6/25 4/6/25  Moy Blackburn FNP   gabapentin (NEURONTIN) 300 MG capsule Take 1 capsule (300 mg total) by mouth 3 (three) times daily. 1/6/25 2/5/25  Moy Blackburn FNP   methocarbamoL (ROBAXIN) 500 MG Tab Take by mouth 4 (four) times daily.  Patient not taking: Reported on 4/16/2025    Provider, Historical   metoprolol succinate (TOPROL-XL) 25 MG 24 hr tablet Take 25 mg by mouth once daily.    Provider, Historical   metoprolol tartrate (LOPRESSOR) 25 MG tablet Take 12.5 mg by mouth.    Provider, Historical   oxyCODONE-acetaminophen (PERCOCET)  mg per tablet Take 1 tablet by mouth every 4 (four) hours as needed for Pain.  Patient not taking: Reported on 4/16/2025    Provider, Historical   pantoprazole (PROTONIX) 40 MG tablet Take 1 tablet (40 mg total) by mouth 2 (two) times daily. 1/6/25 2/5/25  Moy Blackburn FNP   potassium chloride SA (K-DUR,KLOR-CON) 20 MEQ tablet Take 20 mEq by mouth 2 (two) times daily. 11/27/24   Provider, Historical   QUEtiapine (SEROQUEL) 25 MG Tab Take by mouth.    Provider, Historical   rosuvastatin (CRESTOR) 20 MG tablet Take 1 tablet (20 mg total) by mouth once daily. 1/6/25 4/6/25  Moy Blackburn FNP   sodium bicarbonate 650 MG tablet Take 1 tablet (650 mg total) by mouth 2 (two) times daily. for 7 days 1/6/25 1/13/25  Moy Blackburn FNP        Physical Exam   Vital Signs  Temp:  [97.8 °F (36.6 °C)]   Pulse:  [62-99]   Resp:  [22-35]   BP: (163-238)/()   SpO2:  [96 %-100 %]    General: Appears comfortable  HEENT: NC/AT  Neck:  No JVD  Chest: CTABL  CVS: Regular rhythm. Normal S1/S2.  Abdomen: nondistended, normoactive BS, soft and non-tender.  MSK: No obvious deformity or joint swelling  Skin: Warm and dry  Neuro: AAOx3, no focal neurological deficit  Psych:  "Cooperative    Labs     Recent Labs     06/25/25  0310   WBC 8.73   RBC 5.27   HGB 17.3*   HCT 50.9*   MCV 96.6*   MCH 32.8*   MCHC 34.0   RDW 13.2        No results for input(s): "PROTIME", "INR", "PTT", "D-DIMER", "FERRITIN", "IRON", "TRANS", "TIBC", "LABIRON", "[id number]", "FOLATE", "LDH", "HAPTOGLOBIN", "RETICCNTAUTO", "RETABS", "PERIPSMEAREV" in the last 72 hours.   Recent Labs     06/25/25  0310      K 4.1   CO2 15*   BUN 12.5   CREATININE 0.90   EGFRNORACEVR >60   CALCIUM 10.4*   MG 1.80   ALBUMIN 4.8   GLOBULIN 4.2*   ALKPHOS 193*   ALT 10   AST 17   BILITOT 0.8   LIPASE 7     No results for input(s): "LACTIC" in the last 72 hours.  Recent Labs     06/25/25 0310   CPK 62   TROPONINI <0.010        Microbiology Results (last 7 days)       ** No results found for the last 168 hours. **           Imaging   CT Head Without Contrast  Narrative: EXAMINATION:  CT HEAD WITHOUT CONTRAST    CLINICAL HISTORY:  Mental status change, unknown cause;    TECHNIQUE:  Low dose axial images were obtained through the head.  Coronal and sagittal reformations were also performed. Contrast was not administered.  Dose reduction techniques including automatic exposure control (AEC) were utilized.    Dose (DLP): 1464 mGycm    COMPARISON:  CT head without contrast 10/03/2024.    FINDINGS:  INTRACRANIAL: Redemonstration of a small focus of encephalomalacia in the right parietal lobe.  Mild to moderate chronic small-vessel ischemic changes of the internal capsules and supratentorial periventricular white matter.  No acute intracranial hemorrhage.  No hydrocephalus.  No intracranial mass effect.    SINUSES: Visualized paranasal sinuses and mastoids are clear.    SKULL/SCALP: Visualized osseous structures are normal.    ORBITS: Visualized orbits are normal.  Impression: No acute intracranial pathology.    Small focus of encephalomalacia in the right parietal lobe.    Mild to moderate chronic small-vessel ischemic changes of " the supratentorial white matter.    No significant discrepancy between preliminary and final reports.    Electronically signed by: Jack Kirkland  Date:    06/25/2025  Time:    05:38  CT Abdomen Pelvis With IV Contrast NO Oral Contrast  START OF REPORT:  Technique: CT of the abdomen and pelvis was performed with axial images as well as sagittal and coronal reconstruction images with intravenous contrast.    Comparison: Comparison is with study [id number] 02:52:27.    Clinical History: Nausea/vomiting; Abdominal pain, acute, nonlocalized.    Dosage Information: Automated Exposure Control was utilized.    Findings:  Technical notes: There is moderate to severe motion artifact at the visualized lung bases and in portions of the abdomen which decreases sensitivity and specificity of the study for subtle findings certain subtle pathology.  Lines and Tubes: None.  Thorax:  Lungs: There is mild nonspecific dependent change at the lung bases. No focal infiltrate or consolidation is seen.  Pleura: No effusions or thickening are seen. No pneumothorax is seen in the visualized lung bases.  Heart: The heart size is within normal limits. Moderate coronary artery and mitral valve calcification are seen.  Abdomen:  Abdominal Wall: No abdominal wall pathology is seen.  Liver: There is stable, heterogeneous hepatic parenchyma with mildly irregular contour and enlarged left hepatic lobe consistent with cirrhotic changes.  Biliary System: No intrahepatic or extrahepatic biliary duct dilatation is seen.  Gallbladder: The gallbladder is full but non-distended and appears otherwise unremarkable.  Pancreas: The pancreas appears unremarkable. No pancreatic mass, or, ductal dilatation is seen.  Spleen: The spleen appears unremarkable.  Adrenals: The adrenal glands appear unremarkable.  Kidneys: The kidneys appear unremarkable with no stones cysts masses or hydronephrosis.  Aorta: There is moderate calcification of the abdominal aorta and  its branches.  IVC: Unremarkable.  Bowel:  Esophagus: There is a small hiatal hernia.  Stomach: The stomach appears unremarkable.  Duodenum: Unremarkable appearing duodenum.  Small Bowel: The small bowel appears unremarkable.  Colon: Nondistended.  Appendix: The appendix is not identified but no inflammatory changes are seen in the right lower quadrant to suggest appendicitis.  Peritoneum: No intraperitoneal free air or ascites is seen.    Pelvis:  Bladder: The bladder is nondistended but appears otherwise unremarkable.  Female:  Uterus: The uterus appears unremarkable.  Ovaries: The ovaries are not identified.    Bony structures:  Dorsal Spine: There is moderate to severe spondylosis of the visualized dorsal spine. The previously noted posterior spinal fusion device at the level of T9-L1 is stable from prior.  Bony Pelvis: The right hip prosthesis is stable in place. There is interval placement of a left hip prosthesis.    Impression:  1. No definite acute intraabdominal or pelvic solid organ or bowel pathology identified. Details and other findings as discussed above.    Assessment & Plan     Hypertensive urgency  Viral gastroenteritis  Intractable nausea vomiting secondary to above  Dehydration secondary to above    History: COPD, HFpEF, HLD, dm type 2, and HTN     Plan:     - U/S abdomen limited pending  - COVID flu RSV pending  - Respiratory panel pending  - BC x2 pending  - GI panel/stool culture pending (given stool sample is provided)  - Total of 1,500 mL LR given in ED  - LR mL/hr IV x 10 hours  - Monitor daily labs (CBC, CMP, Mag)  - PRN. antihypertensives for SBP >160  - Clear liquid diet, advanced as tolerated  - Antiemetics as needed  - Pepcid 20 IV daily      VTE Prophylaxis:      IMaria E FNP-C have discussed this patients case with Dr. Paris who agrees with the diagnosis and treatment plan.

## 2025-06-25 NOTE — ED PROVIDER NOTES
Encounter Date: 6/25/2025    SCRIBE #1 NOTE: I, Jonny Browning, am scribing for, and in the presence of,  Carlos Coulter MD. I have scribed the following portions of the note - Other sections scribed: HPI,ROS,PE.       History     Chief Complaint   Patient presents with    Abdominal Pain     Arrives [facility] 1206 c/o abd nausea vomiting all day - has been taking zofran and not helping - htn w/ [facility]     64 y/o female with PMHx of HTN, HLD, and DM presents to ED c/o abdominal pain onset 6/24. Pt reports she has been having LLQ abdominal pain, that radiates to her lower back since onset. She reports associated symptoms of nausea and multiple episodes of vomiting. She denies any diarrhea, fever, or chills.     The history is provided by the patient and medical records.     Review of patient's allergies indicates:   Allergen Reactions    Meperidine Rash and Hives     Past Medical History:   Diagnosis Date    Benign essential HTN     Mixed hyperlipidemia     Type 2 diabetes mellitus without complications      Past Surgical History:   Procedure Laterality Date    APPENDECTOMY      BACK SURGERY      BREAST SURGERY  01 13 2002    Augmantation    ESOPHAGOGASTRODUODENOSCOPY N/A 6/28/2025    Procedure: EGD;  Surgeon: Chitra Huffman MD;  Location: [facility] OR;  Service: Gastroenterology;  Laterality: N/A;    ESOPHAGOSCOPY, USING BOUGIE, WITH DILATION  6/28/2025    Procedure: ESOPHAGOSCOPY, USING BOUGIE, WITH DILATION;  Surgeon: Chitra Huffman MD;  Location: [facility] OR;  Service: Gastroenterology;;  16mm Savory    HEMIARTHROPLASTY OF HIP Left 12/20/2024    Procedure: HEMIARTHROPLASTY, HIP;  Surgeon: Jose J Montemayor DO;  Location: [facility] OR;  Service: Orthopedics;  Laterality: Left;  lateral peg board kevin    HIP SURGERY      INTRAMEDULLARY RODDING OF FEMUR Right 10/27/2022    Procedure: RIGHT FEMUR INTERTROCH IMN;  Surgeon: Stewart Brambila MD;  Location: [facility] OR;  Service: Orthopedics;  Laterality: Right;  [place] table, supine  3rd  case  Synthes Short TFNA     No family history on file.  Social History[1]  Review of Systems   Constitutional:  Negative for chills and fever.   HENT:  Negative for congestion, drooling and sore throat.    Eyes:  Negative for pain and visual disturbance.   Respiratory:  Negative for chest tightness, shortness of breath and wheezing.    Cardiovascular:  Negative for chest pain, palpitations and leg swelling.   Gastrointestinal:  Positive for abdominal pain, nausea and vomiting. Negative for diarrhea.   Genitourinary:  Negative for dysuria and hematuria.   Musculoskeletal:  Positive for back pain (lower). Negative for neck pain and neck stiffness.   Skin:  Negative for pallor and rash.   Neurological:  Negative for weakness and numbness.   Hematological:  Does not bruise/bleed easily.       Physical Exam     Initial Vitals [06/25/25 0250]   BP Pulse Resp Temp SpO2   (!) 223/104 98 (!) 35 97.8 °F (36.6 °C) 100 %      MAP       --         Physical Exam    Nursing note and vitals reviewed.  Constitutional: She appears well-developed and well-nourished. She is not diaphoretic. No distress.   Pt appears to be uncomfortable.    HENT:   Head: Normocephalic and atraumatic.   Nose: Nose normal. Mouth/Throat: Oropharynx is clear and moist. Mucous membranes are dry.   Eyes: EOM are normal. Pupils are equal, round, and reactive to light.   Neck: Neck supple.   Normal range of motion.  Cardiovascular:  Regular rhythm, normal heart sounds and intact distal pulses.   Tachycardia present.         No murmur heard.  Pulmonary/Chest: Breath sounds normal. No respiratory distress. She has no wheezes. She has no rales.   Abdominal: Abdomen is soft. She exhibits no distension. There is abdominal tenderness in the left lower quadrant.   Musculoskeletal:      Cervical back: Normal range of motion and neck supple.     Neurological: She is alert and oriented to person, place, and time. She has normal strength. No cranial nerve deficit or  sensory deficit.   Skin: Skin is warm. Capillary refill takes less than 2 seconds. No rash noted.         ED Course   Procedures  Labs Reviewed   COMPREHENSIVE METABOLIC PANEL - Abnormal       Result Value    Sodium 138      Potassium 4.1      Chloride 108 (*)     CO2 15 (*)     Glucose 144 (*)     Blood Urea Nitrogen 12.5      Creatinine 0.90      Calcium 10.4 (*)     Protein Total 9.0 (*)     Albumin 4.8      Globulin 4.2 (*)     Albumin/Globulin Ratio 1.1      Bilirubin Total 0.8       (*)     ALT 10      AST 17      eGFR >60      Anion Gap 15.0      BUN/Creatinine Ratio 14     LACTIC ACID, PLASMA - Abnormal    Lactic Acid Level 2.9 (*)    URINALYSIS, REFLEX TO URINE CULTURE - Abnormal    Color, UA Colorless      Appearance, UA Clear      Specific Gravity, UA 1.025      pH, UA 7.0      Protein, UA Negative      Glucose, UA Trace (*)     Ketones, UA 1+ (*)     Blood, UA Negative      Bilirubin, UA Negative      Urobilinogen, UA Normal      Nitrites, UA Negative      Leukocyte Esterase, UA Negative      RBC, UA None Seen      WBC, UA None Seen      Bacteria, UA None Seen      Squamous Epithelial Cells, UA None Seen     CBC WITH DIFFERENTIAL - Abnormal    WBC 8.73      RBC 5.27      Hgb 17.3 (*)     Hct 50.9 (*)     MCV 96.6 (*)     MCH 32.8 (*)     MCHC 34.0      RDW 13.2      Platelet 244      MPV 10.2      Neut % 55.6      Lymph % 39.3      Mono % 4.2      Eos % 0.5      Basophil % 0.2      Imm Grans % 0.2      Neut # 4.85      Lymph # 3.43      Mono # 0.37      Eos # 0.04      Baso # 0.02      Imm Gran # 0.02      NRBC% 0.0     LACTIC ACID, PLASMA - Abnormal    Lactic Acid Level 2.8 (*)    BLOOD GAS - Abnormal    Sample Type Venous Blood      Drawn by BD RN      pH, Blood gas 7.430      pCO2, Blood gas 26.0      pO2, Blood gas 123.0      Sodium, Blood Gas 132 (*)     Potassium, Blood Gas 3.9      Calcium Level Ionized 1.10 (*)     TOC2, Blood gas 18.1      Base Excess, Blood gas -4.90      sO2, Blood gas  98.9      HCO3, Blood gas 17.3      THb, Blood gas 17.6      O2 Hb, Blood Gas 94.0      CO Hgb 2.6      Met Hgb 1.0     LACTIC ACID, PLASMA - Abnormal    Lactic Acid Level 2.5 (*)    DRUG SCREEN, URINE (BEAKER) - Abnormal    Amphetamines, Urine Negative      Barbiturates, Urine Negative      Benzodiazepine, Urine Negative      Cannabinoids, Urine Negative      Cocaine, Urine Negative      Fentanyl, Urine Negative      MDMA, Urine Negative      Opiates, Urine Positive (*)     Phencyclidine, Urine Negative      pH, Urine 7.0      Specific Gravity, Urine Auto 1.027      Narrative:     Cut off concentrations:    Amphetamines - 1000 ng/ml  Barbiturates - 200 ng/ml  Benzodiazepine - 200 ng/ml  Cannabinoids (THC) - 50 ng/ml  Cocaine - 300 ng/ml  Fentanyl - 1.0 ng/ml  MDMA - 500 ng/ml  Opiates - 300 ng/ml   Phencyclidine (PCP) - 25 ng/ml    Specimen submitted for drug analysis and tested for pH and specific gravity in order to evaluate sample integrity. Suspect tampering if specific gravity is <1.003 and/or pH is not within the range of 4.5 - 8.0  False negatives may result form substances such as bleach added to urine.  False positives may result for the presence of a substance with similar chemical structure to the drug or its metabolite.    This test provides only a PRELIMINARY analytical test result. A more specific alternate chemical method must be used in order to obtain a confirmed analytical result. Gas chromatography/mass spectrometry (GC/MS) is the preferred confirmatory method. Other chemical confirmation methods are available. Clinical consideration and professional judgement should be applied to any drug of abuse test result, particularly when preliminary positive results are used.    Positive results will be confirmed only at the physicians request. Unconfirmed screening results are to be used only for medical purposes (treatment).        CBC WITH DIFFERENTIAL - Abnormal    WBC 5.95      RBC 4.37      Hgb 14.7       Hct 42.4      MCV 97.0 (*)     MCH 33.6 (*)     MCHC 34.7      RDW 13.4      Platelet 162      MPV 9.8      Neut % 78.6      Lymph % 19.5      Mono % 1.3      Eos % 0.2      Basophil % 0.2      Imm Grans % 0.2      Neut # 4.68      Lymph # 1.16      Mono # 0.08 (*)     Eos # 0.01      Baso # 0.01      Imm Gran # 0.01      NRBC% 0.0     LIPASE - Normal    Lipase Level 7     MAGNESIUM - Normal    Magnesium Level 1.80     CK - Normal    Creatine Kinase 62     TROPONIN I - Normal    Troponin-I <0.010     COVID/RSV/FLU A&B PCR - Normal    Influenza A PCR Not Detected      Influenza B PCR Not Detected      Respiratory Syncytial Virus PCR Not Detected      SARS-CoV-2 PCR Not Detected      Narrative:     The [employer] Xpress SARS-CoV-2/FLU/RSV plus is a rapid, multiplexed real-time PCR test intended for the simultaneous qualitative detection and differentiation of SARS-CoV-2, Influenza A, Influenza B, and respiratory syncytial virus (RSV) viral RNA in either nasopharyngeal swab or nasal swab specimens.         RESPIRATORY PANEL - Normal    Adenovirus Not Detected      Coronavirus 229E Not Detected      Coronavirus HKU1 Not Detected      Coronavirus NL63 Not Detected      Coronavirus OC43 PCR, Common Cold Virus Not Detected      Human Metapneumovirus Not Detected      Parainfluenza Virus 1 Not Detected      Parainfluenza Virus 2 Not Detected      Parainfluenza Virus 3 Not Detected      Parainfluenza Virus 4 Not Detected      Bordetella pertussis (ptxP) Not Detected      Chlamydia pneumoniae Not Detected      Mycoplasma pneumoniae Not Detected      Human Rhinovirus/Enterovirus Not Detected      Bordetella parapertussis ([id number]) Not Detected      Narrative:     The [employer] Respiratory Panel 2.1 (RP2.1) is a PCR-based multiplexed nucleic acid test intended for use with the [employer]Fire® 2.0 for simultaneous qualitative detection and identification of multiple respiratory viral and bacterial nucleic acids in nasopharyngeal swabs  (NPS) obtained from individuals suspected of respiratory tract infections.   ALCOHOL,MEDICAL (ETHANOL) - Normal    Ethanol Level <10.0     LACTIC ACID, PLASMA - Normal    Lactic Acid Level 2.0     CBC W/ AUTO DIFFERENTIAL    Narrative:     The following orders were created for panel order CBC auto differential.  Procedure                               Abnormality         Status                     ---------                               -----------         ------                     CBC with Differential[[id number]]       Abnormal            Final result                 Please view results for these tests on the individual orders.   CBC W/ AUTO DIFFERENTIAL    Narrative:     The following orders were created for panel order CBC Auto Differential.  Procedure                               Abnormality         Status                     ---------                               -----------         ------                     CBC with Differential[[id number]]       Abnormal            Final result                 Please view results for these tests on the individual orders.        ECG Results              EKG 12-lead (Final result)        Collection Time Result Time QRS Duration OHS QTC Calculation    06/25/25 12:23:15 06/25/25 23:42:34 78 492                     Final result by Interface, Lab In [facility] (06/25/25 23:42:40)                   Narrative:    Test Reason : R11.0,    Vent. Rate :  94 BPM     Atrial Rate :  94 BPM     P-R Int : 184 ms          QRS Dur :  78 ms      QT Int : 394 ms       P-R-T Axes :  74 -27  70 degrees    QTcB Int : 492 ms    Normal sinus rhythm  Minimal voltage criteria for LVH, may be normal variant ( [place] product )  Septal infarct  Abnormal ECG    Confirmed by Rogelio Devries ([id number]) on 6/25/2025 11:42:28 PM    Referred By: AAAREFERRAL SELF           Confirmed By: Rogelio Devries                                     EKG 12-lead (Final result)        Collection Time Result Time QRS  Duration OHS QTC Calculation    06/25/25 08:54:48 06/25/25 23:38:54 80 596                     Final result by Interface, Lab In [facility] (06/25/25 23:39:00)                   Narrative:    Test Reason : R00.0,    Vent. Rate : 121 BPM     Atrial Rate : 121 BPM     P-R Int : 158 ms          QRS Dur :  80 ms      QT Int : 420 ms       P-R-T Axes :  74 -25  82 degrees    QTcB Int : 596 ms    Sinus tachycardia  Minimal voltage criteria for LVH, may be normal variant ( [place] product )  Anteroseptal infarct  Abnormal ECG    Confirmed by Rogelio Devries ([id number]) on 6/25/2025 11:38:52 PM    Referred By: AAAREFERRAL SELF           Confirmed By: Rogelio Devries                                     EKG 12-lead (Final result)        Collection Time Result Time QRS Duration OHS QTC Calculation    06/25/25 02:56:59 06/25/25 23:34:28 80 459                     Final result by Interface, Lab In [facility] (06/25/25 23:34:36)                   Narrative:    Test Reason : R10.9,    Vent. Rate :  72 BPM     Atrial Rate :  72 BPM     P-R Int : 180 ms          QRS Dur :  80 ms      QT Int : 420 ms       P-R-T Axes :  72 -14  80 degrees    QTcB Int : 459 ms    Normal sinus rhythm  Septal infarct  Abnormal ECG    Confirmed by Rogelio Devries ([id number]) on 6/25/2025 11:34:25 PM    Referred By:            Confirmed By: Rogelio Devries                                     EKG 12-lead (Final result)  Result time 07/02/25 14:44:41      Final result by Unknown User (07/02/25 14:44:41)                                      Imaging Results              US Abdomen Limited (Final result)  Result time 06/25/25 11:09:50      Final result by Gaurang Montenegro MD (06/25/25 11:09:50)                   Narrative:    EXAMINATION  US ABDOMEN LIMITED    CLINICAL HISTORY  RUQ/Cholecystitis;    TECHNIQUE  Multiplanar grayscale sonographic evaluation of the right upper quadrant was performed, with focused Doppler assessment of the main portal  vein.    COMPARISON  22 January 2024    FINDINGS  Exam quality: adequate for evaluation    Pancreas: No focal abnormality or peripancreatic collection appreciated at the visualized pancreas.    Gallbladder: No shadowing stones or layering sludge. No wall thickening or pericholecystic fluid.  Sonographic Kurtz sign is negative, per the ultrasonographer's report.    Bile ducts: No intra- / extrahepatic biliary dilatation suggestive of obstructing pathology.    Liver: Size is within normal limits.  There is irregular nodular appearance of the liver surface, suggesting changes of cirrhosis.  No discrete mass-like abnormality.  Unremarkable isoechoic appearance of the hepatic background, relative to the adjacent right kidney.  Normal hepatopetal flow is seen within the portal vein.    Other findings: No suspicious right kidney lesion, shadowing stone, or hydronephrosis.  No free fluid identified. The visualized portions of the aorta and IVC are structures are unremarkable.    IMPRESSION  1. No convincing acute sonographic abnormality.  2. Liver surface changes suggestive of cirrhosis.      Electronically signed by: Gaurang Montenegro  Date:    06/25/2025  Time:    11:09                                     CT Abdomen Pelvis With IV Contrast NO Oral Contrast (Final result)  Result time 06/25/25 08:58:06      Final result by Bruce mSith MD (06/25/25 08:58:06)                   Impression:      Motion degraded scan with no acute abdominopelvic findings evident.    No significant discrepancy with the preliminary report.      Electronically signed by: Bruce Smith  Date:    06/25/2025  Time:    08:58               Narrative:    EXAMINATION:  CT ABDOMEN PELVIS WITH IV CONTRAST    CLINICAL HISTORY:  Nausea/vomiting;Abdominal pain, acute, nonlocalized;.    TECHNIQUE:  Helical acquisition through the abdomen and pelvis with IV contrast.  Three plane reconstructions were provided for review.  mGycm. Automatic exposure  control, adjustment of mA/kV or iterative reconstruction technique was used to reduce radiation.    COMPARISON:  20 March 2024    FINDINGS:  Motion degraded scan.    Lung bases are clear.    Lobular liver suggestive of chronic liver disease.  No significant abnormality of the gallbladder, spleen, pancreas or adrenals.  No hydronephrosis.    No bowel obstruction. No significant inflammatory changes of the bowel.  No free air.    Urinary bladder is unremarkable. No free fluid. Aorta normal in caliber.  There is moderate atherosclerotic disease.    No acute osseous findings.  There are degenerative and postsurgical changes of the spine.  There are bilateral hip arthroplasties.                        Preliminary result by Alexys Rodriguez Jr., MD (06/25/25 04:09:11)                   Impression:    1. No definite acute intraabdominal or pelvic solid organ or bowel pathology identified. Details and other findings as discussed above.               Narrative:    START OF REPORT:  Technique: CT of the abdomen and pelvis was performed with axial images as well as sagittal and coronal reconstruction images with intravenous contrast.    Comparison: Comparison is with study dated2024-03-20 02:52:27.    Clinical History: Nausea/vomiting; Abdominal pain, acute, nonlocalized.    Dosage Information: Automated Exposure Control was utilized.    Findings:  Technical notes: There is moderate to severe motion artifact at the visualized lung bases and in portions of the abdomen which decreases sensitivity and specificity of the study for subtle findings certain subtle pathology.  Lines and Tubes: None.  Thorax:  Lungs: There is mild nonspecific dependent change at the lung bases. No focal infiltrate or consolidation is seen.  Pleura: No effusions or thickening are seen. No pneumothorax is seen in the visualized lung bases.  Heart: The heart size is within normal limits. Moderate coronary artery and mitral valve calcification are  seen.  Abdomen:  Abdominal Wall: No abdominal wall pathology is seen.  Liver: There is stable, heterogeneous hepatic parenchyma with mildly irregular contour and enlarged left hepatic lobe consistent with cirrhotic changes.  Biliary System: No intrahepatic or extrahepatic biliary duct dilatation is seen.  Gallbladder: The gallbladder is full but non-distended and appears otherwise unremarkable.  Pancreas: The pancreas appears unremarkable. No pancreatic mass, or, ductal dilatation is seen.  Spleen: The spleen appears unremarkable.  Adrenals: The adrenal glands appear unremarkable.  Kidneys: The kidneys appear unremarkable with no stones cysts masses or hydronephrosis.  Aorta: There is moderate calcification of the abdominal aorta and its branches.  IVC: Unremarkable.  Bowel:  Esophagus: There is a small hiatal hernia.  Stomach: The stomach appears unremarkable.  Duodenum: Unremarkable appearing duodenum.  Small Bowel: The small bowel appears unremarkable.  Colon: Nondistended.  Appendix: The appendix is not identified but no inflammatory changes are seen in the right lower quadrant to suggest appendicitis.  Peritoneum: No intraperitoneal free air or ascites is seen.    Pelvis:  Bladder: The bladder is nondistended but appears otherwise unremarkable.  Female:  Uterus: The uterus appears unremarkable.  Ovaries: The ovaries are not identified.    Bony structures:  Dorsal Spine: There is moderate to severe spondylosis of the visualized dorsal spine. The previously noted posterior spinal fusion device at the level of T9-L1 is stable from prior.  Bony Pelvis: The right hip prosthesis is stable in place. There is interval placement of a left hip prosthesis.                                         CT Head Without Contrast (Final result)  Result time 06/25/25 05:38:12      Final result by Jack Kirkland MD (06/25/25 05:38:12)                   Impression:      No acute intracranial pathology.    Small focus of  encephalomalacia in the right parietal lobe.    Mild to moderate chronic small-vessel ischemic changes of the supratentorial white matter.    No significant discrepancy between preliminary and final reports.      Electronically signed by: Jack Kirkland  Date:    06/25/2025  Time:    05:38               Narrative:    EXAMINATION:  CT HEAD WITHOUT CONTRAST    CLINICAL HISTORY:  Mental status change, unknown cause;    TECHNIQUE:  Low dose axial images were obtained through the head.  Coronal and sagittal reformations were also performed. Contrast was not administered.  Dose reduction techniques including automatic exposure control (AEC) were utilized.    Dose (DLP): 1464 mGycm    COMPARISON:  CT head without contrast 10/03/2024.    FINDINGS:  INTRACRANIAL: Redemonstration of a small focus of encephalomalacia in the right parietal lobe.  Mild to moderate chronic small-vessel ischemic changes of the internal capsules and supratentorial periventricular white matter.  No acute intracranial hemorrhage.  No hydrocephalus.  No intracranial mass effect.    SINUSES: Visualized paranasal sinuses and mastoids are clear.    SKULL/SCALP: Visualized osseous structures are normal.    ORBITS: Visualized orbits are normal.                        Preliminary result by Alexys Rodriguez Jr., MD (06/25/25 04:07:57)                   Impression:    1. No acute intracranial process identified. Details and other findings as noted above.               Narrative:    START OF REPORT:  Technique: CT of the head was performed without intravenous contrast with axial as well as coronal and sagittal images.    Comparison: None.    Dosage Information: Automated exposure control was utilized.    Clinical history: Ams.    Findings:  Hemorrhage: No acute intracranial hemorrhage is seen.  CSF spaces: The ventricles, sulci and basal cisterns all appear somewhat prominent suggesting an element of global cerebral atrophy.  Brain parenchyma: There is  preservation of the grey white junction throughout. No acute infarct is identified. Mild microvascular change is seen in portions of the periventricular and deep white matter tracts.  Cerebellum: Unremarkable.  Vascular: Mild atheromatous calcification of the intracranial arteries is seen.  Sella and skull base: The sella appears to be within normal limits for age.  Cerebellopontine angles: Within normal limits.  Herniation: None.  Intracranial calcifications: Incidental note is made of bilateral choroid plexus calcification. Incidental note is made of some pineal region calcification.  Calvarium: No acute linear or depressed skull fracture is seen.    Maxillofacial Structures:  Paranasal sinuses: The visualized paranasal sinuses appear clear with no significant mucoperiosteal thickening or air fluid levels identified.  Orbits: The orbits appear unremarkable.  Zygomatic arches: The zygomatic arches are intact and unremarkable.  Temporal bones and mastoids: The temporal bones and mastoids appear unremarkable.  TMJ: The mandibular condyles appear normally placed with respect to the mandibular fossa.                                         X-Ray Chest AP Portable (Final result)  Result time 06/25/25 09:05:47      Final result by Bruce Smith MD (06/25/25 09:05:47)                   Impression:      No acute findings.      Electronically signed by: Bruce Smith  Date:    06/25/2025  Time:    09:05               Narrative:    EXAMINATION:  XR CHEST AP PORTABLE    CLINICAL HISTORY:  Unspecified abdominal pain    COMPARISON:  1 January 2025    FINDINGS:  Frontal view of the chest was obtained. Heart is not enlarged.  The lungs are clear.  No pneumothorax.                                       Medications   lactated ringers infusion ( Intravenous New Bag 6/25/25 4912)   lactated ringers infusion ( Intravenous New Bag 6/26/25 2571)   lactated ringers bolus 1,000 mL (0 mLs Intravenous Stopped 6/25/25 0420)   morphine  injection 4 mg (4 mg Intravenous Given 6/25/25 0321)   ondansetron injection 4 mg (4 mg Intravenous Given 6/25/25 0321)   albuterol-ipratropium 2.5 mg-0.5 mg/3 mL nebulizer solution 3 mL (3 mLs Nebulization Given 6/25/25 0325)   methylPREDNISolone sodium succinate injection 125 mg (125 mg Intravenous Given 6/25/25 0325)   iohexoL (OMNIPAQUE 350) injection 100 mL (100 mLs Intravenous Given 6/25/25 0344)   hydrALAZINE injection 10 mg (10 mg Intravenous Given 6/25/25 0420)   metoclopramide injection 10 mg (10 mg Intravenous Given 6/25/25 0442)   labetaloL injection 10 mg (10 mg Intravenous Given 6/25/25 0446)   hydrALAZINE injection 20 mg (20 mg Intravenous Given 6/25/25 0519)   droPERidol injection 1.25 mg (1.25 mg Intravenous Given 6/25/25 0855)   lactated ringers bolus 500 mL (0 mLs Intravenous Stopped 6/25/25 1001)   labetaloL injection 10 mg (10 mg Intravenous Given 6/25/25 0944)   famotidine (PF) injection 20 mg (20 mg Intravenous Given 6/25/25 0944)   magnesium sulfate 2g in water 50mL IVPB (premix) (0 g Intravenous Stopped 6/25/25 1231)   cloNIDine tablet 0.1 mg (0.1 mg Oral Given 6/25/25 1042)   lactated ringers bolus 500 mL (0 mLs Intravenous Stopped 6/25/25 1334)   potassium chloride SA CR tablet 40 mEq (40 mEq Oral Given 6/26/25 1709)   trimethobenzamide injection 200 mg (200 mg Intramuscular Given 6/26/25 2216)   potassium chloride CR tablet 50 mEq (50 mEq Oral Given 6/27/25 1447)   magnesium sulfate in dextrose IVPB (premix) 1 g (0 g Intravenous Stopped 6/27/25 1550)   electrolyte-A infusion ( Intravenous New Bag 6/28/25 1005)     Medical Decision Making  Problems Addressed:  Abdominal pain: acute illness or injury  Dehydration: acute illness or injury    Amount and/or Complexity of Data Reviewed  Labs: ordered. Decision-making details documented in ED Course.  Radiology: ordered. Decision-making details documented in ED Course.  ECG/medicine tests: ordered and independent interpretation performed.  Decision-making details documented in ED Course.    Risk  Prescription drug management.    Differential diagnosis (includes but is not limited to):   ACS, arrhythmia, gastroenteritis, biliary pathology, gastritis, esophagitis, dehydration, kidney, electrolyte abnormalities    MDM Narrative  65-year-old female presents for evaluation of generalized abdominal pain with nausea and vomiting all day.  She has been taking Zofran at home without relief.  Labs performed and reviewed.  IV fluid rehydration ongoing.  CT scans performed and reviewed with no acute pathology identified.  EKG and chest x-ray reviewed.  Unable to control the patient's symptoms despite multiple rounds of pain medications, IV fluids.  Given this, discussed with the hospitalist, will admit for further care.    Dispo: admit    My independent radiology interpretation: as above  Point of care US (independently performed and interpreted):   Decision rules/clinical scoring:     Sepsis Perfusion Assessment:     Amount and/or Complexity of Data Reviewed  Independent historian: none   Summary of history:   External data reviewed: notes from previous ED visits and notes from clinic visits  Summary of data reviewed: Prior records reviewed  Risk and benefits of testing: discussed   Labs: ordered and reviewed  Radiology: ordered and independent interpretation performed (see above or ED course)  ECG/medicine tests: ordered and independent interpretation performed (see above or ED course)  Discussion of management or test interpretation with external provider(s): discussed with hospitalist physician   Summary of discussion: as above    Risk  Parenteral controlled substances   Drug therapy requiring intense monitoring for toxicity   Decision regarding hospitalization  Shared decision making     Critical Care  none    Data Reviewed/Counseling: I have personally reviewed the patient's vital signs, nursing notes, and other relevant tests, information, and imaging. I  "had a detailed discussion regarding the historical points, exam findings, and any diagnostic results supporting the discharge diagnosis. I personally performed the history, PE, MDM and procedures as documented above and agree with the scribe's documentation.    Portions of this note were dictated using voice recognition software. Although it was reviewed for accuracy, some inherent voice recognition errors may have occurred and may be present in this document.          Scribe Attestation:   Scribe #1: I performed the above scribed service and the documentation accurately describes the services I performed. I attest to the accuracy of the note.    Attending Attestation:           Physician Attestation for Scribe:  Physician Attestation Statement for Scribe #1: I, Carlos Coulter MD, reviewed documentation, as scribed by Jonny Browning in my presence, and it is both accurate and complete.             ED Course as of 07/09/25 1232   Wed Jun 25, 2025   0320 X-Ray Chest AP Portable  Independently visualized/reviewed by me during the ED visit.  - no lobar consolidation or PTX [MC]   0320 Patient denies having a contrast allergy.  She states she has received IV contrast before without issue. []   0512 CBC auto differential(!) []   0615 EKG independently interpreted by me.  EKG: NSR @ 72, no STEMI, Qtc 459 []      ED Course User Index  [MC] Carlos Coulter MD                           Clinical Impression:  Final diagnoses:  [R10.9] Abdominal pain  [E86.0] Dehydration          ED Disposition Condition    Observation                       [1]   Social History  Tobacco Use    Smoking status: Every Day     Current packs/day: 0.50     Types: Cigarettes    Smokeless tobacco: Current   Substance Use Topics    Alcohol use: Not Currently     Comment: 6 a week anand    Drug use: Not Currently     Types: "Crack" cocaine     Comment: Been clean 8 years        Carlos Coulter MD  07/09/25 1233    "

## 2025-06-25 NOTE — ED NOTES
Pt c/o nausea post zofran IVP and pts HR elevated in the 120s. Provider informed and aware view orders.

## 2025-06-26 PROBLEM — F17.200 TOBACCO DEPENDENCY: Status: ACTIVE | Noted: 2025-06-26

## 2025-06-26 LAB
AFP-TM SERPL-MCNC: <2 NG/ML
ANION GAP SERPL CALC-SCNC: 16 MEQ/L
ANION GAP SERPL CALC-SCNC: 9 MEQ/L
BASOPHILS # BLD AUTO: 0.02 X10(3)/MCL
BASOPHILS NFR BLD AUTO: 0.2 %
BUN SERPL-MCNC: 10.9 MG/DL (ref 9.8–20.1)
BUN SERPL-MCNC: 11.7 MG/DL (ref 9.8–20.1)
CALCIUM SERPL-MCNC: 9 MG/DL (ref 8.4–10.2)
CALCIUM SERPL-MCNC: 9.5 MG/DL (ref 8.4–10.2)
CHLORIDE SERPL-SCNC: 107 MMOL/L (ref 98–107)
CHLORIDE SERPL-SCNC: 109 MMOL/L (ref 98–107)
CO2 SERPL-SCNC: 14 MMOL/L (ref 23–31)
CO2 SERPL-SCNC: 24 MMOL/L (ref 23–31)
CREAT SERPL-MCNC: 0.71 MG/DL (ref 0.55–1.02)
CREAT SERPL-MCNC: 0.88 MG/DL (ref 0.55–1.02)
CREAT/UREA NIT SERPL: 12
CREAT/UREA NIT SERPL: 16
EOSINOPHIL # BLD AUTO: 0 X10(3)/MCL (ref 0–0.9)
EOSINOPHIL NFR BLD AUTO: 0 %
ERYTHROCYTE [DISTWIDTH] IN BLOOD BY AUTOMATED COUNT: 13.6 % (ref 11.5–17)
GFR SERPLBLD CREATININE-BSD FMLA CKD-EPI: >60 ML/MIN/1.73/M2
GFR SERPLBLD CREATININE-BSD FMLA CKD-EPI: >60 ML/MIN/1.73/M2
GLUCOSE SERPL-MCNC: 239 MG/DL (ref 82–115)
GLUCOSE SERPL-MCNC: 88 MG/DL (ref 82–115)
HCT VFR BLD AUTO: 39.9 % (ref 37–47)
HGB BLD-MCNC: 13.4 G/DL (ref 12–16)
IMM GRANULOCYTES # BLD AUTO: 0.1 X10(3)/MCL (ref 0–0.04)
IMM GRANULOCYTES NFR BLD AUTO: 0.9 %
LYMPHOCYTES # BLD AUTO: 2.94 X10(3)/MCL (ref 0.6–4.6)
LYMPHOCYTES NFR BLD AUTO: 25.6 %
MCH RBC QN AUTO: 33.4 PG (ref 27–31)
MCHC RBC AUTO-ENTMCNC: 33.6 G/DL (ref 33–36)
MCV RBC AUTO: 99.5 FL (ref 80–94)
MONOCYTES # BLD AUTO: 0.89 X10(3)/MCL (ref 0.1–1.3)
MONOCYTES NFR BLD AUTO: 7.8 %
NEUTROPHILS # BLD AUTO: 7.52 X10(3)/MCL (ref 2.1–9.2)
NEUTROPHILS NFR BLD AUTO: 65.5 %
NRBC BLD AUTO-RTO: 0 %
PLATELET # BLD AUTO: 185 X10(3)/MCL (ref 130–400)
PMV BLD AUTO: 10.1 FL (ref 7.4–10.4)
POTASSIUM SERPL-SCNC: 3.1 MMOL/L (ref 3.5–5.1)
POTASSIUM SERPL-SCNC: 4 MMOL/L (ref 3.5–5.1)
RBC # BLD AUTO: 4.01 X10(6)/MCL (ref 4.2–5.4)
SODIUM SERPL-SCNC: 137 MMOL/L (ref 136–145)
SODIUM SERPL-SCNC: 142 MMOL/L (ref 136–145)
WBC # BLD AUTO: 11.47 X10(3)/MCL (ref 4.5–11.5)

## 2025-06-26 PROCEDURE — 25000003 PHARM REV CODE 250: Performed by: NURSE PRACTITIONER

## 2025-06-26 PROCEDURE — 82105 ALPHA-FETOPROTEIN SERUM: CPT

## 2025-06-26 PROCEDURE — 63600175 PHARM REV CODE 636 W HCPCS

## 2025-06-26 PROCEDURE — 63600175 PHARM REV CODE 636 W HCPCS: Performed by: NURSE PRACTITIONER

## 2025-06-26 PROCEDURE — 87427 SHIGA-LIKE TOXIN AG IA: CPT

## 2025-06-26 PROCEDURE — 80048 BASIC METABOLIC PNL TOTAL CA: CPT

## 2025-06-26 PROCEDURE — 21400001 HC TELEMETRY ROOM

## 2025-06-26 PROCEDURE — 36415 COLL VENOUS BLD VENIPUNCTURE: CPT

## 2025-06-26 PROCEDURE — 87507 IADNA-DNA/RNA PROBE TQ 12-25: CPT

## 2025-06-26 PROCEDURE — 85025 COMPLETE CBC W/AUTO DIFF WBC: CPT

## 2025-06-26 PROCEDURE — S4991 NICOTINE PATCH NONLEGEND: HCPCS

## 2025-06-26 PROCEDURE — 25000003 PHARM REV CODE 250

## 2025-06-26 RX ORDER — METHOCARBAMOL 500 MG/1
500 TABLET, FILM COATED ORAL 4 TIMES DAILY
Status: DISCONTINUED | OUTPATIENT
Start: 2025-06-26 | End: 2025-06-29 | Stop reason: HOSPADM

## 2025-06-26 RX ORDER — METOPROLOL TARTRATE 25 MG/1
12.5 TABLET ORAL 2 TIMES DAILY
Status: DISCONTINUED | OUTPATIENT
Start: 2025-06-26 | End: 2025-06-29 | Stop reason: HOSPADM

## 2025-06-26 RX ORDER — IBUPROFEN 200 MG
1 TABLET ORAL DAILY
Status: DISCONTINUED | OUTPATIENT
Start: 2025-06-26 | End: 2025-06-29 | Stop reason: HOSPADM

## 2025-06-26 RX ORDER — AMLODIPINE BESYLATE 5 MG/1
5 TABLET ORAL DAILY
Status: DISCONTINUED | OUTPATIENT
Start: 2025-06-26 | End: 2025-06-27

## 2025-06-26 RX ORDER — QUETIAPINE FUMARATE 25 MG/1
25 TABLET, FILM COATED ORAL NIGHTLY
Status: DISCONTINUED | OUTPATIENT
Start: 2025-06-26 | End: 2025-06-29 | Stop reason: HOSPADM

## 2025-06-26 RX ORDER — HYDROCODONE BITARTRATE AND ACETAMINOPHEN 7.5; 325 MG/1; MG/1
1 TABLET ORAL 2 TIMES DAILY PRN
Refills: 0 | Status: DISCONTINUED | OUTPATIENT
Start: 2025-06-26 | End: 2025-06-29 | Stop reason: HOSPADM

## 2025-06-26 RX ORDER — SODIUM CHLORIDE, SODIUM LACTATE, POTASSIUM CHLORIDE, CALCIUM CHLORIDE 600; 310; 30; 20 MG/100ML; MG/100ML; MG/100ML; MG/100ML
INJECTION, SOLUTION INTRAVENOUS CONTINUOUS
Status: ACTIVE | OUTPATIENT
Start: 2025-06-26 | End: 2025-06-26

## 2025-06-26 RX ORDER — ATORVASTATIN CALCIUM 10 MG/1
20 TABLET, FILM COATED ORAL NIGHTLY
Status: DISCONTINUED | OUTPATIENT
Start: 2025-06-26 | End: 2025-06-29 | Stop reason: HOSPADM

## 2025-06-26 RX ORDER — ARIPIPRAZOLE 2 MG/1
2 TABLET ORAL DAILY
Status: DISCONTINUED | OUTPATIENT
Start: 2025-06-26 | End: 2025-06-29 | Stop reason: HOSPADM

## 2025-06-26 RX ORDER — ACETAMINOPHEN 325 MG/1
650 TABLET ORAL EVERY 6 HOURS PRN
Status: DISCONTINUED | OUTPATIENT
Start: 2025-06-26 | End: 2025-06-29 | Stop reason: HOSPADM

## 2025-06-26 RX ORDER — POTASSIUM CHLORIDE 20 MEQ/1
40 TABLET, EXTENDED RELEASE ORAL ONCE
Status: COMPLETED | OUTPATIENT
Start: 2025-06-26 | End: 2025-06-26

## 2025-06-26 RX ORDER — HYDRALAZINE HYDROCHLORIDE 20 MG/ML
10 INJECTION INTRAMUSCULAR; INTRAVENOUS EVERY 4 HOURS PRN
Status: DISCONTINUED | OUTPATIENT
Start: 2025-06-26 | End: 2025-06-29 | Stop reason: HOSPADM

## 2025-06-26 RX ORDER — BUPROPION HYDROCHLORIDE 100 MG/1
100 TABLET, EXTENDED RELEASE ORAL 2 TIMES DAILY
Status: DISCONTINUED | OUTPATIENT
Start: 2025-06-26 | End: 2025-06-29 | Stop reason: HOSPADM

## 2025-06-26 RX ADMIN — ONDANSETRON 4 MG: 2 INJECTION INTRAMUSCULAR; INTRAVENOUS at 08:06

## 2025-06-26 RX ADMIN — METOPROLOL TARTRATE 12.5 MG: 25 TABLET, FILM COATED ORAL at 01:06

## 2025-06-26 RX ADMIN — HYDRALAZINE HYDROCHLORIDE 10 MG: 20 INJECTION INTRAMUSCULAR; INTRAVENOUS at 08:06

## 2025-06-26 RX ADMIN — SODIUM CHLORIDE, POTASSIUM CHLORIDE, SODIUM LACTATE AND CALCIUM CHLORIDE: 600; 310; 30; 20 INJECTION, SOLUTION INTRAVENOUS at 08:06

## 2025-06-26 RX ADMIN — METHOCARBAMOL 500 MG: 500 TABLET ORAL at 01:06

## 2025-06-26 RX ADMIN — ARIPIPRAZOLE 2 MG: 2 TABLET ORAL at 01:06

## 2025-06-26 RX ADMIN — FAMOTIDINE 20 MG: 10 INJECTION, SOLUTION INTRAVENOUS at 08:06

## 2025-06-26 RX ADMIN — METOPROLOL TARTRATE 12.5 MG: 25 TABLET, FILM COATED ORAL at 09:06

## 2025-06-26 RX ADMIN — BUPROPION HYDROCHLORIDE 100 MG: 100 TABLET, FILM COATED, EXTENDED RELEASE ORAL at 09:06

## 2025-06-26 RX ADMIN — ONDANSETRON 4 MG: 2 INJECTION INTRAMUSCULAR; INTRAVENOUS at 04:06

## 2025-06-26 RX ADMIN — ATORVASTATIN CALCIUM 20 MG: 10 TABLET, FILM COATED ORAL at 09:06

## 2025-06-26 RX ADMIN — TRIMETHOBENZAMIDE HYDROCHLORIDE 200 MG: 100 INJECTION INTRAMUSCULAR at 10:06

## 2025-06-26 RX ADMIN — POTASSIUM CHLORIDE 40 MEQ: 1500 TABLET, EXTENDED RELEASE ORAL at 05:06

## 2025-06-26 RX ADMIN — METHOCARBAMOL 500 MG: 500 TABLET ORAL at 09:06

## 2025-06-26 RX ADMIN — QUETIAPINE FUMARATE 25 MG: 25 TABLET ORAL at 09:06

## 2025-06-26 RX ADMIN — ENOXAPARIN SODIUM 40 MG: 40 INJECTION SUBCUTANEOUS at 04:06

## 2025-06-26 RX ADMIN — ACETAMINOPHEN 1000 MG: 500 TABLET ORAL at 03:06

## 2025-06-26 RX ADMIN — ACETAMINOPHEN 650 MG: 325 TABLET ORAL at 09:06

## 2025-06-26 RX ADMIN — BUPROPION HYDROCHLORIDE 100 MG: 100 TABLET, FILM COATED, EXTENDED RELEASE ORAL at 01:06

## 2025-06-26 RX ADMIN — NICOTINE 1 PATCH: 14 PATCH TRANSDERMAL at 05:06

## 2025-06-26 RX ADMIN — METHOCARBAMOL 500 MG: 500 TABLET ORAL at 04:06

## 2025-06-26 RX ADMIN — AMLODIPINE BESYLATE 5 MG: 5 TABLET ORAL at 08:06

## 2025-06-26 RX ADMIN — HYDROCODONE BITARTRATE AND ACETAMINOPHEN 1 TABLET: 7.5; 325 TABLET ORAL at 01:06

## 2025-06-26 NOTE — PLAN OF CARE
Problem: Adult Inpatient Plan of Care  Goal: Plan of Care Review  Outcome: Unable to Meet  Goal: Patient-Specific Goal (Individualized)  Outcome: Unable to Meet  Goal: Absence of Hospital-Acquired Illness or Injury  Outcome: Unable to Meet  Goal: Optimal Comfort and Wellbeing  Outcome: Unable to Meet  Goal: Readiness for Transition of Care  Outcome: Unable to Meet     Problem: Diabetes Comorbidity  Goal: Blood Glucose Level Within Targeted Range  Outcome: Unable to Meet

## 2025-06-26 NOTE — PROGRESS NOTES
[facility] Hospital Medicine Progress Note        Chief Complaint: Inpatient Follow-up     HPI:   65-year-old woman with a PMH of COPD (not oxygen dependent), HFpEF, HLD, dm type 2, prior alcohol induced cirrhosis? and HTN who presented to the ED with complaints of abdominal pain with associated nausea vomiting that began 3 days prior. Patient explains that she has experienced right upper quadrant and left upper quadrant abdominal pain, and has been unable to keep food or drink down since his symptoms began. Patient states that she has had a small amount of diarrhea, denies fever, denies sick contacts, denies abdominal surgical history.     Upon arrival to the ED patient was afebrile, tachypneic with a respiratory rate of 35, hypertensive with a BP of 223/104, but maintaining normal sats on room air.  ED workup revealed hemoglobin 17.3, hematocrit 50.9, CO2 15, lactic acid 2.8, UA was positive for glucose and ketones, CT abdomen and pelvis with contrast showed no definite acute intra-abdominal or pelvic solid organ or bowel pathology, CT head without showed no acute intracranial pathology, small focus of encephalomalacia in the right parietal lobe, mild to moderate chronic small-vessel ischemic changes of the supratentorial white matter. Patient was given hydralazine 30 mg IV, labetalol 10 mg IV, Reglan 10 mg IV, morphine 4 mg IV, Zofran 4 mg IV, Solu-Medrol 125 mg IV, and a DuoNeb in the ED and admitted to Hospital Medicine for further care.    Interval Hx:   Seen and examined. Requesting her norco to be resumed for her chronic back pain. Otherwise mild nausea. Able to tolerate CLD.     Case was discussed with patient's nurse and  on the floor.    Objective/physical exam:  General: In no acute distress, afebrile  Chest: Clear to auscultation bilaterally  Heart: RRR, +S1, S2, no appreciable murmur  Abdomen: Soft, nontender, BS +  MSK: Warm, no lower extremity edema, no  clubbing or cyanosis  Neurologic: Alert and oriented x4, Cranial nerve II-XII intact, Strength 5/5 in all 4 extremities    VITAL SIGNS: 24 HRS MIN & MAX LAST   Temp  Min: 98.2 °F (36.8 °C)  Max: 99.1 °F (37.3 °C) 98.3 °F (36.8 °C)   BP  Min: 127/68  Max: 153/79 127/68   Pulse  Min: 66  Max: 92  66   Resp  Min: 18  Max: 20 18   SpO2  Min: 92 %  Max: 97 % (!) 92 %     I have reviewed the following labs:  Recent Labs   Lab 06/25/25  0310 06/25/25  1402 06/26/25  0925   WBC 8.73 5.95 11.47   RBC 5.27 4.37 4.01*   HGB 17.3* 14.7 13.4   HCT 50.9* 42.4 39.9   MCV 96.6* 97.0* 99.5*   MCH 32.8* 33.6* 33.4*   MCHC 34.0 34.7 33.6   RDW 13.2 13.4 13.6    162 185   MPV 10.2 9.8 10.1     Recent Labs   Lab 06/25/25  0310 06/25/25  0619 06/25/25  0956 06/26/25  0925     --  137 142   K 4.1  --  4.0 3.1*   *  --  107 109*   CO2 15*  --  14* 24   BUN 12.5  --  10.9 11.7   CREATININE 0.90  --  0.88 0.71   *  --  239* 88   CALCIUM 10.4*  --  9.5 9.0   PH  --  7.430  --   --    MG 1.80  --   --   --    ALBUMIN 4.8  --   --   --    PROT 9.0*  --   --   --    ALKPHOS 193*  --   --   --    ALT 10  --   --   --    AST 17  --   --   --    BILITOT 0.8  --   --   --      Microbiology Results (last 7 days)       Procedure Component Value Units Date/Time    Blood Culture [[id number]]  (Normal) Collected: 06/25/25 1009    Order Status: Completed Specimen: Blood, Venous Updated: 06/26/25 1201     Blood Culture No Growth At 24 Hours    Blood Culture [[id number]]  (Normal) Collected: 06/25/25 1009    Order Status: Completed Specimen: Blood, Venous Updated: 06/26/25 1201     Blood Culture No Growth At 24 Hours    Stool Culture [[id number]]     Order Status: Sent Specimen: Stool              See below for Radiology    Assessment/Plan:  # N/V - possibly marijuna induced?  # Hypokalemia  # EtOH induced cirrhosis  # Tobacco use disorder  # HTN urgency - resolved  # Lactic acidosis - resolved  # Low BMI  # Hx of COPD (not oxygen  dependent), HFpEF, HLD, prior alcohol induced cirrhosis? and HTN    - GI panel pending  - will advance diet today  - obtain AFP  - replete potassium  - UA normal  - follow up Bcx  - she reports that she follows with Dr. Garcia a year or so ago regarding her liver. She stopped drinking a year ago. Unclear if she had screening EGD. Will give her GI outpatient  - CT A/P and US abdomen noted  - start amlodipine. Continue home lopressor  - IV fluids  - labs in AM  - likely discharge in AM if she clinically improves    Smoking cessation counseling  Patient advised on smoking cessation counseling. I ordered nicotine patches. I spent 8 mins on smoking cessation counseling    ADDENDUM - will consult GI for persistent N/V associated with food intake. Keep NPO at midnight incase EGD can be done    VTE prophylaxis: lovenox    Patient condition:  Fair    Anticipated discharge and Disposition:         All diagnosis and differential diagnosis have been reviewed; assessment and plan has been documented; I have personally reviewed the labs and test results that are presently available; I have reviewed the patients medication list; I have reviewed the consulting providers response and recommendations. I have reviewed or attempted to review medical records based upon their availability    All of the patient's questions have been  addressed and answered. Patient's is agreeable to the above stated plan. I will continue to monitor closely and make adjustments to medical management as needed.    _____________________________________________________________________    Malnutrition Status:  Nutrition consulted. Most recent weight and BMI monitored-     Measurements:  Wt Readings from Last 1 Encounters:   06/25/25 45.8 kg (100 lb 14.4 oz)   Body mass index is 17.87 kg/m².    Patient has been screened and assessed by RD.    Malnutrition Type:  Context:    Level:      Malnutrition Characteristic Summary:       Interventions/Recommendations  (treatment strategy):        Scheduled Med:   amLODIPine  5 mg Oral Daily    ARIPiprazole  2 mg Oral Daily    atorvastatin  20 mg Oral QHS    buPROPion  100 mg Oral BID    enoxparin  40 mg Subcutaneous Daily    famotidine (PF)  20 mg Intravenous Daily    methocarbamoL  500 mg Oral QID    metoprolol tartrate  12.5 mg Oral BID    QUEtiapine  25 mg Oral Nightly    scopolamine  1 patch Transdermal Q3 Days      Continuous Infusions:   lactated ringers   Intravenous Continuous 100 mL/hr at 06/26/25 0857 New Bag at 06/26/25 0857      PRN Meds:    Current Facility-Administered Medications:     acetaminophen, 650 mg, Oral, Q6H PRN    aluminum-magnesium hydroxide-simethicone, 30 mL, Oral, QID PRN    bisacodyL, 10 mg, Rectal, Daily PRN    dextrose 50%, 12.5 g, Intravenous, PRN    dextrose 50%, 25 g, Intravenous, PRN    glucagon (human recombinant), 1 mg, Intramuscular, PRN    glucose, 16 g, Oral, PRN    glucose, 24 g, Oral, PRN    HYDROcodone-acetaminophen, 1 tablet, Oral, BID PRN    levalbuterol, 1.25 mg, Nebulization, Q4H PRN    melatonin, 6 mg, Oral, Nightly PRN    naloxone, 0.02 mg, Intravenous, PRN    ondansetron, 4 mg, Intravenous, Q4H PRN    prochlorperazine, 5 mg, Intravenous, Q6H PRN    senna-docusate, 1 tablet, Oral, BID PRN    sodium chloride 0.9%, 10 mL, Intravenous, PRN     Radiology:  I have personally reviewed the following imaging and agree with the radiologist.     US Abdomen Limited  EXAMINATION  US ABDOMEN LIMITED    CLINICAL HISTORY  RUQ/Cholecystitis;    TECHNIQUE  Multiplanar grayscale sonographic evaluation of the right upper quadrant was performed, with focused Doppler assessment of the main portal vein.    COMPARISON  22 January 2024    FINDINGS  Exam quality: adequate for evaluation    Pancreas: No focal abnormality or peripancreatic collection appreciated at the visualized pancreas.    Gallbladder: No shadowing stones or layering sludge. No wall thickening or pericholecystic fluid.  Sonographic  Kurtz sign is negative, per the ultrasonographer's report.    Bile ducts: No intra- / extrahepatic biliary dilatation suggestive of obstructing pathology.    Liver: Size is within normal limits.  There is irregular nodular appearance of the liver surface, suggesting changes of cirrhosis.  No discrete mass-like abnormality.  Unremarkable isoechoic appearance of the hepatic background, relative to the adjacent right kidney.  Normal hepatopetal flow is seen within the portal vein.    Other findings: No suspicious right kidney lesion, shadowing stone, or hydronephrosis.  No free fluid identified. The visualized portions of the aorta and IVC are structures are unremarkable.    IMPRESSION  1. No convincing acute sonographic abnormality.  2. Liver surface changes suggestive of cirrhosis.    Electronically signed by: Gaurang Montenegro  Date:    06/25/2025  Time:    11:09  X-Ray Chest AP Portable  Narrative: EXAMINATION:  XR CHEST AP PORTABLE    CLINICAL HISTORY:  Unspecified abdominal pain    COMPARISON:  1 January 2025    FINDINGS:  Frontal view of the chest was obtained. Heart is not enlarged.  The lungs are clear.  No pneumothorax.  Impression: No acute findings.    Electronically signed by: Bruce Smith  Date:    06/25/2025  Time:    09:05  CT Abdomen Pelvis With IV Contrast NO Oral Contrast  Narrative: EXAMINATION:  CT ABDOMEN PELVIS WITH IV CONTRAST    CLINICAL HISTORY:  Nausea/vomiting;Abdominal pain, acute, nonlocalized;.    TECHNIQUE:  Helical acquisition through the abdomen and pelvis with IV contrast.  Three plane reconstructions were provided for review.  mGycm. Automatic exposure control, adjustment of mA/kV or iterative reconstruction technique was used to reduce radiation.    COMPARISON:  20 March 2024    FINDINGS:  Motion degraded scan.    Lung bases are clear.    Lobular liver suggestive of chronic liver disease.  No significant abnormality of the gallbladder, spleen, pancreas or adrenals.  No  hydronephrosis.    No bowel obstruction. No significant inflammatory changes of the bowel.  No free air.    Urinary bladder is unremarkable. No free fluid. Aorta normal in caliber.  There is moderate atherosclerotic disease.    No acute osseous findings.  There are degenerative and postsurgical changes of the spine.  There are bilateral hip arthroplasties.  Impression: Motion degraded scan with no acute abdominopelvic findings evident.    No significant discrepancy with the preliminary report.    Electronically signed by: Bruce Smith  Date:    06/25/2025  Time:    08:58  CT Head Without Contrast  Narrative: EXAMINATION:  CT HEAD WITHOUT CONTRAST    CLINICAL HISTORY:  Mental status change, unknown cause;    TECHNIQUE:  Low dose axial images were obtained through the head.  Coronal and sagittal reformations were also performed. Contrast was not administered.  Dose reduction techniques including automatic exposure control (AEC) were utilized.    Dose (DLP): 1464 mGycm    COMPARISON:  CT head without contrast 10/03/2024.    FINDINGS:  INTRACRANIAL: Redemonstration of a small focus of encephalomalacia in the right parietal lobe.  Mild to moderate chronic small-vessel ischemic changes of the internal capsules and supratentorial periventricular white matter.  No acute intracranial hemorrhage.  No hydrocephalus.  No intracranial mass effect.    SINUSES: Visualized paranasal sinuses and mastoids are clear.    SKULL/SCALP: Visualized osseous structures are normal.    ORBITS: Visualized orbits are normal.  Impression: No acute intracranial pathology.    Small focus of encephalomalacia in the right parietal lobe.    Mild to moderate chronic small-vessel ischemic changes of the supratentorial white matter.    No significant discrepancy between preliminary and final reports.    Electronically signed by: Jack Kirkland  Date:    06/25/2025  Time:    05:38      Ilir Gale MD  Department of Hospital Medicine   [facility]   06/26/2025

## 2025-06-27 LAB
ADV 40+41 DNA STL QL NAA+NON-PROBE: NOT DETECTED
ANION GAP SERPL CALC-SCNC: 11 MEQ/L
ASTRO TYP 1-8 RNA STL QL NAA+NON-PROBE: NOT DETECTED
BASOPHILS # BLD AUTO: 0.02 X10(3)/MCL
BASOPHILS NFR BLD AUTO: 0.2 %
BUN SERPL-MCNC: 11.2 MG/DL (ref 9.8–20.1)
C CAYETANENSIS DNA STL QL NAA+NON-PROBE: NOT DETECTED
C COLI+JEJ+UPSA DNA STL QL NAA+NON-PROBE: NOT DETECTED
CALCIUM SERPL-MCNC: 9.2 MG/DL (ref 8.4–10.2)
CHLORIDE SERPL-SCNC: 102 MMOL/L (ref 98–107)
CO2 SERPL-SCNC: 25 MMOL/L (ref 23–31)
CREAT SERPL-MCNC: 0.75 MG/DL (ref 0.55–1.02)
CREAT/UREA NIT SERPL: 15
CRYPTOSP DNA STL QL NAA+NON-PROBE: NOT DETECTED
E HISTOLYT DNA STL QL NAA+NON-PROBE: NOT DETECTED
EAEC PAA PLAS AGGR+AATA ST NAA+NON-PRB: NOT DETECTED
EC STX1+STX2 GENES STL QL NAA+NON-PROBE: NOT DETECTED
EOSINOPHIL # BLD AUTO: 0.01 X10(3)/MCL (ref 0–0.9)
EOSINOPHIL NFR BLD AUTO: 0.1 %
EPEC EAE GENE STL QL NAA+NON-PROBE: NOT DETECTED
ERYTHROCYTE [DISTWIDTH] IN BLOOD BY AUTOMATED COUNT: 13.6 % (ref 11.5–17)
ETEC LTA+ST1A+ST1B TOX ST NAA+NON-PROBE: NOT DETECTED
G LAMBLIA DNA STL QL NAA+NON-PROBE: NOT DETECTED
GFR SERPLBLD CREATININE-BSD FMLA CKD-EPI: >60 ML/MIN/1.73/M2
GLUCOSE SERPL-MCNC: 91 MG/DL (ref 82–115)
HCT VFR BLD AUTO: 44.4 % (ref 37–47)
HGB BLD-MCNC: 15 G/DL (ref 12–16)
IMM GRANULOCYTES # BLD AUTO: 0.03 X10(3)/MCL (ref 0–0.04)
IMM GRANULOCYTES NFR BLD AUTO: 0.3 %
LYMPHOCYTES # BLD AUTO: 3.6 X10(3)/MCL (ref 0.6–4.6)
LYMPHOCYTES NFR BLD AUTO: 33.4 %
MAGNESIUM SERPL-MCNC: 1.7 MG/DL (ref 1.6–2.6)
MCH RBC QN AUTO: 33 PG (ref 27–31)
MCHC RBC AUTO-ENTMCNC: 33.8 G/DL (ref 33–36)
MCV RBC AUTO: 97.8 FL (ref 80–94)
MONOCYTES # BLD AUTO: 0.73 X10(3)/MCL (ref 0.1–1.3)
MONOCYTES NFR BLD AUTO: 6.8 %
NEUTROPHILS # BLD AUTO: 6.4 X10(3)/MCL (ref 2.1–9.2)
NEUTROPHILS NFR BLD AUTO: 59.2 %
NOROVIRUS GI+II RNA STL QL NAA+NON-PROBE: NOT DETECTED
NRBC BLD AUTO-RTO: 0 %
P SHIGELLOIDES DNA STL QL NAA+NON-PROBE: NOT DETECTED
PLATELET # BLD AUTO: 204 X10(3)/MCL (ref 130–400)
PMV BLD AUTO: 10.1 FL (ref 7.4–10.4)
POTASSIUM SERPL-SCNC: 3.1 MMOL/L (ref 3.5–5.1)
RBC # BLD AUTO: 4.54 X10(6)/MCL (ref 4.2–5.4)
RVA RNA STL QL NAA+NON-PROBE: NOT DETECTED
S ENT+BONG DNA STL QL NAA+NON-PROBE: NOT DETECTED
SAPO I+II+IV+V RNA STL QL NAA+NON-PROBE: NOT DETECTED
SHIGELLA SP+EIEC IPAH ST NAA+NON-PROBE: NOT DETECTED
SODIUM SERPL-SCNC: 138 MMOL/L (ref 136–145)
V CHOL+PARA+VUL DNA STL QL NAA+NON-PROBE: NOT DETECTED
V CHOLERAE DNA STL QL NAA+NON-PROBE: NOT DETECTED
WBC # BLD AUTO: 10.79 X10(3)/MCL (ref 4.5–11.5)
Y ENTEROCOL DNA STL QL NAA+NON-PROBE: NOT DETECTED

## 2025-06-27 PROCEDURE — 80048 BASIC METABOLIC PNL TOTAL CA: CPT

## 2025-06-27 PROCEDURE — S4991 NICOTINE PATCH NONLEGEND: HCPCS

## 2025-06-27 PROCEDURE — 63600175 PHARM REV CODE 636 W HCPCS

## 2025-06-27 PROCEDURE — 63600175 PHARM REV CODE 636 W HCPCS: Performed by: NURSE PRACTITIONER

## 2025-06-27 PROCEDURE — 83735 ASSAY OF MAGNESIUM: CPT

## 2025-06-27 PROCEDURE — 21400001 HC TELEMETRY ROOM

## 2025-06-27 PROCEDURE — 36415 COLL VENOUS BLD VENIPUNCTURE: CPT

## 2025-06-27 PROCEDURE — 25000003 PHARM REV CODE 250

## 2025-06-27 PROCEDURE — 85025 COMPLETE CBC W/AUTO DIFF WBC: CPT

## 2025-06-27 RX ORDER — MAGNESIUM SULFATE 1 G/100ML
1 INJECTION INTRAVENOUS ONCE
Status: COMPLETED | OUTPATIENT
Start: 2025-06-27 | End: 2025-06-27

## 2025-06-27 RX ORDER — LISINOPRIL 10 MG/1
10 TABLET ORAL DAILY
Status: DISCONTINUED | OUTPATIENT
Start: 2025-06-27 | End: 2025-06-29 | Stop reason: HOSPADM

## 2025-06-27 RX ORDER — POTASSIUM CHLORIDE 750 MG/1
50 TABLET, EXTENDED RELEASE ORAL ONCE
Status: COMPLETED | OUTPATIENT
Start: 2025-06-27 | End: 2025-06-27

## 2025-06-27 RX ORDER — AMLODIPINE BESYLATE 5 MG/1
10 TABLET ORAL DAILY
Status: DISCONTINUED | OUTPATIENT
Start: 2025-06-27 | End: 2025-06-29 | Stop reason: HOSPADM

## 2025-06-27 RX ADMIN — AMLODIPINE BESYLATE 10 MG: 5 TABLET ORAL at 10:06

## 2025-06-27 RX ADMIN — METHOCARBAMOL 500 MG: 500 TABLET ORAL at 01:06

## 2025-06-27 RX ADMIN — BUPROPION HYDROCHLORIDE 100 MG: 100 TABLET, FILM COATED, EXTENDED RELEASE ORAL at 08:06

## 2025-06-27 RX ADMIN — LISINOPRIL 10 MG: 10 TABLET ORAL at 10:06

## 2025-06-27 RX ADMIN — FAMOTIDINE 20 MG: 10 INJECTION, SOLUTION INTRAVENOUS at 08:06

## 2025-06-27 RX ADMIN — NICOTINE 1 PATCH: 14 PATCH TRANSDERMAL at 04:06

## 2025-06-27 RX ADMIN — METHOCARBAMOL 500 MG: 500 TABLET ORAL at 08:06

## 2025-06-27 RX ADMIN — QUETIAPINE FUMARATE 25 MG: 25 TABLET ORAL at 08:06

## 2025-06-27 RX ADMIN — METHOCARBAMOL 500 MG: 500 TABLET ORAL at 10:06

## 2025-06-27 RX ADMIN — ARIPIPRAZOLE 2 MG: 2 TABLET ORAL at 10:06

## 2025-06-27 RX ADMIN — ATORVASTATIN CALCIUM 20 MG: 10 TABLET, FILM COATED ORAL at 08:06

## 2025-06-27 RX ADMIN — MAGNESIUM SULFATE IN DEXTROSE 1 G: 10 INJECTION, SOLUTION INTRAVENOUS at 02:06

## 2025-06-27 RX ADMIN — HYDROCODONE BITARTRATE AND ACETAMINOPHEN 1 TABLET: 7.5; 325 TABLET ORAL at 11:06

## 2025-06-27 RX ADMIN — POTASSIUM CHLORIDE 50 MEQ: 750 TABLET, FILM COATED, EXTENDED RELEASE ORAL at 02:06

## 2025-06-27 RX ADMIN — METHOCARBAMOL 500 MG: 500 TABLET ORAL at 04:06

## 2025-06-27 RX ADMIN — BUPROPION HYDROCHLORIDE 100 MG: 100 TABLET, FILM COATED, EXTENDED RELEASE ORAL at 10:06

## 2025-06-27 RX ADMIN — ENOXAPARIN SODIUM 40 MG: 40 INJECTION SUBCUTANEOUS at 04:06

## 2025-06-27 RX ADMIN — METOPROLOL TARTRATE 12.5 MG: 25 TABLET, FILM COATED ORAL at 08:06

## 2025-06-27 RX ADMIN — METOPROLOL TARTRATE 12.5 MG: 25 TABLET, FILM COATED ORAL at 10:06

## 2025-06-27 RX ADMIN — HYDRALAZINE HYDROCHLORIDE 10 MG: 20 INJECTION INTRAMUSCULAR; INTRAVENOUS at 12:06

## 2025-06-27 NOTE — CONSULTS
"Consult Note    Reason for Consult:      We were consulted to evaluate this patient for intractable nausea and vomiting.     HPI:     66 yo CF known to Dr. Garcia with a pmhx of HTN, HLD, DM, alcohol abuse, chronic anemia, andetoh vs owens cirrhosis.      Patient presented to the ED on 06/25 with complaints of nausea, vomiting, diarrhea, abdominal pain.  She states that is was so bad that she was "short of breath."    On presentation, CO2 14, glucose 239, +urine ketones, lactic acidosis 2.9, alcohol less than 10 .  UDS positive for opiates and negative for cannabis.  CT abdomen/pelvis with IV contras was negative for acute abnormalities, but was emotion degraded scan.  RUQ US with normal gallbladder and biliary tree.  Stool culture collected and pending. Patient was admitted and treated symptomatically with IV fluids.  Despite only clear liquids yesterday, she had continued emesis versus regurgitation.  GI was consulted.Today, hypokalemia 3.1.    She is somewhat of a difficult historian.  She reports onset of symptoms about 1 week ago.  About 2-3 weeks ago she started taking Norco and smoking marijuana for her chronic back pain she.  She reports about 5 hits off of the joint of marijuana daily.  It sounds like when she eats, at times she will have dysphagia where things will get hung up and resulted regurgitation.  Other times, the food will go down and then she will get diffuse abdominal pain with associated nausea and vomiting, as well as loose stool.  Denies recent sick contacts or known fevers.    Previous records reviewed...  EGD for variceal screening and nausea 01/24/2024: benign intrinsic stricture s/p dilation with 44Fr bougie. small mucosal tear noted so didn't dilate further. small HH. granularity and erythema in antrum compatible with gastritis. normal mucosa in duodenum. No mention of esophageal varices.   Colonoscopy for alternating bowel habits 9/1/2023: internal hemorrhoids, 3 polyps in descending " colon, and 1 polyp in ascending. All TA polyps.  NM HIDA 2/2024: GB EF high 80%    No prior NM GES.  A1c was 5 in  12/2024.       PCP:  Luzmaria Flores FNP    Review of patient's allergies indicates:   Allergen Reactions    Meperidine Rash and Hives        Current Medications[1]  Prescriptions Prior to Admission[2]    Past Medical History:  Past Medical History:   Diagnosis Date    Benign essential HTN     Mixed hyperlipidemia     Type 2 diabetes mellitus without complications       Past Surgical History:  Past Surgical History:   Procedure Laterality Date    APPENDECTOMY      BACK SURGERY      BREAST SURGERY  01 13 2002    Augmantation    HEMIARTHROPLASTY OF HIP Left 12/20/2024    Procedure: HEMIARTHROPLASTY, HIP;  Surgeon: Jose J Montemayor DO;  Location: [facility] OR;  Service: Orthopedics;  Laterality: Left;  lateral peg board kevin    HIP SURGERY      INTRAMEDULLARY RODDING OF FEMUR Right 10/27/2022    Procedure: RIGHT FEMUR INTERTROCH IMN;  Surgeon: Stewart Brambila MD;  Location: [facility] OR;  Service: Orthopedics;  Laterality: Right;  Miracle table, supine  3rd case  Synthes Short TFNA      Family History:  No family history on file.  Social History:  Social History     Tobacco Use    Smoking status: Every Day     Current packs/day: 0.50     Types: Cigarettes    Smokeless tobacco: Current   Substance Use Topics    Alcohol use: Not Currently     Comment: 6 a week anand       Review of Systems:     Review of Systems   Constitutional:  Negative for appetite change, chills, diaphoresis, fatigue, fever and unexpected weight change.   HENT:  Positive for trouble swallowing.    Respiratory:  Positive for cough and shortness of breath. Negative for chest tightness.    Cardiovascular:  Negative for chest pain, palpitations and leg swelling.   Gastrointestinal:  Positive for abdominal pain, diarrhea, nausea and vomiting. Negative for abdominal distention, blood in stool, constipation and rectal pain.   Skin:  Negative for color  change and pallor.   Neurological:  Negative for dizziness, weakness and light-headedness.   Psychiatric/Behavioral:  Negative for confusion. The patient is not nervous/anxious.        Objective:     VITAL SIGNS: 24 HR MIN & MAX LAST    Temp  Min: 98 °F (36.7 °C)  Max: 99.2 °F (37.3 °C)  99.2 °F (37.3 °C)        BP  Min: 119/69  Max: 212/98  119/69     Pulse  Min: 66  Max: 93  80     Resp  Min: 18  Max: 18  18    SpO2  Min: 90 %  Max: 98 %  (!) 94 %      No intake or output data in the 24 hours ending 06/27/25 1351    Physical Exam  Constitutional:       General: She is not in acute distress.     Appearance: She is not ill-appearing.   HENT:      Head: Normocephalic and atraumatic.   Eyes:      General: No scleral icterus.     Extraocular Movements: Extraocular movements intact.   Cardiovascular:      Rate and Rhythm: Normal rate and regular rhythm.   Pulmonary:      Effort: Pulmonary effort is normal. No respiratory distress.   Abdominal:      General: Bowel sounds are normal. There is no distension.      Palpations: Abdomen is soft. There is no mass.      Tenderness: There is no abdominal tenderness. There is no guarding or rebound.      Comments: No fluid wave appreciated   Musculoskeletal:         General: Normal range of motion.      Right lower leg: No edema.      Left lower leg: No edema.   Skin:     General: Skin is warm and dry.   Neurological:      Mental Status: She is alert and oriented to person, place, and time.   Psychiatric:         Mood and Affect: Mood and affect normal.           Recent Results (from the past 48 hours)   CBC with Differential    Collection Time: 06/25/25  2:02 PM   Result Value Ref Range    WBC 5.95 4.50 - 11.50 x10(3)/mcL    RBC 4.37 4.20 - 5.40 x10(6)/mcL    Hgb 14.7 12.0 - 16.0 g/dL    Hct 42.4 37.0 - 47.0 %    MCV 97.0 (H) 80.0 - 94.0 fL    MCH 33.6 (H) 27.0 - 31.0 pg    MCHC 34.7 33.0 - 36.0 g/dL    RDW 13.4 11.5 - 17.0 %    Platelet 162 130 - 400 x10(3)/mcL    MPV 9.8 7.4 -  10.4 fL    Neut % 78.6 %    Lymph % 19.5 %    Mono % 1.3 %    Eos % 0.2 %    Basophil % 0.2 %    Imm Grans % 0.2 %    Neut # 4.68 2.1 - 9.2 x10(3)/mcL    Lymph # 1.16 0.6 - 4.6 x10(3)/mcL    Mono # 0.08 (L) 0.1 - 1.3 x10(3)/mcL    Eos # 0.01 0 - 0.9 x10(3)/mcL    Baso # 0.01 <=0.2 x10(3)/mcL    Imm Gran # 0.01 0.00 - 0.04 x10(3)/mcL    NRBC% 0.0 %   Troponin I    Collection Time: 06/25/25  2:02 PM   Result Value Ref Range    Troponin-I <0.010 0.000 - 0.045 ng/mL   Lactic Acid, Plasma    Collection Time: 06/25/25  2:02 PM   Result Value Ref Range    Lactic Acid Level 2.0 0.5 - 2.2 mmol/L   Troponin I    Collection Time: 06/25/25  4:14 PM   Result Value Ref Range    Troponin-I 0.017 0.000 - 0.045 ng/mL   Basic Metabolic Panel    Collection Time: 06/26/25  9:25 AM   Result Value Ref Range    Sodium 142 136 - 145 mmol/L    Potassium 3.1 (L) 3.5 - 5.1 mmol/L    Chloride 109 (H) 98 - 107 mmol/L    CO2 24 23 - 31 mmol/L    Glucose 88 82 - 115 mg/dL    Blood Urea Nitrogen 11.7 9.8 - 20.1 mg/dL    Creatinine 0.71 0.55 - 1.02 mg/dL    BUN/Creatinine Ratio 16     Calcium 9.0 8.4 - 10.2 mg/dL    Anion Gap 9.0 mEq/L    eGFR >60 mL/min/1.73/m2   AFP Tumor Marker    Collection Time: 06/26/25  9:25 AM   Result Value Ref Range    Alpha Fetoprotein Level <2.00 <=8.90 ng/mL   CBC with Differential    Collection Time: 06/26/25  9:25 AM   Result Value Ref Range    WBC 11.47 4.50 - 11.50 x10(3)/mcL    RBC 4.01 (L) 4.20 - 5.40 x10(6)/mcL    Hgb 13.4 12.0 - 16.0 g/dL    Hct 39.9 37.0 - 47.0 %    MCV 99.5 (H) 80.0 - 94.0 fL    MCH 33.4 (H) 27.0 - 31.0 pg    MCHC 33.6 33.0 - 36.0 g/dL    RDW 13.6 11.5 - 17.0 %    Platelet 185 130 - 400 x10(3)/mcL    MPV 10.1 7.4 - 10.4 fL    Neut % 65.5 %    Lymph % 25.6 %    Mono % 7.8 %    Eos % 0.0 %    Basophil % 0.2 %    Imm Grans % 0.9 %    Neut # 7.52 2.1 - 9.2 x10(3)/mcL    Lymph # 2.94 0.6 - 4.6 x10(3)/mcL    Mono # 0.89 0.1 - 1.3 x10(3)/mcL    Eos # 0.00 0 - 0.9 x10(3)/mcL    Baso # 0.02 <=0.2  x10(3)/mcL    Imm Gran # 0.10 (H) 0.00 - 0.04 x10(3)/mcL    NRBC% 0.0 %   GI Panel    Collection Time: 06/26/25 10:16 PM   Result Value Ref Range    CAMPYLOBACTER Not Detected Not Detected    PLESIOMONAS SHIGELLOIDES Not Detected Not Detected    SALMONELLA Not Detected Not Detected    Vibrio sp. Not Detected Not Detected    VIBRIO CHOLERAE Not Detected Not Detected    YERSINIA ENTEROCOLITICA Not Detected Not Detected    ENTEROAGGREGATIVE E. COLI (EAEC) Not Detected Not Detected    ENTEROPATHOGENIC E. COLI (EPEC) Not Detected Not Detected    Enterotoxigenic E. coli (ETEC) Not Detected Not Detected    SHIGA-LIKE TOXIN-PRODUCING E. COLI (STEC) Not Detected Not Detected    Shigella/Enteroinvasive E. coli (EIEC) Not Detected Not Detected    CRYPTOSPORIDIUM Not Detected Not Detected    Cyclospora cayetanensis Not Detected Not Detected    Entamoeba histolytica Not Detected Not Detected    Giardia lamblia Not Detected Not Detected    Adenovirus F 40/41 Not Detected Not Detected    Astrovirus Not Detected Not Detected    Norovirus GI/GII Not Detected Not Detected    Rotavirus A Not Detected Not Detected    Sapovirus Not Detected Not Detected   Basic Metabolic Panel    Collection Time: 06/27/25  9:39 AM   Result Value Ref Range    Sodium 138 136 - 145 mmol/L    Potassium 3.1 (L) 3.5 - 5.1 mmol/L    Chloride 102 98 - 107 mmol/L    CO2 25 23 - 31 mmol/L    Glucose 91 82 - 115 mg/dL    Blood Urea Nitrogen 11.2 9.8 - 20.1 mg/dL    Creatinine 0.75 0.55 - 1.02 mg/dL    BUN/Creatinine Ratio 15     Calcium 9.2 8.4 - 10.2 mg/dL    Anion Gap 11.0 mEq/L    eGFR >60 mL/min/1.73/m2   Magnesium    Collection Time: 06/27/25  9:39 AM   Result Value Ref Range    Magnesium Level 1.70 1.60 - 2.60 mg/dL   CBC with Differential    Collection Time: 06/27/25  9:39 AM   Result Value Ref Range    WBC 10.79 4.50 - 11.50 x10(3)/mcL    RBC 4.54 4.20 - 5.40 x10(6)/mcL    Hgb 15.0 12.0 - 16.0 g/dL    Hct 44.4 37.0 - 47.0 %    MCV 97.8 (H) 80.0 - 94.0 fL     MCH 33.0 (H) 27.0 - 31.0 pg    MCHC 33.8 33.0 - 36.0 g/dL    RDW 13.6 11.5 - 17.0 %    Platelet 204 130 - 400 x10(3)/mcL    MPV 10.1 7.4 - 10.4 fL    Neut % 59.2 %    Lymph % 33.4 %    Mono % 6.8 %    Eos % 0.1 %    Basophil % 0.2 %    Imm Grans % 0.3 %    Neut # 6.40 2.1 - 9.2 x10(3)/mcL    Lymph # 3.60 0.6 - 4.6 x10(3)/mcL    Mono # 0.73 0.1 - 1.3 x10(3)/mcL    Eos # 0.01 0 - 0.9 x10(3)/mcL    Baso # 0.02 <=0.2 x10(3)/mcL    Imm Gran # 0.03 0.00 - 0.04 x10(3)/mcL    NRBC% 0.0 %       US Abdomen Limited  Result Date: 6/25/2025  EXAMINATION US ABDOMEN LIMITED CLINICAL HISTORY RUQ/Cholecystitis; TECHNIQUE Multiplanar grayscale sonographic evaluation of the right upper quadrant was performed, with focused Doppler assessment of the main portal vein. COMPARISON 22 January 2024 FINDINGS Exam quality: adequate for evaluation Pancreas: No focal abnormality or peripancreatic collection appreciated at the visualized pancreas. Gallbladder: No shadowing stones or layering sludge. No wall thickening or pericholecystic fluid.  Sonographic Kurtz sign is negative, per the ultrasonographer's report. Bile ducts: No intra- / extrahepatic biliary dilatation suggestive of obstructing pathology. Liver: Size is within normal limits.  There is irregular nodular appearance of the liver surface, suggesting changes of cirrhosis.  No discrete mass-like abnormality.  Unremarkable isoechoic appearance of the hepatic background, relative to the adjacent right kidney.  Normal hepatopetal flow is seen within the portal vein. Other findings: No suspicious right kidney lesion, shadowing stone, or hydronephrosis.  No free fluid identified. The visualized portions of the aorta and IVC are structures are unremarkable. IMPRESSION 1. No convincing acute sonographic abnormality. 2. Liver surface changes suggestive of cirrhosis. Electronically signed by: Gaurang Montenegro Date:    06/25/2025 Time:    11:09    X-Ray Chest AP Portable  Result Date:  6/25/2025  EXAMINATION: XR CHEST AP PORTABLE CLINICAL HISTORY: Unspecified abdominal pain COMPARISON: 1 January 2025 FINDINGS: Frontal view of the chest was obtained. Heart is not enlarged.  The lungs are clear.  No pneumothorax.     No acute findings. Electronically signed by: Bruce Smith Date:    06/25/2025 Time:    09:05    CT Abdomen Pelvis With IV Contrast NO Oral Contrast  Result Date: 6/25/2025  EXAMINATION: CT ABDOMEN PELVIS WITH IV CONTRAST CLINICAL HISTORY: Nausea/vomiting;Abdominal pain, acute, nonlocalized;. TECHNIQUE: Helical acquisition through the abdomen and pelvis with IV contrast.  Three plane reconstructions were provided for review.  mGycm. Automatic exposure control, adjustment of mA/kV or iterative reconstruction technique was used to reduce radiation. COMPARISON: 20 March 2024 FINDINGS: Motion degraded scan. Lung bases are clear. Lobular liver suggestive of chronic liver disease.  No significant abnormality of the gallbladder, spleen, pancreas or adrenals.  No hydronephrosis. No bowel obstruction. No significant inflammatory changes of the bowel.  No free air. Urinary bladder is unremarkable. No free fluid. Aorta normal in caliber.  There is moderate atherosclerotic disease. No acute osseous findings.  There are degenerative and postsurgical changes of the spine.  There are bilateral hip arthroplasties.     Motion degraded scan with no acute abdominopelvic findings evident. No significant discrepancy with the preliminary report. Electronically signed by: Bruce Smith Date:    06/25/2025 Time:    08:58    CT Head Without Contrast  Result Date: 6/25/2025  EXAMINATION: CT HEAD WITHOUT CONTRAST CLINICAL HISTORY: Mental status change, unknown cause; TECHNIQUE: Low dose axial images were obtained through the head.  Coronal and sagittal reformations were also performed. Contrast was not administered.  Dose reduction techniques including automatic exposure control (AEC) were utilized. Dose  (DLP): 1464 mGycm COMPARISON: CT head without contrast 10/03/2024. FINDINGS: INTRACRANIAL: Redemonstration of a small focus of encephalomalacia in the right parietal lobe.  Mild to moderate chronic small-vessel ischemic changes of the internal capsules and supratentorial periventricular white matter.  No acute intracranial hemorrhage.  No hydrocephalus.  No intracranial mass effect. SINUSES: Visualized paranasal sinuses and mastoids are clear. SKULL/SCALP: Visualized osseous structures are normal. ORBITS: Visualized orbits are normal.     No acute intracranial pathology. Small focus of encephalomalacia in the right parietal lobe. Mild to moderate chronic small-vessel ischemic changes of the supratentorial white matter. No significant discrepancy between preliminary and final reports. Electronically signed by: Jack Kirkland Date:    06/25/2025 Time:    05:38    CT Lumbar Spine without Contrast  Result Date: 6/3/2025  Indication: Postop thoracolumbar fusion with back pain initial encounter, Yu 3, 2025 at 10:26 AM Technique: Multiple axial CT images were obtained through the lumbar spine from T11 to S1. No contrast was given. Sagittal and coronal reconstructions were performed. Comparison is made with October 17, 2024. Findings: There are bilateral L1 pedicle screws which transverse the pedicles and through the inferior endplate of L1 into the L1-2 disc space with L1-2 disc and endplate spondylosis. There is lucency surrounding the pedicle screws consistent with loosening unchanged. There is no metallic failure. There is diffuse osteopenia greater than expected for age with L3-4, L4-5, and L5-S1 disc and endplate spondylosis at L3-4 and L4-5 vacuum disc phenomena. There is lower facet spondylosis. There is no canal stenosis or neural foraminal impingement. Impression: Loosening surrounding the L1 pedicle screws which transverse the inferior endplate of L1 into the disc space with severe disc and endplate spondylosis.  These findings are unchanged. All CT scans at this facility use dose modulation, iterative reconstruction, and/or weight base dose when appropriate to reduce radiation dose to as low as reasonably achievable. Number of CT and cardiac nuclear medicine studies in the past 12 months: 4     CT Thoracic Spine without Contrast  Result Date: 6/3/2025  Indication: T9 and T10 vertebral body compression fractures with fusion initial encounter, Yu 3, 2025 at 10:20 AM Technique:   Multiple axial CT images were obtained through the thoracic spine from the thoracic inlet to the upper abdomen. No contrast was given. Sagittal and coronal reconstructions were performed. Findings: There are compression deformities of the T10, T11, and T12 vertebral bodies. There are pedicle screws at T9 and L1 bilaterally. There is a posterior fixator device extending between these levels with a laminectomy. At multiple other levels, there is anterior lateral endplate osteophytosis but the spinal canal and neural foramen are widely patent. At the levels of compression and laminectomy, there is no high-grade canal stenosis or neural foraminal impingement. Impression: Lower thoracic fusion and laminectomy transversing T9, T10 and T11 levels with vertebral body compression deformities. There is no high-grade canal stenosis or neural foraminal impingement. All CT scans at this facility use dose modulation, iterative reconstruction, and/or weight base dose when appropriate to reduce radiation dose to as low as reasonably achievable. Number of CT and cardiac nuclear medicine studies in the past 12 months: 4    Assessment / Plan:     64 yo CF known to Dr. Garcia with a pmhx of HTN, HLD, DM, alcohol abuse, chronic anemia, andetoh vs owens cirrhosis. Here with c/o n/v/d, generalized abdominal pain and dysphagia.     Dysphagia with hx of esophageal stricture   Inability to tolerate PO intake    - clear liquids today.  NPO after MN for EGD tomorrow.   Correct K today.   - may consider GES pending EGD findings when not on opioids.    - would not expect cannabis hyperemesis with such short term use.         Thank you for allowing us to participate in this patient's care.       [1]   Current Facility-Administered Medications   Medication Dose Route Frequency Provider Last Rate Last Admin    acetaminophen tablet 650 mg  650 mg Oral Q6H PRN Ilir Gale MD   650 mg at 06/26/25 0941    aluminum-magnesium hydroxide-simethicone 200-200-20 mg/5 mL suspension 30 mL  30 mL Oral QID PRN Toyin Golden, ALEKS        amLODIPine tablet 10 mg  10 mg Oral Daily Ilir Gale MD   10 mg at 06/27/25 1001    ARIPiprazole tablet 2 mg  2 mg Oral Daily Ilir Gale MD   2 mg at 06/27/25 1001    atorvastatin tablet 20 mg  20 mg Oral QHS Ilir Gale MD   20 mg at 06/26/25 2159    bisacodyL suppository 10 mg  10 mg Rectal Daily PRN Toyin Golden FNP        buPROPion TBSR 12 hr tablet 100 mg  100 mg Oral BID Ilir Gale MD   100 mg at 06/27/25 1001    dextrose 50% injection 12.5 g  12.5 g Intravenous PRN Toyin Golden FNP        dextrose 50% injection 25 g  25 g Intravenous PRN Toyin Golden FNP        enoxaparin injection 40 mg  40 mg Subcutaneous Daily Toyin Golden FNP   40 mg at 06/26/25 1602    famotidine (PF) injection 20 mg  20 mg Intravenous Daily Maria E Hall FNP   20 mg at 06/27/25 0837    glucagon (human recombinant) injection 1 mg  1 mg Intramuscular PRN Toyin Golden FNP        glucose chewable tablet 16 g  16 g Oral PRN Toyin Golden FNP        glucose chewable tablet 24 g  24 g Oral PRN Toyin Golden, ALEKS        hydrALAZINE injection 10 mg  10 mg Intravenous Q4H PRN Toyin Golden FNP   10 mg at 06/27/25 0026    HYDROcodone-acetaminophen 7.5-325 mg per tablet 1 tablet  1 tablet Oral BID PRN Ilir Gale MD   1 tablet at 06/27/25 1134    levalbuterol nebulizer  solution 1.25 mg  1.25 mg Nebulization Q4H PRN Maria E Hall FNP        lisinopriL tablet 10 mg  10 mg Oral Daily Ilir Gale MD   10 mg at 06/27/25 1001    melatonin tablet 6 mg  6 mg Oral Nightly PRN Toyin Golden FNP        methocarbamoL tablet 500 mg  500 mg Oral QID Ilir Gale MD   500 mg at 06/27/25 1305    metoprolol tartrate (LOPRESSOR) split tablet 12.5 mg  12.5 mg Oral BID Ilir Gale MD   12.5 mg at 06/27/25 1001    naloxone 0.4 mg/mL injection 0.02 mg  0.02 mg Intravenous PRN Toyin Golden FNP        nicotine 14 mg/24 hr 1 patch  1 patch Transdermal Daily Ilir Gale MD   1 patch at 06/26/25 1702    ondansetron injection 4 mg  4 mg Intravenous Q4H PRN Toyin Golden FNP   4 mg at 06/26/25 2024    prochlorperazine injection Soln 5 mg  5 mg Intravenous Q6H PRN Toyin Golden FNP        QUEtiapine tablet 25 mg  25 mg Oral Nightly Ilir Gale MD   25 mg at 06/26/25 2159    scopolamine 1.3-1.5 mg (1 mg over 3 days) 1 patch  1 patch Transdermal Q3 Days Maria E Hall FNP   1 patch at 06/25/25 0856    senna-docusate 8.6-50 mg per tablet 1 tablet  1 tablet Oral BID PRN Toyin Golden FNP        sodium chloride 0.9% flush 10 mL  10 mL Intravenous PRN Toyin Golden FNP       [2]   Medications Prior to Admission   Medication Sig Dispense Refill Last Dose/Taking    buPROPion (WELLBUTRIN SR) 100 MG TBSR 12 hr tablet Take 100 mg by mouth 2 (two) times daily.   Taking    HYDROcodone-acetaminophen (NORCO) 7.5-325 mg per tablet Take 1 tablet by mouth 2 (two) times daily as needed for Pain.   Taking As Needed    methocarbamoL (ROBAXIN) 500 MG Tab Take by mouth 4 (four) times daily.   Taking    metoprolol tartrate (LOPRESSOR) 25 MG tablet Take 12.5 mg by mouth.   Taking    pantoprazole (PROTONIX) 40 MG tablet Take 1 tablet (40 mg total) by mouth 2 (two) times daily. 60 tablet 0 Taking    QUEtiapine (SEROQUEL) 25 MG Tab Take 25  mg by mouth nightly.   Taking    rosuvastatin (CRESTOR) 20 MG tablet Take 1 tablet (20 mg total) by mouth once daily. 90 tablet 0 Taking    amoxicillin (AMOXIL) 500 MG capsule Take 1,000 mg by mouth every morning. (Patient not taking: Reported on 4/16/2025)       ARIPiprazole (ABILIFY) 2 MG Tab Take 1 tablet (2 mg total) by mouth once daily. 30 tablet 1     aspirin (ECOTRIN) 81 MG EC tablet Take 1 tablet (81 mg total) by mouth 2 (two) times a day. 60 tablet 0     busPIRone (BUSPAR) 5 MG Tab Take 1 tablet (5 mg total) by mouth 2 (two) times daily. (Patient not taking: Reported on 3/13/2025) 60 tablet 2     diclofenac sodium (VOLTAREN) 1 % Gel Apply 4 g topically 3 (three) times daily before meals. 450 g 1     EScitalopram oxalate (LEXAPRO) 5 MG Tab Take 1 tablet (5 mg total) by mouth once daily. 90 tablet 0     gabapentin (NEURONTIN) 300 MG capsule Take 1 capsule (300 mg total) by mouth 3 (three) times daily. 90 capsule 0     metoprolol succinate (TOPROL-XL) 25 MG 24 hr tablet Take 25 mg by mouth once daily.       oxyCODONE-acetaminophen (PERCOCET)  mg per tablet Take 1 tablet by mouth every 4 (four) hours as needed for Pain.       potassium chloride SA (K-DUR,KLOR-CON) 20 MEQ tablet Take 20 mEq by mouth 2 (two) times daily.       sodium bicarbonate 650 MG tablet Take 1 tablet (650 mg total) by mouth 2 (two) times daily. for 7 days 14 tablet 0

## 2025-06-27 NOTE — PROGRESS NOTES
Inpatient Nutrition Assessment    Admit Date: 6/25/2025   Total duration of encounter: 2 days   Patient Age: 65 y.o.    Nutrition Recommendation/Prescription     -Advance diet as tolerated per MD. Goal Diet: Low Residue, 1800 Calorie Carbohydrate Consistent Diet as tolerated.   -If unable to advance diet in the next 1-2 days, consider PPN. Recommend Clinimix E 4.25/5 @ 65 mL/hr with IVPB IL 20% 250 mL 3x/week.   -Monitor wt and labs. Replete K as medically feasible.     Communication of Recommendations: EMR    Nutrition Assessment     Malnutrition Assessment/Nutrition-Focused Physical Exam    Malnutrition Context: chronic illness (06/27/25 1214)  Malnutrition Level: severe (06/27/25 1214)     Weight Loss (Malnutrition): other (see comments) (Unable to assess) (06/27/25 1214)  Subcutaneous Fat (Malnutrition): severe depletion (06/27/25 1214)  Orbital Region (Subcutaneous Fat Loss): severe depletion  Upper Arm Region (Subcutaneous Fat Loss): mild depletion     Muscle Mass (Malnutrition): severe depletion (06/27/25 1214)  Caodaism Region (Muscle Loss): severe depletion                                A minimum of two characteristics is recommended for diagnosis of either severe or non-severe malnutrition.    Chart Review    Reason Seen: malnutrition screening tool (MST)    Malnutrition Screening Tool Results   Have you recently lost weight without trying?: Yes: 2-13 lbs  Have you been eating poorly because of a decreased appetite?: Yes   MST Score: 2   Diagnosis:  Hypertensive urgency  Viral gastroenteritis  Intractable nausea vomiting secondary to above  Dehydration secondary to above    Relevant Medical History:  COPD (not oxygen dependent), HFpEF, HLD, dm type 2, prior alcohol induced cirrhosis? and HTN     Scheduled Medications:  amLODIPine, 10 mg, Daily  ARIPiprazole, 2 mg, Daily  atorvastatin, 20 mg, QHS  buPROPion, 100 mg, BID  enoxparin, 40 mg, Daily  famotidine (PF), 20 mg, Daily  lisinopriL, 10 mg,  Daily  methocarbamoL, 500 mg, QID  metoprolol tartrate, 12.5 mg, BID  nicotine, 1 patch, Daily  QUEtiapine, 25 mg, Nightly  scopolamine, 1 patch, Q3 Days    Continuous Infusions:   PRN Medications:  acetaminophen, 650 mg, Q6H PRN  aluminum-magnesium hydroxide-simethicone, 30 mL, QID PRN  bisacodyL, 10 mg, Daily PRN  dextrose 50%, 12.5 g, PRN  dextrose 50%, 25 g, PRN  glucagon (human recombinant), 1 mg, PRN  glucose, 16 g, PRN  glucose, 24 g, PRN  hydrALAZINE, 10 mg, Q4H PRN  HYDROcodone-acetaminophen, 1 tablet, BID PRN  levalbuterol, 1.25 mg, Q4H PRN  melatonin, 6 mg, Nightly PRN  naloxone, 0.02 mg, PRN  ondansetron, 4 mg, Q4H PRN  prochlorperazine, 5 mg, Q6H PRN  senna-docusate, 1 tablet, BID PRN  sodium chloride 0.9%, 10 mL, PRN    Calorie Containing IV Medications: no significant kcals from medications at this time    Recent Labs   Lab 06/25/25  0310 06/25/25  0956 06/25/25  1402 06/26/25  0925 06/27/25  0939    137  --  142 138   K 4.1 4.0  --  3.1* 3.1*   CALCIUM 10.4* 9.5  --  9.0 9.2   MG 1.80  --   --   --  1.70   * 107  --  109* 102   CO2 15* 14*  --  24 25   BUN 12.5 10.9  --  11.7 11.2   CREATININE 0.90 0.88  --  0.71 0.75   EGFRNORACEVR >60 >60  --  >60 >60   * 239*  --  88 91   BILITOT 0.8  --   --   --   --    ALKPHOS 193*  --   --   --   --    ALT 10  --   --   --   --    AST 17  --   --   --   --    ALBUMIN 4.8  --   --   --   --    LIPASE 7  --   --   --   --    WBC 8.73  --  5.95 11.47 10.79   HGB 17.3*  --  14.7 13.4 15.0   HCT 50.9*  --  42.4 39.9 44.4     Nutrition Orders:  Diet NPO Except for: Medication  Diet Clear Liquid Standard Tray      Appetite/Oral Intake: NPO/NPO  Factors Affecting Nutritional Intake: altered gastrointestinal function and NPO  Social Needs Impacting Access to Food: unable to assess at this time; will attempt on follow-up  Food/Episcopalian/Cultural Preferences: unable to obtain  Food Allergies: no known food allergies  Last Bowel Movement:  "06/26/25  Wound(s):  skin intact per EMR    Comments    6/27/25: Pt asleep during rounds; NPO; noted physical evidence of muscle and fat depletion.     Anthropometrics    Height: 5' 3" (160 cm), Height Method: Stated  Last Weight: 45.8 kg (100 lb 14.4 oz) (06/25/25 1500), Weight Method: Stated  BMI (Calculated): 17.9  BMI Classification: underweight (BMI less than 18.5)     Ideal Body Weight (IBW), Female: 115 lb     % Ideal Body Weight, Female (lb): 91.3 %                             Usual Weight Provided By: unable to obtain usual weight    Wt Readings from Last 5 Encounters:   06/25/25 45.8 kg (100 lb 14.4 oz)   04/16/25 46 kg (101 lb 6.6 oz)   03/13/25 46.7 kg (103 lb)   01/14/25 50.9 kg (112 lb 3.4 oz)   12/28/24 50.9 kg (112 lb 3.4 oz)     Weight Change(s) Since Admission: scale differences on admit date  Wt Readings from Last 1 Encounters:   06/25/25 1500 45.8 kg (100 lb 14.4 oz)   06/25/25 0250 47.6 kg (105 lb)   Admit Weight: 47.6 kg (105 lb) (06/25/25 0250), Weight Method: Stated    Estimated Needs    Weight Used For Calorie Calculations: 45.8 kg (100 lb 15.5 oz)  Energy Calorie Requirements (kcal): 1603 kcal (35 kcal/kg)  Energy Need Method: Kcal/kg  Weight Used For Protein Calculations: 45.8 kg (100 lb 15.5 oz)  Protein Requirements: 69 gm (1.5g/kg)  Fluid Requirements (mL): 1603 mL (1mL/kcal)  CHO Requirement: 180 gm (45% EEN)     Enteral Nutrition     Patient not receiving enteral nutrition at this time.    Parenteral Nutrition     Patient not receiving parenteral nutrition support at this time.    Evaluation of Received Nutrient Intake    Calories: not meeting estimated needs  Protein: not meeting estimated needs    Patient Education     Not applicable.    Nutrition Diagnosis     PES: Inadequate oral intake related to acute illness as evidenced by NPO since admit. (new)     PES: Severe chronic disease or condition related malnutrition Related to chronic illness As Evidenced by:  - weight loss: Unable " to assess - muscle mass depletion: 1 area of severe muscle loss (Temporalis) - loss of subcutaneous fat: 1 area of severe fat loss (Infraorbital), 1 area of mild fat loss (Triceps Skinfold) new    Nutrition Interventions     Intervention(s): modified composition of meals/snacks, modified composition of parenteral nutrition, modified rate of parenteral nutrition, and collaboration with other providers  Intervention(s): Care coordination or referral    Goal: Meet greater than 80% of nutritional needs by follow-up. (new)  Goal: Maintain weight throughout hospitalization. (new)    Nutrition Goals & Monitoring     Dietitian will monitor: energy intake, weight, and electrolyte/renal panel  Discharge planning: too early to determine; pending clinical course  Nutrition Risk/Follow-Up: patient at increased nutrition risk; dietitian will follow-up twice weekly   Please consult if re-assessment needed sooner.

## 2025-06-27 NOTE — PLAN OF CARE
06/27/25 1242   Discharge Assessment   Assessment Type Discharge Planning Assessment   Confirmed/corrected address, phone number and insurance Yes   Confirmed Demographics Correct on Facesheet   Source of Information patient   Communicated MILAN with patient/caregiver Date not available/Unable to determine  (ALEKS Mcfadden is PCP)   People in Home child(lala), adult;grandchild(lala)   Name(s) of People in Home Omer Baum -son and grandson   Do you expect to return to your current living situation? Yes   Do you have help at home or someone to help you manage your care at home? No  (Son Omer and Sig Other  Bruce willy if need)   Prior to hospitilization cognitive status: Unable to Assess   Current cognitive status: Alert/Oriented   Walking or Climbing Stairs Difficulty yes   Walking or Climbing Stairs ambulation difficulty, requires equipment   Mobility Management uses a walker most of the time   Dressing/Bathing Difficulty no   Home Layout Able to live on 1st floor   Equipment Currently Used at Home shower chair;walker, rolling;bedside commode;grab bar;other (see comments)  (reacher)   Readmission within 30 days? No   Patient currently being followed by outpatient case management? No   Do you currently have service(s) that help you manage your care at home? No  ([facility].)   Do you take prescription medications? Yes   Do you have prescription coverage? Yes   Do you have any problems affording any of your prescribed medications? No   Is the patient taking medications as prescribed? yes   Who is going to help you get home at discharge? Bruce Baum  [phone] or james Baum [phone]   How do you get to doctors appointments? family or friend will provide   Are you on dialysis? No   Do you take coumadin? No   Discharge Plan A Home;Home with family   Discharge Plan B Home with family;Home   DME Needed Upon Discharge  none   Discharge Plan discussed with: Patient   Transition  of Care Barriers None   Physical Activity   On average, how many days per week do you engage in moderate to strenuous exercise (like a brisk walk)? 0 days   On average, how many minutes do you engage in exercise at this level? 0 min   Financial Resource Strain   How hard is it for you to pay for the very basics like food, housing, medical care, and heating? Not hard   Housing Stability   In the last 12 months, was there a time when you were not able to pay the mortgage or rent on time? N   At any time in the past 12 months, were you homeless or living in a shelter (including now)? N   Transportation Needs   In the past 12 months, has lack of transportation kept you from medical appointments or from getting medications? no   In the past 12 months, has lack of transportation kept you from meetings, work, or from getting things needed for daily living? No   Food Insecurity   Within the past 12 months, you worried that your food would run out before you got the money to buy more. Never true   Within the past 12 months, the food you bought just didn't last and you didn't have money to get more. Never true   Stress   Do you feel stress - tense, restless, nervous, or anxious, or unable to sleep at night because your mind is troubled all the time - these days? Only a littl   Social Isolation   How often do you feel lonely or isolated from those around you?  Never   Alcohol Use   Q1: How often do you have a drink containing alcohol? Never   Q2: How many drinks containing alcohol do you have on a typical day when you are drinking? None   Q3: How often do you have six or more drinks on one occasion? Never   Talko   In the past 12 months has the electric, gas, oil, or water company threatened to shut off services in your home? No   Health Literacy   How often do you need to have someone help you when you read instructions, pamphlets, or other written material from your doctor or pharmacy? Never     PCP is Luzmaria Flores ,  FNP. Pharmacy is [facility] on [address] Has used [facility] in the past for Home health.

## 2025-06-27 NOTE — PROGRESS NOTES
[facility] Hospital Medicine Progress Note        Chief Complaint: Inpatient Follow-up     HPI:   65-year-old woman with a PMH of COPD (not oxygen dependent), HFpEF, HLD, dm type 2, prior alcohol induced cirrhosis? and HTN who presented to the ED with complaints of abdominal pain with associated nausea vomiting that began 3 days prior. Patient explains that she has experienced right upper quadrant and left upper quadrant abdominal pain, and has been unable to keep food or drink down since his symptoms began. Patient states that she has had a small amount of diarrhea, denies fever, denies sick contacts, denies abdominal surgical history.     Upon arrival to the ED patient was afebrile, tachypneic with a respiratory rate of 35, hypertensive with a BP of 223/104, but maintaining normal sats on room air.  ED workup revealed hemoglobin 17.3, hematocrit 50.9, CO2 15, lactic acid 2.8, UA was positive for glucose and ketones, CT abdomen and pelvis with contrast showed no definite acute intra-abdominal or pelvic solid organ or bowel pathology, CT head without showed no acute intracranial pathology, small focus of encephalomalacia in the right parietal lobe, mild to moderate chronic small-vessel ischemic changes of the supratentorial white matter. Patient was given hydralazine 30 mg IV, labetalol 10 mg IV, Reglan 10 mg IV, morphine 4 mg IV, Zofran 4 mg IV, Solu-Medrol 125 mg IV, and a DuoNeb in the ED and admitted to Hospital Medicine for further care.    GI consulted for EGD given persistent N/V    Interval Hx:   Hypertensive overnight but improvement this morning. Reports she cannot tolerate liquids occasionally either.     Case was discussed with patient's nurse and  on the floor.    Objective/physical exam:  General: In no acute distress, afebrile  Chest: Clear to auscultation bilaterally  Heart: RRR, +S1, S2, no appreciable murmur  Abdomen: Soft, nontender, BS +  MSK: Warm, no  lower extremity edema, no clubbing or cyanosis  Neurologic: Alert and oriented x4, Cranial nerve II-XII intact, Strength 5/5 in all 4 extremities    VITAL SIGNS: 24 HRS MIN & MAX LAST   Temp  Min: 98 °F (36.7 °C)  Max: 99.2 °F (37.3 °C) 99.2 °F (37.3 °C)   BP  Min: 119/69  Max: 212/98 119/69   Pulse  Min: 66  Max: 93  85   Resp  Min: 18  Max: 18 18   SpO2  Min: 90 %  Max: 98 % (!) 94 %     I have reviewed the following labs:  Recent Labs   Lab 06/25/25  1402 06/26/25  0925 06/27/25  0939   WBC 5.95 11.47 10.79   RBC 4.37 4.01* 4.54   HGB 14.7 13.4 15.0   HCT 42.4 39.9 44.4   MCV 97.0* 99.5* 97.8*   MCH 33.6* 33.4* 33.0*   MCHC 34.7 33.6 33.8   RDW 13.4 13.6 13.6    185 204   MPV 9.8 10.1 10.1     Recent Labs   Lab 06/25/25  0310 06/25/25  0619 06/25/25  0956 06/26/25  0925 06/27/25  0939     --  137 142 138   K 4.1  --  4.0 3.1* 3.1*   *  --  107 109* 102   CO2 15*  --  14* 24 25   BUN 12.5  --  10.9 11.7 11.2   CREATININE 0.90  --  0.88 0.71 0.75   *  --  239* 88 91   CALCIUM 10.4*  --  9.5 9.0 9.2   PH  --  7.430  --   --   --    MG 1.80  --   --   --  1.70   ALBUMIN 4.8  --   --   --   --    PROT 9.0*  --   --   --   --    ALKPHOS 193*  --   --   --   --    ALT 10  --   --   --   --    AST 17  --   --   --   --    BILITOT 0.8  --   --   --   --      Microbiology Results (last 7 days)       Procedure Component Value Units Date/Time    Blood Culture [[id number]]  (Normal) Collected: 06/25/25 1009    Order Status: Completed Specimen: Blood, Venous Updated: 06/27/25 1201     Blood Culture No Growth At 48 Hours    Blood Culture [[id number]]  (Normal) Collected: 06/25/25 1009    Order Status: Completed Specimen: Blood, Venous Updated: 06/27/25 1201     Blood Culture No Growth At 48 Hours    Stool Culture [[id number]] Collected: 06/26/25 2216    Order Status: Resulted Specimen: Stool Updated: 06/26/25 2221             See below for Radiology    Assessment/Plan:  # N/V - possibly mely  induced?  # Hypokalemia  # Hx of esophageal stricture  # EtOH induced cirrhosis  # Tobacco use disorder  # HTN urgency - resolved  # Lactic acidosis - resolved  # Low BMI  # Hx of COPD (not oxygen dependent), HFpEF, HLD, prior alcohol induced cirrhosis? and HTN    - GI panel normal  - AFP normal  - replete potassium again  - UA normal  - follow up Bcx  - she reports that she follows with Dr. Garcia a year or so ago regarding her liver. She stopped drinking a year ago. Unclear if she had screening EGD. Will give her GI outpatient  - will consult GI for persistent N/V associated with food intake  - CT A/P and US abdomen noted  - increase amlodipine. Continue home lopressor  - start lisinopril  - labs in AM      VTE prophylaxis: lovenox    Patient condition:  Fair    Anticipated discharge and Disposition:         All diagnosis and differential diagnosis have been reviewed; assessment and plan has been documented; I have personally reviewed the labs and test results that are presently available; I have reviewed the patients medication list; I have reviewed the consulting providers response and recommendations. I have reviewed or attempted to review medical records based upon their availability    All of the patient's questions have been  addressed and answered. Patient's is agreeable to the above stated plan. I will continue to monitor closely and make adjustments to medical management as needed.    _____________________________________________________________________    Malnutrition Status:  Nutrition consulted. Most recent weight and BMI monitored-     Measurements:  Wt Readings from Last 1 Encounters:   06/25/25 45.8 kg (100 lb 14.4 oz)   Body mass index is 17.87 kg/m².    Patient has been screened and assessed by RD.    Malnutrition Type:  Context: chronic illness  Level: severe    Malnutrition Characteristic Summary:  Weight Loss (Malnutrition): other (see comments) (Unable to assess)  Subcutaneous Fat  (Malnutrition): severe depletion  Muscle Mass (Malnutrition): severe depletion    Interventions/Recommendations (treatment strategy):  Care coordination or referral     Scheduled Med:   amLODIPine  10 mg Oral Daily    ARIPiprazole  2 mg Oral Daily    atorvastatin  20 mg Oral QHS    buPROPion  100 mg Oral BID    enoxparin  40 mg Subcutaneous Daily    famotidine (PF)  20 mg Intravenous Daily    lisinopriL  10 mg Oral Daily    methocarbamoL  500 mg Oral QID    metoprolol tartrate  12.5 mg Oral BID    nicotine  1 patch Transdermal Daily    potassium chloride  50 mEq Oral Once    QUEtiapine  25 mg Oral Nightly    scopolamine  1 patch Transdermal Q3 Days      Continuous Infusions:       PRN Meds:    Current Facility-Administered Medications:     acetaminophen, 650 mg, Oral, Q6H PRN    aluminum-magnesium hydroxide-simethicone, 30 mL, Oral, QID PRN    bisacodyL, 10 mg, Rectal, Daily PRN    dextrose 50%, 12.5 g, Intravenous, PRN    dextrose 50%, 25 g, Intravenous, PRN    glucagon (human recombinant), 1 mg, Intramuscular, PRN    glucose, 16 g, Oral, PRN    glucose, 24 g, Oral, PRN    hydrALAZINE, 10 mg, Intravenous, Q4H PRN    HYDROcodone-acetaminophen, 1 tablet, Oral, BID PRN    levalbuterol, 1.25 mg, Nebulization, Q4H PRN    melatonin, 6 mg, Oral, Nightly PRN    naloxone, 0.02 mg, Intravenous, PRN    ondansetron, 4 mg, Intravenous, Q4H PRN    prochlorperazine, 5 mg, Intravenous, Q6H PRN    senna-docusate, 1 tablet, Oral, BID PRN    sodium chloride 0.9%, 10 mL, Intravenous, PRN     Radiology:  I have personally reviewed the following imaging and agree with the radiologist.     US Abdomen Limited  EXAMINATION  US ABDOMEN LIMITED    CLINICAL HISTORY  RUQ/Cholecystitis;    TECHNIQUE  Multiplanar grayscale sonographic evaluation of the right upper quadrant was performed, with focused Doppler assessment of the main portal vein.    COMPARISON  22 January 2024    FINDINGS  Exam quality: adequate for evaluation    Pancreas: No focal  abnormality or peripancreatic collection appreciated at the visualized pancreas.    Gallbladder: No shadowing stones or layering sludge. No wall thickening or pericholecystic fluid.  Sonographic Kurtz sign is negative, per the ultrasonographer's report.    Bile ducts: No intra- / extrahepatic biliary dilatation suggestive of obstructing pathology.    Liver: Size is within normal limits.  There is irregular nodular appearance of the liver surface, suggesting changes of cirrhosis.  No discrete mass-like abnormality.  Unremarkable isoechoic appearance of the hepatic background, relative to the adjacent right kidney.  Normal hepatopetal flow is seen within the portal vein.    Other findings: No suspicious right kidney lesion, shadowing stone, or hydronephrosis.  No free fluid identified. The visualized portions of the aorta and IVC are structures are unremarkable.    IMPRESSION  1. No convincing acute sonographic abnormality.  2. Liver surface changes suggestive of cirrhosis.    Electronically signed by: Gaurang Montenegro  Date:    06/25/2025  Time:    11:09  X-Ray Chest AP Portable  Narrative: EXAMINATION:  XR CHEST AP PORTABLE    CLINICAL HISTORY:  Unspecified abdominal pain    COMPARISON:  1 January 2025    FINDINGS:  Frontal view of the chest was obtained. Heart is not enlarged.  The lungs are clear.  No pneumothorax.  Impression: No acute findings.    Electronically signed by: Bruce Smith  Date:    06/25/2025  Time:    09:05  CT Abdomen Pelvis With IV Contrast NO Oral Contrast  Narrative: EXAMINATION:  CT ABDOMEN PELVIS WITH IV CONTRAST    CLINICAL HISTORY:  Nausea/vomiting;Abdominal pain, acute, nonlocalized;.    TECHNIQUE:  Helical acquisition through the abdomen and pelvis with IV contrast.  Three plane reconstructions were provided for review.  mGycm. Automatic exposure control, adjustment of mA/kV or iterative reconstruction technique was used to reduce radiation.    COMPARISON:  20 March  2024    FINDINGS:  Motion degraded scan.    Lung bases are clear.    Lobular liver suggestive of chronic liver disease.  No significant abnormality of the gallbladder, spleen, pancreas or adrenals.  No hydronephrosis.    No bowel obstruction. No significant inflammatory changes of the bowel.  No free air.    Urinary bladder is unremarkable. No free fluid. Aorta normal in caliber.  There is moderate atherosclerotic disease.    No acute osseous findings.  There are degenerative and postsurgical changes of the spine.  There are bilateral hip arthroplasties.  Impression: Motion degraded scan with no acute abdominopelvic findings evident.    No significant discrepancy with the preliminary report.    Electronically signed by: Bruce Smith  Date:    06/25/2025  Time:    08:58  CT Head Without Contrast  Narrative: EXAMINATION:  CT HEAD WITHOUT CONTRAST    CLINICAL HISTORY:  Mental status change, unknown cause;    TECHNIQUE:  Low dose axial images were obtained through the head.  Coronal and sagittal reformations were also performed. Contrast was not administered.  Dose reduction techniques including automatic exposure control (AEC) were utilized.    Dose (DLP): 1464 mGycm    COMPARISON:  CT head without contrast 10/03/2024.    FINDINGS:  INTRACRANIAL: Redemonstration of a small focus of encephalomalacia in the right parietal lobe.  Mild to moderate chronic small-vessel ischemic changes of the internal capsules and supratentorial periventricular white matter.  No acute intracranial hemorrhage.  No hydrocephalus.  No intracranial mass effect.    SINUSES: Visualized paranasal sinuses and mastoids are clear.    SKULL/SCALP: Visualized osseous structures are normal.    ORBITS: Visualized orbits are normal.  Impression: No acute intracranial pathology.    Small focus of encephalomalacia in the right parietal lobe.    Mild to moderate chronic small-vessel ischemic changes of the supratentorial white matter.    No significant  discrepancy between preliminary and final reports.    Electronically signed by: Jack Kirkland  Date:    06/25/2025  Time:    05:38      Ilir Gale MD  Department of Hospital Medicine   [facility]   06/27/2025

## 2025-06-28 ENCOUNTER — ANESTHESIA EVENT (OUTPATIENT)
Dept: SURGERY | Facility: HOSPITAL | Age: 65
End: 2025-06-28
Payer: MEDICARE

## 2025-06-28 ENCOUNTER — ANESTHESIA (OUTPATIENT)
Dept: SURGERY | Facility: HOSPITAL | Age: 65
End: 2025-06-28
Payer: MEDICARE

## 2025-06-28 LAB
ANION GAP SERPL CALC-SCNC: 8 MEQ/L
BUN SERPL-MCNC: 20.8 MG/DL (ref 9.8–20.1)
CALCIUM SERPL-MCNC: 8.8 MG/DL (ref 8.4–10.2)
CHLORIDE SERPL-SCNC: 104 MMOL/L (ref 98–107)
CO2 SERPL-SCNC: 24 MMOL/L (ref 23–31)
CREAT SERPL-MCNC: 0.84 MG/DL (ref 0.55–1.02)
CREAT/UREA NIT SERPL: 25
GFR SERPLBLD CREATININE-BSD FMLA CKD-EPI: >60 ML/MIN/1.73/M2
GLUCOSE SERPL-MCNC: 81 MG/DL (ref 82–115)
INR PPP: 1.2
POTASSIUM SERPL-SCNC: 4.1 MMOL/L (ref 3.5–5.1)
PROTHROMBIN TIME: 14.9 SECONDS (ref 12.5–14.5)
SODIUM SERPL-SCNC: 136 MMOL/L (ref 136–145)

## 2025-06-28 PROCEDURE — 36415 COLL VENOUS BLD VENIPUNCTURE: CPT | Performed by: INTERNAL MEDICINE

## 2025-06-28 PROCEDURE — 27201423 OPTIME MED/SURG SUP & DEVICES STERILE SUPPLY: Performed by: INTERNAL MEDICINE

## 2025-06-28 PROCEDURE — C1769 GUIDE WIRE: HCPCS | Performed by: INTERNAL MEDICINE

## 2025-06-28 PROCEDURE — 37000009 HC ANESTHESIA EA ADD 15 MINS: Performed by: INTERNAL MEDICINE

## 2025-06-28 PROCEDURE — 85610 PROTHROMBIN TIME: CPT | Performed by: INTERNAL MEDICINE

## 2025-06-28 PROCEDURE — 25000003 PHARM REV CODE 250: Performed by: ANESTHESIOLOGY

## 2025-06-28 PROCEDURE — 25000003 PHARM REV CODE 250: Performed by: INTERNAL MEDICINE

## 2025-06-28 PROCEDURE — 25000003 PHARM REV CODE 250: Performed by: NURSE ANESTHETIST, CERTIFIED REGISTERED

## 2025-06-28 PROCEDURE — 88342 IMHCHEM/IMCYTCHM 1ST ANTB: CPT

## 2025-06-28 PROCEDURE — 25000003 PHARM REV CODE 250

## 2025-06-28 PROCEDURE — S4991 NICOTINE PATCH NONLEGEND: HCPCS

## 2025-06-28 PROCEDURE — 21400001 HC TELEMETRY ROOM

## 2025-06-28 PROCEDURE — 63600175 PHARM REV CODE 636 W HCPCS

## 2025-06-28 PROCEDURE — 43239 EGD BIOPSY SINGLE/MULTIPLE: CPT | Mod: XS | Performed by: INTERNAL MEDICINE

## 2025-06-28 PROCEDURE — 63600175 PHARM REV CODE 636 W HCPCS: Performed by: INTERNAL MEDICINE

## 2025-06-28 PROCEDURE — 63600175 PHARM REV CODE 636 W HCPCS: Performed by: NURSE PRACTITIONER

## 2025-06-28 PROCEDURE — 63600175 PHARM REV CODE 636 W HCPCS: Performed by: NURSE ANESTHETIST, CERTIFIED REGISTERED

## 2025-06-28 PROCEDURE — 43248 EGD GUIDE WIRE INSERTION: CPT | Performed by: INTERNAL MEDICINE

## 2025-06-28 PROCEDURE — 80048 BASIC METABOLIC PNL TOTAL CA: CPT

## 2025-06-28 PROCEDURE — 37000008 HC ANESTHESIA 1ST 15 MINUTES: Performed by: INTERNAL MEDICINE

## 2025-06-28 PROCEDURE — 88305 TISSUE EXAM BY PATHOLOGIST: CPT | Performed by: INTERNAL MEDICINE

## 2025-06-28 RX ORDER — PROPOFOL 10 MG/ML
VIAL (ML) INTRAVENOUS
Status: DISCONTINUED | OUTPATIENT
Start: 2025-06-28 | End: 2025-06-28

## 2025-06-28 RX ORDER — CALCIUM CHLORIDE INJECTION 100 MG/ML
INJECTION, SOLUTION INTRAVENOUS
Status: DISCONTINUED | OUTPATIENT
Start: 2025-06-28 | End: 2025-06-28

## 2025-06-28 RX ORDER — SODIUM CITRATE AND CITRIC ACID MONOHYDRATE 334; 500 MG/5ML; MG/5ML
30 SOLUTION ORAL
OUTPATIENT
Start: 2025-06-28

## 2025-06-28 RX ORDER — SODIUM CHLORIDE, SODIUM GLUCONATE, SODIUM ACETATE, POTASSIUM CHLORIDE AND MAGNESIUM CHLORIDE 30; 37; 368; 526; 502 MG/100ML; MG/100ML; MG/100ML; MG/100ML; MG/100ML
INJECTION, SOLUTION INTRAVENOUS ONCE
Status: COMPLETED | OUTPATIENT
Start: 2025-06-28 | End: 2025-06-28

## 2025-06-28 RX ORDER — SUCRALFATE 1 G/1
1 TABLET ORAL
Status: DISCONTINUED | OUTPATIENT
Start: 2025-06-28 | End: 2025-06-29 | Stop reason: HOSPADM

## 2025-06-28 RX ORDER — LIDOCAINE HYDROCHLORIDE 10 MG/ML
1 INJECTION, SOLUTION EPIDURAL; INFILTRATION; INTRACAUDAL; PERINEURAL ONCE
OUTPATIENT
Start: 2025-06-28 | End: 2025-06-28

## 2025-06-28 RX ORDER — LIDOCAINE HYDROCHLORIDE 20 MG/ML
INJECTION, SOLUTION EPIDURAL; INFILTRATION; INTRACAUDAL; PERINEURAL
Status: DISCONTINUED | OUTPATIENT
Start: 2025-06-28 | End: 2025-06-28

## 2025-06-28 RX ORDER — SODIUM CHLORIDE, SODIUM GLUCONATE, SODIUM ACETATE, POTASSIUM CHLORIDE AND MAGNESIUM CHLORIDE 30; 37; 368; 526; 502 MG/100ML; MG/100ML; MG/100ML; MG/100ML; MG/100ML
INJECTION, SOLUTION INTRAVENOUS CONTINUOUS
OUTPATIENT
Start: 2025-06-28 | End: 2025-07-28

## 2025-06-28 RX ORDER — PANTOPRAZOLE SODIUM 40 MG/10ML
40 INJECTION, POWDER, LYOPHILIZED, FOR SOLUTION INTRAVENOUS EVERY 12 HOURS
Status: DISCONTINUED | OUTPATIENT
Start: 2025-06-28 | End: 2025-06-29 | Stop reason: HOSPADM

## 2025-06-28 RX ADMIN — SUCRALFATE 1 G: 1 TABLET ORAL at 04:06

## 2025-06-28 RX ADMIN — METOPROLOL TARTRATE 12.5 MG: 25 TABLET, FILM COATED ORAL at 08:06

## 2025-06-28 RX ADMIN — PANTOPRAZOLE SODIUM 40 MG: 40 INJECTION, POWDER, FOR SOLUTION INTRAVENOUS at 11:06

## 2025-06-28 RX ADMIN — FAMOTIDINE 20 MG: 10 INJECTION, SOLUTION INTRAVENOUS at 08:06

## 2025-06-28 RX ADMIN — BUPROPION HYDROCHLORIDE 100 MG: 100 TABLET, FILM COATED, EXTENDED RELEASE ORAL at 08:06

## 2025-06-28 RX ADMIN — METHOCARBAMOL 500 MG: 500 TABLET ORAL at 09:06

## 2025-06-28 RX ADMIN — METHOCARBAMOL 500 MG: 500 TABLET ORAL at 04:06

## 2025-06-28 RX ADMIN — PROPOFOL 25 MG: 10 INJECTION, EMULSION INTRAVENOUS at 10:06

## 2025-06-28 RX ADMIN — QUETIAPINE FUMARATE 25 MG: 25 TABLET ORAL at 09:06

## 2025-06-28 RX ADMIN — METHOCARBAMOL 500 MG: 500 TABLET ORAL at 08:06

## 2025-06-28 RX ADMIN — SODIUM CHLORIDE, SODIUM GLUCONATE, SODIUM ACETATE, POTASSIUM CHLORIDE AND MAGNESIUM CHLORIDE: 526; 502; 368; 37; 30 INJECTION, SOLUTION INTRAVENOUS at 10:06

## 2025-06-28 RX ADMIN — BUPROPION HYDROCHLORIDE 100 MG: 100 TABLET, FILM COATED, EXTENDED RELEASE ORAL at 09:06

## 2025-06-28 RX ADMIN — METHOCARBAMOL 500 MG: 500 TABLET ORAL at 12:06

## 2025-06-28 RX ADMIN — HYDROCODONE BITARTRATE AND ACETAMINOPHEN 1 TABLET: 7.5; 325 TABLET ORAL at 01:06

## 2025-06-28 RX ADMIN — SUCRALFATE 1 G: 1 TABLET ORAL at 09:06

## 2025-06-28 RX ADMIN — SUCRALFATE 1 G: 1 TABLET ORAL at 11:06

## 2025-06-28 RX ADMIN — HYDROCODONE BITARTRATE AND ACETAMINOPHEN 1 TABLET: 7.5; 325 TABLET ORAL at 02:06

## 2025-06-28 RX ADMIN — CALCIUM CHLORIDE INJECTION 0.5 G: 100 INJECTION, SOLUTION INTRAVENOUS at 10:06

## 2025-06-28 RX ADMIN — PROPOFOL 50 MG: 10 INJECTION, EMULSION INTRAVENOUS at 10:06

## 2025-06-28 RX ADMIN — LIDOCAINE HYDROCHLORIDE 4 ML: 20 INJECTION, SOLUTION EPIDURAL; INFILTRATION; INTRACAUDAL; PERINEURAL at 10:06

## 2025-06-28 RX ADMIN — SCOPOLAMINE 1 PATCH: 1 PATCH TRANSDERMAL at 08:06

## 2025-06-28 RX ADMIN — METOPROLOL TARTRATE 12.5 MG: 25 TABLET, FILM COATED ORAL at 09:06

## 2025-06-28 RX ADMIN — ONDANSETRON 4 MG: 2 INJECTION INTRAMUSCULAR; INTRAVENOUS at 01:06

## 2025-06-28 RX ADMIN — PANTOPRAZOLE SODIUM 40 MG: 40 INJECTION, POWDER, FOR SOLUTION INTRAVENOUS at 09:06

## 2025-06-28 RX ADMIN — ATORVASTATIN CALCIUM 20 MG: 10 TABLET, FILM COATED ORAL at 09:06

## 2025-06-28 RX ADMIN — NICOTINE 1 PATCH: 14 PATCH TRANSDERMAL at 04:06

## 2025-06-28 RX ADMIN — ONDANSETRON 4 MG: 2 INJECTION INTRAMUSCULAR; INTRAVENOUS at 04:06

## 2025-06-28 RX ADMIN — ARIPIPRAZOLE 2 MG: 2 TABLET ORAL at 08:06

## 2025-06-28 NOTE — ANESTHESIA PREPROCEDURE EVALUATION
06/28/2025    Luz Maria Alfaro is a 65 y.o., female  pmhx of VHD, HTN, DM, alcohol abuse, chronic anemia, and etoh vs owens cirrhosis and other medical problems noted in the EMR.  Patient presented to the ED on 06/25 with complaints of nausea, vomiting, diarrhea, abdominal pain.     Previous ortho surgery @ [facility] 12/20/24 & 10/27/22....[facility]     Pre-operative evaluation for Procedure(s) (LRB):  EGD (N/A)    Pre-op Assessment    I have reviewed the Patient Summary Reports.     I have reviewed the Nursing Notes. I have reviewed the NPO Status.   I have reviewed the Medications.     Review of Systems  Anesthesia Hx:  No problems with previous Anesthesia                Cardiovascular:  Exercise tolerance: poor                                                 Past Medical History:   Diagnosis Date    Benign essential HTN     Mixed hyperlipidemia     Type 2 diabetes mellitus without complications        Problem List[1]    Review of patient's allergies indicates:   Allergen Reactions    Meperidine Rash and Hives       Current Outpatient Medications   Medication Instructions    amoxicillin (AMOXIL) 1,000 mg, Every morning    ARIPiprazole (ABILIFY) 2 mg, Oral, Daily    aspirin (ECOTRIN) 81 mg, Oral, 2 times daily    buPROPion (WELLBUTRIN SR) 100 mg, 2 times daily    busPIRone (BUSPAR) 5 mg, Oral, 2 times daily    diclofenac sodium (VOLTAREN) 4 g, Topical (Top), 3 times daily before meals    EScitalopram oxalate (LEXAPRO) 5 mg, Oral, Daily    gabapentin (NEURONTIN) 300 mg, Oral, 3 times daily    HYDROcodone-acetaminophen (NORCO) 7.5-325 mg per tablet 1 tablet, 2 times daily PRN    methocarbamoL (ROBAXIN) 500 MG Tab 4 times daily    metoprolol succinate (TOPROL-XL) 25 mg, Daily    metoprolol tartrate (LOPRESSOR) 12.5 mg    oxyCODONE-acetaminophen (PERCOCET)  mg per tablet 1 tablet, Oral, Every 4 hours PRN    pantoprazole (PROTONIX) 40 mg, Oral, 2 times daily    potassium chloride SA (K-DUR,KLOR-CON) 20 MEQ tablet 20 mEq, 2  "times daily    QUEtiapine (SEROQUEL) 25 mg, Nightly    rosuvastatin (CRESTOR) 20 mg, Oral, Daily    sodium bicarbonate 650 mg, Oral, 2 times daily       Past Surgical History:   Procedure Laterality Date    APPENDECTOMY      BACK SURGERY      BREAST SURGERY  01 13 2002    Augmantation    HEMIARTHROPLASTY OF HIP Left 12/20/2024    Procedure: HEMIARTHROPLASTY, HIP;  Surgeon: Jose J Montemayor DO;  Location: [facility] OR;  Service: Orthopedics;  Laterality: Left;  lateral peg board kevin    HIP SURGERY      INTRAMEDULLARY RODDING OF FEMUR Right 10/27/2022    Procedure: RIGHT FEMUR INTERTROCH IMN;  Surgeon: Stewart Brambila MD;  Location: [facility] OR;  Service: Orthopedics;  Laterality: Right;  Florence table, supine  3rd case  Synthes Short TFNA       Social History[2]    /61   Pulse 63   Temp 36.5 °C (97.7 °F) (Axillary)   Resp 18   Ht 5' 3" (1.6 m)   Wt 45.8 kg (100 lb 14.4 oz)   SpO2 (!) 93%   BMI 17.87 kg/m²       Physical Exam  General: Well nourished and Confusion    Airway:  Mallampati: II   Mouth Opening: Normal  TM Distance: Normal  Tongue: Normal  Neck ROM: Normal ROM    Dental:  Edentulous    Chest/Lungs:  Clear to auscultation    Heart:  Rhythm: Regular Rhythm        Lab Results   Component Value Date    WBC 10.79 06/27/2025    HGB 15.0 06/27/2025    HCT 44.4 06/27/2025    MCV 97.8 (H) 06/27/2025     06/27/2025          BMP  Lab Results   Component Value Date    HCT 44.4 06/27/2025     06/28/2025    K 4.1 06/28/2025    BUN 20.8 (H) 06/28/2025    CREATININE 0.84 06/28/2025    CALCIUM 8.8 06/28/2025        INR  Recent Labs     06/28/25  0343   INR 1.2   PROTIME 14.9*         Diagnostic Studies:    8/13/24      Left Ventricle: The left ventricle is normal in size. Mildly increased wall thickness. There is normal systolic function with a visually estimated ejection fraction of 60 - 65%. Grade I diastolic dysfunction.    Right Ventricle: Normal right ventricular cavity size. Systolic function is " normal. TAPSE is 1.91 cm.    Left Atrium: Left atrium is moderately dilated.    Aortic Valve: Mildly calcified cusps. Mildly restricted motion. There is mild stenosis. Aortic valve area by VTI is 1.36 cm². Aortic valve peak velocity is 2.40 m/s. Mean gradient is 13 mmHg. The dimensionless index is 0.48. There is moderate aortic regurgitation.    Mitral Valve: Mildly calcified leaflets. There is mild mitral annular calcification present. There is no stenosis. The mean pressure gradient across the mitral valve is 6 mmHg at a heart rate of  bpm. There is mild regurgitation.    IVC/SVC: Normal venous pressure at 3 mmHg.     .  EKG  Results for orders placed or performed during the hospital encounter of 06/25/25   EKG 12-lead    Collection Time: 06/25/25 12:23 PM   Result Value Ref Range    QRS Duration 78 ms    OHS QTC Calculation 492 ms    Narrative    Test Reason : R11.0,    Vent. Rate :  94 BPM     Atrial Rate :  94 BPM     P-R Int : 184 ms          QRS Dur :  78 ms      QT Int : 394 ms       P-R-T Axes :  74 -27  70 degrees    QTcB Int : 492 ms    Normal sinus rhythm  Minimal voltage criteria for LVH, may be normal variant ( [place] product )  Septal infarct  Abnormal ECG    Confirmed by Rogelio Devries ([id number]) on 6/25/2025 11:42:28 PM    Referred By: AAAREFERRAL SELF           Confirmed By: Rogelio Devries         Anesthesia Plan  Type of Anesthesia, risks & benefits discussed:    Anesthesia Type: Gen Natural Airway  Intra-op Monitoring Plan: Standard ASA Monitors  Induction:  IV  Informed Consent: Informed consent signed with the Patient and all parties understand the risks and agree with anesthesia plan.  All questions answered.   ASA Score: 3  Day of Surgery Review of History & Physical: H&P Update referred to the surgeon/provider.    Ready For Surgery From Anesthesia Perspective.     .  Anesthesia consent includes material facts, risks, complications & alternatives, and possibility of altering the anesthesia  "plan due to intraoperative conditions.    I reviewed problem list, prior to admission medication list, appropriate labs, any workup, Xray, EKG etc   See anesthesia chart for details of the anesthesia plan carried out.       Vamsi Penn MD    ¤                [1]   Patient Active Problem List  Diagnosis    DVT (deep venous thrombosis)    Type 2 diabetes mellitus without complications    Malnutrition    Acute encephalopathy    Acute on chronic combined systolic and diastolic heart failure    Hypokalemia    Hypotension due to drugs    Calcaneal fracture    AMS (altered mental status)    Knee pain    Displaced fracture of left femoral neck    Moderate malnutrition    Benign essential HTN    Mixed hyperlipidemia    Alcoholic fatty liver    COPD (chronic obstructive pulmonary disease)    Chronic diastolic heart failure    Depressive disorder    Gastroesophageal reflux disease    Heart valve disease    History of colonic polyps    Other hemorrhoids    Stage 3b chronic kidney disease    Stricture of esophagus    Tobacco user    Unspecified cirrhosis of liver    Weight loss, abnormal    Benign neoplasm of ascending colon    Benign neoplasm of descending colon    Ototoxicity of both ears    History of substance abuse    Lumbar radiculopathy, chronic    Degeneration of intervertebral disc of lumbar region with discogenic back pain and lower extremity pain    Facet arthritis of lumbar region    Postlaminectomy syndrome of lumbar region    Sacroiliac joint pain    Central stenosis of spinal canal    Intractable nausea and vomiting    Tobacco dependency   [2]   Social History  Socioeconomic History    Marital status:    Tobacco Use    Smoking status: Every Day     Current packs/day: 0.50     Types: Cigarettes    Smokeless tobacco: Current   Substance and Sexual Activity    Alcohol use: Not Currently     Comment: 6 a week anand    Drug use: Not Currently     Types: "Crack" cocaine     Comment: Been clean 8 years    " Sexual activity: Not Currently     Partners: Male     Social Drivers of Health     Financial Resource Strain: Low Risk  (6/27/2025)    Overall Financial Resource Strain (CARDIA)     Difficulty of Paying Living Expenses: Not hard at all   Food Insecurity: No Food Insecurity (6/27/2025)    Hunger Vital Sign     Worried About Running Out of Food in the Last Year: Never true     Ran Out of Food in the Last Year: Never true   Transportation Needs: No Transportation Needs (6/27/2025)    PRAPARE - Transportation     Lack of Transportation (Medical): No     Lack of Transportation (Non-Medical): No   Physical Activity: Inactive (6/27/2025)    Exercise Vital Sign     Days of Exercise per Week: 0 days     Minutes of Exercise per Session: 0 min   Stress: No Stress Concern Present (6/27/2025)    [employer] [place] [employer] - Occupational Stress Questionnaire     Feeling of Stress : Only a little   Housing Stability: Low Risk  (6/27/2025)    Housing Stability Vital Sign     Unable to Pay for Housing in the Last Year: No     Homeless in the Last Year: No

## 2025-06-28 NOTE — PROGRESS NOTES
[facility] Hospital Medicine Progress Note        Chief Complaint: Inpatient Follow-up     HPI:   65-year-old woman with a PMH of COPD (not oxygen dependent), HFpEF, HLD, dm type 2, prior alcohol induced cirrhosis? and HTN who presented to the ED with complaints of abdominal pain with associated nausea vomiting that began 3 days prior. Patient explains that she has experienced right upper quadrant and left upper quadrant abdominal pain, and has been unable to keep food or drink down since his symptoms began. Patient states that she has had a small amount of diarrhea, denies fever, denies sick contacts, denies abdominal surgical history.     Upon arrival to the ED patient was afebrile, tachypneic with a respiratory rate of 35, hypertensive with a BP of 223/104, but maintaining normal sats on room air.  ED workup revealed hemoglobin 17.3, hematocrit 50.9, CO2 15, lactic acid 2.8, UA was positive for glucose and ketones, CT abdomen and pelvis with contrast showed no definite acute intra-abdominal or pelvic solid organ or bowel pathology, CT head without showed no acute intracranial pathology, small focus of encephalomalacia in the right parietal lobe, mild to moderate chronic small-vessel ischemic changes of the supratentorial white matter. Patient was given hydralazine 30 mg IV, labetalol 10 mg IV, Reglan 10 mg IV, morphine 4 mg IV, Zofran 4 mg IV, Solu-Medrol 125 mg IV, and a DuoNeb in the ED and admitted to Hospital Medicine for further care.    GI consulted for EGD given persistent N/V. Planned for EGD on 6/28.    Interval Hx:   NAEO. Seen and examined. Planned for EGD this morning. Seen before and after EGD.     Case was discussed with patient's nurse and  on the floor.    Objective/physical exam:  General: In no acute distress, afebrile  Chest: Clear to auscultation bilaterally  Heart: RRR, +S1, S2, no appreciable murmur  Abdomen: Soft, nontender, BS +  MSK: Warm, no  lower extremity edema, no clubbing or cyanosis  Neurologic: Alert and oriented x4, Cranial nerve II-XII intact, Strength 5/5 in all 4 extremities    VITAL SIGNS: 24 HRS MIN & MAX LAST   Temp  Min: 95.4 °F (35.2 °C)  Max: 98.4 °F (36.9 °C) (!) 95.4 °F (35.2 °C)   BP  Min: 95/50  Max: 136/69 (!) 99/52   Pulse  Min: 58  Max: 85  (!) 58   Resp  Min: 18  Max: 23 20   SpO2  Min: 92 %  Max: 98 % 98 %     I have reviewed the following labs:  Recent Labs   Lab 06/25/25  1402 06/26/25  0925 06/27/25  0939   WBC 5.95 11.47 10.79   RBC 4.37 4.01* 4.54   HGB 14.7 13.4 15.0   HCT 42.4 39.9 44.4   MCV 97.0* 99.5* 97.8*   MCH 33.6* 33.4* 33.0*   MCHC 34.7 33.6 33.8   RDW 13.4 13.6 13.6    185 204   MPV 9.8 10.1 10.1     Recent Labs   Lab 06/25/25  0310 06/25/25  0619 06/25/25  0956 06/26/25  0925 06/27/25  0939 06/28/25  0343     --    < > 142 138 136   K 4.1  --    < > 3.1* 3.1* 4.1   *  --    < > 109* 102 104   CO2 15*  --    < > 24 25 24   BUN 12.5  --    < > 11.7 11.2 20.8*   CREATININE 0.90  --    < > 0.71 0.75 0.84   *  --    < > 88 91 81*   CALCIUM 10.4*  --    < > 9.0 9.2 8.8   PH  --  7.430  --   --   --   --    MG 1.80  --   --   --  1.70  --    ALBUMIN 4.8  --   --   --   --   --    PROT 9.0*  --   --   --   --   --    ALKPHOS 193*  --   --   --   --   --    ALT 10  --   --   --   --   --    AST 17  --   --   --   --   --    BILITOT 0.8  --   --   --   --   --     < > = values in this interval not displayed.     Microbiology Results (last 7 days)       Procedure Component Value Units Date/Time    Stool Culture [[id number]]  (Normal) Collected: 06/26/25 2216    Order Status: Completed Specimen: Stool Updated: 06/28/25 0942     Stool Culture Negative for Salmonella, Shigella, Campylobacter, Vibrio, Aeromonas, Pleisiomonas,Yersinia, or Shiga Toxin 1 and 2.    Blood Culture [[id number]]  (Normal) Collected: 06/25/25 1009    Order Status: Completed Specimen: Blood, Venous Updated: 06/27/25 1201      Blood Culture No Growth At 48 Hours    Blood Culture [[id number]]  (Normal) Collected: 06/25/25 1009    Order Status: Completed Specimen: Blood, Venous Updated: 06/27/25 1201     Blood Culture No Growth At 48 Hours             See below for Radiology    Assessment/Plan:  # N/V - possibly esophageal stricture?  # Hypokalemia - resolved  # Hx of esophageal stricture  # EtOH induced cirrhosis  # Tobacco use disorder  # HTN urgency - resolved  # Lactic acidosis - resolved  # Low BMI  # Hx of COPD (not oxygen dependent), HFpEF, HLD, prior alcohol induced cirrhosis? and HTN    - GI panel normal  - AFP normal  - UA normal  - follow up Bcx  - she reports that she follows with Dr. Garcia a year or so ago regarding her liver. She stopped drinking a year ago. Unclear if she had screening EGD.   - will consult GI for persistent N/V associated with food intake   - planned for EGD this morning  - CT A/P and US abdomen noted  - continue amlodipine. Continue home lopressor  - continue lisinopril  - labs in AM    ADDENDUM - EGD noted this afternoon. As per GI recommending clear liquid diet today and advancing tomorrow and they would like to hold the lovenox      VTE prophylaxis: SCD    Patient condition:  Fair    Anticipated discharge and Disposition:     Likely in AM    All diagnosis and differential diagnosis have been reviewed; assessment and plan has been documented; I have personally reviewed the labs and test results that are presently available; I have reviewed the patients medication list; I have reviewed the consulting providers response and recommendations. I have reviewed or attempted to review medical records based upon their availability    All of the patient's questions have been  addressed and answered. Patient's is agreeable to the above stated plan. I will continue to monitor closely and make adjustments to medical management as  needed.    _____________________________________________________________________    Malnutrition Status:  Nutrition consulted. Most recent weight and BMI monitored-     Measurements:  Wt Readings from Last 1 Encounters:   06/25/25 45.8 kg (100 lb 14.4 oz)   Body mass index is 17.87 kg/m².    Patient has been screened and assessed by RD.    Malnutrition Type:  Context: chronic illness  Level: severe    Malnutrition Characteristic Summary:  Weight Loss (Malnutrition): other (see comments) (Unable to assess)  Subcutaneous Fat (Malnutrition): severe depletion  Muscle Mass (Malnutrition): severe depletion    Interventions/Recommendations (treatment strategy):  Care coordination or referral     Scheduled Med:   amLODIPine  10 mg Oral Daily    ARIPiprazole  2 mg Oral Daily    atorvastatin  20 mg Oral QHS    buPROPion  100 mg Oral BID    enoxparin  40 mg Subcutaneous Daily    lisinopriL  10 mg Oral Daily    methocarbamoL  500 mg Oral QID    metoprolol tartrate  12.5 mg Oral BID    nicotine  1 patch Transdermal Daily    pantoprazole  40 mg Intravenous Q12H    QUEtiapine  25 mg Oral Nightly    scopolamine  1 patch Transdermal Q3 Days    sucralfate  1 g Oral QID (AC & HS)      Continuous Infusions:       PRN Meds:    Current Facility-Administered Medications:     acetaminophen, 650 mg, Oral, Q6H PRN    aluminum-magnesium hydroxide-simethicone, 30 mL, Oral, QID PRN    bisacodyL, 10 mg, Rectal, Daily PRN    dextrose 50%, 12.5 g, Intravenous, PRN    dextrose 50%, 12.5 g, Intravenous, PRN    dextrose 50%, 25 g, Intravenous, PRN    glucagon (human recombinant), 1 mg, Intramuscular, PRN    glucose, 16 g, Oral, PRN    glucose, 24 g, Oral, PRN    hydrALAZINE, 10 mg, Intravenous, Q4H PRN    HYDROcodone-acetaminophen, 1 tablet, Oral, BID PRN    levalbuterol, 1.25 mg, Nebulization, Q4H PRN    melatonin, 6 mg, Oral, Nightly PRN    naloxone, 0.02 mg, Intravenous, PRN    ondansetron, 4 mg, Intravenous, Q4H PRN    prochlorperazine, 5 mg,  Intravenous, Q6H PRN    senna-docusate, 1 tablet, Oral, BID PRN    sodium chloride 0.9%, 10 mL, Intravenous, PRN     Radiology:  I have personally reviewed the following imaging and agree with the radiologist.     US Abdomen Limited  EXAMINATION  US ABDOMEN LIMITED    CLINICAL HISTORY  RUQ/Cholecystitis;    TECHNIQUE  Multiplanar grayscale sonographic evaluation of the right upper quadrant was performed, with focused Doppler assessment of the main portal vein.    COMPARISON  22 January 2024    FINDINGS  Exam quality: adequate for evaluation    Pancreas: No focal abnormality or peripancreatic collection appreciated at the visualized pancreas.    Gallbladder: No shadowing stones or layering sludge. No wall thickening or pericholecystic fluid.  Sonographic Kurtz sign is negative, per the ultrasonographer's report.    Bile ducts: No intra- / extrahepatic biliary dilatation suggestive of obstructing pathology.    Liver: Size is within normal limits.  There is irregular nodular appearance of the liver surface, suggesting changes of cirrhosis.  No discrete mass-like abnormality.  Unremarkable isoechoic appearance of the hepatic background, relative to the adjacent right kidney.  Normal hepatopetal flow is seen within the portal vein.    Other findings: No suspicious right kidney lesion, shadowing stone, or hydronephrosis.  No free fluid identified. The visualized portions of the aorta and IVC are structures are unremarkable.    IMPRESSION  1. No convincing acute sonographic abnormality.  2. Liver surface changes suggestive of cirrhosis.    Electronically signed by: Gaurang Montenegro  Date:    06/25/2025  Time:    11:09  X-Ray Chest AP Portable  Narrative: EXAMINATION:  XR CHEST AP PORTABLE    CLINICAL HISTORY:  Unspecified abdominal pain    COMPARISON:  1 January 2025    FINDINGS:  Frontal view of the chest was obtained. Heart is not enlarged.  The lungs are clear.  No pneumothorax.  Impression: No acute  findings.    Electronically signed by: Bruce Smith  Date:    06/25/2025  Time:    09:05  CT Abdomen Pelvis With IV Contrast NO Oral Contrast  Narrative: EXAMINATION:  CT ABDOMEN PELVIS WITH IV CONTRAST    CLINICAL HISTORY:  Nausea/vomiting;Abdominal pain, acute, nonlocalized;.    TECHNIQUE:  Helical acquisition through the abdomen and pelvis with IV contrast.  Three plane reconstructions were provided for review.  mGycm. Automatic exposure control, adjustment of mA/kV or iterative reconstruction technique was used to reduce radiation.    COMPARISON:  20 March 2024    FINDINGS:  Motion degraded scan.    Lung bases are clear.    Lobular liver suggestive of chronic liver disease.  No significant abnormality of the gallbladder, spleen, pancreas or adrenals.  No hydronephrosis.    No bowel obstruction. No significant inflammatory changes of the bowel.  No free air.    Urinary bladder is unremarkable. No free fluid. Aorta normal in caliber.  There is moderate atherosclerotic disease.    No acute osseous findings.  There are degenerative and postsurgical changes of the spine.  There are bilateral hip arthroplasties.  Impression: Motion degraded scan with no acute abdominopelvic findings evident.    No significant discrepancy with the preliminary report.    Electronically signed by: Bruce Smith  Date:    06/25/2025  Time:    08:58  CT Head Without Contrast  Narrative: EXAMINATION:  CT HEAD WITHOUT CONTRAST    CLINICAL HISTORY:  Mental status change, unknown cause;    TECHNIQUE:  Low dose axial images were obtained through the head.  Coronal and sagittal reformations were also performed. Contrast was not administered.  Dose reduction techniques including automatic exposure control (AEC) were utilized.    Dose (DLP): 1464 mGycm    COMPARISON:  CT head without contrast 10/03/2024.    FINDINGS:  INTRACRANIAL: Redemonstration of a small focus of encephalomalacia in the right parietal lobe.  Mild to moderate chronic  small-vessel ischemic changes of the internal capsules and supratentorial periventricular white matter.  No acute intracranial hemorrhage.  No hydrocephalus.  No intracranial mass effect.    SINUSES: Visualized paranasal sinuses and mastoids are clear.    SKULL/SCALP: Visualized osseous structures are normal.    ORBITS: Visualized orbits are normal.  Impression: No acute intracranial pathology.    Small focus of encephalomalacia in the right parietal lobe.    Mild to moderate chronic small-vessel ischemic changes of the supratentorial white matter.    No significant discrepancy between preliminary and final reports.    Electronically signed by: Jack Kirkland  Date:    06/25/2025  Time:    05:38      Ilir Gale MD  Department of Hospital Medicine   [facility]   06/28/2025

## 2025-06-28 NOTE — PLAN OF CARE
Problem: Adult Inpatient Plan of Care  Goal: Plan of Care Review  Outcome: Progressing  Flowsheets (Taken 6/28/2025 0936)  Plan of Care Reviewed With: patient  Goal: Optimal Comfort and Wellbeing  Outcome: Progressing  Intervention: Provide Person-Centered Care  Flowsheets (Taken 6/28/2025 0936)  Trust Relationship/Rapport:   care explained   questions answered   empathic listening provided   thoughts/feelings acknowledged

## 2025-06-28 NOTE — ANESTHESIA POSTPROCEDURE EVALUATION
Anesthesia Post Evaluation    Patient: Luz Maria Alfaro    Procedure(s) Performed: Procedure(s) (LRB):  EGD (N/A)  ESOPHAGOSCOPY, USING BOUGIE, WITH DILATION    Final Anesthesia Type: general      Patient location during evaluation: PACU  Patient participation: Yes- Able to Participate  Post-procedure mental status: @ basline.  Pain management: adequate  [id number] EDUAR Mitigation: See pacu RN note for any measures applied.  PONV status: See postop meds for drugs used to control n/v if any.  Anesthetic complications: no      Cardiovascular status: stable  Respiratory status: @ baseline.  Hydration status: euvolemic                Vitals Value Taken Time   BP 99/52 06/28/25 10:40   Temp 96 06/28/25 11:48   Pulse 58 06/28/25 10:40   Resp 20 06/28/25 10:40   SpO2 98 % 06/28/25 10:40         No case tracking events are documented in the log.      Pain/Saroj Score: Pain Rating Prior to Med Admin: 6 (6/28/2025  2:05 AM)  Pain Rating Post Med Admin: 2 (6/28/2025  3:05 AM)  Saroj Score: 10 (6/28/2025 10:46 AM)

## 2025-06-28 NOTE — PROCEDURES
Luz Maria Alfaro   MRN: [id number]   ADMISSION DATE: 2025  : 1960  AGE: 65 y.o.    DATE OF PROCEDURE:  2025     PROCEDURE:  EGD with Savary dilation (16 mm).     SURGEON: PABLO ALDRIDGE    PREOPERATIVE DIAGNOSIS:  History of nausea and vomiting, history of dysphagia.    POSTOPERATIVE DIAGNOSIS:  1.  Partial Schatzki's ring with evidence of hiatal hernia, 2-3 cm.  Savary dilation was done, 16 mm.  Small proximal esophageal mucosal tear (4-5 mm) post dilation with no evidence of bleeding.  Photodocumentation was obtained.  2. Antral gastritis, mild-to-moderate.  Biopsies obtained from body and antrum.    3. Distal duodenal biopsies obtained.  Otherwise normal exam.    HISTORY AND PHYSICAL:  As per preoperative note.      DESCRIPTION OF PROCEDURE:  Informed consent was obtained.  Patient was placed in left lateral position.  Sedation per anesthesia service.  Olympus video gastroscope was introduced into the oral cavity and esophagus was intubated under direct visualization. The scope was carefully advanced to the distal duodenum.  Patient tolerated the procedure well without any complications.    FINDINGS:  Esophagus:  Patient was noted to have normal esophageal mucosa.  There was a 2-3 cm hiatal hernia noted along with a partial Schatzki's ring around the GE junction.  In view of symptom of intermittent dysphagia, 16 mm Savary dilator was passed over the wire at the end of procedure with mild resistance after gastroscope was removed.  On post-dilation EGD, there was a small 4-5 mm mucosal tear noted in the proximal esophagus with no evidence of bleeding.  Photodocumentation was obtained.  Distal esophagus appeared unremarkable, post dilation.  Stomach: Fundus, cardia and body appeared unremarkable.  There was mild-to-moderate erythema noted in antrum.  Biopsies were obtained from body and antrum.  Duodenum: Duodenal bulb, 2nd and 3rd portion of the duodenum appeared unremarkable to the extent of  exam.    ESTIMATED BLOOD LOSS:  3-4 cc    COMPLICATIONS:  None    RECOMMENDATIONS:  1. Clear liquid diet today.  Can advance to full liquid tomorrow as tolerated.    2. Avoid NSAIDs or anticoagulation.    3. Continue PPI along with Carafate.    4. GI consult service to follow.

## 2025-06-28 NOTE — TRANSFER OF CARE
"Anesthesia Transfer of Care Note    Patient: Luz Maria Alfaro    Procedure(s) Performed: Procedure(s) (LRB):  EGD (N/A)  ESOPHAGOSCOPY, USING BOUGIE, WITH DILATION    Patient location: PACU    Anesthesia Type: general    Transport from OR: Transported from OR on room air with adequate spontaneous ventilation    Post pain: adequate analgesia    Post assessment: no apparent anesthetic complications    Post vital signs: stable    Level of consciousness: awake and alert    Nausea/Vomiting: no nausea/vomiting    Complications: none    Transfer of care protocol was followed      Last vitals: Visit Vitals  /69   Pulse 62   Temp (!) 35.2 °C (95.4 °F)   Resp 20   Ht 5' 3" (1.6 m)   Wt 45.8 kg (100 lb 14.4 oz)   SpO2 (!) 93%   BMI 17.87 kg/m²     "

## 2025-06-29 VITALS
DIASTOLIC BLOOD PRESSURE: 74 MMHG | HEART RATE: 72 BPM | BODY MASS INDEX: 19.26 KG/M2 | WEIGHT: 108.69 LBS | RESPIRATION RATE: 18 BRPM | OXYGEN SATURATION: 97 % | SYSTOLIC BLOOD PRESSURE: 130 MMHG | HEIGHT: 63 IN | TEMPERATURE: 98 F

## 2025-06-29 PROBLEM — K22.2 SCHATZKI'S RING: Status: ACTIVE | Noted: 2025-06-29

## 2025-06-29 LAB
BACTERIA STL CULT: NORMAL
BASOPHILS # BLD AUTO: 0.02 X10(3)/MCL
BASOPHILS NFR BLD AUTO: 0.2 %
EOSINOPHIL # BLD AUTO: 0.11 X10(3)/MCL (ref 0–0.9)
EOSINOPHIL NFR BLD AUTO: 1.2 %
ERYTHROCYTE [DISTWIDTH] IN BLOOD BY AUTOMATED COUNT: 13.3 % (ref 11.5–17)
HCT VFR BLD AUTO: 45.4 % (ref 37–47)
HGB BLD-MCNC: 15.4 G/DL (ref 12–16)
IMM GRANULOCYTES # BLD AUTO: 0.02 X10(3)/MCL (ref 0–0.04)
IMM GRANULOCYTES NFR BLD AUTO: 0.2 %
LYMPHOCYTES # BLD AUTO: 3.36 X10(3)/MCL (ref 0.6–4.6)
LYMPHOCYTES NFR BLD AUTO: 35.7 %
MCH RBC QN AUTO: 33.3 PG (ref 27–31)
MCHC RBC AUTO-ENTMCNC: 33.9 G/DL (ref 33–36)
MCV RBC AUTO: 98.1 FL (ref 80–94)
MONOCYTES # BLD AUTO: 0.88 X10(3)/MCL (ref 0.1–1.3)
MONOCYTES NFR BLD AUTO: 9.4 %
NEUTROPHILS # BLD AUTO: 5.02 X10(3)/MCL (ref 2.1–9.2)
NEUTROPHILS NFR BLD AUTO: 53.3 %
NRBC BLD AUTO-RTO: 0 %
PLATELET # BLD AUTO: 146 X10(3)/MCL (ref 130–400)
PMV BLD AUTO: 10.6 FL (ref 7.4–10.4)
RBC # BLD AUTO: 4.63 X10(6)/MCL (ref 4.2–5.4)
WBC # BLD AUTO: 9.41 X10(3)/MCL (ref 4.5–11.5)

## 2025-06-29 PROCEDURE — 25000003 PHARM REV CODE 250: Performed by: INTERNAL MEDICINE

## 2025-06-29 PROCEDURE — 85025 COMPLETE CBC W/AUTO DIFF WBC: CPT

## 2025-06-29 PROCEDURE — 36415 COLL VENOUS BLD VENIPUNCTURE: CPT

## 2025-06-29 PROCEDURE — 63600175 PHARM REV CODE 636 W HCPCS: Performed by: INTERNAL MEDICINE

## 2025-06-29 PROCEDURE — 25000003 PHARM REV CODE 250

## 2025-06-29 RX ORDER — LISINOPRIL 10 MG/1
10 TABLET ORAL DAILY
Qty: 30 TABLET | Refills: 0 | Status: SHIPPED | OUTPATIENT
Start: 2025-06-29 | End: 2025-07-29

## 2025-06-29 RX ORDER — SUCRALFATE 1 G/1
1 TABLET ORAL
Qty: 28 TABLET | Refills: 0 | Status: SHIPPED | OUTPATIENT
Start: 2025-06-29 | End: 2025-07-06

## 2025-06-29 RX ORDER — AMLODIPINE BESYLATE 10 MG/1
10 TABLET ORAL DAILY
Qty: 30 TABLET | Refills: 0 | Status: SHIPPED | OUTPATIENT
Start: 2025-06-29 | End: 2025-07-29

## 2025-06-29 RX ORDER — PANTOPRAZOLE SODIUM 40 MG/1
40 TABLET, DELAYED RELEASE ORAL 2 TIMES DAILY
Qty: 60 TABLET | Refills: 0 | Status: SHIPPED | OUTPATIENT
Start: 2025-06-29 | End: 2025-07-29

## 2025-06-29 RX ORDER — IBUPROFEN 200 MG
1 TABLET ORAL DAILY
Qty: 30 PATCH | Refills: 0 | Status: SHIPPED | OUTPATIENT
Start: 2025-06-29 | End: 2025-07-29

## 2025-06-29 RX ADMIN — BUPROPION HYDROCHLORIDE 100 MG: 100 TABLET, FILM COATED, EXTENDED RELEASE ORAL at 08:06

## 2025-06-29 RX ADMIN — METHOCARBAMOL 500 MG: 500 TABLET ORAL at 08:06

## 2025-06-29 RX ADMIN — PANTOPRAZOLE SODIUM 40 MG: 40 INJECTION, POWDER, FOR SOLUTION INTRAVENOUS at 08:06

## 2025-06-29 RX ADMIN — SUCRALFATE 1 G: 1 TABLET ORAL at 10:06

## 2025-06-29 RX ADMIN — HYDROCODONE BITARTRATE AND ACETAMINOPHEN 1 TABLET: 7.5; 325 TABLET ORAL at 02:06

## 2025-06-29 RX ADMIN — METOPROLOL TARTRATE 12.5 MG: 25 TABLET, FILM COATED ORAL at 08:06

## 2025-06-29 RX ADMIN — AMLODIPINE BESYLATE 10 MG: 5 TABLET ORAL at 08:06

## 2025-06-29 RX ADMIN — SUCRALFATE 1 G: 1 TABLET ORAL at 06:06

## 2025-06-29 RX ADMIN — ARIPIPRAZOLE 2 MG: 2 TABLET ORAL at 08:06

## 2025-06-29 RX ADMIN — LISINOPRIL 10 MG: 10 TABLET ORAL at 08:06

## 2025-06-29 NOTE — DISCHARGE SUMMARY
[facility] Hospital Medicine Discharge Summary    Admit Date: 6/25/2025  Discharge Date and Time: 6/29/20257:41 AM  Admitting Physician:  Team  Discharging Physician: Ilir Gale MD.  Primary Care Physician: Luzmaria Flores FNP  Consults: Gastroenterology    Discharge Diagnoses:  # N/V 2/2 Schatzki's ring now s/p dilation  # Hypokalemia - resolved  # Hx of esophageal stricture  # EtOH induced cirrhosis  # Tobacco use disorder  # HTN urgency - resolved  # Lactic acidosis - resolved  # Low BMI  # Hx of COPD (not oxygen dependent), HFpEF, HLD, prior alcohol induced cirrhosis? and HTN    Hospital Course:   65-year-old woman with a PMH of COPD (not oxygen dependent), HFpEF, HLD, dm type 2, prior alcohol induced cirrhosis and HTN who presented to the ED with complaints of abdominal pain with associated nausea vomiting that began 3 days prior. Patient explains that she has experienced right upper quadrant and left upper quadrant abdominal pain, and has been unable to keep food or drink down since his symptoms began. Patient states that she has had a small amount of diarrhea, denies fever, denies sick contacts, denies abdominal surgical history.     Upon arrival to the ED patient was afebrile, tachypneic with a respiratory rate of 35, hypertensive with a BP of 223/104, but maintaining normal sats on room air.  ED workup revealed hemoglobin 17.3, hematocrit 50.9, CO2 15, lactic acid 2.8, UA was positive for glucose and ketones, CT abdomen and pelvis with contrast showed no definite acute intra-abdominal or pelvic solid organ or bowel pathology, CT head without showed no acute intracranial pathology, small focus of encephalomalacia in the right parietal lobe, mild to moderate chronic small-vessel ischemic changes of the supratentorial white matter. Patient was given hydralazine 30 mg IV, labetalol 10 mg IV, Reglan 10 mg IV, morphine 4 mg IV, Zofran 4 mg IV, Solu-Medrol 125 mg  IV, and a DuoNeb in the ED and admitted to Hospital Medicine for further care.    US abdomen showed cirrhosis - AFP normal. Previously saw  Dr. Garcia.      GI consulted for EGD given persistent N/V. Planned for EGD on 6/28 which showed a 2-3 cm hiatal hernia noted along with a partial Schatzki's ring around the GE junction.  In view of symptom of intermittent dysphagia, 16 mm Savary dilator was passed over the wire at the end of procedure with mild resistance after gastroscope was removed.  On post-dilation EGD, there was a small 4-5 mm mucosal tear noted in the proximal esophagus with no evidence of bleeding. Stomach: Fundus, cardia and body appeared unremarkable.  There was mild-to-moderate erythema noted in antrum.  Biopsies were obtained from body and antrum.    She was tolerating diet post procedure and monitored overnight.     Pt was seen and examined on the day of discharge. She was cleared by GI for discharge. She is stable to be discharged with PCP and GI follow up. GI to call the patient with pathology results. Patient eager to go home - verbalizes understanding.     Vitals:  VITAL SIGNS: 24 HRS MIN & MAX LAST   Temp  Min: 95.4 °F (35.2 °C)  Max: 98.4 °F (36.9 °C) 98.4 °F (36.9 °C)   BP  Min: 95/50  Max: 155/71 119/64   Pulse  Min: 58  Max: 67  63   Resp  Min: 17  Max: 23 18   SpO2  Min: 93 %  Max: 98 % 95 %       Physical Exam:  General: In no acute distress, afebrile  Chest: Clear to auscultation bilaterally  Heart: RRR, +S1, S2, no appreciable murmur  Abdomen: Soft, nontender, BS +  MSK: Warm, no lower extremity edema, no clubbing or cyanosis  Neurologic: Alert and oriented x4, Cranial nerve II-XII intact, Strength 5/5 in all 4 extremities    Procedures Performed: No admission procedures for hospital encounter.     Significant Diagnostic Studies: See Full reports for all details    Recent Labs   Lab 06/26/25  0925 06/27/25  0939 06/29/25  0509   WBC 11.47 10.79 9.41   RBC 4.01* 4.54 4.63   HGB 13.4  15.0 15.4   HCT 39.9 44.4 45.4   MCV 99.5* 97.8* 98.1*   MCH 33.4* 33.0* 33.3*   MCHC 33.6 33.8 33.9   RDW 13.6 13.6 13.3    204 146   MPV 10.1 10.1 10.6*       Recent Labs   Lab 06/25/25  0310 06/25/25  0619 06/25/25  0956 06/26/25  0925 06/27/25  0939 06/28/25  0343     --    < > 142 138 136   K 4.1  --    < > 3.1* 3.1* 4.1   *  --    < > 109* 102 104   CO2 15*  --    < > 24 25 24   BUN 12.5  --    < > 11.7 11.2 20.8*   CREATININE 0.90  --    < > 0.71 0.75 0.84   *  --    < > 88 91 81*   CALCIUM 10.4*  --    < > 9.0 9.2 8.8   PH  --  7.430  --   --   --   --    MG 1.80  --   --   --  1.70  --    ALBUMIN 4.8  --   --   --   --   --    PROT 9.0*  --   --   --   --   --    ALKPHOS 193*  --   --   --   --   --    ALT 10  --   --   --   --   --    AST 17  --   --   --   --   --    BILITOT 0.8  --   --   --   --   --     < > = values in this interval not displayed.        Microbiology Results (last 7 days)       Procedure Component Value Units Date/Time    Blood Culture [[id number]]  (Normal) Collected: 06/25/25 1009    Order Status: Completed Specimen: Blood, Venous Updated: 06/28/25 1201     Blood Culture No Growth At 72 Hours    Blood Culture [[id number]]  (Normal) Collected: 06/25/25 1009    Order Status: Completed Specimen: Blood, Venous Updated: 06/28/25 1201     Blood Culture No Growth At 72 Hours    Stool Culture [[id number]]  (Normal) Collected: 06/26/25 2216    Order Status: Completed Specimen: Stool Updated: 06/28/25 1154     Stool Culture Negative for Salmonella, Shigella, Campylobacter, Vibrio, Aeromonas, Pleisiomonas,Yersinia, or Shiga Toxin 1 and 2.             US Abdomen Limited  EXAMINATION  US ABDOMEN LIMITED    CLINICAL HISTORY  RUQ/Cholecystitis;    TECHNIQUE  Multiplanar grayscale sonographic evaluation of the right upper quadrant was performed, with focused Doppler assessment of the main portal vein.    COMPARISON  22 January 2024    FINDINGS  Exam quality: adequate for  evaluation    Pancreas: No focal abnormality or peripancreatic collection appreciated at the visualized pancreas.    Gallbladder: No shadowing stones or layering sludge. No wall thickening or pericholecystic fluid.  Sonographic Kurtz sign is negative, per the ultrasonographer's report.    Bile ducts: No intra- / extrahepatic biliary dilatation suggestive of obstructing pathology.    Liver: Size is within normal limits.  There is irregular nodular appearance of the liver surface, suggesting changes of cirrhosis.  No discrete mass-like abnormality.  Unremarkable isoechoic appearance of the hepatic background, relative to the adjacent right kidney.  Normal hepatopetal flow is seen within the portal vein.    Other findings: No suspicious right kidney lesion, shadowing stone, or hydronephrosis.  No free fluid identified. The visualized portions of the aorta and IVC are structures are unremarkable.    IMPRESSION  1. No convincing acute sonographic abnormality.  2. Liver surface changes suggestive of cirrhosis.    Electronically signed by: Gaurang Montenegro  Date:    06/25/2025  Time:    11:09  X-Ray Chest AP Portable  Narrative: EXAMINATION:  XR CHEST AP PORTABLE    CLINICAL HISTORY:  Unspecified abdominal pain    COMPARISON:  1 January 2025    FINDINGS:  Frontal view of the chest was obtained. Heart is not enlarged.  The lungs are clear.  No pneumothorax.  Impression: No acute findings.    Electronically signed by: Bruce Smith  Date:    06/25/2025  Time:    09:05  CT Abdomen Pelvis With IV Contrast NO Oral Contrast  Narrative: EXAMINATION:  CT ABDOMEN PELVIS WITH IV CONTRAST    CLINICAL HISTORY:  Nausea/vomiting;Abdominal pain, acute, nonlocalized;.    TECHNIQUE:  Helical acquisition through the abdomen and pelvis with IV contrast.  Three plane reconstructions were provided for review.  mGycm. Automatic exposure control, adjustment of mA/kV or iterative reconstruction technique was used to reduce  radiation.    COMPARISON:  20 March 2024    FINDINGS:  Motion degraded scan.    Lung bases are clear.    Lobular liver suggestive of chronic liver disease.  No significant abnormality of the gallbladder, spleen, pancreas or adrenals.  No hydronephrosis.    No bowel obstruction. No significant inflammatory changes of the bowel.  No free air.    Urinary bladder is unremarkable. No free fluid. Aorta normal in caliber.  There is moderate atherosclerotic disease.    No acute osseous findings.  There are degenerative and postsurgical changes of the spine.  There are bilateral hip arthroplasties.  Impression: Motion degraded scan with no acute abdominopelvic findings evident.    No significant discrepancy with the preliminary report.    Electronically signed by: Bruce Smith  Date:    06/25/2025  Time:    08:58  CT Head Without Contrast  Narrative: EXAMINATION:  CT HEAD WITHOUT CONTRAST    CLINICAL HISTORY:  Mental status change, unknown cause;    TECHNIQUE:  Low dose axial images were obtained through the head.  Coronal and sagittal reformations were also performed. Contrast was not administered.  Dose reduction techniques including automatic exposure control (AEC) were utilized.    Dose (DLP): 1464 mGycm    COMPARISON:  CT head without contrast 10/03/2024.    FINDINGS:  INTRACRANIAL: Redemonstration of a small focus of encephalomalacia in the right parietal lobe.  Mild to moderate chronic small-vessel ischemic changes of the internal capsules and supratentorial periventricular white matter.  No acute intracranial hemorrhage.  No hydrocephalus.  No intracranial mass effect.    SINUSES: Visualized paranasal sinuses and mastoids are clear.    SKULL/SCALP: Visualized osseous structures are normal.    ORBITS: Visualized orbits are normal.  Impression: No acute intracranial pathology.    Small focus of encephalomalacia in the right parietal lobe.    Mild to moderate chronic small-vessel ischemic changes of the supratentorial  white matter.    No significant discrepancy between preliminary and final reports.    Electronically signed by: Jack Kirkland  Date:    06/25/2025  Time:    05:38         Medication List        START taking these medications      amLODIPine 10 MG tablet  Commonly known as: NORVASC  Take 1 tablet (10 mg total) by mouth once daily.     lisinopriL 10 MG tablet  Take 1 tablet (10 mg total) by mouth once daily.     nicotine 14 mg/24 hr  Commonly known as: NICODERM CQ  Place 1 patch onto the skin once daily.     sucralfate 1 gram tablet  Commonly known as: CARAFATE  Take 1 tablet (1 g total) by mouth 4 (four) times daily before meals and nightly. for 7 days            CONTINUE taking these medications      ARIPiprazole 2 MG Tab  Commonly known as: ABILIFY  Take 1 tablet (2 mg total) by mouth once daily.     buPROPion 100 MG TBSR 12 hr tablet  Commonly known as: WELLBUTRIN SR     EScitalopram oxalate 5 MG Tab  Commonly known as: LEXAPRO  Take 1 tablet (5 mg total) by mouth once daily.     HYDROcodone-acetaminophen 7.5-325 mg per tablet  Commonly known as: NORCO     methocarbamoL 500 MG Tab  Commonly known as: Robaxin     metoprolol tartrate 25 MG tablet  Commonly known as: LOPRESSOR     pantoprazole 40 MG tablet  Commonly known as: PROTONIX  Take 1 tablet (40 mg total) by mouth 2 (two) times daily.     QUEtiapine 25 MG Tab  Commonly known as: SEROQUEL     rosuvastatin 20 MG tablet  Commonly known as: CRESTOR  Take 1 tablet (20 mg total) by mouth once daily.            STOP taking these medications      amoxicillin 500 MG capsule  Commonly known as: AMOXIL     aspirin 81 MG EC tablet  Commonly known as: ECOTRIN     busPIRone 5 MG Tab  Commonly known as: BUSPAR     diclofenac sodium 1 % Gel  Commonly known as: VOLTAREN     gabapentin 300 MG capsule  Commonly known as: NEURONTIN     metoprolol succinate 25 MG 24 hr tablet  Commonly known as: TOPROL-XL     oxyCODONE-acetaminophen  mg per tablet  Commonly known as:  PERCOCET     potassium chloride SA 20 MEQ tablet  Commonly known as: K-DUR,KLOR-CON     sodium bicarbonate 650 MG tablet               Where to Get Your Medications        These medications were sent to [id number] - [place] - [id number]  [address] [place] [address]      Phone: [phone]   amLODIPine 10 MG tablet  lisinopriL 10 MG tablet  nicotine 14 mg/24 hr  pantoprazole 40 MG tablet  sucralfate 1 gram tablet          Explained in detail to the patient about the discharge plan, medications, and follow-up visits. Pt understands and agrees with the treatment plan  [place] [facility] [place] Condition: stable  Diet-   Dietary Orders (From admission, onward)       Start     Ordered    06/29/25 0726  Diet Low Fiber/Residue  Diet effective now         06/29/25 0725                   Medications Per DC med rec  Activities as tolerated   Follow-up Information       Luzmaria Flores FNP. Call in 1 week(s).    Specialty: Family Medicine  Contact information:  [address] [address]  [phone]               Woodrow Garcia MD. Call in 1 week(s).    Specialty: Gastroenterology  Contact information:  [address] [address]  [phone]                           For further questions contact hospitalist office    Discharge time 75 minutes    For worsening symptoms, chest pain, shortness of breath, increased abdominal pain, high grade fever, stroke or stroke like symptoms, immediately go to the nearest Emergency Room or call 911 as soon as possible.      Ilir Barrios M.D, on 6/29/2025. at 7:41 AM.

## 2025-06-29 NOTE — PLAN OF CARE
Problem: Adult Inpatient Plan of Care  Goal: Plan of Care Review  Outcome: Progressing  Flowsheets (Taken 6/29/2025 1051)  Plan of Care Reviewed With: patient  Goal: Optimal Comfort and Wellbeing  Outcome: Progressing  Goal: Readiness for Transition of Care  Outcome: Progressing  Intervention: Mutually Develop Transition Plan  Flowsheets (Taken 6/29/2025 1051)  Transportation Anticipated: family or friend will provide

## 2025-06-29 NOTE — PROGRESS NOTES
Luz Maria Alfaro   MRN: [id number]   ADMISSION DATE: 2025  : 1960  AGE: 65 y.o.    DATE :  2025       PROVIDER: PABLO ALDRIDGE      SUBJECTIVE:  Patient seems to be doing well from GI standpoint.  No complaints of dysphagia.  Tolerated diet well.    Review of Systems   No cardiopulmonary symptoms.  Nausea or vomiting.  Tolerated diet well.  No abdominal pain.  No overt GI bleed.    Review of patient's allergies indicates:   Allergen Reactions    Meperidine Rash and Hives         amLODIPine  10 mg Oral Daily    ARIPiprazole  2 mg Oral Daily    atorvastatin  20 mg Oral QHS    buPROPion  100 mg Oral BID    lisinopriL  10 mg Oral Daily    methocarbamoL  500 mg Oral QID    metoprolol tartrate  12.5 mg Oral BID    nicotine  1 patch Transdermal Daily    pantoprazole  40 mg Intravenous Q12H    QUEtiapine  25 mg Oral Nightly    scopolamine  1 patch Transdermal Q3 Days    sucralfate  1 g Oral QID (AC & HS)       Medications Discontinued During This Encounter   Medication Reason    hydrALAZINE injection 20 mg     acetaminophen tablet 1,000 mg     hydrALAZINE injection 10 mg     amLODIPine tablet 5 mg     famotidine (PF) injection 20 mg     enoxaparin injection 40 mg     busPIRone (BUSPAR) 5 MG Tab Stop Taking at Discharge    diclofenac sodium (VOLTAREN) 1 % Gel Stop Taking at Discharge    aspirin (ECOTRIN) 81 MG EC tablet Stop Taking at Discharge    gabapentin (NEURONTIN) 300 MG capsule Stop Taking at Discharge    sodium bicarbonate 650 MG tablet Stop Taking at Discharge    amoxicillin (AMOXIL) 500 MG capsule Stop Taking at Discharge    metoprolol succinate (TOPROL-XL) 25 MG 24 hr tablet Stop Taking at Discharge    oxyCODONE-acetaminophen (PERCOCET)  mg per tablet Stop Taking at Discharge    potassium chloride SA (K-DUR,KLOR-CON) 20 MEQ tablet Stop Taking at Discharge    pantoprazole (PROTONIX) 40 MG tablet              Past Medical History:   Diagnosis Date    Benign essential HTN     Mixed hyperlipidemia  "    Type 2 diabetes mellitus without complications       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    Sexual activity: Not Currently     Partners: Male     Social Drivers of Health     Financial Resource Strain: Low Risk  (6/27/2025)    Overall Financial Resource Strain (CARDIA)     Difficulty of Paying Living Expenses: Not hard at all   Food Insecurity: No Food Insecurity (6/27/2025)    Hunger Vital Sign     Worried About Running Out of Food in the Last Year: Never true     Ran Out of Food in the Last Year: Never true   Transportation Needs: No Transportation Needs (6/27/2025)    PRAPARE - Transportation     Lack of Transportation (Medical): No     Lack of Transportation (Non-Medical): No   Physical Activity: Inactive (6/27/2025)    Exercise Vital Sign     Days of Exercise per Week: 0 days     Minutes of Exercise per Session: 0 min   Stress: No Stress Concern Present (6/27/2025)    [employer] - Occupational Stress Questionnaire     Feeling of Stress : Only a little   Housing Stability: Low Risk  (6/27/2025)    Housing Stability Vital Sign     Unable to Pay for Housing in the Last Year: No     Homeless in the Last Year: No      Past Surgical History:   Procedure Laterality Date    APPENDECTOMY      BACK SURGERY      BREAST SURGERY  01 13 2002    Augmantation    HEMIARTHROPLASTY OF HIP Left 12/20/2024    Procedure: HEMIARTHROPLASTY, HIP;  Surgeon: Jose J Montemayor DO;  Location: [facility];  Service: Orthopedics;  Laterality: Left;  lateral peg board kevin    HIP SURGERY      INTRAMEDULLARY RODDING OF FEMUR Right 10/27/2022    Procedure: RIGHT FEMUR INTERTROCH IMN;  " Surgeon: Stewart Brambila MD;  Location: [facility];  Service: Orthopedics;  Laterality: Right;  [place] table, supine  3rd case  Synthes Short TFNA        OBJECTIVE:     Vitals:    06/29/25 0730 06/29/25 0748 06/29/25 0815 06/29/25 1138   BP:  138/76 138/76 130/74   Pulse: 74 77 77 72   Resp:       Temp:  97.9 °F (36.6 °C)  98.3 °F (36.8 °C)   TempSrc:  Oral  Oral   SpO2:  99%  97%   Weight:       Height:           Physical Exam   Constitutional:       General: She is not in acute distress.     Appearance: She is not ill-appearing.   HENT:      Head: Normocephalic and atraumatic.   Eyes:      General: No scleral icterus.     Extraocular Movements: Extraocular movements intact.   Cardiovascular:      Rate and Rhythm: Normal rate and regular rhythm.   Pulmonary:      Effort: Pulmonary effort is normal. No respiratory distress.   Abdominal:      General: Bowel sounds are normal. There is no distension.      Palpations: Abdomen is soft. There is no mass.      Tenderness: There is no abdominal tenderness. There is no guarding or rebound.      Comments: No fluid wave appreciated   Musculoskeletal:         General: Normal range of motion.      Right lower leg: No edema.      Left lower leg: No edema.   Skin:     General: Skin is warm and dry.   Neurological:      Mental Status: She is alert and oriented to person, place, and time.   Psychiatric:         Mood and Affect: Mood and affect normal.        LABS    Recent Labs   Lab 06/26/25  0925 06/27/25  0939 06/29/25  0509   WBC 11.47 10.79 9.41   HGB 13.4 15.0 15.4   HCT 39.9 44.4 45.4    204 146      Recent Labs   Lab 06/25/25  0310 06/25/25  0956 06/26/25  0925 06/27/25  0939 06/28/25  0343      < > 142 138 136   K 4.1   < > 3.1* 3.1* 4.1   *   < > 109* 102 104   CO2 15*   < > 24 25 24   BUN 12.5   < > 11.7 11.2 20.8*   CREATININE 0.90   < > 0.71 0.75 0.84   CALCIUM 10.4*   < > 9.0 9.2 8.8   PROT 9.0*  --   --   --   --    BILITOT 0.8  --   --   --   --   "  ALKPHOS 193*  --   --   --   --    ALT 10  --   --   --   --    AST 17  --   --   --   --     < > = values in this interval not displayed.      Recent Labs   Lab 06/28/25  0343   INR 1.2    No results for input(s): "AMYLASE" in the last 168 hours.   Recent Labs   Lab 06/28/25  0343   INR 1.2           RESULTS: US Abdomen Limited  Result Date: 6/25/2025  EXAMINATION US ABDOMEN LIMITED CLINICAL HISTORY RUQ/Cholecystitis; TECHNIQUE Multiplanar grayscale sonographic evaluation of the right upper quadrant was performed, with focused Doppler assessment of the main portal vein. COMPARISON 22 January 2024 FINDINGS Exam quality: adequate for evaluation Pancreas: No focal abnormality or peripancreatic collection appreciated at the visualized pancreas. Gallbladder: No shadowing stones or layering sludge. No wall thickening or pericholecystic fluid.  Sonographic Kurtz sign is negative, per the ultrasonographer's report. Bile ducts: No intra- / extrahepatic biliary dilatation suggestive of obstructing pathology. Liver: Size is within normal limits.  There is irregular nodular appearance of the liver surface, suggesting changes of cirrhosis.  No discrete mass-like abnormality.  Unremarkable isoechoic appearance of the hepatic background, relative to the adjacent right kidney.  Normal hepatopetal flow is seen within the portal vein. Other findings: No suspicious right kidney lesion, shadowing stone, or hydronephrosis.  No free fluid identified. The visualized portions of the aorta and IVC are structures are unremarkable. IMPRESSION 1. No convincing acute sonographic abnormality. 2. Liver surface changes suggestive of cirrhosis. Electronically signed by: Gaurang Montenegro Date:    06/25/2025 Time:    11:09    X-Ray Chest AP Portable  Result Date: 6/25/2025  EXAMINATION: XR CHEST AP PORTABLE CLINICAL HISTORY: Unspecified abdominal pain COMPARISON: 1 January 2025 FINDINGS: Frontal view of the chest was obtained. Heart is not enlarged.  " The lungs are clear.  No pneumothorax.     No acute findings. Electronically signed by: Bruce Smith Date:    06/25/2025 Time:    09:05    CT Abdomen Pelvis With IV Contrast NO Oral Contrast  Result Date: 6/25/2025  EXAMINATION: CT ABDOMEN PELVIS WITH IV CONTRAST CLINICAL HISTORY: Nausea/vomiting;Abdominal pain, acute, nonlocalized;. TECHNIQUE: Helical acquisition through the abdomen and pelvis with IV contrast.  Three plane reconstructions were provided for review.  mGycm. Automatic exposure control, adjustment of mA/kV or iterative reconstruction technique was used to reduce radiation. COMPARISON: 20 March 2024 FINDINGS: Motion degraded scan. Lung bases are clear. Lobular liver suggestive of chronic liver disease.  No significant abnormality of the gallbladder, spleen, pancreas or adrenals.  No hydronephrosis. No bowel obstruction. No significant inflammatory changes of the bowel.  No free air. Urinary bladder is unremarkable. No free fluid. Aorta normal in caliber.  There is moderate atherosclerotic disease. No acute osseous findings.  There are degenerative and postsurgical changes of the spine.  There are bilateral hip arthroplasties.     Motion degraded scan with no acute abdominopelvic findings evident. No significant discrepancy with the preliminary report. Electronically signed by: Bruce Smith Date:    06/25/2025 Time:    08:58    CT Head Without Contrast  Result Date: 6/25/2025  EXAMINATION: CT HEAD WITHOUT CONTRAST CLINICAL HISTORY: Mental status change, unknown cause; TECHNIQUE: Low dose axial images were obtained through the head.  Coronal and sagittal reformations were also performed. Contrast was not administered.  Dose reduction techniques including automatic exposure control (AEC) were utilized. Dose (DLP): 1464 mGycm COMPARISON: CT head without contrast 10/03/2024. FINDINGS: INTRACRANIAL: Redemonstration of a small focus of encephalomalacia in the right parietal lobe.  Mild to moderate  chronic small-vessel ischemic changes of the internal capsules and supratentorial periventricular white matter.  No acute intracranial hemorrhage.  No hydrocephalus.  No intracranial mass effect. SINUSES: Visualized paranasal sinuses and mastoids are clear. SKULL/SCALP: Visualized osseous structures are normal. ORBITS: Visualized orbits are normal.     No acute intracranial pathology. Small focus of encephalomalacia in the right parietal lobe. Mild to moderate chronic small-vessel ischemic changes of the supratentorial white matter. No significant discrepancy between preliminary and final reports. Electronically signed by: Jack Kirkland Date:    06/25/2025 Time:    05:38    CT Lumbar Spine without Contrast  Result Date: 6/3/2025  Indication: Postop thoracolumbar fusion with back pain initial encounter, Yu 3, 2025 at 10:26 AM Technique: Multiple axial CT images were obtained through the lumbar spine from T11 to S1. No contrast was given. Sagittal and coronal reconstructions were performed. Comparison is made with October 17, 2024. Findings: There are bilateral L1 pedicle screws which transverse the pedicles and through the inferior endplate of L1 into the L1-2 disc space with L1-2 disc and endplate spondylosis. There is lucency surrounding the pedicle screws consistent with loosening unchanged. There is no metallic failure. There is diffuse osteopenia greater than expected for age with L3-4, L4-5, and L5-S1 disc and endplate spondylosis at L3-4 and L4-5 vacuum disc phenomena. There is lower facet spondylosis. There is no canal stenosis or neural foraminal impingement. Impression: Loosening surrounding the L1 pedicle screws which transverse the inferior endplate of L1 into the disc space with severe disc and endplate spondylosis. These findings are unchanged. All CT scans at this facility use dose modulation, iterative reconstruction, and/or weight base dose when appropriate to reduce radiation dose to as low as  reasonably achievable. Number of CT and cardiac nuclear medicine studies in the past 12 months: 4     CT Thoracic Spine without Contrast  Result Date: 6/3/2025  Indication: T9 and T10 vertebral body compression fractures with fusion initial encounter, Yu 3, 2025 at 10:20 AM Technique:   Multiple axial CT images were obtained through the thoracic spine from the thoracic inlet to the upper abdomen. No contrast was given. Sagittal and coronal reconstructions were performed. Findings: There are compression deformities of the T10, T11, and T12 vertebral bodies. There are pedicle screws at T9 and L1 bilaterally. There is a posterior fixator device extending between these levels with a laminectomy. At multiple other levels, there is anterior lateral endplate osteophytosis but the spinal canal and neural foramen are widely patent. At the levels of compression and laminectomy, there is no high-grade canal stenosis or neural foraminal impingement. Impression: Lower thoracic fusion and laminectomy transversing T9, T10 and T11 levels with vertebral body compression deformities. There is no high-grade canal stenosis or neural foraminal impingement. All CT scans at this facility use dose modulation, iterative reconstruction, and/or weight base dose when appropriate to reduce radiation dose to as low as reasonably achievable. Number of CT and cardiac nuclear medicine studies in the past 12 months: 4             ICD-10-CM ICD-9-CM   1. Stricture of esophagus  K22.2 530.3   2. Abdominal pain  R10.9 789.00   3. Dehydration  E86.0 276.51   4. Chest pain  R07.9 786.50   5. Tachycardia  R00.0 785.0   6. Nausea  R11.0 787.02          ASSESSMENT & PLAN:   64 yo CF known to Dr. Garcia with a pmhx of HTN, HLD, DM, alcohol abuse, chronic anemia, andetoh vs owens cirrhosis. Here with c/o n/v/d, generalized abdominal pain and dysphagia.      Dysphagia with hx of esophageal stricture   Inability to tolerate PO intake     - clear liquids  today.  NPO after MN for EGD tomorrow.  Correct K today.   - may consider GES pending EGD findings when not on opioids.    - would not expect cannabis hyperemesis with such short term use.     6/29/25  Doing well from GI standpoint.  No complaints of dysphagia reported.  Seems to be tolerating diet well.  Advice to chew food well.  Patient has some issues with dentition.  Mechanical soft diet as tolerated.  Continue PPI along with Carafate.  Needs follow up with primary gastroenterologist, Dr. Garcia as outpatient.  Thank you for the consult

## 2025-06-30 LAB
BACTERIA BLD CULT: NORMAL
BACTERIA BLD CULT: NORMAL

## 2025-07-01 ENCOUNTER — TELEPHONE (OUTPATIENT)
Dept: ADMINISTRATIVE | Facility: CLINIC | Age: 65
End: 2025-07-01
Payer: MEDICARE

## 2025-07-01 LAB — PSYCHE PATHOLOGY RESULT: NORMAL

## 2025-07-01 NOTE — TELEPHONE ENCOUNTER
"Phoned patient in response to reply of "2" to post-discharge texting tracker. Ms. Alfaro states she was just discharged from the hospital and is not feeling well and won't understand anything I have to say then disconnected call.            "

## 2025-07-09 ENCOUNTER — TELEPHONE (OUTPATIENT)
Dept: ADMINISTRATIVE | Facility: CLINIC | Age: 65
End: 2025-07-09
Payer: MEDICARE

## 2025-07-09 ENCOUNTER — PATIENT OUTREACH (OUTPATIENT)
Dept: ADMINISTRATIVE | Facility: OTHER | Age: 65
End: 2025-07-09
Payer: MEDICARE

## 2025-07-09 NOTE — PROGRESS NOTES
CHW - Initial Contact    This Community Health Worker completed OR updated the Social Determinant of Health questionnaire with patient via telephone today.    Pt identified barriers of most importance are: States she cannot hear on the phone and asked me to text her and I have no way of texting her.    Referrals to community agencies completed with patient/caregiver consent outside of [facility] include: no  Referrals were put through [facility] - no:   Support and Services: No support & services have been documented.  Other information discussed the patient needs / wants help with: hearing  Follow up required: no  No future outreach task assigned

## 2025-07-15 ENCOUNTER — PATIENT MESSAGE (OUTPATIENT)
Dept: ORTHOPEDICS | Facility: CLINIC | Age: 65
End: 2025-07-15
Payer: MEDICARE

## 2025-07-21 ENCOUNTER — HOSPITAL ENCOUNTER (OUTPATIENT)
Dept: RADIOLOGY | Facility: CLINIC | Age: 65
Discharge: HOME | End: 2025-07-21
Attending: ORTHOPAEDIC SURGERY
Payer: MEDICARE

## 2025-07-21 ENCOUNTER — OFFICE VISIT (OUTPATIENT)
Dept: ORTHOPEDICS | Facility: CLINIC | Age: 65
End: 2025-07-21
Payer: MEDICARE

## 2025-07-21 VITALS
WEIGHT: 108 LBS | DIASTOLIC BLOOD PRESSURE: 54 MMHG | SYSTOLIC BLOOD PRESSURE: 87 MMHG | HEART RATE: 71 BPM | BODY MASS INDEX: 19.14 KG/M2 | HEIGHT: 63 IN

## 2025-07-21 DIAGNOSIS — S72.002A CLOSED DISPLACED FRACTURE OF LEFT FEMORAL NECK: ICD-10-CM

## 2025-07-21 DIAGNOSIS — S72.002A CLOSED DISPLACED FRACTURE OF LEFT FEMORAL NECK: Primary | ICD-10-CM

## 2025-07-21 PROCEDURE — 73502 X-RAY EXAM HIP UNI 2-3 VIEWS: CPT | Mod: LT,,, | Performed by: ORTHOPAEDIC SURGERY

## 2025-07-21 PROCEDURE — 99213 OFFICE O/P EST LOW 20 MIN: CPT | Mod: ,,, | Performed by: ORTHOPAEDIC SURGERY

## 2025-07-21 NOTE — PROGRESS NOTES
Subjective:       Patient ID: Luz Maria Alfaro is a 65 y.o. female.  Chief Complaint   Patient presents with    Left Hip - Follow-up     7 month f/u from left hip janusz. Reports sharp pain to left hip worse with ROM.         Follow-up        History of Present Illness    HPI:  Ms. Alfaro presents for a post-op follow-up visit for left hip surgery. She reports pain in the left hip, specifically in the back of the hip area, as well as in her right hip and right knee, which she attributes to her altered gait. She describes her condition as significantly uncomfortable, particularly regarding the right hip, stating that addressing this issue would likely provide substantial relief. She also reports thigh pain on the left side, noting it is not muscular and persists regardless of activity level.    She has tried cannabis gummies for pain management, initially experiencing apprehension. She is a heavy smoker, consuming 1.5-2 packs of tobacco per day.    She denies any medical diagnoses.    PREVIOUS TREATMENTS:  Ms. Alfaro underwent a left hip hemiarthroplasty for a fracture.    IMAGING:  An X-ray of the left hip revealed signs of loosening, including a pedestal sign, which indicates micromotion of the implant.    MEDICATIONS:  Ms. Alfaro is using cannabis gummies for pain management.    SOCIAL HISTORY:  Ms. Alfaro smokes 1.5 to two packs of tobacco per day. She reports using cannabis gummies for marijuana consumption.      ROS:  Musculoskeletal: +joint pain, +back pain, +limb pain           ROS:  Constitutional: Denies fever chills  Eyes: No change in vision  ENT: No ringing or current infections  CV: No chest pain  Resp: No labored breathing  MSK: Pain evident at site of injury located in HPI,   Integ: No signs of abrasions or lacerations  Neuro: No numbness or tingling  Lymphatic: No swelling outside the area of injury     Current Outpatient Medications on File Prior to Visit   Medication Sig Dispense  "Refill    amLODIPine (NORVASC) 10 MG tablet Take 1 tablet (10 mg total) by mouth once daily. 30 tablet 0    buPROPion (WELLBUTRIN SR) 100 MG TBSR 12 hr tablet Take 100 mg by mouth 2 (two) times daily.      HYDROcodone-acetaminophen (NORCO) 7.5-325 mg per tablet Take 1 tablet by mouth 2 (two) times daily as needed for Pain.      lisinopriL 10 MG tablet Take 1 tablet (10 mg total) by mouth once daily. 30 tablet 0    methocarbamoL (ROBAXIN) 500 MG Tab Take by mouth 4 (four) times daily.      metoprolol tartrate (LOPRESSOR) 25 MG tablet Take 12.5 mg by mouth.      nicotine (NICODERM CQ) 14 mg/24 hr Place 1 patch onto the skin once daily. 30 patch 0    pantoprazole (PROTONIX) 40 MG tablet Take 1 tablet (40 mg total) by mouth 2 (two) times daily. 60 tablet 0    QUEtiapine (SEROQUEL) 25 MG Tab Take 25 mg by mouth nightly.      ARIPiprazole (ABILIFY) 2 MG Tab Take 1 tablet (2 mg total) by mouth once daily. 30 tablet 1    EScitalopram oxalate (LEXAPRO) 5 MG Tab Take 1 tablet (5 mg total) by mouth once daily. 90 tablet 0    rosuvastatin (CRESTOR) 20 MG tablet Take 1 tablet (20 mg total) by mouth once daily. 90 tablet 0     No current facility-administered medications on file prior to visit.          Objective:      BP (!) 87/54 Comment: just started new bp medication..seeing pcp thursday.  Pulse 71   Ht 5' 3" (1.6 m)   Wt 49 kg (108 lb 0.4 oz)   BMI 19.14 kg/m²   Physical Exam  General the patient is alert and oriented x3 no acute distress nontoxic-appearing appropriate affect.    Constitutional: Vital signs are examined and stable.  Resp: No signs of labored breathing               LLE: -Skin: incisions well healed.           -MSK: Hip and Knee F/E, EHL/FHL, Gastroc/Tib anterior Strength 5/5; Tolerates full passive circumduction of the hip           -Neuro:  Sensation intact to light touch L3-S1 dermatomes           -Lymphatic: No signs of lymphadenopathy           -CV: Capillary refill is less than 2 seconds. " "          Body mass index is 19.14 kg/m².  Ideal body weight: 52.4 kg (115 lb 8.3 oz)  Hemoglobin A1c   Date Value Ref Range Status   12/25/2024 5.0 <=7.0 % Final   10/05/2024 5.4 <=7.0 % Final     Hgb   Date Value Ref Range Status   06/29/2025 15.4 12.0 - 16.0 g/dL Final   06/27/2025 15.0 12.0 - 16.0 g/dL Final     Hct   Date Value Ref Range Status   06/29/2025 45.4 37.0 - 47.0 % Final   06/27/2025 44.4 37.0 - 47.0 % Final     Iron   Date Value Ref Range Status   11/19/2024 9 (L) 25 - 156 UG/DL Final     Iron Level   Date Value Ref Range Status   12/25/2024 52 50 - 170 ug/dL Final   12/23/2024 17 (L) 50 - 170 ug/dL Final     No components found for: "FROLATE"  Vitamin D   Date Value Ref Range Status   03/03/2022 5.7 30.0 - 80.0      WBC   Date Value Ref Range Status   06/29/2025 9.41 4.50 - 11.50 x10(3)/mcL Final   06/27/2025 10.79 4.50 - 11.50 x10(3)/mcL Final   10/31/2022 6.3 x10(3)/mcL Final       Radiology:  X-ray left hip reveals implant in place length and alignment stable as compared to the contralateral side.  Loosening evident to the left stem.  No sign of fracture.  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 Closed displaced fracture of left femoral neck  X-Ray Hip 2 or 3 views Left with Pelvis when performed              Plan:         No follow-ups on file.    Luz Maria was seen today for follow-up.    Diagnoses and all orders for this visit:    Closed displaced fracture of left femoral neck  -     X-Ray Hip 2 or 3 views Left with Pelvis when performed; Future    Assessment & Plan    PLAN SUMMARY:  - Left hip full replacement required due to loosening and micromotion  - Address left hip issues before considering right hip replacement  - Complete removal and replacement of current left hip implant  - Follow-up in early September with Dr. Davila to discuss surgical options    FOLLOW UP:  - Follow up in early September to discuss surgical options with " Dr. Davila.    PROCEDURES:  - Left hip full replacement needed due to signs of loosening.  - Current left hip implant requires complete removal and replacement.  - High risk of complications due to patient's smoking habit and poor bone quality.  - Left hip issues must be addressed before considering right hip replacement.  - X-ray shows pedestal sign, indicating micromotion in the left hip implant.        Patient has a chronic pain.  She is here with a friend today.  She has paper thin bone and my notes.  She does have pedestal sign and sign of loosening of the left hip although his her right hip is bothering her worse today.  She has a sclerotic changes of the right femoral head likely needs a total hip arthroplasty versus hemiarthroplasty.  She has been terrible surgical candidate for elective procedure at this time, she has 2 pack per day smoker.  We will concerns about elective surgery on her.  At this time we would like to proceed with nonoperative management    This note was generated with the assistance of ambient listening technology. Verbal consent was obtained by the patient and accompanying visitor(s) for the recording of patient appointment to facilitate this note. I attest to having reviewed and edited the generated note for accuracy, though some syntax or spelling errors may persist. Please contact the author of this note for any clarification.    This note/OR report was created with the assistance of  voice recognition software or phone  dictation.  There may be transcription errors as a result of using this technology however minimal. Effort has been made to assure accuracy of transcription but any obvious errors or omissions should be clarified with the author of the document.       Jose J Montemayor DO  Orthopedic Trauma Surgery             Future Appointments   Date Time Provider Department Center   1/21/2026  9:30 AM Jose J Montemayor DO [facility]

## 2025-08-11 PROBLEM — R11.2 INTRACTABLE NAUSEA AND VOMITING: Status: RESOLVED | Noted: 2025-06-25 | Resolved: 2025-08-11

## 2025-08-13 ENCOUNTER — HOSPITAL ENCOUNTER (INPATIENT)
Facility: HOSPITAL | Age: 65
LOS: 4 days | Discharge: HOME | DRG: 872 | End: 2025-08-17
Attending: EMERGENCY MEDICINE | Admitting: STUDENT IN AN ORGANIZED HEALTH CARE EDUCATION/TRAINING PROGRAM
Payer: MEDICARE

## 2025-08-13 DIAGNOSIS — R06.00 DYSPNEA: ICD-10-CM

## 2025-08-13 DIAGNOSIS — K11.20 SIALOADENITIS OF SUBMANDIBULAR GLAND: Primary | ICD-10-CM

## 2025-08-13 DIAGNOSIS — E86.0 DEHYDRATION: ICD-10-CM

## 2025-08-13 DIAGNOSIS — R07.9 CHEST PAIN: ICD-10-CM

## 2025-08-13 DIAGNOSIS — E87.6 HYPOKALEMIA: ICD-10-CM

## 2025-08-13 DIAGNOSIS — R19.7 DIARRHEA, UNSPECIFIED TYPE: ICD-10-CM

## 2025-08-13 DIAGNOSIS — R00.0 TACHYCARDIA: ICD-10-CM

## 2025-08-13 LAB
ADV 40+41 DNA STL QL NAA+NON-PROBE: NOT DETECTED
ALBUMIN SERPL-MCNC: 4.1 G/DL (ref 3.4–4.8)
ALBUMIN/GLOB SERPL: 1 RATIO (ref 1.1–2)
ALP SERPL-CCNC: 154 UNIT/L (ref 40–150)
ALT SERPL-CCNC: 14 UNIT/L (ref 0–55)
ANION GAP SERPL CALC-SCNC: 18 MEQ/L
AST SERPL-CCNC: 21 UNIT/L (ref 11–45)
ASTRO TYP 1-8 RNA STL QL NAA+NON-PROBE: NOT DETECTED
BASOPHILS # BLD AUTO: 0.05 X10(3)/MCL
BASOPHILS NFR BLD AUTO: 0.3 %
BILIRUB SERPL-MCNC: 0.5 MG/DL
BUN SERPL-MCNC: 13.2 MG/DL (ref 9.8–20.1)
C CAYETANENSIS DNA STL QL NAA+NON-PROBE: NOT DETECTED
C COLI+JEJ+UPSA DNA STL QL NAA+NON-PROBE: NOT DETECTED
C DIFF TOX A+B STL QL IA: NEGATIVE
CALCIUM SERPL-MCNC: 9.5 MG/DL (ref 8.4–10.2)
CHLORIDE SERPL-SCNC: 112 MMOL/L (ref 98–107)
CK SERPL-CCNC: 41 U/L (ref 29–168)
CLOSTRIDIOIDES DIFFICILE GDH ANTIGEN (OHS): NEGATIVE
CO2 SERPL-SCNC: 11 MMOL/L (ref 23–31)
CREAT SERPL-MCNC: 1.07 MG/DL (ref 0.55–1.02)
CREAT/UREA NIT SERPL: 12
CRYPTOSP DNA STL QL NAA+NON-PROBE: NOT DETECTED
D DIMER PPP IA.FEU-MCNC: 0.57 UG/ML FEU (ref 0–0.5)
E HISTOLYT DNA STL QL NAA+NON-PROBE: NOT DETECTED
EAEC PAA PLAS AGGR+AATA ST NAA+NON-PRB: NOT DETECTED
EC STX1+STX2 GENES STL QL NAA+NON-PROBE: NOT DETECTED
EOSINOPHIL # BLD AUTO: 0 X10(3)/MCL (ref 0–0.9)
EOSINOPHIL NFR BLD AUTO: 0 %
EPEC EAE GENE STL QL NAA+NON-PROBE: NOT DETECTED
ERYTHROCYTE [DISTWIDTH] IN BLOOD BY AUTOMATED COUNT: 15 % (ref 11.5–17)
ETEC LTA+ST1A+ST1B TOX ST NAA+NON-PROBE: NOT DETECTED
G LAMBLIA DNA STL QL NAA+NON-PROBE: NOT DETECTED
GFR SERPLBLD CREATININE-BSD FMLA CKD-EPI: 57 ML/MIN/1.73/M2
GLOBULIN SER-MCNC: 4.1 GM/DL (ref 2.4–3.5)
GLUCOSE SERPL-MCNC: 262 MG/DL (ref 82–115)
HCT VFR BLD AUTO: 42.8 % (ref 37–47)
HGB BLD-MCNC: 15.2 G/DL (ref 12–16)
IMM GRANULOCYTES # BLD AUTO: 0.27 X10(3)/MCL (ref 0–0.04)
IMM GRANULOCYTES NFR BLD AUTO: 1.4 %
LYMPHOCYTES # BLD AUTO: 1.03 X10(3)/MCL (ref 0.6–4.6)
LYMPHOCYTES NFR BLD AUTO: 5.4 %
MAGNESIUM SERPL-MCNC: 1.5 MG/DL (ref 1.6–2.6)
MCH RBC QN AUTO: 33.2 PG (ref 27–31)
MCHC RBC AUTO-ENTMCNC: 35.5 G/DL (ref 33–36)
MCV RBC AUTO: 93.4 FL (ref 80–94)
MONOCYTES # BLD AUTO: 0.06 X10(3)/MCL (ref 0.1–1.3)
MONOCYTES NFR BLD AUTO: 0.3 %
NEUTROPHILS # BLD AUTO: 17.77 X10(3)/MCL (ref 2.1–9.2)
NEUTROPHILS NFR BLD AUTO: 92.6 %
NOROVIRUS GI+II RNA STL QL NAA+NON-PROBE: NOT DETECTED
NRBC BLD AUTO-RTO: 0 %
NT-PROBNP SERPL-MCNC: 655 PG/ML
OHS QRS DURATION: 88 MS
OHS QTC CALCULATION: 510 MS
P SHIGELLOIDES DNA STL QL NAA+NON-PROBE: NOT DETECTED
PLATELET # BLD AUTO: 529 X10(3)/MCL (ref 130–400)
PMV BLD AUTO: 9.4 FL (ref 7.4–10.4)
POTASSIUM SERPL-SCNC: 3 MMOL/L (ref 3.5–5.1)
PROT SERPL-MCNC: 8.2 GM/DL (ref 5.8–7.6)
RBC # BLD AUTO: 4.58 X10(6)/MCL (ref 4.2–5.4)
RVA RNA STL QL NAA+NON-PROBE: NOT DETECTED
S ENT+BONG DNA STL QL NAA+NON-PROBE: NOT DETECTED
SAPO I+II+IV+V RNA STL QL NAA+NON-PROBE: NOT DETECTED
SHIGELLA SP+EIEC IPAH ST NAA+NON-PROBE: NOT DETECTED
SODIUM SERPL-SCNC: 141 MMOL/L (ref 136–145)
TROPONIN I SERPL HS-MCNC: 4 NG/L
V CHOL+PARA+VUL DNA STL QL NAA+NON-PROBE: NOT DETECTED
V CHOLERAE DNA STL QL NAA+NON-PROBE: NOT DETECTED
WBC # BLD AUTO: 19.18 X10(3)/MCL (ref 4.5–11.5)
Y ENTEROCOL DNA STL QL NAA+NON-PROBE: NOT DETECTED

## 2025-08-13 PROCEDURE — 96361 HYDRATE IV INFUSION ADD-ON: CPT

## 2025-08-13 PROCEDURE — 80053 COMPREHEN METABOLIC PANEL: CPT | Performed by: EMERGENCY MEDICINE

## 2025-08-13 PROCEDURE — 25500020 PHARM REV CODE 255: Performed by: EMERGENCY MEDICINE

## 2025-08-13 PROCEDURE — 85025 COMPLETE CBC W/AUTO DIFF WBC: CPT | Performed by: EMERGENCY MEDICINE

## 2025-08-13 PROCEDURE — 93010 ELECTROCARDIOGRAM REPORT: CPT | Mod: ,,, | Performed by: INTERNAL MEDICINE

## 2025-08-13 PROCEDURE — 93005 ELECTROCARDIOGRAM TRACING: CPT

## 2025-08-13 PROCEDURE — 25000003 PHARM REV CODE 250: Performed by: EMERGENCY MEDICINE

## 2025-08-13 PROCEDURE — 99285 EMERGENCY DEPT VISIT HI MDM: CPT | Mod: 25

## 2025-08-13 PROCEDURE — 82550 ASSAY OF CK (CPK): CPT | Performed by: EMERGENCY MEDICINE

## 2025-08-13 PROCEDURE — 83735 ASSAY OF MAGNESIUM: CPT | Performed by: EMERGENCY MEDICINE

## 2025-08-13 PROCEDURE — 96375 TX/PRO/DX INJ NEW DRUG ADDON: CPT

## 2025-08-13 PROCEDURE — 96374 THER/PROPH/DIAG INJ IV PUSH: CPT

## 2025-08-13 PROCEDURE — 87507 IADNA-DNA/RNA PROBE TQ 12-25: CPT | Performed by: EMERGENCY MEDICINE

## 2025-08-13 PROCEDURE — 85379 FIBRIN DEGRADATION QUANT: CPT | Performed by: EMERGENCY MEDICINE

## 2025-08-13 PROCEDURE — 21400001 HC TELEMETRY ROOM

## 2025-08-13 PROCEDURE — 86318 IA INFECTIOUS AGENT ANTIBODY: CPT | Performed by: EMERGENCY MEDICINE

## 2025-08-13 PROCEDURE — 11000001 HC ACUTE MED/SURG PRIVATE ROOM

## 2025-08-13 PROCEDURE — 83880 ASSAY OF NATRIURETIC PEPTIDE: CPT | Performed by: EMERGENCY MEDICINE

## 2025-08-13 PROCEDURE — 84484 ASSAY OF TROPONIN QUANT: CPT | Performed by: EMERGENCY MEDICINE

## 2025-08-13 PROCEDURE — 63600175 PHARM REV CODE 636 W HCPCS: Performed by: STUDENT IN AN ORGANIZED HEALTH CARE EDUCATION/TRAINING PROGRAM

## 2025-08-13 PROCEDURE — 63600175 PHARM REV CODE 636 W HCPCS: Performed by: EMERGENCY MEDICINE

## 2025-08-13 RX ORDER — HYDRALAZINE HYDROCHLORIDE 20 MG/ML
10 INJECTION INTRAMUSCULAR; INTRAVENOUS EVERY 6 HOURS PRN
Status: DISCONTINUED | OUTPATIENT
Start: 2025-08-13 | End: 2025-08-17 | Stop reason: HOSPADM

## 2025-08-13 RX ORDER — POTASSIUM CHLORIDE 7.45 MG/ML
10 INJECTION INTRAVENOUS
Status: COMPLETED | OUTPATIENT
Start: 2025-08-13 | End: 2025-08-13

## 2025-08-13 RX ORDER — HYDRALAZINE HYDROCHLORIDE 20 MG/ML
10 INJECTION INTRAMUSCULAR; INTRAVENOUS
Status: COMPLETED | OUTPATIENT
Start: 2025-08-13 | End: 2025-08-13

## 2025-08-13 RX ORDER — SODIUM CHLORIDE 9 MG/ML
1000 INJECTION, SOLUTION INTRAVENOUS
Status: COMPLETED | OUTPATIENT
Start: 2025-08-13 | End: 2025-08-13

## 2025-08-13 RX ORDER — LORAZEPAM 2 MG/ML
1 INJECTION INTRAMUSCULAR
Status: COMPLETED | OUTPATIENT
Start: 2025-08-13 | End: 2025-08-13

## 2025-08-13 RX ORDER — SODIUM CHLORIDE AND POTASSIUM CHLORIDE 150; 900 MG/100ML; MG/100ML
INJECTION, SOLUTION INTRAVENOUS
Status: DISCONTINUED | OUTPATIENT
Start: 2025-08-13 | End: 2025-08-13

## 2025-08-13 RX ORDER — MAGNESIUM SULFATE 1 G/100ML
1 INJECTION INTRAVENOUS ONCE
Status: COMPLETED | OUTPATIENT
Start: 2025-08-13 | End: 2025-08-13

## 2025-08-13 RX ADMIN — MAGNESIUM SULFATE IN DEXTROSE 1 G: 10 INJECTION, SOLUTION INTRAVENOUS at 07:08

## 2025-08-13 RX ADMIN — POTASSIUM BICARBONATE 50 MEQ: 977.5 TABLET, EFFERVESCENT ORAL at 05:08

## 2025-08-13 RX ADMIN — POTASSIUM CHLORIDE 10 MEQ: 7.46 INJECTION, SOLUTION INTRAVENOUS at 09:08

## 2025-08-13 RX ADMIN — SODIUM CHLORIDE 1000 ML: 9 INJECTION, SOLUTION INTRAVENOUS at 05:08

## 2025-08-13 RX ADMIN — LORAZEPAM 1 MG: 2 INJECTION INTRAMUSCULAR; INTRAVENOUS at 04:08

## 2025-08-13 RX ADMIN — IOHEXOL 75 ML: 350 INJECTION, SOLUTION INTRAVENOUS at 05:08

## 2025-08-13 RX ADMIN — HYDRALAZINE HYDROCHLORIDE 10 MG: 20 INJECTION INTRAMUSCULAR; INTRAVENOUS at 06:08

## 2025-08-14 PROBLEM — I16.0 HYPERTENSIVE URGENCY: Status: ACTIVE | Noted: 2025-08-14

## 2025-08-14 LAB
ACCEPTIBLE SP GR UR QL: 1.01 (ref 1–1.03)
ALBUMIN SERPL-MCNC: 4.2 G/DL (ref 3.4–4.8)
ALBUMIN/GLOB SERPL: 1 RATIO (ref 1.1–2)
ALP SERPL-CCNC: 153 UNIT/L (ref 40–150)
ALT SERPL-CCNC: 12 UNIT/L (ref 0–55)
AMPHET UR QL SCN: NEGATIVE
ANION GAP SERPL CALC-SCNC: 15 MEQ/L
AST SERPL-CCNC: 18 UNIT/L (ref 11–45)
BACTERIA #/AREA URNS AUTO: NORMAL /HPF
BARBITURATE SCN PRESENT UR: NEGATIVE
BASOPHILS # BLD AUTO: 0.03 X10(3)/MCL
BASOPHILS NFR BLD AUTO: 0.1 %
BENZODIAZ UR QL SCN: NEGATIVE
BILIRUB SERPL-MCNC: 0.5 MG/DL
BILIRUB UR QL STRIP.AUTO: NEGATIVE
BUN SERPL-MCNC: 9.1 MG/DL (ref 9.8–20.1)
CALCIUM SERPL-MCNC: 9.7 MG/DL (ref 8.4–10.2)
CANNABINOIDS UR QL SCN: POSITIVE
CHLORIDE SERPL-SCNC: 110 MMOL/L (ref 98–107)
CLARITY UR: CLEAR
CO2 SERPL-SCNC: 16 MMOL/L (ref 23–31)
COCAINE UR QL SCN: NEGATIVE
COLOR UR AUTO: ABNORMAL
CORTIS SERPL-SCNC: 43.2 UG/DL
CREAT SERPL-MCNC: 0.76 MG/DL (ref 0.55–1.02)
CREAT/UREA NIT SERPL: 12
EOSINOPHIL # BLD AUTO: 0.01 X10(3)/MCL (ref 0–0.9)
EOSINOPHIL NFR BLD AUTO: 0 %
ERYTHROCYTE [DISTWIDTH] IN BLOOD BY AUTOMATED COUNT: 14.6 % (ref 11.5–17)
EST. AVERAGE GLUCOSE BLD GHB EST-MCNC: 114 MG/DL
FENTANYL UR QL SCN: NEGATIVE
GFR SERPLBLD CREATININE-BSD FMLA CKD-EPI: >60 ML/MIN/1.73/M2
GLOBULIN SER-MCNC: 4.1 GM/DL (ref 2.4–3.5)
GLUCOSE SERPL-MCNC: 187 MG/DL (ref 82–115)
GLUCOSE UR QL STRIP: 250
HBA1C MFR BLD: 5.6 %
HCT VFR BLD AUTO: 42.6 % (ref 37–47)
HGB BLD-MCNC: 15.4 G/DL (ref 12–16)
HGB UR QL STRIP: NEGATIVE
IMM GRANULOCYTES # BLD AUTO: 0.15 X10(3)/MCL (ref 0–0.04)
IMM GRANULOCYTES NFR BLD AUTO: 0.7 %
KETONES UR QL STRIP: 15
LACTATE SERPL-SCNC: 2.2 MMOL/L (ref 0.5–2.2)
LEUKOCYTE ESTERASE UR QL STRIP: NEGATIVE
LYMPHOCYTES # BLD AUTO: 1.27 X10(3)/MCL (ref 0.6–4.6)
LYMPHOCYTES NFR BLD AUTO: 6.2 %
MAGNESIUM SERPL-MCNC: 2 MG/DL (ref 1.6–2.6)
MCH RBC QN AUTO: 32.7 PG (ref 27–31)
MCHC RBC AUTO-ENTMCNC: 36.2 G/DL (ref 33–36)
MCV RBC AUTO: 90.4 FL (ref 80–94)
MDMA UR QL SCN: NEGATIVE
MONOCYTES # BLD AUTO: 0.48 X10(3)/MCL (ref 0.1–1.3)
MONOCYTES NFR BLD AUTO: 2.3 %
NEUTROPHILS # BLD AUTO: 18.5 X10(3)/MCL (ref 2.1–9.2)
NEUTROPHILS NFR BLD AUTO: 90.7 %
NITRITE UR QL STRIP: NEGATIVE
NRBC BLD AUTO-RTO: 0 %
OHS QRS DURATION: 84 MS
OHS QTC CALCULATION: 455 MS
OPIATES UR QL SCN: POSITIVE
PCP UR QL: NEGATIVE
PH UR STRIP: 6 [PH]
PH UR: 6 [PH] (ref 3–11)
PLATELET # BLD AUTO: 494 X10(3)/MCL (ref 130–400)
PMV BLD AUTO: 8.9 FL (ref 7.4–10.4)
POTASSIUM SERPL-SCNC: 3.4 MMOL/L (ref 3.5–5.1)
PROT SERPL-MCNC: 8.3 GM/DL (ref 5.8–7.6)
PROT UR QL STRIP: ABNORMAL
RBC # BLD AUTO: 4.71 X10(6)/MCL (ref 4.2–5.4)
RBC #/AREA URNS AUTO: NORMAL /HPF
SODIUM SERPL-SCNC: 141 MMOL/L (ref 136–145)
SP GR UR STRIP.AUTO: 1.01 (ref 1–1.03)
SQUAMOUS #/AREA URNS AUTO: NORMAL /HPF
T4 FREE SERPL-MCNC: 3.32 NG/DL (ref 0.7–1.48)
TSH SERPL-ACNC: 0.47 UIU/ML (ref 0.35–4.94)
UROBILINOGEN UR STRIP-ACNC: 0.2
WBC # BLD AUTO: 20.44 X10(3)/MCL (ref 4.5–11.5)
WBC #/AREA URNS AUTO: NORMAL /HPF

## 2025-08-14 PROCEDURE — 83605 ASSAY OF LACTIC ACID: CPT | Performed by: EMERGENCY MEDICINE

## 2025-08-14 PROCEDURE — 84244 ASSAY OF RENIN: CPT | Performed by: STUDENT IN AN ORGANIZED HEALTH CARE EDUCATION/TRAINING PROGRAM

## 2025-08-14 PROCEDURE — 63600175 PHARM REV CODE 636 W HCPCS: Performed by: EMERGENCY MEDICINE

## 2025-08-14 PROCEDURE — 63600175 PHARM REV CODE 636 W HCPCS: Performed by: INTERNAL MEDICINE

## 2025-08-14 PROCEDURE — 83735 ASSAY OF MAGNESIUM: CPT | Performed by: STUDENT IN AN ORGANIZED HEALTH CARE EDUCATION/TRAINING PROGRAM

## 2025-08-14 PROCEDURE — 63600175 PHARM REV CODE 636 W HCPCS: Performed by: STUDENT IN AN ORGANIZED HEALTH CARE EDUCATION/TRAINING PROGRAM

## 2025-08-14 PROCEDURE — 25000003 PHARM REV CODE 250: Performed by: STUDENT IN AN ORGANIZED HEALTH CARE EDUCATION/TRAINING PROGRAM

## 2025-08-14 PROCEDURE — 82533 TOTAL CORTISOL: CPT | Performed by: STUDENT IN AN ORGANIZED HEALTH CARE EDUCATION/TRAINING PROGRAM

## 2025-08-14 PROCEDURE — 85025 COMPLETE CBC W/AUTO DIFF WBC: CPT | Performed by: STUDENT IN AN ORGANIZED HEALTH CARE EDUCATION/TRAINING PROGRAM

## 2025-08-14 PROCEDURE — 87040 BLOOD CULTURE FOR BACTERIA: CPT | Performed by: INTERNAL MEDICINE

## 2025-08-14 PROCEDURE — 93010 ELECTROCARDIOGRAM REPORT: CPT | Mod: ,,, | Performed by: INTERNAL MEDICINE

## 2025-08-14 PROCEDURE — 80053 COMPREHEN METABOLIC PANEL: CPT | Performed by: STUDENT IN AN ORGANIZED HEALTH CARE EDUCATION/TRAINING PROGRAM

## 2025-08-14 PROCEDURE — 21400001 HC TELEMETRY ROOM

## 2025-08-14 PROCEDURE — 84439 ASSAY OF FREE THYROXINE: CPT | Performed by: STUDENT IN AN ORGANIZED HEALTH CARE EDUCATION/TRAINING PROGRAM

## 2025-08-14 PROCEDURE — 83036 HEMOGLOBIN GLYCOSYLATED A1C: CPT | Performed by: INTERNAL MEDICINE

## 2025-08-14 PROCEDURE — 11000001 HC ACUTE MED/SURG PRIVATE ROOM

## 2025-08-14 PROCEDURE — 84443 ASSAY THYROID STIM HORMONE: CPT | Performed by: STUDENT IN AN ORGANIZED HEALTH CARE EDUCATION/TRAINING PROGRAM

## 2025-08-14 PROCEDURE — 82088 ASSAY OF ALDOSTERONE: CPT | Performed by: STUDENT IN AN ORGANIZED HEALTH CARE EDUCATION/TRAINING PROGRAM

## 2025-08-14 PROCEDURE — 81003 URINALYSIS AUTO W/O SCOPE: CPT | Mod: 59 | Performed by: STUDENT IN AN ORGANIZED HEALTH CARE EDUCATION/TRAINING PROGRAM

## 2025-08-14 PROCEDURE — 25000003 PHARM REV CODE 250: Performed by: INTERNAL MEDICINE

## 2025-08-14 PROCEDURE — 93005 ELECTROCARDIOGRAM TRACING: CPT

## 2025-08-14 PROCEDURE — 25500020 PHARM REV CODE 255: Performed by: STUDENT IN AN ORGANIZED HEALTH CARE EDUCATION/TRAINING PROGRAM

## 2025-08-14 PROCEDURE — 80307 DRUG TEST PRSMV CHEM ANLYZR: CPT | Performed by: STUDENT IN AN ORGANIZED HEALTH CARE EDUCATION/TRAINING PROGRAM

## 2025-08-14 RX ORDER — IBUPROFEN 200 MG
16 TABLET ORAL
Status: DISCONTINUED | OUTPATIENT
Start: 2025-08-14 | End: 2025-08-17 | Stop reason: HOSPADM

## 2025-08-14 RX ORDER — CEFEPIME HYDROCHLORIDE 2 G/1
2 INJECTION, POWDER, FOR SOLUTION INTRAVENOUS
Status: DISCONTINUED | OUTPATIENT
Start: 2025-08-14 | End: 2025-08-17 | Stop reason: SDUPTHER

## 2025-08-14 RX ORDER — METHOCARBAMOL 500 MG/1
500 TABLET, FILM COATED ORAL 4 TIMES DAILY
Status: DISCONTINUED | OUTPATIENT
Start: 2025-08-14 | End: 2025-08-17 | Stop reason: HOSPADM

## 2025-08-14 RX ORDER — BUPROPION HYDROCHLORIDE 100 MG/1
100 TABLET, EXTENDED RELEASE ORAL 2 TIMES DAILY
Status: DISCONTINUED | OUTPATIENT
Start: 2025-08-14 | End: 2025-08-17 | Stop reason: HOSPADM

## 2025-08-14 RX ORDER — METOPROLOL TARTRATE 25 MG/1
25 TABLET, FILM COATED ORAL 2 TIMES DAILY
Status: DISCONTINUED | OUTPATIENT
Start: 2025-08-14 | End: 2025-08-17 | Stop reason: HOSPADM

## 2025-08-14 RX ORDER — CHLORPROMAZINE HYDROCHLORIDE 25 MG/1
25 TABLET, FILM COATED ORAL 3 TIMES DAILY PRN
Status: DISCONTINUED | OUTPATIENT
Start: 2025-08-14 | End: 2025-08-17 | Stop reason: HOSPADM

## 2025-08-14 RX ORDER — CEFTRIAXONE 1 G/1
1 INJECTION, POWDER, FOR SOLUTION INTRAMUSCULAR; INTRAVENOUS
Status: DISCONTINUED | OUTPATIENT
Start: 2025-08-14 | End: 2025-08-14

## 2025-08-14 RX ORDER — INSULIN ASPART 100 [IU]/ML
0-10 INJECTION, SOLUTION INTRAVENOUS; SUBCUTANEOUS
Status: DISCONTINUED | OUTPATIENT
Start: 2025-08-14 | End: 2025-08-17 | Stop reason: HOSPADM

## 2025-08-14 RX ORDER — METRONIDAZOLE 500 MG/1
500 TABLET ORAL EVERY 8 HOURS
Status: DISCONTINUED | OUTPATIENT
Start: 2025-08-14 | End: 2025-08-14

## 2025-08-14 RX ORDER — GLUCAGON 1 MG
1 KIT INJECTION
Status: DISCONTINUED | OUTPATIENT
Start: 2025-08-14 | End: 2025-08-17 | Stop reason: HOSPADM

## 2025-08-14 RX ORDER — PANTOPRAZOLE SODIUM 40 MG/1
40 TABLET, DELAYED RELEASE ORAL 2 TIMES DAILY
Status: DISCONTINUED | OUTPATIENT
Start: 2025-08-14 | End: 2025-08-17 | Stop reason: HOSPADM

## 2025-08-14 RX ORDER — ONDANSETRON 4 MG/1
8 TABLET, ORALLY DISINTEGRATING ORAL
Status: COMPLETED | OUTPATIENT
Start: 2025-08-14 | End: 2025-08-14

## 2025-08-14 RX ORDER — ENOXAPARIN SODIUM 100 MG/ML
40 INJECTION SUBCUTANEOUS EVERY 24 HOURS
Status: DISCONTINUED | OUTPATIENT
Start: 2025-08-14 | End: 2025-08-17 | Stop reason: HOSPADM

## 2025-08-14 RX ORDER — TRAZODONE HYDROCHLORIDE 50 MG/1
50 TABLET ORAL NIGHTLY PRN
Status: DISCONTINUED | OUTPATIENT
Start: 2025-08-14 | End: 2025-08-17 | Stop reason: HOSPADM

## 2025-08-14 RX ORDER — LISINOPRIL 10 MG/1
10 TABLET ORAL DAILY
Status: DISCONTINUED | OUTPATIENT
Start: 2025-08-14 | End: 2025-08-17 | Stop reason: HOSPADM

## 2025-08-14 RX ORDER — ONDANSETRON HYDROCHLORIDE 2 MG/ML
4 INJECTION, SOLUTION INTRAVENOUS EVERY 6 HOURS PRN
Status: DISCONTINUED | OUTPATIENT
Start: 2025-08-14 | End: 2025-08-17 | Stop reason: HOSPADM

## 2025-08-14 RX ORDER — ARIPIPRAZOLE 2 MG/1
2 TABLET ORAL DAILY
Status: DISCONTINUED | OUTPATIENT
Start: 2025-08-14 | End: 2025-08-17 | Stop reason: HOSPADM

## 2025-08-14 RX ORDER — SODIUM CHLORIDE, SODIUM LACTATE, POTASSIUM CHLORIDE, CALCIUM CHLORIDE 600; 310; 30; 20 MG/100ML; MG/100ML; MG/100ML; MG/100ML
INJECTION, SOLUTION INTRAVENOUS CONTINUOUS
Status: ACTIVE | OUTPATIENT
Start: 2025-08-14 | End: 2025-08-15

## 2025-08-14 RX ORDER — SODIUM CHLORIDE, SODIUM LACTATE, POTASSIUM CHLORIDE, CALCIUM CHLORIDE 600; 310; 30; 20 MG/100ML; MG/100ML; MG/100ML; MG/100ML
INJECTION, SOLUTION INTRAVENOUS CONTINUOUS
Status: DISCONTINUED | OUTPATIENT
Start: 2025-08-14 | End: 2025-08-14

## 2025-08-14 RX ORDER — HYDROCODONE BITARTRATE AND ACETAMINOPHEN 5; 325 MG/1; MG/1
1 TABLET ORAL EVERY 4 HOURS PRN
Refills: 0 | Status: DISCONTINUED | OUTPATIENT
Start: 2025-08-14 | End: 2025-08-17 | Stop reason: HOSPADM

## 2025-08-14 RX ORDER — SODIUM CHLORIDE 0.9 % (FLUSH) 0.9 %
10 SYRINGE (ML) INJECTION EVERY 12 HOURS PRN
Status: DISCONTINUED | OUTPATIENT
Start: 2025-08-14 | End: 2025-08-17 | Stop reason: HOSPADM

## 2025-08-14 RX ORDER — IBUPROFEN 200 MG
24 TABLET ORAL
Status: DISCONTINUED | OUTPATIENT
Start: 2025-08-14 | End: 2025-08-17 | Stop reason: HOSPADM

## 2025-08-14 RX ORDER — SODIUM CHLORIDE 9 MG/ML
1000 INJECTION, SOLUTION INTRAVENOUS
Status: DISCONTINUED | OUTPATIENT
Start: 2025-08-14 | End: 2025-08-14

## 2025-08-14 RX ORDER — LABETALOL HYDROCHLORIDE 5 MG/ML
10 INJECTION, SOLUTION INTRAVENOUS EVERY 4 HOURS PRN
Status: DISCONTINUED | OUTPATIENT
Start: 2025-08-14 | End: 2025-08-17 | Stop reason: HOSPADM

## 2025-08-14 RX ORDER — ACETAMINOPHEN 325 MG/1
325 TABLET ORAL EVERY 4 HOURS PRN
Status: DISCONTINUED | OUTPATIENT
Start: 2025-08-14 | End: 2025-08-17 | Stop reason: HOSPADM

## 2025-08-14 RX ORDER — BENZONATATE 100 MG/1
200 CAPSULE ORAL EVERY 8 HOURS PRN
Status: DISCONTINUED | OUTPATIENT
Start: 2025-08-14 | End: 2025-08-17 | Stop reason: HOSPADM

## 2025-08-14 RX ORDER — NALOXONE HCL 0.4 MG/ML
0.02 VIAL (ML) INJECTION
Status: DISCONTINUED | OUTPATIENT
Start: 2025-08-14 | End: 2025-08-17 | Stop reason: HOSPADM

## 2025-08-14 RX ORDER — AMLODIPINE BESYLATE 5 MG/1
10 TABLET ORAL DAILY
Status: DISCONTINUED | OUTPATIENT
Start: 2025-08-14 | End: 2025-08-17 | Stop reason: HOSPADM

## 2025-08-14 RX ORDER — ONDANSETRON HYDROCHLORIDE 4 MG/5ML
4 SOLUTION ORAL ONCE
Status: DISCONTINUED | OUTPATIENT
Start: 2025-08-14 | End: 2025-08-14

## 2025-08-14 RX ADMIN — METHOCARBAMOL 500 MG: 500 TABLET ORAL at 02:08

## 2025-08-14 RX ADMIN — VANCOMYCIN HYDROCHLORIDE 1250 MG: 1.25 INJECTION, POWDER, LYOPHILIZED, FOR SOLUTION INTRAVENOUS at 10:08

## 2025-08-14 RX ADMIN — IOHEXOL 70 ML: 350 INJECTION, SOLUTION INTRAVENOUS at 09:08

## 2025-08-14 RX ADMIN — AMLODIPINE BESYLATE 10 MG: 5 TABLET ORAL at 08:08

## 2025-08-14 RX ADMIN — METHOCARBAMOL 500 MG: 500 TABLET ORAL at 08:08

## 2025-08-14 RX ADMIN — ONDANSETRON 8 MG: 4 TABLET, ORALLY DISINTEGRATING ORAL at 02:08

## 2025-08-14 RX ADMIN — POTASSIUM BICARBONATE 25 MEQ: 977.5 TABLET, EFFERVESCENT ORAL at 10:08

## 2025-08-14 RX ADMIN — LISINOPRIL 10 MG: 10 TABLET ORAL at 08:08

## 2025-08-14 RX ADMIN — CEFEPIME 2 G: 2 INJECTION, POWDER, FOR SOLUTION INTRAVENOUS at 10:08

## 2025-08-14 RX ADMIN — SODIUM CHLORIDE, POTASSIUM CHLORIDE, SODIUM LACTATE AND CALCIUM CHLORIDE: 600; 310; 30; 20 INJECTION, SOLUTION INTRAVENOUS at 12:08

## 2025-08-14 RX ADMIN — ENOXAPARIN SODIUM 40 MG: 40 INJECTION SUBCUTANEOUS at 06:08

## 2025-08-14 RX ADMIN — PANTOPRAZOLE SODIUM 40 MG: 40 TABLET, DELAYED RELEASE ORAL at 08:08

## 2025-08-14 RX ADMIN — SODIUM CHLORIDE, POTASSIUM CHLORIDE, SODIUM LACTATE AND CALCIUM CHLORIDE: 600; 310; 30; 20 INJECTION, SOLUTION INTRAVENOUS at 10:08

## 2025-08-14 RX ADMIN — PIPERACILLIN SODIUM AND TAZOBACTAM SODIUM 4.5 G: 4; .5 INJECTION, POWDER, LYOPHILIZED, FOR SOLUTION INTRAVENOUS at 08:08

## 2025-08-14 RX ADMIN — BUPROPION HYDROCHLORIDE 100 MG: 100 TABLET, FILM COATED, EXTENDED RELEASE ORAL at 08:08

## 2025-08-14 RX ADMIN — SODIUM CHLORIDE, POTASSIUM CHLORIDE, SODIUM LACTATE AND CALCIUM CHLORIDE: 600; 310; 30; 20 INJECTION, SOLUTION INTRAVENOUS at 06:08

## 2025-08-14 RX ADMIN — METOPROLOL TARTRATE 25 MG: 25 TABLET, FILM COATED ORAL at 08:08

## 2025-08-14 RX ADMIN — ARIPIPRAZOLE 2 MG: 2 TABLET ORAL at 08:08

## 2025-08-14 RX ADMIN — METHOCARBAMOL 500 MG: 500 TABLET ORAL at 06:08

## 2025-08-14 RX ADMIN — HYDRALAZINE HYDROCHLORIDE 10 MG: 20 INJECTION INTRAMUSCULAR; INTRAVENOUS at 02:08

## 2025-08-14 RX ADMIN — POTASSIUM BICARBONATE 50 MEQ: 977.5 TABLET, EFFERVESCENT ORAL at 07:08

## 2025-08-14 RX ADMIN — ONDANSETRON 4 MG: 2 INJECTION INTRAMUSCULAR; INTRAVENOUS at 02:08

## 2025-08-15 LAB
ANION GAP SERPL CALC-SCNC: 12 MEQ/L
BASOPHILS # BLD AUTO: 0.03 X10(3)/MCL
BASOPHILS NFR BLD AUTO: 0.2 %
BUN SERPL-MCNC: 8.6 MG/DL (ref 9.8–20.1)
CALCIUM SERPL-MCNC: 9.5 MG/DL (ref 8.4–10.2)
CHLORIDE SERPL-SCNC: 99 MMOL/L (ref 98–107)
CO2 SERPL-SCNC: 23 MMOL/L (ref 23–31)
CREAT SERPL-MCNC: 0.65 MG/DL (ref 0.55–1.02)
CREAT/UREA NIT SERPL: 13
EOSINOPHIL # BLD AUTO: 0.02 X10(3)/MCL (ref 0–0.9)
EOSINOPHIL NFR BLD AUTO: 0.1 %
ERYTHROCYTE [DISTWIDTH] IN BLOOD BY AUTOMATED COUNT: 14.6 % (ref 11.5–17)
GFR SERPLBLD CREATININE-BSD FMLA CKD-EPI: >60 ML/MIN/1.73/M2
GLUCOSE SERPL-MCNC: 123 MG/DL (ref 82–115)
HCT VFR BLD AUTO: 43.8 % (ref 37–47)
HGB BLD-MCNC: 15.5 G/DL (ref 12–16)
IMM GRANULOCYTES # BLD AUTO: 0.08 X10(3)/MCL (ref 0–0.04)
IMM GRANULOCYTES NFR BLD AUTO: 0.4 %
LACTATE SERPL-SCNC: 1.4 MMOL/L (ref 0.5–2.2)
LYMPHOCYTES # BLD AUTO: 2.47 X10(3)/MCL (ref 0.6–4.6)
LYMPHOCYTES NFR BLD AUTO: 13.3 %
MAGNESIUM SERPL-MCNC: 1.7 MG/DL (ref 1.6–2.6)
MCH RBC QN AUTO: 32.3 PG (ref 27–31)
MCHC RBC AUTO-ENTMCNC: 35.4 G/DL (ref 33–36)
MCV RBC AUTO: 91.3 FL (ref 80–94)
MONOCYTES # BLD AUTO: 0.85 X10(3)/MCL (ref 0.1–1.3)
MONOCYTES NFR BLD AUTO: 4.6 %
NEUTROPHILS # BLD AUTO: 15.07 X10(3)/MCL (ref 2.1–9.2)
NEUTROPHILS NFR BLD AUTO: 81.4 %
NRBC BLD AUTO-RTO: 0 %
PLATELET # BLD AUTO: 424 X10(3)/MCL (ref 130–400)
PMV BLD AUTO: 8.9 FL (ref 7.4–10.4)
POCT GLUCOSE: 101 MG/DL (ref 70–110)
POCT GLUCOSE: 111 MG/DL (ref 70–110)
POCT GLUCOSE: 112 MG/DL (ref 70–110)
POCT GLUCOSE: 117 MG/DL (ref 70–110)
POCT GLUCOSE: 133 MG/DL (ref 70–110)
POTASSIUM SERPL-SCNC: 3.4 MMOL/L (ref 3.5–5.1)
RBC # BLD AUTO: 4.8 X10(6)/MCL (ref 4.2–5.4)
SODIUM SERPL-SCNC: 134 MMOL/L (ref 136–145)
WBC # BLD AUTO: 18.52 X10(3)/MCL (ref 4.5–11.5)

## 2025-08-15 PROCEDURE — 85025 COMPLETE CBC W/AUTO DIFF WBC: CPT | Performed by: INTERNAL MEDICINE

## 2025-08-15 PROCEDURE — 83735 ASSAY OF MAGNESIUM: CPT | Performed by: INTERNAL MEDICINE

## 2025-08-15 PROCEDURE — 63600175 PHARM REV CODE 636 W HCPCS

## 2025-08-15 PROCEDURE — S4991 NICOTINE PATCH NONLEGEND: HCPCS | Performed by: INTERNAL MEDICINE

## 2025-08-15 PROCEDURE — 63600175 PHARM REV CODE 636 W HCPCS: Performed by: INTERNAL MEDICINE

## 2025-08-15 PROCEDURE — 21400001 HC TELEMETRY ROOM

## 2025-08-15 PROCEDURE — 25000003 PHARM REV CODE 250: Performed by: STUDENT IN AN ORGANIZED HEALTH CARE EDUCATION/TRAINING PROGRAM

## 2025-08-15 PROCEDURE — 11000001 HC ACUTE MED/SURG PRIVATE ROOM

## 2025-08-15 PROCEDURE — 25000003 PHARM REV CODE 250: Performed by: INTERNAL MEDICINE

## 2025-08-15 PROCEDURE — 83605 ASSAY OF LACTIC ACID: CPT | Performed by: EMERGENCY MEDICINE

## 2025-08-15 PROCEDURE — 36415 COLL VENOUS BLD VENIPUNCTURE: CPT | Performed by: EMERGENCY MEDICINE

## 2025-08-15 PROCEDURE — 80048 BASIC METABOLIC PNL TOTAL CA: CPT | Performed by: INTERNAL MEDICINE

## 2025-08-15 PROCEDURE — 63600175 PHARM REV CODE 636 W HCPCS: Performed by: STUDENT IN AN ORGANIZED HEALTH CARE EDUCATION/TRAINING PROGRAM

## 2025-08-15 RX ORDER — IBUPROFEN 200 MG
1 TABLET ORAL DAILY
Status: DISCONTINUED | OUTPATIENT
Start: 2025-08-15 | End: 2025-08-17 | Stop reason: HOSPADM

## 2025-08-15 RX ORDER — SODIUM CHLORIDE, SODIUM LACTATE, POTASSIUM CHLORIDE, CALCIUM CHLORIDE 600; 310; 30; 20 MG/100ML; MG/100ML; MG/100ML; MG/100ML
INJECTION, SOLUTION INTRAVENOUS CONTINUOUS
Status: DISCONTINUED | OUTPATIENT
Start: 2025-08-15 | End: 2025-08-17

## 2025-08-15 RX ADMIN — PANTOPRAZOLE SODIUM 40 MG: 40 TABLET, DELAYED RELEASE ORAL at 12:08

## 2025-08-15 RX ADMIN — AMLODIPINE BESYLATE 10 MG: 5 TABLET ORAL at 09:08

## 2025-08-15 RX ADMIN — METOPROLOL TARTRATE 25 MG: 25 TABLET, FILM COATED ORAL at 12:08

## 2025-08-15 RX ADMIN — SODIUM CHLORIDE, POTASSIUM CHLORIDE, SODIUM LACTATE AND CALCIUM CHLORIDE: 600; 310; 30; 20 INJECTION, SOLUTION INTRAVENOUS at 11:08

## 2025-08-15 RX ADMIN — CEFEPIME 2 G: 2 INJECTION, POWDER, FOR SOLUTION INTRAVENOUS at 09:08

## 2025-08-15 RX ADMIN — METHOCARBAMOL 500 MG: 500 TABLET ORAL at 10:08

## 2025-08-15 RX ADMIN — ONDANSETRON 4 MG: 2 INJECTION INTRAMUSCULAR; INTRAVENOUS at 12:08

## 2025-08-15 RX ADMIN — METHOCARBAMOL 500 MG: 500 TABLET ORAL at 12:08

## 2025-08-15 RX ADMIN — METHOCARBAMOL 500 MG: 500 TABLET ORAL at 04:08

## 2025-08-15 RX ADMIN — PANTOPRAZOLE SODIUM 40 MG: 40 TABLET, DELAYED RELEASE ORAL at 10:08

## 2025-08-15 RX ADMIN — BUPROPION HYDROCHLORIDE 100 MG: 100 TABLET, FILM COATED, EXTENDED RELEASE ORAL at 10:08

## 2025-08-15 RX ADMIN — CEFEPIME 2 G: 2 INJECTION, POWDER, FOR SOLUTION INTRAVENOUS at 12:08

## 2025-08-15 RX ADMIN — SODIUM CHLORIDE, POTASSIUM CHLORIDE, SODIUM LACTATE AND CALCIUM CHLORIDE 500 ML: 600; 310; 30; 20 INJECTION, SOLUTION INTRAVENOUS at 10:08

## 2025-08-15 RX ADMIN — CEFEPIME 2 G: 2 INJECTION, POWDER, FOR SOLUTION INTRAVENOUS at 10:08

## 2025-08-15 RX ADMIN — VANCOMYCIN HYDROCHLORIDE 1000 MG: 1 INJECTION, POWDER, LYOPHILIZED, FOR SOLUTION INTRAVENOUS at 11:08

## 2025-08-15 RX ADMIN — BUPROPION HYDROCHLORIDE 100 MG: 100 TABLET, FILM COATED, EXTENDED RELEASE ORAL at 09:08

## 2025-08-15 RX ADMIN — ARIPIPRAZOLE 2 MG: 2 TABLET ORAL at 09:08

## 2025-08-15 RX ADMIN — ENOXAPARIN SODIUM 40 MG: 40 INJECTION SUBCUTANEOUS at 04:08

## 2025-08-15 RX ADMIN — CHLORPROMAZINE HYDROCHLORIDE 25 MG: 25 TABLET, FILM COATED ORAL at 11:08

## 2025-08-15 RX ADMIN — METOPROLOL TARTRATE 25 MG: 25 TABLET, FILM COATED ORAL at 09:08

## 2025-08-15 RX ADMIN — LISINOPRIL 10 MG: 10 TABLET ORAL at 09:08

## 2025-08-15 RX ADMIN — NICOTINE 1 PATCH: 14 PATCH TRANSDERMAL at 11:08

## 2025-08-15 RX ADMIN — TRAZODONE HYDROCHLORIDE 50 MG: 50 TABLET ORAL at 10:08

## 2025-08-15 RX ADMIN — PANTOPRAZOLE SODIUM 40 MG: 40 TABLET, DELAYED RELEASE ORAL at 09:08

## 2025-08-15 RX ADMIN — METHOCARBAMOL 500 MG: 500 TABLET ORAL at 09:08

## 2025-08-15 RX ADMIN — BUPROPION HYDROCHLORIDE 100 MG: 100 TABLET, FILM COATED, EXTENDED RELEASE ORAL at 12:08

## 2025-08-15 RX ADMIN — ONDANSETRON 4 MG: 2 INJECTION INTRAMUSCULAR; INTRAVENOUS at 06:08

## 2025-08-16 LAB
ANION GAP SERPL CALC-SCNC: 12 MEQ/L
BUN SERPL-MCNC: 15.6 MG/DL (ref 9.8–20.1)
CALCIUM SERPL-MCNC: 9.1 MG/DL (ref 8.4–10.2)
CHLORIDE SERPL-SCNC: 100 MMOL/L (ref 98–107)
CO2 SERPL-SCNC: 22 MMOL/L (ref 23–31)
CREAT SERPL-MCNC: 1.2 MG/DL (ref 0.55–1.02)
CREAT/UREA NIT SERPL: 13
ERYTHROCYTE [DISTWIDTH] IN BLOOD BY AUTOMATED COUNT: 14.7 % (ref 11.5–17)
GFR SERPLBLD CREATININE-BSD FMLA CKD-EPI: 50 ML/MIN/1.73/M2
GLUCOSE SERPL-MCNC: 85 MG/DL (ref 82–115)
HCT VFR BLD AUTO: 39 % (ref 37–47)
HGB BLD-MCNC: 13.5 G/DL (ref 12–16)
MCH RBC QN AUTO: 32.6 PG (ref 27–31)
MCHC RBC AUTO-ENTMCNC: 34.6 G/DL (ref 33–36)
MCV RBC AUTO: 94.2 FL (ref 80–94)
NRBC BLD AUTO-RTO: 0 %
PLATELET # BLD AUTO: 307 X10(3)/MCL (ref 130–400)
PMV BLD AUTO: 9.3 FL (ref 7.4–10.4)
POCT GLUCOSE: 83 MG/DL (ref 70–110)
POCT GLUCOSE: 91 MG/DL (ref 70–110)
POCT GLUCOSE: 95 MG/DL (ref 70–110)
POTASSIUM SERPL-SCNC: 3 MMOL/L (ref 3.5–5.1)
RBC # BLD AUTO: 4.14 X10(6)/MCL (ref 4.2–5.4)
SODIUM SERPL-SCNC: 134 MMOL/L (ref 136–145)
VANCOMYCIN TROUGH SERPL-MCNC: 18.7 UG/ML (ref 15–20)
WBC # BLD AUTO: 12.59 X10(3)/MCL (ref 4.5–11.5)

## 2025-08-16 PROCEDURE — 36415 COLL VENOUS BLD VENIPUNCTURE: CPT | Performed by: STUDENT IN AN ORGANIZED HEALTH CARE EDUCATION/TRAINING PROGRAM

## 2025-08-16 PROCEDURE — 63600175 PHARM REV CODE 636 W HCPCS: Performed by: STUDENT IN AN ORGANIZED HEALTH CARE EDUCATION/TRAINING PROGRAM

## 2025-08-16 PROCEDURE — 80048 BASIC METABOLIC PNL TOTAL CA: CPT | Performed by: INTERNAL MEDICINE

## 2025-08-16 PROCEDURE — 63600175 PHARM REV CODE 636 W HCPCS: Performed by: INTERNAL MEDICINE

## 2025-08-16 PROCEDURE — S4991 NICOTINE PATCH NONLEGEND: HCPCS | Performed by: INTERNAL MEDICINE

## 2025-08-16 PROCEDURE — 25000003 PHARM REV CODE 250: Performed by: INTERNAL MEDICINE

## 2025-08-16 PROCEDURE — 63600175 PHARM REV CODE 636 W HCPCS

## 2025-08-16 PROCEDURE — 11000001 HC ACUTE MED/SURG PRIVATE ROOM

## 2025-08-16 PROCEDURE — 21400001 HC TELEMETRY ROOM

## 2025-08-16 PROCEDURE — 25000003 PHARM REV CODE 250: Performed by: STUDENT IN AN ORGANIZED HEALTH CARE EDUCATION/TRAINING PROGRAM

## 2025-08-16 PROCEDURE — 85027 COMPLETE CBC AUTOMATED: CPT | Performed by: INTERNAL MEDICINE

## 2025-08-16 PROCEDURE — 80202 ASSAY OF VANCOMYCIN: CPT | Performed by: STUDENT IN AN ORGANIZED HEALTH CARE EDUCATION/TRAINING PROGRAM

## 2025-08-16 PROCEDURE — 36415 COLL VENOUS BLD VENIPUNCTURE: CPT | Performed by: INTERNAL MEDICINE

## 2025-08-16 RX ORDER — POTASSIUM CHLORIDE 7.45 MG/ML
10 INJECTION INTRAVENOUS
Status: COMPLETED | OUTPATIENT
Start: 2025-08-16 | End: 2025-08-16

## 2025-08-16 RX ADMIN — METHOCARBAMOL 500 MG: 500 TABLET ORAL at 01:08

## 2025-08-16 RX ADMIN — METOPROLOL TARTRATE 25 MG: 25 TABLET, FILM COATED ORAL at 09:08

## 2025-08-16 RX ADMIN — TRAZODONE HYDROCHLORIDE 50 MG: 50 TABLET ORAL at 11:08

## 2025-08-16 RX ADMIN — CEFEPIME 2 G: 2 INJECTION, POWDER, FOR SOLUTION INTRAVENOUS at 10:08

## 2025-08-16 RX ADMIN — METHOCARBAMOL 500 MG: 500 TABLET ORAL at 11:08

## 2025-08-16 RX ADMIN — POTASSIUM BICARBONATE 50 MEQ: 977.5 TABLET, EFFERVESCENT ORAL at 10:08

## 2025-08-16 RX ADMIN — ARIPIPRAZOLE 2 MG: 2 TABLET ORAL at 09:08

## 2025-08-16 RX ADMIN — POTASSIUM CHLORIDE 10 MEQ: 7.46 INJECTION, SOLUTION INTRAVENOUS at 01:08

## 2025-08-16 RX ADMIN — METHOCARBAMOL 500 MG: 500 TABLET ORAL at 05:08

## 2025-08-16 RX ADMIN — VANCOMYCIN HYDROCHLORIDE 1000 MG: 1 INJECTION, POWDER, LYOPHILIZED, FOR SOLUTION INTRAVENOUS at 12:08

## 2025-08-16 RX ADMIN — HYDROCODONE BITARTRATE AND ACETAMINOPHEN 1 TABLET: 5; 325 TABLET ORAL at 10:08

## 2025-08-16 RX ADMIN — SODIUM CHLORIDE, POTASSIUM CHLORIDE, SODIUM LACTATE AND CALCIUM CHLORIDE: 600; 310; 30; 20 INJECTION, SOLUTION INTRAVENOUS at 06:08

## 2025-08-16 RX ADMIN — PANTOPRAZOLE SODIUM 40 MG: 40 TABLET, DELAYED RELEASE ORAL at 09:08

## 2025-08-16 RX ADMIN — METHOCARBAMOL 500 MG: 500 TABLET ORAL at 09:08

## 2025-08-16 RX ADMIN — POTASSIUM CHLORIDE 10 MEQ: 7.46 INJECTION, SOLUTION INTRAVENOUS at 11:08

## 2025-08-16 RX ADMIN — ENOXAPARIN SODIUM 40 MG: 40 INJECTION SUBCUTANEOUS at 05:08

## 2025-08-16 RX ADMIN — BUPROPION HYDROCHLORIDE 100 MG: 100 TABLET, FILM COATED, EXTENDED RELEASE ORAL at 11:08

## 2025-08-16 RX ADMIN — LISINOPRIL 10 MG: 10 TABLET ORAL at 09:08

## 2025-08-16 RX ADMIN — PANTOPRAZOLE SODIUM 40 MG: 40 TABLET, DELAYED RELEASE ORAL at 11:08

## 2025-08-16 RX ADMIN — BUPROPION HYDROCHLORIDE 100 MG: 100 TABLET, FILM COATED, EXTENDED RELEASE ORAL at 09:08

## 2025-08-16 RX ADMIN — CEFEPIME 2 G: 2 INJECTION, POWDER, FOR SOLUTION INTRAVENOUS at 11:08

## 2025-08-16 RX ADMIN — NICOTINE 1 PATCH: 14 PATCH TRANSDERMAL at 09:08

## 2025-08-16 RX ADMIN — AMLODIPINE BESYLATE 10 MG: 5 TABLET ORAL at 09:08

## 2025-08-17 VITALS
SYSTOLIC BLOOD PRESSURE: 125 MMHG | TEMPERATURE: 98 F | BODY MASS INDEX: 19.49 KG/M2 | WEIGHT: 110 LBS | HEIGHT: 63 IN | DIASTOLIC BLOOD PRESSURE: 64 MMHG | HEART RATE: 73 BPM | RESPIRATION RATE: 17 BRPM | OXYGEN SATURATION: 98 %

## 2025-08-17 LAB
ALDOST SERPL-MCNC: <3 NG/DL
ALDOST/RENIN PLAS-RTO: <6 RATIO
ANION GAP SERPL CALC-SCNC: 5 MEQ/L
BUN SERPL-MCNC: 14.3 MG/DL (ref 9.8–20.1)
CALCIUM SERPL-MCNC: 8.7 MG/DL (ref 8.4–10.2)
CHLORIDE SERPL-SCNC: 109 MMOL/L (ref 98–107)
CO2 SERPL-SCNC: 26 MMOL/L (ref 23–31)
CREAT SERPL-MCNC: 0.68 MG/DL (ref 0.55–1.02)
CREAT/UREA NIT SERPL: 21
ERYTHROCYTE [DISTWIDTH] IN BLOOD BY AUTOMATED COUNT: 15 % (ref 11.5–17)
GFR SERPLBLD CREATININE-BSD FMLA CKD-EPI: >60 ML/MIN/1.73/M2
GLUCOSE SERPL-MCNC: 82 MG/DL (ref 82–115)
HCT VFR BLD AUTO: 37.4 % (ref 37–47)
HGB BLD-MCNC: 12.5 G/DL (ref 12–16)
MCH RBC QN AUTO: 32.6 PG (ref 27–31)
MCHC RBC AUTO-ENTMCNC: 33.4 G/DL (ref 33–36)
MCV RBC AUTO: 97.4 FL (ref 80–94)
NRBC BLD AUTO-RTO: 0 %
PLATELET # BLD AUTO: 215 X10(3)/MCL (ref 130–400)
PMV BLD AUTO: 9.3 FL (ref 7.4–10.4)
POCT GLUCOSE: 88 MG/DL (ref 70–110)
POCT GLUCOSE: 90 MG/DL (ref 70–110)
POCT GLUCOSE: 90 MG/DL (ref 70–110)
POTASSIUM SERPL-SCNC: 3.5 MMOL/L (ref 3.5–5.1)
RBC # BLD AUTO: 3.84 X10(6)/MCL (ref 4.2–5.4)
RENIN PLAS-CCNC: 0.5 NG/ML/HR
SODIUM SERPL-SCNC: 140 MMOL/L (ref 136–145)
VANCOMYCIN SERPL-MCNC: 16.9 UG/ML (ref 15–20)
WBC # BLD AUTO: 6.52 X10(3)/MCL (ref 4.5–11.5)

## 2025-08-17 PROCEDURE — 80048 BASIC METABOLIC PNL TOTAL CA: CPT | Performed by: INTERNAL MEDICINE

## 2025-08-17 PROCEDURE — 63600175 PHARM REV CODE 636 W HCPCS: Performed by: INTERNAL MEDICINE

## 2025-08-17 PROCEDURE — S4991 NICOTINE PATCH NONLEGEND: HCPCS | Performed by: INTERNAL MEDICINE

## 2025-08-17 PROCEDURE — 36415 COLL VENOUS BLD VENIPUNCTURE: CPT | Performed by: INTERNAL MEDICINE

## 2025-08-17 PROCEDURE — 25000003 PHARM REV CODE 250: Performed by: INTERNAL MEDICINE

## 2025-08-17 PROCEDURE — 85027 COMPLETE CBC AUTOMATED: CPT | Performed by: INTERNAL MEDICINE

## 2025-08-17 PROCEDURE — 80202 ASSAY OF VANCOMYCIN: CPT | Performed by: INTERNAL MEDICINE

## 2025-08-17 PROCEDURE — 25000003 PHARM REV CODE 250: Performed by: STUDENT IN AN ORGANIZED HEALTH CARE EDUCATION/TRAINING PROGRAM

## 2025-08-17 RX ORDER — IBUPROFEN 200 MG
1 TABLET ORAL DAILY
COMMUNITY
Start: 2025-08-18

## 2025-08-17 RX ORDER — BENZONATATE 200 MG/1
200 CAPSULE ORAL EVERY 8 HOURS PRN
Qty: 30 CAPSULE | Refills: 0 | Status: SHIPPED | OUTPATIENT
Start: 2025-08-17 | End: 2025-08-27

## 2025-08-17 RX ORDER — LOPERAMIDE HYDROCHLORIDE 2 MG/1
2 CAPSULE ORAL 4 TIMES DAILY PRN
COMMUNITY
Start: 2025-08-17 | End: 2025-08-27

## 2025-08-17 RX ORDER — IBUPROFEN 200 MG
1 TABLET ORAL DAILY
COMMUNITY
Start: 2025-08-17

## 2025-08-17 RX ORDER — AMOXICILLIN AND CLAVULANATE POTASSIUM 875; 125 MG/1; MG/1
1 TABLET, FILM COATED ORAL EVERY 12 HOURS
Qty: 14 TABLET | Refills: 0 | Status: SHIPPED | OUTPATIENT
Start: 2025-08-17 | End: 2025-08-24

## 2025-08-17 RX ORDER — AMOXICILLIN AND CLAVULANATE POTASSIUM 875; 125 MG/1; MG/1
1 TABLET, FILM COATED ORAL EVERY 12 HOURS
Status: DISCONTINUED | OUTPATIENT
Start: 2025-08-17 | End: 2025-08-17 | Stop reason: HOSPADM

## 2025-08-17 RX ORDER — METOPROLOL TARTRATE 25 MG/1
25 TABLET, FILM COATED ORAL 2 TIMES DAILY
Qty: 60 TABLET | Refills: 0 | Status: SHIPPED | OUTPATIENT
Start: 2025-08-17 | End: 2025-09-16

## 2025-08-17 RX ADMIN — PANTOPRAZOLE SODIUM 40 MG: 40 TABLET, DELAYED RELEASE ORAL at 09:08

## 2025-08-17 RX ADMIN — METHOCARBAMOL 500 MG: 500 TABLET ORAL at 12:08

## 2025-08-17 RX ADMIN — NICOTINE 1 PATCH: 14 PATCH TRANSDERMAL at 09:08

## 2025-08-17 RX ADMIN — AMOXICILLIN AND CLAVULANATE POTASSIUM 1 TABLET: 875; 125 TABLET, FILM COATED ORAL at 12:08

## 2025-08-17 RX ADMIN — BUPROPION HYDROCHLORIDE 100 MG: 100 TABLET, FILM COATED, EXTENDED RELEASE ORAL at 09:08

## 2025-08-17 RX ADMIN — CEFEPIME 2 G: 2 INJECTION, POWDER, FOR SOLUTION INTRAVENOUS at 10:08

## 2025-08-17 RX ADMIN — ACETAMINOPHEN 325 MG: 325 TABLET ORAL at 10:08

## 2025-08-17 RX ADMIN — ARIPIPRAZOLE 2 MG: 2 TABLET ORAL at 09:08

## 2025-08-19 LAB
BACTERIA BLD CULT: NORMAL
BACTERIA BLD CULT: NORMAL

## (undated) DEVICE — DRAPE INCISE IOBAN 2 23X23IN

## (undated) DEVICE — SUT VICRYL BR 1 GEN 27 CT-1

## (undated) DEVICE — SUT CTD VICRYL CT-1 27

## (undated) DEVICE — STAPLER SKIN PROXIMATE WIDE

## (undated) DEVICE — DEVICE CLSR PDS PLUS 1 CT 18IN

## (undated) DEVICE — DRESSING ISLAND TELFA 4X5IN

## (undated) DEVICE — APPLICATOR CHLORAPREP ORN 26ML

## (undated) DEVICE — SUT STRATAFIX 3-0 SH 8IN

## (undated) DEVICE — RETRIEVER SUTURE HEWSON DISP

## (undated) DEVICE — GUIDEWIRE SAFEGUIDE 210CM

## (undated) DEVICE — BLADE SURG CARBON STEEL #10

## (undated) DEVICE — CONTAINER SPECIMEN SCREW 4OZ

## (undated) DEVICE — GLOVE SIGNATURE MICRO LTX 6

## (undated) DEVICE — WIRE GUIDE 3.2MM 400MM
Type: IMPLANTABLE DEVICE | Site: FEMUR | Status: NON-FUNCTIONAL
Removed: 2022-10-27

## (undated) DEVICE — DRESSING XEROFORM 5X9IN

## (undated) DEVICE — ELECTRODE REM POLYHESIVE II

## (undated) DEVICE — Device

## (undated) DEVICE — GLOVE PROTEXIS LTX MICRO 8

## (undated) DEVICE — SOL IRRI STRL WATER 1000ML

## (undated) DEVICE — LINER MEDI-VAC PPV FLTR 1500CC

## (undated) DEVICE — SYRINGE 0.9% NACL 10MIL PREFIL

## (undated) DEVICE — BLADE SAG DUAL CUT 18X90X1.35

## (undated) DEVICE — DRAPE STERI U-SHAPED 47X51IN

## (undated) DEVICE — TAPE SILK 3IN

## (undated) DEVICE — COVER TABLE HVY DTY 60X90IN

## (undated) DEVICE — DRAPE ORTH SPLIT 77X108IN

## (undated) DEVICE — KIT SURGICAL TURNOVER

## (undated) DEVICE — GLOVE PROTEXIS HYDROGEL SZ9

## (undated) DEVICE — GLOVE PROTEXIS NEU-THERA SZ6

## (undated) DEVICE — GOWN SMARTSLEEVE XXL/XLONG

## (undated) DEVICE — GLOVE PROTEXIS PI CRM 7

## (undated) DEVICE — COVER FULLGUARD SHOE HIGH-TOP

## (undated) DEVICE — SPONGE GAUZE 4X4 12 PLY STRL

## (undated) DEVICE — GOWN SMARTGOWN 3XL XLONG

## (undated) DEVICE — PILLOW ABDUCTION FOAM MED

## (undated) DEVICE — SOL NACL IRR 1000ML BTL

## (undated) DEVICE — GLOVE PROTEXIS BLUE LATEX 7

## (undated) DEVICE — BLOCK BLOX BITE DENT RIM 54FR

## (undated) DEVICE — SPONGE COTTON TRAY 4X4IN

## (undated) DEVICE — GLOVE 8 PROTEXIS PI ORTHO

## (undated) DEVICE — DRAPE SURG W/TWL 17 5/8X23

## (undated) DEVICE — SUT 5 30IN ETHIBOND GRN BR

## (undated) DEVICE — COVER SHOE FLUID RESISTANT XL

## (undated) DEVICE — MANIFOLD 4 PORT

## (undated) DEVICE — SOL NACL IRR 3000ML

## (undated) DEVICE — SUT STRATAFIX 1PDS CTX 18IN

## (undated) DEVICE — BIT DRILL QCK-CPLG 4.2MM

## (undated) DEVICE — COVER HD BACK TABLE 5FT

## (undated) DEVICE — DRESSING TELFA + RECT 6X10IN

## (undated) DEVICE — TIP SUCTION YANKAUER

## (undated) DEVICE — GLOVE PROTEXIS BLUE LATEX 9

## (undated) DEVICE — FORCEP ALLIGATOR 2.8MM W/NDL

## (undated) DEVICE — CABLE EKG DL RBBN 3 LEAD DISP

## (undated) DEVICE — DRAPE C-ARMOR EQUIPMENT COVER

## (undated) DEVICE — ELECTRODE PATIENT RETURN DISP

## (undated) DEVICE — KIT SURGICAL COLON .25 1.1OZ

## (undated) DEVICE — KIT TOTAL HIP HLGC